# Patient Record
Sex: FEMALE | Race: BLACK OR AFRICAN AMERICAN | NOT HISPANIC OR LATINO | Employment: OTHER | URBAN - METROPOLITAN AREA
[De-identification: names, ages, dates, MRNs, and addresses within clinical notes are randomized per-mention and may not be internally consistent; named-entity substitution may affect disease eponyms.]

---

## 2017-06-03 PROBLEM — J96.21 ACUTE ON CHRONIC RESPIRATORY FAILURE WITH HYPOXIA AND HYPERCAPNIA: Status: ACTIVE | Noted: 2017-06-03

## 2017-06-03 PROBLEM — K43.6 VENTRAL HERNIA WITH BOWEL OBSTRUCTION: Status: ACTIVE | Noted: 2017-06-03

## 2017-06-03 PROBLEM — J96.22 ACUTE ON CHRONIC RESPIRATORY FAILURE WITH HYPOXIA AND HYPERCAPNIA: Status: ACTIVE | Noted: 2017-06-03

## 2017-06-08 PROBLEM — I26.99 PE (PULMONARY THROMBOEMBOLISM): Status: ACTIVE | Noted: 2017-06-08

## 2017-06-08 PROBLEM — I48.0 PAROXYSMAL A-FIB: Status: ACTIVE | Noted: 2017-06-08

## 2017-06-14 ENCOUNTER — PATIENT OUTREACH (OUTPATIENT)
Dept: ADMINISTRATIVE | Facility: CLINIC | Age: 67
End: 2017-06-14

## 2017-06-14 NOTE — PROGRESS NOTES
[facility] Script completed with patient instructed on medications stopped with D/C, verbalizes understanding.

## 2017-06-14 NOTE — PATIENT INSTRUCTIONS
Chronic Lung Disease: If Oxygen Is Prescribed   Supplemental oxygen is prescribed if tests show that the level of oxygen in your blood is too low. If the level stays too low for too long, serious problems can develop in many parts of the body. Supplemental oxygen helps to relieve your symptoms and prevent future problems by getting more oxygen to the blood. Depending on your test results, you may need oxygen all the time. Or it may only be needed during certain activities, such as exercise or sleep. When oxygen is prescribed, youll be referred to a medical equipment company. They will set up the oxygen unit and teach you how to use it.  Nasal cannula     A nasal cannula should be placed in the nose with the prongs arching toward you.     Oxygen is most often inhaled through a nasal cannula (lightweight tube with two hollow prongs that fit just inside the nose).  Types of supplemental oxygen    Compressed oxygen   Oxygen concentrator   Liquid oxygen   Prescribed oxygen comes in several forms. You may use more than one type, depending on when you need oxygen:  · Compressed oxygen is oxygen gas stored in a tank. Because the oxygen is stored under pressure, these tanks must be handled carefully. Gauges on the tank can be used to adjust the oxygen flow rate. Your healthcare provider will determine what this should be. Small tanks can be carried. Larger tanks are on wheels and can be pulled around the house.  · An oxygen concentrator is a machine about the size of a large suitcase. It plugs into an electrical outlet. (A back-up oxygen supply is recommended in case of a power outage.) The machine takes oxygen from the air and concentrates it. Its then delivered to you through plastic tubing. The tubing is long enough so that you can move around the house. When youre using the concentrator, it must be kept somewhere that has a good supply of fresh air. (Dont keep it in a confined space, like a closet.) You may be set up  on a concentrator if you need oxygen all the time or while youre sleeping.  · Liquid oxygen results when oxygen gas is cooled to a very low temperature. Its kept in special containers that maintain this low temperature. When you use liquid oxygen, its warmed and becomes gas before reaching the cannula. Most tanks come with a portable unit that you can carry or pull on a cart. Some of these weigh only a few pounds. Liquid oxygen units are easy to carry around. If you need oxygen all the time or during activity, this kind of unit can help you stay active.  Oxygen is prescribed just for you  Your healthcare provider will prescribe oxygen based on your needs. Here are a few things you should know:  · A therapist from the medical equipment company will explain when to use oxygen and what type to use. Youll be taught how to use and maintain your oxygen equipment.  · You must use the exact rate of oxygen prescribed for each activity. Dont increase or decrease the amount without asking your healthcare provider first.  · Supplemental oxygen is a medicine. Its not addictive and causes no side effects when used as directed.   Date Last Reviewed: 5/1/2016  © 8838-0761 [employer]. [address] [place] [address]. All rights reserved. This information is not intended as a substitute for professional medical care. Always follow your healthcare professional's instructions.

## 2021-04-12 PROBLEM — J96.12 CHRONIC RESPIRATORY FAILURE WITH HYPOXIA AND HYPERCAPNIA: Chronic | Status: ACTIVE | Noted: 2017-06-03

## 2021-04-12 PROBLEM — I26.99 PE (PULMONARY THROMBOEMBOLISM): Chronic | Status: ACTIVE | Noted: 2017-06-08

## 2021-04-12 PROBLEM — J96.11 CHRONIC RESPIRATORY FAILURE WITH HYPOXIA AND HYPERCAPNIA: Chronic | Status: ACTIVE | Noted: 2017-06-03

## 2021-04-12 PROBLEM — I48.0 PAROXYSMAL A-FIB: Chronic | Status: ACTIVE | Noted: 2017-06-08

## 2021-08-12 ENCOUNTER — HOSPITAL ENCOUNTER (EMERGENCY)
Facility: HOSPITAL | Age: 71
Discharge: HOME | End: 2021-08-12
Attending: EMERGENCY MEDICINE
Payer: MEDICARE

## 2021-08-12 VITALS
BODY MASS INDEX: 51.91 KG/M2 | SYSTOLIC BLOOD PRESSURE: 122 MMHG | TEMPERATURE: 98 F | HEIGHT: 63 IN | HEART RATE: 88 BPM | OXYGEN SATURATION: 96 % | WEIGHT: 293 LBS | RESPIRATION RATE: 20 BRPM | DIASTOLIC BLOOD PRESSURE: 84 MMHG

## 2021-08-12 DIAGNOSIS — J44.1 COPD EXACERBATION: Primary | ICD-10-CM

## 2021-08-12 DIAGNOSIS — Z20.822 COVID-19 VIRUS NOT DETECTED: ICD-10-CM

## 2021-08-12 LAB
CTP QC/QA: YES
SARS-COV-2 RDRP RESP QL NAA+PROBE: NEGATIVE

## 2021-08-12 PROCEDURE — 94761 N-INVAS EAR/PLS OXIMETRY MLT: CPT

## 2021-08-12 PROCEDURE — 94640 AIRWAY INHALATION TREATMENT: CPT

## 2021-08-12 PROCEDURE — 63600175 PHARM REV CODE 636 W HCPCS: Performed by: EMERGENCY MEDICINE

## 2021-08-12 PROCEDURE — 25000242 PHARM REV CODE 250 ALT 637 W/ HCPCS: Performed by: EMERGENCY MEDICINE

## 2021-08-12 PROCEDURE — 99284 EMERGENCY DEPT VISIT MOD MDM: CPT | Mod: 25

## 2021-08-12 PROCEDURE — 99900035 HC TECH TIME PER 15 MIN (STAT)

## 2021-08-12 PROCEDURE — 99900031 HC PATIENT EDUCATION (STAT)

## 2021-08-12 PROCEDURE — 27000221 HC OXYGEN, UP TO 24 HOURS

## 2021-08-12 PROCEDURE — U0002 COVID-19 LAB TEST NON-CDC: HCPCS | Performed by: EMERGENCY MEDICINE

## 2021-08-12 PROCEDURE — 96372 THER/PROPH/DIAG INJ SC/IM: CPT

## 2021-08-12 RX ORDER — DICYCLOMINE HYDROCHLORIDE 10 MG/ML
20 INJECTION INTRAMUSCULAR
Status: COMPLETED | OUTPATIENT
Start: 2021-08-12 | End: 2021-08-12

## 2021-08-12 RX ORDER — IPRATROPIUM BROMIDE AND ALBUTEROL SULFATE 2.5; .5 MG/3ML; MG/3ML
3 SOLUTION RESPIRATORY (INHALATION)
Status: COMPLETED | OUTPATIENT
Start: 2021-08-12 | End: 2021-08-12

## 2021-08-12 RX ORDER — METHYLPREDNISOLONE SOD SUCC 125 MG
125 VIAL (EA) INJECTION
Status: COMPLETED | OUTPATIENT
Start: 2021-08-12 | End: 2021-08-12

## 2021-08-12 RX ADMIN — IPRATROPIUM BROMIDE AND ALBUTEROL SULFATE 3 ML: 2.5; .5 SOLUTION RESPIRATORY (INHALATION) at 11:08

## 2021-08-12 RX ADMIN — DICYCLOMINE HYDROCHLORIDE 20 MG: 20 INJECTION, SOLUTION INTRAMUSCULAR at 11:08

## 2021-08-12 RX ADMIN — METHYLPREDNISOLONE SODIUM SUCCINATE 125 MG: 125 INJECTION, POWDER, FOR SOLUTION INTRAMUSCULAR; INTRAVENOUS at 11:08

## 2022-01-31 ENCOUNTER — HOSPITAL ENCOUNTER (INPATIENT)
Facility: HOSPITAL | Age: 72
LOS: 7 days | Discharge: HOME | DRG: 177 | End: 2022-02-07
Attending: EMERGENCY MEDICINE | Admitting: INTERNAL MEDICINE
Payer: MEDICARE

## 2022-01-31 DIAGNOSIS — R09.02 HYPOXIA: ICD-10-CM

## 2022-01-31 DIAGNOSIS — U07.1 COVID-19: Primary | ICD-10-CM

## 2022-01-31 DIAGNOSIS — R06.02 SHORTNESS OF BREATH: ICD-10-CM

## 2022-01-31 LAB
ALBUMIN SERPL BCP-MCNC: 2.9 G/DL (ref 3.5–5.2)
ALP SERPL-CCNC: 178 U/L (ref 55–135)
ALT SERPL W/O P-5'-P-CCNC: 31 U/L (ref 10–44)
ANION GAP SERPL CALC-SCNC: 4 MMOL/L (ref 8–16)
AST SERPL-CCNC: 44 U/L (ref 10–40)
BASOPHILS # BLD AUTO: 0.03 K/UL (ref 0–0.2)
BASOPHILS NFR BLD: 0.4 % (ref 0–1.9)
BILIRUB SERPL-MCNC: 0.5 MG/DL (ref 0.1–1)
BUN SERPL-MCNC: 15 MG/DL (ref 8–23)
CALCIUM SERPL-MCNC: 9.6 MG/DL (ref 8.7–10.5)
CHLORIDE SERPL-SCNC: 108 MMOL/L (ref 95–110)
CO2 SERPL-SCNC: 33 MMOL/L (ref 23–29)
CREAT SERPL-MCNC: 1.4 MG/DL (ref 0.5–1.4)
CTP QC/QA: YES
DIFFERENTIAL METHOD: ABNORMAL
EOSINOPHIL # BLD AUTO: 0 K/UL (ref 0–0.5)
EOSINOPHIL NFR BLD: 0 % (ref 0–8)
ERYTHROCYTE [DISTWIDTH] IN BLOOD BY AUTOMATED COUNT: 17.2 % (ref 11.5–14.5)
EST. GFR  (AFRICAN AMERICAN): 43.6 ML/MIN/1.73 M^2
EST. GFR  (NON AFRICAN AMERICAN): 37.8 ML/MIN/1.73 M^2
GLUCOSE SERPL-MCNC: 125 MG/DL (ref 70–110)
HCT VFR BLD AUTO: 42.2 % (ref 37–48.5)
HGB BLD-MCNC: 13.8 G/DL (ref 12–16)
IMM GRANULOCYTES # BLD AUTO: 0.02 K/UL (ref 0–0.04)
IMM GRANULOCYTES NFR BLD AUTO: 0.3 % (ref 0–0.5)
LYMPHOCYTES # BLD AUTO: 1.5 K/UL (ref 1–4.8)
LYMPHOCYTES NFR BLD: 20.3 % (ref 18–48)
MCH RBC QN AUTO: 29.9 PG (ref 27–31)
MCHC RBC AUTO-ENTMCNC: 32.7 G/DL (ref 32–36)
MCV RBC AUTO: 92 FL (ref 82–98)
MONOCYTES # BLD AUTO: 0.6 K/UL (ref 0.3–1)
MONOCYTES NFR BLD: 8.7 % (ref 4–15)
NEUTROPHILS # BLD AUTO: 5.1 K/UL (ref 1.8–7.7)
NEUTROPHILS NFR BLD: 70.3 % (ref 38–73)
NRBC BLD-RTO: 0 /100 WBC
NT-PROBNP SERPL-MCNC: 9796 PG/ML (ref 5–900)
PLATELET # BLD AUTO: 172 K/UL (ref 150–450)
PMV BLD AUTO: 9.5 FL (ref 9.2–12.9)
POTASSIUM SERPL-SCNC: 4.1 MMOL/L (ref 3.5–5.1)
PROT SERPL-MCNC: 6.7 G/DL (ref 6–8.4)
RBC # BLD AUTO: 4.61 M/UL (ref 4–5.4)
SARS-COV-2 RDRP RESP QL NAA+PROBE: POSITIVE
SODIUM SERPL-SCNC: 145 MMOL/L (ref 136–145)
TROPONIN I SERPL DL<=0.01 NG/ML-MCNC: 39.9 PG/ML (ref 0–60)
WBC # BLD AUTO: 7.25 K/UL (ref 3.9–12.7)

## 2022-01-31 PROCEDURE — U0002 COVID-19 LAB TEST NON-CDC: HCPCS | Performed by: EMERGENCY MEDICINE

## 2022-01-31 PROCEDURE — 36415 COLL VENOUS BLD VENIPUNCTURE: CPT | Performed by: EMERGENCY MEDICINE

## 2022-01-31 PROCEDURE — 93010 EKG 12-LEAD: ICD-10-PCS | Mod: ,,, | Performed by: INTERNAL MEDICINE

## 2022-01-31 PROCEDURE — 94660 CPAP INITIATION&MGMT: CPT

## 2022-01-31 PROCEDURE — 93010 ELECTROCARDIOGRAM REPORT: CPT | Mod: ,,, | Performed by: INTERNAL MEDICINE

## 2022-01-31 PROCEDURE — 63600175 PHARM REV CODE 636 W HCPCS: Performed by: EMERGENCY MEDICINE

## 2022-01-31 PROCEDURE — 93005 ELECTROCARDIOGRAM TRACING: CPT

## 2022-01-31 PROCEDURE — 27000221 HC OXYGEN, UP TO 24 HOURS

## 2022-01-31 PROCEDURE — 96374 THER/PROPH/DIAG INJ IV PUSH: CPT

## 2022-01-31 PROCEDURE — 25000003 PHARM REV CODE 250: Performed by: EMERGENCY MEDICINE

## 2022-01-31 PROCEDURE — 99291 CRITICAL CARE FIRST HOUR: CPT | Mod: 25

## 2022-01-31 PROCEDURE — 27000207 HC ISOLATION

## 2022-01-31 PROCEDURE — 94761 N-INVAS EAR/PLS OXIMETRY MLT: CPT

## 2022-01-31 PROCEDURE — 85025 COMPLETE CBC W/AUTO DIFF WBC: CPT | Performed by: EMERGENCY MEDICINE

## 2022-01-31 PROCEDURE — 27000190 HC CPAP FULL FACE MASK W/VALVE

## 2022-01-31 PROCEDURE — 80053 COMPREHEN METABOLIC PANEL: CPT | Performed by: EMERGENCY MEDICINE

## 2022-01-31 PROCEDURE — 99900031 HC PATIENT EDUCATION (STAT)

## 2022-01-31 PROCEDURE — 83880 ASSAY OF NATRIURETIC PEPTIDE: CPT | Performed by: EMERGENCY MEDICINE

## 2022-01-31 PROCEDURE — 84484 ASSAY OF TROPONIN QUANT: CPT | Performed by: EMERGENCY MEDICINE

## 2022-01-31 PROCEDURE — 11000001 HC ACUTE MED/SURG PRIVATE ROOM

## 2022-01-31 PROCEDURE — 99900035 HC TECH TIME PER 15 MIN (STAT)

## 2022-01-31 RX ORDER — TALC
6 POWDER (GRAM) TOPICAL NIGHTLY PRN
Status: DISCONTINUED | OUTPATIENT
Start: 2022-01-31 | End: 2022-02-07 | Stop reason: HOSPADM

## 2022-01-31 RX ORDER — SODIUM CHLORIDE 0.9 % (FLUSH) 0.9 %
10 SYRINGE (ML) INJECTION
Status: DISCONTINUED | OUTPATIENT
Start: 2022-01-31 | End: 2022-02-07 | Stop reason: HOSPADM

## 2022-01-31 RX ORDER — DEXAMETHASONE SODIUM PHOSPHATE 4 MG/ML
6 INJECTION, SOLUTION INTRA-ARTICULAR; INTRALESIONAL; INTRAMUSCULAR; INTRAVENOUS; SOFT TISSUE EVERY 24 HOURS
Status: DISCONTINUED | OUTPATIENT
Start: 2022-01-31 | End: 2022-02-01

## 2022-01-31 RX ORDER — ENOXAPARIN SODIUM 100 MG/ML
40 INJECTION SUBCUTANEOUS EVERY 12 HOURS
Status: DISCONTINUED | OUTPATIENT
Start: 2022-01-31 | End: 2022-02-07 | Stop reason: HOSPADM

## 2022-01-31 RX ORDER — ACETAMINOPHEN 325 MG/1
650 TABLET ORAL EVERY 8 HOURS PRN
Status: DISCONTINUED | OUTPATIENT
Start: 2022-01-31 | End: 2022-02-07 | Stop reason: HOSPADM

## 2022-01-31 RX ORDER — ONDANSETRON 2 MG/ML
4 INJECTION INTRAMUSCULAR; INTRAVENOUS EVERY 8 HOURS PRN
Status: DISCONTINUED | OUTPATIENT
Start: 2022-01-31 | End: 2022-02-07 | Stop reason: HOSPADM

## 2022-01-31 RX ORDER — FUROSEMIDE 10 MG/ML
40 INJECTION INTRAMUSCULAR; INTRAVENOUS
Status: COMPLETED | OUTPATIENT
Start: 2022-01-31 | End: 2022-01-31

## 2022-01-31 RX ADMIN — ENOXAPARIN SODIUM 40 MG: 40 INJECTION SUBCUTANEOUS at 10:01

## 2022-01-31 RX ADMIN — FUROSEMIDE 40 MG: 10 INJECTION, SOLUTION INTRAVENOUS at 06:01

## 2022-01-31 RX ADMIN — REMDESIVIR 200 MG: 100 INJECTION, POWDER, LYOPHILIZED, FOR SOLUTION INTRAVENOUS at 10:01

## 2022-01-31 RX ADMIN — MELATONIN TAB 3 MG 6 MG: 3 TAB at 11:01

## 2022-01-31 RX ADMIN — DEXAMETHASONE SODIUM PHOSPHATE 6 MG: 4 INJECTION, SOLUTION INTRA-ARTICULAR; INTRALESIONAL; INTRAMUSCULAR; INTRAVENOUS; SOFT TISSUE at 10:01

## 2022-02-01 PROBLEM — U07.1 COVID-19: Status: ACTIVE | Noted: 2022-02-01

## 2022-02-01 PROBLEM — J96.01 ACUTE HYPOXEMIC RESPIRATORY FAILURE: Status: ACTIVE | Noted: 2022-02-01

## 2022-02-01 LAB
ALBUMIN SERPL BCP-MCNC: 3.3 G/DL (ref 3.5–5.2)
ALP SERPL-CCNC: 185 U/L (ref 55–135)
ALT SERPL W/O P-5'-P-CCNC: 36 U/L (ref 10–44)
ANION GAP SERPL CALC-SCNC: 8 MMOL/L (ref 8–16)
AST SERPL-CCNC: 34 U/L (ref 10–40)
BASOPHILS # BLD AUTO: 0.01 K/UL (ref 0–0.2)
BASOPHILS NFR BLD: 0.2 % (ref 0–1.9)
BILIRUB SERPL-MCNC: 0.4 MG/DL (ref 0.1–1)
BUN SERPL-MCNC: 14 MG/DL (ref 8–23)
CALCIUM SERPL-MCNC: 9.9 MG/DL (ref 8.7–10.5)
CHLORIDE SERPL-SCNC: 107 MMOL/L (ref 95–110)
CO2 SERPL-SCNC: 31 MMOL/L (ref 23–29)
CREAT SERPL-MCNC: 1.3 MG/DL (ref 0.5–1.4)
DIFFERENTIAL METHOD: ABNORMAL
EOSINOPHIL # BLD AUTO: 0 K/UL (ref 0–0.5)
EOSINOPHIL NFR BLD: 0 % (ref 0–8)
ERYTHROCYTE [DISTWIDTH] IN BLOOD BY AUTOMATED COUNT: 18.5 % (ref 11.5–14.5)
EST. GFR  (AFRICAN AMERICAN): 47.7 ML/MIN/1.73 M^2
EST. GFR  (NON AFRICAN AMERICAN): 41.4 ML/MIN/1.73 M^2
GLUCOSE SERPL-MCNC: 138 MG/DL (ref 70–110)
HCT VFR BLD AUTO: 44.6 % (ref 37–48.5)
HGB BLD-MCNC: 14.6 G/DL (ref 12–16)
IMM GRANULOCYTES # BLD AUTO: 0.02 K/UL (ref 0–0.04)
IMM GRANULOCYTES NFR BLD AUTO: 0.3 % (ref 0–0.5)
LYMPHOCYTES # BLD AUTO: 0.7 K/UL (ref 1–4.8)
LYMPHOCYTES NFR BLD: 9.9 % (ref 18–48)
MCH RBC QN AUTO: 30.9 PG (ref 27–31)
MCHC RBC AUTO-ENTMCNC: 32.7 G/DL (ref 32–36)
MCV RBC AUTO: 95 FL (ref 82–98)
MONOCYTES # BLD AUTO: 0.2 K/UL (ref 0.3–1)
MONOCYTES NFR BLD: 2.6 % (ref 4–15)
NEUTROPHILS # BLD AUTO: 5.7 K/UL (ref 1.8–7.7)
NEUTROPHILS NFR BLD: 87 % (ref 38–73)
NRBC BLD-RTO: 0 /100 WBC
PLATELET # BLD AUTO: 191 K/UL (ref 150–450)
PMV BLD AUTO: 10.4 FL (ref 9.2–12.9)
POTASSIUM SERPL-SCNC: 4.5 MMOL/L (ref 3.5–5.1)
PROT SERPL-MCNC: 7.6 G/DL (ref 6–8.4)
RBC # BLD AUTO: 4.72 M/UL (ref 4–5.4)
SODIUM SERPL-SCNC: 146 MMOL/L (ref 136–145)
WBC # BLD AUTO: 6.58 K/UL (ref 3.9–12.7)

## 2022-02-01 PROCEDURE — 80053 COMPREHEN METABOLIC PANEL: CPT | Performed by: EMERGENCY MEDICINE

## 2022-02-01 PROCEDURE — 99900035 HC TECH TIME PER 15 MIN (STAT)

## 2022-02-01 PROCEDURE — 94660 CPAP INITIATION&MGMT: CPT

## 2022-02-01 PROCEDURE — 94761 N-INVAS EAR/PLS OXIMETRY MLT: CPT

## 2022-02-01 PROCEDURE — 27000207 HC ISOLATION

## 2022-02-01 PROCEDURE — 25000003 PHARM REV CODE 250: Performed by: EMERGENCY MEDICINE

## 2022-02-01 PROCEDURE — 99900031 HC PATIENT EDUCATION (STAT)

## 2022-02-01 PROCEDURE — 11000001 HC ACUTE MED/SURG PRIVATE ROOM

## 2022-02-01 PROCEDURE — 63600175 PHARM REV CODE 636 W HCPCS: Performed by: EMERGENCY MEDICINE

## 2022-02-01 PROCEDURE — 27000221 HC OXYGEN, UP TO 24 HOURS

## 2022-02-01 PROCEDURE — 63600175 PHARM REV CODE 636 W HCPCS: Performed by: INTERNAL MEDICINE

## 2022-02-01 PROCEDURE — 85025 COMPLETE CBC W/AUTO DIFF WBC: CPT | Performed by: EMERGENCY MEDICINE

## 2022-02-01 PROCEDURE — 36415 COLL VENOUS BLD VENIPUNCTURE: CPT | Performed by: EMERGENCY MEDICINE

## 2022-02-01 RX ORDER — DEXAMETHASONE SODIUM PHOSPHATE 4 MG/ML
6 INJECTION, SOLUTION INTRA-ARTICULAR; INTRALESIONAL; INTRAMUSCULAR; INTRAVENOUS; SOFT TISSUE EVERY 24 HOURS
Status: DISCONTINUED | OUTPATIENT
Start: 2022-02-01 | End: 2022-02-05

## 2022-02-01 RX ORDER — FUROSEMIDE 10 MG/ML
40 INJECTION INTRAMUSCULAR; INTRAVENOUS
Status: DISCONTINUED | OUTPATIENT
Start: 2022-02-01 | End: 2022-02-07 | Stop reason: HOSPADM

## 2022-02-01 RX ADMIN — DEXAMETHASONE SODIUM PHOSPHATE 6 MG: 4 INJECTION, SOLUTION INTRA-ARTICULAR; INTRALESIONAL; INTRAMUSCULAR; INTRAVENOUS; SOFT TISSUE at 01:02

## 2022-02-01 RX ADMIN — ENOXAPARIN SODIUM 40 MG: 40 INJECTION SUBCUTANEOUS at 08:02

## 2022-02-01 RX ADMIN — ENOXAPARIN SODIUM 40 MG: 40 INJECTION SUBCUTANEOUS at 09:02

## 2022-02-01 RX ADMIN — REMDESIVIR 100 MG: 100 INJECTION, POWDER, LYOPHILIZED, FOR SOLUTION INTRAVENOUS at 09:02

## 2022-02-01 RX ADMIN — FUROSEMIDE 40 MG: 10 INJECTION, SOLUTION INTRAVENOUS at 02:02

## 2022-02-01 NOTE — CARE UPDATE
Patient stated she did not feel like she was getting enough pressure on the bipap at 10/5 40%. Dr. Stephen stated ok to adjust settings to fit patients needs. Increased bipap settings to 16/8 40%. Pt stated she was more comfortable on these settings. No distress noted. Nurse Talon aware of bipap changes. Pt sats 94%

## 2022-02-01 NOTE — H&P
"[facility]  History & Physical    Patient Name: Carmen Tan  MRN: [id number]  Patient Class: IP- Inpatient  Admission Date: 1/31/2022  Attending Physician: Oscar Ge Jr., MD   Primary Care Provider: Lucian Barry MD         Patient information was obtained from ER records.     Subjective:     Principal Problem:<principal problem not specified>    Chief Complaint:   Chief Complaint   Patient presents with    Shortness of Breath     EMS reports, "Patient complaining of shortness of breath. Stopped taking her Lasix 3-4 months ago because she could never make it to the restroom. She would urinate all over. Started feeling really bad this morning. I put her on 4 liters and was up to 97%. 18 gauge right hand."        HPI:   Chief Complaint   Patient presents with    Shortness of Breath       EMS reports, "Patient complaining of shortness of breath. Stopped taking her Lasix 3-4 months ago because she could never make it to the restroom. She would urinate all over. Started feeling really bad this morning. I put her on 4 liters and was up to 97%. 18 gauge right hand."      73 yo female with HTN, CHF, COPD on 2L here with SOB x 2 days. No fever. Dry cough. No hemoptysis. Gradual onset. No known sick contacts. Vaccinated for COVID. Worse with exertion and alleviated with rest. Self d/c'd lasix a few months ago because she didn't like urinating so frequently.          Past Medical History:   Diagnosis Date    Abdominal hernia     CHF (congestive heart failure)     COPD (chronic obstructive pulmonary disease)     GERD (gastroesophageal reflux disease)     History of home oxygen therapy     Hypertension     Migraine headache     Pulmonary embolism 06/01/2017    Small bowel obstruction     Thyroid disease        Past Surgical History:   Procedure Laterality Date    COLONOSCOPY      HYSTERECTOMY      INNER EAR SURGERY      UPPER GASTROINTESTINAL ENDOSCOPY         Review of " patient's allergies indicates:   Allergen Reactions    Pcn [penicillins] Swelling       No current facility-administered medications on file prior to encounter.     Current Outpatient Medications on File Prior to Encounter   Medication Sig    fesoterodine (TOVIAZ) 8 mg Tb24 Take by mouth.    fish oil-omega-3 fatty acids 300-1,000 mg capsule Take 2 g by mouth once daily.    furosemide (LASIX) 40 MG tablet Take 1 tablet (40 mg total) by mouth 2 (two) times daily. If having increased shortness of breath, increase in leg swelling, and if you notice an >3lbs in 24 hours. May take up to 4 tablets in 1 day (every 6 hours)    gabapentin (NEURONTIN) 600 MG tablet Take 600 mg by mouth 2 (two) times daily.    LIPITOR 40 mg tablet Take 1 tablet (40 mg total) by mouth once daily.    lisinopril (PRINIVIL,ZESTRIL) 5 MG tablet Take 1 tablet (5 mg total) by mouth once daily.    metoprolol tartrate (LOPRESSOR) 25 MG tablet Take 1 tablet (25 mg total) by mouth 2 (two) times daily.    mometasone-formoterol (DULERA) 200-5 mcg/actuation inhaler Inhale 2 puffs into the lungs 2 (two) times daily. Controller    omeprazole (PRILOSEC) 20 MG capsule Take 20 mg by mouth once daily.    rivaroxaban (XARELTO) 10 mg Tab Take 2 tablets (20 mg total) by mouth daily with dinner or evening meal.    vitamin D 1000 units Tab Take 185 mg by mouth once daily.     Family History    None       Tobacco Use    Smoking status: Former Smoker    Smokeless tobacco: Never Used   Substance and Sexual Activity    Alcohol use: No    Drug use: No    Sexual activity: Not on file     Review of Systems   Constitutional: Positive for fatigue. Negative for chills and fever.   HENT: Negative for rhinorrhea, sneezing and sore throat.    Eyes: Negative for pain and visual disturbance.   Respiratory: Positive for cough and shortness of breath.    Cardiovascular: Positive for leg swelling. Negative for chest pain.   Gastrointestinal: Negative for abdominal pain,  constipation and diarrhea.   Endocrine: Negative for cold intolerance and heat intolerance.   Genitourinary: Negative for difficulty urinating.   Musculoskeletal: Negative for arthralgias and joint swelling.   Skin: Negative for rash.   Allergic/Immunologic: Negative for environmental allergies.   Neurological: Negative for dizziness, syncope and weakness.   Hematological: Does not bruise/bleed easily.   Psychiatric/Behavioral: Negative for dysphoric mood. The patient is not nervous/anxious.      Objective:     Vital Signs (Most Recent):  Temp: 97.6 °F (36.4 °C) (02/01/22 1119)  Pulse: 77 (02/01/22 1119)  Resp: (!) 32 (02/01/22 1119)  BP: (!) 156/70 (02/01/22 1119)  SpO2: 98 % (02/01/22 1119) Vital Signs (24h Range):  Temp:  [97.6 °F (36.4 °C)-98.9 °F (37.2 °C)] 97.6 °F (36.4 °C)  Pulse:  [60-92] 77  Resp:  [18-35] 32  SpO2:  [89 %-98 %] 98 %  BP: (151-184)/() 156/70     Weight: (!) 148.5 kg (327 lb 6.1 oz)  Body mass index is 57.99 kg/m².    Physical Exam  Vitals and nursing note reviewed.   Constitutional:       Appearance: Normal appearance. She is obese.   HENT:      Head: Normocephalic and atraumatic.      Nose: Nose normal.   Eyes:      Extraocular Movements: Extraocular movements intact.      Pupils: Pupils are equal, round, and reactive to light.   Cardiovascular:      Rate and Rhythm: Normal rate and regular rhythm.      Heart sounds: No murmur heard.  No friction rub. No gallop.    Pulmonary:      Effort: Pulmonary effort is normal.      Breath sounds: Rhonchi and rales present.   Abdominal:      General: Abdomen is flat. Bowel sounds are normal.      Palpations: Abdomen is soft.   Musculoskeletal:         General: No swelling or deformity.      Cervical back: Normal range of motion and neck supple.      Right lower leg: Edema present.      Left lower leg: Edema present.   Skin:     General: Skin is warm and dry.      Capillary Refill: Capillary refill takes less than 2 seconds.   Neurological:       General: No focal deficit present.      Mental Status: She is alert and oriented to person, place, and time.   Psychiatric:         Mood and Affect: Mood normal.         Behavior: Behavior normal.           CRANIAL NERVES     CN III, IV, VI   Pupils are equal, round, and reactive to light.       Significant Labs: All pertinent labs within the past 24 hours have been reviewed.    Significant Imaging: I have reviewed all pertinent imaging results/findings within the past 24 hours.    Assessment/Plan:     COVID-19  Continue treatment per protocol    remdesivir 1/5      Acute hypoxemic respiratory failure  Patient with Hypoxic Respiratory failure which is Acute on chronic.  she is on home oxygen at 2 LPM. Supplemental oxygen was provided and noted- Oxygen Concentration (%):  [40-50] 50.   Signs/symptoms of respiratory failure include- tachypnea, increased work of breathing, respiratory distress and wheezing. Contributing diagnoses includes - Pneumonia Labs and images were reviewed. Patient Has not had a recent ABG. Will treat underlying causes and adjust management of respiratory failure as follows- Treat for covid    PE (pulmonary thromboembolism)  By hx.  Continue lovenox      VTE Risk Mitigation (From admission, onward)         Ordered     IP VTE HIGH RISK PATIENT  Once         01/31/22 2159     Place sequential compression device  Until discontinued         01/31/22 2159     enoxaparin injection 40 mg  Every 12 hours         01/31/22 2159                   Oscar Ge Jr, MD  Department of Hospital Medicine   [facility] [address]

## 2022-02-01 NOTE — PLAN OF CARE
[facility]  Discharge Assessment    Primary Care Provider: Lucian Barry MD     Discharge Assessment (most recent)     BRIEF DISCHARGE ASSESSMENT - 02/01/22 1024        Discharge Planning    Assessment Type Discharge Planning Brief Assessment     Discharge Plan A Home with family;Home     Discharge Plan B Home with family;Home               Brief discharge assessment. SW attempted to completed the assessment with the patient's daughter, but she asked the SW to call her back later. SW attempted to contact the patient's brother, Don-no answer.     Will try to complete the full assessment later.

## 2022-02-01 NOTE — SUBJECTIVE & OBJECTIVE
Past Medical History:   Diagnosis Date    Abdominal hernia     CHF (congestive heart failure)     COPD (chronic obstructive pulmonary disease)     GERD (gastroesophageal reflux disease)     History of home oxygen therapy     Hypertension     Migraine headache     Pulmonary embolism 06/01/2017    Small bowel obstruction     Thyroid disease        Past Surgical History:   Procedure Laterality Date    COLONOSCOPY      HYSTERECTOMY      INNER EAR SURGERY      UPPER GASTROINTESTINAL ENDOSCOPY         Review of patient's allergies indicates:   Allergen Reactions    Pcn [penicillins] Swelling       No current facility-administered medications on file prior to encounter.     Current Outpatient Medications on File Prior to Encounter   Medication Sig    fesoterodine (TOVIAZ) 8 mg Tb24 Take by mouth.    fish oil-omega-3 fatty acids 300-1,000 mg capsule Take 2 g by mouth once daily.    furosemide (LASIX) 40 MG tablet Take 1 tablet (40 mg total) by mouth 2 (two) times daily. If having increased shortness of breath, increase in leg swelling, and if you notice an >3lbs in 24 hours. May take up to 4 tablets in 1 day (every 6 hours)    gabapentin (NEURONTIN) 600 MG tablet Take 600 mg by mouth 2 (two) times daily.    LIPITOR 40 mg tablet Take 1 tablet (40 mg total) by mouth once daily.    lisinopril (PRINIVIL,ZESTRIL) 5 MG tablet Take 1 tablet (5 mg total) by mouth once daily.    metoprolol tartrate (LOPRESSOR) 25 MG tablet Take 1 tablet (25 mg total) by mouth 2 (two) times daily.    mometasone-formoterol (DULERA) 200-5 mcg/actuation inhaler Inhale 2 puffs into the lungs 2 (two) times daily. Controller    omeprazole (PRILOSEC) 20 MG capsule Take 20 mg by mouth once daily.    rivaroxaban (XARELTO) 10 mg Tab Take 2 tablets (20 mg total) by mouth daily with dinner or evening meal.    vitamin D 1000 units Tab Take 185 mg by mouth once daily.     Family History    None       Tobacco Use    Smoking status: Former  Smoker    Smokeless tobacco: Never Used   Substance and Sexual Activity    Alcohol use: No    Drug use: No    Sexual activity: Not on file     Review of Systems   Constitutional: Positive for fatigue. Negative for chills and fever.   HENT: Negative for rhinorrhea, sneezing and sore throat.    Eyes: Negative for pain and visual disturbance.   Respiratory: Positive for cough and shortness of breath.    Cardiovascular: Positive for leg swelling. Negative for chest pain.   Gastrointestinal: Negative for abdominal pain, constipation and diarrhea.   Endocrine: Negative for cold intolerance and heat intolerance.   Genitourinary: Negative for difficulty urinating.   Musculoskeletal: Negative for arthralgias and joint swelling.   Skin: Negative for rash.   Allergic/Immunologic: Negative for environmental allergies.   Neurological: Negative for dizziness, syncope and weakness.   Hematological: Does not bruise/bleed easily.   Psychiatric/Behavioral: Negative for dysphoric mood. The patient is not nervous/anxious.      Objective:     Vital Signs (Most Recent):  Temp: 97.6 °F (36.4 °C) (02/01/22 1119)  Pulse: 77 (02/01/22 1119)  Resp: (!) 32 (02/01/22 1119)  BP: (!) 156/70 (02/01/22 1119)  SpO2: 98 % (02/01/22 1119) Vital Signs (24h Range):  Temp:  [97.6 °F (36.4 °C)-98.9 °F (37.2 °C)] 97.6 °F (36.4 °C)  Pulse:  [60-92] 77  Resp:  [18-35] 32  SpO2:  [89 %-98 %] 98 %  BP: (151-184)/() 156/70     Weight: (!) 148.5 kg (327 lb 6.1 oz)  Body mass index is 57.99 kg/m².    Physical Exam  Vitals and nursing note reviewed.   Constitutional:       Appearance: Normal appearance. She is obese.   HENT:      Head: Normocephalic and atraumatic.      Nose: Nose normal.   Eyes:      Extraocular Movements: Extraocular movements intact.      Pupils: Pupils are equal, round, and reactive to light.   Cardiovascular:      Rate and Rhythm: Normal rate and regular rhythm.      Heart sounds: No murmur heard.  No friction rub. No gallop.     Pulmonary:      Effort: Pulmonary effort is normal.      Breath sounds: Rhonchi and rales present.   Abdominal:      General: Abdomen is flat. Bowel sounds are normal.      Palpations: Abdomen is soft.   Musculoskeletal:         General: No swelling or deformity.      Cervical back: Normal range of motion and neck supple.      Right lower leg: Edema present.      Left lower leg: Edema present.   Skin:     General: Skin is warm and dry.      Capillary Refill: Capillary refill takes less than 2 seconds.   Neurological:      General: No focal deficit present.      Mental Status: She is alert and oriented to person, place, and time.   Psychiatric:         Mood and Affect: Mood normal.         Behavior: Behavior normal.           CRANIAL NERVES     CN III, IV, VI   Pupils are equal, round, and reactive to light.       Significant Labs: All pertinent labs within the past 24 hours have been reviewed.    Significant Imaging: I have reviewed all pertinent imaging results/findings within the past 24 hours.

## 2022-02-01 NOTE — NURSING
Pt admit to unit from ED with c/o sob. Pt. Aaox3. Able to make needs known. Pt came to ED after not taking lasix at home due to it causing her to urinate to much. Pt on 3L/nc with lung sounds diminished in all lung fields. Pt. States she uses a cipap at night at home. Pt instructed to call for assist. Call bell in reach. Will cont. To monitor.

## 2022-02-01 NOTE — ED NOTES
Report received and pt care assumed.  Purewik placed and connected to low suction. Pt requesting a C-Pap machine, will notify RANJAN RICHARDSON.

## 2022-02-01 NOTE — CARE UPDATE
Pt complaining of feeling SOB. Gave pt a bump of 100% O2 for 2 mins and then increased bipap settings to 50% fio2. Instructed pt how to deep breathe and slow rate. Pt began to feel slightly less short of breath and respiratory rate decreased and work of breathing decreased.  Sats 96% Talon nurse aware.

## 2022-02-01 NOTE — ASSESSMENT & PLAN NOTE
Patient with Hypoxic Respiratory failure which is Acute on chronic.  she is on home oxygen at 2 LPM. Supplemental oxygen was provided and noted- Oxygen Concentration (%):  [40-50] 50.   Signs/symptoms of respiratory failure include- tachypnea, increased work of breathing, respiratory distress and wheezing. Contributing diagnoses includes - Pneumonia Labs and images were reviewed. Patient Has not had a recent ABG. Will treat underlying causes and adjust management of respiratory failure as follows- Treat for covid

## 2022-02-01 NOTE — ED PROVIDER NOTES
"Encounter Date: 1/31/2022       History     Chief Complaint   Patient presents with    Shortness of Breath     EMS reports, "Patient complaining of shortness of breath. Stopped taking her Lasix 3-4 months ago because she could never make it to the restroom. She would urinate all over. Started feeling really bad this morning. I put her on 4 liters and was up to 97%. 18 gauge right hand."     73 yo female with HTN, CHF, COPD on 2L here with SOB x 2 days. No fever. Dry cough. No hemoptysis. Gradual onset. No known sick contacts. Vaccinated for COVID. Worse with exertion and alleviated with rest. Self d/c'd lasix a few months ago because she didn't like urinating so frequently.         Review of patient's allergies indicates:   Allergen Reactions    Pcn [penicillins] Swelling     Past Medical History:   Diagnosis Date    Abdominal hernia     CHF (congestive heart failure)     COPD (chronic obstructive pulmonary disease)     GERD (gastroesophageal reflux disease)     History of home oxygen therapy     Hypertension     Migraine headache     Pulmonary embolism 06/01/2017    Small bowel obstruction     Thyroid disease      Past Surgical History:   Procedure Laterality Date    COLONOSCOPY      HYSTERECTOMY      INNER EAR SURGERY      UPPER GASTROINTESTINAL ENDOSCOPY       History reviewed. No pertinent family history.  Social History     Tobacco Use    Smoking status: Former Smoker    Smokeless tobacco: Never Used   Substance Use Topics    Alcohol use: No    Drug use: No     Review of Systems   Constitutional: Positive for fatigue. Negative for fever.   HENT: Negative.    Respiratory: Positive for cough and shortness of breath.    Cardiovascular: Positive for leg swelling. Negative for chest pain.   Gastrointestinal: Negative.    All other systems reviewed and are negative.      Physical Exam     Initial Vitals   BP Pulse Resp Temp SpO2   01/31/22 1831 01/31/22 1831 01/31/22 1831 01/31/22 1835 01/31/22 " 1831   (!) 169/85 76 18 98.5 °F (36.9 °C) 96 %      MAP       --                Physical Exam    Nursing note and vitals reviewed.  Constitutional: She appears well-developed and well-nourished. She is not diaphoretic. No distress.   Morbidly obese   HENT:   Head: Normocephalic and atraumatic.   Eyes: Conjunctivae and EOM are normal. Pupils are equal, round, and reactive to light.   Neck: Neck supple.   Normal range of motion.  Cardiovascular: Normal rate, regular rhythm and intact distal pulses.   Pulmonary/Chest: No respiratory distress. She has no wheezes. She has rhonchi. She has rales.   Abdominal: Abdomen is soft. Bowel sounds are normal. She exhibits no distension. There is no abdominal tenderness. There is no rebound.   Musculoskeletal:         General: Edema present. No tenderness. Normal range of motion.      Cervical back: Normal range of motion and neck supple.     Neurological: She is alert and oriented to person, place, and time. She has normal strength and normal reflexes. GCS score is 15. GCS eye subscore is 4. GCS verbal subscore is 5. GCS motor subscore is 6.   Skin: Skin is warm and dry. Capillary refill takes less than 2 seconds. No rash noted. No erythema.         ED Course   Critical Care    Date/Time: 1/31/2022 8:24 PM  Performed by: Alfonzo Stephen MD  Authorized by: Alfonzo Stephen MD   Direct patient critical care time: 10 minutes  Additional history critical care time: 5 minutes  Ordering / reviewing critical care time: 5 minutes  Documentation critical care time: 5 minutes  Consulting other physicians critical care time: 5 minutes  Total critical care time (exclusive of procedural time) : 30 minutes  Critical care was necessary to treat or prevent imminent or life-threatening deterioration of the following conditions: respiratory failure.        Labs Reviewed   CBC W/ AUTO DIFFERENTIAL - Abnormal; Notable for the following components:       Result Value    RDW 17.2 (*)     All  other components within normal limits   COMPREHENSIVE METABOLIC PANEL - Abnormal; Notable for the following components:    CO2 33 (*)     Glucose 125 (*)     Albumin 2.9 (*)     Alkaline Phosphatase 178 (*)     AST 44 (*)     Anion Gap 4 (*)     eGFR if  43.6 (*)     eGFR if non  37.8 (*)     All other components within normal limits   NT-PRO NATRIURETIC PEPTIDE - Abnormal; Notable for the following components:    NT-proBNP 9796 (*)     All other components within normal limits   SARS-COV-2 RDRP GENE - Abnormal; Notable for the following components:    POC Rapid COVID Positive (*)     All other components within normal limits    Narrative:     This test utilizes isothermal nucleic acid amplification   technology to detect the SARS-CoV-2 RdRp nucleic acid segment.   The analytical sensitivity (limit of detection) is 125 genome   equivalents/mL.   A POSITIVE result implies infection with the SARS-CoV-2 virus;   the patient is presumed to be contagious.     A NEGATIVE result means that SARS-CoV-2 nucleic acids are not   present above the limit of detection. A NEGATIVE result should be   treated as presumptive. It does not rule out the possibility of   COVID-19 and should not be the sole basis for treatment decisions.   If COVID-19 is strongly suspected based on clinical and exposure   history, re-testing using an alternate molecular assay should be   considered.   This test is only for use under the Food and Drug   Administration s Emergency Use Authorization (EUA).   Commercial kits are provided by [employer].   Performance characteristics of the EUA have been independently   verified by [facility] Department of   Pathology and Laboratory Medicine.   _________________________________________________________________   The authorized Fact Sheet for Healthcare Providers and the authorized Fact   Sheet for Patients of the ID NOW COVID-19 are available on the FDA    website:     https://www.fda.gov/media/433944/download  https://www.fda.gov/media/214356/download       TROPONIN I HIGH SENSITIVITY     EKG Readings: (Independently Interpreted)   Initial Reading: No STEMI. Rhythm: Normal Sinus Rhythm. Heart Rate: 73. Ectopy: No Ectopy. Clinical Impression: Normal Sinus Rhythm       Imaging Results          X-Ray Chest AP Portable (Final result)  Result time 01/31/22 20:33:17    Final result by Abran Pelletier MD (01/31/22 20:33:17)                 Impression:      As above.      Electronically signed by: Abran Pelletier MD  Date:    01/31/2022  Time:    20:33             Narrative:    EXAMINATION:  XR CHEST AP PORTABLE    CLINICAL HISTORY:  CHF    COMPARISON:  06/05/2019    FINDINGS:  Cardiac silhouette is enlarged, similar to the previous exam.  Ground-glass opacities at the bilateral mid and lower lung zones could represent mild edema or an atypical infectious process.  No obvious pleural effusion.                              X-Rays:   Independently Interpreted Readings:   Chest X-Ray: Scattered infiltrates. Concerning for COVID-19     Medications   furosemide injection 40 mg (40 mg Intravenous Given 1/31/22 1859)     Medical Decision Making:   Clinical Tests:   Lab Tests: Reviewed and Ordered  Radiological Study: Reviewed and Ordered  Medical Tests: Reviewed and Ordered  ED Management:  Discussed with Dr Ge. Agrees to accept patient admission given increased O2 requirements, CXR findings and high risk for mortality related to COVID infection.                       Clinical Impression:   Final diagnoses:  [R06.02] Shortness of breath  [U07.1] COVID-19 (Primary)  [R09.02] Hypoxia          ED Disposition Condition    Admit               Alfonzo Stephen MD  01/31/22 2025       Alfonzo Stephen MD  01/31/22 2043

## 2022-02-01 NOTE — ED NOTES
Pt on the phone with her oldest son Yassine Tan #[phone].  Update given as son is out of state and will call later when pt settled in room.

## 2022-02-01 NOTE — PLAN OF CARE
[facility]  Initial Discharge Assessment       Primary Care Provider: Lucian Barry MD    Admission Diagnosis: Shortness of breath [R06.02]  Hypoxia [R09.02]  COVID-19 [U07.1]    Admission Date: 1/31/2022  Expected Discharge Date:          Payor: HU[employer] MANAGED MEDICARE / Plan: [employer] MEDICARE HMO / Product Type: Capitation /     Extended Emergency Contact Information  Primary Emergency Contact: Esther Tan   [facility]  Home Phone: [phone]  Relation: Daughter  Secondary Emergency Contact: Davion Tan   [place]  Mobile Phone: [phone]  Relation: Son    Discharge Plan A: Home with family,Home  Discharge Plan B: Home with family,Home Health      [id number] - [facility] [address] [place] [address] & [facility] [place]  [address] [address]  Phone: [phone] Fax: [phone]      Initial Assessment (most recent)     Adult Discharge Assessment - 02/01/22 1105        Discharge Assessment    Assessment Type Discharge Planning Assessment     Confirmed/corrected address, phone number and insurance Yes     Source of Information patient     When was your last doctors appointment? --   The patient stated that she saw her PCP last month.    Reason For Admission COVID-19     Lives With grandchild(edmar);spouse;child(edmar), adult     Do you expect to return to your current living situation? Yes     Do you have help at home or someone to help you manage your care at home? Yes     Who are your caregiver(s) and their phone number(s)? Luisa (granddaughter) [phone]     Prior to hospitilization cognitive status: Alert/Oriented     Current cognitive status: Alert/Oriented     Walking or Climbing Stairs Difficulty ambulation difficulty, requires equipment     Mobility Management rolling walker, cane, and wheelchair     Dressing/Bathing Difficulty bathing difficulty, assistance 1 person     Home Accessibility stairs to  enter home     Number of Stairs, Main Entrance three     Home Layout Able to live on 1st floor     Equipment Currently Used at Home oxygen;CPAP;walker, rolling;cane, straight;wheelchair     Readmission within 30 days? No     Patient currently being followed by outpatient case management? No     Do you currently have service(s) that help you manage your care at home? No     Do you take prescription medications? Yes     Do you have prescription coverage? Yes     Do you have any problems affording any of your prescribed medications? No     Is the patient taking medications as prescribed? no     If no, which medications is patient not taking? Lasix   The patient stated she was not taking her medication because she has to urinate a lot.    Who is going to help you get home at discharge? family     How do you get to doctors appointments? family or friend will provide     Discharge Plan A Home with family;Home     Discharge Plan B Home with family;Home Health     DME Needed Upon Discharge  other (see comments)   TBD    Discharge Plan discussed with: Patient             Initial discharge assessment is completed. SW was able to complete the assessment with the patient via phone. The patient stated that she lives with her , son, and granddaughter in a single level home. She stated the home has three steps leading towards the main entrance. She requires assistance with her ADLs. She stated that her granddaughter and cousin are able to help her as needed. She has oxygen, CPAP machine, rolling walker, cane, and wheelchair. The patient is open to home health if it is recommended.  Case management/SW will remain available until the patient is discharged. Please contact case management if the patient needs any assistance. [phone]

## 2022-02-01 NOTE — HPI
"Chief Complaint   Patient presents with    Shortness of Breath       EMS reports, "Patient complaining of shortness of breath. Stopped taking her Lasix 3-4 months ago because she could never make it to the restroom. She would urinate all over. Started feeling really bad this morning. I put her on 4 liters and was up to 97%. 18 gauge right hand."      73 yo female with HTN, CHF, COPD on 2L here with SOB x 2 days. No fever. Dry cough. No hemoptysis. Gradual onset. No known sick contacts. Vaccinated for COVID. Worse with exertion and alleviated with rest. Self d/c'd lasix a few months ago because she didn't like urinating so frequently.      "

## 2022-02-02 LAB
ALBUMIN SERPL BCP-MCNC: 2.8 G/DL (ref 3.5–5.2)
ALP SERPL-CCNC: 159 U/L (ref 55–135)
ALT SERPL W/O P-5'-P-CCNC: 27 U/L (ref 10–44)
ANION GAP SERPL CALC-SCNC: 5 MMOL/L (ref 8–16)
AST SERPL-CCNC: 17 U/L (ref 10–40)
BASOPHILS # BLD AUTO: 0.02 K/UL (ref 0–0.2)
BASOPHILS NFR BLD: 0.2 % (ref 0–1.9)
BILIRUB SERPL-MCNC: 0.3 MG/DL (ref 0.1–1)
BUN SERPL-MCNC: 16 MG/DL (ref 8–23)
CALCIUM SERPL-MCNC: 9.7 MG/DL (ref 8.7–10.5)
CHLORIDE SERPL-SCNC: 107 MMOL/L (ref 95–110)
CO2 SERPL-SCNC: 35 MMOL/L (ref 23–29)
CREAT SERPL-MCNC: 1.1 MG/DL (ref 0.5–1.4)
DIFFERENTIAL METHOD: ABNORMAL
EOSINOPHIL # BLD AUTO: 0 K/UL (ref 0–0.5)
EOSINOPHIL NFR BLD: 0 % (ref 0–8)
ERYTHROCYTE [DISTWIDTH] IN BLOOD BY AUTOMATED COUNT: 18.1 % (ref 11.5–14.5)
EST. GFR  (AFRICAN AMERICAN): 58.4 ML/MIN/1.73 M^2
EST. GFR  (NON AFRICAN AMERICAN): 50.6 ML/MIN/1.73 M^2
GLUCOSE SERPL-MCNC: 93 MG/DL (ref 70–110)
HCT VFR BLD AUTO: 44 % (ref 37–48.5)
HGB BLD-MCNC: 14 G/DL (ref 12–16)
IMM GRANULOCYTES # BLD AUTO: 0.02 K/UL (ref 0–0.04)
IMM GRANULOCYTES NFR BLD AUTO: 0.2 % (ref 0–0.5)
LYMPHOCYTES # BLD AUTO: 1.1 K/UL (ref 1–4.8)
LYMPHOCYTES NFR BLD: 13.4 % (ref 18–48)
MAGNESIUM SERPL-MCNC: 2.2 MG/DL (ref 1.6–2.6)
MCH RBC QN AUTO: 30.2 PG (ref 27–31)
MCHC RBC AUTO-ENTMCNC: 31.8 G/DL (ref 32–36)
MCV RBC AUTO: 95 FL (ref 82–98)
MONOCYTES # BLD AUTO: 0.8 K/UL (ref 0.3–1)
MONOCYTES NFR BLD: 10.2 % (ref 4–15)
NEUTROPHILS # BLD AUTO: 6.2 K/UL (ref 1.8–7.7)
NEUTROPHILS NFR BLD: 76 % (ref 38–73)
NRBC BLD-RTO: 0 /100 WBC
PLATELET # BLD AUTO: 180 K/UL (ref 150–450)
PMV BLD AUTO: 10 FL (ref 9.2–12.9)
POTASSIUM SERPL-SCNC: 4.3 MMOL/L (ref 3.5–5.1)
PROT SERPL-MCNC: 6.8 G/DL (ref 6–8.4)
RBC # BLD AUTO: 4.63 M/UL (ref 4–5.4)
SODIUM SERPL-SCNC: 147 MMOL/L (ref 136–145)
WBC # BLD AUTO: 8.14 K/UL (ref 3.9–12.7)

## 2022-02-02 PROCEDURE — 27000221 HC OXYGEN, UP TO 24 HOURS

## 2022-02-02 PROCEDURE — 99900035 HC TECH TIME PER 15 MIN (STAT)

## 2022-02-02 PROCEDURE — 86480 TB TEST CELL IMMUN MEASURE: CPT | Performed by: INTERNAL MEDICINE

## 2022-02-02 PROCEDURE — 87340 HEPATITIS B SURFACE AG IA: CPT | Performed by: INTERNAL MEDICINE

## 2022-02-02 PROCEDURE — 85025 COMPLETE CBC W/AUTO DIFF WBC: CPT | Performed by: INTERNAL MEDICINE

## 2022-02-02 PROCEDURE — 86704 HEP B CORE ANTIBODY TOTAL: CPT | Performed by: INTERNAL MEDICINE

## 2022-02-02 PROCEDURE — 27000207 HC ISOLATION

## 2022-02-02 PROCEDURE — 25000003 PHARM REV CODE 250: Performed by: INTERNAL MEDICINE

## 2022-02-02 PROCEDURE — 36415 COLL VENOUS BLD VENIPUNCTURE: CPT | Performed by: INTERNAL MEDICINE

## 2022-02-02 PROCEDURE — 94761 N-INVAS EAR/PLS OXIMETRY MLT: CPT

## 2022-02-02 PROCEDURE — 80053 COMPREHEN METABOLIC PANEL: CPT | Performed by: INTERNAL MEDICINE

## 2022-02-02 PROCEDURE — 63600175 PHARM REV CODE 636 W HCPCS: Performed by: EMERGENCY MEDICINE

## 2022-02-02 PROCEDURE — 63600175 PHARM REV CODE 636 W HCPCS: Performed by: INTERNAL MEDICINE

## 2022-02-02 PROCEDURE — 83735 ASSAY OF MAGNESIUM: CPT | Performed by: INTERNAL MEDICINE

## 2022-02-02 PROCEDURE — 94660 CPAP INITIATION&MGMT: CPT

## 2022-02-02 PROCEDURE — C9399 UNCLASSIFIED DRUGS OR BIOLOG: HCPCS | Performed by: INTERNAL MEDICINE

## 2022-02-02 PROCEDURE — 11000001 HC ACUTE MED/SURG PRIVATE ROOM

## 2022-02-02 PROCEDURE — 99900031 HC PATIENT EDUCATION (STAT)

## 2022-02-02 RX ADMIN — ENOXAPARIN SODIUM 40 MG: 40 INJECTION SUBCUTANEOUS at 09:02

## 2022-02-02 RX ADMIN — FUROSEMIDE 40 MG: 10 INJECTION, SOLUTION INTRAVENOUS at 03:02

## 2022-02-02 RX ADMIN — REMDESIVIR 100 MG: 100 INJECTION, POWDER, LYOPHILIZED, FOR SOLUTION INTRAVENOUS at 09:02

## 2022-02-02 RX ADMIN — FUROSEMIDE 40 MG: 10 INJECTION, SOLUTION INTRAVENOUS at 01:02

## 2022-02-02 RX ADMIN — BARICITINIB 2 MG: 2 TABLET, FILM COATED ORAL at 11:02

## 2022-02-02 RX ADMIN — DEXAMETHASONE SODIUM PHOSPHATE 6 MG: 4 INJECTION, SOLUTION INTRA-ARTICULAR; INTRALESIONAL; INTRAMUSCULAR; INTRAVENOUS; SOFT TISSUE at 08:02

## 2022-02-02 NOTE — HOSPITAL COURSE
2/2: No acute events overnight.  Patient tolerating BiPAP.  She endorses some significant fatigue and shortness of breath off of BiPAP at this time.  2/3:  weaning from bipap.  Will transition to nasal cannula.  2/4:  still requiring bipap.  Feeling better this am.     2/5/22 [facility] no acute events overnight, weaning oxygen via bipap now on 30% patient reports is on bipap much of the day at home. Will add Pt/OT     2/6/22 [facility] patient is doing well, no acute events overnight tolerating NC this am  2/7: Patient tolerating nasal cannula well.  She currently has BiPAP and oxygen at home.  Stable for discharge at this time.

## 2022-02-02 NOTE — CARE UPDATE
Attempted to remove and wean patient from bipap . 100% O2 sat. Patient placed on nasal O2 3lpm. Patient immediately became sob when bipap was removed. Attempted to reassure and calm patient.  She appeared anxious with arms flailing and continuously complaining she could not breathe. O2 sat 98%. Placed patient back on bipap . Nurse notified.

## 2022-02-02 NOTE — ASSESSMENT & PLAN NOTE
Patient with Hypoxic Respiratory failure which is Acute on chronic.  she is on home oxygen at 2 LPM. Supplemental oxygen was provided and noted- Oxygen Concentration (%):  [50] 50.   Signs/symptoms of respiratory failure include- tachypnea, increased work of breathing, respiratory distress and wheezing. Contributing diagnoses includes - Pneumonia Labs and images were reviewed. Patient Has not had a recent ABG. Will treat underlying causes and adjust management of respiratory failure as follows- Treat for covid

## 2022-02-02 NOTE — SUBJECTIVE & OBJECTIVE
Past Medical History:   Diagnosis Date    Abdominal hernia     CHF (congestive heart failure)     COPD (chronic obstructive pulmonary disease)     GERD (gastroesophageal reflux disease)     History of home oxygen therapy     Hypertension     Migraine headache     Pulmonary embolism 06/01/2017    Small bowel obstruction     Thyroid disease        Past Surgical History:   Procedure Laterality Date    COLONOSCOPY      HYSTERECTOMY      INNER EAR SURGERY      UPPER GASTROINTESTINAL ENDOSCOPY         Review of patient's allergies indicates:   Allergen Reactions    Pcn [penicillins] Swelling       No current facility-administered medications on file prior to encounter.     Current Outpatient Medications on File Prior to Encounter   Medication Sig    fesoterodine (TOVIAZ) 8 mg Tb24 Take by mouth.    fish oil-omega-3 fatty acids 300-1,000 mg capsule Take 2 g by mouth once daily.    furosemide (LASIX) 40 MG tablet Take 1 tablet (40 mg total) by mouth 2 (two) times daily. If having increased shortness of breath, increase in leg swelling, and if you notice an >3lbs in 24 hours. May take up to 4 tablets in 1 day (every 6 hours)    gabapentin (NEURONTIN) 600 MG tablet Take 600 mg by mouth 2 (two) times daily.    LIPITOR 40 mg tablet Take 1 tablet (40 mg total) by mouth once daily.    lisinopril (PRINIVIL,ZESTRIL) 5 MG tablet Take 1 tablet (5 mg total) by mouth once daily.    metoprolol tartrate (LOPRESSOR) 25 MG tablet Take 1 tablet (25 mg total) by mouth 2 (two) times daily.    mometasone-formoterol (DULERA) 200-5 mcg/actuation inhaler Inhale 2 puffs into the lungs 2 (two) times daily. Controller    omeprazole (PRILOSEC) 20 MG capsule Take 20 mg by mouth once daily.    rivaroxaban (XARELTO) 10 mg Tab Take 2 tablets (20 mg total) by mouth daily with dinner or evening meal.    vitamin D 1000 units Tab Take 185 mg by mouth once daily.     Family History    None       Tobacco Use    Smoking status: Former  Smoker    Smokeless tobacco: Never Used   Substance and Sexual Activity    Alcohol use: No    Drug use: No    Sexual activity: Not on file     Review of Systems   Constitutional: Positive for fatigue. Negative for chills and fever.   HENT: Negative for rhinorrhea, sneezing and sore throat.    Eyes: Negative for pain and visual disturbance.   Respiratory: Positive for cough and shortness of breath.    Cardiovascular: Positive for leg swelling. Negative for chest pain.   Gastrointestinal: Negative for abdominal pain, constipation and diarrhea.   Endocrine: Negative for cold intolerance and heat intolerance.   Genitourinary: Negative for difficulty urinating.   Musculoskeletal: Negative for arthralgias and joint swelling.   Skin: Negative for rash.   Allergic/Immunologic: Negative for environmental allergies.   Neurological: Negative for dizziness, syncope and weakness.   Hematological: Does not bruise/bleed easily.   Psychiatric/Behavioral: Negative for dysphoric mood. The patient is not nervous/anxious.      Objective:     Vital Signs (Most Recent):  Temp: 97.7 °F (36.5 °C) (02/02/22 0816)  Pulse: 82 (02/02/22 0816)  Resp: (!) 32 (02/02/22 0816)  BP: 133/79 (02/02/22 0816)  SpO2: 100 % (02/02/22 0816) Vital Signs (24h Range):  Temp:  [97.6 °F (36.4 °C)-98.1 °F (36.7 °C)] 97.7 °F (36.5 °C)  Pulse:  [66-88] 82  Resp:  [20-32] 32  SpO2:  [98 %-100 %] 100 %  BP: (133-177)/() 133/79     Weight: (!) 148.5 kg (327 lb 6.1 oz)  Body mass index is 57.99 kg/m².    Physical Exam  Vitals and nursing note reviewed.   Constitutional:       Appearance: Normal appearance. She is obese.   HENT:      Head: Normocephalic and atraumatic.      Nose: Nose normal.   Eyes:      Extraocular Movements: Extraocular movements intact.      Pupils: Pupils are equal, round, and reactive to light.   Cardiovascular:      Rate and Rhythm: Normal rate and regular rhythm.      Heart sounds: No murmur heard.  No friction rub. No gallop.     Pulmonary:      Effort: Pulmonary effort is normal.      Breath sounds: Rhonchi and rales present.   Abdominal:      General: Abdomen is flat. Bowel sounds are normal.      Palpations: Abdomen is soft.   Musculoskeletal:         General: No swelling or deformity.      Cervical back: Normal range of motion and neck supple.      Right lower leg: Edema present.      Left lower leg: Edema present.   Skin:     General: Skin is warm and dry.      Capillary Refill: Capillary refill takes less than 2 seconds.   Neurological:      General: No focal deficit present.      Mental Status: She is alert and oriented to person, place, and time.   Psychiatric:         Mood and Affect: Mood normal.         Behavior: Behavior normal.           CRANIAL NERVES     CN III, IV, VI   Pupils are equal, round, and reactive to light.       Significant Labs: All pertinent labs within the past 24 hours have been reviewed.    Significant Imaging: I have reviewed all pertinent imaging results/findings within the past 24 hours.

## 2022-02-02 NOTE — PROGRESS NOTES
"[facility]  Progress Note    Patient Name: Carmen Tan  MRN: [id number]  Patient Class: IP- Inpatient   Admission Date: 1/31/2022  Length of Stay: 2 days  Attending Physician: Oscar Ge Jr., MD  Primary Care Provider: Lucian Barry MD        Subjective:     Principal Problem:<principal problem not specified>        HPI:  Chief Complaint   Patient presents with    Shortness of Breath       EMS reports, "Patient complaining of shortness of breath. Stopped taking her Lasix 3-4 months ago because she could never make it to the restroom. She would urinate all over. Started feeling really bad this morning. I put her on 4 liters and was up to 97%. 18 gauge right hand."      71 yo female with HTN, CHF, COPD on 2L here with SOB x 2 days. No fever. Dry cough. No hemoptysis. Gradual onset. No known sick contacts. Vaccinated for COVID. Worse with exertion and alleviated with rest. Self d/c'd lasix a few months ago because she didn't like urinating so frequently.          Overview/Hospital Course:  2/2: No acute events overnight.  Patient tolerating BiPAP.  She endorses some significant fatigue and shortness of breath off of BiPAP at this time.      Past Medical History:   Diagnosis Date    Abdominal hernia     CHF (congestive heart failure)     COPD (chronic obstructive pulmonary disease)     GERD (gastroesophageal reflux disease)     History of home oxygen therapy     Hypertension     Migraine headache     Pulmonary embolism 06/01/2017    Small bowel obstruction     Thyroid disease        Past Surgical History:   Procedure Laterality Date    COLONOSCOPY      HYSTERECTOMY      INNER EAR SURGERY      UPPER GASTROINTESTINAL ENDOSCOPY         Review of patient's allergies indicates:   Allergen Reactions    Pcn [penicillins] Swelling       No current facility-administered medications on file prior to encounter.     Current Outpatient Medications on File Prior to Encounter "   Medication Sig    fesoterodine (TOVIAZ) 8 mg Tb24 Take by mouth.    fish oil-omega-3 fatty acids 300-1,000 mg capsule Take 2 g by mouth once daily.    furosemide (LASIX) 40 MG tablet Take 1 tablet (40 mg total) by mouth 2 (two) times daily. If having increased shortness of breath, increase in leg swelling, and if you notice an >3lbs in 24 hours. May take up to 4 tablets in 1 day (every 6 hours)    gabapentin (NEURONTIN) 600 MG tablet Take 600 mg by mouth 2 (two) times daily.    LIPITOR 40 mg tablet Take 1 tablet (40 mg total) by mouth once daily.    lisinopril (PRINIVIL,ZESTRIL) 5 MG tablet Take 1 tablet (5 mg total) by mouth once daily.    metoprolol tartrate (LOPRESSOR) 25 MG tablet Take 1 tablet (25 mg total) by mouth 2 (two) times daily.    mometasone-formoterol (DULERA) 200-5 mcg/actuation inhaler Inhale 2 puffs into the lungs 2 (two) times daily. Controller    omeprazole (PRILOSEC) 20 MG capsule Take 20 mg by mouth once daily.    rivaroxaban (XARELTO) 10 mg Tab Take 2 tablets (20 mg total) by mouth daily with dinner or evening meal.    vitamin D 1000 units Tab Take 185 mg by mouth once daily.     Family History    None       Tobacco Use    Smoking status: Former Smoker    Smokeless tobacco: Never Used   Substance and Sexual Activity    Alcohol use: No    Drug use: No    Sexual activity: Not on file     Review of Systems   Constitutional: Positive for fatigue. Negative for chills and fever.   HENT: Negative for rhinorrhea, sneezing and sore throat.    Eyes: Negative for pain and visual disturbance.   Respiratory: Positive for cough and shortness of breath.    Cardiovascular: Positive for leg swelling. Negative for chest pain.   Gastrointestinal: Negative for abdominal pain, constipation and diarrhea.   Endocrine: Negative for cold intolerance and heat intolerance.   Genitourinary: Negative for difficulty urinating.   Musculoskeletal: Negative for arthralgias and joint swelling.   Skin:  Negative for rash.   Allergic/Immunologic: Negative for environmental allergies.   Neurological: Negative for dizziness, syncope and weakness.   Hematological: Does not bruise/bleed easily.   Psychiatric/Behavioral: Negative for dysphoric mood. The patient is not nervous/anxious.      Objective:     Vital Signs (Most Recent):  Temp: 97.7 °F (36.5 °C) (02/02/22 0816)  Pulse: 82 (02/02/22 0816)  Resp: (!) 32 (02/02/22 0816)  BP: 133/79 (02/02/22 0816)  SpO2: 100 % (02/02/22 0816) Vital Signs (24h Range):  Temp:  [97.6 °F (36.4 °C)-98.1 °F (36.7 °C)] 97.7 °F (36.5 °C)  Pulse:  [66-88] 82  Resp:  [20-32] 32  SpO2:  [98 %-100 %] 100 %  BP: (133-177)/() 133/79     Weight: (!) 148.5 kg (327 lb 6.1 oz)  Body mass index is 57.99 kg/m².    Physical Exam  Vitals and nursing note reviewed.   Constitutional:       Appearance: Normal appearance. She is obese.   HENT:      Head: Normocephalic and atraumatic.      Nose: Nose normal.   Eyes:      Extraocular Movements: Extraocular movements intact.      Pupils: Pupils are equal, round, and reactive to light.   Cardiovascular:      Rate and Rhythm: Normal rate and regular rhythm.      Heart sounds: No murmur heard.  No friction rub. No gallop.    Pulmonary:      Effort: Pulmonary effort is normal.      Breath sounds: Rhonchi and rales present.   Abdominal:      General: Abdomen is flat. Bowel sounds are normal.      Palpations: Abdomen is soft.   Musculoskeletal:         General: No swelling or deformity.      Cervical back: Normal range of motion and neck supple.      Right lower leg: Edema present.      Left lower leg: Edema present.   Skin:     General: Skin is warm and dry.      Capillary Refill: Capillary refill takes less than 2 seconds.   Neurological:      General: No focal deficit present.      Mental Status: She is alert and oriented to person, place, and time.   Psychiatric:         Mood and Affect: Mood normal.         Behavior: Behavior normal.           CRANIAL  NERVES     CN III, IV, VI   Pupils are equal, round, and reactive to light.       Significant Labs: All pertinent labs within the past 24 hours have been reviewed.    Significant Imaging: I have reviewed all pertinent imaging results/findings within the past 24 hours.      Assessment/Plan:      COVID-19  Continue treatment per protocol    remdesivir begun      Acute hypoxemic respiratory failure  Patient with Hypoxic Respiratory failure which is Acute on chronic.  she is on home oxygen at 2 LPM. Supplemental oxygen was provided and noted- Oxygen Concentration (%):  [50] 50.   Signs/symptoms of respiratory failure include- tachypnea, increased work of breathing, respiratory distress and wheezing. Contributing diagnoses includes - Pneumonia Labs and images were reviewed. Patient Has not had a recent ABG. Will treat underlying causes and adjust management of respiratory failure as follows- Treat for covid    PE (pulmonary thromboembolism)  By hx.  Continue lovenox      VTE Risk Mitigation (From admission, onward)         Ordered     IP VTE HIGH RISK PATIENT  Once         01/31/22 2159     Place sequential compression device  Until discontinued         01/31/22 2159     enoxaparin injection 40 mg  Every 12 hours         01/31/22 2159                Discharge Planning   TIGRE:      Code Status: Full Code   Is the patient medically ready for discharge?:     Reason for patient still in hospital (select all that apply): Treatment  Discharge Plan A: Home with family,Home                  Oscar Ge Jr, MD  Department of Hospital Medicine   [facility]

## 2022-02-03 LAB
ALBUMIN SERPL BCP-MCNC: 2.7 G/DL (ref 3.5–5.2)
ALP SERPL-CCNC: 135 U/L (ref 55–135)
ALT SERPL W/O P-5'-P-CCNC: 22 U/L (ref 10–44)
ANION GAP SERPL CALC-SCNC: 1 MMOL/L (ref 8–16)
AST SERPL-CCNC: 18 U/L (ref 10–40)
BASOPHILS # BLD AUTO: 0.03 K/UL (ref 0–0.2)
BASOPHILS NFR BLD: 0.4 % (ref 0–1.9)
BILIRUB SERPL-MCNC: 0.4 MG/DL (ref 0.1–1)
BUN SERPL-MCNC: 17 MG/DL (ref 8–23)
CALCIUM SERPL-MCNC: 9.9 MG/DL (ref 8.7–10.5)
CHLORIDE SERPL-SCNC: 101 MMOL/L (ref 95–110)
CO2 SERPL-SCNC: 40 MMOL/L (ref 23–29)
CREAT SERPL-MCNC: 1 MG/DL (ref 0.5–1.4)
DIFFERENTIAL METHOD: ABNORMAL
EOSINOPHIL # BLD AUTO: 0 K/UL (ref 0–0.5)
EOSINOPHIL NFR BLD: 0 % (ref 0–8)
ERYTHROCYTE [DISTWIDTH] IN BLOOD BY AUTOMATED COUNT: 17.2 % (ref 11.5–14.5)
EST. GFR  (AFRICAN AMERICAN): >60 ML/MIN/1.73 M^2
EST. GFR  (NON AFRICAN AMERICAN): 56.8 ML/MIN/1.73 M^2
GAMMA INTERFERON BACKGROUND BLD IA-ACNC: 0.02 IU/ML
GLUCOSE SERPL-MCNC: 81 MG/DL (ref 70–110)
HBV CORE AB SERPL QL IA: NEGATIVE
HBV SURFACE AG SERPL QL IA: NEGATIVE
HCT VFR BLD AUTO: 44.4 % (ref 37–48.5)
HGB BLD-MCNC: 14.4 G/DL (ref 12–16)
IMM GRANULOCYTES # BLD AUTO: 0.01 K/UL (ref 0–0.04)
IMM GRANULOCYTES NFR BLD AUTO: 0.1 % (ref 0–0.5)
LYMPHOCYTES # BLD AUTO: 1.3 K/UL (ref 1–4.8)
LYMPHOCYTES NFR BLD: 17.8 % (ref 18–48)
M TB IFN-G CD4+ BCKGRND COR BLD-ACNC: 0 IU/ML
MAGNESIUM SERPL-MCNC: 2.1 MG/DL (ref 1.6–2.6)
MCH RBC QN AUTO: 30.5 PG (ref 27–31)
MCHC RBC AUTO-ENTMCNC: 32.4 G/DL (ref 32–36)
MCV RBC AUTO: 94 FL (ref 82–98)
MITOGEN IGNF BCKGRD COR BLD-ACNC: 0.69 IU/ML
MONOCYTES # BLD AUTO: 0.8 K/UL (ref 0.3–1)
MONOCYTES NFR BLD: 10.3 % (ref 4–15)
NEUTROPHILS # BLD AUTO: 5.3 K/UL (ref 1.8–7.7)
NEUTROPHILS NFR BLD: 71.4 % (ref 38–73)
NRBC BLD-RTO: 0 /100 WBC
PLATELET # BLD AUTO: 184 K/UL (ref 150–450)
PMV BLD AUTO: 10 FL (ref 9.2–12.9)
POTASSIUM SERPL-SCNC: 3.8 MMOL/L (ref 3.5–5.1)
PROT SERPL-MCNC: 6.9 G/DL (ref 6–8.4)
RBC # BLD AUTO: 4.72 M/UL (ref 4–5.4)
SODIUM SERPL-SCNC: 142 MMOL/L (ref 136–145)
TB GOLD PLUS: NEGATIVE
TB2 - NIL: -0.01 IU/ML
WBC # BLD AUTO: 7.36 K/UL (ref 3.9–12.7)

## 2022-02-03 PROCEDURE — 63600175 PHARM REV CODE 636 W HCPCS: Performed by: EMERGENCY MEDICINE

## 2022-02-03 PROCEDURE — 36415 COLL VENOUS BLD VENIPUNCTURE: CPT | Performed by: INTERNAL MEDICINE

## 2022-02-03 PROCEDURE — 99900035 HC TECH TIME PER 15 MIN (STAT)

## 2022-02-03 PROCEDURE — C9399 UNCLASSIFIED DRUGS OR BIOLOG: HCPCS | Performed by: INTERNAL MEDICINE

## 2022-02-03 PROCEDURE — 63600175 PHARM REV CODE 636 W HCPCS: Performed by: INTERNAL MEDICINE

## 2022-02-03 PROCEDURE — 94761 N-INVAS EAR/PLS OXIMETRY MLT: CPT

## 2022-02-03 PROCEDURE — 27000221 HC OXYGEN, UP TO 24 HOURS

## 2022-02-03 PROCEDURE — 11000001 HC ACUTE MED/SURG PRIVATE ROOM

## 2022-02-03 PROCEDURE — 80053 COMPREHEN METABOLIC PANEL: CPT | Performed by: INTERNAL MEDICINE

## 2022-02-03 PROCEDURE — 94660 CPAP INITIATION&MGMT: CPT

## 2022-02-03 PROCEDURE — 85025 COMPLETE CBC W/AUTO DIFF WBC: CPT | Performed by: INTERNAL MEDICINE

## 2022-02-03 PROCEDURE — 27000207 HC ISOLATION

## 2022-02-03 PROCEDURE — 83735 ASSAY OF MAGNESIUM: CPT | Performed by: INTERNAL MEDICINE

## 2022-02-03 PROCEDURE — 99900031 HC PATIENT EDUCATION (STAT)

## 2022-02-03 PROCEDURE — 25000003 PHARM REV CODE 250: Performed by: INTERNAL MEDICINE

## 2022-02-03 PROCEDURE — 25000003 PHARM REV CODE 250: Performed by: EMERGENCY MEDICINE

## 2022-02-03 RX ADMIN — FUROSEMIDE 40 MG: 10 INJECTION, SOLUTION INTRAVENOUS at 05:02

## 2022-02-03 RX ADMIN — FUROSEMIDE 40 MG: 10 INJECTION, SOLUTION INTRAVENOUS at 02:02

## 2022-02-03 RX ADMIN — ENOXAPARIN SODIUM 40 MG: 40 INJECTION SUBCUTANEOUS at 08:02

## 2022-02-03 RX ADMIN — DEXAMETHASONE SODIUM PHOSPHATE 6 MG: 4 INJECTION, SOLUTION INTRA-ARTICULAR; INTRALESIONAL; INTRAMUSCULAR; INTRAVENOUS; SOFT TISSUE at 08:02

## 2022-02-03 RX ADMIN — BARICITINIB 2 MG: 2 TABLET, FILM COATED ORAL at 08:02

## 2022-02-03 RX ADMIN — REMDESIVIR 100 MG: 100 INJECTION, POWDER, LYOPHILIZED, FOR SOLUTION INTRAVENOUS at 08:02

## 2022-02-03 RX ADMIN — MELATONIN TAB 3 MG 6 MG: 3 TAB at 12:02

## 2022-02-03 RX ADMIN — MELATONIN TAB 3 MG 6 MG: 3 TAB at 08:02

## 2022-02-03 NOTE — SUBJECTIVE & OBJECTIVE
Past Medical History:   Diagnosis Date    Abdominal hernia     CHF (congestive heart failure)     COPD (chronic obstructive pulmonary disease)     GERD (gastroesophageal reflux disease)     History of home oxygen therapy     Hypertension     Migraine headache     Pulmonary embolism 06/01/2017    Small bowel obstruction     Thyroid disease        Past Surgical History:   Procedure Laterality Date    COLONOSCOPY      HYSTERECTOMY      INNER EAR SURGERY      UPPER GASTROINTESTINAL ENDOSCOPY         Review of patient's allergies indicates:   Allergen Reactions    Pcn [penicillins] Swelling       No current facility-administered medications on file prior to encounter.     Current Outpatient Medications on File Prior to Encounter   Medication Sig    fesoterodine (TOVIAZ) 8 mg Tb24 Take by mouth.    fish oil-omega-3 fatty acids 300-1,000 mg capsule Take 2 g by mouth once daily.    furosemide (LASIX) 40 MG tablet Take 1 tablet (40 mg total) by mouth 2 (two) times daily. If having increased shortness of breath, increase in leg swelling, and if you notice an >3lbs in 24 hours. May take up to 4 tablets in 1 day (every 6 hours)    gabapentin (NEURONTIN) 600 MG tablet Take 600 mg by mouth 2 (two) times daily.    LIPITOR 40 mg tablet Take 1 tablet (40 mg total) by mouth once daily.    lisinopril (PRINIVIL,ZESTRIL) 5 MG tablet Take 1 tablet (5 mg total) by mouth once daily.    metoprolol tartrate (LOPRESSOR) 25 MG tablet Take 1 tablet (25 mg total) by mouth 2 (two) times daily.    mometasone-formoterol (DULERA) 200-5 mcg/actuation inhaler Inhale 2 puffs into the lungs 2 (two) times daily. Controller    omeprazole (PRILOSEC) 20 MG capsule Take 20 mg by mouth once daily.    rivaroxaban (XARELTO) 10 mg Tab Take 2 tablets (20 mg total) by mouth daily with dinner or evening meal.    vitamin D 1000 units Tab Take 185 mg by mouth once daily.     Family History    None       Tobacco Use    Smoking status: Former  Smoker    Smokeless tobacco: Never Used   Substance and Sexual Activity    Alcohol use: No    Drug use: No    Sexual activity: Not on file     Review of Systems   Constitutional: Positive for fatigue. Negative for chills and fever.   HENT: Negative for rhinorrhea, sneezing and sore throat.    Eyes: Negative for pain and visual disturbance.   Respiratory: Positive for cough and shortness of breath.    Cardiovascular: Positive for leg swelling. Negative for chest pain.   Gastrointestinal: Negative for abdominal pain, constipation and diarrhea.   Endocrine: Negative for cold intolerance and heat intolerance.   Genitourinary: Negative for difficulty urinating.   Musculoskeletal: Negative for arthralgias and joint swelling.   Skin: Negative for rash.   Allergic/Immunologic: Negative for environmental allergies.   Neurological: Negative for dizziness, syncope and weakness.   Hematological: Does not bruise/bleed easily.   Psychiatric/Behavioral: Negative for dysphoric mood. The patient is not nervous/anxious.      Objective:     Vital Signs (Most Recent):  Temp: 98.1 °F (36.7 °C) (02/03/22 0443)  Pulse: 73 (02/03/22 0443)  Resp: (!) 29 (02/03/22 0443)  BP: (!) 165/92 (02/03/22 0443)  SpO2: 99 % (02/03/22 0443) Vital Signs (24h Range):  Temp:  [98 °F (36.7 °C)-98.2 °F (36.8 °C)] 98.1 °F (36.7 °C)  Pulse:  [71-87] 73  Resp:  [26-36] 29  SpO2:  [95 %-100 %] 99 %  BP: (149-185)/() 165/92     Weight: (!) 148.5 kg (327 lb 6.1 oz)  Body mass index is 57.99 kg/m².    Physical Exam  Vitals and nursing note reviewed.   Constitutional:       Appearance: Normal appearance. She is obese.   HENT:      Head: Normocephalic and atraumatic.      Nose: Nose normal.   Eyes:      Extraocular Movements: Extraocular movements intact.      Pupils: Pupils are equal, round, and reactive to light.   Cardiovascular:      Rate and Rhythm: Normal rate and regular rhythm.      Heart sounds: No murmur heard.  No friction rub. No gallop.     Pulmonary:      Effort: Pulmonary effort is normal.      Breath sounds: Rhonchi and rales present.   Abdominal:      General: Abdomen is flat. Bowel sounds are normal.      Palpations: Abdomen is soft.   Musculoskeletal:         General: No swelling or deformity.      Cervical back: Normal range of motion and neck supple.      Right lower leg: Edema present.      Left lower leg: Edema present.   Skin:     General: Skin is warm and dry.      Capillary Refill: Capillary refill takes less than 2 seconds.   Neurological:      General: No focal deficit present.      Mental Status: She is alert and oriented to person, place, and time.   Psychiatric:         Mood and Affect: Mood normal.         Behavior: Behavior normal.           CRANIAL NERVES     CN III, IV, VI   Pupils are equal, round, and reactive to light.       Significant Labs: All pertinent labs within the past 24 hours have been reviewed.    Significant Imaging: I have reviewed all pertinent imaging results/findings within the past 24 hours.

## 2022-02-03 NOTE — RESPIRATORY THERAPY
Attempted to take pt of bipap and place on 4 liters via nasal cannula; however, pt only lasted a few minutes.  Pt felt sob.

## 2022-02-03 NOTE — ASSESSMENT & PLAN NOTE
Patient with Hypoxic Respiratory failure which is Acute on chronic.  she is on home oxygen at 2 LPM. Supplemental oxygen was provided and noted- Oxygen Concentration (%):  [40] 40.   Signs/symptoms of respiratory failure include- tachypnea, increased work of breathing, respiratory distress and wheezing. Contributing diagnoses includes - Pneumonia Labs and images were reviewed. Patient Has not had a recent ABG. Will treat underlying causes and adjust management of respiratory failure as follows- Treat for covid

## 2022-02-03 NOTE — PROGRESS NOTES
"[facility]  Progress Note    Patient Name: Carmen Tan  MRN: [id number]  Patient Class: IP- Inpatient   Admission Date: 1/31/2022  Length of Stay: 3 days  Attending Physician: Oscar Ge Jr., MD  Primary Care Provider: Lucian Barry MD        Subjective:     Principal Problem:<principal problem not specified>        HPI:  Chief Complaint   Patient presents with    Shortness of Breath       EMS reports, "Patient complaining of shortness of breath. Stopped taking her Lasix 3-4 months ago because she could never make it to the restroom. She would urinate all over. Started feeling really bad this morning. I put her on 4 liters and was up to 97%. 18 gauge right hand."      71 yo female with HTN, CHF, COPD on 2L here with SOB x 2 days. No fever. Dry cough. No hemoptysis. Gradual onset. No known sick contacts. Vaccinated for COVID. Worse with exertion and alleviated with rest. Self d/c'd lasix a few months ago because she didn't like urinating so frequently.          Overview/Hospital Course:  2/2: No acute events overnight.  Patient tolerating BiPAP.  She endorses some significant fatigue and shortness of breath off of BiPAP at this time.  2/3:  weaning from bipap.  Will transition to nasal cannula.      Past Medical History:   Diagnosis Date    Abdominal hernia     CHF (congestive heart failure)     COPD (chronic obstructive pulmonary disease)     GERD (gastroesophageal reflux disease)     History of home oxygen therapy     Hypertension     Migraine headache     Pulmonary embolism 06/01/2017    Small bowel obstruction     Thyroid disease        Past Surgical History:   Procedure Laterality Date    COLONOSCOPY      HYSTERECTOMY      INNER EAR SURGERY      UPPER GASTROINTESTINAL ENDOSCOPY         Review of patient's allergies indicates:   Allergen Reactions    Pcn [penicillins] Swelling       No current facility-administered medications on file prior to encounter. "     Current Outpatient Medications on File Prior to Encounter   Medication Sig    fesoterodine (TOVIAZ) 8 mg Tb24 Take by mouth.    fish oil-omega-3 fatty acids 300-1,000 mg capsule Take 2 g by mouth once daily.    furosemide (LASIX) 40 MG tablet Take 1 tablet (40 mg total) by mouth 2 (two) times daily. If having increased shortness of breath, increase in leg swelling, and if you notice an >3lbs in 24 hours. May take up to 4 tablets in 1 day (every 6 hours)    gabapentin (NEURONTIN) 600 MG tablet Take 600 mg by mouth 2 (two) times daily.    LIPITOR 40 mg tablet Take 1 tablet (40 mg total) by mouth once daily.    lisinopril (PRINIVIL,ZESTRIL) 5 MG tablet Take 1 tablet (5 mg total) by mouth once daily.    metoprolol tartrate (LOPRESSOR) 25 MG tablet Take 1 tablet (25 mg total) by mouth 2 (two) times daily.    mometasone-formoterol (DULERA) 200-5 mcg/actuation inhaler Inhale 2 puffs into the lungs 2 (two) times daily. Controller    omeprazole (PRILOSEC) 20 MG capsule Take 20 mg by mouth once daily.    rivaroxaban (XARELTO) 10 mg Tab Take 2 tablets (20 mg total) by mouth daily with dinner or evening meal.    vitamin D 1000 units Tab Take 185 mg by mouth once daily.     Family History    None       Tobacco Use    Smoking status: Former Smoker    Smokeless tobacco: Never Used   Substance and Sexual Activity    Alcohol use: No    Drug use: No    Sexual activity: Not on file     Review of Systems   Constitutional: Positive for fatigue. Negative for chills and fever.   HENT: Negative for rhinorrhea, sneezing and sore throat.    Eyes: Negative for pain and visual disturbance.   Respiratory: Positive for cough and shortness of breath.    Cardiovascular: Positive for leg swelling. Negative for chest pain.   Gastrointestinal: Negative for abdominal pain, constipation and diarrhea.   Endocrine: Negative for cold intolerance and heat intolerance.   Genitourinary: Negative for difficulty urinating.    Musculoskeletal: Negative for arthralgias and joint swelling.   Skin: Negative for rash.   Allergic/Immunologic: Negative for environmental allergies.   Neurological: Negative for dizziness, syncope and weakness.   Hematological: Does not bruise/bleed easily.   Psychiatric/Behavioral: Negative for dysphoric mood. The patient is not nervous/anxious.      Objective:     Vital Signs (Most Recent):  Temp: 98.1 °F (36.7 °C) (02/03/22 0443)  Pulse: 73 (02/03/22 0443)  Resp: (!) 29 (02/03/22 0443)  BP: (!) 165/92 (02/03/22 0443)  SpO2: 99 % (02/03/22 0443) Vital Signs (24h Range):  Temp:  [98 °F (36.7 °C)-98.2 °F (36.8 °C)] 98.1 °F (36.7 °C)  Pulse:  [71-87] 73  Resp:  [26-36] 29  SpO2:  [95 %-100 %] 99 %  BP: (149-185)/() 165/92     Weight: (!) 148.5 kg (327 lb 6.1 oz)  Body mass index is 57.99 kg/m².    Physical Exam  Vitals and nursing note reviewed.   Constitutional:       Appearance: Normal appearance. She is obese.   HENT:      Head: Normocephalic and atraumatic.      Nose: Nose normal.   Eyes:      Extraocular Movements: Extraocular movements intact.      Pupils: Pupils are equal, round, and reactive to light.   Cardiovascular:      Rate and Rhythm: Normal rate and regular rhythm.      Heart sounds: No murmur heard.  No friction rub. No gallop.    Pulmonary:      Effort: Pulmonary effort is normal.      Breath sounds: Rhonchi and rales present.   Abdominal:      General: Abdomen is flat. Bowel sounds are normal.      Palpations: Abdomen is soft.   Musculoskeletal:         General: No swelling or deformity.      Cervical back: Normal range of motion and neck supple.      Right lower leg: Edema present.      Left lower leg: Edema present.   Skin:     General: Skin is warm and dry.      Capillary Refill: Capillary refill takes less than 2 seconds.   Neurological:      General: No focal deficit present.      Mental Status: She is alert and oriented to person, place, and time.   Psychiatric:         Mood and  Affect: Mood normal.         Behavior: Behavior normal.           CRANIAL NERVES     CN III, IV, VI   Pupils are equal, round, and reactive to light.       Significant Labs: All pertinent labs within the past 24 hours have been reviewed.    Significant Imaging: I have reviewed all pertinent imaging results/findings within the past 24 hours.      Assessment/Plan:      COVID-19  Continue treatment per protocol    Remdesivir/immunomodulator       Acute hypoxemic respiratory failure  Patient with Hypoxic Respiratory failure which is Acute on chronic.  she is on home oxygen at 2 LPM. Supplemental oxygen was provided and noted- Oxygen Concentration (%):  [40] 40.   Signs/symptoms of respiratory failure include- tachypnea, increased work of breathing, respiratory distress and wheezing. Contributing diagnoses includes - Pneumonia Labs and images were reviewed. Patient Has not had a recent ABG. Will treat underlying causes and adjust management of respiratory failure as follows- Treat for covid    PE (pulmonary thromboembolism)  By hx.  Continue lovenox      VTE Risk Mitigation (From admission, onward)         Ordered     IP VTE HIGH RISK PATIENT  Once         01/31/22 2159     Place sequential compression device  Until discontinued         01/31/22 2159     enoxaparin injection 40 mg  Every 12 hours         01/31/22 2159                Discharge Planning   TIGRE:      Code Status: Full Code   Is the patient medically ready for discharge?:     Reason for patient still in hospital (select all that apply): Treatment  Discharge Plan A: Home with family,Home                  Oscar Ge Jr, MD  Department of Hospital Medicine   [facility]

## 2022-02-04 PROBLEM — E66.01 SEVERE OBESITY (BMI >= 40): Status: ACTIVE | Noted: 2022-02-04

## 2022-02-04 LAB
ALBUMIN SERPL BCP-MCNC: 2.6 G/DL (ref 3.5–5.2)
ALP SERPL-CCNC: 131 U/L (ref 55–135)
ALT SERPL W/O P-5'-P-CCNC: 21 U/L (ref 10–44)
ANION GAP SERPL CALC-SCNC: 1 MMOL/L (ref 8–16)
AST SERPL-CCNC: 20 U/L (ref 10–40)
BASOPHILS # BLD AUTO: 0.02 K/UL (ref 0–0.2)
BASOPHILS NFR BLD: 0.3 % (ref 0–1.9)
BILIRUB SERPL-MCNC: 0.5 MG/DL (ref 0.1–1)
BUN SERPL-MCNC: 16 MG/DL (ref 8–23)
CALCIUM SERPL-MCNC: 9.7 MG/DL (ref 8.7–10.5)
CHLORIDE SERPL-SCNC: 96 MMOL/L (ref 95–110)
CO2 SERPL-SCNC: 44 MMOL/L (ref 23–29)
CREAT SERPL-MCNC: 0.9 MG/DL (ref 0.5–1.4)
DIFFERENTIAL METHOD: ABNORMAL
EOSINOPHIL # BLD AUTO: 0 K/UL (ref 0–0.5)
EOSINOPHIL NFR BLD: 0 % (ref 0–8)
ERYTHROCYTE [DISTWIDTH] IN BLOOD BY AUTOMATED COUNT: 16.2 % (ref 11.5–14.5)
EST. GFR  (AFRICAN AMERICAN): >60 ML/MIN/1.73 M^2
EST. GFR  (NON AFRICAN AMERICAN): >60 ML/MIN/1.73 M^2
GLUCOSE SERPL-MCNC: 85 MG/DL (ref 70–110)
HCT VFR BLD AUTO: 46.6 % (ref 37–48.5)
HGB BLD-MCNC: 15.3 G/DL (ref 12–16)
IMM GRANULOCYTES # BLD AUTO: 0.02 K/UL (ref 0–0.04)
IMM GRANULOCYTES NFR BLD AUTO: 0.3 % (ref 0–0.5)
LYMPHOCYTES # BLD AUTO: 1.3 K/UL (ref 1–4.8)
LYMPHOCYTES NFR BLD: 17.3 % (ref 18–48)
MAGNESIUM SERPL-MCNC: 2.1 MG/DL (ref 1.6–2.6)
MCH RBC QN AUTO: 29.9 PG (ref 27–31)
MCHC RBC AUTO-ENTMCNC: 32.8 G/DL (ref 32–36)
MCV RBC AUTO: 91 FL (ref 82–98)
MONOCYTES # BLD AUTO: 0.8 K/UL (ref 0.3–1)
MONOCYTES NFR BLD: 10.7 % (ref 4–15)
NEUTROPHILS # BLD AUTO: 5.2 K/UL (ref 1.8–7.7)
NEUTROPHILS NFR BLD: 71.4 % (ref 38–73)
NRBC BLD-RTO: 0 /100 WBC
PLATELET # BLD AUTO: 176 K/UL (ref 150–450)
PMV BLD AUTO: 10.1 FL (ref 9.2–12.9)
POTASSIUM SERPL-SCNC: 3.7 MMOL/L (ref 3.5–5.1)
PROT SERPL-MCNC: 6.8 G/DL (ref 6–8.4)
RBC # BLD AUTO: 5.11 M/UL (ref 4–5.4)
SODIUM SERPL-SCNC: 141 MMOL/L (ref 136–145)
WBC # BLD AUTO: 7.3 K/UL (ref 3.9–12.7)

## 2022-02-04 PROCEDURE — 27000207 HC ISOLATION

## 2022-02-04 PROCEDURE — 11000001 HC ACUTE MED/SURG PRIVATE ROOM

## 2022-02-04 PROCEDURE — 94761 N-INVAS EAR/PLS OXIMETRY MLT: CPT

## 2022-02-04 PROCEDURE — 94660 CPAP INITIATION&MGMT: CPT

## 2022-02-04 PROCEDURE — 27000221 HC OXYGEN, UP TO 24 HOURS

## 2022-02-04 PROCEDURE — 99900035 HC TECH TIME PER 15 MIN (STAT)

## 2022-02-04 PROCEDURE — 36415 COLL VENOUS BLD VENIPUNCTURE: CPT | Performed by: INTERNAL MEDICINE

## 2022-02-04 PROCEDURE — 83735 ASSAY OF MAGNESIUM: CPT | Performed by: INTERNAL MEDICINE

## 2022-02-04 PROCEDURE — C9399 UNCLASSIFIED DRUGS OR BIOLOG: HCPCS | Performed by: INTERNAL MEDICINE

## 2022-02-04 PROCEDURE — 25000003 PHARM REV CODE 250: Performed by: INTERNAL MEDICINE

## 2022-02-04 PROCEDURE — 85025 COMPLETE CBC W/AUTO DIFF WBC: CPT | Performed by: INTERNAL MEDICINE

## 2022-02-04 PROCEDURE — 99900031 HC PATIENT EDUCATION (STAT)

## 2022-02-04 PROCEDURE — 80053 COMPREHEN METABOLIC PANEL: CPT | Performed by: INTERNAL MEDICINE

## 2022-02-04 PROCEDURE — 63600175 PHARM REV CODE 636 W HCPCS: Performed by: INTERNAL MEDICINE

## 2022-02-04 PROCEDURE — 63600175 PHARM REV CODE 636 W HCPCS: Performed by: EMERGENCY MEDICINE

## 2022-02-04 RX ADMIN — FUROSEMIDE 40 MG: 10 INJECTION, SOLUTION INTRAVENOUS at 02:02

## 2022-02-04 RX ADMIN — REMDESIVIR 100 MG: 100 INJECTION, POWDER, LYOPHILIZED, FOR SOLUTION INTRAVENOUS at 08:02

## 2022-02-04 RX ADMIN — ENOXAPARIN SODIUM 40 MG: 40 INJECTION SUBCUTANEOUS at 08:02

## 2022-02-04 RX ADMIN — DEXAMETHASONE SODIUM PHOSPHATE 6 MG: 4 INJECTION, SOLUTION INTRA-ARTICULAR; INTRALESIONAL; INTRAMUSCULAR; INTRAVENOUS; SOFT TISSUE at 08:02

## 2022-02-04 RX ADMIN — BARICITINIB 2 MG: 2 TABLET, FILM COATED ORAL at 08:02

## 2022-02-04 NOTE — SUBJECTIVE & OBJECTIVE
Past Medical History:   Diagnosis Date    Abdominal hernia     CHF (congestive heart failure)     COPD (chronic obstructive pulmonary disease)     GERD (gastroesophageal reflux disease)     History of home oxygen therapy     Hypertension     Migraine headache     Pulmonary embolism 06/01/2017    Small bowel obstruction     Thyroid disease        Past Surgical History:   Procedure Laterality Date    COLONOSCOPY      HYSTERECTOMY      INNER EAR SURGERY      UPPER GASTROINTESTINAL ENDOSCOPY         Review of patient's allergies indicates:   Allergen Reactions    Pcn [penicillins] Swelling       No current facility-administered medications on file prior to encounter.     Current Outpatient Medications on File Prior to Encounter   Medication Sig    fesoterodine (TOVIAZ) 8 mg Tb24 Take by mouth.    fish oil-omega-3 fatty acids 300-1,000 mg capsule Take 2 g by mouth once daily.    furosemide (LASIX) 40 MG tablet Take 1 tablet (40 mg total) by mouth 2 (two) times daily. If having increased shortness of breath, increase in leg swelling, and if you notice an >3lbs in 24 hours. May take up to 4 tablets in 1 day (every 6 hours)    gabapentin (NEURONTIN) 600 MG tablet Take 600 mg by mouth 2 (two) times daily.    LIPITOR 40 mg tablet Take 1 tablet (40 mg total) by mouth once daily.    lisinopril (PRINIVIL,ZESTRIL) 5 MG tablet Take 1 tablet (5 mg total) by mouth once daily.    metoprolol tartrate (LOPRESSOR) 25 MG tablet Take 1 tablet (25 mg total) by mouth 2 (two) times daily.    mometasone-formoterol (DULERA) 200-5 mcg/actuation inhaler Inhale 2 puffs into the lungs 2 (two) times daily. Controller    omeprazole (PRILOSEC) 20 MG capsule Take 20 mg by mouth once daily.    rivaroxaban (XARELTO) 10 mg Tab Take 2 tablets (20 mg total) by mouth daily with dinner or evening meal.    vitamin D 1000 units Tab Take 185 mg by mouth once daily.     Family History    None       Tobacco Use    Smoking status: Former  Smoker    Smokeless tobacco: Never Used   Substance and Sexual Activity    Alcohol use: No    Drug use: No    Sexual activity: Not on file     Review of Systems   Constitutional: Positive for fatigue. Negative for chills and fever.   HENT: Negative for rhinorrhea, sneezing and sore throat.    Eyes: Negative for pain and visual disturbance.   Respiratory: Positive for cough and shortness of breath.    Cardiovascular: Positive for leg swelling. Negative for chest pain.   Gastrointestinal: Negative for abdominal pain, constipation and diarrhea.   Endocrine: Negative for cold intolerance and heat intolerance.   Genitourinary: Negative for difficulty urinating.   Musculoskeletal: Negative for arthralgias and joint swelling.   Skin: Negative for rash.   Allergic/Immunologic: Negative for environmental allergies.   Neurological: Negative for dizziness, syncope and weakness.   Hematological: Does not bruise/bleed easily.   Psychiatric/Behavioral: Negative for dysphoric mood. The patient is not nervous/anxious.      Objective:     Vital Signs (Most Recent):  Temp: 97 °F (36.1 °C) (02/04/22 0752)  Pulse: 67 (02/04/22 0752)  Resp: 20 (02/04/22 0752)  BP: (!) 143/86 (02/04/22 0752)  SpO2: 98 % (02/04/22 0752) Vital Signs (24h Range):  Temp:  [97 °F (36.1 °C)-98.1 °F (36.7 °C)] 97 °F (36.1 °C)  Pulse:  [58-86] 67  Resp:  [19-37] 20  SpO2:  [94 %-100 %] 98 %  BP: (118-165)/(72-98) 143/86     Weight: (!) 148.5 kg (327 lb 6.1 oz)  Body mass index is 57.99 kg/m².    Physical Exam  Vitals and nursing note reviewed.   Constitutional:       Appearance: Normal appearance. She is obese.   HENT:      Head: Normocephalic and atraumatic.      Nose: Nose normal.   Eyes:      Extraocular Movements: Extraocular movements intact.      Pupils: Pupils are equal, round, and reactive to light.   Cardiovascular:      Rate and Rhythm: Normal rate and regular rhythm.      Heart sounds: No murmur heard.  No friction rub. No gallop.    Pulmonary:       Effort: Pulmonary effort is normal.      Breath sounds: Rhonchi and rales present.   Abdominal:      General: Abdomen is flat. Bowel sounds are normal.      Palpations: Abdomen is soft.   Musculoskeletal:         General: No swelling or deformity.      Cervical back: Normal range of motion and neck supple.      Right lower leg: Edema present.      Left lower leg: Edema present.   Skin:     General: Skin is warm and dry.      Capillary Refill: Capillary refill takes less than 2 seconds.   Neurological:      General: No focal deficit present.      Mental Status: She is alert and oriented to person, place, and time.   Psychiatric:         Mood and Affect: Mood normal.         Behavior: Behavior normal.           CRANIAL NERVES     CN III, IV, VI   Pupils are equal, round, and reactive to light.       Significant Labs: All pertinent labs within the past 24 hours have been reviewed.    Significant Imaging: I have reviewed all pertinent imaging results/findings within the past 24 hours.

## 2022-02-04 NOTE — ASSESSMENT & PLAN NOTE
Body mass index is 57.99 kg/m². Morbid obesity complicates all aspects of disease management from diagnostic modalities to treatment. Weight loss encouraged and health benefits explained to patient.

## 2022-02-04 NOTE — PROGRESS NOTES
"[facility] Medicine  Progress Note    Patient Name: Carmen Tan  MRN: [id number]  Patient Class: IP- Inpatient   Admission Date: 1/31/2022  Length of Stay: 4 days  Attending Physician: Oscar Ge Jr., MD  Primary Care Provider: Lucian Barry MD        Subjective:     Principal Problem:<principal problem not specified>        HPI:  Chief Complaint   Patient presents with    Shortness of Breath       EMS reports, "Patient complaining of shortness of breath. Stopped taking her Lasix 3-4 months ago because she could never make it to the restroom. She would urinate all over. Started feeling really bad this morning. I put her on 4 liters and was up to 97%. 18 gauge right hand."      71 yo female with HTN, CHF, COPD on 2L here with SOB x 2 days. No fever. Dry cough. No hemoptysis. Gradual onset. No known sick contacts. Vaccinated for COVID. Worse with exertion and alleviated with rest. Self d/c'd lasix a few months ago because she didn't like urinating so frequently.          Overview/Hospital Course:  2/2: No acute events overnight.  Patient tolerating BiPAP.  She endorses some significant fatigue and shortness of breath off of BiPAP at this time.  2/3:  weaning from bipap.  Will transition to nasal cannula.  2/4:  still requiring bipap.  Feeling better this am.       Past Medical History:   Diagnosis Date    Abdominal hernia     CHF (congestive heart failure)     COPD (chronic obstructive pulmonary disease)     GERD (gastroesophageal reflux disease)     History of home oxygen therapy     Hypertension     Migraine headache     Pulmonary embolism 06/01/2017    Small bowel obstruction     Thyroid disease        Past Surgical History:   Procedure Laterality Date    COLONOSCOPY      HYSTERECTOMY      INNER EAR SURGERY      UPPER GASTROINTESTINAL ENDOSCOPY         Review of patient's allergies indicates:   Allergen Reactions    Pcn [penicillins] Swelling       No current " facility-administered medications on file prior to encounter.     Current Outpatient Medications on File Prior to Encounter   Medication Sig    fesoterodine (TOVIAZ) 8 mg Tb24 Take by mouth.    fish oil-omega-3 fatty acids 300-1,000 mg capsule Take 2 g by mouth once daily.    furosemide (LASIX) 40 MG tablet Take 1 tablet (40 mg total) by mouth 2 (two) times daily. If having increased shortness of breath, increase in leg swelling, and if you notice an >3lbs in 24 hours. May take up to 4 tablets in 1 day (every 6 hours)    gabapentin (NEURONTIN) 600 MG tablet Take 600 mg by mouth 2 (two) times daily.    LIPITOR 40 mg tablet Take 1 tablet (40 mg total) by mouth once daily.    lisinopril (PRINIVIL,ZESTRIL) 5 MG tablet Take 1 tablet (5 mg total) by mouth once daily.    metoprolol tartrate (LOPRESSOR) 25 MG tablet Take 1 tablet (25 mg total) by mouth 2 (two) times daily.    mometasone-formoterol (DULERA) 200-5 mcg/actuation inhaler Inhale 2 puffs into the lungs 2 (two) times daily. Controller    omeprazole (PRILOSEC) 20 MG capsule Take 20 mg by mouth once daily.    rivaroxaban (XARELTO) 10 mg Tab Take 2 tablets (20 mg total) by mouth daily with dinner or evening meal.    vitamin D 1000 units Tab Take 185 mg by mouth once daily.     Family History    None       Tobacco Use    Smoking status: Former Smoker    Smokeless tobacco: Never Used   Substance and Sexual Activity    Alcohol use: No    Drug use: No    Sexual activity: Not on file     Review of Systems   Constitutional: Positive for fatigue. Negative for chills and fever.   HENT: Negative for rhinorrhea, sneezing and sore throat.    Eyes: Negative for pain and visual disturbance.   Respiratory: Positive for cough and shortness of breath.    Cardiovascular: Positive for leg swelling. Negative for chest pain.   Gastrointestinal: Negative for abdominal pain, constipation and diarrhea.   Endocrine: Negative for cold intolerance and heat intolerance.    Genitourinary: Negative for difficulty urinating.   Musculoskeletal: Negative for arthralgias and joint swelling.   Skin: Negative for rash.   Allergic/Immunologic: Negative for environmental allergies.   Neurological: Negative for dizziness, syncope and weakness.   Hematological: Does not bruise/bleed easily.   Psychiatric/Behavioral: Negative for dysphoric mood. The patient is not nervous/anxious.      Objective:     Vital Signs (Most Recent):  Temp: 97 °F (36.1 °C) (02/04/22 0752)  Pulse: 67 (02/04/22 0752)  Resp: 20 (02/04/22 0752)  BP: (!) 143/86 (02/04/22 0752)  SpO2: 98 % (02/04/22 0752) Vital Signs (24h Range):  Temp:  [97 °F (36.1 °C)-98.1 °F (36.7 °C)] 97 °F (36.1 °C)  Pulse:  [58-86] 67  Resp:  [19-37] 20  SpO2:  [94 %-100 %] 98 %  BP: (118-165)/(72-98) 143/86     Weight: (!) 148.5 kg (327 lb 6.1 oz)  Body mass index is 57.99 kg/m².    Physical Exam  Vitals and nursing note reviewed.   Constitutional:       Appearance: Normal appearance. She is obese.   HENT:      Head: Normocephalic and atraumatic.      Nose: Nose normal.   Eyes:      Extraocular Movements: Extraocular movements intact.      Pupils: Pupils are equal, round, and reactive to light.   Cardiovascular:      Rate and Rhythm: Normal rate and regular rhythm.      Heart sounds: No murmur heard.  No friction rub. No gallop.    Pulmonary:      Effort: Pulmonary effort is normal.      Breath sounds: Rhonchi and rales present.   Abdominal:      General: Abdomen is flat. Bowel sounds are normal.      Palpations: Abdomen is soft.   Musculoskeletal:         General: No swelling or deformity.      Cervical back: Normal range of motion and neck supple.      Right lower leg: Edema present.      Left lower leg: Edema present.   Skin:     General: Skin is warm and dry.      Capillary Refill: Capillary refill takes less than 2 seconds.   Neurological:      General: No focal deficit present.      Mental Status: She is alert and oriented to person, place, and  time.   Psychiatric:         Mood and Affect: Mood normal.         Behavior: Behavior normal.           CRANIAL NERVES     CN III, IV, VI   Pupils are equal, round, and reactive to light.       Significant Labs: All pertinent labs within the past 24 hours have been reviewed.    Significant Imaging: I have reviewed all pertinent imaging results/findings within the past 24 hours.      Assessment/Plan:      Severe obesity (BMI >= 40)  Body mass index is 57.99 kg/m². Morbid obesity complicates all aspects of disease management from diagnostic modalities to treatment. Weight loss encouraged and health benefits explained to patient.         COVID-19  Continue treatment per protocol    Remdesivir/immunomodulator       Acute hypoxemic respiratory failure  Patient with Hypoxic Respiratory failure which is Acute on chronic.  she is on home oxygen at 2 LPM. Supplemental oxygen was provided and noted- Oxygen Concentration (%):  [40] 40.   Signs/symptoms of respiratory failure include- tachypnea, increased work of breathing, respiratory distress and wheezing. Contributing diagnoses includes - Pneumonia Labs and images were reviewed. Patient Has not had a recent ABG. Will treat underlying causes and adjust management of respiratory failure as follows- Treat for covid    PE (pulmonary thromboembolism)  By hx.  Continue lovenox      VTE Risk Mitigation (From admission, onward)         Ordered     IP VTE HIGH RISK PATIENT  Once         01/31/22 2159     Place sequential compression device  Until discontinued         01/31/22 2159     enoxaparin injection 40 mg  Every 12 hours         01/31/22 2159                Discharge Planning   TIGRE:      Code Status: Full Code   Is the patient medically ready for discharge?:     Reason for patient still in hospital (select all that apply): Treatment  Discharge Plan A: Home with family,Home                  Oscar Ge Jr, MD  Department of Hospital Medicine   [facility]

## 2022-02-04 NOTE — PLAN OF CARE
02/04/22 1343   Medicare Message   Important Message from Medicare regarding Discharge Appeal Rights Explained to patient/caregiver;Signed/date by patient/caregiver;Other (comments);Given to patient/caregiver  (verbal explanation and consent per phone.)   Date IMM was signed 02/04/22   Time IMM was signed 1343   Covid isolation maintained.  Patient not answering phone now.  Reached out to patient's daughter, Esther Tan per phone  Explained medicare IM appeal rights to daughter.  She verbalizes understanding, Gives consent and agreement, copy delivered to patient at bedside per patient's nurse.

## 2022-02-05 PROBLEM — R53.1 WEAKNESS: Status: ACTIVE | Noted: 2022-02-05

## 2022-02-05 LAB
ALBUMIN SERPL BCP-MCNC: 2.5 G/DL (ref 3.5–5.2)
ALP SERPL-CCNC: 134 U/L (ref 55–135)
ALT SERPL W/O P-5'-P-CCNC: 21 U/L (ref 10–44)
ANION GAP SERPL CALC-SCNC: ABNORMAL MMOL/L (ref 8–16)
AST SERPL-CCNC: 18 U/L (ref 10–40)
BASOPHILS # BLD AUTO: 0.02 K/UL (ref 0–0.2)
BASOPHILS NFR BLD: 0.3 % (ref 0–1.9)
BILIRUB SERPL-MCNC: 0.6 MG/DL (ref 0.1–1)
BUN SERPL-MCNC: 18 MG/DL (ref 8–23)
CALCIUM SERPL-MCNC: 9.5 MG/DL (ref 8.7–10.5)
CHLORIDE SERPL-SCNC: 96 MMOL/L (ref 95–110)
CO2 SERPL-SCNC: >45 MMOL/L (ref 23–29)
CREAT SERPL-MCNC: 0.9 MG/DL (ref 0.5–1.4)
DIFFERENTIAL METHOD: ABNORMAL
EOSINOPHIL # BLD AUTO: 0 K/UL (ref 0–0.5)
EOSINOPHIL NFR BLD: 0 % (ref 0–8)
ERYTHROCYTE [DISTWIDTH] IN BLOOD BY AUTOMATED COUNT: 16.6 % (ref 11.5–14.5)
EST. GFR  (AFRICAN AMERICAN): >60 ML/MIN/1.73 M^2
EST. GFR  (NON AFRICAN AMERICAN): >60 ML/MIN/1.73 M^2
GLUCOSE SERPL-MCNC: 86 MG/DL (ref 70–110)
HCT VFR BLD AUTO: 45 % (ref 37–48.5)
HGB BLD-MCNC: 15.8 G/DL (ref 12–16)
IMM GRANULOCYTES # BLD AUTO: 0.01 K/UL (ref 0–0.04)
IMM GRANULOCYTES NFR BLD AUTO: 0.1 % (ref 0–0.5)
LYMPHOCYTES # BLD AUTO: 1.4 K/UL (ref 1–4.8)
LYMPHOCYTES NFR BLD: 19.7 % (ref 18–48)
MAGNESIUM SERPL-MCNC: 2.1 MG/DL (ref 1.6–2.6)
MCH RBC QN AUTO: 32.7 PG (ref 27–31)
MCHC RBC AUTO-ENTMCNC: 35.1 G/DL (ref 32–36)
MCV RBC AUTO: 93 FL (ref 82–98)
MONOCYTES # BLD AUTO: 0.7 K/UL (ref 0.3–1)
MONOCYTES NFR BLD: 9.5 % (ref 4–15)
NEUTROPHILS # BLD AUTO: 5.1 K/UL (ref 1.8–7.7)
NEUTROPHILS NFR BLD: 70.4 % (ref 38–73)
NRBC BLD-RTO: 0 /100 WBC
PLATELET # BLD AUTO: 186 K/UL (ref 150–450)
PMV BLD AUTO: 10 FL (ref 9.2–12.9)
POTASSIUM SERPL-SCNC: 4 MMOL/L (ref 3.5–5.1)
PROT SERPL-MCNC: 6.5 G/DL (ref 6–8.4)
RBC # BLD AUTO: 4.83 M/UL (ref 4–5.4)
SODIUM SERPL-SCNC: 142 MMOL/L (ref 136–145)
WBC # BLD AUTO: 7.19 K/UL (ref 3.9–12.7)

## 2022-02-05 PROCEDURE — 27000221 HC OXYGEN, UP TO 24 HOURS

## 2022-02-05 PROCEDURE — C9399 UNCLASSIFIED DRUGS OR BIOLOG: HCPCS | Performed by: INTERNAL MEDICINE

## 2022-02-05 PROCEDURE — 25000003 PHARM REV CODE 250: Performed by: EMERGENCY MEDICINE

## 2022-02-05 PROCEDURE — 80053 COMPREHEN METABOLIC PANEL: CPT | Performed by: INTERNAL MEDICINE

## 2022-02-05 PROCEDURE — 83735 ASSAY OF MAGNESIUM: CPT | Performed by: INTERNAL MEDICINE

## 2022-02-05 PROCEDURE — 85025 COMPLETE CBC W/AUTO DIFF WBC: CPT | Performed by: INTERNAL MEDICINE

## 2022-02-05 PROCEDURE — 94660 CPAP INITIATION&MGMT: CPT

## 2022-02-05 PROCEDURE — 99900035 HC TECH TIME PER 15 MIN (STAT)

## 2022-02-05 PROCEDURE — 27000207 HC ISOLATION

## 2022-02-05 PROCEDURE — 36415 COLL VENOUS BLD VENIPUNCTURE: CPT | Performed by: INTERNAL MEDICINE

## 2022-02-05 PROCEDURE — 97166 OT EVAL MOD COMPLEX 45 MIN: CPT

## 2022-02-05 PROCEDURE — 99900031 HC PATIENT EDUCATION (STAT)

## 2022-02-05 PROCEDURE — 11000001 HC ACUTE MED/SURG PRIVATE ROOM

## 2022-02-05 PROCEDURE — 63600175 PHARM REV CODE 636 W HCPCS: Performed by: EMERGENCY MEDICINE

## 2022-02-05 PROCEDURE — 63600175 PHARM REV CODE 636 W HCPCS: Performed by: INTERNAL MEDICINE

## 2022-02-05 PROCEDURE — 94761 N-INVAS EAR/PLS OXIMETRY MLT: CPT

## 2022-02-05 RX ORDER — DEXAMETHASONE SODIUM PHOSPHATE 4 MG/ML
4 INJECTION, SOLUTION INTRA-ARTICULAR; INTRALESIONAL; INTRAMUSCULAR; INTRAVENOUS; SOFT TISSUE EVERY 24 HOURS
Status: DISCONTINUED | OUTPATIENT
Start: 2022-02-06 | End: 2022-02-07 | Stop reason: HOSPADM

## 2022-02-05 RX ADMIN — DEXAMETHASONE SODIUM PHOSPHATE 6 MG: 4 INJECTION, SOLUTION INTRA-ARTICULAR; INTRALESIONAL; INTRAMUSCULAR; INTRAVENOUS; SOFT TISSUE at 08:02

## 2022-02-05 RX ADMIN — FUROSEMIDE 40 MG: 10 INJECTION, SOLUTION INTRAVENOUS at 01:02

## 2022-02-05 RX ADMIN — MELATONIN TAB 3 MG 6 MG: 3 TAB at 08:02

## 2022-02-05 RX ADMIN — MELATONIN TAB 3 MG 6 MG: 3 TAB at 12:02

## 2022-02-05 RX ADMIN — ENOXAPARIN SODIUM 40 MG: 40 INJECTION SUBCUTANEOUS at 08:02

## 2022-02-05 RX ADMIN — BARICITINIB 2 MG: 2 TABLET, FILM COATED ORAL at 08:02

## 2022-02-05 RX ADMIN — FUROSEMIDE 40 MG: 10 INJECTION, SOLUTION INTRAVENOUS at 03:02

## 2022-02-05 NOTE — RESPIRATORY THERAPY
02/05/22 0824   PRE-TX-O2   O2 Device (Oxygen Therapy) (S)  BiPAP   Oxygen Concentration (%) (S)  30   SpO2 100 %       Weaned FIO2 on Bipap to 30% at this time. Manuela VERDIN and Bridgett Fernandez NP notified

## 2022-02-05 NOTE — RESPIRATORY THERAPY
02/05/22 0822   Patient Assessment/Suction   Level of Consciousness (AVPU) alert   Respiratory Effort Unlabored   Expansion/Accessory Muscles/Retractions expansion symmetric;no retractions   Rhythm/Pattern, Respiratory tachypneic   PRE-TX-O2   O2 Device (Oxygen Therapy) BiPAP   $ Is the patient on Low Flow Oxygen? Yes   Oxygen Concentration (%) 40   SpO2 100 %   Pulse Oximetry Type Intermittent   $ Pulse Oximetry - Multiple Charge Pulse Oximetry - Multiple   Pulse 64   Resp (!) 32   Positioning HOB elevated 30 degrees   Skin Integrity   Area Observed Left;Right;Cheek;Bridge of nose;Chin;Forehead   Skin Appearance without discoloration   Barrier used? Liquid Filled Cushion  (Liquicell Cushion in place on bridge of patient's nose)   Preset CPAP/BiPAP Settings   Mode Of Delivery BiPAP S/T   $ CPAP/BiPAP Daily Charge BiPAP/CPAP Daily   $ Initial CPAP/BiPAP Setup? No   $ Is patient using? Yes   Size of Mask Medium/Large   Sized Appropriately? Yes   Equipment Type Trilogy    Airway Device Type medium full face mask   Humidifier not applicable   Ipap 16   EPAP (cm H2O) 8   Pressure Support (cm H2O) 8   ITime (sec) 1.3   Rise Time (sec) 2   Patient CPAP/BiPAP Settings   RR Total (Breaths/Min) 32   Tidal Volume (mL) 351   VE Minute Ventilation (L/min) 7.2 L/min   Peak Inspiratory Pressure (cm H2O) 15.9   Total Leak (L/Min) 32.8   Patient Trigger - ST Mode Only (%) 100   CPAP/BiPAP Backup Settings   Backup Rate 8 breaths per minute (bpm)   CPAP/BiPAP Alarms   Minute Ventilation (L/Min)   (low = 4.0 / high = 24.0)   High RR (breaths/min) 45   Low RR (breaths/min) 8   Apnea (Sec) 20   Respiratory Evaluation   $ Care Plan Tech Time 15 min       Patient requests to remain on Bipap. Manuela PERESN and Bridgett Fernandez NP notified

## 2022-02-05 NOTE — PT/OT/SLP EVAL
Occupational Therapy   Evaluation    Name: Carmen Tan  MRN: [id number]  Admitting Diagnosis:  Shortness of breath, Hypoxia, and COVID-19  Recent Surgery: * No surgery found *      Recommendations:     Discharge Recommendations: home with home health  Discharge Equipment Recommendations:  bedside commode,bath bench  Barriers to discharge:  Other (Comment) (Medical and functional status)    Assessment:     Carmen Tan is a 72 y.o. female with a medical diagnosis of Shortness of breath, Hypoxia, and COVID-19.  She presents with functional deficits impacting independence with ADL's including functional mobility. Performance deficits affecting function: weakness,impaired endurance,impaired self care skills,impaired functional mobilty,impaired balance,decreased safety awareness,impaired cardiopulmonary response to activity.      Rehab Prognosis: Good; patient would benefit from acute skilled OT services to address these deficits and reach maximum level of function.       Plan:     Patient to be seen 6 x/week to address the above listed problems via self-care/home management,therapeutic activities,therapeutic exercises  · Plan of Care Expires: 02/16/22  · Plan of Care Reviewed with: patient    Subjective     Chief Complaint: weakness and poor endurance  Patient/Family Comments/goals: Pt would like to increase strength and endurance in order to return home with her family.    Occupational Profile:  Living Environment: Pt lives with her spouse, son, and granddaughter in a H with 3 steps to enter / exit.  Previous level of function: Min to Mod Assistance required with ADL's  Roles and Routines: ADL's and some light IADL's  Equipment Used at Home:  cane, straight,CPAP,oxygen,walker, rolling,wheelchair  Assistance upon Discharge: Pt's granddaughter and cousin can assist as needed.    Pain/Comfort:  · Pain Rating 1: 0/10  · Pain Rating Post-Intervention 1: 0/10    Patients cultural, spiritual, Scientology conflicts  given the current situation: no    Objective:     Communicated with: nurse prior to session.  Patient found supine with telemetry,SCD,pulse ox (continuous),peripheral IV,BIPAP upon OT entry to room.    General Precautions: Standard, contact,airborne,droplet,fall   Orthopedic Precautions:N/A   Braces: N/A  Respiratory Status: BiPAP    Occupational Performance:    Bed Mobility:    · Patient completed Rolling/Turning to Left with  moderate assistance  · Patient completed Rolling/Turning to Right with moderate assistance  · Patient completed Scooting/Bridging with maximal assistance  · Patient completed Supine to Sit with maximal assistance  · Patient completed Sit to Supine with maximal assistance    Functional Mobility/Transfers:  · Patient completed Sit <> Stand Transfer with moderate assistance  with  no assistive device   · Patient completed Bed <> Chair Transfer using Stand Pivot technique with maximal assistance with no assistive device  · Patient completed Toilet Transfer Stand Pivot technique with maximal assistance with  bedside commode  · Functional Mobility: Pt ambulated 5' requiring much steadying assist and encouragement.    Activities of Daily Living:  · Feeding:  setup assistance    · Grooming: minimum assistance    · Bathing: maximal assistance    · Upper Body Dressing: moderate assistance    · Lower Body Dressing: maximal assistance    · Toileting: maximal assistance      Cognitive/Visual Perceptual:  Cognitive/Psychosocial Skills:  -       Oriented to: Person, Place and Situation   -       Follows Commands/attention:Follows two-step commands  -       Communication: clear/fluent  -       Memory: No Deficits noted  -       Safety awareness/insight to disability: impaired   -       Mood/Affect/Coping skills/emotional control: Appropriate to situation    Physical Exam:  Postural examination/scapula alignment: -       Rounded shoulders  Edema:  Moderate bilateral lower legs  Sensation: -       Intact   light/touch bilateral UE's and proprioception bilateral UE's  Dominant hand: -       Right  Upper Extremity Range of Motion:  -       Right Upper Extremity: WFL  -       Left Upper Extremity: WFL  Upper Extremity Strength: -       Right Upper Extremity: 4- to 4 / 5  -       Left Upper Extremity: 4- to 4 / 5   Strength: -       Right Upper Extremity: 4 / 5  -       Left Upper Extremity: 4 / 5  Fine Motor Coordination: -       Intact  Left hand, finger to nose, Right hand, finger to nose, Left hand, manipulation of objects and Right hand, manipulation of objects  Gross motor coordination: WFL    AMPAC 6 Click ADL:  AMPAC Total Score: 16    Treatment & Education:  Pt was provided education / instruction regarding role of OT and established OT POC.  Education:    Patient left HOB elevated with all lines intact and call button in reach    GOALS:   Goals to be met by: 02/16/22     Patient will increase functional independence with ADLs by performing:    Feeding with [place].  UE Dressing with Set-up Assistance.  LE Dressing with Moderate Assistance.  Grooming while seated at sink with Set-up Assistance.  Toileting from bedside commode with Moderate Assistance for hygiene and clothing management.   Bathing from  shower chair/bench with Moderate Assistance.  Step transfer with Minimal Assistance  Toilet transfer to bedside commode with Minimal Assistance.  Increased functional strength to 4+/5 for bilateral UE's.      History:     Past Medical History:   Diagnosis Date    Abdominal hernia     CHF (congestive heart failure)     COPD (chronic obstructive pulmonary disease)     GERD (gastroesophageal reflux disease)     History of home oxygen therapy     Hypertension     Migraine headache     Pulmonary embolism 06/01/2017    Small bowel obstruction     Thyroid disease        Past Surgical History:   Procedure Laterality Date    COLONOSCOPY      HYSTERECTOMY      INNER EAR SURGERY      UPPER  GASTROINTESTINAL ENDOSCOPY         Time Tracking:     OT Date of Treatment: 02/05/22  OT Start Time: 1045  OT Stop Time: 1125  OT Total Time (min): 40 min    Billable Minutes:Evaluation 40    2/5/2022

## 2022-02-05 NOTE — PROGRESS NOTES
"[facility]  Progress Note    Patient Name: Carmen Tan  MRN: [id number]  Patient Class: IP- Inpatient   Admission Date: 1/31/2022  Length of Stay: 5 days  Attending Physician: Oscar Ge Jr., MD  Primary Care Provider: Lucian Barry MD        Subjective:     Principal Problem:<principal problem not specified>        HPI:  Chief Complaint   Patient presents with    Shortness of Breath       EMS reports, "Patient complaining of shortness of breath. Stopped taking her Lasix 3-4 months ago because she could never make it to the restroom. She would urinate all over. Started feeling really bad this morning. I put her on 4 liters and was up to 97%. 18 gauge right hand."      71 yo female with HTN, CHF, COPD on 2L here with SOB x 2 days. No fever. Dry cough. No hemoptysis. Gradual onset. No known sick contacts. Vaccinated for COVID. Worse with exertion and alleviated with rest. Self d/c'd lasix a few months ago because she didn't like urinating so frequently.          Overview/Hospital Course:  2/2: No acute events overnight.  Patient tolerating BiPAP.  She endorses some significant fatigue and shortness of breath off of BiPAP at this time.  2/3:  weaning from bipap.  Will transition to nasal cannula.  2/4:  still requiring bipap.  Feeling better this am.     2/5/22 [facility] no acute events overnight, weaning oxygen via bipap now on 30% patient reports is on bipap much of the day at home. Will add Pt/OT       Past Medical History:   Diagnosis Date    Abdominal hernia     CHF (congestive heart failure)     COPD (chronic obstructive pulmonary disease)     GERD (gastroesophageal reflux disease)     History of home oxygen therapy     Hypertension     Migraine headache     Pulmonary embolism 06/01/2017    Small bowel obstruction     Thyroid disease        Past Surgical History:   Procedure Laterality Date    COLONOSCOPY      HYSTERECTOMY      INNER EAR SURGERY      UPPER " GASTROINTESTINAL ENDOSCOPY         Review of patient's allergies indicates:   Allergen Reactions    Pcn [penicillins] Swelling       No current facility-administered medications on file prior to encounter.     Current Outpatient Medications on File Prior to Encounter   Medication Sig    fesoterodine (TOVIAZ) 8 mg Tb24 Take by mouth.    fish oil-omega-3 fatty acids 300-1,000 mg capsule Take 2 g by mouth once daily.    furosemide (LASIX) 40 MG tablet Take 1 tablet (40 mg total) by mouth 2 (two) times daily. If having increased shortness of breath, increase in leg swelling, and if you notice an >3lbs in 24 hours. May take up to 4 tablets in 1 day (every 6 hours)    gabapentin (NEURONTIN) 600 MG tablet Take 600 mg by mouth 2 (two) times daily.    LIPITOR 40 mg tablet Take 1 tablet (40 mg total) by mouth once daily.    lisinopril (PRINIVIL,ZESTRIL) 5 MG tablet Take 1 tablet (5 mg total) by mouth once daily.    metoprolol tartrate (LOPRESSOR) 25 MG tablet Take 1 tablet (25 mg total) by mouth 2 (two) times daily.    mometasone-formoterol (DULERA) 200-5 mcg/actuation inhaler Inhale 2 puffs into the lungs 2 (two) times daily. Controller    omeprazole (PRILOSEC) 20 MG capsule Take 20 mg by mouth once daily.    rivaroxaban (XARELTO) 10 mg Tab Take 2 tablets (20 mg total) by mouth daily with dinner or evening meal.    vitamin D 1000 units Tab Take 185 mg by mouth once daily.     Family History    None       Tobacco Use    Smoking status: Former Smoker    Smokeless tobacco: Never Used   Substance and Sexual Activity    Alcohol use: No    Drug use: No    Sexual activity: Not on file     Review of Systems   Constitutional: Positive for fatigue. Negative for chills and fever.   HENT: Negative for rhinorrhea, sneezing and sore throat.    Eyes: Negative for pain and visual disturbance.   Respiratory: Positive for cough and shortness of breath.    Cardiovascular: Positive for leg swelling. Negative for chest pain.    Gastrointestinal: Negative for abdominal pain, constipation and diarrhea.   Endocrine: Negative for cold intolerance and heat intolerance.   Genitourinary: Negative for difficulty urinating.   Musculoskeletal: Negative for arthralgias and joint swelling.   Skin: Negative for rash.   Allergic/Immunologic: Negative for environmental allergies.   Neurological: Negative for dizziness, syncope and weakness.   Hematological: Does not bruise/bleed easily.   Psychiatric/Behavioral: Negative for dysphoric mood. The patient is not nervous/anxious.      Objective:     Vital Signs (Most Recent):  Temp: 97.6 °F (36.4 °C) (02/05/22 0834)  Pulse: 78 (02/05/22 0834)  Resp: 16 (02/05/22 0834)  BP: (!) 175/85 (02/05/22 0834)  SpO2: 96 % (02/05/22 0834) Vital Signs (24h Range):  Temp:  [97.6 °F (36.4 °C)-98.7 °F (37.1 °C)] 97.6 °F (36.4 °C)  Pulse:  [61-82] 78  Resp:  [16-39] 16  SpO2:  [95 %-100 %] 96 %  BP: (117-175)/(82-96) 175/85     Weight: (!) 148.5 kg (327 lb 6.1 oz)  Body mass index is 57.99 kg/m².    Physical Exam  Vitals and nursing note reviewed.   Constitutional:       Appearance: Normal appearance. She is obese.   HENT:      Head: Normocephalic and atraumatic.      Nose: Nose normal.   Eyes:      Extraocular Movements: Extraocular movements intact.      Pupils: Pupils are equal, round, and reactive to light.   Cardiovascular:      Rate and Rhythm: Normal rate and regular rhythm.      Heart sounds: No murmur heard.  No friction rub. No gallop.    Pulmonary:      Effort: Pulmonary effort is normal.      Breath sounds: Rhonchi and rales present.   Abdominal:      General: Abdomen is flat. Bowel sounds are normal.      Palpations: Abdomen is soft.   Musculoskeletal:         General: No swelling or deformity.      Cervical back: Normal range of motion and neck supple.      Right lower leg: Edema present.      Left lower leg: Edema present.   Skin:     General: Skin is warm and dry.      Capillary Refill: Capillary refill  takes less than 2 seconds.   Neurological:      General: No focal deficit present.      Mental Status: She is alert and oriented to person, place, and time.   Psychiatric:         Mood and Affect: Mood normal.         Behavior: Behavior normal.           CRANIAL NERVES     CN III, IV, VI   Pupils are equal, round, and reactive to light.       Significant Labs:   All pertinent labs within the past 24 hours have been reviewed.  CBC:   Recent Labs   Lab 02/04/22  0809 02/05/22  0453   WBC 7.30 7.19   HGB 15.3 15.8   HCT 46.6 45.0    186     CMP:   Recent Labs   Lab 02/04/22  0809 02/05/22  0453    142   K 3.7 4.0   CL 96 96   CO2 44* >45*   GLU 85 86   BUN 16 18   CREATININE 0.9 0.9   CALCIUM 9.7 9.5   PROT 6.8 6.5   ALBUMIN 2.6* 2.5*   BILITOT 0.5 0.6   ALKPHOS 131 134   AST 20 18   ALT 21 21   ANIONGAP 1* Unable to calculate   EGFRNONAA >60.0 >60.0     Magnesium:   Recent Labs   Lab 02/04/22  0809 02/05/22 0453   MG 2.1 2.1     POCT Glucose: No results for input(s): POCTGLUCOSE in the last 48 hours.    Significant Imaging: chest xray: Cardiac silhouette is unchanged.  Heterogeneous ground-glass infiltrates of the bilateral lungs are similar.  No obvious pleural effusion      Assessment/Plan:      Weakness  PT/OT eval and treat      Severe obesity (BMI >= 40)  Body mass index is 57.99 kg/m². Morbid obesity complicates all aspects of disease management from diagnostic modalities to treatment. Weight loss encouraged and health benefits explained to patient.         COVID-19  Continue treatment per protocol    Remdesivir/immunomodulator       Acute hypoxemic respiratory failure  Patient with Hypoxic Respiratory failure which is Acute on chronic.  she is on home oxygen at 2 LPM. Supplemental oxygen was provided and noted- Oxygen Concentration (%):  [40] 40.   Signs/symptoms of respiratory failure include- tachypnea, increased work of breathing, respiratory distress and wheezing. Contributing diagnoses  includes - Pneumonia Labs and images were reviewed. Patient Has not had a recent ABG. Will treat underlying causes and adjust management of respiratory failure as follows- Treat for covid    2/5/22 [facility] continue to wean oxygen as tolerates    PE (pulmonary thromboembolism)  By hx.  Continue lovenox      VTE Risk Mitigation (From admission, onward)         Ordered     IP VTE HIGH RISK PATIENT  Once         01/31/22 2159     Place sequential compression device  Until discontinued         01/31/22 2159     enoxaparin injection 40 mg  Every 12 hours         01/31/22 2159                Discharge Planning   TIGRE:      Code Status: Full Code   Is the patient medically ready for discharge?:     Reason for patient still in hospital (select all that apply): Patient trending condition, Laboratory test, Treatment and PT / OT recommendations  Discharge Plan A: Home with family,Home                  Bridgett Fernandez NP  Department of Hospital Medicine   [facility]

## 2022-02-05 NOTE — SUBJECTIVE & OBJECTIVE
Past Medical History:   Diagnosis Date    Abdominal hernia     CHF (congestive heart failure)     COPD (chronic obstructive pulmonary disease)     GERD (gastroesophageal reflux disease)     History of home oxygen therapy     Hypertension     Migraine headache     Pulmonary embolism 06/01/2017    Small bowel obstruction     Thyroid disease        Past Surgical History:   Procedure Laterality Date    COLONOSCOPY      HYSTERECTOMY      INNER EAR SURGERY      UPPER GASTROINTESTINAL ENDOSCOPY         Review of patient's allergies indicates:   Allergen Reactions    Pcn [penicillins] Swelling       No current facility-administered medications on file prior to encounter.     Current Outpatient Medications on File Prior to Encounter   Medication Sig    fesoterodine (TOVIAZ) 8 mg Tb24 Take by mouth.    fish oil-omega-3 fatty acids 300-1,000 mg capsule Take 2 g by mouth once daily.    furosemide (LASIX) 40 MG tablet Take 1 tablet (40 mg total) by mouth 2 (two) times daily. If having increased shortness of breath, increase in leg swelling, and if you notice an >3lbs in 24 hours. May take up to 4 tablets in 1 day (every 6 hours)    gabapentin (NEURONTIN) 600 MG tablet Take 600 mg by mouth 2 (two) times daily.    LIPITOR 40 mg tablet Take 1 tablet (40 mg total) by mouth once daily.    lisinopril (PRINIVIL,ZESTRIL) 5 MG tablet Take 1 tablet (5 mg total) by mouth once daily.    metoprolol tartrate (LOPRESSOR) 25 MG tablet Take 1 tablet (25 mg total) by mouth 2 (two) times daily.    mometasone-formoterol (DULERA) 200-5 mcg/actuation inhaler Inhale 2 puffs into the lungs 2 (two) times daily. Controller    omeprazole (PRILOSEC) 20 MG capsule Take 20 mg by mouth once daily.    rivaroxaban (XARELTO) 10 mg Tab Take 2 tablets (20 mg total) by mouth daily with dinner or evening meal.    vitamin D 1000 units Tab Take 185 mg by mouth once daily.     Family History    None       Tobacco Use    Smoking status: Former  Reviewed patient's chart with Nataly Adams PA-C  Per Chan Bhagat, if question of pyelonephritis, patient should have CT with contrast        Spoke with Princess Zuluaga at Dr Fred Rodriguez office  She discussed with Dr Regina Coppola, who will change ultrasound order to CT with contrast  Princess Zuluaga to call patient to arrange CT  Spoke with patient  Patient denies fever, but reports severe pain and nausea  Scheduled patient for appointment with Dr Marilee Carroll tomorrow, 11/9/18 at 3:00 in the [facility] office  Patient knows plan is to get CT done prior to this appointment  Patient to arrive early for new patient paperwork  Office location and address provided to patient  Smoker    Smokeless tobacco: Never Used   Substance and Sexual Activity    Alcohol use: No    Drug use: No    Sexual activity: Not on file     Review of Systems   Constitutional: Positive for fatigue. Negative for chills and fever.   HENT: Negative for rhinorrhea, sneezing and sore throat.    Eyes: Negative for pain and visual disturbance.   Respiratory: Positive for cough and shortness of breath.    Cardiovascular: Positive for leg swelling. Negative for chest pain.   Gastrointestinal: Negative for abdominal pain, constipation and diarrhea.   Endocrine: Negative for cold intolerance and heat intolerance.   Genitourinary: Negative for difficulty urinating.   Musculoskeletal: Negative for arthralgias and joint swelling.   Skin: Negative for rash.   Allergic/Immunologic: Negative for environmental allergies.   Neurological: Negative for dizziness, syncope and weakness.   Hematological: Does not bruise/bleed easily.   Psychiatric/Behavioral: Negative for dysphoric mood. The patient is not nervous/anxious.      Objective:     Vital Signs (Most Recent):  Temp: 97.6 °F (36.4 °C) (02/05/22 0834)  Pulse: 78 (02/05/22 0834)  Resp: 16 (02/05/22 0834)  BP: (!) 175/85 (02/05/22 0834)  SpO2: 96 % (02/05/22 0834) Vital Signs (24h Range):  Temp:  [97.6 °F (36.4 °C)-98.7 °F (37.1 °C)] 97.6 °F (36.4 °C)  Pulse:  [61-82] 78  Resp:  [16-39] 16  SpO2:  [95 %-100 %] 96 %  BP: (117-175)/(82-96) 175/85     Weight: (!) 148.5 kg (327 lb 6.1 oz)  Body mass index is 57.99 kg/m².    Physical Exam  Vitals and nursing note reviewed.   Constitutional:       Appearance: Normal appearance. She is obese.   HENT:      Head: Normocephalic and atraumatic.      Nose: Nose normal.   Eyes:      Extraocular Movements: Extraocular movements intact.      Pupils: Pupils are equal, round, and reactive to light.   Cardiovascular:      Rate and Rhythm: Normal rate and regular rhythm.      Heart sounds: No murmur heard.  No friction rub. No gallop.     Pulmonary:      Effort: Pulmonary effort is normal.      Breath sounds: Rhonchi and rales present.   Abdominal:      General: Abdomen is flat. Bowel sounds are normal.      Palpations: Abdomen is soft.   Musculoskeletal:         General: No swelling or deformity.      Cervical back: Normal range of motion and neck supple.      Right lower leg: Edema present.      Left lower leg: Edema present.   Skin:     General: Skin is warm and dry.      Capillary Refill: Capillary refill takes less than 2 seconds.   Neurological:      General: No focal deficit present.      Mental Status: She is alert and oriented to person, place, and time.   Psychiatric:         Mood and Affect: Mood normal.         Behavior: Behavior normal.           CRANIAL NERVES     CN III, IV, VI   Pupils are equal, round, and reactive to light.       Significant Labs:   All pertinent labs within the past 24 hours have been reviewed.  CBC:   Recent Labs   Lab 02/04/22  0809 02/05/22  0453   WBC 7.30 7.19   HGB 15.3 15.8   HCT 46.6 45.0    186     CMP:   Recent Labs   Lab 02/04/22  0809 02/05/22  0453    142   K 3.7 4.0   CL 96 96   CO2 44* >45*   GLU 85 86   BUN 16 18   CREATININE 0.9 0.9   CALCIUM 9.7 9.5   PROT 6.8 6.5   ALBUMIN 2.6* 2.5*   BILITOT 0.5 0.6   ALKPHOS 131 134   AST 20 18   ALT 21 21   ANIONGAP 1* Unable to calculate   EGFRNONAA >60.0 >60.0     Magnesium:   Recent Labs   Lab 02/04/22  0809 02/05/22  0453   MG 2.1 2.1     POCT Glucose: No results for input(s): POCTGLUCOSE in the last 48 hours.    Significant Imaging: chest xray: Cardiac silhouette is unchanged.  Heterogeneous ground-glass infiltrates of the bilateral lungs are similar.  No obvious pleural effusion

## 2022-02-05 NOTE — ASSESSMENT & PLAN NOTE
Patient with Hypoxic Respiratory failure which is Acute on chronic.  she is on home oxygen at 2 LPM. Supplemental oxygen was provided and noted- Oxygen Concentration (%):  [40] 40.   Signs/symptoms of respiratory failure include- tachypnea, increased work of breathing, respiratory distress and wheezing. Contributing diagnoses includes - Pneumonia Labs and images were reviewed. Patient Has not had a recent ABG. Will treat underlying causes and adjust management of respiratory failure as follows- Treat for covid    2/5/22 [facility] continue to wean oxygen as tolerates

## 2022-02-06 LAB
ALBUMIN SERPL BCP-MCNC: 2.6 G/DL (ref 3.5–5.2)
ALP SERPL-CCNC: 138 U/L (ref 55–135)
ALT SERPL W/O P-5'-P-CCNC: 24 U/L (ref 10–44)
ANION GAP SERPL CALC-SCNC: 1 MMOL/L (ref 8–16)
AST SERPL-CCNC: 20 U/L (ref 10–40)
BASOPHILS # BLD AUTO: 0.02 K/UL (ref 0–0.2)
BASOPHILS NFR BLD: 0.3 % (ref 0–1.9)
BILIRUB SERPL-MCNC: 0.8 MG/DL (ref 0.1–1)
BUN SERPL-MCNC: 20 MG/DL (ref 8–23)
CALCIUM SERPL-MCNC: 9.9 MG/DL (ref 8.7–10.5)
CHLORIDE SERPL-SCNC: 96 MMOL/L (ref 95–110)
CO2 SERPL-SCNC: 43 MMOL/L (ref 23–29)
CREAT SERPL-MCNC: 1 MG/DL (ref 0.5–1.4)
DIFFERENTIAL METHOD: ABNORMAL
EOSINOPHIL # BLD AUTO: 0 K/UL (ref 0–0.5)
EOSINOPHIL NFR BLD: 0 % (ref 0–8)
ERYTHROCYTE [DISTWIDTH] IN BLOOD BY AUTOMATED COUNT: 16.2 % (ref 11.5–14.5)
EST. GFR  (AFRICAN AMERICAN): >60 ML/MIN/1.73 M^2
EST. GFR  (NON AFRICAN AMERICAN): 56.8 ML/MIN/1.73 M^2
GLUCOSE SERPL-MCNC: 82 MG/DL (ref 70–110)
HCT VFR BLD AUTO: 47.9 % (ref 37–48.5)
HGB BLD-MCNC: 16.7 G/DL (ref 12–16)
IMM GRANULOCYTES # BLD AUTO: 0.02 K/UL (ref 0–0.04)
IMM GRANULOCYTES NFR BLD AUTO: 0.3 % (ref 0–0.5)
LYMPHOCYTES # BLD AUTO: 1.6 K/UL (ref 1–4.8)
LYMPHOCYTES NFR BLD: 21.1 % (ref 18–48)
MAGNESIUM SERPL-MCNC: 2.2 MG/DL (ref 1.6–2.6)
MCH RBC QN AUTO: 31.9 PG (ref 27–31)
MCHC RBC AUTO-ENTMCNC: 34.9 G/DL (ref 32–36)
MCV RBC AUTO: 91 FL (ref 82–98)
MONOCYTES # BLD AUTO: 0.7 K/UL (ref 0.3–1)
MONOCYTES NFR BLD: 8.6 % (ref 4–15)
NEUTROPHILS # BLD AUTO: 5.3 K/UL (ref 1.8–7.7)
NEUTROPHILS NFR BLD: 69.7 % (ref 38–73)
NRBC BLD-RTO: 0 /100 WBC
PLATELET # BLD AUTO: 187 K/UL (ref 150–450)
PMV BLD AUTO: 9.9 FL (ref 9.2–12.9)
POTASSIUM SERPL-SCNC: 3.8 MMOL/L (ref 3.5–5.1)
PROT SERPL-MCNC: 7.1 G/DL (ref 6–8.4)
RBC # BLD AUTO: 5.24 M/UL (ref 4–5.4)
SODIUM SERPL-SCNC: 140 MMOL/L (ref 136–145)
WBC # BLD AUTO: 7.55 K/UL (ref 3.9–12.7)

## 2022-02-06 PROCEDURE — 36415 COLL VENOUS BLD VENIPUNCTURE: CPT | Performed by: INTERNAL MEDICINE

## 2022-02-06 PROCEDURE — 27000221 HC OXYGEN, UP TO 24 HOURS

## 2022-02-06 PROCEDURE — 85025 COMPLETE CBC W/AUTO DIFF WBC: CPT | Performed by: INTERNAL MEDICINE

## 2022-02-06 PROCEDURE — 99900031 HC PATIENT EDUCATION (STAT)

## 2022-02-06 PROCEDURE — C9399 UNCLASSIFIED DRUGS OR BIOLOG: HCPCS | Performed by: INTERNAL MEDICINE

## 2022-02-06 PROCEDURE — 97162 PT EVAL MOD COMPLEX 30 MIN: CPT

## 2022-02-06 PROCEDURE — 25000003 PHARM REV CODE 250: Performed by: EMERGENCY MEDICINE

## 2022-02-06 PROCEDURE — 80053 COMPREHEN METABOLIC PANEL: CPT | Performed by: INTERNAL MEDICINE

## 2022-02-06 PROCEDURE — 83735 ASSAY OF MAGNESIUM: CPT | Performed by: INTERNAL MEDICINE

## 2022-02-06 PROCEDURE — 94761 N-INVAS EAR/PLS OXIMETRY MLT: CPT

## 2022-02-06 PROCEDURE — 63600175 PHARM REV CODE 636 W HCPCS: Performed by: EMERGENCY MEDICINE

## 2022-02-06 PROCEDURE — 94660 CPAP INITIATION&MGMT: CPT

## 2022-02-06 PROCEDURE — 11000001 HC ACUTE MED/SURG PRIVATE ROOM

## 2022-02-06 PROCEDURE — 63600175 PHARM REV CODE 636 W HCPCS: Performed by: INTERNAL MEDICINE

## 2022-02-06 PROCEDURE — 27000207 HC ISOLATION

## 2022-02-06 PROCEDURE — 63600175 PHARM REV CODE 636 W HCPCS: Performed by: NURSE PRACTITIONER

## 2022-02-06 PROCEDURE — 99900035 HC TECH TIME PER 15 MIN (STAT)

## 2022-02-06 RX ADMIN — ENOXAPARIN SODIUM 40 MG: 40 INJECTION SUBCUTANEOUS at 08:02

## 2022-02-06 RX ADMIN — FUROSEMIDE 40 MG: 10 INJECTION, SOLUTION INTRAVENOUS at 02:02

## 2022-02-06 RX ADMIN — MELATONIN TAB 3 MG 6 MG: 3 TAB at 08:02

## 2022-02-06 RX ADMIN — DEXAMETHASONE SODIUM PHOSPHATE 4 MG: 4 INJECTION, SOLUTION INTRA-ARTICULAR; INTRALESIONAL; INTRAMUSCULAR; INTRAVENOUS; SOFT TISSUE at 09:02

## 2022-02-06 RX ADMIN — BARICITINIB 2 MG: 2 TABLET, FILM COATED ORAL at 09:02

## 2022-02-06 NOTE — PROGRESS NOTES
"[facility] Medicine  Progress Note    Patient Name: Carmen Tan  MRN: [id number]  Patient Class: IP- Inpatient   Admission Date: 1/31/2022  Length of Stay: 6 days  Attending Physician: Oscar Ge Jr., MD  Primary Care Provider: Lucian Barry MD        Subjective:     Principal Problem:<principal problem not specified>        HPI:  Chief Complaint   Patient presents with    Shortness of Breath       EMS reports, "Patient complaining of shortness of breath. Stopped taking her Lasix 3-4 months ago because she could never make it to the restroom. She would urinate all over. Started feeling really bad this morning. I put her on 4 liters and was up to 97%. 18 gauge right hand."      73 yo female with HTN, CHF, COPD on 2L here with SOB x 2 days. No fever. Dry cough. No hemoptysis. Gradual onset. No known sick contacts. Vaccinated for COVID. Worse with exertion and alleviated with rest. Self d/c'd lasix a few months ago because she didn't like urinating so frequently.          Overview/Hospital Course:  2/2: No acute events overnight.  Patient tolerating BiPAP.  She endorses some significant fatigue and shortness of breath off of BiPAP at this time.  2/3:  weaning from bipap.  Will transition to nasal cannula.  2/4:  still requiring bipap.  Feeling better this am.     2/5/22 [facility] no acute events overnight, weaning oxygen via bipap now on 30% patient reports is on bipap much of the day at home. Will add Pt/OT     2/6/22 [facility] patient is doing well, no acute events overnight tolerating NC this am      Past Medical History:   Diagnosis Date    Abdominal hernia     CHF (congestive heart failure)     COPD (chronic obstructive pulmonary disease)     GERD (gastroesophageal reflux disease)     History of home oxygen therapy     Hypertension     Migraine headache     Pulmonary embolism 06/01/2017    Small bowel obstruction     Thyroid disease        Past Surgical History:   Procedure " Laterality Date    COLONOSCOPY      HYSTERECTOMY      INNER EAR SURGERY      UPPER GASTROINTESTINAL ENDOSCOPY         Review of patient's allergies indicates:   Allergen Reactions    Pcn [penicillins] Swelling       No current facility-administered medications on file prior to encounter.     Current Outpatient Medications on File Prior to Encounter   Medication Sig    fesoterodine (TOVIAZ) 8 mg Tb24 Take by mouth.    fish oil-omega-3 fatty acids 300-1,000 mg capsule Take 2 g by mouth once daily.    furosemide (LASIX) 40 MG tablet Take 1 tablet (40 mg total) by mouth 2 (two) times daily. If having increased shortness of breath, increase in leg swelling, and if you notice an >3lbs in 24 hours. May take up to 4 tablets in 1 day (every 6 hours)    gabapentin (NEURONTIN) 600 MG tablet Take 600 mg by mouth 2 (two) times daily.    LIPITOR 40 mg tablet Take 1 tablet (40 mg total) by mouth once daily.    lisinopril (PRINIVIL,ZESTRIL) 5 MG tablet Take 1 tablet (5 mg total) by mouth once daily.    metoprolol tartrate (LOPRESSOR) 25 MG tablet Take 1 tablet (25 mg total) by mouth 2 (two) times daily.    mometasone-formoterol (DULERA) 200-5 mcg/actuation inhaler Inhale 2 puffs into the lungs 2 (two) times daily. Controller    omeprazole (PRILOSEC) 20 MG capsule Take 20 mg by mouth once daily.    rivaroxaban (XARELTO) 10 mg Tab Take 2 tablets (20 mg total) by mouth daily with dinner or evening meal.    vitamin D 1000 units Tab Take 185 mg by mouth once daily.     Family History    None       Tobacco Use    Smoking status: Former Smoker    Smokeless tobacco: Never Used   Substance and Sexual Activity    Alcohol use: No    Drug use: No    Sexual activity: Not on file     Review of Systems   Constitutional: Positive for fatigue. Negative for chills and fever.   HENT: Negative for rhinorrhea, sneezing and sore throat.    Eyes: Negative for pain and visual disturbance.   Respiratory: Positive for cough and  shortness of breath.    Cardiovascular: Positive for leg swelling. Negative for chest pain.   Gastrointestinal: Negative for abdominal pain, constipation and diarrhea.   Endocrine: Negative for cold intolerance and heat intolerance.   Genitourinary: Negative for difficulty urinating.   Musculoskeletal: Negative for arthralgias and joint swelling.   Skin: Negative for rash.   Allergic/Immunologic: Negative for environmental allergies.   Neurological: Negative for dizziness, syncope and weakness.   Hematological: Does not bruise/bleed easily.   Psychiatric/Behavioral: Negative for dysphoric mood. The patient is not nervous/anxious.      Objective:     Vital Signs (Most Recent):  Temp: 97.8 °F (36.6 °C) (02/06/22 0818)  Pulse: 69 (02/06/22 0818)  Resp: 18 (02/06/22 0818)  BP: (!) 141/80 (02/06/22 0818)  SpO2: 98 % (02/06/22 0818) Vital Signs (24h Range):  Temp:  [97.5 °F (36.4 °C)-98.4 °F (36.9 °C)] 97.8 °F (36.6 °C)  Pulse:  [53-89] 69  Resp:  [12-36] 18  SpO2:  [95 %-98 %] 98 %  BP: (133-150)/(73-90) 141/80     Weight: (!) 148.5 kg (327 lb 6.1 oz)  Body mass index is 57.99 kg/m².    Physical Exam  Vitals and nursing note reviewed.   Constitutional:       Appearance: Normal appearance. She is obese.   HENT:      Head: Normocephalic and atraumatic.      Nose: Nose normal.   Eyes:      Extraocular Movements: Extraocular movements intact.      Pupils: Pupils are equal, round, and reactive to light.   Cardiovascular:      Rate and Rhythm: Normal rate and regular rhythm.      Heart sounds: No murmur heard.  No friction rub. No gallop.    Pulmonary:      Effort: Pulmonary effort is normal.      Breath sounds: Rhonchi and rales present.   Abdominal:      General: Abdomen is flat. Bowel sounds are normal.      Palpations: Abdomen is soft.   Musculoskeletal:         General: No swelling or deformity.      Cervical back: Normal range of motion and neck supple.      Right lower leg: Edema present.      Left lower leg: Edema  present.   Skin:     General: Skin is warm and dry.      Capillary Refill: Capillary refill takes less than 2 seconds.   Neurological:      General: No focal deficit present.      Mental Status: She is alert and oriented to person, place, and time.   Psychiatric:         Mood and Affect: Mood normal.         Behavior: Behavior normal.           CRANIAL NERVES     CN III, IV, VI   Pupils are equal, round, and reactive to light.       Significant Labs:   All pertinent labs within the past 24 hours have been reviewed.  CBC:   Recent Labs   Lab 02/05/22 0453 02/06/22  0530   WBC 7.19 7.55   HGB 15.8 16.7*   HCT 45.0 47.9    187     CMP:   Recent Labs   Lab 02/05/22 0453 02/06/22  0530    140   K 4.0 3.8   CL 96 96   CO2 >45* 43*   GLU 86 82   BUN 18 20   CREATININE 0.9 1.0   CALCIUM 9.5 9.9   PROT 6.5 7.1   ALBUMIN 2.5* 2.6*   BILITOT 0.6 0.8   ALKPHOS 134 138*   AST 18 20   ALT 21 24   ANIONGAP Unable to calculate 1*   EGFRNONAA >60.0 56.8*     Magnesium:   Recent Labs   Lab 02/05/22 0453 02/06/22  0530   MG 2.1 2.2     POCT Glucose: No results for input(s): POCTGLUCOSE in the last 48 hours.    Significant Imaging: chest xray: Cardiac silhouette is unchanged.  Heterogeneous ground-glass infiltrates of the bilateral lungs are similar.  No obvious pleural effusion      Assessment/Plan:      Weakness  PT/OT eval and treat      Severe obesity (BMI >= 40)  Body mass index is 57.99 kg/m². Morbid obesity complicates all aspects of disease management from diagnostic modalities to treatment. Weight loss encouraged and health benefits explained to patient.         COVID-19  Continue treatment per protocol    Remdesivir/immunomodulator       Acute hypoxemic respiratory failure  Patient with Hypoxic Respiratory failure which is Acute on chronic.  she is on home oxygen at 2 LPM. Supplemental oxygen was provided and noted- Oxygen Concentration (%):  [30-36] 36.   Signs/symptoms of respiratory failure include-  tachypnea, increased work of breathing, respiratory distress and wheezing. Contributing diagnoses includes - Pneumonia Labs and images were reviewed. Patient Has not had a recent ABG. Will treat underlying causes and adjust management of respiratory failure as follows- Treat for covid    2/5/22 [facility] continue to wean oxygen as tolerates    PE (pulmonary thromboembolism)  By hx.  Continue lovenox      VTE Risk Mitigation (From admission, onward)         Ordered     IP VTE HIGH RISK PATIENT  Once         01/31/22 2159     Place sequential compression device  Until discontinued         01/31/22 2159     enoxaparin injection 40 mg  Every 12 hours         01/31/22 2159                Discharge Planning   TIGRE:      Code Status: Full Code   Is the patient medically ready for discharge?:     Reason for patient still in hospital (select all that apply): Patient trending condition, Laboratory test, Treatment and PT / OT recommendations  Discharge Plan A: Home with family,Home                  Bridgett Fernandez NP  Department of Hospital Medicine   [facility]

## 2022-02-06 NOTE — ASSESSMENT & PLAN NOTE
Patient with Hypoxic Respiratory failure which is Acute on chronic.  she is on home oxygen at 2 LPM. Supplemental oxygen was provided and noted- Oxygen Concentration (%):  [30-36] 36.   Signs/symptoms of respiratory failure include- tachypnea, increased work of breathing, respiratory distress and wheezing. Contributing diagnoses includes - Pneumonia Labs and images were reviewed. Patient Has not had a recent ABG. Will treat underlying causes and adjust management of respiratory failure as follows- Treat for covid    2/5/22 [facility] continue to wean oxygen as tolerates

## 2022-02-06 NOTE — PLAN OF CARE
02/06/22 1134   Medicare Message   Important Message from Medicare regarding Discharge Appeal Rights Given to patient/caregiver;Explained to patient/caregiver   Date IMM was signed 02/06/22   Time IMM was signed 1134     Verbal consent via phone w/patient's daughter Leena LEVI.  Copy to be placed in patient's room.

## 2022-02-06 NOTE — PT/OT/SLP EVAL
Physical Therapy  Evaluation    Carmen Tan   MRN: [id number]   Admitting Diagnosis: <principal problem not specified>                          Billable Minutes:  Evaluation 30 minutes    Diagnosis: SOB: COvid    Past Medical History:   Diagnosis Date    Abdominal hernia     CHF (congestive heart failure)     COPD (chronic obstructive pulmonary disease)     GERD (gastroesophageal reflux disease)     History of home oxygen therapy     Hypertension     Migraine headache     Pulmonary embolism 06/01/2017    Small bowel obstruction     Thyroid disease       Past Surgical History:   Procedure Laterality Date    COLONOSCOPY      HYSTERECTOMY      INNER EAR SURGERY      UPPER GASTROINTESTINAL ENDOSCOPY         Referring physician: Luma  Date referred to PT: 2/5/22    General Precautions: Standard,  fall, contact, droplet  Resp: 4 L NC O2; pt's O25 remained 95/-98% with activity         Patient History:       Previous Level of Function:    Living Environment: Pt lives with her spouse, son, and granddaughter in a H with 3 steps to enter / exit.  Previous level of function: Min to Mod Assistance required with ADL's  Roles and Routines: ADL's and some light IADL's  Equipment Used at Home:  cane, straight,CPAP,oxygen,walker, rolling,wheelchair  Assistance upon Discharge: Pt's granddaughter and cousin can assist as needed.    Subjective:  Communicated with nurse prior to session.    Pain: feet: FACES scale 4/10 what is described as neuropathy by pt  Chief Complaint: foot pain  Patient goals: return home           Objective:         Cognitive Exam:  Oriented to: Person, Place, Time and Situation    Follows Commands/attention: Follows multistep  commands  Communication: clear/fluent  Safety awareness/insight to disability: intact    Physical Exam:  Postural examination/scapula alignment:    -       Rounded shoulders  -       Forward head      Lower Extremity Range of Motion:  Right Lower Extremity: WFL  Left  Lower Extremity: WFL    Lower Extremity Strength:  Right Lower Extremity: 3+/5  Left Lower Extremity: 3+/5     Fine motor coordination:     -       Intact    Gross motor coordination: WFL's    Functional Mobility:  Bed Mobility:   -Rolling mod assist  -supine to/from sit max assist for trunk elevation and B LE management  ]-scooting up in bed in supine max assist  -lateral scooting in sitting mod assist    Transfers:   -sit to/from stand mod assist and slow, tasking effort using SC and HHA    Gait:    -sidestpping along side of bed 3' mod assist using SC and HHA    Stairs:  NA    Balance:   Static Sit: GOOD-: Takes MODERATE challenges from all directions but inconsistently  Dynamic Sit: FAIR+: Maintains balance through MINIMAL excursions of active trunk motion  Static Stand: POOR+: Needs MINIMAL assist to maintain  Dynamic stand: POOR: Needs MOD (moderate) assist during gait      Patient left HOB elevated with all lines intact, call button in reach and chair alarm off.    Assessment:   Carmen Tan is a 72 y.o. female with a medical diagnosis of <principal problem not specified> and presents with decreased LE strength, decreased ability to perform bed mobility/transfers and gait without significant assistance. .    Rehab identified problem list/impairments:      Rehab potential is good.    Activity tolerance: Fair    Discharge recommendations:   continued PT    Barriers to discharge:      Equipment recommendations:       GOALS:   Multidisciplinary Problems     Physical Therapy Goals        Problem: Physical Therapy Goal    Goal Priority Disciplines Outcome Goal Variances Interventions   Physical Therapy Goal     PT, PT/OT      Description: Goals to be met by: 22    Patient will increase functional independence with mobility by performin. Supine to sit with MInimal Assistance  2. Sit to supine with MInimal Assistance  3. Sit to stand transfer with Minimal Assistance  4. Gait  x 25' x 2 feet with  Minimal Assistance using Rolling Walker vs cane.                      PLAN:    Patient to be seen 5-6 days/week   to address the above listed problems via  strengthening and progressive mobilization  Plan of Care expires:  2/21/22  Plan of Care reviewed with:  patient          02/06/2022

## 2022-02-06 NOTE — SUBJECTIVE & OBJECTIVE
Past Medical History:   Diagnosis Date    Abdominal hernia     CHF (congestive heart failure)     COPD (chronic obstructive pulmonary disease)     GERD (gastroesophageal reflux disease)     History of home oxygen therapy     Hypertension     Migraine headache     Pulmonary embolism 06/01/2017    Small bowel obstruction     Thyroid disease        Past Surgical History:   Procedure Laterality Date    COLONOSCOPY      HYSTERECTOMY      INNER EAR SURGERY      UPPER GASTROINTESTINAL ENDOSCOPY         Review of patient's allergies indicates:   Allergen Reactions    Pcn [penicillins] Swelling       No current facility-administered medications on file prior to encounter.     Current Outpatient Medications on File Prior to Encounter   Medication Sig    fesoterodine (TOVIAZ) 8 mg Tb24 Take by mouth.    fish oil-omega-3 fatty acids 300-1,000 mg capsule Take 2 g by mouth once daily.    furosemide (LASIX) 40 MG tablet Take 1 tablet (40 mg total) by mouth 2 (two) times daily. If having increased shortness of breath, increase in leg swelling, and if you notice an >3lbs in 24 hours. May take up to 4 tablets in 1 day (every 6 hours)    gabapentin (NEURONTIN) 600 MG tablet Take 600 mg by mouth 2 (two) times daily.    LIPITOR 40 mg tablet Take 1 tablet (40 mg total) by mouth once daily.    lisinopril (PRINIVIL,ZESTRIL) 5 MG tablet Take 1 tablet (5 mg total) by mouth once daily.    metoprolol tartrate (LOPRESSOR) 25 MG tablet Take 1 tablet (25 mg total) by mouth 2 (two) times daily.    mometasone-formoterol (DULERA) 200-5 mcg/actuation inhaler Inhale 2 puffs into the lungs 2 (two) times daily. Controller    omeprazole (PRILOSEC) 20 MG capsule Take 20 mg by mouth once daily.    rivaroxaban (XARELTO) 10 mg Tab Take 2 tablets (20 mg total) by mouth daily with dinner or evening meal.    vitamin D 1000 units Tab Take 185 mg by mouth once daily.     Family History    None       Tobacco Use    Smoking status: Former  Smoker    Smokeless tobacco: Never Used   Substance and Sexual Activity    Alcohol use: No    Drug use: No    Sexual activity: Not on file     Review of Systems   Constitutional: Positive for fatigue. Negative for chills and fever.   HENT: Negative for rhinorrhea, sneezing and sore throat.    Eyes: Negative for pain and visual disturbance.   Respiratory: Positive for cough and shortness of breath.    Cardiovascular: Positive for leg swelling. Negative for chest pain.   Gastrointestinal: Negative for abdominal pain, constipation and diarrhea.   Endocrine: Negative for cold intolerance and heat intolerance.   Genitourinary: Negative for difficulty urinating.   Musculoskeletal: Negative for arthralgias and joint swelling.   Skin: Negative for rash.   Allergic/Immunologic: Negative for environmental allergies.   Neurological: Negative for dizziness, syncope and weakness.   Hematological: Does not bruise/bleed easily.   Psychiatric/Behavioral: Negative for dysphoric mood. The patient is not nervous/anxious.      Objective:     Vital Signs (Most Recent):  Temp: 97.8 °F (36.6 °C) (02/06/22 0818)  Pulse: 69 (02/06/22 0818)  Resp: 18 (02/06/22 0818)  BP: (!) 141/80 (02/06/22 0818)  SpO2: 98 % (02/06/22 0818) Vital Signs (24h Range):  Temp:  [97.5 °F (36.4 °C)-98.4 °F (36.9 °C)] 97.8 °F (36.6 °C)  Pulse:  [53-89] 69  Resp:  [12-36] 18  SpO2:  [95 %-98 %] 98 %  BP: (133-150)/(73-90) 141/80     Weight: (!) 148.5 kg (327 lb 6.1 oz)  Body mass index is 57.99 kg/m².    Physical Exam  Vitals and nursing note reviewed.   Constitutional:       Appearance: Normal appearance. She is obese.   HENT:      Head: Normocephalic and atraumatic.      Nose: Nose normal.   Eyes:      Extraocular Movements: Extraocular movements intact.      Pupils: Pupils are equal, round, and reactive to light.   Cardiovascular:      Rate and Rhythm: Normal rate and regular rhythm.      Heart sounds: No murmur heard.  No friction rub. No gallop.     Pulmonary:      Effort: Pulmonary effort is normal.      Breath sounds: Rhonchi and rales present.   Abdominal:      General: Abdomen is flat. Bowel sounds are normal.      Palpations: Abdomen is soft.   Musculoskeletal:         General: No swelling or deformity.      Cervical back: Normal range of motion and neck supple.      Right lower leg: Edema present.      Left lower leg: Edema present.   Skin:     General: Skin is warm and dry.      Capillary Refill: Capillary refill takes less than 2 seconds.   Neurological:      General: No focal deficit present.      Mental Status: She is alert and oriented to person, place, and time.   Psychiatric:         Mood and Affect: Mood normal.         Behavior: Behavior normal.           CRANIAL NERVES     CN III, IV, VI   Pupils are equal, round, and reactive to light.       Significant Labs:   All pertinent labs within the past 24 hours have been reviewed.  CBC:   Recent Labs   Lab 02/05/22 0453 02/06/22  0530   WBC 7.19 7.55   HGB 15.8 16.7*   HCT 45.0 47.9    187     CMP:   Recent Labs   Lab 02/05/22 0453 02/06/22  0530    140   K 4.0 3.8   CL 96 96   CO2 >45* 43*   GLU 86 82   BUN 18 20   CREATININE 0.9 1.0   CALCIUM 9.5 9.9   PROT 6.5 7.1   ALBUMIN 2.5* 2.6*   BILITOT 0.6 0.8   ALKPHOS 134 138*   AST 18 20   ALT 21 24   ANIONGAP Unable to calculate 1*   EGFRNONAA >60.0 56.8*     Magnesium:   Recent Labs   Lab 02/05/22 0453 02/06/22  0530   MG 2.1 2.2     POCT Glucose: No results for input(s): POCTGLUCOSE in the last 48 hours.    Significant Imaging: chest xray: Cardiac silhouette is unchanged.  Heterogeneous ground-glass infiltrates of the bilateral lungs are similar.  No obvious pleural effusion

## 2022-02-07 VITALS
TEMPERATURE: 98 F | RESPIRATION RATE: 26 BRPM | BODY MASS INDEX: 51.91 KG/M2 | HEIGHT: 63 IN | OXYGEN SATURATION: 96 % | WEIGHT: 293 LBS | DIASTOLIC BLOOD PRESSURE: 97 MMHG | HEART RATE: 65 BPM | SYSTOLIC BLOOD PRESSURE: 143 MMHG

## 2022-02-07 DIAGNOSIS — U07.1 COVID-19 VIRUS DETECTED: ICD-10-CM

## 2022-02-07 LAB
ALBUMIN SERPL BCP-MCNC: 2.4 G/DL (ref 3.5–5.2)
ALP SERPL-CCNC: 126 U/L (ref 55–135)
ALT SERPL W/O P-5'-P-CCNC: 20 U/L (ref 10–44)
ANION GAP SERPL CALC-SCNC: 2 MMOL/L (ref 8–16)
AST SERPL-CCNC: 18 U/L (ref 10–40)
BASOPHILS # BLD AUTO: 0.01 K/UL (ref 0–0.2)
BASOPHILS NFR BLD: 0.1 % (ref 0–1.9)
BILIRUB SERPL-MCNC: 0.8 MG/DL (ref 0.1–1)
BUN SERPL-MCNC: 23 MG/DL (ref 8–23)
CALCIUM SERPL-MCNC: 9.6 MG/DL (ref 8.7–10.5)
CHLORIDE SERPL-SCNC: 97 MMOL/L (ref 95–110)
CO2 SERPL-SCNC: 41 MMOL/L (ref 23–29)
CREAT SERPL-MCNC: 1.2 MG/DL (ref 0.5–1.4)
DIFFERENTIAL METHOD: ABNORMAL
EOSINOPHIL # BLD AUTO: 0 K/UL (ref 0–0.5)
EOSINOPHIL NFR BLD: 0.2 % (ref 0–8)
ERYTHROCYTE [DISTWIDTH] IN BLOOD BY AUTOMATED COUNT: 15.5 % (ref 11.5–14.5)
EST. GFR  (AFRICAN AMERICAN): 52.5 ML/MIN/1.73 M^2
EST. GFR  (NON AFRICAN AMERICAN): 45.6 ML/MIN/1.73 M^2
GLUCOSE SERPL-MCNC: 79 MG/DL (ref 70–110)
HCT VFR BLD AUTO: 48.4 % (ref 37–48.5)
HGB BLD-MCNC: 16.5 G/DL (ref 12–16)
IMM GRANULOCYTES # BLD AUTO: 0.01 K/UL (ref 0–0.04)
IMM GRANULOCYTES NFR BLD AUTO: 0.1 % (ref 0–0.5)
LYMPHOCYTES # BLD AUTO: 2.3 K/UL (ref 1–4.8)
LYMPHOCYTES NFR BLD: 28.3 % (ref 18–48)
MAGNESIUM SERPL-MCNC: 2.1 MG/DL (ref 1.6–2.6)
MCH RBC QN AUTO: 30.9 PG (ref 27–31)
MCHC RBC AUTO-ENTMCNC: 34.1 G/DL (ref 32–36)
MCV RBC AUTO: 91 FL (ref 82–98)
MONOCYTES # BLD AUTO: 0.8 K/UL (ref 0.3–1)
MONOCYTES NFR BLD: 9.6 % (ref 4–15)
NEUTROPHILS # BLD AUTO: 5 K/UL (ref 1.8–7.7)
NEUTROPHILS NFR BLD: 61.7 % (ref 38–73)
NRBC BLD-RTO: 0 /100 WBC
PLATELET # BLD AUTO: 185 K/UL (ref 150–450)
PMV BLD AUTO: 10.4 FL (ref 9.2–12.9)
POTASSIUM SERPL-SCNC: 3.8 MMOL/L (ref 3.5–5.1)
PROT SERPL-MCNC: 6.5 G/DL (ref 6–8.4)
RBC # BLD AUTO: 5.34 M/UL (ref 4–5.4)
SODIUM SERPL-SCNC: 140 MMOL/L (ref 136–145)
WBC # BLD AUTO: 8.15 K/UL (ref 3.9–12.7)

## 2022-02-07 PROCEDURE — 63600175 PHARM REV CODE 636 W HCPCS: Performed by: NURSE PRACTITIONER

## 2022-02-07 PROCEDURE — 27000221 HC OXYGEN, UP TO 24 HOURS

## 2022-02-07 PROCEDURE — 80053 COMPREHEN METABOLIC PANEL: CPT | Performed by: INTERNAL MEDICINE

## 2022-02-07 PROCEDURE — 63600175 PHARM REV CODE 636 W HCPCS: Performed by: INTERNAL MEDICINE

## 2022-02-07 PROCEDURE — 94660 CPAP INITIATION&MGMT: CPT

## 2022-02-07 PROCEDURE — 36415 COLL VENOUS BLD VENIPUNCTURE: CPT | Performed by: INTERNAL MEDICINE

## 2022-02-07 PROCEDURE — 85025 COMPLETE CBC W/AUTO DIFF WBC: CPT | Performed by: INTERNAL MEDICINE

## 2022-02-07 PROCEDURE — 99900035 HC TECH TIME PER 15 MIN (STAT)

## 2022-02-07 PROCEDURE — C9399 UNCLASSIFIED DRUGS OR BIOLOG: HCPCS | Performed by: INTERNAL MEDICINE

## 2022-02-07 PROCEDURE — 63600175 PHARM REV CODE 636 W HCPCS: Performed by: EMERGENCY MEDICINE

## 2022-02-07 PROCEDURE — 83735 ASSAY OF MAGNESIUM: CPT | Performed by: INTERNAL MEDICINE

## 2022-02-07 PROCEDURE — 99900031 HC PATIENT EDUCATION (STAT)

## 2022-02-07 PROCEDURE — 94761 N-INVAS EAR/PLS OXIMETRY MLT: CPT

## 2022-02-07 RX ORDER — IPRATROPIUM BROMIDE AND ALBUTEROL SULFATE 2.5; .5 MG/3ML; MG/3ML
3 SOLUTION RESPIRATORY (INHALATION) EVERY 6 HOURS PRN
Qty: 1 EACH | Refills: 0 | Status: ON HOLD | OUTPATIENT
Start: 2022-02-07 | End: 2023-04-11 | Stop reason: HOSPADM

## 2022-02-07 RX ORDER — BUDESONIDE 0.5 MG/2ML
0.5 INHALANT ORAL 2 TIMES DAILY
Qty: 120 ML | Refills: 0 | Status: ON HOLD | OUTPATIENT
Start: 2022-02-07 | End: 2023-04-11 | Stop reason: HOSPADM

## 2022-02-07 RX ORDER — DEXAMETHASONE 6 MG/1
6 TABLET ORAL
Qty: 5 TABLET | Refills: 0 | Status: SHIPPED | OUTPATIENT
Start: 2022-02-07 | End: 2022-02-12

## 2022-02-07 RX ADMIN — DEXAMETHASONE SODIUM PHOSPHATE 4 MG: 4 INJECTION, SOLUTION INTRA-ARTICULAR; INTRALESIONAL; INTRAMUSCULAR; INTRAVENOUS; SOFT TISSUE at 08:02

## 2022-02-07 RX ADMIN — FUROSEMIDE 40 MG: 10 INJECTION, SOLUTION INTRAVENOUS at 02:02

## 2022-02-07 RX ADMIN — BARICITINIB 2 MG: 2 TABLET, FILM COATED ORAL at 08:02

## 2022-02-07 RX ADMIN — ENOXAPARIN SODIUM 40 MG: 40 INJECTION SUBCUTANEOUS at 08:02

## 2022-02-07 NOTE — PLAN OF CARE
[place] [facility]  Discharge Final Note    Primary Care Provider: Lucian Barry MD    Expected Discharge Date: 2/7/2022    Final Discharge Note (most recent)     Final Note - 02/07/22 0842        Final Note    Assessment Type Final Discharge Note     Anticipated Discharge Disposition Home or Self Care     Hospital Resources/Appts/Education Provided Appointments scheduled and added to AVS        Post-Acute Status    Discharge Delays None known at this time                 Important Message from Medicare  Important Message from Medicare regarding Discharge Appeal Rights: Given to patient/caregiver,Explained to patient/caregiver     Date IMM was signed: 02/06/22  Time IMM was signed: 1134    Contact Info     Lucian Barry MD   Specialty: Internal Medicine   Relationship: PCP - General    [address] [place] [id number]   Phone: [phone]       Next Steps: Follow up on 2/14/2022    Instructions: 8:30            Next Steps: Follow up on 2/14/2022      Final note is completed. The patient will be discharged home with self-care. SW spoke to the patient regarding transportation, and she stated that her granddaughter or daughter will be able to pick her up upon discharge. She does not require any case managment needs at this time. Patient will receive an [employer] brochure and 211 flyer for additional resource if needed.

## 2022-02-07 NOTE — NURSING
Saline lock removed intact with out edema or redness noted, discharge instructions given and patient instructed to have family bring her home oxygen

## 2022-02-07 NOTE — DISCHARGE SUMMARY
"[facility] Medicine  Discharge Summary      Patient Name: Carmen Tan  MRN: [id number]  Patient Class: IP- Inpatient  Admission Date: 1/31/2022  Hospital Length of Stay: 7 days  Discharge Date and Time:  02/07/2022 8:28 AM  Attending Physician: Oscar Ge Jr., MD   Discharging Provider: Oscar Ge Jr, MD  Primary Care Provider: Lucian Barry MD      HPI:   Chief Complaint   Patient presents with    Shortness of Breath       EMS reports, "Patient complaining of shortness of breath. Stopped taking her Lasix 3-4 months ago because she could never make it to the restroom. She would urinate all over. Started feeling really bad this morning. I put her on 4 liters and was up to 97%. 18 gauge right hand."      71 yo female with HTN, CHF, COPD on 2L here with SOB x 2 days. No fever. Dry cough. No hemoptysis. Gradual onset. No known sick contacts. Vaccinated for COVID. Worse with exertion and alleviated with rest. Self d/c'd lasix a few months ago because she didn't like urinating so frequently.          * No surgery found *      Hospital Course:   2/2: No acute events overnight.  Patient tolerating BiPAP.  She endorses some significant fatigue and shortness of breath off of BiPAP at this time.  2/3:  weaning from bipap.  Will transition to nasal cannula.  2/4:  still requiring bipap.  Feeling better this am.     2/5/22 [facility] no acute events overnight, weaning oxygen via bipap now on 30% patient reports is on bipap much of the day at home. Will add Pt/OT     2/6/22 [facility] patient is doing well, no acute events overnight tolerating NC this am  2/7: Patient tolerating nasal cannula well.  She currently has BiPAP and oxygen at home.  Stable for discharge at this time.       Goals of Care Treatment Preferences:  Code Status: Full Code      Consults:     COVID-19  Continue treatment per protocol    Remdesivir/immunomodulator       Acute hypoxemic respiratory failure  Patient with Hypoxic " Respiratory failure which is Acute on chronic.  she is on home oxygen at 2 LPM. Supplemental oxygen was provided and noted- Oxygen Concentration (%):  [30] 30.   Signs/symptoms of respiratory failure include- tachypnea, increased work of breathing, respiratory distress and wheezing. Contributing diagnoses includes - Pneumonia Labs and images were reviewed. Patient Has not had a recent ABG. Will treat underlying causes and adjust management of respiratory failure as follows- Treat for covid    2/5/22 [facility] continue to wean oxygen as tolerates    2/7:  At baseline will dc with close outpatient follow up.    PE (pulmonary thromboembolism)  By hx.  Continue lovenox      Final Active Diagnoses:    Diagnosis Date Noted POA    Weakness [R53.1] 02/05/2022 Yes    Severe obesity (BMI >= 40) [E66.01] 02/04/2022 Unknown    Acute hypoxemic respiratory failure [J96.01] 02/01/2022 Unknown    COVID-19 [U07.1] 02/01/2022 Unknown    PE (pulmonary thromboembolism) [I26.99] 06/08/2017 Yes     Chronic      Problems Resolved During this Admission:       Discharged Condition: good    Disposition:     Follow Up:    Patient Instructions:   No discharge procedures on file.    Significant Diagnostic Studies: Labs: All labs within the past 24 hours have been reviewed    Pending Diagnostic Studies:     Procedure Component Value Units Date/Time    CBC Auto Differential [[id number]] Collected: 02/07/22 0635    Order Status: Sent Lab Status: In process Updated: 02/07/22 0718    Specimen: Blood          Medications:  Reconciled Home Medications:      Medication List      ASK your doctor about these medications    DULERA 200-5 mcg/actuation inhaler  Generic drug: mometasone-formoterol  Inhale 2 puffs into the lungs 2 (two) times daily. Controller     fish oil-omega-3 fatty acids 300-1,000 mg capsule  Take 2 g by mouth once daily.     furosemide 40 MG tablet  Commonly known as: LASIX  Take 1 tablet (40 mg total) by mouth 2 (two) times daily. If  having increased shortness of breath, increase in leg swelling, and if you notice an >3lbs in 24 hours. May take up to 4 tablets in 1 day (every 6 hours)     gabapentin 600 MG tablet  Commonly known as: NEURONTIN  Take 600 mg by mouth 2 (two) times daily.     LIPITOR 40 MG tablet  Generic drug: atorvastatin  Take 1 tablet (40 mg total) by mouth once daily.     lisinopriL 5 MG tablet  Commonly known as: PRINIVIL,ZESTRIL  Take 1 tablet (5 mg total) by mouth once daily.     metoprolol tartrate 25 MG tablet  Commonly known as: LOPRESSOR  Take 1 tablet (25 mg total) by mouth 2 (two) times daily.     omeprazole 20 MG capsule  Commonly known as: PRILOSEC  Take 20 mg by mouth once daily.     rivaroxaban 10 mg Tab  Commonly known as: XARELTO  Take 2 tablets (20 mg total) by mouth daily with dinner or evening meal.     TOVIAZ 8 mg Tb24  Generic drug: fesoterodine  Take by mouth.     vitamin D 1000 units Tab  Commonly known as: VITAMIN D3  Take 185 mg by mouth once daily.            Indwelling Lines/Drains at time of discharge:   Lines/Drains/Airways     None                 Time spent on the discharge of patient: 60 minutes         Oscar Ge Jr, MD  Department of Hospital Medicine  [facility]

## 2022-02-07 NOTE — ASSESSMENT & PLAN NOTE
Patient with Hypoxic Respiratory failure which is Acute on chronic.  she is on home oxygen at 2 LPM. Supplemental oxygen was provided and noted- Oxygen Concentration (%):  [30] 30.   Signs/symptoms of respiratory failure include- tachypnea, increased work of breathing, respiratory distress and wheezing. Contributing diagnoses includes - Pneumonia Labs and images were reviewed. Patient Has not had a recent ABG. Will treat underlying causes and adjust management of respiratory failure as follows- Treat for covid    2/5/22 [facility] continue to wean oxygen as tolerates    2/7:  At baseline will dc with close outpatient follow up.

## 2022-02-08 ENCOUNTER — HOSPITAL ENCOUNTER (EMERGENCY)
Facility: HOSPITAL | Age: 72
Discharge: HOME | End: 2022-02-08
Attending: EMERGENCY MEDICINE
Payer: MEDICARE

## 2022-02-08 VITALS
TEMPERATURE: 98 F | HEART RATE: 88 BPM | RESPIRATION RATE: 24 BRPM | BODY MASS INDEX: 56.69 KG/M2 | WEIGHT: 293 LBS | SYSTOLIC BLOOD PRESSURE: 135 MMHG | OXYGEN SATURATION: 96 % | DIASTOLIC BLOOD PRESSURE: 80 MMHG

## 2022-02-08 DIAGNOSIS — R06.02 SHORTNESS OF BREATH: ICD-10-CM

## 2022-02-08 DIAGNOSIS — R55 NEAR SYNCOPE: Primary | ICD-10-CM

## 2022-02-08 LAB
ALBUMIN SERPL BCP-MCNC: 2.5 G/DL (ref 3.5–5.2)
ALP SERPL-CCNC: 149 U/L (ref 55–135)
ALT SERPL W/O P-5'-P-CCNC: 22 U/L (ref 10–44)
ANION GAP SERPL CALC-SCNC: 4 MMOL/L (ref 8–16)
AST SERPL-CCNC: 21 U/L (ref 10–40)
BASOPHILS # BLD AUTO: 0.01 K/UL (ref 0–0.2)
BASOPHILS NFR BLD: 0.1 % (ref 0–1.9)
BILIRUB SERPL-MCNC: 0.6 MG/DL (ref 0.1–1)
BUN SERPL-MCNC: 31 MG/DL (ref 8–23)
CALCIUM SERPL-MCNC: 9.7 MG/DL (ref 8.7–10.5)
CHLORIDE SERPL-SCNC: 96 MMOL/L (ref 95–110)
CO2 SERPL-SCNC: 41 MMOL/L (ref 23–29)
CREAT SERPL-MCNC: 1.4 MG/DL (ref 0.5–1.4)
DIFFERENTIAL METHOD: ABNORMAL
EOSINOPHIL # BLD AUTO: 0 K/UL (ref 0–0.5)
EOSINOPHIL NFR BLD: 0.1 % (ref 0–8)
ERYTHROCYTE [DISTWIDTH] IN BLOOD BY AUTOMATED COUNT: 15.7 % (ref 11.5–14.5)
EST. GFR  (AFRICAN AMERICAN): 43.6 ML/MIN/1.73 M^2
EST. GFR  (NON AFRICAN AMERICAN): 37.8 ML/MIN/1.73 M^2
GLUCOSE SERPL-MCNC: 103 MG/DL (ref 70–110)
HCT VFR BLD AUTO: 46.9 % (ref 37–48.5)
HGB BLD-MCNC: 15.5 G/DL (ref 12–16)
IMM GRANULOCYTES # BLD AUTO: 0.06 K/UL (ref 0–0.04)
IMM GRANULOCYTES NFR BLD AUTO: 0.6 % (ref 0–0.5)
LYMPHOCYTES # BLD AUTO: 1.6 K/UL (ref 1–4.8)
LYMPHOCYTES NFR BLD: 16.7 % (ref 18–48)
MCH RBC QN AUTO: 29.9 PG (ref 27–31)
MCHC RBC AUTO-ENTMCNC: 33 G/DL (ref 32–36)
MCV RBC AUTO: 90 FL (ref 82–98)
MONOCYTES # BLD AUTO: 1.1 K/UL (ref 0.3–1)
MONOCYTES NFR BLD: 11 % (ref 4–15)
NEUTROPHILS # BLD AUTO: 7 K/UL (ref 1.8–7.7)
NEUTROPHILS NFR BLD: 71.5 % (ref 38–73)
NRBC BLD-RTO: 0 /100 WBC
PLATELET # BLD AUTO: 190 K/UL (ref 150–450)
PMV BLD AUTO: 10 FL (ref 9.2–12.9)
POTASSIUM SERPL-SCNC: 3.4 MMOL/L (ref 3.5–5.1)
PROT SERPL-MCNC: 6.6 G/DL (ref 6–8.4)
RBC # BLD AUTO: 5.19 M/UL (ref 4–5.4)
SODIUM SERPL-SCNC: 141 MMOL/L (ref 136–145)
TROPONIN I SERPL DL<=0.01 NG/ML-MCNC: 64.9 PG/ML (ref 0–60)
WBC # BLD AUTO: 9.75 K/UL (ref 3.9–12.7)

## 2022-02-08 PROCEDURE — 99284 EMERGENCY DEPT VISIT MOD MDM: CPT | Mod: 25

## 2022-02-08 PROCEDURE — 93010 EKG 12-LEAD: ICD-10-PCS | Mod: ,,, | Performed by: INTERNAL MEDICINE

## 2022-02-08 PROCEDURE — 80053 COMPREHEN METABOLIC PANEL: CPT | Performed by: EMERGENCY MEDICINE

## 2022-02-08 PROCEDURE — 85025 COMPLETE CBC W/AUTO DIFF WBC: CPT | Performed by: EMERGENCY MEDICINE

## 2022-02-08 PROCEDURE — 36415 COLL VENOUS BLD VENIPUNCTURE: CPT | Performed by: EMERGENCY MEDICINE

## 2022-02-08 PROCEDURE — 93005 ELECTROCARDIOGRAM TRACING: CPT

## 2022-02-08 PROCEDURE — 84484 ASSAY OF TROPONIN QUANT: CPT | Performed by: EMERGENCY MEDICINE

## 2022-02-08 PROCEDURE — 93010 ELECTROCARDIOGRAM REPORT: CPT | Mod: ,,, | Performed by: INTERNAL MEDICINE

## 2022-02-08 NOTE — PHYSICIAN QUERY
PT Name: Carmen Tan  MR #: [id number]     DOCUMENTATION CLARIFICATION     CDS: Michael Hollis RN CCDS               Contact information: [email or web address]   This form is a permanent document in the medical record.     Query Date: February 8, 2022    By submitting this query, we are merely seeking further clarification of documentation.  Please utilize your independent clinical judgment when addressing the question(s) below.    The Medical Record contains the following   Indicators Supporting Clinical Findings Location in Medical Record     x Heart Failure documented CHF 2/1/2022 H&P     x BNP  1/31/2022 18:51   NT-proBNP 9796 (H)    Lab value     x EF/Echo CONCLUSIONS     1 - Not well seen left ventricular systolic function (EF 55-60%).     2 - Right ventricle is upper limit of normal in size with normal systolic function.     3 - Impaired LV relaxation, normal LAP (grade 1 diastolic dysfunction).     4 - Concentric hypertrophy.     5 - Mild left ventricular enlargement.     6 - Mild left atrial enlargement.     7 - Trivial mitral regurgitation.     8 - The estimated PA systolic pressure is greater than 19 mmHg.     9 - Mild to moderate pulmonic regurgitation.     10 - Trivial tricuspid regurgitation. 6/5/2017 Echo report     x Radiology findings Cardiac silhouette is enlarged, similar to the previous exam.  Ground-glass opacities at the bilateral mid and lower lung zones could represent mild edema or an atypical infectious process.  No obvious pleural effusion. 1/31/2022 Chest x-ray     x Subjective/Objective Respiratory Conditions Shortness of Breath. Worse with exertion and alleviated with rest. 1/31/2022 ED provider notes    Recent/Current MI      Heart Transplant, LVAD       x Edema, JVD Right lower leg: Edema present.   Left lower leg: Edema present 2/1/2022 H&P    Ascites       x Diuretics/Meds furosemide 40 mg IV x 1  furosemide 40 mg IV q12h 1/31/2022 Medication  2/1-2/7/2022 Medication    Other  Treatment       x Other Self d/c'd lasix a few months ago because she didn't like urinating so frequently.    Current Outpatient Medications on File Prior to Encounter  lisinopril (PRINIVIL,ZESTRIL) 5mg PO daily  metoprolol tartrate (LOPRESSOR) 25mg PO BID 1/31/2022 ED provider notes    2/1/2022 H&P     Heart failure is a clinical diagnosis which includes symptomatic fluid retention, elevated intracardiac pressures, and/or the inability of the heart to deliver adequate blood flow.    Heart Failure with reduced Ejection Fraction (HFrEF) or Systolic Heart Failure (loses ability to contract normally, EF is <40%)    Heart Failure with preserved Ejection Fraction (HFpEF) or Diastolic Heart Failure (stiff ventricles, does not relax properly, EF is >50%)     Heart Failure with Combined Systolic and Diastolic Failure (stiff ventricles, does not relax properly and EF is <50%)    Mid-range or mildly reduced ejection fraction (HFmrEF) is classified as systolic heart failure.   Common clues to acute exacerbation:  Rapidly progressive symptoms (w/in 2 weeks of presentation), using IV diuretics, using supplemental O2, pulmonary edema on Xray, new or worsening pleural effusion, +JVD or other signs of volume overload, MI w/in 4 weeks, and/or BNP >500  The clinical guidelines noted are only system guidelines, and do not replace the providers clinical judgment.    Provider, please clarify the Type and Acuity of CHF associated with the above clinical findings.    [  x ]  Acute on Chronic Diastolic Heart Failure (HFpEF) - worsening of CHF signs/symptoms in preexisting CHF   [   ]  Chronic Diastolic Heart Failure (HFpEF) - preexisting and stable   [   ]  Other (please specify): ___________________________________   [  ]  Clinically Undetermined         Please document in your progress notes daily for the duration of treatment until resolved and include in your discharge summary.    References:  American Heart Association editorial  staff. (2017, May). Ejection Fraction Heart Failure Measurement. American Heart Association. https://www.heart.org/en/health-topics/heart-failure/diagnosing-heart-failure/ejection-fraction-heart-failure-measurement#:~:text=Ejection%20fraction%20(EF)%20is%20a,pushed%20out%20with%20each%20heartbeat  ROSA M Baeza (2020, December 15). Heart failure with preserved ejection fraction: Clinical manifestations and diagnosis. [employer]. https://www.[employer]/contents/heart-failure-with-preserved-ejection-fraction-clinical-manifestations-and-diagnosis.  ICD-10-CM/PCS Coding Clinic Third Quarter ICD-10, Effective with discharges: September 8, 2020 [facility] § Heart failure with mid-range or mildly reduced ejection fraction (2020).  Form No. 84143

## 2022-02-08 NOTE — ED PROVIDER NOTES
Encounter Date: 2/8/2022       History     Chief Complaint   Patient presents with    Loss of Consciousness     Pt brought in by EMS after having a syncopal episode. Family stated they were helping her in bed and swapping her nasal cannula to her bipap and she had a short period of unresponsiveness. Pt room air SPO2 was in the mid 80's. Pt was 97% on 3 LPM NC. Pt was discharged from Med Surg today.      This is a 72-year-old female with recent admission here for COPD, COVID-19, pulmonary embolism states she was having a malfunction with her BiPAP mask and her oxygen dropped at home, questionable loss of consciousness per EMS, patient does not believe she lost consciousness, states wants her mask and oxygen working correctly that she was back to normal.  she has no complaints now.  States she is back to her baseline.  Denies any worsening shortness of breath, no chest pain, no nausea or vomiting, no diaphoresis.  Her oxygen saturations 100% on her nasal cannula.  She is not ill appearing, speaking in full sentences without issue, alert oriented x4, GCS is 15        Review of patient's allergies indicates:   Allergen Reactions    Pcn [penicillins] Swelling     Past Medical History:   Diagnosis Date    Abdominal hernia     CHF (congestive heart failure)     COPD (chronic obstructive pulmonary disease)     GERD (gastroesophageal reflux disease)     History of home oxygen therapy     Hypertension     Migraine headache     Pulmonary embolism 06/01/2017    Small bowel obstruction     Thyroid disease      Past Surgical History:   Procedure Laterality Date    COLONOSCOPY      HYSTERECTOMY      INNER EAR SURGERY      UPPER GASTROINTESTINAL ENDOSCOPY       No family history on file.  Social History     Tobacco Use    Smoking status: Former Smoker    Smokeless tobacco: Never Used   Substance Use Topics    Alcohol use: No    Drug use: No     Review of Systems   Constitutional: Negative for fever.   HENT:  Negative for sore throat.    Respiratory: Negative for shortness of breath.    Cardiovascular: Negative for chest pain.   Gastrointestinal: Negative for nausea.   Genitourinary: Negative for dysuria.   Musculoskeletal: Negative for back pain.   Skin: Negative for rash.   Neurological: Negative for weakness.   Hematological: Does not bruise/bleed easily.   All other systems reviewed and are negative.      Physical Exam     Initial Vitals   BP Pulse Resp Temp SpO2   02/08/22 0117 02/08/22 0117 02/08/22 0117 02/08/22 0119 02/08/22 0117   135/80 88 (!) 24 98.1 °F (36.7 °C) 96 %      MAP       --                Physical Exam    Nursing note and vitals reviewed.  Constitutional: She appears well-developed and well-nourished. She is not diaphoretic. No distress.   HENT:   Head: Normocephalic and atraumatic.   Eyes: Conjunctivae and EOM are normal. Pupils are equal, round, and reactive to light.   Neck: Neck supple.   Normal range of motion.  Cardiovascular: Normal rate, regular rhythm and intact distal pulses.   Murmur heard.  Pulmonary/Chest: Breath sounds normal. No respiratory distress. She has no wheezes. She has no rhonchi. She has no rales. She exhibits no tenderness.   Abdominal: Abdomen is soft. Bowel sounds are normal.   Musculoskeletal:         General: No tenderness or edema. Normal range of motion.      Cervical back: Normal range of motion and neck supple.     Neurological: She is alert and oriented to person, place, and time. She has normal strength. No cranial nerve deficit. GCS score is 15. GCS eye subscore is 4. GCS verbal subscore is 5. GCS motor subscore is 6.   Skin: Skin is warm and dry. Capillary refill takes less than 2 seconds.         ED Course   Procedures  Labs Reviewed   CBC W/ AUTO DIFFERENTIAL - Abnormal; Notable for the following components:       Result Value    RDW 15.7 (*)     Immature Granulocytes 0.6 (*)     Immature Grans (Abs) 0.06 (*)     Mono # 1.1 (*)     Lymph % 16.7 (*)     All  other components within normal limits   COMPREHENSIVE METABOLIC PANEL - Abnormal; Notable for the following components:    Potassium 3.4 (*)     CO2 41 (*)     BUN 31 (*)     Albumin 2.5 (*)     Alkaline Phosphatase 149 (*)     Anion Gap 4 (*)     eGFR if  43.6 (*)     eGFR if non  37.8 (*)     All other components within normal limits    Narrative:      CO2  critical result(s) called and verbal readback obtained from   Kaila/ RANJAN by LANCE 02/08/2022 02:20   TROPONIN I HIGH SENSITIVITY - Abnormal; Notable for the following components:    Troponin I High Sensitivity 64.9 (*)     All other components within normal limits     EKG Readings: (Independently Interpreted)   Initial Reading: No STEMI. Rhythm: Normal Sinus Rhythm. Heart Rate: 86. Ectopy: No Ectopy. Conduction: Normal. ST Segments: Normal ST Segments. T Waves: Normal. Clinical Impression: Normal Sinus Rhythm   LVH       Imaging Results    None          Medications - No data to display  Medical Decision Making:   Differential Diagnosis:   BiPAP malfunction at home, hypoxia, electrolyte abnormality                      Clinical Impression:   Final diagnoses:  [R06.02] Shortness of breath  [R55] Near syncope (Primary)          ED Disposition Condition    Discharge Stable        ED Prescriptions     None        Follow-up Information     Follow up With Specialties Details Why Contact Info Additional Information    Primary care physician  In 2 days       [facility] Emergency Medicine  If symptoms worsen, As needed [address] [place] [address] [phone] Floor 1           Deon Osorio MD  02/08/22 5373

## 2022-02-09 ENCOUNTER — PATIENT OUTREACH (OUTPATIENT)
Dept: ADMINISTRATIVE | Facility: CLINIC | Age: 72
End: 2022-02-09
Payer: MEDICARE

## 2022-02-09 NOTE — PROGRESS NOTES
C3 nurse attempted to contact Carmen Tan  for a TCC post hospital discharge follow up call. No answer. Left voicemail with callback information. The patient does not have a scheduled HOSFU appointment. Non [facility] PCP

## 2022-02-10 NOTE — PATIENT INSTRUCTIONS
Instructions for Patients with Confirmed or Suspected COVID-19    If you are awaiting your test result, you will either be called or it will be released to the patient portal.  If you have any questions about your test, please visit [email or web address] or call our COVID-19 information line at [phone].      Please isolate yourself at home.  You may leave home and/or return to work once the following conditions are met:    If you have symptoms and tested positive:   More than 5 days since symptoms first appeared AND   More than 24 hours fever free without medications AND       symptoms have improved   · For five days after ending isolation, masks are required.    If you had no symptoms but tested positive:   More than 5 days since the date of the first positive test. If you develop symptoms, then use the guidelines above  · For five days after ending isolation, masks are required.      Testing is not recommended if you are symptom free after completing isolation.      [facility] teaching reviewed with Carmen Tan .  Carmen Tan  Education was provided based on the patient's discharge diagnosis using the attached [facility] patient education as a reference.

## 2022-02-10 NOTE — PROGRESS NOTES
Trazodone at bedtime.        C3 nurse spoke with Carmen Tan  for a TCC post hospital discharge follow up call. The patient has a scheduled HOSFU appointment with Lucian Barry MD on 02/14/2022 @  1300

## 2022-02-15 ENCOUNTER — HOSPITAL ENCOUNTER (INPATIENT)
Facility: HOSPITAL | Age: 72
LOS: 3 days | Discharge: HOME | DRG: 291 | End: 2022-02-19
Attending: EMERGENCY MEDICINE | Admitting: EMERGENCY MEDICINE
Payer: MEDICARE

## 2022-02-15 DIAGNOSIS — R53.1 WEAKNESS: ICD-10-CM

## 2022-02-15 DIAGNOSIS — R06.02 SHORTNESS OF BREATH: ICD-10-CM

## 2022-02-15 DIAGNOSIS — J96.01 ACUTE HYPOXEMIC RESPIRATORY FAILURE: ICD-10-CM

## 2022-02-15 DIAGNOSIS — I50.9 ACUTE ON CHRONIC CONGESTIVE HEART FAILURE, UNSPECIFIED HEART FAILURE TYPE: Primary | ICD-10-CM

## 2022-02-15 DIAGNOSIS — J44.9 COPD (CHRONIC OBSTRUCTIVE PULMONARY DISEASE): ICD-10-CM

## 2022-02-15 LAB
ALBUMIN SERPL BCP-MCNC: 2.9 G/DL (ref 3.5–5.2)
ALP SERPL-CCNC: 122 U/L (ref 55–135)
ALT SERPL W/O P-5'-P-CCNC: 27 U/L (ref 10–44)
ANION GAP SERPL CALC-SCNC: 4 MMOL/L (ref 8–16)
AST SERPL-CCNC: 24 U/L (ref 10–40)
BASOPHILS # BLD AUTO: 0.05 K/UL (ref 0–0.2)
BASOPHILS NFR BLD: 0.5 % (ref 0–1.9)
BILIRUB SERPL-MCNC: 0.6 MG/DL (ref 0.1–1)
BUN SERPL-MCNC: 18 MG/DL (ref 8–23)
CALCIUM SERPL-MCNC: 9.8 MG/DL (ref 8.7–10.5)
CHLORIDE SERPL-SCNC: 102 MMOL/L (ref 95–110)
CO2 SERPL-SCNC: 32 MMOL/L (ref 23–29)
CREAT SERPL-MCNC: 1.5 MG/DL (ref 0.5–1.4)
CTP QC/QA: YES
DIFFERENTIAL METHOD: ABNORMAL
EOSINOPHIL # BLD AUTO: 0 K/UL (ref 0–0.5)
EOSINOPHIL NFR BLD: 0.3 % (ref 0–8)
ERYTHROCYTE [DISTWIDTH] IN BLOOD BY AUTOMATED COUNT: 16.4 % (ref 11.5–14.5)
EST. GFR  (AFRICAN AMERICAN): 40.1 ML/MIN/1.73 M^2
EST. GFR  (NON AFRICAN AMERICAN): 34.8 ML/MIN/1.73 M^2
GLUCOSE SERPL-MCNC: 250 MG/DL (ref 70–110)
HCT VFR BLD AUTO: 48 % (ref 37–48.5)
HGB BLD-MCNC: 15.4 G/DL (ref 12–16)
IMM GRANULOCYTES # BLD AUTO: 0.05 K/UL (ref 0–0.04)
IMM GRANULOCYTES NFR BLD AUTO: 0.5 % (ref 0–0.5)
LYMPHOCYTES # BLD AUTO: 3 K/UL (ref 1–4.8)
LYMPHOCYTES NFR BLD: 32.8 % (ref 18–48)
MCH RBC QN AUTO: 30.3 PG (ref 27–31)
MCHC RBC AUTO-ENTMCNC: 32.1 G/DL (ref 32–36)
MCV RBC AUTO: 94 FL (ref 82–98)
MONOCYTES # BLD AUTO: 1.7 K/UL (ref 0.3–1)
MONOCYTES NFR BLD: 18.1 % (ref 4–15)
NEUTROPHILS # BLD AUTO: 4.4 K/UL (ref 1.8–7.7)
NEUTROPHILS NFR BLD: 47.8 % (ref 38–73)
NRBC BLD-RTO: 0 /100 WBC
NT-PROBNP SERPL-MCNC: 3561 PG/ML (ref 5–900)
PLATELET # BLD AUTO: 214 K/UL (ref 150–450)
PMV BLD AUTO: 9.9 FL (ref 9.2–12.9)
POTASSIUM SERPL-SCNC: 4.3 MMOL/L (ref 3.5–5.1)
PROT SERPL-MCNC: 7.8 G/DL (ref 6–8.4)
RBC # BLD AUTO: 5.09 M/UL (ref 4–5.4)
SARS-COV-2 RDRP RESP QL NAA+PROBE: NEGATIVE
SODIUM SERPL-SCNC: 138 MMOL/L (ref 136–145)
TROPONIN I SERPL DL<=0.01 NG/ML-MCNC: 36 PG/ML (ref 0–60)
WBC # BLD AUTO: 9.27 K/UL (ref 3.9–12.7)

## 2022-02-15 PROCEDURE — 25000242 PHARM REV CODE 250 ALT 637 W/ HCPCS: Performed by: EMERGENCY MEDICINE

## 2022-02-15 PROCEDURE — 99285 EMERGENCY DEPT VISIT HI MDM: CPT | Mod: 25

## 2022-02-15 PROCEDURE — 99900035 HC TECH TIME PER 15 MIN (STAT)

## 2022-02-15 PROCEDURE — 99900031 HC PATIENT EDUCATION (STAT)

## 2022-02-15 PROCEDURE — 93005 ELECTROCARDIOGRAM TRACING: CPT

## 2022-02-15 PROCEDURE — 83880 ASSAY OF NATRIURETIC PEPTIDE: CPT | Performed by: EMERGENCY MEDICINE

## 2022-02-15 PROCEDURE — 27000221 HC OXYGEN, UP TO 24 HOURS

## 2022-02-15 PROCEDURE — 94660 CPAP INITIATION&MGMT: CPT

## 2022-02-15 PROCEDURE — 25000003 PHARM REV CODE 250: Performed by: EMERGENCY MEDICINE

## 2022-02-15 PROCEDURE — 36415 COLL VENOUS BLD VENIPUNCTURE: CPT | Performed by: EMERGENCY MEDICINE

## 2022-02-15 PROCEDURE — 96375 TX/PRO/DX INJ NEW DRUG ADDON: CPT

## 2022-02-15 PROCEDURE — 93010 ELECTROCARDIOGRAM REPORT: CPT | Mod: ,,, | Performed by: INTERNAL MEDICINE

## 2022-02-15 PROCEDURE — 96374 THER/PROPH/DIAG INJ IV PUSH: CPT

## 2022-02-15 PROCEDURE — 94640 AIRWAY INHALATION TREATMENT: CPT

## 2022-02-15 PROCEDURE — 93010 EKG 12-LEAD: ICD-10-PCS | Mod: ,,, | Performed by: INTERNAL MEDICINE

## 2022-02-15 PROCEDURE — 85025 COMPLETE CBC W/AUTO DIFF WBC: CPT | Performed by: EMERGENCY MEDICINE

## 2022-02-15 PROCEDURE — 27000190 HC CPAP FULL FACE MASK W/VALVE

## 2022-02-15 PROCEDURE — 80053 COMPREHEN METABOLIC PANEL: CPT | Performed by: EMERGENCY MEDICINE

## 2022-02-15 PROCEDURE — 63600175 PHARM REV CODE 636 W HCPCS: Performed by: EMERGENCY MEDICINE

## 2022-02-15 PROCEDURE — U0002 COVID-19 LAB TEST NON-CDC: HCPCS | Performed by: EMERGENCY MEDICINE

## 2022-02-15 PROCEDURE — 94761 N-INVAS EAR/PLS OXIMETRY MLT: CPT

## 2022-02-15 PROCEDURE — 84484 ASSAY OF TROPONIN QUANT: CPT | Performed by: EMERGENCY MEDICINE

## 2022-02-15 PROCEDURE — G0378 HOSPITAL OBSERVATION PER HR: HCPCS

## 2022-02-15 RX ORDER — INSULIN ASPART 100 [IU]/ML
0-5 INJECTION, SOLUTION INTRAVENOUS; SUBCUTANEOUS
Status: DISCONTINUED | OUTPATIENT
Start: 2022-02-15 | End: 2022-02-16

## 2022-02-15 RX ORDER — METOPROLOL TARTRATE 25 MG/1
25 TABLET, FILM COATED ORAL 2 TIMES DAILY
Status: DISCONTINUED | OUTPATIENT
Start: 2022-02-15 | End: 2022-02-19 | Stop reason: HOSPADM

## 2022-02-15 RX ORDER — TALC
6 POWDER (GRAM) TOPICAL NIGHTLY PRN
Status: DISCONTINUED | OUTPATIENT
Start: 2022-02-15 | End: 2022-02-19 | Stop reason: HOSPADM

## 2022-02-15 RX ORDER — GLUCAGON 1 MG
1 KIT INJECTION
Status: DISCONTINUED | OUTPATIENT
Start: 2022-02-15 | End: 2022-02-16

## 2022-02-15 RX ORDER — FAMOTIDINE 20 MG/1
20 TABLET, FILM COATED ORAL DAILY
Status: DISCONTINUED | OUTPATIENT
Start: 2022-02-16 | End: 2022-02-19 | Stop reason: HOSPADM

## 2022-02-15 RX ORDER — ONDANSETRON 2 MG/ML
4 INJECTION INTRAMUSCULAR; INTRAVENOUS EVERY 8 HOURS PRN
Status: DISCONTINUED | OUTPATIENT
Start: 2022-02-15 | End: 2022-02-19 | Stop reason: HOSPADM

## 2022-02-15 RX ORDER — IBUPROFEN 200 MG
16 TABLET ORAL
Status: DISCONTINUED | OUTPATIENT
Start: 2022-02-15 | End: 2022-02-16

## 2022-02-15 RX ORDER — ATORVASTATIN CALCIUM 40 MG/1
40 TABLET, FILM COATED ORAL DAILY
Status: DISCONTINUED | OUTPATIENT
Start: 2022-02-16 | End: 2022-02-19 | Stop reason: HOSPADM

## 2022-02-15 RX ORDER — IBUPROFEN 200 MG
24 TABLET ORAL
Status: DISCONTINUED | OUTPATIENT
Start: 2022-02-15 | End: 2022-02-16

## 2022-02-15 RX ORDER — SODIUM CHLORIDE 0.9 % (FLUSH) 0.9 %
10 SYRINGE (ML) INJECTION
Status: DISCONTINUED | OUTPATIENT
Start: 2022-02-15 | End: 2022-02-19 | Stop reason: HOSPADM

## 2022-02-15 RX ORDER — METHYLPREDNISOLONE SOD SUCC 125 MG
125 VIAL (EA) INJECTION
Status: COMPLETED | OUTPATIENT
Start: 2022-02-15 | End: 2022-02-15

## 2022-02-15 RX ORDER — FUROSEMIDE 10 MG/ML
80 INJECTION INTRAMUSCULAR; INTRAVENOUS
Status: DISCONTINUED | OUTPATIENT
Start: 2022-02-16 | End: 2022-02-18

## 2022-02-15 RX ORDER — IPRATROPIUM BROMIDE AND ALBUTEROL SULFATE 2.5; .5 MG/3ML; MG/3ML
3 SOLUTION RESPIRATORY (INHALATION) EVERY 4 HOURS PRN
Status: DISCONTINUED | OUTPATIENT
Start: 2022-02-15 | End: 2022-02-19 | Stop reason: HOSPADM

## 2022-02-15 RX ORDER — ACETAMINOPHEN 325 MG/1
650 TABLET ORAL EVERY 8 HOURS PRN
Status: DISCONTINUED | OUTPATIENT
Start: 2022-02-15 | End: 2022-02-19 | Stop reason: HOSPADM

## 2022-02-15 RX ORDER — FUROSEMIDE 10 MG/ML
100 INJECTION INTRAMUSCULAR; INTRAVENOUS
Status: COMPLETED | OUTPATIENT
Start: 2022-02-15 | End: 2022-02-15

## 2022-02-15 RX ORDER — IPRATROPIUM BROMIDE AND ALBUTEROL SULFATE 2.5; .5 MG/3ML; MG/3ML
3 SOLUTION RESPIRATORY (INHALATION)
Status: COMPLETED | OUTPATIENT
Start: 2022-02-15 | End: 2022-02-15

## 2022-02-15 RX ORDER — LISINOPRIL 5 MG/1
5 TABLET ORAL DAILY
Status: DISCONTINUED | OUTPATIENT
Start: 2022-02-16 | End: 2022-02-18

## 2022-02-15 RX ADMIN — Medication 6 MG: at 09:02

## 2022-02-15 RX ADMIN — METHYLPREDNISOLONE SODIUM SUCCINATE 125 MG: 125 INJECTION, POWDER, FOR SOLUTION INTRAMUSCULAR; INTRAVENOUS at 01:02

## 2022-02-15 RX ADMIN — IPRATROPIUM BROMIDE AND ALBUTEROL SULFATE 3 ML: 2.5; .5 SOLUTION RESPIRATORY (INHALATION) at 02:02

## 2022-02-15 RX ADMIN — METOPROLOL TARTRATE 25 MG: 25 TABLET, FILM COATED ORAL at 09:02

## 2022-02-15 RX ADMIN — IPRATROPIUM BROMIDE AND ALBUTEROL SULFATE 3 ML: 2.5; .5 SOLUTION RESPIRATORY (INHALATION) at 07:02

## 2022-02-15 RX ADMIN — FUROSEMIDE 100 MG: 10 INJECTION, SOLUTION INTRAMUSCULAR; INTRAVENOUS at 02:02

## 2022-02-15 NOTE — ED NOTES
Prior to bipap patient no longer answered questions dr. barrios notified. Patient appeared lethargic

## 2022-02-15 NOTE — RESPIRATORY THERAPY
02/15/22 1735   Patient Assessment/Suction   Level of Consciousness (AVPU) alert   Respiratory Effort Short of breath   Expansion/Accessory Muscles/Retractions no use of accessory muscles   All Lung Fields Breath Sounds crackles;wheezes, expiratory   Rhythm/Pattern, Respiratory tachypneic   Cough Frequency no cough   PRE-TX-O2   O2 Device (Oxygen Therapy) BiPAP   $ Is the patient on Low Flow Oxygen? Yes   SpO2 (!) 94 %   Pulse (!) 114   Resp (!) 33   Positioning HOB elevated 30 degrees   Preset CPAP/BiPAP Settings   Mode Of Delivery BiPAP S/T   $ CPAP/BiPAP Daily Charge BiPAP/CPAP Daily   $ Initial CPAP/BiPAP Setup? No   $ Is patient using? Yes   Size of Mask Small/Medium   Sized Appropriately? Yes   Equipment Type Trilogy    Airway Device Type medium full face mask   Humidifier not applicable   Ipap 14   EPAP (cm H2O) 7   Pressure Support (cm H2O) 7   ITime (sec) 1   Patient CPAP/BiPAP Settings   RR Total (Breaths/Min) 33   Tidal Volume (mL) 504   VE Minute Ventilation (L/min) 17.8 L/min   Peak Inspiratory Pressure (cm H2O) 14.1   Total Leak (L/Min) 30.9   Patient Trigger - ST Mode Only (%) 100   CPAP/BiPAP Alarms   High RR (breaths/min) 45   Low RR (breaths/min) 8   Apnea (Sec) 20   Transport Patient   $ Transport Tech Time Charge 15 min   Oxygen Method Nasal cannula  (4lpm)   Transport to 648   Toleration Fair       Patient moved from ER 5 o Rm 642.  After patient assisted in move from ER bed to 6th Floor bed she was SOB.  Patient placed on BIPAP.  Christina VERDIN at bedside.

## 2022-02-15 NOTE — ED NOTES
"Upon arrival patient complaining of air hunger. Patient states her son made her angry before she went to her doctors appointment and then became SOB edema noted to ellen lower legs and patient states she did not take lasix because she did not want to "have to pee for appointment" when patient got into bed patient in tripod position sats dropped to 60%. Dr. barrios notified. Patient placed on non rebreather and respiratory called.   "
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
## 2022-02-15 NOTE — ED NOTES
Patient taken off of bipap and reports feeling SOB dr. barrios notified of spo2 in upper 80's patient placed back on bipap

## 2022-02-16 PROBLEM — J44.1 COPD EXACERBATION: Status: ACTIVE | Noted: 2022-02-16

## 2022-02-16 PROBLEM — E11.9 DIABETES MELLITUS, TYPE 2: Status: ACTIVE | Noted: 2022-02-16

## 2022-02-16 PROBLEM — I50.9 ACUTE ON CHRONIC CONGESTIVE HEART FAILURE: Status: ACTIVE | Noted: 2022-02-16

## 2022-02-16 LAB
ESTIMATED AVG GLUCOSE: 117 MG/DL (ref 68–131)
HBA1C MFR BLD: 5.7 % (ref 4–5.6)
POCT GLUCOSE: 123 MG/DL (ref 70–110)
POCT GLUCOSE: 143 MG/DL (ref 70–110)

## 2022-02-16 PROCEDURE — 63600175 PHARM REV CODE 636 W HCPCS: Performed by: EMERGENCY MEDICINE

## 2022-02-16 PROCEDURE — 27000221 HC OXYGEN, UP TO 24 HOURS

## 2022-02-16 PROCEDURE — 36415 COLL VENOUS BLD VENIPUNCTURE: CPT | Performed by: INTERNAL MEDICINE

## 2022-02-16 PROCEDURE — 25000003 PHARM REV CODE 250: Performed by: INTERNAL MEDICINE

## 2022-02-16 PROCEDURE — 25000003 PHARM REV CODE 250: Performed by: EMERGENCY MEDICINE

## 2022-02-16 PROCEDURE — 25000242 PHARM REV CODE 250 ALT 637 W/ HCPCS: Performed by: INTERNAL MEDICINE

## 2022-02-16 PROCEDURE — 94660 CPAP INITIATION&MGMT: CPT

## 2022-02-16 PROCEDURE — 99900031 HC PATIENT EDUCATION (STAT)

## 2022-02-16 PROCEDURE — 96376 TX/PRO/DX INJ SAME DRUG ADON: CPT

## 2022-02-16 PROCEDURE — 11000001 HC ACUTE MED/SURG PRIVATE ROOM

## 2022-02-16 PROCEDURE — 83036 HEMOGLOBIN GLYCOSYLATED A1C: CPT | Performed by: INTERNAL MEDICINE

## 2022-02-16 PROCEDURE — 94640 AIRWAY INHALATION TREATMENT: CPT

## 2022-02-16 PROCEDURE — 63600175 PHARM REV CODE 636 W HCPCS: Performed by: INTERNAL MEDICINE

## 2022-02-16 PROCEDURE — 94761 N-INVAS EAR/PLS OXIMETRY MLT: CPT

## 2022-02-16 PROCEDURE — 99900035 HC TECH TIME PER 15 MIN (STAT)

## 2022-02-16 RX ORDER — IBUPROFEN 200 MG
24 TABLET ORAL
Status: DISCONTINUED | OUTPATIENT
Start: 2022-02-16 | End: 2022-02-19 | Stop reason: HOSPADM

## 2022-02-16 RX ORDER — INSULIN ASPART 100 [IU]/ML
0-5 INJECTION, SOLUTION INTRAVENOUS; SUBCUTANEOUS
Status: DISCONTINUED | OUTPATIENT
Start: 2022-02-16 | End: 2022-02-19 | Stop reason: HOSPADM

## 2022-02-16 RX ORDER — GLUCAGON 1 MG
1 KIT INJECTION
Status: DISCONTINUED | OUTPATIENT
Start: 2022-02-16 | End: 2022-02-19 | Stop reason: HOSPADM

## 2022-02-16 RX ORDER — METHYLPREDNISOLONE SOD SUCC 125 MG
80 VIAL (EA) INJECTION EVERY 8 HOURS
Status: DISCONTINUED | OUTPATIENT
Start: 2022-02-16 | End: 2022-02-18

## 2022-02-16 RX ORDER — IBUPROFEN 200 MG
16 TABLET ORAL
Status: DISCONTINUED | OUTPATIENT
Start: 2022-02-16 | End: 2022-02-19 | Stop reason: HOSPADM

## 2022-02-16 RX ADMIN — IPRATROPIUM BROMIDE AND ALBUTEROL SULFATE 3 ML: 2.5; .5 SOLUTION RESPIRATORY (INHALATION) at 01:02

## 2022-02-16 RX ADMIN — METHYLPREDNISOLONE SODIUM SUCCINATE 80 MG: 125 INJECTION, POWDER, FOR SOLUTION INTRAMUSCULAR; INTRAVENOUS at 02:02

## 2022-02-16 RX ADMIN — FAMOTIDINE 20 MG: 20 TABLET ORAL at 08:02

## 2022-02-16 RX ADMIN — RIVAROXABAN 20 MG: 10 TABLET, FILM COATED ORAL at 06:02

## 2022-02-16 RX ADMIN — METOPROLOL TARTRATE 25 MG: 25 TABLET, FILM COATED ORAL at 09:02

## 2022-02-16 RX ADMIN — FUROSEMIDE 80 MG: 10 INJECTION, SOLUTION INTRAMUSCULAR; INTRAVENOUS at 06:02

## 2022-02-16 RX ADMIN — IPRATROPIUM BROMIDE AND ALBUTEROL SULFATE 3 ML: 2.5; .5 SOLUTION RESPIRATORY (INHALATION) at 08:02

## 2022-02-16 RX ADMIN — FUROSEMIDE 80 MG: 10 INJECTION, SOLUTION INTRAMUSCULAR; INTRAVENOUS at 05:02

## 2022-02-16 RX ADMIN — LISINOPRIL 5 MG: 5 TABLET ORAL at 08:02

## 2022-02-16 RX ADMIN — METOPROLOL TARTRATE 25 MG: 25 TABLET, FILM COATED ORAL at 08:02

## 2022-02-16 RX ADMIN — ATORVASTATIN CALCIUM 40 MG: 40 TABLET, FILM COATED ORAL at 08:02

## 2022-02-16 RX ADMIN — ACETAMINOPHEN 650 MG: 325 TABLET ORAL at 08:02

## 2022-02-16 RX ADMIN — METHYLPREDNISOLONE SODIUM SUCCINATE 80 MG: 125 INJECTION, POWDER, FOR SOLUTION INTRAMUSCULAR; INTRAVENOUS at 10:02

## 2022-02-16 RX ADMIN — Medication 6 MG: at 09:02

## 2022-02-16 NOTE — ASSESSMENT & PLAN NOTE
Patient with Hypoxic Respiratory failure which is Acute on chronic.  she is not on home oxygen. Supplemental oxygen was provided and noted- Oxygen Concentration (%):  [40-50] 50.   Signs/symptoms of respiratory failure include- tachypnea, increased work of breathing, respiratory distress and wheezing. Contributing diagnoses includes - COPD Labs and images were reviewed. Patient Has not had a recent ABG. Will treat underlying causes and adjust management of respiratory failure as follows- treat underlying COPD exacerbation.

## 2022-02-16 NOTE — ASSESSMENT & PLAN NOTE
Body mass index is 44.78 kg/m². Morbid obesity complicates all aspects of disease management from diagnostic modalities to treatment. Weight loss encouraged and health benefits explained to patient.

## 2022-02-16 NOTE — SUBJECTIVE & OBJECTIVE
Past Medical History:   Diagnosis Date    Abdominal hernia     CHF (congestive heart failure)     COPD (chronic obstructive pulmonary disease)     GERD (gastroesophageal reflux disease)     History of home oxygen therapy     Hypertension     Migraine headache     Pulmonary embolism 06/01/2017    Small bowel obstruction     Thyroid disease        Past Surgical History:   Procedure Laterality Date    COLONOSCOPY      HYSTERECTOMY      INNER EAR SURGERY      UPPER GASTROINTESTINAL ENDOSCOPY         Review of patient's allergies indicates:   Allergen Reactions    Pcn [penicillins] Swelling       No current facility-administered medications on file prior to encounter.     Current Outpatient Medications on File Prior to Encounter   Medication Sig    albuterol-ipratropium (DUO-NEB) 2.5 mg-0.5 mg/3 mL nebulizer solution Take 3 mLs by nebulization every 6 (six) hours as needed for Wheezing. Rescue    budesonide (PULMICORT) 0.5 mg/2 mL nebulizer solution Take 2 mLs (0.5 mg total) by nebulization 2 (two) times a day. Controller    fesoterodine (TOVIAZ) 8 mg Tb24 Take by mouth.    fish oil-omega-3 fatty acids 300-1,000 mg capsule Take 2 g by mouth once daily.    furosemide (LASIX) 40 MG tablet Take 1 tablet (40 mg total) by mouth 2 (two) times daily. If having increased shortness of breath, increase in leg swelling, and if you notice an >3lbs in 24 hours. May take up to 4 tablets in 1 day (every 6 hours)    gabapentin (NEURONTIN) 600 MG tablet Take 600 mg by mouth 2 (two) times daily.    LIPITOR 40 mg tablet Take 1 tablet (40 mg total) by mouth once daily.    lisinopril (PRINIVIL,ZESTRIL) 5 MG tablet Take 1 tablet (5 mg total) by mouth once daily.    metoprolol tartrate (LOPRESSOR) 25 MG tablet Take 1 tablet (25 mg total) by mouth 2 (two) times daily.    mometasone-formoterol (DULERA) 200-5 mcg/actuation inhaler Inhale 2 puffs into the lungs 2 (two) times daily. Controller    omeprazole (PRILOSEC) 20  MG capsule Take 20 mg by mouth once daily.    rivaroxaban (XARELTO) 10 mg Tab Take 2 tablets (20 mg total) by mouth daily with dinner or evening meal.    vitamin D 1000 units Tab Take 185 mg by mouth once daily.     Family History    None       Tobacco Use    Smoking status: Former Smoker    Smokeless tobacco: Never Used   Substance and Sexual Activity    Alcohol use: No    Drug use: No    Sexual activity: Not on file     Review of Systems   Constitutional: Positive for fatigue. Negative for chills and fever.   HENT: Negative for rhinorrhea, sneezing and sore throat.    Eyes: Negative for pain and visual disturbance.   Respiratory: Positive for shortness of breath and wheezing. Negative for cough.    Cardiovascular: Negative for chest pain.   Gastrointestinal: Negative for abdominal pain, constipation and diarrhea.   Endocrine: Negative for cold intolerance and heat intolerance.   Genitourinary: Negative for difficulty urinating.   Musculoskeletal: Negative for arthralgias and joint swelling.   Skin: Negative for rash.   Allergic/Immunologic: Negative for environmental allergies.   Neurological: Negative for dizziness, syncope and weakness.   Hematological: Does not bruise/bleed easily.   Psychiatric/Behavioral: Negative for dysphoric mood. The patient is not nervous/anxious.      Objective:     Vital Signs (Most Recent):  Temp: 98.5 °F (36.9 °C) (02/16/22 0721)  Pulse: 62 (02/16/22 0721)  Resp: (!) 28 (02/16/22 0721)  BP: 132/82 (02/16/22 0721)  SpO2: 98 % (02/16/22 0721) Vital Signs (24h Range):  Temp:  [96.5 °F (35.8 °C)-98.5 °F (36.9 °C)] 98.5 °F (36.9 °C)  Pulse:  [] 62  Resp:  [20-33] 28  SpO2:  [86 %-100 %] 98 %  BP: (103-181)/(70-96) 132/82     Weight: 118.3 kg (260 lb 14.4 oz)  Body mass index is 44.78 kg/m².    Physical Exam  Vitals and nursing note reviewed.   Constitutional:       Appearance: She is obese. She is ill-appearing.   HENT:      Head: Normocephalic and atraumatic.      Nose: Nose  normal.   Eyes:      Extraocular Movements: Extraocular movements intact.      Pupils: Pupils are equal, round, and reactive to light.   Cardiovascular:      Rate and Rhythm: Normal rate and regular rhythm.      Heart sounds: No murmur heard.  No friction rub. No gallop.    Pulmonary:      Effort: Respiratory distress present.      Breath sounds: Wheezing present.      Comments: bipap  Abdominal:      General: Abdomen is flat. Bowel sounds are normal.      Palpations: Abdomen is soft.   Musculoskeletal:         General: No swelling or deformity.      Cervical back: Normal range of motion and neck supple.   Skin:     General: Skin is warm and dry.      Capillary Refill: Capillary refill takes less than 2 seconds.   Neurological:      General: No focal deficit present.      Mental Status: She is alert and oriented to person, place, and time.   Psychiatric:         Mood and Affect: Mood normal.         Behavior: Behavior normal.           CRANIAL NERVES     CN III, IV, VI   Pupils are equal, round, and reactive to light.       Significant Labs: All pertinent labs within the past 24 hours have been reviewed.    Significant Imaging: I have reviewed all pertinent imaging results/findings within the past 24 hours.

## 2022-02-16 NOTE — PLAN OF CARE
02/16/22 0758   Medicare Message   Important Message from Medicare regarding Discharge Appeal Rights Signed/date by patient/caregiver;Explained to patient/caregiver   Date IMM was signed 02/16/22   Time IMM was signed 1708     IMM obtained by registration.

## 2022-02-16 NOTE — PLAN OF CARE
[facility]  Initial [facility]       Primary Care Provider: Lucian Barry MD    Admission Diagnosis: Shortness of breath [R06.02]  COPD (chronic obstructive pulmonary disease) [J44.9]  Acute on chronic congestive heart failure, unspecified heart failure type [I50.9]    Admission Date: 2/15/2022  Expected Discharge Date:          Payor: HU[employer] MANAGED MEDICARE / Plan: HU[employer] MEDICARE HMO / Product Type: Capitation /     Extended Emergency Contact Information  Primary Emergency Contact: DominickEsther   [facility]  Home Phone: [phone]  Relation: Daughter  Secondary Emergency Contact: Davion Tan   [place]  Mobile Phone: [phone]  Relation: Son    Discharge Plan A: Home,Home with family  Discharge Plan B: Home with family,Home Health      [id number] - [facility] [address] AT [facility] [address] & [facility]  [address] [address]  Phone: [phone] Fax: [phone]      Initial Assessment (most recent)     Adult Discharge Assessment - 02/15/22 1800        Discharge Assessment    Assessment Type Discharge Planning Assessment     Confirmed/corrected address, phone number and insurance Yes     Confirmed Demographics Correct on Facesheet     When was your last doctors appointment? --   The patient saw her PCP yesterday.    Reason For Admission SOB     Lives With alone;grandchild(edmar);spouse;child(edmar), adult     Do you expect to return to your current living situation? Yes     Do you have help at home or someone to help you manage your care at home? Yes     Who are your caregiver(s) and their phone number(s)? Odilia [phone]     Prior to hospitilization cognitive status: Alert/Oriented     Current cognitive status: Alert/Oriented     Walking or Climbing Stairs Difficulty stair climbing difficulty, requires equipment;ambulation difficulty, requires equipment     Mobility Management cane  "rolling walker   The patient stated that her wheelchair broke    Dressing/Bathing Difficulty bathing difficulty, assistance 1 person     Home Accessibility wheelchair accessible;stairs to enter home     Number of Stairs, Main Entrance three     Stair Railings, Main Entrance railings on both sides of stairs     Home Layout Able to live on 1st floor     Equipment Currently Used at Home nebulizer;oxygen;CPAP;walker, rolling;cane, straight   inhaler    Readmission within 30 days? Yes     Patient currently being followed by outpatient case management? No     Do you currently have service(s) that help you manage your care at home? No     Do you take prescription medications? Yes     Do you have prescription coverage? Yes     Is the patient taking medications as prescribed? yes   The patient stated that she is taking her prescribed medications, but she did not take her lasix yesterday.    Who is going to help you get home at discharge? family     How do you get to doctors appointments? family or friend will provide     Are you on dialysis? No     Do you take coumadin? No     Discharge Plan A Home;Home with family     Discharge Plan B Home with family;Home Health     DME Needed Upon Discharge  other (see comments)   TBD    Discharge Plan discussed with: Patient                 Readmission Assessment (most recent)     Readmission Assessment - 02/16/22 0820        Readmission    Why were you hospitalized in the last 30 days? COVID-19     Why were you readmitted? Alarmed about signs/symptoms     When you left the hospital how did you feel? The patient stated that she felt "good."     When you left the hospital where did you go? Home with Family     Did patient/caregiver refused recommended DC plan? No     Tell me about what happened between when you left the hospital and the day you returned. The patient stated that she was feeling fine until yesterday. She stated that her son was supposed to bring her to her doctor's " appointment, and she was disappointed that he did not bring her. Instead, her granddaughter, was able to bring her to her appointment. The patient indicated that she was offended by her son not being able to bring her to her appointment.     When did you start not feeling well? Yesterday     Did you try to manage your symptoms your self? No     Did you call anyone? --   The patient stated that her doctor informed her to come to the hospital .    Did you try to see or did see a doctor or nurse before you came? Yes     Were you seen? Yes     Did you have  a follow-up appointment on discharge? Yes     Did you go? Yes     Was this a planned readmission? No              Initial discharge assessment is completed. SW went to see the patient in her room. She was alert and oriented to the questions being asked. The patient stated that she lives with her , son, and granddaughter, and great grand children in a single level home. She stated the home has three steps leading towards the main and back entrance.The home as has rails on both sides. She requires assistance with her ADLs. She stated that her granddaughter is  able to help her with her care. She has oxygen, CPAP machine, nebulizer, rolling walker, and  cane. The patient is open to home health if it is recommended.  Case management/SW will remain available until the patient is discharged. Please contact case management if the patient needs any assistance. [phone]
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
## 2022-02-16 NOTE — ASSESSMENT & PLAN NOTE
Last A1c reviewed-   Lab Results   Component Value Date    HGBA1C 6.6 (H) 06/13/2017     Most recent fingerstick glucose reviewed-   Recent Labs   Lab 02/15/22  2104 02/16/22  0443   POCTGLUCOSE 143* 123*     Current correctional scale:  Low  Anti-hyperglycemic dose as follows-   Antihyperglycemics (From admission, onward)            Start     Stop Route Frequency Ordered    02/15/22 1753  insulin aspart U-100 pen 0-5 Units         -- SubQ Before meals & nightly PRN 02/15/22 1754        Hold Oral hypoglycemics while patient is in the hospital.

## 2022-02-16 NOTE — PLAN OF CARE
02/16/22 1113   Post-Acute Status   Post-Acute Authorization E   E Status Referrals Sent       New order for wheelchair for home use. Sent referral to to [facility] via [facility]. Notified Nuria with [facility].
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
## 2022-02-17 LAB
ALBUMIN SERPL BCP-MCNC: 2.4 G/DL (ref 3.5–5.2)
ALP SERPL-CCNC: 103 U/L (ref 55–135)
ALT SERPL W/O P-5'-P-CCNC: 24 U/L (ref 10–44)
ANION GAP SERPL CALC-SCNC: 2 MMOL/L (ref 8–16)
AST SERPL-CCNC: 20 U/L (ref 10–40)
BASOPHILS # BLD AUTO: 0 K/UL (ref 0–0.2)
BASOPHILS NFR BLD: 0 % (ref 0–1.9)
BILIRUB SERPL-MCNC: 0.3 MG/DL (ref 0.1–1)
BUN SERPL-MCNC: 23 MG/DL (ref 8–23)
CALCIUM SERPL-MCNC: 9.9 MG/DL (ref 8.7–10.5)
CHLORIDE SERPL-SCNC: 100 MMOL/L (ref 95–110)
CO2 SERPL-SCNC: 37 MMOL/L (ref 23–29)
CREAT SERPL-MCNC: 1.1 MG/DL (ref 0.5–1.4)
DIFFERENTIAL METHOD: ABNORMAL
EOSINOPHIL # BLD AUTO: 0 K/UL (ref 0–0.5)
EOSINOPHIL NFR BLD: 0 % (ref 0–8)
ERYTHROCYTE [DISTWIDTH] IN BLOOD BY AUTOMATED COUNT: 16.5 % (ref 11.5–14.5)
EST. GFR  (AFRICAN AMERICAN): 58.4 ML/MIN/1.73 M^2
EST. GFR  (NON AFRICAN AMERICAN): 50.6 ML/MIN/1.73 M^2
GLUCOSE SERPL-MCNC: 139 MG/DL (ref 70–110)
HCT VFR BLD AUTO: 44.9 % (ref 37–48.5)
HGB BLD-MCNC: 14.7 G/DL (ref 12–16)
IMM GRANULOCYTES # BLD AUTO: 0.02 K/UL (ref 0–0.04)
IMM GRANULOCYTES NFR BLD AUTO: 0.3 % (ref 0–0.5)
LYMPHOCYTES # BLD AUTO: 0.8 K/UL (ref 1–4.8)
LYMPHOCYTES NFR BLD: 12.2 % (ref 18–48)
MAGNESIUM SERPL-MCNC: 2.5 MG/DL (ref 1.6–2.6)
MCH RBC QN AUTO: 30.1 PG (ref 27–31)
MCHC RBC AUTO-ENTMCNC: 32.7 G/DL (ref 32–36)
MCV RBC AUTO: 92 FL (ref 82–98)
MONOCYTES # BLD AUTO: 0.3 K/UL (ref 0.3–1)
MONOCYTES NFR BLD: 5.1 % (ref 4–15)
NEUTROPHILS # BLD AUTO: 5.1 K/UL (ref 1.8–7.7)
NEUTROPHILS NFR BLD: 82.4 % (ref 38–73)
NRBC BLD-RTO: 0 /100 WBC
PLATELET # BLD AUTO: 211 K/UL (ref 150–450)
PMV BLD AUTO: 10.1 FL (ref 9.2–12.9)
POCT GLUCOSE: 100 MG/DL (ref 70–110)
POCT GLUCOSE: 122 MG/DL (ref 70–110)
POCT GLUCOSE: 153 MG/DL (ref 70–110)
POCT GLUCOSE: 97 MG/DL (ref 70–110)
POTASSIUM SERPL-SCNC: 4.3 MMOL/L (ref 3.5–5.1)
PROT SERPL-MCNC: 6.7 G/DL (ref 6–8.4)
RBC # BLD AUTO: 4.88 M/UL (ref 4–5.4)
SODIUM SERPL-SCNC: 139 MMOL/L (ref 136–145)
WBC # BLD AUTO: 6.22 K/UL (ref 3.9–12.7)

## 2022-02-17 PROCEDURE — 99900035 HC TECH TIME PER 15 MIN (STAT)

## 2022-02-17 PROCEDURE — 63600175 PHARM REV CODE 636 W HCPCS: Performed by: INTERNAL MEDICINE

## 2022-02-17 PROCEDURE — 27000221 HC OXYGEN, UP TO 24 HOURS

## 2022-02-17 PROCEDURE — 83735 ASSAY OF MAGNESIUM: CPT | Performed by: INTERNAL MEDICINE

## 2022-02-17 PROCEDURE — 80053 COMPREHEN METABOLIC PANEL: CPT | Performed by: INTERNAL MEDICINE

## 2022-02-17 PROCEDURE — A4216 STERILE WATER/SALINE, 10 ML: HCPCS | Performed by: INTERNAL MEDICINE

## 2022-02-17 PROCEDURE — 25000242 PHARM REV CODE 250 ALT 637 W/ HCPCS: Performed by: INTERNAL MEDICINE

## 2022-02-17 PROCEDURE — 99900031 HC PATIENT EDUCATION (STAT)

## 2022-02-17 PROCEDURE — 36415 COLL VENOUS BLD VENIPUNCTURE: CPT | Performed by: INTERNAL MEDICINE

## 2022-02-17 PROCEDURE — 25000003 PHARM REV CODE 250: Performed by: INTERNAL MEDICINE

## 2022-02-17 PROCEDURE — 94761 N-INVAS EAR/PLS OXIMETRY MLT: CPT

## 2022-02-17 PROCEDURE — 94640 AIRWAY INHALATION TREATMENT: CPT

## 2022-02-17 PROCEDURE — 85025 COMPLETE CBC W/AUTO DIFF WBC: CPT | Performed by: INTERNAL MEDICINE

## 2022-02-17 PROCEDURE — 94660 CPAP INITIATION&MGMT: CPT

## 2022-02-17 PROCEDURE — 11000001 HC ACUTE MED/SURG PRIVATE ROOM

## 2022-02-17 RX ORDER — ISOSORBIDE MONONITRATE 30 MG/1
30 TABLET, EXTENDED RELEASE ORAL DAILY
Status: DISCONTINUED | OUTPATIENT
Start: 2022-02-17 | End: 2022-02-18

## 2022-02-17 RX ADMIN — SODIUM CHLORIDE, PRESERVATIVE FREE 10 ML: 5 INJECTION INTRAVENOUS at 07:02

## 2022-02-17 RX ADMIN — RIVAROXABAN 20 MG: 10 TABLET, FILM COATED ORAL at 04:02

## 2022-02-17 RX ADMIN — METHYLPREDNISOLONE SODIUM SUCCINATE 80 MG: 125 INJECTION, POWDER, FOR SOLUTION INTRAMUSCULAR; INTRAVENOUS at 02:02

## 2022-02-17 RX ADMIN — ACETAMINOPHEN 650 MG: 325 TABLET ORAL at 07:02

## 2022-02-17 RX ADMIN — IPRATROPIUM BROMIDE AND ALBUTEROL SULFATE 3 ML: 2.5; .5 SOLUTION RESPIRATORY (INHALATION) at 04:02

## 2022-02-17 RX ADMIN — FUROSEMIDE 80 MG: 10 INJECTION, SOLUTION INTRAMUSCULAR; INTRAVENOUS at 06:02

## 2022-02-17 RX ADMIN — METOPROLOL TARTRATE 25 MG: 25 TABLET, FILM COATED ORAL at 07:02

## 2022-02-17 RX ADMIN — Medication 6 MG: at 07:02

## 2022-02-17 RX ADMIN — FAMOTIDINE 20 MG: 20 TABLET ORAL at 08:02

## 2022-02-17 RX ADMIN — ATORVASTATIN CALCIUM 40 MG: 40 TABLET, FILM COATED ORAL at 08:02

## 2022-02-17 RX ADMIN — ISOSORBIDE MONONITRATE 30 MG: 30 TABLET, EXTENDED RELEASE ORAL at 11:02

## 2022-02-17 RX ADMIN — FUROSEMIDE 80 MG: 10 INJECTION, SOLUTION INTRAMUSCULAR; INTRAVENOUS at 05:02

## 2022-02-17 RX ADMIN — METHYLPREDNISOLONE SODIUM SUCCINATE 80 MG: 125 INJECTION, POWDER, FOR SOLUTION INTRAMUSCULAR; INTRAVENOUS at 06:02

## 2022-02-17 RX ADMIN — LISINOPRIL 5 MG: 5 TABLET ORAL at 08:02

## 2022-02-17 RX ADMIN — IPRATROPIUM BROMIDE AND ALBUTEROL SULFATE 3 ML: 2.5; .5 SOLUTION RESPIRATORY (INHALATION) at 11:02

## 2022-02-17 RX ADMIN — METHYLPREDNISOLONE SODIUM SUCCINATE 80 MG: 125 INJECTION, POWDER, FOR SOLUTION INTRAMUSCULAR; INTRAVENOUS at 07:02

## 2022-02-17 RX ADMIN — METOPROLOL TARTRATE 25 MG: 25 TABLET, FILM COATED ORAL at 08:02

## 2022-02-17 NOTE — PLAN OF CARE
02/17/22 1400   Medicare Message   Important Message from Medicare regarding Discharge Appeal Rights Given to patient/caregiver;Explained to patient/caregiver;Signed/date by patient/caregiver   Date IMM was signed 02/17/22   Time IMM was signed 7799      Pt /115 in triage. Reports hx of HTN. Pt states she took BP meds and cough syrup PTA. NAD noted. Denies chest pain, weakness, dizziness, and visual changes but reports headache 10/10. Provider made aware.

## 2022-02-17 NOTE — ASSESSMENT & PLAN NOTE
Last A1c reviewed-   Lab Results   Component Value Date    HGBA1C 5.7 (H) 02/16/2022     Most recent fingerstick glucose reviewed-   No results for input(s): POCTGLUCOSE in the last 24 hours.  Current correctional scale:  Low  Anti-hyperglycemic dose as follows-   Antihyperglycemics (From admission, onward)            Start     Stop Route Frequency Ordered    02/16/22 1017  insulin aspart U-100 pen 0-5 Units         -- SubQ Before meals & nightly PRN 02/16/22 0917        Hold Oral hypoglycemics while patient is in the hospital.

## 2022-02-17 NOTE — SUBJECTIVE & OBJECTIVE
Past Medical History:   Diagnosis Date    Abdominal hernia     CHF (congestive heart failure)     COPD (chronic obstructive pulmonary disease)     Diabetes mellitus, type 2 2/16/2022    GERD (gastroesophageal reflux disease)     History of home oxygen therapy     Hypertension     Migraine headache     Pulmonary embolism 06/01/2017    Small bowel obstruction     Thyroid disease        Past Surgical History:   Procedure Laterality Date    COLONOSCOPY      HYSTERECTOMY      INNER EAR SURGERY      UPPER GASTROINTESTINAL ENDOSCOPY         Review of patient's allergies indicates:   Allergen Reactions    Pcn [penicillins] Swelling       No current facility-administered medications on file prior to encounter.     Current Outpatient Medications on File Prior to Encounter   Medication Sig    albuterol-ipratropium (DUO-NEB) 2.5 mg-0.5 mg/3 mL nebulizer solution Take 3 mLs by nebulization every 6 (six) hours as needed for Wheezing. Rescue    budesonide (PULMICORT) 0.5 mg/2 mL nebulizer solution Take 2 mLs (0.5 mg total) by nebulization 2 (two) times a day. Controller    fesoterodine (TOVIAZ) 8 mg Tb24 Take by mouth.    fish oil-omega-3 fatty acids 300-1,000 mg capsule Take 2 g by mouth once daily.    furosemide (LASIX) 40 MG tablet Take 1 tablet (40 mg total) by mouth 2 (two) times daily. If having increased shortness of breath, increase in leg swelling, and if you notice an >3lbs in 24 hours. May take up to 4 tablets in 1 day (every 6 hours)    gabapentin (NEURONTIN) 600 MG tablet Take 600 mg by mouth 2 (two) times daily.    mometasone-formoterol (DULERA) 200-5 mcg/actuation inhaler Inhale 2 puffs into the lungs 2 (two) times daily. Controller    omeprazole (PRILOSEC) 20 MG capsule Take 20 mg by mouth once daily.    vitamin D 1000 units Tab Take 185 mg by mouth once daily.    LIPITOR 40 mg tablet Take 1 tablet (40 mg total) by mouth once daily.    lisinopril (PRINIVIL,ZESTRIL) 5 MG tablet Take 1  tablet (5 mg total) by mouth once daily.    metoprolol tartrate (LOPRESSOR) 25 MG tablet Take 1 tablet (25 mg total) by mouth 2 (two) times daily.    rivaroxaban (XARELTO) 10 mg Tab Take 2 tablets (20 mg total) by mouth daily with dinner or evening meal.     Family History    None       Tobacco Use    Smoking status: Former Smoker    Smokeless tobacco: Never Used   Substance and Sexual Activity    Alcohol use: No    Drug use: No    Sexual activity: Not on file     Review of Systems   Constitutional: Positive for fatigue. Negative for chills and fever.   HENT: Negative for rhinorrhea, sneezing and sore throat.    Eyes: Negative for pain and visual disturbance.   Respiratory: Positive for shortness of breath and wheezing. Negative for cough.    Cardiovascular: Negative for chest pain.   Gastrointestinal: Negative for abdominal pain, constipation and diarrhea.   Endocrine: Negative for cold intolerance and heat intolerance.   Genitourinary: Negative for difficulty urinating.   Musculoskeletal: Negative for arthralgias and joint swelling.   Skin: Negative for rash.   Allergic/Immunologic: Negative for environmental allergies.   Neurological: Negative for dizziness, syncope and weakness.   Hematological: Does not bruise/bleed easily.   Psychiatric/Behavioral: Negative for dysphoric mood. The patient is not nervous/anxious.      Objective:     Vital Signs (Most Recent):  Temp: 97.6 °F (36.4 °C) (02/17/22 0400)  Pulse: 70 (02/17/22 0840)  Resp: (!) 42 (02/17/22 0840)  BP: (!) 183/86 (02/17/22 0835)  SpO2: 96 % (02/17/22 0840) Vital Signs (24h Range):  Temp:  [97.6 °F (36.4 °C)-98.3 °F (36.8 °C)] 97.6 °F (36.4 °C)  Pulse:  [58-77] 70  Resp:  [16-42] 42  SpO2:  [95 %-100 %] 96 %  BP: (134-183)/(69-88) 183/86     Weight: 118.3 kg (260 lb 14.4 oz)  Body mass index is 44.78 kg/m².    Physical Exam  Vitals and nursing note reviewed.   Constitutional:       Appearance: She is obese. She is ill-appearing.   HENT:      Head:  Normocephalic and atraumatic.      Nose: Nose normal.   Eyes:      Extraocular Movements: Extraocular movements intact.      Pupils: Pupils are equal, round, and reactive to light.   Cardiovascular:      Rate and Rhythm: Normal rate and regular rhythm.      Heart sounds: No murmur heard.  No friction rub. No gallop.    Pulmonary:      Effort: Respiratory distress present.      Breath sounds: Wheezing present.      Comments: bipap  Abdominal:      General: Abdomen is flat. Bowel sounds are normal.      Palpations: Abdomen is soft.   Musculoskeletal:         General: No swelling or deformity.      Cervical back: Normal range of motion and neck supple.   Skin:     General: Skin is warm and dry.      Capillary Refill: Capillary refill takes less than 2 seconds.   Neurological:      General: No focal deficit present.      Mental Status: She is alert and oriented to person, place, and time.   Psychiatric:         Mood and Affect: Mood normal.         Behavior: Behavior normal.           CRANIAL NERVES     CN III, IV, VI   Pupils are equal, round, and reactive to light.       Significant Labs: All pertinent labs within the past 24 hours have been reviewed.    Significant Imaging: I have reviewed all pertinent imaging results/findings within the past 24 hours.

## 2022-02-17 NOTE — PLAN OF CARE
02/17/22 1402   Post-Acute Status   Post-Acute Authorization HME   HME Status Set-up Complete/Auth obtained     Spoke to the patient about the wheelchair. Told her that it could be a couple weeks before [facility] is able to get one and that they do not have a estimated date. Patient is okay with this. Notified Marizol with [facility].
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
## 2022-02-17 NOTE — HOSPITAL COURSE
2/17: No acute events overnight.  Patient still requiring BiPAP intermittently.  Will attempt trial of nasal cannula today.  Patient is on 2 L to 3 L nasal cannula at home continuously.  2/18: Patient still requires BiPAP while laying flat.  Will evaluate for nasal cannula tolerance when sitting up.  Tentative discharge home soon.  This appears to be how she treats herself while at home as well.  2/19: Patient appears to be back at her baseline.  She has diuresed significantly and is resting comfortable on nasal cannula.  Will discharge with close outpatient follow-up.

## 2022-02-17 NOTE — ASSESSMENT & PLAN NOTE
Patient with Hypoxic Respiratory failure which is Acute on chronic.  she is not on home oxygen. Supplemental oxygen was provided and noted- Oxygen Concentration (%):  [50] 50.   Signs/symptoms of respiratory failure include- tachypnea, increased work of breathing, respiratory distress and wheezing. Contributing diagnoses includes - COPD Labs and images were reviewed. Patient Has not had a recent ABG. Will treat underlying causes and adjust management of respiratory failure as follows- treat underlying COPD exacerbation.

## 2022-02-17 NOTE — PLAN OF CARE
02/17/22 0927   Post-Acute Status   Post-Acute Authorization FADI     Spoke to Meli with [employer] about the status of the wheelchair referral that was sent yesterday. She says that everything is approved but she is awaiting a call back from the family to see if they can wait until the wheelchair shipment arrives. Could be a week or two. Will await call back from Meli.    Depth Of Tumor Invasion (For Histology): dermis

## 2022-02-18 LAB
ALBUMIN SERPL BCP-MCNC: 2.5 G/DL (ref 3.5–5.2)
ALP SERPL-CCNC: 99 U/L (ref 55–135)
ALT SERPL W/O P-5'-P-CCNC: 26 U/L (ref 10–44)
ANION GAP SERPL CALC-SCNC: 2 MMOL/L (ref 8–16)
AST SERPL-CCNC: 18 U/L (ref 10–40)
BASOPHILS # BLD AUTO: 0.01 K/UL (ref 0–0.2)
BASOPHILS NFR BLD: 0.1 % (ref 0–1.9)
BILIRUB SERPL-MCNC: 0.3 MG/DL (ref 0.1–1)
BUN SERPL-MCNC: 32 MG/DL (ref 8–23)
CALCIUM SERPL-MCNC: 10 MG/DL (ref 8.7–10.5)
CHLORIDE SERPL-SCNC: 99 MMOL/L (ref 95–110)
CO2 SERPL-SCNC: 39 MMOL/L (ref 23–29)
CREAT SERPL-MCNC: 1.3 MG/DL (ref 0.5–1.4)
DIFFERENTIAL METHOD: ABNORMAL
EOSINOPHIL # BLD AUTO: 0 K/UL (ref 0–0.5)
EOSINOPHIL NFR BLD: 0 % (ref 0–8)
ERYTHROCYTE [DISTWIDTH] IN BLOOD BY AUTOMATED COUNT: 15.9 % (ref 11.5–14.5)
EST. GFR  (AFRICAN AMERICAN): 47.7 ML/MIN/1.73 M^2
EST. GFR  (NON AFRICAN AMERICAN): 41.4 ML/MIN/1.73 M^2
GLUCOSE SERPL-MCNC: 123 MG/DL (ref 70–110)
HCT VFR BLD AUTO: 45.2 % (ref 37–48.5)
HGB BLD-MCNC: 14.8 G/DL (ref 12–16)
IMM GRANULOCYTES # BLD AUTO: 0.05 K/UL (ref 0–0.04)
IMM GRANULOCYTES NFR BLD AUTO: 0.5 % (ref 0–0.5)
LYMPHOCYTES # BLD AUTO: 1.2 K/UL (ref 1–4.8)
LYMPHOCYTES NFR BLD: 11.2 % (ref 18–48)
MAGNESIUM SERPL-MCNC: 2.3 MG/DL (ref 1.6–2.6)
MCH RBC QN AUTO: 30.3 PG (ref 27–31)
MCHC RBC AUTO-ENTMCNC: 32.7 G/DL (ref 32–36)
MCV RBC AUTO: 93 FL (ref 82–98)
MONOCYTES # BLD AUTO: 0.6 K/UL (ref 0.3–1)
MONOCYTES NFR BLD: 5.4 % (ref 4–15)
NEUTROPHILS # BLD AUTO: 9 K/UL (ref 1.8–7.7)
NEUTROPHILS NFR BLD: 82.8 % (ref 38–73)
NRBC BLD-RTO: 0 /100 WBC
PLATELET # BLD AUTO: 222 K/UL (ref 150–450)
PMV BLD AUTO: 9.8 FL (ref 9.2–12.9)
POCT GLUCOSE: 130 MG/DL (ref 70–110)
POCT GLUCOSE: 177 MG/DL (ref 70–110)
POTASSIUM SERPL-SCNC: 4.2 MMOL/L (ref 3.5–5.1)
PROT SERPL-MCNC: 6.8 G/DL (ref 6–8.4)
RBC # BLD AUTO: 4.88 M/UL (ref 4–5.4)
SODIUM SERPL-SCNC: 140 MMOL/L (ref 136–145)
WBC # BLD AUTO: 10.84 K/UL (ref 3.9–12.7)

## 2022-02-18 PROCEDURE — 27000221 HC OXYGEN, UP TO 24 HOURS

## 2022-02-18 PROCEDURE — 36415 COLL VENOUS BLD VENIPUNCTURE: CPT | Performed by: INTERNAL MEDICINE

## 2022-02-18 PROCEDURE — 94660 CPAP INITIATION&MGMT: CPT

## 2022-02-18 PROCEDURE — 99900035 HC TECH TIME PER 15 MIN (STAT)

## 2022-02-18 PROCEDURE — 25000003 PHARM REV CODE 250: Performed by: INTERNAL MEDICINE

## 2022-02-18 PROCEDURE — 83735 ASSAY OF MAGNESIUM: CPT | Performed by: INTERNAL MEDICINE

## 2022-02-18 PROCEDURE — 85025 COMPLETE CBC W/AUTO DIFF WBC: CPT | Performed by: INTERNAL MEDICINE

## 2022-02-18 PROCEDURE — 80053 COMPREHEN METABOLIC PANEL: CPT | Performed by: INTERNAL MEDICINE

## 2022-02-18 PROCEDURE — 99900031 HC PATIENT EDUCATION (STAT)

## 2022-02-18 PROCEDURE — 94761 N-INVAS EAR/PLS OXIMETRY MLT: CPT

## 2022-02-18 PROCEDURE — 11000001 HC ACUTE MED/SURG PRIVATE ROOM

## 2022-02-18 PROCEDURE — 63600175 PHARM REV CODE 636 W HCPCS: Performed by: INTERNAL MEDICINE

## 2022-02-18 RX ORDER — PREDNISONE 20 MG/1
20 TABLET ORAL 2 TIMES DAILY
Status: DISCONTINUED | OUTPATIENT
Start: 2022-02-18 | End: 2022-02-19 | Stop reason: HOSPADM

## 2022-02-18 RX ORDER — ISOSORBIDE MONONITRATE 30 MG/1
60 TABLET, EXTENDED RELEASE ORAL DAILY
Status: DISCONTINUED | OUTPATIENT
Start: 2022-02-19 | End: 2022-02-19 | Stop reason: HOSPADM

## 2022-02-18 RX ORDER — LISINOPRIL 10 MG/1
10 TABLET ORAL DAILY
Status: DISCONTINUED | OUTPATIENT
Start: 2022-02-19 | End: 2022-02-19 | Stop reason: HOSPADM

## 2022-02-18 RX ADMIN — PREDNISONE 20 MG: 20 TABLET ORAL at 08:02

## 2022-02-18 RX ADMIN — RIVAROXABAN 20 MG: 10 TABLET, FILM COATED ORAL at 04:02

## 2022-02-18 RX ADMIN — ACETAMINOPHEN 650 MG: 325 TABLET ORAL at 02:02

## 2022-02-18 RX ADMIN — METOPROLOL TARTRATE 25 MG: 25 TABLET, FILM COATED ORAL at 08:02

## 2022-02-18 RX ADMIN — ISOSORBIDE MONONITRATE 30 MG: 30 TABLET, EXTENDED RELEASE ORAL at 08:02

## 2022-02-18 RX ADMIN — FUROSEMIDE 80 MG: 10 INJECTION, SOLUTION INTRAMUSCULAR; INTRAVENOUS at 05:02

## 2022-02-18 RX ADMIN — ATORVASTATIN CALCIUM 40 MG: 40 TABLET, FILM COATED ORAL at 08:02

## 2022-02-18 RX ADMIN — FAMOTIDINE 20 MG: 20 TABLET ORAL at 08:02

## 2022-02-18 RX ADMIN — Medication 6 MG: at 08:02

## 2022-02-18 RX ADMIN — METHYLPREDNISOLONE SODIUM SUCCINATE 80 MG: 125 INJECTION, POWDER, FOR SOLUTION INTRAMUSCULAR; INTRAVENOUS at 05:02

## 2022-02-18 RX ADMIN — LISINOPRIL 5 MG: 5 TABLET ORAL at 08:02

## 2022-02-18 RX ADMIN — PREDNISONE 20 MG: 20 TABLET ORAL at 11:02

## 2022-02-18 NOTE — PLAN OF CARE
Problem: Adult Inpatient Plan of Care  Goal: Plan of Care Review  Outcome: Ongoing, Progressing  Goal: Patient-Specific Goal (Individualized)  Outcome: Ongoing, Progressing  Goal: Absence of Hospital-Acquired Illness or Injury  Outcome: Ongoing, Progressing  Goal: Optimal Comfort and Wellbeing  Outcome: Ongoing, Progressing  Goal: Readiness for Transition of Care  Outcome: Ongoing, Progressing     Problem: Bariatric Environmental Safety  Goal: Safety Maintained with Care  Outcome: Ongoing, Progressing     Problem: Diabetes Comorbidity  Goal: Blood Glucose Level Within Targeted Range  Outcome: Ongoing, Progressing     Problem: Skin Injury Risk Increased  Goal: Skin Health and Integrity  Outcome: Ongoing, Progressing     Problem: Gas Exchange Impaired  Goal: Optimal Gas Exchange  Outcome: Ongoing, Progressing     Problem: Activity Intolerance  Goal: Enhanced Capacity and Energy  Outcome: Ongoing, Progressing

## 2022-02-18 NOTE — RESPIRATORY THERAPY
02/18/22 0800   Patient Assessment/Suction   Level of Consciousness (AVPU) alert   Respiratory Effort Unlabored   Expansion/Accessory Muscles/Retractions no use of accessory muscles;no retractions   All Lung Fields Breath Sounds diminished   Rhythm/Pattern, Respiratory unlabored   Cough Frequency infrequent   Cough Type nonproductive   PRE-TX-O2   O2 Device (Oxygen Therapy) nasal cannula   $ Is the patient on Low Flow Oxygen? Yes   Flow (L/min) 4   Oxygen Concentration (%) 36   SpO2 97 %   Pulse Oximetry Type Intermittent   $ Pulse Oximetry - Multiple Charge Pulse Oximetry - Multiple   Pulse (!) 57   Resp (!) 25   Positioning HOB elevated 45 degrees   Ready to Wean/Extubation Screen   FIO2<=50 (chart decimal) 0.36   Respiratory Evaluation   $ Care Plan Tech Time 15 min   Placed on 4lpm nasal cannual at this time.  Patient waiting to eat.  Dr. Ge at bedside.

## 2022-02-18 NOTE — ASSESSMENT & PLAN NOTE
Last A1c reviewed-   Lab Results   Component Value Date    HGBA1C 5.7 (H) 02/16/2022     Most recent fingerstick glucose reviewed-   Recent Labs   Lab 02/17/22  1634 02/17/22  1915   POCTGLUCOSE 130* 177*     Current correctional scale:  Low  Anti-hyperglycemic dose as follows-   Antihyperglycemics (From admission, onward)            Start     Stop Route Frequency Ordered    02/16/22 1017  insulin aspart U-100 pen 0-5 Units         -- SubQ Before meals & nightly PRN 02/16/22 0917        Hold Oral hypoglycemics while patient is in the hospital.
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
## 2022-02-18 NOTE — SUBJECTIVE & OBJECTIVE
Past Medical History:   Diagnosis Date    Abdominal hernia     CHF (congestive heart failure)     COPD (chronic obstructive pulmonary disease)     Diabetes mellitus, type 2 2/16/2022    GERD (gastroesophageal reflux disease)     History of home oxygen therapy     Hypertension     Migraine headache     Pulmonary embolism 06/01/2017    Small bowel obstruction     Thyroid disease        Past Surgical History:   Procedure Laterality Date    COLONOSCOPY      HYSTERECTOMY      INNER EAR SURGERY      UPPER GASTROINTESTINAL ENDOSCOPY         Review of patient's allergies indicates:   Allergen Reactions    Pcn [penicillins] Swelling       No current facility-administered medications on file prior to encounter.     Current Outpatient Medications on File Prior to Encounter   Medication Sig    albuterol-ipratropium (DUO-NEB) 2.5 mg-0.5 mg/3 mL nebulizer solution Take 3 mLs by nebulization every 6 (six) hours as needed for Wheezing. Rescue    budesonide (PULMICORT) 0.5 mg/2 mL nebulizer solution Take 2 mLs (0.5 mg total) by nebulization 2 (two) times a day. Controller    fesoterodine (TOVIAZ) 8 mg Tb24 Take by mouth.    fish oil-omega-3 fatty acids 300-1,000 mg capsule Take 2 g by mouth once daily.    furosemide (LASIX) 40 MG tablet Take 1 tablet (40 mg total) by mouth 2 (two) times daily. If having increased shortness of breath, increase in leg swelling, and if you notice an >3lbs in 24 hours. May take up to 4 tablets in 1 day (every 6 hours)    gabapentin (NEURONTIN) 600 MG tablet Take 600 mg by mouth 2 (two) times daily.    mometasone-formoterol (DULERA) 200-5 mcg/actuation inhaler Inhale 2 puffs into the lungs 2 (two) times daily. Controller    omeprazole (PRILOSEC) 20 MG capsule Take 20 mg by mouth once daily.    vitamin D 1000 units Tab Take 185 mg by mouth once daily.    LIPITOR 40 mg tablet Take 1 tablet (40 mg total) by mouth once daily.    lisinopril (PRINIVIL,ZESTRIL) 5 MG tablet Take 1  tablet (5 mg total) by mouth once daily.    metoprolol tartrate (LOPRESSOR) 25 MG tablet Take 1 tablet (25 mg total) by mouth 2 (two) times daily.    rivaroxaban (XARELTO) 10 mg Tab Take 2 tablets (20 mg total) by mouth daily with dinner or evening meal.     Family History    None       Tobacco Use    Smoking status: Former Smoker    Smokeless tobacco: Never Used   Substance and Sexual Activity    Alcohol use: No    Drug use: No    Sexual activity: Not on file     Review of Systems   Constitutional: Positive for fatigue. Negative for chills and fever.   HENT: Negative for rhinorrhea, sneezing and sore throat.    Eyes: Negative for pain and visual disturbance.   Respiratory: Positive for shortness of breath and wheezing. Negative for cough.    Cardiovascular: Negative for chest pain.   Gastrointestinal: Negative for abdominal pain, constipation and diarrhea.   Endocrine: Negative for cold intolerance and heat intolerance.   Genitourinary: Negative for difficulty urinating.   Musculoskeletal: Negative for arthralgias and joint swelling.   Skin: Negative for rash.   Allergic/Immunologic: Negative for environmental allergies.   Neurological: Negative for dizziness, syncope and weakness.   Hematological: Does not bruise/bleed easily.   Psychiatric/Behavioral: Negative for dysphoric mood. The patient is not nervous/anxious.      Objective:     Vital Signs (Most Recent):  Temp: 97.6 °F (36.4 °C) (02/18/22 0731)  Pulse: (!) 57 (02/18/22 0800)  Resp: (!) 25 (02/18/22 0800)  BP: (!) 189/88 (02/18/22 0821)  SpO2: 97 % (02/18/22 0800) Vital Signs (24h Range):  Temp:  [97 °F (36.1 °C)-98.1 °F (36.7 °C)] 97.6 °F (36.4 °C)  Pulse:  [49-70] 57  Resp:  [15-33] 25  SpO2:  [92 %-100 %] 97 %  BP: (137-189)/(63-88) 189/88     Weight: 118.3 kg (260 lb 14.4 oz)  Body mass index is 44.78 kg/m².    Physical Exam  Vitals and nursing note reviewed.   Constitutional:       Appearance: She is obese. She is ill-appearing.   HENT:       Head: Normocephalic and atraumatic.      Nose: Nose normal.   Eyes:      Extraocular Movements: Extraocular movements intact.      Pupils: Pupils are equal, round, and reactive to light.   Cardiovascular:      Rate and Rhythm: Normal rate and regular rhythm.      Heart sounds: No murmur heard.  No friction rub. No gallop.    Pulmonary:      Effort: Respiratory distress present.      Breath sounds: Wheezing present.      Comments: bipap  Abdominal:      General: Abdomen is flat. Bowel sounds are normal.      Palpations: Abdomen is soft.   Musculoskeletal:         General: No swelling or deformity.      Cervical back: Normal range of motion and neck supple.   Skin:     General: Skin is warm and dry.      Capillary Refill: Capillary refill takes less than 2 seconds.   Neurological:      General: No focal deficit present.      Mental Status: She is alert and oriented to person, place, and time.   Psychiatric:         Mood and Affect: Mood normal.         Behavior: Behavior normal.           CRANIAL NERVES     CN III, IV, VI   Pupils are equal, round, and reactive to light.       Significant Labs: All pertinent labs within the past 24 hours have been reviewed.    Significant Imaging: I have reviewed all pertinent imaging results/findings within the past 24 hours.

## 2022-02-18 NOTE — ASSESSMENT & PLAN NOTE
Patient with Hypoxic Respiratory failure which is Acute on chronic.  she is not on home oxygen. Supplemental oxygen was provided and noted- Oxygen Concentration (%):  [36-50] 36.   Signs/symptoms of respiratory failure include- tachypnea, increased work of breathing, respiratory distress and wheezing. Contributing diagnoses includes - COPD Labs and images were reviewed. Patient Has not had a recent ABG. Will treat underlying causes and adjust management of respiratory failure as follows- treat underlying COPD exacerbation.

## 2022-02-19 VITALS
RESPIRATION RATE: 24 BRPM | BODY MASS INDEX: 44.54 KG/M2 | TEMPERATURE: 98 F | HEART RATE: 57 BPM | WEIGHT: 260.88 LBS | OXYGEN SATURATION: 95 % | SYSTOLIC BLOOD PRESSURE: 127 MMHG | HEIGHT: 64 IN | DIASTOLIC BLOOD PRESSURE: 71 MMHG

## 2022-02-19 LAB
ALBUMIN SERPL BCP-MCNC: 2.4 G/DL (ref 3.5–5.2)
ALP SERPL-CCNC: 98 U/L (ref 55–135)
ALT SERPL W/O P-5'-P-CCNC: 27 U/L (ref 10–44)
ANION GAP SERPL CALC-SCNC: 1 MMOL/L (ref 8–16)
AST SERPL-CCNC: 18 U/L (ref 10–40)
BASOPHILS # BLD AUTO: 0.02 K/UL (ref 0–0.2)
BASOPHILS NFR BLD: 0.2 % (ref 0–1.9)
BILIRUB SERPL-MCNC: 0.3 MG/DL (ref 0.1–1)
BUN SERPL-MCNC: 40 MG/DL (ref 8–23)
CALCIUM SERPL-MCNC: 9.9 MG/DL (ref 8.7–10.5)
CHLORIDE SERPL-SCNC: 100 MMOL/L (ref 95–110)
CO2 SERPL-SCNC: 40 MMOL/L (ref 23–29)
CREAT SERPL-MCNC: 1.3 MG/DL (ref 0.5–1.4)
DIFFERENTIAL METHOD: ABNORMAL
EOSINOPHIL # BLD AUTO: 0 K/UL (ref 0–0.5)
EOSINOPHIL NFR BLD: 0 % (ref 0–8)
ERYTHROCYTE [DISTWIDTH] IN BLOOD BY AUTOMATED COUNT: 15.8 % (ref 11.5–14.5)
EST. GFR  (AFRICAN AMERICAN): 47.7 ML/MIN/1.73 M^2
EST. GFR  (NON AFRICAN AMERICAN): 41.4 ML/MIN/1.73 M^2
GLUCOSE SERPL-MCNC: 111 MG/DL (ref 70–110)
HCT VFR BLD AUTO: 46.6 % (ref 37–48.5)
HGB BLD-MCNC: 15.1 G/DL (ref 12–16)
IMM GRANULOCYTES # BLD AUTO: 0.05 K/UL (ref 0–0.04)
IMM GRANULOCYTES NFR BLD AUTO: 0.4 % (ref 0–0.5)
LYMPHOCYTES # BLD AUTO: 1.4 K/UL (ref 1–4.8)
LYMPHOCYTES NFR BLD: 12.4 % (ref 18–48)
MAGNESIUM SERPL-MCNC: 2.5 MG/DL (ref 1.6–2.6)
MCH RBC QN AUTO: 30.1 PG (ref 27–31)
MCHC RBC AUTO-ENTMCNC: 32.4 G/DL (ref 32–36)
MCV RBC AUTO: 93 FL (ref 82–98)
MONOCYTES # BLD AUTO: 0.7 K/UL (ref 0.3–1)
MONOCYTES NFR BLD: 5.8 % (ref 4–15)
NEUTROPHILS # BLD AUTO: 9.3 K/UL (ref 1.8–7.7)
NEUTROPHILS NFR BLD: 81.2 % (ref 38–73)
NRBC BLD-RTO: 0 /100 WBC
PLATELET # BLD AUTO: 229 K/UL (ref 150–450)
PMV BLD AUTO: 9.7 FL (ref 9.2–12.9)
POCT GLUCOSE: 112 MG/DL (ref 70–110)
POCT GLUCOSE: 115 MG/DL (ref 70–110)
POTASSIUM SERPL-SCNC: 5 MMOL/L (ref 3.5–5.1)
PROT SERPL-MCNC: 6.7 G/DL (ref 6–8.4)
RBC # BLD AUTO: 5.02 M/UL (ref 4–5.4)
SODIUM SERPL-SCNC: 141 MMOL/L (ref 136–145)
WBC # BLD AUTO: 11.49 K/UL (ref 3.9–12.7)

## 2022-02-19 PROCEDURE — 27000221 HC OXYGEN, UP TO 24 HOURS

## 2022-02-19 PROCEDURE — 63600175 PHARM REV CODE 636 W HCPCS: Performed by: INTERNAL MEDICINE

## 2022-02-19 PROCEDURE — 80053 COMPREHEN METABOLIC PANEL: CPT | Performed by: INTERNAL MEDICINE

## 2022-02-19 PROCEDURE — 25000003 PHARM REV CODE 250: Performed by: INTERNAL MEDICINE

## 2022-02-19 PROCEDURE — 94660 CPAP INITIATION&MGMT: CPT

## 2022-02-19 PROCEDURE — 83735 ASSAY OF MAGNESIUM: CPT | Performed by: INTERNAL MEDICINE

## 2022-02-19 PROCEDURE — 94640 AIRWAY INHALATION TREATMENT: CPT

## 2022-02-19 PROCEDURE — 36415 COLL VENOUS BLD VENIPUNCTURE: CPT | Performed by: INTERNAL MEDICINE

## 2022-02-19 PROCEDURE — 99900031 HC PATIENT EDUCATION (STAT)

## 2022-02-19 PROCEDURE — 94761 N-INVAS EAR/PLS OXIMETRY MLT: CPT

## 2022-02-19 PROCEDURE — 85025 COMPLETE CBC W/AUTO DIFF WBC: CPT | Performed by: INTERNAL MEDICINE

## 2022-02-19 PROCEDURE — 99900035 HC TECH TIME PER 15 MIN (STAT)

## 2022-02-19 PROCEDURE — 25000242 PHARM REV CODE 250 ALT 637 W/ HCPCS: Performed by: INTERNAL MEDICINE

## 2022-02-19 RX ORDER — ISOSORBIDE MONONITRATE 60 MG/1
60 TABLET, EXTENDED RELEASE ORAL DAILY
Qty: 30 TABLET | Refills: 1 | Status: ON HOLD | OUTPATIENT
Start: 2022-02-20 | End: 2023-04-11 | Stop reason: HOSPADM

## 2022-02-19 RX ORDER — PREDNISONE 20 MG/1
20 TABLET ORAL 2 TIMES DAILY
Qty: 14 TABLET | Refills: 0 | Status: ON HOLD | OUTPATIENT
Start: 2022-02-19 | End: 2023-04-11 | Stop reason: HOSPADM

## 2022-02-19 RX ADMIN — LISINOPRIL 10 MG: 10 TABLET ORAL at 08:02

## 2022-02-19 RX ADMIN — FAMOTIDINE 20 MG: 20 TABLET ORAL at 08:02

## 2022-02-19 RX ADMIN — IPRATROPIUM BROMIDE AND ALBUTEROL SULFATE 3 ML: 2.5; .5 SOLUTION RESPIRATORY (INHALATION) at 04:02

## 2022-02-19 RX ADMIN — ISOSORBIDE MONONITRATE 60 MG: 30 TABLET, EXTENDED RELEASE ORAL at 08:02

## 2022-02-19 RX ADMIN — ATORVASTATIN CALCIUM 40 MG: 40 TABLET, FILM COATED ORAL at 08:02

## 2022-02-19 RX ADMIN — IPRATROPIUM BROMIDE AND ALBUTEROL SULFATE 3 ML: 2.5; .5 SOLUTION RESPIRATORY (INHALATION) at 12:02

## 2022-02-19 RX ADMIN — PREDNISONE 20 MG: 20 TABLET ORAL at 08:02

## 2022-02-19 RX ADMIN — METOPROLOL TARTRATE 25 MG: 25 TABLET, FILM COATED ORAL at 08:02

## 2022-02-19 NOTE — NURSING
1430   PATIENT GIVEN DISCHARGE INSTRUCTIONS.  SALINE-LOCK D/C'D WITH CATHETER INTACT.  TELEMETRY D/C'D AS WELL.  VOICED COMPLETE UNDERSTANDING OF ALL INSTRUCTIONS.  PATIENT WAITING ON FAMILY MEMBER TO PICK HER UP.  PATIENT ALSO INSTRUCTED TO FOLLOW-UP WITH PRIMARY CARE DOCTOR IN 2 DAYS.  VOICED UNDERSTANDING.  JASON VARGAS LPN

## 2022-02-19 NOTE — NURSING
1520   PATIENT LEFT FLOOR IN STABLE CONDITION VIA WHEEL CHAIR WITH OXYGEN IN USE.  SAFETY MAINTAINED.  ACCOMPANIED BY STAFF MEMBER.   JASON VARGAS LPN

## 2022-02-19 NOTE — ASSESSMENT & PLAN NOTE
Patient with Hypoxic Respiratory failure which is Acute on chronic.  she is not on home oxygen. Supplemental oxygen was provided and noted- Oxygen Concentration (%):  [32-50] 32.   Signs/symptoms of respiratory failure include- tachypnea, increased work of breathing, respiratory distress and wheezing. Contributing diagnoses includes - COPD Labs and images were reviewed. Patient Has not had a recent ABG. Will treat underlying causes and adjust management of respiratory failure as follows- treat underlying COPD exacerbation.

## 2022-02-19 NOTE — DISCHARGE SUMMARY
[facility] Medicine  Discharge Summary      Patient Name: Carmen Tan  MRN: [id number]  Patient Class: IP- Inpatient  Admission Date: 2/15/2022  Hospital Length of Stay: 3 days  Discharge Date and Time:  02/19/2022 2:00 PM  Attending Physician: Oscar Ge Jr., MD   Discharging Provider: Oscar Ge Jr, MD  Primary Care Provider: Lucian Barry MD      HPI:   Chief Complaint   Patient presents with    Shortness of Breath       Pt states she asked her son to drive her to her follow up appointment at doctors office today. Pt states her son said she needed to find another ride which made patient upset and she then became short of breath. Pt also states she did not take her lasix this morning be cause she didn't want to pee while going to the doctor.      This is a 72-year-old female with a history of COPD, CHF, hypertension a presents to the emergency department with shortness of breath and wheezing that began after a family member upset her.  History of this multiple times in the past.  She is on home oxygen.  Worse with exertion better with rest.  Denies any fever.  No alleviating factors, denies any chest pain.  Patient states she had a doctor's appointment today and did not take her Lasix because she did not want to urinate often while at the doctor's office.  So she did not have her Lasix yet today         * No surgery found *      Hospital Course:   2/17: No acute events overnight.  Patient still requiring BiPAP intermittently.  Will attempt trial of nasal cannula today.  Patient is on 2 L to 3 L nasal cannula at home continuously.  2/18: Patient still requires BiPAP while laying flat.  Will evaluate for nasal cannula tolerance when sitting up.  Tentative discharge home soon.  This appears to be how she treats herself while at home as well.  2/19: Patient appears to be back at her baseline.  She has diuresed significantly and is resting comfortable on nasal cannula.  Will  discharge with close outpatient follow-up.       Goals of Care Treatment Preferences:  Code Status: Full Code      Consults:   Consults (From admission, onward)        Status Ordering Provider     Inpatient consult to Social Work/Case Management  Once        Provider:  (Not yet assigned)    Completed CHRISTINA BOJORQUEZ JR          * Acute hypoxemic respiratory failure  Patient with Hypoxic Respiratory failure which is Acute on chronic.  she is not on home oxygen. Supplemental oxygen was provided and noted- Oxygen Concentration (%):  [32-50] 32.   Signs/symptoms of respiratory failure include- tachypnea, increased work of breathing, respiratory distress and wheezing. Contributing diagnoses includes - COPD Labs and images were reviewed. Patient Has not had a recent ABG. Will treat underlying causes and adjust management of respiratory failure as follows- treat underlying COPD exacerbation.    Diabetes mellitus, type 2  Last A1c reviewed-   Lab Results   Component Value Date    HGBA1C 5.7 (H) 02/16/2022     Most recent fingerstick glucose reviewed-   Recent Labs   Lab 02/18/22  2242 02/19/22  1053   POCTGLUCOSE 112* 115*     Current correctional scale:  Low  Anti-hyperglycemic dose as follows-   Antihyperglycemics (From admission, onward)            Start     Stop Route Frequency Ordered    02/16/22 1017  insulin aspart U-100 pen 0-5 Units         -- SubQ Before meals & nightly PRN 02/16/22 0917        Hold Oral hypoglycemics while patient is in the hospital.        Acute on chronic congestive heart failure  Patient is responding nicely to diuretic therapy.    We will monitor.      COPD exacerbation  Continue antimicrobial therapy and steroids.    We will provide steroids at the time of discharge      Final Active Diagnoses:    Diagnosis Date Noted POA    PRINCIPAL PROBLEM:  Acute hypoxemic respiratory failure [J96.01] 02/01/2022 Yes    COPD exacerbation [J44.1] 02/16/2022 Unknown    Acute on chronic congestive heart  "failure [I50.9] 02/16/2022 Unknown    Diabetes mellitus, type 2 [E11.9] 02/16/2022 Unknown    Severe obesity (BMI >= 40) [E66.01] 02/04/2022 Yes    Paroxysmal A-fib [I48.0] 06/08/2017 Yes     Chronic      Problems Resolved During this Admission:       Discharged Condition: good    Disposition:     Follow Up:   Contact information for follow-up providers     Lucian Barry MD In 2 days.    Specialty: Internal Medicine  Contact information:  [address] [address]  [phone]             [facility] Emergency Department.    Specialty: Emergency Medicine  Why: If symptoms worsen  Contact information:  [address] [facility] [address] [phone]  Additional information:  Floor [address]                 Contact information for after-discharge care     Durable Medical Equipment     [facility] [place] .    Service: Durable Medical Equipment  Contact information:  [address]  [facility] [address]  [phone]                           Patient Instructions:      WHEELCHAIR FOR HOME USE     Order Specific Question Answer Comments   Hours in W/C per day: 24    Type of Wheelchair: Lightweight    Patient unable to propel in Standard wheelchair? Yes    Size(Width): 18"(STD adult)    Leg Support: Swing Away    Leg Support: STD footrests    Lap Belt: Velcro    Accessories: Front brakes    Cushion: Basic    Height: 5' 4" (1.626 m)    Weight: 118.3 kg (260 lb 14.4 oz)    Does patient have medical equipment at home? nebulizer inhaler / inhaler / inhaler / inhaler / inhaler   Does patient have medical equipment at home? oxygen    Does patient have medical equipment at home? CPAP    Does patient have medical equipment at home? walker, rolling    Does patient have medical equipment at home? cane, straight    Length of need (1-99 months): 99    Please check all that apply: Caregiver is capable and willing to operate wheelchair safely.    Please check all that apply: Patient's upper body strength " is sufficient for propulsion.        Significant Diagnostic Studies: Labs: All labs within the past 24 hours have been reviewed    Pending Diagnostic Studies:     None         Medications:  Reconciled Home Medications:      Medication List      ASK your doctor about these medications    albuterol-ipratropium 2.5 mg-0.5 mg/3 mL nebulizer solution  Commonly known as: DUO-NEB  Take 3 mLs by nebulization every 6 (six) hours as needed for Wheezing. Rescue     budesonide 0.5 mg/2 mL nebulizer solution  Commonly known as: PULMICORT  Take 2 mLs (0.5 mg total) by nebulization 2 (two) times a day. Controller     fish oil-omega-3 fatty acids 300-1,000 mg capsule  Take 2 g by mouth once daily.     furosemide 40 MG tablet  Commonly known as: LASIX  Take 1 tablet (40 mg total) by mouth 2 (two) times daily. If having increased shortness of breath, increase in leg swelling, and if you notice an >3lbs in 24 hours. May take up to 4 tablets in 1 day (every 6 hours)     gabapentin 600 MG tablet  Commonly known as: NEURONTIN  Take 600 mg by mouth 2 (two) times daily.     LIPITOR 40 MG tablet  Generic drug: atorvastatin  Take 1 tablet (40 mg total) by mouth once daily.     lisinopriL 5 MG tablet  Commonly known as: PRINIVIL,ZESTRIL  Take 1 tablet (5 mg total) by mouth once daily.     metoprolol tartrate 25 MG tablet  Commonly known as: LOPRESSOR  Take 1 tablet (25 mg total) by mouth 2 (two) times daily.     mometasone-formoterol 200-5 mcg/actuation inhaler  Commonly known as: DULERA  Inhale 2 puffs into the lungs 2 (two) times daily. Controller     omeprazole 20 MG capsule  Commonly known as: PRILOSEC  Take 20 mg by mouth once daily.     rivaroxaban 10 mg Tab  Commonly known as: XARELTO  Take 2 tablets (20 mg total) by mouth daily with dinner or evening meal.     TOVIAZ 8 mg Tb24  Generic drug: fesoterodine  Take by mouth.     vitamin D 1000 units Tab  Commonly known as: VITAMIN D3  Take 185 mg by mouth once daily.            Indwelling  Lines/Drains at time of discharge:   Lines/Drains/Airways     Drain  Duration           Female External Urinary Catheter 02/15/22 1700 3 days                Time spent on the discharge of patient: 60 minutes         Oscar Ge Jr, MD  Department of Hospital Medicine  [facility]

## 2022-02-19 NOTE — ASSESSMENT & PLAN NOTE
Last A1c reviewed-   Lab Results   Component Value Date    HGBA1C 5.7 (H) 02/16/2022     Most recent fingerstick glucose reviewed-   Recent Labs   Lab 02/18/22  2242 02/19/22  1053   POCTGLUCOSE 112* 115*     Current correctional scale:  Low  Anti-hyperglycemic dose as follows-   Antihyperglycemics (From admission, onward)            Start     Stop Route Frequency Ordered    02/16/22 1017  insulin aspart U-100 pen 0-5 Units         -- SubQ Before meals & nightly PRN 02/16/22 0917        Hold Oral hypoglycemics while patient is in the hospital.

## 2022-02-21 ENCOUNTER — PATIENT OUTREACH (OUTPATIENT)
Dept: ADMINISTRATIVE | Facility: CLINIC | Age: 72
End: 2022-02-21
Payer: MEDICARE

## 2022-02-21 NOTE — PROGRESS NOTES
C3 nurse attempted to contact Carmen Tan for a TCC post hospital discharge follow up call. No answer. The patient does not have a scheduled HOSFU appointment, and the pt does not have an [facility] PCP.

## 2022-02-23 NOTE — PROGRESS NOTES
"C3 nurse spoke with Carmen Tan for a TCC post hospital discharge follow up call. The patient reports does not have a scheduled HOSFU appointment. C3 nurse was unable to schedule HOSFU appointment for Non-[facility] PCP. Patient advised to contact their PCP to schedule a HOSPFU within 7 days -"Monday At the latest" patient was advised.  "

## 2022-10-08 ENCOUNTER — HOSPITAL ENCOUNTER (EMERGENCY)
Facility: HOSPITAL | Age: 72
Discharge: HOME | End: 2022-10-08
Attending: EMERGENCY MEDICINE
Payer: MEDICARE

## 2022-10-08 VITALS
TEMPERATURE: 99 F | OXYGEN SATURATION: 100 % | BODY MASS INDEX: 44.39 KG/M2 | RESPIRATION RATE: 18 BRPM | DIASTOLIC BLOOD PRESSURE: 74 MMHG | WEIGHT: 260 LBS | SYSTOLIC BLOOD PRESSURE: 135 MMHG | HEART RATE: 84 BPM | HEIGHT: 64 IN

## 2022-10-08 DIAGNOSIS — N39.0 URINARY TRACT INFECTION WITHOUT HEMATURIA, SITE UNSPECIFIED: Primary | ICD-10-CM

## 2022-10-08 LAB
ALBUMIN SERPL BCP-MCNC: 2.7 G/DL (ref 3.5–5.2)
ALP SERPL-CCNC: 84 U/L (ref 55–135)
ALT SERPL W/O P-5'-P-CCNC: 12 U/L (ref 10–44)
ANION GAP SERPL CALC-SCNC: 4 MMOL/L (ref 8–16)
AST SERPL-CCNC: 9 U/L (ref 10–40)
BACTERIA #/AREA URNS HPF: ABNORMAL /HPF
BASOPHILS # BLD AUTO: 0.01 K/UL (ref 0–0.2)
BASOPHILS NFR BLD: 0.1 % (ref 0–1.9)
BILIRUB SERPL-MCNC: 1.1 MG/DL (ref 0.1–1)
BILIRUB UR QL STRIP: NEGATIVE
BUN SERPL-MCNC: 15 MG/DL (ref 8–23)
CALCIUM SERPL-MCNC: 9.8 MG/DL (ref 8.7–10.5)
CHLORIDE SERPL-SCNC: 107 MMOL/L (ref 95–110)
CLARITY UR: ABNORMAL
CO2 SERPL-SCNC: 27 MMOL/L (ref 23–29)
COLOR UR: YELLOW
CREAT SERPL-MCNC: 1.3 MG/DL (ref 0.5–1.4)
CTP QC/QA: YES
CTP QC/QA: YES
DIFFERENTIAL METHOD: ABNORMAL
EOSINOPHIL # BLD AUTO: 0 K/UL (ref 0–0.5)
EOSINOPHIL NFR BLD: 0 % (ref 0–8)
ERYTHROCYTE [DISTWIDTH] IN BLOOD BY AUTOMATED COUNT: 15.4 % (ref 11.5–14.5)
EST. GFR  (NO RACE VARIABLE): 44 ML/MIN/1.73 M^2
GLUCOSE SERPL-MCNC: 103 MG/DL (ref 70–110)
GLUCOSE UR QL STRIP: NEGATIVE
HCT VFR BLD AUTO: 38.4 % (ref 37–48.5)
HGB BLD-MCNC: 12.6 G/DL (ref 12–16)
HGB UR QL STRIP: ABNORMAL
HYALINE CASTS #/AREA URNS LPF: 1.2 /LPF
IMM GRANULOCYTES # BLD AUTO: 0.05 K/UL (ref 0–0.04)
IMM GRANULOCYTES NFR BLD AUTO: 0.5 % (ref 0–0.5)
KETONES UR QL STRIP: NEGATIVE
LEUKOCYTE ESTERASE UR QL STRIP: ABNORMAL
LYMPHOCYTES # BLD AUTO: 1 K/UL (ref 1–4.8)
LYMPHOCYTES NFR BLD: 10.4 % (ref 18–48)
MCH RBC QN AUTO: 28.6 PG (ref 27–31)
MCHC RBC AUTO-ENTMCNC: 32.8 G/DL (ref 32–36)
MCV RBC AUTO: 87 FL (ref 82–98)
MICROSCOPIC COMMENT: ABNORMAL
MONOCYTES # BLD AUTO: 1.5 K/UL (ref 0.3–1)
MONOCYTES NFR BLD: 15.1 % (ref 4–15)
NEUTROPHILS # BLD AUTO: 7.3 K/UL (ref 1.8–7.7)
NEUTROPHILS NFR BLD: 73.9 % (ref 38–73)
NITRITE UR QL STRIP: POSITIVE
NRBC BLD-RTO: 0 /100 WBC
PH UR STRIP: 6 [PH] (ref 5–8)
PLATELET # BLD AUTO: 161 K/UL (ref 150–450)
PMV BLD AUTO: 8.7 FL (ref 9.2–12.9)
POC MOLECULAR INFLUENZA A AGN: NEGATIVE
POC MOLECULAR INFLUENZA B AGN: NEGATIVE
POTASSIUM SERPL-SCNC: 4.1 MMOL/L (ref 3.5–5.1)
PROT SERPL-MCNC: 6.4 G/DL (ref 6–8.4)
PROT UR QL STRIP: ABNORMAL
RBC # BLD AUTO: 4.4 M/UL (ref 4–5.4)
RBC #/AREA URNS HPF: 6 /HPF (ref 0–4)
SARS-COV-2 RDRP RESP QL NAA+PROBE: NEGATIVE
SODIUM SERPL-SCNC: 138 MMOL/L (ref 136–145)
SP GR UR STRIP: 1.01 (ref 1–1.03)
SQUAMOUS #/AREA URNS HPF: 2 /HPF
URN SPEC COLLECT METH UR: ABNORMAL
UROBILINOGEN UR STRIP-ACNC: 1 EU/DL
WBC # BLD AUTO: 9.84 K/UL (ref 3.9–12.7)
WBC #/AREA URNS HPF: >100 /HPF (ref 0–5)

## 2022-10-08 PROCEDURE — 96372 THER/PROPH/DIAG INJ SC/IM: CPT | Performed by: EMERGENCY MEDICINE

## 2022-10-08 PROCEDURE — 87635 SARS-COV-2 COVID-19 AMP PRB: CPT | Performed by: EMERGENCY MEDICINE

## 2022-10-08 PROCEDURE — 87086 URINE CULTURE/COLONY COUNT: CPT | Performed by: EMERGENCY MEDICINE

## 2022-10-08 PROCEDURE — 63600175 PHARM REV CODE 636 W HCPCS: Performed by: EMERGENCY MEDICINE

## 2022-10-08 PROCEDURE — 80053 COMPREHEN METABOLIC PANEL: CPT | Performed by: EMERGENCY MEDICINE

## 2022-10-08 PROCEDURE — 25000003 PHARM REV CODE 250: Performed by: EMERGENCY MEDICINE

## 2022-10-08 PROCEDURE — 99284 EMERGENCY DEPT VISIT MOD MDM: CPT | Mod: 25

## 2022-10-08 PROCEDURE — 85025 COMPLETE CBC W/AUTO DIFF WBC: CPT | Performed by: EMERGENCY MEDICINE

## 2022-10-08 PROCEDURE — 81000 URINALYSIS NONAUTO W/SCOPE: CPT | Performed by: EMERGENCY MEDICINE

## 2022-10-08 PROCEDURE — 36415 COLL VENOUS BLD VENIPUNCTURE: CPT | Performed by: EMERGENCY MEDICINE

## 2022-10-08 PROCEDURE — 87502 INFLUENZA DNA AMP PROBE: CPT

## 2022-10-08 RX ORDER — CEFTRIAXONE 1 G/1
1 INJECTION, POWDER, FOR SOLUTION INTRAMUSCULAR; INTRAVENOUS ONCE
Status: COMPLETED | OUTPATIENT
Start: 2022-10-08 | End: 2022-10-08

## 2022-10-08 RX ORDER — LIDOCAINE HYDROCHLORIDE 10 MG/ML
1 INJECTION INFILTRATION; PERINEURAL ONCE
Status: COMPLETED | OUTPATIENT
Start: 2022-10-08 | End: 2022-10-08

## 2022-10-08 RX ORDER — ACETAMINOPHEN 325 MG/1
650 TABLET ORAL
Status: COMPLETED | OUTPATIENT
Start: 2022-10-08 | End: 2022-10-08

## 2022-10-08 RX ORDER — LEVOFLOXACIN 500 MG/1
500 TABLET, FILM COATED ORAL DAILY
Qty: 7 TABLET | Refills: 0 | Status: SHIPPED | OUTPATIENT
Start: 2022-10-08 | End: 2022-10-15

## 2022-10-08 RX ADMIN — LIDOCAINE HYDROCHLORIDE 1 ML: 10 INJECTION, SOLUTION INFILTRATION; PERINEURAL at 03:10

## 2022-10-08 RX ADMIN — ACETAMINOPHEN 650 MG: 325 TABLET ORAL at 11:10

## 2022-10-08 RX ADMIN — CEFTRIAXONE 1 G: 1 INJECTION, POWDER, FOR SOLUTION INTRAMUSCULAR; INTRAVENOUS at 03:10

## 2022-10-08 NOTE — ED PROVIDER NOTES
Encounter Date: 10/8/2022       History     Chief Complaint   Patient presents with    Abdominal Pain     Pt stated that she has been experiencing abdominal pain for the past 3 days. Denied n/v/d. Reporting runny nose /headache as well. No known exposure.     Dysuria     Pt reporting burning urination - stated that doctor Rx antibiotic for 10 days but continues to experience burning.      73 yo female with extensive history as below is here via POV with multiple complaints. Reports generalized abd pain x 3 days with dysuria. No fever/chills. Has runny nose and gradual onset generalized headache. No known sick contacts.     Review of patient's allergies indicates:   Allergen Reactions    Pcn [penicillins] Swelling     Past Medical History:   Diagnosis Date    Abdominal hernia     CHF (congestive heart failure)     COPD (chronic obstructive pulmonary disease)     Diabetes mellitus, type 2 2/16/2022    GERD (gastroesophageal reflux disease)     History of home oxygen therapy     Hypertension     Migraine headache     Pulmonary embolism 06/01/2017    Small bowel obstruction     Thyroid disease      Past Surgical History:   Procedure Laterality Date    COLONOSCOPY      HYSTERECTOMY      INNER EAR SURGERY      UPPER GASTROINTESTINAL ENDOSCOPY       No family history on file.  Social History     Tobacco Use    Smoking status: Former    Smokeless tobacco: Never   Substance Use Topics    Alcohol use: No    Drug use: No     Review of Systems   Constitutional:  Positive for fatigue.   HENT:  Positive for congestion.    Respiratory:  Positive for cough.    Cardiovascular: Negative.    Gastrointestinal:  Positive for abdominal pain.   Genitourinary:  Positive for dysuria.   All other systems reviewed and are negative.    Physical Exam     Initial Vitals [10/08/22 1121]   BP Pulse Resp Temp SpO2   (!) 148/72 76 18 98.6 °F (37 °C) (!) 93 %      MAP       --         Physical Exam    Nursing note and vitals  reviewed.  Constitutional: She appears well-developed and well-nourished. She is not diaphoretic. No distress.   HENT:   Head: Normocephalic and atraumatic.   Eyes: EOM are normal. Pupils are equal, round, and reactive to light.   Neck: Neck supple.   Normal range of motion.  Cardiovascular:  Normal rate, regular rhythm and intact distal pulses.     Exam reveals no gallop and no friction rub.       No murmur heard.  Pulmonary/Chest: Breath sounds normal. No respiratory distress. She has no wheezes. She has no rales.   Abdominal: Abdomen is soft. Bowel sounds are normal. She exhibits no distension. There is no abdominal tenderness. There is no rebound.   Musculoskeletal:         General: No tenderness or edema. Normal range of motion.      Cervical back: Normal range of motion and neck supple.     Neurological: She is alert and oriented to person, place, and time. She has normal strength and normal reflexes.   Skin: Skin is warm and dry. Capillary refill takes less than 2 seconds. No rash noted.       ED Course   Procedures  Labs Reviewed   CBC W/ AUTO DIFFERENTIAL - Abnormal; Notable for the following components:       Result Value    RDW 15.4 (*)     MPV 8.7 (*)     Immature Grans (Abs) 0.05 (*)     Mono # 1.5 (*)     Gran % 73.9 (*)     Lymph % 10.4 (*)     Mono % 15.1 (*)     All other components within normal limits   COMPREHENSIVE METABOLIC PANEL - Abnormal; Notable for the following components:    Albumin 2.7 (*)     Total Bilirubin 1.1 (*)     AST 9 (*)     Anion Gap 4 (*)     eGFR 44.0 (*)     All other components within normal limits   URINALYSIS, REFLEX TO URINE CULTURE - Abnormal; Notable for the following components:    Appearance, UA Cloudy (*)     Protein, UA 2+ (*)     Occult Blood UA 2+ (*)     Nitrite, UA Positive (*)     Leukocytes, UA 3+ (*)     All other components within normal limits    Narrative:     Preferred Collection Type->Urine, Clean Catch  Specimen Source->Urine   URINALYSIS MICROSCOPIC  - Abnormal; Notable for the following components:    RBC, UA 6 (*)     WBC, UA >100 (*)     Bacteria Moderate (*)     Hyaline Casts, UA 1.2 (*)     All other components within normal limits    Narrative:     Preferred Collection Type->Urine, Clean Catch  Specimen Source->Urine   CULTURE, URINE   POCT INFLUENZA A/B MOLECULAR   SARS-COV-2 RDRP GENE    Narrative:     This test utilizes isothermal nucleic acid amplification   technology to detect the SARS-CoV-2 RdRp nucleic acid segment.   The analytical sensitivity (limit of detection) is 125 genome   equivalents/mL.   A POSITIVE result implies infection with the SARS-CoV-2 virus;   the patient is presumed to be contagious.     A NEGATIVE result means that SARS-CoV-2 nucleic acids are not   present above the limit of detection. A NEGATIVE result should be   treated as presumptive. It does not rule out the possibility of   COVID-19 and should not be the sole basis for treatment decisions.   If COVID-19 is strongly suspected based on clinical and exposure   history, re-testing using an alternate molecular assay should be   considered.   This test is only for use under the Food and Drug   Administration s Emergency Use Authorization (EUA).   Commercial kits are provided by [employer].   Performance characteristics of the EUA have been independently   verified by [facility] Department of   Pathology and Laboratory Medicine.   _________________________________________________________________   The authorized Fact Sheet for Healthcare Providers and the authorized Fact   Sheet for Patients of the ID NOW COVID-19 are available on the FDA   website:     https://www.fda.gov/media/884444/download  https://www.fda.gov/media/994965/download                 Imaging Results    None          Medications   acetaminophen tablet 650 mg (650 mg Oral Given 10/8/22 1152)   cefTRIAXone injection 1 g (1 g Intramuscular Given 10/8/22 1197)   LIDOcaine HCL 10 mg/ml (1%)  injection 1 mL (1 mL Intramuscular Given 10/8/22 8496)     Medical Decision Making:   Clinical Tests:   Lab Tests: Ordered and Reviewed                        Clinical Impression:   Final diagnoses:  [N39.0] Urinary tract infection without hematuria, site unspecified (Primary)        ED Disposition Condition    Discharge Stable          ED Prescriptions       Medication Sig Dispense Start Date End Date Auth. Provider    levoFLOXacin (LEVAQUIN) 500 MG tablet Take 1 tablet (500 mg total) by mouth once daily. for 7 days 7 tablet 10/8/2022 10/15/2022 Alfonzo Stephen MD          Follow-up Information       Follow up With Specialties Details Why Contact Info    Lucian Barry MD Internal Medicine Schedule an appointment as soon as possible for a visit   [address] [place] [address]  [phone]               Alfonzo Stephen MD  10/08/22 1508

## 2022-10-10 LAB
BACTERIA UR CULT: NORMAL
BACTERIA UR CULT: NORMAL

## 2023-03-29 ENCOUNTER — HOSPITAL ENCOUNTER (INPATIENT)
Facility: HOSPITAL | Age: 73
LOS: 14 days | Discharge: NURSING, REHAB OR LONG-TERM CARE | DRG: 189 | End: 2023-04-13
Attending: EMERGENCY MEDICINE | Admitting: STUDENT IN AN ORGANIZED HEALTH CARE EDUCATION/TRAINING PROGRAM
Payer: MEDICARE

## 2023-03-29 DIAGNOSIS — E11.65 TYPE 2 DIABETES MELLITUS WITH HYPERGLYCEMIA, WITHOUT LONG-TERM CURRENT USE OF INSULIN: ICD-10-CM

## 2023-03-29 DIAGNOSIS — R79.89 ELEVATED BRAIN NATRIURETIC PEPTIDE (BNP) LEVEL: ICD-10-CM

## 2023-03-29 DIAGNOSIS — J96.01 ACUTE RESPIRATORY FAILURE WITH HYPOXIA: ICD-10-CM

## 2023-03-29 DIAGNOSIS — I50.9 CHF (CONGESTIVE HEART FAILURE): ICD-10-CM

## 2023-03-29 DIAGNOSIS — I16.1 HYPERTENSIVE EMERGENCY: Primary | ICD-10-CM

## 2023-03-29 DIAGNOSIS — I50.1 PULMONARY EDEMA CARDIAC CAUSE: ICD-10-CM

## 2023-03-29 DIAGNOSIS — R06.02 SOB (SHORTNESS OF BREATH): ICD-10-CM

## 2023-03-29 DIAGNOSIS — I50.9 CONGESTIVE HEART FAILURE DUE TO CARDIOMYOPATHY: ICD-10-CM

## 2023-03-29 DIAGNOSIS — R78.81 BACTEREMIA: ICD-10-CM

## 2023-03-29 DIAGNOSIS — R06.02 SHORTNESS OF BREATH: ICD-10-CM

## 2023-03-29 DIAGNOSIS — I42.9 CONGESTIVE HEART FAILURE DUE TO CARDIOMYOPATHY: ICD-10-CM

## 2023-03-29 DIAGNOSIS — E66.01 SEVERE OBESITY (BMI >= 40): ICD-10-CM

## 2023-03-29 DIAGNOSIS — I50.43 ACUTE ON CHRONIC COMBINED SYSTOLIC AND DIASTOLIC CONGESTIVE HEART FAILURE: ICD-10-CM

## 2023-03-29 DIAGNOSIS — J44.1 COPD EXACERBATION: ICD-10-CM

## 2023-03-29 DIAGNOSIS — R53.1 WEAKNESS: ICD-10-CM

## 2023-03-29 LAB
ALBUMIN SERPL BCP-MCNC: 3.3 G/DL (ref 3.5–5.2)
ALP SERPL-CCNC: 198 U/L (ref 55–135)
ALT SERPL W/O P-5'-P-CCNC: 55 U/L (ref 10–44)
ANION GAP SERPL CALC-SCNC: 4 MMOL/L (ref 8–16)
AST SERPL-CCNC: 48 U/L (ref 10–40)
BASOPHILS # BLD AUTO: 0.03 K/UL (ref 0–0.2)
BASOPHILS NFR BLD: 0.5 % (ref 0–1.9)
BILIRUB SERPL-MCNC: 0.8 MG/DL (ref 0.1–1)
BUN SERPL-MCNC: 20 MG/DL (ref 8–23)
CALCIUM SERPL-MCNC: 9.6 MG/DL (ref 8.7–10.5)
CHLORIDE SERPL-SCNC: 109 MMOL/L (ref 95–110)
CO2 SERPL-SCNC: 28 MMOL/L (ref 23–29)
CREAT SERPL-MCNC: 1.6 MG/DL (ref 0.5–1.4)
DIFFERENTIAL METHOD: ABNORMAL
EOSINOPHIL # BLD AUTO: 0 K/UL (ref 0–0.5)
EOSINOPHIL NFR BLD: 0.2 % (ref 0–8)
ERYTHROCYTE [DISTWIDTH] IN BLOOD BY AUTOMATED COUNT: 17.2 % (ref 11.5–14.5)
EST. GFR  (NO RACE VARIABLE): 34.1 ML/MIN/1.73 M^2
GLUCOSE SERPL-MCNC: 132 MG/DL (ref 70–110)
HCT VFR BLD AUTO: 45.6 % (ref 37–48.5)
HGB BLD-MCNC: 14.6 G/DL (ref 12–16)
IMM GRANULOCYTES # BLD AUTO: 0.01 K/UL (ref 0–0.04)
IMM GRANULOCYTES NFR BLD AUTO: 0.2 % (ref 0–0.5)
LYMPHOCYTES # BLD AUTO: 0.7 K/UL (ref 1–4.8)
LYMPHOCYTES NFR BLD: 11.5 % (ref 18–48)
MCH RBC QN AUTO: 29 PG (ref 27–31)
MCHC RBC AUTO-ENTMCNC: 32 G/DL (ref 32–36)
MCV RBC AUTO: 91 FL (ref 82–98)
MONOCYTES # BLD AUTO: 0.7 K/UL (ref 0.3–1)
MONOCYTES NFR BLD: 11.9 % (ref 4–15)
NEUTROPHILS # BLD AUTO: 4.4 K/UL (ref 1.8–7.7)
NEUTROPHILS NFR BLD: 75.7 % (ref 38–73)
NRBC BLD-RTO: 0 /100 WBC
NT-PROBNP SERPL-MCNC: ABNORMAL PG/ML (ref 5–900)
PLATELET # BLD AUTO: 184 K/UL (ref 150–450)
PMV BLD AUTO: 9.2 FL (ref 9.2–12.9)
POTASSIUM SERPL-SCNC: 4.2 MMOL/L (ref 3.5–5.1)
PROT SERPL-MCNC: 7.1 G/DL (ref 6–8.4)
RBC # BLD AUTO: 5.04 M/UL (ref 4–5.4)
SODIUM SERPL-SCNC: 141 MMOL/L (ref 136–145)
TROPONIN I SERPL DL<=0.01 NG/ML-MCNC: 43.2 PG/ML (ref 0–60)
WBC # BLD AUTO: 5.82 K/UL (ref 3.9–12.7)

## 2023-03-29 PROCEDURE — 84443 ASSAY THYROID STIM HORMONE: CPT | Performed by: STUDENT IN AN ORGANIZED HEALTH CARE EDUCATION/TRAINING PROGRAM

## 2023-03-29 PROCEDURE — 99285 EMERGENCY DEPT VISIT HI MDM: CPT | Mod: 25

## 2023-03-29 PROCEDURE — 27000190 HC CPAP FULL FACE MASK W/VALVE

## 2023-03-29 PROCEDURE — 80053 COMPREHEN METABOLIC PANEL: CPT | Performed by: EMERGENCY MEDICINE

## 2023-03-29 PROCEDURE — 84484 ASSAY OF TROPONIN QUANT: CPT | Performed by: EMERGENCY MEDICINE

## 2023-03-29 PROCEDURE — 99900031 HC PATIENT EDUCATION (STAT)

## 2023-03-29 PROCEDURE — 83036 HEMOGLOBIN GLYCOSYLATED A1C: CPT | Performed by: STUDENT IN AN ORGANIZED HEALTH CARE EDUCATION/TRAINING PROGRAM

## 2023-03-29 PROCEDURE — 63600175 PHARM REV CODE 636 W HCPCS: Performed by: EMERGENCY MEDICINE

## 2023-03-29 PROCEDURE — 51702 INSERT TEMP BLADDER CATH: CPT

## 2023-03-29 PROCEDURE — 85025 COMPLETE CBC W/AUTO DIFF WBC: CPT | Performed by: EMERGENCY MEDICINE

## 2023-03-29 PROCEDURE — 96374 THER/PROPH/DIAG INJ IV PUSH: CPT

## 2023-03-29 PROCEDURE — 99900035 HC TECH TIME PER 15 MIN (STAT)

## 2023-03-29 PROCEDURE — 94761 N-INVAS EAR/PLS OXIMETRY MLT: CPT

## 2023-03-29 PROCEDURE — 84439 ASSAY OF FREE THYROXINE: CPT | Performed by: STUDENT IN AN ORGANIZED HEALTH CARE EDUCATION/TRAINING PROGRAM

## 2023-03-29 PROCEDURE — 36415 COLL VENOUS BLD VENIPUNCTURE: CPT | Performed by: EMERGENCY MEDICINE

## 2023-03-29 PROCEDURE — 94660 CPAP INITIATION&MGMT: CPT

## 2023-03-29 PROCEDURE — 83880 ASSAY OF NATRIURETIC PEPTIDE: CPT | Performed by: EMERGENCY MEDICINE

## 2023-03-29 RX ORDER — FUROSEMIDE 10 MG/ML
60 INJECTION INTRAMUSCULAR; INTRAVENOUS
Status: COMPLETED | OUTPATIENT
Start: 2023-03-29 | End: 2023-03-29

## 2023-03-29 RX ORDER — NITROGLYCERIN 20 MG/100ML
0-400 INJECTION INTRAVENOUS CONTINUOUS
Status: DISCONTINUED | OUTPATIENT
Start: 2023-03-30 | End: 2023-04-01

## 2023-03-29 RX ADMIN — NITROGLYCERIN 10 MCG/MIN: 20 INJECTION INTRAVENOUS at 11:03

## 2023-03-29 RX ADMIN — FUROSEMIDE 60 MG: 10 INJECTION, SOLUTION INTRAMUSCULAR; INTRAVENOUS at 11:03

## 2023-03-29 NOTE — Clinical Note
Diagnosis: Shortness of breath [786.05.ICD-9-CM]   Admitting Provider:: SHONNA DANIELS [[id number]]   Future Attending Provider: SHONNA DANIELS [[id number]]   Reason for IP Medical Treatment  (Clinical interventions that can only be accomplished in the IP setting? ) :: CHF   I certify that Inpatient services for greater than or equal to 2 midnights are medically necessary:: Yes   Plans for Post-Acute care--if anticipated (pick the single best option):: F. IP Rehabilitation Unit Placement

## 2023-03-29 NOTE — LETTER
April 11, 2023    {enter consultant's name}  {enter consultant's address}                [address] [place] [id number]  Phone: [phone]  Fax: [phone]   Patient: Carmen Tan   MR Number: [id number]   YOB: 1950   Date of Visit: 3/29/2023       Dear ***:    I am referring my patient, Carmen Tan, to you for evaluation of ***.    She  has a past medical history of Abdominal hernia, Bacteremia (4/5/2023), CHF (congestive heart failure), COPD (chronic obstructive pulmonary disease), Diabetes mellitus, type 2 (2/16/2022), GERD (gastroesophageal reflux disease), History of home oxygen therapy, Hypertension, Hypertensive emergency (3/30/2023), Migraine headache, Pulmonary embolism (06/01/2017), Small bowel obstruction, and Thyroid disease. Her  has a past surgical history that includes Hysterectomy; Inner ear surgery; Colonoscopy; and Upper gastrointestinal endoscopy. She  reports that she has quit smoking. She has never used smokeless tobacco. She reports that she does not drink alcohol and does not use drugs.    She has a current medication list which includes the following prescription(s): acetaminophen, albuterol-ipratropium, budesonide, buspirone, carvedilol, [START ON 4/12/2023] empagliflozin, [START ON 4/12/2023] furosemide, gabapentin, [START ON 4/12/2023] insulin detemir u-100 (levemir), lipitor, [START ON 4/12/2023] nifedipine, [START ON 4/12/2023] pantoprazole, potassium chloride sa, sacubitril-valsartan, [START ON 4/12/2023] sertraline, [START ON 4/12/2023] spironolactone, sucralfate, and vitamin d, and the following Facility-Administered Medications: acetaminophen, albuterol-ipratropium, aluminum-magnesium hydroxide-simethicone, budesonide, buspirone, carvedilol, dextrose 10%, dextrose 10%, dextrose, dextrose, empagliflozin, enoxaparin, furosemide, gabapentin, glucagon (human recombinant), insulin aspart u-100, insulin detemir u-100 (levemir), melatonin,  nifedipine, ondansetron, pantoprazole, potassium chloride sa, sacubitril-valsartan, sertraline, sodium chloride 0.9%, Flushing PICC Protocol **AND** sodium chloride 0.9% **AND** sodium chloride 0.9%, spironolactone, sucralfate. She is allergic to pcn [penicillins].    I appreciate your assistance in her care and look forward to your findings and recommendations.    Sincerely,                           No name on file

## 2023-03-30 ENCOUNTER — CLINICAL SUPPORT (OUTPATIENT)
Dept: CARDIOLOGY | Facility: HOSPITAL | Age: 73
DRG: 189 | End: 2023-03-30
Attending: EMERGENCY MEDICINE
Payer: MEDICARE

## 2023-03-30 PROBLEM — I16.1 HYPERTENSIVE EMERGENCY: Status: ACTIVE | Noted: 2023-03-30

## 2023-03-30 PROBLEM — I50.1 PULMONARY EDEMA CARDIAC CAUSE: Status: ACTIVE | Noted: 2023-03-30

## 2023-03-30 PROBLEM — R06.02 SHORTNESS OF BREATH: Status: ACTIVE | Noted: 2023-03-30

## 2023-03-30 LAB
ALBUMIN SERPL BCP-MCNC: 2.7 G/DL (ref 3.5–5.2)
ALP SERPL-CCNC: 162 U/L (ref 55–135)
ALT SERPL W/O P-5'-P-CCNC: 47 U/L (ref 10–44)
ANION GAP SERPL CALC-SCNC: 5 MMOL/L (ref 8–16)
AORTIC ROOT ANNULUS: 3.38 CM
AORTIC VALVE CUSP SEPERATION: 1.4 CM
AST SERPL-CCNC: 36 U/L (ref 10–40)
AV INDEX (PROSTH): 0.46
AV MEAN GRADIENT: 10 MMHG
AV PEAK GRADIENT: 17 MMHG
AV REGURGITATION PRESSURE HALF TIME: 462.18 MS
AV VALVE AREA: 1.44 CM2
AV VELOCITY RATIO: 0.37
BASOPHILS # BLD AUTO: 0.01 K/UL (ref 0–0.2)
BASOPHILS NFR BLD: 0.2 % (ref 0–1.9)
BILIRUB SERPL-MCNC: 0.6 MG/DL (ref 0.1–1)
BIPAP: 146
BSA FOR ECHO PROCEDURE: 2.57 M2
BUN SERPL-MCNC: 21 MG/DL (ref 8–23)
CALCIUM SERPL-MCNC: 9.2 MG/DL (ref 8.7–10.5)
CHLORIDE SERPL-SCNC: 109 MMOL/L (ref 95–110)
CO2 SERPL-SCNC: 27 MMOL/L (ref 23–29)
CREAT SERPL-MCNC: 1.4 MG/DL (ref 0.5–1.4)
CV ECHO LV RWT: 0.48 CM
DIFFERENTIAL METHOD: ABNORMAL
DOP CALC AO PEAK VEL: 2.09 M/S
DOP CALC AO VTI: 42.1 CM
DOP CALC LVOT AREA: 3.1 CM2
DOP CALC LVOT DIAMETER: 2 CM
DOP CALC LVOT PEAK VEL: 0.78 M/S
DOP CALC LVOT STROKE VOLUME: 60.6 CM3
DOP CALCLVOT PEAK VEL VTI: 19.3 CM
E WAVE DECELERATION TIME: 364.79 MSEC
E/A RATIO: 1.08
ECHO LV POSTERIOR WALL: 1.3 CM (ref 0.6–1.1)
EJECTION FRACTION: 45 %
EOSINOPHIL # BLD AUTO: 0 K/UL (ref 0–0.5)
EOSINOPHIL NFR BLD: 0 % (ref 0–8)
ERYTHROCYTE [DISTWIDTH] IN BLOOD BY AUTOMATED COUNT: 16.9 % (ref 11.5–14.5)
EST. GFR  (NO RACE VARIABLE): 40 ML/MIN/1.73 M^2
ESTIMATED AVG GLUCOSE: 111 MG/DL (ref 68–131)
FIO2: 50 %
FRACTIONAL SHORTENING: 24 % (ref 28–44)
GLUCOSE SERPL-MCNC: 149 MG/DL (ref 70–110)
HBA1C MFR BLD: 5.5 % (ref 4–5.6)
HCT VFR BLD AUTO: 38.5 % (ref 37–48.5)
HGB BLD-MCNC: 12.6 G/DL (ref 12–16)
IMM GRANULOCYTES # BLD AUTO: 0.01 K/UL (ref 0–0.04)
IMM GRANULOCYTES NFR BLD AUTO: 0.2 % (ref 0–0.5)
INTERVENTRICULAR SEPTUM: 1.62 CM (ref 0.6–1.1)
IVC DIAMETER: 2.39 CM
IVRT: 138.41 MSEC
LA MAJOR: 7.06 CM
LEFT ATRIUM SIZE: 5.2 CM
LEFT INTERNAL DIMENSION IN SYSTOLE: 4.1 CM (ref 2.1–4)
LEFT VENTRICLE DIASTOLIC VOLUME INDEX: 58.9 ML/M2
LEFT VENTRICLE DIASTOLIC VOLUME: 140.77 ML
LEFT VENTRICLE MASS INDEX: 146 G/M2
LEFT VENTRICLE SYSTOLIC VOLUME INDEX: 31 ML/M2
LEFT VENTRICLE SYSTOLIC VOLUME: 74.2 ML
LEFT VENTRICULAR INTERNAL DIMENSION IN DIASTOLE: 5.39 CM (ref 3.5–6)
LEFT VENTRICULAR MASS: 347.76 G
LVOT MG: 1.4 MMHG
LVOT MV: 0.56 CM/S
LYMPHOCYTES # BLD AUTO: 0.3 K/UL (ref 1–4.8)
LYMPHOCYTES NFR BLD: 4.7 % (ref 18–48)
MCH RBC QN AUTO: 29.2 PG (ref 27–31)
MCHC RBC AUTO-ENTMCNC: 32.7 G/DL (ref 32–36)
MCV RBC AUTO: 89 FL (ref 82–98)
MODIFIED ALLEN'S TEST: ABNORMAL
MONOCYTES # BLD AUTO: 0.2 K/UL (ref 0.3–1)
MONOCYTES NFR BLD: 3.5 % (ref 4–15)
MV PEAK A VEL: 0.86 M/S
MV PEAK E VEL: 0.93 M/S
MV STENOSIS PRESSURE HALF TIME: 105.79 MS
MV VALVE AREA P 1/2 METHOD: 2.08 CM2
NEUTROPHILS # BLD AUTO: 5.5 K/UL (ref 1.8–7.7)
NEUTROPHILS NFR BLD: 91.4 % (ref 38–73)
NRBC BLD-RTO: 0 /100 WBC
O2DEVICE: ABNORMAL
PCO2 BLDA: 51.8 MMHG (ref 35–45)
PH SMN: 7.33 [PH] (ref 7.34–7.45)
PISA AR MAX VEL: 5.06 M/S
PISA TR MAX VEL: 3.66 M/S
PLATELET # BLD AUTO: 155 K/UL (ref 150–450)
PMV BLD AUTO: 9.2 FL (ref 9.2–12.9)
PO2 BLDA: 130 MMHG (ref 80–100)
POC BASE DEFICIT: 1.2 MMOL/L (ref -2–2)
POC HCO3: 24.8 MMOL/L (ref 24–28)
POC PERFORMED BY: ABNORMAL
POC SATURATED O2: 98.8 % (ref 95–100)
POC TEMPERATURE: 37 C
POTASSIUM SERPL-SCNC: 3.9 MMOL/L (ref 3.5–5.1)
PROT SERPL-MCNC: 6 G/DL (ref 6–8.4)
PV MV: 0.47 M/S
PV PEAK VELOCITY: 0.65 CM/S
RA MAJOR: 5.5 CM
RA PRESSURE: 15 MMHG
RA WIDTH: 4.8 CM
RBC # BLD AUTO: 4.31 M/UL (ref 4–5.4)
RIGHT VENTRICULAR END-DIASTOLIC DIMENSION: 3.66 CM
SODIUM SERPL-SCNC: 141 MMOL/L (ref 136–145)
SPECIMEN SOURCE: ABNORMAL
T4 FREE SERPL-MCNC: 1.61 NG/DL (ref 0.71–1.51)
TR MAX PG: 54 MMHG
TSH SERPL DL<=0.005 MIU/L-ACNC: 2.15 UIU/ML (ref 0.4–4)
TV PEAK E VEL: 0.6 M/S
TV REST PULMONARY ARTERY PRESSURE: 69 MMHG
WBC # BLD AUTO: 5.98 K/UL (ref 3.9–12.7)

## 2023-03-30 PROCEDURE — 99223 PR INITIAL HOSPITAL CARE,LEVL III: ICD-10-PCS | Mod: AI,,, | Performed by: STUDENT IN AN ORGANIZED HEALTH CARE EDUCATION/TRAINING PROGRAM

## 2023-03-30 PROCEDURE — 94660 CPAP INITIATION&MGMT: CPT

## 2023-03-30 PROCEDURE — 20000000 HC ICU ROOM

## 2023-03-30 PROCEDURE — 25000003 PHARM REV CODE 250: Performed by: STUDENT IN AN ORGANIZED HEALTH CARE EDUCATION/TRAINING PROGRAM

## 2023-03-30 PROCEDURE — 80053 COMPREHEN METABOLIC PANEL: CPT | Performed by: EMERGENCY MEDICINE

## 2023-03-30 PROCEDURE — 93005 ELECTROCARDIOGRAM TRACING: CPT

## 2023-03-30 PROCEDURE — 25000003 PHARM REV CODE 250: Performed by: EMERGENCY MEDICINE

## 2023-03-30 PROCEDURE — 93010 ELECTROCARDIOGRAM REPORT: CPT | Mod: ,,, | Performed by: INTERNAL MEDICINE

## 2023-03-30 PROCEDURE — 99223 1ST HOSP IP/OBS HIGH 75: CPT | Mod: AI,,, | Performed by: STUDENT IN AN ORGANIZED HEALTH CARE EDUCATION/TRAINING PROGRAM

## 2023-03-30 PROCEDURE — 27000221 HC OXYGEN, UP TO 24 HOURS

## 2023-03-30 PROCEDURE — 25000242 PHARM REV CODE 250 ALT 637 W/ HCPCS: Performed by: EMERGENCY MEDICINE

## 2023-03-30 PROCEDURE — 85025 COMPLETE CBC W/AUTO DIFF WBC: CPT | Performed by: EMERGENCY MEDICINE

## 2023-03-30 PROCEDURE — 25000242 PHARM REV CODE 250 ALT 637 W/ HCPCS: Performed by: STUDENT IN AN ORGANIZED HEALTH CARE EDUCATION/TRAINING PROGRAM

## 2023-03-30 PROCEDURE — 36415 COLL VENOUS BLD VENIPUNCTURE: CPT | Performed by: STUDENT IN AN ORGANIZED HEALTH CARE EDUCATION/TRAINING PROGRAM

## 2023-03-30 PROCEDURE — 63600175 PHARM REV CODE 636 W HCPCS: Performed by: EMERGENCY MEDICINE

## 2023-03-30 PROCEDURE — 36600 WITHDRAWAL OF ARTERIAL BLOOD: CPT

## 2023-03-30 PROCEDURE — A4216 STERILE WATER/SALINE, 10 ML: HCPCS | Performed by: STUDENT IN AN ORGANIZED HEALTH CARE EDUCATION/TRAINING PROGRAM

## 2023-03-30 PROCEDURE — 36415 COLL VENOUS BLD VENIPUNCTURE: CPT | Performed by: EMERGENCY MEDICINE

## 2023-03-30 PROCEDURE — 25000003 PHARM REV CODE 250: Performed by: INTERNAL MEDICINE

## 2023-03-30 PROCEDURE — 93010 EKG 12-LEAD: ICD-10-PCS | Mod: ,,, | Performed by: INTERNAL MEDICINE

## 2023-03-30 PROCEDURE — 99900031 HC PATIENT EDUCATION (STAT)

## 2023-03-30 PROCEDURE — 94640 AIRWAY INHALATION TREATMENT: CPT

## 2023-03-30 PROCEDURE — 94761 N-INVAS EAR/PLS OXIMETRY MLT: CPT

## 2023-03-30 PROCEDURE — 93306 TTE W/DOPPLER COMPLETE: CPT

## 2023-03-30 PROCEDURE — 82803 BLOOD GASES ANY COMBINATION: CPT

## 2023-03-30 PROCEDURE — 63600175 PHARM REV CODE 636 W HCPCS: Performed by: STUDENT IN AN ORGANIZED HEALTH CARE EDUCATION/TRAINING PROGRAM

## 2023-03-30 PROCEDURE — 99900035 HC TECH TIME PER 15 MIN (STAT)

## 2023-03-30 RX ORDER — SPIRONOLACTONE 25 MG/1
25 TABLET ORAL DAILY
Status: DISCONTINUED | OUTPATIENT
Start: 2023-03-30 | End: 2023-04-13 | Stop reason: HOSPADM

## 2023-03-30 RX ORDER — ACETAMINOPHEN 325 MG/1
650 TABLET ORAL EVERY 8 HOURS PRN
Status: DISCONTINUED | OUTPATIENT
Start: 2023-03-30 | End: 2023-04-13 | Stop reason: HOSPADM

## 2023-03-30 RX ORDER — IPRATROPIUM BROMIDE AND ALBUTEROL SULFATE 2.5; .5 MG/3ML; MG/3ML
3 SOLUTION RESPIRATORY (INHALATION)
Status: COMPLETED | OUTPATIENT
Start: 2023-03-30 | End: 2023-04-01

## 2023-03-30 RX ORDER — CARVEDILOL 3.12 MG/1
6.25 TABLET ORAL 2 TIMES DAILY
Status: DISCONTINUED | OUTPATIENT
Start: 2023-03-30 | End: 2023-03-31

## 2023-03-30 RX ORDER — FUROSEMIDE 10 MG/ML
40 INJECTION INTRAMUSCULAR; INTRAVENOUS
Status: DISCONTINUED | OUTPATIENT
Start: 2023-03-30 | End: 2023-03-30

## 2023-03-30 RX ORDER — METOPROLOL TARTRATE 25 MG/1
25 TABLET, FILM COATED ORAL 2 TIMES DAILY
Status: CANCELLED | OUTPATIENT
Start: 2023-03-30

## 2023-03-30 RX ORDER — ISOSORBIDE MONONITRATE 30 MG/1
60 TABLET, EXTENDED RELEASE ORAL DAILY
Status: DISCONTINUED | OUTPATIENT
Start: 2023-03-30 | End: 2023-04-01

## 2023-03-30 RX ORDER — METOPROLOL TARTRATE 25 MG/1
25 TABLET, FILM COATED ORAL 2 TIMES DAILY
Status: DISCONTINUED | OUTPATIENT
Start: 2023-03-30 | End: 2023-03-30

## 2023-03-30 RX ORDER — TALC
6 POWDER (GRAM) TOPICAL NIGHTLY PRN
Status: DISCONTINUED | OUTPATIENT
Start: 2023-03-30 | End: 2023-04-13 | Stop reason: HOSPADM

## 2023-03-30 RX ORDER — ONDANSETRON 2 MG/ML
4 INJECTION INTRAMUSCULAR; INTRAVENOUS EVERY 8 HOURS PRN
Status: DISCONTINUED | OUTPATIENT
Start: 2023-03-30 | End: 2023-04-13 | Stop reason: HOSPADM

## 2023-03-30 RX ORDER — SODIUM CHLORIDE 0.9 % (FLUSH) 0.9 %
10 SYRINGE (ML) INJECTION
Status: DISCONTINUED | OUTPATIENT
Start: 2023-03-30 | End: 2023-04-13 | Stop reason: HOSPADM

## 2023-03-30 RX ORDER — HYDRALAZINE HYDROCHLORIDE 25 MG/1
25 TABLET, FILM COATED ORAL EVERY 8 HOURS
Status: DISCONTINUED | OUTPATIENT
Start: 2023-03-30 | End: 2023-04-04

## 2023-03-30 RX ORDER — FUROSEMIDE 10 MG/ML
40 INJECTION INTRAMUSCULAR; INTRAVENOUS EVERY 6 HOURS
Status: COMPLETED | OUTPATIENT
Start: 2023-03-30 | End: 2023-03-31

## 2023-03-30 RX ORDER — LISINOPRIL 5 MG/1
5 TABLET ORAL DAILY
Status: DISCONTINUED | OUTPATIENT
Start: 2023-03-30 | End: 2023-03-30

## 2023-03-30 RX ORDER — AMLODIPINE BESYLATE 5 MG/1
5 TABLET ORAL DAILY
Status: DISCONTINUED | OUTPATIENT
Start: 2023-03-30 | End: 2023-03-31

## 2023-03-30 RX ORDER — MUPIROCIN 20 MG/G
OINTMENT TOPICAL 2 TIMES DAILY
Status: ACTIVE | OUTPATIENT
Start: 2023-03-30 | End: 2023-04-04

## 2023-03-30 RX ADMIN — EMPAGLIFLOZIN 10 MG: 10 TABLET, FILM COATED ORAL at 04:03

## 2023-03-30 RX ADMIN — CARVEDILOL 6.25 MG: 3.12 TABLET, FILM COATED ORAL at 09:03

## 2023-03-30 RX ADMIN — AMLODIPINE BESYLATE 5 MG: 5 TABLET ORAL at 11:03

## 2023-03-30 RX ADMIN — RIVAROXABAN 20 MG: 10 TABLET, FILM COATED ORAL at 04:03

## 2023-03-30 RX ADMIN — METHYLPREDNISOLONE SODIUM SUCCINATE 60 MG: 40 INJECTION, POWDER, FOR SOLUTION INTRAMUSCULAR; INTRAVENOUS at 02:03

## 2023-03-30 RX ADMIN — Medication 10 ML: at 09:03

## 2023-03-30 RX ADMIN — IPRATROPIUM BROMIDE AND ALBUTEROL SULFATE 3 ML: 2.5; .5 SOLUTION RESPIRATORY (INHALATION) at 07:03

## 2023-03-30 RX ADMIN — SPIRONOLACTONE 25 MG: 25 TABLET ORAL at 11:03

## 2023-03-30 RX ADMIN — RIVAROXABAN 20 MG: 10 TABLET, FILM COATED ORAL at 02:03

## 2023-03-30 RX ADMIN — IPRATROPIUM BROMIDE AND ALBUTEROL SULFATE 3 ML: 2.5; .5 SOLUTION RESPIRATORY (INHALATION) at 11:03

## 2023-03-30 RX ADMIN — METOPROLOL TARTRATE 25 MG: 25 TABLET, FILM COATED ORAL at 08:03

## 2023-03-30 RX ADMIN — FUROSEMIDE 40 MG: 10 INJECTION, SOLUTION INTRAMUSCULAR; INTRAVENOUS at 08:03

## 2023-03-30 RX ADMIN — FUROSEMIDE 40 MG: 10 INJECTION, SOLUTION INTRAMUSCULAR; INTRAVENOUS at 05:03

## 2023-03-30 RX ADMIN — CARVEDILOL 6.25 MG: 3.12 TABLET, FILM COATED ORAL at 11:03

## 2023-03-30 RX ADMIN — METHYLPREDNISOLONE SODIUM SUCCINATE 60 MG: 40 INJECTION, POWDER, FOR SOLUTION INTRAMUSCULAR; INTRAVENOUS at 06:03

## 2023-03-30 RX ADMIN — HYDRALAZINE HYDROCHLORIDE 25 MG: 25 TABLET, FILM COATED ORAL at 09:03

## 2023-03-30 RX ADMIN — MUPIROCIN: 20 OINTMENT TOPICAL at 10:03

## 2023-03-30 RX ADMIN — ISOSORBIDE MONONITRATE 60 MG: 30 TABLET, EXTENDED RELEASE ORAL at 10:03

## 2023-03-30 RX ADMIN — LISINOPRIL 5 MG: 5 TABLET ORAL at 08:03

## 2023-03-30 RX ADMIN — Medication 6 MG: at 10:03

## 2023-03-30 RX ADMIN — METHYLPREDNISOLONE SODIUM SUCCINATE 60 MG: 40 INJECTION, POWDER, FOR SOLUTION INTRAMUSCULAR; INTRAVENOUS at 09:03

## 2023-03-30 RX ADMIN — IPRATROPIUM BROMIDE AND ALBUTEROL SULFATE 3 ML: 2.5; .5 SOLUTION RESPIRATORY (INHALATION) at 03:03

## 2023-03-30 RX ADMIN — HYDRALAZINE HYDROCHLORIDE 25 MG: 25 TABLET, FILM COATED ORAL at 02:03

## 2023-03-30 RX ADMIN — MUPIROCIN: 20 OINTMENT TOPICAL at 09:03

## 2023-03-30 NOTE — EICU
EICU BRIEF ADMIT NOTE:    Reason for ICU admission:  Acute hypoxic respiratory failure    Please refer to admission H&P for details.     Comfortably lying in the bed.  Currently on BiPAP.  Does not seem to be in distress.    Chart reviewed: yes  Recent MD notes reviewed: Yes  Labs results reviewed: yes  Radiology: Chest images reviewed. Reports for others reviewed.  Telemetry tracing: yes  Evaluation via interactive audio and video telecommunications: yes comfortable and not in distress.  Currently on BiPAP.  Communicated issues/orders/plan with: bedside ICU RN    Best Practices Review:  Stress ulcer prophylaxis:  not indicated  DVT prophylaxis: Systemic AC in effect.   Blood glucose monitoring: Ordered        Ventilator review:  Intubated : No      Assessment & Plan:     Impression:  Acute hypoxic respiratory failure  Acute pulmonary edema  Hypertensive emergency  Probable COPD exacerbation  Morbid obesity  History of PE on anticoagulation with Xarelto      Plan:  Continue BiPAP overnight.  Wean BiPAP in the a.m. if tolerated.  Wean FiO2 to the minimum to keep oxygen saturation between 89-92%.  I will obtain an ABG.  Continue diuresis with Lasix.  I was not able to open the chest x-ray in the system that is per ED physician reading chest x-ray look congested and showing pulmonary edema.  Strict intake output record and daily weight.  Continue to monitor renal parameters and electrolytes.  Continue steroid and bronchodilators.  Wean steroid based on clinical improvement.  Continue rest of supportive care.      Thank you for allowing the EICU to participate in your patient's care. Please feel free to call us as needed.     I have encourage bedside RN to call me with the results of labs/imaging if ordered.         Flakito Rivas MD  [facility]  [phone]

## 2023-03-30 NOTE — ASSESSMENT & PLAN NOTE
Called, spoke to the pt, he is doing better, was discharged from the hospital. Pt will follow up with me on Tuesday, November 26.   Patient's FSGs are controlled on current medication regimen.  Last A1c reviewed-   Lab Results   Component Value Date    HGBA1C 5.5 03/29/2023     Most recent fingerstick glucose reviewed- No results for input(s): POCTGLUCOSE in the last 24 hours.  Current correctional scale  Low  Maintain anti-hyperglycemic dose as follows-   Antihyperglycemics (From admission, onward)    None        Hold Oral hypoglycemics while patient is in the hospital.

## 2023-03-30 NOTE — ASSESSMENT & PLAN NOTE
Patient with Hypercapnic and Hypoxic Respiratory failure which is Acute on chronic.  she is on home oxygen at 2 LPM. Supplemental oxygen was provided and noted- Oxygen Concentration (%):  [32-50] 50    .   Signs/symptoms of respiratory failure include- tachypnea, increased work of breathing, use of accessory muscles and wheezing. Contributing diagnoses includes - CHF, COPD, Obesity Hypoventilation and Pleural effusion Labs and images were reviewed. Patient Has recent ABG, which has been reviewed. Will treat underlying causes and adjust management of respiratory failure as follows- IV lasix, supplemental oxygen, continuous BiPAP

## 2023-03-30 NOTE — ASSESSMENT & PLAN NOTE
Patient has a current diagnosis of Hypertensive emergency with end organ damage evidenced by hypertensive encephalopathy and acute pulmonary edema without heart failure which is controlled.  Latest blood pressure and vitals reviewed-   Temp:  [97.3 °F (36.3 °C)-98.5 °F (36.9 °C)]   Pulse:  []   Resp:  [7-44]   BP: (127-216)/()   SpO2:  [94 %-100 %] .   Patient currently on IV antihypertensives.   Home meds for hypertension were reviewed and noted below.   Hypertension Medications             furosemide (LASIX) 40 MG tablet Take 1 tablet (40 mg total) by mouth 2 (two) times daily. If having increased shortness of breath, increase in leg swelling, and if you notice an >3lbs in 24 hours. May take up to 4 tablets in 1 day (every 6 hours)    isosorbide mononitrate (IMDUR) 60 MG 24 hr tablet Take 1 tablet (60 mg total) by mouth once daily.    lisinopril (PRINIVIL,ZESTRIL) 5 MG tablet Take 1 tablet (5 mg total) by mouth once daily.    metoprolol tartrate (LOPRESSOR) 25 MG tablet Take 1 tablet (25 mg total) by mouth 2 (two) times daily.      Will aim for controlled BP reduction by medications noted above. Monitor and mitigate end organ damage as indicated.  Follow up cardiology consult appreciated.

## 2023-03-30 NOTE — ASSESSMENT & PLAN NOTE
Secondary to above CHF exacerbation, fluid overload, SOB, body habitus. When patient's cardiorespiratory status stabilized, follow up PT and OT evaluation.

## 2023-03-30 NOTE — ASSESSMENT & PLAN NOTE
Patient with Long standing persistent (>12 months) atrial fibrillation which is controlled currently with Beta Blocker. Patient is currently in atrial fibrillation.[id number] Score: 3. Anticoagulation indicated. Anticoagulation done with Xarelto.

## 2023-03-30 NOTE — EICU
Intervention Initiated From:  [facility] / [facility]    Elliott intervened regarding:  Rounding (Video assessment)    Comments:   Video assessment complete. Pt lying in bed on bipap, awake and alert. Pt shakes head yes when asked if she is feeling ok. VSS and NAD noted. Reminded pt to press call light which is in her hands if she has any needs.

## 2023-03-30 NOTE — EICU
Intervention Initiated From:  [facility] / EICU    Elliott intervened regarding:  Rounding (Video assessment)    Nurse Notified:  No    Doctor Notified:  Yes    Comments: new admit rounding. Pt resting in bed with eyes closed. NAD observed. VSS. Pt maintained on Bipap and tolerating well. Nitro drip in progress. Informed Dr. Rivas of admit.

## 2023-03-30 NOTE — SUBJECTIVE & OBJECTIVE
Past Medical History:   Diagnosis Date    Abdominal hernia     CHF (congestive heart failure)     COPD (chronic obstructive pulmonary disease)     Diabetes mellitus, type 2 2/16/2022    GERD (gastroesophageal reflux disease)     History of home oxygen therapy     Hypertension     Migraine headache     Pulmonary embolism 06/01/2017    Small bowel obstruction     Thyroid disease        Past Surgical History:   Procedure Laterality Date    COLONOSCOPY      HYSTERECTOMY      INNER EAR SURGERY      UPPER GASTROINTESTINAL ENDOSCOPY         Review of patient's allergies indicates:   Allergen Reactions    Pcn [penicillins] Swelling       No current facility-administered medications on file prior to encounter.     Current Outpatient Medications on File Prior to Encounter   Medication Sig    albuterol-ipratropium (DUO-NEB) 2.5 mg-0.5 mg/3 mL nebulizer solution Take 3 mLs by nebulization every 6 (six) hours as needed for Wheezing. Rescue    budesonide (PULMICORT) 0.5 mg/2 mL nebulizer solution Take 2 mLs (0.5 mg total) by nebulization 2 (two) times a day. Controller    fesoterodine (TOVIAZ) 8 mg Tb24 Take by mouth.    fish oil-omega-3 fatty acids 300-1,000 mg capsule Take 2 g by mouth once daily.    furosemide (LASIX) 40 MG tablet Take 1 tablet (40 mg total) by mouth 2 (two) times daily. If having increased shortness of breath, increase in leg swelling, and if you notice an >3lbs in 24 hours. May take up to 4 tablets in 1 day (every 6 hours)    gabapentin (NEURONTIN) 600 MG tablet Take 600 mg by mouth 2 (two) times daily.    isosorbide mononitrate (IMDUR) 60 MG 24 hr tablet Take 1 tablet (60 mg total) by mouth once daily.    LIPITOR 40 mg tablet Take 1 tablet (40 mg total) by mouth once daily.    lisinopril (PRINIVIL,ZESTRIL) 5 MG tablet Take 1 tablet (5 mg total) by mouth once daily.    metoprolol tartrate (LOPRESSOR) 25 MG tablet Take 1 tablet (25 mg total) by mouth 2 (two) times daily.    mometasone-formoterol (DULERA)  200-5 mcg/actuation inhaler Inhale 2 puffs into the lungs 2 (two) times daily. Controller    omeprazole (PRILOSEC) 20 MG capsule Take 20 mg by mouth once daily.    predniSONE (DELTASONE) 20 MG tablet Take 1 tablet (20 mg total) by mouth 2 (two) times daily.    rivaroxaban (XARELTO) 10 mg Tab Take 2 tablets (20 mg total) by mouth daily with dinner or evening meal.    vitamin D 1000 units Tab Take 185 mg by mouth once daily.     Family History    None       Tobacco Use    Smoking status: Former    Smokeless tobacco: Never   Substance and Sexual Activity    Alcohol use: No    Drug use: No    Sexual activity: Not on file     Review of Systems   Constitutional:  Positive for activity change. Negative for appetite change, chills, fatigue and fever.   HENT:  Negative for mouth sores, sneezing and sore throat.    Eyes:  Negative for visual disturbance.   Respiratory:  Positive for cough (intermittent dry) and shortness of breath. Negative for chest tightness, wheezing and stridor.    Cardiovascular:  Positive for leg swelling. Negative for chest pain and palpitations.   Gastrointestinal:  Negative for abdominal pain, blood in stool, constipation, diarrhea, nausea and vomiting.   Musculoskeletal:  Positive for arthralgias, gait problem and joint swelling. Negative for neck pain and neck stiffness.   Skin:  Negative for color change and pallor.   Neurological:  Positive for weakness. Negative for dizziness, seizures, syncope, facial asymmetry, speech difficulty, light-headedness, numbness and headaches.   Psychiatric/Behavioral:  Negative for agitation, confusion, decreased concentration, dysphoric mood and sleep disturbance. The patient is not nervous/anxious.    Objective:     Vital Signs (Most Recent):  Temp: 97.6 °F (36.4 °C) (03/30/23 1129)  Pulse: 64 (03/30/23 1145)  Resp: (!) 28 (03/30/23 1145)  BP: (!) 163/77 (03/30/23 1145)  SpO2: 97 % (03/30/23 1145)   Vital Signs (24h Range):  Temp:  [97.3 °F (36.3 °C)-98.5 °F  (36.9 °C)] 97.6 °F (36.4 °C)  Pulse:  [] 64  Resp:  [7-44] 28  SpO2:  [94 %-100 %] 97 %  BP: (127-216)/() 163/77     Weight: (!) 146.1 kg (322 lb)  Body mass index is 55.27 kg/m².    Physical Exam  Vitals and nursing note reviewed.   Constitutional:       General: She is not in acute distress.     Appearance: She is obese. She is ill-appearing. She is not toxic-appearing or diaphoretic.   HENT:      Right Ear: External ear normal.      Left Ear: External ear normal.      Nose: No congestion or rhinorrhea.      Mouth/Throat:      Mouth: Mucous membranes are dry.      Pharynx: Oropharynx is clear. No oropharyngeal exudate or posterior oropharyngeal erythema.   Eyes:      Extraocular Movements: Extraocular movements intact.   Neck:      Comments: Thick skin folds  Cardiovascular:      Rate and Rhythm: Bradycardia present. Rhythm irregular.      Heart sounds: No murmur heard.  Pulmonary:      Effort: No respiratory distress.      Breath sounds: Wheezing and rhonchi present. No rales.   Chest:      Chest wall: No tenderness.   Abdominal:      Palpations: Abdomen is soft.      Tenderness: There is no abdominal tenderness.      Comments: Limited secondary to body habitus   Musculoskeletal:         General: Swelling and tenderness present.      Cervical back: Neck supple. No rigidity.      Right lower leg: Edema (tense skin, loss of bony features in toes and dorsum of foot bilaterally) present.      Left lower leg: Edema present.   Skin:     General: Skin is warm and dry.      Capillary Refill: Capillary refill takes less than 2 seconds.      Coloration: Skin is not jaundiced.      Findings: No bruising, erythema, lesion or rash.   Neurological:      Mental Status: She is alert and oriented to person, place, and time. Mental status is at baseline.      Comments: Baseline requires walker for ambulation   Psychiatric:         Mood and Affect: Mood normal.         Behavior: Behavior normal.           Significant  Labs: All pertinent labs within the past 24 hours have been reviewed.  A1C:   Recent Labs   Lab 03/29/23 2319   HGBA1C 5.5     ABGs:   Recent Labs   Lab 03/30/23  0558   PH 7.328*   PCO2 51.8*   HCO3 24.8   POCSATURATED 98.8   PO2 130*     Bilirubin:   Recent Labs   Lab 03/29/23 2319 03/30/23  0340   BILITOT 0.8 0.6     BMP:   Recent Labs   Lab 03/30/23  0340   *      K 3.9      CO2 27   BUN 21   CREATININE 1.4   CALCIUM 9.2     CBC:   Recent Labs   Lab 03/29/23 2319 03/30/23  0340   WBC 5.82 5.98   HGB 14.6 12.6   HCT 45.6 38.5    155     CMP:   Recent Labs   Lab 03/29/23 2319 03/30/23  0340    141   K 4.2 3.9    109   CO2 28 27   * 149*   BUN 20 21   CREATININE 1.6* 1.4   CALCIUM 9.6 9.2   PROT 7.1 6.0   ALBUMIN 3.3* 2.7*   BILITOT 0.8 0.6   ALKPHOS 198* 162*   AST 48* 36   ALT 55* 47*   ANIONGAP 4* 5*     Troponin:   Recent Labs   Lab 03/29/23 2319   TROPONINIHS 43.2     TSH:   Recent Labs   Lab 03/29/23 2319   TSH 2.150     Significant Imaging: I have reviewed all pertinent imaging results/findings within the past 24 hours.

## 2023-03-30 NOTE — CONSULTS
Cardiology Consultation  ([facility])    Today's Date:  3/30/2023  Patient Name: Carmen Tan    MRN: [id number]    Code Status: Full Code     Attending Physician: Gunnar Mott DO   Consulting Physician: ELIOT Espinosa MD  ______________________________________________________________________    Reason for Consult:  Congestive heart failure  Respiratory failure  Cardiomyopathy    Temp: 97.6 °F (36.4 °C) (03/30/23 1129)  Pulse: (!) 58 (03/30/23 1115)  Resp: (!) 27 (03/30/23 1115)  BP: (!) 160/72 (03/30/23 1115)  SpO2: 100 % (03/30/23 1115)  Subjective:    The patient is a 73-year-old lady with multiple chronic comorbidities:  Congestive heart failure: patient's  chest x-ray shows enlarged cardiac silhouette which  would be suggestive of dilated cardiomyopathy.  No recent echocardiogram.  The patient has been treated for both systolic and diastolic dysfunction.   Hypertension: poorly controlled.   Diabetes mellitus  COPD  Morbid obesity (BMI 55.27)  History of small bowel obstruction  History of pulmonary embolus: on Xarelto therapy  GERD    Patient with multiple admissions for respiratory failure.  The patient was admitted on 3/29/2023, with respiratory failure.  In the ED her blood pressure was significantly elevated at 200/100 mmHg's.  She was started on:  nitrates, CPAP, and diuretic therapy to which  she has responded well.    BNP 17,411    EKG: none performed.    Chest x-ray:  CLINICAL HISTORY:  Shortness of breath     COMPARISON:  Chest x-ray 02/15/2022.     FINDINGS:  Prominent AP cardiac silhouette.  Lungs clear.  No pleural fluid.     Impression:     No acute findings.        Electronically signed by: William Hardy MD  Date:                                            03/30/2023  Time:                                           08:13     The patient was admitted to the intensive care unit for more aggressive management.  She is on IV Lasix, as well as a Tridil drip.  She has been maintained  on beta-blocker therapy.  She is diuresing well, but her blood pressure remains elevated albeit improved.    Past Medical History:  Past Medical History:   Diagnosis Date    Abdominal hernia     CHF (congestive heart failure)     COPD (chronic obstructive pulmonary disease)     Diabetes mellitus, type 2 2/16/2022    GERD (gastroesophageal reflux disease)     History of home oxygen therapy     Hypertension     Migraine headache     Pulmonary embolism 06/01/2017    Small bowel obstruction     Thyroid disease        Past Surgical History:   Procedure Laterality Date    COLONOSCOPY      HYSTERECTOMY      INNER EAR SURGERY      UPPER GASTROINTESTINAL ENDOSCOPY         No family history on file.    Social History     Socioeconomic History    Marital status:    Tobacco Use    Smoking status: Former    Smokeless tobacco: Never   Substance and Sexual Activity    Alcohol use: No    Drug use: No       Medications:  Current Facility-Administered Medications   Medication Dose Route Frequency Provider Last Rate Last Admin    acetaminophen tablet 650 mg  650 mg Oral Q8H PRN Gunnar Mott DO        albuterol-ipratropium 2.5 mg-0.5 mg/3 mL nebulizer solution 3 mL  3 mL Nebulization Q4H WAKE Gunnar Mott, DO   3 mL at 03/30/23 1107    amLODIPine tablet 5 mg  5 mg Oral Daily David Espinosa MD        carvediloL tablet 6.25 mg  6.25 mg Oral BID David Espinosa MD        furosemide injection 40 mg  40 mg Intravenous Q6H Gunnar Mott DO        hydrALAZINE tablet 25 mg  25 mg Oral Q8H David Espinosa MD        isosorbide mononitrate 24 hr tablet 60 mg  60 mg Oral Daily Gunnar Mott, DO   60 mg at 03/30/23 1043    melatonin tablet 6 mg  6 mg Oral Nightly PRN Gunnar Mott DO        methylPREDNISolone sodium succinate injection 60 mg  60 mg Intravenous Q8H Gunnar Mott, DO   60 mg at 03/30/23 0625    mupirocin 2 % ointment   Nasal BID Gunnar Mott,    Given at 03/30/23  1043    nitroGLYCERIN in 5 % dextrose 50 mg/250 mL (200 mcg/mL) infusion  0-400 mcg/min Intravenous Continuous Gunnar Mott, DO 3 mL/hr at 03/30/23 1057 10 mcg/min at 03/30/23 1057    ondansetron injection 4 mg  4 mg Intravenous Q8H PRN Gunnar Mott, DO        rivaroxaban tablet 20 mg  20 mg Oral Daily with dinner Gunnar Mott, DO        sodium chloride 0.9% flush 10 mL  10 mL Intravenous PRN Gunnar Mott, DO        spironolactone tablet 25 mg  25 mg Oral Daily David Espinosa MD           Allergies:  Review of patient's allergies indicates:   Allergen Reactions    Pcn [penicillins] Swelling        Labs: CMP   Recent Labs   Lab 03/29/23 2319 03/30/23  0340    141   K 4.2 3.9    109   CO2 28 27   * 149*   BUN 20 21   CREATININE 1.6* 1.4   CALCIUM 9.6 9.2   PROT 7.1 6.0   ALBUMIN 3.3* 2.7*   BILITOT 0.8 0.6   ALKPHOS 198* 162*   AST 48* 36   ALT 55* 47*   ANIONGAP 4* 5*   , CBC   Recent Labs   Lab 03/29/23 2319 03/30/23  0340   WBC 5.82 5.98   HGB 14.6 12.6   HCT 45.6 38.5    155   , and Troponin No results for input(s): TROPONINI in the last 48 hours.      Vital Signs (Most Recent):  Temp: 97.6 °F (36.4 °C) (03/30/23 1129)  Pulse: (!) 58 (03/30/23 1115)  Resp: (!) 27 (03/30/23 1115)  BP: (!) 160/72 (03/30/23 1115)  SpO2: 100 % (03/30/23 1115)   Vital Signs (24h Range):  Temp:  [97.3 °F (36.3 °C)-98.5 °F (36.9 °C)] 97.6 °F (36.4 °C)  Pulse:  [] 58  Resp:  [7-44] 27  SpO2:  [94 %-100 %] 100 %  BP: (127-216)/() 160/72     Weight: (!) 146.1 kg (322 lb)  Body mass index is 55.27 kg/m².    SpO2: 100 %         Intake/Output Summary (Last 24 hours) at 3/30/2023 1135  Last data filed at 3/30/2023 0600  Gross per 24 hour   Intake 149 ml   Output 1800 ml   Net -1651 ml       Assessment and Plan:   1.  Congestive heart failure: Clinically the patient is in CHF supported by an elevated BNP of 17,.411.    It is unclear whether this is systolic dysfunction,  "diastolic dysfunction or combination of both.    Acute goals will be to control blood pressure, and diurese patient.  We will make the following recommendations:  Continue with Lasix IV.  We will continue with Lasix until the patient begins to show evidence of prerenal azotemia.  Obtain EKG  Obtain echocardiogram  Continue with nitrate therapy for both blood pressure and systolic dysfunction.  The patient's chest x-ray shows considerable cardiomegaly, and therefore nitrates are very much appropriate.  Discontinue metoprolol, and institute carvedilol at 6.25 mg twice daily with up titration.  Discontinue lisinopril.  We will institute Entresto, but will require a "washout".  BIDIL.  The patient is on nitrate therapy therefore, we will institute hydralazine 25 mg every 8.  [place] Jardiance 10 mg daily (will bring Jardiance from office as this medication is currently nonformulary).  [place] spironolactone 25 mg daily.    2.  Hypertension: poorly controlled.  Titrate Tridil accordingly.  Carvedilol 6.25 mg twice daily with up titration.  [place] Entresto after washout.  Amlodipine 5 mg daily.  Hydralazine 25 every 8    3.  Hyperlipidemia: restart statin therapy.    4.  Diabetes mellitus: as per primary care physician.  [place] Jardiance 10 mg daily.    5.  COPD: Componrnry og "obesity hypoventilation syndrome"  as well an emphysema from chronic tobacco usage.  (CO2 levels are elevated.)  Weight loss  CPAP therapy  Outpatient sleep study    6.  History of pulmonary embolus: on Xarelto therapy.  "

## 2023-03-30 NOTE — ED PROVIDER NOTES
EMERGENCY DEPARTMENT HISTORY AND PHYSICAL EXAM     This note is dictated on M*Modal word recognition program.  There are word recognition mistakes and grammatical errors that are occasionally missed on review.     Date: 3/29/2023   Patient Name: Carmen Tan       History of Presenting Illness      Chief Complaint   Patient presents with    Shortness of Breath     Per ems pt called 911 due to sob, when they arrived gave her a duoneb and moved her over and her blood pressure went up to 200/100's from an original reading of 140/80, solumedrol 125mg IM given, 4 sprays of nitro spray given, 1inch of nitropaste on pts chest, pt currently on cpap with an O2 sat of 100%        2323   Carmen Tan is a 73 y.o. female with PMHX of COPD, CHF, PE on Xarelto, morbid obesity who presents to the emergency department C/O shortness of breath.    Patient arrives by EMS.  They report they were called because patient was acutely short of breath.  Patient was given a DuoNeb and being prepared to be taken to hospital.  On repeat blood pressure check patient was markedly hypertensive.  Patient was given for nitro space, nitro patch, placed on CPAP.  She received 125mg solumendrol. On arrival to the emergency department patient is on CPAP and moderate respiratory distress.  Appears to be mentating normally and able to respond to questions appropriately however history limited due to respiratory distress    PCP: Lucian Barry MD        Current Facility-Administered Medications   Medication Dose Route Frequency Provider Last Rate Last Admin    albuterol-ipratropium 2.5 mg-0.5 mg/3 mL nebulizer solution 3 mL  3 mL Nebulization Q4H WAKE John Paul Rojas MD        furosemide injection 40 mg  40 mg Intravenous Q12H John Paul Rojas MD        lisinopriL tablet 5 mg  5 mg Oral Daily John Paul Rojas MD        metoprolol tartrate (LOPRESSOR) tablet 25 mg  25 mg Oral BID John Paul Rojas MD        nitroGLYCERIN in  5 % dextrose 50 mg/250 mL (200 mcg/mL) infusion  0-400 mcg/min Intravenous Continuous John Paul Rojas MD 12 mL/hr at 03/30/23 0030 40 mcg/min at 03/30/23 0030    rivaroxaban tablet 20 mg  20 mg Oral Daily with dinner John Paul Rojas MD        rivaroxaban tablet 20 mg  20 mg Oral Once John Paul Rojas MD         Current Outpatient Medications   Medication Sig Dispense Refill    albuterol-ipratropium (DUO-NEB) 2.5 mg-0.5 mg/3 mL nebulizer solution Take 3 mLs by nebulization every 6 (six) hours as needed for Wheezing. Rescue 1 each 0    budesonide (PULMICORT) 0.5 mg/2 mL nebulizer solution Take 2 mLs (0.5 mg total) by nebulization 2 (two) times a day. Controller 120 mL 0    fesoterodine (TOVIAZ) 8 mg Tb24 Take by mouth.      fish oil-omega-3 fatty acids 300-1,000 mg capsule Take 2 g by mouth once daily.      furosemide (LASIX) 40 MG tablet Take 1 tablet (40 mg total) by mouth 2 (two) times daily. If having increased shortness of breath, increase in leg swelling, and if you notice an >3lbs in 24 hours. May take up to 4 tablets in 1 day (every 6 hours) 90 tablet 11    gabapentin (NEURONTIN) 600 MG tablet Take 600 mg by mouth 2 (two) times daily.      isosorbide mononitrate (IMDUR) 60 MG 24 hr tablet Take 1 tablet (60 mg total) by mouth once daily. 30 tablet 1    LIPITOR 40 mg tablet Take 1 tablet (40 mg total) by mouth once daily. 90 tablet 6    lisinopril (PRINIVIL,ZESTRIL) 5 MG tablet Take 1 tablet (5 mg total) by mouth once daily. 30 tablet 11    metoprolol tartrate (LOPRESSOR) 25 MG tablet Take 1 tablet (25 mg total) by mouth 2 (two) times daily. 60 tablet 11    mometasone-formoterol (DULERA) 200-5 mcg/actuation inhaler Inhale 2 puffs into the lungs 2 (two) times daily. Controller      omeprazole (PRILOSEC) 20 MG capsule Take 20 mg by mouth once daily.      predniSONE (DELTASONE) 20 MG tablet Take 1 tablet (20 mg total) by mouth 2 (two) times daily. 14 tablet 0    rivaroxaban (XARELTO)  10 mg Tab Take 2 tablets (20 mg total) by mouth daily with dinner or evening meal. 30 tablet 11    vitamin D 1000 units Tab Take 185 mg by mouth once daily.             Past History     Past Medical History:   Past Medical History:   Diagnosis Date    Abdominal hernia     CHF (congestive heart failure)     COPD (chronic obstructive pulmonary disease)     Diabetes mellitus, type 2 2/16/2022    GERD (gastroesophageal reflux disease)     History of home oxygen therapy     Hypertension     Migraine headache     Pulmonary embolism 06/01/2017    Small bowel obstruction     Thyroid disease         Past Surgical History:   Past Surgical History:   Procedure Laterality Date    COLONOSCOPY      HYSTERECTOMY      INNER EAR SURGERY      UPPER GASTROINTESTINAL ENDOSCOPY          Family History:   No family history on file.     Social History:   Social History     Tobacco Use    Smoking status: Former    Smokeless tobacco: Never   Substance Use Topics    Alcohol use: No    Drug use: No        Allergies:   Review of patient's allergies indicates:   Allergen Reactions    Pcn [penicillins] Swelling          Review of Systems   Review of Systems   See HPI for pertinent positives and negatives       Physical Exam     Vitals:    03/29/23 2344 03/30/23 0015 03/30/23 0026 03/30/23 0042   BP:  (!) 168/80 (!) 170/79 (!) 162/74   Pulse:  80 80 77   Resp:  (!) 24 (!) 25 (!) 22   Temp: 97.3 °F (36.3 °C)      TempSrc: Axillary      SpO2:  100% 100% 100%   Weight:          Physical Exam  Vitals and nursing note reviewed.   Constitutional:       General: She is not in acute distress.     Appearance: Normal appearance. She is obese. She is ill-appearing and diaphoretic.   HENT:      Head: Normocephalic and atraumatic.      Right Ear: External ear normal.      Left Ear: External ear normal.      Nose: Nose normal. No congestion or rhinorrhea.      Mouth/Throat:      Mouth: Mucous membranes are moist.   Eyes:       Conjunctiva/sclera: Conjunctivae normal.      Pupils: Pupils are equal, round, and reactive to light.   Cardiovascular:      Rate and Rhythm: Regular rhythm. Tachycardia present.   Pulmonary:      Effort: Tachypnea present. No respiratory distress.      Breath sounds: Wheezing present.   Chest:      Chest wall: No tenderness.   Abdominal:      Palpations: Abdomen is soft.      Tenderness: There is no guarding or rebound.   Musculoskeletal:         General: No deformity. Normal range of motion.      Cervical back: Normal range of motion. No rigidity.      Right lower leg: No tenderness. Edema present.      Left lower leg: No tenderness. Edema present.   Neurological:      General: No focal deficit present.      Mental Status: She is alert. Mental status is at baseline.            Diagnostic Study Results      Labs -   Recent Results (from the past 12 hour(s))   CBC auto differential    Collection Time: 03/29/23 11:19 PM   Result Value Ref Range    WBC 5.82 3.90 - 12.70 K/uL    RBC 5.04 4.00 - 5.40 M/uL    Hemoglobin 14.6 12.0 - 16.0 g/dL    Hematocrit 45.6 37.0 - 48.5 %    MCV 91 82 - 98 fL    MCH 29.0 27.0 - 31.0 pg    MCHC 32.0 32.0 - 36.0 g/dL    RDW 17.2 (H) 11.5 - 14.5 %    Platelets 184 150 - 450 K/uL    MPV 9.2 9.2 - 12.9 fL    Immature Granulocytes 0.2 0.0 - 0.5 %    Gran # (ANC) 4.4 1.8 - 7.7 K/uL    Immature Grans (Abs) 0.01 0.00 - 0.04 K/uL    Lymph # 0.7 (L) 1.0 - 4.8 K/uL    Mono # 0.7 0.3 - 1.0 K/uL    Eos # 0.0 0.0 - 0.5 K/uL    Baso # 0.03 0.00 - 0.20 K/uL    nRBC 0 0 /100 WBC    Gran % 75.7 (H) 38.0 - 73.0 %    Lymph % 11.5 (L) 18.0 - 48.0 %    Mono % 11.9 4.0 - 15.0 %    Eosinophil % 0.2 0.0 - 8.0 %    Basophil % 0.5 0.0 - 1.9 %    Differential Method Automated    Comprehensive metabolic panel    Collection Time: 03/29/23 11:19 PM   Result Value Ref Range    Sodium 141 136 - 145 mmol/L    Potassium 4.2 3.5 - 5.1 mmol/L    Chloride 109 95 - 110 mmol/L    CO2 28 23 - 29 mmol/L    Glucose 132 (H) 70 -  110 mg/dL    BUN 20 8 - 23 mg/dL    Creatinine 1.6 (H) 0.5 - 1.4 mg/dL    Calcium 9.6 8.7 - 10.5 mg/dL    Total Protein 7.1 6.0 - 8.4 g/dL    Albumin 3.3 (L) 3.5 - 5.2 g/dL    Total Bilirubin 0.8 0.1 - 1.0 mg/dL    Alkaline Phosphatase 198 (H) 55 - 135 U/L    AST 48 (H) 10 - 40 U/L    ALT 55 (H) 10 - 44 U/L    Anion Gap 4 (L) 8 - 16 mmol/L    eGFR 34.1 (A) >60 mL/min/1.73 m^2   TROPONIN I HIGH SENSITIVITY    Collection Time: 03/29/23 11:19 PM   Result Value Ref Range    Troponin I High Sensitivity 43.2 0.0 - 60.0 pg/mL   NT-Pro Natriuretic Peptide    Collection Time: 03/29/23 11:19 PM   Result Value Ref Range    NT-proBNP 20875 (H) 5 - 900 pg/mL        Radiologic Studies -    X-Ray Chest 1 View    (Results Pending)        Medications given in the ED-   Medications   nitroGLYCERIN in 5 % dextrose 50 mg/250 mL (200 mcg/mL) infusion (40 mcg/min Intravenous Rate/Dose Change 3/30/23 0030)   furosemide injection 40 mg (has no administration in time range)   albuterol-ipratropium 2.5 mg-0.5 mg/3 mL nebulizer solution 3 mL (has no administration in time range)   metoprolol tartrate (LOPRESSOR) tablet 25 mg (has no administration in time range)   lisinopriL tablet 5 mg (has no administration in time range)   rivaroxaban tablet 20 mg (has no administration in time range)   rivaroxaban tablet 20 mg (has no administration in time range)   furosemide injection 60 mg (60 mg Intravenous Given 3/29/23 4309)           Medical Decision Making    I am the first provider for this patient.     I reviewed the vital signs, available nursing notes, past medical history, past surgical history, family history and social history.     Vital Signs:  Reviewed the patient's vital signs.     Pulse Oximetry Analysis and Interpretation:    100% on BiPAP, hypoxia requiring supplemental oxygen        EKG Interpretation: (Per my independent interpretation, pending formal read)   Interpreted by John Paul Rojas MD at normal sinus rhythm rate of 94,  left axis deviation, LVH, no ST elevation, when compared to prior studys grossly similar       CXR  Interpretation: (Per my independent interpretation, pending formal read)   CXR read by Dr. John Paul Rojas at 2353    Cardiomegaly, pulmonary edema, CHF     External Test Results (Pertinent to encounter):    Records Reviewed: Nursing Notes, Current Prescription Medications, Old Medical Records, External Medical Records , Previous EKG, and Previous Radiology Studies    History Obtained By: Patient and EMS    Provider Notes: Carmen Tan is a 73 y.o. female with SOB    Co-morbidities Considered: CHF, COPD    Differential Diagnosis:  Pulmonary edema, CHF, COPD exacerbation, pneumonia      ED Course:    Patient presents with acute shortness of breath.  Markedly hypertensive on arrival.  History of CHF.  Reviewed past documentation no recent echo since 2017 which showed LVH and diastolic dysfunction.  Treating as a CHF flash pulmonary edema picture in emergency department.  Patient tolerating BiPAP.  Will give diuretic.  Patient transition to nitro drip for blood pressure control and preload reduction    ED Course as of 03/30/23 0138   Wed Mar 29, 2023   2351 WBC: 5.82 [MO]   2351 Hemoglobin: 14.6 [MO]   2351 Creatinine(!): 1.6 [MO]   2351 NT-proBNP(!): 11676 [MO]   2351 Troponin I High Sensitivity: 43.2 [MO]      ED Course User Index  [MO] John Paul Rojas MD       CONSULT NOTE:    12:41 AM      Dr. Rojas discussed care with?Dr Mott, Hospitalist   It was a standard discussion,?including history of patients chief complaint, available diagnostic results, and treatment course.?Discussed case. Agrees with admission    Patient presents emergency department with acute shortness of breath found to be in flash pulmonary edema secondary congestive heart failure.  Placed on BiPAP which she is tolerated well and started on nitroglycerin drip.  Patient reports that her son was getting into an argument with her  grandchildren and this might have triggered her symptoms.  She denies tobacco use.  She denies chest pain.  Labs reviewed and noted above.  ProBNP markedly elevated from prior value.  Creatinine at baseline.  High sensitive troponin negative.  Chest x-ray consistent with a pulmonary edema type picture.  Patient diuresed.  Hardwick catheter placed for to measure urine output.  Patient will be admitted to ICU at this time due to nitro drip requirements and BiPAP requirement.    Updated patient of plan.  Patient resting comfortably.  Denies complaints.  States breathing feels much better on BiPAP.           Problems Addressed:  CHF, Pulmonary Edema    Procedures:   Procedures       Diagnosis and Disposition     Critical Care:      1:37 AM     I have spent 62 minutes of critical care time involved in lab review, consultations with specialist, family decision-making, and documentation.  During this entire length of time I was immediately available to the patient.     Critical Care:  The reason for providing this level of medical care for this critically ill patient was due a critical illness that impaired one or more vital organ systems such that there was a high probability of imminent or life threatening deterioration in the patients condition. This care involved high complexity decision making to assess, manipulate, and support vital system functions, to treat this degreee vital organ system failure and to prevent further life threatening deterioration of the patients condition.               CLINICAL IMPRESSION:         1. Hypertensive emergency    2. Shortness of breath    3. SOB (shortness of breath)    4. Pulmonary edema cardiac cause    5. Acute respiratory failure with hypoxia              PLAN:   1. Admit ICU  2.      Medication List        ASK your doctor about these medications      albuterol-ipratropium 2.5 mg-0.5 mg/3 mL nebulizer solution  Commonly known as: DUO-NEB  Take 3 mLs by nebulization every 6 (six)  hours as needed for Wheezing. Rescue     budesonide 0.5 mg/2 mL nebulizer solution  Commonly known as: PULMICORT  Take 2 mLs (0.5 mg total) by nebulization 2 (two) times a day. Controller     fish oil-omega-3 fatty acids 300-1,000 mg capsule     furosemide 40 MG tablet  Commonly known as: LASIX  Take 1 tablet (40 mg total) by mouth 2 (two) times daily. If having increased shortness of breath, increase in leg swelling, and if you notice an >3lbs in 24 hours. May take up to 4 tablets in 1 day (every 6 hours)     gabapentin 600 MG tablet  Commonly known as: NEURONTIN     isosorbide mononitrate 60 MG 24 hr tablet  Commonly known as: IMDUR  Take 1 tablet (60 mg total) by mouth once daily.     LIPITOR 40 MG tablet  Generic drug: atorvastatin  Take 1 tablet (40 mg total) by mouth once daily.     lisinopriL 5 MG tablet  Commonly known as: PRINIVIL,ZESTRIL  Take 1 tablet (5 mg total) by mouth once daily.     metoprolol tartrate 25 MG tablet  Commonly known as: LOPRESSOR  Take 1 tablet (25 mg total) by mouth 2 (two) times daily.     mometasone-formoterol 200-5 mcg/actuation inhaler  Commonly known as: DULERA     omeprazole 20 MG capsule  Commonly known as: PRILOSEC     predniSONE 20 MG tablet  Commonly known as: DELTASONE  Take 1 tablet (20 mg total) by mouth 2 (two) times daily.     rivaroxaban 10 mg Tab  Commonly known as: XARELTO  Take 2 tablets (20 mg total) by mouth daily with dinner or evening meal.     TOVIAZ 8 mg Tb24  Generic drug: fesoterodine     vitamin D 1000 units Tab  Commonly known as: VITAMIN D3             3. No follow-up provider specified.     _______________________________     Please note that this dictation was completed with [employer], the computer voice recognition software.  Quite often unanticipated grammatical, syntax, homophones, and other interpretive errors are inadvertently transcribed by the computer software.  Please disregard these errors.  Please excuse any errors that have escaped final  proofreading.             John Paul Rojas MD  03/30/23 0138

## 2023-03-30 NOTE — PLAN OF CARE
Received patient to room 209 from ER via stretcher in stable condition. AAOX3, calm and cooperative. Placed on bipap per RT, patient reports feeling better since arriving. Resp even and unlabored, no distress noted. Tridil gtt decreased to 20mcg from 40mcg since arriving in unit. VSS, afebrile. UOP adequate, no BM

## 2023-03-30 NOTE — PLAN OF CARE
Remains on bipap - off for meals but remains very sob - 02 sat 100 percent - awake and alert - moves self in bed and all extremities - bipap settings 10.6 back up rate of 8 - fio2 50 percent - sb pulses present - multiple changes to blood pressure medications by dr haddad today after consult - hannon catheter draining clear yellow urine via gravity secured in place - wili 2800 cc of urine today - lasix q 12 hours - no bm today - iv x 2 in place - weaning tridil infusion today - no complaints of pain or distress - max temp 98.3 - will continue to monitor

## 2023-03-30 NOTE — H&P
[facility] Medicine  History & Physical    Patient Name: Carmen Tan  MRN: [id number]  Patient Class: IP- Inpatient  Admission Date: 3/29/2023  Attending Physician: Gunnar Mott DO   Primary Care Provider: Lucian Barry MD         Patient information was obtained from patient and ER records.     Subjective:     Principal Problem:Acute respiratory failure with hypoxia    Chief Complaint:   Chief Complaint   Patient presents with    Shortness of Breath     Per ems pt called 911 due to sob, when they arrived gave her a duoneb and moved her over and her blood pressure went up to 200/100's from an original reading of 140/80, solumedrol 125mg IM given, 4 sprays of nitro spray given, 1inch of nitropaste on pts chest, pt currently on cpap with an O2 sat of 100%        HPI:   Chief Complaint   Patient presents with    Shortness of Breath       Per ems pt called 911 due to sob, when they arrived gave her a duoneb and moved her over and her blood pressure went up to 200/100's from an original reading of 140/80, solumedrol 125mg IM given, 4 sprays of nitro spray given, 1inch of nitropaste on pts chest, pt currently on cpap with an O2 sat of 100%      2325   Carmen Tan is a 73 y.o. female with PMHX of COPD, CHF, PE on Xarelto, morbid obesity who presents to the emergency department C/O shortness of breath.     Patient arrives by EMS. They report they were called because patient was acutely short of breath.  Patient was given a DuoNeb and being prepared to be taken to hospital. On repeat blood pressure check patient was markedly hypertensive.  Patient was given for nitro space, nitro patch, placed on CPAP.  She received 125mg solumedrol. On arrival to the emergency department patient is on CPAP and moderate respiratory distress.  Appears to be mentating normally and able to respond to questions appropriately however history limited due to respiratory distress.     PCP: Lucian GRAY  MD Jhonny   3/30   Patient awake and alert. Diuresed well overnight with urine output of 1.8L. Patient endorses breathing better compared to time of arrival to ED. She has had a similar hospitalization over a year ago and have since not followed up with any of her specialists. 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   Shortness of breath has been worsening over the past week and her leg swellings have started to worsen two weeks ago. She normally ambulates with a walker at home. She has a son who also lives with her to assist. Patient remains on nitroglycerin gtt with SBP still >160s mmHg. Patient to be admitted for hypertensive emergency with acute respiratory failure with hypoxia and hypercarbia, CHF exacerbation for continue IV diuresis, oxygen supplementation, and consult with cardiology.              Past Medical History:   Diagnosis Date    Abdominal hernia     CHF (congestive heart failure)     COPD (chronic obstructive pulmonary disease)     Diabetes mellitus, type 2 2/16/2022    GERD (gastroesophageal reflux disease)     History of home oxygen therapy     Hypertension     Migraine headache     Pulmonary embolism 06/01/2017    Small bowel obstruction     Thyroid disease        Past Surgical History:   Procedure Laterality Date    COLONOSCOPY      HYSTERECTOMY      INNER EAR SURGERY      UPPER GASTROINTESTINAL ENDOSCOPY         Review of patient's allergies indicates:   Allergen Reactions    Pcn [penicillins] Swelling       No current facility-administered medications on file prior to encounter.     Current Outpatient Medications on File Prior to Encounter   Medication Sig    albuterol-ipratropium (DUO-NEB) 2.5 mg-0.5 mg/3 mL nebulizer solution Take 3 mLs by nebulization every 6 (six) hours as needed for Wheezing. Rescue    budesonide (PULMICORT) 0.5 mg/2 mL nebulizer solution Take 2 mLs (0.5 mg total) by nebulization 2 (two) times a day. Controller     fesoterodine (TOVIAZ) 8 mg Tb24 Take by mouth.    fish oil-omega-3 fatty acids 300-1,000 mg capsule Take 2 g by mouth once daily.    furosemide (LASIX) 40 MG tablet Take 1 tablet (40 mg total) by mouth 2 (two) times daily. If having increased shortness of breath, increase in leg swelling, and if you notice an >3lbs in 24 hours. May take up to 4 tablets in 1 day (every 6 hours)    gabapentin (NEURONTIN) 600 MG tablet Take 600 mg by mouth 2 (two) times daily.    isosorbide mononitrate (IMDUR) 60 MG 24 hr tablet Take 1 tablet (60 mg total) by mouth once daily.    LIPITOR 40 mg tablet Take 1 tablet (40 mg total) by mouth once daily.    lisinopril (PRINIVIL,ZESTRIL) 5 MG tablet Take 1 tablet (5 mg total) by mouth once daily.    metoprolol tartrate (LOPRESSOR) 25 MG tablet Take 1 tablet (25 mg total) by mouth 2 (two) times daily.    mometasone-formoterol (DULERA) 200-5 mcg/actuation inhaler Inhale 2 puffs into the lungs 2 (two) times daily. Controller    omeprazole (PRILOSEC) 20 MG capsule Take 20 mg by mouth once daily.    predniSONE (DELTASONE) 20 MG tablet Take 1 tablet (20 mg total) by mouth 2 (two) times daily.    rivaroxaban (XARELTO) 10 mg Tab Take 2 tablets (20 mg total) by mouth daily with dinner or evening meal.    vitamin D 1000 units Tab Take 185 mg by mouth once daily.     Family History    None       Tobacco Use    Smoking status: Former    Smokeless tobacco: Never   Substance and Sexual Activity    Alcohol use: No    Drug use: No    Sexual activity: Not on file     Review of Systems   Constitutional:  Positive for activity change. Negative for appetite change, chills, fatigue and fever.   HENT:  Negative for mouth sores, sneezing and sore throat.    Eyes:  Negative for visual disturbance.   Respiratory:  Positive for cough (intermittent dry) and shortness of breath. Negative for chest tightness, wheezing and stridor.    Cardiovascular:  Positive for leg swelling. Negative for chest pain  and palpitations.   Gastrointestinal:  Negative for abdominal pain, blood in stool, constipation, diarrhea, nausea and vomiting.   Musculoskeletal:  Positive for arthralgias, gait problem and joint swelling. Negative for neck pain and neck stiffness.   Skin:  Negative for color change and pallor.   Neurological:  Positive for weakness. Negative for dizziness, seizures, syncope, facial asymmetry, speech difficulty, light-headedness, numbness and headaches.   Psychiatric/Behavioral:  Negative for agitation, confusion, decreased concentration, dysphoric mood and sleep disturbance. The patient is not nervous/anxious.    Objective:     Vital Signs (Most Recent):  Temp: 97.6 °F (36.4 °C) (03/30/23 1129)  Pulse: 64 (03/30/23 1145)  Resp: (!) 28 (03/30/23 1145)  BP: (!) 163/77 (03/30/23 1145)  SpO2: 97 % (03/30/23 1145)   Vital Signs (24h Range):  Temp:  [97.3 °F (36.3 °C)-98.5 °F (36.9 °C)] 97.6 °F (36.4 °C)  Pulse:  [] 64  Resp:  [7-44] 28  SpO2:  [94 %-100 %] 97 %  BP: (127-216)/() 163/77     Weight: (!) 146.1 kg (322 lb)  Body mass index is 55.27 kg/m².    Physical Exam  Vitals and nursing note reviewed.   Constitutional:       General: She is not in acute distress.     Appearance: She is obese. She is ill-appearing. She is not toxic-appearing or diaphoretic.   HENT:      Right Ear: External ear normal.      Left Ear: External ear normal.      Nose: No congestion or rhinorrhea.      Mouth/Throat:      Mouth: Mucous membranes are dry.      Pharynx: Oropharynx is clear. No oropharyngeal exudate or posterior oropharyngeal erythema.   Eyes:      Extraocular Movements: Extraocular movements intact.   Neck:      Comments: Thick skin folds  Cardiovascular:      Rate and Rhythm: Bradycardia present. Rhythm irregular.      Heart sounds: No murmur heard.  Pulmonary:      Effort: No respiratory distress.      Breath sounds: Wheezing and rhonchi present. No rales.   Chest:      Chest wall: No tenderness.   Abdominal:       Palpations: Abdomen is soft.      Tenderness: There is no abdominal tenderness.      Comments: Limited secondary to body habitus   Musculoskeletal:         General: Swelling and tenderness present.      Cervical back: Neck supple. No rigidity.      Right lower leg: Edema (tense skin, loss of bony features in toes and dorsum of foot bilaterally) present.      Left lower leg: Edema present.   Skin:     General: Skin is warm and dry.      Capillary Refill: Capillary refill takes less than 2 seconds.      Coloration: Skin is not jaundiced.      Findings: No bruising, erythema, lesion or rash.   Neurological:      Mental Status: She is alert and oriented to person, place, and time. Mental status is at baseline.      Comments: Baseline requires walker for ambulation   Psychiatric:         Mood and Affect: Mood normal.         Behavior: Behavior normal.           Significant Labs: All pertinent labs within the past 24 hours have been reviewed.  A1C:   Recent Labs   Lab 03/29/23 2319   HGBA1C 5.5     ABGs:   Recent Labs   Lab 03/30/23  0558   PH 7.328*   PCO2 51.8*   HCO3 24.8   POCSATURATED 98.8   PO2 130*     Bilirubin:   Recent Labs   Lab 03/29/23 2319 03/30/23  0340   BILITOT 0.8 0.6     BMP:   Recent Labs   Lab 03/30/23 0340   *      K 3.9      CO2 27   BUN 21   CREATININE 1.4   CALCIUM 9.2     CBC:   Recent Labs   Lab 03/29/23 2319 03/30/23  0340   WBC 5.82 5.98   HGB 14.6 12.6   HCT 45.6 38.5    155     CMP:   Recent Labs   Lab 03/29/23 2319 03/30/23  0340    141   K 4.2 3.9    109   CO2 28 27   * 149*   BUN 20 21   CREATININE 1.6* 1.4   CALCIUM 9.6 9.2   PROT 7.1 6.0   ALBUMIN 3.3* 2.7*   BILITOT 0.8 0.6   ALKPHOS 198* 162*   AST 48* 36   ALT 55* 47*   ANIONGAP 4* 5*     Troponin:   Recent Labs   Lab 03/29/23 2319   TROPONINIHS 43.2     TSH:   Recent Labs   Lab 03/29/23 2319   TSH 2.150     Significant Imaging: I have reviewed all pertinent imaging  results/findings within the past 24 hours.    Assessment/Plan:     * Acute respiratory failure with hypoxia  Patient with Hypercapnic and Hypoxic Respiratory failure which is Acute on chronic.  she is on home oxygen at 2 LPM. Supplemental oxygen was provided and noted- Oxygen Concentration (%):  [32-50] 50    .   Signs/symptoms of respiratory failure include- tachypnea, increased work of breathing, use of accessory muscles and wheezing. Contributing diagnoses includes - CHF, COPD, Obesity Hypoventilation and Pleural effusion Labs and images were reviewed. Patient Has recent ABG, which has been reviewed. Will treat underlying causes and adjust management of respiratory failure as follows- IV lasix, supplemental oxygen, continuous BiPAP    Hypertensive emergency  Patient has a current diagnosis of Hypertensive emergency with end organ damage evidenced by hypertensive encephalopathy and acute pulmonary edema without heart failure which is controlled.  Latest blood pressure and vitals reviewed-   Temp:  [97.3 °F (36.3 °C)-98.5 °F (36.9 °C)]   Pulse:  []   Resp:  [7-44]   BP: (127-216)/()   SpO2:  [94 %-100 %] .   Patient currently on IV antihypertensives.   Home meds for hypertension were reviewed and noted below.   Hypertension Medications             furosemide (LASIX) 40 MG tablet Take 1 tablet (40 mg total) by mouth 2 (two) times daily. If having increased shortness of breath, increase in leg swelling, and if you notice an >3lbs in 24 hours. May take up to 4 tablets in 1 day (every 6 hours)    isosorbide mononitrate (IMDUR) 60 MG 24 hr tablet Take 1 tablet (60 mg total) by mouth once daily.    lisinopril (PRINIVIL,ZESTRIL) 5 MG tablet Take 1 tablet (5 mg total) by mouth once daily.    metoprolol tartrate (LOPRESSOR) 25 MG tablet Take 1 tablet (25 mg total) by mouth 2 (two) times daily.      Will aim for controlled BP reduction by medications noted above. Monitor and mitigate end organ damage as  indicated.  Follow up cardiology consult appreciated.     Pulmonary edema cardiac cause  Patient is identified as having Combined Systolic and Diastolic heart failure that is Acute on chronic. CHF is currently uncontrolled due to Continued edema of extremities and Hepatic congestion/ascites, Dyspnea not returned to baseline after 2 doses of IV diuretic and Rales/crackles on pulmonary exam. Latest ECHO performed and demonstrates- No results found for this or any previous visit.  . Continue Beta Blocker, Furosemide and Nitrate/Vasodilator and monitor clinical status closely. Monitor on telemetry. Monitor strict Is&Os and daily weights.  Place on fluid restriction of 1.5 L. Continue to stress to patient importance of self efficacy and  on diet for CHF. Last BNP reviewed- and noted below No results for input(s): BNP, BNPTRIAGEBLO in the last 168 hours.   Lab Results   Component Value Date    NTPROBNP 25726 (H) 03/29/2023         Shortness of breath  See above management.      Diabetes mellitus, type 2  Patient's FSGs are controlled on current medication regimen.  Last A1c reviewed-   Lab Results   Component Value Date    HGBA1C 5.5 03/29/2023     Most recent fingerstick glucose reviewed- No results for input(s): POCTGLUCOSE in the last 24 hours.  Current correctional scale  Low  Maintain anti-hyperglycemic dose as follows-   Antihyperglycemics (From admission, onward)    None        Hold Oral hypoglycemics while patient is in the hospital.    Weakness  Secondary to above CHF exacerbation, fluid overload, SOB, body habitus. When patient's cardiorespiratory status stabilized, follow up PT and OT evaluation.       Paroxysmal A-fib  Patient with Long standing persistent (>12 months) atrial fibrillation which is controlled currently with Beta Blocker. Patient is currently in atrial fibrillation.[id number] Score: 3. Anticoagulation indicated. Anticoagulation done with Xarelto.          VTE Risk Mitigation (From  admission, onward)         Ordered     rivaroxaban tablet 20 mg  With dinner         03/30/23 0055     IP VTE HIGH RISK PATIENT  Once         03/30/23 0314     Place sequential compression device  Until discontinued         03/30/23 0314                           Gunnar Mott DO  Department of Hospital Medicine  [place] - Intensive Care

## 2023-03-30 NOTE — HPI
Chief Complaint   Patient presents with    Shortness of Breath       Per ems pt called 911 due to sob, when they arrived gave her a duoneb and moved her over and her blood pressure went up to 200/100's from an original reading of 140/80, solumedrol 125mg IM given, 4 sprays of nitro spray given, 1inch of nitropaste on pts chest, pt currently on cpap with an O2 sat of 100%      2436   Carmen Tan is a 73 y.o. female with PMHX of COPD, CHF, PE on Xarelto, morbid obesity who presents to the emergency department C/O shortness of breath.     Patient arrives by EMS. They report they were called because patient was acutely short of breath.  Patient was given a DuoNeb and being prepared to be taken to hospital. On repeat blood pressure check patient was markedly hypertensive.  Patient was given for nitro space, nitro patch, placed on CPAP.  She received 125mg solumedrol. On arrival to the emergency department patient is on CPAP and moderate respiratory distress.  Appears to be mentating normally and able to respond to questions appropriately however history limited due to respiratory distress.     PCP: Lucian Barry MD   3/30   Patient awake and alert. Diuresed well overnight with urine output of 1.8L. Patient endorses breathing better compared to time of arrival to ED. She has had a similar hospitalization over a year ago and have since not followed up with any of her specialists. 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   Shortness of breath has been worsening over the past week and her leg swellings have started to worsen two weeks ago. She normally ambulates with a walker at home. She has a son who also lives with her to assist. Patient remains on nitroglycerin gtt with SBP still >160s mmHg. Patient to be admitted for hypertensive emergency with acute respiratory failure with hypoxia and hypercarbia, CHF exacerbation for continue IV diuresis, oxygen supplementation, and  consult with cardiology.

## 2023-03-30 NOTE — ASSESSMENT & PLAN NOTE
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 CHF is currently uncontrolled due to Continued edema of extremities and Hepatic congestion/ascites, Dyspnea not returned to baseline after 2 doses of IV diuretic and Rales/crackles on pulmonary exam. Latest ECHO performed and demonstrates- No results found for this or any previous visit.  . Continue Beta Blocker, Furosemide and Nitrate/Vasodilator and monitor clinical status closely. Monitor on telemetry. Monitor strict Is&Os and daily weights.  Place on fluid restriction of 1.5 L. Continue to stress to patient importance of self efficacy and  on diet for CHF. Last BNP reviewed- and noted below No results for input(s): BNP, BNPTRIAGEBLO in the last 168 hours.   Lab Results   Component Value Date    NTPROBNP 67718 (H) 03/29/2023

## 2023-03-31 PROBLEM — F13.10: Status: ACTIVE | Noted: 2023-03-31

## 2023-03-31 LAB
ANION GAP SERPL CALC-SCNC: 3 MMOL/L (ref 8–16)
BUN SERPL-MCNC: 24 MG/DL (ref 8–23)
CALCIUM SERPL-MCNC: 9.3 MG/DL (ref 8.7–10.5)
CHLORIDE SERPL-SCNC: 108 MMOL/L (ref 95–110)
CO2 SERPL-SCNC: 28 MMOL/L (ref 23–29)
CREAT SERPL-MCNC: 1.3 MG/DL (ref 0.5–1.4)
EST. GFR  (NO RACE VARIABLE): 43.7 ML/MIN/1.73 M^2
GLUCOSE SERPL-MCNC: 120 MG/DL (ref 70–110)
MAGNESIUM SERPL-MCNC: 2 MG/DL (ref 1.6–2.6)
POCT GLUCOSE: 110 MG/DL (ref 70–110)
POCT GLUCOSE: 117 MG/DL (ref 70–110)
POCT GLUCOSE: 126 MG/DL (ref 70–110)
POCT GLUCOSE: 128 MG/DL (ref 70–110)
POTASSIUM SERPL-SCNC: 4.6 MMOL/L (ref 3.5–5.1)
SODIUM SERPL-SCNC: 139 MMOL/L (ref 136–145)

## 2023-03-31 PROCEDURE — 99900035 HC TECH TIME PER 15 MIN (STAT)

## 2023-03-31 PROCEDURE — 27000221 HC OXYGEN, UP TO 24 HOURS

## 2023-03-31 PROCEDURE — 25000003 PHARM REV CODE 250: Performed by: INTERNAL MEDICINE

## 2023-03-31 PROCEDURE — 63600175 PHARM REV CODE 636 W HCPCS: Performed by: INTERNAL MEDICINE

## 2023-03-31 PROCEDURE — A4216 STERILE WATER/SALINE, 10 ML: HCPCS | Performed by: STUDENT IN AN ORGANIZED HEALTH CARE EDUCATION/TRAINING PROGRAM

## 2023-03-31 PROCEDURE — 25000242 PHARM REV CODE 250 ALT 637 W/ HCPCS: Performed by: STUDENT IN AN ORGANIZED HEALTH CARE EDUCATION/TRAINING PROGRAM

## 2023-03-31 PROCEDURE — 99900031 HC PATIENT EDUCATION (STAT)

## 2023-03-31 PROCEDURE — 20000000 HC ICU ROOM

## 2023-03-31 PROCEDURE — 94640 AIRWAY INHALATION TREATMENT: CPT

## 2023-03-31 PROCEDURE — 63600175 PHARM REV CODE 636 W HCPCS: Performed by: STUDENT IN AN ORGANIZED HEALTH CARE EDUCATION/TRAINING PROGRAM

## 2023-03-31 PROCEDURE — 36415 COLL VENOUS BLD VENIPUNCTURE: CPT | Performed by: STUDENT IN AN ORGANIZED HEALTH CARE EDUCATION/TRAINING PROGRAM

## 2023-03-31 PROCEDURE — 94660 CPAP INITIATION&MGMT: CPT

## 2023-03-31 PROCEDURE — 83735 ASSAY OF MAGNESIUM: CPT | Performed by: STUDENT IN AN ORGANIZED HEALTH CARE EDUCATION/TRAINING PROGRAM

## 2023-03-31 PROCEDURE — 80048 BASIC METABOLIC PNL TOTAL CA: CPT | Performed by: STUDENT IN AN ORGANIZED HEALTH CARE EDUCATION/TRAINING PROGRAM

## 2023-03-31 PROCEDURE — 99233 SBSQ HOSP IP/OBS HIGH 50: CPT | Mod: ,,, | Performed by: STUDENT IN AN ORGANIZED HEALTH CARE EDUCATION/TRAINING PROGRAM

## 2023-03-31 PROCEDURE — 99233 PR SUBSEQUENT HOSPITAL CARE,LEVL III: ICD-10-PCS | Mod: ,,, | Performed by: STUDENT IN AN ORGANIZED HEALTH CARE EDUCATION/TRAINING PROGRAM

## 2023-03-31 PROCEDURE — 94761 N-INVAS EAR/PLS OXIMETRY MLT: CPT

## 2023-03-31 PROCEDURE — 25000003 PHARM REV CODE 250: Performed by: STUDENT IN AN ORGANIZED HEALTH CARE EDUCATION/TRAINING PROGRAM

## 2023-03-31 RX ORDER — DEXTROSE 40 %
30 GEL (GRAM) ORAL
Status: DISCONTINUED | OUTPATIENT
Start: 2023-03-31 | End: 2023-04-13 | Stop reason: HOSPADM

## 2023-03-31 RX ORDER — TRAZODONE HYDROCHLORIDE 50 MG/1
50 TABLET ORAL NIGHTLY PRN
Status: DISCONTINUED | OUTPATIENT
Start: 2023-03-31 | End: 2023-04-04

## 2023-03-31 RX ORDER — GLUCAGON 1 MG
1 KIT INJECTION
Status: DISCONTINUED | OUTPATIENT
Start: 2023-03-31 | End: 2023-04-13 | Stop reason: HOSPADM

## 2023-03-31 RX ORDER — CARVEDILOL 12.5 MG/1
12.5 TABLET ORAL 2 TIMES DAILY
Status: DISCONTINUED | OUTPATIENT
Start: 2023-03-31 | End: 2023-04-10

## 2023-03-31 RX ORDER — INSULIN ASPART 100 [IU]/ML
0-5 INJECTION, SOLUTION INTRAVENOUS; SUBCUTANEOUS
Status: DISCONTINUED | OUTPATIENT
Start: 2023-03-31 | End: 2023-04-13 | Stop reason: HOSPADM

## 2023-03-31 RX ORDER — GABAPENTIN 300 MG/1
300 CAPSULE ORAL 2 TIMES DAILY
Status: DISCONTINUED | OUTPATIENT
Start: 2023-03-31 | End: 2023-04-13 | Stop reason: HOSPADM

## 2023-03-31 RX ORDER — FUROSEMIDE 10 MG/ML
40 INJECTION INTRAMUSCULAR; INTRAVENOUS
Status: DISCONTINUED | OUTPATIENT
Start: 2023-03-31 | End: 2023-04-01

## 2023-03-31 RX ORDER — DEXTROSE 40 %
15 GEL (GRAM) ORAL
Status: DISCONTINUED | OUTPATIENT
Start: 2023-03-31 | End: 2023-04-13 | Stop reason: HOSPADM

## 2023-03-31 RX ORDER — NIFEDIPINE 30 MG/1
60 TABLET, EXTENDED RELEASE ORAL DAILY
Status: DISCONTINUED | OUTPATIENT
Start: 2023-03-31 | End: 2023-04-13 | Stop reason: HOSPADM

## 2023-03-31 RX ORDER — FUROSEMIDE 10 MG/ML
40 INJECTION INTRAMUSCULAR; INTRAVENOUS EVERY 6 HOURS
Status: DISCONTINUED | OUTPATIENT
Start: 2023-03-31 | End: 2023-03-31

## 2023-03-31 RX ADMIN — CARVEDILOL 12.5 MG: 12.5 TABLET, FILM COATED ORAL at 08:03

## 2023-03-31 RX ADMIN — NIFEDIPINE 60 MG: 30 TABLET, FILM COATED, EXTENDED RELEASE ORAL at 09:03

## 2023-03-31 RX ADMIN — METHYLPREDNISOLONE SODIUM SUCCINATE 60 MG: 40 INJECTION, POWDER, FOR SOLUTION INTRAMUSCULAR; INTRAVENOUS at 05:03

## 2023-03-31 RX ADMIN — FUROSEMIDE 40 MG: 10 INJECTION, SOLUTION INTRAMUSCULAR; INTRAVENOUS at 11:03

## 2023-03-31 RX ADMIN — GABAPENTIN 300 MG: 300 CAPSULE ORAL at 11:03

## 2023-03-31 RX ADMIN — HYDRALAZINE HYDROCHLORIDE 25 MG: 25 TABLET, FILM COATED ORAL at 05:03

## 2023-03-31 RX ADMIN — EMPAGLIFLOZIN 10 MG: 10 TABLET, FILM COATED ORAL at 09:03

## 2023-03-31 RX ADMIN — IPRATROPIUM BROMIDE AND ALBUTEROL SULFATE 3 ML: 2.5; .5 SOLUTION RESPIRATORY (INHALATION) at 12:03

## 2023-03-31 RX ADMIN — IPRATROPIUM BROMIDE AND ALBUTEROL SULFATE 3 ML: 2.5; .5 SOLUTION RESPIRATORY (INHALATION) at 08:03

## 2023-03-31 RX ADMIN — TRAZODONE HYDROCHLORIDE 50 MG: 50 TABLET ORAL at 12:03

## 2023-03-31 RX ADMIN — ISOSORBIDE MONONITRATE 60 MG: 30 TABLET, EXTENDED RELEASE ORAL at 09:03

## 2023-03-31 RX ADMIN — MUPIROCIN: 20 OINTMENT TOPICAL at 09:03

## 2023-03-31 RX ADMIN — TRAZODONE HYDROCHLORIDE 50 MG: 50 TABLET ORAL at 08:03

## 2023-03-31 RX ADMIN — GABAPENTIN 300 MG: 300 CAPSULE ORAL at 08:03

## 2023-03-31 RX ADMIN — MUPIROCIN: 20 OINTMENT TOPICAL at 08:03

## 2023-03-31 RX ADMIN — HYDRALAZINE HYDROCHLORIDE 25 MG: 25 TABLET, FILM COATED ORAL at 09:03

## 2023-03-31 RX ADMIN — SPIRONOLACTONE 25 MG: 25 TABLET ORAL at 09:03

## 2023-03-31 RX ADMIN — IPRATROPIUM BROMIDE AND ALBUTEROL SULFATE 3 ML: 2.5; .5 SOLUTION RESPIRATORY (INHALATION) at 04:03

## 2023-03-31 RX ADMIN — Medication 10 ML: at 11:03

## 2023-03-31 RX ADMIN — HYDRALAZINE HYDROCHLORIDE 25 MG: 25 TABLET, FILM COATED ORAL at 02:03

## 2023-03-31 RX ADMIN — FUROSEMIDE 40 MG: 10 INJECTION, SOLUTION INTRAMUSCULAR; INTRAVENOUS at 12:03

## 2023-03-31 RX ADMIN — RIVAROXABAN 20 MG: 10 TABLET, FILM COATED ORAL at 05:03

## 2023-03-31 RX ADMIN — METHYLPREDNISOLONE SODIUM SUCCINATE 60 MG: 40 INJECTION, POWDER, FOR SOLUTION INTRAMUSCULAR; INTRAVENOUS at 11:03

## 2023-03-31 NOTE — NURSING
Patient is not sleeping, agitated, B/P noted turned patient to the right side as patient request, tridil restarted at 10 mcg/min as ordered.

## 2023-03-31 NOTE — PLAN OF CARE
Patient had uneventful day. Placed on 4L NC for meals only, tolerated supper much better that breakfast and lunch but still requested bipap due to SOB after brief period of time. Tridil gtt off at 1445 and BP has remained stable. Afebrile, UOP 1200ml, no BM

## 2023-03-31 NOTE — PROGRESS NOTES
Cardiology Progress Note  ([facility])    Today's Date:  3/31/2023  Patient Name: Carmen Tan    MRN: [id number]    Code Status: Full Code     Attending Physician: Gunnar Mott DO   Consulting Physician: ELIOT Espinosa MD    Hospital Course:  The patient is resting comfortably this morning on BiPAP therapy.  According to nursing staff, she was placed on nasal cannula, but began to experience symptoms of dyspnea.  However, her oxygen saturation was maintained at 100%.    Last night the patient's blood pressure began to trend upward for which he is now back on Tridil therapy.    Clinical:  Fluctuating blood pressure.  Experiencing dyspnea without oxygen therapy.  She is diuresed well:  Negative 6590 cc.    Hemodynamic:  Blood pressure remains elevated, heart rate and vital signs otherwise are stable.    Telemetry:  Normal sinus rhythm.    Relevant testing:      ROS    Vital Signs (Most Recent):  Temp: 97.6 °F (36.4 °C) (03/31/23 0800)  Pulse: 69 (03/31/23 0825)  Resp: (!) 30 (03/31/23 0825)  BP: (!) 163/86 (03/31/23 0800)  SpO2: 100 % (03/31/23 0825)   Vital Signs (24h Range):  Temp:  [96.4 °F (35.8 °C)-98.3 °F (36.8 °C)] 97.6 °F (36.4 °C)  Pulse:  [49-74] 69  Resp:  [7-34] 30  SpO2:  [96 %-100 %] 100 %  BP: (134-188)/(62-91) 163/86     Weight: (!) 146.1 kg (322 lb)  Body mass index is 55.27 kg/m².    SpO2: 100 %         Intake/Output Summary (Last 24 hours) at 3/31/2023 0904  Last data filed at 3/31/2023 0606  Gross per 24 hour   Intake 949.25 ml   Output 7800 ml   Net -6850.75 ml     Labs:  CMP   Recent Labs   Lab 03/29/23  2319 03/30/23  0340 03/31/23  0334    141 139   K 4.2 3.9 4.6    109 108   CO2 28 27 28   * 149* 120*   BUN 20 21 24*   CREATININE 1.6* 1.4 1.3   CALCIUM 9.6 9.2 9.3   PROT 7.1 6.0  --    ALBUMIN 3.3* 2.7*  --    BILITOT 0.8 0.6  --    ALKPHOS 198* 162*  --    AST 48* 36  --    ALT 55* 47*  --    ANIONGAP 4* 5* 3*    and CBC   Recent Labs   Lab 03/29/23  4135  03/30/23  0340   WBC 5.82 5.98   HGB 14.6 12.6   HCT 45.6 38.5    155       Imaging:  Echocardiogram (03/30/2023):  The left ventricle is normal in size with moderate concentric hypertrophy and mildly decreased systolic function.  The estimated ejection fraction is 45%.  Grade I left ventricular diastolic dysfunction.  Moderate right ventricular enlargement.  Moderate right atrial enlargement.  Mild-to-moderate aortic regurgitation.  There is mild aortic valve stenosis.  Aortic valve area is 1.44 cm2; peak velocity is 2.09 m/s; mean gradient is 10 mmHg.  Mild-to-moderate mitral regurgitation.  Moderate tricuspid regurgitation.  Moderate pulmonic regurgitation.  The estimated PA systolic pressure is 69 mmHg.  There is moderate pulmonary hypertension.  Moderate left atrial enlargement.      Physical Examination:  General:  Comfortable with BiPAP therapy.  Heent:  No JVD.  Lungs:  Diminished breath sounds, but no wheezing or rhonchi.  Heart:  Regular rate rhythm; grade 1 holosystolic murmur.  Extremities:  1+ bilateral edema; bilateral skin tenting.    Assessment and Plan:   1. Congestive heart failure:  Secondary to both systolic and diastolic heart failure, as well as significant structural heart disease.  The patient is diuresing well.  Reduce Lasix to b.i.d. dosing  Continue with O2 therapy  DC amlodipine  Initiate nifedipine ER 60 mg daily  Increase carvedilol to 12.5 mg b.i.d.  Continue hydralazine    Continue nitrates    2. Hypertension:  Patient with resistant hypertension.  Steroid contributing to blood pressure.  Nifedipine  Hydralazine  Carvedilol  Nitrates  Spironolactone  Will introduce Entresto tomorrow (post washout)    3. Valvular disease:  No further workup or intervention.  The patient is not a candidate for surgery.    4. Pulmonary hypertension:  Secondary to obesity hypoventilation syndrome.    5. Respiratory failure:  Multifactorial:  Morbid obesity, hypoventilation syndrome, hypertension,  valvular insufficiency, related structural heart disease.

## 2023-03-31 NOTE — HOSPITAL COURSE
3/31 Overnight due to increased blood pressure, tridil gtt back on. Patient awake and tolerating BiPAP I/E 14/6 FiO2 50%. Urine output of 5 L. Patient endorses tenseness in lower extremities improving with breathing better compared yesterday. BP adjusted per cardiology with added daily Jardiance. Continue with IV 40 mg BID lasix and monitor UOP and renal function.   4/1 ND patient is feeling a little better this morning, does report less heaviness to her legs which do show improvement in edema.  Patient had good diuresis overnight.  Off of all IV blood pressure meds, and blood pressure appears more stable  4/2 ND patient still endorses some shortness of breath especially when transitioned from BiPAP to nasal cannula O2 despite having good saturation.  Nurses reports she stays very anxious.  Patient has been diuresing well with improvement in blood pressures  4/3 Overnight increasing agitation and confusion, blood tinged urine collecting in [place]wick catheter. Given change in mentation; will hold xarelto, f/u urine culture, f/u blood cultures, f/u ultrasound. Over the weekend, patient diuresed over 8 liters, IV lasix now PO. Remains tachypneic, ABG obtained show hypercarbia, BiPAP settings adjusted back to I/E 14/6 FiO2 60%. On nasal cannula patient maintains SpO2 >98%.   4/4 BiPAP tubing replacement resulted in increased tidal volume, back on nasal cannula this morning. BP normotensive. Patient more alert and agrees to participating with PT and OT. Adjust anxiety meds, add sertraline and zyprexa at bedtime. Continue bowel regimen with gentle IVF.  Follow pulmonary hygiene care per respiratory.   4/5 Patient more alert endorsing feeling back to baseline mentation. Passing stool. Positive blood cx for gram positive clusters, start IV vancomycin adjust per pharmacy. BP normotensive. Continue with daily pulmonary hygiene care.   4/6 F/u positive blood cx, PCR resulted with no evidence of staph aureus, MRSA. CXR unremarakle,  urine culture negative, patient afebrile, no clinical signs suggestive of systemic infection, will discontinue vancomycin. Spoke at length with three of patient's children, advised SNF or LTAC to continue with pulmonary care to allow patient regain her baseline strength. Family insists patient to return home with home health services, PT and OT for strengthening.   4/7 Cardiopulmonary status stable. Continue with current daily pulm care, BiPAP QHS and continuous O2 2-3L via nasal cannula. CM working on inpatient rehab care to help patient regain strength and mobility. Patient prior to hospitalization reported to ambulate with walker. With PT has been able to sit up in bed, but unable to take steps with assistance. Continue with PT and OT efforts.   4/8/23 CG: No acute overnight events. A little more lethargic today, will continue PT and OT.   4/9/23 CG: No acute overnight events. More awake this AM, BP meds optimized yesterday.   4/10 Improving appetite. No events overnight. Medically optimized at this time. CM working on placement.    4/11 Endorses feeling a little better. No changes to daily medications. Continue daily PT and OT. F/u placement.    4/12 No reported events overnight. [facility] declined acceptance of patient due to issues with breathing equipment. CM working on placement. Continue with daily PT and OT.  4/13 Encouraged patient to stay up longer during the day time and stay on 2L oxygen supplementation with use of BiPAP limited to bedtime use. Patient to be discharged to [facility] for continue PT, OT and aggressive daily pulmonary hygiene care.

## 2023-03-31 NOTE — EICU
Intervention Initiated From:  [facility] / EICU    Elliott intervened regarding:  Rounding (Video assessment)    Pt awake in bed on 50% BIPAP. No distress observed. VSS. No continuous medications infusing.

## 2023-03-31 NOTE — NURSING
Placed on 4L NC for breakfast, only bites eaten before patient requested to be put back on bipap, SOB reported, grunting noted despite sats being 100%.

## 2023-03-31 NOTE — PLAN OF CARE
Patient is on the BIPAP tolerating it well, on 50% FIO2. She get SOB when on the N/C while eating . She is receiving medication to lower her B/P, and for the edema, U/O > 5 liters in a 24 hour period. She states she is breathing better .   Patient did not sleep well last night, B/P went up and the tridil was restarted.

## 2023-03-31 NOTE — EICU
Intervention Initiated From:  [facility] / EICU    Elliott intervened regarding:  Rounding (Video assessment)    Comments: Debby rounding complete; patient on bipap; no infusions   n/a

## 2023-04-01 LAB
ALBUMIN SERPL BCP-MCNC: 2.4 G/DL (ref 3.5–5.2)
ALP SERPL-CCNC: 120 U/L (ref 55–135)
ALT SERPL W/O P-5'-P-CCNC: 34 U/L (ref 10–44)
ANION GAP SERPL CALC-SCNC: 2 MMOL/L (ref 8–16)
AST SERPL-CCNC: 19 U/L (ref 10–40)
BILIRUB SERPL-MCNC: 0.4 MG/DL (ref 0.1–1)
BUN SERPL-MCNC: 28 MG/DL (ref 8–23)
CALCIUM SERPL-MCNC: 8.9 MG/DL (ref 8.7–10.5)
CHLORIDE SERPL-SCNC: 106 MMOL/L (ref 95–110)
CO2 SERPL-SCNC: 33 MMOL/L (ref 23–29)
CREAT SERPL-MCNC: 1.2 MG/DL (ref 0.5–1.4)
EST. GFR  (NO RACE VARIABLE): 48.1 ML/MIN/1.73 M^2
GLUCOSE SERPL-MCNC: 108 MG/DL (ref 70–110)
MAGNESIUM SERPL-MCNC: 2.1 MG/DL (ref 1.6–2.6)
POCT GLUCOSE: 101 MG/DL (ref 70–110)
POCT GLUCOSE: 110 MG/DL (ref 70–110)
POCT GLUCOSE: 93 MG/DL (ref 70–110)
POTASSIUM SERPL-SCNC: 4.1 MMOL/L (ref 3.5–5.1)
PROT SERPL-MCNC: 5.8 G/DL (ref 6–8.4)
SODIUM SERPL-SCNC: 141 MMOL/L (ref 136–145)

## 2023-04-01 PROCEDURE — 94640 AIRWAY INHALATION TREATMENT: CPT

## 2023-04-01 PROCEDURE — 25000242 PHARM REV CODE 250 ALT 637 W/ HCPCS: Performed by: STUDENT IN AN ORGANIZED HEALTH CARE EDUCATION/TRAINING PROGRAM

## 2023-04-01 PROCEDURE — 83735 ASSAY OF MAGNESIUM: CPT | Performed by: STUDENT IN AN ORGANIZED HEALTH CARE EDUCATION/TRAINING PROGRAM

## 2023-04-01 PROCEDURE — 94761 N-INVAS EAR/PLS OXIMETRY MLT: CPT

## 2023-04-01 PROCEDURE — 25000003 PHARM REV CODE 250: Performed by: INTERNAL MEDICINE

## 2023-04-01 PROCEDURE — 63600175 PHARM REV CODE 636 W HCPCS: Performed by: INTERNAL MEDICINE

## 2023-04-01 PROCEDURE — 99900031 HC PATIENT EDUCATION (STAT)

## 2023-04-01 PROCEDURE — 25000003 PHARM REV CODE 250: Performed by: STUDENT IN AN ORGANIZED HEALTH CARE EDUCATION/TRAINING PROGRAM

## 2023-04-01 PROCEDURE — 20000000 HC ICU ROOM

## 2023-04-01 PROCEDURE — 94660 CPAP INITIATION&MGMT: CPT

## 2023-04-01 PROCEDURE — 27000221 HC OXYGEN, UP TO 24 HOURS

## 2023-04-01 PROCEDURE — 80053 COMPREHEN METABOLIC PANEL: CPT | Performed by: STUDENT IN AN ORGANIZED HEALTH CARE EDUCATION/TRAINING PROGRAM

## 2023-04-01 PROCEDURE — 36415 COLL VENOUS BLD VENIPUNCTURE: CPT | Performed by: STUDENT IN AN ORGANIZED HEALTH CARE EDUCATION/TRAINING PROGRAM

## 2023-04-01 PROCEDURE — 99900035 HC TECH TIME PER 15 MIN (STAT)

## 2023-04-01 RX ORDER — FUROSEMIDE 40 MG/1
40 TABLET ORAL 2 TIMES DAILY
Status: DISCONTINUED | OUTPATIENT
Start: 2023-04-01 | End: 2023-04-08

## 2023-04-01 RX ORDER — ISOSORBIDE MONONITRATE 30 MG/1
30 TABLET, EXTENDED RELEASE ORAL DAILY
Status: DISCONTINUED | OUTPATIENT
Start: 2023-04-02 | End: 2023-04-08

## 2023-04-01 RX ADMIN — FUROSEMIDE 40 MG: 40 TABLET ORAL at 05:04

## 2023-04-01 RX ADMIN — MUPIROCIN: 20 OINTMENT TOPICAL at 08:04

## 2023-04-01 RX ADMIN — GABAPENTIN 300 MG: 300 CAPSULE ORAL at 08:04

## 2023-04-01 RX ADMIN — TRAZODONE HYDROCHLORIDE 50 MG: 50 TABLET ORAL at 10:04

## 2023-04-01 RX ADMIN — FUROSEMIDE 40 MG: 10 INJECTION, SOLUTION INTRAMUSCULAR; INTRAVENOUS at 11:04

## 2023-04-01 RX ADMIN — SACUBITRIL AND VALSARTAN 1 TABLET: 24; 26 TABLET, FILM COATED ORAL at 08:04

## 2023-04-01 RX ADMIN — IPRATROPIUM BROMIDE AND ALBUTEROL SULFATE 3 ML: 2.5; .5 SOLUTION RESPIRATORY (INHALATION) at 03:04

## 2023-04-01 RX ADMIN — IPRATROPIUM BROMIDE AND ALBUTEROL SULFATE 3 ML: 2.5; .5 SOLUTION RESPIRATORY (INHALATION) at 07:04

## 2023-04-01 RX ADMIN — NIFEDIPINE 60 MG: 30 TABLET, FILM COATED, EXTENDED RELEASE ORAL at 08:04

## 2023-04-01 RX ADMIN — RIVAROXABAN 20 MG: 10 TABLET, FILM COATED ORAL at 04:04

## 2023-04-01 RX ADMIN — CARVEDILOL 12.5 MG: 12.5 TABLET, FILM COATED ORAL at 08:04

## 2023-04-01 RX ADMIN — ISOSORBIDE MONONITRATE 60 MG: 30 TABLET, EXTENDED RELEASE ORAL at 08:04

## 2023-04-01 RX ADMIN — IPRATROPIUM BROMIDE AND ALBUTEROL SULFATE 3 ML: 2.5; .5 SOLUTION RESPIRATORY (INHALATION) at 11:04

## 2023-04-01 RX ADMIN — HYDRALAZINE HYDROCHLORIDE 25 MG: 25 TABLET, FILM COATED ORAL at 02:04

## 2023-04-01 RX ADMIN — EMPAGLIFLOZIN 10 MG: 10 TABLET, FILM COATED ORAL at 08:04

## 2023-04-01 RX ADMIN — SPIRONOLACTONE 25 MG: 25 TABLET ORAL at 08:04

## 2023-04-01 RX ADMIN — HYDRALAZINE HYDROCHLORIDE 25 MG: 25 TABLET, FILM COATED ORAL at 09:04

## 2023-04-01 NOTE — ASSESSMENT & PLAN NOTE
Improving with maintaining SpO2 >95% when transitioned to nasal cannula, but patient exhibiting increased agitation and hyperventilation when off BiPAP mask and machine.   Patient medication list not updated, but local [facility] verifies intermittent use of Temazepam 15 mg QHS for insomnia.  - will start ativan PRN and monitor symptoms of withdrawal

## 2023-04-01 NOTE — ASSESSMENT & PLAN NOTE
Patient's FSGs are controlled on current medication regimen.  Last A1c reviewed-   Lab Results   Component Value Date    HGBA1C 5.5 03/29/2023     Most recent fingerstick glucose reviewed-   Recent Labs   Lab 03/31/23  1106 03/31/23  1641 03/31/23 2057   POCTGLUCOSE 110 126* 128*     Current correctional scale  Low  Maintain anti-hyperglycemic dose as follows-   Antihyperglycemics (From admission, onward)    Start     Stop Route Frequency Ordered    03/31/23 0106  insulin aspart U-100 pen 0-5 Units         -- SubQ Before meals & nightly PRN 03/31/23 0008    03/30/23 1700  empagliflozin 10 mg tablet 10 mg         -- Oral Daily 03/30/23 1345        Per cardiology;   3/31 Jardiance 10 mg daily  4/1 continue current med

## 2023-04-01 NOTE — EICU
Intervention Initiated From:  [facility] / EICU    Elliott intervened regarding:  Rounding (Video assessment)    Pt lying in bed-awake on BIPAP. O2 sat 93%, RR 30s. Encouraged slow, deep breathing. Pt acknowledged with hand gesture and head nod. Other VSS-No continuous medications infusing.

## 2023-04-01 NOTE — ASSESSMENT & PLAN NOTE
Patient with Hypercapnic and Hypoxic Respiratory failure which is Acute on chronic.  she is on home oxygen at 2 LPM. Supplemental oxygen was provided and noted- Oxygen Concentration (%):  [50] 50    Signs/symptoms of respiratory failure include- tachypnea, increased work of breathing, use of accessory muscles and wheezing. Contributing diagnoses includes - CHF, COPD, Obesity Hypoventilation and Pleural effusion Labs and images were reviewed. Patient Has recent ABG, which has been reviewed. Will treat underlying causes and adjust management of respiratory failure as follows- IV lasix, supplemental oxygen, continuous BiPAP  3/31: IV lasix 40 mg Q6H x 2 doses

## 2023-04-01 NOTE — PROGRESS NOTES
[facility] Medicine  Progress Note    Patient Name: Carmen Tan  MRN: [id number]  Patient Class: IP- Inpatient   Admission Date: 3/29/2023  Length of Stay: 1 days  Attending Physician: Gunnar Mott DO  Primary Care Provider: Lucian Barry MD    Subjective:     Principal Problem:Acute respiratory failure with hypoxia    HPI:  Chief Complaint   Patient presents with    Shortness of Breath       Per ems pt called 911 due to sob, when they arrived gave her a duoneb and moved her over and her blood pressure went up to 200/100's from an original reading of 140/80, solumedrol 125mg IM given, 4 sprays of nitro spray given, 1inch of nitropaste on pts chest, pt currently on cpap with an O2 sat of 100%      [id number]   Carmen Tan is a 73 y.o. female with PMHX of COPD, CHF, PE on Xarelto, morbid obesity who presents to the emergency department C/O shortness of breath.     Patient arrives by EMS. They report they were called because patient was acutely short of breath.  Patient was given a DuoNeb and being prepared to be taken to hospital. On repeat blood pressure check patient was markedly hypertensive.  Patient was given for nitro space, nitro patch, placed on CPAP.  She received 125mg solumedrol. On arrival to the emergency department patient is on CPAP and moderate respiratory distress.  Appears to be mentating normally and able to respond to questions appropriately however history limited due to respiratory distress.     PCP: Lucian Barry MD   3/30   Patient awake and alert. Diuresed well overnight with urine output of 1.8L. Patient endorses breathing better compared to time of arrival to ED. She has had a similar hospitalization over a year ago and have since not followed up with any of her specialists. 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   Shortness of breath has been worsening over the past week and her leg swellings have started to  worsen two weeks ago. She normally ambulates with a walker at home. She has a son who also lives with her to assist. Patient remains on nitroglycerin gtt with SBP still >160s mmHg. Patient to be admitted for hypertensive emergency with acute respiratory failure with hypoxia and hypercarbia, CHF exacerbation for continue IV diuresis, oxygen supplementation, and consult with cardiology.              Overview/Hospital Course:  3/31 Overnight due to increased blood pressure, tridil gtt back on. Patient awake and tolerating BiPAP I/E 14/6 FiO2 50%. Urine output of 5 L. Patient endorses tenseness in lower extremities improving with breathing better compared yesterday. BP adjusted per cardiology with added daily Jardiance. Continue with IV 40 mg BID lasix and monitor UOP and renal function.       Interval History: Patient seen and examined.     Review of Systems   Constitutional:  Positive for activity change. Negative for appetite change, chills, fatigue and fever.   HENT:  Negative for mouth sores, sneezing and sore throat.    Eyes:  Negative for visual disturbance.   Respiratory:  Positive for cough (intermittent dry) and shortness of breath (improving). Negative for chest tightness, wheezing and stridor.    Cardiovascular:  Positive for leg swelling. Negative for chest pain and palpitations.   Gastrointestinal:  Negative for abdominal pain, blood in stool, constipation, diarrhea, nausea and vomiting.   Musculoskeletal:  Positive for arthralgias, gait problem and joint swelling. Negative for neck pain and neck stiffness.   Skin:  Negative for color change and pallor.   Neurological:  Positive for weakness. Negative for dizziness, seizures, syncope, facial asymmetry, speech difficulty, light-headedness, numbness and headaches.   Psychiatric/Behavioral:  Negative for agitation, confusion, decreased concentration, dysphoric mood and sleep disturbance. The patient is not nervous/anxious.    Objective:     Vital Signs (Most  Recent):  Temp: 97.9 °F (36.6 °C) (03/31/23 1600)  Pulse: 73 (03/31/23 1800)  Resp: (!) 22 (03/31/23 1800)  BP: 132/63 (03/31/23 1800)  SpO2: 99 % (03/31/23 1800)   Vital Signs (24h Range):  Temp:  [96.4 °F (35.8 °C)-98.3 °F (36.8 °C)] 97.9 °F (36.6 °C)  Pulse:  [49-77] 73  Resp:  [18-34] 22  SpO2:  [97 %-100 %] 99 %  BP: (109-185)/(52-91) 132/63     Weight: (!) 146.1 kg (322 lb)  Body mass index is 55.27 kg/m².    Intake/Output Summary (Last 24 hours) at 3/31/2023 1912  Last data filed at 3/31/2023 1800  Gross per 24 hour   Intake 882.9 ml   Output 6200 ml   Net -5317.1 ml      Physical Exam  Vitals and nursing note reviewed.   Constitutional:       General: She is not in acute distress.     Appearance: She is obese. She is ill-appearing. She is not toxic-appearing or diaphoretic.   HENT:      Right Ear: External ear normal.      Left Ear: External ear normal.      Nose: No congestion or rhinorrhea.      Mouth/Throat:      Mouth: Mucous membranes are dry.      Pharynx: Oropharynx is clear. No oropharyngeal exudate or posterior oropharyngeal erythema.   Eyes:      Extraocular Movements: Extraocular movements intact.   Neck:      Comments: Thick skin folds  Cardiovascular:      Rate and Rhythm: Bradycardia present. Rhythm irregular.      Heart sounds: No murmur heard.  Pulmonary:      Effort: No respiratory distress.      Breath sounds: Wheezing present. No rhonchi or rales.   Chest:      Chest wall: No tenderness.   Abdominal:      Palpations: Abdomen is soft.      Tenderness: There is no abdominal tenderness.      Comments: Limited secondary to body habitus   Musculoskeletal:         General: Swelling and tenderness present.      Cervical back: Neck supple. No rigidity.      Right lower leg: Edema (+3 pitting, loss of bony features in toes and dorsum of foot bilaterally) present.      Left lower leg: Edema present.   Skin:     General: Skin is warm and dry.      Capillary Refill: Capillary refill takes less than 2  seconds.      Coloration: Skin is not jaundiced.      Findings: No bruising, erythema, lesion or rash.   Neurological:      Mental Status: She is alert and oriented to person, place, and time. Mental status is at baseline.      Comments: Baseline requires walker for ambulation   Psychiatric:         Mood and Affect: Mood normal.         Behavior: Behavior normal.     Significant Labs: All pertinent labs within the past 24 hours have been reviewed.  Blood Culture: No results for input(s): LABBLOO in the last 48 hours.  BMP:   Recent Labs   Lab 03/31/23  0334   *      K 4.6      CO2 28   BUN 24*   CREATININE 1.3   CALCIUM 9.3   MG 2.0     CBC:   Recent Labs   Lab 03/29/23 2319 03/30/23  0340   WBC 5.82 5.98   HGB 14.6 12.6   HCT 45.6 38.5    155     CMP:   Recent Labs   Lab 03/29/23 2319 03/30/23  0340 03/31/23  0334    141 139   K 4.2 3.9 4.6    109 108   CO2 28 27 28   * 149* 120*   BUN 20 21 24*   CREATININE 1.6* 1.4 1.3   CALCIUM 9.6 9.2 9.3   PROT 7.1 6.0  --    ALBUMIN 3.3* 2.7*  --    BILITOT 0.8 0.6  --    ALKPHOS 198* 162*  --    AST 48* 36  --    ALT 55* 47*  --    ANIONGAP 4* 5* 3*     Magnesium:   Recent Labs   Lab 03/31/23  0334   MG 2.0     Significant Imaging: I have reviewed all pertinent imaging results/findings within the past 24 hours.      Assessment/Plan:      * Acute respiratory failure with hypoxia  Patient with Hypercapnic and Hypoxic Respiratory failure which is Acute on chronic.  she is on home oxygen at 2 LPM. Supplemental oxygen was provided and noted- Oxygen Concentration (%):  [50] 50    Signs/symptoms of respiratory failure include- tachypnea, increased work of breathing, use of accessory muscles and wheezing. Contributing diagnoses includes - CHF, COPD, Obesity Hypoventilation and Pleural effusion Labs and images were reviewed. Patient Has recent ABG, which has been reviewed. Will treat underlying causes and adjust management of respiratory  failure as follows- IV lasix, supplemental oxygen, continuous BiPAP  3/31: IV lasix 40 mg Q6H x 2 doses        Mild temazepam use disorder  Temazepam 15 mg QHS use, unclear consistency since 1/13/23 with last fill of 30 tablets on 2/28/23.   - ativan 1g QHS  - monitor symptoms of withdrawal      Hypertensive emergency  Patient has a current diagnosis of Hypertensive emergency with end organ damage evidenced by hypertensive encephalopathy and acute pulmonary edema without heart failure which is uncontrolled.  Latest blood pressure and vitals reviewed-   Temp:  [96.4 °F (35.8 °C)-98.3 °F (36.8 °C)]   Pulse:  [52-77]   Resp:  [18-34]   BP: (109-185)/(52-91)   SpO2:  [97 %-100 %] .   Patient currently on IV antihypertensives.   Home meds for hypertension were reviewed and noted below.   Hypertension Medications             furosemide (LASIX) 40 MG tablet Take 1 tablet (40 mg total) by mouth 2 (two) times daily. If having increased shortness of breath, increase in leg swelling, and if you notice an >3lbs in 24 hours. May take up to 4 tablets in 1 day (every 6 hours)    isosorbide mononitrate (IMDUR) 60 MG 24 hr tablet Take 1 tablet (60 mg total) by mouth once daily.    lisinopril (PRINIVIL,ZESTRIL) 5 MG tablet Take 1 tablet (5 mg total) by mouth once daily.    metoprolol tartrate (LOPRESSOR) 25 MG tablet Take 1 tablet (25 mg total) by mouth 2 (two) times daily.      Will aim for controlled BP reduction by medications noted above. Monitor and mitigate end organ damage as indicated.  Follow up cardiology consult appreciated.   3/31 Lisinopril discontinued for start of Entresto  - continue nitrate, hydralazine, carvedilol  - tridil gtt PRN      Pulmonary edema cardiac cause  Patient is identified as having Combined Systolic and Diastolic heart failure that is Acute on chronic. CHF is currently uncontrolled due to Continued edema of extremities and Hepatic congestion/ascites, Dyspnea not returned to baseline after 2 doses of  IV diuretic and Rales/crackles on pulmonary exam. Latest ECHO performed and demonstrates- No results found for this or any previous visit.  . Continue Beta Blocker, Furosemide and Nitrate/Vasodilator and monitor clinical status closely. Monitor on telemetry. Monitor strict Is&Os and daily weights.  Place on fluid restriction of 1.5 L. Continue to stress to patient importance of self efficacy and  on diet for CHF. Last BNP reviewed- and noted below No results for input(s): BNP, BNPTRIAGEBLO in the last 168 hours.   Lab Results   Component Value Date    NTPROBNP 23775 (H) 03/29/2023   - per cardiology, echo cardiogram with systolic and diastolic dysfunction with pulmonary hypertension  - continue with IV lasix, BiPAP QHS      Shortness of breath  Improving with maintaining SpO2 >95% when transitioned to nasal cannula, but patient exhibiting increased agitation and hyperventilation when off BiPAP mask and machine.   Patient medication list not updated, but local [facility] verifies intermittent use of Temazepam 15 mg QHS for insomnia.  - will start ativan PRN and monitor symptoms of withdrawal        Diabetes mellitus, type 2  Patient's FSGs are controlled on current medication regimen.  Last A1c reviewed-   Lab Results   Component Value Date    HGBA1C 5.5 03/29/2023     Most recent fingerstick glucose reviewed-   Recent Labs   Lab 03/31/23  0553 03/31/23  1106   POCTGLUCOSE 117* 110     Current correctional scale  Low  Maintain anti-hyperglycemic dose as follows-   Antihyperglycemics (From admission, onward)    Start     Stop Route Frequency Ordered    03/31/23 0106  insulin aspart U-100 pen 0-5 Units         -- SubQ Before meals & nightly PRN 03/31/23 0008    03/30/23 1700  empagliflozin 10 mg tablet 10 mg         -- Oral Daily 03/30/23 1345        Per cardiology;   3/31 Jardiance 10 mg daily      Weakness  Secondary to above CHF exacerbation, fluid overload, SOB, body habitus. When patient's cardiorespiratory  status stabilized, follow up PT and OT evaluation.       Paroxysmal A-fib  Patient with Long standing persistent (>12 months) atrial fibrillation which is controlled currently with Beta Blocker. Patient is currently in atrial fibrillation.[id number] Score: 3. Anticoagulation indicated. Anticoagulation done with Xarelto.          VTE Risk Mitigation (From admission, onward)         Ordered     rivaroxaban tablet 20 mg  With dinner         03/30/23 0055     IP VTE HIGH RISK PATIENT  Once         03/30/23 0314     Place sequential compression device  Until discontinued         03/30/23 0314                Discharge Planning   TIGRE:      Code Status: Full Code   Is the patient medically ready for discharge?:     Reason for patient still in hospital (select all that apply): Patient trending condition, Treatment, Consult recommendations and PT / OT recommendations  Discharge Plan A: Home with family, Home Health                  Gunnar Mott DO  Department of Hospital Medicine   [place] - [facility]

## 2023-04-01 NOTE — PROGRESS NOTES
Cardiology Progress Note  [facility]    Today's Date:  4/1/2023  Patient Name: Carmen Tan    MRN: [id number]    Code Status: Full Code     Attending Physician: Gunnar Mott DO   Consulting Physician: ELIOT Espinosa MD    Hospital Course:  Excellent clinical improvement over the last 24 hours.    Clinical:  Doing much better today.  She is breathing with nasal cannula and maintaining her oxygen saturation at 93%.  She is able to hold a conversation, and is clearly making good progress.  We discussed the importance of tobacco cessation, eating correctly, and maintaining a healthy lifestyle.    Hemodynamic:  Blood pressure is controlled at 136/82 off of Tridil.    Telemetry:  Normal sinus rhythm with occasional PVCs.    Relevant testing:      ROS    Vital Signs (Most Recent):  Temp: 97.7 °F (36.5 °C) (04/01/23 1210)  Pulse: 60 (04/01/23 1100)  Resp: (!) 30 (04/01/23 1100)  BP: 132/61 (04/01/23 1100)  SpO2: (!) 93 % (04/01/23 1100)   Vital Signs (24h Range):  Temp:  [97.7 °F (36.5 °C)-98.2 °F (36.8 °C)] 97.7 °F (36.5 °C)  Pulse:  [56-77] 60  Resp:  [19-35] 30  SpO2:  [93 %-100 %] 93 %  BP: (120-156)/(57-72) 132/61     Weight: (!) 146.1 kg (322 lb)  Body mass index is 55.27 kg/m².    SpO2: (!) 93 %         Intake/Output Summary (Last 24 hours) at 4/1/2023 1213  Last data filed at 4/1/2023 0540  Gross per 24 hour   Intake 872.5 ml   Output 4600 ml   Net -3727.5 ml       Labs:  CMP   Recent Labs   Lab 03/31/23  0334 04/01/23  0412    141   K 4.6 4.1    106   CO2 28 33*   * 108   BUN 24* 28*   CREATININE 1.3 1.2   CALCIUM 9.3 8.9   PROT  --  5.8*   ALBUMIN  --  2.4*   BILITOT  --  0.4   ALKPHOS  --  120   AST  --  19   ALT  --  34   ANIONGAP 3* 2*         Imaging:  Echocardiogram (03/30/2023):  The left ventricle is normal in size with moderate concentric hypertrophy and mildly decreased systolic function.  The estimated ejection fraction is 45%.  Grade I left ventricular diastolic  dysfunction.  Moderate right ventricular enlargement.  Moderate right atrial enlargement.  Mild-to-moderate aortic regurgitation.  There is mild aortic valve stenosis.  Aortic valve area is 1.44 cm2; peak velocity is 2.09 m/s; mean gradient is 10 mmHg.  Mild-to-moderate mitral regurgitation.  Moderate tricuspid regurgitation.  Moderate pulmonic regurgitation.  The estimated PA systolic pressure is 69 mmHg.  There is moderate pulmonary hypertension.  Moderate left atrial enlargement.      Physical Examination:  General:  Comfortable with BiPAP therapy.  Heent:  No JVD.  Lungs:  Diminished breath sounds, but no wheezing or rhonchi.  Heart:  Regular rate rhythm; grade 1 holosystolic murmur.  Extremities:  1+ bilateral edema; bilateral skin tenting.    Assessment and Plan:   1. Congestive heart failure:  Secondary to both systolic and diastolic heart failure, as well as significant structural heart disease.  The patient is diuresing well.  Change Lasix to 40 mg p.o. b.i.d.  Continue with O2 therapy  Continue nifedipine ER 60 mg daily  Contiue carvedilol to 12.5 mg b.i.d.  Continue hydralazine    Reduce isosorbide mononitrate from 60 to 30 mg daily  Initiate entresto bid.  Continue Jardiance 10 mg qd (brought from office)    2. Hypertension:  Patient with resistant hypertension.  Much better controlled.  See #1.    ** Will need to watch for evidence of hyperkalemia as patient will be placed on entresto, reduced lasix, and spironolactone.    3. Valvular disease:  No further workup or intervention.  The patient is not a candidate for surgery.    4. Pulmonary hypertension:  Secondary to obesity hypoventilation syndrome.  Continue anticoagulation therapy.  Weight loss  Tobacco cessation.    5. Respiratory failure:  Multifactorial:  Morbid obesity, hypoventilation syndrome, hypertension, valvular insufficiency, related structural heart disease.  Patient has made appropriate clinical progress.

## 2023-04-01 NOTE — ASSESSMENT & PLAN NOTE
Patient has a current diagnosis of Hypertensive emergency with end organ damage evidenced by hypertensive encephalopathy and acute pulmonary edema without heart failure which is uncontrolled.  Latest blood pressure and vitals reviewed-   Temp:  [97.6 °F (36.4 °C)-98.3 °F (36.8 °C)]   Pulse:  [56-77]   Resp:  [19-34]   BP: (109-163)/(52-86)   SpO2:  [97 %-100 %] .   Patient currently on IV antihypertensives.   Home meds for hypertension were reviewed and noted below.   Hypertension Medications             furosemide (LASIX) 40 MG tablet Take 1 tablet (40 mg total) by mouth 2 (two) times daily. If having increased shortness of breath, increase in leg swelling, and if you notice an >3lbs in 24 hours. May take up to 4 tablets in 1 day (every 6 hours)    isosorbide mononitrate (IMDUR) 60 MG 24 hr tablet Take 1 tablet (60 mg total) by mouth once daily.    lisinopril (PRINIVIL,ZESTRIL) 5 MG tablet Take 1 tablet (5 mg total) by mouth once daily.    metoprolol tartrate (LOPRESSOR) 25 MG tablet Take 1 tablet (25 mg total) by mouth 2 (two) times daily.      Will aim for controlled BP reduction by medications noted above. Monitor and mitigate end organ damage as indicated.  Follow up cardiology consult appreciated.   3/31 Lisinopril discontinued for start of Entresto  - continue nitrate, hydralazine, carvedilol  - tridil gtt PRN  4/1 blood pressure has improved, appreciate cardiology's help

## 2023-04-01 NOTE — ASSESSMENT & PLAN NOTE
Patient's FSGs are controlled on current medication regimen.  Last A1c reviewed-   Lab Results   Component Value Date    HGBA1C 5.5 03/29/2023     Most recent fingerstick glucose reviewed-   Recent Labs   Lab 03/31/23  0553 03/31/23  1106   POCTGLUCOSE 117* 110     Current correctional scale  Low  Maintain anti-hyperglycemic dose as follows-   Antihyperglycemics (From admission, onward)    Start     Stop Route Frequency Ordered    03/31/23 0106  insulin aspart U-100 pen 0-5 Units         -- SubQ Before meals & nightly PRN 03/31/23 0008    03/30/23 1700  empagliflozin 10 mg tablet 10 mg         -- Oral Daily 03/30/23 1345        Per cardiology;   3/31 Jardiance 10 mg daily

## 2023-04-01 NOTE — ASSESSMENT & PLAN NOTE
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 CHF is currently uncontrolled due to Continued edema of extremities and Hepatic congestion/ascites, Dyspnea not returned to baseline after 2 doses of IV diuretic and Rales/crackles on pulmonary exam. Latest ECHO performed and demonstrates- No results found for this or any previous visit.  . Continue Beta Blocker, Furosemide and Nitrate/Vasodilator and monitor clinical status closely. Monitor on telemetry. Monitor strict Is&Os and daily weights.  Place on fluid restriction of 1.5 L. Continue to stress to patient importance of self efficacy and  on diet for CHF. Last BNP reviewed- and noted below No results for input(s): BNP, BNPTRIAGEBLO in the last 168 hours.   Lab Results   Component Value Date    NTPROBNP 68998 (H) 03/29/2023   - per cardiology, echo cardiogram with systolic and diastolic dysfunction with pulmonary hypertension  - continue with IV lasix, BiPAP QHS  4/1 diuresing well continue Lasix, continue blood pressure control

## 2023-04-01 NOTE — ASSESSMENT & PLAN NOTE
Patient with Long standing persistent (>12 months) atrial fibrillation which is controlled currently with Beta Blocker. Patient is currently in atrial fibrillation.[id number] Score: 3. Anticoagulation indicated. Anticoagulation done with Xarelto.

## 2023-04-01 NOTE — ASSESSMENT & PLAN NOTE
Patient has a current diagnosis of Hypertensive emergency with end organ damage evidenced by hypertensive encephalopathy and acute pulmonary edema without heart failure which is uncontrolled.  Latest blood pressure and vitals reviewed-   Temp:  [96.4 °F (35.8 °C)-98.3 °F (36.8 °C)]   Pulse:  [52-77]   Resp:  [18-34]   BP: (109-185)/(52-91)   SpO2:  [97 %-100 %] .   Patient currently on IV antihypertensives.   Home meds for hypertension were reviewed and noted below.   Hypertension Medications             furosemide (LASIX) 40 MG tablet Take 1 tablet (40 mg total) by mouth 2 (two) times daily. If having increased shortness of breath, increase in leg swelling, and if you notice an >3lbs in 24 hours. May take up to 4 tablets in 1 day (every 6 hours)    isosorbide mononitrate (IMDUR) 60 MG 24 hr tablet Take 1 tablet (60 mg total) by mouth once daily.    lisinopril (PRINIVIL,ZESTRIL) 5 MG tablet Take 1 tablet (5 mg total) by mouth once daily.    metoprolol tartrate (LOPRESSOR) 25 MG tablet Take 1 tablet (25 mg total) by mouth 2 (two) times daily.      Will aim for controlled BP reduction by medications noted above. Monitor and mitigate end organ damage as indicated.  Follow up cardiology consult appreciated.   3/31 Lisinopril discontinued for start of Entresto  - continue nitrate, hydralazine, carvedilol  - tridil gtt PRN

## 2023-04-01 NOTE — ASSESSMENT & PLAN NOTE
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 CHF is currently uncontrolled due to Continued edema of extremities and Hepatic congestion/ascites, Dyspnea not returned to baseline after 2 doses of IV diuretic and Rales/crackles on pulmonary exam. Latest ECHO performed and demonstrates- No results found for this or any previous visit.  . Continue Beta Blocker, Furosemide and Nitrate/Vasodilator and monitor clinical status closely. Monitor on telemetry. Monitor strict Is&Os and daily weights.  Place on fluid restriction of 1.5 L. Continue to stress to patient importance of self efficacy and  on diet for CHF. Last BNP reviewed- and noted below No results for input(s): BNP, BNPTRIAGEBLO in the last 168 hours.   Lab Results   Component Value Date    NTPROBNP 39263 (H) 03/29/2023   - per cardiology, echo cardiogram with systolic and diastolic dysfunction with pulmonary hypertension  - continue with IV lasix, BiPAP QHS

## 2023-04-01 NOTE — PLAN OF CARE
Patient rested very well last. Urine output is good, some blood streaked in the tubing, orange/cornelia in the bag. VSS, afebrile. Patient's B/P better controlled.  Patient asked during the night if she was going to die, reassured patient, she seemed content with my response.

## 2023-04-01 NOTE — ASSESSMENT & PLAN NOTE
Temazepam 15 mg QHS use, unclear consistency since 1/13/23 with last fill of 30 tablets on 2/28/23.   - ativan 1g QHS  - monitor symptoms of withdrawal

## 2023-04-01 NOTE — RESPIRATORY THERAPY
04/01/23 0755   PRE-TX-O2   Device (Oxygen Therapy) (S)  nasal cannula   $ Is the patient on Low Flow Oxygen? Yes   Flow (L/min) (S)  5   Oxygen Concentration (%) 40   Pulse Oximetry Type Continuous   $ Pulse Oximetry - Multiple Charge Pulse Oximetry - Multiple   Resp (!) 25   Positioning HOB elevated 30 degrees   Preset CPAP/BiPAP Settings   Mode Of Delivery Standby  (Placed BIPAP on standby at this time; RUI Marion at bedside.)     Placed patient on 5LNC at this time for patient to eat breakfast. Patient tolerating well and denies shortness of breath. BIPAP on standby at bedside. RUI Marion notified and will call if BIPAP use is indicated.

## 2023-04-01 NOTE — ASSESSMENT & PLAN NOTE
Patient with Long standing persistent (>12 months) atrial fibrillation which is controlled currently with Beta Blocker. Patient is currently in atrial fibrillation.[id number] Score: 3. Anticoagulation indicated. Anticoagulation done with Xarelto.

## 2023-04-01 NOTE — SUBJECTIVE & OBJECTIVE
Interval History: Patient seen and examined.     Review of Systems   Constitutional:  Positive for activity change. Negative for appetite change, chills, fatigue and fever.   HENT:  Negative for mouth sores, sneezing and sore throat.    Eyes:  Negative for visual disturbance.   Respiratory:  Positive for cough (intermittent dry) and shortness of breath (improving). Negative for chest tightness, wheezing and stridor.    Cardiovascular:  Positive for leg swelling. Negative for chest pain and palpitations.   Gastrointestinal:  Negative for abdominal pain, blood in stool, constipation, diarrhea, nausea and vomiting.   Musculoskeletal:  Positive for arthralgias, gait problem and joint swelling. Negative for neck pain and neck stiffness.   Skin:  Negative for color change and pallor.   Neurological:  Positive for weakness. Negative for dizziness, seizures, syncope, facial asymmetry, speech difficulty, light-headedness, numbness and headaches.   Psychiatric/Behavioral:  Negative for agitation, confusion, decreased concentration, dysphoric mood and sleep disturbance. The patient is not nervous/anxious.    Objective:     Vital Signs (Most Recent):  Temp: 97.9 °F (36.6 °C) (03/31/23 1600)  Pulse: 73 (03/31/23 1800)  Resp: (!) 22 (03/31/23 1800)  BP: 132/63 (03/31/23 1800)  SpO2: 99 % (03/31/23 1800)   Vital Signs (24h Range):  Temp:  [96.4 °F (35.8 °C)-98.3 °F (36.8 °C)] 97.9 °F (36.6 °C)  Pulse:  [49-77] 73  Resp:  [18-34] 22  SpO2:  [97 %-100 %] 99 %  BP: (109-185)/(52-91) 132/63     Weight: (!) 146.1 kg (322 lb)  Body mass index is 55.27 kg/m².    Intake/Output Summary (Last 24 hours) at 3/31/2023 1912  Last data filed at 3/31/2023 1800  Gross per 24 hour   Intake 882.9 ml   Output 6200 ml   Net -5317.1 ml      Physical Exam  Vitals and nursing note reviewed.   Constitutional:       General: She is not in acute distress.     Appearance: She is obese. She is ill-appearing. She is not toxic-appearing or diaphoretic.   HENT:       Right Ear: External ear normal.      Left Ear: External ear normal.      Nose: No congestion or rhinorrhea.      Mouth/Throat:      Mouth: Mucous membranes are dry.      Pharynx: Oropharynx is clear. No oropharyngeal exudate or posterior oropharyngeal erythema.   Eyes:      Extraocular Movements: Extraocular movements intact.   Neck:      Comments: Thick skin folds  Cardiovascular:      Rate and Rhythm: Bradycardia present. Rhythm irregular.      Heart sounds: No murmur heard.  Pulmonary:      Effort: No respiratory distress.      Breath sounds: Wheezing present. No rhonchi or rales.   Chest:      Chest wall: No tenderness.   Abdominal:      Palpations: Abdomen is soft.      Tenderness: There is no abdominal tenderness.      Comments: Limited secondary to body habitus   Musculoskeletal:         General: Swelling and tenderness present.      Cervical back: Neck supple. No rigidity.      Right lower leg: Edema (+3 pitting, loss of bony features in toes and dorsum of foot bilaterally) present.      Left lower leg: Edema present.   Skin:     General: Skin is warm and dry.      Capillary Refill: Capillary refill takes less than 2 seconds.      Coloration: Skin is not jaundiced.      Findings: No bruising, erythema, lesion or rash.   Neurological:      Mental Status: She is alert and oriented to person, place, and time. Mental status is at baseline.      Comments: Baseline requires walker for ambulation   Psychiatric:         Mood and Affect: Mood normal.         Behavior: Behavior normal.     Significant Labs: All pertinent labs within the past 24 hours have been reviewed.  Blood Culture: No results for input(s): LABBLOO in the last 48 hours.  BMP:   Recent Labs   Lab 03/31/23  0334   *      K 4.6      CO2 28   BUN 24*   CREATININE 1.3   CALCIUM 9.3   MG 2.0     CBC:   Recent Labs   Lab 03/29/23  2319 03/30/23  0340   WBC 5.82 5.98   HGB 14.6 12.6   HCT 45.6 38.5    155     CMP:   Recent  Labs   Lab 03/29/23  2319 03/30/23  0340 03/31/23  0334    141 139   K 4.2 3.9 4.6    109 108   CO2 28 27 28   * 149* 120*   BUN 20 21 24*   CREATININE 1.6* 1.4 1.3   CALCIUM 9.6 9.2 9.3   PROT 7.1 6.0  --    ALBUMIN 3.3* 2.7*  --    BILITOT 0.8 0.6  --    ALKPHOS 198* 162*  --    AST 48* 36  --    ALT 55* 47*  --    ANIONGAP 4* 5* 3*     Magnesium:   Recent Labs   Lab 03/31/23  0334   MG 2.0     Significant Imaging: I have reviewed all pertinent imaging results/findings within the past 24 hours.

## 2023-04-01 NOTE — CARE UPDATE
03/31/23 2020   PRE-TX-O2   Oxygen Concentration (%) (S)  40  (decreased fio2 do to pt spo2 maintaining %)

## 2023-04-01 NOTE — ASSESSMENT & PLAN NOTE
Patient with Hypercapnic and Hypoxic Respiratory failure which is Acute on chronic.  she is on home oxygen at 2 LPM. Supplemental oxygen was provided and noted- Oxygen Concentration (%):  [40-50] 40    Signs/symptoms of respiratory failure include- tachypnea, increased work of breathing, use of accessory muscles and wheezing. Contributing diagnoses includes - CHF, COPD, Obesity Hypoventilation and Pleural effusion Labs and images were reviewed. Patient Has recent ABG, which has been reviewed. Will treat underlying causes and adjust management of respiratory failure as follows- IV lasix, supplemental oxygen, continuous BiPAP  3/31: IV lasix 40 mg Q6H x 2 doses  4/1 improving daily, continue diuresis with Lasix, continue BiPAP therapy and will transition to O2 therapy as tolerates

## 2023-04-01 NOTE — RESPIRATORY THERAPY
04/01/23 1100   Patient Assessment/Suction   Level of Consciousness (AVPU) alert   Respiratory Effort Unlabored   Expansion/Accessory Muscles/Retractions no use of accessory muscles;no retractions   All Lung Fields Breath Sounds equal bilaterally;diminished   Rhythm/Pattern, Respiratory unlabored   Cough Frequency infrequent   Cough Type nonproductive   Skin Integrity   Area Observed Left;Right;Bridge of nose   Skin Appearance without discoloration   Barrier used? Liquid Filled Cushion   PRE-TX-O2   Device (Oxygen Therapy) (S)  BIPAP  (Found patient on BIPAP at this time per RUI Marion.)   $ Is the patient on Low Flow Oxygen? Yes   Oxygen Concentration (%) 40   SpO2 (!) 93 %   Pulse Oximetry Type Continuous   $ Pulse Oximetry - Multiple Charge Pulse Oximetry - Multiple   Pulse 60   Resp (!) 30   /61   Positioning HOB elevated 30 degrees   Aerosol Therapy   $ Aerosol Therapy Charges Aerosol Treatment   Daily Review of Necessity (SVN) completed   Respiratory Treatment Status (SVN) given   Treatment Route (SVN) in-line   Patient Position (SVN) HOB elevated   Post Treatment Assessment (SVN) breath sounds unchanged   Signs of Intolerance (SVN) none   Breath Sounds Post-Respiratory Treatment   Throughout All Fields Post-Treatment All Fields   Throughout All Fields Post-Treatment diminished   Post-treatment Heart Rate (beats/min) 60   Post-treatment Resp Rate (breaths/min) 31   Preset CPAP/BiPAP Settings   Mode Of Delivery BiPAP S/T   $ CPAP/BiPAP Daily Charge BiPAP/CPAP Daily   $ Initial CPAP/BiPAP Setup? No   $ Is patient using? Yes   Size of Mask Medium/Large   Sized Appropriately? Yes   Equipment Type Trilog    Airway Device Type medium full face mask   Humidifier not applicable   Ipap 14   EPAP (cm H2O) 6   Pressure Support (cm H2O) 8   ITime (sec) 1.3   Rise Time (sec) 2   Patient CPAP/BiPAP Settings   CPAP/BIPAP ID [id number]   RR Total (Breaths/Min) 31   Tidal Volume (mL) 327   VE Minute Ventilation (L/min)  10.2 L/min   Peak Inspiratory Pressure (cm H2O) 14.2   Total Leak (L/Min) 25   Patient Trigger - ST Mode Only (%) 100   CPAP/BiPAP Backup Settings   Backup Rate 8 breaths per minute (bpm)   CPAP/BiPAP Alarms   Minute Ventilation (L/Min)   (3.0-24.0)   High RR (breaths/min) 45   Low RR (breaths/min) 8   Apnea (Sec) 20   Respiratory Evaluation   $ Care Plan Tech Time 15 min     Found patient on BIPAP at this time per RUI Marion; oxygen via nasal cannula running at bedside. No distress noted at this time. RT encouraged deep breathing when patient feels short of breath.  Patient understands.

## 2023-04-01 NOTE — ASSESSMENT & PLAN NOTE
Improving with maintaining SpO2 >95% when transitioned to nasal cannula, but patient exhibiting increased agitation and hyperventilation when off BiPAP mask and machine.   Patient medication list not updated, but local [facility] verifies intermittent use of Temazepam 15 mg QHS for insomnia.  - will start ativan PRN and monitor symptoms of withdrawal  4/1 continues to improve daily

## 2023-04-01 NOTE — PROGRESS NOTES
[facility] Medicine  Progress Note    Patient Name: Carmen Tan  MRN: [id number]  Patient Class: IP- Inpatient   Admission Date: 3/29/2023  Length of Stay: 2 days  Attending Physician: Gunnar Mott DO  Primary Care Provider: Lucian Barry MD        Subjective:     Principal Problem:Acute respiratory failure with hypoxia        HPI:  Chief Complaint   Patient presents with    Shortness of Breath       Per ems pt called 911 due to sob, when they arrived gave her a duoneb and moved her over and her blood pressure went up to 200/100's from an original reading of 140/80, solumedrol 125mg IM given, 4 sprays of nitro spray given, 1inch of nitropaste on pts chest, pt currently on cpap with an O2 sat of 100%      [id number]   Carmen Tan is a 73 y.o. female with PMHX of COPD, CHF, PE on Xarelto, morbid obesity who presents to the emergency department C/O shortness of breath.     Patient arrives by EMS. They report they were called because patient was acutely short of breath.  Patient was given a DuoNeb and being prepared to be taken to hospital. On repeat blood pressure check patient was markedly hypertensive.  Patient was given for nitro space, nitro patch, placed on CPAP.  She received 125mg solumedrol. On arrival to the emergency department patient is on CPAP and moderate respiratory distress.  Appears to be mentating normally and able to respond to questions appropriately however history limited due to respiratory distress.     PCP: Lucian Barry MD   3/30   Patient awake and alert. Diuresed well overnight with urine output of 1.8L. Patient endorses breathing better compared to time of arrival to ED. She has had a similar hospitalization over a year ago and have since not followed up with any of her specialists. 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   Shortness of breath has been worsening over the past week and her leg swellings have  started to worsen two weeks ago. She normally ambulates with a walker at home. She has a son who also lives with her to assist. Patient remains on nitroglycerin gtt with SBP still >160s mmHg. Patient to be admitted for hypertensive emergency with acute respiratory failure with hypoxia and hypercarbia, CHF exacerbation for continue IV diuresis, oxygen supplementation, and consult with cardiology.              Overview/Hospital Course:  3/31 Overnight due to increased blood pressure, tridil gtt back on. Patient awake and tolerating BiPAP I/E 14/6 FiO2 50%. Urine output of 5 L. Patient endorses tenseness in lower extremities improving with breathing better compared yesterday. BP adjusted per cardiology with added daily Jardiance. Continue with IV 40 mg BID lasix and monitor UOP and renal function.   4/1 ND patient is feeling a little better this morning, does report less heaviness to her legs which do show improvement in edema.  Patient had good diuresis overnight.  Off of all IV blood pressure meds, and blood pressure appears more stable      Interval History: Patient seen and examined.     Review of Systems   Constitutional:  Positive for activity change. Negative for appetite change, chills, fatigue and fever.   HENT:  Negative for mouth sores, sneezing and sore throat.    Eyes:  Negative for visual disturbance.   Respiratory:  Positive for cough (intermittent dry) and shortness of breath (improving). Negative for chest tightness, wheezing and stridor.    Cardiovascular:  Positive for leg swelling. Negative for chest pain and palpitations.   Gastrointestinal:  Negative for abdominal pain, blood in stool, constipation, diarrhea, nausea and vomiting.   Musculoskeletal:  Positive for arthralgias, gait problem and joint swelling. Negative for neck pain and neck stiffness.   Skin:  Negative for color change and pallor.   Neurological:  Positive for weakness. Negative for dizziness, seizures, syncope, facial asymmetry,  speech difficulty, light-headedness, numbness and headaches.   Psychiatric/Behavioral:  Negative for agitation, confusion, decreased concentration, dysphoric mood and sleep disturbance. The patient is not nervous/anxious.    Objective:     Vital Signs (Most Recent):  Temp: 98 °F (36.7 °C) (04/01/23 0714)  Pulse: 60 (04/01/23 0600)  Resp: (!) 25 (04/01/23 0600)  BP: (!) 146/65 (04/01/23 0600)  SpO2: 100 % (04/01/23 0600)   Vital Signs (24h Range):  Temp:  [97.6 °F (36.4 °C)-98.3 °F (36.8 °C)] 98 °F (36.7 °C)  Pulse:  [56-77] 60  Resp:  [19-34] 25  SpO2:  [97 %-100 %] 100 %  BP: (109-163)/(52-86) 146/65     Weight: (!) 146.1 kg (322 lb)  Body mass index is 55.27 kg/m².    Intake/Output Summary (Last 24 hours) at 4/1/2023 0741  Last data filed at 4/1/2023 0540  Gross per 24 hour   Intake 872.5 ml   Output 4600 ml   Net -3727.5 ml        Physical Exam  Vitals and nursing note reviewed.   Constitutional:       General: She is not in acute distress.     Appearance: She is obese. She is ill-appearing. She is not toxic-appearing or diaphoretic.   HENT:      Right Ear: External ear normal.      Left Ear: External ear normal.      Nose: No congestion or rhinorrhea.      Mouth/Throat:      Mouth: Mucous membranes are dry.      Pharynx: Oropharynx is clear. No oropharyngeal exudate or posterior oropharyngeal erythema.   Eyes:      Extraocular Movements: Extraocular movements intact.   Neck:      Comments: Thick skin folds  Cardiovascular:      Rate and Rhythm: Normal rate. Rhythm irregular.      Heart sounds: No murmur heard.  Pulmonary:      Effort: No respiratory distress.      Breath sounds: No rhonchi or rales.      Comments: On bipap - coarse bs ellen  Chest:      Chest wall: No tenderness.   Abdominal:      Palpations: Abdomen is soft.      Tenderness: There is no abdominal tenderness.      Comments: Limited secondary to body habitus   Musculoskeletal:         General: Swelling and tenderness present.      Cervical back:  Neck supple. No rigidity.      Right lower leg: Edema (+2 pitting, loss of bony features in toes and dorsum of foot bilaterally) present.      Left lower leg: Edema present.   Skin:     General: Skin is warm and dry.      Capillary Refill: Capillary refill takes less than 2 seconds.      Coloration: Skin is not jaundiced.      Findings: No bruising, erythema, lesion or rash.   Neurological:      Mental Status: She is alert and oriented to person, place, and time. Mental status is at baseline.      Comments: Baseline requires walker for ambulation   Psychiatric:         Mood and Affect: Mood normal.         Behavior: Behavior normal.     Significant Labs: All pertinent labs within the past 24 hours have been reviewed.  Blood Culture: No results for input(s): LABBLOO in the last 48 hours.  BMP:   Recent Labs   Lab 04/01/23 0412         K 4.1      CO2 33*   BUN 28*   CREATININE 1.2   CALCIUM 8.9   MG 2.1       CBC:   No results for input(s): WBC, HGB, HCT, PLT in the last 48 hours.    CMP:   Recent Labs   Lab 03/31/23  0334 04/01/23 0412    141   K 4.6 4.1    106   CO2 28 33*   * 108   BUN 24* 28*   CREATININE 1.3 1.2   CALCIUM 9.3 8.9   PROT  --  5.8*   ALBUMIN  --  2.4*   BILITOT  --  0.4   ALKPHOS  --  120   AST  --  19   ALT  --  34   ANIONGAP 3* 2*       Magnesium:   Recent Labs   Lab 03/31/23  0334 04/01/23 0412   MG 2.0 2.1       Significant Imaging: I have reviewed all pertinent imaging results/findings within the past 24 hours.      Assessment/Plan:      * Acute respiratory failure with hypoxia  Patient with Hypercapnic and Hypoxic Respiratory failure which is Acute on chronic.  she is on home oxygen at 2 LPM. Supplemental oxygen was provided and noted- Oxygen Concentration (%):  [40-50] 40    Signs/symptoms of respiratory failure include- tachypnea, increased work of breathing, use of accessory muscles and wheezing. Contributing diagnoses includes - CHF, COPD, Obesity  Hypoventilation and Pleural effusion Labs and images were reviewed. Patient Has recent ABG, which has been reviewed. Will treat underlying causes and adjust management of respiratory failure as follows- IV lasix, supplemental oxygen, continuous BiPAP  3/31: IV lasix 40 mg Q6H x 2 doses  4/1 improving daily, continue diuresis with Lasix, continue BiPAP therapy and will transition to O2 therapy as tolerates      Mild temazepam use disorder  Temazepam 15 mg QHS use, unclear consistency since 1/13/23 with last fill of 30 tablets on 2/28/23.   - ativan 1g QHS  - monitor symptoms of withdrawal      Hypertensive emergency  Patient has a current diagnosis of Hypertensive emergency with end organ damage evidenced by hypertensive encephalopathy and acute pulmonary edema without heart failure which is uncontrolled.  Latest blood pressure and vitals reviewed-   Temp:  [97.6 °F (36.4 °C)-98.3 °F (36.8 °C)]   Pulse:  [56-77]   Resp:  [19-34]   BP: (109-163)/(52-86)   SpO2:  [97 %-100 %] .   Patient currently on IV antihypertensives.   Home meds for hypertension were reviewed and noted below.   Hypertension Medications             furosemide (LASIX) 40 MG tablet Take 1 tablet (40 mg total) by mouth 2 (two) times daily. If having increased shortness of breath, increase in leg swelling, and if you notice an >3lbs in 24 hours. May take up to 4 tablets in 1 day (every 6 hours)    isosorbide mononitrate (IMDUR) 60 MG 24 hr tablet Take 1 tablet (60 mg total) by mouth once daily.    lisinopril (PRINIVIL,ZESTRIL) 5 MG tablet Take 1 tablet (5 mg total) by mouth once daily.    metoprolol tartrate (LOPRESSOR) 25 MG tablet Take 1 tablet (25 mg total) by mouth 2 (two) times daily.      Will aim for controlled BP reduction by medications noted above. Monitor and mitigate end organ damage as indicated.  Follow up cardiology consult appreciated.   3/31 Lisinopril discontinued for start of Entresto  - continue nitrate, hydralazine, carvedilol  -  tridil gtt PRN  4/1 blood pressure has improved, appreciate cardiology's help      Pulmonary edema cardiac cause  Patient is identified as having Combined Systolic and Diastolic heart failure that is Acute on chronic. CHF is currently uncontrolled due to Continued edema of extremities and Hepatic congestion/ascites, Dyspnea not returned to baseline after 2 doses of IV diuretic and Rales/crackles on pulmonary exam. Latest ECHO performed and demonstrates- No results found for this or any previous visit.  . Continue Beta Blocker, Furosemide and Nitrate/Vasodilator and monitor clinical status closely. Monitor on telemetry. Monitor strict Is&Os and daily weights.  Place on fluid restriction of 1.5 L. Continue to stress to patient importance of self efficacy and  on diet for CHF. Last BNP reviewed- and noted below No results for input(s): BNP, BNPTRIAGEBLO in the last 168 hours.   Lab Results   Component Value Date    NTPROBNP 04126 (H) 03/29/2023   - per cardiology, echo cardiogram with systolic and diastolic dysfunction with pulmonary hypertension  - continue with IV lasix, BiPAP QHS  4/1 diuresing well continue Lasix, continue blood pressure control    Shortness of breath  Improving with maintaining SpO2 >95% when transitioned to nasal cannula, but patient exhibiting increased agitation and hyperventilation when off BiPAP mask and machine.   Patient medication list not updated, but local [facility] verifies intermittent use of Temazepam 15 mg QHS for insomnia.  - will start ativan PRN and monitor symptoms of withdrawal  4/1 continues to improve daily      Diabetes mellitus, type 2  Patient's FSGs are controlled on current medication regimen.  Last A1c reviewed-   Lab Results   Component Value Date    HGBA1C 5.5 03/29/2023     Most recent fingerstick glucose reviewed-   Recent Labs   Lab 03/31/23  1106 03/31/23  1641 03/31/23 2057   POCTGLUCOSE 110 126* 128*     Current correctional scale  Low  Maintain  anti-hyperglycemic dose as follows-   Antihyperglycemics (From admission, onward)    Start     Stop Route Frequency Ordered    03/31/23 0106  insulin aspart U-100 pen 0-5 Units         -- SubQ Before meals & nightly PRN 03/31/23 0008    03/30/23 1700  empagliflozin 10 mg tablet 10 mg         -- Oral Daily 03/30/23 1345        Per cardiology;   3/31 Jardiance 10 mg daily  4/1 continue current med      Weakness  Secondary to above CHF exacerbation, fluid overload, SOB, body habitus. When patient's cardiorespiratory status stabilized, follow up PT and OT evaluation.       Paroxysmal A-fib  Patient with Long standing persistent (>12 months) atrial fibrillation which is controlled currently with Beta Blocker. Patient is currently in atrial fibrillation.[id number] Score: 3. Anticoagulation indicated. Anticoagulation done with Xarelto.          VTE Risk Mitigation (From admission, onward)         Ordered     rivaroxaban tablet 20 mg  With dinner         03/30/23 0055     IP VTE HIGH RISK PATIENT  Once         03/30/23 0314     Place sequential compression device  Until discontinued         03/30/23 0314                Discharge Planning   TIGRE:      Code Status: Full Code   Is the patient medically ready for discharge?:     Reason for patient still in hospital (select all that apply): Treatment  Discharge Plan A: Home with family, Home Health                  Veda Gleason MD  Department of Hospital Medicine   [place] [facility]

## 2023-04-01 NOTE — PLAN OF CARE
Patient with improvement today. Placed on 5L NC for meals and patient lasted longer of bipap each time before requesting it to be put back on.  Fed self and visited with sister. VSS, BP controlled, afebrile. UOP 1500ml, no BM noted.

## 2023-04-02 LAB
ALBUMIN SERPL BCP-MCNC: 2.5 G/DL (ref 3.5–5.2)
ALP SERPL-CCNC: 124 U/L (ref 55–135)
ALT SERPL W/O P-5'-P-CCNC: 29 U/L (ref 10–44)
ANION GAP SERPL CALC-SCNC: 3 MMOL/L (ref 8–16)
AST SERPL-CCNC: 16 U/L (ref 10–40)
BASOPHILS # BLD AUTO: 0.02 K/UL (ref 0–0.2)
BASOPHILS NFR BLD: 0.2 % (ref 0–1.9)
BILIRUB SERPL-MCNC: 0.5 MG/DL (ref 0.1–1)
BUN SERPL-MCNC: 29 MG/DL (ref 8–23)
CALCIUM SERPL-MCNC: 9.1 MG/DL (ref 8.7–10.5)
CHLORIDE SERPL-SCNC: 106 MMOL/L (ref 95–110)
CO2 SERPL-SCNC: 34 MMOL/L (ref 23–29)
CREAT SERPL-MCNC: 1.2 MG/DL (ref 0.5–1.4)
DIFFERENTIAL METHOD: ABNORMAL
EOSINOPHIL # BLD AUTO: 0 K/UL (ref 0–0.5)
EOSINOPHIL NFR BLD: 0.1 % (ref 0–8)
ERYTHROCYTE [DISTWIDTH] IN BLOOD BY AUTOMATED COUNT: 17 % (ref 11.5–14.5)
EST. GFR  (NO RACE VARIABLE): 48.1 ML/MIN/1.73 M^2
GLUCOSE SERPL-MCNC: 111 MG/DL (ref 70–110)
HCT VFR BLD AUTO: 46.1 % (ref 37–48.5)
HGB BLD-MCNC: 14.7 G/DL (ref 12–16)
IMM GRANULOCYTES # BLD AUTO: 0.05 K/UL (ref 0–0.04)
IMM GRANULOCYTES NFR BLD AUTO: 0.6 % (ref 0–0.5)
LYMPHOCYTES # BLD AUTO: 1.7 K/UL (ref 1–4.8)
LYMPHOCYTES NFR BLD: 20.7 % (ref 18–48)
MAGNESIUM SERPL-MCNC: 2.1 MG/DL (ref 1.6–2.6)
MCH RBC QN AUTO: 29.2 PG (ref 27–31)
MCHC RBC AUTO-ENTMCNC: 31.9 G/DL (ref 32–36)
MCV RBC AUTO: 92 FL (ref 82–98)
MONOCYTES # BLD AUTO: 0.8 K/UL (ref 0.3–1)
MONOCYTES NFR BLD: 9.6 % (ref 4–15)
NEUTROPHILS # BLD AUTO: 5.7 K/UL (ref 1.8–7.7)
NEUTROPHILS NFR BLD: 68.8 % (ref 38–73)
NRBC BLD-RTO: 0 /100 WBC
PLATELET # BLD AUTO: 212 K/UL (ref 150–450)
PMV BLD AUTO: 8.7 FL (ref 9.2–12.9)
POCT GLUCOSE: 100 MG/DL (ref 70–110)
POCT GLUCOSE: 103 MG/DL (ref 70–110)
POCT GLUCOSE: 116 MG/DL (ref 70–110)
POTASSIUM SERPL-SCNC: 3.5 MMOL/L (ref 3.5–5.1)
PROT SERPL-MCNC: 5.8 G/DL (ref 6–8.4)
RBC # BLD AUTO: 5.04 M/UL (ref 4–5.4)
SODIUM SERPL-SCNC: 143 MMOL/L (ref 136–145)
WBC # BLD AUTO: 8.23 K/UL (ref 3.9–12.7)

## 2023-04-02 PROCEDURE — 94660 CPAP INITIATION&MGMT: CPT

## 2023-04-02 PROCEDURE — 25000242 PHARM REV CODE 250 ALT 637 W/ HCPCS: Performed by: STUDENT IN AN ORGANIZED HEALTH CARE EDUCATION/TRAINING PROGRAM

## 2023-04-02 PROCEDURE — 25000242 PHARM REV CODE 250 ALT 637 W/ HCPCS: Performed by: INTERNAL MEDICINE

## 2023-04-02 PROCEDURE — 83735 ASSAY OF MAGNESIUM: CPT | Performed by: STUDENT IN AN ORGANIZED HEALTH CARE EDUCATION/TRAINING PROGRAM

## 2023-04-02 PROCEDURE — 99900035 HC TECH TIME PER 15 MIN (STAT)

## 2023-04-02 PROCEDURE — 25000003 PHARM REV CODE 250: Performed by: INTERNAL MEDICINE

## 2023-04-02 PROCEDURE — 25000003 PHARM REV CODE 250: Performed by: STUDENT IN AN ORGANIZED HEALTH CARE EDUCATION/TRAINING PROGRAM

## 2023-04-02 PROCEDURE — 94640 AIRWAY INHALATION TREATMENT: CPT

## 2023-04-02 PROCEDURE — 99900031 HC PATIENT EDUCATION (STAT)

## 2023-04-02 PROCEDURE — 80053 COMPREHEN METABOLIC PANEL: CPT | Performed by: STUDENT IN AN ORGANIZED HEALTH CARE EDUCATION/TRAINING PROGRAM

## 2023-04-02 PROCEDURE — 36415 COLL VENOUS BLD VENIPUNCTURE: CPT | Performed by: STUDENT IN AN ORGANIZED HEALTH CARE EDUCATION/TRAINING PROGRAM

## 2023-04-02 PROCEDURE — 63600175 PHARM REV CODE 636 W HCPCS: Performed by: STUDENT IN AN ORGANIZED HEALTH CARE EDUCATION/TRAINING PROGRAM

## 2023-04-02 PROCEDURE — 94761 N-INVAS EAR/PLS OXIMETRY MLT: CPT

## 2023-04-02 PROCEDURE — 20000000 HC ICU ROOM

## 2023-04-02 PROCEDURE — 85025 COMPLETE CBC W/AUTO DIFF WBC: CPT | Performed by: INTERNAL MEDICINE

## 2023-04-02 PROCEDURE — 27000221 HC OXYGEN, UP TO 24 HOURS

## 2023-04-02 RX ORDER — BUSPIRONE HYDROCHLORIDE 5 MG/1
5 TABLET ORAL 3 TIMES DAILY
Status: DISCONTINUED | OUTPATIENT
Start: 2023-04-02 | End: 2023-04-13 | Stop reason: HOSPADM

## 2023-04-02 RX ORDER — IPRATROPIUM BROMIDE AND ALBUTEROL SULFATE 2.5; .5 MG/3ML; MG/3ML
3 SOLUTION RESPIRATORY (INHALATION)
Status: DISCONTINUED | OUTPATIENT
Start: 2023-04-02 | End: 2023-04-13 | Stop reason: HOSPADM

## 2023-04-02 RX ADMIN — MUPIROCIN: 20 OINTMENT TOPICAL at 09:04

## 2023-04-02 RX ADMIN — NIFEDIPINE 60 MG: 30 TABLET, FILM COATED, EXTENDED RELEASE ORAL at 08:04

## 2023-04-02 RX ADMIN — TRAZODONE HYDROCHLORIDE 50 MG: 50 TABLET ORAL at 11:04

## 2023-04-02 RX ADMIN — FUROSEMIDE 40 MG: 40 TABLET ORAL at 05:04

## 2023-04-02 RX ADMIN — GABAPENTIN 300 MG: 300 CAPSULE ORAL at 09:04

## 2023-04-02 RX ADMIN — HYDRALAZINE HYDROCHLORIDE 25 MG: 25 TABLET, FILM COATED ORAL at 05:04

## 2023-04-02 RX ADMIN — MUPIROCIN: 20 OINTMENT TOPICAL at 08:04

## 2023-04-02 RX ADMIN — ONDANSETRON HYDROCHLORIDE 4 MG: 2 SOLUTION INTRAMUSCULAR; INTRAVENOUS at 11:04

## 2023-04-02 RX ADMIN — HYDRALAZINE HYDROCHLORIDE 25 MG: 25 TABLET, FILM COATED ORAL at 09:04

## 2023-04-02 RX ADMIN — BUSPIRONE HYDROCHLORIDE 5 MG: 5 TABLET ORAL at 08:04

## 2023-04-02 RX ADMIN — GABAPENTIN 300 MG: 300 CAPSULE ORAL at 08:04

## 2023-04-02 RX ADMIN — HYDRALAZINE HYDROCHLORIDE 25 MG: 25 TABLET, FILM COATED ORAL at 01:04

## 2023-04-02 RX ADMIN — CARVEDILOL 12.5 MG: 12.5 TABLET, FILM COATED ORAL at 08:04

## 2023-04-02 RX ADMIN — SACUBITRIL AND VALSARTAN 1 TABLET: 24; 26 TABLET, FILM COATED ORAL at 08:04

## 2023-04-02 RX ADMIN — IPRATROPIUM BROMIDE AND ALBUTEROL SULFATE 3 ML: 2.5; .5 SOLUTION RESPIRATORY (INHALATION) at 01:04

## 2023-04-02 RX ADMIN — LORAZEPAM 1 MG: 2 INJECTION INTRAMUSCULAR; INTRAVENOUS at 12:04

## 2023-04-02 RX ADMIN — IPRATROPIUM BROMIDE AND ALBUTEROL SULFATE 3 ML: 2.5; .5 SOLUTION RESPIRATORY (INHALATION) at 08:04

## 2023-04-02 RX ADMIN — RIVAROXABAN 20 MG: 10 TABLET, FILM COATED ORAL at 04:04

## 2023-04-02 RX ADMIN — ISOSORBIDE MONONITRATE 30 MG: 30 TABLET, EXTENDED RELEASE ORAL at 08:04

## 2023-04-02 RX ADMIN — BUSPIRONE HYDROCHLORIDE 5 MG: 5 TABLET ORAL at 09:04

## 2023-04-02 RX ADMIN — IPRATROPIUM BROMIDE AND ALBUTEROL SULFATE 3 ML: 2.5; .5 SOLUTION RESPIRATORY (INHALATION) at 07:04

## 2023-04-02 RX ADMIN — SACUBITRIL AND VALSARTAN 1 TABLET: 24; 26 TABLET, FILM COATED ORAL at 09:04

## 2023-04-02 RX ADMIN — CARVEDILOL 12.5 MG: 12.5 TABLET, FILM COATED ORAL at 09:04

## 2023-04-02 RX ADMIN — SPIRONOLACTONE 25 MG: 25 TABLET ORAL at 08:04

## 2023-04-02 RX ADMIN — Medication 6 MG: at 11:04

## 2023-04-02 RX ADMIN — ACETAMINOPHEN 650 MG: 325 TABLET ORAL at 08:04

## 2023-04-02 RX ADMIN — FUROSEMIDE 40 MG: 40 TABLET ORAL at 08:04

## 2023-04-02 RX ADMIN — EMPAGLIFLOZIN 10 MG: 10 TABLET, FILM COATED ORAL at 08:04

## 2023-04-02 RX ADMIN — BUSPIRONE HYDROCHLORIDE 5 MG: 5 TABLET ORAL at 02:04

## 2023-04-02 NOTE — SUBJECTIVE & OBJECTIVE
Interval History: Patient seen and examined.     Review of Systems   Constitutional:  Positive for activity change. Negative for appetite change, chills, fatigue and fever.   HENT:  Negative for mouth sores, sneezing and sore throat.    Eyes:  Negative for visual disturbance.   Respiratory:  Positive for cough (intermittent dry) and shortness of breath (improving). Negative for chest tightness, wheezing and stridor.    Cardiovascular:  Positive for leg swelling. Negative for chest pain and palpitations.   Gastrointestinal:  Negative for abdominal pain, blood in stool, constipation, diarrhea, nausea and vomiting.   Musculoskeletal:  Positive for arthralgias, gait problem and joint swelling. Negative for neck pain and neck stiffness.   Skin:  Negative for color change and pallor.   Neurological:  Positive for weakness. Negative for dizziness, seizures, syncope, facial asymmetry, speech difficulty, light-headedness, numbness and headaches.   Psychiatric/Behavioral:  Negative for agitation, confusion, decreased concentration, dysphoric mood and sleep disturbance. The patient is not nervous/anxious.    Objective:     Vital Signs (Most Recent):  Temp: 97.6 °F (36.4 °C) (04/02/23 0816)  Pulse: 73 (04/02/23 0817)  Resp: (!) 31 (04/02/23 0817)  BP: (!) 153/72 (04/02/23 0817)  SpO2: 96 % (04/02/23 0817)   Vital Signs (24h Range):  Temp:  [97.6 °F (36.4 °C)-97.9 °F (36.6 °C)] 97.6 °F (36.4 °C)  Pulse:  [56-77] 73  Resp:  [24-35] 31  SpO2:  [91 %-97 %] 96 %  BP: (118-154)/() 153/72     Weight: 132.9 kg (293 lb)  Body mass index is 50.29 kg/m².    Intake/Output Summary (Last 24 hours) at 4/2/2023 0820  Last data filed at 4/2/2023 0537  Gross per 24 hour   Intake 1380 ml   Output 4300 ml   Net -2920 ml        Physical Exam  Vitals and nursing note reviewed.   Constitutional:       General: She is not in acute distress.     Appearance: She is obese. She is not toxic-appearing or diaphoretic.   HENT:      Right Ear: External  ear normal.      Left Ear: External ear normal.      Nose: No congestion or rhinorrhea.      Mouth/Throat:      Mouth: Mucous membranes are dry.      Pharynx: Oropharynx is clear. No oropharyngeal exudate or posterior oropharyngeal erythema.   Eyes:      Extraocular Movements: Extraocular movements intact.   Neck:      Comments: Thick skin folds  Cardiovascular:      Rate and Rhythm: Normal rate. Rhythm irregular.      Heart sounds: No murmur heard.  Pulmonary:      Effort: No respiratory distress.      Breath sounds: No rhonchi or rales.      Comments: On bipap - coarse bs ellen - with improvement in airflow  Chest:      Chest wall: No tenderness.   Abdominal:      Palpations: Abdomen is soft.      Tenderness: There is no abdominal tenderness.      Comments: Limited secondary to body habitus   Genitourinary:     Comments: [place] with pinkish urine  Musculoskeletal:         General: Swelling present.      Cervical back: Neck supple. No rigidity.      Right lower leg: Edema (+2 pitting, loss of bony features in toes and dorsum of foot bilaterally) present.      Left lower leg: Edema present.      Comments: Skin much more relaxed   Skin:     General: Skin is warm and dry.      Capillary Refill: Capillary refill takes less than 2 seconds.      Coloration: Skin is not jaundiced.      Findings: No bruising, erythema, lesion or rash.   Neurological:      Mental Status: She is alert and oriented to person, place, and time. Mental status is at baseline.      Comments: Baseline requires walker for ambulation   Psychiatric:         Behavior: Behavior is uncooperative and agitated.     Significant Labs: All pertinent labs within the past 24 hours have been reviewed.  Blood Culture: No results for input(s): LABBLOO in the last 48 hours.  BMP:   Recent Labs   Lab 04/02/23  0339   *      K 3.5      CO2 34*   BUN 29*   CREATININE 1.2   CALCIUM 9.1   MG 2.1       CBC:   Recent Labs   Lab 04/02/23  0339   WBC 8.23    HGB 14.7   HCT 46.1          CMP:   Recent Labs   Lab 04/01/23  0412 04/02/23  0339    143   K 4.1 3.5    106   CO2 33* 34*    111*   BUN 28* 29*   CREATININE 1.2 1.2   CALCIUM 8.9 9.1   PROT 5.8* 5.8*   ALBUMIN 2.4* 2.5*   BILITOT 0.4 0.5   ALKPHOS 120 124   AST 19 16   ALT 34 29   ANIONGAP 2* 3*       Magnesium:   Recent Labs   Lab 04/01/23  0412 04/02/23  0339   MG 2.1 2.1       Significant Imaging: I have reviewed all pertinent imaging results/findings within the past 24 hours.

## 2023-04-02 NOTE — ASSESSMENT & PLAN NOTE
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 CHF is currently uncontrolled due to Continued edema of extremities and Hepatic congestion/ascites, Dyspnea not returned to baseline after 2 doses of IV diuretic and Rales/crackles on pulmonary exam. Latest ECHO performed and demonstrates- No results found for this or any previous visit.  . Continue Beta Blocker, Furosemide and Nitrate/Vasodilator and monitor clinical status closely. Monitor on telemetry. Monitor strict Is&Os and daily weights.  Place on fluid restriction of 1.5 L. Continue to stress to patient importance of self efficacy and  on diet for CHF. Last BNP reviewed- and noted below No results for input(s): BNP, BNPTRIAGEBLO in the last 168 hours.   Lab Results   Component Value Date    NTPROBNP 75513 (H) 03/29/2023   - per cardiology, echo cardiogram with systolic and diastolic dysfunction with pulmonary hypertension  - continue with IV lasix, BiPAP QHS  4/1 diuresing well continue Lasix, continue blood pressure control  4/2 a should diuresing well continue Lasix

## 2023-04-02 NOTE — ASSESSMENT & PLAN NOTE
Patient has a current diagnosis of Hypertensive emergency with end organ damage evidenced by hypertensive encephalopathy and acute pulmonary edema without heart failure which is uncontrolled.  Latest blood pressure and vitals reviewed-   Temp:  [97.6 °F (36.4 °C)-97.9 °F (36.6 °C)]   Pulse:  [56-77]   Resp:  [24-35]   BP: (118-154)/()   SpO2:  [91 %-97 %] .   Patient currently on IV antihypertensives.   Home meds for hypertension were reviewed and noted below.   Hypertension Medications             furosemide (LASIX) 40 MG tablet Take 1 tablet (40 mg total) by mouth 2 (two) times daily. If having increased shortness of breath, increase in leg swelling, and if you notice an >3lbs in 24 hours. May take up to 4 tablets in 1 day (every 6 hours)    isosorbide mononitrate (IMDUR) 60 MG 24 hr tablet Take 1 tablet (60 mg total) by mouth once daily.    lisinopril (PRINIVIL,ZESTRIL) 5 MG tablet Take 1 tablet (5 mg total) by mouth once daily.    metoprolol tartrate (LOPRESSOR) 25 MG tablet Take 1 tablet (25 mg total) by mouth 2 (two) times daily.      Will aim for controlled BP reduction by medications noted above. Monitor and mitigate end organ damage as indicated.  Follow up cardiology consult appreciated.   3/31 Lisinopril discontinued for start of Entresto  - continue nitrate, hydralazine, carvedilol  - tridil gtt PRN  4/1 blood pressure has improved, appreciate cardiology's help  4/2 blood pressures have improved continue current medications  4/3 on Entresto, spironolactone, carvedilol, nifedipime, hydralazine, lasix

## 2023-04-02 NOTE — ASSESSMENT & PLAN NOTE
Temazepam 15 mg QHS use, unclear consistency since 1/13/23 with last fill of 30 tablets on 2/28/23.   - ativan 1g QHS  - monitor symptoms of withdrawal  4/2 due to continued anxiety will add BuSpar 3 times a day

## 2023-04-02 NOTE — PLAN OF CARE
Pt awake and oriented - moving better in bed - sat up on side of bed to eat lunch - able to stay off bipap for wili 2 hours - tolerated - sr no ectopy - pulses present -  abdomen soft bs x 4 - hannon catheter draining clear yellow/pink urine - dr du aware of color - iv x 2 saline locked - max temp 97.5 - no complaints of pain or distress - will continue to monitor

## 2023-04-02 NOTE — ASSESSMENT & PLAN NOTE
Patient's FSGs are controlled on current medication regimen.  Last A1c reviewed-   Lab Results   Component Value Date    HGBA1C 5.5 03/29/2023     Most recent fingerstick glucose reviewed-   Recent Labs   Lab 04/01/23  1107 04/01/23  1633 04/01/23 2037   POCTGLUCOSE 93 101 110     Current correctional scale  Low  Maintain anti-hyperglycemic dose as follows-   Antihyperglycemics (From admission, onward)    Start     Stop Route Frequency Ordered    03/31/23 0106  insulin aspart U-100 pen 0-5 Units         -- SubQ Before meals & nightly PRN 03/31/23 0008    03/30/23 1700  empagliflozin 10 mg tablet 10 mg         -- Oral Daily 03/30/23 1345        Per cardiology;   3/31 Jardiance 10 mg daily  4/1 continue current med

## 2023-04-02 NOTE — PLAN OF CARE
"Patient"s O2 sat where 93-94% all night. She did not rest  well last night, she even received ativan. Her urine has some blood , sediment noted, its pinkish orange. Urinary out put is good. Patient seems to be breathing better auscultated exp. Wheeze. Her VSS, B/P has been steady, not as labile.     "

## 2023-04-02 NOTE — ASSESSMENT & PLAN NOTE
Patient with Long standing persistent (>12 months) atrial fibrillation which is controlled currently with Beta Blocker. Patient is currently in atrial fibrillation.[id number] Score: 3. Anticoagulation indicated. Anticoagulation done with Xarelto.

## 2023-04-02 NOTE — EICU
Intervention Initiated From:  [facility] / EICU    Elliott intervened regarding:  Documentation    Nurse Notified:  No    Doctor Notified:  No    Comments: video rounding complete, appears asleep, bipap on/in place, no critical care drips at this time, NAD, VSS

## 2023-04-02 NOTE — EICU
Intervention Initiated From:  [facility] / GLENNU    Elliott intervened regarding:  Rounding (Video assessment)    Nurse Notified:  No    Doctor Notified:  No    Comments: Video rounding completed. BIPAP 40% O2 in place, sats 97%. Gives thumbs up when asked if everything is well. VSS. NAD.

## 2023-04-02 NOTE — ASSESSMENT & PLAN NOTE
Patient with Hypercapnic and Hypoxic Respiratory failure which is Acute on chronic.  she is on home oxygen at 2 LPM. Supplemental oxygen was provided and noted- Oxygen Concentration (%):  [40] 40    Signs/symptoms of respiratory failure include- tachypnea, increased work of breathing, use of accessory muscles and wheezing. Contributing diagnoses includes - CHF, COPD, Obesity Hypoventilation and Pleural effusion Labs and images were reviewed. Patient Has recent ABG, which has been reviewed. Will treat underlying causes and adjust management of respiratory failure as follows- IV lasix, supplemental oxygen, continuous BiPAP  3/31: IV lasix 40 mg Q6H x 2 doses  4/1 improving daily, continue diuresis with Lasix, continue BiPAP therapy and will transition to O2 therapy as tolerates  4/2 continue current treatment, continue diuresis with Lasix; continue to offer nasal cannula as tolerates for oxygen options  4/3 transitioned to PO lasix 40 mg BID, continue BiPAP I/E 14/6 FiO2 60%

## 2023-04-02 NOTE — ASSESSMENT & PLAN NOTE
Improving with maintaining SpO2 >95% when transitioned to nasal cannula, but patient exhibiting increased agitation and hyperventilation when off BiPAP mask and machine.   Patient medication list not updated, but local [facility] verifies intermittent use of Temazepam 15 mg QHS for insomnia.  - will start ativan PRN and monitor symptoms of withdrawal  4/1 continues to improve daily  4/3 complaint of increased agitation and confusion, ABG shows retention, BiPAP settings adjusted

## 2023-04-03 PROBLEM — R31.0 GROSS HEMATURIA: Status: ACTIVE | Noted: 2023-04-03

## 2023-04-03 LAB
ALBUMIN SERPL BCP-MCNC: 2.5 G/DL (ref 3.5–5.2)
ALP SERPL-CCNC: 120 U/L (ref 55–135)
ALT SERPL W/O P-5'-P-CCNC: 25 U/L (ref 10–44)
ANION GAP SERPL CALC-SCNC: -1 MMOL/L (ref 8–16)
AST SERPL-CCNC: 15 U/L (ref 10–40)
BACTERIA #/AREA URNS HPF: NEGATIVE /HPF
BASOPHILS # BLD AUTO: 0.02 K/UL (ref 0–0.2)
BASOPHILS NFR BLD: 0.3 % (ref 0–1.9)
BILIRUB SERPL-MCNC: 0.5 MG/DL (ref 0.1–1)
BILIRUB UR QL STRIP: ABNORMAL
BIPAP: ABNORMAL
BUN SERPL-MCNC: 29 MG/DL (ref 8–23)
CALCIUM SERPL-MCNC: 9.4 MG/DL (ref 8.7–10.5)
CHLORIDE SERPL-SCNC: 106 MMOL/L (ref 95–110)
CLARITY UR: ABNORMAL
CO2 SERPL-SCNC: 38 MMOL/L (ref 23–29)
COLOR UR: ABNORMAL
COMMENTS: ABNORMAL
CREAT SERPL-MCNC: 1.3 MG/DL (ref 0.5–1.4)
DIFFERENTIAL METHOD: ABNORMAL
EOSINOPHIL # BLD AUTO: 0 K/UL (ref 0–0.5)
EOSINOPHIL NFR BLD: 0 % (ref 0–8)
ERYTHROCYTE [DISTWIDTH] IN BLOOD BY AUTOMATED COUNT: 17.8 % (ref 11.5–14.5)
EST. GFR  (NO RACE VARIABLE): 43.7 ML/MIN/1.73 M^2
FIO2: 60 %
FIO2: 60 %
GLUCOSE SERPL-MCNC: 118 MG/DL (ref 70–110)
GLUCOSE UR QL STRIP: ABNORMAL
HCT VFR BLD AUTO: 52.5 % (ref 37–48.5)
HGB BLD-MCNC: 16.4 G/DL (ref 12–16)
HGB UR QL STRIP: ABNORMAL
HYALINE CASTS #/AREA URNS LPF: 0 /LPF
IMM GRANULOCYTES # BLD AUTO: 0.02 K/UL (ref 0–0.04)
IMM GRANULOCYTES NFR BLD AUTO: 0.3 % (ref 0–0.5)
KETONES UR QL STRIP: NEGATIVE
LEUKOCYTE ESTERASE UR QL STRIP: ABNORMAL
LYMPHOCYTES # BLD AUTO: 1.4 K/UL (ref 1–4.8)
LYMPHOCYTES NFR BLD: 20.6 % (ref 18–48)
Lab: ABNORMAL
Lab: ABNORMAL
MAGNESIUM SERPL-MCNC: 2.5 MG/DL (ref 1.6–2.6)
MCH RBC QN AUTO: 29.1 PG (ref 27–31)
MCHC RBC AUTO-ENTMCNC: 31.2 G/DL (ref 32–36)
MCV RBC AUTO: 93 FL (ref 82–98)
MICROSCOPIC COMMENT: ABNORMAL
MODIFIED ALLEN'S TEST: ABNORMAL
MODIFIED ALLEN'S TEST: POSITIVE
MONOCYTES # BLD AUTO: 0.7 K/UL (ref 0.3–1)
MONOCYTES NFR BLD: 11.3 % (ref 4–15)
NEUTROPHILS # BLD AUTO: 4.4 K/UL (ref 1.8–7.7)
NEUTROPHILS NFR BLD: 67.5 % (ref 38–73)
NITRITE UR QL STRIP: NEGATIVE
NOTIFIED BY: ABNORMAL
NOTIFIED BY: ABNORMAL
NRBC BLD-RTO: 0 /100 WBC
O2DEVICE: ABNORMAL
O2DEVICE: ABNORMAL
PCO2 BLDA: 70 MMHG (ref 35–45)
PCO2 BLDA: 83 MMHG (ref 35–45)
PEEP: 8
PH SMN: 7.29 [PH] (ref 7.34–7.45)
PH SMN: 7.35 [PH] (ref 7.34–7.45)
PH UR STRIP: 5 [PH] (ref 5–8)
PLATELET # BLD AUTO: 247 K/UL (ref 150–450)
PMV BLD AUTO: 9.2 FL (ref 9.2–12.9)
PO2 BLDA: 54.8 MMHG (ref 80–100)
PO2 BLDA: 56.5 MMHG (ref 80–100)
POC BASE DEFICIT: 12.7 MMOL/L (ref -2–2)
POC BASE DEFICIT: 13.6 MMOL/L (ref -2–2)
POC HCO3: 32.4 MMOL/L (ref 24–28)
POC HCO3: 32.8 MMOL/L (ref 24–28)
POC NOTIFIED NOTE: ABNORMAL
POC PERFORMED BY: ABNORMAL
POC PERFORMED BY: ABNORMAL
POC SATURATED O2: 85.8 % (ref 95–100)
POC SATURATED O2: 87.5 % (ref 95–100)
POC TEMPERATURE: 37 C
POC TEMPERATURE: 37 C
POCT GLUCOSE: 103 MG/DL (ref 70–110)
POCT GLUCOSE: 95 MG/DL (ref 70–110)
POCT GLUCOSE: 98 MG/DL (ref 70–110)
POTASSIUM SERPL-SCNC: 3.9 MMOL/L (ref 3.5–5.1)
PRESSURE SUPPORT: 10
PROT SERPL-MCNC: 5.9 G/DL (ref 6–8.4)
PROT UR QL STRIP: ABNORMAL
PROVIDER NOTIFIED: ABNORMAL
PROVIDER NOTIFIED: ABNORMAL
RBC # BLD AUTO: 5.63 M/UL (ref 4–5.4)
RBC #/AREA URNS HPF: >100 /HPF (ref 0–4)
SODIUM SERPL-SCNC: 143 MMOL/L (ref 136–145)
SP GR UR STRIP: 1.02 (ref 1–1.03)
SPECIMEN SOURCE: ABNORMAL
SPECIMEN SOURCE: ABNORMAL
SQUAMOUS #/AREA URNS HPF: 3 /HPF
URATE SERPL-MCNC: 5.7 MG/DL (ref 2.4–5.7)
URN SPEC COLLECT METH UR: ABNORMAL
UROBILINOGEN UR STRIP-ACNC: 1 EU/DL
VT: 325
WBC # BLD AUTO: 6.56 K/UL (ref 3.9–12.7)
WBC #/AREA URNS HPF: 9 /HPF (ref 0–5)
YEAST URNS QL MICRO: ABNORMAL

## 2023-04-03 PROCEDURE — S0166 INJ OLANZAPINE 2.5MG: HCPCS | Performed by: STUDENT IN AN ORGANIZED HEALTH CARE EDUCATION/TRAINING PROGRAM

## 2023-04-03 PROCEDURE — 27000492 HC SLEEVE, SCD T/L

## 2023-04-03 PROCEDURE — 25000003 PHARM REV CODE 250: Performed by: INTERNAL MEDICINE

## 2023-04-03 PROCEDURE — 84550 ASSAY OF BLOOD/URIC ACID: CPT | Performed by: STUDENT IN AN ORGANIZED HEALTH CARE EDUCATION/TRAINING PROGRAM

## 2023-04-03 PROCEDURE — 81000 URINALYSIS NONAUTO W/SCOPE: CPT | Performed by: STUDENT IN AN ORGANIZED HEALTH CARE EDUCATION/TRAINING PROGRAM

## 2023-04-03 PROCEDURE — 94660 CPAP INITIATION&MGMT: CPT

## 2023-04-03 PROCEDURE — 27000190 HC CPAP FULL FACE MASK W/VALVE

## 2023-04-03 PROCEDURE — 80053 COMPREHEN METABOLIC PANEL: CPT | Performed by: STUDENT IN AN ORGANIZED HEALTH CARE EDUCATION/TRAINING PROGRAM

## 2023-04-03 PROCEDURE — 99900035 HC TECH TIME PER 15 MIN (STAT)

## 2023-04-03 PROCEDURE — 87150 DNA/RNA AMPLIFIED PROBE: CPT | Mod: 59 | Performed by: STUDENT IN AN ORGANIZED HEALTH CARE EDUCATION/TRAINING PROGRAM

## 2023-04-03 PROCEDURE — 99233 SBSQ HOSP IP/OBS HIGH 50: CPT | Mod: ,,, | Performed by: STUDENT IN AN ORGANIZED HEALTH CARE EDUCATION/TRAINING PROGRAM

## 2023-04-03 PROCEDURE — 83735 ASSAY OF MAGNESIUM: CPT | Performed by: STUDENT IN AN ORGANIZED HEALTH CARE EDUCATION/TRAINING PROGRAM

## 2023-04-03 PROCEDURE — 99233 PR SUBSEQUENT HOSPITAL CARE,LEVL III: ICD-10-PCS | Mod: ,,, | Performed by: STUDENT IN AN ORGANIZED HEALTH CARE EDUCATION/TRAINING PROGRAM

## 2023-04-03 PROCEDURE — 25000242 PHARM REV CODE 250 ALT 637 W/ HCPCS: Performed by: STUDENT IN AN ORGANIZED HEALTH CARE EDUCATION/TRAINING PROGRAM

## 2023-04-03 PROCEDURE — 85025 COMPLETE CBC W/AUTO DIFF WBC: CPT | Performed by: INTERNAL MEDICINE

## 2023-04-03 PROCEDURE — 20000000 HC ICU ROOM

## 2023-04-03 PROCEDURE — 87040 BLOOD CULTURE FOR BACTERIA: CPT | Performed by: STUDENT IN AN ORGANIZED HEALTH CARE EDUCATION/TRAINING PROGRAM

## 2023-04-03 PROCEDURE — 36410 VNPNXR 3YR/> PHY/QHP DX/THER: CPT

## 2023-04-03 PROCEDURE — 36600 WITHDRAWAL OF ARTERIAL BLOOD: CPT

## 2023-04-03 PROCEDURE — 63600175 PHARM REV CODE 636 W HCPCS: Performed by: STUDENT IN AN ORGANIZED HEALTH CARE EDUCATION/TRAINING PROGRAM

## 2023-04-03 PROCEDURE — C1751 CATH, INF, PER/CENT/MIDLINE: HCPCS

## 2023-04-03 PROCEDURE — 99900031 HC PATIENT EDUCATION (STAT)

## 2023-04-03 PROCEDURE — 25000242 PHARM REV CODE 250 ALT 637 W/ HCPCS: Performed by: INTERNAL MEDICINE

## 2023-04-03 PROCEDURE — 94761 N-INVAS EAR/PLS OXIMETRY MLT: CPT

## 2023-04-03 PROCEDURE — 82803 BLOOD GASES ANY COMBINATION: CPT

## 2023-04-03 PROCEDURE — 94640 AIRWAY INHALATION TREATMENT: CPT

## 2023-04-03 PROCEDURE — 27000221 HC OXYGEN, UP TO 24 HOURS

## 2023-04-03 PROCEDURE — 87086 URINE CULTURE/COLONY COUNT: CPT | Performed by: STUDENT IN AN ORGANIZED HEALTH CARE EDUCATION/TRAINING PROGRAM

## 2023-04-03 PROCEDURE — 36415 COLL VENOUS BLD VENIPUNCTURE: CPT | Performed by: STUDENT IN AN ORGANIZED HEALTH CARE EDUCATION/TRAINING PROGRAM

## 2023-04-03 PROCEDURE — 25000003 PHARM REV CODE 250: Performed by: STUDENT IN AN ORGANIZED HEALTH CARE EDUCATION/TRAINING PROGRAM

## 2023-04-03 RX ORDER — BISACODYL 10 MG
10 SUPPOSITORY, RECTAL RECTAL DAILY
Status: DISCONTINUED | OUTPATIENT
Start: 2023-04-03 | End: 2023-04-07

## 2023-04-03 RX ORDER — OLANZAPINE 10 MG/2ML
2.5 INJECTION, POWDER, FOR SOLUTION INTRAMUSCULAR ONCE AS NEEDED
Status: COMPLETED | OUTPATIENT
Start: 2023-04-03 | End: 2023-04-03

## 2023-04-03 RX ORDER — SODIUM CHLORIDE 9 MG/ML
INJECTION, SOLUTION INTRAVENOUS CONTINUOUS
Status: DISCONTINUED | OUTPATIENT
Start: 2023-04-03 | End: 2023-04-05

## 2023-04-03 RX ORDER — MAG HYDROX/ALUMINUM HYD/SIMETH 200-200-20
30 SUSPENSION, ORAL (FINAL DOSE FORM) ORAL EVERY 6 HOURS PRN
Status: DISCONTINUED | OUTPATIENT
Start: 2023-04-03 | End: 2023-04-13 | Stop reason: HOSPADM

## 2023-04-03 RX ORDER — AMOXICILLIN 250 MG
2 CAPSULE ORAL ONCE
Status: COMPLETED | OUTPATIENT
Start: 2023-04-03 | End: 2023-04-03

## 2023-04-03 RX ORDER — VANCOMYCIN 2 GRAM/500 ML IN 0.9 % SODIUM CHLORIDE INTRAVENOUS
2000 ONCE
Status: DISCONTINUED | OUTPATIENT
Start: 2023-04-03 | End: 2023-04-03

## 2023-04-03 RX ORDER — PANTOPRAZOLE SODIUM 40 MG/1
40 TABLET, DELAYED RELEASE ORAL DAILY
Status: DISCONTINUED | OUTPATIENT
Start: 2023-04-03 | End: 2023-04-04

## 2023-04-03 RX ORDER — POLYETHYLENE GLYCOL 3350 17 G/17G
17 POWDER, FOR SOLUTION ORAL ONCE
Status: COMPLETED | OUTPATIENT
Start: 2023-04-03 | End: 2023-04-03

## 2023-04-03 RX ADMIN — SODIUM CHLORIDE: 9 INJECTION, SOLUTION INTRAVENOUS at 06:04

## 2023-04-03 RX ADMIN — CARVEDILOL 12.5 MG: 12.5 TABLET, FILM COATED ORAL at 09:04

## 2023-04-03 RX ADMIN — SPIRONOLACTONE 25 MG: 25 TABLET ORAL at 09:04

## 2023-04-03 RX ADMIN — MUPIROCIN: 20 OINTMENT TOPICAL at 09:04

## 2023-04-03 RX ADMIN — ALUMINUM HYDROXIDE, MAGNESIUM HYDROXIDE, AND SIMETHICONE 30 ML: 200; 200; 20 SUSPENSION ORAL at 12:04

## 2023-04-03 RX ADMIN — SODIUM CHLORIDE 250 ML: 9 INJECTION, SOLUTION INTRAVENOUS at 11:04

## 2023-04-03 RX ADMIN — ISOSORBIDE MONONITRATE 30 MG: 30 TABLET, EXTENDED RELEASE ORAL at 09:04

## 2023-04-03 RX ADMIN — POLYETHYLENE GLYCOL (3350) 17 G: 17 POWDER, FOR SOLUTION ORAL at 12:04

## 2023-04-03 RX ADMIN — BUSPIRONE HYDROCHLORIDE 5 MG: 5 TABLET ORAL at 03:04

## 2023-04-03 RX ADMIN — SACUBITRIL AND VALSARTAN 1 TABLET: 24; 26 TABLET, FILM COATED ORAL at 09:04

## 2023-04-03 RX ADMIN — FUROSEMIDE 40 MG: 40 TABLET ORAL at 09:04

## 2023-04-03 RX ADMIN — IPRATROPIUM BROMIDE AND ALBUTEROL SULFATE 3 ML: 2.5; .5 SOLUTION RESPIRATORY (INHALATION) at 07:04

## 2023-04-03 RX ADMIN — FUROSEMIDE 40 MG: 40 TABLET ORAL at 05:04

## 2023-04-03 RX ADMIN — HYDRALAZINE HYDROCHLORIDE 25 MG: 25 TABLET, FILM COATED ORAL at 05:04

## 2023-04-03 RX ADMIN — ACETAMINOPHEN 650 MG: 325 TABLET ORAL at 10:04

## 2023-04-03 RX ADMIN — OLANZAPINE 2.5 MG: 10 INJECTION, POWDER, FOR SOLUTION INTRAMUSCULAR at 09:04

## 2023-04-03 RX ADMIN — LORAZEPAM 1 MG: 2 INJECTION INTRAMUSCULAR; INTRAVENOUS at 12:04

## 2023-04-03 RX ADMIN — GABAPENTIN 300 MG: 300 CAPSULE ORAL at 09:04

## 2023-04-03 RX ADMIN — PANTOPRAZOLE SODIUM 40 MG: 40 TABLET, DELAYED RELEASE ORAL at 09:04

## 2023-04-03 RX ADMIN — EMPAGLIFLOZIN 10 MG: 10 TABLET, FILM COATED ORAL at 09:04

## 2023-04-03 RX ADMIN — BUSPIRONE HYDROCHLORIDE 5 MG: 5 TABLET ORAL at 09:04

## 2023-04-03 RX ADMIN — IPRATROPIUM BROMIDE AND ALBUTEROL SULFATE 3 ML: 2.5; .5 SOLUTION RESPIRATORY (INHALATION) at 01:04

## 2023-04-03 RX ADMIN — BISACODYL 10 MG: 10 SUPPOSITORY RECTAL at 12:04

## 2023-04-03 RX ADMIN — SENNOSIDES AND DOCUSATE SODIUM 2 TABLET: 50; 8.6 TABLET ORAL at 12:04

## 2023-04-03 RX ADMIN — NIFEDIPINE 60 MG: 30 TABLET, FILM COATED, EXTENDED RELEASE ORAL at 09:04

## 2023-04-03 NOTE — CARE UPDATE
04/03/23 0321   PRE-TX-O2   Oxygen Concentration (%) (S)  60  (increased fio2 do to spo2 decreasing below 90%)

## 2023-04-03 NOTE — EICU
Intervention Initiated From:  [facility] / EICU    Elliott intervened regarding:  Rounding (Video assessment)    Nurse Notified:  No    Doctor Notified:  No    Comments: Video rounding completed. No infusions at present. BIPAP w/ 40% O2 in place, sats 93%. VSS. Patient waves when asked if everything is going well. NAD.

## 2023-04-03 NOTE — PLAN OF CARE
"[facility] - Intensive Care  Initial Discharge Assessment       Primary Care Provider: Lucian Barry MD    Admission Diagnosis: Shortness of breath [R06.02]  SOB (shortness of breath) [R06.02]  Pulmonary edema cardiac cause [I50.1]  Acute respiratory failure with hypoxia [J96.01]  Hypertensive emergency [I16.1]    Admission Date: 3/29/2023  Expected Discharge Date:     Discharge Barriers Identified: Social (The patient assist in the care of her elderly .)    Payor: [employer] MEDICARE / Plan: [employer] MEDICARE HMO / Product Type: Capitation /     Extended Emergency Contact Information  Primary Emergency Contact: Esther Tan   [facility]  Home Phone: [phone]  Relation: Daughter    Discharge Plan A: Home with family, Home Health  Discharge Plan B: Other (TBD)      [id number] - [facility] [address] & [facility]  [address] [address]  Phone: [phone] Fax: [phone]      Initial Assessment (most recent)       Adult Discharge Assessment - 03/31/23 0736          Discharge Assessment    Assessment Type Discharge Planning Assessment     Confirmed/corrected address, phone number and insurance Yes     Confirmed Demographics Correct on Facesheet     Source of Information patient     When was your last doctors appointment? --   The patient was unsure of her last doctor's appontment. She stated between "November or December."    Reason For Admission Acute respiratory failure with hypoxia     People in Home child(edmar), adult;spouse     Do you expect to return to your current living situation? Yes     Do you have help at home or someone to help you manage your care at home? Yes   The patient does not have a caregiver, but she does have children who are able to assist her as needed.    Prior to hospitilization cognitive status: Alert/Oriented     Current cognitive status: Alert/Oriented     Walking or " Climbing Stairs ambulation difficulty, requires equipment;stair climbing difficulty, assistance 1 person     Mobility Management rollator     Dressing/Bathing bathing difficulty, assistance 1 person;dressing difficulty, assistance 1 person   The patient stated that her daughter assist her with her bathing.    Do you have any problems with: Needs other help;Errands/Grocery   The patient's family assist her with her groceries.    Home Accessibility other (see comments)   The patient stated that she has a ramp attached to her mobile home.    Home Layout Able to live on 1st floor     Equipment Currently Used at Home oxygen;CPAP;rollator     Readmission within 30 days? No     Patient currently being followed by outpatient case management? No     Do you currently have service(s) that help you manage your care at home? No     Do you take prescription medications? Yes     Do you have prescription coverage? Yes     Coverage [employer] MANAGED MEDICARE - [employer] MEDICARE HMO     Is the patient taking medications as prescribed? yes     Who is going to help you get home at discharge? family     How do you get to doctors appointments? family or friend will provide     Discharge Plan A Home with family;Home Health     Discharge Plan B Other   TBD    DME Needed Upon Discharge  other (see comments)   TBD    Discharge Plan discussed with: Patient     Discharge Barriers Identified Social   The patient assist in the care of her elderly .                Initial discharge assessment completed. At this time, the patient does not require any assistance from case management. CM/SW will remain available. Contact information was left in the patient's room.

## 2023-04-03 NOTE — NURSING
Called respiratory patient's O2 sat has been 90 88-90%, patient is asleep, HR in 70's RR in the 30's, no distress noted, FIO2 increased per RT.

## 2023-04-03 NOTE — ASSESSMENT & PLAN NOTE
4/3 Collecting blood tinged urine in Hardwick catheter. Hg/HCT stable. Holding xarelto. Start empiric antibiotics for UTI, f/u urine culture and blood cultures.

## 2023-04-03 NOTE — ASSESSMENT & PLAN NOTE
Body mass index is 50.29 kg/m². Morbid obesity complicates all aspects of disease management from diagnostic modalities to treatment. Weight loss encouraged and health benefits explained to patient.

## 2023-04-03 NOTE — PLAN OF CARE
Late entry: Spoke to the patient's daughter, Esther, on Friday regarding the patient's discharge plan of care. Esther expressed some concerns between her sibling, Alfonzo and the patient. She informed me that she does not agree with the way her brother talks to the patient. I had a conversation with the patient regarding her daughter's concerns, and the patient denies any form of abuse. I explained to the patient to please contact me if she has any questions or concerns. CM/SW will remain available.

## 2023-04-03 NOTE — PLAN OF CARE
Pt confused today.  Bipap in place.  Awaiting Midline nurse for midline placement.  Pt pulled IV's out today and unable to insert another one after multiple times.  Blood cultures X 2 collected and uric acid collected.  Pt bathed today.  Assessment per flow sheet.  Will monitor.

## 2023-04-03 NOTE — PROGRESS NOTES
Pharmacokinetic Initial Assessment: IV Vancomycin    Assessment/Plan:    Initiate intravenous vancomycin with loading dose of 2000 mg once followed by a maintenance dose of vancomycin 1000 mg IV every 24 hours  Desired empiric serum trough concentration is 15 to 20 mcg/mL  Draw vancomycin trough level 60 min prior to third dose on 4/5/23 at approximately 1100  Pharmacy will continue to follow and monitor vancomycin.      Please contact pharmacy at extension [phone] with any questions regarding this assessment.     Thank you for the consult,   DEANNE MOONEY       Patient brief summary:  Carmen Tan is a 73 y.o. female initiated on antimicrobial therapy with IV Vancomycin for treatment of suspected urinary tract infection    Drug Allergies:   Review of patient's allergies indicates:   Allergen Reactions    Pcn [penicillins] Swelling       Actual Body Weight:   132.9 kg ( Ideal wt: 54.7 kg)    Renal Function:   Estimated Creatinine Clearance: 53.1 mL/min (based on SCr of 1.3 mg/dL).,     Dialysis Method (if applicable):  N/A    CBC (last 72 hours):  Recent Labs   Lab Result Units 04/02/23  0339 04/03/23  0344   WBC K/uL 8.23 6.56   Hemoglobin g/dL 14.7 16.4*   Hematocrit % 46.1 52.5*   Platelets K/uL 212 247   Gran % % 68.8 67.5   Lymph % % 20.7 20.6   Mono % % 9.6 11.3   Eosinophil % % 0.1 0.0   Basophil % % 0.2 0.3   Differential Method  Automated Automated       Metabolic Panel (last 72 hours):  Recent Labs   Lab Result Units 04/01/23  0412 04/02/23  0339 04/03/23  0344 04/03/23  0632   Sodium mmol/L 141 143 143  --    Potassium mmol/L 4.1 3.5 3.9  --    Chloride mmol/L 106 106 106  --    CO2 mmol/L 33* 34* 38*  --    Glucose mg/dL 108 111* 118*  --    Glucose, UA   --   --   --  4+*   BUN mg/dL 28* 29* 29*  --    Creatinine mg/dL 1.2 1.2 1.3  --    Albumin g/dL 2.4* 2.5* 2.5*  --    Total Bilirubin mg/dL 0.4 0.5 0.5  --    Alkaline Phosphatase U/L 120 124 120  --    AST U/L 19 16 15  --    ALT U/L 34 29 25  --     Magnesium mg/dL 2.1 2.1 2.5  --        Drug levels (last 3 results):  No results for input(s): VANCOMYCINRA, VANCORANDOM, VANCOMYCINPE, VANCOPEAK, VANCOMYCINTR, VANCOTROUGH in the last 72 hours.    Microbiologic Results:  Microbiology Results (last 7 days)       Procedure Component Value Units Date/Time    Blood culture [[id number]]     Order Status: Sent Specimen: Blood     Blood culture [[id number]]     Order Status: Sent Specimen: Blood     Urine Culture High Risk [[id number]] Collected: 04/03/23 0631    Order Status: Sent Specimen: Urine, Catheterized Updated: 04/03/23 0653

## 2023-04-03 NOTE — PLAN OF CARE
Patient's pulmonary status has improved, she has been in ICU for a while and seems to be getting confused. She asked to take the BIPAP off and she is tolerating the N/C well at 5 LPM. Her U/O is still good, the hannon is draining blood tinged urine. VSS, afebrile.

## 2023-04-03 NOTE — ASSESSMENT & PLAN NOTE
4/3 continue with cardiology recommendations  Patient is identified as having Combined Systolic and Diastolic heart failure that is Acute on chronic. CHF is currently uncontrolled due to Continued edema of extremities, Dyspnea not returned to baseline after 2 doses of IV diuretic and Rales/crackles on pulmonary exam. Latest ECHO performed and demonstrates- Results for orders placed during the hospital encounter of 03/29/23  Echo  Interpretation Summary  · The left ventricle is normal in size with moderate concentric hypertrophy and mildly decreased systolic function.  · The estimated ejection fraction is 45%.  · Grade I left ventricular diastolic dysfunction.  · Moderate right ventricular enlargement.  · Moderate right atrial enlargement.  · Mild-to-moderate aortic regurgitation.  · There is mild aortic valve stenosis.  · Aortic valve area is 1.44 cm2; peak velocity is 2.09 m/s; mean gradient is 10 mmHg.  · Mild-to-moderate mitral regurgitation.  · Moderate tricuspid regurgitation.  · Moderate pulmonic regurgitation.  · The estimated PA systolic pressure is 69 mmHg.  · There is moderate pulmonary hypertension.  · Moderate left atrial enlargement.  . Continue Beta Blocker, ACE/ARB, Furosemide, Aldactone, Nitrate/Vasodilator and ARNI and monitor clinical status closely. Monitor on telemetry. Monitor strict Is&Os and daily weights.  Place on fluid restriction of 1.5 L. Continue to stress to patient importance of self efficacy and  on diet for CHF. Last BNP reviewed-  Lab Results   Component Value Date    NTPROBNP 58638 (H) 03/29/2023

## 2023-04-03 NOTE — PROGRESS NOTES
[facility]  Progress Note    Patient Name: Carmen Tan  MRN: [id number]  Patient Class: IP- Inpatient   Admission Date: 3/29/2023  Length of Stay: 4 days  Attending Physician: Gunnar Mott DO  Primary Care Provider: Lucian Barry MD    Subjective:     Principal Problem:Acute respiratory failure with hypoxia    HPI:  Chief Complaint   Patient presents with    Shortness of Breath       Per ems pt called 911 due to sob, when they arrived gave her a duoneb and moved her over and her blood pressure went up to 200/100's from an original reading of 140/80, solumedrol 125mg IM given, 4 sprays of nitro spray given, 1inch of nitropaste on pts chest, pt currently on cpap with an O2 sat of 100%      1101   Carmen Tan is a 73 y.o. female with PMHX of COPD, CHF, PE on Xarelto, morbid obesity who presents to the emergency department C/O shortness of breath.     Patient arrives by EMS. They report they were called because patient was acutely short of breath.  Patient was given a DuoNeb and being prepared to be taken to hospital. On repeat blood pressure check patient was markedly hypertensive.  Patient was given for nitro space, nitro patch, placed on CPAP.  She received 125mg solumedrol. On arrival to the emergency department patient is on CPAP and moderate respiratory distress.  Appears to be mentating normally and able to respond to questions appropriately however history limited due to respiratory distress.     PCP: Lucian Barry MD   3/30   Patient awake and alert. Diuresed well overnight with urine output of 1.8L. Patient endorses breathing better compared to time of arrival to ED. She has had a similar hospitalization over a year ago and have since not followed up with any of her specialists. 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   Shortness of breath has been worsening over the past week and her leg swellings have started to  worsen two weeks ago. She normally ambulates with a walker at home. She has a son who also lives with her to assist. Patient remains on nitroglycerin gtt with SBP still >160s mmHg. Patient to be admitted for hypertensive emergency with acute respiratory failure with hypoxia and hypercarbia, CHF exacerbation for continue IV diuresis, oxygen supplementation, and consult with cardiology.              Overview/Hospital Course:  3/31 Overnight due to increased blood pressure, tridil gtt back on. Patient awake and tolerating BiPAP I/E 14/6 FiO2 50%. Urine output of 5 L. Patient endorses tenseness in lower extremities improving with breathing better compared yesterday. BP adjusted per cardiology with added daily Jardiance. Continue with IV 40 mg BID lasix and monitor UOP and renal function.   4/1 ND patient is feeling a little better this morning, does report less heaviness to her legs which do show improvement in edema.  Patient had good diuresis overnight.  Off of all IV blood pressure meds, and blood pressure appears more stable  4/2 ND patient still endorses some shortness of breath especially when transitioned from BiPAP to nasal cannula O2 despite having good saturation.  Nurses reports she stays very anxious.  Patient has been diuresing well with improvement in blood pressures  4/3 Overnight increasing agitation and confusion, blood tinged urine collecting in Hardwick catheter. Given change in mentation; will hold xarelto, start empiric broad antibiotics to cover for UTI. F/u ultrasound. Over the weekend, patient diuresed over 8 liters. Remains tachypneic, ABG obtained show hypercarbia, BiPAP settings adjusted back to I/E 14/6 FiO2 60%, on nasal cannula patient maintaining SpO2 >98%. F/u blood cx x2, urine cx.       Interval History: Patient seen and examined.     Review of Systems   Constitutional:  Positive for activity change. Negative for appetite change, chills, fatigue and fever.   HENT:  Negative for mouth  sores, sneezing and sore throat.    Eyes:  Negative for visual disturbance.   Respiratory:  Positive for cough (intermittent dry) and shortness of breath (improving). Negative for chest tightness, wheezing and stridor.    Cardiovascular:  Positive for leg swelling. Negative for chest pain and palpitations.   Gastrointestinal:  Negative for abdominal pain, blood in stool, constipation, diarrhea, nausea and vomiting.   Musculoskeletal:  Positive for arthralgias, gait problem and joint swelling. Negative for neck pain and neck stiffness.   Skin:  Negative for color change and pallor.   Neurological:  Positive for weakness. Negative for dizziness, seizures, syncope, facial asymmetry, speech difficulty, light-headedness, numbness and headaches.   Psychiatric/Behavioral:  Negative for agitation, confusion, decreased concentration, dysphoric mood and sleep disturbance. The patient is not nervous/anxious.    Objective:     Vital Signs (Most Recent):  Temp: 97.6 °F (36.4 °C) (04/02/23 0816)  Pulse: 73 (04/02/23 0817)  Resp: (!) 31 (04/02/23 0817)  BP: (!) 153/72 (04/02/23 0817)  SpO2: 96 % (04/02/23 0817)   Vital Signs (24h Range):  Temp:  [97.6 °F (36.4 °C)-97.9 °F (36.6 °C)] 97.6 °F (36.4 °C)  Pulse:  [56-77] 73  Resp:  [24-35] 31  SpO2:  [91 %-97 %] 96 %  BP: (118-154)/() 153/72     Weight: 132.9 kg (293 lb)  Body mass index is 50.29 kg/m².    Intake/Output Summary (Last 24 hours) at 4/2/2023 0820  Last data filed at 4/2/2023 0537  Gross per 24 hour   Intake 1380 ml   Output 4300 ml   Net -2920 ml        Physical Exam  Vitals and nursing note reviewed.   Constitutional:       General: She is not in acute distress.     Appearance: She is obese. She is not toxic-appearing or diaphoretic.   HENT:      Right Ear: External ear normal.      Left Ear: External ear normal.      Nose: No congestion or rhinorrhea.      Mouth/Throat:      Mouth: Mucous membranes are dry.      Pharynx: Oropharynx is clear. No oropharyngeal  exudate or posterior oropharyngeal erythema.   Eyes:      Extraocular Movements: Extraocular movements intact.   Neck:      Comments: Thick skin folds  Cardiovascular:      Rate and Rhythm: Normal rate. Rhythm irregular.      Heart sounds: No murmur heard.  Pulmonary:      Effort: No respiratory distress.      Breath sounds: No rhonchi or rales.      Comments: On bipap - coarse bs ellen - with improvement in airflow  Chest:      Chest wall: No tenderness.   Abdominal:      Palpations: Abdomen is soft.      Tenderness: There is no abdominal tenderness.      Comments: Limited secondary to body habitus   Genitourinary:     Comments: Hardwick with pinkish urine  Musculoskeletal:         General: Swelling present.      Cervical back: Neck supple. No rigidity.      Right lower leg: Edema (+2 pitting, loss of bony features in toes and dorsum of foot bilaterally) present.      Left lower leg: Edema present.      Comments: Skin much more relaxed   Skin:     General: Skin is warm and dry.      Capillary Refill: Capillary refill takes less than 2 seconds.      Coloration: Skin is not jaundiced.      Findings: No bruising, erythema, lesion or rash.   Neurological:      Mental Status: She is alert and oriented to person, place, and time. Mental status is at baseline.      Comments: Baseline requires walker for ambulation   Psychiatric:         Behavior: Behavior is uncooperative and agitated.     Significant Labs: All pertinent labs within the past 24 hours have been reviewed.  Blood Culture: No results for input(s): LABBLOO in the last 48 hours.  BMP:   Recent Labs   Lab 04/02/23 0339   *      K 3.5      CO2 34*   BUN 29*   CREATININE 1.2   CALCIUM 9.1   MG 2.1       CBC:   Recent Labs   Lab 04/02/23 0339   WBC 8.23   HGB 14.7   HCT 46.1          CMP:   Recent Labs   Lab 04/01/23  0412 04/02/23 0339    143   K 4.1 3.5    106   CO2 33* 34*    111*   BUN 28* 29*   CREATININE 1.2 1.2    CALCIUM 8.9 9.1   PROT 5.8* 5.8*   ALBUMIN 2.4* 2.5*   BILITOT 0.4 0.5   ALKPHOS 120 124   AST 19 16   ALT 34 29   ANIONGAP 2* 3*       Magnesium:   Recent Labs   Lab 04/01/23  0412 04/02/23  0339   MG 2.1 2.1       Significant Imaging: I have reviewed all pertinent imaging results/findings within the past 24 hours.      Assessment/Plan:      * Acute respiratory failure with hypoxia  Patient with Hypercapnic and Hypoxic Respiratory failure which is Acute on chronic.  she is on home oxygen at 2 LPM. Supplemental oxygen was provided and noted- Oxygen Concentration (%):  [40] 40    Signs/symptoms of respiratory failure include- tachypnea, increased work of breathing, use of accessory muscles and wheezing. Contributing diagnoses includes - CHF, COPD, Obesity Hypoventilation and Pleural effusion Labs and images were reviewed. Patient Has recent ABG, which has been reviewed. Will treat underlying causes and adjust management of respiratory failure as follows- IV lasix, supplemental oxygen, continuous BiPAP  3/31: IV lasix 40 mg Q6H x 2 doses  4/1 improving daily, continue diuresis with Lasix, continue BiPAP therapy and will transition to O2 therapy as tolerates  4/2 continue current treatment, continue diuresis with Lasix; continue to offer nasal cannula as tolerates for oxygen options  4/3 transitioned to PO lasix 40 mg BID, repeat CXR with no worsening interval changes.   While on BiPAP I/E 14/6 FiO2 60%, worsening ABG readings of pH 7.29, PCO2 83, PO2 56 HCO3 32. Respiratory status complicated by pulmonary hypertension due to morbid obesity and associated hypoventilation syndrome. Patient will require non-invasive home ventilator on discharge. F/u ABG and adjust per respiratory.      Gross hematuria  4/3 Collecting blood tinged urine in Hardwick catheter. Hg/HCT stable. Holding xarelto. Start empiric antibiotics for UTI, f/u urine culture and blood cultures.        Mild temazepam use disorder  Temazepam 15 mg QHS use,  unclear consistency since 1/13/23 with last fill of 30 tablets on 2/28/23.   - ativan 1g QHS  - monitor symptoms of withdrawal  4/2 due to continued anxiety will add BuSpar 3 times a day    Hypertensive emergency  Patient has a current diagnosis of Hypertensive emergency with end organ damage evidenced by hypertensive encephalopathy and acute pulmonary edema without heart failure which is uncontrolled.  Latest blood pressure and vitals reviewed-   Temp:  [97.6 °F (36.4 °C)-97.9 °F (36.6 °C)]   Pulse:  [56-77]   Resp:  [24-35]   BP: (118-154)/()   SpO2:  [91 %-97 %] .   Patient currently on IV antihypertensives.   Home meds for hypertension were reviewed and noted below.   Hypertension Medications               furosemide (LASIX) 40 MG tablet Take 1 tablet (40 mg total) by mouth 2 (two) times daily. If having increased shortness of breath, increase in leg swelling, and if you notice an >3lbs in 24 hours. May take up to 4 tablets in 1 day (every 6 hours)    isosorbide mononitrate (IMDUR) 60 MG 24 hr tablet Take 1 tablet (60 mg total) by mouth once daily.    lisinopril (PRINIVIL,ZESTRIL) 5 MG tablet Take 1 tablet (5 mg total) by mouth once daily.    metoprolol tartrate (LOPRESSOR) 25 MG tablet Take 1 tablet (25 mg total) by mouth 2 (two) times daily.        Will aim for controlled BP reduction by medications noted above. Monitor and mitigate end organ damage as indicated.  Follow up cardiology consult appreciated.   3/31 Lisinopril discontinued for start of Entresto  - continue nitrate, hydralazine, carvedilol  - tridil gtt PRN  4/1 blood pressure has improved, appreciate cardiology's help  4/2 blood pressures have improved continue current medications  4/3 on Entresto, spironolactone, carvedilol, nifedipime, hydralazine, lasix    Pulmonary edema cardiac cause  Patient is identified as having Combined Systolic and Diastolic heart failure that is Acute on chronic. CHF is currently uncontrolled due to Continued  edema of extremities and Hepatic congestion/ascites, Dyspnea not returned to baseline after 2 doses of IV diuretic and Rales/crackles on pulmonary exam. Latest ECHO performed and demonstrates- No results found for this or any previous visit.  . Continue Beta Blocker, Furosemide and Nitrate/Vasodilator and monitor clinical status closely. Monitor on telemetry. Monitor strict Is&Os and daily weights.  Place on fluid restriction of 1.5 L. Continue to stress to patient importance of self efficacy and  on diet for CHF. Last BNP reviewed- and noted below No results for input(s): BNP, BNPTRIAGEBLO in the last 168 hours.   Lab Results   Component Value Date    NTPROBNP 85812 (H) 03/29/2023   - per cardiology, echo cardiogram with systolic and diastolic dysfunction with pulmonary hypertension  - continue with IV lasix, BiPAP QHS  4/1 diuresing well continue Lasix, continue blood pressure control  4/2 a should diuresing well continue Lasix    Shortness of breath  Improving with maintaining SpO2 >95% when transitioned to nasal cannula, but patient exhibiting increased agitation and hyperventilation when off BiPAP mask and machine.   Patient medication list not updated, but local [facility] verifies intermittent use of Temazepam 15 mg QHS for insomnia.  - will start ativan PRN and monitor symptoms of withdrawal  4/1 continues to improve daily  4/3 complaint of increased agitation and confusion, ABG shows retention, BiPAP settings adjusted        Diabetes mellitus, type 2  Patient's FSGs are controlled on current medication regimen.  Last A1c reviewed-   Lab Results   Component Value Date    HGBA1C 5.5 03/29/2023     Most recent fingerstick glucose reviewed-   Recent Labs   Lab 04/01/23  1107 04/01/23  1633 04/01/23 2037   POCTGLUCOSE 93 101 110     Current correctional scale  Low  Maintain anti-hyperglycemic dose as follows-   Antihyperglycemics (From admission, onward)      Start     Stop Route Frequency Ordered     03/31/23 0106  insulin aspart U-100 pen 0-5 Units         -- SubQ Before meals & nightly PRN 03/31/23 0008    03/30/23 1700  empagliflozin 10 mg tablet 10 mg         -- Oral Daily 03/30/23 1345          Per cardiology;   3/31 Jardiance 10 mg daily  4/1 continue current med      Acute on chronic congestive heart failure  4/3 continue with cardiology recommendations  Patient is identified as having Combined Systolic and Diastolic heart failure that is Acute on chronic. CHF is currently uncontrolled due to Continued edema of extremities, Dyspnea not returned to baseline after 2 doses of IV diuretic and Rales/crackles on pulmonary exam. Latest ECHO performed and demonstrates- Results for orders placed during the hospital encounter of 03/29/23  Echo  Interpretation Summary  · The left ventricle is normal in size with moderate concentric hypertrophy and mildly decreased systolic function.  · The estimated ejection fraction is 45%.  · Grade I left ventricular diastolic dysfunction.  · Moderate right ventricular enlargement.  · Moderate right atrial enlargement.  · Mild-to-moderate aortic regurgitation.  · There is mild aortic valve stenosis.  · Aortic valve area is 1.44 cm2; peak velocity is 2.09 m/s; mean gradient is 10 mmHg.  · Mild-to-moderate mitral regurgitation.  · Moderate tricuspid regurgitation.  · Moderate pulmonic regurgitation.  · The estimated PA systolic pressure is 69 mmHg.  · There is moderate pulmonary hypertension.  · Moderate left atrial enlargement.  . Continue Beta Blocker, ACE/ARB, Furosemide, Aldactone, Nitrate/Vasodilator and ARNI and monitor clinical status closely. Monitor on telemetry. Monitor strict Is&Os and daily weights.  Place on fluid restriction of 1.5 L. Continue to stress to patient importance of self efficacy and  on diet for CHF. Last BNP reviewed-  Lab Results   Component Value Date    NTPROBNP 23138 (H) 03/29/2023       Weakness  Secondary to above CHF exacerbation, fluid  overload, SOB, body habitus. When patient's cardiorespiratory status stabilized, follow up PT and OT evaluation.       Severe obesity (BMI >= 40)  Body mass index is 50.29 kg/m². Morbid obesity complicates all aspects of disease management from diagnostic modalities to treatment. Weight loss encouraged and health benefits explained to patient.         Paroxysmal A-fib  Patient with Long standing persistent (>12 months) atrial fibrillation which is controlled currently with Beta Blocker. Patient is currently in atrial fibrillation.[id number] Score: 3. Anticoagulation indicated. Anticoagulation done with Xarelto.   4/3: HOLD xarelto secondary to hematuria, monitor Hg/HCT          VTE Risk Mitigation (From admission, onward)           Ordered     IP VTE HIGH RISK PATIENT  Once         03/30/23 0314     Place sequential compression device  Until discontinued         03/30/23 0314                    Discharge Planning   TIGRE:      Code Status: Full Code   Is the patient medically ready for discharge?:     Reason for patient still in hospital (select all that apply): Patient new problem, Patient trending condition, Treatment, Consult recommendations and PT / OT recommendations  Discharge Plan A: Home with family, Home Health                  Gunnar Mott DO  Department of Hospital Medicine   [employer] - [facility]

## 2023-04-04 LAB
ALBUMIN SERPL BCP-MCNC: 2.4 G/DL (ref 3.5–5.2)
ALP SERPL-CCNC: 101 U/L (ref 55–135)
ALT SERPL W/O P-5'-P-CCNC: 21 U/L (ref 10–44)
ANION GAP SERPL CALC-SCNC: 0 MMOL/L (ref 8–16)
AST SERPL-CCNC: 14 U/L (ref 10–40)
BASOPHILS # BLD AUTO: 0.01 K/UL (ref 0–0.2)
BASOPHILS NFR BLD: 0.1 % (ref 0–1.9)
BILIRUB SERPL-MCNC: 0.5 MG/DL (ref 0.1–1)
BUN SERPL-MCNC: 29 MG/DL (ref 8–23)
CALCIUM SERPL-MCNC: 9.3 MG/DL (ref 8.7–10.5)
CHLORIDE SERPL-SCNC: 107 MMOL/L (ref 95–110)
CO2 SERPL-SCNC: 38 MMOL/L (ref 23–29)
COMMENTS: ABNORMAL
CREAT SERPL-MCNC: 1.3 MG/DL (ref 0.5–1.4)
DIFFERENTIAL METHOD: ABNORMAL
EOSINOPHIL # BLD AUTO: 0 K/UL (ref 0–0.5)
EOSINOPHIL NFR BLD: 0.4 % (ref 0–8)
ERYTHROCYTE [DISTWIDTH] IN BLOOD BY AUTOMATED COUNT: 17.2 % (ref 11.5–14.5)
EST. GFR  (NO RACE VARIABLE): 43.7 ML/MIN/1.73 M^2
FIO2: 50 %
GLUCOSE SERPL-MCNC: 99 MG/DL (ref 70–110)
HCT VFR BLD AUTO: 49.5 % (ref 37–48.5)
HGB BLD-MCNC: 15.1 G/DL (ref 12–16)
IMM GRANULOCYTES # BLD AUTO: 0.02 K/UL (ref 0–0.04)
IMM GRANULOCYTES NFR BLD AUTO: 0.3 % (ref 0–0.5)
LYMPHOCYTES # BLD AUTO: 1.5 K/UL (ref 1–4.8)
LYMPHOCYTES NFR BLD: 20.6 % (ref 18–48)
Lab: ABNORMAL
MAGNESIUM SERPL-MCNC: 2.6 MG/DL (ref 1.6–2.6)
MCH RBC QN AUTO: 28.9 PG (ref 27–31)
MCHC RBC AUTO-ENTMCNC: 30.5 G/DL (ref 32–36)
MCV RBC AUTO: 95 FL (ref 82–98)
MODIFIED ALLEN'S TEST: ABNORMAL
MONOCYTES # BLD AUTO: 0.7 K/UL (ref 0.3–1)
MONOCYTES NFR BLD: 9.7 % (ref 4–15)
NEUTROPHILS # BLD AUTO: 4.9 K/UL (ref 1.8–7.7)
NEUTROPHILS NFR BLD: 68.9 % (ref 38–73)
NOTIFIED BY: ABNORMAL
NRBC BLD-RTO: 0 /100 WBC
O2DEVICE: ABNORMAL
PCO2 BLDA: 68.4 MMHG (ref 35–45)
PH SMN: 7.34 [PH] (ref 7.34–7.45)
PLATELET # BLD AUTO: 222 K/UL (ref 150–450)
PMV BLD AUTO: 8.5 FL (ref 9.2–12.9)
PO2 BLDA: 86.6 MMHG (ref 80–100)
POC BASE DEFICIT: 11.6 MMOL/L (ref -2–2)
POC HCO3: 32.3 MMOL/L (ref 24–28)
POC NOTIFIED NOTE: ABNORMAL
POC PERFORMED BY: ABNORMAL
POC SATURATED O2: 96.9 % (ref 95–100)
POC TEMPERATURE: 37 C
POCT GLUCOSE: 67 MG/DL (ref 70–110)
POCT GLUCOSE: 77 MG/DL (ref 70–110)
POCT GLUCOSE: 79 MG/DL (ref 70–110)
POCT GLUCOSE: 86 MG/DL (ref 70–110)
POTASSIUM SERPL-SCNC: 3.7 MMOL/L (ref 3.5–5.1)
PROT SERPL-MCNC: 5.8 G/DL (ref 6–8.4)
PROVIDER NOTIFIED: ABNORMAL
RBC # BLD AUTO: 5.22 M/UL (ref 4–5.4)
SODIUM SERPL-SCNC: 145 MMOL/L (ref 136–145)
SPECIMEN SOURCE: ABNORMAL
VT: 325
WBC # BLD AUTO: 7.04 K/UL (ref 3.9–12.7)

## 2023-04-04 PROCEDURE — 25000242 PHARM REV CODE 250 ALT 637 W/ HCPCS: Performed by: STUDENT IN AN ORGANIZED HEALTH CARE EDUCATION/TRAINING PROGRAM

## 2023-04-04 PROCEDURE — 94660 CPAP INITIATION&MGMT: CPT

## 2023-04-04 PROCEDURE — 85025 COMPLETE CBC W/AUTO DIFF WBC: CPT | Performed by: INTERNAL MEDICINE

## 2023-04-04 PROCEDURE — 99233 PR SUBSEQUENT HOSPITAL CARE,LEVL III: ICD-10-PCS | Mod: ,,, | Performed by: STUDENT IN AN ORGANIZED HEALTH CARE EDUCATION/TRAINING PROGRAM

## 2023-04-04 PROCEDURE — 25000242 PHARM REV CODE 250 ALT 637 W/ HCPCS: Performed by: INTERNAL MEDICINE

## 2023-04-04 PROCEDURE — 27000221 HC OXYGEN, UP TO 24 HOURS

## 2023-04-04 PROCEDURE — 99900031 HC PATIENT EDUCATION (STAT)

## 2023-04-04 PROCEDURE — 36415 COLL VENOUS BLD VENIPUNCTURE: CPT | Performed by: STUDENT IN AN ORGANIZED HEALTH CARE EDUCATION/TRAINING PROGRAM

## 2023-04-04 PROCEDURE — 25000003 PHARM REV CODE 250: Performed by: INTERNAL MEDICINE

## 2023-04-04 PROCEDURE — 25000003 PHARM REV CODE 250: Performed by: STUDENT IN AN ORGANIZED HEALTH CARE EDUCATION/TRAINING PROGRAM

## 2023-04-04 PROCEDURE — 99233 SBSQ HOSP IP/OBS HIGH 50: CPT | Mod: ,,, | Performed by: STUDENT IN AN ORGANIZED HEALTH CARE EDUCATION/TRAINING PROGRAM

## 2023-04-04 PROCEDURE — C9113 INJ PANTOPRAZOLE SODIUM, VIA: HCPCS | Performed by: STUDENT IN AN ORGANIZED HEALTH CARE EDUCATION/TRAINING PROGRAM

## 2023-04-04 PROCEDURE — 94640 AIRWAY INHALATION TREATMENT: CPT

## 2023-04-04 PROCEDURE — S0166 INJ OLANZAPINE 2.5MG: HCPCS | Performed by: STUDENT IN AN ORGANIZED HEALTH CARE EDUCATION/TRAINING PROGRAM

## 2023-04-04 PROCEDURE — 27000190 HC CPAP FULL FACE MASK W/VALVE

## 2023-04-04 PROCEDURE — 80053 COMPREHEN METABOLIC PANEL: CPT | Performed by: STUDENT IN AN ORGANIZED HEALTH CARE EDUCATION/TRAINING PROGRAM

## 2023-04-04 PROCEDURE — 83735 ASSAY OF MAGNESIUM: CPT | Performed by: STUDENT IN AN ORGANIZED HEALTH CARE EDUCATION/TRAINING PROGRAM

## 2023-04-04 PROCEDURE — 99900035 HC TECH TIME PER 15 MIN (STAT)

## 2023-04-04 PROCEDURE — 82803 BLOOD GASES ANY COMBINATION: CPT

## 2023-04-04 PROCEDURE — 36600 WITHDRAWAL OF ARTERIAL BLOOD: CPT

## 2023-04-04 PROCEDURE — 20000000 HC ICU ROOM

## 2023-04-04 PROCEDURE — C1751 CATH, INF, PER/CENT/MIDLINE: HCPCS

## 2023-04-04 PROCEDURE — 94761 N-INVAS EAR/PLS OXIMETRY MLT: CPT

## 2023-04-04 PROCEDURE — 63600175 PHARM REV CODE 636 W HCPCS: Performed by: STUDENT IN AN ORGANIZED HEALTH CARE EDUCATION/TRAINING PROGRAM

## 2023-04-04 PROCEDURE — 36410 VNPNXR 3YR/> PHY/QHP DX/THER: CPT

## 2023-04-04 PROCEDURE — A4216 STERILE WATER/SALINE, 10 ML: HCPCS | Performed by: STUDENT IN AN ORGANIZED HEALTH CARE EDUCATION/TRAINING PROGRAM

## 2023-04-04 RX ORDER — BUDESONIDE 0.25 MG/2ML
0.25 INHALANT ORAL EVERY 12 HOURS
Status: DISCONTINUED | OUTPATIENT
Start: 2023-04-04 | End: 2023-04-13 | Stop reason: HOSPADM

## 2023-04-04 RX ORDER — OLANZAPINE 10 MG/2ML
10 INJECTION, POWDER, FOR SOLUTION INTRAMUSCULAR ONCE
Status: COMPLETED | OUTPATIENT
Start: 2023-04-04 | End: 2023-04-04

## 2023-04-04 RX ORDER — HYDRALAZINE HYDROCHLORIDE 20 MG/ML
10 INJECTION INTRAMUSCULAR; INTRAVENOUS EVERY 8 HOURS
Status: DISCONTINUED | OUTPATIENT
Start: 2023-04-04 | End: 2023-04-08

## 2023-04-04 RX ORDER — SODIUM CHLORIDE 0.9 % (FLUSH) 0.9 %
10 SYRINGE (ML) INJECTION EVERY 6 HOURS
Status: DISCONTINUED | OUTPATIENT
Start: 2023-04-04 | End: 2023-04-13 | Stop reason: HOSPADM

## 2023-04-04 RX ORDER — SERTRALINE HYDROCHLORIDE 25 MG/1
25 TABLET, FILM COATED ORAL DAILY
Status: DISCONTINUED | OUTPATIENT
Start: 2023-04-04 | End: 2023-04-12

## 2023-04-04 RX ORDER — POLYETHYLENE GLYCOL 3350 17 G/17G
17 POWDER, FOR SOLUTION ORAL 2 TIMES DAILY
Status: DISPENSED | OUTPATIENT
Start: 2023-04-04 | End: 2023-04-07

## 2023-04-04 RX ORDER — OLANZAPINE 5 MG/1
5 TABLET ORAL NIGHTLY
Status: DISCONTINUED | OUTPATIENT
Start: 2023-04-04 | End: 2023-04-04

## 2023-04-04 RX ORDER — SODIUM CHLORIDE 0.9 % (FLUSH) 0.9 %
10 SYRINGE (ML) INJECTION
Status: DISCONTINUED | OUTPATIENT
Start: 2023-04-04 | End: 2023-04-13 | Stop reason: HOSPADM

## 2023-04-04 RX ORDER — OLANZAPINE 10 MG/2ML
10 INJECTION, POWDER, FOR SOLUTION INTRAMUSCULAR ONCE AS NEEDED
Status: DISCONTINUED | OUTPATIENT
Start: 2023-04-04 | End: 2023-04-04

## 2023-04-04 RX ORDER — OLANZAPINE 10 MG/2ML
2.5 INJECTION, POWDER, FOR SOLUTION INTRAMUSCULAR ONCE AS NEEDED
Status: DISCONTINUED | OUTPATIENT
Start: 2023-04-04 | End: 2023-04-04

## 2023-04-04 RX ORDER — OLANZAPINE 10 MG/2ML
5 INJECTION, POWDER, FOR SOLUTION INTRAMUSCULAR ONCE AS NEEDED
Status: COMPLETED | OUTPATIENT
Start: 2023-04-04 | End: 2023-04-04

## 2023-04-04 RX ORDER — PANTOPRAZOLE SODIUM 40 MG/10ML
40 INJECTION, POWDER, LYOPHILIZED, FOR SOLUTION INTRAVENOUS DAILY
Status: DISCONTINUED | OUTPATIENT
Start: 2023-04-04 | End: 2023-04-10

## 2023-04-04 RX ADMIN — EMPAGLIFLOZIN 10 MG: 10 TABLET, FILM COATED ORAL at 09:04

## 2023-04-04 RX ADMIN — HYDRALAZINE HYDROCHLORIDE 10 MG: 20 INJECTION, SOLUTION INTRAMUSCULAR; INTRAVENOUS at 09:04

## 2023-04-04 RX ADMIN — BUSPIRONE HYDROCHLORIDE 5 MG: 5 TABLET ORAL at 09:04

## 2023-04-04 RX ADMIN — PANTOPRAZOLE SODIUM 40 MG: 40 TABLET, DELAYED RELEASE ORAL at 09:04

## 2023-04-04 RX ADMIN — Medication 10 ML: at 11:04

## 2023-04-04 RX ADMIN — BUDESONIDE 0.25 MG: 0.25 INHALANT ORAL at 08:04

## 2023-04-04 RX ADMIN — PANTOPRAZOLE SODIUM 40 MG: 40 INJECTION, POWDER, FOR SOLUTION INTRAVENOUS at 11:04

## 2023-04-04 RX ADMIN — FUROSEMIDE 40 MG: 40 TABLET ORAL at 09:04

## 2023-04-04 RX ADMIN — CARVEDILOL 12.5 MG: 12.5 TABLET, FILM COATED ORAL at 09:04

## 2023-04-04 RX ADMIN — BUSPIRONE HYDROCHLORIDE 5 MG: 5 TABLET ORAL at 03:04

## 2023-04-04 RX ADMIN — SACUBITRIL AND VALSARTAN 1 TABLET: 24; 26 TABLET, FILM COATED ORAL at 09:04

## 2023-04-04 RX ADMIN — OLANZAPINE 5 MG: 10 INJECTION, POWDER, FOR SOLUTION INTRAMUSCULAR at 09:04

## 2023-04-04 RX ADMIN — GABAPENTIN 300 MG: 300 CAPSULE ORAL at 09:04

## 2023-04-04 RX ADMIN — SERTRALINE HYDROCHLORIDE 25 MG: 25 TABLET ORAL at 09:04

## 2023-04-04 RX ADMIN — BUDESONIDE 0.25 MG: 0.25 INHALANT ORAL at 07:04

## 2023-04-04 RX ADMIN — IPRATROPIUM BROMIDE AND ALBUTEROL SULFATE 3 ML: 2.5; .5 SOLUTION RESPIRATORY (INHALATION) at 01:04

## 2023-04-04 RX ADMIN — NIFEDIPINE 60 MG: 30 TABLET, FILM COATED, EXTENDED RELEASE ORAL at 09:04

## 2023-04-04 RX ADMIN — HYDRALAZINE HYDROCHLORIDE 10 MG: 20 INJECTION, SOLUTION INTRAMUSCULAR; INTRAVENOUS at 11:04

## 2023-04-04 RX ADMIN — IPRATROPIUM BROMIDE AND ALBUTEROL SULFATE 3 ML: 2.5; .5 SOLUTION RESPIRATORY (INHALATION) at 07:04

## 2023-04-04 RX ADMIN — ISOSORBIDE MONONITRATE 30 MG: 30 TABLET, EXTENDED RELEASE ORAL at 09:04

## 2023-04-04 RX ADMIN — SPIRONOLACTONE 25 MG: 25 TABLET ORAL at 09:04

## 2023-04-04 RX ADMIN — FUROSEMIDE 40 MG: 40 TABLET ORAL at 05:04

## 2023-04-04 RX ADMIN — BISACODYL 10 MG: 10 SUPPOSITORY RECTAL at 09:04

## 2023-04-04 RX ADMIN — POTASSIUM BICARBONATE 50 MEQ: 978 TABLET, EFFERVESCENT ORAL at 11:04

## 2023-04-04 RX ADMIN — Medication 10 ML: at 05:04

## 2023-04-04 RX ADMIN — OLANZAPINE 10 MG: 10 INJECTION, POWDER, FOR SOLUTION INTRAMUSCULAR at 09:04

## 2023-04-04 RX ADMIN — SODIUM CHLORIDE: 9 INJECTION, SOLUTION INTRAVENOUS at 01:04

## 2023-04-04 RX ADMIN — POLYETHYLENE GLYCOL (3350) 17 G: 17 POWDER, FOR SOLUTION ORAL at 09:04

## 2023-04-04 NOTE — ASSESSMENT & PLAN NOTE
Temazepam 15 mg QHS use, unclear consistency since 1/13/23 with last fill of 30 tablets on 2/28/23.   - ativan 1g QHS  - monitor symptoms of withdrawal  4/2 due to continued anxiety will add BuSpar 3 times a day  4/4 add sertraline to Buspar TID, zyprexa QHS

## 2023-04-04 NOTE — CARE UPDATE
Patient placed on bipap and noted exhaled tidal volumes ranging from 40-70mls despite set tidal volume of 325ml. Upon physical assessment patient in no distress with symmetrical expansion and normal depth, no shallow breaths noted. Oxygen saturation 88-90% on 70% FIO2. Attempted to changed bipap circuit without increase in exhaled tidal volumes. Bipap exchanged for a different machine and exhaled tidal volumes are now reading 300-400ml. Patient oxygen saturation increased and was able to lower to 50% FIO2. RN at bedside and aware. Patient condition stable throughout. Care ongiong.

## 2023-04-04 NOTE — ASSESSMENT & PLAN NOTE
4/3 Collecting blood tinged urine in Hardwick catheter. Hg/HCT stable. Holding xarelto. Start empiric antibiotics for UTI, f/u urine culture and blood cultures.    4/4 Blood cx x2 NGTD x1 day

## 2023-04-04 NOTE — RESPIRATORY THERAPY
Patient refusing Bipap at this time. Patient very uncooperative and agitated when asked to wear bipap. Patient in no distress. Rn aware.

## 2023-04-04 NOTE — ASSESSMENT & PLAN NOTE
Patient with Long standing persistent (>12 months) atrial fibrillation which is controlled currently with Beta Blocker. Patient is currently in atrial fibrillation.[id number] Score: 3. Anticoagulation indicated. Anticoagulation done with Xarelto.

## 2023-04-04 NOTE — PROGRESS NOTES
[facility] Medicine  Progress Note    Patient Name: Cramen Tan  MRN: [id number]  Patient Class: IP- Inpatient   Admission Date: 3/29/2023  Length of Stay: 5 days  Attending Physician: Gunnar Mott DO  Primary Care Provider: Lucian Barry MD        Subjective:     Principal Problem:Acute respiratory failure with hypoxia        HPI:  Chief Complaint   Patient presents with    Shortness of Breath       Per ems pt called 911 due to sob, when they arrived gave her a duoneb and moved her over and her blood pressure went up to 200/100's from an original reading of 140/80, solumedrol 125mg IM given, 4 sprays of nitro spray given, 1inch of nitropaste on pts chest, pt currently on cpap with an O2 sat of 100%      7030   Carmen Tan is a 73 y.o. female with PMHX of COPD, CHF, PE on Xarelto, morbid obesity who presents to the emergency department C/O shortness of breath.     Patient arrives by EMS. They report they were called because patient was acutely short of breath.  Patient was given a DuoNeb and being prepared to be taken to hospital. On repeat blood pressure check patient was markedly hypertensive.  Patient was given for nitro space, nitro patch, placed on CPAP.  She received 125mg solumedrol. On arrival to the emergency department patient is on CPAP and moderate respiratory distress.  Appears to be mentating normally and able to respond to questions appropriately however history limited due to respiratory distress.     PCP: Lucian Barry MD   3/30   Patient awake and alert. Diuresed well overnight with urine output of 1.8L. Patient endorses breathing better compared to time of arrival to ED. She has had a similar hospitalization over a year ago and have since not followed up with any of her specialists. 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   Shortness of breath has been worsening over the past week and her leg swellings have  started to worsen two weeks ago. She normally ambulates with a walker at home. She has a son who also lives with her to assist. Patient remains on nitroglycerin gtt with SBP still >160s mmHg. Patient to be admitted for hypertensive emergency with acute respiratory failure with hypoxia and hypercarbia, CHF exacerbation for continue IV diuresis, oxygen supplementation, and consult with cardiology.              Overview/Hospital Course:  3/31 Overnight due to increased blood pressure, tridil gtt back on. Patient awake and tolerating BiPAP I/E 14/6 FiO2 50%. Urine output of 5 L. Patient endorses tenseness in lower extremities improving with breathing better compared yesterday. BP adjusted per cardiology with added daily Jardiance. Continue with IV 40 mg BID lasix and monitor UOP and renal function.   4/1 ND patient is feeling a little better this morning, does report less heaviness to her legs which do show improvement in edema.  Patient had good diuresis overnight.  Off of all IV blood pressure meds, and blood pressure appears more stable  4/2 ND patient still endorses some shortness of breath especially when transitioned from BiPAP to nasal cannula O2 despite having good saturation.  Nurses reports she stays very anxious.  Patient has been diuresing well with improvement in blood pressures  4/3 Overnight increasing agitation and confusion, blood tinged urine collecting in Hardwick catheter. Given change in mentation; will hold xarelto, f/u urine culture, f/u blood cultures, f/u ultrasound. Over the weekend, patient diuresed over 8 liters, IV lasix now PO. Remains tachypneic, ABG obtained show hypercarbia, BiPAP settings adjusted back to I/E 14/6 FiO2 60%. On nasal cannula patient maintains SpO2 >98%.   4/4 BiPAP tubing replacement resulted in increased tidal volume, back on nasal cannula this morning. BP normotensive. Patient more alert and agrees to participating with PT and OT. Adjust anxiety meds, add sertraline and  zyprexa at bedtime. Continue bowel regimen with gentle IVF.  Follow pulmonary hygiene care per respiratory.          Interval History: patient seen and examined.     Review of Systems   Constitutional:  Positive for activity change. Negative for appetite change, chills, fatigue and fever.   HENT:  Negative for mouth sores, sneezing and sore throat.    Eyes:  Negative for visual disturbance.   Respiratory:  Negative for cough, chest tightness, shortness of breath (improving), wheezing and stridor.    Cardiovascular:  Positive for leg swelling. Negative for chest pain and palpitations.   Gastrointestinal:  Negative for abdominal pain, blood in stool, constipation, diarrhea, nausea and vomiting.   Musculoskeletal:  Positive for arthralgias, gait problem and joint swelling. Negative for neck pain and neck stiffness.   Skin:  Negative for color change and pallor.   Neurological:  Positive for weakness. Negative for dizziness, seizures, syncope, facial asymmetry, speech difficulty, light-headedness, numbness and headaches.   Psychiatric/Behavioral:  Negative for agitation, confusion, decreased concentration, dysphoric mood and sleep disturbance. The patient is not nervous/anxious.    Objective:     Vital Signs (Most Recent):  Temp: 97.7 °F (36.5 °C) (04/04/23 0701)  Pulse: 76 (04/04/23 0830)  Resp: (!) 28 (04/04/23 0830)  BP: (!) 152/67 (04/04/23 0800)  SpO2: (!) 88 % (04/04/23 0830)   Vital Signs (24h Range):  Temp:  [96.2 °F (35.7 °C)-98.1 °F (36.7 °C)] 97.7 °F (36.5 °C)  Pulse:  [62-94] 76  Resp:  [0-39] 28  SpO2:  [70 %-99 %] 88 %  BP: ()/(51-73) 152/67     Weight: 132.9 kg (293 lb)  Body mass index is 50.29 kg/m².    Intake/Output Summary (Last 24 hours) at 4/4/2023 0839  Last data filed at 4/4/2023 0600  Gross per 24 hour   Intake 986 ml   Output 1725 ml   Net -739 ml      Physical Exam  Vitals and nursing note reviewed.   Constitutional:       General: She is not in acute distress.     Appearance: She is  obese. She is not toxic-appearing or diaphoretic.   HENT:      Right Ear: External ear normal.      Left Ear: External ear normal.      Nose: No congestion or rhinorrhea.      Mouth/Throat:      Mouth: Mucous membranes are dry.      Pharynx: Oropharynx is clear. No oropharyngeal exudate or posterior oropharyngeal erythema.   Eyes:      Extraocular Movements: Extraocular movements intact.   Neck:      Comments: Thick skin folds  Cardiovascular:      Rate and Rhythm: Normal rate. Rhythm irregular.      Heart sounds: No murmur heard.  Pulmonary:      Effort: No respiratory distress.      Breath sounds: No rhonchi or rales.      Comments: On bipap - coarse bs ellen - with improvement in airflow  Chest:      Chest wall: No tenderness.   Abdominal:      Palpations: Abdomen is soft.      Tenderness: There is no abdominal tenderness.      Comments: Limited secondary to body habitus, distant bowel sounds in all quadrants   Genitourinary:     Comments: Hardwick with pinkish urine  Musculoskeletal:         General: Swelling present.      Cervical back: Neck supple. No rigidity.      Right lower leg: Edema (+2 pitting, loss of bony features in toes and dorsum of foot bilaterally) present.      Left lower leg: Edema present.      Comments: Skin much more relaxed   Skin:     General: Skin is warm and dry.      Capillary Refill: Capillary refill takes less than 2 seconds.      Coloration: Skin is not jaundiced.      Findings: No bruising, erythema, lesion or rash.   Neurological:      Mental Status: She is alert and oriented to person, place, and time. Mental status is at baseline.      Comments: Baseline requires walker for ambulation   Psychiatric:         Behavior: Behavior is not agitated or aggressive. Behavior is cooperative.     Significant Labs: All pertinent labs within the past 24 hours have been reviewed.  A1C:   Recent Labs   Lab 03/29/23  2319   HGBA1C 5.5     ABGs:   Recent Labs   Lab 04/03/23  0635 04/03/23  1716   PH  7.293* 7.347   PCO2 83.0* 70.0*   HCO3 32.4* 32.8*   POCSATURATED 85.8* 87.5*   PO2 56.5* 54.8*     Bilirubin:   Recent Labs   Lab 03/30/23  0340 04/01/23  0412 04/02/23  0339 04/03/23  0344 04/04/23  0327   BILITOT 0.6 0.4 0.5 0.5 0.5     Blood Culture:   Recent Labs   Lab 04/03/23  1150 04/03/23  1200   LABBLOO No Growth to date No Growth to date     BMP:   Recent Labs   Lab 04/04/23  0327   GLU 99      K 3.7      CO2 38*   BUN 29*   CREATININE 1.3   CALCIUM 9.3   MG 2.6     CBC:   Recent Labs   Lab 04/03/23 0344 04/04/23  0327   WBC 6.56 7.04   HGB 16.4* 15.1   HCT 52.5* 49.5*    222     CMP:   Recent Labs   Lab 04/03/23  0344 04/04/23  0327    145   K 3.9 3.7    107   CO2 38* 38*   * 99   BUN 29* 29*   CREATININE 1.3 1.3   CALCIUM 9.4 9.3   PROT 5.9* 5.8*   ALBUMIN 2.5* 2.4*   BILITOT 0.5 0.5   ALKPHOS 120 101   AST 15 14   ALT 25 21   ANIONGAP -1* 0*     Cardiac Markers: No results for input(s): CKMB, MYOGLOBIN, BNP, TROPISTAT in the last 48 hours.  Coagulation: No results for input(s): PT, INR, APTT in the last 48 hours.  Lactic Acid: No results for input(s): LACTATE in the last 48 hours.  Lipase: No results for input(s): LIPASE in the last 48 hours.  Lipid Panel: No results for input(s): CHOL, HDL, LDLCALC, TRIG, CHOLHDL in the last 48 hours.  Magnesium:   Recent Labs   Lab 04/03/23 0344 04/04/23  0327   MG 2.5 2.6     Troponin: No results for input(s): TROPONINI, TROPONINIHS in the last 48 hours.  TSH:   Recent Labs   Lab 03/29/23  2319   TSH 2.150     Urine Culture: No results for input(s): LABURIN in the last 48 hours.  Urine Studies:   Recent Labs   Lab 04/03/23  0632   COLORU Red*   APPEARANCEUA Cloudy*   PHUR 5.0   SPECGRAV 1.020   PROTEINUA 2+*   GLUCUA 4+*   KETONESU Negative   BILIRUBINUA 1+*   OCCULTUA 3+*   NITRITE Negative   UROBILINOGEN 1.0   LEUKOCYTESUR 1+*   RBCUA >100*   WBCUA 9*   BACTERIA Negative   SQUAMEPITHEL 3   HYALINECASTS 0     US  Retroperitoneal Complete  Narrative: EXAMINATION:  US RETROPERITONEAL COMPLETE    CLINICAL HISTORY:  Hematuria    COMPARISON:  None    FINDINGS:  Kidneys are normal in size measuring 10.5 cm on the left and 10.2 cm on the right.  No hydronephrosis or mass identified.  Urinary bladder is collapsed by Hardwick catheter.  Impression: No sonographic abnormality of the kidneys.    Electronically signed by: Abran Pelletier MD  Date:    04/03/2023  Time:    14:53    X-Ray Chest 1 View  Narrative: EXAMINATION:  XR CHEST 1 VIEW    CLINICAL HISTORY:  Low o2 sat, confusion increase in RR;    TECHNIQUE:  Frontal view obtained of the chest    COMPARISON:  03/29/2023    FINDINGS:  Cardiac silhouette is enlarged with unchanged cardiomediastinal contours.  Coarsened pulmonary markings in the periphery of the right midlung zone unchanged from the prior exam.  No interval developed consolidation or acute osseous change.  Impression: Stable chronic findings with no interval acute radiographic change detected.    Electronically signed by: Faustino Pelletier MD  Date:    04/03/2023  Time:    13:01     Significant Imaging: I have reviewed all pertinent imaging results/findings within the past 24 hours.      Assessment/Plan:      * Acute respiratory failure with hypoxia  Patient with Hypercapnic and Hypoxic Respiratory failure which is Acute on chronic.  she is on home oxygen at 2 LPM. Supplemental oxygen was provided and noted- Oxygen Concentration (%):  [36-60] 36    Signs/symptoms of respiratory failure include- tachypnea, increased work of breathing, use of accessory muscles and wheezing. Contributing diagnoses includes - CHF, COPD, Obesity Hypoventilation and Pleural effusion Labs and images were reviewed. Patient Has recent ABG, which has been reviewed. Will treat underlying causes and adjust management of respiratory failure as follows- IV lasix, supplemental oxygen, continuous BiPAP  3/31: IV lasix 40 mg Q6H x 2 doses  4/1 improving daily,  continue diuresis with Lasix, continue BiPAP therapy and will transition to O2 therapy as tolerates  4/2 continue current treatment, continue diuresis with Lasix; continue to offer nasal cannula as tolerates for oxygen options  4/3 transitioned to PO lasix 40 mg BID, continue BiPAP I/E 14/6 FiO2 60%  4/4 There is evidence, patient now requires noninvasive home ventilator due to chronic respiratory failure and COPD.  If obtained, this will help the patient decrease the work of breathing, hospital readmits, intubations, and improve overall quality of life. If this home respiratory device is not obtained it may lead to patient's harm or death. Will arrange for NI ventilator for home use ready on discharge.    Gross hematuria  4/3 Collecting blood tinged urine in Hardwick catheter. Hg/HCT stable. Holding xarelto. Start empiric antibiotics for UTI, f/u urine culture and blood cultures.    4/4 Blood cx x2 NGTD x1 day      Mild temazepam use disorder  Temazepam 15 mg QHS use, unclear consistency since 1/13/23 with last fill of 30 tablets on 2/28/23.   - ativan 1g QHS  - monitor symptoms of withdrawal  4/2 due to continued anxiety will add BuSpar 3 times a day  4/4 add sertraline to Buspar TID, zyprexa QHS    Hypertensive emergency  Patient has a current diagnosis of Hypertensive emergency with end organ damage evidenced by hypertensive encephalopathy and acute pulmonary edema without heart failure which is uncontrolled.  Latest blood pressure and vitals reviewed-   Temp:  [96.2 °F (35.7 °C)-98.1 °F (36.7 °C)]   Pulse:  [62-94]   Resp:  [0-39]   BP: ()/(51-73)   SpO2:  [70 %-99 %] .   Patient currently on IV antihypertensives.   Home meds for hypertension were reviewed and noted below.   Hypertension Medications             furosemide (LASIX) 40 MG tablet Take 1 tablet (40 mg total) by mouth 2 (two) times daily. If having increased shortness of breath, increase in leg swelling, and if you notice an >3lbs in 24 hours. May  take up to 4 tablets in 1 day (every 6 hours)    isosorbide mononitrate (IMDUR) 60 MG 24 hr tablet Take 1 tablet (60 mg total) by mouth once daily.    lisinopril (PRINIVIL,ZESTRIL) 5 MG tablet Take 1 tablet (5 mg total) by mouth once daily.    metoprolol tartrate (LOPRESSOR) 25 MG tablet Take 1 tablet (25 mg total) by mouth 2 (two) times daily.      Will aim for controlled BP reduction by medications noted above. Monitor and mitigate end organ damage as indicated.  Follow up cardiology consult appreciated.   3/31 Lisinopril discontinued for start of Entresto  - continue nitrate, hydralazine, carvedilol  - tridil gtt PRN  4/1 blood pressure has improved, appreciate cardiology's help  4/2 blood pressures have improved continue current medications  4/3 on Entresto, spironolactone, carvedilol, nifedipime, hydralazine, lasix    Pulmonary edema cardiac cause  Patient is identified as having Combined Systolic and Diastolic heart failure that is Acute on chronic. CHF is currently uncontrolled due to Continued edema of extremities and Hepatic congestion/ascites, Dyspnea not returned to baseline after 2 doses of IV diuretic and Rales/crackles on pulmonary exam. Latest ECHO performed and demonstrates- No results found for this or any previous visit.  . Continue Beta Blocker, Furosemide and Nitrate/Vasodilator and monitor clinical status closely. Monitor on telemetry. Monitor strict Is&Os and daily weights.  Place on fluid restriction of 1.5 L. Continue to stress to patient importance of self efficacy and  on diet for CHF. Last BNP reviewed- and noted below No results for input(s): BNP, BNPTRIAGEBLO in the last 168 hours.   Lab Results   Component Value Date    NTPROBNP 53188 (H) 03/29/2023   - per cardiology, echo cardiogram with systolic and diastolic dysfunction with pulmonary hypertension  - continue with IV lasix, BiPAP QHS  4/1 diuresing well continue Lasix, continue blood pressure control  4/2 a should diuresing  well continue Lasix  4/4 CXR unchanged, on PO lasix, adequate UOP, continue to monitor    Shortness of breath  Improving with maintaining SpO2 >95% when transitioned to nasal cannula, but patient exhibiting increased agitation and hyperventilation when off BiPAP mask and machine.   Patient medication list not updated, but local [facility] verifies intermittent use of Temazepam 15 mg QHS for insomnia.  - will start ativan PRN and monitor symptoms of withdrawal  4/1 continues to improve daily  4/3 complaint of increased agitation and confusion, ABG shows retention, BiPAP settings adjusted  4/4 after BiPAP tubing replaced, patient tidal volume, oxygen requirements have improved, continue with daily pulm hygiene         Diabetes mellitus, type 2  Patient's FSGs are controlled on current medication regimen.  Last A1c reviewed-   Lab Results   Component Value Date    HGBA1C 5.5 03/29/2023     Most recent fingerstick glucose reviewed-   Recent Labs   Lab 04/03/23  1114 04/03/23  1710 04/03/23  2121   POCTGLUCOSE 98 103 95     Current correctional scale  Low  Maintain anti-hyperglycemic dose as follows-   Antihyperglycemics (From admission, onward)    Start     Stop Route Frequency Ordered    03/31/23 0106  insulin aspart U-100 pen 0-5 Units         -- SubQ Before meals & nightly PRN 03/31/23 0008    03/30/23 1700  empagliflozin 10 mg tablet 10 mg         -- Oral Daily 03/30/23 1345        Per cardiology;   3/31 Jardiance 10 mg daily  4/1 continue current med      Acute on chronic congestive heart failure  4/3 continue with cardiology recommendations  Patient is identified as having Combined Systolic and Diastolic heart failure that is Acute on chronic. CHF is currently uncontrolled due to Continued edema of extremities, Dyspnea not returned to baseline after 2 doses of IV diuretic and Rales/crackles on pulmonary exam. Latest ECHO performed and demonstrates- Results for orders placed during the hospital encounter of  03/29/23  Echo  Interpretation Summary  · The left ventricle is normal in size with moderate concentric hypertrophy and mildly decreased systolic function.  · The estimated ejection fraction is 45%.  · Grade I left ventricular diastolic dysfunction.  · Moderate right ventricular enlargement.  · Moderate right atrial enlargement.  · Mild-to-moderate aortic regurgitation.  · There is mild aortic valve stenosis.  · Aortic valve area is 1.44 cm2; peak velocity is 2.09 m/s; mean gradient is 10 mmHg.  · Mild-to-moderate mitral regurgitation.  · Moderate tricuspid regurgitation.  · Moderate pulmonic regurgitation.  · The estimated PA systolic pressure is 69 mmHg.  · There is moderate pulmonary hypertension.  · Moderate left atrial enlargement.  . Continue Beta Blocker, ACE/ARB, Furosemide, Aldactone, Nitrate/Vasodilator and ARNI and monitor clinical status closely. Monitor on telemetry. Monitor strict Is&Os and daily weights.  Place on fluid restriction of 1.5 L. Continue to stress to patient importance of self efficacy and  on diet for CHF. Last BNP reviewed-  Lab Results   Component Value Date    NTPROBNP 28848 (H) 03/29/2023 4/4 improved BP control, now on PO lasix with adequate urine output. Continue recommendations per cardiology.    Weakness  Secondary to above CHF exacerbation, fluid overload, SOB, body habitus. When patient's cardiorespiratory status stabilized, follow up PT and OT evaluation.   4/4 work on sitting up and out of bed, patient agrees to working with PT and OT      Severe obesity (BMI >= 40)  Body mass index is 50.29 kg/m². Morbid obesity complicates all aspects of disease management from diagnostic modalities to treatment. Weight loss encouraged and health benefits explained to patient.         Paroxysmal A-fib  Patient with Long standing persistent (>12 months) atrial fibrillation which is controlled currently with Beta Blocker. Patient is currently in atrial fibrillation.[id number] Score:  3. Anticoagulation indicated. Anticoagulation done with Xarelto.          VTE Risk Mitigation (From admission, onward)         Ordered     Place sequential compression device  Until discontinued         04/03/23 1137     IP VTE HIGH RISK PATIENT  Once         03/30/23 0314     Place sequential compression device  Until discontinued         03/30/23 0314                Discharge Planning   TIGRE:      Code Status: Full Code   Is the patient medically ready for discharge?:     Reason for patient still in hospital (select all that apply): Patient trending condition, Treatment, Consult recommendations, PT / OT recommendations and Pending disposition  Discharge Plan A: Home with family, Home Health                  Gunnar Mott DO  Department of Hospital Medicine   [place] [facility]

## 2023-04-04 NOTE — NURSING
Patient placed back on 4L NC after sleeping approx 2 hrs then pulling off mask. Remains confused and disoriented, argumentative and uncooperative but is somewhat better since receiving Zyprexa. VSS, afebrile, UOP 1200ml, attempted bedpan X2 with no BM noted

## 2023-04-04 NOTE — SUBJECTIVE & OBJECTIVE
Interval History: patient seen and examined.     Review of Systems   Constitutional:  Positive for activity change. Negative for appetite change, chills, fatigue and fever.   HENT:  Negative for mouth sores, sneezing and sore throat.    Eyes:  Negative for visual disturbance.   Respiratory:  Negative for cough, chest tightness, shortness of breath (improving), wheezing and stridor.    Cardiovascular:  Positive for leg swelling. Negative for chest pain and palpitations.   Gastrointestinal:  Negative for abdominal pain, blood in stool, constipation, diarrhea, nausea and vomiting.   Musculoskeletal:  Positive for arthralgias, gait problem and joint swelling. Negative for neck pain and neck stiffness.   Skin:  Negative for color change and pallor.   Neurological:  Positive for weakness. Negative for dizziness, seizures, syncope, facial asymmetry, speech difficulty, light-headedness, numbness and headaches.   Psychiatric/Behavioral:  Negative for agitation, confusion, decreased concentration, dysphoric mood and sleep disturbance. The patient is not nervous/anxious.    Objective:     Vital Signs (Most Recent):  Temp: 97.7 °F (36.5 °C) (04/04/23 0701)  Pulse: 76 (04/04/23 0830)  Resp: (!) 28 (04/04/23 0830)  BP: (!) 152/67 (04/04/23 0800)  SpO2: (!) 88 % (04/04/23 0830)   Vital Signs (24h Range):  Temp:  [96.2 °F (35.7 °C)-98.1 °F (36.7 °C)] 97.7 °F (36.5 °C)  Pulse:  [62-94] 76  Resp:  [0-39] 28  SpO2:  [70 %-99 %] 88 %  BP: ()/(51-73) 152/67     Weight: 132.9 kg (293 lb)  Body mass index is 50.29 kg/m².    Intake/Output Summary (Last 24 hours) at 4/4/2023 0839  Last data filed at 4/4/2023 0600  Gross per 24 hour   Intake 986 ml   Output 1725 ml   Net -739 ml      Physical Exam  Vitals and nursing note reviewed.   Constitutional:       General: She is not in acute distress.     Appearance: She is obese. She is not toxic-appearing or diaphoretic.   HENT:      Right Ear: External ear normal.      Left Ear: External  ear normal.      Nose: No congestion or rhinorrhea.      Mouth/Throat:      Mouth: Mucous membranes are dry.      Pharynx: Oropharynx is clear. No oropharyngeal exudate or posterior oropharyngeal erythema.   Eyes:      Extraocular Movements: Extraocular movements intact.   Neck:      Comments: Thick skin folds  Cardiovascular:      Rate and Rhythm: Normal rate. Rhythm irregular.      Heart sounds: No murmur heard.  Pulmonary:      Effort: No respiratory distress.      Breath sounds: No rhonchi or rales.      Comments: On bipap - coarse bs ellen - with improvement in airflow  Chest:      Chest wall: No tenderness.   Abdominal:      Palpations: Abdomen is soft.      Tenderness: There is no abdominal tenderness.      Comments: Limited secondary to body habitus, distant bowel sounds in all quadrants   Genitourinary:     Comments: Hardwick with pinkish urine  Musculoskeletal:         General: Swelling present.      Cervical back: Neck supple. No rigidity.      Right lower leg: Edema (+2 pitting, loss of bony features in toes and dorsum of foot bilaterally) present.      Left lower leg: Edema present.      Comments: Skin much more relaxed   Skin:     General: Skin is warm and dry.      Capillary Refill: Capillary refill takes less than 2 seconds.      Coloration: Skin is not jaundiced.      Findings: No bruising, erythema, lesion or rash.   Neurological:      Mental Status: She is alert and oriented to person, place, and time. Mental status is at baseline.      Comments: Baseline requires walker for ambulation   Psychiatric:         Behavior: Behavior is not agitated or aggressive. Behavior is cooperative.     Significant Labs: All pertinent labs within the past 24 hours have been reviewed.  A1C:   Recent Labs   Lab 03/29/23  2319   HGBA1C 5.5     ABGs:   Recent Labs   Lab 04/03/23  0635 04/03/23  1716   PH 7.293* 7.347   PCO2 83.0* 70.0*   HCO3 32.4* 32.8*   POCSATURATED 85.8* 87.5*   PO2 56.5* 54.8*     Bilirubin:   Recent  Labs   Lab 03/30/23  0340 04/01/23  0412 04/02/23  0339 04/03/23  0344 04/04/23  0327   BILITOT 0.6 0.4 0.5 0.5 0.5     Blood Culture:   Recent Labs   Lab 04/03/23  1150 04/03/23  1200   LABBLOO No Growth to date No Growth to date     BMP:   Recent Labs   Lab 04/04/23  0327   GLU 99      K 3.7      CO2 38*   BUN 29*   CREATININE 1.3   CALCIUM 9.3   MG 2.6     CBC:   Recent Labs   Lab 04/03/23  0344 04/04/23 0327   WBC 6.56 7.04   HGB 16.4* 15.1   HCT 52.5* 49.5*    222     CMP:   Recent Labs   Lab 04/03/23  0344 04/04/23  0327    145   K 3.9 3.7    107   CO2 38* 38*   * 99   BUN 29* 29*   CREATININE 1.3 1.3   CALCIUM 9.4 9.3   PROT 5.9* 5.8*   ALBUMIN 2.5* 2.4*   BILITOT 0.5 0.5   ALKPHOS 120 101   AST 15 14   ALT 25 21   ANIONGAP -1* 0*     Cardiac Markers: No results for input(s): CKMB, MYOGLOBIN, BNP, TROPISTAT in the last 48 hours.  Coagulation: No results for input(s): PT, INR, APTT in the last 48 hours.  Lactic Acid: No results for input(s): LACTATE in the last 48 hours.  Lipase: No results for input(s): LIPASE in the last 48 hours.  Lipid Panel: No results for input(s): CHOL, HDL, LDLCALC, TRIG, CHOLHDL in the last 48 hours.  Magnesium:   Recent Labs   Lab 04/03/23  0344 04/04/23 0327   MG 2.5 2.6     Troponin: No results for input(s): TROPONINI, TROPONINIHS in the last 48 hours.  TSH:   Recent Labs   Lab 03/29/23  2319   TSH 2.150     Urine Culture: No results for input(s): LABURIN in the last 48 hours.  Urine Studies:   Recent Labs   Lab 04/03/23  0632   COLORU Red*   APPEARANCEUA Cloudy*   PHUR 5.0   SPECGRAV 1.020   PROTEINUA 2+*   GLUCUA 4+*   KETONESU Negative   BILIRUBINUA 1+*   OCCULTUA 3+*   NITRITE Negative   UROBILINOGEN 1.0   LEUKOCYTESUR 1+*   RBCUA >100*   WBCUA 9*   BACTERIA Negative   SQUAMEPITHEL 3   HYALINECASTS 0     US Retroperitoneal Complete  Narrative: EXAMINATION:  US RETROPERITONEAL COMPLETE    CLINICAL  HISTORY:  Hematuria    COMPARISON:  None    FINDINGS:  Kidneys are normal in size measuring 10.5 cm on the left and 10.2 cm on the right.  No hydronephrosis or mass identified.  Urinary bladder is collapsed by Hardwick catheter.  Impression: No sonographic abnormality of the kidneys.    Electronically signed by: Abran Pelletier MD  Date:    04/03/2023  Time:    14:53    X-Ray Chest 1 View  Narrative: EXAMINATION:  XR CHEST 1 VIEW    CLINICAL HISTORY:  Low o2 sat, confusion increase in RR;    TECHNIQUE:  Frontal view obtained of the chest    COMPARISON:  03/29/2023    FINDINGS:  Cardiac silhouette is enlarged with unchanged cardiomediastinal contours.  Coarsened pulmonary markings in the periphery of the right midlung zone unchanged from the prior exam.  No interval developed consolidation or acute osseous change.  Impression: Stable chronic findings with no interval acute radiographic change detected.    Electronically signed by: Faustino Pelletier MD  Date:    04/03/2023  Time:    13:01     Significant Imaging: I have reviewed all pertinent imaging results/findings within the past 24 hours.

## 2023-04-04 NOTE — NURSING
Patient not getting tidal volumes on bipap and sats only 89-90% on 70% FIO2. Bipap and tubing completely changed out per RT and patient immediately started getting tidal volumes and sats increased to 95% on 50% FIO2

## 2023-04-04 NOTE — ASSESSMENT & PLAN NOTE
4/3 continue with cardiology recommendations  Patient is identified as having Combined Systolic and Diastolic heart failure that is Acute on chronic. CHF is currently uncontrolled due to Continued edema of extremities, Dyspnea not returned to baseline after 2 doses of IV diuretic and Rales/crackles on pulmonary exam. Latest ECHO performed and demonstrates- Results for orders placed during the hospital encounter of 03/29/23  Echo  Interpretation Summary  · The left ventricle is normal in size with moderate concentric hypertrophy and mildly decreased systolic function.  · The estimated ejection fraction is 45%.  · Grade I left ventricular diastolic dysfunction.  · Moderate right ventricular enlargement.  · Moderate right atrial enlargement.  · Mild-to-moderate aortic regurgitation.  · There is mild aortic valve stenosis.  · Aortic valve area is 1.44 cm2; peak velocity is 2.09 m/s; mean gradient is 10 mmHg.  · Mild-to-moderate mitral regurgitation.  · Moderate tricuspid regurgitation.  · Moderate pulmonic regurgitation.  · The estimated PA systolic pressure is 69 mmHg.  · There is moderate pulmonary hypertension.  · Moderate left atrial enlargement.  . Continue Beta Blocker, ACE/ARB, Furosemide, Aldactone, Nitrate/Vasodilator and ARNI and monitor clinical status closely. Monitor on telemetry. Monitor strict Is&Os and daily weights.  Place on fluid restriction of 1.5 L. Continue to stress to patient importance of self efficacy and  on diet for CHF. Last BNP reviewed-  Lab Results   Component Value Date    NTPROBNP 60871 (H) 03/29/2023 4/4 improved BP control, now on PO lasix with adequate urine output. Continue recommendations per cardiology.

## 2023-04-04 NOTE — EICU
Intervention Initiated From:  [facility] / KLAUS Gallagher intervened regarding:  Rounding (Video assessment)    Nurse Notified:  Yes    Doctor Notified:  Yes    Comments: pt resting quietly in bed. Bedside nurse reports primary md would like KLAUS RICHARDSON to assess pt and order Precedex drip, if needed. Pt has been confused and pulling out lines. Pt remains  on 4L/NC and  is sating WNL.  Also  ivf of NS infusing.   19:05-Informed Dr. Fierro of above.   20:15- I did not see a note by KLAUS richardson so I reminded  of above. He reports he as already checked pt and feels pt is not in need of Precedex drip. Informed bedside nurse of above via secure chat. She responded that she agrees.

## 2023-04-04 NOTE — NURSING
Dr. Mott requested [facility] to evaluate patient for possible precedex gtt. [facility] not feel [facility] needs precedex at this time but to call if condition changes.

## 2023-04-04 NOTE — ASSESSMENT & PLAN NOTE
Patient's FSGs are controlled on current medication regimen.  Last A1c reviewed-   Lab Results   Component Value Date    HGBA1C 5.5 03/29/2023     Most recent fingerstick glucose reviewed-   Recent Labs   Lab 04/03/23  1114 04/03/23  1710 04/03/23  2121   POCTGLUCOSE 98 103 95     Current correctional scale  Low  Maintain anti-hyperglycemic dose as follows-   Antihyperglycemics (From admission, onward)    Start     Stop Route Frequency Ordered    03/31/23 0106  insulin aspart U-100 pen 0-5 Units         -- SubQ Before meals & nightly PRN 03/31/23 0008    03/30/23 1700  empagliflozin 10 mg tablet 10 mg         -- Oral Daily 03/30/23 1345        Per cardiology;   3/31 Jardiance 10 mg daily  4/1 continue current med

## 2023-04-04 NOTE — NURSING
Patients sats running 88% on 4L NC, patient allowed bipap placement. Sats remained 88-89%, FIO2 increased to 70%

## 2023-04-04 NOTE — PLAN OF CARE
Pt confused today requiring Zyprexa IM and bilateral soft wrist restraints.  Pt trying to climb out of bed and taking O2 off.  Psych consult called to [facility] today and NP will see pt tomorrow.  Will monitor.

## 2023-04-04 NOTE — ASSESSMENT & PLAN NOTE
Improving with maintaining SpO2 >95% when transitioned to nasal cannula, but patient exhibiting increased agitation and hyperventilation when off BiPAP mask and machine.   Patient medication list not updated, but local [facility] verifies intermittent use of Temazepam 15 mg QHS for insomnia.  - will start ativan PRN and monitor symptoms of withdrawal  4/1 continues to improve daily  4/3 complaint of increased agitation and confusion, ABG shows retention, BiPAP settings adjusted  4/4 after BiPAP tubing replaced, patient tidal volume, oxygen requirements have improved, continue with daily pulm hygiene

## 2023-04-04 NOTE — ASSESSMENT & PLAN NOTE
Patient has a current diagnosis of Hypertensive emergency with end organ damage evidenced by hypertensive encephalopathy and acute pulmonary edema without heart failure which is uncontrolled.  Latest blood pressure and vitals reviewed-   Temp:  [96.2 °F (35.7 °C)-98.1 °F (36.7 °C)]   Pulse:  [62-94]   Resp:  [0-39]   BP: ()/(51-73)   SpO2:  [70 %-99 %] .   Patient currently on IV antihypertensives.   Home meds for hypertension were reviewed and noted below.   Hypertension Medications             furosemide (LASIX) 40 MG tablet Take 1 tablet (40 mg total) by mouth 2 (two) times daily. If having increased shortness of breath, increase in leg swelling, and if you notice an >3lbs in 24 hours. May take up to 4 tablets in 1 day (every 6 hours)    isosorbide mononitrate (IMDUR) 60 MG 24 hr tablet Take 1 tablet (60 mg total) by mouth once daily.    lisinopril (PRINIVIL,ZESTRIL) 5 MG tablet Take 1 tablet (5 mg total) by mouth once daily.    metoprolol tartrate (LOPRESSOR) 25 MG tablet Take 1 tablet (25 mg total) by mouth 2 (two) times daily.      Will aim for controlled BP reduction by medications noted above. Monitor and mitigate end organ damage as indicated.  Follow up cardiology consult appreciated.   3/31 Lisinopril discontinued for start of Entresto  - continue nitrate, hydralazine, carvedilol  - tridil gtt PRN  4/1 blood pressure has improved, appreciate cardiology's help  4/2 blood pressures have improved continue current medications  4/3 on Entresto, spironolactone, carvedilol, nifedipime, hydralazine, lasix

## 2023-04-04 NOTE — ASSESSMENT & PLAN NOTE
Secondary to above CHF exacerbation, fluid overload, SOB, body habitus. When patient's cardiorespiratory status stabilized, follow up PT and OT evaluation.   4/4 work on sitting up and out of bed, patient agrees to working with PT and OT

## 2023-04-04 NOTE — ASSESSMENT & PLAN NOTE
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 CHF is currently uncontrolled due to Continued edema of extremities and Hepatic congestion/ascites, Dyspnea not returned to baseline after 2 doses of IV diuretic and Rales/crackles on pulmonary exam. Latest ECHO performed and demonstrates- No results found for this or any previous visit.  . Continue Beta Blocker, Furosemide and Nitrate/Vasodilator and monitor clinical status closely. Monitor on telemetry. Monitor strict Is&Os and daily weights.  Place on fluid restriction of 1.5 L. Continue to stress to patient importance of self efficacy and  on diet for CHF. Last BNP reviewed- and noted below No results for input(s): BNP, BNPTRIAGEBLO in the last 168 hours.   Lab Results   Component Value Date    NTPROBNP 15622 (H) 03/29/2023   - per cardiology, echo cardiogram with systolic and diastolic dysfunction with pulmonary hypertension  - continue with IV lasix, BiPAP QHS  4/1 diuresing well continue Lasix, continue blood pressure control  4/2 a should diuresing well continue Lasix  4/4 CXR unchanged, on PO lasix, adequate UOP, continue to monitor

## 2023-04-05 PROBLEM — R78.81 BACTEREMIA: Status: ACTIVE | Noted: 2023-04-05

## 2023-04-05 LAB
ANION GAP SERPL CALC-SCNC: 1 MMOL/L (ref 8–16)
BACTERIA #/AREA URNS HPF: NEGATIVE /HPF
BACTERIA UR CULT: NO GROWTH
BASOPHILS # BLD AUTO: 0.01 K/UL (ref 0–0.2)
BASOPHILS NFR BLD: 0.2 % (ref 0–1.9)
BILIRUB UR QL STRIP: NEGATIVE
BUN SERPL-MCNC: 23 MG/DL (ref 8–23)
CALCIUM SERPL-MCNC: 9.2 MG/DL (ref 8.7–10.5)
CHLORIDE SERPL-SCNC: 109 MMOL/L (ref 95–110)
CLARITY UR: CLEAR
CO2 SERPL-SCNC: 36 MMOL/L (ref 23–29)
COLOR UR: YELLOW
CREAT SERPL-MCNC: 1.1 MG/DL (ref 0.5–1.4)
DIFFERENTIAL METHOD: ABNORMAL
EOSINOPHIL # BLD AUTO: 0.1 K/UL (ref 0–0.5)
EOSINOPHIL NFR BLD: 1.2 % (ref 0–8)
ERYTHROCYTE [DISTWIDTH] IN BLOOD BY AUTOMATED COUNT: 16.7 % (ref 11.5–14.5)
EST. GFR  (NO RACE VARIABLE): 53.4 ML/MIN/1.73 M^2
GLUCOSE SERPL-MCNC: 80 MG/DL (ref 70–110)
GLUCOSE UR QL STRIP: ABNORMAL
HCT VFR BLD AUTO: 47.4 % (ref 37–48.5)
HGB BLD-MCNC: 14.6 G/DL (ref 12–16)
HGB UR QL STRIP: ABNORMAL
HYALINE CASTS #/AREA URNS LPF: 2.7 /LPF
IMM GRANULOCYTES # BLD AUTO: 0.01 K/UL (ref 0–0.04)
IMM GRANULOCYTES NFR BLD AUTO: 0.2 % (ref 0–0.5)
KETONES UR QL STRIP: ABNORMAL
LEUKOCYTE ESTERASE UR QL STRIP: ABNORMAL
LYMPHOCYTES # BLD AUTO: 1.4 K/UL (ref 1–4.8)
LYMPHOCYTES NFR BLD: 22.1 % (ref 18–48)
MAGNESIUM SERPL-MCNC: 2.3 MG/DL (ref 1.6–2.6)
MCH RBC QN AUTO: 29 PG (ref 27–31)
MCHC RBC AUTO-ENTMCNC: 30.8 G/DL (ref 32–36)
MCV RBC AUTO: 94 FL (ref 82–98)
MICROSCOPIC COMMENT: ABNORMAL
MONOCYTES # BLD AUTO: 0.8 K/UL (ref 0.3–1)
MONOCYTES NFR BLD: 11.9 % (ref 4–15)
MRSA ID BY PCR: NEGATIVE
NEUTROPHILS # BLD AUTO: 4.2 K/UL (ref 1.8–7.7)
NEUTROPHILS NFR BLD: 64.4 % (ref 38–73)
NITRITE UR QL STRIP: NEGATIVE
NRBC BLD-RTO: 0 /100 WBC
PH UR STRIP: 7 [PH] (ref 5–8)
PLATELET # BLD AUTO: 195 K/UL (ref 150–450)
PMV BLD AUTO: 8.7 FL (ref 9.2–12.9)
POCT GLUCOSE: 110 MG/DL (ref 70–110)
POCT GLUCOSE: 119 MG/DL (ref 70–110)
POCT GLUCOSE: 77 MG/DL (ref 70–110)
POTASSIUM SERPL-SCNC: 3.6 MMOL/L (ref 3.5–5.1)
PROT UR QL STRIP: ABNORMAL
RBC # BLD AUTO: 5.04 M/UL (ref 4–5.4)
RBC #/AREA URNS HPF: >100 /HPF (ref 0–4)
SODIUM SERPL-SCNC: 146 MMOL/L (ref 136–145)
SP GR UR STRIP: 1.01 (ref 1–1.03)
SQUAMOUS #/AREA URNS HPF: 4 /HPF
STAPH AUREUS ID BY PCR: NEGATIVE
URN SPEC COLLECT METH UR: ABNORMAL
UROBILINOGEN UR STRIP-ACNC: 1 EU/DL
WBC # BLD AUTO: 6.48 K/UL (ref 3.9–12.7)
WBC #/AREA URNS HPF: 29 /HPF (ref 0–5)

## 2023-04-05 PROCEDURE — 25000003 PHARM REV CODE 250: Performed by: INTERNAL MEDICINE

## 2023-04-05 PROCEDURE — 99233 PR SUBSEQUENT HOSPITAL CARE,LEVL III: ICD-10-PCS | Mod: ,,, | Performed by: PSYCHIATRY & NEUROLOGY

## 2023-04-05 PROCEDURE — 90833 PSYTX W PT W E/M 30 MIN: CPT | Mod: ,,, | Performed by: PSYCHIATRY & NEUROLOGY

## 2023-04-05 PROCEDURE — 25000242 PHARM REV CODE 250 ALT 637 W/ HCPCS: Performed by: INTERNAL MEDICINE

## 2023-04-05 PROCEDURE — 87086 URINE CULTURE/COLONY COUNT: CPT | Performed by: STUDENT IN AN ORGANIZED HEALTH CARE EDUCATION/TRAINING PROGRAM

## 2023-04-05 PROCEDURE — 99233 SBSQ HOSP IP/OBS HIGH 50: CPT | Mod: ,,, | Performed by: STUDENT IN AN ORGANIZED HEALTH CARE EDUCATION/TRAINING PROGRAM

## 2023-04-05 PROCEDURE — 90833 PR PSYCHOTHERAPY W/PATIENT W/E&M, 30 MIN (ADD ON): ICD-10-PCS | Mod: ,,, | Performed by: PSYCHIATRY & NEUROLOGY

## 2023-04-05 PROCEDURE — A4216 STERILE WATER/SALINE, 10 ML: HCPCS | Performed by: STUDENT IN AN ORGANIZED HEALTH CARE EDUCATION/TRAINING PROGRAM

## 2023-04-05 PROCEDURE — 85025 COMPLETE CBC W/AUTO DIFF WBC: CPT | Performed by: INTERNAL MEDICINE

## 2023-04-05 PROCEDURE — 25000242 PHARM REV CODE 250 ALT 637 W/ HCPCS: Performed by: STUDENT IN AN ORGANIZED HEALTH CARE EDUCATION/TRAINING PROGRAM

## 2023-04-05 PROCEDURE — 99233 PR SUBSEQUENT HOSPITAL CARE,LEVL III: ICD-10-PCS | Mod: ,,, | Performed by: STUDENT IN AN ORGANIZED HEALTH CARE EDUCATION/TRAINING PROGRAM

## 2023-04-05 PROCEDURE — 80048 BASIC METABOLIC PNL TOTAL CA: CPT | Performed by: STUDENT IN AN ORGANIZED HEALTH CARE EDUCATION/TRAINING PROGRAM

## 2023-04-05 PROCEDURE — 51702 INSERT TEMP BLADDER CATH: CPT

## 2023-04-05 PROCEDURE — 84145 PROCALCITONIN (PCT): CPT | Performed by: STUDENT IN AN ORGANIZED HEALTH CARE EDUCATION/TRAINING PROGRAM

## 2023-04-05 PROCEDURE — 83735 ASSAY OF MAGNESIUM: CPT | Performed by: STUDENT IN AN ORGANIZED HEALTH CARE EDUCATION/TRAINING PROGRAM

## 2023-04-05 PROCEDURE — 94761 N-INVAS EAR/PLS OXIMETRY MLT: CPT

## 2023-04-05 PROCEDURE — 36415 COLL VENOUS BLD VENIPUNCTURE: CPT | Performed by: STUDENT IN AN ORGANIZED HEALTH CARE EDUCATION/TRAINING PROGRAM

## 2023-04-05 PROCEDURE — 99900031 HC PATIENT EDUCATION (STAT)

## 2023-04-05 PROCEDURE — 20000000 HC ICU ROOM

## 2023-04-05 PROCEDURE — C9113 INJ PANTOPRAZOLE SODIUM, VIA: HCPCS | Performed by: STUDENT IN AN ORGANIZED HEALTH CARE EDUCATION/TRAINING PROGRAM

## 2023-04-05 PROCEDURE — 99900035 HC TECH TIME PER 15 MIN (STAT)

## 2023-04-05 PROCEDURE — 63600175 PHARM REV CODE 636 W HCPCS: Performed by: STUDENT IN AN ORGANIZED HEALTH CARE EDUCATION/TRAINING PROGRAM

## 2023-04-05 PROCEDURE — 94640 AIRWAY INHALATION TREATMENT: CPT

## 2023-04-05 PROCEDURE — 94660 CPAP INITIATION&MGMT: CPT

## 2023-04-05 PROCEDURE — 27000221 HC OXYGEN, UP TO 24 HOURS

## 2023-04-05 PROCEDURE — 25000003 PHARM REV CODE 250: Performed by: STUDENT IN AN ORGANIZED HEALTH CARE EDUCATION/TRAINING PROGRAM

## 2023-04-05 PROCEDURE — 99233 SBSQ HOSP IP/OBS HIGH 50: CPT | Mod: ,,, | Performed by: PSYCHIATRY & NEUROLOGY

## 2023-04-05 PROCEDURE — 81000 URINALYSIS NONAUTO W/SCOPE: CPT | Performed by: STUDENT IN AN ORGANIZED HEALTH CARE EDUCATION/TRAINING PROGRAM

## 2023-04-05 RX ORDER — DEXTROSE MONOHYDRATE, SODIUM CHLORIDE, AND POTASSIUM CHLORIDE 50; 2.98; 4.5 G/1000ML; G/1000ML; G/1000ML
INJECTION, SOLUTION INTRAVENOUS CONTINUOUS
Status: DISPENSED | OUTPATIENT
Start: 2023-04-05 | End: 2023-04-05

## 2023-04-05 RX ORDER — VANCOMYCIN 2 GRAM/500 ML IN 0.9 % SODIUM CHLORIDE INTRAVENOUS
2000 ONCE
Status: COMPLETED | OUTPATIENT
Start: 2023-04-05 | End: 2023-04-05

## 2023-04-05 RX ADMIN — ONDANSETRON HYDROCHLORIDE 4 MG: 2 SOLUTION INTRAMUSCULAR; INTRAVENOUS at 10:04

## 2023-04-05 RX ADMIN — NIFEDIPINE 60 MG: 30 TABLET, FILM COATED, EXTENDED RELEASE ORAL at 01:04

## 2023-04-05 RX ADMIN — IPRATROPIUM BROMIDE AND ALBUTEROL SULFATE 3 ML: 2.5; .5 SOLUTION RESPIRATORY (INHALATION) at 08:04

## 2023-04-05 RX ADMIN — BUDESONIDE 0.25 MG: 0.25 INHALANT ORAL at 08:04

## 2023-04-05 RX ADMIN — CARVEDILOL 12.5 MG: 12.5 TABLET, FILM COATED ORAL at 08:04

## 2023-04-05 RX ADMIN — HYDRALAZINE HYDROCHLORIDE 10 MG: 20 INJECTION, SOLUTION INTRAMUSCULAR; INTRAVENOUS at 02:04

## 2023-04-05 RX ADMIN — SACUBITRIL AND VALSARTAN 1 TABLET: 24; 26 TABLET, FILM COATED ORAL at 08:04

## 2023-04-05 RX ADMIN — HYDRALAZINE HYDROCHLORIDE 10 MG: 20 INJECTION, SOLUTION INTRAMUSCULAR; INTRAVENOUS at 05:04

## 2023-04-05 RX ADMIN — HYDRALAZINE HYDROCHLORIDE 10 MG: 20 INJECTION, SOLUTION INTRAMUSCULAR; INTRAVENOUS at 09:04

## 2023-04-05 RX ADMIN — PANTOPRAZOLE SODIUM 40 MG: 40 INJECTION, POWDER, FOR SOLUTION INTRAVENOUS at 10:04

## 2023-04-05 RX ADMIN — SERTRALINE HYDROCHLORIDE 25 MG: 25 TABLET ORAL at 01:04

## 2023-04-05 RX ADMIN — VANCOMYCIN 2 GRAM/500 ML IN 0.9 % SODIUM CHLORIDE INTRAVENOUS 2000 MG: at 09:04

## 2023-04-05 RX ADMIN — FUROSEMIDE 40 MG: 40 TABLET ORAL at 01:04

## 2023-04-05 RX ADMIN — Medication 10 ML: at 05:04

## 2023-04-05 RX ADMIN — BUSPIRONE HYDROCHLORIDE 5 MG: 5 TABLET ORAL at 08:04

## 2023-04-05 RX ADMIN — VANCOMYCIN HYDROCHLORIDE 1250 MG: 1.25 INJECTION, POWDER, LYOPHILIZED, FOR SOLUTION INTRAVENOUS at 08:04

## 2023-04-05 RX ADMIN — SODIUM CHLORIDE: 9 INJECTION, SOLUTION INTRAVENOUS at 02:04

## 2023-04-05 RX ADMIN — BUSPIRONE HYDROCHLORIDE 5 MG: 5 TABLET ORAL at 01:04

## 2023-04-05 RX ADMIN — SACUBITRIL AND VALSARTAN 1 TABLET: 24; 26 TABLET, FILM COATED ORAL at 01:04

## 2023-04-05 RX ADMIN — SPIRONOLACTONE 25 MG: 25 TABLET ORAL at 01:04

## 2023-04-05 RX ADMIN — DEXTROSE MONOHYDRATE, SODIUM CHLORIDE, AND POTASSIUM CHLORIDE: 50; 4.5; 2.98 INJECTION, SOLUTION INTRAVENOUS at 08:04

## 2023-04-05 RX ADMIN — Medication 10 ML: at 12:04

## 2023-04-05 RX ADMIN — CARVEDILOL 12.5 MG: 12.5 TABLET, FILM COATED ORAL at 01:04

## 2023-04-05 RX ADMIN — IPRATROPIUM BROMIDE AND ALBUTEROL SULFATE 3 ML: 2.5; .5 SOLUTION RESPIRATORY (INHALATION) at 02:04

## 2023-04-05 RX ADMIN — ISOSORBIDE MONONITRATE 30 MG: 30 TABLET, EXTENDED RELEASE ORAL at 01:04

## 2023-04-05 NOTE — ASSESSMENT & PLAN NOTE
Patient with Long standing persistent (>12 months) atrial fibrillation which is controlled currently with Beta Blocker. Patient is currently in atrial fibrillation.[id number] Score: 3. Anticoagulation indicated. Anticoagulation done with Xarelto.

## 2023-04-05 NOTE — PROGRESS NOTES
Pharmacokinetic Initial Assessment: IV Vancomycin    Assessment/Plan:    Initiate intravenous vancomycin with loading dose of 2000 mg once followed by a maintenance dose of vancomycin 1250mg IV every 12 hours  Desired empiric serum trough concentration is 15 to 20 mcg/mL  Draw vancomycin trough level 60 min prior to fourth dose on 4/6/26 at approximately 1930  Pharmacy will continue to follow and monitor vancomycin.      Please contact pharmacy at extension [phone] with any questions regarding this assessment.     Thank you for the consult,   Kaelyn Lopez       Patient brief summary:  Carmen Tan is a 73 y.o. female initiated on antimicrobial therapy with IV Vancomycin for treatment of suspected bacteremia    Drug Allergies:   Review of patient's allergies indicates:   Allergen Reactions    Pcn [penicillins] Swelling       Actual Body Weight:   132.9 kg    Renal Function:   Estimated Creatinine Clearance: 62.8 mL/min (based on SCr of 1.1 mg/dL).,     Dialysis Method (if applicable):  N/A    CBC (last 72 hours):  Recent Labs   Lab Result Units 04/03/23 0344 04/04/23 0327 04/05/23  0334   WBC K/uL 6.56 7.04 6.48   Hemoglobin g/dL 16.4* 15.1 14.6   Hematocrit % 52.5* 49.5* 47.4   Platelets K/uL 247 222 195   Gran % % 67.5 68.9 64.4   Lymph % % 20.6 20.6 22.1   Mono % % 11.3 9.7 11.9   Eosinophil % % 0.0 0.4 1.2   Basophil % % 0.3 0.1 0.2   Differential Method  Automated Automated Automated       Metabolic Panel (last 72 hours):  Recent Labs   Lab Result Units 04/03/23  0344 04/03/23  0632 04/04/23  0327 04/05/23  0334 04/05/23  0400   Sodium mmol/L 143  --  145 146*  --    Potassium mmol/L 3.9  --  3.7 3.6  --    Chloride mmol/L 106  --  107 109  --    CO2 mmol/L 38*  --  38* 36*  --    Glucose mg/dL 118*  --  99 80  --    Glucose, UA   --  4+*  --   --  2+*   BUN mg/dL 29*  --  29* 23  --    Creatinine mg/dL 1.3  --  1.3 1.1  --    Albumin g/dL 2.5*  --  2.4*  --   --    Total Bilirubin mg/dL 0.5  --   0.5  --   --    Alkaline Phosphatase U/L 120  --  101  --   --    AST U/L 15  --  14  --   --    ALT U/L 25  --  21  --   --    Magnesium mg/dL 2.5  --  2.6 2.3  --        Drug levels (last 3 results):  No results for input(s): VANCOMYCINRA, VANCORANDOM, VANCOMYCINPE, VANCOPEAK, VANCOMYCINTR, VANCOTROUGH in the last 72 hours.    Microbiologic Results:  Microbiology Results (last 7 days)       Procedure Component Value Units Date/Time    Blood culture [[id number]] Collected: 04/03/23 1150    Order Status: Completed Specimen: Blood Updated: 04/05/23 0645     Blood Culture, Routine Gram stain aer bottle: Gram positive cocci in clusters resembling Staph      Results called to and read back by: Pao Lindquist RN 04/05/2023      06:44    Urine Culture High Risk [[id number]] Collected: 04/05/23 0400    Order Status: Sent Specimen: Urine, Catheterized Updated: 04/05/23 0430    Urine Culture High Risk [[id number]] Collected: 04/03/23 0631    Order Status: Completed Specimen: Urine, Catheterized Updated: 04/05/23 0348     Urine Culture, Routine No growth    Narrative:      Indicated criteria for high risk culture:->Other  Other (specify):->Altered mental status    MRSA/SA Rapid ID by PCR from Blood culture [[id number]] Collected: 04/03/23 1150    Order Status: No result Updated: 04/05/23 0321    Blood culture [[id number]] Collected: 04/03/23 1200    Order Status: Completed Specimen: Blood Updated: 04/04/23 2212     Blood Culture, Routine No Growth to date      No Growth to date

## 2023-04-05 NOTE — NURSING
Patient was on 4 LPM N/C, RR in the 30's, she states she is a little SOB, noted, changed the N/C to the BIPAP.

## 2023-04-05 NOTE — ASSESSMENT & PLAN NOTE
Patient with Hypercapnic and Hypoxic Respiratory failure which is Acute on chronic.  she is on home oxygen at 2 LPM. Supplemental oxygen was provided and noted- Oxygen Concentration (%):  [40-50] 50    Signs/symptoms of respiratory failure include- tachypnea, increased work of breathing, use of accessory muscles and wheezing. Contributing diagnoses includes - CHF, COPD, Obesity Hypoventilation and Pleural effusion Labs and images were reviewed. Patient Has recent ABG, which has been reviewed. Will treat underlying causes and adjust management of respiratory failure as follows- IV lasix, supplemental oxygen, continuous BiPAP  3/31: IV lasix 40 mg Q6H x 2 doses  4/1 improving daily, continue diuresis with Lasix, continue BiPAP therapy and will transition to O2 therapy as tolerates  4/2 continue current treatment, continue diuresis with Lasix; continue to offer nasal cannula as tolerates for oxygen options  4/3 transitioned to PO lasix 40 mg BID, continue BiPAP I/E 14/6 FiO2 60%  4/4 There is evidence, patient now requires noninvasive home ventilator due to chronic respiratory failure and COPD.  If obtained, this will help the patient decrease the work of breathing, hospital readmits, intubations, and improve overall quality of life. If this home respiratory device is not obtained it may lead to patient's harm or death. Will arrange for NI ventilator for home use ready on discharge.

## 2023-04-05 NOTE — ASSESSMENT & PLAN NOTE
Patient's FSGs are controlled on current medication regimen.  Last A1c reviewed-   Lab Results   Component Value Date    HGBA1C 5.5 03/29/2023     Most recent fingerstick glucose reviewed-   Recent Labs   Lab 04/04/23  1051 04/04/23  1624 04/04/23  2106 04/04/23 2115   POCTGLUCOSE 86 77 79 67*     Current correctional scale  Low  Maintain anti-hyperglycemic dose as follows-   Antihyperglycemics (From admission, onward)    Start     Stop Route Frequency Ordered    03/31/23 0106  insulin aspart U-100 pen 0-5 Units         -- SubQ Before meals & nightly PRN 03/31/23 0008    03/30/23 1700  empagliflozin 10 mg tablet 10 mg         -- Oral Daily 03/30/23 1345        Per cardiology;   3/31 Jardiance 10 mg daily  4/1 continue current med

## 2023-04-05 NOTE — ASSESSMENT & PLAN NOTE
Patient has a current diagnosis of Hypertensive emergency with end organ damage evidenced by hypertensive encephalopathy and acute pulmonary edema without heart failure which is uncontrolled.  Latest blood pressure and vitals reviewed-   Temp:  [97.1 °F (36.2 °C)-97.9 °F (36.6 °C)]   Pulse:  []   Resp:  [12-39]   BP: (131-163)/()   SpO2:  [87 %-100 %] .   Patient currently on IV antihypertensives.   Home meds for hypertension were reviewed and noted below.   Hypertension Medications             furosemide (LASIX) 40 MG tablet Take 1 tablet (40 mg total) by mouth 2 (two) times daily. If having increased shortness of breath, increase in leg swelling, and if you notice an >3lbs in 24 hours. May take up to 4 tablets in 1 day (every 6 hours)    isosorbide mononitrate (IMDUR) 60 MG 24 hr tablet Take 1 tablet (60 mg total) by mouth once daily.    lisinopril (PRINIVIL,ZESTRIL) 5 MG tablet Take 1 tablet (5 mg total) by mouth once daily.    metoprolol tartrate (LOPRESSOR) 25 MG tablet Take 1 tablet (25 mg total) by mouth 2 (two) times daily.      Will aim for controlled BP reduction by medications noted above. Monitor and mitigate end organ damage as indicated.  Follow up cardiology consult appreciated.   3/31 Lisinopril discontinued for start of Entresto  - continue nitrate, hydralazine, carvedilol  - tridil gtt PRN  4/1 blood pressure has improved, appreciate cardiology's help  4/2 blood pressures have improved continue current medications  4/3 on Entresto, spironolactone, carvedilol, nifedipime, hydralazine, lasix

## 2023-04-05 NOTE — ASSESSMENT & PLAN NOTE
4/3 continue with cardiology recommendations  Patient is identified as having Combined Systolic and Diastolic heart failure that is Acute on chronic. CHF is currently uncontrolled due to Continued edema of extremities, Dyspnea not returned to baseline after 2 doses of IV diuretic and Rales/crackles on pulmonary exam. Latest ECHO performed and demonstrates- Results for orders placed during the hospital encounter of 03/29/23  Echo  Interpretation Summary  · The left ventricle is normal in size with moderate concentric hypertrophy and mildly decreased systolic function.  · The estimated ejection fraction is 45%.  · Grade I left ventricular diastolic dysfunction.  · Moderate right ventricular enlargement.  · Moderate right atrial enlargement.  · Mild-to-moderate aortic regurgitation.  · There is mild aortic valve stenosis.  · Aortic valve area is 1.44 cm2; peak velocity is 2.09 m/s; mean gradient is 10 mmHg.  · Mild-to-moderate mitral regurgitation.  · Moderate tricuspid regurgitation.  · Moderate pulmonic regurgitation.  · The estimated PA systolic pressure is 69 mmHg.  · There is moderate pulmonary hypertension.  · Moderate left atrial enlargement.  . Continue Beta Blocker, ACE/ARB, Furosemide, Aldactone, Nitrate/Vasodilator and ARNI and monitor clinical status closely. Monitor on telemetry. Monitor strict Is&Os and daily weights.  Place on fluid restriction of 1.5 L. Continue to stress to patient importance of self efficacy and  on diet for CHF. Last BNP reviewed-  Lab Results   Component Value Date    NTPROBNP 44610 (H) 03/29/2023 4/4 improved BP control, now on PO lasix with adequate urine output. Continue recommendations per cardiology.

## 2023-04-05 NOTE — EICU
Intervention Initiated From:  [facility] / KLAUS Gallagher intervened regarding:  Rounding (Video assessment)    Comments: Video assessment complete. Pt resting in bed, on Bipap. No acute distress noted. VS per flowsheet.

## 2023-04-05 NOTE — CONSULTS
PSYCHIATRY CONSULT      4/5/2023 7:45 AM   Carmen Tan   1950   [id number]         DATE OF ADMISSION: 3/29/2023 11:09 PM    SITE: [facility]    CURRENT LEGAL STATUS: PEC and/or CEC      HISTORY    CHIEF COMPLAINT   Carmen Tan is a 73 y.o. female with a past psychiatric history of n/a currently admitted to the inpatient unit with the following chief complaint: altered mental status    HPI   The patient was seen and examined. The chart was reviewed.    The patient presented to the ER on 3/29/2023 .    Per ED/Hospital medicine:     Per ems pt called 911 due to sob, when they arrived gave her a duoneb and moved her over and her blood pressure went up to 200/100's from an original reading of 140/80, solumedrol 125mg IM given, 4 sprays of nitro spray given, 1inch of nitropaste on pts chest, pt currently on cpap with an O2 sat of 100%      [id number]   Carmen Tan is a 73 y.o. female with PMHX of COPD, CHF, PE on Xarelto, morbid obesity who presents to the emergency department C/O shortness of breath.     Patient arrives by EMS. They report they were called because patient was acutely short of breath.  Patient was given a DuoNeb and being prepared to be taken to hospital. On repeat blood pressure check patient was markedly hypertensive.  Patient was given for nitro space, nitro patch, placed on CPAP.  She received 125mg solumedrol. On arrival to the emergency department patient is on CPAP and moderate respiratory distress.  Appears to be mentating normally and able to respond to questions appropriately however history limited due to respiratory distress.     PCP: Lucian Barry MD   3/30   Patient awake and alert. Diuresed well overnight with urine output of 1.8L. Patient endorses breathing better compared to time of arrival to ED. She has had a similar hospitalization over a year ago and have since not followed up with any of her specialists. 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   Shortness of breath has been worsening over the past week and her leg swellings have started to worsen two weeks ago. She normally ambulates with a walker at home. She has a son who also lives with her to assist. Patient remains on nitroglycerin gtt with SBP still >160s mmHg. Patient to be admitted for hypertensive emergency with acute respiratory failure with hypoxia and hypercarbia, CHF exacerbation for continue IV diuresis, oxygen supplementation, and consult with cardiology.                   Overview/Hospital Course:  3/31 Overnight due to increased blood pressure, tridil gtt back on. Patient awake and tolerating BiPAP I/E 14/6 FiO2 50%. Urine output of 5 L. Patient endorses tenseness in lower extremities improving with breathing better compared yesterday. BP adjusted per cardiology with added daily Jardiance. Continue with IV 40 mg BID lasix and monitor UOP and renal function.   4/1 ND patient is feeling a little better this morning, does report less heaviness to her legs which do show improvement in edema.  Patient had good diuresis overnight.  Off of all IV blood pressure meds, and blood pressure appears more stable  4/2 ND patient still endorses some shortness of breath especially when transitioned from BiPAP to nasal cannula O2 despite having good saturation.  Nurses reports she stays very anxious.  Patient has been diuresing well with improvement in blood pressures  4/3 Overnight increasing agitation and confusion, blood tinged urine collecting in Hardwick catheter. Given change in mentation; will hold xarelto, f/u urine culture, f/u blood cultures, f/u ultrasound. Over the weekend, patient diuresed over 8 liters, IV lasix now PO. Remains tachypneic, ABG obtained show hypercarbia, BiPAP settings adjusted back to I/E 14/6 FiO2 60%. On nasal cannula patient maintains SpO2 >98%.   4/4 BiPAP tubing replacement resulted in increased tidal volume, back on nasal cannula this morning. BP  "normotensive. Patient more alert and agrees to participating with PT and OT. Adjust anxiety meds, add sertraline and zyprexa at bedtime. Continue bowel regimen with gentle IVF.  Follow pulmonary hygiene care per respiratory.             Per initial psychiatric assessment:    Patient seen today for psychiatric consultation secondary to agitation/altered mental status. Patient has significant medical history including heart failure, COPD, diabetes. Pt was brought to hospital d/t shortness of breath. Nursing notes indicate several episodes of confusion/agitation since admission to ICU.     On assessment, patient is in bed, awake, alert, oriented to person, time, situation. When asked to state her location, pt stated "[place]", but otherwise orientation is intact. She is unable to verbalize circumstances leading to hospitalization, states "I don't remember." Pt denies any significant psychiatric history other than insomnia for which she is prescribed Temazepam outpatient. Of note, patient states that she does not take this medication on a nightly basis, urine screen negative for benzos.     Pt denies history of auditory or visual hallucinations. When asked about recent episodes of agitation (attempting to leave bed, pulling IV lines, etc) patient states she has no recollection of this.     She denies any current or previous suicidal or homicidal ideation.     Patient does not appear to have a comprehensive understanding of her medical conditions or medication regimen. When asked to list her health problems, she states "diabetes I think."    Of note, patient utilizing bipap throughout assessment and toward the end of assessment requested to be left alone so that she can rest.     Additional assessment data and recommendations below:    Symptoms of Depression: diminished mood - No, loss of interest/anhedonia - No;  recurrent - No, >14 days - No, diminished energy - No, change in sleep - Yes, change in appetite - Yes, " "diminished concentration or cognition or indecisiveness - No, PMA/R -  No, excessive guilt or hopelessness or worthlessness - No, suicidal ideations - No    Changes in Sleep: trouble with initiation-Occasionally, maintenance, - Yes early morning awakening with inability to return to sleep - No, hypersomnolence - No    Suicidal- active/passive ideations - No, organized plans, future intentions - No    Homicidal ideations: active/passive ideations - No, organized plans, future intentions - No    Symptoms of psychosis: hallucinations - No, delusions - No, disorganized speech - No, disorganized behavior or abnormal motor behavior - No, or negative symptoms (diminshed emotional expression, avolition, anhedonia, alogia, asociality) - No, active phase symptoms >1 month - No, continuous signs of illness > 6 months - No, since onset of illness decreased level of functioning present - No    Symptoms of jamila or hypomania: elevated, expansive, or irritable mood with increased energy or activity - No; > 4 days - No,  >7 days - No; with inflated self-esteem or grandiosity - No, decreased need for sleep - No, increased rate of speech - No, FOI or racing thoughts - No, distractibility - No, increased goal directed activity or PMA - No, risky/disinhibited behavior - No    Symptoms of DELMI: excessive anxiety/worry/fear, more days than not, about numerous issues - No, ongoing for >6 months - No, difficult to control - No, with restlessness - No, fatigue - No, poor concentration - No, irritability - No, muscle tension - No, sleep disturbance - No; causes functionally impairing distress - No    Symptoms of Panic Disorder: recurrent panic attacks (palpitations/heart racing, sweating, shakiness, dyspnea, choking, chest pain/discomfort, Gi symptoms, dizzy/lightheadedness, hot/col flashes, paresthesias, derealization, fear of losing control or fear of dying or fear of "going crazy") - No, precipitated - No, un-precipitated - No, source of " worry and/or behavioral changes secondary for 1 month or longer- No, agoraphobia - No    Symptoms of PTSD: h/o trauma exposure - No; re-experiencing/intrusive symptoms - No, avoidant behavior - No, 2 or more negative alterations in cognition or mood - No, 2 or more hyperarousal symptoms - No; with dissociative symptoms - No, ongoing for 1 or more  months - No    Symptoms of OCD: obsessions (recurrent thoughts/urges/images; intrusive and/or unwanted; uses other thoughts/actions to suppress) - No; compulsions (repetitive behaviors used to lower distress/anxiety/obsessions) - No, time-consuming (over 1 hour per day) or cause significant distress/impairment - - No    Symptoms of Anorexia: restriction of caloric intake leading to significantly low body weight - No, intense fear of gaining weight or persistent behavior that interferes with weight gain even thought at a significantly low weight - No, disturbance in the way in which one's body weight or shape is experienced, undue influence of body weight or shape on self evaluation, or persistent lack of recognition of the seriousness of the current low body weight - No    Symptoms of Bulimia: recurrent episodes of binge eating (definitely larger amount  than what others would eat and lack of a sense of control over eating during episode) - No, recurrent inappropriate compensatory behaviors in order to prevent weight gain (fasting, medications, exercise, vomiting) - No, binges and compensatory behaviors both occur on average at least once a week for 3 months - No, self evaluations is unduly influenced by body shape/weight- - No    Symptoms of Binge eating: recurrent episodes of binge eating (definitely larger amount than what others would eat and lack of a sense of control over eating during episode) - No, 3 or more of following (eating much more rapidly, eating until uncomfortably full, large amounts when not hungry, eating alone because of embarrassed by how much,   feeling disgusted with oneself, depressed or very guilty afterward) - No, distress regarding binges - No, binges occur on average at least once a week for 3 months - No      Substance/s:  Taken in larger amounts or over longer periods than intended: No,  Persistent desire or unsuccessful attempts to cut down or stop: No,  Great deal of time spent seeking, using or recovering from: No,  Craving or strong desire to use: No,  Recurrent use despite failure to meet major role obligation: No,  Continued use despite persistent or recurrent social/interparsonal issues due to use: No,  Important social/work/recreational activities given up due to use: No,  Recurrent use in physically hazardous situations: No,  Continued use despite knowledge of persistent physical or psychological problem: No,  Tolerance (either increased need or diminished effect): No,      Psychotherapy:  Target symptoms: confusion  Why chosen therapy is appropriate versus another modality: relevant to diagnosis, evidence based practice  Outcome monitoring methods: self-report, observation  Therapeutic intervention type: insight oriented psychotherapy, supportive psychotherapy  Topics discussed/themes: building skills sets for symptom management, symptom recognition  The patient's response to the intervention is accepting. The patient's progress toward treatment goals is fair.   Duration of intervention: 16 minutes.      PAST PSYCHIATRIC HISTORY  Previous Psychiatric Hospitalizations: No  Previous SI/HI: No,  Previous Suicide Attempts: No,   Previous Medication Trials: No,  Psychiatric Care (current & past): No,  History of Psychotherapy: No,  History of Violence: No,  History of sexual/physical abuse: No,    PAST MEDICAL & SURGICAL HISTORY   Past Medical History:   Diagnosis Date    Abdominal hernia     CHF (congestive heart failure)     COPD (chronic obstructive pulmonary disease)     Diabetes mellitus, type 2 2/16/2022    GERD (gastroesophageal reflux  disease)     History of home oxygen therapy     Hypertension     Migraine headache     Pulmonary embolism 06/01/2017    Small bowel obstruction     Thyroid disease      Past Surgical History:   Procedure Laterality Date    COLONOSCOPY      HYSTERECTOMY      INNER EAR SURGERY      UPPER GASTROINTESTINAL ENDOSCOPY           CURRENT PSYCH MEDICATION REGIMEN   Temazepam 15 mg qhs PRN  Current Medication side effects:  denies  Current Medication compliance:  yes    Previous psych meds trials      Home Meds:   Prior to Admission medications    Medication Sig Start Date End Date Taking? Authorizing Provider   albuterol-ipratropium (DUO-NEB) 2.5 mg-0.5 mg/3 mL nebulizer solution Take 3 mLs by nebulization every 6 (six) hours as needed for Wheezing. Rescue 2/7/22 2/7/23  Oscar Ge Jr., MD   budesonide (PULMICORT) 0.5 mg/2 mL nebulizer solution Take 2 mLs (0.5 mg total) by nebulization 2 (two) times a day. Controller 2/7/22 3/9/22  Oscar Ge Jr., MD   fesoterodine (TOVIAZ) 8 mg Tb24 Take by mouth.    Historical Provider   fish oil-omega-3 fatty acids 300-1,000 mg capsule Take 2 g by mouth once daily.    Historical Provider   furosemide (LASIX) 40 MG tablet Take 1 tablet (40 mg total) by mouth 2 (two) times daily. If having increased shortness of breath, increase in leg swelling, and if you notice an >3lbs in 24 hours. May take up to 4 tablets in 1 day (every 6 hours) 6/13/17   Sasha Olson MD   gabapentin (NEURONTIN) 600 MG tablet Take 600 mg by mouth 2 (two) times daily.    Historical Provider   isosorbide mononitrate (IMDUR) 60 MG 24 hr tablet Take 1 tablet (60 mg total) by mouth once daily. 2/20/22 2/20/23  Oscar Ge Jr., MD   LIPITOR 40 mg tablet Take 1 tablet (40 mg total) by mouth once daily. 6/13/17 6/13/18  Sasha Olson MD   lisinopril (PRINIVIL,ZESTRIL) 5 MG tablet Take 1 tablet (5 mg total) by mouth once daily. 6/13/17 6/13/18  Sasha Olson MD   metoprolol tartrate (LOPRESSOR) 25 MG tablet Take  1 tablet (25 mg total) by mouth 2 (two) times daily. 6/13/17 6/13/18  Sasha Olson MD   mometasone-formoterol (DULERA) 200-5 mcg/actuation inhaler Inhale 2 puffs into the lungs 2 (two) times daily. Controller    Historical Provider   omeprazole (PRILOSEC) 20 MG capsule Take 20 mg by mouth once daily.    Historical Provider   predniSONE (DELTASONE) 20 MG tablet Take 1 tablet (20 mg total) by mouth 2 (two) times daily. 2/19/22   Oscar Ge Jr., MD   rivaroxaban (XARELTO) 10 mg Tab Take 2 tablets (20 mg total) by mouth daily with dinner or evening meal. 6/13/17 6/13/18  Sasha Olson MD   vitamin D 1000 units Tab Take 185 mg by mouth once daily.    Historical Provider         OTC Meds:     Scheduled Meds:    albuterol-ipratropium  3 mL Nebulization Q6H WAKE    bisacodyL  10 mg Rectal Daily    budesonide  0.25 mg Nebulization Q12H    busPIRone  5 mg Oral TID    carvediloL  12.5 mg Oral BID    empagliflozin  10 mg Oral Daily    furosemide  40 mg Oral BID    gabapentin  300 mg Oral BID    hydrALAZINE  10 mg Intravenous Q8H    isosorbide mononitrate  30 mg Oral Daily    NIFEdipine  60 mg Oral Daily    pantoprazole  40 mg Intravenous Daily    polyethylene glycol  17 g Oral BID    sacubitriL-valsartan  1 tablet Oral BID    sertraline  25 mg Oral Daily    sodium chloride 0.9%  10 mL Intravenous Q6H    spironolactone  25 mg Oral Daily    vancomycin (VANCOCIN) IVPB  1,250 mg Intravenous Q12H    vancomycin (VANCOCIN) IVPB  2,000 mg Intravenous Once      PRN Meds: acetaminophen, aluminum-magnesium hydroxide-simethicone, dextrose 10%, dextrose 10%, dextrose, dextrose, glucagon (human recombinant), insulin aspart U-100, melatonin, ondansetron, sodium chloride 0.9%, Flushing PICC Protocol **AND** sodium chloride 0.9% **AND** sodium chloride 0.9%, Pharmacy to dose Vancomycin consult **AND** vancomycin - pharmacy to dose   [facility] (From admission, onward)      Start     Stop Route Frequency Ordered    04/04/23 7491   "sertraline tablet 25 mg         -- Oral Daily 04/04/23 0826    04/02/23 0930  busPIRone tablet 5 mg         -- Oral 3 times daily 04/02/23 0823            ALLERGIES   Review of patient's allergies indicates:   Allergen Reactions    Pcn [penicillins] Swelling       NEUROLOGIC HISTORY  Seizures: No  Head trauma: Yes, reports head injury with LOC as child    SOCIAL HISTORY:  Developmental/Childhood:Achieved all developmental milestones timely  Education:"8th I think"  Employment Status/Finances:Retired   Relationship Status/Sexual Orientation:   Children: 2  Housing Status: Home     history:  NO   Access to Firearms: NO ;  Locked up? n/a  Buddhist: n/a  Recreational activities: "I don't know"    SUBSTANCE ABUSE HISTORY   Recreational Drugs: denies   Use of Alcohol: denies  Rehab History:denies   Tobacco Use:denies    LEGAL HISTORY:   Past charges/incarcerations: NO  Pending charges:NO    FAMILY PSYCHIATRIC HISTORY   No family history on file.             ROS  Review of Systems   HENT: Negative.     Eyes: Negative.    Respiratory:          Bipap present   Cardiovascular: Negative.    Genitourinary: Negative.    Musculoskeletal: Negative.    Skin: Negative.    Neurological: Negative.    Endo/Heme/Allergies: Negative.    Psychiatric/Behavioral:          As noted       EXAMINATION    PHYSICAL EXAM  Reviewed note/exam by Dr. Mott from     VITALS   Vitals:    04/05/23 0721   BP:    Pulse:    Resp:    Temp: 97.9 °F (36.6 °C)        Body mass index is 50.29 kg/m².        PAIN  0/10  Subjective report of pain matches objective signs and symptoms: Yes    LABORATORY DATA   Recent Results (from the past 72 hour(s))   POCT glucose    Collection Time: 04/02/23 10:18 AM   Result Value Ref Range    POCT Glucose 100 70 - 110 mg/dL   POCT glucose    Collection Time: 04/02/23  4:21 PM   Result Value Ref Range    POCT Glucose 116 (H) 70 - 110 mg/dL   POCT glucose    Collection Time: 04/02/23  9:04 PM   Result Value " Ref Range    POCT Glucose 103 70 - 110 mg/dL   Magnesium    Collection Time: 04/03/23  3:44 AM   Result Value Ref Range    Magnesium 2.5 1.6 - 2.6 mg/dL   Comprehensive Metabolic Panel    Collection Time: 04/03/23  3:44 AM   Result Value Ref Range    Sodium 143 136 - 145 mmol/L    Potassium 3.9 3.5 - 5.1 mmol/L    Chloride 106 95 - 110 mmol/L    CO2 38 (H) 23 - 29 mmol/L    Glucose 118 (H) 70 - 110 mg/dL    BUN 29 (H) 8 - 23 mg/dL    Creatinine 1.3 0.5 - 1.4 mg/dL    Calcium 9.4 8.7 - 10.5 mg/dL    Total Protein 5.9 (L) 6.0 - 8.4 g/dL    Albumin 2.5 (L) 3.5 - 5.2 g/dL    Total Bilirubin 0.5 0.1 - 1.0 mg/dL    Alkaline Phosphatase 120 55 - 135 U/L    AST 15 10 - 40 U/L    ALT 25 10 - 44 U/L    Anion Gap -1 (L) 8 - 16 mmol/L    eGFR 43.7 (A) >60 mL/min/1.73 m^2   CBC Auto Differential    Collection Time: 04/03/23  3:44 AM   Result Value Ref Range    WBC 6.56 3.90 - 12.70 K/uL    RBC 5.63 (H) 4.00 - 5.40 M/uL    Hemoglobin 16.4 (H) 12.0 - 16.0 g/dL    Hematocrit 52.5 (H) 37.0 - 48.5 %    MCV 93 82 - 98 fL    MCH 29.1 27.0 - 31.0 pg    MCHC 31.2 (L) 32.0 - 36.0 g/dL    RDW 17.8 (H) 11.5 - 14.5 %    Platelets 247 150 - 450 K/uL    MPV 9.2 9.2 - 12.9 fL    Immature Granulocytes 0.3 0.0 - 0.5 %    Gran # (ANC) 4.4 1.8 - 7.7 K/uL    Immature Grans (Abs) 0.02 0.00 - 0.04 K/uL    Lymph # 1.4 1.0 - 4.8 K/uL    Mono # 0.7 0.3 - 1.0 K/uL    Eos # 0.0 0.0 - 0.5 K/uL    Baso # 0.02 0.00 - 0.20 K/uL    nRBC 0 0 /100 WBC    Gran % 67.5 38.0 - 73.0 %    Lymph % 20.6 18.0 - 48.0 %    Mono % 11.3 4.0 - 15.0 %    Eosinophil % 0.0 0.0 - 8.0 %    Basophil % 0.3 0.0 - 1.9 %    Differential Method Automated    Urine Culture High Risk    Collection Time: 04/03/23  6:31 AM    Specimen: Urine, Catheterized   Result Value Ref Range    Urine Culture, Routine No growth    Urinalysis    Collection Time: 04/03/23  6:32 AM   Result Value Ref Range    Specimen UA Urine, Catheterized     Color, UA Red (A) Yellow, Straw, Olga    Appearance, UA Cloudy  (A) Clear    pH, UA 5.0 5.0 - 8.0    Specific Gravity, UA 1.020 1.005 - 1.030    Protein, UA 2+ (A) Negative    Glucose, UA 4+ (A) Negative    Ketones, UA Negative Negative    Bilirubin (UA) 1+ (A) Negative    Occult Blood UA 3+ (A) Negative    Nitrite, UA Negative Negative    Urobilinogen, UA 1.0 <2.0 EU/dL    Leukocytes, UA 1+ (A) Negative   Urinalysis Microscopic    Collection Time: 04/03/23  6:32 AM   Result Value Ref Range    RBC, UA >100 (H) 0 - 4 /hpf    WBC, UA 9 (H) 0 - 5 /hpf    Bacteria Negative None-Occ /hpf    Yeast, UA None None    Squam Epithel, UA 3 /hpf    Hyaline Casts, UA 0 0-1/lpf /lpf    Microscopic Comment SEE COMMENT    POCT ARTERIAL BLOOD GAS    Collection Time: 04/03/23  6:35 AM   Result Value Ref Range    POC PH 7.293 (L) 7.345 - 7.450    POC PCO2 83.0 (HH) 35.0 - 45.0 mmHg    POC PO2 56.5 (LL) 80.0 - 100 mmHg    POC SATURATED O2 85.8 (LL) 95.0 - 100.0 %    POC HCO3 32.4 (H) 24.0 - 28.0 mmol/l    Base Deficit 13.6 (H) -2.0 - 2.0 mmol/l    POC Temp 37.0 C    Specimen source Arterial     Performed By: Arnav     MODIFIED BLANCHE'S TEST +     FiO2 60.0 %    O2DEVICE BIPAP     BIPAP 14/6     Provider Notified: RUI CALI     Notified Time 04/03/2023 06:37     Notified By Arnav    POCT glucose    Collection Time: 04/03/23 11:14 AM   Result Value Ref Range    POCT Glucose 98 70 - 110 mg/dL   Blood culture    Collection Time: 04/03/23 11:50 AM    Specimen: Blood   Result Value Ref Range    Blood Culture, Routine       Gram stain aer bottle: Gram positive cocci in clusters resembling Staph    Blood Culture, Routine       Results called to and read back by: Pao Lindquist RN 04/05/2023    Blood Culture, Routine 06:44    Uric Acid    Collection Time: 04/03/23 12:00 PM   Result Value Ref Range    Uric Acid 5.7 2.4 - 5.7 mg/dL   Blood culture    Collection Time: 04/03/23 12:00 PM    Specimen: Blood   Result Value Ref Range    Blood Culture, Routine No Growth to date     Blood Culture, Routine No Growth  to date    POCT glucose    Collection Time: 04/03/23  5:10 PM   Result Value Ref Range    POCT Glucose 103 70 - 110 mg/dL   POCT ARTERIAL BLOOD GAS    Collection Time: 04/03/23  5:16 PM   Result Value Ref Range    POC PH 7.347 7.345 - 7.450    POC PCO2 70.0 (HH) 35.0 - 45.0 mmHg    POC PO2 54.8 (LL) 80.0 - 100 mmHg    POC SATURATED O2 87.5 (LL) 95.0 - 100.0 %    POC HCO3 32.8 (H) 24.0 - 28.0 mmol/l    Base Deficit 12.7 (H) -2.0 - 2.0 mmol/l    POC Temp 37.0 C    Specimen source Arterial     Performed By: Devon     MODIFIED BLANCHE'S TEST POSITIVE     FiO2 60.0 %    O2DEVICE Other     Vt 325     PEEP 8.0     Pressure Support 10.0     Comments       AVAPS 325VT, 30 PRESSURE MAX, 8-10 EPAP, 10-15 PRESSURE SUPPORT    Provider Notified: RUI HAMMER     Notified Time 04/03/2023 17:20     Notified By Devon     Notified Note Called and read back by RUI HAMMER    POCT glucose    Collection Time: 04/03/23  9:21 PM   Result Value Ref Range    POCT Glucose 95 70 - 110 mg/dL   Magnesium    Collection Time: 04/04/23  3:27 AM   Result Value Ref Range    Magnesium 2.6 1.6 - 2.6 mg/dL   CBC Auto Differential    Collection Time: 04/04/23  3:27 AM   Result Value Ref Range    WBC 7.04 3.90 - 12.70 K/uL    RBC 5.22 4.00 - 5.40 M/uL    Hemoglobin 15.1 12.0 - 16.0 g/dL    Hematocrit 49.5 (H) 37.0 - 48.5 %    MCV 95 82 - 98 fL    MCH 28.9 27.0 - 31.0 pg    MCHC 30.5 (L) 32.0 - 36.0 g/dL    RDW 17.2 (H) 11.5 - 14.5 %    Platelets 222 150 - 450 K/uL    MPV 8.5 (L) 9.2 - 12.9 fL    Immature Granulocytes 0.3 0.0 - 0.5 %    Gran # (ANC) 4.9 1.8 - 7.7 K/uL    Immature Grans (Abs) 0.02 0.00 - 0.04 K/uL    Lymph # 1.5 1.0 - 4.8 K/uL    Mono # 0.7 0.3 - 1.0 K/uL    Eos # 0.0 0.0 - 0.5 K/uL    Baso # 0.01 0.00 - 0.20 K/uL    nRBC 0 0 /100 WBC    Gran % 68.9 38.0 - 73.0 %    Lymph % 20.6 18.0 - 48.0 %    Mono % 9.7 4.0 - 15.0 %    Eosinophil % 0.4 0.0 - 8.0 %    Basophil % 0.1 0.0 - 1.9 %    Differential Method Automated    Comprehensive Metabolic  Panel    Collection Time: 04/04/23  3:27 AM   Result Value Ref Range    Sodium 145 136 - 145 mmol/L    Potassium 3.7 3.5 - 5.1 mmol/L    Chloride 107 95 - 110 mmol/L    CO2 38 (H) 23 - 29 mmol/L    Glucose 99 70 - 110 mg/dL    BUN 29 (H) 8 - 23 mg/dL    Creatinine 1.3 0.5 - 1.4 mg/dL    Calcium 9.3 8.7 - 10.5 mg/dL    Total Protein 5.8 (L) 6.0 - 8.4 g/dL    Albumin 2.4 (L) 3.5 - 5.2 g/dL    Total Bilirubin 0.5 0.1 - 1.0 mg/dL    Alkaline Phosphatase 101 55 - 135 U/L    AST 14 10 - 40 U/L    ALT 21 10 - 44 U/L    Anion Gap 0 (L) 8 - 16 mmol/L    eGFR 43.7 (A) >60 mL/min/1.73 m^2   POCT glucose    Collection Time: 04/04/23 10:51 AM   Result Value Ref Range    POCT Glucose 86 70 - 110 mg/dL   POCT ARTERIAL BLOOD GAS    Collection Time: 04/04/23 11:30 AM   Result Value Ref Range    POC PH 7.344 (L) 7.345 - 7.450    POC PCO2 68.4 (HH) 35.0 - 45.0 mmHg    POC PO2 86.6 80.0 - 100 mmHg    POC SATURATED O2 96.9 95.0 - 100.0 %    POC HCO3 32.3 (H) 24.0 - 28.0 mmol/l    Base Deficit 11.6 (H) -2.0 - 2.0 mmol/l    POC Temp 37.0 C    Specimen source Arterial     Performed By: Devon     MODIFIED BLANCHE'S TEST NA     FiO2 50.0 %    O2DEVICE Other     Vt 325     Comments       AVAPS , MAX PRESSURE 30, 8-10 EPAP, 10-15 PRESSURE SUPPORT    Provider Notified: RUI HAMMER     Notified Time 04/04/2023 11:33     Notified By Devon     Notified Note Called and read back by RUI HAMMER    POCT glucose    Collection Time: 04/04/23  4:24 PM   Result Value Ref Range    POCT Glucose 77 70 - 110 mg/dL   POCT glucose    Collection Time: 04/04/23  9:06 PM   Result Value Ref Range    POCT Glucose 79 70 - 110 mg/dL   POCT glucose    Collection Time: 04/04/23  9:15 PM   Result Value Ref Range    POCT Glucose 67 (L) 70 - 110 mg/dL   CBC Auto Differential    Collection Time: 04/05/23  3:34 AM   Result Value Ref Range    WBC 6.48 3.90 - 12.70 K/uL    RBC 5.04 4.00 - 5.40 M/uL    Hemoglobin 14.6 12.0 - 16.0 g/dL    Hematocrit 47.4 37.0 - 48.5 %    MCV  94 82 - 98 fL    MCH 29.0 27.0 - 31.0 pg    MCHC 30.8 (L) 32.0 - 36.0 g/dL    RDW 16.7 (H) 11.5 - 14.5 %    Platelets 195 150 - 450 K/uL    MPV 8.7 (L) 9.2 - 12.9 fL    Immature Granulocytes 0.2 0.0 - 0.5 %    Gran # (ANC) 4.2 1.8 - 7.7 K/uL    Immature Grans (Abs) 0.01 0.00 - 0.04 K/uL    Lymph # 1.4 1.0 - 4.8 K/uL    Mono # 0.8 0.3 - 1.0 K/uL    Eos # 0.1 0.0 - 0.5 K/uL    Baso # 0.01 0.00 - 0.20 K/uL    nRBC 0 0 /100 WBC    Gran % 64.4 38.0 - 73.0 %    Lymph % 22.1 18.0 - 48.0 %    Mono % 11.9 4.0 - 15.0 %    Eosinophil % 1.2 0.0 - 8.0 %    Basophil % 0.2 0.0 - 1.9 %    Differential Method Automated    Basic Metabolic Panel    Collection Time: 04/05/23  3:34 AM   Result Value Ref Range    Sodium 146 (H) 136 - 145 mmol/L    Potassium 3.6 3.5 - 5.1 mmol/L    Chloride 109 95 - 110 mmol/L    CO2 36 (H) 23 - 29 mmol/L    Glucose 80 70 - 110 mg/dL    BUN 23 8 - 23 mg/dL    Creatinine 1.1 0.5 - 1.4 mg/dL    Calcium 9.2 8.7 - 10.5 mg/dL    Anion Gap 1 (L) 8 - 16 mmol/L    eGFR 53.4 (A) >60 mL/min/1.73 m^2   Magnesium    Collection Time: 04/05/23  3:34 AM   Result Value Ref Range    Magnesium 2.3 1.6 - 2.6 mg/dL   Urinalysis    Collection Time: 04/05/23  4:00 AM   Result Value Ref Range    Specimen UA Urine, Catheterized     Color, UA Yellow Yellow, Straw, Olga    Appearance, UA Clear Clear    pH, UA 7.0 5.0 - 8.0    Specific Gravity, UA 1.015 1.005 - 1.030    Protein, UA 2+ (A) Negative    Glucose, UA 2+ (A) Negative    Ketones, UA Trace (A) Negative    Bilirubin (UA) Negative Negative    Occult Blood UA 3+ (A) Negative    Nitrite, UA Negative Negative    Urobilinogen, UA 1.0 <2.0 EU/dL    Leukocytes, UA 1+ (A) Negative   Urinalysis Microscopic    Collection Time: 04/05/23  4:00 AM   Result Value Ref Range    RBC, UA >100 (H) 0 - 4 /hpf    WBC, UA 29 (H) 0 - 5 /hpf    Bacteria Negative None-Occ /hpf    Squam Epithel, UA 4 /hpf    Hyaline Casts, UA 2.7 (A) 0-1/lpf /lpf    Microscopic Comment SEE COMMENT       No results  "found for: PHENYTOIN, PHENOBARB, VALPROATE, CBMZ        CONSTITUTIONAL  General Appearance: lying in bed, overweight    MUSCULOSKELETAL  Muscle Strength and Tone:no tremor, no tic  Abnormal Involuntary Movements: No  Gait and Station: in bed    PSYCHIATRIC   Level of Consciousness: awake, alert , and oriented  Orientation: person, place, time, and situation  Grooming: Hospital garb  Psychomotor Behavior: normal, cooperative  Speech: normal tone, normal rate, normal pitch, normal volume  Language: grossly intact  Mood: "Ok"  Affect: Consistent with mood and Congruent with thought  Thought Process: linear, logical  Associations: intact   Thought Content: denies SI and denies HI  Perceptions: denies AH and denies  VH  Memory: Registers and recalls 3/3 objects at 0 minutes and 2/3 at 5 minutes  Attention:Attends to interview without distraction  Fund of Knowledge: Aware of current events and Vocabulary appropriate   Estimate if Intelligence:  Average based on work/education history, vocabulary and mental status exam  Insight: limited awareness of illness  Judgment: behavior is adequate to circumstances      PSYCHOSOCIAL    PSYCHOSOCIAL STRESSORS   health    FUNCTIONING RELATIONSHIPS   Reports good relationship with children    STRENGTHS AND LIABILITIES   Strength: Patient accepts guidance/feedback, Liability: Patient has poor health.    Is the patient aware of the biomedical complications associated with substance abuse and mental illness? yes    Does the patient have an Advance Directive for Mental Health treatment? no  (If yes, inform patient to bring copy.)        MEDICAL DECISION MAKING        ASSESSMENT       Altered mental status  Chronic benzodiazepine use  Antidepressant/anxiolytic use      PROBLEM LIST AND MANAGEMENT PLANS    Altered mental status:  -Waxing and waning indicates delirium  -Continue to treat underlying medical conditions  DELIRIUM BEHAVIOR MANAGEMENT  PLEASE utilize CHEMICAL restraints with PRN " meds first for agitation. Minimize use of PHYSICAL restraints  Keep window shades open and room lit during day and room dim at night in order to promote normal sleep-wake cycles  Encourage family at bedside. [place] patient often to situation, location, date  Continue to Limit or Discontinue use of Narcotics, Benzos and Anti-cholinergic medications as they may worsen delirium  Continue medical workup for causative etiology of Delirium  -Consider PRN antipsychotic as needed for acute agitation.   -For short term use, recommend olanzapine PO first (ODT may be considered d/t faster onset), IM if needed. 5-10 mg TID PRN.   -Recommend continued close monitoring of cardiac and respiratory status.     Chronic benzo use:  -Exact pattern of use is unclear. Pt appears to be prescribed temazepam 15 mg qhs PRN for insomnia, which she states she does not take every night.   -Monitor for s/s of benzo withdrawal  including sleep disturbance, acute anxiety, tremor, nausea/vomiting, tachycardia/hypertension, visual hallucinations  -Of note, patient's VS are WNL at time of assessment and she has no subjective complaints.     Anxiolytic/antidepressant use:  -Buspar, sertraline-unclear if home medications of hospital-initiated.   -Ok to continue if home medication or if needed for anxiety/depression      PRESCRIPTION DRUG MANAGEMENT    Discussed diagnosis, risks and benefits of proposed treatment vs alternative treatments vs no treatment, potential side effects of these treatments and the inherent unpredictability of treatment. The patient expresses understanding of the above and displays the capacity to agree with this treatment given said understanding. Patient also agrees that, currently, the benefits outweigh the risks and would like to pursue/continue treatment at this time.    Any medications being used off-label were discussed with the patient inclusive of the evidence base for the use of the medications and consent was obtained  for the off-label use of the medication.         DIAGNOSTIC TESTING  Labs reviewed with patient; follow up pending labs    Disposition:   Inpatient psychiatric hospitalization not indicated at this time.         Vadim Dela Cruz, HERIBERTO-BC

## 2023-04-05 NOTE — ASSESSMENT & PLAN NOTE
No leukocytosis, no elevated temperature readings.   4/5 Blood cx x2 positive growth of gram positive clusters, start IV vancomycin dosed per pharmacy.

## 2023-04-05 NOTE — SUBJECTIVE & OBJECTIVE
Interval History: patient seen and examined.     Review of Systems   Constitutional:  Positive for activity change. Negative for appetite change, chills, fatigue and fever.   HENT:  Negative for mouth sores, sneezing and sore throat.    Eyes:  Negative for visual disturbance.   Respiratory:  Negative for cough, chest tightness, shortness of breath (improving), wheezing and stridor.    Cardiovascular:  Positive for leg swelling. Negative for chest pain and palpitations.   Gastrointestinal:  Negative for abdominal pain, blood in stool, constipation, diarrhea, nausea and vomiting.   Musculoskeletal:  Positive for gait problem and joint swelling. Negative for arthralgias, neck pain and neck stiffness.   Skin:  Negative for color change and pallor.   Neurological:  Positive for weakness. Negative for dizziness, seizures, syncope, facial asymmetry, speech difficulty, light-headedness, numbness and headaches.   Psychiatric/Behavioral:  Negative for agitation, confusion, decreased concentration, dysphoric mood and sleep disturbance. The patient is not nervous/anxious.    Objective:     Vital Signs (Most Recent):  Temp: 97.9 °F (36.6 °C) (04/05/23 0721)  Pulse: 75 (04/05/23 0835)  Resp: (!) 23 (04/05/23 0835)  BP: 138/68 (04/05/23 0600)  SpO2: 99 % (04/05/23 0835)   Vital Signs (24h Range):  Temp:  [97.1 °F (36.2 °C)-97.9 °F (36.6 °C)] 97.9 °F (36.6 °C)  Pulse:  [] 75  Resp:  [12-39] 23  SpO2:  [87 %-100 %] 99 %  BP: (131-163)/() 138/68     Weight: 132.9 kg (293 lb)  Body mass index is 50.29 kg/m².    Intake/Output Summary (Last 24 hours) at 4/5/2023 9162  Last data filed at 4/5/2023 0515  Gross per 24 hour   Intake 2269.25 ml   Output 2400 ml   Net -130.75 ml      Physical Exam  Vitals and nursing note reviewed.   Constitutional:       General: She is not in acute distress.     Appearance: She is obese. She is not toxic-appearing or diaphoretic.   HENT:      Right Ear: External ear normal.      Left Ear:  External ear normal.      Nose: No congestion or rhinorrhea.      Mouth/Throat:      Mouth: Mucous membranes are dry.      Pharynx: Oropharynx is clear. No oropharyngeal exudate or posterior oropharyngeal erythema.   Eyes:      Extraocular Movements: Extraocular movements intact.   Neck:      Comments: Thick skin folds  Cardiovascular:      Rate and Rhythm: Normal rate. Rhythm irregular.      Heart sounds: No murmur heard.  Pulmonary:      Effort: No respiratory distress.      Breath sounds: No rhonchi or rales.      Comments: On bipap - coarse bs ellen - with improvement in airflow  Chest:      Chest wall: No tenderness.   Abdominal:      General: There is no distension.      Palpations: Abdomen is soft.      Tenderness: There is no abdominal tenderness.      Comments: Limited secondary to body habitus   Genitourinary:     Comments: [place] with pinkish urine  Musculoskeletal:         General: Swelling present.      Cervical back: Neck supple. No rigidity.      Right lower leg: Edema (+1 pitting, loss of bony features in toes and dorsum of foot bilaterally) present.      Left lower leg: Edema present.      Comments: Skin much more relaxed   Skin:     General: Skin is warm and dry.      Capillary Refill: Capillary refill takes less than 2 seconds.      Coloration: Skin is not jaundiced.      Findings: No bruising, erythema, lesion or rash.   Neurological:      Mental Status: She is alert and oriented to person, place, and time. Mental status is at baseline.      Comments: Baseline requires walker for ambulation   Psychiatric:         Behavior: Behavior is not agitated or aggressive. Behavior is cooperative.     Significant Labs: All pertinent labs within the past 24 hours have been reviewed.  A1C:   Recent Labs   Lab 03/29/23  2319   HGBA1C 5.5     ABGs:   Recent Labs   Lab 04/03/23  1716 04/04/23  1130   PH 7.347 7.344*   PCO2 70.0* 68.4*   HCO3 32.8* 32.3*   POCSATURATED 87.5* 96.9   PO2 54.8* 86.6     Bilirubin:    Recent Labs   Lab 03/30/23  0340 04/01/23  0412 04/02/23  0339 04/03/23  0344 04/04/23  0327   BILITOT 0.6 0.4 0.5 0.5 0.5     Blood Culture:   Recent Labs   Lab 04/03/23  1150 04/03/23  1200   LABBLOO Gram stain aer bottle: Gram positive cocci in clusters resembling Staph  Results called to and read back by: Pao Lindquist RN 04/05/2023  06:44 No Growth to date  No Growth to date     BMP:   Recent Labs   Lab 04/05/23  0334   GLU 80   *   K 3.6      CO2 36*   BUN 23   CREATININE 1.1   CALCIUM 9.2   MG 2.3     CBC:   Recent Labs   Lab 04/04/23 0327 04/05/23 0334   WBC 7.04 6.48   HGB 15.1 14.6   HCT 49.5* 47.4    195     CMP:   Recent Labs   Lab 04/04/23 0327 04/05/23  0334    146*   K 3.7 3.6    109   CO2 38* 36*   GLU 99 80   BUN 29* 23   CREATININE 1.3 1.1   CALCIUM 9.3 9.2   PROT 5.8*  --    ALBUMIN 2.4*  --    BILITOT 0.5  --    ALKPHOS 101  --    AST 14  --    ALT 21  --    ANIONGAP 0* 1*     Cardiac Markers: No results for input(s): CKMB, MYOGLOBIN, BNP, TROPISTAT in the last 48 hours.  Coagulation: No results for input(s): PT, INR, APTT in the last 48 hours.  Lactic Acid: No results for input(s): LACTATE in the last 48 hours.  Lipase: No results for input(s): LIPASE in the last 48 hours.  Lipid Panel: No results for input(s): CHOL, HDL, LDLCALC, TRIG, CHOLHDL in the last 48 hours.  Magnesium:   Recent Labs   Lab 04/04/23 0327 04/05/23  0334   MG 2.6 2.3     POCT Glucose:   Recent Labs   Lab 04/04/23  1624 04/04/23  2106 04/04/23  2115   POCTGLUCOSE 77 79 67*     Prealbumin: No results for input(s): PREALBUMIN in the last 48 hours.  Respiratory Culture: No results for input(s): GSRESP, RESPIRATORYC in the last 48 hours.  Troponin: No results for input(s): TROPONINI, TROPONINIHS in the last 48 hours.  TSH:   Recent Labs   Lab 03/29/23  2319   TSH 2.150     Urine Culture: No results for input(s): LABURIN in the last 48 hours.  Urine Studies:   Recent Labs   Lab  04/05/23  0400   COLORU Yellow   APPEARANCEUA Clear   PHUR 7.0   SPECGRAV 1.015   PROTEINUA 2+*   GLUCUA 2+*   KETONESU Trace*   BILIRUBINUA Negative   OCCULTUA 3+*   NITRITE Negative   UROBILINOGEN 1.0   LEUKOCYTESUR 1+*   RBCUA >100*   WBCUA 29*   BACTERIA Negative   SQUAMEPITHEL 4   HYALINECASTS 2.7*     Significant Imaging: I have reviewed all pertinent imaging results/findings within the past 24 hours.

## 2023-04-05 NOTE — PROGRESS NOTES
[facility] Medicine  Progress Note    Patient Name: Carmen Tan  MRN: [id number]  Patient Class: IP- Inpatient   Admission Date: 3/29/2023  Length of Stay: 6 days  Attending Physician: Gunnar Mott DO  Primary Care Provider: Lucian Barry MD        Subjective:     Principal Problem:Acute respiratory failure with hypoxia        HPI:  Chief Complaint   Patient presents with    Shortness of Breath       Per ems pt called 911 due to sob, when they arrived gave her a duoneb and moved her over and her blood pressure went up to 200/100's from an original reading of 140/80, solumedrol 125mg IM given, 4 sprays of nitro spray given, 1inch of nitropaste on pts chest, pt currently on cpap with an O2 sat of 100%      [id number]   Carmen Tan is a 73 y.o. female with PMHX of COPD, CHF, PE on Xarelto, morbid obesity who presents to the emergency department C/O shortness of breath.     Patient arrives by EMS. They report they were called because patient was acutely short of breath.  Patient was given a DuoNeb and being prepared to be taken to hospital. On repeat blood pressure check patient was markedly hypertensive.  Patient was given for nitro space, nitro patch, placed on CPAP.  She received 125mg solumedrol. On arrival to the emergency department patient is on CPAP and moderate respiratory distress.  Appears to be mentating normally and able to respond to questions appropriately however history limited due to respiratory distress.     PCP: Lucian Barry MD   3/30   Patient awake and alert. Diuresed well overnight with urine output of 1.8L. Patient endorses breathing better compared to time of arrival to ED. She has had a similar hospitalization over a year ago and have since not followed up with any of her specialists. 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   Shortness of breath has been worsening over the past week and her leg swellings have  started to worsen two weeks ago. She normally ambulates with a walker at home. She has a son who also lives with her to assist. Patient remains on nitroglycerin gtt with SBP still >160s mmHg. Patient to be admitted for hypertensive emergency with acute respiratory failure with hypoxia and hypercarbia, CHF exacerbation for continue IV diuresis, oxygen supplementation, and consult with cardiology.              Overview/Hospital Course:  3/31 Overnight due to increased blood pressure, tridil gtt back on. Patient awake and tolerating BiPAP I/E 14/6 FiO2 50%. Urine output of 5 L. Patient endorses tenseness in lower extremities improving with breathing better compared yesterday. BP adjusted per cardiology with added daily Jardiance. Continue with IV 40 mg BID lasix and monitor UOP and renal function.   4/1 ND patient is feeling a little better this morning, does report less heaviness to her legs which do show improvement in edema.  Patient had good diuresis overnight.  Off of all IV blood pressure meds, and blood pressure appears more stable  4/2 ND patient still endorses some shortness of breath especially when transitioned from BiPAP to nasal cannula O2 despite having good saturation.  Nurses reports she stays very anxious.  Patient has been diuresing well with improvement in blood pressures  4/3 Overnight increasing agitation and confusion, blood tinged urine collecting in Hardwick catheter. Given change in mentation; will hold xarelto, f/u urine culture, f/u blood cultures, f/u ultrasound. Over the weekend, patient diuresed over 8 liters, IV lasix now PO. Remains tachypneic, ABG obtained show hypercarbia, BiPAP settings adjusted back to I/E 14/6 FiO2 60%. On nasal cannula patient maintains SpO2 >98%.   4/4 BiPAP tubing replacement resulted in increased tidal volume, back on nasal cannula this morning. BP normotensive. Patient more alert and agrees to participating with PT and OT. Adjust anxiety meds, add sertraline and  zyprexa at bedtime. Continue bowel regimen with gentle IVF.  Follow pulmonary hygiene care per respiratory.   4/5 Patient more alert endorsing feeling back to baseline mentation. Passing stool. Positive blood cx for gram positive clusters, start IV vancomycin adjust per pharmacy. BP normotensive. Continue with daily pulmonary hygiene care.       Interval History: patient seen and examined.     Review of Systems   Constitutional:  Positive for activity change. Negative for appetite change, chills, fatigue and fever.   HENT:  Negative for mouth sores, sneezing and sore throat.    Eyes:  Negative for visual disturbance.   Respiratory:  Negative for cough, chest tightness, shortness of breath (improving), wheezing and stridor.    Cardiovascular:  Positive for leg swelling. Negative for chest pain and palpitations.   Gastrointestinal:  Negative for abdominal pain, blood in stool, constipation, diarrhea, nausea and vomiting.   Musculoskeletal:  Positive for gait problem and joint swelling. Negative for arthralgias, neck pain and neck stiffness.   Skin:  Negative for color change and pallor.   Neurological:  Positive for weakness. Negative for dizziness, seizures, syncope, facial asymmetry, speech difficulty, light-headedness, numbness and headaches.   Psychiatric/Behavioral:  Negative for agitation, confusion, decreased concentration, dysphoric mood and sleep disturbance. The patient is not nervous/anxious.    Objective:     Vital Signs (Most Recent):  Temp: 97.9 °F (36.6 °C) (04/05/23 0721)  Pulse: 75 (04/05/23 0835)  Resp: (!) 23 (04/05/23 0835)  BP: 138/68 (04/05/23 0600)  SpO2: 99 % (04/05/23 0835)   Vital Signs (24h Range):  Temp:  [97.1 °F (36.2 °C)-97.9 °F (36.6 °C)] 97.9 °F (36.6 °C)  Pulse:  [] 75  Resp:  [12-39] 23  SpO2:  [87 %-100 %] 99 %  BP: (131-163)/() 138/68     Weight: 132.9 kg (293 lb)  Body mass index is 50.29 kg/m².    Intake/Output Summary (Last 24 hours) at 4/5/2023 3156  Last data filed  at 4/5/2023 0515  Gross per 24 hour   Intake 2269.25 ml   Output 2400 ml   Net -130.75 ml      Physical Exam  Vitals and nursing note reviewed.   Constitutional:       General: She is not in acute distress.     Appearance: She is obese. She is not toxic-appearing or diaphoretic.   HENT:      Right Ear: External ear normal.      Left Ear: External ear normal.      Nose: No congestion or rhinorrhea.      Mouth/Throat:      Mouth: Mucous membranes are dry.      Pharynx: Oropharynx is clear. No oropharyngeal exudate or posterior oropharyngeal erythema.   Eyes:      Extraocular Movements: Extraocular movements intact.   Neck:      Comments: Thick skin folds  Cardiovascular:      Rate and Rhythm: Normal rate. Rhythm irregular.      Heart sounds: No murmur heard.  Pulmonary:      Effort: No respiratory distress.      Breath sounds: No rhonchi or rales.      Comments: On bipap - coarse bs ellen - with improvement in airflow  Chest:      Chest wall: No tenderness.   Abdominal:      General: There is no distension.      Palpations: Abdomen is soft.      Tenderness: There is no abdominal tenderness.      Comments: Limited secondary to body habitus   Genitourinary:     Comments: Hardwick with pinkish urine  Musculoskeletal:         General: Swelling present.      Cervical back: Neck supple. No rigidity.      Right lower leg: Edema (+1 pitting, loss of bony features in toes and dorsum of foot bilaterally) present.      Left lower leg: Edema present.      Comments: Skin much more relaxed   Skin:     General: Skin is warm and dry.      Capillary Refill: Capillary refill takes less than 2 seconds.      Coloration: Skin is not jaundiced.      Findings: No bruising, erythema, lesion or rash.   Neurological:      Mental Status: She is alert and oriented to person, place, and time. Mental status is at baseline.      Comments: Baseline requires walker for ambulation   Psychiatric:         Behavior: Behavior is not agitated or aggressive.  Behavior is cooperative.     Significant Labs: All pertinent labs within the past 24 hours have been reviewed.  A1C:   Recent Labs   Lab 03/29/23  2319   HGBA1C 5.5     ABGs:   Recent Labs   Lab 04/03/23  1716 04/04/23  1130   PH 7.347 7.344*   PCO2 70.0* 68.4*   HCO3 32.8* 32.3*   POCSATURATED 87.5* 96.9   PO2 54.8* 86.6     Bilirubin:   Recent Labs   Lab 03/30/23  0340 04/01/23  0412 04/02/23  0339 04/03/23  0344 04/04/23  0327   BILITOT 0.6 0.4 0.5 0.5 0.5     Blood Culture:   Recent Labs   Lab 04/03/23  1150 04/03/23  1200   LABBLOO Gram stain aer bottle: Gram positive cocci in clusters resembling Staph  Results called to and read back by: Pao Lindquist RN 04/05/2023  06:44 No Growth to date  No Growth to date     BMP:   Recent Labs   Lab 04/05/23  0334   GLU 80   *   K 3.6      CO2 36*   BUN 23   CREATININE 1.1   CALCIUM 9.2   MG 2.3     CBC:   Recent Labs   Lab 04/04/23  0327 04/05/23  0334   WBC 7.04 6.48   HGB 15.1 14.6   HCT 49.5* 47.4    195     CMP:   Recent Labs   Lab 04/04/23  0327 04/05/23  0334    146*   K 3.7 3.6    109   CO2 38* 36*   GLU 99 80   BUN 29* 23   CREATININE 1.3 1.1   CALCIUM 9.3 9.2   PROT 5.8*  --    ALBUMIN 2.4*  --    BILITOT 0.5  --    ALKPHOS 101  --    AST 14  --    ALT 21  --    ANIONGAP 0* 1*     Cardiac Markers: No results for input(s): CKMB, MYOGLOBIN, BNP, TROPISTAT in the last 48 hours.  Coagulation: No results for input(s): PT, INR, APTT in the last 48 hours.  Lactic Acid: No results for input(s): LACTATE in the last 48 hours.  Lipase: No results for input(s): LIPASE in the last 48 hours.  Lipid Panel: No results for input(s): CHOL, HDL, LDLCALC, TRIG, CHOLHDL in the last 48 hours.  Magnesium:   Recent Labs   Lab 04/04/23  0327 04/05/23  0334   MG 2.6 2.3     POCT Glucose:   Recent Labs   Lab 04/04/23  1624 04/04/23  2106 04/04/23  2115   POCTGLUCOSE 77 79 67*     Prealbumin: No results for input(s): PREALBUMIN in the last 48  hours.  Respiratory Culture: No results for input(s): GSRESP, RESPIRATORYC in the last 48 hours.  Troponin: No results for input(s): TROPONINI, TROPONINIHS in the last 48 hours.  TSH:   Recent Labs   Lab 03/29/23  2319   TSH 2.150     Urine Culture: No results for input(s): LABURIN in the last 48 hours.  Urine Studies:   Recent Labs   Lab 04/05/23  0400   COLORU Yellow   APPEARANCEUA Clear   PHUR 7.0   SPECGRAV 1.015   PROTEINUA 2+*   GLUCUA 2+*   KETONESU Trace*   BILIRUBINUA Negative   OCCULTUA 3+*   NITRITE Negative   UROBILINOGEN 1.0   LEUKOCYTESUR 1+*   RBCUA >100*   WBCUA 29*   BACTERIA Negative   SQUAMEPITHEL 4   HYALINECASTS 2.7*     Significant Imaging: I have reviewed all pertinent imaging results/findings within the past 24 hours.      Assessment/Plan:      * Acute respiratory failure with hypoxia  Patient with Hypercapnic and Hypoxic Respiratory failure which is Acute on chronic.  she is on home oxygen at 2 LPM. Supplemental oxygen was provided and noted- Oxygen Concentration (%):  [40-50] 50    Signs/symptoms of respiratory failure include- tachypnea, increased work of breathing, use of accessory muscles and wheezing. Contributing diagnoses includes - CHF, COPD, Obesity Hypoventilation and Pleural effusion Labs and images were reviewed. Patient Has recent ABG, which has been reviewed. Will treat underlying causes and adjust management of respiratory failure as follows- IV lasix, supplemental oxygen, continuous BiPAP  3/31: IV lasix 40 mg Q6H x 2 doses  4/1 improving daily, continue diuresis with Lasix, continue BiPAP therapy and will transition to O2 therapy as tolerates  4/2 continue current treatment, continue diuresis with Lasix; continue to offer nasal cannula as tolerates for oxygen options  4/3 transitioned to PO lasix 40 mg BID, continue BiPAP I/E 14/6 FiO2 60%  4/4 There is evidence, patient now requires noninvasive home ventilator due to chronic respiratory failure and COPD.  If obtained, this  will help the patient decrease the work of breathing, hospital readmits, intubations, and improve overall quality of life. If this home respiratory device is not obtained it may lead to patient's harm or death. Will arrange for NI ventilator for home use ready on discharge.    Bacteremia  No leukocytosis, no elevated temperature readings.   4/5 Blood cx x2 positive growth of gram positive clusters, start IV vancomycin dosed per pharmacy.       Gross hematuria  4/3 Collecting blood tinged urine in Hardwick catheter. Hg/HCT stable. Holding xarelto. Start empiric antibiotics for UTI, f/u urine culture and blood cultures.    4/4 Blood cx x2, positive for gram positive clusters      Mild temazepam use disorder  Temazepam 15 mg QHS use, unclear consistency since 1/13/23 with last fill of 30 tablets on 2/28/23.   - ativan 1g QHS  - monitor symptoms of withdrawal  4/2 due to continued anxiety will add BuSpar 3 times a day  4/4 add sertraline to Buspar TID, zyprexa QHS    Hypertensive emergency  Patient has a current diagnosis of Hypertensive emergency with end organ damage evidenced by hypertensive encephalopathy and acute pulmonary edema without heart failure which is uncontrolled.  Latest blood pressure and vitals reviewed-   Temp:  [97.1 °F (36.2 °C)-97.9 °F (36.6 °C)]   Pulse:  []   Resp:  [12-39]   BP: (131-163)/()   SpO2:  [87 %-100 %] .   Patient currently on IV antihypertensives.   Home meds for hypertension were reviewed and noted below.   Hypertension Medications             furosemide (LASIX) 40 MG tablet Take 1 tablet (40 mg total) by mouth 2 (two) times daily. If having increased shortness of breath, increase in leg swelling, and if you notice an >3lbs in 24 hours. May take up to 4 tablets in 1 day (every 6 hours)    isosorbide mononitrate (IMDUR) 60 MG 24 hr tablet Take 1 tablet (60 mg total) by mouth once daily.    lisinopril (PRINIVIL,ZESTRIL) 5 MG tablet Take 1 tablet (5 mg total) by mouth once  daily.    metoprolol tartrate (LOPRESSOR) 25 MG tablet Take 1 tablet (25 mg total) by mouth 2 (two) times daily.      Will aim for controlled BP reduction by medications noted above. Monitor and mitigate end organ damage as indicated.  Follow up cardiology consult appreciated.   3/31 Lisinopril discontinued for start of Entresto  - continue nitrate, hydralazine, carvedilol  - tridil gtt PRN  4/1 blood pressure has improved, appreciate cardiology's help  4/2 blood pressures have improved continue current medications  4/3 on Entresto, spironolactone, carvedilol, nifedipime, hydralazine, lasix    Pulmonary edema cardiac cause  Patient is identified as having Combined Systolic and Diastolic heart failure that is Acute on chronic. CHF is currently uncontrolled due to Continued edema of extremities and Hepatic congestion/ascites, Dyspnea not returned to baseline after 2 doses of IV diuretic and Rales/crackles on pulmonary exam. Latest ECHO performed and demonstrates- No results found for this or any previous visit.  . Continue Beta Blocker, Furosemide and Nitrate/Vasodilator and monitor clinical status closely. Monitor on telemetry. Monitor strict Is&Os and daily weights.  Place on fluid restriction of 1.5 L. Continue to stress to patient importance of self efficacy and  on diet for CHF. Last BNP reviewed- and noted below No results for input(s): BNP, BNPTRIAGEBLO in the last 168 hours.   Lab Results   Component Value Date    NTPROBNP 26992 (H) 03/29/2023   - per cardiology, echo cardiogram with systolic and diastolic dysfunction with pulmonary hypertension  - continue with IV lasix, BiPAP QHS  4/1 diuresing well continue Lasix, continue blood pressure control  4/2 a should diuresing well continue Lasix  4/4 CXR unchanged, on PO lasix, adequate UOP, continue to monitor    Shortness of breath  Improving with maintaining SpO2 >95% when transitioned to nasal cannula, but patient exhibiting increased agitation and  hyperventilation when off BiPAP mask and machine.   Patient medication list not updated, but local [place] verifies intermittent use of Temazepam 15 mg QHS for insomnia.  - will start ativan PRN and monitor symptoms of withdrawal  4/1 continues to improve daily  4/3 complaint of increased agitation and confusion, ABG shows retention, BiPAP settings adjusted  4/4 after BiPAP tubing replaced, patient tidal volume, oxygen requirements have improved, continue with daily pulm hygiene         Diabetes mellitus, type 2  Patient's FSGs are controlled on current medication regimen.  Last A1c reviewed-   Lab Results   Component Value Date    HGBA1C 5.5 03/29/2023     Most recent fingerstick glucose reviewed-   Recent Labs   Lab 04/04/23  1051 04/04/23  1624 04/04/23  2106 04/04/23  2115   POCTGLUCOSE 86 77 79 67*     Current correctional scale  Low  Maintain anti-hyperglycemic dose as follows-   Antihyperglycemics (From admission, onward)    Start     Stop Route Frequency Ordered    03/31/23 0106  insulin aspart U-100 pen 0-5 Units         -- SubQ Before meals & nightly PRN 03/31/23 0008    03/30/23 1700  empagliflozin 10 mg tablet 10 mg         -- Oral Daily 03/30/23 1345        Per cardiology;   3/31 Jardiance 10 mg daily  4/1 continue current med      Acute on chronic congestive heart failure  4/3 continue with cardiology recommendations  Patient is identified as having Combined Systolic and Diastolic heart failure that is Acute on chronic. CHF is currently uncontrolled due to Continued edema of extremities, Dyspnea not returned to baseline after 2 doses of IV diuretic and Rales/crackles on pulmonary exam. Latest ECHO performed and demonstrates- Results for orders placed during the hospital encounter of 03/29/23  Echo  Interpretation Summary  · The left ventricle is normal in size with moderate concentric hypertrophy and mildly decreased systolic function.  · The estimated ejection fraction is 45%.  · Grade I left  ventricular diastolic dysfunction.  · Moderate right ventricular enlargement.  · Moderate right atrial enlargement.  · Mild-to-moderate aortic regurgitation.  · There is mild aortic valve stenosis.  · Aortic valve area is 1.44 cm2; peak velocity is 2.09 m/s; mean gradient is 10 mmHg.  · Mild-to-moderate mitral regurgitation.  · Moderate tricuspid regurgitation.  · Moderate pulmonic regurgitation.  · The estimated PA systolic pressure is 69 mmHg.  · There is moderate pulmonary hypertension.  · Moderate left atrial enlargement.  . Continue Beta Blocker, ACE/ARB, Furosemide, Aldactone, Nitrate/Vasodilator and ARNI and monitor clinical status closely. Monitor on telemetry. Monitor strict Is&Os and daily weights.  Place on fluid restriction of 1.5 L. Continue to stress to patient importance of self efficacy and  on diet for CHF. Last BNP reviewed-  Lab Results   Component Value Date    NTPROBNP 33716 (H) 03/29/2023 4/4 improved BP control, now on PO lasix with adequate urine output. Continue recommendations per cardiology.    Weakness  Secondary to above CHF exacerbation, fluid overload, SOB, body habitus. When patient's cardiorespiratory status stabilized, follow up PT and OT evaluation.   4/4 work on sitting up and out of bed, patient agrees to working with PT and OT      Severe obesity (BMI >= 40)  Body mass index is 50.29 kg/m². Morbid obesity complicates all aspects of disease management from diagnostic modalities to treatment. Weight loss encouraged and health benefits explained to patient.         Paroxysmal A-fib  Patient with Long standing persistent (>12 months) atrial fibrillation which is controlled currently with Beta Blocker. Patient is currently in atrial fibrillation.[id number] Score: 3. Anticoagulation indicated. Anticoagulation done with Xarelto.          VTE Risk Mitigation (From admission, onward)         Ordered     Place sequential compression device  Until discontinued         04/03/23  1137     IP VTE HIGH RISK PATIENT  Once         03/30/23 0314     Place sequential compression device  Until discontinued         03/30/23 0314                Discharge Planning   TIGRE:      Code Status: Full Code   Is the patient medically ready for discharge?:     Reason for patient still in hospital (select all that apply): Patient new problem, Patient trending condition, Laboratory test, Treatment, Consult recommendations and PT / OT recommendations  Discharge Plan A: Home with family, Home Health                  Gunnar Mott DO  Department of Hospital Medicine   [place] [facility]

## 2023-04-05 NOTE — ASSESSMENT & PLAN NOTE
4/3 Collecting blood tinged urine in Hardwick catheter. Hg/HCT stable. Holding xarelto. Start empiric antibiotics for UTI, f/u urine culture and blood cultures.    4/4 Blood cx x2, positive for gram positive clusters

## 2023-04-05 NOTE — EICU
Intervention Initiated From:  [facility] / EICU    Elliott intervened regarding:  Rounding (Video assessment)    Nurse Notified:  No    Doctor Notified:  No    Comments: In bed, BiPap on, bilateral wrist restraints in use. IVF infusing at 75cc/hr.  B/P 156/72, HR 76, RR 32, Sats 100%

## 2023-04-05 NOTE — ASSESSMENT & PLAN NOTE
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 CHF is currently uncontrolled due to Continued edema of extremities and Hepatic congestion/ascites, Dyspnea not returned to baseline after 2 doses of IV diuretic and Rales/crackles on pulmonary exam. Latest ECHO performed and demonstrates- No results found for this or any previous visit.  . Continue Beta Blocker, Furosemide and Nitrate/Vasodilator and monitor clinical status closely. Monitor on telemetry. Monitor strict Is&Os and daily weights.  Place on fluid restriction of 1.5 L. Continue to stress to patient importance of self efficacy and  on diet for CHF. Last BNP reviewed- and noted below No results for input(s): BNP, BNPTRIAGEBLO in the last 168 hours.   Lab Results   Component Value Date    NTPROBNP 14031 (H) 03/29/2023   - per cardiology, echo cardiogram with systolic and diastolic dysfunction with pulmonary hypertension  - continue with IV lasix, BiPAP QHS  4/1 diuresing well continue Lasix, continue blood pressure control  4/2 a should diuresing well continue Lasix  4/4 CXR unchanged, on PO lasix, adequate UOP, continue to monitor

## 2023-04-05 NOTE — NURSING
After reviewing patient's medication , held the gabapentin and buspirone, which are contraindicated to give together, and gave the Olanzapine IM as ordered.

## 2023-04-05 NOTE — PLAN OF CARE
[place] - Intensive Care  Discharge Reassessment    Primary Care Provider: Lucian Barry MD    Expected Discharge Date:     Reassessment (most recent)       Discharge Reassessment - 04/05/23 2178          Discharge Reassessment    Assessment Type Discharge Planning Assessment     Did the patient's condition or plan change since previous assessment? Yes     Discharge Plan discussed with: Patient;Adult children     Discharge Plan A Home with family;Home Health     Discharge Plan B Home with family;Home Health     DME Needed Upon Discharge  other (see comments)   TBD    Why the patient remains in the hospital Requires continued medical care        Post-Acute Status    Coverage [employer] [facility] MEDICARE - [employer]     Discharge Delays None known at this time                 Discharge reassessment is completed. Spoke to the patient in her room. The patient's daughter was present at her bedside, and her son was on the phone. I had a discussion with the patient about her discharge plan of care, and she is adamantly refusing placement at this time. Dr. Mott was notified. The patient did agree to home health care.

## 2023-04-05 NOTE — PLAN OF CARE
Patient was placed in restraints yesterday, she is confused, pulled out IV's, Midline etc. Held Buspirone and gabapentin, patient calmer last night, still disorientated to place,time, situation. Patient in and out of atrial fib. B/P elevated at times also, it was reported that patient didn't take her medications. Patient is not eating well, Blood sugars are running low. Collected U/A/culture after d/cing her catheter then reinserting another as per policy. She is afebrile.

## 2023-04-06 LAB
ANION GAP SERPL CALC-SCNC: -1 MMOL/L (ref 8–16)
BASOPHILS # BLD AUTO: 0.02 K/UL (ref 0–0.2)
BASOPHILS NFR BLD: 0.3 % (ref 0–1.9)
BUN SERPL-MCNC: 20 MG/DL (ref 8–23)
CALCIUM SERPL-MCNC: 9.1 MG/DL (ref 8.7–10.5)
CHLORIDE SERPL-SCNC: 110 MMOL/L (ref 95–110)
CO2 SERPL-SCNC: 37 MMOL/L (ref 23–29)
CREAT SERPL-MCNC: 1.1 MG/DL (ref 0.5–1.4)
DIFFERENTIAL METHOD: ABNORMAL
EOSINOPHIL # BLD AUTO: 0.1 K/UL (ref 0–0.5)
EOSINOPHIL NFR BLD: 1.6 % (ref 0–8)
ERYTHROCYTE [DISTWIDTH] IN BLOOD BY AUTOMATED COUNT: 16.8 % (ref 11.5–14.5)
EST. GFR  (NO RACE VARIABLE): 53.4 ML/MIN/1.73 M^2
GLUCOSE SERPL-MCNC: 94 MG/DL (ref 70–110)
HCT VFR BLD AUTO: 43.9 % (ref 37–48.5)
HGB BLD-MCNC: 13.6 G/DL (ref 12–16)
IMM GRANULOCYTES # BLD AUTO: 0.02 K/UL (ref 0–0.04)
IMM GRANULOCYTES NFR BLD AUTO: 0.3 % (ref 0–0.5)
LYMPHOCYTES # BLD AUTO: 1.4 K/UL (ref 1–4.8)
LYMPHOCYTES NFR BLD: 20.5 % (ref 18–48)
MAGNESIUM SERPL-MCNC: 2.4 MG/DL (ref 1.6–2.6)
MCH RBC QN AUTO: 29.5 PG (ref 27–31)
MCHC RBC AUTO-ENTMCNC: 31 G/DL (ref 32–36)
MCV RBC AUTO: 95 FL (ref 82–98)
MONOCYTES # BLD AUTO: 0.9 K/UL (ref 0.3–1)
MONOCYTES NFR BLD: 13.8 % (ref 4–15)
NEUTROPHILS # BLD AUTO: 4.3 K/UL (ref 1.8–7.7)
NEUTROPHILS NFR BLD: 63.5 % (ref 38–73)
NRBC BLD-RTO: 0 /100 WBC
PLATELET # BLD AUTO: 178 K/UL (ref 150–450)
PMV BLD AUTO: 9.3 FL (ref 9.2–12.9)
POCT GLUCOSE: 113 MG/DL (ref 70–110)
POCT GLUCOSE: 126 MG/DL (ref 70–110)
POCT GLUCOSE: 126 MG/DL (ref 70–110)
POTASSIUM SERPL-SCNC: 4 MMOL/L (ref 3.5–5.1)
PROCALCITONIN SERPL IA-MCNC: 0.05 NG/ML
RBC # BLD AUTO: 4.61 M/UL (ref 4–5.4)
SODIUM SERPL-SCNC: 146 MMOL/L (ref 136–145)
WBC # BLD AUTO: 6.74 K/UL (ref 3.9–12.7)

## 2023-04-06 PROCEDURE — 94640 AIRWAY INHALATION TREATMENT: CPT

## 2023-04-06 PROCEDURE — 99233 PR SUBSEQUENT HOSPITAL CARE,LEVL III: ICD-10-PCS | Mod: ,,, | Performed by: STUDENT IN AN ORGANIZED HEALTH CARE EDUCATION/TRAINING PROGRAM

## 2023-04-06 PROCEDURE — A4216 STERILE WATER/SALINE, 10 ML: HCPCS | Performed by: STUDENT IN AN ORGANIZED HEALTH CARE EDUCATION/TRAINING PROGRAM

## 2023-04-06 PROCEDURE — 97162 PT EVAL MOD COMPLEX 30 MIN: CPT

## 2023-04-06 PROCEDURE — 25000242 PHARM REV CODE 250 ALT 637 W/ HCPCS: Performed by: INTERNAL MEDICINE

## 2023-04-06 PROCEDURE — 85025 COMPLETE CBC W/AUTO DIFF WBC: CPT | Performed by: INTERNAL MEDICINE

## 2023-04-06 PROCEDURE — 20000000 HC ICU ROOM

## 2023-04-06 PROCEDURE — 25000242 PHARM REV CODE 250 ALT 637 W/ HCPCS: Performed by: STUDENT IN AN ORGANIZED HEALTH CARE EDUCATION/TRAINING PROGRAM

## 2023-04-06 PROCEDURE — 90833 PR PSYCHOTHERAPY W/PATIENT W/E&M, 30 MIN (ADD ON): ICD-10-PCS | Mod: ,,, | Performed by: PSYCHIATRY & NEUROLOGY

## 2023-04-06 PROCEDURE — 83735 ASSAY OF MAGNESIUM: CPT | Performed by: STUDENT IN AN ORGANIZED HEALTH CARE EDUCATION/TRAINING PROGRAM

## 2023-04-06 PROCEDURE — 99232 PR SUBSEQUENT HOSPITAL CARE,LEVL II: ICD-10-PCS | Mod: ,,, | Performed by: PSYCHIATRY & NEUROLOGY

## 2023-04-06 PROCEDURE — 25000003 PHARM REV CODE 250: Performed by: INTERNAL MEDICINE

## 2023-04-06 PROCEDURE — 99900031 HC PATIENT EDUCATION (STAT)

## 2023-04-06 PROCEDURE — 80048 BASIC METABOLIC PNL TOTAL CA: CPT | Performed by: STUDENT IN AN ORGANIZED HEALTH CARE EDUCATION/TRAINING PROGRAM

## 2023-04-06 PROCEDURE — 99233 SBSQ HOSP IP/OBS HIGH 50: CPT | Mod: ,,, | Performed by: STUDENT IN AN ORGANIZED HEALTH CARE EDUCATION/TRAINING PROGRAM

## 2023-04-06 PROCEDURE — 27000221 HC OXYGEN, UP TO 24 HOURS

## 2023-04-06 PROCEDURE — S5010 5% DEXTROSE AND 0.45% SALINE: HCPCS | Performed by: STUDENT IN AN ORGANIZED HEALTH CARE EDUCATION/TRAINING PROGRAM

## 2023-04-06 PROCEDURE — 94761 N-INVAS EAR/PLS OXIMETRY MLT: CPT

## 2023-04-06 PROCEDURE — 94660 CPAP INITIATION&MGMT: CPT

## 2023-04-06 PROCEDURE — 25000003 PHARM REV CODE 250: Performed by: STUDENT IN AN ORGANIZED HEALTH CARE EDUCATION/TRAINING PROGRAM

## 2023-04-06 PROCEDURE — 63600175 PHARM REV CODE 636 W HCPCS: Performed by: STUDENT IN AN ORGANIZED HEALTH CARE EDUCATION/TRAINING PROGRAM

## 2023-04-06 PROCEDURE — 99232 SBSQ HOSP IP/OBS MODERATE 35: CPT | Mod: ,,, | Performed by: PSYCHIATRY & NEUROLOGY

## 2023-04-06 PROCEDURE — C9113 INJ PANTOPRAZOLE SODIUM, VIA: HCPCS | Performed by: STUDENT IN AN ORGANIZED HEALTH CARE EDUCATION/TRAINING PROGRAM

## 2023-04-06 PROCEDURE — 90833 PSYTX W PT W E/M 30 MIN: CPT | Mod: ,,, | Performed by: PSYCHIATRY & NEUROLOGY

## 2023-04-06 PROCEDURE — 36415 COLL VENOUS BLD VENIPUNCTURE: CPT | Performed by: STUDENT IN AN ORGANIZED HEALTH CARE EDUCATION/TRAINING PROGRAM

## 2023-04-06 PROCEDURE — 97166 OT EVAL MOD COMPLEX 45 MIN: CPT

## 2023-04-06 PROCEDURE — 99900035 HC TECH TIME PER 15 MIN (STAT)

## 2023-04-06 RX ORDER — DEXTROSE MONOHYDRATE AND SODIUM CHLORIDE 5; .45 G/100ML; G/100ML
INJECTION, SOLUTION INTRAVENOUS CONTINUOUS
Status: ACTIVE | OUTPATIENT
Start: 2023-04-06 | End: 2023-04-06

## 2023-04-06 RX ORDER — ENOXAPARIN SODIUM 100 MG/ML
40 INJECTION SUBCUTANEOUS EVERY 12 HOURS
Status: DISCONTINUED | OUTPATIENT
Start: 2023-04-06 | End: 2023-04-13 | Stop reason: HOSPADM

## 2023-04-06 RX ADMIN — Medication 10 ML: at 11:04

## 2023-04-06 RX ADMIN — BUDESONIDE 0.25 MG: 0.25 INHALANT ORAL at 07:04

## 2023-04-06 RX ADMIN — BUSPIRONE HYDROCHLORIDE 5 MG: 5 TABLET ORAL at 09:04

## 2023-04-06 RX ADMIN — GABAPENTIN 300 MG: 300 CAPSULE ORAL at 09:04

## 2023-04-06 RX ADMIN — SPIRONOLACTONE 25 MG: 25 TABLET ORAL at 10:04

## 2023-04-06 RX ADMIN — DEXTROSE AND SODIUM CHLORIDE: 5; .45 INJECTION, SOLUTION INTRAVENOUS at 12:04

## 2023-04-06 RX ADMIN — BUSPIRONE HYDROCHLORIDE 5 MG: 5 TABLET ORAL at 10:04

## 2023-04-06 RX ADMIN — HYDRALAZINE HYDROCHLORIDE 10 MG: 20 INJECTION, SOLUTION INTRAMUSCULAR; INTRAVENOUS at 05:04

## 2023-04-06 RX ADMIN — NIFEDIPINE 60 MG: 30 TABLET, FILM COATED, EXTENDED RELEASE ORAL at 10:04

## 2023-04-06 RX ADMIN — FUROSEMIDE 40 MG: 40 TABLET ORAL at 09:04

## 2023-04-06 RX ADMIN — SODIUM CHLORIDE 250 ML: 9 INJECTION, SOLUTION INTRAVENOUS at 11:04

## 2023-04-06 RX ADMIN — FUROSEMIDE 40 MG: 40 TABLET ORAL at 06:04

## 2023-04-06 RX ADMIN — POLYETHYLENE GLYCOL (3350) 17 G: 17 POWDER, FOR SOLUTION ORAL at 10:04

## 2023-04-06 RX ADMIN — ENOXAPARIN SODIUM 40 MG: 40 INJECTION SUBCUTANEOUS at 09:04

## 2023-04-06 RX ADMIN — EMPAGLIFLOZIN 10 MG: 10 TABLET, FILM COATED ORAL at 10:04

## 2023-04-06 RX ADMIN — POLYETHYLENE GLYCOL (3350) 17 G: 17 POWDER, FOR SOLUTION ORAL at 09:04

## 2023-04-06 RX ADMIN — Medication 10 ML: at 06:04

## 2023-04-06 RX ADMIN — IPRATROPIUM BROMIDE AND ALBUTEROL SULFATE 3 ML: 2.5; .5 SOLUTION RESPIRATORY (INHALATION) at 07:04

## 2023-04-06 RX ADMIN — SACUBITRIL AND VALSARTAN 1 TABLET: 24; 26 TABLET, FILM COATED ORAL at 09:04

## 2023-04-06 RX ADMIN — IPRATROPIUM BROMIDE AND ALBUTEROL SULFATE 3 ML: 2.5; .5 SOLUTION RESPIRATORY (INHALATION) at 01:04

## 2023-04-06 RX ADMIN — Medication 10 ML: at 12:04

## 2023-04-06 RX ADMIN — DEXTROSE AND SODIUM CHLORIDE: 5; .45 INJECTION, SOLUTION INTRAVENOUS at 10:04

## 2023-04-06 RX ADMIN — ISOSORBIDE MONONITRATE 30 MG: 30 TABLET, EXTENDED RELEASE ORAL at 09:04

## 2023-04-06 RX ADMIN — HYDRALAZINE HYDROCHLORIDE 10 MG: 20 INJECTION, SOLUTION INTRAMUSCULAR; INTRAVENOUS at 09:04

## 2023-04-06 RX ADMIN — VANCOMYCIN HYDROCHLORIDE 1250 MG: 1.25 INJECTION, POWDER, LYOPHILIZED, FOR SOLUTION INTRAVENOUS at 09:04

## 2023-04-06 RX ADMIN — SERTRALINE HYDROCHLORIDE 25 MG: 25 TABLET ORAL at 10:04

## 2023-04-06 RX ADMIN — CARVEDILOL 12.5 MG: 12.5 TABLET, FILM COATED ORAL at 09:04

## 2023-04-06 RX ADMIN — PANTOPRAZOLE SODIUM 40 MG: 40 INJECTION, POWDER, FOR SOLUTION INTRAVENOUS at 10:04

## 2023-04-06 RX ADMIN — BUSPIRONE HYDROCHLORIDE 5 MG: 5 TABLET ORAL at 03:04

## 2023-04-06 RX ADMIN — GABAPENTIN 300 MG: 300 CAPSULE ORAL at 10:04

## 2023-04-06 RX ADMIN — ENOXAPARIN SODIUM 40 MG: 40 INJECTION SUBCUTANEOUS at 01:04

## 2023-04-06 RX ADMIN — Medication 10 ML: at 05:04

## 2023-04-06 NOTE — NURSING
Daughter at side. More alert. Verbalize Name and  correctly. Took am meds without adverse reaction. Refusing Miralax and Dulcolax. Denies need for them at this time.  Dr. Chung notified. No new orders

## 2023-04-06 NOTE — ASSESSMENT & PLAN NOTE
Patient has a current diagnosis of Hypertensive emergency with end organ damage evidenced by hypertensive encephalopathy and acute pulmonary edema without heart failure which is uncontrolled.  Latest blood pressure and vitals reviewed-   Temp:  [96.8 °F (36 °C)-97.9 °F (36.6 °C)]   Pulse:  [59-75]   Resp:  [13-29]   BP: (101-159)/(57-87)   SpO2:  [89 %-99 %] .   Patient currently on IV antihypertensives.   Home meds for hypertension were reviewed and noted below.   Hypertension Medications             furosemide (LASIX) 40 MG tablet Take 1 tablet (40 mg total) by mouth 2 (two) times daily. If having increased shortness of breath, increase in leg swelling, and if you notice an >3lbs in 24 hours. May take up to 4 tablets in 1 day (every 6 hours)    isosorbide mononitrate (IMDUR) 60 MG 24 hr tablet Take 1 tablet (60 mg total) by mouth once daily.    lisinopril (PRINIVIL,ZESTRIL) 5 MG tablet Take 1 tablet (5 mg total) by mouth once daily.    metoprolol tartrate (LOPRESSOR) 25 MG tablet Take 1 tablet (25 mg total) by mouth 2 (two) times daily.      Will aim for controlled BP reduction by medications noted above. Monitor and mitigate end organ damage as indicated.  Follow up cardiology consult appreciated.   3/31 Lisinopril discontinued for start of Entresto  - continue nitrate, hydralazine, carvedilol  - tridil gtt PRN  4/1 blood pressure has improved, appreciate cardiology's help  4/2 blood pressures have improved continue current medications  4/3 on Entresto, spironolactone, carvedilol, nifedipime, hydralazine, lasix

## 2023-04-06 NOTE — PLAN OF CARE
Patient is staying on the N/C 4 LPM for longer periods of time., She is not c/o that she is tired or SOB. When her O2 sat begins to decrease and her RR increases she is put back on the BIPAP which is approx 2 hours now. Patient is less confused, not always taking her medication, noted. She has a poor appetite. She has not been sleeping well, last night she slept more soundly all night. O2 sat where above 90 RR in the teens. Afebrile, and B/P stable.

## 2023-04-06 NOTE — PLAN OF CARE
Problem: Physical Therapy  Goal: Physical Therapy Goal  Description: Patient will increase functional independence with mobility by performin. Supine to sit with Standby Assistance  2. Sit to supine with Standby Assistance  3. Bed to chair transfer with Standby Assistancewith or without rolling walker using Step Transfer TECHNIQUE  4. Gait  x 50  feet with Standby Assistance with or without rolling walker  5. Lower extremity exercise program x10 reps per handout, with assistance as needed      Outcome: Ongoing, Progressing; Plan of Care initiated today

## 2023-04-06 NOTE — PLAN OF CARE
Cont to encourage PO intake. Tolerating NC. IVF changed. Vanc given. Awaiting sputum culture. Psych consult completed.

## 2023-04-06 NOTE — NURSING
Spoke with Rajesh pharmacist to see if ok to give Lasix. Last dose @ 1313. Attempted to reach Dr. Chung in regards to PO Lasix. Awaiting call back. Reported off to JAKE Momin RN.

## 2023-04-06 NOTE — PROGRESS NOTES
"PSYCHIATRY DAILY INPATIENT PROGRESS NOTE  SUBSEQUENT HOSPITAL VISIT    ENCOUNTER DATE: 4/6/2023  SITE: [facility]    DATE OF ADMISSION: 3/29/2023 11:09 PM  LENGTH OF STAY: 7 days    CHIEF COMPLAINT   Carmen Tan is a 73 y.o. female, seen during daily oseguera rounds on the ICU  Carmen Tan presented with the chief complaint of altered mental status    The patient was seen and examined. The chart was reviewed.     Reviewed notes from Rns and MD and labs from the last 24 hours.    The patient's case was discussed with the treatment team/care providers today including Rns and MD    Staff reports less behavioral or management issues.     The patient has been compliant with treatment.    Subjective 04/06/2023    Patient seen today for follow up NP visit (psychiatry). She is seen via telehealth.     Patient observed lying in bed, asleep, awakens to voice and light touch. She is quite lethargic , limiting assessment somewhat.     Pt denies suicidal or homicidal ideation. Denies auditory/visual hallucination at this time. States that mood is "better."     Nursing staff reports patient's appetite seems to have decreased markedly for the last two days. Recent notes indicate poor sleep (improved last night).     The patient denies any side effects to medications.    Psychiatric ROS (observed, reported, or endorsed/denied):  Depressed mood - No  Interest/pleasure/anhedonia: No  Guilt/hopelessness/worthlessness - No  Changes in Sleep - Yes  Changes in Appetite - Yes  Changes in Concentration - No  Changes in Energy - No  PMA/R- No  Suicidal- active/passive ideations - No  Homicidal ideations: active/passive ideations - No    Hallucinations - No  Delusions - No  Disorganized behavior - No  Disorganized speech - No  Negative symptoms - No    Elevated mood - No  Decreased need for sleep - No  Grandiosity - No  Racing thoughts - No  Impulsivity - No  Irritability- No  Increased energy - No  Distractibility - " No  Increase in goal-directed activity or PMA- No    Symptoms of DELMI - fluctuating  Symptoms of Panic Disorder- No  Symptoms of PTSD - No    Overall progress: Patient is showing mild improvement     Psychotherapy:  Target symptoms: confusion  Why chosen therapy is appropriate versus another modality: relevant to diagnosis, evidence based practice  Outcome monitoring methods: self-report, observation  Therapeutic intervention type: insight oriented psychotherapy  Topics discussed/themes: building skills sets for symptom management, symptom recognition  The patient's response to the intervention is  minimal . The patient's progress toward treatment goals is limited.   Duration of intervention: 16 minutes.    Medical ROS  Review of Systems   Constitutional: Negative.    HENT: Negative.     Eyes: Negative.    Respiratory: Negative.     Cardiovascular: Negative.    Gastrointestinal: Negative.    Genitourinary: Negative.    Musculoskeletal: Negative.    Skin: Negative.    Neurological: Negative.    Endo/Heme/Allergies: Negative.    Psychiatric/Behavioral:          As noted     PAST MEDICAL HISTORY   Past Medical History:   Diagnosis Date    Abdominal hernia     CHF (congestive heart failure)     COPD (chronic obstructive pulmonary disease)     Diabetes mellitus, type 2 2/16/2022    GERD (gastroesophageal reflux disease)     History of home oxygen therapy     Hypertension     Migraine headache     Pulmonary embolism 06/01/2017    Small bowel obstruction     Thyroid disease        PSYCHOTROPIC MEDICATIONS   Scheduled Meds:   albuterol-ipratropium  3 mL Nebulization Q6H WAKE    bisacodyL  10 mg Rectal Daily    budesonide  0.25 mg Nebulization Q12H    busPIRone  5 mg Oral TID    carvediloL  12.5 mg Oral BID    empagliflozin  10 mg Oral Daily    furosemide  40 mg Oral BID    gabapentin  300 mg Oral BID    hydrALAZINE  10 mg Intravenous Q8H    isosorbide mononitrate  30 mg Oral Daily    NIFEdipine  60 mg Oral Daily     pantoprazole  40 mg Intravenous Daily    polyethylene glycol  17 g Oral BID    sacubitriL-valsartan  1 tablet Oral BID    sertraline  25 mg Oral Daily    sodium chloride 0.9%  10 mL Intravenous Q6H    spironolactone  25 mg Oral Daily     Continuous Infusions:  PRN Meds:.acetaminophen, aluminum-magnesium hydroxide-simethicone, dextrose 10%, dextrose 10%, dextrose, dextrose, glucagon (human recombinant), insulin aspart U-100, melatonin, ondansetron, sodium chloride 0.9%, Flushing PICC Protocol **AND** sodium chloride 0.9% **AND** sodium chloride 0.9%    EXAMINATION    VITALS   Vitals:    04/06/23 0958 04/06/23 0959 04/06/23 1001 04/06/23 1002   BP: 130/64 130/64 (!) 159/78 (!) 159/78   BP Location:       Patient Position:       Pulse:  65     Resp:       Temp:       TempSrc:       SpO2:       Weight:       Height:           Body mass index is 50.29 kg/m².    CONSTITUTIONAL  General Appearance: neatly groomed, obese    MUSCULOSKELETAL  Muscle Strength and Tone:no tremor, no tic  Abnormal Involuntary Movements: No  Gait and Station: In bed/not observed    PSYCHIATRIC   Level of Consciousness: awake, lethargic, and responds to stimulation  Orientation: person, place, and situation  Grooming: Hospital garb and Well groomed  Psychomotor Behavior: normal, cooperative  Speech: slowed, increased latency of response  Language: grossly intact  Mood: fine  Affect: Consistent with mood and Congruent with thought  Thought Process: linear, logical  Associations: intact   Thought Content: DENIES suicidal ideation, DENIES homicidal ideation, and no delusions  Perceptions: denies hallucinations  Memory: Unable to assess  Attention:Impaired to some degree  Fund of Knowledge: Aware of current events and Vocabulary appropriate   Estimate if Intelligence:  Average based on work/education history, vocabulary and mental status exam  Insight: limited awareness of illness  Judgment: behavior is adequate to circumstances    DIAGNOSTIC TESTING    Laboratory Results  Recent Results (from the past 24 hour(s))   Procalcitonin    Collection Time: 04/05/23 12:25 PM   Result Value Ref Range    Procalcitonin 0.05 <0.25 ng/mL   POCT glucose    Collection Time: 04/05/23  1:04 PM   Result Value Ref Range    POCT Glucose 77 70 - 110 mg/dL   POCT glucose    Collection Time: 04/05/23  5:27 PM   Result Value Ref Range    POCT Glucose 110 70 - 110 mg/dL   POCT glucose    Collection Time: 04/05/23  8:55 PM   Result Value Ref Range    POCT Glucose 119 (H) 70 - 110 mg/dL   CBC Auto Differential    Collection Time: 04/06/23  4:25 AM   Result Value Ref Range    WBC 6.74 3.90 - 12.70 K/uL    RBC 4.61 4.00 - 5.40 M/uL    Hemoglobin 13.6 12.0 - 16.0 g/dL    Hematocrit 43.9 37.0 - 48.5 %    MCV 95 82 - 98 fL    MCH 29.5 27.0 - 31.0 pg    MCHC 31.0 (L) 32.0 - 36.0 g/dL    RDW 16.8 (H) 11.5 - 14.5 %    Platelets 178 150 - 450 K/uL    MPV 9.3 9.2 - 12.9 fL    Immature Granulocytes 0.3 0.0 - 0.5 %    Gran # (ANC) 4.3 1.8 - 7.7 K/uL    Immature Grans (Abs) 0.02 0.00 - 0.04 K/uL    Lymph # 1.4 1.0 - 4.8 K/uL    Mono # 0.9 0.3 - 1.0 K/uL    Eos # 0.1 0.0 - 0.5 K/uL    Baso # 0.02 0.00 - 0.20 K/uL    nRBC 0 0 /100 WBC    Gran % 63.5 38.0 - 73.0 %    Lymph % 20.5 18.0 - 48.0 %    Mono % 13.8 4.0 - 15.0 %    Eosinophil % 1.6 0.0 - 8.0 %    Basophil % 0.3 0.0 - 1.9 %    Differential Method Automated    Basic Metabolic Panel    Collection Time: 04/06/23  4:25 AM   Result Value Ref Range    Sodium 146 (H) 136 - 145 mmol/L    Potassium 4.0 3.5 - 5.1 mmol/L    Chloride 110 95 - 110 mmol/L    CO2 37 (H) 23 - 29 mmol/L    Glucose 94 70 - 110 mg/dL    BUN 20 8 - 23 mg/dL    Creatinine 1.1 0.5 - 1.4 mg/dL    Calcium 9.1 8.7 - 10.5 mg/dL    Anion Gap -1 (L) 8 - 16 mmol/L    eGFR 53.4 (A) >60 mL/min/1.73 m^2   Magnesium    Collection Time: 04/06/23  4:25 AM   Result Value Ref Range    Magnesium 2.4 1.6 - 2.6 mg/dL       MEDICAL DECISION MAKING       ASSESSMENT         Altered mental status  Chronic  benzodiazepine use  Antidepressant/anxiolytic use  Decreased appetite        PROBLEM LIST AND MANAGEMENT PLANS     Altered mental status:  -Waxing and waning indicates delirium  -Continue to treat underlying medical conditions  DELIRIUM BEHAVIOR MANAGEMENT  PLEASE utilize CHEMICAL restraints with PRN meds first for agitation. Minimize use of PHYSICAL restraints  Keep window shades open and room lit during day and room dim at night in order to promote normal sleep-wake cycles  Encourage family at bedside. Moro patient often to situation, location, date  Continue to Limit or Discontinue use of Narcotics, Benzos and Anti-cholinergic medications as they may worsen delirium  Continue medical workup for causative etiology of Delirium  -Consider PRN antipsychotic as needed for acute agitation.   -For short term use, recommend olanzapine PO first (ODT may be considered d/t faster onset), IM if needed. 5-10 mg TID PRN.   -Recommend continued close monitoring of cardiac and respiratory status.      Chronic benzo use:  -Exact pattern of use is unclear. Pt appears to be prescribed temazepam 15 mg qhs PRN for insomnia, which she states she does not take every night.   -Monitor for s/s of benzo withdrawal  including sleep disturbance, acute anxiety, tremor, nausea/vomiting, tachycardia/hypertension, visual hallucinations  -Of note, patient's VS are WNL at time of assessment and she has no subjective complaints.      Anxiolytic/antidepressant use:  -Buspar, sertraline-unclear if home medications of hospital-initiated.   -Ok to continue if home medication or if needed for anxiety/depression    Decreased appetite:  -Monitor for now  -May consider remeron 7.5 mg qhs as adjunct to sertraline and off-label for appetite stimulation/improved sleep quality. Long-term use should entail close monitoring of patient's weight/glucose      STAFF:   Vadim Dela Cruz, ISHAN  Psychiatry

## 2023-04-06 NOTE — PROGRESS NOTES
[facility] Medicine  Progress Note    Patient Name: Carmen Tan  MRN: [id number]  Patient Class: IP- Inpatient   Admission Date: 3/29/2023  Length of Stay: 7 days  Attending Physician: Gunnar Mott DO  Primary Care Provider: Lucian Barry MD  Subjective:     Principal Problem:Acute respiratory failure with hypoxia    HPI:  Chief Complaint   Patient presents with    Shortness of Breath       Per ems pt called 911 due to sob, when they arrived gave her a duoneb and moved her over and her blood pressure went up to 200/100's from an original reading of 140/80, solumedrol 125mg IM given, 4 sprays of nitro spray given, 1inch of nitropaste on pts chest, pt currently on cpap with an O2 sat of 100%      7529   Carmen Tan is a 73 y.o. female with PMHX of COPD, CHF, PE on Xarelto, morbid obesity who presents to the emergency department C/O shortness of breath.     Patient arrives by EMS. They report they were called because patient was acutely short of breath.  Patient was given a DuoNeb and being prepared to be taken to hospital. On repeat blood pressure check patient was markedly hypertensive.  Patient was given for nitro space, nitro patch, placed on CPAP.  She received 125mg solumedrol. On arrival to the emergency department patient is on CPAP and moderate respiratory distress.  Appears to be mentating normally and able to respond to questions appropriately however history limited due to respiratory distress.     PCP: Lucian Barry MD   3/30   Patient awake and alert. Diuresed well overnight with urine output of 1.8L. Patient endorses breathing better compared to time of arrival to ED. She has had a similar hospitalization over a year ago and have since not followed up with any of her specialists. 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   Shortness of breath has been worsening over the past week and her leg swellings have started to  worsen two weeks ago. She normally ambulates with a walker at home. She has a son who also lives with her to assist. Patient remains on nitroglycerin gtt with SBP still >160s mmHg. Patient to be admitted for hypertensive emergency with acute respiratory failure with hypoxia and hypercarbia, CHF exacerbation for continue IV diuresis, oxygen supplementation, and consult with cardiology.              Overview/Hospital Course:  3/31 Overnight due to increased blood pressure, tridil gtt back on. Patient awake and tolerating BiPAP I/E 14/6 FiO2 50%. Urine output of 5 L. Patient endorses tenseness in lower extremities improving with breathing better compared yesterday. BP adjusted per cardiology with added daily Jardiance. Continue with IV 40 mg BID lasix and monitor UOP and renal function.   4/1 ND patient is feeling a little better this morning, does report less heaviness to her legs which do show improvement in edema.  Patient had good diuresis overnight.  Off of all IV blood pressure meds, and blood pressure appears more stable  4/2 ND patient still endorses some shortness of breath especially when transitioned from BiPAP to nasal cannula O2 despite having good saturation.  Nurses reports she stays very anxious.  Patient has been diuresing well with improvement in blood pressures  4/3 Overnight increasing agitation and confusion, blood tinged urine collecting in Hardwick catheter. Given change in mentation; will hold xarelto, f/u urine culture, f/u blood cultures, f/u ultrasound. Over the weekend, patient diuresed over 8 liters, IV lasix now PO. Remains tachypneic, ABG obtained show hypercarbia, BiPAP settings adjusted back to I/E 14/6 FiO2 60%. On nasal cannula patient maintains SpO2 >98%.   4/4 BiPAP tubing replacement resulted in increased tidal volume, back on nasal cannula this morning. BP normotensive. Patient more alert and agrees to participating with PT and OT. Adjust anxiety meds, add sertraline and zyprexa at  bedtime. Continue bowel regimen with gentle IVF.  Follow pulmonary hygiene care per respiratory.   4/5 Patient more alert endorsing feeling back to baseline mentation. Passing stool. Positive blood cx for gram positive clusters, start IV vancomycin adjust per pharmacy. BP normotensive. Continue with daily pulmonary hygiene care.   4/6 F/u positive blood cx, PCR resulted with no evidence of staph aureus, MRSA. CXR unremarakle, urine culture negative, patient afebrile, no clinical signs suggestive of systemic infection, will discontinue vancomycin. Spoke at length with three of patient's children, advised SNF or LTAC to continue with pulmonary care to allow patient regain her baseline strength. Family insists patient to return home with home health services, PT and OT for strengthening.       Interval History: Patient seen and examined. Insists on feeling fine but making minimal effort in sitting up in bed.      Review of Systems   Constitutional:  Positive for activity change. Negative for appetite change, chills, fatigue and fever.   HENT:  Negative for mouth sores, sneezing and sore throat.    Eyes:  Negative for visual disturbance.   Respiratory:  Negative for cough, chest tightness, shortness of breath (improving), wheezing and stridor.    Cardiovascular:  Positive for leg swelling. Negative for chest pain and palpitations.   Gastrointestinal:  Negative for abdominal pain, blood in stool, constipation, diarrhea, nausea and vomiting.   Musculoskeletal:  Positive for gait problem and joint swelling. Negative for arthralgias, neck pain and neck stiffness.   Skin:  Negative for color change and pallor.   Neurological:  Positive for weakness. Negative for dizziness, seizures, syncope, facial asymmetry, speech difficulty, light-headedness, numbness and headaches.   Psychiatric/Behavioral:  Negative for agitation, confusion, decreased concentration, dysphoric mood and sleep disturbance. The patient is not  nervous/anxious.    Objective:     Vital Signs (Most Recent):  Temp: 96.8 °F (36 °C) (04/06/23 0400)  Pulse: 65 (04/06/23 0959)  Resp: (!) 22 (04/06/23 0802)  BP: (!) 159/78 (04/06/23 1002)  SpO2: (!) 93 % (04/06/23 0802)   Vital Signs (24h Range):  Temp:  [96.8 °F (36 °C)-97.9 °F (36.6 °C)] 96.8 °F (36 °C)  Pulse:  [59-75] 65  Resp:  [13-29] 22  SpO2:  [89 %-99 %] 93 %  BP: (101-159)/(57-87) 159/78     Weight: 132.9 kg (293 lb)  Body mass index is 50.29 kg/m².    Intake/Output Summary (Last 24 hours) at 4/6/2023 1138  Last data filed at 4/6/2023 0555  Gross per 24 hour   Intake 2049.75 ml   Output 1650 ml   Net 399.75 ml      Physical Exam  Vitals and nursing note reviewed.   Constitutional:       General: She is not in acute distress.     Appearance: She is obese. She is not toxic-appearing or diaphoretic.   HENT:      Right Ear: External ear normal.      Left Ear: External ear normal.      Nose: No congestion or rhinorrhea.      Mouth/Throat:      Mouth: Mucous membranes are dry.      Pharynx: Oropharynx is clear. No oropharyngeal exudate or posterior oropharyngeal erythema.   Eyes:      Extraocular Movements: Extraocular movements intact.   Neck:      Comments: Thick skin folds  Cardiovascular:      Rate and Rhythm: Normal rate. Rhythm irregular.      Heart sounds: No murmur heard.  Pulmonary:      Effort: No respiratory distress.      Breath sounds: No rhonchi or rales.      Comments: On bipap - coarse bs ellen - with improvement in airflow  Chest:      Chest wall: No tenderness.   Abdominal:      General: There is no distension.      Palpations: Abdomen is soft.      Tenderness: There is no abdominal tenderness.      Comments: Limited secondary to body habitus   Genitourinary:     Comments: Hardwick with pinkish urine  Musculoskeletal:         General: Swelling present.      Cervical back: Neck supple. No rigidity.      Right lower leg: Edema (+1 pitting, loss of bony features in toes and dorsum of foot  bilaterally) present.      Left lower leg: Edema present.      Comments: Skin much more relaxed   Skin:     General: Skin is warm and dry.      Capillary Refill: Capillary refill takes less than 2 seconds.      Coloration: Skin is not jaundiced.      Findings: No bruising, erythema, lesion or rash.   Neurological:      Mental Status: She is alert and oriented to person, place, and time. Mental status is at baseline.      Comments: Baseline requires walker for ambulation   Psychiatric:         Behavior: Behavior is not agitated or aggressive. Behavior is cooperative.     Significant Labs: All pertinent labs within the past 24 hours have been reviewed.  A1C:   Recent Labs   Lab 03/29/23 2319   HGBA1C 5.5     ABGs: No results for input(s): PH, PCO2, HCO3, POCSATURATED, BE, TOTALHB, COHB, METHB, O2HB, POCFIO2, PO2 in the last 48 hours.  Bilirubin:   Recent Labs   Lab 03/30/23  0340 04/01/23  0412 04/02/23  0339 04/03/23  0344 04/04/23  0327   BILITOT 0.6 0.4 0.5 0.5 0.5     Blood Culture: No results for input(s): LABBLOO in the last 48 hours.  BMP:   Recent Labs   Lab 04/06/23  0425   GLU 94   *   K 4.0      CO2 37*   BUN 20   CREATININE 1.1   CALCIUM 9.1   MG 2.4     CBC:   Recent Labs   Lab 04/05/23  0334 04/06/23  0425   WBC 6.48 6.74   HGB 14.6 13.6   HCT 47.4 43.9    178     CMP:   Recent Labs   Lab 04/05/23  0334 04/06/23  0425   * 146*   K 3.6 4.0    110   CO2 36* 37*   GLU 80 94   BUN 23 20   CREATININE 1.1 1.1   CALCIUM 9.2 9.1   ANIONGAP 1* -1*     Magnesium:   Recent Labs   Lab 04/05/23  0334 04/06/23  0425   MG 2.3 2.4     POCT Glucose:   Recent Labs   Lab 04/05/23  1727 04/05/23  2055 04/06/23  1129   POCTGLUCOSE 110 119* 113*     TSH:   Recent Labs   Lab 03/29/23  2319   TSH 2.150     Urine Studies:   Recent Labs   Lab 04/05/23  0400   COLORU Yellow   APPEARANCEUA Clear   PHUR 7.0   SPECGRAV 1.015   PROTEINUA 2+*   GLUCUA 2+*   KETONESU Trace*   BILIRUBINUA Negative    OCCULTUA 3+*   NITRITE Negative   UROBILINOGEN 1.0   LEUKOCYTESUR 1+*   RBCUA >100*   WBCUA 29*   BACTERIA Negative   SQUAMEPITHEL 4   HYALINECASTS 2.7*     Significant Imaging: I have reviewed all pertinent imaging results/findings within the past 24 hours.      Assessment/Plan:      * Acute respiratory failure with hypoxia  Patient with Hypercapnic and Hypoxic Respiratory failure which is Acute on chronic.  she is on home oxygen at 2 LPM. Supplemental oxygen was provided and noted- Oxygen Concentration (%):  [32-50] 32    Signs/symptoms of respiratory failure include- tachypnea, increased work of breathing, use of accessory muscles and wheezing. Contributing diagnoses includes - CHF, COPD, Obesity Hypoventilation and Pleural effusion Labs and images were reviewed. Patient Has recent ABG, which has been reviewed. Will treat underlying causes and adjust management of respiratory failure as follows- IV lasix, supplemental oxygen, continuous BiPAP  3/31: IV lasix 40 mg Q6H x 2 doses  4/1 improving daily, continue diuresis with Lasix, continue BiPAP therapy and will transition to O2 therapy as tolerates  4/2 continue current treatment, continue diuresis with Lasix; continue to offer nasal cannula as tolerates for oxygen options  4/3 transitioned to PO lasix 40 mg BID, continue BiPAP I/E 14/6 FiO2 60%  4/4 There is evidence, patient now requires noninvasive home ventilator due to chronic respiratory failure and COPD.  If obtained, this will help the patient decrease the work of breathing, hospital readmits, intubations, and improve overall quality of life. If this home respiratory device is not obtained it may lead to patient's harm or death. Will arrange for NI ventilator for home use ready on discharge.  4/6 SpO2 >94% on 2L NC, arranging at home NIV     Bacteremia  No leukocytosis, no elevated temperature readings.   4/5 Blood cx x2 positive growth of gram positive clusters, start IV vancomycin dosed per pharmacy.    4/6 Negative PCR for MRSA or staph aureus. Will discontinue vancomycin and treatment for bacteremia. ID Pharm consulted.       Gross hematuria  4/3 Collecting blood tinged urine in Hannon catheter. Hg/HCT stable. Holding xarelto. Start empiric antibiotics for UTI, f/u urine culture and blood cultures.    4/4 Blood cx x2, positive for gram positive clusters  4/6 Hg/HCT stable. Urine in hannon collecting clear colored    Mild temazepam use disorder  Temazepam 15 mg QHS use, unclear consistency since 1/13/23 with last fill of 30 tablets on 2/28/23.   - ativan 1g QHS  - monitor symptoms of withdrawal  4/2 due to continued anxiety will add BuSpar 3 times a day  4/4 add sertraline to Buspar TID, zyprexa QHS    Hypertensive emergency  Patient has a current diagnosis of Hypertensive emergency with end organ damage evidenced by hypertensive encephalopathy and acute pulmonary edema without heart failure which is uncontrolled.  Latest blood pressure and vitals reviewed-   Temp:  [96.8 °F (36 °C)-97.9 °F (36.6 °C)]   Pulse:  [59-75]   Resp:  [13-29]   BP: (101-159)/(57-87)   SpO2:  [89 %-99 %] .   Patient currently on IV antihypertensives.   Home meds for hypertension were reviewed and noted below.   Hypertension Medications             furosemide (LASIX) 40 MG tablet Take 1 tablet (40 mg total) by mouth 2 (two) times daily. If having increased shortness of breath, increase in leg swelling, and if you notice an >3lbs in 24 hours. May take up to 4 tablets in 1 day (every 6 hours)    isosorbide mononitrate (IMDUR) 60 MG 24 hr tablet Take 1 tablet (60 mg total) by mouth once daily.    lisinopril (PRINIVIL,ZESTRIL) 5 MG tablet Take 1 tablet (5 mg total) by mouth once daily.    metoprolol tartrate (LOPRESSOR) 25 MG tablet Take 1 tablet (25 mg total) by mouth 2 (two) times daily.      Will aim for controlled BP reduction by medications noted above. Monitor and mitigate end organ damage as indicated.  Follow up cardiology consult  appreciated.   3/31 Lisinopril discontinued for start of Entresto  - continue nitrate, hydralazine, carvedilol  - tridil gtt PRN  4/1 blood pressure has improved, appreciate cardiology's help  4/2 blood pressures have improved continue current medications  4/3 on Entresto, spironolactone, carvedilol, nifedipime, hydralazine, lasix      Pulmonary edema cardiac cause  Patient is identified as having Combined Systolic and Diastolic heart failure that is Acute on chronic. CHF is currently uncontrolled due to Continued edema of extremities and Hepatic congestion/ascites, Dyspnea not returned to baseline after 2 doses of IV diuretic and Rales/crackles on pulmonary exam. Latest ECHO performed and demonstrates- No results found for this or any previous visit.  . Continue Beta Blocker, Furosemide and Nitrate/Vasodilator and monitor clinical status closely. Monitor on telemetry. Monitor strict Is&Os and daily weights.  Place on fluid restriction of 1.5 L. Continue to stress to patient importance of self efficacy and  on diet for CHF. Last BNP reviewed- and noted below No results for input(s): BNP, BNPTRIAGEBLO in the last 168 hours.   Lab Results   Component Value Date    NTPROBNP 47285 (H) 03/29/2023   - per cardiology, echo cardiogram with systolic and diastolic dysfunction with pulmonary hypertension  - continue with IV lasix, BiPAP QHS  4/1 diuresing well continue Lasix, continue blood pressure control  4/2 a should diuresing well continue Lasix  4/4 CXR unchanged, on PO lasix, adequate UOP, continue to monitor    Shortness of breath  Improving with maintaining SpO2 >95% when transitioned to nasal cannula, but patient exhibiting increased agitation and hyperventilation when off BiPAP mask and machine.   Patient medication list not updated, but local [facility] verifies intermittent use of Temazepam 15 mg QHS for insomnia.  - will start ativan PRN and monitor symptoms of withdrawal  4/1 continues to improve daily  4/3  complaint of increased agitation and confusion, ABG shows retention, BiPAP settings adjusted  4/4 after BiPAP tubing replaced, patient tidal volume, oxygen requirements have improved, continue with daily pulm hygiene         Diabetes mellitus, type 2  Patient's FSGs are controlled on current medication regimen.  Last A1c reviewed-   Lab Results   Component Value Date    HGBA1C 5.5 03/29/2023     Most recent fingerstick glucose reviewed-   Recent Labs   Lab 04/05/23  1304 04/05/23  1727 04/05/23  2055 04/06/23  1129   POCTGLUCOSE 77 110 119* 113*     Current correctional scale  Low  Maintain anti-hyperglycemic dose as follows-   Antihyperglycemics (From admission, onward)    Start     Stop Route Frequency Ordered    03/31/23 0106  insulin aspart U-100 pen 0-5 Units         -- SubQ Before meals & nightly PRN 03/31/23 0008    03/30/23 1700  empagliflozin 10 mg tablet 10 mg         -- Oral Daily 03/30/23 1345        Per cardiology;   3/31 Jardiance 10 mg daily  4/1 continue current med  4/6 PO intake reduced, continue with regimen above      Acute on chronic congestive heart failure  4/3 continue with cardiology recommendations  Patient is identified as having Combined Systolic and Diastolic heart failure that is Acute on chronic. CHF is currently uncontrolled due to Continued edema of extremities, Dyspnea not returned to baseline after 2 doses of IV diuretic and Rales/crackles on pulmonary exam. Latest ECHO performed and demonstrates- Results for orders placed during the hospital encounter of 03/29/23  Echo  Interpretation Summary  · The left ventricle is normal in size with moderate concentric hypertrophy and mildly decreased systolic function.  · The estimated ejection fraction is 45%.  · Grade I left ventricular diastolic dysfunction.  · Moderate right ventricular enlargement.  · Moderate right atrial enlargement.  · Mild-to-moderate aortic regurgitation.  · There is mild aortic valve stenosis.  · Aortic valve area  is 1.44 cm2; peak velocity is 2.09 m/s; mean gradient is 10 mmHg.  · Mild-to-moderate mitral regurgitation.  · Moderate tricuspid regurgitation.  · Moderate pulmonic regurgitation.  · The estimated PA systolic pressure is 69 mmHg.  · There is moderate pulmonary hypertension.  · Moderate left atrial enlargement.  . Continue Beta Blocker, ACE/ARB, Furosemide, Aldactone, Nitrate/Vasodilator and ARNI and monitor clinical status closely. Monitor on telemetry. Monitor strict Is&Os and daily weights.  Place on fluid restriction of 1.5 L. Continue to stress to patient importance of self efficacy and  on diet for CHF. Last BNP reviewed-  Lab Results   Component Value Date    NTPROBNP 70741 (H) 03/29/2023 4/4 improved BP control, now on PO lasix with adequate urine output. Continue recommendations per cardiology.  4/6 borderline hypotension, will provide gentle IVF followed by bolus 250mL    Weakness  Secondary to above CHF exacerbation, fluid overload, SOB, body habitus. When patient's cardiorespiratory status stabilized, follow up PT and OT evaluation.   4/6 work on sitting up and out of bed, patient agrees to working with PT and OT      Severe obesity (BMI >= 40)  Body mass index is 50.29 kg/m². Morbid obesity complicates all aspects of disease management from diagnostic modalities to treatment. Weight loss encouraged and health benefits explained to patient.         Paroxysmal A-fib  Patient with Long standing persistent (>12 months) atrial fibrillation which is controlled currently with Beta Blocker. Patient is currently in atrial fibrillation.[id number] Score: 3. Anticoagulation indicated. Anticoagulation done with Xarelto.          VTE Risk Mitigation (From admission, onward)         Ordered     enoxaparin injection 40 mg  Every 12 hours         04/06/23 1125     Place sequential compression device  Until discontinued         04/03/23 1137     IP VTE HIGH RISK PATIENT  Once         03/30/23 0314     Place  sequential compression device  Until discontinued         03/30/23 0314                Discharge Planning   TIGRE:      Code Status: Full Code   Is the patient medically ready for discharge?:     Reason for patient still in hospital (select all that apply): Patient trending condition, Consult recommendations, PT / OT recommendations and Pending disposition  Discharge Plan A: Home with family, Home Health   Discharge Delays: None known at this time              Gunnar Mott DO  Department of Hospital Medicine   [place] [facility]

## 2023-04-06 NOTE — ASSESSMENT & PLAN NOTE
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 CHF is currently uncontrolled due to Continued edema of extremities and Hepatic congestion/ascites, Dyspnea not returned to baseline after 2 doses of IV diuretic and Rales/crackles on pulmonary exam. Latest ECHO performed and demonstrates- No results found for this or any previous visit.  . Continue Beta Blocker, Furosemide and Nitrate/Vasodilator and monitor clinical status closely. Monitor on telemetry. Monitor strict Is&Os and daily weights.  Place on fluid restriction of 1.5 L. Continue to stress to patient importance of self efficacy and  on diet for CHF. Last BNP reviewed- and noted below No results for input(s): BNP, BNPTRIAGEBLO in the last 168 hours.   Lab Results   Component Value Date    NTPROBNP 90102 (H) 03/29/2023   - per cardiology, echo cardiogram with systolic and diastolic dysfunction with pulmonary hypertension  - continue with IV lasix, BiPAP QHS  4/1 diuresing well continue Lasix, continue blood pressure control  4/2 a should diuresing well continue Lasix  4/4 CXR unchanged, on PO lasix, adequate UOP, continue to monitor

## 2023-04-06 NOTE — PT/OT/SLP EVAL
Physical Therapy Evaluation     Patient Name: Carmen Tan   MRN: [id number]  Recent Surgery: * No surgery found *      Recommendations:     Discharge Recommendations: home with home health, rehabilitation facility, nursing facility, skilled   Discharge Equipment Recommendations: other (see comments) (TBD)   Barriers to discharge: Increased level of assist, Decreased caregiver support, and Ongoing medical treatment    Assessment:     Carmen Tan is a 73 y.o. female admitted with a medical diagnosis of Acute respiratory failure with hypoxia. She presents with the following impairments/functional limitations: weakness, impaired endurance, impaired self care skills, impaired functional mobility, gait instability, impaired balance, decreased lower extremity function, decreased safety awareness, pain, edema, impaired cardiopulmonary response to activity. Pt very limited in mobility activity at time of eval due to somnolence and complaints of pain during mobility activity.  Pt's SpO2 decreasing from 92% to 87% upon sitting.      Rehab Prognosis: Fair; patient would benefit from acute PT services to address these deficits and reach maximum level of function.    Plan:     During this hospitalization, patient to be seen 5 x/week to address the above listed problems via gait training, therapeutic activities, therapeutic exercises    Plan of Care Expires: 04/14/23      Subjective     Chief Complaint: Pt complaints of increase back pain when sitting at edge of bed.  Patient Comments/Goals: to be in less pain  Pain/Comfort:  Pain Rating 1: 10/10  Location - Orientation 1: generalized  Location 1: back  Pain Addressed 1: Cessation of Activity  Pain Rating Post-Intervention 1: 0/10    Social History:  Living Environment: Patient lives with their spouse in a single story home with ramp to enter home  Prior Level of Function: Prior to admission, patient  reportedly able to ambulate with Rollator for short distances with pt  stating having instances of losing balance due to leg not clearing the floor  Equipment Used at Home: oxygen, CPAP, rollator   DME owned (not currently used): rollator  Assistance Upon Discharge: family    Objective:     Communicated with nursing and patient prior to session. Patient found supine with oxygen, hannon catheter, telemetry, SCD, pulse ox (continuous), blood pressure cuff, peripheral IV upon PT entry to room.    General Precautions: Standard, fall   Orthopedic Precautions: N/A   Braces: N/A    Respiratory Status: Nasal cannula, flow 3 L/min    Exams:  Cognition: Patient is oriented to Person  RLE ROM: Deficits: decreased active range by grossly 50%  RLE Strength:  grossly graded as 3/5  LLE ROM: Deficits: decreased active range by grossly 50%  LLE Strength:  grossly graded as 3/5  Gross Motor Coordination: WFL    Functional Mobility:  Gait belt applied - No  Bed Mobility  Rolling Left: moderate assistance and maximal assistance  Rolling Right: moderate assistance and maximal assistance  Supine to Sit: moderate assistance and maximal assistance for LE management and trunk management  Sit to Supine: moderate assistance and maximal assistance for LE management and trunk management  Transfers  Sit to Stand: nable with rolling walker  Gait  Unable to ambulate at this time  Balance  Sitting: minimum assistance  Standing:  unable      Therapeutic Activities and Exercises:   Patient educated on role of acute care PT and PT POC and call light usage  Patient educated about importance of OOB mobility and remaining up in chair most of day      AM-PAC 6 CLICK MOBILITY  Total Score:12    Patient left HOB elevated with all lines intact and call button in reach.    GOALS:   Multidisciplinary Problems       Physical Therapy Goals          Problem: Physical Therapy    Goal Priority Disciplines Outcome Goal Variances Interventions   Physical Therapy Goal     PT, PT/OT Ongoing, Progressing     Description: Patient will  increase functional independence with mobility by performin. Supine to sit with Standby Assistance  2. Sit to supine with Standby Assistance  3. Bed to chair transfer with Standby Assistancewith or without rolling walker using Step Transfer TECHNIQUE  4. Gait  x 50  feet with Standby Assistance with or without rolling walker  5. Lower extremity exercise program x10 reps per handout, with assistance as needed                           History:     Past Medical History:   Diagnosis Date    Abdominal hernia     CHF (congestive heart failure)     COPD (chronic obstructive pulmonary disease)     Diabetes mellitus, type 2 2022    GERD (gastroesophageal reflux disease)     History of home oxygen therapy     Hypertension     Migraine headache     Pulmonary embolism 2017    Small bowel obstruction     Thyroid disease        Past Surgical History:   Procedure Laterality Date    COLONOSCOPY      HYSTERECTOMY      INNER EAR SURGERY      UPPER GASTROINTESTINAL ENDOSCOPY         Time Tracking:     PT Received On: 23  PT Start Time: 1342  PT Stop Time: 1403  PT Total Time (min): 21 min     Billable Minutes: Evaluation 20 minimal  - Mod    2023

## 2023-04-06 NOTE — PLAN OF CARE
Problem: Occupational Therapy  Goal: Occupational Therapy Goal  Description: Goals to be met by: 04/14/23     Patient will increase functional independence with ADLs by performing:    Feeding with Set-up Assistance.  UE Dressing with Minimal Assistance.  LE Dressing with Moderate Assistance.  Grooming while seated with Set-up Assistance.  Toileting from bedside commode with Moderate Assistance for hygiene and clothing management.   Bathing from  edge of bed with Moderate Assistance.  Step transfer with Moderate Assistance  Toilet transfer to bedside commode with Moderate Assistance.  Increased functional strength to 3+ to 4-/5 for bilateral UE's.    Outcome: Ongoing, Progressing

## 2023-04-06 NOTE — EICU
Intervention Initiated From:  [facility] / EICU    Elliott intervened regarding:  Rounding (Video assessment)    RN Notified: Yes  Comments: Family at bedside, report poor appetite and sleeping a lot.  B/P 148/69, HR 74, RR 21, sats 90%

## 2023-04-06 NOTE — ASSESSMENT & PLAN NOTE
Patient with Long standing persistent (>12 months) atrial fibrillation which is controlled currently with Beta Blocker. Patient is currently in atrial fibrillation.[id number] Score: 3. Anticoagulation indicated. Anticoagulation done with Xarelto.

## 2023-04-06 NOTE — ASSESSMENT & PLAN NOTE
Patient with Hypercapnic and Hypoxic Respiratory failure which is Acute on chronic.  she is on home oxygen at 2 LPM. Supplemental oxygen was provided and noted- Oxygen Concentration (%):  [32-50] 32    Signs/symptoms of respiratory failure include- tachypnea, increased work of breathing, use of accessory muscles and wheezing. Contributing diagnoses includes - CHF, COPD, Obesity Hypoventilation and Pleural effusion Labs and images were reviewed. Patient Has recent ABG, which has been reviewed. Will treat underlying causes and adjust management of respiratory failure as follows- IV lasix, supplemental oxygen, continuous BiPAP  3/31: IV lasix 40 mg Q6H x 2 doses  4/1 improving daily, continue diuresis with Lasix, continue BiPAP therapy and will transition to O2 therapy as tolerates  4/2 continue current treatment, continue diuresis with Lasix; continue to offer nasal cannula as tolerates for oxygen options  4/3 transitioned to PO lasix 40 mg BID, continue BiPAP I/E 14/6 FiO2 60%  4/4 There is evidence, patient now requires noninvasive home ventilator due to chronic respiratory failure and COPD.  If obtained, this will help the patient decrease the work of breathing, hospital readmits, intubations, and improve overall quality of life. If this home respiratory device is not obtained it may lead to patient's harm or death. Will arrange for NI ventilator for home use ready on discharge.  4/6 SpO2 >94% on 2L NC, arranging at home NIV

## 2023-04-06 NOTE — ASSESSMENT & PLAN NOTE
4/3 continue with cardiology recommendations  Patient is identified as having Combined Systolic and Diastolic heart failure that is Acute on chronic. CHF is currently uncontrolled due to Continued edema of extremities, Dyspnea not returned to baseline after 2 doses of IV diuretic and Rales/crackles on pulmonary exam. Latest ECHO performed and demonstrates- Results for orders placed during the hospital encounter of 03/29/23  Echo  Interpretation Summary  · The left ventricle is normal in size with moderate concentric hypertrophy and mildly decreased systolic function.  · The estimated ejection fraction is 45%.  · Grade I left ventricular diastolic dysfunction.  · Moderate right ventricular enlargement.  · Moderate right atrial enlargement.  · Mild-to-moderate aortic regurgitation.  · There is mild aortic valve stenosis.  · Aortic valve area is 1.44 cm2; peak velocity is 2.09 m/s; mean gradient is 10 mmHg.  · Mild-to-moderate mitral regurgitation.  · Moderate tricuspid regurgitation.  · Moderate pulmonic regurgitation.  · The estimated PA systolic pressure is 69 mmHg.  · There is moderate pulmonary hypertension.  · Moderate left atrial enlargement.  . Continue Beta Blocker, ACE/ARB, Furosemide, Aldactone, Nitrate/Vasodilator and ARNI and monitor clinical status closely. Monitor on telemetry. Monitor strict Is&Os and daily weights.  Place on fluid restriction of 1.5 L. Continue to stress to patient importance of self efficacy and  on diet for CHF. Last BNP reviewed-  Lab Results   Component Value Date    NTPROBNP 61990 (H) 03/29/2023 4/4 improved BP control, now on PO lasix with adequate urine output. Continue recommendations per cardiology.  4/6 borderline hypotension, will provide gentle IVF followed by bolus 250mL

## 2023-04-06 NOTE — ASSESSMENT & PLAN NOTE
4/3 Collecting blood tinged urine in Hannon catheter. Hg/HCT stable. Holding xarelto. Start empiric antibiotics for UTI, f/u urine culture and blood cultures.    4/4 Blood cx x2, positive for gram positive clusters  4/6 Hg/HCT stable. Urine in hannon collecting clear colored

## 2023-04-06 NOTE — EICU
Intervention Initiated From:  [facility] / [facility]    Elliott intervened regarding:  Rounding (Video assessment)    Comments: Video assessment complete. Pt resting in bed, no acute distress noted. VS per flowsheet.

## 2023-04-06 NOTE — ASSESSMENT & PLAN NOTE
Patient's FSGs are controlled on current medication regimen.  Last A1c reviewed-   Lab Results   Component Value Date    HGBA1C 5.5 03/29/2023     Most recent fingerstick glucose reviewed-   Recent Labs   Lab 04/05/23  1304 04/05/23  1727 04/05/23 2055 04/06/23  1129   POCTGLUCOSE 77 110 119* 113*     Current correctional scale  Low  Maintain anti-hyperglycemic dose as follows-   Antihyperglycemics (From admission, onward)    Start     Stop Route Frequency Ordered    03/31/23 0106  insulin aspart U-100 pen 0-5 Units         -- SubQ Before meals & nightly PRN 03/31/23 0008    03/30/23 1700  empagliflozin 10 mg tablet 10 mg         -- Oral Daily 03/30/23 1345        Per cardiology;   3/31 Jardiance 10 mg daily  4/1 continue current med  4/6 PO intake reduced, continue with regimen above

## 2023-04-06 NOTE — PT/OT/SLP EVAL
Occupational Therapy   Evaluation    Name: Carmen Tan  MRN: [id number]  Admitting Diagnosis: Acute respiratory failure with hypoxia  Recent Surgery: * No surgery found *      Recommendations:     Discharge Recommendations: other (see comments) (Post Acute Rehabilitation to be further determined prior to discharge.)  Discharge Equipment Recommendations:  bedside commode  Barriers to discharge:   (Medical and functional status)    Assessment:     Carmen Tan is a 73 y.o. female with a medical diagnosis of Acute respiratory failure with hypoxia.  She presents with severe weakness in which patient fatigues with minimal activity including feeding self. Performance deficits affecting function: weakness, impaired endurance, impaired self care skills, impaired functional mobility, impaired balance, decreased upper extremity function, decreased lower extremity function, decreased safety awareness, pain, decreased ROM, edema, impaired cardiopulmonary response to activity.      Rehab Prognosis: Fair; patient would benefit from acute skilled OT services to address these deficits and reach maximum level of function.       Plan:     Patient to be seen 5 x/week to address the above listed problems via self-care/home management, therapeutic activities, therapeutic exercises  Plan of Care Expires: 04/14/23  Plan of Care Reviewed with: patient    Subjective     Chief Complaint: weakness  Patient/Family Comments/goals: Pt would like to increase overall strength in order to do more for herself to eventually return home with family.     Occupational Profile:  Living Environment: Pt lives with spouse and son in a mobile home with ramp to enter / exit.   Previous level of function: Minimal assistance at times, but modified independent most of the time.   Roles and Routines: ADL's and light IADL's  Equipment Used at Home: oxygen, CPAP, rollator, wheelchair  Assistance upon Discharge: Family can assist as needed.      Pain/Comfort:  Pain Rating 1: 8/10  Location - Orientation 1: generalized  Location 1: back  Pain Addressed 1: Reposition, Distraction, Cessation of Activity, Nurse notified  Pain Rating Post-Intervention 1: 0/10    Patients cultural, spiritual, Jewish conflicts given the current situation: no    Objective:     Communicated with: nurse prior to session.  Patient found HOB elevated with oxygen, hannon catheter, telemetry, SCD, pulse ox (continuous), peripheral IV, blood pressure cuff upon OT entry to room.    General Precautions: Standard, fall  Orthopedic Precautions: N/A  Braces: N/A  Respiratory Status: Nasal cannula, flow 3 L/min    Occupational Performance:    Bed Mobility:    Patient completed Rolling/Turning to Left with  maximal assistance  Patient completed Rolling/Turning to Right with maximal assistance  Patient completed Scooting/Bridging with maximal assistance  Patient completed Supine to Sit with maximal assistance  Patient completed Sit to Supine with maximal assistance    Functional Mobility/Transfers:  Patient completed Sit <> Stand Transfer with maximal assistance  with  rolling walker   Patient completed Bed <> Chair Transfer using Stand Pivot technique with maximal assistance with no assistive device  Functional Mobility: Pt unable to ambulate on this date.    Activities of Daily Living:  Feeding:  moderate assistance    Grooming: moderate assistance    Bathing: maximal assistance    Upper Body Dressing: maximal assistance    Lower Body Dressing: total assistance    Toileting: total assistance      Cognitive/Visual Perceptual:  Cognitive/Psychosocial Skills:  -       Oriented to: Person, Place, and Situation   -       Follows Commands/attention:Follows two-step commands  -       Communication: clear/fluent  -       Memory: No Deficits noted  -       Safety awareness/insight to disability: impaired   -       Mood/Affect/Coping skills/emotional control: Lethargic    Physical Exam:  Postural  examination/scapula alignment: -       Rounded shoulders  -       Forward head  Edema:  bilateral lower legs  Sensation: -       Intact  light/touch   and proprioception bilateral UE's  Dominant hand: -       Right  Upper Extremity Range of Motion:  -       Right Upper Extremity: WFL except 95 degrees shoulder flexion in plane of scaption  -       Left Upper Extremity: WFL except 90 degrees shoulder flexion in plane of scaption  Upper Extremity Strength: -       Right Upper Extremity: Deficits: 3- / 5  -       Left Upper Extremity: Deficits: 3- / 5  Fine Motor Coordination: -       Impaired  Left hand, manipulation of objects mild and Right hand, manipulation of objects mild  Gross motor coordination: WFL    AMPAC 6 Click ADL:  AMPAC Total Score: 15    Treatment & Education:  Pt was provided education / instruction regarding role of OT and established OT POC.    Patient left HOB elevated with all lines intact, call button in reach, and nurse notified    GOALS:   Goals to be met by: 04/14/23     Patient will increase functional independence with ADLs by performing:    Feeding with Set-up Assistance.  UE Dressing with Minimal Assistance.  LE Dressing with Moderate Assistance.  Grooming while seated with Set-up Assistance.  Toileting from bedside commode with Moderate Assistance for hygiene and clothing management.   Bathing from  edge of bed with Moderate Assistance.  Step transfer with Moderate Assistance  Toilet transfer to bedside commode with Moderate Assistance.  Increased functional strength to 3+ to 4-/5 for bilateral UE's.      History:     Past Medical History:   Diagnosis Date    Abdominal hernia     CHF (congestive heart failure)     COPD (chronic obstructive pulmonary disease)     Diabetes mellitus, type 2 2/16/2022    GERD (gastroesophageal reflux disease)     History of home oxygen therapy     Hypertension     Migraine headache     Pulmonary embolism 06/01/2017    Small bowel obstruction     Thyroid  disease          Past Surgical History:   Procedure Laterality Date    COLONOSCOPY      HYSTERECTOMY      INNER EAR SURGERY      UPPER GASTROINTESTINAL ENDOSCOPY         Time Tracking:     OT Date of Treatment: 04/06/23  OT Start Time: 1535  OT Stop Time: 1610  OT Total Time (min): 35 min    Billable Minutes:Evaluation 35    4/6/2023

## 2023-04-06 NOTE — SUBJECTIVE & OBJECTIVE
Interval History: Patient seen and examined. Insists on feeling fine but making minimal effort in sitting up in bed.      Review of Systems   Constitutional:  Positive for activity change. Negative for appetite change, chills, fatigue and fever.   HENT:  Negative for mouth sores, sneezing and sore throat.    Eyes:  Negative for visual disturbance.   Respiratory:  Negative for cough, chest tightness, shortness of breath (improving), wheezing and stridor.    Cardiovascular:  Positive for leg swelling. Negative for chest pain and palpitations.   Gastrointestinal:  Negative for abdominal pain, blood in stool, constipation, diarrhea, nausea and vomiting.   Musculoskeletal:  Positive for gait problem and joint swelling. Negative for arthralgias, neck pain and neck stiffness.   Skin:  Negative for color change and pallor.   Neurological:  Positive for weakness. Negative for dizziness, seizures, syncope, facial asymmetry, speech difficulty, light-headedness, numbness and headaches.   Psychiatric/Behavioral:  Negative for agitation, confusion, decreased concentration, dysphoric mood and sleep disturbance. The patient is not nervous/anxious.    Objective:     Vital Signs (Most Recent):  Temp: 96.8 °F (36 °C) (04/06/23 0400)  Pulse: 65 (04/06/23 0959)  Resp: (!) 22 (04/06/23 0802)  BP: (!) 159/78 (04/06/23 1002)  SpO2: (!) 93 % (04/06/23 0802)   Vital Signs (24h Range):  Temp:  [96.8 °F (36 °C)-97.9 °F (36.6 °C)] 96.8 °F (36 °C)  Pulse:  [59-75] 65  Resp:  [13-29] 22  SpO2:  [89 %-99 %] 93 %  BP: (101-159)/(57-87) 159/78     Weight: 132.9 kg (293 lb)  Body mass index is 50.29 kg/m².    Intake/Output Summary (Last 24 hours) at 4/6/2023 1138  Last data filed at 4/6/2023 0555  Gross per 24 hour   Intake 2049.75 ml   Output 1650 ml   Net 399.75 ml      Physical Exam  Vitals and nursing note reviewed.   Constitutional:       General: She is not in acute distress.     Appearance: She is obese. She is not toxic-appearing or  diaphoretic.   HENT:      Right Ear: External ear normal.      Left Ear: External ear normal.      Nose: No congestion or rhinorrhea.      Mouth/Throat:      Mouth: Mucous membranes are dry.      Pharynx: Oropharynx is clear. No oropharyngeal exudate or posterior oropharyngeal erythema.   Eyes:      Extraocular Movements: Extraocular movements intact.   Neck:      Comments: Thick skin folds  Cardiovascular:      Rate and Rhythm: Normal rate. Rhythm irregular.      Heart sounds: No murmur heard.  Pulmonary:      Effort: No respiratory distress.      Breath sounds: No rhonchi or rales.      Comments: On bipap - coarse bs ellen - with improvement in airflow  Chest:      Chest wall: No tenderness.   Abdominal:      General: There is no distension.      Palpations: Abdomen is soft.      Tenderness: There is no abdominal tenderness.      Comments: Limited secondary to body habitus   Genitourinary:     Comments: Hardwick with pinkish urine  Musculoskeletal:         General: Swelling present.      Cervical back: Neck supple. No rigidity.      Right lower leg: Edema (+1 pitting, loss of bony features in toes and dorsum of foot bilaterally) present.      Left lower leg: Edema present.      Comments: Skin much more relaxed   Skin:     General: Skin is warm and dry.      Capillary Refill: Capillary refill takes less than 2 seconds.      Coloration: Skin is not jaundiced.      Findings: No bruising, erythema, lesion or rash.   Neurological:      Mental Status: She is alert and oriented to person, place, and time. Mental status is at baseline.      Comments: Baseline requires walker for ambulation   Psychiatric:         Behavior: Behavior is not agitated or aggressive. Behavior is cooperative.     Significant Labs: All pertinent labs within the past 24 hours have been reviewed.  A1C:   Recent Labs   Lab 03/29/23  2319   HGBA1C 5.5     ABGs: No results for input(s): PH, PCO2, HCO3, POCSATURATED, BE, TOTALHB, COHB, METHB, O2HB, POCFIO2,  PO2 in the last 48 hours.  Bilirubin:   Recent Labs   Lab 03/30/23  0340 04/01/23  0412 04/02/23  0339 04/03/23  0344 04/04/23  0327   BILITOT 0.6 0.4 0.5 0.5 0.5     Blood Culture: No results for input(s): LABBLOO in the last 48 hours.  BMP:   Recent Labs   Lab 04/06/23  0425   GLU 94   *   K 4.0      CO2 37*   BUN 20   CREATININE 1.1   CALCIUM 9.1   MG 2.4     CBC:   Recent Labs   Lab 04/05/23  0334 04/06/23  0425   WBC 6.48 6.74   HGB 14.6 13.6   HCT 47.4 43.9    178     CMP:   Recent Labs   Lab 04/05/23  0334 04/06/23  0425   * 146*   K 3.6 4.0    110   CO2 36* 37*   GLU 80 94   BUN 23 20   CREATININE 1.1 1.1   CALCIUM 9.2 9.1   ANIONGAP 1* -1*     Magnesium:   Recent Labs   Lab 04/05/23  0334 04/06/23  0425   MG 2.3 2.4     POCT Glucose:   Recent Labs   Lab 04/05/23  1727 04/05/23  2055 04/06/23  1129   POCTGLUCOSE 110 119* 113*     TSH:   Recent Labs   Lab 03/29/23  2319   TSH 2.150     Urine Studies:   Recent Labs   Lab 04/05/23  0400   COLORU Yellow   APPEARANCEUA Clear   PHUR 7.0   SPECGRAV 1.015   PROTEINUA 2+*   GLUCUA 2+*   KETONESU Trace*   BILIRUBINUA Negative   OCCULTUA 3+*   NITRITE Negative   UROBILINOGEN 1.0   LEUKOCYTESUR 1+*   RBCUA >100*   WBCUA 29*   BACTERIA Negative   SQUAMEPITHEL 4   HYALINECASTS 2.7*     Significant Imaging: I have reviewed all pertinent imaging results/findings within the past 24 hours.

## 2023-04-06 NOTE — RESPIRATORY THERAPY
04/06/23 0802   Patient Assessment/Suction   Respiratory Effort Unlabored   PRE-TX-O2   Device (Oxygen Therapy) (S)  nasal cannula   $ Is the patient on Low Flow Oxygen? Yes   Flow (L/min) (S)  3   Oxygen Concentration (%) 32   SpO2 (!) 93 %   Pulse 62   Resp (!) 22   BP (!) 142/67     Placed patient on 3LNC at this time; Placed BIPAP on standby at bedside. Patient appears to be resting comfortably. Dr. Mott & Josie, RN aware.

## 2023-04-06 NOTE — ASSESSMENT & PLAN NOTE
Secondary to above CHF exacerbation, fluid overload, SOB, body habitus. When patient's cardiorespiratory status stabilized, follow up PT and OT evaluation.   4/6 work on sitting up and out of bed, patient agrees to working with PT and OT

## 2023-04-06 NOTE — PLAN OF CARE
BAKARI Chapman and I spoke to the patient in her room about her discharge plan of care. The patient was in agreement to placement. She was open to several facilities.

## 2023-04-07 PROBLEM — I16.1 HYPERTENSIVE EMERGENCY: Status: RESOLVED | Noted: 2023-03-30 | Resolved: 2023-04-07

## 2023-04-07 PROBLEM — R78.81 BACTEREMIA: Status: RESOLVED | Noted: 2023-04-05 | Resolved: 2023-04-07

## 2023-04-07 LAB
ANION GAP SERPL CALC-SCNC: -1 MMOL/L (ref 8–16)
BACTERIA BLD CULT: ABNORMAL
BACTERIA UR CULT: NO GROWTH
BASOPHILS # BLD AUTO: 0.01 K/UL (ref 0–0.2)
BASOPHILS NFR BLD: 0.2 % (ref 0–1.9)
BUN SERPL-MCNC: 14 MG/DL (ref 8–23)
CALCIUM SERPL-MCNC: 8.9 MG/DL (ref 8.7–10.5)
CHLORIDE SERPL-SCNC: 108 MMOL/L (ref 95–110)
CO2 SERPL-SCNC: 38 MMOL/L (ref 23–29)
CREAT SERPL-MCNC: 1.2 MG/DL (ref 0.5–1.4)
DIFFERENTIAL METHOD: ABNORMAL
EOSINOPHIL # BLD AUTO: 0.1 K/UL (ref 0–0.5)
EOSINOPHIL NFR BLD: 0.9 % (ref 0–8)
ERYTHROCYTE [DISTWIDTH] IN BLOOD BY AUTOMATED COUNT: 16.7 % (ref 11.5–14.5)
EST. GFR  (NO RACE VARIABLE): 48.1 ML/MIN/1.73 M^2
GLUCOSE SERPL-MCNC: 118 MG/DL (ref 70–110)
HCT VFR BLD AUTO: 42.6 % (ref 37–48.5)
HGB BLD-MCNC: 13.1 G/DL (ref 12–16)
IMM GRANULOCYTES # BLD AUTO: 0.02 K/UL (ref 0–0.04)
IMM GRANULOCYTES NFR BLD AUTO: 0.3 % (ref 0–0.5)
LYMPHOCYTES # BLD AUTO: 1.2 K/UL (ref 1–4.8)
LYMPHOCYTES NFR BLD: 21.6 % (ref 18–48)
MAGNESIUM SERPL-MCNC: 2.2 MG/DL (ref 1.6–2.6)
MCH RBC QN AUTO: 29.5 PG (ref 27–31)
MCHC RBC AUTO-ENTMCNC: 30.8 G/DL (ref 32–36)
MCV RBC AUTO: 96 FL (ref 82–98)
MONOCYTES # BLD AUTO: 0.7 K/UL (ref 0.3–1)
MONOCYTES NFR BLD: 12 % (ref 4–15)
NEUTROPHILS # BLD AUTO: 3.7 K/UL (ref 1.8–7.7)
NEUTROPHILS NFR BLD: 65 % (ref 38–73)
NRBC BLD-RTO: 0 /100 WBC
OB PNL STL: POSITIVE
PLATELET # BLD AUTO: 188 K/UL (ref 150–450)
PMV BLD AUTO: 8.9 FL (ref 9.2–12.9)
POCT GLUCOSE: 110 MG/DL (ref 70–110)
POCT GLUCOSE: 118 MG/DL (ref 70–110)
POCT GLUCOSE: 97 MG/DL (ref 70–110)
POTASSIUM SERPL-SCNC: 3.5 MMOL/L (ref 3.5–5.1)
RBC # BLD AUTO: 4.44 M/UL (ref 4–5.4)
SODIUM SERPL-SCNC: 145 MMOL/L (ref 136–145)
WBC # BLD AUTO: 5.73 K/UL (ref 3.9–12.7)

## 2023-04-07 PROCEDURE — 25000242 PHARM REV CODE 250 ALT 637 W/ HCPCS: Performed by: INTERNAL MEDICINE

## 2023-04-07 PROCEDURE — 94761 N-INVAS EAR/PLS OXIMETRY MLT: CPT

## 2023-04-07 PROCEDURE — 27000221 HC OXYGEN, UP TO 24 HOURS

## 2023-04-07 PROCEDURE — 80048 BASIC METABOLIC PNL TOTAL CA: CPT | Performed by: STUDENT IN AN ORGANIZED HEALTH CARE EDUCATION/TRAINING PROGRAM

## 2023-04-07 PROCEDURE — 87205 SMEAR GRAM STAIN: CPT | Performed by: STUDENT IN AN ORGANIZED HEALTH CARE EDUCATION/TRAINING PROGRAM

## 2023-04-07 PROCEDURE — 87147 CULTURE TYPE IMMUNOLOGIC: CPT | Performed by: STUDENT IN AN ORGANIZED HEALTH CARE EDUCATION/TRAINING PROGRAM

## 2023-04-07 PROCEDURE — 20000000 HC ICU ROOM

## 2023-04-07 PROCEDURE — 94640 AIRWAY INHALATION TREATMENT: CPT

## 2023-04-07 PROCEDURE — 25000003 PHARM REV CODE 250: Performed by: INTERNAL MEDICINE

## 2023-04-07 PROCEDURE — A4216 STERILE WATER/SALINE, 10 ML: HCPCS | Performed by: STUDENT IN AN ORGANIZED HEALTH CARE EDUCATION/TRAINING PROGRAM

## 2023-04-07 PROCEDURE — 36415 COLL VENOUS BLD VENIPUNCTURE: CPT | Performed by: STUDENT IN AN ORGANIZED HEALTH CARE EDUCATION/TRAINING PROGRAM

## 2023-04-07 PROCEDURE — C9113 INJ PANTOPRAZOLE SODIUM, VIA: HCPCS | Performed by: STUDENT IN AN ORGANIZED HEALTH CARE EDUCATION/TRAINING PROGRAM

## 2023-04-07 PROCEDURE — 99233 SBSQ HOSP IP/OBS HIGH 50: CPT | Mod: ,,, | Performed by: STUDENT IN AN ORGANIZED HEALTH CARE EDUCATION/TRAINING PROGRAM

## 2023-04-07 PROCEDURE — 99233 PR SUBSEQUENT HOSPITAL CARE,LEVL III: ICD-10-PCS | Mod: ,,, | Performed by: STUDENT IN AN ORGANIZED HEALTH CARE EDUCATION/TRAINING PROGRAM

## 2023-04-07 PROCEDURE — 63600175 PHARM REV CODE 636 W HCPCS: Performed by: STUDENT IN AN ORGANIZED HEALTH CARE EDUCATION/TRAINING PROGRAM

## 2023-04-07 PROCEDURE — 99900031 HC PATIENT EDUCATION (STAT)

## 2023-04-07 PROCEDURE — 25000242 PHARM REV CODE 250 ALT 637 W/ HCPCS: Performed by: STUDENT IN AN ORGANIZED HEALTH CARE EDUCATION/TRAINING PROGRAM

## 2023-04-07 PROCEDURE — 99900035 HC TECH TIME PER 15 MIN (STAT)

## 2023-04-07 PROCEDURE — 83735 ASSAY OF MAGNESIUM: CPT | Performed by: STUDENT IN AN ORGANIZED HEALTH CARE EDUCATION/TRAINING PROGRAM

## 2023-04-07 PROCEDURE — S5010 5% DEXTROSE AND 0.45% SALINE: HCPCS | Performed by: INTERNAL MEDICINE

## 2023-04-07 PROCEDURE — 25000003 PHARM REV CODE 250: Performed by: STUDENT IN AN ORGANIZED HEALTH CARE EDUCATION/TRAINING PROGRAM

## 2023-04-07 PROCEDURE — 87070 CULTURE OTHR SPECIMN AEROBIC: CPT | Performed by: STUDENT IN AN ORGANIZED HEALTH CARE EDUCATION/TRAINING PROGRAM

## 2023-04-07 PROCEDURE — 85025 COMPLETE CBC W/AUTO DIFF WBC: CPT | Performed by: INTERNAL MEDICINE

## 2023-04-07 PROCEDURE — 82272 OCCULT BLD FECES 1-3 TESTS: CPT | Performed by: STUDENT IN AN ORGANIZED HEALTH CARE EDUCATION/TRAINING PROGRAM

## 2023-04-07 PROCEDURE — 94660 CPAP INITIATION&MGMT: CPT

## 2023-04-07 RX ORDER — DEXTROSE MONOHYDRATE, SODIUM CHLORIDE, AND POTASSIUM CHLORIDE 50; 2.98; 4.5 G/1000ML; G/1000ML; G/1000ML
INJECTION, SOLUTION INTRAVENOUS CONTINUOUS
Status: DISCONTINUED | OUTPATIENT
Start: 2023-04-07 | End: 2023-04-07

## 2023-04-07 RX ORDER — DEXTROSE MONOHYDRATE AND SODIUM CHLORIDE 5; .45 G/100ML; G/100ML
INJECTION, SOLUTION INTRAVENOUS CONTINUOUS
Status: DISCONTINUED | OUTPATIENT
Start: 2023-04-07 | End: 2023-04-10

## 2023-04-07 RX ADMIN — HYDRALAZINE HYDROCHLORIDE 10 MG: 20 INJECTION, SOLUTION INTRAMUSCULAR; INTRAVENOUS at 05:04

## 2023-04-07 RX ADMIN — DEXTROSE AND SODIUM CHLORIDE: 5; .45 INJECTION, SOLUTION INTRAVENOUS at 05:04

## 2023-04-07 RX ADMIN — GABAPENTIN 300 MG: 300 CAPSULE ORAL at 09:04

## 2023-04-07 RX ADMIN — Medication 10 ML: at 11:04

## 2023-04-07 RX ADMIN — FUROSEMIDE 40 MG: 40 TABLET ORAL at 05:04

## 2023-04-07 RX ADMIN — SACUBITRIL AND VALSARTAN 1 TABLET: 24; 26 TABLET, FILM COATED ORAL at 08:04

## 2023-04-07 RX ADMIN — BUDESONIDE 0.25 MG: 0.25 INHALANT ORAL at 07:04

## 2023-04-07 RX ADMIN — CARVEDILOL 12.5 MG: 12.5 TABLET, FILM COATED ORAL at 08:04

## 2023-04-07 RX ADMIN — DEXTROSE MONOHYDRATE, SODIUM CHLORIDE, AND POTASSIUM CHLORIDE: 50; 4.5; 2.98 INJECTION, SOLUTION INTRAVENOUS at 08:04

## 2023-04-07 RX ADMIN — BUSPIRONE HYDROCHLORIDE 5 MG: 5 TABLET ORAL at 08:04

## 2023-04-07 RX ADMIN — BUDESONIDE 0.25 MG: 0.25 INHALANT ORAL at 08:04

## 2023-04-07 RX ADMIN — FUROSEMIDE 40 MG: 40 TABLET ORAL at 09:04

## 2023-04-07 RX ADMIN — POTASSIUM BICARBONATE 50 MEQ: 978 TABLET, EFFERVESCENT ORAL at 12:04

## 2023-04-07 RX ADMIN — NIFEDIPINE 60 MG: 30 TABLET, FILM COATED, EXTENDED RELEASE ORAL at 09:04

## 2023-04-07 RX ADMIN — IPRATROPIUM BROMIDE AND ALBUTEROL SULFATE 3 ML: 2.5; .5 SOLUTION RESPIRATORY (INHALATION) at 08:04

## 2023-04-07 RX ADMIN — EMPAGLIFLOZIN 10 MG: 10 TABLET, FILM COATED ORAL at 08:04

## 2023-04-07 RX ADMIN — SACUBITRIL AND VALSARTAN 1 TABLET: 24; 26 TABLET, FILM COATED ORAL at 09:04

## 2023-04-07 RX ADMIN — SPIRONOLACTONE 25 MG: 25 TABLET ORAL at 08:04

## 2023-04-07 RX ADMIN — IPRATROPIUM BROMIDE AND ALBUTEROL SULFATE 3 ML: 2.5; .5 SOLUTION RESPIRATORY (INHALATION) at 02:04

## 2023-04-07 RX ADMIN — Medication 10 ML: at 05:04

## 2023-04-07 RX ADMIN — CARVEDILOL 12.5 MG: 12.5 TABLET, FILM COATED ORAL at 09:04

## 2023-04-07 RX ADMIN — GABAPENTIN 300 MG: 300 CAPSULE ORAL at 08:04

## 2023-04-07 RX ADMIN — IPRATROPIUM BROMIDE AND ALBUTEROL SULFATE 3 ML: 2.5; .5 SOLUTION RESPIRATORY (INHALATION) at 07:04

## 2023-04-07 RX ADMIN — SERTRALINE HYDROCHLORIDE 25 MG: 25 TABLET ORAL at 08:04

## 2023-04-07 RX ADMIN — PANTOPRAZOLE SODIUM 40 MG: 40 INJECTION, POWDER, FOR SOLUTION INTRAVENOUS at 09:04

## 2023-04-07 RX ADMIN — HYDRALAZINE HYDROCHLORIDE 10 MG: 20 INJECTION, SOLUTION INTRAMUSCULAR; INTRAVENOUS at 03:04

## 2023-04-07 RX ADMIN — ENOXAPARIN SODIUM 40 MG: 40 INJECTION SUBCUTANEOUS at 09:04

## 2023-04-07 RX ADMIN — ISOSORBIDE MONONITRATE 30 MG: 30 TABLET, EXTENDED RELEASE ORAL at 09:04

## 2023-04-07 RX ADMIN — ENOXAPARIN SODIUM 40 MG: 40 INJECTION SUBCUTANEOUS at 08:04

## 2023-04-07 RX ADMIN — BUSPIRONE HYDROCHLORIDE 5 MG: 5 TABLET ORAL at 03:04

## 2023-04-07 NOTE — ASSESSMENT & PLAN NOTE
4/3 continue with cardiology recommendations  Patient is identified as having Combined Systolic and Diastolic heart failure that is Acute on chronic. CHF is currently uncontrolled due to Continued edema of extremities, Dyspnea not returned to baseline after 2 doses of IV diuretic and Rales/crackles on pulmonary exam. Latest ECHO performed and demonstrates- Results for orders placed during the hospital encounter of 03/29/23  Echo  Interpretation Summary  · The left ventricle is normal in size with moderate concentric hypertrophy and mildly decreased systolic function.  · The estimated ejection fraction is 45%.  · Grade I left ventricular diastolic dysfunction.  · Moderate right ventricular enlargement.  · Moderate right atrial enlargement.  · Mild-to-moderate aortic regurgitation.  · There is mild aortic valve stenosis.  · Aortic valve area is 1.44 cm2; peak velocity is 2.09 m/s; mean gradient is 10 mmHg.  · Mild-to-moderate mitral regurgitation.  · Moderate tricuspid regurgitation.  · Moderate pulmonic regurgitation.  · The estimated PA systolic pressure is 69 mmHg.  · There is moderate pulmonary hypertension.  · Moderate left atrial enlargement.  . Continue Beta Blocker, ACE/ARB, Furosemide, Aldactone, Nitrate/Vasodilator and ARNI and monitor clinical status closely. Monitor on telemetry. Monitor strict Is&Os and daily weights.  Place on fluid restriction of 1.5 L. Continue to stress to patient importance of self efficacy and  on diet for CHF. Last BNP reviewed-  Lab Results   Component Value Date    NTPROBNP 43512 (H) 03/29/2023 4/4 improved BP control, now on PO lasix with adequate urine output. Continue recommendations per cardiology.  4/6 borderline hypotension, will provide gentle IVF followed by bolus 250mL

## 2023-04-07 NOTE — ASSESSMENT & PLAN NOTE
4/3 Collecting blood tinged urine in Hannon catheter. Hg/HCT stable. Holding xarelto. Start empiric antibiotics for UTI, f/u urine culture and blood cultures.    4/4 Blood cx x2, positive for gram positive clusters  4/6 Blood cx PCR negative for MRSA and staph aureus, urine cx x2 (4/6 and 4/7) NGTD. No evidence of an infectious process, afebrile. Discontinued vancomycin after one dose administered  4/7 Hg/HCT stable. Urine in hannon collecting clear colored

## 2023-04-07 NOTE — EICU
Intervention Initiated From:  [facility] / EICU    Elliott intervened regarding:  Documentation    Nurse Notified:  No    Doctor Notified:  No    Comments: video rounding complete, appears asleep, no critical care drips at this time, NC on/in place, NAD, VSS

## 2023-04-07 NOTE — ASSESSMENT & PLAN NOTE
Patient with Long standing persistent (>12 months) atrial fibrillation which is controlled currently with Beta Blocker. Patient is currently in atrial fibrillation.[id number] Score: 3. Anticoagulation indicated. Anticoagulation done with Xarelto.  Xarelto held 4/3 after evidence of hematuria. Hg/HCT stable.

## 2023-04-07 NOTE — NURSING
Dr. Chung updated on patient condition. Drowsy. Arousable but quickly closes eyes. Able to follow command and weak speech noted.B/P- 95/53 HR- 53-55.

## 2023-04-07 NOTE — ASSESSMENT & PLAN NOTE
Patient with Hypercapnic and Hypoxic Respiratory failure which is Acute on chronic.  she is on home oxygen at 2 LPM. Supplemental oxygen was provided and noted- Oxygen Concentration (%):  [32-50] 32    Signs/symptoms of respiratory failure include- tachypnea, increased work of breathing, use of accessory muscles and wheezing. Contributing diagnoses includes - CHF, COPD, Obesity Hypoventilation and Pleural effusion Labs and images were reviewed. Patient Has recent ABG, which has been reviewed. Will treat underlying causes and adjust management of respiratory failure as follows- IV lasix, supplemental oxygen, continuous BiPAP  3/31: IV lasix 40 mg Q6H x 2 doses  4/1 improving daily, continue diuresis with Lasix, continue BiPAP therapy and will transition to O2 therapy as tolerates  4/2 continue current treatment, continue diuresis with Lasix; continue to offer nasal cannula as tolerates for oxygen options  4/3 transitioned to PO lasix 40 mg BID, continue BiPAP I/E 14/6 FiO2 60%  4/4 There is evidence, patient now requires noninvasive home ventilator due to chronic respiratory failure and COPD.  If obtained, this will help the patient decrease the work of breathing, hospital readmits, intubations, and improve overall quality of life. If this home respiratory device is not obtained it may lead to patient's harm or death. Will arrange for NI ventilator for home use ready on discharge.  4/6 SpO2 >94% on 2L NC, arranging at home NIV   4/7 Home NIV arranged on discharge. Currently requiring continuous O2 3L via NC during daytime. Continue daily pulmonary hygiene care

## 2023-04-07 NOTE — ASSESSMENT & PLAN NOTE
Patient's FSGs are controlled on current medication regimen.  Last A1c reviewed-   Lab Results   Component Value Date    HGBA1C 5.5 03/29/2023     Most recent fingerstick glucose reviewed-   Recent Labs   Lab 04/06/23  1627 04/06/23  2122 04/07/23  1039   POCTGLUCOSE 126* 126* 118*     Current correctional scale  Low  Maintain anti-hyperglycemic dose as follows-   Antihyperglycemics (From admission, onward)    Start     Stop Route Frequency Ordered    03/31/23 0106  insulin aspart U-100 pen 0-5 Units         -- SubQ Before meals & nightly PRN 03/31/23 0008    03/30/23 1700  empagliflozin 10 mg tablet 10 mg         -- Oral Daily 03/30/23 1345        Per cardiology;   3/31 Jardiance 10 mg daily  4/1 continue current med  4/6 PO intake reduced, continue with regimen above  4/7 Tolerating PO intake, continue daily Jardiance

## 2023-04-07 NOTE — PLAN OF CARE
No issues overnight, patient rested well on bipap. Pleasant and cooperative, took PO meds whole without difficulty. VSS, afebrile, UOP 1000ml, small, loose, brown BM noted. Bed bath and linen change done. Tolerated well.

## 2023-04-07 NOTE — RESPIRATORY THERAPY
04/07/23 0725   PRE-TX-O2   Device (Oxygen Therapy) (S)  nasal cannula  (placed patient on nasal cannula at this time; RUI Marion notified.)   $ Is the patient on Low Flow Oxygen? Yes   Flow (L/min) (S)  3   Oxygen Concentration (%) 32   SpO2 100 %   Pulse Oximetry Type Continuous   $ Pulse Oximetry - Multiple Charge Pulse Oximetry - Multiple   Pulse (!) 55   Resp (!) 21   Preset CPAP/BiPAP Settings   Mode Of Delivery Standby  (BIPAP placed on standby at bedside at this time.)

## 2023-04-07 NOTE — ASSESSMENT & PLAN NOTE
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 CHF is currently uncontrolled due to Continued edema of extremities and Hepatic congestion/ascites, Dyspnea not returned to baseline after 2 doses of IV diuretic and Rales/crackles on pulmonary exam. Latest ECHO performed and demonstrates- No results found for this or any previous visit.  . Continue Beta Blocker, Furosemide and Nitrate/Vasodilator and monitor clinical status closely. Monitor on telemetry. Monitor strict Is&Os and daily weights.  Place on fluid restriction of 1.5 L. Continue to stress to patient importance of self efficacy and  on diet for CHF. Last BNP reviewed- and noted below No results for input(s): BNP, BNPTRIAGEBLO in the last 168 hours.   Lab Results   Component Value Date    NTPROBNP 21583 (H) 03/29/2023   - per cardiology, echo cardiogram with systolic and diastolic dysfunction with pulmonary hypertension  - continue with IV lasix, BiPAP QHS  4/1 diuresing well continue Lasix, continue blood pressure control  4/2 a should diuresing well continue Lasix  4/4 CXR unchanged, on PO lasix, adequate UOP, continue to monitor  4/7 stable cardiopulmonary status, continue with current med regimen with continuous O2 supplementation

## 2023-04-07 NOTE — PLAN OF CARE
Spoke to Marija with [facility]. Patient not a candidate for their SWING program at this time.     Addendum: 0905  Spoke to Micheline with [facility]. Updated therapy notes sent to [facility] and [facility] in [facility]. Micheline with [facility] will look at patient and have case submitted to [facility] for authorization. Will await authorization. Auth is unlikely to happen today.

## 2023-04-07 NOTE — EICU
Intervention Initiated From:  COR / [facility]    Elliott intervened regarding:  Rounding (Video assessment)    Nurse Notified:  No    Doctor Notified:  No    Comments: Video rounds complete. Patient appears to be sleeping with BiPAP in place. No alarms, or distress noted at this time.

## 2023-04-07 NOTE — PLAN OF CARE
Pt remains very sleepy today - does wake up and takes medications - drinks well but eats very little - refusing - skin warm and dry - max temp today 98.0 - no complaints of pain or distress - iv fluids changed per dr loyola this am to d 5 1/2 normal saline at 100 ml per hour x 1 liters - - bipap removed this am by resp and is on 3 liters n.c. and tolerating well - hannon catheter draining clear yellow urine via gravity - secured in place - stool sample to lab - + for occult blood - sputum sample also sent to labs for analysis - will continue to monitor

## 2023-04-07 NOTE — ASSESSMENT & PLAN NOTE
No leukocytosis, no elevated temperature readings.   4/5 Blood cx x2 positive growth of gram positive clusters, start IV vancomycin dosed per pharmacy.   4/6 Negative PCR for MRSA or staph aureus. Will discontinue vancomycin and treatment for bacteremia. ID Pharm consulted.

## 2023-04-07 NOTE — SUBJECTIVE & OBJECTIVE
Interval History: Patient seen and examined.     Review of Systems   Constitutional:  Positive for activity change. Negative for appetite change, chills, fatigue and fever.   HENT:  Negative for mouth sores, sneezing and sore throat.    Eyes:  Negative for visual disturbance.   Respiratory:  Negative for cough, chest tightness, shortness of breath (improving), wheezing and stridor.    Cardiovascular:  Positive for leg swelling. Negative for chest pain and palpitations.   Gastrointestinal:  Negative for abdominal pain, blood in stool, constipation, diarrhea, nausea and vomiting.   Musculoskeletal:  Positive for gait problem and joint swelling. Negative for arthralgias, neck pain and neck stiffness.   Skin:  Negative for color change and pallor.   Neurological:  Positive for weakness. Negative for dizziness, seizures, syncope, facial asymmetry, speech difficulty, light-headedness, numbness and headaches.   Psychiatric/Behavioral:  Negative for agitation, confusion, decreased concentration, dysphoric mood and sleep disturbance. The patient is not nervous/anxious.    Objective:     Vital Signs (Most Recent):  Temp: 97.2 °F (36.2 °C) (04/07/23 1110)  Pulse: (!) 57 (04/07/23 1115)  Resp: (!) 23 (04/07/23 1115)  BP: (!) 112/51 (04/07/23 1110)  SpO2: (!) 92 % (04/07/23 1115)   Vital Signs (24h Range):  Temp:  [96.8 °F (36 °C)-97.5 °F (36.4 °C)] 97.2 °F (36.2 °C)  Pulse:  [51-67] 57  Resp:  [14-29] 23  SpO2:  [91 %-100 %] 92 %  BP: (107-140)/(51-86) 112/51     Weight: 132.9 kg (293 lb)  Body mass index is 50.29 kg/m².    Intake/Output Summary (Last 24 hours) at 4/7/2023 1159  Last data filed at 4/7/2023 0800  Gross per 24 hour   Intake 2902.33 ml   Output 2000 ml   Net 902.33 ml      Physical Exam  Vitals and nursing note reviewed.   Constitutional:       General: She is not in acute distress.     Appearance: She is obese. She is not toxic-appearing or diaphoretic.   HENT:      Right Ear: External ear normal.      Left Ear:  External ear normal.      Nose: No congestion or rhinorrhea.      Mouth/Throat:      Mouth: Mucous membranes are dry.      Pharynx: Oropharynx is clear. No oropharyngeal exudate or posterior oropharyngeal erythema.   Eyes:      Extraocular Movements: Extraocular movements intact.   Neck:      Comments: Thick skin folds  Cardiovascular:      Rate and Rhythm: Normal rate. Rhythm irregular.      Heart sounds: No murmur heard.  Pulmonary:      Effort: No respiratory distress.      Breath sounds: No rhonchi or rales.      Comments: On bipap - coarse bs ellen - with improvement in airflow  Chest:      Chest wall: No tenderness.   Abdominal:      General: There is no distension.      Palpations: Abdomen is soft.      Tenderness: There is no abdominal tenderness.      Comments: Limited secondary to body habitus   Genitourinary:     Comments: Hardwick draining yellow urine  Musculoskeletal:         General: Swelling present.      Cervical back: Neck supple. No rigidity.      Right lower leg: No edema (+1 pitting, loss of bony features in toes and dorsum of foot bilaterally).      Left lower leg: No edema.      Comments: Skin much more relaxed   Skin:     General: Skin is warm and dry.      Capillary Refill: Capillary refill takes less than 2 seconds.      Coloration: Skin is not jaundiced.      Findings: No bruising, erythema, lesion or rash.   Neurological:      Mental Status: She is alert and oriented to person, place, and time. Mental status is at baseline.      Comments: Baseline requires walker for ambulation   Psychiatric:         Mood and Affect: Mood normal.         Behavior: Behavior normal. Behavior is not agitated or aggressive. Behavior is cooperative.     Significant Labs: All pertinent labs within the past 24 hours have been reviewed.  Bilirubin:   Recent Labs   Lab 03/30/23  0340 04/01/23  0412 04/02/23  0339 04/03/23  0344 04/04/23  0327   BILITOT 0.6 0.4 0.5 0.5 0.5     BMP:   Recent Labs   Lab 04/07/23  0403    *      K 3.5      CO2 38*   BUN 14   CREATININE 1.2   CALCIUM 8.9   MG 2.2     CBC:   Recent Labs   Lab 04/06/23  0425 04/07/23  0403   WBC 6.74 5.73   HGB 13.6 13.1   HCT 43.9 42.6    188     CMP:   Recent Labs   Lab 04/06/23  0425 04/07/23  0403   * 145   K 4.0 3.5    108   CO2 37* 38*   GLU 94 118*   BUN 20 14   CREATININE 1.1 1.2   CALCIUM 9.1 8.9   ANIONGAP -1* -1*     Magnesium:   Recent Labs   Lab 04/06/23  0425 04/07/23  0403   MG 2.4 2.2     POCT Glucose:   Recent Labs   Lab 04/06/23  1627 04/06/23 2122 04/07/23  1039   POCTGLUCOSE 126* 126* 118*     Significant Imaging: I have reviewed all pertinent imaging results/findings within the past 24 hours.

## 2023-04-07 NOTE — PROGRESS NOTES
[facility] Medicine  Progress Note    Patient Name: Carmen Tan  MRN: [id number]  Patient Class: IP- Inpatient   Admission Date: 3/29/2023  Length of Stay: 8 days  Attending Physician: Gunnar Mott DO  Primary Care Provider: Lucian Barry MD    Subjective:     Principal Problem:Acute respiratory failure with hypoxia    HPI:  Chief Complaint   Patient presents with    Shortness of Breath       Per ems pt called 911 due to sob, when they arrived gave her a duoneb and moved her over and her blood pressure went up to 200/100's from an original reading of 140/80, solumedrol 125mg IM given, 4 sprays of nitro spray given, 1inch of nitropaste on pts chest, pt currently on cpap with an O2 sat of 100%      0176   Carmen Tan is a 73 y.o. female with PMHX of COPD, CHF, PE on Xarelto, morbid obesity who presents to the emergency department C/O shortness of breath.     Patient arrives by EMS. They report they were called because patient was acutely short of breath.  Patient was given a DuoNeb and being prepared to be taken to hospital. On repeat blood pressure check patient was markedly hypertensive.  Patient was given for nitro space, nitro patch, placed on CPAP.  She received 125mg solumedrol. On arrival to the emergency department patient is on CPAP and moderate respiratory distress.  Appears to be mentating normally and able to respond to questions appropriately however history limited due to respiratory distress.     PCP: Lucian Barry MD   3/30   Patient awake and alert. Diuresed well overnight with urine output of 1.8L. Patient endorses breathing better compared to time of arrival to ED. She has had a similar hospitalization over a year ago and have since not followed up with any of her specialists. 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   Shortness of breath has been worsening over the past week and her leg swellings have started to  worsen two weeks ago. She normally ambulates with a walker at home. She has a son who also lives with her to assist. Patient remains on nitroglycerin gtt with SBP still >160s mmHg. Patient to be admitted for hypertensive emergency with acute respiratory failure with hypoxia and hypercarbia, CHF exacerbation for continue IV diuresis, oxygen supplementation, and consult with cardiology.              Overview/Hospital Course:  3/31 Overnight due to increased blood pressure, tridil gtt back on. Patient awake and tolerating BiPAP I/E 14/6 FiO2 50%. Urine output of 5 L. Patient endorses tenseness in lower extremities improving with breathing better compared yesterday. BP adjusted per cardiology with added daily Jardiance. Continue with IV 40 mg BID lasix and monitor UOP and renal function.   4/1 ND patient is feeling a little better this morning, does report less heaviness to her legs which do show improvement in edema.  Patient had good diuresis overnight.  Off of all IV blood pressure meds, and blood pressure appears more stable  4/2 ND patient still endorses some shortness of breath especially when transitioned from BiPAP to nasal cannula O2 despite having good saturation.  Nurses reports she stays very anxious.  Patient has been diuresing well with improvement in blood pressures  4/3 Overnight increasing agitation and confusion, blood tinged urine collecting in Hardwick catheter. Given change in mentation; will hold xarelto, f/u urine culture, f/u blood cultures, f/u ultrasound. Over the weekend, patient diuresed over 8 liters, IV lasix now PO. Remains tachypneic, ABG obtained show hypercarbia, BiPAP settings adjusted back to I/E 14/6 FiO2 60%. On nasal cannula patient maintains SpO2 >98%.   4/4 BiPAP tubing replacement resulted in increased tidal volume, back on nasal cannula this morning. BP normotensive. Patient more alert and agrees to participating with PT and OT. Adjust anxiety meds, add sertraline and zyprexa at  bedtime. Continue bowel regimen with gentle IVF.  Follow pulmonary hygiene care per respiratory.   4/5 Patient more alert endorsing feeling back to baseline mentation. Passing stool. Positive blood cx for gram positive clusters, start IV vancomycin adjust per pharmacy. BP normotensive. Continue with daily pulmonary hygiene care.   4/6 F/u positive blood cx, PCR resulted with no evidence of staph aureus, MRSA. CXR unremarakle, urine culture negative, patient afebrile, no clinical signs suggestive of systemic infection, will discontinue vancomycin. Spoke at length with three of patient's children, advised SNF or LTAC to continue with pulmonary care to allow patient regain her baseline strength. Family insists patient to return home with home health services, PT and OT for strengthening.   4/7 Cardiopulmonary status stable. Continue with current daily pulm care, BiPAP QHS and continuous O2 2-3L via nasal cannula. CM working on inpatient rehab care to help patient regain strength and mobility. Patient prior to hospitalization reported to ambulate with walker. With PT has been able to sit up in bed, but unable to take steps with assistance. Continue with PT and OT efforts.       Interval History: Patient seen and examined.     Review of Systems   Constitutional:  Positive for activity change. Negative for appetite change, chills, fatigue and fever.   HENT:  Negative for mouth sores, sneezing and sore throat.    Eyes:  Negative for visual disturbance.   Respiratory:  Negative for cough, chest tightness, shortness of breath (improving), wheezing and stridor.    Cardiovascular:  Positive for leg swelling. Negative for chest pain and palpitations.   Gastrointestinal:  Negative for abdominal pain, blood in stool, constipation, diarrhea, nausea and vomiting.   Musculoskeletal:  Positive for gait problem and joint swelling. Negative for arthralgias, neck pain and neck stiffness.   Skin:  Negative for color change and pallor.    Neurological:  Positive for weakness. Negative for dizziness, seizures, syncope, facial asymmetry, speech difficulty, light-headedness, numbness and headaches.   Psychiatric/Behavioral:  Negative for agitation, confusion, decreased concentration, dysphoric mood and sleep disturbance. The patient is not nervous/anxious.    Objective:     Vital Signs (Most Recent):  Temp: 97.2 °F (36.2 °C) (04/07/23 1110)  Pulse: (!) 57 (04/07/23 1115)  Resp: (!) 23 (04/07/23 1115)  BP: (!) 112/51 (04/07/23 1110)  SpO2: (!) 92 % (04/07/23 1115)   Vital Signs (24h Range):  Temp:  [96.8 °F (36 °C)-97.5 °F (36.4 °C)] 97.2 °F (36.2 °C)  Pulse:  [51-67] 57  Resp:  [14-29] 23  SpO2:  [91 %-100 %] 92 %  BP: (107-140)/(51-86) 112/51     Weight: 132.9 kg (293 lb)  Body mass index is 50.29 kg/m².    Intake/Output Summary (Last 24 hours) at 4/7/2023 1159  Last data filed at 4/7/2023 0800  Gross per 24 hour   Intake 2902.33 ml   Output 2000 ml   Net 902.33 ml      Physical Exam  Vitals and nursing note reviewed.   Constitutional:       General: She is not in acute distress.     Appearance: She is obese. She is not toxic-appearing or diaphoretic.   HENT:      Right Ear: External ear normal.      Left Ear: External ear normal.      Nose: No congestion or rhinorrhea.      Mouth/Throat:      Mouth: Mucous membranes are dry.      Pharynx: Oropharynx is clear. No oropharyngeal exudate or posterior oropharyngeal erythema.   Eyes:      Extraocular Movements: Extraocular movements intact.   Neck:      Comments: Thick skin folds  Cardiovascular:      Rate and Rhythm: Normal rate. Rhythm irregular.      Heart sounds: No murmur heard.  Pulmonary:      Effort: No respiratory distress.      Breath sounds: No rhonchi or rales.      Comments: On bipap - coarse bs ellen - with improvement in airflow  Chest:      Chest wall: No tenderness.   Abdominal:      General: There is no distension.      Palpations: Abdomen is soft.      Tenderness: There is no abdominal  tenderness.      Comments: Limited secondary to body habitus   Genitourinary:     Comments: Hardwick draining yellow urine  Musculoskeletal:         General: Swelling present.      Cervical back: Neck supple. No rigidity.      Right lower leg: No edema (+1 pitting, loss of bony features in toes and dorsum of foot bilaterally).      Left lower leg: No edema.      Comments: Skin much more relaxed   Skin:     General: Skin is warm and dry.      Capillary Refill: Capillary refill takes less than 2 seconds.      Coloration: Skin is not jaundiced.      Findings: No bruising, erythema, lesion or rash.   Neurological:      Mental Status: She is alert and oriented to person, place, and time. Mental status is at baseline.      Comments: Baseline requires walker for ambulation   Psychiatric:         Mood and Affect: Mood normal.         Behavior: Behavior normal. Behavior is not agitated or aggressive. Behavior is cooperative.     Significant Labs: All pertinent labs within the past 24 hours have been reviewed.  Bilirubin:   Recent Labs   Lab 03/30/23  0340 04/01/23  0412 04/02/23  0339 04/03/23  0344 04/04/23  0327   BILITOT 0.6 0.4 0.5 0.5 0.5     BMP:   Recent Labs   Lab 04/07/23  0403   *      K 3.5      CO2 38*   BUN 14   CREATININE 1.2   CALCIUM 8.9   MG 2.2     CBC:   Recent Labs   Lab 04/06/23  0425 04/07/23  0403   WBC 6.74 5.73   HGB 13.6 13.1   HCT 43.9 42.6    188     CMP:   Recent Labs   Lab 04/06/23  0425 04/07/23  0403   * 145   K 4.0 3.5    108   CO2 37* 38*   GLU 94 118*   BUN 20 14   CREATININE 1.1 1.2   CALCIUM 9.1 8.9   ANIONGAP -1* -1*     Magnesium:   Recent Labs   Lab 04/06/23  0425 04/07/23  0403   MG 2.4 2.2     POCT Glucose:   Recent Labs   Lab 04/06/23  1627 04/06/23  2122 04/07/23  1039   POCTGLUCOSE 126* 126* 118*     Significant Imaging: I have reviewed all pertinent imaging results/findings within the past 24 hours.      Assessment/Plan:      * Acute respiratory  failure with hypoxia  Patient with Hypercapnic and Hypoxic Respiratory failure which is Acute on chronic.  she is on home oxygen at 2 LPM. Supplemental oxygen was provided and noted- Oxygen Concentration (%):  [32-50] 32    Signs/symptoms of respiratory failure include- tachypnea, increased work of breathing, use of accessory muscles and wheezing. Contributing diagnoses includes - CHF, COPD, Obesity Hypoventilation and Pleural effusion Labs and images were reviewed. Patient Has recent ABG, which has been reviewed. Will treat underlying causes and adjust management of respiratory failure as follows- IV lasix, supplemental oxygen, continuous BiPAP  3/31: IV lasix 40 mg Q6H x 2 doses  4/1 improving daily, continue diuresis with Lasix, continue BiPAP therapy and will transition to O2 therapy as tolerates  4/2 continue current treatment, continue diuresis with Lasix; continue to offer nasal cannula as tolerates for oxygen options  4/3 transitioned to PO lasix 40 mg BID, continue BiPAP I/E 14/6 FiO2 60%  4/4 There is evidence, patient now requires noninvasive home ventilator due to chronic respiratory failure and COPD.  If obtained, this will help the patient decrease the work of breathing, hospital readmits, intubations, and improve overall quality of life. If this home respiratory device is not obtained it may lead to patient's harm or death. Will arrange for NI ventilator for home use ready on discharge.  4/6 SpO2 >94% on 2L NC, arranging at home NIV   4/7 Home NIV arranged on discharge. Currently requiring continuous O2 3L via NC during daytime. Continue daily pulmonary hygiene care    Gross hematuria  4/3 Collecting blood tinged urine in Hardwick catheter. Hg/HCT stable. Holding xarelto. Start empiric antibiotics for UTI, f/u urine culture and blood cultures.    4/4 Blood cx x2, positive for gram positive clusters  4/6 Blood cx PCR negative for MRSA and staph aureus, urine cx x2 (4/6 and 4/7) NGTD. No evidence of an  infectious process, afebrile. Discontinued vancomycin after one dose administered  4/7 Hg/HCT stable. Urine in hannon collecting clear colored    Mild temazepam use disorder  Temazepam 15 mg QHS use, unclear consistency since 1/13/23 with last fill of 30 tablets on 2/28/23.   - ativan 1g QHS  - monitor symptoms of withdrawal  4/2 due to continued anxiety will add BuSpar 3 times a day  4/4 add sertraline to Buspar TID, zyprexa QHS  4/7 mentation at baseline, continue with current sertraline and buspar, per psychiatry if appetite low, then consider addition of mirtazipine     Pulmonary edema cardiac cause  Patient is identified as having Combined Systolic and Diastolic heart failure that is Acute on chronic. CHF is currently uncontrolled due to Continued edema of extremities and Hepatic congestion/ascites, Dyspnea not returned to baseline after 2 doses of IV diuretic and Rales/crackles on pulmonary exam. Latest ECHO performed and demonstrates- No results found for this or any previous visit.  . Continue Beta Blocker, Furosemide and Nitrate/Vasodilator and monitor clinical status closely. Monitor on telemetry. Monitor strict Is&Os and daily weights.  Place on fluid restriction of 1.5 L. Continue to stress to patient importance of self efficacy and  on diet for CHF. Last BNP reviewed- and noted below No results for input(s): BNP, BNPTRIAGEBLO in the last 168 hours.   Lab Results   Component Value Date    NTPROBNP 53942 (H) 03/29/2023   - per cardiology, echo cardiogram with systolic and diastolic dysfunction with pulmonary hypertension  - continue with IV lasix, BiPAP QHS  4/1 diuresing well continue Lasix, continue blood pressure control  4/2 a should diuresing well continue Lasix  4/4 CXR unchanged, on PO lasix, adequate UOP, continue to monitor  4/7 stable cardiopulmonary status, continue with current med regimen with continuous O2 supplementation    Shortness of breath  Improving with maintaining SpO2 >95%  when transitioned to nasal cannula, but patient exhibiting increased agitation and hyperventilation when off BiPAP mask and machine.   Patient medication list not updated, but local [facility] verifies intermittent use of Temazepam 15 mg QHS for insomnia.  - will start ativan PRN and monitor symptoms of withdrawal  4/1 continues to improve daily  4/3 complaint of increased agitation and confusion, ABG shows retention, BiPAP settings adjusted  4/4 after BiPAP tubing replaced, patient tidal volume, oxygen requirements have improved, continue with daily pulm hygiene   4/7 Tolerating continuous O2 supplementation via nasal cannula 3L SpO2 >94% during daytime with BiPAP QHS, continue to monitor        Diabetes mellitus, type 2  Patient's FSGs are controlled on current medication regimen.  Last A1c reviewed-   Lab Results   Component Value Date    HGBA1C 5.5 03/29/2023     Most recent fingerstick glucose reviewed-   Recent Labs   Lab 04/06/23  1627 04/06/23  2122 04/07/23  1039   POCTGLUCOSE 126* 126* 118*     Current correctional scale  Low  Maintain anti-hyperglycemic dose as follows-   Antihyperglycemics (From admission, onward)    Start     Stop Route Frequency Ordered    03/31/23 0106  insulin aspart U-100 pen 0-5 Units         -- SubQ Before meals & nightly PRN 03/31/23 0008    03/30/23 1700  empagliflozin 10 mg tablet 10 mg         -- Oral Daily 03/30/23 1345        Per cardiology;   3/31 Jardiance 10 mg daily  4/1 continue current med  4/6 PO intake reduced, continue with regimen above  4/7 Tolerating PO intake, continue daily Jardiance      Acute on chronic congestive heart failure  4/3 continue with cardiology recommendations  Patient is identified as having Combined Systolic and Diastolic heart failure that is Acute on chronic. CHF is currently uncontrolled due to Continued edema of extremities, Dyspnea not returned to baseline after 2 doses of IV diuretic and Rales/crackles on pulmonary exam. Latest ECHO  performed and demonstrates- Results for orders placed during the hospital encounter of 03/29/23  Echo  Interpretation Summary  · The left ventricle is normal in size with moderate concentric hypertrophy and mildly decreased systolic function.  · The estimated ejection fraction is 45%.  · Grade I left ventricular diastolic dysfunction.  · Moderate right ventricular enlargement.  · Moderate right atrial enlargement.  · Mild-to-moderate aortic regurgitation.  · There is mild aortic valve stenosis.  · Aortic valve area is 1.44 cm2; peak velocity is 2.09 m/s; mean gradient is 10 mmHg.  · Mild-to-moderate mitral regurgitation.  · Moderate tricuspid regurgitation.  · Moderate pulmonic regurgitation.  · The estimated PA systolic pressure is 69 mmHg.  · There is moderate pulmonary hypertension.  · Moderate left atrial enlargement.  . Continue Beta Blocker, ACE/ARB, Furosemide, Aldactone, Nitrate/Vasodilator and ARNI and monitor clinical status closely. Monitor on telemetry. Monitor strict Is&Os and daily weights.  Place on fluid restriction of 1.5 L. Continue to stress to patient importance of self efficacy and  on diet for CHF. Last BNP reviewed-  Lab Results   Component Value Date    NTPROBNP 53583 (H) 03/29/2023   4/4 improved BP control, now on PO lasix with adequate urine output. Continue recommendations per cardiology.  4/6 borderline hypotension, will provide gentle IVF followed by bolus 250mL    Weakness  Secondary to above CHF exacerbation, fluid overload, SOB, body habitus. When patient's cardiorespiratory status stabilized, follow up PT and OT evaluation.   4/6 work on sitting up and out of bed, patient agrees to working with PT and OT  4/7 continue with aggressive PT and OT, CM working on inpatient rehab services       Severe obesity (BMI >= 40)  Body mass index is 50.29 kg/m². Morbid obesity complicates all aspects of disease management from diagnostic modalities to treatment. Weight loss encouraged and  health benefits explained to patient.         Paroxysmal A-fib  Patient with Long standing persistent (>12 months) atrial fibrillation which is controlled currently with Beta Blocker. Patient is currently in atrial fibrillation.[id number] Score: 3. Anticoagulation indicated. Anticoagulation done with Xarelto.  Xarelto held 4/3 after evidence of hematuria. Hg/HCT stable.             VTE Risk Mitigation (From admission, onward)         Ordered     enoxaparin injection 40 mg  Every 12 hours         04/06/23 1125     Place sequential compression device  Until discontinued         04/03/23 1137     IP VTE HIGH RISK PATIENT  Once         03/30/23 0314     Place sequential compression device  Until discontinued         03/30/23 0314                Discharge Planning   TIGRE:      Code Status: Full Code   Is the patient medically ready for discharge?:     Reason for patient still in hospital (select all that apply): PT / OT recommendations and Pending disposition  Discharge Plan A: Home with family, Home Health   Discharge Delays: None known at this time      Gunnar Mott DO  Department of Hospital Medicine   [facility]

## 2023-04-07 NOTE — ASSESSMENT & PLAN NOTE
Patient has a current diagnosis of Hypertensive emergency with end organ damage evidenced by hypertensive encephalopathy and acute pulmonary edema without heart failure which is uncontrolled.  Latest blood pressure and vitals reviewed-   Temp:  [96.8 °F (36 °C)-97.5 °F (36.4 °C)]   Pulse:  [51-67]   Resp:  [14-29]   BP: ()/(51-86)   SpO2:  [91 %-100 %] .   Patient currently on IV antihypertensives.   Home meds for hypertension were reviewed and noted below.   Hypertension Medications             furosemide (LASIX) 40 MG tablet Take 1 tablet (40 mg total) by mouth 2 (two) times daily. If having increased shortness of breath, increase in leg swelling, and if you notice an >3lbs in 24 hours. May take up to 4 tablets in 1 day (every 6 hours)    isosorbide mononitrate (IMDUR) 60 MG 24 hr tablet Take 1 tablet (60 mg total) by mouth once daily.    lisinopril (PRINIVIL,ZESTRIL) 5 MG tablet Take 1 tablet (5 mg total) by mouth once daily.    metoprolol tartrate (LOPRESSOR) 25 MG tablet Take 1 tablet (25 mg total) by mouth 2 (two) times daily.      Will aim for controlled BP reduction by medications noted above. Monitor and mitigate end organ damage as indicated.  Follow up cardiology consult appreciated.   3/31 Lisinopril discontinued for start of Entresto  - continue nitrate, hydralazine, carvedilol  - tridil gtt PRN  4/1 blood pressure has improved, appreciate cardiology's help  4/2 blood pressures have improved continue current medications  4/3 on Entresto, spironolactone, carvedilol, nifedipime, hydralazine, lasix  4/7 Continue with above regimen

## 2023-04-07 NOTE — ASSESSMENT & PLAN NOTE
Improving with maintaining SpO2 >95% when transitioned to nasal cannula, but patient exhibiting increased agitation and hyperventilation when off BiPAP mask and machine.   Patient medication list not updated, but local [facility] verifies intermittent use of Temazepam 15 mg QHS for insomnia.  - will start ativan PRN and monitor symptoms of withdrawal  4/1 continues to improve daily  4/3 complaint of increased agitation and confusion, ABG shows retention, BiPAP settings adjusted  4/4 after BiPAP tubing replaced, patient tidal volume, oxygen requirements have improved, continue with daily pulm hygiene   4/7 Tolerating continuous O2 supplementation via nasal cannula 3L SpO2 >94% during daytime with BiPAP QHS, continue to monitor

## 2023-04-07 NOTE — ASSESSMENT & PLAN NOTE
Temazepam 15 mg QHS use, unclear consistency since 1/13/23 with last fill of 30 tablets on 2/28/23.   - ativan 1g QHS  - monitor symptoms of withdrawal  4/2 due to continued anxiety will add BuSpar 3 times a day  4/4 add sertraline to Buspar TID, zyprexa QHS  4/7 mentation at baseline, continue with current sertraline and buspar, per psychiatry if appetite low, then consider addition of mirtazipine

## 2023-04-07 NOTE — PLAN OF CARE
Awaiting stool for OCB and Sputum collection. PT/OT eval completed. IVF started. Better spirits today. Abx. Dc'd. Accuchecks- no coverage needed. Tolerated 3L NC today.

## 2023-04-07 NOTE — ASSESSMENT & PLAN NOTE
Secondary to above CHF exacerbation, fluid overload, SOB, body habitus. When patient's cardiorespiratory status stabilized, follow up PT and OT evaluation.   4/6 work on sitting up and out of bed, patient agrees to working with PT and OT  4/7 continue with aggressive PT and OT, CM working on inpatient rehab services

## 2023-04-08 LAB
ANION GAP SERPL CALC-SCNC: -2 MMOL/L (ref 8–16)
BACTERIA BLD CULT: NORMAL
BASOPHILS # BLD AUTO: 0.01 K/UL (ref 0–0.2)
BASOPHILS NFR BLD: 0.2 % (ref 0–1.9)
BUN SERPL-MCNC: 12 MG/DL (ref 8–23)
CALCIUM SERPL-MCNC: 9.1 MG/DL (ref 8.7–10.5)
CHLORIDE SERPL-SCNC: 105 MMOL/L (ref 95–110)
CO2 SERPL-SCNC: 41 MMOL/L (ref 23–29)
CREAT SERPL-MCNC: 1.2 MG/DL (ref 0.5–1.4)
DIFFERENTIAL METHOD: ABNORMAL
EOSINOPHIL # BLD AUTO: 0.1 K/UL (ref 0–0.5)
EOSINOPHIL NFR BLD: 1.5 % (ref 0–8)
ERYTHROCYTE [DISTWIDTH] IN BLOOD BY AUTOMATED COUNT: 16.3 % (ref 11.5–14.5)
EST. GFR  (NO RACE VARIABLE): 48.1 ML/MIN/1.73 M^2
GLUCOSE SERPL-MCNC: 101 MG/DL (ref 70–110)
HCT VFR BLD AUTO: 42.2 % (ref 37–48.5)
HGB BLD-MCNC: 13 G/DL (ref 12–16)
IMM GRANULOCYTES # BLD AUTO: 0.01 K/UL (ref 0–0.04)
IMM GRANULOCYTES NFR BLD AUTO: 0.2 % (ref 0–0.5)
LYMPHOCYTES # BLD AUTO: 1.4 K/UL (ref 1–4.8)
LYMPHOCYTES NFR BLD: 25.7 % (ref 18–48)
MAGNESIUM SERPL-MCNC: 2 MG/DL (ref 1.6–2.6)
MCH RBC QN AUTO: 29.3 PG (ref 27–31)
MCHC RBC AUTO-ENTMCNC: 30.8 G/DL (ref 32–36)
MCV RBC AUTO: 95 FL (ref 82–98)
MONOCYTES # BLD AUTO: 0.7 K/UL (ref 0.3–1)
MONOCYTES NFR BLD: 12.6 % (ref 4–15)
NEUTROPHILS # BLD AUTO: 3.2 K/UL (ref 1.8–7.7)
NEUTROPHILS NFR BLD: 59.8 % (ref 38–73)
NRBC BLD-RTO: 0 /100 WBC
PLATELET # BLD AUTO: 193 K/UL (ref 150–450)
PMV BLD AUTO: 9.1 FL (ref 9.2–12.9)
POCT GLUCOSE: 104 MG/DL (ref 70–110)
POCT GLUCOSE: 110 MG/DL (ref 70–110)
POCT GLUCOSE: 131 MG/DL (ref 70–110)
POTASSIUM SERPL-SCNC: 3.8 MMOL/L (ref 3.5–5.1)
RBC # BLD AUTO: 4.43 M/UL (ref 4–5.4)
SODIUM SERPL-SCNC: 144 MMOL/L (ref 136–145)
WBC # BLD AUTO: 5.4 K/UL (ref 3.9–12.7)

## 2023-04-08 PROCEDURE — 80048 BASIC METABOLIC PNL TOTAL CA: CPT | Performed by: STUDENT IN AN ORGANIZED HEALTH CARE EDUCATION/TRAINING PROGRAM

## 2023-04-08 PROCEDURE — 25000003 PHARM REV CODE 250: Performed by: STUDENT IN AN ORGANIZED HEALTH CARE EDUCATION/TRAINING PROGRAM

## 2023-04-08 PROCEDURE — 25000242 PHARM REV CODE 250 ALT 637 W/ HCPCS: Performed by: INTERNAL MEDICINE

## 2023-04-08 PROCEDURE — C9113 INJ PANTOPRAZOLE SODIUM, VIA: HCPCS | Performed by: STUDENT IN AN ORGANIZED HEALTH CARE EDUCATION/TRAINING PROGRAM

## 2023-04-08 PROCEDURE — 25000003 PHARM REV CODE 250: Performed by: INTERNAL MEDICINE

## 2023-04-08 PROCEDURE — 97530 THERAPEUTIC ACTIVITIES: CPT

## 2023-04-08 PROCEDURE — 99900031 HC PATIENT EDUCATION (STAT)

## 2023-04-08 PROCEDURE — 25000242 PHARM REV CODE 250 ALT 637 W/ HCPCS: Performed by: STUDENT IN AN ORGANIZED HEALTH CARE EDUCATION/TRAINING PROGRAM

## 2023-04-08 PROCEDURE — 27000221 HC OXYGEN, UP TO 24 HOURS

## 2023-04-08 PROCEDURE — S5010 5% DEXTROSE AND 0.45% SALINE: HCPCS | Performed by: INTERNAL MEDICINE

## 2023-04-08 PROCEDURE — 99900035 HC TECH TIME PER 15 MIN (STAT)

## 2023-04-08 PROCEDURE — 97110 THERAPEUTIC EXERCISES: CPT

## 2023-04-08 PROCEDURE — 85025 COMPLETE CBC W/AUTO DIFF WBC: CPT | Performed by: INTERNAL MEDICINE

## 2023-04-08 PROCEDURE — 63600175 PHARM REV CODE 636 W HCPCS: Performed by: STUDENT IN AN ORGANIZED HEALTH CARE EDUCATION/TRAINING PROGRAM

## 2023-04-08 PROCEDURE — 94761 N-INVAS EAR/PLS OXIMETRY MLT: CPT

## 2023-04-08 PROCEDURE — 94640 AIRWAY INHALATION TREATMENT: CPT

## 2023-04-08 PROCEDURE — 36415 COLL VENOUS BLD VENIPUNCTURE: CPT | Performed by: STUDENT IN AN ORGANIZED HEALTH CARE EDUCATION/TRAINING PROGRAM

## 2023-04-08 PROCEDURE — 20000000 HC ICU ROOM

## 2023-04-08 PROCEDURE — A4216 STERILE WATER/SALINE, 10 ML: HCPCS | Performed by: STUDENT IN AN ORGANIZED HEALTH CARE EDUCATION/TRAINING PROGRAM

## 2023-04-08 PROCEDURE — 94660 CPAP INITIATION&MGMT: CPT

## 2023-04-08 PROCEDURE — 83735 ASSAY OF MAGNESIUM: CPT | Performed by: STUDENT IN AN ORGANIZED HEALTH CARE EDUCATION/TRAINING PROGRAM

## 2023-04-08 RX ORDER — HYDRALAZINE HYDROCHLORIDE 25 MG/1
25 TABLET, FILM COATED ORAL EVERY 12 HOURS
Status: DISCONTINUED | OUTPATIENT
Start: 2023-04-08 | End: 2023-04-08

## 2023-04-08 RX ORDER — FUROSEMIDE 40 MG/1
40 TABLET ORAL DAILY
Status: DISCONTINUED | OUTPATIENT
Start: 2023-04-09 | End: 2023-04-13 | Stop reason: HOSPADM

## 2023-04-08 RX ADMIN — Medication 10 ML: at 06:04

## 2023-04-08 RX ADMIN — BUSPIRONE HYDROCHLORIDE 5 MG: 5 TABLET ORAL at 02:04

## 2023-04-08 RX ADMIN — SERTRALINE HYDROCHLORIDE 25 MG: 25 TABLET ORAL at 08:04

## 2023-04-08 RX ADMIN — SACUBITRIL AND VALSARTAN 1 TABLET: 24; 26 TABLET, FILM COATED ORAL at 08:04

## 2023-04-08 RX ADMIN — IPRATROPIUM BROMIDE AND ALBUTEROL SULFATE 3 ML: 2.5; .5 SOLUTION RESPIRATORY (INHALATION) at 01:04

## 2023-04-08 RX ADMIN — CARVEDILOL 12.5 MG: 12.5 TABLET, FILM COATED ORAL at 08:04

## 2023-04-08 RX ADMIN — SPIRONOLACTONE 25 MG: 25 TABLET ORAL at 08:04

## 2023-04-08 RX ADMIN — IPRATROPIUM BROMIDE AND ALBUTEROL SULFATE 3 ML: 2.5; .5 SOLUTION RESPIRATORY (INHALATION) at 07:04

## 2023-04-08 RX ADMIN — Medication 10 ML: at 11:04

## 2023-04-08 RX ADMIN — Medication 10 ML: at 05:04

## 2023-04-08 RX ADMIN — GABAPENTIN 300 MG: 300 CAPSULE ORAL at 08:04

## 2023-04-08 RX ADMIN — ENOXAPARIN SODIUM 40 MG: 40 INJECTION SUBCUTANEOUS at 08:04

## 2023-04-08 RX ADMIN — Medication 10 ML: at 12:04

## 2023-04-08 RX ADMIN — PANTOPRAZOLE SODIUM 40 MG: 40 INJECTION, POWDER, FOR SOLUTION INTRAVENOUS at 08:04

## 2023-04-08 RX ADMIN — BUSPIRONE HYDROCHLORIDE 5 MG: 5 TABLET ORAL at 08:04

## 2023-04-08 RX ADMIN — NIFEDIPINE 60 MG: 30 TABLET, FILM COATED, EXTENDED RELEASE ORAL at 08:04

## 2023-04-08 RX ADMIN — EMPAGLIFLOZIN 10 MG: 10 TABLET, FILM COATED ORAL at 08:04

## 2023-04-08 RX ADMIN — FUROSEMIDE 40 MG: 40 TABLET ORAL at 08:04

## 2023-04-08 RX ADMIN — BUDESONIDE 0.25 MG: 0.25 INHALANT ORAL at 07:04

## 2023-04-08 RX ADMIN — DEXTROSE AND SODIUM CHLORIDE: 5; .45 INJECTION, SOLUTION INTRAVENOUS at 01:04

## 2023-04-08 RX ADMIN — ISOSORBIDE MONONITRATE 30 MG: 30 TABLET, EXTENDED RELEASE ORAL at 08:04

## 2023-04-08 RX ADMIN — ACETAMINOPHEN 650 MG: 325 TABLET ORAL at 12:04

## 2023-04-08 NOTE — PLAN OF CARE
Pt more awake today - stayed on 3 liters n.c. until this afternoon post bath and pt therapy session - feeds her self this am - slightly better appetite - bath given - large bm noted and cleansed - diaper in place - pt does assist some with turning and pulling up in bed - sr occasional pvc today - pulses present - generalized edema - abdomen soft non tender bs x 4 - hannon catheter draining clear cornelia urine via graviy - secured in placed - iv with d 5 1/2 ns at 50 ml per hour - no complaints of pain or distress - max temp today 97.8 - multiple adjustments to blood pressure medications per dr loyola and dr haddad - monitoring blood sugar levels - will continue to monitor

## 2023-04-08 NOTE — PT/OT/SLP PROGRESS
"Physical Therapy Treatment    Patient Name:  Carmen Tan   MRN:  [id number]    Recommendations:     Discharge Recommendations: nursing facility, skilled (TBD)  Discharge Equipment Recommendations:  (TBD)  Barriers to discharge: Decreased caregiver support    Assessment:     Carmen Tan is a 73 y.o. female admitted with a medical diagnosis of Acute respiratory failure with hypoxia.  She presents with the following impairments/functional limitations: weakness, impaired endurance, impaired functional mobility, impaired cardiopulmonary response to activity. Patient requiring mod-maxA of 1-2 persons with bed mobility. Patient performing supine therapeutic exercises with SpO2 remaining at 95-97%. Patient performing supine>sit with mod A and BiPAP mask donned. Patient SpO2 desaturating to 84% after about 1 minute sitting EOB. Patient return to supine.    Rehab Prognosis: Good; patient would benefit from acute skilled PT services to address these deficits and reach maximum level of function.    Recent Surgery: * No surgery found *      Plan:     During this hospitalization, patient to be seen 5 x/week to address the identified rehab impairments via gait training, therapeutic activities, therapeutic exercises and progress toward the following goals:    Plan of Care Expires:  04/22/23    Subjective     Chief Complaint: SOB  Patient/Family Comments/goals: "I need some air"  Pain/Comfort:  Pain Rating 1: 0/10      Objective:     Communicated with RN prior to session.  Patient found supine with peripheral IV, hannon catheter, BIPAP, pulse ox (continuous), blood pressure cuff, telemetry, SCD upon PT entry to room.     General Precautions: Standard, fall  Orthopedic Precautions: N/A  Braces: N/A  Respiratory Status:  BiPAP on 50% oxygen concentration     Functional Mobility:  Bed Mobility:     Rolling Left:  maximal assistance  Rolling Right: maximal assistance  Scooting: maximal assistance and of 2 persons  Supine to " Sit: maximal assistance  Sit to Supine: maximal assistance  Transfers:     Sit to Stand:  unable to assess with 2/2 oxygen desaturation with sitting EOB  Gait: unable to assess      AM-PAC 6 CLICK MOBILITY  Turning over in bed (including adjusting bedclothes, sheets and blankets)?: 2  Sitting down on and standing up from a chair with arms (e.g., wheelchair, bedside commode, etc.): 1  Moving from lying on back to sitting on the side of the bed?: 2  Moving to and from a bed to a chair (including a wheelchair)?: 1  Need to walk in hospital room?: 1  Climbing 3-5 steps with a railing?: 1  Basic Mobility Total Score: 8       Treatment & Education:  Supine Therapeutic Exercises performed bilaterally: AP, heels slides, hip abduction x30 reps each    Patient left HOB elevated with all lines intact, call button in reach, and RN present..    GOALS:   Multidisciplinary Problems       Physical Therapy Goals          Problem: Physical Therapy    Goal Priority Disciplines Outcome Goal Variances Interventions   Physical Therapy Goal     PT, PT/OT Ongoing, Progressing     Description: Patient will increase functional independence with mobility by performin. Supine to sit with Standby Assistance  2. Sit to supine with Standby Assistance  3. Bed to chair transfer with Standby Assistancewith or without rolling walker using Step Transfer TECHNIQUE  4. Gait  x 50  feet with Standby Assistance with or without rolling walker  5. Lower extremity exercise program x10 reps per handout, with assistance as needed                           Time Tracking:     PT Received On: 23  PT Start Time: 1328     PT Stop Time: 1400  PT Total Time (min): 32 min     Billable Minutes: Therapeutic Activity 20 minutes and Therapeutic Exercise 12 minutes    Treatment Type: Treatment  PT/PTA: PT     Number of PTA visits since last PT visit: 0     2023

## 2023-04-08 NOTE — EICU
Intervention Initiated From:  [facility] / EICU    Elliott intervened regarding:  Rounding (Video assessment)    Nurse Notified:  No    Doctor Notified:  No    Comments: Video rounds complete. Patient resting in bed with family at bedside. Patient appears to be in good spirits, laughing and visiting with family. Denies needs, and no alarms or distress noted at this time.

## 2023-04-08 NOTE — SUBJECTIVE & OBJECTIVE
Interval History: Patient seen and examined.     Review of Systems   Constitutional:  Positive for activity change. Negative for appetite change, chills, fatigue and fever.   HENT:  Negative for mouth sores, sneezing and sore throat.    Eyes:  Negative for visual disturbance.   Respiratory:  Negative for cough, chest tightness, shortness of breath (improving), wheezing and stridor.    Cardiovascular:  Positive for leg swelling. Negative for chest pain and palpitations.   Gastrointestinal:  Negative for abdominal pain, blood in stool, constipation, diarrhea, nausea and vomiting.   Musculoskeletal:  Positive for gait problem and joint swelling. Negative for arthralgias, neck pain and neck stiffness.   Skin:  Negative for color change and pallor.   Neurological:  Positive for weakness. Negative for dizziness, seizures, syncope, facial asymmetry, speech difficulty, light-headedness, numbness and headaches.   Psychiatric/Behavioral:  Negative for agitation, confusion, decreased concentration, dysphoric mood and sleep disturbance. The patient is not nervous/anxious.    Objective:     Vital Signs (Most Recent):  Temp: 97.6 °F (36.4 °C) (04/08/23 1245)  Pulse: 76 (04/08/23 1400)  Resp: (!) 29 (04/08/23 1400)  BP: 123/61 (04/08/23 1400)  SpO2: 98 % (04/08/23 1400)   Vital Signs (24h Range):  Temp:  [97.3 °F (36.3 °C)-98 °F (36.7 °C)] 97.6 °F (36.4 °C)  Pulse:  [48-80] 76  Resp:  [12-35] 29  SpO2:  [88 %-100 %] 98 %  BP: ()/(51-66) 123/61     Weight: 132.9 kg (293 lb)  Body mass index is 50.29 kg/m².    Intake/Output Summary (Last 24 hours) at 4/8/2023 1448  Last data filed at 4/8/2023 1255  Gross per 24 hour   Intake 3959.84 ml   Output 2550 ml   Net 1409.84 ml        Physical Exam  Vitals and nursing note reviewed.   Constitutional:       General: She is not in acute distress.     Appearance: She is obese. She is not toxic-appearing or diaphoretic.   HENT:      Right Ear: External ear normal.      Left Ear: External  ear normal.      Nose: No congestion or rhinorrhea.      Mouth/Throat:      Mouth: Mucous membranes are dry.      Pharynx: Oropharynx is clear. No oropharyngeal exudate or posterior oropharyngeal erythema.   Eyes:      Extraocular Movements: Extraocular movements intact.   Neck:      Comments: Thick skin folds  Cardiovascular:      Rate and Rhythm: Normal rate. Rhythm irregular.      Heart sounds: No murmur heard.  Pulmonary:      Effort: No respiratory distress.      Breath sounds: No rhonchi or rales.      Comments: On bipap - coarse bs ellen - with improvement in airflow  Chest:      Chest wall: No tenderness.   Abdominal:      General: There is no distension.      Palpations: Abdomen is soft.      Tenderness: There is no abdominal tenderness.      Comments: Limited secondary to body habitus   Genitourinary:     Comments: Hardwick draining yellow urine  Musculoskeletal:         General: Swelling present.      Cervical back: Neck supple. No rigidity.      Right lower leg: No edema (+1 pitting, loss of bony features in toes and dorsum of foot bilaterally).      Left lower leg: No edema.      Comments: Skin much more relaxed   Skin:     General: Skin is warm and dry.      Capillary Refill: Capillary refill takes less than 2 seconds.      Coloration: Skin is not jaundiced.      Findings: No bruising, erythema, lesion or rash.   Neurological:      Mental Status: She is alert and oriented to person, place, and time. Mental status is at baseline.      Comments: Baseline requires walker for ambulation   Psychiatric:         Mood and Affect: Mood normal.         Behavior: Behavior normal. Behavior is not agitated or aggressive. Behavior is cooperative.     Significant Labs: All pertinent labs within the past 24 hours have been reviewed.  Bilirubin:   Recent Labs   Lab 03/30/23  0340 04/01/23  0412 04/02/23  0339 04/03/23  0344 04/04/23  0327   BILITOT 0.6 0.4 0.5 0.5 0.5       BMP:   Recent Labs   Lab 04/08/23  0404          K 3.8      CO2 41*   BUN 12   CREATININE 1.2   CALCIUM 9.1   MG 2.0       CBC:   Recent Labs   Lab 04/07/23  0403 04/08/23  0404   WBC 5.73 5.40   HGB 13.1 13.0   HCT 42.6 42.2    193       CMP:   Recent Labs   Lab 04/07/23  0403 04/08/23  0404    144   K 3.5 3.8    105   CO2 38* 41*   * 101   BUN 14 12   CREATININE 1.2 1.2   CALCIUM 8.9 9.1   ANIONGAP -1* -2*       Magnesium:   Recent Labs   Lab 04/07/23  0403 04/08/23  0404   MG 2.2 2.0       POCT Glucose:   Recent Labs   Lab 04/07/23  1558 04/07/23  2041 04/08/23  1042   POCTGLUCOSE 110 97 131*       Significant Imaging: I have reviewed all pertinent imaging results/findings within the past 24 hours.

## 2023-04-08 NOTE — CARE UPDATE
Large bm noted and cleansed - bath given with linen change - pt in room working with pt - pt sat up side of bed but was sob - bipap in place - pt tolerating without distress    96986 - Moderate Complexity

## 2023-04-08 NOTE — PLAN OF CARE
No issues overnight, patient rested well on bipap. No distress noted. Remains oriented and pleasant. VSS, afebrile, UOP 2550ml, no BM this shift

## 2023-04-08 NOTE — EICU
Intervention Initiated From:  [facility] / [facility]    Elliott intervened regarding:  Rounding (Video assessment)    Nurse Notified:  No    Doctor Notified:  No    Comments: Pt seen on video rounds resting in bed with nc in place.  No distress noted.

## 2023-04-08 NOTE — EICU
Intervention Initiated From:  [facility] / EICU    Elliott intervened regarding:  Rounding (Video assessment)    Comments: [place] rounding complete; patient lying in bed, awake alert and oriented with family at bedside; no distress noted, states she feels much better

## 2023-04-08 NOTE — PROGRESS NOTES
[facility] Medicine  Progress Note    Patient Name: Carmen Tan  MRN: [id number]  Patient Class: IP- Inpatient   Admission Date: 3/29/2023  Length of Stay: 9 days  Attending Physician: Gunnar Mott DO  Primary Care Provider: Lucian Barry MD        Subjective:     Principal Problem:Acute respiratory failure with hypoxia        HPI:  Chief Complaint   Patient presents with    Shortness of Breath       Per ems pt called 911 due to sob, when they arrived gave her a duoneb and moved her over and her blood pressure went up to 200/100's from an original reading of 140/80, solumedrol 125mg IM given, 4 sprays of nitro spray given, 1inch of nitropaste on pts chest, pt currently on cpap with an O2 sat of 100%      8933   Carmen Tan is a 73 y.o. female with PMHX of COPD, CHF, PE on Xarelto, morbid obesity who presents to the emergency department C/O shortness of breath.     Patient arrives by EMS. They report they were called because patient was acutely short of breath.  Patient was given a DuoNeb and being prepared to be taken to hospital. On repeat blood pressure check patient was markedly hypertensive.  Patient was given for nitro space, nitro patch, placed on CPAP.  She received 125mg solumedrol. On arrival to the emergency department patient is on CPAP and moderate respiratory distress.  Appears to be mentating normally and able to respond to questions appropriately however history limited due to respiratory distress.     PCP: Lucian Barry MD   3/30   Patient awake and alert. Diuresed well overnight with urine output of 1.8L. Patient endorses breathing better compared to time of arrival to ED. She has had a similar hospitalization over a year ago and have since not followed up with any of her specialists. 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   Shortness of breath has been worsening over the past week and her leg swellings have  started to worsen two weeks ago. She normally ambulates with a walker at home. She has a son who also lives with her to assist. Patient remains on nitroglycerin gtt with SBP still >160s mmHg. Patient to be admitted for hypertensive emergency with acute respiratory failure with hypoxia and hypercarbia, CHF exacerbation for continue IV diuresis, oxygen supplementation, and consult with cardiology.              Overview/Hospital Course:  3/31 Overnight due to increased blood pressure, tridil gtt back on. Patient awake and tolerating BiPAP I/E 14/6 FiO2 50%. Urine output of 5 L. Patient endorses tenseness in lower extremities improving with breathing better compared yesterday. BP adjusted per cardiology with added daily Jardiance. Continue with IV 40 mg BID lasix and monitor UOP and renal function.   4/1 ND patient is feeling a little better this morning, does report less heaviness to her legs which do show improvement in edema.  Patient had good diuresis overnight.  Off of all IV blood pressure meds, and blood pressure appears more stable  4/2 ND patient still endorses some shortness of breath especially when transitioned from BiPAP to nasal cannula O2 despite having good saturation.  Nurses reports she stays very anxious.  Patient has been diuresing well with improvement in blood pressures  4/3 Overnight increasing agitation and confusion, blood tinged urine collecting in Hardwick catheter. Given change in mentation; will hold xarelto, f/u urine culture, f/u blood cultures, f/u ultrasound. Over the weekend, patient diuresed over 8 liters, IV lasix now PO. Remains tachypneic, ABG obtained show hypercarbia, BiPAP settings adjusted back to I/E 14/6 FiO2 60%. On nasal cannula patient maintains SpO2 >98%.   4/4 BiPAP tubing replacement resulted in increased tidal volume, back on nasal cannula this morning. BP normotensive. Patient more alert and agrees to participating with PT and OT. Adjust anxiety meds, add sertraline and  zyprexa at bedtime. Continue bowel regimen with gentle IVF.  Follow pulmonary hygiene care per respiratory.   4/5 Patient more alert endorsing feeling back to baseline mentation. Passing stool. Positive blood cx for gram positive clusters, start IV vancomycin adjust per pharmacy. BP normotensive. Continue with daily pulmonary hygiene care.   4/6 F/u positive blood cx, PCR resulted with no evidence of staph aureus, MRSA. CXR unremarakle, urine culture negative, patient afebrile, no clinical signs suggestive of systemic infection, will discontinue vancomycin. Spoke at length with three of patient's children, advised SNF or LTAC to continue with pulmonary care to allow patient regain her baseline strength. Family insists patient to return home with home health services, PT and OT for strengthening.   4/7 Cardiopulmonary status stable. Continue with current daily pulm care, BiPAP QHS and continuous O2 2-3L via nasal cannula. CM working on inpatient rehab care to help patient regain strength and mobility. Patient prior to hospitalization reported to ambulate with walker. With PT has been able to sit up in bed, but unable to take steps with assistance. Continue with PT and OT efforts.   4/8/23 CG: No acute overnight events. A little more lethargic today, will continue PT and OT.       Interval History: Patient seen and examined.     Review of Systems   Constitutional:  Positive for activity change. Negative for appetite change, chills, fatigue and fever.   HENT:  Negative for mouth sores, sneezing and sore throat.    Eyes:  Negative for visual disturbance.   Respiratory:  Negative for cough, chest tightness, shortness of breath (improving), wheezing and stridor.    Cardiovascular:  Positive for leg swelling. Negative for chest pain and palpitations.   Gastrointestinal:  Negative for abdominal pain, blood in stool, constipation, diarrhea, nausea and vomiting.   Musculoskeletal:  Positive for gait problem and joint  swelling. Negative for arthralgias, neck pain and neck stiffness.   Skin:  Negative for color change and pallor.   Neurological:  Positive for weakness. Negative for dizziness, seizures, syncope, facial asymmetry, speech difficulty, light-headedness, numbness and headaches.   Psychiatric/Behavioral:  Negative for agitation, confusion, decreased concentration, dysphoric mood and sleep disturbance. The patient is not nervous/anxious.    Objective:     Vital Signs (Most Recent):  Temp: 97.6 °F (36.4 °C) (04/08/23 1245)  Pulse: 76 (04/08/23 1400)  Resp: (!) 29 (04/08/23 1400)  BP: 123/61 (04/08/23 1400)  SpO2: 98 % (04/08/23 1400)   Vital Signs (24h Range):  Temp:  [97.3 °F (36.3 °C)-98 °F (36.7 °C)] 97.6 °F (36.4 °C)  Pulse:  [48-80] 76  Resp:  [12-35] 29  SpO2:  [88 %-100 %] 98 %  BP: ()/(51-66) 123/61     Weight: 132.9 kg (293 lb)  Body mass index is 50.29 kg/m².    Intake/Output Summary (Last 24 hours) at 4/8/2023 1448  Last data filed at 4/8/2023 1255  Gross per 24 hour   Intake 3959.84 ml   Output 2550 ml   Net 1409.84 ml        Physical Exam  Vitals and nursing note reviewed.   Constitutional:       General: She is not in acute distress.     Appearance: She is obese. She is not toxic-appearing or diaphoretic.   HENT:      Right Ear: External ear normal.      Left Ear: External ear normal.      Nose: No congestion or rhinorrhea.      Mouth/Throat:      Mouth: Mucous membranes are dry.      Pharynx: Oropharynx is clear. No oropharyngeal exudate or posterior oropharyngeal erythema.   Eyes:      Extraocular Movements: Extraocular movements intact.   Neck:      Comments: Thick skin folds  Cardiovascular:      Rate and Rhythm: Normal rate. Rhythm irregular.      Heart sounds: No murmur heard.  Pulmonary:      Effort: No respiratory distress.      Breath sounds: No rhonchi or rales.      Comments: On bipap - coarse bs ellen - with improvement in airflow  Chest:      Chest wall: No tenderness.   Abdominal:       General: There is no distension.      Palpations: Abdomen is soft.      Tenderness: There is no abdominal tenderness.      Comments: Limited secondary to body habitus   Genitourinary:     Comments: Hardwick draining yellow urine  Musculoskeletal:         General: Swelling present.      Cervical back: Neck supple. No rigidity.      Right lower leg: No edema (+1 pitting, loss of bony features in toes and dorsum of foot bilaterally).      Left lower leg: No edema.      Comments: Skin much more relaxed   Skin:     General: Skin is warm and dry.      Capillary Refill: Capillary refill takes less than 2 seconds.      Coloration: Skin is not jaundiced.      Findings: No bruising, erythema, lesion or rash.   Neurological:      Mental Status: She is alert and oriented to person, place, and time. Mental status is at baseline.      Comments: Baseline requires walker for ambulation   Psychiatric:         Mood and Affect: Mood normal.         Behavior: Behavior normal. Behavior is not agitated or aggressive. Behavior is cooperative.     Significant Labs: All pertinent labs within the past 24 hours have been reviewed.  Bilirubin:   Recent Labs   Lab 03/30/23  0340 04/01/23  0412 04/02/23  0339 04/03/23  0344 04/04/23  0327   BILITOT 0.6 0.4 0.5 0.5 0.5       BMP:   Recent Labs   Lab 04/08/23  0404         K 3.8      CO2 41*   BUN 12   CREATININE 1.2   CALCIUM 9.1   MG 2.0       CBC:   Recent Labs   Lab 04/07/23  0403 04/08/23  0404   WBC 5.73 5.40   HGB 13.1 13.0   HCT 42.6 42.2    193       CMP:   Recent Labs   Lab 04/07/23  0403 04/08/23  0404    144   K 3.5 3.8    105   CO2 38* 41*   * 101   BUN 14 12   CREATININE 1.2 1.2   CALCIUM 8.9 9.1   ANIONGAP -1* -2*       Magnesium:   Recent Labs   Lab 04/07/23  0403 04/08/23  0404   MG 2.2 2.0       POCT Glucose:   Recent Labs   Lab 04/07/23  1558 04/07/23  2041 04/08/23  1042   POCTGLUCOSE 110 97 131*       Significant Imaging: I have  reviewed all pertinent imaging results/findings within the past 24 hours.      Assessment/Plan:      * Acute respiratory failure with hypoxia  Patient with Hypercapnic and Hypoxic Respiratory failure which is Acute on chronic.  she is on home oxygen at 2 LPM. Supplemental oxygen was provided and noted- Oxygen Concentration (%):  [32-50] 32    Signs/symptoms of respiratory failure include- tachypnea, increased work of breathing, use of accessory muscles and wheezing. Contributing diagnoses includes - CHF, COPD, Obesity Hypoventilation and Pleural effusion Labs and images were reviewed. Patient Has recent ABG, which has been reviewed. Will treat underlying causes and adjust management of respiratory failure as follows- IV lasix, supplemental oxygen, continuous BiPAP  3/31: IV lasix 40 mg Q6H x 2 doses  4/1 improving daily, continue diuresis with Lasix, continue BiPAP therapy and will transition to O2 therapy as tolerates  4/2 continue current treatment, continue diuresis with Lasix; continue to offer nasal cannula as tolerates for oxygen options  4/3 transitioned to PO lasix 40 mg BID, continue BiPAP I/E 14/6 FiO2 60%  4/4 There is evidence, patient now requires noninvasive home ventilator due to chronic respiratory failure and COPD.  If obtained, this will help the patient decrease the work of breathing, hospital readmits, intubations, and improve overall quality of life. If this home respiratory device is not obtained it may lead to patient's harm or death. Will arrange for NI ventilator for home use ready on discharge.  4/6 SpO2 >94% on 2L NC, arranging at home NIV   4/7 Home NIV arranged on discharge. Currently requiring continuous O2 3L via NC during daytime. Continue daily pulmonary hygiene care    Gross hematuria  4/3 Collecting blood tinged urine in Hardwick catheter. Hg/HCT stable. Holding xarelto. Start empiric antibiotics for UTI, f/u urine culture and blood cultures.    4/4 Blood cx x2, positive for gram  positive clusters  4/6 Blood cx PCR negative for MRSA and staph aureus, urine cx x2 (4/6 and 4/7) NGTD. No evidence of an infectious process, afebrile. Discontinued vancomycin after one dose administered  4/7 Hg/HCT stable. Urine in hannon collecting clear colored    Mild temazepam use disorder  Temazepam 15 mg QHS use, unclear consistency since 1/13/23 with last fill of 30 tablets on 2/28/23.   - ativan 1g QHS  - monitor symptoms of withdrawal  4/2 due to continued anxiety will add BuSpar 3 times a day  4/4 add sertraline to Buspar TID, zyprexa QHS  4/7 mentation at baseline, continue with current sertraline and buspar, per psychiatry if appetite low, then consider addition of mirtazipine     Pulmonary edema cardiac cause  Patient is identified as having Combined Systolic and Diastolic heart failure that is Acute on chronic. CHF is currently uncontrolled due to Continued edema of extremities and Hepatic congestion/ascites, Dyspnea not returned to baseline after 2 doses of IV diuretic and Rales/crackles on pulmonary exam. Latest ECHO performed and demonstrates- No results found for this or any previous visit.  . Continue Beta Blocker, Furosemide and Nitrate/Vasodilator and monitor clinical status closely. Monitor on telemetry. Monitor strict Is&Os and daily weights.  Place on fluid restriction of 1.5 L. Continue to stress to patient importance of self efficacy and  on diet for CHF. Last BNP reviewed- and noted below No results for input(s): BNP, BNPTRIAGEBLO in the last 168 hours.   Lab Results   Component Value Date    NTPROBNP 06469 (H) 03/29/2023   - per cardiology, echo cardiogram with systolic and diastolic dysfunction with pulmonary hypertension  - continue with IV lasix, BiPAP QHS  4/1 diuresing well continue Lasix, continue blood pressure control  4/2 a should diuresing well continue Lasix  4/4 CXR unchanged, on PO lasix, adequate UOP, continue to monitor  4/7 stable cardiopulmonary status, continue  with current med regimen with continuous O2 supplementation    Shortness of breath  Improving with maintaining SpO2 >95% when transitioned to nasal cannula, but patient exhibiting increased agitation and hyperventilation when off BiPAP mask and machine.   Patient medication list not updated, but local [facility] verifies intermittent use of Temazepam 15 mg QHS for insomnia.  - will start ativan PRN and monitor symptoms of withdrawal  4/1 continues to improve daily  4/3 complaint of increased agitation and confusion, ABG shows retention, BiPAP settings adjusted  4/4 after BiPAP tubing replaced, patient tidal volume, oxygen requirements have improved, continue with daily pulm hygiene   4/7 Tolerating continuous O2 supplementation via nasal cannula 3L SpO2 >94% during daytime with BiPAP QHS, continue to monitor        Diabetes mellitus, type 2  Patient's FSGs are controlled on current medication regimen.  Last A1c reviewed-   Lab Results   Component Value Date    HGBA1C 5.5 03/29/2023     Most recent fingerstick glucose reviewed-   Recent Labs   Lab 04/06/23  1627 04/06/23  2122 04/07/23  1039   POCTGLUCOSE 126* 126* 118*     Current correctional scale  Low  Maintain anti-hyperglycemic dose as follows-   Antihyperglycemics (From admission, onward)    Start     Stop Route Frequency Ordered    03/31/23 0106  insulin aspart U-100 pen 0-5 Units         -- SubQ Before meals & nightly PRN 03/31/23 0008    03/30/23 1700  empagliflozin 10 mg tablet 10 mg         -- Oral Daily 03/30/23 1345        Per cardiology;   3/31 Jardiance 10 mg daily  4/1 continue current med  4/6 PO intake reduced, continue with regimen above  4/7 Tolerating PO intake, continue daily Jardiance      Acute on chronic congestive heart failure  4/3 continue with cardiology recommendations  Patient is identified as having Combined Systolic and Diastolic heart failure that is Acute on chronic. CHF is currently uncontrolled due to Continued edema of  extremities, Dyspnea not returned to baseline after 2 doses of IV diuretic and Rales/crackles on pulmonary exam. Latest ECHO performed and demonstrates- Results for orders placed during the hospital encounter of 03/29/23  Echo  Interpretation Summary  · The left ventricle is normal in size with moderate concentric hypertrophy and mildly decreased systolic function.  · The estimated ejection fraction is 45%.  · Grade I left ventricular diastolic dysfunction.  · Moderate right ventricular enlargement.  · Moderate right atrial enlargement.  · Mild-to-moderate aortic regurgitation.  · There is mild aortic valve stenosis.  · Aortic valve area is 1.44 cm2; peak velocity is 2.09 m/s; mean gradient is 10 mmHg.  · Mild-to-moderate mitral regurgitation.  · Moderate tricuspid regurgitation.  · Moderate pulmonic regurgitation.  · The estimated PA systolic pressure is 69 mmHg.  · There is moderate pulmonary hypertension.  · Moderate left atrial enlargement.  . Continue Beta Blocker, ACE/ARB, Furosemide, Aldactone, Nitrate/Vasodilator and ARNI and monitor clinical status closely. Monitor on telemetry. Monitor strict Is&Os and daily weights.  Place on fluid restriction of 1.5 L. Continue to stress to patient importance of self efficacy and  on diet for CHF. Last BNP reviewed-  Lab Results   Component Value Date    NTPROBNP 00218 (H) 03/29/2023   4/4 improved BP control, now on PO lasix with adequate urine output. Continue recommendations per cardiology.  4/6 borderline hypotension, will provide gentle IVF followed by bolus 250mL    Weakness  Secondary to above CHF exacerbation, fluid overload, SOB, body habitus. When patient's cardiorespiratory status stabilized, follow up PT and OT evaluation.   4/6 work on sitting up and out of bed, patient agrees to working with PT and OT  4/7 continue with aggressive PT and OT, CM working on inpatient rehab services       Severe obesity (BMI >= 40)  Body mass index is 50.29 kg/m².  Morbid obesity complicates all aspects of disease management from diagnostic modalities to treatment. Weight loss encouraged and health benefits explained to patient.         Paroxysmal A-fib  Patient with Long standing persistent (>12 months) atrial fibrillation which is controlled currently with Beta Blocker. Patient is currently in atrial fibrillation.[id number] Score: 3. Anticoagulation indicated. Anticoagulation done with Xarelto.  Xarelto held 4/3 after evidence of hematuria. Hg/HCT stable.             VTE Risk Mitigation (From admission, onward)         Ordered     enoxaparin injection 40 mg  Every 12 hours         04/06/23 1125     Place sequential compression device  Until discontinued         04/03/23 1137     IP VTE HIGH RISK PATIENT  Once         03/30/23 0314     Place sequential compression device  Until discontinued         03/30/23 0314                Discharge Planning   TIGRE:      Code Status: Full Code   Is the patient medically ready for discharge?:     Reason for patient still in hospital (select all that apply): Treatment  Discharge Plan A: Home with family, Home Health   Discharge Delays: None known at this time              Leonidas Mills DO  Department of Hospital Medicine   [place] - [facility]

## 2023-04-09 LAB
ANION GAP SERPL CALC-SCNC: -4 MMOL/L (ref 8–16)
BASOPHILS # BLD AUTO: 0.02 K/UL (ref 0–0.2)
BASOPHILS NFR BLD: 0.4 % (ref 0–1.9)
BUN SERPL-MCNC: 11 MG/DL (ref 8–23)
CALCIUM SERPL-MCNC: 9 MG/DL (ref 8.7–10.5)
CHLORIDE SERPL-SCNC: 104 MMOL/L (ref 95–110)
CO2 SERPL-SCNC: 42 MMOL/L (ref 23–29)
CREAT SERPL-MCNC: 1.1 MG/DL (ref 0.5–1.4)
DIFFERENTIAL METHOD: ABNORMAL
EOSINOPHIL # BLD AUTO: 0.1 K/UL (ref 0–0.5)
EOSINOPHIL NFR BLD: 1.5 % (ref 0–8)
ERYTHROCYTE [DISTWIDTH] IN BLOOD BY AUTOMATED COUNT: 16 % (ref 11.5–14.5)
EST. GFR  (NO RACE VARIABLE): 53.4 ML/MIN/1.73 M^2
GLUCOSE SERPL-MCNC: 92 MG/DL (ref 70–110)
HCT VFR BLD AUTO: 41.8 % (ref 37–48.5)
HGB BLD-MCNC: 12.9 G/DL (ref 12–16)
IMM GRANULOCYTES # BLD AUTO: 0.03 K/UL (ref 0–0.04)
IMM GRANULOCYTES NFR BLD AUTO: 0.6 % (ref 0–0.5)
LYMPHOCYTES # BLD AUTO: 1.3 K/UL (ref 1–4.8)
LYMPHOCYTES NFR BLD: 25 % (ref 18–48)
MAGNESIUM SERPL-MCNC: 2 MG/DL (ref 1.6–2.6)
MCH RBC QN AUTO: 29.1 PG (ref 27–31)
MCHC RBC AUTO-ENTMCNC: 30.9 G/DL (ref 32–36)
MCV RBC AUTO: 94 FL (ref 82–98)
MONOCYTES # BLD AUTO: 0.7 K/UL (ref 0.3–1)
MONOCYTES NFR BLD: 13.1 % (ref 4–15)
NEUTROPHILS # BLD AUTO: 3.2 K/UL (ref 1.8–7.7)
NEUTROPHILS NFR BLD: 59.4 % (ref 38–73)
NRBC BLD-RTO: 0 /100 WBC
PLATELET # BLD AUTO: 199 K/UL (ref 150–450)
PMV BLD AUTO: 9.2 FL (ref 9.2–12.9)
POCT GLUCOSE: 103 MG/DL (ref 70–110)
POCT GLUCOSE: 115 MG/DL (ref 70–110)
POCT GLUCOSE: 209 MG/DL (ref 70–110)
POTASSIUM SERPL-SCNC: 3.8 MMOL/L (ref 3.5–5.1)
RBC # BLD AUTO: 4.44 M/UL (ref 4–5.4)
SODIUM SERPL-SCNC: 142 MMOL/L (ref 136–145)
WBC # BLD AUTO: 5.33 K/UL (ref 3.9–12.7)

## 2023-04-09 PROCEDURE — 25000003 PHARM REV CODE 250: Performed by: INTERNAL MEDICINE

## 2023-04-09 PROCEDURE — 20000000 HC ICU ROOM

## 2023-04-09 PROCEDURE — 36415 COLL VENOUS BLD VENIPUNCTURE: CPT | Performed by: STUDENT IN AN ORGANIZED HEALTH CARE EDUCATION/TRAINING PROGRAM

## 2023-04-09 PROCEDURE — 25000003 PHARM REV CODE 250: Performed by: STUDENT IN AN ORGANIZED HEALTH CARE EDUCATION/TRAINING PROGRAM

## 2023-04-09 PROCEDURE — 25000242 PHARM REV CODE 250 ALT 637 W/ HCPCS: Performed by: INTERNAL MEDICINE

## 2023-04-09 PROCEDURE — 85025 COMPLETE CBC W/AUTO DIFF WBC: CPT | Performed by: INTERNAL MEDICINE

## 2023-04-09 PROCEDURE — A4216 STERILE WATER/SALINE, 10 ML: HCPCS | Performed by: STUDENT IN AN ORGANIZED HEALTH CARE EDUCATION/TRAINING PROGRAM

## 2023-04-09 PROCEDURE — 83735 ASSAY OF MAGNESIUM: CPT | Performed by: STUDENT IN AN ORGANIZED HEALTH CARE EDUCATION/TRAINING PROGRAM

## 2023-04-09 PROCEDURE — 94640 AIRWAY INHALATION TREATMENT: CPT

## 2023-04-09 PROCEDURE — 94660 CPAP INITIATION&MGMT: CPT

## 2023-04-09 PROCEDURE — 25000242 PHARM REV CODE 250 ALT 637 W/ HCPCS: Performed by: STUDENT IN AN ORGANIZED HEALTH CARE EDUCATION/TRAINING PROGRAM

## 2023-04-09 PROCEDURE — S5010 5% DEXTROSE AND 0.45% SALINE: HCPCS | Performed by: INTERNAL MEDICINE

## 2023-04-09 PROCEDURE — C9113 INJ PANTOPRAZOLE SODIUM, VIA: HCPCS | Performed by: STUDENT IN AN ORGANIZED HEALTH CARE EDUCATION/TRAINING PROGRAM

## 2023-04-09 PROCEDURE — 99900035 HC TECH TIME PER 15 MIN (STAT)

## 2023-04-09 PROCEDURE — 27000221 HC OXYGEN, UP TO 24 HOURS

## 2023-04-09 PROCEDURE — 99900031 HC PATIENT EDUCATION (STAT)

## 2023-04-09 PROCEDURE — 94799 UNLISTED PULMONARY SVC/PX: CPT

## 2023-04-09 PROCEDURE — 63600175 PHARM REV CODE 636 W HCPCS: Performed by: STUDENT IN AN ORGANIZED HEALTH CARE EDUCATION/TRAINING PROGRAM

## 2023-04-09 PROCEDURE — 94761 N-INVAS EAR/PLS OXIMETRY MLT: CPT

## 2023-04-09 PROCEDURE — 80048 BASIC METABOLIC PNL TOTAL CA: CPT | Performed by: STUDENT IN AN ORGANIZED HEALTH CARE EDUCATION/TRAINING PROGRAM

## 2023-04-09 RX ADMIN — GABAPENTIN 300 MG: 300 CAPSULE ORAL at 08:04

## 2023-04-09 RX ADMIN — IPRATROPIUM BROMIDE AND ALBUTEROL SULFATE 3 ML: 2.5; .5 SOLUTION RESPIRATORY (INHALATION) at 07:04

## 2023-04-09 RX ADMIN — EMPAGLIFLOZIN 10 MG: 10 TABLET, FILM COATED ORAL at 08:04

## 2023-04-09 RX ADMIN — DEXTROSE AND SODIUM CHLORIDE: 5; .45 INJECTION, SOLUTION INTRAVENOUS at 09:04

## 2023-04-09 RX ADMIN — BUDESONIDE 0.25 MG: 0.25 INHALANT ORAL at 07:04

## 2023-04-09 RX ADMIN — SACUBITRIL AND VALSARTAN 1 TABLET: 24; 26 TABLET, FILM COATED ORAL at 08:04

## 2023-04-09 RX ADMIN — Medication 10 ML: at 05:04

## 2023-04-09 RX ADMIN — SERTRALINE HYDROCHLORIDE 25 MG: 25 TABLET ORAL at 08:04

## 2023-04-09 RX ADMIN — INSULIN ASPART 1 UNITS: 100 INJECTION, SOLUTION INTRAVENOUS; SUBCUTANEOUS at 09:04

## 2023-04-09 RX ADMIN — BUSPIRONE HYDROCHLORIDE 5 MG: 5 TABLET ORAL at 08:04

## 2023-04-09 RX ADMIN — NIFEDIPINE 60 MG: 30 TABLET, FILM COATED, EXTENDED RELEASE ORAL at 08:04

## 2023-04-09 RX ADMIN — ENOXAPARIN SODIUM 40 MG: 40 INJECTION SUBCUTANEOUS at 08:04

## 2023-04-09 RX ADMIN — ACETAMINOPHEN 650 MG: 325 TABLET ORAL at 07:04

## 2023-04-09 RX ADMIN — Medication 10 ML: at 11:04

## 2023-04-09 RX ADMIN — BUSPIRONE HYDROCHLORIDE 5 MG: 5 TABLET ORAL at 02:04

## 2023-04-09 RX ADMIN — CARVEDILOL 12.5 MG: 12.5 TABLET, FILM COATED ORAL at 08:04

## 2023-04-09 RX ADMIN — FUROSEMIDE 40 MG: 40 TABLET ORAL at 08:04

## 2023-04-09 RX ADMIN — PANTOPRAZOLE SODIUM 40 MG: 40 INJECTION, POWDER, FOR SOLUTION INTRAVENOUS at 08:04

## 2023-04-09 RX ADMIN — SPIRONOLACTONE 25 MG: 25 TABLET ORAL at 08:04

## 2023-04-09 RX ADMIN — Medication 6 MG: at 08:04

## 2023-04-09 RX ADMIN — IPRATROPIUM BROMIDE AND ALBUTEROL SULFATE 3 ML: 2.5; .5 SOLUTION RESPIRATORY (INHALATION) at 01:04

## 2023-04-09 NOTE — PLAN OF CARE
No change in condition and no issues overnight. Patient requested bipap shortly after shift change and rested well all night. VSS, afebrile. UOP 1500ml, no BM this shift.

## 2023-04-09 NOTE — EICU
Intervention Initiated From:  [facility] / EICU    Elliott intervened regarding:  Documentation    Nurse Notified:  No    Doctor Notified:  No    Comments: Video rounding completed on patient. VS on cardiac monitor P 78, RR 29, Sa02 89% on N/C on . Encouraged deep breaths. Patient denies discomfort. PIV Midline inserted on 04/04 not due to be change. Hardwick inserted on 04/06 for hourly I&O. NAD noted. WCTM as needed.

## 2023-04-09 NOTE — SUBJECTIVE & OBJECTIVE
Interval History: Patient seen and examined.     Review of Systems   Constitutional:  Positive for activity change. Negative for appetite change, chills, fatigue and fever.   HENT:  Negative for mouth sores, sneezing and sore throat.    Eyes:  Negative for visual disturbance.   Respiratory:  Negative for cough, chest tightness, shortness of breath (improving), wheezing and stridor.    Cardiovascular:  Positive for leg swelling. Negative for chest pain and palpitations.   Gastrointestinal:  Negative for abdominal pain, blood in stool, constipation, diarrhea, nausea and vomiting.   Musculoskeletal:  Positive for gait problem and joint swelling. Negative for arthralgias, neck pain and neck stiffness.   Skin:  Negative for color change and pallor.   Neurological:  Positive for weakness. Negative for dizziness, seizures, syncope, facial asymmetry, speech difficulty, light-headedness, numbness and headaches.   Psychiatric/Behavioral:  Negative for agitation, confusion, decreased concentration, dysphoric mood and sleep disturbance. The patient is not nervous/anxious.    Objective:     Vital Signs (Most Recent):  Temp: 98.4 °F (36.9 °C) (04/09/23 1109)  Pulse: 72 (04/09/23 1117)  Resp: (!) 27 (04/09/23 1117)  BP: (!) 109/57 (04/09/23 1109)  SpO2: 97 % (04/09/23 1117)   Vital Signs (24h Range):  Temp:  [97.1 °F (36.2 °C)-98.4 °F (36.9 °C)] 98.4 °F (36.9 °C)  Pulse:  [53-80] 72  Resp:  [14-35] 27  SpO2:  [88 %-100 %] 97 %  BP: ()/(51-82) 109/57     Weight: 132.9 kg (293 lb)  Body mass index is 50.29 kg/m².    Intake/Output Summary (Last 24 hours) at 4/9/2023 1144  Last data filed at 4/9/2023 0800  Gross per 24 hour   Intake 1868.16 ml   Output 3200 ml   Net -1331.84 ml        Physical Exam  Vitals and nursing note reviewed.   Constitutional:       General: She is not in acute distress.     Appearance: She is obese. She is not toxic-appearing or diaphoretic.   HENT:      Right Ear: External ear normal.      Left Ear:  External ear normal.      Nose: No congestion or rhinorrhea.      Mouth/Throat:      Mouth: Mucous membranes are dry.      Pharynx: Oropharynx is clear. No oropharyngeal exudate or posterior oropharyngeal erythema.   Eyes:      Extraocular Movements: Extraocular movements intact.   Neck:      Comments: Thick skin folds  Cardiovascular:      Rate and Rhythm: Normal rate. Rhythm irregular.      Heart sounds: No murmur heard.  Pulmonary:      Effort: No respiratory distress.      Breath sounds: No rhonchi or rales.      Comments: On bipap - coarse bs ellen - with improvement in airflow  Chest:      Chest wall: No tenderness.   Abdominal:      General: There is no distension.      Palpations: Abdomen is soft.      Tenderness: There is no abdominal tenderness.      Comments: Limited secondary to body habitus   Genitourinary:     Comments: Hardwick draining yellow urine  Musculoskeletal:         General: Swelling present.      Cervical back: Neck supple. No rigidity.      Right lower leg: No edema (+1 pitting, loss of bony features in toes and dorsum of foot bilaterally).      Left lower leg: No edema.      Comments: Skin much more relaxed   Skin:     General: Skin is warm and dry.      Capillary Refill: Capillary refill takes less than 2 seconds.      Coloration: Skin is not jaundiced.      Findings: No bruising, erythema, lesion or rash.   Neurological:      Mental Status: She is alert and oriented to person, place, and time. Mental status is at baseline.      Comments: Baseline requires walker for ambulation   Psychiatric:         Mood and Affect: Mood normal.         Behavior: Behavior normal. Behavior is not agitated or aggressive. Behavior is cooperative.     Significant Labs: All pertinent labs within the past 24 hours have been reviewed.  Bilirubin:   Recent Labs   Lab 03/30/23  0340 04/01/23  0412 04/02/23  0339 04/03/23  0344 04/04/23  0327   BILITOT 0.6 0.4 0.5 0.5 0.5       BMP:   Recent Labs   Lab 04/09/23  0400    GLU 92      K 3.8      CO2 42*   BUN 11   CREATININE 1.1   CALCIUM 9.0   MG 2.0       CBC:   Recent Labs   Lab 04/08/23  0404 04/09/23  0400   WBC 5.40 5.33   HGB 13.0 12.9   HCT 42.2 41.8    199       CMP:   Recent Labs   Lab 04/08/23  0404 04/09/23  0400    142   K 3.8 3.8    104   CO2 41* 42*    92   BUN 12 11   CREATININE 1.2 1.1   CALCIUM 9.1 9.0   ANIONGAP -2* -4*       Magnesium:   Recent Labs   Lab 04/08/23  0404 04/09/23  0400   MG 2.0 2.0       POCT Glucose:   Recent Labs   Lab 04/08/23  1705 04/08/23  2040 04/09/23  1045   POCTGLUCOSE 104 110 115*       Significant Imaging: I have reviewed all pertinent imaging results/findings within the past 24 hours.

## 2023-04-09 NOTE — PLAN OF CARE
Pt more awake and alert - ate well for breakfast and lunch - speaking with sister at present on 3 liters n.c. - moves all extremities and to assist with rolling slightly but does not attempted any physical activity - no pt or ot today - encourage pt to much arms and legs and turn in bed but she sleeps most of the day on bipap - encouraged to remain off bipap as much as possible - voiced understanding - max temp today 98.4 - no complaints of pain or distress - will continue to monitor

## 2023-04-09 NOTE — PROGRESS NOTES
[facility] Medicine  Progress Note    Patient Name: Carmen Tan  MRN: [id number]  Patient Class: IP- Inpatient   Admission Date: 3/29/2023  Length of Stay: 10 days  Attending Physician: Gunnar Mott DO  Primary Care Provider: Lucian Barry MD        Subjective:     Principal Problem:Acute respiratory failure with hypoxia        HPI:  Chief Complaint   Patient presents with    Shortness of Breath       Per ems pt called 911 due to sob, when they arrived gave her a duoneb and moved her over and her blood pressure went up to 200/100's from an original reading of 140/80, solumedrol 125mg IM given, 4 sprays of nitro spray given, 1inch of nitropaste on pts chest, pt currently on cpap with an O2 sat of 100%      2875   Carmen Tan is a 73 y.o. female with PMHX of COPD, CHF, PE on Xarelto, morbid obesity who presents to the emergency department C/O shortness of breath.     Patient arrives by EMS. They report they were called because patient was acutely short of breath.  Patient was given a DuoNeb and being prepared to be taken to hospital. On repeat blood pressure check patient was markedly hypertensive.  Patient was given for nitro space, nitro patch, placed on CPAP.  She received 125mg solumedrol. On arrival to the emergency department patient is on CPAP and moderate respiratory distress.  Appears to be mentating normally and able to respond to questions appropriately however history limited due to respiratory distress.     PCP: Lucain Barry MD   3/30   Patient awake and alert. Diuresed well overnight with urine output of 1.8L. Patient endorses breathing better compared to time of arrival to ED. She has had a similar hospitalization over a year ago and have since not followed up with any of her specialists. 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   Shortness of breath has been worsening over the past week and her leg swellings have  started to worsen two weeks ago. She normally ambulates with a walker at home. She has a son who also lives with her to assist. Patient remains on nitroglycerin gtt with SBP still >160s mmHg. Patient to be admitted for hypertensive emergency with acute respiratory failure with hypoxia and hypercarbia, CHF exacerbation for continue IV diuresis, oxygen supplementation, and consult with cardiology.              Overview/Hospital Course:  3/31 Overnight due to increased blood pressure, tridil gtt back on. Patient awake and tolerating BiPAP I/E 14/6 FiO2 50%. Urine output of 5 L. Patient endorses tenseness in lower extremities improving with breathing better compared yesterday. BP adjusted per cardiology with added daily Jardiance. Continue with IV 40 mg BID lasix and monitor UOP and renal function.   4/1 ND patient is feeling a little better this morning, does report less heaviness to her legs which do show improvement in edema.  Patient had good diuresis overnight.  Off of all IV blood pressure meds, and blood pressure appears more stable  4/2 ND patient still endorses some shortness of breath especially when transitioned from BiPAP to nasal cannula O2 despite having good saturation.  Nurses reports she stays very anxious.  Patient has been diuresing well with improvement in blood pressures  4/3 Overnight increasing agitation and confusion, blood tinged urine collecting in Hardwick catheter. Given change in mentation; will hold xarelto, f/u urine culture, f/u blood cultures, f/u ultrasound. Over the weekend, patient diuresed over 8 liters, IV lasix now PO. Remains tachypneic, ABG obtained show hypercarbia, BiPAP settings adjusted back to I/E 14/6 FiO2 60%. On nasal cannula patient maintains SpO2 >98%.   4/4 BiPAP tubing replacement resulted in increased tidal volume, back on nasal cannula this morning. BP normotensive. Patient more alert and agrees to participating with PT and OT. Adjust anxiety meds, add sertraline and  zyprexa at bedtime. Continue bowel regimen with gentle IVF.  Follow pulmonary hygiene care per respiratory.   4/5 Patient more alert endorsing feeling back to baseline mentation. Passing stool. Positive blood cx for gram positive clusters, start IV vancomycin adjust per pharmacy. BP normotensive. Continue with daily pulmonary hygiene care.   4/6 F/u positive blood cx, PCR resulted with no evidence of staph aureus, MRSA. CXR unremarakle, urine culture negative, patient afebrile, no clinical signs suggestive of systemic infection, will discontinue vancomycin. Spoke at length with three of patient's children, advised SNF or LTAC to continue with pulmonary care to allow patient regain her baseline strength. Family insists patient to return home with home health services, PT and OT for strengthening.   4/7 Cardiopulmonary status stable. Continue with current daily pulm care, BiPAP QHS and continuous O2 2-3L via nasal cannula. CM working on inpatient rehab care to help patient regain strength and mobility. Patient prior to hospitalization reported to ambulate with walker. With PT has been able to sit up in bed, but unable to take steps with assistance. Continue with PT and OT efforts.   4/8/23 CG: No acute overnight events. A little more lethargic today, will continue PT and OT.   4/9/23 CG: No acute overnight events. More awake this AM, BP meds optimized yesterday.       Interval History: Patient seen and examined.     Review of Systems   Constitutional:  Positive for activity change. Negative for appetite change, chills, fatigue and fever.   HENT:  Negative for mouth sores, sneezing and sore throat.    Eyes:  Negative for visual disturbance.   Respiratory:  Negative for cough, chest tightness, shortness of breath (improving), wheezing and stridor.    Cardiovascular:  Positive for leg swelling. Negative for chest pain and palpitations.   Gastrointestinal:  Negative for abdominal pain, blood in stool, constipation,  diarrhea, nausea and vomiting.   Musculoskeletal:  Positive for gait problem and joint swelling. Negative for arthralgias, neck pain and neck stiffness.   Skin:  Negative for color change and pallor.   Neurological:  Positive for weakness. Negative for dizziness, seizures, syncope, facial asymmetry, speech difficulty, light-headedness, numbness and headaches.   Psychiatric/Behavioral:  Negative for agitation, confusion, decreased concentration, dysphoric mood and sleep disturbance. The patient is not nervous/anxious.    Objective:     Vital Signs (Most Recent):  Temp: 98.4 °F (36.9 °C) (04/09/23 1109)  Pulse: 72 (04/09/23 1117)  Resp: (!) 27 (04/09/23 1117)  BP: (!) 109/57 (04/09/23 1109)  SpO2: 97 % (04/09/23 1117)   Vital Signs (24h Range):  Temp:  [97.1 °F (36.2 °C)-98.4 °F (36.9 °C)] 98.4 °F (36.9 °C)  Pulse:  [53-80] 72  Resp:  [14-35] 27  SpO2:  [88 %-100 %] 97 %  BP: ()/(51-82) 109/57     Weight: 132.9 kg (293 lb)  Body mass index is 50.29 kg/m².    Intake/Output Summary (Last 24 hours) at 4/9/2023 1144  Last data filed at 4/9/2023 0800  Gross per 24 hour   Intake 1868.16 ml   Output 3200 ml   Net -1331.84 ml        Physical Exam  Vitals and nursing note reviewed.   Constitutional:       General: She is not in acute distress.     Appearance: She is obese. She is not toxic-appearing or diaphoretic.   HENT:      Right Ear: External ear normal.      Left Ear: External ear normal.      Nose: No congestion or rhinorrhea.      Mouth/Throat:      Mouth: Mucous membranes are dry.      Pharynx: Oropharynx is clear. No oropharyngeal exudate or posterior oropharyngeal erythema.   Eyes:      Extraocular Movements: Extraocular movements intact.   Neck:      Comments: Thick skin folds  Cardiovascular:      Rate and Rhythm: Normal rate. Rhythm irregular.      Heart sounds: No murmur heard.  Pulmonary:      Effort: No respiratory distress.      Breath sounds: No rhonchi or rales.      Comments: On bipap - coarse bs ellen  - with improvement in airflow  Chest:      Chest wall: No tenderness.   Abdominal:      General: There is no distension.      Palpations: Abdomen is soft.      Tenderness: There is no abdominal tenderness.      Comments: Limited secondary to body habitus   Genitourinary:     Comments: Hardwick draining yellow urine  Musculoskeletal:         General: Swelling present.      Cervical back: Neck supple. No rigidity.      Right lower leg: No edema (+1 pitting, loss of bony features in toes and dorsum of foot bilaterally).      Left lower leg: No edema.      Comments: Skin much more relaxed   Skin:     General: Skin is warm and dry.      Capillary Refill: Capillary refill takes less than 2 seconds.      Coloration: Skin is not jaundiced.      Findings: No bruising, erythema, lesion or rash.   Neurological:      Mental Status: She is alert and oriented to person, place, and time. Mental status is at baseline.      Comments: Baseline requires walker for ambulation   Psychiatric:         Mood and Affect: Mood normal.         Behavior: Behavior normal. Behavior is not agitated or aggressive. Behavior is cooperative.     Significant Labs: All pertinent labs within the past 24 hours have been reviewed.  Bilirubin:   Recent Labs   Lab 03/30/23  0340 04/01/23  0412 04/02/23  0339 04/03/23  0344 04/04/23  0327   BILITOT 0.6 0.4 0.5 0.5 0.5       BMP:   Recent Labs   Lab 04/09/23  0400   GLU 92      K 3.8      CO2 42*   BUN 11   CREATININE 1.1   CALCIUM 9.0   MG 2.0       CBC:   Recent Labs   Lab 04/08/23  0404 04/09/23  0400   WBC 5.40 5.33   HGB 13.0 12.9   HCT 42.2 41.8    199       CMP:   Recent Labs   Lab 04/08/23  0404 04/09/23  0400    142   K 3.8 3.8    104   CO2 41* 42*    92   BUN 12 11   CREATININE 1.2 1.1   CALCIUM 9.1 9.0   ANIONGAP -2* -4*       Magnesium:   Recent Labs   Lab 04/08/23  0404 04/09/23  0400   MG 2.0 2.0       POCT Glucose:   Recent Labs   Lab 04/08/23  1705  04/08/23 2040 04/09/23  1045   POCTGLUCOSE 104 110 115*       Significant Imaging: I have reviewed all pertinent imaging results/findings within the past 24 hours.      Assessment/Plan:      * Acute respiratory failure with hypoxia  Patient with Hypercapnic and Hypoxic Respiratory failure which is Acute on chronic.  she is on home oxygen at 2 LPM. Supplemental oxygen was provided and noted- Oxygen Concentration (%):  [32-50] 32    Signs/symptoms of respiratory failure include- tachypnea, increased work of breathing, use of accessory muscles and wheezing. Contributing diagnoses includes - CHF, COPD, Obesity Hypoventilation and Pleural effusion Labs and images were reviewed. Patient Has recent ABG, which has been reviewed. Will treat underlying causes and adjust management of respiratory failure as follows- IV lasix, supplemental oxygen, continuous BiPAP  3/31: IV lasix 40 mg Q6H x 2 doses  4/1 improving daily, continue diuresis with Lasix, continue BiPAP therapy and will transition to O2 therapy as tolerates  4/2 continue current treatment, continue diuresis with Lasix; continue to offer nasal cannula as tolerates for oxygen options  4/3 transitioned to PO lasix 40 mg BID, continue BiPAP I/E 14/6 FiO2 60%  4/4 There is evidence, patient now requires noninvasive home ventilator due to chronic respiratory failure and COPD.  If obtained, this will help the patient decrease the work of breathing, hospital readmits, intubations, and improve overall quality of life. If this home respiratory device is not obtained it may lead to patient's harm or death. Will arrange for NI ventilator for home use ready on discharge.  4/6 SpO2 >94% on 2L NC, arranging at home NIV   4/7 Home NIV arranged on discharge. Currently requiring continuous O2 3L via NC during daytime. Continue daily pulmonary hygiene care    Gross hematuria  4/3 Collecting blood tinged urine in Hardwick catheter. Hg/HCT stable. Holding xarelto. Start empiric  antibiotics for UTI, f/u urine culture and blood cultures.    4/4 Blood cx x2, positive for gram positive clusters  4/6 Blood cx PCR negative for MRSA and staph aureus, urine cx x2 (4/6 and 4/7) NGTD. No evidence of an infectious process, afebrile. Discontinued vancomycin after one dose administered  4/7 Hg/HCT stable. Urine in hannon collecting clear colored    Mild temazepam use disorder  Temazepam 15 mg QHS use, unclear consistency since 1/13/23 with last fill of 30 tablets on 2/28/23.   - ativan 1g QHS  - monitor symptoms of withdrawal  4/2 due to continued anxiety will add BuSpar 3 times a day  4/4 add sertraline to Buspar TID, zyprexa QHS  4/7 mentation at baseline, continue with current sertraline and buspar, per psychiatry if appetite low, then consider addition of mirtazipine     Pulmonary edema cardiac cause  Patient is identified as having Combined Systolic and Diastolic heart failure that is Acute on chronic. CHF is currently uncontrolled due to Continued edema of extremities and Hepatic congestion/ascites, Dyspnea not returned to baseline after 2 doses of IV diuretic and Rales/crackles on pulmonary exam. Latest ECHO performed and demonstrates- No results found for this or any previous visit.  . Continue Beta Blocker, Furosemide and Nitrate/Vasodilator and monitor clinical status closely. Monitor on telemetry. Monitor strict Is&Os and daily weights.  Place on fluid restriction of 1.5 L. Continue to stress to patient importance of self efficacy and  on diet for CHF. Last BNP reviewed- and noted below No results for input(s): BNP, BNPTRIAGEBLO in the last 168 hours.   Lab Results   Component Value Date    NTPROBNP 86034 (H) 03/29/2023   - per cardiology, echo cardiogram with systolic and diastolic dysfunction with pulmonary hypertension  - continue with IV lasix, BiPAP QHS  4/1 diuresing well continue Lasix, continue blood pressure control  4/2 a should diuresing well continue Lasix  4/4 CXR  unchanged, on PO lasix, adequate UOP, continue to monitor  4/7 stable cardiopulmonary status, continue with current med regimen with continuous O2 supplementation    Shortness of breath  Improving with maintaining SpO2 >95% when transitioned to nasal cannula, but patient exhibiting increased agitation and hyperventilation when off BiPAP mask and machine.   Patient medication list not updated, but local [facility] verifies intermittent use of Temazepam 15 mg QHS for insomnia.  - will start ativan PRN and monitor symptoms of withdrawal  4/1 continues to improve daily  4/3 complaint of increased agitation and confusion, ABG shows retention, BiPAP settings adjusted  4/4 after BiPAP tubing replaced, patient tidal volume, oxygen requirements have improved, continue with daily pulm hygiene   4/7 Tolerating continuous O2 supplementation via nasal cannula 3L SpO2 >94% during daytime with BiPAP QHS, continue to monitor        Diabetes mellitus, type 2  Patient's FSGs are controlled on current medication regimen.  Last A1c reviewed-   Lab Results   Component Value Date    HGBA1C 5.5 03/29/2023     Most recent fingerstick glucose reviewed-   Recent Labs   Lab 04/06/23  1627 04/06/23  2122 04/07/23  1039   POCTGLUCOSE 126* 126* 118*     Current correctional scale  Low  Maintain anti-hyperglycemic dose as follows-   Antihyperglycemics (From admission, onward)    Start     Stop Route Frequency Ordered    03/31/23 0106  insulin aspart U-100 pen 0-5 Units         -- SubQ Before meals & nightly PRN 03/31/23 0008    03/30/23 1700  empagliflozin 10 mg tablet 10 mg         -- Oral Daily 03/30/23 1345        Per cardiology;   3/31 Jardiance 10 mg daily  4/1 continue current med  4/6 PO intake reduced, continue with regimen above  4/7 Tolerating PO intake, continue daily Jardiance      Acute on chronic congestive heart failure  4/3 continue with cardiology recommendations  Patient is identified as having Combined Systolic and Diastolic  heart failure that is Acute on chronic. CHF is currently uncontrolled due to Continued edema of extremities, Dyspnea not returned to baseline after 2 doses of IV diuretic and Rales/crackles on pulmonary exam. Latest ECHO performed and demonstrates- Results for orders placed during the hospital encounter of 03/29/23  Echo  Interpretation Summary  · The left ventricle is normal in size with moderate concentric hypertrophy and mildly decreased systolic function.  · The estimated ejection fraction is 45%.  · Grade I left ventricular diastolic dysfunction.  · Moderate right ventricular enlargement.  · Moderate right atrial enlargement.  · Mild-to-moderate aortic regurgitation.  · There is mild aortic valve stenosis.  · Aortic valve area is 1.44 cm2; peak velocity is 2.09 m/s; mean gradient is 10 mmHg.  · Mild-to-moderate mitral regurgitation.  · Moderate tricuspid regurgitation.  · Moderate pulmonic regurgitation.  · The estimated PA systolic pressure is 69 mmHg.  · There is moderate pulmonary hypertension.  · Moderate left atrial enlargement.  . Continue Beta Blocker, ACE/ARB, Furosemide, Aldactone, Nitrate/Vasodilator and ARNI and monitor clinical status closely. Monitor on telemetry. Monitor strict Is&Os and daily weights.  Place on fluid restriction of 1.5 L. Continue to stress to patient importance of self efficacy and  on diet for CHF. Last BNP reviewed-  Lab Results   Component Value Date    NTPROBNP 14565 (H) 03/29/2023   4/4 improved BP control, now on PO lasix with adequate urine output. Continue recommendations per cardiology.  4/6 borderline hypotension, will provide gentle IVF followed by bolus 250mL    Weakness  Secondary to above CHF exacerbation, fluid overload, SOB, body habitus. When patient's cardiorespiratory status stabilized, follow up PT and OT evaluation.   4/6 work on sitting up and out of bed, patient agrees to working with PT and OT  4/7 continue with aggressive PT and OT, CM working on  inpatient rehab services       Severe obesity (BMI >= 40)  Body mass index is 50.29 kg/m². Morbid obesity complicates all aspects of disease management from diagnostic modalities to treatment. Weight loss encouraged and health benefits explained to patient.         Paroxysmal A-fib  Patient with Long standing persistent (>12 months) atrial fibrillation which is controlled currently with Beta Blocker. Patient is currently in atrial fibrillation.[id number] Score: 3. Anticoagulation indicated. Anticoagulation done with Xarelto.  Xarelto held 4/3 after evidence of hematuria. Hg/HCT stable.             VTE Risk Mitigation (From admission, onward)         Ordered     enoxaparin injection 40 mg  Every 12 hours         04/06/23 1125     Place sequential compression device  Until discontinued         04/03/23 1137     IP VTE HIGH RISK PATIENT  Once         03/30/23 0314     Place sequential compression device  Until discontinued         03/30/23 0314                Discharge Planning   TIGRE:      Code Status: Full Code   Is the patient medically ready for discharge?:     Reason for patient still in hospital (select all that apply): Treatment  Discharge Plan A: Home with family, Home Health   Discharge Delays: None known at this time              Leonidas Mills DO  Department of Hospital Medicine   [place] [facility]

## 2023-04-10 LAB
ANION GAP SERPL CALC-SCNC: -3 MMOL/L (ref 8–16)
BACTERIA SPEC AEROBE CULT: NORMAL
BACTERIA SPEC AEROBE CULT: NORMAL
BASOPHILS # BLD AUTO: 0.02 K/UL (ref 0–0.2)
BASOPHILS NFR BLD: 0.3 % (ref 0–1.9)
BUN SERPL-MCNC: 13 MG/DL (ref 8–23)
CALCIUM SERPL-MCNC: 8.9 MG/DL (ref 8.7–10.5)
CHLORIDE SERPL-SCNC: 104 MMOL/L (ref 95–110)
CO2 SERPL-SCNC: 41 MMOL/L (ref 23–29)
CREAT SERPL-MCNC: 1.3 MG/DL (ref 0.5–1.4)
DIFFERENTIAL METHOD: ABNORMAL
EOSINOPHIL # BLD AUTO: 0.1 K/UL (ref 0–0.5)
EOSINOPHIL NFR BLD: 0.9 % (ref 0–8)
ERYTHROCYTE [DISTWIDTH] IN BLOOD BY AUTOMATED COUNT: 15.9 % (ref 11.5–14.5)
EST. GFR  (NO RACE VARIABLE): 43.7 ML/MIN/1.73 M^2
GLUCOSE SERPL-MCNC: 102 MG/DL (ref 70–110)
GRAM STN SPEC: NORMAL
HCT VFR BLD AUTO: 39.2 % (ref 37–48.5)
HGB BLD-MCNC: 12.2 G/DL (ref 12–16)
IMM GRANULOCYTES # BLD AUTO: 0.03 K/UL (ref 0–0.04)
IMM GRANULOCYTES NFR BLD AUTO: 0.5 % (ref 0–0.5)
LYMPHOCYTES # BLD AUTO: 1.4 K/UL (ref 1–4.8)
LYMPHOCYTES NFR BLD: 22.3 % (ref 18–48)
MCH RBC QN AUTO: 29.3 PG (ref 27–31)
MCHC RBC AUTO-ENTMCNC: 31.1 G/DL (ref 32–36)
MCV RBC AUTO: 94 FL (ref 82–98)
MONOCYTES # BLD AUTO: 0.9 K/UL (ref 0.3–1)
MONOCYTES NFR BLD: 13.8 % (ref 4–15)
NEUTROPHILS # BLD AUTO: 4 K/UL (ref 1.8–7.7)
NEUTROPHILS NFR BLD: 62.2 % (ref 38–73)
NRBC BLD-RTO: 0 /100 WBC
PLATELET # BLD AUTO: 185 K/UL (ref 150–450)
PMV BLD AUTO: 9.6 FL (ref 9.2–12.9)
POCT GLUCOSE: 91 MG/DL (ref 70–110)
POCT GLUCOSE: 93 MG/DL (ref 70–110)
POCT GLUCOSE: 94 MG/DL (ref 70–110)
POTASSIUM SERPL-SCNC: 3.4 MMOL/L (ref 3.5–5.1)
RBC # BLD AUTO: 4.17 M/UL (ref 4–5.4)
SODIUM SERPL-SCNC: 142 MMOL/L (ref 136–145)
WBC # BLD AUTO: 6.45 K/UL (ref 3.9–12.7)

## 2023-04-10 PROCEDURE — 11000001 HC ACUTE MED/SURG PRIVATE ROOM

## 2023-04-10 PROCEDURE — 25000242 PHARM REV CODE 250 ALT 637 W/ HCPCS: Performed by: STUDENT IN AN ORGANIZED HEALTH CARE EDUCATION/TRAINING PROGRAM

## 2023-04-10 PROCEDURE — 94640 AIRWAY INHALATION TREATMENT: CPT

## 2023-04-10 PROCEDURE — 97110 THERAPEUTIC EXERCISES: CPT | Mod: CO

## 2023-04-10 PROCEDURE — A4216 STERILE WATER/SALINE, 10 ML: HCPCS | Performed by: STUDENT IN AN ORGANIZED HEALTH CARE EDUCATION/TRAINING PROGRAM

## 2023-04-10 PROCEDURE — 85025 COMPLETE CBC W/AUTO DIFF WBC: CPT | Performed by: INTERNAL MEDICINE

## 2023-04-10 PROCEDURE — 99900031 HC PATIENT EDUCATION (STAT)

## 2023-04-10 PROCEDURE — 36415 COLL VENOUS BLD VENIPUNCTURE: CPT | Performed by: STUDENT IN AN ORGANIZED HEALTH CARE EDUCATION/TRAINING PROGRAM

## 2023-04-10 PROCEDURE — 25000242 PHARM REV CODE 250 ALT 637 W/ HCPCS: Performed by: INTERNAL MEDICINE

## 2023-04-10 PROCEDURE — 97530 THERAPEUTIC ACTIVITIES: CPT | Mod: CQ

## 2023-04-10 PROCEDURE — 27000221 HC OXYGEN, UP TO 24 HOURS

## 2023-04-10 PROCEDURE — 99233 PR SUBSEQUENT HOSPITAL CARE,LEVL III: ICD-10-PCS | Mod: ,,, | Performed by: STUDENT IN AN ORGANIZED HEALTH CARE EDUCATION/TRAINING PROGRAM

## 2023-04-10 PROCEDURE — 94761 N-INVAS EAR/PLS OXIMETRY MLT: CPT

## 2023-04-10 PROCEDURE — 25000003 PHARM REV CODE 250: Performed by: INTERNAL MEDICINE

## 2023-04-10 PROCEDURE — 99233 SBSQ HOSP IP/OBS HIGH 50: CPT | Mod: ,,, | Performed by: STUDENT IN AN ORGANIZED HEALTH CARE EDUCATION/TRAINING PROGRAM

## 2023-04-10 PROCEDURE — 63600175 PHARM REV CODE 636 W HCPCS: Performed by: STUDENT IN AN ORGANIZED HEALTH CARE EDUCATION/TRAINING PROGRAM

## 2023-04-10 PROCEDURE — C9113 INJ PANTOPRAZOLE SODIUM, VIA: HCPCS | Performed by: STUDENT IN AN ORGANIZED HEALTH CARE EDUCATION/TRAINING PROGRAM

## 2023-04-10 PROCEDURE — 80048 BASIC METABOLIC PNL TOTAL CA: CPT | Performed by: STUDENT IN AN ORGANIZED HEALTH CARE EDUCATION/TRAINING PROGRAM

## 2023-04-10 PROCEDURE — 99900035 HC TECH TIME PER 15 MIN (STAT)

## 2023-04-10 PROCEDURE — 94660 CPAP INITIATION&MGMT: CPT

## 2023-04-10 PROCEDURE — 94799 UNLISTED PULMONARY SVC/PX: CPT

## 2023-04-10 PROCEDURE — S5010 5% DEXTROSE AND 0.45% SALINE: HCPCS | Performed by: INTERNAL MEDICINE

## 2023-04-10 PROCEDURE — 25000003 PHARM REV CODE 250: Performed by: STUDENT IN AN ORGANIZED HEALTH CARE EDUCATION/TRAINING PROGRAM

## 2023-04-10 RX ORDER — PANTOPRAZOLE SODIUM 40 MG/1
40 TABLET, DELAYED RELEASE ORAL DAILY
Status: DISCONTINUED | OUTPATIENT
Start: 2023-04-10 | End: 2023-04-13 | Stop reason: HOSPADM

## 2023-04-10 RX ORDER — CARVEDILOL 3.12 MG/1
6.25 TABLET ORAL 2 TIMES DAILY
Status: DISCONTINUED | OUTPATIENT
Start: 2023-04-10 | End: 2023-04-13 | Stop reason: HOSPADM

## 2023-04-10 RX ORDER — SUCRALFATE 1 G/10ML
1 SUSPENSION ORAL
Status: DISCONTINUED | OUTPATIENT
Start: 2023-04-10 | End: 2023-04-13 | Stop reason: HOSPADM

## 2023-04-10 RX ADMIN — ACETAMINOPHEN 650 MG: 325 TABLET ORAL at 05:04

## 2023-04-10 RX ADMIN — CARVEDILOL 6.25 MG: 3.12 TABLET, FILM COATED ORAL at 08:04

## 2023-04-10 RX ADMIN — FUROSEMIDE 40 MG: 40 TABLET ORAL at 09:04

## 2023-04-10 RX ADMIN — SPIRONOLACTONE 25 MG: 25 TABLET ORAL at 09:04

## 2023-04-10 RX ADMIN — IPRATROPIUM BROMIDE AND ALBUTEROL SULFATE 3 ML: 2.5; .5 SOLUTION RESPIRATORY (INHALATION) at 08:04

## 2023-04-10 RX ADMIN — SACUBITRIL AND VALSARTAN 1 TABLET: 24; 26 TABLET, FILM COATED ORAL at 08:04

## 2023-04-10 RX ADMIN — BUDESONIDE 0.25 MG: 0.25 INHALANT ORAL at 08:04

## 2023-04-10 RX ADMIN — ENOXAPARIN SODIUM 40 MG: 40 INJECTION SUBCUTANEOUS at 09:04

## 2023-04-10 RX ADMIN — Medication 10 ML: at 12:04

## 2023-04-10 RX ADMIN — SERTRALINE HYDROCHLORIDE 25 MG: 25 TABLET ORAL at 09:04

## 2023-04-10 RX ADMIN — INSULIN DETEMIR 15 UNITS: 100 INJECTION, SOLUTION SUBCUTANEOUS at 10:04

## 2023-04-10 RX ADMIN — PANTOPRAZOLE SODIUM 40 MG: 40 INJECTION, POWDER, FOR SOLUTION INTRAVENOUS at 09:04

## 2023-04-10 RX ADMIN — DEXTROSE AND SODIUM CHLORIDE: 5; .45 INJECTION, SOLUTION INTRAVENOUS at 03:04

## 2023-04-10 RX ADMIN — IPRATROPIUM BROMIDE AND ALBUTEROL SULFATE 3 ML: 2.5; .5 SOLUTION RESPIRATORY (INHALATION) at 01:04

## 2023-04-10 RX ADMIN — GABAPENTIN 300 MG: 300 CAPSULE ORAL at 09:04

## 2023-04-10 RX ADMIN — SACUBITRIL AND VALSARTAN 1 TABLET: 24; 26 TABLET, FILM COATED ORAL at 09:04

## 2023-04-10 RX ADMIN — Medication 10 ML: at 05:04

## 2023-04-10 RX ADMIN — SUCRALFATE 1 G: 1 SUSPENSION ORAL at 11:04

## 2023-04-10 RX ADMIN — BUSPIRONE HYDROCHLORIDE 5 MG: 5 TABLET ORAL at 09:04

## 2023-04-10 RX ADMIN — ENOXAPARIN SODIUM 40 MG: 40 INJECTION SUBCUTANEOUS at 08:04

## 2023-04-10 RX ADMIN — NIFEDIPINE 60 MG: 30 TABLET, FILM COATED, EXTENDED RELEASE ORAL at 09:04

## 2023-04-10 RX ADMIN — SUCRALFATE 1 G: 1 SUSPENSION ORAL at 04:04

## 2023-04-10 RX ADMIN — CARVEDILOL 6.25 MG: 3.12 TABLET, FILM COATED ORAL at 09:04

## 2023-04-10 RX ADMIN — BUSPIRONE HYDROCHLORIDE 5 MG: 5 TABLET ORAL at 04:04

## 2023-04-10 RX ADMIN — GABAPENTIN 300 MG: 300 CAPSULE ORAL at 08:04

## 2023-04-10 RX ADMIN — BUSPIRONE HYDROCHLORIDE 5 MG: 5 TABLET ORAL at 08:04

## 2023-04-10 RX ADMIN — SUCRALFATE 1 G: 1 SUSPENSION ORAL at 08:04

## 2023-04-10 RX ADMIN — EMPAGLIFLOZIN 10 MG: 10 TABLET, FILM COATED ORAL at 09:04

## 2023-04-10 NOTE — PROGRESS NOTES
[facility] Medicine  Progress Note    Patient Name: Carmen Tan  MRN: [id number]  Patient Class: IP- Inpatient   Admission Date: 3/29/2023  Length of Stay: 11 days  Attending Physician: Gunnar Mott DO  Primary Care Provider: Lucian Barry MD        Subjective:     Principal Problem:Acute respiratory failure with hypoxia        HPI:  Chief Complaint   Patient presents with    Shortness of Breath       Per ems pt called 911 due to sob, when they arrived gave her a duoneb and moved her over and her blood pressure went up to 200/100's from an original reading of 140/80, solumedrol 125mg IM given, 4 sprays of nitro spray given, 1inch of nitropaste on pts chest, pt currently on cpap with an O2 sat of 100%      6882   Carmen Tan is a 73 y.o. female with PMHX of COPD, CHF, PE on Xarelto, morbid obesity who presents to the emergency department C/O shortness of breath.     Patient arrives by EMS. They report they were called because patient was acutely short of breath.  Patient was given a DuoNeb and being prepared to be taken to hospital. On repeat blood pressure check patient was markedly hypertensive.  Patient was given for nitro space, nitro patch, placed on CPAP.  She received 125mg solumedrol. On arrival to the emergency department patient is on CPAP and moderate respiratory distress.  Appears to be mentating normally and able to respond to questions appropriately however history limited due to respiratory distress.     PCP: Lucian Barry MD   3/30   Patient awake and alert. Diuresed well overnight with urine output of 1.8L. Patient endorses breathing better compared to time of arrival to ED. She has had a similar hospitalization over a year ago and have since not followed up with any of her specialists. 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   Shortness of breath has been worsening over the past week and her leg swellings have  started to worsen two weeks ago. She normally ambulates with a walker at home. She has a son who also lives with her to assist. Patient remains on nitroglycerin gtt with SBP still >160s mmHg. Patient to be admitted for hypertensive emergency with acute respiratory failure with hypoxia and hypercarbia, CHF exacerbation for continue IV diuresis, oxygen supplementation, and consult with cardiology.              Overview/Hospital Course:  3/31 Overnight due to increased blood pressure, tridil gtt back on. Patient awake and tolerating BiPAP I/E 14/6 FiO2 50%. Urine output of 5 L. Patient endorses tenseness in lower extremities improving with breathing better compared yesterday. BP adjusted per cardiology with added daily Jardiance. Continue with IV 40 mg BID lasix and monitor UOP and renal function.   4/1 ND patient is feeling a little better this morning, does report less heaviness to her legs which do show improvement in edema.  Patient had good diuresis overnight.  Off of all IV blood pressure meds, and blood pressure appears more stable  4/2 ND patient still endorses some shortness of breath especially when transitioned from BiPAP to nasal cannula O2 despite having good saturation.  Nurses reports she stays very anxious.  Patient has been diuresing well with improvement in blood pressures  4/3 Overnight increasing agitation and confusion, blood tinged urine collecting in Hardwick catheter. Given change in mentation; will hold xarelto, f/u urine culture, f/u blood cultures, f/u ultrasound. Over the weekend, patient diuresed over 8 liters, IV lasix now PO. Remains tachypneic, ABG obtained show hypercarbia, BiPAP settings adjusted back to I/E 14/6 FiO2 60%. On nasal cannula patient maintains SpO2 >98%.   4/4 BiPAP tubing replacement resulted in increased tidal volume, back on nasal cannula this morning. BP normotensive. Patient more alert and agrees to participating with PT and OT. Adjust anxiety meds, add sertraline and  zyprexa at bedtime. Continue bowel regimen with gentle IVF.  Follow pulmonary hygiene care per respiratory.   4/5 Patient more alert endorsing feeling back to baseline mentation. Passing stool. Positive blood cx for gram positive clusters, start IV vancomycin adjust per pharmacy. BP normotensive. Continue with daily pulmonary hygiene care.   4/6 F/u positive blood cx, PCR resulted with no evidence of staph aureus, MRSA. CXR unremarakle, urine culture negative, patient afebrile, no clinical signs suggestive of systemic infection, will discontinue vancomycin. Spoke at length with three of patient's children, advised SNF or LTAC to continue with pulmonary care to allow patient regain her baseline strength. Family insists patient to return home with home health services, PT and OT for strengthening.   4/7 Cardiopulmonary status stable. Continue with current daily pulm care, BiPAP QHS and continuous O2 2-3L via nasal cannula. CM working on inpatient rehab care to help patient regain strength and mobility. Patient prior to hospitalization reported to ambulate with walker. With PT has been able to sit up in bed, but unable to take steps with assistance. Continue with PT and OT efforts.   4/8/23 CG: No acute overnight events. A little more lethargic today, will continue PT and OT.   4/9/23 CG: No acute overnight events. More awake this AM, BP meds optimized yesterday.   4/10 Improving appetite. No events overnight. Medically optimized at this time. CM working on placement.        Interval History: Patient seen and examined.     Review of Systems   Constitutional:  Positive for activity change. Negative for appetite change, chills, fatigue and fever.   HENT:  Negative for mouth sores, sneezing and sore throat.    Eyes:  Negative for visual disturbance.   Respiratory:  Negative for cough, chest tightness, shortness of breath (improving), wheezing and stridor.    Cardiovascular:  Positive for leg swelling. Negative for chest  pain and palpitations.   Gastrointestinal:  Negative for abdominal pain, blood in stool, constipation, diarrhea, nausea and vomiting.   Musculoskeletal:  Positive for gait problem and joint swelling. Negative for arthralgias, neck pain and neck stiffness.   Skin:  Negative for color change and pallor.   Neurological:  Positive for weakness. Negative for dizziness, seizures, syncope, facial asymmetry, speech difficulty, light-headedness, numbness and headaches.   Psychiatric/Behavioral:  Negative for agitation, confusion, decreased concentration, dysphoric mood and sleep disturbance. The patient is not nervous/anxious.    Objective:     Vital Signs (Most Recent):  Temp: 97.6 °F (36.4 °C) (04/10/23 0729)  Pulse: (!) 48 (04/10/23 0814)  Resp: (!) 26 (04/10/23 0814)  BP: (!) 101/57 (04/10/23 0600)  SpO2: 97 % (04/10/23 0814)   Vital Signs (24h Range):  Temp:  [97.4 °F (36.3 °C)-98.4 °F (36.9 °C)] 97.6 °F (36.4 °C)  Pulse:  [45-79] 48  Resp:  [14-39] 26  SpO2:  [88 %-99 %] 97 %  BP: ()/(44-99) 101/57     Weight: 132.9 kg (293 lb)  Body mass index is 50.29 kg/m².    Intake/Output Summary (Last 24 hours) at 4/10/2023 0848  Last data filed at 4/10/2023 0600  Gross per 24 hour   Intake 2122 ml   Output 3125 ml   Net -1003 ml      Physical Exam  Vitals and nursing note reviewed.   Constitutional:       General: She is not in acute distress.     Appearance: She is obese. She is not toxic-appearing or diaphoretic.   HENT:      Right Ear: External ear normal.      Left Ear: External ear normal.      Nose: No congestion or rhinorrhea.      Mouth/Throat:      Mouth: Mucous membranes are dry.      Pharynx: Oropharynx is clear. No oropharyngeal exudate or posterior oropharyngeal erythema.   Eyes:      Extraocular Movements: Extraocular movements intact.   Neck:      Comments: Thick skin folds  Cardiovascular:      Rate and Rhythm: Normal rate. Rhythm irregular.      Heart sounds: No murmur heard.  Pulmonary:      Effort: No  respiratory distress.      Breath sounds: No rhonchi or rales.      Comments: On bipap - coarse bs ellen - with improvement in airflow  Chest:      Chest wall: No tenderness.   Abdominal:      General: There is no distension.      Palpations: Abdomen is soft.      Tenderness: There is no abdominal tenderness.      Comments: Limited secondary to body habitus   Genitourinary:     Comments: Hardwick draining yellow urine  Musculoskeletal:         General: Swelling present.      Cervical back: Neck supple. No rigidity.      Right lower leg: No edema (+1 pitting, loss of bony features in toes and dorsum of foot bilaterally).      Left lower leg: No edema.      Comments: Skin much more relaxed, moves upper and lower extremities while in supine position, unable to roll self without assistance   Skin:     General: Skin is warm and dry.      Capillary Refill: Capillary refill takes less than 2 seconds.      Coloration: Skin is not jaundiced.      Findings: No bruising, erythema, lesion or rash.   Neurological:      Mental Status: She is alert and oriented to person, place, and time. Mental status is at baseline.      Gait: Gait abnormal.      Comments: Baseline requires walker for ambulation   Psychiatric:         Mood and Affect: Mood normal.         Behavior: Behavior normal. Behavior is not agitated or aggressive. Behavior is cooperative.     Significant Labs: All pertinent labs within the past 24 hours have been reviewed.  BMP:   Recent Labs   Lab 04/09/23  0400 04/10/23  0407   GLU 92 102    142   K 3.8 3.4*    104   CO2 42* 41*   BUN 11 13   CREATININE 1.1 1.3   CALCIUM 9.0 8.9   MG 2.0  --      CBC:   Recent Labs   Lab 04/09/23  0400 04/10/23  0407   WBC 5.33 6.45   HGB 12.9 12.2   HCT 41.8 39.2    185     CMP:   Recent Labs   Lab 04/09/23 0400 04/10/23  0407    142   K 3.8 3.4*    104   CO2 42* 41*   GLU 92 102   BUN 11 13   CREATININE 1.1 1.3   CALCIUM 9.0 8.9   ANIONGAP -4* -3*      Magnesium:   Recent Labs   Lab 04/09/23  0400   MG 2.0     Significant Imaging: I have reviewed all pertinent imaging results/findings within the past 24 hours.      Assessment/Plan:      * Acute respiratory failure with hypoxia  Patient with Hypercapnic and Hypoxic Respiratory failure which is Acute on chronic.  she is on home oxygen at 2 LPM. Supplemental oxygen was provided and noted- Oxygen Concentration (%):  [32-50] 50    Signs/symptoms of respiratory failure include- tachypnea, increased work of breathing, use of accessory muscles and wheezing. Contributing diagnoses includes - CHF, COPD, Obesity Hypoventilation and Pleural effusion Labs and images were reviewed. Patient Has recent ABG, which has been reviewed. Will treat underlying causes and adjust management of respiratory failure as follows- IV lasix, supplemental oxygen, continuous BiPAP  3/31: IV lasix 40 mg Q6H x 2 doses  4/1 improving daily, continue diuresis with Lasix, continue BiPAP therapy and will transition to O2 therapy as tolerates  4/2 continue current treatment, continue diuresis with Lasix; continue to offer nasal cannula as tolerates for oxygen options  4/3 transitioned to PO lasix 40 mg BID, continue BiPAP I/E 14/6 FiO2 60%  4/4 There is evidence, patient now requires noninvasive home ventilator due to chronic respiratory failure and COPD.  If obtained, this will help the patient decrease the work of breathing, hospital readmits, intubations, and improve overall quality of life. If this home respiratory device is not obtained it may lead to patient's harm or death. Will arrange for NI ventilator for home use ready on discharge.  4/6 SpO2 >94% on 2L NC, arranging at home NIV   4/7 Home NIV arranged on discharge. Currently requiring continuous O2 3L via NC during daytime. Continue daily pulmonary hygiene care    Gross hematuria  4/3 Collecting blood tinged urine in Hardwick catheter. Hg/HCT stable. Holding xarelto. Start empiric  antibiotics for UTI, f/u urine culture and blood cultures.    4/4 Blood cx x2, positive for gram positive clusters  4/6 Blood cx PCR negative for MRSA and staph aureus, urine cx x2 (4/6 and 4/7) NGTD. No evidence of an infectious process, afebrile. Discontinued vancomycin after one dose administered  4/7 Hg/HCT stable. Urine in hannon collecting clear colored  4/10 Hg/HCT stable, continue to monitor    Mild temazepam use disorder  Temazepam 15 mg QHS use, unclear consistency since 1/13/23 with last fill of 30 tablets on 2/28/23.   - ativan 1g QHS  - monitor symptoms of withdrawal  4/2 due to continued anxiety will add BuSpar 3 times a day  4/4 add sertraline to Buspar TID, zyprexa QHS  4/7 mentation at baseline, continue with current sertraline and buspar, per psychiatry if appetite low, then consider addition of mirtazipine     Pulmonary edema cardiac cause  Patient is identified as having Combined Systolic and Diastolic heart failure that is Acute on chronic. CHF is currently uncontrolled due to Continued edema of extremities and Hepatic congestion/ascites, Dyspnea not returned to baseline after 2 doses of IV diuretic and Rales/crackles on pulmonary exam. Latest ECHO performed and demonstrates- No results found for this or any previous visit.  . Continue Beta Blocker, Furosemide and Nitrate/Vasodilator and monitor clinical status closely. Monitor on telemetry. Monitor strict Is&Os and daily weights.  Place on fluid restriction of 1.5 L. Continue to stress to patient importance of self efficacy and  on diet for CHF. Last BNP reviewed- and noted below No results for input(s): BNP, BNPTRIAGEBLO in the last 168 hours.   Lab Results   Component Value Date    NTPROBNP 52374 (H) 03/29/2023   - per cardiology, echo cardiogram with systolic and diastolic dysfunction with pulmonary hypertension  - continue with IV lasix, BiPAP QHS  4/1 diuresing well continue Lasix, continue blood pressure control  4/2 a should  diuresing well continue Lasix  4/4 CXR unchanged, on PO lasix, adequate UOP, continue to monitor  4/7 stable cardiopulmonary status, continue with current med regimen with continuous O2 supplementation    Shortness of breath  Improving with maintaining SpO2 >95% when transitioned to nasal cannula, but patient exhibiting increased agitation and hyperventilation when off BiPAP mask and machine.   Patient medication list not updated, but local [facility] verifies intermittent use of Temazepam 15 mg QHS for insomnia.  - will start ativan PRN and monitor symptoms of withdrawal  4/1 continues to improve daily  4/3 complaint of increased agitation and confusion, ABG shows retention, BiPAP settings adjusted  4/4 after BiPAP tubing replaced, patient tidal volume, oxygen requirements have improved, continue with daily pulm hygiene   4/7 Tolerating continuous O2 supplementation via nasal cannula 3L SpO2 >94% during daytime with BiPAP QHS, continue to monitor  4/10 stable respiratory status, continue with baseline O2 and BiPAP QHS        Diabetes mellitus, type 2  Patient's FSGs are controlled on current medication regimen.  Last A1c reviewed-   Lab Results   Component Value Date    HGBA1C 5.5 03/29/2023     Most recent fingerstick glucose reviewed-   Recent Labs   Lab 04/09/23  1045 04/09/23  1637 04/09/23  2101   POCTGLUCOSE 115* 103 209*     Current correctional scale  Low  Maintain anti-hyperglycemic dose as follows-   Antihyperglycemics (From admission, onward)    Start     Stop Route Frequency Ordered    03/31/23 0106  insulin aspart U-100 pen 0-5 Units         -- SubQ Before meals & nightly PRN 03/31/23 0008    03/30/23 1700  empagliflozin 10 mg tablet 10 mg         -- Oral Daily 03/30/23 1345        Per cardiology;   3/31 Jardiance 10 mg daily  4/1 continue current med  4/6 PO intake reduced, continue with regimen above  4/7 Tolerating PO intake, continue daily Jardiance  4/10 add on basal insulin 15U daily      Acute on  chronic congestive heart failure  4/3 continue with cardiology recommendations  Patient is identified as having Combined Systolic and Diastolic heart failure that is Acute on chronic. CHF is currently uncontrolled due to Continued edema of extremities, Dyspnea not returned to baseline after 2 doses of IV diuretic and Rales/crackles on pulmonary exam. Latest ECHO performed and demonstrates- Results for orders placed during the hospital encounter of 03/29/23  Echo  Interpretation Summary  · The left ventricle is normal in size with moderate concentric hypertrophy and mildly decreased systolic function.  · The estimated ejection fraction is 45%.  · Grade I left ventricular diastolic dysfunction.  · Moderate right ventricular enlargement.  · Moderate right atrial enlargement.  · Mild-to-moderate aortic regurgitation.  · There is mild aortic valve stenosis.  · Aortic valve area is 1.44 cm2; peak velocity is 2.09 m/s; mean gradient is 10 mmHg.  · Mild-to-moderate mitral regurgitation.  · Moderate tricuspid regurgitation.  · Moderate pulmonic regurgitation.  · The estimated PA systolic pressure is 69 mmHg.  · There is moderate pulmonary hypertension.  · Moderate left atrial enlargement.  . Continue Beta Blocker, ACE/ARB, Furosemide, Aldactone, Nitrate/Vasodilator and ARNI and monitor clinical status closely. Monitor on telemetry. Monitor strict Is&Os and daily weights.  Place on fluid restriction of 1.5 L. Continue to stress to patient importance of self efficacy and  on diet for CHF. Last BNP reviewed-  Lab Results   Component Value Date    NTPROBNP 43512 (H) 03/29/2023   4/4 improved BP control, now on PO lasix with adequate urine output. Continue recommendations per cardiology.  4/6 borderline hypotension, will provide gentle IVF followed by bolus 250mL  4/8 BP medications adjusted    Weakness  Secondary to above CHF exacerbation, fluid overload, SOB, body habitus. When patient's cardiorespiratory status  stabilized, follow up PT and OT evaluation.   4/6 work on sitting up and out of bed, patient agrees to working with PT and OT  4/7 continue with aggressive PT and OT, CM working on inpatient rehab services   4/10 medially optimized, requires continued encouragement to increase mobility, PT and OT efforts      Severe obesity (BMI >= 40)  Body mass index is 50.29 kg/m². Morbid obesity complicates all aspects of disease management from diagnostic modalities to treatment. Weight loss encouraged and health benefits explained to patient.         Paroxysmal A-fib  Patient with Long standing persistent (>12 months) atrial fibrillation which is controlled currently with Beta Blocker. Patient is currently in atrial fibrillation.[id number] Score: 3. Anticoagulation indicated. Anticoagulation done with Xarelto.  Xarelto held 4/3 after evidence of hematuria. Hg/HCT stable.   Prophylaxis with lovenox.          VTE Risk Mitigation (From admission, onward)         Ordered     enoxaparin injection 40 mg  Every 12 hours         04/06/23 1125     Place sequential compression device  Until discontinued         04/03/23 1137     IP VTE HIGH RISK PATIENT  Once         03/30/23 0314     Place sequential compression device  Until discontinued         03/30/23 0314                Discharge Planning   TIGRE:      Code Status: Full Code   Is the patient medically ready for discharge?:     Reason for patient still in hospital (select all that apply): PT / OT recommendations and Pending disposition  Discharge Plan A: Home with family, Home Health   Discharge Delays: None known at this time              Gunnar Mott DO  Department of Hospital Medicine   [place] [facility]

## 2023-04-10 NOTE — ASSESSMENT & PLAN NOTE
Patient with Long standing persistent (>12 months) atrial fibrillation which is controlled currently with Beta Blocker. Patient is currently in atrial fibrillation.[id number] Score: 3. Anticoagulation indicated. Anticoagulation done with Xarelto.  Xarelto held 4/3 after evidence of hematuria. Hg/HCT stable.   Prophylaxis with lovenox.

## 2023-04-10 NOTE — ASSESSMENT & PLAN NOTE
Improving with maintaining SpO2 >95% when transitioned to nasal cannula, but patient exhibiting increased agitation and hyperventilation when off BiPAP mask and machine.   Patient medication list not updated, but local [facility] verifies intermittent use of Temazepam 15 mg QHS for insomnia.  - will start ativan PRN and monitor symptoms of withdrawal  4/1 continues to improve daily  4/3 complaint of increased agitation and confusion, ABG shows retention, BiPAP settings adjusted  4/4 after BiPAP tubing replaced, patient tidal volume, oxygen requirements have improved, continue with daily pulm hygiene   4/7 Tolerating continuous O2 supplementation via nasal cannula 3L SpO2 >94% during daytime with BiPAP QHS, continue to monitor  4/10 stable respiratory status, continue with baseline O2 and BiPAP QHS

## 2023-04-10 NOTE — RESPIRATORY THERAPY
04/10/23 0831   PRE-TX-O2   Device (Oxygen Therapy) (S)  nasal cannula   $ Is the patient on Low Flow Oxygen? Yes   Flow (L/min) (S)  3   Oxygen Concentration (%) 32   SpO2 99 %   Pulse (!) 48   Resp 20   Positioning HOB elevated 30 degrees   Respiratory Evaluation   $ Care Plan Tech Time 15 min     Placed patient on 3LNC at this time. BIPAP placed on standby. Patient resting comfortably. RUI Galindo aware.

## 2023-04-10 NOTE — PT/OT/SLP PROGRESS
Occupational Therapy   Treatment    Name: Carmen Tan  MRN: [id number]  Admitting Diagnosis:  Acute respiratory failure with hypoxia       Recommendations:     Discharge Recommendations: nursing facility, skilled  Discharge Equipment Recommendations:  other (see comments) (TBD)  Barriers to discharge:  Other (Comment) (current medical and functional status)    Assessment:     Carmen Tan is a 73 y.o. female with a medical diagnosis of Acute respiratory failure with hypoxia.  She presents with good participation and motivation. Performance deficits affecting function are weakness, impaired endurance, impaired self care skills, impaired functional mobility, decreased coordination, decreased upper extremity function, decreased lower extremity function, decreased safety awareness, decreased ROM, impaired fine motor, impaired cardiopulmonary response to activity, impaired joint extensibility, impaired muscle length.     Rehab Prognosis:  Good; patient would benefit from acute skilled OT services to address these deficits and reach maximum level of function.       Plan:     Patient to be seen 5 x/week to address the above listed problems via self-care/home management, therapeutic activities, therapeutic exercises  Plan of Care Expires: 04/14/23  Plan of Care Reviewed with: patient    Subjective     Chief Complaint: none at this time  Patient/Family Comments/goals: I want to go home  Pain/Comfort:  Pain Rating 1: 0/10  Pain Rating Post-Intervention 1: 0/10    Objective:     Communicated with: occupational therapist and RN prior to session.  Patient found HOB elevated with peripheral IV, hannon catheter, BIPAP, pulse ox (continuous), blood pressure cuff, telemetry, SCD upon OT entry to room.    General Precautions: Standard, fall    Orthopedic Precautions:N/A  Braces: N/A  Respiratory Status:  BIPAP     Occupational Performance:     Bed Mobility:    Patient completed Rolling/Turning to Left with  moderate  assistance and maximal assistance  Patient completed Rolling/Turning to Right with moderate assistance and maximal assistance     [facility] 6 Click ADL: 15    Treatment & Education:  Patient engaged in active range of motion therapeutic exercise for 2 x 10 supine in bed with HOB elevated in the following planes: shoulder flexion/extension, shoulder abduction/adduction, elbow flexion/extension, scapular retractions/protraction, scapular elevation/depression, shoulder rotations (forward and reverse), wrist flexion/extension, wrist radial/ulnar deviation, forearm supination/pronation, and composite fist. Patient needed rest breaks between each set dur to impaired muscle endurance and activity tolerance.  Patient then engage din bed mobility task rolling left and right in bed for 3 trials each side with min verbal cues to use bed rails for assistance and needing mod/max assistance for DELACRUZ to complete task efficiently. Patient educated to continue to complete exercise throughout the day to increase strength and endurance to facilitate an increase in ADL/IADLs. Patient verbally understood.      Patient left HOB elevated with all lines intact, call button in reach, and registered nurse in visual sight    GOALS:   Multidisciplinary Problems       Occupational Therapy Goals          Problem: Occupational Therapy    Goal Priority Disciplines Outcome Interventions   Occupational Therapy Goal     OT, PT/OT Ongoing, Progressing    Description: Goals to be met by: 04/14/23     Patient will increase functional independence with ADLs by performing:    Feeding with Set-up Assistance.  UE Dressing with Minimal Assistance.  LE Dressing with Moderate Assistance.  Grooming while seated with Set-up Assistance.  Toileting from bedside commode with Moderate Assistance for hygiene and clothing management.   Bathing from  edge of bed with Moderate Assistance.  Step transfer with Moderate Assistance  Toilet transfer to bedside commode with  Moderate Assistance.  Increased functional strength to 3+ to 4-/5 for bilateral UE's.                         Time Tracking:     OT Date of Treatment: 04/10/23  OT Start Time: 1409  OT Stop Time: 1423  OT Total Time (min): 14 min    Billable Minutes:Therapeutic Exercise 14 min    OT/MARLA: MARLA     Number of MARLA visits since last OT visit: 1    4/10/2023  Juliet DELACRUZ

## 2023-04-10 NOTE — ASSESSMENT & PLAN NOTE
Patient with Hypercapnic and Hypoxic Respiratory failure which is Acute on chronic.  she is on home oxygen at 2 LPM. Supplemental oxygen was provided and noted- Oxygen Concentration (%):  [32-50] 50    Signs/symptoms of respiratory failure include- tachypnea, increased work of breathing, use of accessory muscles and wheezing. Contributing diagnoses includes - CHF, COPD, Obesity Hypoventilation and Pleural effusion Labs and images were reviewed. Patient Has recent ABG, which has been reviewed. Will treat underlying causes and adjust management of respiratory failure as follows- IV lasix, supplemental oxygen, continuous BiPAP  3/31: IV lasix 40 mg Q6H x 2 doses  4/1 improving daily, continue diuresis with Lasix, continue BiPAP therapy and will transition to O2 therapy as tolerates  4/2 continue current treatment, continue diuresis with Lasix; continue to offer nasal cannula as tolerates for oxygen options  4/3 transitioned to PO lasix 40 mg BID, continue BiPAP I/E 14/6 FiO2 60%  4/4 There is evidence, patient now requires noninvasive home ventilator due to chronic respiratory failure and COPD.  If obtained, this will help the patient decrease the work of breathing, hospital readmits, intubations, and improve overall quality of life. If this home respiratory device is not obtained it may lead to patient's harm or death. Will arrange for NI ventilator for home use ready on discharge.  4/6 SpO2 >94% on 2L NC, arranging at home NIV   4/7 Home NIV arranged on discharge. Currently requiring continuous O2 3L via NC during daytime. Continue daily pulmonary hygiene care

## 2023-04-10 NOTE — PLAN OF CARE
04/10/23 1444   Medicare Message   Important Message from Medicare regarding Discharge Appeal Rights Given to patient/caregiver;Explained to patient/caregiver;Signed/date by patient/caregiver   Date IMM was signed 04/10/23   Time IMM was signed [id number]

## 2023-04-10 NOTE — PLAN OF CARE
No issues overnight. Patient more awake and alert this shift. Appetite improving, patient calm and cooperative. VSS, BP and HR trend down throughout the night after PM meds but patient remains asymptomatic. Afebrile, UOP adequate, no BM this shift.

## 2023-04-10 NOTE — SUBJECTIVE & OBJECTIVE
Interval History: Patient seen and examined.     Review of Systems   Constitutional:  Positive for activity change. Negative for appetite change, chills, fatigue and fever.   HENT:  Negative for mouth sores, sneezing and sore throat.    Eyes:  Negative for visual disturbance.   Respiratory:  Negative for cough, chest tightness, shortness of breath (improving), wheezing and stridor.    Cardiovascular:  Positive for leg swelling. Negative for chest pain and palpitations.   Gastrointestinal:  Negative for abdominal pain, blood in stool, constipation, diarrhea, nausea and vomiting.   Musculoskeletal:  Positive for gait problem and joint swelling. Negative for arthralgias, neck pain and neck stiffness.   Skin:  Negative for color change and pallor.   Neurological:  Positive for weakness. Negative for dizziness, seizures, syncope, facial asymmetry, speech difficulty, light-headedness, numbness and headaches.   Psychiatric/Behavioral:  Negative for agitation, confusion, decreased concentration, dysphoric mood and sleep disturbance. The patient is not nervous/anxious.    Objective:     Vital Signs (Most Recent):  Temp: 97.6 °F (36.4 °C) (04/10/23 0729)  Pulse: (!) 48 (04/10/23 0814)  Resp: (!) 26 (04/10/23 0814)  BP: (!) 101/57 (04/10/23 0600)  SpO2: 97 % (04/10/23 0814)   Vital Signs (24h Range):  Temp:  [97.4 °F (36.3 °C)-98.4 °F (36.9 °C)] 97.6 °F (36.4 °C)  Pulse:  [45-79] 48  Resp:  [14-39] 26  SpO2:  [88 %-99 %] 97 %  BP: ()/(44-99) 101/57     Weight: 132.9 kg (293 lb)  Body mass index is 50.29 kg/m².    Intake/Output Summary (Last 24 hours) at 4/10/2023 0848  Last data filed at 4/10/2023 0600  Gross per 24 hour   Intake 2122 ml   Output 3125 ml   Net -1003 ml      Physical Exam  Vitals and nursing note reviewed.   Constitutional:       General: She is not in acute distress.     Appearance: She is obese. She is not toxic-appearing or diaphoretic.   HENT:      Right Ear: External ear normal.      Left Ear:  External ear normal.      Nose: No congestion or rhinorrhea.      Mouth/Throat:      Mouth: Mucous membranes are dry.      Pharynx: Oropharynx is clear. No oropharyngeal exudate or posterior oropharyngeal erythema.   Eyes:      Extraocular Movements: Extraocular movements intact.   Neck:      Comments: Thick skin folds  Cardiovascular:      Rate and Rhythm: Normal rate. Rhythm irregular.      Heart sounds: No murmur heard.  Pulmonary:      Effort: No respiratory distress.      Breath sounds: No rhonchi or rales.      Comments: On bipap - coarse bs ellen - with improvement in airflow  Chest:      Chest wall: No tenderness.   Abdominal:      General: There is no distension.      Palpations: Abdomen is soft.      Tenderness: There is no abdominal tenderness.      Comments: Limited secondary to body habitus   Genitourinary:     Comments: Hardwick draining yellow urine  Musculoskeletal:         General: Swelling present.      Cervical back: Neck supple. No rigidity.      Right lower leg: No edema (+1 pitting, loss of bony features in toes and dorsum of foot bilaterally).      Left lower leg: No edema.      Comments: Skin much more relaxed, moves upper and lower extremities while in supine position, unable to roll self without assistance   Skin:     General: Skin is warm and dry.      Capillary Refill: Capillary refill takes less than 2 seconds.      Coloration: Skin is not jaundiced.      Findings: No bruising, erythema, lesion or rash.   Neurological:      Mental Status: She is alert and oriented to person, place, and time. Mental status is at baseline.      Gait: Gait abnormal.      Comments: Baseline requires walker for ambulation   Psychiatric:         Mood and Affect: Mood normal.         Behavior: Behavior normal. Behavior is not agitated or aggressive. Behavior is cooperative.     Significant Labs: All pertinent labs within the past 24 hours have been reviewed.  BMP:   Recent Labs   Lab 04/09/23  0400 04/10/23  1909    GLU 92 102    142   K 3.8 3.4*    104   CO2 42* 41*   BUN 11 13   CREATININE 1.1 1.3   CALCIUM 9.0 8.9   MG 2.0  --      CBC:   Recent Labs   Lab 04/09/23 0400 04/10/23  0407   WBC 5.33 6.45   HGB 12.9 12.2   HCT 41.8 39.2    185     CMP:   Recent Labs   Lab 04/09/23 0400 04/10/23  0407    142   K 3.8 3.4*    104   CO2 42* 41*   GLU 92 102   BUN 11 13   CREATININE 1.1 1.3   CALCIUM 9.0 8.9   ANIONGAP -4* -3*     Magnesium:   Recent Labs   Lab 04/09/23 0400   MG 2.0     Significant Imaging: I have reviewed all pertinent imaging results/findings within the past 24 hours.

## 2023-04-10 NOTE — ASSESSMENT & PLAN NOTE
4/3 continue with cardiology recommendations  Patient is identified as having Combined Systolic and Diastolic heart failure that is Acute on chronic. CHF is currently uncontrolled due to Continued edema of extremities, Dyspnea not returned to baseline after 2 doses of IV diuretic and Rales/crackles on pulmonary exam. Latest ECHO performed and demonstrates- Results for orders placed during the hospital encounter of 03/29/23  Echo  Interpretation Summary  · The left ventricle is normal in size with moderate concentric hypertrophy and mildly decreased systolic function.  · The estimated ejection fraction is 45%.  · Grade I left ventricular diastolic dysfunction.  · Moderate right ventricular enlargement.  · Moderate right atrial enlargement.  · Mild-to-moderate aortic regurgitation.  · There is mild aortic valve stenosis.  · Aortic valve area is 1.44 cm2; peak velocity is 2.09 m/s; mean gradient is 10 mmHg.  · Mild-to-moderate mitral regurgitation.  · Moderate tricuspid regurgitation.  · Moderate pulmonic regurgitation.  · The estimated PA systolic pressure is 69 mmHg.  · There is moderate pulmonary hypertension.  · Moderate left atrial enlargement.  . Continue Beta Blocker, ACE/ARB, Furosemide, Aldactone, Nitrate/Vasodilator and ARNI and monitor clinical status closely. Monitor on telemetry. Monitor strict Is&Os and daily weights.  Place on fluid restriction of 1.5 L. Continue to stress to patient importance of self efficacy and  on diet for CHF. Last BNP reviewed-  Lab Results   Component Value Date    NTPROBNP 39757 (H) 03/29/2023 4/4 improved BP control, now on PO lasix with adequate urine output. Continue recommendations per cardiology.  4/6 borderline hypotension, will provide gentle IVF followed by bolus 250mL  4/8 BP medications adjusted

## 2023-04-10 NOTE — EICU
Intervention Initiated From:  COR / EICU    Elliott intervened regarding:  Rounding (Video assessment)    Nurse Notified:  RN is at Bedside    Doctor Notified:  No    Comments:  Awake, alert and conversant. IVF infusing at 50cc/hr.  B/P 119/63, HR 71, RR 28m Sats 91%

## 2023-04-10 NOTE — PLAN OF CARE
Problem: Occupational Therapy  Goal: Occupational Therapy Goal  Description: Goals to be met by: 04/14/23     Patient will increase functional independence with ADLs by performing:    Feeding with Set-up Assistance.  UE Dressing with Minimal Assistance.  LE Dressing with Moderate Assistance.  Grooming while seated with Set-up Assistance.  Toileting from bedside commode with Moderate Assistance for hygiene and clothing management.   Bathing from  edge of bed with Moderate Assistance.  Step transfer with Moderate Assistance  Toilet transfer to bedside commode with Moderate Assistance.  Increased functional strength to 3+ to 4-/5 for bilateral UE's.    Outcome: Ongoing, Progressing   Continue with POC

## 2023-04-10 NOTE — PLAN OF CARE
The patient verbalized that she was okay with, Micheline from [facility], coming to see her.     Addendum: Marija with [facility] also denied the patient at this time-unable to meet the patient's needs.

## 2023-04-10 NOTE — PLAN OF CARE
Awaiting med-surg bed. IVF dc'd. Hardwick cath dc'd. Check void status. External cath in place. Cont to encourage PO intake. Sat on side of bed for Breakfast and supper. NC/ Bipap. Carafate ordered. B/p meds adjusted. Accuchecks AC/HS- no coverage needed.

## 2023-04-10 NOTE — ASSESSMENT & PLAN NOTE
Secondary to above CHF exacerbation, fluid overload, SOB, body habitus. When patient's cardiorespiratory status stabilized, follow up PT and OT evaluation.   4/6 work on sitting up and out of bed, patient agrees to working with PT and OT  4/7 continue with aggressive PT and OT, CM working on inpatient rehab services   4/10 medially optimized, requires continued encouragement to increase mobility, PT and OT efforts

## 2023-04-10 NOTE — EICU
Intervention Initiated From:  COR / GLENNU    Elliott intervened regarding:  Rounding (Video assessment)    Comments:   Video assessment complete. Pt sitting up in bed, awake and alert. Pt off bipap at this time and on NC w/ O2 sats 88-91% during the time I was in the room. Respirations appear even and unlabored. Pt states she overall is feeling better w/ NAD noted.

## 2023-04-10 NOTE — ASSESSMENT & PLAN NOTE
4/3 Collecting blood tinged urine in Hannon catheter. Hg/HCT stable. Holding xarelto. Start empiric antibiotics for UTI, f/u urine culture and blood cultures.    4/4 Blood cx x2, positive for gram positive clusters  4/6 Blood cx PCR negative for MRSA and staph aureus, urine cx x2 (4/6 and 4/7) NGTD. No evidence of an infectious process, afebrile. Discontinued vancomycin after one dose administered  4/7 Hg/HCT stable. Urine in hannon collecting clear colored  4/10 Hg/HCT stable, continue to monitor

## 2023-04-10 NOTE — ASSESSMENT & PLAN NOTE
Patient's FSGs are controlled on current medication regimen.  Last A1c reviewed-   Lab Results   Component Value Date    HGBA1C 5.5 03/29/2023     Most recent fingerstick glucose reviewed-   Recent Labs   Lab 04/09/23  1045 04/09/23  1637 04/09/23  2101   POCTGLUCOSE 115* 103 209*     Current correctional scale  Low  Maintain anti-hyperglycemic dose as follows-   Antihyperglycemics (From admission, onward)    Start     Stop Route Frequency Ordered    03/31/23 0106  insulin aspart U-100 pen 0-5 Units         -- SubQ Before meals & nightly PRN 03/31/23 0008    03/30/23 1700  empagliflozin 10 mg tablet 10 mg         -- Oral Daily 03/30/23 1345        Per cardiology;   3/31 Jardiance 10 mg daily  4/1 continue current med  4/6 PO intake reduced, continue with regimen above  4/7 Tolerating PO intake, continue daily Jardiance  4/10 add on basal insulin 15U daily

## 2023-04-10 NOTE — PLAN OF CARE
Problem: Physical Therapy  Goal: Physical Therapy Goal  Description: Patient will increase functional independence with mobility by performin. Supine to sit with Standby Assistance  2. Sit to supine with Standby Assistance  3. Bed to chair transfer with Standby Assistancewith or without rolling walker using Step Transfer TECHNIQUE  4. Gait  x 50  feet with Standby Assistance with or without rolling walker  5. Lower extremity exercise program x10 reps per handout, with assistance as needed      Outcome: Ongoing, Progressing

## 2023-04-10 NOTE — ASSESSMENT & PLAN NOTE
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 CHF is currently uncontrolled due to Continued edema of extremities and Hepatic congestion/ascites, Dyspnea not returned to baseline after 2 doses of IV diuretic and Rales/crackles on pulmonary exam. Latest ECHO performed and demonstrates- No results found for this or any previous visit.  . Continue Beta Blocker, Furosemide and Nitrate/Vasodilator and monitor clinical status closely. Monitor on telemetry. Monitor strict Is&Os and daily weights.  Place on fluid restriction of 1.5 L. Continue to stress to patient importance of self efficacy and  on diet for CHF. Last BNP reviewed- and noted below No results for input(s): BNP, BNPTRIAGEBLO in the last 168 hours.   Lab Results   Component Value Date    NTPROBNP 71687 (H) 03/29/2023   - per cardiology, echo cardiogram with systolic and diastolic dysfunction with pulmonary hypertension  - continue with IV lasix, BiPAP QHS  4/1 diuresing well continue Lasix, continue blood pressure control  4/2 a should diuresing well continue Lasix  4/4 CXR unchanged, on PO lasix, adequate UOP, continue to monitor  4/7 stable cardiopulmonary status, continue with current med regimen with continuous O2 supplementation

## 2023-04-11 PROBLEM — Z78.9: Status: ACTIVE | Noted: 2023-04-11

## 2023-04-11 LAB
ANION GAP SERPL CALC-SCNC: -1 MMOL/L (ref 8–16)
BUN SERPL-MCNC: 11 MG/DL (ref 8–23)
CALCIUM SERPL-MCNC: 9.7 MG/DL (ref 8.7–10.5)
CHLORIDE SERPL-SCNC: 104 MMOL/L (ref 95–110)
CO2 SERPL-SCNC: 39 MMOL/L (ref 23–29)
CREAT SERPL-MCNC: 1 MG/DL (ref 0.5–1.4)
EST. GFR  (NO RACE VARIABLE): 59.9 ML/MIN/1.73 M^2
GLUCOSE SERPL-MCNC: 81 MG/DL (ref 70–110)
MAGNESIUM SERPL-MCNC: 1.9 MG/DL (ref 1.6–2.6)
POCT GLUCOSE: 108 MG/DL (ref 70–110)
POCT GLUCOSE: 112 MG/DL (ref 70–110)
POTASSIUM SERPL-SCNC: 3.6 MMOL/L (ref 3.5–5.1)
SODIUM SERPL-SCNC: 142 MMOL/L (ref 136–145)

## 2023-04-11 PROCEDURE — 25000242 PHARM REV CODE 250 ALT 637 W/ HCPCS: Performed by: INTERNAL MEDICINE

## 2023-04-11 PROCEDURE — 25000003 PHARM REV CODE 250: Performed by: STUDENT IN AN ORGANIZED HEALTH CARE EDUCATION/TRAINING PROGRAM

## 2023-04-11 PROCEDURE — 99900031 HC PATIENT EDUCATION (STAT)

## 2023-04-11 PROCEDURE — 63600175 PHARM REV CODE 636 W HCPCS: Performed by: STUDENT IN AN ORGANIZED HEALTH CARE EDUCATION/TRAINING PROGRAM

## 2023-04-11 PROCEDURE — 25000003 PHARM REV CODE 250: Performed by: INTERNAL MEDICINE

## 2023-04-11 PROCEDURE — 80048 BASIC METABOLIC PNL TOTAL CA: CPT | Performed by: STUDENT IN AN ORGANIZED HEALTH CARE EDUCATION/TRAINING PROGRAM

## 2023-04-11 PROCEDURE — 99900035 HC TECH TIME PER 15 MIN (STAT)

## 2023-04-11 PROCEDURE — 94660 CPAP INITIATION&MGMT: CPT

## 2023-04-11 PROCEDURE — 94761 N-INVAS EAR/PLS OXIMETRY MLT: CPT

## 2023-04-11 PROCEDURE — 99232 PR SUBSEQUENT HOSPITAL CARE,LEVL II: ICD-10-PCS | Mod: ,,, | Performed by: STUDENT IN AN ORGANIZED HEALTH CARE EDUCATION/TRAINING PROGRAM

## 2023-04-11 PROCEDURE — 83735 ASSAY OF MAGNESIUM: CPT | Performed by: STUDENT IN AN ORGANIZED HEALTH CARE EDUCATION/TRAINING PROGRAM

## 2023-04-11 PROCEDURE — A4216 STERILE WATER/SALINE, 10 ML: HCPCS | Performed by: STUDENT IN AN ORGANIZED HEALTH CARE EDUCATION/TRAINING PROGRAM

## 2023-04-11 PROCEDURE — 99232 SBSQ HOSP IP/OBS MODERATE 35: CPT | Mod: ,,, | Performed by: STUDENT IN AN ORGANIZED HEALTH CARE EDUCATION/TRAINING PROGRAM

## 2023-04-11 PROCEDURE — 25000242 PHARM REV CODE 250 ALT 637 W/ HCPCS: Performed by: STUDENT IN AN ORGANIZED HEALTH CARE EDUCATION/TRAINING PROGRAM

## 2023-04-11 PROCEDURE — 27000221 HC OXYGEN, UP TO 24 HOURS

## 2023-04-11 PROCEDURE — 97110 THERAPEUTIC EXERCISES: CPT | Mod: CO

## 2023-04-11 PROCEDURE — 36415 COLL VENOUS BLD VENIPUNCTURE: CPT | Performed by: STUDENT IN AN ORGANIZED HEALTH CARE EDUCATION/TRAINING PROGRAM

## 2023-04-11 PROCEDURE — 97530 THERAPEUTIC ACTIVITIES: CPT

## 2023-04-11 PROCEDURE — 94640 AIRWAY INHALATION TREATMENT: CPT

## 2023-04-11 PROCEDURE — 11000001 HC ACUTE MED/SURG PRIVATE ROOM

## 2023-04-11 RX ORDER — POTASSIUM CHLORIDE 20 MEQ/1
20 TABLET, EXTENDED RELEASE ORAL
Status: DISPENSED | OUTPATIENT
Start: 2023-04-11 | End: 2023-04-11

## 2023-04-11 RX ORDER — POTASSIUM CHLORIDE 20 MEQ/1
20 TABLET, EXTENDED RELEASE ORAL 2 TIMES DAILY
Qty: 60 TABLET | Refills: 0 | Status: SHIPPED | OUTPATIENT
Start: 2023-04-11 | End: 2023-05-11

## 2023-04-11 RX ORDER — BUSPIRONE HYDROCHLORIDE 5 MG/1
5 TABLET ORAL 3 TIMES DAILY
Qty: 90 TABLET | Refills: 11 | Status: ON HOLD | OUTPATIENT
Start: 2023-04-11 | End: 2023-09-09 | Stop reason: CLARIF

## 2023-04-11 RX ORDER — CARVEDILOL 6.25 MG/1
6.25 TABLET ORAL 2 TIMES DAILY
Qty: 60 TABLET | Refills: 11 | Status: SHIPPED | OUTPATIENT
Start: 2023-04-11 | End: 2024-04-10

## 2023-04-11 RX ORDER — SUCRALFATE 1 G/10ML
1 SUSPENSION ORAL
Qty: 1200 ML | Refills: 2 | Status: SHIPPED | OUTPATIENT
Start: 2023-04-11 | End: 2023-07-10

## 2023-04-11 RX ORDER — SPIRONOLACTONE 25 MG/1
25 TABLET ORAL DAILY
Qty: 30 TABLET | Refills: 11 | Status: ON HOLD | OUTPATIENT
Start: 2023-04-12 | End: 2023-09-09 | Stop reason: CLARIF

## 2023-04-11 RX ORDER — IPRATROPIUM BROMIDE AND ALBUTEROL SULFATE 2.5; .5 MG/3ML; MG/3ML
3 SOLUTION RESPIRATORY (INHALATION)
Qty: 75 ML | Refills: 0 | Status: ON HOLD | OUTPATIENT
Start: 2023-04-11 | End: 2023-10-04 | Stop reason: HOSPADM

## 2023-04-11 RX ORDER — GABAPENTIN 300 MG/1
300 CAPSULE ORAL 2 TIMES DAILY
Qty: 60 CAPSULE | Refills: 0 | Status: ON HOLD | OUTPATIENT
Start: 2023-04-11 | End: 2023-04-25 | Stop reason: HOSPADM

## 2023-04-11 RX ORDER — NIFEDIPINE 60 MG/1
60 TABLET, EXTENDED RELEASE ORAL DAILY
Qty: 30 TABLET | Refills: 11 | Status: ON HOLD | OUTPATIENT
Start: 2023-04-12 | End: 2023-09-09

## 2023-04-11 RX ORDER — SERTRALINE HYDROCHLORIDE 50 MG/1
50 TABLET, FILM COATED ORAL DAILY
Qty: 30 TABLET | Refills: 11 | Status: SHIPPED | OUTPATIENT
Start: 2023-04-12 | End: 2023-04-22

## 2023-04-11 RX ORDER — FUROSEMIDE 40 MG/1
40 TABLET ORAL DAILY
Qty: 30 TABLET | Refills: 11 | Status: ON HOLD | OUTPATIENT
Start: 2023-04-12 | End: 2023-10-04 | Stop reason: HOSPADM

## 2023-04-11 RX ORDER — BUDESONIDE 0.25 MG/2ML
0.25 INHALANT ORAL EVERY 12 HOURS
Qty: 120 ML | Refills: 0 | Status: ON HOLD | OUTPATIENT
Start: 2023-04-11 | End: 2023-10-04 | Stop reason: HOSPADM

## 2023-04-11 RX ORDER — ACETAMINOPHEN 325 MG/1
650 TABLET ORAL EVERY 8 HOURS PRN
Qty: 90 TABLET | Refills: 0 | Status: SHIPPED | OUTPATIENT
Start: 2023-04-11 | End: 2023-05-11

## 2023-04-11 RX ORDER — PANTOPRAZOLE SODIUM 40 MG/1
40 TABLET, DELAYED RELEASE ORAL DAILY
Qty: 30 TABLET | Refills: 0 | Status: ON HOLD | OUTPATIENT
Start: 2023-04-12 | End: 2023-04-25 | Stop reason: HOSPADM

## 2023-04-11 RX ADMIN — SERTRALINE HYDROCHLORIDE 25 MG: 25 TABLET ORAL at 10:04

## 2023-04-11 RX ADMIN — Medication 10 ML: at 12:04

## 2023-04-11 RX ADMIN — IPRATROPIUM BROMIDE AND ALBUTEROL SULFATE 3 ML: 2.5; .5 SOLUTION RESPIRATORY (INHALATION) at 07:04

## 2023-04-11 RX ADMIN — NIFEDIPINE 60 MG: 30 TABLET, FILM COATED, EXTENDED RELEASE ORAL at 10:04

## 2023-04-11 RX ADMIN — SUCRALFATE 1 G: 1 SUSPENSION ORAL at 09:04

## 2023-04-11 RX ADMIN — SUCRALFATE 1 G: 1 SUSPENSION ORAL at 04:04

## 2023-04-11 RX ADMIN — ENOXAPARIN SODIUM 40 MG: 40 INJECTION SUBCUTANEOUS at 09:04

## 2023-04-11 RX ADMIN — EMPAGLIFLOZIN 10 MG: 10 TABLET, FILM COATED ORAL at 09:04

## 2023-04-11 RX ADMIN — BUDESONIDE 0.25 MG: 0.25 INHALANT ORAL at 07:04

## 2023-04-11 RX ADMIN — GABAPENTIN 300 MG: 300 CAPSULE ORAL at 10:04

## 2023-04-11 RX ADMIN — POTASSIUM CHLORIDE 20 MEQ: 1500 TABLET, EXTENDED RELEASE ORAL at 02:04

## 2023-04-11 RX ADMIN — BUSPIRONE HYDROCHLORIDE 5 MG: 5 TABLET ORAL at 09:04

## 2023-04-11 RX ADMIN — BUSPIRONE HYDROCHLORIDE 5 MG: 5 TABLET ORAL at 10:04

## 2023-04-11 RX ADMIN — INSULIN DETEMIR 15 UNITS: 100 INJECTION, SOLUTION SUBCUTANEOUS at 09:04

## 2023-04-11 RX ADMIN — IPRATROPIUM BROMIDE AND ALBUTEROL SULFATE 3 ML: 2.5; .5 SOLUTION RESPIRATORY (INHALATION) at 01:04

## 2023-04-11 RX ADMIN — GABAPENTIN 300 MG: 300 CAPSULE ORAL at 09:04

## 2023-04-11 RX ADMIN — PANTOPRAZOLE SODIUM 40 MG: 40 TABLET, DELAYED RELEASE ORAL at 10:04

## 2023-04-11 RX ADMIN — SACUBITRIL AND VALSARTAN 1 TABLET: 24; 26 TABLET, FILM COATED ORAL at 09:04

## 2023-04-11 RX ADMIN — CARVEDILOL 6.25 MG: 3.12 TABLET, FILM COATED ORAL at 10:04

## 2023-04-11 RX ADMIN — BUSPIRONE HYDROCHLORIDE 5 MG: 5 TABLET ORAL at 03:04

## 2023-04-11 RX ADMIN — SACUBITRIL AND VALSARTAN 1 TABLET: 24; 26 TABLET, FILM COATED ORAL at 10:04

## 2023-04-11 RX ADMIN — SPIRONOLACTONE 25 MG: 25 TABLET ORAL at 10:04

## 2023-04-11 RX ADMIN — Medication 10 ML: at 06:04

## 2023-04-11 RX ADMIN — ENOXAPARIN SODIUM 40 MG: 40 INJECTION SUBCUTANEOUS at 10:04

## 2023-04-11 RX ADMIN — FUROSEMIDE 40 MG: 40 TABLET ORAL at 10:04

## 2023-04-11 RX ADMIN — CARVEDILOL 6.25 MG: 3.12 TABLET, FILM COATED ORAL at 09:04

## 2023-04-11 NOTE — ASSESSMENT & PLAN NOTE
4/3 Collecting blood tinged urine in Hannon catheter. Hg/HCT stable. Holding xarelto. Start empiric antibiotics for UTI, f/u urine culture and blood cultures.    4/4 Blood cx x2, positive for gram positive clusters  4/6 Blood cx PCR negative for MRSA and staph aureus, urine cx x2 (4/6 and 4/7) NGTD. No evidence of an infectious process, afebrile. Discontinued vancomycin after one dose administered  4/7 Hg/HCT stable. Urine in hannon collecting clear colored  4/10 Hg/HCT stable, continue to monitor  4/11 Xarelto not initiated, Hg/HCT stable

## 2023-04-11 NOTE — ASSESSMENT & PLAN NOTE
4/3 continue with cardiology recommendations  Patient is identified as having Combined Systolic and Diastolic heart failure that is Acute on chronic. CHF is currently uncontrolled due to Continued edema of extremities, Dyspnea not returned to baseline after 2 doses of IV diuretic and Rales/crackles on pulmonary exam. Latest ECHO performed and demonstrates- Results for orders placed during the hospital encounter of 03/29/23  Echo  Interpretation Summary  · The left ventricle is normal in size with moderate concentric hypertrophy and mildly decreased systolic function.  · The estimated ejection fraction is 45%.  · Grade I left ventricular diastolic dysfunction.  · Moderate right ventricular enlargement.  · Moderate right atrial enlargement.  · Mild-to-moderate aortic regurgitation.  · There is mild aortic valve stenosis.  · Aortic valve area is 1.44 cm2; peak velocity is 2.09 m/s; mean gradient is 10 mmHg.  · Mild-to-moderate mitral regurgitation.  · Moderate tricuspid regurgitation.  · Moderate pulmonic regurgitation.  · The estimated PA systolic pressure is 69 mmHg.  · There is moderate pulmonary hypertension.  · Moderate left atrial enlargement.  . Continue Beta Blocker, ACE/ARB, Furosemide, Aldactone, Nitrate/Vasodilator and ARNI and monitor clinical status closely. Monitor on telemetry. Monitor strict Is&Os and daily weights.  Place on fluid restriction of 1.5 L. Continue to stress to patient importance of self efficacy and  on diet for CHF. Last BNP reviewed-  Lab Results   Component Value Date    NTPROBNP 46397 (H) 03/29/2023 4/4 improved BP control, now on PO lasix with adequate urine output. Continue recommendations per cardiology.  4/6 borderline hypotension, will provide gentle IVF followed by bolus 250mL  4/8 BP medications adjusted  4/10 carvedilol reduced to 6.25 mg BID

## 2023-04-11 NOTE — NURSING
2135 Called report to Bianka FABIAN on 6 th floor. Patient a room is now available, Patient is med-surg status. Patient's blood sugar was 93. She had her PM medication, and ate a 4 gram crackers and drank 2 juices . She then brushed her teeth , back on the BIPAP.  2145 Called patient's family called her daughter's number she will be transferred to [phone].

## 2023-04-11 NOTE — ASSESSMENT & PLAN NOTE
Patient has a current diagnosis of Hypertensive emergency with end organ damage evidenced by hypertensive encephalopathy and acute pulmonary edema without heart failure which is uncontrolled.  Latest blood pressure and vitals reviewed-   Temp:  [96.6 °F (35.9 °C)-98.3 °F (36.8 °C)]   Pulse:  [50-76]   Resp:  [18-31]   BP: ()/(51-74)   SpO2:  [85 %-98 %] .   Patient currently on IV antihypertensives.   Home meds for hypertension were reviewed and noted below.   Hypertension Medications             furosemide (LASIX) 40 MG tablet Take 1 tablet (40 mg total) by mouth 2 (two) times daily. If having increased shortness of breath, increase in leg swelling, and if you notice an >3lbs in 24 hours. May take up to 4 tablets in 1 day (every 6 hours)    isosorbide mononitrate (IMDUR) 60 MG 24 hr tablet Take 1 tablet (60 mg total) by mouth once daily.    lisinopril (PRINIVIL,ZESTRIL) 5 MG tablet Take 1 tablet (5 mg total) by mouth once daily.    metoprolol tartrate (LOPRESSOR) 25 MG tablet Take 1 tablet (25 mg total) by mouth 2 (two) times daily.      Will aim for controlled BP reduction by medications noted above. Monitor and mitigate end organ damage as indicated.  Follow up cardiology consult appreciated.   3/31 Lisinopril discontinued for start of Entresto  - continue nitrate, hydralazine, carvedilol  - tridil gtt PRN  4/1 blood pressure has improved, appreciate cardiology's help  4/2 blood pressures have improved continue current medications  4/3 on Entresto, spironolactone, carvedilol, nifedipime, hydralazine, lasix  4/7 Continue with above regimen

## 2023-04-11 NOTE — ASSESSMENT & PLAN NOTE
Patient's FSGs are controlled on current medication regimen.  Last A1c reviewed-   Lab Results   Component Value Date    HGBA1C 5.5 03/29/2023     Most recent fingerstick glucose reviewed-   Recent Labs   Lab 04/10/23  1639 04/10/23  2026 04/11/23  1113   POCTGLUCOSE 94 93 112*     Current correctional scale  Low  Maintain anti-hyperglycemic dose as follows-   Antihyperglycemics (From admission, onward)    Start     Stop Route Frequency Ordered    04/10/23 1000  insulin detemir U-100 (Levemir) pen 15 Units         -- SubQ Daily 04/10/23 0858    03/31/23 0106  insulin aspart U-100 pen 0-5 Units         -- SubQ Before meals & nightly PRN 03/31/23 0008    03/30/23 1700  empagliflozin 10 mg tablet 10 mg         -- Oral Daily 03/30/23 1345        Per cardiology;   3/31 Jardiance 10 mg daily  4/1 continue current med  4/6 PO intake reduced, continue with regimen above  4/7 Tolerating PO intake, continue daily Jardiance  4/10 add on basal insulin 15U daily

## 2023-04-11 NOTE — PLAN OF CARE
Dr. Mott requesting pulmonology referral be sent out. Referral faxed to Dr. Brodie Marin's office in [place]. His office will call [facility] in [place] with appointment. Micheline with [facility] made aware.

## 2023-04-11 NOTE — ASSESSMENT & PLAN NOTE
Patient with Long standing persistent (>12 months) atrial fibrillation which is controlled currently with Beta Blocker. Patient is currently in atrial fibrillation.[id number] Score: 3. Anticoagulation indicated. Anticoagulation done with Xarelto.  Xarelto held 4/3 after evidence of hematuria. Hg/HCT stable.   - continue prophylaxis with lovenox

## 2023-04-11 NOTE — ASSESSMENT & PLAN NOTE
Patient with Long standing persistent (>12 months) atrial fibrillation which is controlled currently with Beta Blocker. Patient is currently in atrial fibrillation.[id number] Score: 3. Anticoagulation indicated. Anticoagulation done with Xarelto.  History of pulmonary embolism 2017.   Xarelto held 4/3 after evidence of hematuria. Hg/HCT stable.   - continue prophylaxis with lovenox  - on discharge discontinue anticoagulation  - f/u cardiology and pulmonology on reassessing benefits of resuming anti-coagulation for increased risks for DVT and PE

## 2023-04-11 NOTE — NURSING
Patient up to floor via bed from ICU, oriented to room and nurse, assessment performed, denies any pain or discomfort at this time. No distress noted at this time. Bed lowered, call bell placed in reach, side rails up x2, instructed to call with any needs or for assistance. Bi-pap in use. See flowchart.

## 2023-04-11 NOTE — SUBJECTIVE & OBJECTIVE
Interval History: Patient seen and examined.     Review of Systems   Constitutional:  Positive for activity change. Negative for appetite change, chills, fatigue and fever.   HENT:  Negative for mouth sores, sneezing and sore throat.    Eyes:  Negative for visual disturbance.   Respiratory:  Positive for shortness of breath. Negative for cough, chest tightness, wheezing and stridor.    Cardiovascular:  Positive for leg swelling. Negative for chest pain and palpitations.   Gastrointestinal:  Negative for abdominal pain, blood in stool, constipation, diarrhea, nausea and vomiting.   Musculoskeletal:  Positive for gait problem and joint swelling. Negative for arthralgias, neck pain and neck stiffness.   Skin:  Negative for color change and pallor.   Neurological:  Positive for weakness. Negative for dizziness, seizures, syncope, facial asymmetry, speech difficulty, light-headedness, numbness and headaches.   Psychiatric/Behavioral:  Negative for agitation, confusion, decreased concentration, dysphoric mood and sleep disturbance. The patient is not nervous/anxious.    Objective:     Vital Signs (Most Recent):  Temp: 97.9 °F (36.6 °C) (04/11/23 1114)  Pulse: 61 (04/11/23 1114)  Resp: 20 (04/11/23 1114)  BP: (!) 140/67 (04/11/23 1114)  SpO2: 96 % (04/11/23 1114)   Vital Signs (24h Range):  Temp:  [96.6 °F (35.9 °C)-98.3 °F (36.8 °C)] 97.9 °F (36.6 °C)  Pulse:  [50-76] 61  Resp:  [18-31] 20  SpO2:  [85 %-98 %] 96 %  BP: ()/(51-74) 140/67     Weight: 132.9 kg (293 lb)  Body mass index is 50.29 kg/m².    Intake/Output Summary (Last 24 hours) at 4/11/2023 1134  Last data filed at 4/11/2023 0558  Gross per 24 hour   Intake 1214.17 ml   Output 1650 ml   Net -435.83 ml      Physical Exam  Vitals and nursing note reviewed.   Constitutional:       General: She is not in acute distress.     Appearance: She is obese. She is not toxic-appearing or diaphoretic.   HENT:      Right Ear: External ear normal.      Left Ear: External  ear normal.      Nose: No congestion or rhinorrhea.      Mouth/Throat:      Mouth: Mucous membranes are dry.      Pharynx: Oropharynx is clear. No oropharyngeal exudate or posterior oropharyngeal erythema.   Eyes:      Extraocular Movements: Extraocular movements intact.   Neck:      Comments: Thick skin folds  Cardiovascular:      Rate and Rhythm: Normal rate. Rhythm irregular.      Heart sounds: No murmur heard.  Pulmonary:      Effort: No respiratory distress.      Breath sounds: No rhonchi or rales.      Comments: On bipap - coarse bs bilaterally, breath sounds reaching lung bases  Chest:      Chest wall: No tenderness.   Abdominal:      General: There is no distension.      Palpations: Abdomen is soft.      Tenderness: There is no abdominal tenderness.      Comments: Limited secondary to body habitus   Genitourinary:     Comments: Hardwick draining yellow urine  Musculoskeletal:         General: Swelling (non tender) present.      Cervical back: Neck supple. No rigidity.      Right lower leg: No edema.      Left lower leg: No edema.   Skin:     General: Skin is warm and dry.      Capillary Refill: Capillary refill takes less than 2 seconds.      Coloration: Skin is not jaundiced.      Findings: No bruising, erythema, lesion or rash.   Neurological:      Mental Status: She is alert and oriented to person, place, and time. Mental status is at baseline.      Gait: Gait abnormal.   Psychiatric:         Mood and Affect: Mood normal.         Behavior: Behavior normal. Behavior is not agitated or aggressive. Behavior is cooperative.     Significant Labs: All pertinent labs within the past 24 hours have been reviewed.  A1C:   Recent Labs   Lab 03/29/23  2319   HGBA1C 5.5     Bilirubin:   Recent Labs   Lab 03/30/23  0340 04/01/23  0412 04/02/23  0339 04/03/23  0344 04/04/23  0327   BILITOT 0.6 0.4 0.5 0.5 0.5     BMP:   Recent Labs   Lab 04/11/23  0516   GLU 81      K 3.6      CO2 39*   BUN 11   CREATININE 1.0    CALCIUM 9.7   MG 1.9     CBC:   Recent Labs   Lab 04/10/23  0407   WBC 6.45   HGB 12.2   HCT 39.2        CMP:   Recent Labs   Lab 04/10/23  0407 04/11/23  0516    142   K 3.4* 3.6    104   CO2 41* 39*    81   BUN 13 11   CREATININE 1.3 1.0   CALCIUM 8.9 9.7   ANIONGAP -3* -1*     Magnesium:   Recent Labs   Lab 04/11/23  0516   MG 1.9     Significant Imaging: I have reviewed all pertinent imaging results/findings within the past 24 hours.

## 2023-04-11 NOTE — ASSESSMENT & PLAN NOTE
Patient with Hypercapnic and Hypoxic Respiratory failure which is Acute on chronic.  she is on home oxygen at 2 LPM. Supplemental oxygen was provided and noted- Oxygen Concentration (%):  [32-50] 32    Signs/symptoms of respiratory failure include- tachypnea, increased work of breathing, use of accessory muscles and wheezing. Contributing diagnoses includes - CHF, COPD, Obesity Hypoventilation and Pleural effusion Labs and images were reviewed. Patient Has recent ABG, which has been reviewed. Will treat underlying causes and adjust management of respiratory failure as follows- IV lasix, supplemental oxygen, continuous BiPAP  3/31: IV lasix 40 mg Q6H x 2 doses  4/1 improving daily, continue diuresis with Lasix, continue BiPAP therapy and will transition to O2 therapy as tolerates  4/2 continue current treatment, continue diuresis with Lasix; continue to offer nasal cannula as tolerates for oxygen options  4/3 transitioned to PO lasix 40 mg BID, continue BiPAP I/E 14/6 FiO2 60%  4/4 There is evidence, patient now requires noninvasive home ventilator due to chronic respiratory failure and COPD.  If obtained, this will help the patient decrease the work of breathing, hospital readmits, intubations, and improve overall quality of life. If this home respiratory device is not obtained it may lead to patient's harm or death. Will arrange for NI ventilator for home use ready on discharge.  4/6 SpO2 >94% on 2L NC, arranging at home NIV   4/7 Home NIV arranged on discharge. Currently requiring continuous O2 3L via NC during daytime. Continue daily pulmonary hygiene care  4/11 2LNC during daytime, BiPAP at bedtime and daytime when asleep, continue daily pulmonary hygiene care.

## 2023-04-11 NOTE — ASSESSMENT & PLAN NOTE
Xarelto held after gross hematuria, likely secondary to trauma from hannon catheter insertion.   Hg/HCT stable throughout hospitalization. History of pulmonary embolism and paroxysmal atrial fibrillation raise risk for DVT and PE with associated co morbidities. Will continue to hold Xarelto until further evaluation outpatient with cardiology and pulmonology on weighing benefits of resuming daily anticoagulation therapy.   - encourage physical activity as tolerated  - daily pulmonary hygiene care including use of incentive spirometer

## 2023-04-11 NOTE — PROGRESS NOTES
[facility] Medicine  Progress Note    Patient Name: Carmen Tan  MRN: [id number]  Patient Class: IP- Inpatient   Admission Date: 3/29/2023  Length of Stay: 12 days  Attending Physician: Gunnar Mott DO  Primary Care Provider: Lucian Barry MD      Subjective:     Principal Problem:Acute respiratory failure with hypoxia    HPI:  Chief Complaint   Patient presents with    Shortness of Breath       Per ems pt called 911 due to sob, when they arrived gave her a duoneb and moved her over and her blood pressure went up to 200/100's from an original reading of 140/80, solumedrol 125mg IM given, 4 sprays of nitro spray given, 1inch of nitropaste on pts chest, pt currently on cpap with an O2 sat of 100%      9009   Carmen Tan is a 73 y.o. female with PMHX of COPD, CHF, PE on Xarelto, morbid obesity who presents to the emergency department C/O shortness of breath.     Patient arrives by EMS. They report they were called because patient was acutely short of breath.  Patient was given a DuoNeb and being prepared to be taken to hospital. On repeat blood pressure check patient was markedly hypertensive.  Patient was given for nitro space, nitro patch, placed on CPAP.  She received 125mg solumedrol. On arrival to the emergency department patient is on CPAP and moderate respiratory distress.  Appears to be mentating normally and able to respond to questions appropriately however history limited due to respiratory distress.     PCP: Lucian Barry MD   3/30   Patient awake and alert. Diuresed well overnight with urine output of 1.8L. Patient endorses breathing better compared to time of arrival to ED. She has had a similar hospitalization over a year ago and have since not followed up with any of her specialists. 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   Shortness of breath has been worsening over the past week and her leg swellings have started to  worsen two weeks ago. She normally ambulates with a walker at home. She has a son who also lives with her to assist. Patient remains on nitroglycerin gtt with SBP still >160s mmHg. Patient to be admitted for hypertensive emergency with acute respiratory failure with hypoxia and hypercarbia, CHF exacerbation for continue IV diuresis, oxygen supplementation, and consult with cardiology.              Overview/Hospital Course:  3/31 Overnight due to increased blood pressure, tridil gtt back on. Patient awake and tolerating BiPAP I/E 14/6 FiO2 50%. Urine output of 5 L. Patient endorses tenseness in lower extremities improving with breathing better compared yesterday. BP adjusted per cardiology with added daily Jardiance. Continue with IV 40 mg BID lasix and monitor UOP and renal function.   4/1 ND patient is feeling a little better this morning, does report less heaviness to her legs which do show improvement in edema.  Patient had good diuresis overnight.  Off of all IV blood pressure meds, and blood pressure appears more stable  4/2 ND patient still endorses some shortness of breath especially when transitioned from BiPAP to nasal cannula O2 despite having good saturation.  Nurses reports she stays very anxious.  Patient has been diuresing well with improvement in blood pressures  4/3 Overnight increasing agitation and confusion, blood tinged urine collecting in Hardwick catheter. Given change in mentation; will hold xarelto, f/u urine culture, f/u blood cultures, f/u ultrasound. Over the weekend, patient diuresed over 8 liters, IV lasix now PO. Remains tachypneic, ABG obtained show hypercarbia, BiPAP settings adjusted back to I/E 14/6 FiO2 60%. On nasal cannula patient maintains SpO2 >98%.   4/4 BiPAP tubing replacement resulted in increased tidal volume, back on nasal cannula this morning. BP normotensive. Patient more alert and agrees to participating with PT and OT. Adjust anxiety meds, add sertraline and zyprexa at  bedtime. Continue bowel regimen with gentle IVF.  Follow pulmonary hygiene care per respiratory.   4/5 Patient more alert endorsing feeling back to baseline mentation. Passing stool. Positive blood cx for gram positive clusters, start IV vancomycin adjust per pharmacy. BP normotensive. Continue with daily pulmonary hygiene care.   4/6 F/u positive blood cx, PCR resulted with no evidence of staph aureus, MRSA. CXR unremarakle, urine culture negative, patient afebrile, no clinical signs suggestive of systemic infection, will discontinue vancomycin. Spoke at length with three of patient's children, advised SNF or LTAC to continue with pulmonary care to allow patient regain her baseline strength. Family insists patient to return home with home health services, PT and OT for strengthening.   4/7 Cardiopulmonary status stable. Continue with current daily pulm care, BiPAP QHS and continuous O2 2-3L via nasal cannula. CM working on inpatient rehab care to help patient regain strength and mobility. Patient prior to hospitalization reported to ambulate with walker. With PT has been able to sit up in bed, but unable to take steps with assistance. Continue with PT and OT efforts.   4/8/23 CG: No acute overnight events. A little more lethargic today, will continue PT and OT.   4/9/23 CG: No acute overnight events. More awake this AM, BP meds optimized yesterday.   4/10 Improving appetite. No events overnight. Medically optimized at this time. CM working on placement.    4/11 Endorses feeling a little better. No changes to daily medications. Continue daily PT and OT. F/u placement.        Interval History: Patient seen and examined.     Review of Systems   Constitutional:  Positive for activity change. Negative for appetite change, chills, fatigue and fever.   HENT:  Negative for mouth sores, sneezing and sore throat.    Eyes:  Negative for visual disturbance.   Respiratory:  Positive for shortness of breath. Negative for cough,  chest tightness, wheezing and stridor.    Cardiovascular:  Positive for leg swelling. Negative for chest pain and palpitations.   Gastrointestinal:  Negative for abdominal pain, blood in stool, constipation, diarrhea, nausea and vomiting.   Musculoskeletal:  Positive for gait problem and joint swelling. Negative for arthralgias, neck pain and neck stiffness.   Skin:  Negative for color change and pallor.   Neurological:  Positive for weakness. Negative for dizziness, seizures, syncope, facial asymmetry, speech difficulty, light-headedness, numbness and headaches.   Psychiatric/Behavioral:  Negative for agitation, confusion, decreased concentration, dysphoric mood and sleep disturbance. The patient is not nervous/anxious.    Objective:     Vital Signs (Most Recent):  Temp: 97.9 °F (36.6 °C) (04/11/23 1114)  Pulse: 61 (04/11/23 1114)  Resp: 20 (04/11/23 1114)  BP: (!) 140/67 (04/11/23 1114)  SpO2: 96 % (04/11/23 1114)   Vital Signs (24h Range):  Temp:  [96.6 °F (35.9 °C)-98.3 °F (36.8 °C)] 97.9 °F (36.6 °C)  Pulse:  [50-76] 61  Resp:  [18-31] 20  SpO2:  [85 %-98 %] 96 %  BP: ()/(51-74) 140/67     Weight: 132.9 kg (293 lb)  Body mass index is 50.29 kg/m².    Intake/Output Summary (Last 24 hours) at 4/11/2023 1134  Last data filed at 4/11/2023 0558  Gross per 24 hour   Intake 1214.17 ml   Output 1650 ml   Net -435.83 ml      Physical Exam  Vitals and nursing note reviewed.   Constitutional:       General: She is not in acute distress.     Appearance: She is obese. She is not toxic-appearing or diaphoretic.   HENT:      Right Ear: External ear normal.      Left Ear: External ear normal.      Nose: No congestion or rhinorrhea.      Mouth/Throat:      Mouth: Mucous membranes are dry.      Pharynx: Oropharynx is clear. No oropharyngeal exudate or posterior oropharyngeal erythema.   Eyes:      Extraocular Movements: Extraocular movements intact.   Neck:      Comments: Thick skin folds  Cardiovascular:      Rate and  Rhythm: Normal rate. Rhythm irregular.      Heart sounds: No murmur heard.  Pulmonary:      Effort: No respiratory distress.      Breath sounds: No rhonchi or rales.      Comments: On bipap - coarse bs bilaterally, breath sounds reaching lung bases  Chest:      Chest wall: No tenderness.   Abdominal:      General: There is no distension.      Palpations: Abdomen is soft.      Tenderness: There is no abdominal tenderness.      Comments: Limited secondary to body habitus   Genitourinary:     Comments: Hardwick draining yellow urine  Musculoskeletal:         General: Swelling (non tender) present.      Cervical back: Neck supple. No rigidity.      Right lower leg: No edema.      Left lower leg: No edema.   Skin:     General: Skin is warm and dry.      Capillary Refill: Capillary refill takes less than 2 seconds.      Coloration: Skin is not jaundiced.      Findings: No bruising, erythema, lesion or rash.   Neurological:      Mental Status: She is alert and oriented to person, place, and time. Mental status is at baseline.      Gait: Gait abnormal.   Psychiatric:         Mood and Affect: Mood normal.         Behavior: Behavior normal. Behavior is not agitated or aggressive. Behavior is cooperative.     Significant Labs: All pertinent labs within the past 24 hours have been reviewed.  A1C:   Recent Labs   Lab 03/29/23  2319   HGBA1C 5.5     Bilirubin:   Recent Labs   Lab 03/30/23  0340 04/01/23  0412 04/02/23  0339 04/03/23  0344 04/04/23  0327   BILITOT 0.6 0.4 0.5 0.5 0.5     BMP:   Recent Labs   Lab 04/11/23  0516   GLU 81      K 3.6      CO2 39*   BUN 11   CREATININE 1.0   CALCIUM 9.7   MG 1.9     CBC:   Recent Labs   Lab 04/10/23  0407   WBC 6.45   HGB 12.2   HCT 39.2        CMP:   Recent Labs   Lab 04/10/23  0407 04/11/23  0516    142   K 3.4* 3.6    104   CO2 41* 39*    81   BUN 13 11   CREATININE 1.3 1.0   CALCIUM 8.9 9.7   ANIONGAP -3* -1*     Magnesium:   Recent Labs   Lab  04/11/23  0516   MG 1.9     Significant Imaging: I have reviewed all pertinent imaging results/findings within the past 24 hours.      Assessment/Plan:      * Acute respiratory failure with hypoxia  Patient with Hypercapnic and Hypoxic Respiratory failure which is Acute on chronic.  she is on home oxygen at 2 LPM. Supplemental oxygen was provided and noted- Oxygen Concentration (%):  [32-50] 32    Signs/symptoms of respiratory failure include- tachypnea, increased work of breathing, use of accessory muscles and wheezing. Contributing diagnoses includes - CHF, COPD, Obesity Hypoventilation and Pleural effusion Labs and images were reviewed. Patient Has recent ABG, which has been reviewed. Will treat underlying causes and adjust management of respiratory failure as follows- IV lasix, supplemental oxygen, continuous BiPAP  3/31: IV lasix 40 mg Q6H x 2 doses  4/1 improving daily, continue diuresis with Lasix, continue BiPAP therapy and will transition to O2 therapy as tolerates  4/2 continue current treatment, continue diuresis with Lasix; continue to offer nasal cannula as tolerates for oxygen options  4/3 transitioned to PO lasix 40 mg BID, continue BiPAP I/E 14/6 FiO2 60%  4/4 There is evidence, patient now requires noninvasive home ventilator due to chronic respiratory failure and COPD.  If obtained, this will help the patient decrease the work of breathing, hospital readmits, intubations, and improve overall quality of life. If this home respiratory device is not obtained it may lead to patient's harm or death. Will arrange for NI ventilator for home use ready on discharge.  4/6 SpO2 >94% on 2L NC, arranging at home NIV   4/7 Home NIV arranged on discharge. Currently requiring continuous O2 3L via NC during daytime. Continue daily pulmonary hygiene care  4/11 2LNC during daytime, BiPAP at bedtime and daytime when asleep, continue daily pulmonary hygiene care.    Gross hematuria  4/3 Collecting blood tinged urine in  Hannon catheter. Hg/HCT stable. Holding xarelto. Start empiric antibiotics for UTI, f/u urine culture and blood cultures.    4/4 Blood cx x2, positive for gram positive clusters  4/6 Blood cx PCR negative for MRSA and staph aureus, urine cx x2 (4/6 and 4/7) NGTD. No evidence of an infectious process, afebrile. Discontinued vancomycin after one dose administered  4/7 Hg/HCT stable. Urine in hannon collecting clear colored  4/10 Hg/HCT stable, continue to monitor    Mild temazepam use disorder  Temazepam 15 mg QHS use, unclear consistency since 1/13/23 with last fill of 30 tablets on 2/28/23.   - ativan 1g QHS  - monitor symptoms of withdrawal  4/2 due to continued anxiety will add BuSpar 3 times a day  4/4 add sertraline to Buspar TID, zyprexa QHS  4/7 mentation at baseline, continue with current sertraline and buspar, per psychiatry if appetite low, then consider addition of mirtazipine     Pulmonary edema cardiac cause  Patient is identified as having Combined Systolic and Diastolic heart failure that is Acute on chronic. CHF is currently uncontrolled due to Continued edema of extremities and Hepatic congestion/ascites, Dyspnea not returned to baseline after 2 doses of IV diuretic and Rales/crackles on pulmonary exam. Latest ECHO performed and demonstrates- No results found for this or any previous visit.  . Continue Beta Blocker, Furosemide and Nitrate/Vasodilator and monitor clinical status closely. Monitor on telemetry. Monitor strict Is&Os and daily weights.  Place on fluid restriction of 1.5 L. Continue to stress to patient importance of self efficacy and  on diet for CHF. Last BNP reviewed- and noted below No results for input(s): BNP, BNPTRIAGEBLO in the last 168 hours.   Lab Results   Component Value Date    NTPROBNP 96982 (H) 03/29/2023   - per cardiology, echo cardiogram with systolic and diastolic dysfunction with pulmonary hypertension  - continue with IV lasix, BiPAP QHS  4/1 diuresing well  continue Lasix, continue blood pressure control  4/2 a should diuresing well continue Lasix  4/4 CXR unchanged, on PO lasix, adequate UOP, continue to monitor  4/7 stable cardiopulmonary status, continue with current med regimen with continuous O2 supplementation    Shortness of breath  Improving with maintaining SpO2 >95% when transitioned to nasal cannula, but patient exhibiting increased agitation and hyperventilation when off BiPAP mask and machine.   Patient medication list not updated, but local [facility] verifies intermittent use of Temazepam 15 mg QHS for insomnia.  - will start ativan PRN and monitor symptoms of withdrawal  4/1 continues to improve daily  4/3 complaint of increased agitation and confusion, ABG shows retention, BiPAP settings adjusted  4/4 after BiPAP tubing replaced, patient tidal volume, oxygen requirements have improved, continue with daily pulm hygiene   4/7 Tolerating continuous O2 supplementation via nasal cannula 3L SpO2 >94% during daytime with BiPAP QHS, continue to monitor  4/10 stable respiratory status, continue with baseline O2 and BiPAP QHS        Diabetes mellitus, type 2  Patient's FSGs are controlled on current medication regimen.  Last A1c reviewed-   Lab Results   Component Value Date    HGBA1C 5.5 03/29/2023     Most recent fingerstick glucose reviewed-   Recent Labs   Lab 04/10/23  1639 04/10/23  2026 04/11/23  1113   POCTGLUCOSE 94 93 112*     Current correctional scale  Low  Maintain anti-hyperglycemic dose as follows-   Antihyperglycemics (From admission, onward)    Start     Stop Route Frequency Ordered    04/10/23 1000  insulin detemir U-100 (Levemir) pen 15 Units         -- SubQ Daily 04/10/23 0858    03/31/23 0106  insulin aspart U-100 pen 0-5 Units         -- SubQ Before meals & nightly PRN 03/31/23 0008    03/30/23 1700  empagliflozin 10 mg tablet 10 mg         -- Oral Daily 03/30/23 1345        Per cardiology;   3/31 Jardiance 10 mg daily  4/1 continue current  med  4/6 PO intake reduced, continue with regimen above  4/7 Tolerating PO intake, continue daily Jardiance  4/10 add on basal insulin 15U daily       Acute on chronic congestive heart failure  4/3 continue with cardiology recommendations  Patient is identified as having Combined Systolic and Diastolic heart failure that is Acute on chronic. CHF is currently uncontrolled due to Continued edema of extremities, Dyspnea not returned to baseline after 2 doses of IV diuretic and Rales/crackles on pulmonary exam. Latest ECHO performed and demonstrates- Results for orders placed during the hospital encounter of 03/29/23  Echo  Interpretation Summary  · The left ventricle is normal in size with moderate concentric hypertrophy and mildly decreased systolic function.  · The estimated ejection fraction is 45%.  · Grade I left ventricular diastolic dysfunction.  · Moderate right ventricular enlargement.  · Moderate right atrial enlargement.  · Mild-to-moderate aortic regurgitation.  · There is mild aortic valve stenosis.  · Aortic valve area is 1.44 cm2; peak velocity is 2.09 m/s; mean gradient is 10 mmHg.  · Mild-to-moderate mitral regurgitation.  · Moderate tricuspid regurgitation.  · Moderate pulmonic regurgitation.  · The estimated PA systolic pressure is 69 mmHg.  · There is moderate pulmonary hypertension.  · Moderate left atrial enlargement.  . Continue Beta Blocker, ACE/ARB, Furosemide, Aldactone, Nitrate/Vasodilator and ARNI and monitor clinical status closely. Monitor on telemetry. Monitor strict Is&Os and daily weights.  Place on fluid restriction of 1.5 L. Continue to stress to patient importance of self efficacy and  on diet for CHF. Last BNP reviewed-  Lab Results   Component Value Date    NTPROBNP 80725 (H) 03/29/2023   4/4 improved BP control, now on PO lasix with adequate urine output. Continue recommendations per cardiology.  4/6 borderline hypotension, will provide gentle IVF followed by bolus  250mL  4/8 BP medications adjusted  4/10 carvedilol reduced to 6.25 mg BID    Weakness  Secondary to above CHF exacerbation, fluid overload, SOB, body habitus. When patient's cardiorespiratory status stabilized, follow up PT and OT evaluation.   4/6 work on sitting up and out of bed, patient agrees to working with PT and OT  4/7 continue with aggressive PT and OT, CM working on inpatient rehab services   4/10 medically optimized, requires continued encouragement to increase mobility, PT and OT efforts  4/11 awaiting placement for PT and OT      Severe obesity (BMI >= 40)  Body mass index is 50.29 kg/m². Morbid obesity complicates all aspects of disease management from diagnostic modalities to treatment. Weight loss encouraged and health benefits explained to patient.         Paroxysmal A-fib  Patient with Long standing persistent (>12 months) atrial fibrillation which is controlled currently with Beta Blocker. Patient is currently in atrial fibrillation.[id number] Score: 3. Anticoagulation indicated. Anticoagulation done with Xarelto.  Xarelto held 4/3 after evidence of hematuria. Hg/HCT stable.   - continue prophylaxis with lovenox          VTE Risk Mitigation (From admission, onward)         Ordered     enoxaparin injection 40 mg  Every 12 hours         04/06/23 1125     Place sequential compression device  Until discontinued         04/03/23 1137     IP VTE HIGH RISK PATIENT  Once         03/30/23 0314     Place sequential compression device  Until discontinued         03/30/23 0314                Discharge Planning   TIGRE:      Code Status: Full Code   Is the patient medically ready for discharge?:     Reason for patient still in hospital (select all that apply): PT / OT recommendations and Pending disposition  Discharge Plan A: Home with family, Home Health   Discharge Delays: None known at this time              Gunnar Mott DO  Department of Hospital Medicine   [facility]

## 2023-04-11 NOTE — DISCHARGE SUMMARY
[facility] Medicine  Discharge Summary      Patient Name: Carmen Tan  MRN: [id number]  GOLDIE: [id number]  Patient Class: IP- Inpatient  Admission Date: 3/29/2023  Hospital Length of Stay: 12 days  Discharge Date and Time:  04/11/2023 12:16 PM  Attending Physician: Gunnar Mott DO   Discharging Provider: Gunnar Mott DO  Primary Care Provider: Lucian Barry MD    Primary Care Team: Networked reference to record PCT     HPI:   Chief Complaint   Patient presents with    Shortness of Breath       Per ems pt called 911 due to sob, when they arrived gave her a duoneb and moved her over and her blood pressure went up to 200/100's from an original reading of 140/80, solumedrol 125mg IM given, 4 sprays of nitro spray given, 1inch of nitropaste on pts chest, pt currently on cpap with an O2 sat of 100%      2312   Carmen Tan is a 73 y.o. female with PMHX of COPD, CHF, PE on Xarelto, morbid obesity who presents to the emergency department C/O shortness of breath.     Patient arrives by EMS. They report they were called because patient was acutely short of breath.  Patient was given a DuoNeb and being prepared to be taken to hospital. On repeat blood pressure check patient was markedly hypertensive.  Patient was given for nitro space, nitro patch, placed on CPAP.  She received 125mg solumedrol. On arrival to the emergency department patient is on CPAP and moderate respiratory distress.  Appears to be mentating normally and able to respond to questions appropriately however history limited due to respiratory distress.     PCP: Lucian Barry MD   3/30   Patient awake and alert. Diuresed well overnight with urine output of 1.8L. Patient endorses breathing better compared to time of arrival to ED. She has had a similar hospitalization over a year ago and have since not followed up with any of her specialists. 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   Shortness of breath has been worsening over the past week and her leg swellings have started to worsen two weeks ago. She normally ambulates with a walker at home. She has a son who also lives with her to assist. Patient remains on nitroglycerin gtt with SBP still >160s mmHg. Patient to be admitted for hypertensive emergency with acute respiratory failure with hypoxia and hypercarbia, CHF exacerbation for continue IV diuresis, oxygen supplementation, and consult with cardiology.              * No surgery found *      Hospital Course:   3/31 Overnight due to increased blood pressure, tridil gtt back on. Patient awake and tolerating BiPAP I/E 14/6 FiO2 50%. Urine output of 5 L. Patient endorses tenseness in lower extremities improving with breathing better compared yesterday. BP adjusted per cardiology with added daily Jardiance. Continue with IV 40 mg BID lasix and monitor UOP and renal function.   4/1 ND patient is feeling a little better this morning, does report less heaviness to her legs which do show improvement in edema.  Patient had good diuresis overnight.  Off of all IV blood pressure meds, and blood pressure appears more stable  4/2 ND patient still endorses some shortness of breath especially when transitioned from BiPAP to nasal cannula O2 despite having good saturation.  Nurses reports she stays very anxious.  Patient has been diuresing well with improvement in blood pressures  4/3 Overnight increasing agitation and confusion, blood tinged urine collecting in Hardwick catheter. Given change in mentation; will hold xarelto, f/u urine culture, f/u blood cultures, f/u ultrasound. Over the weekend, patient diuresed over 8 liters, IV lasix now PO. Remains tachypneic, ABG obtained show hypercarbia, BiPAP settings adjusted back to I/E 14/6 FiO2 60%. On nasal cannula patient maintains SpO2 >98%.   4/4 BiPAP tubing replacement resulted in increased tidal volume, back on nasal cannula this morning. BP  normotensive. Patient more alert and agrees to participating with PT and OT. Adjust anxiety meds, add sertraline and zyprexa at bedtime. Continue bowel regimen with gentle IVF.  Follow pulmonary hygiene care per respiratory.   4/5 Patient more alert endorsing feeling back to baseline mentation. Passing stool. Positive blood cx for gram positive clusters, start IV vancomycin adjust per pharmacy. BP normotensive. Continue with daily pulmonary hygiene care.   4/6 F/u positive blood cx, PCR resulted with no evidence of staph aureus, MRSA. CXR unremarakle, urine culture negative, patient afebrile, no clinical signs suggestive of systemic infection, will discontinue vancomycin. Spoke at length with three of patient's children, advised SNF or LTAC to continue with pulmonary care to allow patient regain her baseline strength. Family insists patient to return home with home health services, PT and OT for strengthening.   4/7 Cardiopulmonary status stable. Continue with current daily pulm care, BiPAP QHS and continuous O2 2-3L via nasal cannula. CM working on inpatient rehab care to help patient regain strength and mobility. Patient prior to hospitalization reported to ambulate with walker. With PT has been able to sit up in bed, but unable to take steps with assistance. Continue with PT and OT efforts.   4/8/23 CG: No acute overnight events. A little more lethargic today, will continue PT and OT.   4/9/23 CG: No acute overnight events. More awake this AM, BP meds optimized yesterday.   4/10 Improving appetite. No events overnight. Medically optimized at this time. CM working on placement.    4/11 Endorses feeling a little better. No changes to daily medications. Continue daily PT and OT. F/u placement.         Goals of Care Treatment Preferences:  Code Status: Full Code      Consults:   Consults (From admission, onward)        Status Ordering Provider     Inpatient consult to Social Work/Case Management  Once         Provider:  (Not yet assigned)    Acknowledged SHONNA DANIELS     Inpatient consult to Registered Dietitian/Nutritionist  Once        Provider:  (Not yet assigned)    Acknowledged SHONNA DANIELS.     Inpatient consult to Psychiatry  Once        Provider:  Vadim Dela Cruz NP    Completed SHONNA DANIELS     Inpatient consult to Cardiology  Once        Provider:  David Espinosa MD    Acknowledged SHONNA DANIELS          Cardiac/Vascular  Paroxysmal A-fib  Patient with Long standing persistent (>12 months) atrial fibrillation which is controlled currently with Beta Blocker. Patient is currently in atrial fibrillation.[id number] Score: 3. Anticoagulation indicated. Anticoagulation done with Xarelto.  History of pulmonary embolism 2017.   Xarelto held 4/3 after evidence of hematuria. Hg/HCT stable.   - continue prophylaxis with lovenox  - on discharge discontinue anticoagulation  - f/u cardiology and pulmonology on reassessing benefits of resuming anti-coagulation for increased risks for DVT and PE        Renal/  Gross hematuria  4/3 Collecting blood tinged urine in Hannon catheter. Hg/HCT stable. Holding xarelto. Start empiric antibiotics for UTI, f/u urine culture and blood cultures.    4/4 Blood cx x2, positive for gram positive clusters  4/6 Blood cx PCR negative for MRSA and staph aureus, urine cx x2 (4/6 and 4/7) NGTD. No evidence of an infectious process, afebrile. Discontinued vancomycin after one dose administered  4/7 Hg/HCT stable. Urine in hannon collecting clear colored  4/10 Hg/HCT stable, continue to monitor  4/11 Xarelto not initiated, Hg/HCT stable    Hematology  Deep vein thrombosis (DVT) prophylaxis not tolerated by patient  Xarelto held after gross hematuria, likely secondary to trauma from hannon catheter insertion.   Hg/HCT stable throughout hospitalization. History of pulmonary embolism and paroxysmal atrial fibrillation raise risk for DVT and PE with associated co  morbidities. Will continue to hold Xarelto until further evaluation outpatient with cardiology and pulmonology on weighing benefits of resuming daily anticoagulation therapy.   - encourage physical activity as tolerated  - daily pulmonary hygiene care including use of incentive spirometer             Final Active Diagnoses:    Diagnosis Date Noted POA    PRINCIPAL PROBLEM:  Acute respiratory failure with hypoxia [J96.01] 02/01/2022 Yes    Deep vein thrombosis (DVT) prophylaxis not tolerated by patient [Z78.9] 04/11/2023 Unknown    Gross hematuria [R31.0] 04/03/2023 No    Mild temazepam use disorder [F13.10] 03/31/2023 No    Shortness of breath [R06.02] 03/30/2023 Yes    Pulmonary edema cardiac cause [I50.1] 03/30/2023 Yes    Diabetes mellitus, type 2 [E11.9] 02/16/2022 Yes    Acute on chronic congestive heart failure [I50.9] 02/16/2022 Yes    Weakness [R53.1] 02/05/2022 Yes    Severe obesity (BMI >= 40) [E66.01] 02/04/2022 Yes    Paroxysmal A-fib [I48.0] 06/08/2017 Yes     Chronic      Problems Resolved During this Admission:    Diagnosis Date Noted Date Resolved POA    Bacteremia [R78.81] 04/05/2023 04/07/2023 Clinically Undetermined    Hypertensive emergency [I16.1] 03/30/2023 04/07/2023 Yes       Discharged Condition: stable    Disposition: SNF placement in [place] with PT and OT, pulmonary hygiene care    Follow Up:   Follow-up Information     Lucian Barry MD Follow up.    Specialty: Internal Medicine  Contact information:  [address]  [facility] [address]  [phone]             David Espinosa MD Follow up in 2 day(s).    Specialty: Cardiology  Contact information:  [address]  [address]  [place] [address]  [phone]                       Patient Instructions:      Ambulatory referral/consult to Pulmonology   Standing Status: Future   Referral Priority: Routine Referral Type: Consultation   Referral Reason: Specialty Services Required    Requested Specialty: Pulmonary Disease   Number of Visits Requested: 1     Ambulatory referral/consult to Home Health   Standing Status: Future   Referral Priority: Routine Referral Type: Home Health   Referral Reason: Specialty Services Required   Requested Specialty: Home Health Services   Number of Visits Requested: 1     Ambulatory referral/consult to Skilled Nursing   Standing Status: Future   Referral Priority: Routine Referral Type: Consultation   Referral Reason: Specialty Services Required   Requested Specialty: Skilled Nursing   Number of Visits Requested: 1       Significant Diagnostic Studies: Labs:   BMP:   Recent Labs   Lab 04/10/23  0407 04/11/23  0516    81    142   K 3.4* 3.6    104   CO2 41* 39*   BUN 13 11   CREATININE 1.3 1.0   CALCIUM 8.9 9.7   MG  --  1.9   , CMP   Recent Labs   Lab 04/10/23  0407 04/11/23  0516    142   K 3.4* 3.6    104   CO2 41* 39*    81   BUN 13 11   CREATININE 1.3 1.0   CALCIUM 8.9 9.7   ANIONGAP -3* -1*   , CBC   Recent Labs   Lab 04/10/23  0407   WBC 6.45   HGB 12.2   HCT 39.2      , INR   Lab Results   Component Value Date    INR 1.0 07/29/2017   , Lipid Panel   Lab Results   Component Value Date    CHOL 151 06/13/2017    HDL 42 06/13/2017    LDLCALC 85.0 06/13/2017    TRIG 120 06/13/2017    CHOLHDL 27.8 06/13/2017   , Troponin No results for input(s): TROPONINI in the last 168 hours., A1C:   Recent Labs   Lab 03/29/23  2319   HGBA1C 5.5    and All labs within the past 24 hours have been reviewed  Microbiology:   Blood Culture   Lab Results   Component Value Date    LABBLOO No growth after 5 days. 04/03/2023   , Sputum Culture   Lab Results   Component Value Date    GSRESP <10 epithelial cells per low power field. 04/07/2023    GSRESP Few Gram positive cocci 04/07/2023    GSRESP Few Gram negative rods 04/07/2023    RESPIRATORYC Normal respiratory beth 04/07/2023    RESPIRATORYC No S aureus or Pseudomonas isolated.  04/07/2023    and Urine Culture    Lab Results   Component Value Date    LABURIN No growth 04/05/2023     US Retroperitoneal Complete  Narrative: EXAMINATION:  US RETROPERITONEAL COMPLETE    CLINICAL HISTORY:  Hematuria    COMPARISON:  None    FINDINGS:  Kidneys are normal in size measuring 10.5 cm on the left and 10.2 cm on the right.  No hydronephrosis or mass identified.  Urinary bladder is collapsed by Hardwick catheter.  Impression: No sonographic abnormality of the kidneys.    Electronically signed by: Abran Pelletier MD  Date:    04/03/2023  Time:    14:53  X-Ray Chest 1 View  Narrative: EXAMINATION:  XR CHEST 1 VIEW    CLINICAL HISTORY:  Low o2 sat, confusion increase in RR;    TECHNIQUE:  Frontal view obtained of the chest    COMPARISON:  03/29/2023    FINDINGS:  Cardiac silhouette is enlarged with unchanged cardiomediastinal contours.  Coarsened pulmonary markings in the periphery of the right midlung zone unchanged from the prior exam.  No interval developed consolidation or acute osseous change.  Impression: Stable chronic findings with no interval acute radiographic change detected.    Electronically signed by: Faustino Pelletier MD  Date:    04/03/2023  Time:    13:01      Pending Diagnostic Studies:     Procedure Component Value Units Date/Time    Echo [[id number]]     Order Status: Sent Lab Status: No result     Echo [[id number]]     Order Status: Sent Lab Status: No result          Medications:  Reconciled Home Medications:      Medication List      START taking these medications    acetaminophen 325 MG tablet  Commonly known as: TYLENOL  Take 2 tablets (650 mg total) by mouth every 8 (eight) hours as needed for Pain or Temperature greater than.     budesonide 0.25 mg/2 mL nebulizer solution  Commonly known as: PULMICORT  Take 2 mLs (0.25 mg total) by nebulization every 12 (twelve) hours. Controller  Replaces: budesonide 0.5 mg/2 mL nebulizer solution     busPIRone 5 MG Tab  Commonly known as: BUSPAR  Take 1 tablet  (5 mg total) by mouth 3 (three) times daily.     carvediloL 6.25 MG tablet  Commonly known as: COREG  Take 1 tablet (6.25 mg total) by mouth 2 (two) times daily.     empagliflozin 10 mg tablet  Commonly known as: JARDIANCE  Take 1 tablet (10 mg total) by mouth once daily.  Start taking on: April 12, 2023     gabapentin 300 MG capsule  Commonly known as: NEURONTIN  Take 1 capsule (300 mg total) by mouth 2 (two) times daily.  Replaces: gabapentin 600 MG tablet     insulin detemir U-100 (Levemir) 100 unit/mL (3 mL) Inpn pen  Inject 15 Units into the skin once daily.  Start taking on: April 12, 2023     NIFEdipine 60 MG (OSM) 24 hr tablet  Commonly known as: PROCARDIA-XL  Take 1 tablet (60 mg total) by mouth once daily.  Start taking on: April 12, 2023     pantoprazole 40 MG tablet  Commonly known as: PROTONIX  Take 1 tablet (40 mg total) by mouth once daily.  Start taking on: April 12, 2023     potassium chloride SA 20 MEQ tablet  Commonly known as: K-DUR,KLOR-CON  Take 1 tablet (20 mEq total) by mouth 2 (two) times daily.     sacubitriL-valsartan 24-26 mg per tablet  Commonly known as: ENTRESTO  Take 1 tablet by mouth 2 (two) times daily.     sertraline 50 MG tablet  Commonly known as: ZOLOFT  Take 1 tablet (50 mg total) by mouth once daily.  Start taking on: April 12, 2023     spironolactone 25 MG tablet  Commonly known as: ALDACTONE  Take 1 tablet (25 mg total) by mouth once daily.  Start taking on: April 12, 2023     sucralfate 100 mg/mL suspension  Commonly known as: CARAFATE  Take 10 mLs (1 g total) by mouth 4 (four) times daily before meals and nightly.        CHANGE how you take these medications    albuterol-ipratropium 2.5 mg-0.5 mg/3 mL nebulizer solution  Commonly known as: DUO-NEB  Take 3 mLs by nebulization every 6 (six) hours while awake. Rescue  What changed:   · when to take this  · reasons to take this     furosemide 40 MG tablet  Commonly known as: LASIX  Take 1 tablet (40 mg total) by mouth once  daily.  Start taking on: April 12, 2023  What changed:   · when to take this  · additional instructions        CONTINUE taking these medications    LIPITOR 40 MG tablet  Generic drug: atorvastatin  Take 1 tablet (40 mg total) by mouth once daily.     vitamin D 1000 units Tab  Commonly known as: VITAMIN D3  Take 185 mg by mouth once daily.        STOP taking these medications    budesonide 0.5 mg/2 mL nebulizer solution  Commonly known as: PULMICORT  Replaced by: budesonide 0.25 mg/2 mL nebulizer solution     fish oil-omega-3 fatty acids 300-1,000 mg capsule     gabapentin 600 MG tablet  Commonly known as: NEURONTIN  Replaced by: gabapentin 300 MG capsule     isosorbide mononitrate 60 MG 24 hr tablet  Commonly known as: IMDUR     lisinopriL 5 MG tablet  Commonly known as: PRINIVIL,ZESTRIL     metoprolol tartrate 25 MG tablet  Commonly known as: LOPRESSOR     mometasone-formoterol 200-5 mcg/actuation inhaler  Commonly known as: DULERA     omeprazole 20 MG capsule  Commonly known as: PRILOSEC     predniSONE 20 MG tablet  Commonly known as: DELTASONE     rivaroxaban 10 mg Tab  Commonly known as: XARELTO     TOVIAZ 8 mg Tb24  Generic drug: fesoterodine            Indwelling Lines/Drains at time of discharge:   Lines/Drains/Airways     None               Time spent on the discharge of patient: 30 minutes    Gunnar Mott DO  Department of Hospital Medicine  [facility]

## 2023-04-11 NOTE — ASSESSMENT & PLAN NOTE
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 CHF is currently uncontrolled due to Continued edema of extremities and Hepatic congestion/ascites, Dyspnea not returned to baseline after 2 doses of IV diuretic and Rales/crackles on pulmonary exam. Latest ECHO performed and demonstrates- No results found for this or any previous visit.  . Continue Beta Blocker, Furosemide and Nitrate/Vasodilator and monitor clinical status closely. Monitor on telemetry. Monitor strict Is&Os and daily weights.  Place on fluid restriction of 1.5 L. Continue to stress to patient importance of self efficacy and  on diet for CHF. Last BNP reviewed- and noted below No results for input(s): BNP, BNPTRIAGEBLO in the last 168 hours.   Lab Results   Component Value Date    NTPROBNP 45101 (H) 03/29/2023   - per cardiology, echo cardiogram with systolic and diastolic dysfunction with pulmonary hypertension  - continue with IV lasix, BiPAP QHS  4/1 diuresing well continue Lasix, continue blood pressure control  4/2 a should diuresing well continue Lasix  4/4 CXR unchanged, on PO lasix, adequate UOP, continue to monitor  4/7 stable cardiopulmonary status, continue with current med regimen with continuous O2 supplementation

## 2023-04-11 NOTE — ASSESSMENT & PLAN NOTE
Secondary to above CHF exacerbation, fluid overload, SOB, body habitus. When patient's cardiorespiratory status stabilized, follow up PT and OT evaluation.   4/6 work on sitting up and out of bed, patient agrees to working with PT and OT  4/7 continue with aggressive PT and OT, CM working on inpatient rehab services   4/10 medically optimized, requires continued encouragement to increase mobility, PT and OT efforts  4/11 awaiting placement for PT and OT

## 2023-04-11 NOTE — PT/OT/SLP PROGRESS
"Occupational Therapy   Treatment    Name: Carmen Tan  MRN: [id number]  Admitting Diagnosis:  Acute respiratory failure with hypoxia       Recommendations:     Discharge Recommendations: nursing facility, skilled  Discharge Equipment Recommendations:  other (see comments) (TBD)  Barriers to discharge:  Other (Comment) (current medical and functional status)    Assessment:     Carmen Tan is a 73 y.o. female with a medical diagnosis of Acute respiratory failure with hypoxia.  She presents with good participation and fatigue. Performance deficits affecting function are weakness, impaired endurance, impaired self care skills, impaired functional mobility, gait instability, impaired balance, decreased coordination, decreased upper extremity function, decreased lower extremity function, decreased safety awareness, impaired fine motor.     Rehab Prognosis:  Fair; patient would benefit from acute skilled OT services to address these deficits and reach maximum level of function.       Plan:     Patient to be seen 5 x/week to address the above listed problems via self-care/home management, therapeutic activities, therapeutic exercises  Plan of Care Expires: 04/14/23  Plan of Care Reviewed with: patient    Subjective     Chief Complaint: "I am tired"  Pain/Comfort:  Pain Rating 1: 0/10  Pain Rating Post-Intervention 1: 0/10    Objective:     Communicated with: occupational therapist and RN prior to session.  Patient found HOB elevated with BIPAP, PureWick, peripheral IV, pulse ox (continuous), telemetry upon OT entry to room.    General Precautions: Standard, fall    Orthopedic Precautions:N/A  Braces: N/A  Respiratory Status:  BIPAP     Occupational Performance:     Bed Mobility:    Patient refused stating she was too tired    [facility] 6 Click ADL: 15    Treatment & Education:  Patient engaged in active range of motion therapeutic exercise for 2 x 10 supine in bed with HOB elevated in the following planes: shoulder " flexion/extension, shoulder abduction/adduction, elbow flexion/extension, scapular retractions/protraction, scapular elevation/depression, shoulder rotations (forward and reverse), wrist flexion/extension, wrist radial/ulnar deviation, forearm supination/pronation, and composite fist. Patient needed rest breaks between each set dur to impaired muscle endurance and activity tolerance. DELACRUZ attempted to assist patient with bed mobility task post therapeutic exercise and patient refused reporting she was too tired. While patient was completing therapeutic exercise, patient requested to take BIPAP off and do therapy with NC at 3 left O2. DELACRUZ talked with nurse and reported it was okay. After session, DELACRUZ reapplied BIPAP and notified registered nurse of re-application. Patient reported comfort upon exit.     Patient left HOB elevated with all lines intact, call button in reach, and RN notified    GOALS:   Multidisciplinary Problems       Occupational Therapy Goals          Problem: Occupational Therapy    Goal Priority Disciplines Outcome Interventions   Occupational Therapy Goal     OT, PT/OT Ongoing, Progressing    Description: Goals to be met by: 04/14/23     Patient will increase functional independence with ADLs by performing:    Feeding with Set-up Assistance.  UE Dressing with Minimal Assistance.  LE Dressing with Moderate Assistance.  Grooming while seated with Set-up Assistance.  Toileting from bedside commode with Moderate Assistance for hygiene and clothing management.   Bathing from  edge of bed with Moderate Assistance.  Step transfer with Moderate Assistance  Toilet transfer to bedside commode with Moderate Assistance.  Increased functional strength to 3+ to 4-/5 for bilateral UE's.                         Time Tracking:     OT Date of Treatment: 04/11/23  OT Start Time: 1035  OT Stop Time: 1054  OT Total Time (min): 19 min    Billable Minutes:Therapeutic Exercise 19 min    OT/MARLA: MARLA     Number of MARLA  visits since last OT visit: 2    4/11/2023  Juliet DELACRUZ

## 2023-04-12 PROBLEM — F32.A ANXIETY AND DEPRESSION: Status: ACTIVE | Noted: 2023-04-12

## 2023-04-12 PROBLEM — F41.9 ANXIETY AND DEPRESSION: Status: ACTIVE | Noted: 2023-04-12

## 2023-04-12 LAB
POCT GLUCOSE: 115 MG/DL (ref 70–110)
POCT GLUCOSE: 117 MG/DL (ref 70–110)
POCT GLUCOSE: 123 MG/DL (ref 70–110)

## 2023-04-12 PROCEDURE — 25000003 PHARM REV CODE 250: Performed by: INTERNAL MEDICINE

## 2023-04-12 PROCEDURE — 94761 N-INVAS EAR/PLS OXIMETRY MLT: CPT

## 2023-04-12 PROCEDURE — 99232 PR SUBSEQUENT HOSPITAL CARE,LEVL II: ICD-10-PCS | Mod: ,,, | Performed by: STUDENT IN AN ORGANIZED HEALTH CARE EDUCATION/TRAINING PROGRAM

## 2023-04-12 PROCEDURE — 97116 GAIT TRAINING THERAPY: CPT

## 2023-04-12 PROCEDURE — 94660 CPAP INITIATION&MGMT: CPT

## 2023-04-12 PROCEDURE — 25000242 PHARM REV CODE 250 ALT 637 W/ HCPCS: Performed by: STUDENT IN AN ORGANIZED HEALTH CARE EDUCATION/TRAINING PROGRAM

## 2023-04-12 PROCEDURE — A4216 STERILE WATER/SALINE, 10 ML: HCPCS | Performed by: STUDENT IN AN ORGANIZED HEALTH CARE EDUCATION/TRAINING PROGRAM

## 2023-04-12 PROCEDURE — 94640 AIRWAY INHALATION TREATMENT: CPT

## 2023-04-12 PROCEDURE — 99900031 HC PATIENT EDUCATION (STAT)

## 2023-04-12 PROCEDURE — 94799 UNLISTED PULMONARY SVC/PX: CPT

## 2023-04-12 PROCEDURE — 97530 THERAPEUTIC ACTIVITIES: CPT

## 2023-04-12 PROCEDURE — 11000001 HC ACUTE MED/SURG PRIVATE ROOM

## 2023-04-12 PROCEDURE — 25000242 PHARM REV CODE 250 ALT 637 W/ HCPCS: Performed by: INTERNAL MEDICINE

## 2023-04-12 PROCEDURE — 63600175 PHARM REV CODE 636 W HCPCS: Performed by: STUDENT IN AN ORGANIZED HEALTH CARE EDUCATION/TRAINING PROGRAM

## 2023-04-12 PROCEDURE — 99232 SBSQ HOSP IP/OBS MODERATE 35: CPT | Mod: ,,, | Performed by: STUDENT IN AN ORGANIZED HEALTH CARE EDUCATION/TRAINING PROGRAM

## 2023-04-12 PROCEDURE — 25000003 PHARM REV CODE 250: Performed by: STUDENT IN AN ORGANIZED HEALTH CARE EDUCATION/TRAINING PROGRAM

## 2023-04-12 PROCEDURE — 97530 THERAPEUTIC ACTIVITIES: CPT | Mod: CO

## 2023-04-12 PROCEDURE — 99900035 HC TECH TIME PER 15 MIN (STAT)

## 2023-04-12 PROCEDURE — 97535 SELF CARE MNGMENT TRAINING: CPT | Mod: CO

## 2023-04-12 PROCEDURE — 27000221 HC OXYGEN, UP TO 24 HOURS

## 2023-04-12 RX ORDER — SERTRALINE HYDROCHLORIDE 50 MG/1
50 TABLET, FILM COATED ORAL DAILY
Status: DISCONTINUED | OUTPATIENT
Start: 2023-04-13 | End: 2023-04-13 | Stop reason: HOSPADM

## 2023-04-12 RX ADMIN — CARVEDILOL 6.25 MG: 3.12 TABLET, FILM COATED ORAL at 09:04

## 2023-04-12 RX ADMIN — Medication 10 ML: at 06:04

## 2023-04-12 RX ADMIN — SUCRALFATE 1 G: 1 SUSPENSION ORAL at 08:04

## 2023-04-12 RX ADMIN — FUROSEMIDE 40 MG: 40 TABLET ORAL at 09:04

## 2023-04-12 RX ADMIN — IPRATROPIUM BROMIDE AND ALBUTEROL SULFATE 3 ML: 2.5; .5 SOLUTION RESPIRATORY (INHALATION) at 08:04

## 2023-04-12 RX ADMIN — BUSPIRONE HYDROCHLORIDE 5 MG: 5 TABLET ORAL at 03:04

## 2023-04-12 RX ADMIN — BUDESONIDE 0.25 MG: 0.25 INHALANT ORAL at 07:04

## 2023-04-12 RX ADMIN — IPRATROPIUM BROMIDE AND ALBUTEROL SULFATE 3 ML: 2.5; .5 SOLUTION RESPIRATORY (INHALATION) at 07:04

## 2023-04-12 RX ADMIN — BUDESONIDE 0.25 MG: 0.25 INHALANT ORAL at 08:04

## 2023-04-12 RX ADMIN — EMPAGLIFLOZIN 10 MG: 10 TABLET, FILM COATED ORAL at 09:04

## 2023-04-12 RX ADMIN — Medication 10 ML: at 05:04

## 2023-04-12 RX ADMIN — NIFEDIPINE 60 MG: 30 TABLET, FILM COATED, EXTENDED RELEASE ORAL at 09:04

## 2023-04-12 RX ADMIN — BUSPIRONE HYDROCHLORIDE 5 MG: 5 TABLET ORAL at 08:04

## 2023-04-12 RX ADMIN — GABAPENTIN 300 MG: 300 CAPSULE ORAL at 09:04

## 2023-04-12 RX ADMIN — SUCRALFATE 1 G: 1 SUSPENSION ORAL at 11:04

## 2023-04-12 RX ADMIN — Medication 10 ML: at 11:04

## 2023-04-12 RX ADMIN — SACUBITRIL AND VALSARTAN 1 TABLET: 24; 26 TABLET, FILM COATED ORAL at 09:04

## 2023-04-12 RX ADMIN — IPRATROPIUM BROMIDE AND ALBUTEROL SULFATE 3 ML: 2.5; .5 SOLUTION RESPIRATORY (INHALATION) at 02:04

## 2023-04-12 RX ADMIN — SPIRONOLACTONE 25 MG: 25 TABLET ORAL at 09:04

## 2023-04-12 RX ADMIN — SUCRALFATE 1 G: 1 SUSPENSION ORAL at 04:04

## 2023-04-12 RX ADMIN — SACUBITRIL AND VALSARTAN 1 TABLET: 24; 26 TABLET, FILM COATED ORAL at 08:04

## 2023-04-12 RX ADMIN — Medication 10 ML: at 12:04

## 2023-04-12 RX ADMIN — INSULIN DETEMIR 15 UNITS: 100 INJECTION, SOLUTION SUBCUTANEOUS at 09:04

## 2023-04-12 RX ADMIN — ENOXAPARIN SODIUM 40 MG: 40 INJECTION SUBCUTANEOUS at 08:04

## 2023-04-12 RX ADMIN — GABAPENTIN 300 MG: 300 CAPSULE ORAL at 08:04

## 2023-04-12 RX ADMIN — BUSPIRONE HYDROCHLORIDE 5 MG: 5 TABLET ORAL at 09:04

## 2023-04-12 RX ADMIN — SERTRALINE HYDROCHLORIDE 25 MG: 25 TABLET ORAL at 09:04

## 2023-04-12 RX ADMIN — PANTOPRAZOLE SODIUM 40 MG: 40 TABLET, DELAYED RELEASE ORAL at 09:04

## 2023-04-12 RX ADMIN — ENOXAPARIN SODIUM 40 MG: 40 INJECTION SUBCUTANEOUS at 09:04

## 2023-04-12 NOTE — SUBJECTIVE & OBJECTIVE
Interval History: Patient seen and examined.     Review of Systems   Constitutional:  Positive for activity change. Negative for appetite change, chills, fatigue and fever.   HENT:  Negative for mouth sores, sneezing and sore throat.    Eyes:  Negative for visual disturbance.   Respiratory:  Positive for shortness of breath. Negative for cough, chest tightness, wheezing and stridor.    Cardiovascular:  Positive for leg swelling. Negative for chest pain and palpitations.   Gastrointestinal:  Negative for abdominal pain, blood in stool, constipation, diarrhea, nausea and vomiting.   Musculoskeletal:  Positive for gait problem and joint swelling. Negative for arthralgias, neck pain and neck stiffness.   Skin:  Negative for color change and pallor.   Neurological:  Positive for weakness. Negative for dizziness, seizures, syncope, facial asymmetry, speech difficulty, light-headedness, numbness and headaches.   Psychiatric/Behavioral:  Negative for agitation, confusion, decreased concentration, dysphoric mood and sleep disturbance. The patient is not nervous/anxious.    Objective:     Vital Signs (Most Recent):  Temp: 97.6 °F (36.4 °C) (04/12/23 1109)  Pulse: (!) 52 (04/12/23 1109)  Resp: (!) 22 (04/12/23 1109)  BP: 100/65 (04/12/23 1109)  SpO2: 99 % (04/12/23 1109)   Vital Signs (24h Range):  Temp:  [96 °F (35.6 °C)-98.6 °F (37 °C)] 97.6 °F (36.4 °C)  Pulse:  [52-66] 52  Resp:  [17-26] 22  SpO2:  [90 %-100 %] 99 %  BP: (100-136)/(59-81) 100/65     Weight: 132.9 kg (293 lb)  Body mass index is 50.29 kg/m².    Intake/Output Summary (Last 24 hours) at 4/12/2023 1229  Last data filed at 4/12/2023 0631  Gross per 24 hour   Intake 960 ml   Output 2100 ml   Net -1140 ml      Physical Exam  Vitals and nursing note reviewed.   Constitutional:       General: She is not in acute distress.     Appearance: She is obese. She is not toxic-appearing or diaphoretic.   HENT:      Right Ear: External ear normal.      Left Ear: External ear  normal.      Nose: No congestion or rhinorrhea.      Mouth/Throat:      Mouth: Mucous membranes are dry.      Pharynx: Oropharynx is clear. No oropharyngeal exudate or posterior oropharyngeal erythema.   Eyes:      Extraocular Movements: Extraocular movements intact.   Neck:      Comments: Thick skin folds  Cardiovascular:      Rate and Rhythm: Normal rate. Rhythm irregular.      Heart sounds: No murmur heard.  Pulmonary:      Effort: No respiratory distress.      Breath sounds: No rhonchi or rales.      Comments: On bipap - coarse bs bilaterally, breath sounds reaching lung bases  Chest:      Chest wall: No tenderness.   Abdominal:      General: There is no distension.      Palpations: Abdomen is soft.      Tenderness: There is no abdominal tenderness.      Comments: Limited secondary to body habitus   Genitourinary:     Comments: Hardwick draining yellow urine  Musculoskeletal:         General: Swelling (non tender) present.      Cervical back: Neck supple. No rigidity.      Right lower leg: No edema.      Left lower leg: No edema.   Skin:     General: Skin is warm and dry.      Capillary Refill: Capillary refill takes less than 2 seconds.      Coloration: Skin is not jaundiced.      Findings: No bruising, erythema, lesion or rash.   Neurological:      Mental Status: She is alert and oriented to person, place, and time. Mental status is at baseline.      Gait: Gait abnormal.   Psychiatric:         Mood and Affect: Mood normal.         Behavior: Behavior normal. Behavior is not agitated or aggressive. Behavior is cooperative.     Significant Labs: All pertinent labs within the past 24 hours have been reviewed.  BMP:   Recent Labs   Lab 04/11/23  0516   GLU 81      K 3.6      CO2 39*   BUN 11   CREATININE 1.0   CALCIUM 9.7   MG 1.9     CMP:   Recent Labs   Lab 04/11/23  0516      K 3.6      CO2 39*   GLU 81   BUN 11   CREATININE 1.0   CALCIUM 9.7   ANIONGAP -1*     Significant Imaging: I have  reviewed all pertinent imaging results/findings within the past 24 hours.

## 2023-04-12 NOTE — PLAN OF CARE
Discharge has been delayed due to [facility] calling and stating that they need Tho to do an in-service with their staff on the [facility] in the morning.

## 2023-04-12 NOTE — PT/OT/SLP PROGRESS
"Physical Therapy Treatment    Patient Name:  Carmen Tan   MRN:  [id number]    Recommendations:     Discharge Recommendations: other (see comments) (post acute care rehabilitation)  Discharge Equipment Recommendations: none  Barriers to discharge: None    Assessment:     Carmen Tan is a 73 y.o. female admitted with a medical diagnosis of Acute respiratory failure with hypoxia.  She presents with the following impairments/functional limitations: weakness, impaired joint extensibility, impaired endurance, impaired muscle length, impaired coordination, impaired self care skills, decreased upper extremity function, impaired functional mobility, decreased lower extremity function, gait instability, decreased safety awareness, impaired balance, impaired cardiopulmonary response to activity Patient was able to demonstrate decrease assistance with supine <> sit and sit <> stand, increase distance ambulated noted.  Progressing well in therapy. .    Rehab Prognosis: Fair; patient would benefit from acute skilled PT services to address these deficits and reach maximum level of function.    Recent Surgery: * No surgery found *      Plan:     During this hospitalization, patient to be seen 5 x/week to address the identified rehab impairments via gait training, therapeutic activities, therapeutic exercises, neuromuscular re-education and progress toward the following goals:    Plan of Care Expires:  04/22/23    Subjective     Chief Complaint: "I did better today."   Patient/Family Comments/goals: Get better.  Pain/Comfort:  Pain Rating 1: 0/10  Pain Rating Post-Intervention 1: 0/10      Objective:     Communicated with nurse and patient  prior to session.  Patient found supine with peripheral IV, PureWick, pulse ox (continuous), telemetry, BIPAP upon PT entry to room.     General Precautions: Standard, fall, respiratory  Orthopedic Precautions: N/A  Braces: N/A  Respiratory Status:  BI-PAP      Functional " Mobility:  Bed Mobility:     Rolling Left:  stand by assistance and contact guard assistance  Rolling Right: stand by assistance and contact guard assistance  Scooting: stand by assistance and contact guard assistance  Bridging: contact guard assistance  Supine to Sit: contact guard assistance  Sit to Supine: contact guard assistance  Transfers:     Sit to Stand:  contact guard assistance and minimum assistance with rolling walker  Gait: 12 feet x 2 with RW with BI-PAP as O2 supplement, CGA/Min assist, decrease david   Balance: SBA with static sitting, CGA with static standing using a RW       AM-PAC 6 CLICK MOBILITY  Turning over in bed (including adjusting bedclothes, sheets and blankets)?: 3  Sitting down on and standing up from a chair with arms (e.g., wheelchair, bedside commode, etc.): 3  Moving from lying on back to sitting on the side of the bed?: 3  Moving to and from a bed to a chair (including a wheelchair)?: 3  Need to walk in hospital room?: 3  Climbing 3-5 steps with a railing?: 2 (based on clinical judgement)  Basic Mobility Total Score: 17       Treatment & Education:  roles and goals of PT, transfer training, bed mobility, gait training, balance training, safety awareness, body mechanics, assistive device, bed positioning, strengthening exercises, and fall prevention    Patient left HOB elevated with all lines intact, call button in reach, bed alarm on, and nurse notified..    GOALS:   Multidisciplinary Problems       Physical Therapy Goals          Problem: Physical Therapy    Goal Priority Disciplines Outcome Goal Variances Interventions   Physical Therapy Goal     PT, PT/OT Ongoing, Progressing     Description: Patient will increase functional independence with mobility by performin. Supine to sit with Standby Assistance  2. Sit to supine with Standby Assistance  3. Bed to chair transfer with Standby Assistancewith or without rolling walker using Step Transfer TECHNIQUE  4. Gait  x 50   feet with Standby Assistance with or without rolling walker  5. Lower extremity exercise program x10 reps per handout, with assistance as needed                           Time Tracking:     PT Received On: 04/12/23  PT Start Time: 1400     PT Stop Time: 1423  PT Total Time (min): 23 min     Billable Minutes: Gait Training 10 and Therapeutic Activity 13    Treatment Type: Treatment  PT/PTA: PT     Number of PTA visits since last PT visit: 0     04/12/2023

## 2023-04-12 NOTE — PROGRESS NOTES
[facility] Medicine  Progress Note    Patient Name: Carmen Tan  MRN: [id number]  Patient Class: IP- Inpatient   Admission Date: 3/29/2023  Length of Stay: 13 days  Attending Physician: Gunnar Mott DO  Primary Care Provider: Lucian Barry MD      Subjective:     Principal Problem:Acute respiratory failure with hypoxia    HPI:  Chief Complaint   Patient presents with    Shortness of Breath       Per ems pt called 911 due to sob, when they arrived gave her a duoneb and moved her over and her blood pressure went up to 200/100's from an original reading of 140/80, solumedrol 125mg IM given, 4 sprays of nitro spray given, 1inch of nitropaste on pts chest, pt currently on cpap with an O2 sat of 100%      0977   Carmen Tan is a 73 y.o. female with PMHX of COPD, CHF, PE on Xarelto, morbid obesity who presents to the emergency department C/O shortness of breath.     Patient arrives by EMS. They report they were called because patient was acutely short of breath.  Patient was given a DuoNeb and being prepared to be taken to hospital. On repeat blood pressure check patient was markedly hypertensive.  Patient was given for nitro space, nitro patch, placed on CPAP.  She received 125mg solumedrol. On arrival to the emergency department patient is on CPAP and moderate respiratory distress.  Appears to be mentating normally and able to respond to questions appropriately however history limited due to respiratory distress.     PCP: Lucian Barry MD   3/30   Patient awake and alert. Diuresed well overnight with urine output of 1.8L. Patient endorses breathing better compared to time of arrival to ED. She has had a similar hospitalization over a year ago and have since not followed up with any of her specialists. 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   Shortness of breath has been worsening over the past week and her leg swellings have started to  worsen two weeks ago. She normally ambulates with a walker at home. She has a son who also lives with her to assist. Patient remains on nitroglycerin gtt with SBP still >160s mmHg. Patient to be admitted for hypertensive emergency with acute respiratory failure with hypoxia and hypercarbia, CHF exacerbation for continue IV diuresis, oxygen supplementation, and consult with cardiology.              Overview/Hospital Course:  3/31 Overnight due to increased blood pressure, tridil gtt back on. Patient awake and tolerating BiPAP I/E 14/6 FiO2 50%. Urine output of 5 L. Patient endorses tenseness in lower extremities improving with breathing better compared yesterday. BP adjusted per cardiology with added daily Jardiance. Continue with IV 40 mg BID lasix and monitor UOP and renal function.   4/1 ND patient is feeling a little better this morning, does report less heaviness to her legs which do show improvement in edema.  Patient had good diuresis overnight.  Off of all IV blood pressure meds, and blood pressure appears more stable  4/2 ND patient still endorses some shortness of breath especially when transitioned from BiPAP to nasal cannula O2 despite having good saturation.  Nurses reports she stays very anxious.  Patient has been diuresing well with improvement in blood pressures  4/3 Overnight increasing agitation and confusion, blood tinged urine collecting in Hardwick catheter. Given change in mentation; will hold xarelto, f/u urine culture, f/u blood cultures, f/u ultrasound. Over the weekend, patient diuresed over 8 liters, IV lasix now PO. Remains tachypneic, ABG obtained show hypercarbia, BiPAP settings adjusted back to I/E 14/6 FiO2 60%. On nasal cannula patient maintains SpO2 >98%.   4/4 BiPAP tubing replacement resulted in increased tidal volume, back on nasal cannula this morning. BP normotensive. Patient more alert and agrees to participating with PT and OT. Adjust anxiety meds, add sertraline and zyprexa at  bedtime. Continue bowel regimen with gentle IVF.  Follow pulmonary hygiene care per respiratory.   4/5 Patient more alert endorsing feeling back to baseline mentation. Passing stool. Positive blood cx for gram positive clusters, start IV vancomycin adjust per pharmacy. BP normotensive. Continue with daily pulmonary hygiene care.   4/6 F/u positive blood cx, PCR resulted with no evidence of staph aureus, MRSA. CXR unremarakle, urine culture negative, patient afebrile, no clinical signs suggestive of systemic infection, will discontinue vancomycin. Spoke at length with three of patient's children, advised SNF or LTAC to continue with pulmonary care to allow patient regain her baseline strength. Family insists patient to return home with home health services, PT and OT for strengthening.   4/7 Cardiopulmonary status stable. Continue with current daily pulm care, BiPAP QHS and continuous O2 2-3L via nasal cannula. CM working on inpatient rehab care to help patient regain strength and mobility. Patient prior to hospitalization reported to ambulate with walker. With PT has been able to sit up in bed, but unable to take steps with assistance. Continue with PT and OT efforts.   4/8/23 CG: No acute overnight events. A little more lethargic today, will continue PT and OT.   4/9/23 CG: No acute overnight events. More awake this AM, BP meds optimized yesterday.   4/10 Improving appetite. No events overnight. Medically optimized at this time. CM working on placement.    4/11 Endorses feeling a little better. No changes to daily medications. Continue daily PT and OT. F/u placement.    4/12 No reported events overnight. [facility] declined acceptance of patient due to issues with breathing equipment. CM working on placement. Continue with daily PT and OT.      Interval History: Patient seen and examined.     Review of Systems   Constitutional:  Positive for activity change. Negative for appetite change, chills, fatigue and  fever.   HENT:  Negative for mouth sores, sneezing and sore throat.    Eyes:  Negative for visual disturbance.   Respiratory:  Positive for shortness of breath. Negative for cough, chest tightness, wheezing and stridor.    Cardiovascular:  Positive for leg swelling. Negative for chest pain and palpitations.   Gastrointestinal:  Negative for abdominal pain, blood in stool, constipation, diarrhea, nausea and vomiting.   Musculoskeletal:  Positive for gait problem and joint swelling. Negative for arthralgias, neck pain and neck stiffness.   Skin:  Negative for color change and pallor.   Neurological:  Positive for weakness. Negative for dizziness, seizures, syncope, facial asymmetry, speech difficulty, light-headedness, numbness and headaches.   Psychiatric/Behavioral:  Negative for agitation, confusion, decreased concentration, dysphoric mood and sleep disturbance. The patient is not nervous/anxious.    Objective:     Vital Signs (Most Recent):  Temp: 97.6 °F (36.4 °C) (04/12/23 1109)  Pulse: (!) 52 (04/12/23 1109)  Resp: (!) 22 (04/12/23 1109)  BP: 100/65 (04/12/23 1109)  SpO2: 99 % (04/12/23 1109)   Vital Signs (24h Range):  Temp:  [96 °F (35.6 °C)-98.6 °F (37 °C)] 97.6 °F (36.4 °C)  Pulse:  [52-66] 52  Resp:  [17-26] 22  SpO2:  [90 %-100 %] 99 %  BP: (100-136)/(59-81) 100/65     Weight: 132.9 kg (293 lb)  Body mass index is 50.29 kg/m².    Intake/Output Summary (Last 24 hours) at 4/12/2023 1229  Last data filed at 4/12/2023 0631  Gross per 24 hour   Intake 960 ml   Output 2100 ml   Net -1140 ml      Physical Exam  Vitals and nursing note reviewed.   Constitutional:       General: She is not in acute distress.     Appearance: She is obese. She is not toxic-appearing or diaphoretic.   HENT:      Right Ear: External ear normal.      Left Ear: External ear normal.      Nose: No congestion or rhinorrhea.      Mouth/Throat:      Mouth: Mucous membranes are dry.      Pharynx: Oropharynx is clear. No oropharyngeal exudate  or posterior oropharyngeal erythema.   Eyes:      Extraocular Movements: Extraocular movements intact.   Neck:      Comments: Thick skin folds  Cardiovascular:      Rate and Rhythm: Normal rate. Rhythm irregular.      Heart sounds: No murmur heard.  Pulmonary:      Effort: No respiratory distress.      Breath sounds: No rhonchi or rales.      Comments: On bipap - coarse bs bilaterally, breath sounds reaching lung bases  Chest:      Chest wall: No tenderness.   Abdominal:      General: There is no distension.      Palpations: Abdomen is soft.      Tenderness: There is no abdominal tenderness.      Comments: Limited secondary to body habitus   Genitourinary:     Comments: Hardwick draining yellow urine  Musculoskeletal:         General: Swelling (non tender) present.      Cervical back: Neck supple. No rigidity.      Right lower leg: No edema.      Left lower leg: No edema.   Skin:     General: Skin is warm and dry.      Capillary Refill: Capillary refill takes less than 2 seconds.      Coloration: Skin is not jaundiced.      Findings: No bruising, erythema, lesion or rash.   Neurological:      Mental Status: She is alert and oriented to person, place, and time. Mental status is at baseline.      Gait: Gait abnormal.   Psychiatric:         Mood and Affect: Mood normal.         Behavior: Behavior normal. Behavior is not agitated or aggressive. Behavior is cooperative.     Significant Labs: All pertinent labs within the past 24 hours have been reviewed.  BMP:   Recent Labs   Lab 04/11/23  0516   GLU 81      K 3.6      CO2 39*   BUN 11   CREATININE 1.0   CALCIUM 9.7   MG 1.9     CMP:   Recent Labs   Lab 04/11/23  0516      K 3.6      CO2 39*   GLU 81   BUN 11   CREATININE 1.0   CALCIUM 9.7   ANIONGAP -1*     Significant Imaging: I have reviewed all pertinent imaging results/findings within the past 24 hours.      Assessment/Plan:      * Acute respiratory failure with hypoxia  Patient with Hypercapnic  and Hypoxic Respiratory failure which is Acute on chronic.  she is on home oxygen at 2 LPM. Supplemental oxygen was provided and noted- Oxygen Concentration (%):  [32-50] 32    Signs/symptoms of respiratory failure include- tachypnea, increased work of breathing, use of accessory muscles and wheezing. Contributing diagnoses includes - CHF, COPD, Obesity Hypoventilation and Pleural effusion Labs and images were reviewed. Patient Has recent ABG, which has been reviewed. Will treat underlying causes and adjust management of respiratory failure as follows- IV lasix, supplemental oxygen, continuous BiPAP  3/31: IV lasix 40 mg Q6H x 2 doses  4/1 improving daily, continue diuresis with Lasix, continue BiPAP therapy and will transition to O2 therapy as tolerates  4/2 continue current treatment, continue diuresis with Lasix; continue to offer nasal cannula as tolerates for oxygen options  4/3 transitioned to PO lasix 40 mg BID, continue BiPAP I/E 14/6 FiO2 60%  4/4 There is evidence, patient now requires noninvasive home ventilator due to chronic respiratory failure and COPD.  If obtained, this will help the patient decrease the work of breathing, hospital readmits, intubations, and improve overall quality of life. If this home respiratory device is not obtained it may lead to patient's harm or death. Will arrange for NI ventilator for home use ready on discharge.  4/6 SpO2 >94% on 2L NC, arranging at home NIV   4/7 Home NIV arranged on discharge. Currently requiring continuous O2 3L via NC during daytime. Continue daily pulmonary hygiene care  4/11 2LNC during daytime, BiPAP at bedtime and daytime when asleep, continue daily pulmonary hygiene care.    Anxiety and depression  During hospitalization, patient newly started on buspar and sertraline.   Tolerating medications well. No psychosis, no increased agitation. Cardiopulmonary status stable.   Continue to monitor closely and adjust plan of care as needed.  - continue  buspar 5mg TID  - continue sertraline 50 mg daily      Deep vein thrombosis (DVT) prophylaxis not tolerated by patient  Xarelto held after gross hematuria, likely secondary to trauma from hannon catheter insertion.   Hg/HCT stable throughout hospitalization. History of pulmonary embolism and paroxysmal atrial fibrillation raise risk for DVT and PE with associated co morbidities. Will continue to hold Xarelto until further evaluation outpatient with cardiology and pulmonology on weighing benefits of resuming daily anticoagulation therapy.   - encourage physical activity as tolerated  - daily pulmonary hygiene care including use of incentive spirometer           Gross hematuria  4/3 Collecting blood tinged urine in Hannon catheter. Hg/HCT stable. Holding xarelto. Start empiric antibiotics for UTI, f/u urine culture and blood cultures.    4/4 Blood cx x2, positive for gram positive clusters  4/6 Blood cx PCR negative for MRSA and staph aureus, urine cx x2 (4/6 and 4/7) NGTD. No evidence of an infectious process, afebrile. Discontinued vancomycin after one dose administered  4/7 Hg/HCT stable. Urine in hannon collecting clear colored  4/10 Hg/HCT stable, continue to monitor  4/11 Xarelto not initiated, Hg/HCT stable, monitor signs for bleeds  Need for anticoagulation therapy to be evaluated and reviewed outpatient with vascular specialist.      Mild temazepam use disorder  Temazepam 15 mg QHS use, unclear consistency since 1/13/23 with last fill of 30 tablets on 2/28/23.   - ativan 1g QHS  - monitor symptoms of withdrawal  4/2 due to continued anxiety will add BuSpar 3 times a day  4/4 add sertraline to Buspar TID, zyprexa QHS  4/7 mentation at baseline, continue with current sertraline and buspar, per psychiatry if appetite low, then consider addition of mirtazipine       Pulmonary edema cardiac cause  Patient is identified as having Combined Systolic and Diastolic heart failure that is Acute on chronic. CHF is currently  uncontrolled due to Continued edema of extremities and Hepatic congestion/ascites, Dyspnea not returned to baseline after 2 doses of IV diuretic and Rales/crackles on pulmonary exam. Latest ECHO performed and demonstrates- No results found for this or any previous visit.  . Continue Beta Blocker, Furosemide and Nitrate/Vasodilator and monitor clinical status closely. Monitor on telemetry. Monitor strict Is&Os and daily weights.  Place on fluid restriction of 1.5 L. Continue to stress to patient importance of self efficacy and  on diet for CHF. Last BNP reviewed- and noted below No results for input(s): BNP, BNPTRIAGEBLO in the last 168 hours.   Lab Results   Component Value Date    NTPROBNP 13658 (H) 03/29/2023   - per cardiology, echo cardiogram with systolic and diastolic dysfunction with pulmonary hypertension  - continue with IV lasix, BiPAP QHS  4/1 diuresing well continue Lasix, continue blood pressure control  4/2 a should diuresing well continue Lasix  4/4 CXR unchanged, on PO lasix, adequate UOP, continue to monitor  4/7 stable cardiopulmonary status, continue with current med regimen with continuous O2 supplementation    Shortness of breath  Improving with maintaining SpO2 >95% when transitioned to nasal cannula, but patient exhibiting increased agitation and hyperventilation when off BiPAP mask and machine.   Patient medication list not updated, but local [facility] verifies intermittent use of Temazepam 15 mg QHS for insomnia.  - will start ativan PRN and monitor symptoms of withdrawal  4/1 continues to improve daily  4/3 complaint of increased agitation and confusion, ABG shows retention, BiPAP settings adjusted  4/4 after BiPAP tubing replaced, patient tidal volume, oxygen requirements have improved, continue with daily pulm hygiene   4/7 Tolerating continuous O2 supplementation via nasal cannula 3L SpO2 >94% during daytime with BiPAP QHS, continue to monitor  4/10 stable respiratory status,  continue with baseline O2 and BiPAP QHS  4/12 continue daily pulmonary hygiene care, keep patient on nasal cannula during daytime        Diabetes mellitus, type 2  Patient's FSGs are controlled on current medication regimen.  Last A1c reviewed-   Lab Results   Component Value Date    HGBA1C 5.5 03/29/2023     Most recent fingerstick glucose reviewed-   Recent Labs   Lab 04/11/23  1823 04/12/23  1108   POCTGLUCOSE 108 117*     Current correctional scale  Low  Maintain anti-hyperglycemic dose as follows-   Antihyperglycemics (From admission, onward)    Start     Stop Route Frequency Ordered    04/10/23 1000  insulin detemir U-100 (Levemir) pen 15 Units         -- SubQ Daily 04/10/23 0858    03/31/23 0106  insulin aspart U-100 pen 0-5 Units         -- SubQ Before meals & nightly PRN 03/31/23 0008    03/30/23 1700  empagliflozin 10 mg tablet 10 mg         -- Oral Daily 03/30/23 1345        Per cardiology;   3/31 Jardiance 10 mg daily  4/1 continue current med  4/6 PO intake reduced, continue with regimen above  4/7 Tolerating PO intake, continue daily Jardiance  4/10 add on basal insulin 15U daily       Acute on chronic congestive heart failure  4/3 continue with cardiology recommendations  Patient is identified as having Combined Systolic and Diastolic heart failure that is Acute on chronic. CHF is currently uncontrolled due to Continued edema of extremities, Dyspnea not returned to baseline after 2 doses of IV diuretic and Rales/crackles on pulmonary exam. Latest ECHO performed and demonstrates- Results for orders placed during the hospital encounter of 03/29/23  Echo  Interpretation Summary  · The left ventricle is normal in size with moderate concentric hypertrophy and mildly decreased systolic function.  · The estimated ejection fraction is 45%.  · Grade I left ventricular diastolic dysfunction.  · Moderate right ventricular enlargement.  · Moderate right atrial enlargement.  · Mild-to-moderate aortic  regurgitation.  · There is mild aortic valve stenosis.  · Aortic valve area is 1.44 cm2; peak velocity is 2.09 m/s; mean gradient is 10 mmHg.  · Mild-to-moderate mitral regurgitation.  · Moderate tricuspid regurgitation.  · Moderate pulmonic regurgitation.  · The estimated PA systolic pressure is 69 mmHg.  · There is moderate pulmonary hypertension.  · Moderate left atrial enlargement.  . Continue Beta Blocker, ACE/ARB, Furosemide, Aldactone, Nitrate/Vasodilator and ARNI and monitor clinical status closely. Monitor on telemetry. Monitor strict Is&Os and daily weights.  Place on fluid restriction of 1.5 L. Continue to stress to patient importance of self efficacy and  on diet for CHF. Last BNP reviewed-  Lab Results   Component Value Date    NTPROBNP 71511 (H) 03/29/2023 4/4 improved BP control, now on PO lasix with adequate urine output. Continue recommendations per cardiology.  4/6 borderline hypotension, will provide gentle IVF followed by bolus 250mL  4/8 BP medications adjusted  4/10 carvedilol reduced to 6.25 mg BID    Weakness  Secondary to above CHF exacerbation, fluid overload, SOB, body habitus. When patient's cardiorespiratory status stabilized, follow up PT and OT evaluation.   4/6 work on sitting up and out of bed, patient agrees to working with PT and OT  4/7 continue with aggressive PT and OT, CM working on inpatient rehab services   4/10 medically optimized, requires continued encouragement to increase mobility, PT and OT efforts  4/11 awaiting placement for PT and OT  4/12 encourage continued physical activity      Severe obesity (BMI >= 40)  Body mass index is 50.29 kg/m². Morbid obesity complicates all aspects of disease management from diagnostic modalities to treatment. Weight loss encouraged and health benefits explained to patient.         Paroxysmal A-fib  Patient with Long standing persistent (>12 months) atrial fibrillation which is controlled currently with Beta Blocker. Patient is  currently in atrial fibrillation.[id number] Score: 3. Anticoagulation indicated. Anticoagulation done with Xarelto.  History of pulmonary embolism 2017.   Xarelto held 4/3 after evidence of hematuria. Hg/HCT stable.   - continue prophylaxis with lovenox  - on discharge discontinue anticoagulation  - f/u cardiology and pulmonology on reassessing benefits of resuming anti-coagulation for increased risks for DVT and PE          VTE Risk Mitigation (From admission, onward)         Ordered     enoxaparin injection 40 mg  Every 12 hours         04/06/23 1125     Place sequential compression device  Until discontinued         04/03/23 1137     IP VTE HIGH RISK PATIENT  Once         03/30/23 0314     Place sequential compression device  Until discontinued         03/30/23 0314                Discharge Planning   TIGRE: 4/12/2023     Code Status: Full Code   Is the patient medically ready for discharge?:     Reason for patient still in hospital (select all that apply): Placement  Discharge Plan A: Home with family, Home Health   Discharge Delays: None known at this time              Gunnar Mott DO  Department of Hospital Medicine   [facility]

## 2023-04-12 NOTE — ASSESSMENT & PLAN NOTE
During hospitalization, patient newly started on buspar and sertraline.   Tolerating medications well. No psychosis, no increased agitation. Cardiopulmonary status stable.   Continue to monitor closely and adjust plan of care as needed.  - continue buspar 5mg TID  - continue sertraline 50 mg daily

## 2023-04-12 NOTE — ASSESSMENT & PLAN NOTE
Improving with maintaining SpO2 >95% when transitioned to nasal cannula, but patient exhibiting increased agitation and hyperventilation when off BiPAP mask and machine.   Patient medication list not updated, but local [facility] verifies intermittent use of Temazepam 15 mg QHS for insomnia.  - will start ativan PRN and monitor symptoms of withdrawal  4/1 continues to improve daily  4/3 complaint of increased agitation and confusion, ABG shows retention, BiPAP settings adjusted  4/4 after BiPAP tubing replaced, patient tidal volume, oxygen requirements have improved, continue with daily pulm hygiene   4/7 Tolerating continuous O2 supplementation via nasal cannula 3L SpO2 >94% during daytime with BiPAP QHS, continue to monitor  4/10 stable respiratory status, continue with baseline O2 and BiPAP QHS  4/12 continue daily pulmonary hygiene care, keep patient on nasal cannula during daytime

## 2023-04-12 NOTE — PLAN OF CARE
04/12/23 1312   Medicare Message   Important Message from Medicare regarding Discharge Appeal Rights Given to patient/caregiver;Explained to patient/caregiver;Signed/date by patient/caregiver   Date IMM was signed 04/12/23   Time IMM was signed [id number]

## 2023-04-12 NOTE — ASSESSMENT & PLAN NOTE
4/3 Collecting blood tinged urine in Hannon catheter. Hg/HCT stable. Holding xarelto. Start empiric antibiotics for UTI, f/u urine culture and blood cultures.    4/4 Blood cx x2, positive for gram positive clusters  4/6 Blood cx PCR negative for MRSA and staph aureus, urine cx x2 (4/6 and 4/7) NGTD. No evidence of an infectious process, afebrile. Discontinued vancomycin after one dose administered  4/7 Hg/HCT stable. Urine in hannon collecting clear colored  4/10 Hg/HCT stable, continue to monitor  4/11 Xarelto not initiated, Hg/HCT stable, monitor signs for bleeds  Need for anticoagulation therapy to be evaluated and reviewed outpatient with vascular specialist.

## 2023-04-12 NOTE — PLAN OF CARE
Spoke to BAKARI Meek, she informed me that per Micheline, with [facility], they will not be able to meet the patient's needs at this time. The patient and her family was informed, and she would like to try placement at [facility].

## 2023-04-12 NOTE — ASSESSMENT & PLAN NOTE
Patient's FSGs are controlled on current medication regimen.  Last A1c reviewed-   Lab Results   Component Value Date    HGBA1C 5.5 03/29/2023     Most recent fingerstick glucose reviewed-   Recent Labs   Lab 04/11/23  1823 04/12/23  1108   POCTGLUCOSE 108 117*     Current correctional scale  Low  Maintain anti-hyperglycemic dose as follows-   Antihyperglycemics (From admission, onward)    Start     Stop Route Frequency Ordered    04/10/23 1000  insulin detemir U-100 (Levemir) pen 15 Units         -- SubQ Daily 04/10/23 0858    03/31/23 0106  insulin aspart U-100 pen 0-5 Units         -- SubQ Before meals & nightly PRN 03/31/23 0008    03/30/23 1700  empagliflozin 10 mg tablet 10 mg         -- Oral Daily 03/30/23 1345        Per cardiology;   3/31 Jardiance 10 mg daily  4/1 continue current med  4/6 PO intake reduced, continue with regimen above  4/7 Tolerating PO intake, continue daily Jardiance  4/10 add on basal insulin 15U daily

## 2023-04-12 NOTE — ASSESSMENT & PLAN NOTE
Secondary to above CHF exacerbation, fluid overload, SOB, body habitus. When patient's cardiorespiratory status stabilized, follow up PT and OT evaluation.   4/6 work on sitting up and out of bed, patient agrees to working with PT and OT  4/7 continue with aggressive PT and OT, CM working on inpatient rehab services   4/10 medically optimized, requires continued encouragement to increase mobility, PT and OT efforts  4/11 awaiting placement for PT and OT  4/12 encourage continued physical activity

## 2023-04-12 NOTE — ASSESSMENT & PLAN NOTE
4/3 continue with cardiology recommendations  Patient is identified as having Combined Systolic and Diastolic heart failure that is Acute on chronic. CHF is currently uncontrolled due to Continued edema of extremities, Dyspnea not returned to baseline after 2 doses of IV diuretic and Rales/crackles on pulmonary exam. Latest ECHO performed and demonstrates- Results for orders placed during the hospital encounter of 03/29/23  Echo  Interpretation Summary  · The left ventricle is normal in size with moderate concentric hypertrophy and mildly decreased systolic function.  · The estimated ejection fraction is 45%.  · Grade I left ventricular diastolic dysfunction.  · Moderate right ventricular enlargement.  · Moderate right atrial enlargement.  · Mild-to-moderate aortic regurgitation.  · There is mild aortic valve stenosis.  · Aortic valve area is 1.44 cm2; peak velocity is 2.09 m/s; mean gradient is 10 mmHg.  · Mild-to-moderate mitral regurgitation.  · Moderate tricuspid regurgitation.  · Moderate pulmonic regurgitation.  · The estimated PA systolic pressure is 69 mmHg.  · There is moderate pulmonary hypertension.  · Moderate left atrial enlargement.  . Continue Beta Blocker, ACE/ARB, Furosemide, Aldactone, Nitrate/Vasodilator and ARNI and monitor clinical status closely. Monitor on telemetry. Monitor strict Is&Os and daily weights.  Place on fluid restriction of 1.5 L. Continue to stress to patient importance of self efficacy and  on diet for CHF. Last BNP reviewed-  Lab Results   Component Value Date    NTPROBNP 48826 (H) 03/29/2023 4/4 improved BP control, now on PO lasix with adequate urine output. Continue recommendations per cardiology.  4/6 borderline hypotension, will provide gentle IVF followed by bolus 250mL  4/8 BP medications adjusted  4/10 carvedilol reduced to 6.25 mg BID

## 2023-04-12 NOTE — PLAN OF CARE
[facility] approved this patient for skilled therapy, per Jenn, with admissions. She submitted for the prior auth with the insurance provider. I called Octavio with [employer] to expedite the referral

## 2023-04-12 NOTE — PT/OT/SLP PROGRESS
Occupational Therapy   Treatment    Name: Carmen Tan  MRN: [id number]  Admitting Diagnosis:  Acute respiratory failure with hypoxia       Recommendations:     Discharge Recommendations: other (see comments) (post acute care rehab)  Discharge Equipment Recommendations:  none  Barriers to discharge:  Other (Comment) (current medical and functional status)    Assessment:     Carmen Tan is a 73 y.o. female with a medical diagnosis of Acute respiratory failure with hypoxia.  She presents with good motivation and participation. Performance deficits affecting function are weakness, impaired endurance, impaired self care skills, impaired functional mobility, gait instability, impaired balance, decreased coordination, decreased upper extremity function, decreased lower extremity function, decreased safety awareness, decreased ROM, impaired fine motor, impaired cardiopulmonary response to activity.     Rehab Prognosis:  Good; patient would benefit from acute skilled OT services to address these deficits and reach maximum level of function.       Plan:     Patient to be seen 5 x/week to address the above listed problems via self-care/home management, therapeutic activities, therapeutic exercises  Plan of Care Expires: 04/14/23  Plan of Care Reviewed with: patient    Subjective     Chief Complaint: none at this time  Pain/Comfort:  Pain Rating 1: 0/10  Pain Addressed 1: Reposition, Cessation of Activity  Pain Rating Post-Intervention 1: 0/10    Objective:     Communicated with: occupational therapist and RN prior to session.  Patient found HOB elevated with peripheral IV, pulse ox (continuous), PureWick, telemetry, BIPAP upon OT entry to room.    General Precautions: Standard, fall, respiratory    Orthopedic Precautions:N/A  Braces: N/A  Respiratory Status:  BIPAP     Occupational Performance:     Bed Mobility:    Patient completed Rolling/Turning to Left with  contact guard assistance  Patient completed  Rolling/Turning to Right with contact guard assistance  Patient completed Scooting/Bridging with minimum assistance     Activities of Daily Living:  Grooming: stand by assistance post set up supine in be with HOB elevated      AMPA 6 Click ADL: 14    Treatment & Education:  Patient engaged in active range of motion therapeutic exercise for 2 x 15 supine in bed with HOB elevated in the following planes: shoulder flexion/extension, shoulder abduction/adduction, elbow flexion/extension, scapular retractions/protraction, scapular elevation/depression, shoulder rotations (forward and reverse), wrist flexion/extension, wrist radial/ulnar deviation, forearm supination/pronation, and composite fist. Patient needed rest breaks between each set dur to impaired muscle endurance and activity tolerance.  Patient then completed bed mobility task rolling left and right in bed with use of bed rails with CGA for safety. Patient then engaged in ADL task of washing face and brushing teeth with SBA with supine HOB elevated. While patient completed ADL task, BIPAP was removed and O2 applied via NC on 3L.     Patient left HOB elevated with all lines intact, call button in reach, and RN notified    GOALS:   Multidisciplinary Problems       Occupational Therapy Goals          Problem: Occupational Therapy    Goal Priority Disciplines Outcome Interventions   Occupational Therapy Goal     OT, PT/OT Ongoing, Progressing    Description: Goals to be met by: 04/14/23     Patient will increase functional independence with ADLs by performing:    Feeding with Set-up Assistance.  UE Dressing with Minimal Assistance.  LE Dressing with Moderate Assistance.  Grooming while seated with Set-up Assistance.  Toileting from bedside commode with Moderate Assistance for hygiene and clothing management.   Bathing from  edge of bed with Moderate Assistance.  Step transfer with Moderate Assistance  Toilet transfer to bedside commode with Moderate  Assistance.  Increased functional strength to 3+ to 4-/5 for bilateral UE's.                         Time Tracking:     OT Date of Treatment: 04/12/23  OT Start Time: 1306  OT Stop Time: 1329  OT Total Time (min): 23 min    Billable Minutes:Self Care/Home Management 10 min  Therapeutic Activity 13 min    OT/MARLA: MARLA     Number of MARLA visits since last OT visit: 3    4/12/2023  Juliet DELACRUZ

## 2023-04-13 VITALS
HEART RATE: 54 BPM | TEMPERATURE: 98 F | OXYGEN SATURATION: 98 % | RESPIRATION RATE: 27 BRPM | DIASTOLIC BLOOD PRESSURE: 87 MMHG | WEIGHT: 293 LBS | BODY MASS INDEX: 50.02 KG/M2 | HEIGHT: 64 IN | SYSTOLIC BLOOD PRESSURE: 120 MMHG

## 2023-04-13 LAB
POCT GLUCOSE: 154 MG/DL (ref 70–110)
POCT GLUCOSE: 71 MG/DL (ref 70–110)

## 2023-04-13 PROCEDURE — 25000003 PHARM REV CODE 250: Performed by: INTERNAL MEDICINE

## 2023-04-13 PROCEDURE — 96372 THER/PROPH/DIAG INJ SC/IM: CPT

## 2023-04-13 PROCEDURE — 1111F PR DISCHARGE MEDS RECONCILED W/ CURRENT OUTPATIENT MED LIST: ICD-10-PCS | Mod: CPTII,,, | Performed by: STUDENT IN AN ORGANIZED HEALTH CARE EDUCATION/TRAINING PROGRAM

## 2023-04-13 PROCEDURE — 25000242 PHARM REV CODE 250 ALT 637 W/ HCPCS: Performed by: STUDENT IN AN ORGANIZED HEALTH CARE EDUCATION/TRAINING PROGRAM

## 2023-04-13 PROCEDURE — 1111F DSCHRG MED/CURRENT MED MERGE: CPT | Mod: CPTII,,, | Performed by: STUDENT IN AN ORGANIZED HEALTH CARE EDUCATION/TRAINING PROGRAM

## 2023-04-13 PROCEDURE — 94660 CPAP INITIATION&MGMT: CPT

## 2023-04-13 PROCEDURE — 94640 AIRWAY INHALATION TREATMENT: CPT

## 2023-04-13 PROCEDURE — 99900035 HC TECH TIME PER 15 MIN (STAT)

## 2023-04-13 PROCEDURE — 25000003 PHARM REV CODE 250: Performed by: STUDENT IN AN ORGANIZED HEALTH CARE EDUCATION/TRAINING PROGRAM

## 2023-04-13 PROCEDURE — 27000221 HC OXYGEN, UP TO 24 HOURS

## 2023-04-13 PROCEDURE — 94761 N-INVAS EAR/PLS OXIMETRY MLT: CPT

## 2023-04-13 PROCEDURE — 99900031 HC PATIENT EDUCATION (STAT)

## 2023-04-13 PROCEDURE — A4216 STERILE WATER/SALINE, 10 ML: HCPCS | Performed by: STUDENT IN AN ORGANIZED HEALTH CARE EDUCATION/TRAINING PROGRAM

## 2023-04-13 PROCEDURE — 25000242 PHARM REV CODE 250 ALT 637 W/ HCPCS: Performed by: INTERNAL MEDICINE

## 2023-04-13 PROCEDURE — 99238 PR HOSPITAL DISCHARGE DAY,<30 MIN: ICD-10-PCS | Mod: ,,, | Performed by: STUDENT IN AN ORGANIZED HEALTH CARE EDUCATION/TRAINING PROGRAM

## 2023-04-13 PROCEDURE — 99238 HOSP IP/OBS DSCHRG MGMT 30/<: CPT | Mod: ,,, | Performed by: STUDENT IN AN ORGANIZED HEALTH CARE EDUCATION/TRAINING PROGRAM

## 2023-04-13 PROCEDURE — 63600175 PHARM REV CODE 636 W HCPCS: Performed by: STUDENT IN AN ORGANIZED HEALTH CARE EDUCATION/TRAINING PROGRAM

## 2023-04-13 RX ORDER — SERTRALINE HYDROCHLORIDE 50 MG/1
50 TABLET, FILM COATED ORAL DAILY
Qty: 30 TABLET | Refills: 11 | Status: ON HOLD | OUTPATIENT
Start: 2023-04-14 | End: 2023-09-09 | Stop reason: CLARIF

## 2023-04-13 RX ADMIN — Medication 10 ML: at 11:04

## 2023-04-13 RX ADMIN — SERTRALINE HYDROCHLORIDE 50 MG: 50 TABLET ORAL at 10:04

## 2023-04-13 RX ADMIN — CARVEDILOL 6.25 MG: 3.12 TABLET, FILM COATED ORAL at 10:04

## 2023-04-13 RX ADMIN — SUCRALFATE 1 G: 1 SUSPENSION ORAL at 10:04

## 2023-04-13 RX ADMIN — INSULIN DETEMIR 15 UNITS: 100 INJECTION, SOLUTION SUBCUTANEOUS at 10:04

## 2023-04-13 RX ADMIN — BUDESONIDE 0.25 MG: 0.25 INHALANT ORAL at 07:04

## 2023-04-13 RX ADMIN — SUCRALFATE 1 G: 1 SUSPENSION ORAL at 06:04

## 2023-04-13 RX ADMIN — NIFEDIPINE 60 MG: 30 TABLET, FILM COATED, EXTENDED RELEASE ORAL at 10:04

## 2023-04-13 RX ADMIN — ENOXAPARIN SODIUM 40 MG: 40 INJECTION SUBCUTANEOUS at 10:04

## 2023-04-13 RX ADMIN — PANTOPRAZOLE SODIUM 40 MG: 40 TABLET, DELAYED RELEASE ORAL at 10:04

## 2023-04-13 RX ADMIN — IPRATROPIUM BROMIDE AND ALBUTEROL SULFATE 3 ML: 2.5; .5 SOLUTION RESPIRATORY (INHALATION) at 01:04

## 2023-04-13 RX ADMIN — EMPAGLIFLOZIN 10 MG: 10 TABLET, FILM COATED ORAL at 10:04

## 2023-04-13 RX ADMIN — SACUBITRIL AND VALSARTAN 1 TABLET: 24; 26 TABLET, FILM COATED ORAL at 10:04

## 2023-04-13 RX ADMIN — SPIRONOLACTONE 25 MG: 25 TABLET ORAL at 10:04

## 2023-04-13 RX ADMIN — Medication 10 ML: at 06:04

## 2023-04-13 RX ADMIN — IPRATROPIUM BROMIDE AND ALBUTEROL SULFATE 3 ML: 2.5; .5 SOLUTION RESPIRATORY (INHALATION) at 07:04

## 2023-04-13 RX ADMIN — FUROSEMIDE 40 MG: 40 TABLET ORAL at 10:04

## 2023-04-13 RX ADMIN — GABAPENTIN 300 MG: 300 CAPSULE ORAL at 10:04

## 2023-04-13 RX ADMIN — BUSPIRONE HYDROCHLORIDE 5 MG: 5 TABLET ORAL at 10:04

## 2023-04-13 NOTE — PLAN OF CARE
Pt being Dc to [facility]. Called report to [facility]ON[facility]. Per Nellie they will provide transportation .

## 2023-04-13 NOTE — ASSESSMENT & PLAN NOTE
Patient's FSGs are controlled on current medication regimen.  Last A1c reviewed-   Lab Results   Component Value Date    HGBA1C 5.5 03/29/2023     Most recent fingerstick glucose reviewed-   Recent Labs   Lab 04/12/23  1635 04/12/23 2014 04/13/23  0640 04/13/23  1048   POCTGLUCOSE 123* 115* 71 154*     Current correctional scale  Low  Maintain anti-hyperglycemic dose as follows-   Antihyperglycemics (From admission, onward)    Start     Stop Route Frequency Ordered    04/10/23 1000  insulin detemir U-100 (Levemir) pen 15 Units         -- SubQ Daily 04/10/23 0858    03/31/23 0106  insulin aspart U-100 pen 0-5 Units         -- SubQ Before meals & nightly PRN 03/31/23 0008    03/30/23 1700  empagliflozin 10 mg tablet 10 mg         -- Oral Daily 03/30/23 1345        Per cardiology;   3/31 Jardiance 10 mg daily  4/1 continue current med  4/6 PO intake reduced, continue with regimen above  4/7 Tolerating PO intake, continue daily Jardiance  4/10 add on basal insulin 15U daily

## 2023-04-13 NOTE — ASSESSMENT & PLAN NOTE
Patient with Hypercapnic and Hypoxic Respiratory failure which is Acute on chronic.  she is on home oxygen at 2 LPM. Supplemental oxygen was provided and noted- Oxygen Concentration (%):  [50] 50    Signs/symptoms of respiratory failure include- tachypnea, increased work of breathing, use of accessory muscles and wheezing. Contributing diagnoses includes - CHF, COPD, Obesity Hypoventilation and Pleural effusion Labs and images were reviewed. Patient Has recent ABG, which has been reviewed. Will treat underlying causes and adjust management of respiratory failure as follows- IV lasix, supplemental oxygen, continuous BiPAP  3/31: IV lasix 40 mg Q6H x 2 doses  4/1 improving daily, continue diuresis with Lasix, continue BiPAP therapy and will transition to O2 therapy as tolerates  4/2 continue current treatment, continue diuresis with Lasix; continue to offer nasal cannula as tolerates for oxygen options  4/3 transitioned to PO lasix 40 mg BID, continue BiPAP I/E 14/6 FiO2 60%  4/4 There is evidence, patient now requires noninvasive home ventilator due to chronic respiratory failure and COPD.  If obtained, this will help the patient decrease the work of breathing, hospital readmits, intubations, and improve overall quality of life. If this home respiratory device is not obtained it may lead to patient's harm or death. Will arrange for NI ventilator for home use ready on discharge.  4/6 SpO2 >94% on 2L NC, arranging at home NIV   4/7 Home NIV arranged on discharge. Currently requiring continuous O2 3L via NC during daytime. Continue daily pulmonary hygiene care  4/11 2LNC during daytime, BiPAP at bedtime and daytime when asleep, continue daily pulmonary hygiene care.  4/13 continue with above daily pulmonary hygiene care

## 2023-04-13 NOTE — ASSESSMENT & PLAN NOTE
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 CHF is currently uncontrolled due to Continued edema of extremities and Hepatic congestion/ascites, Dyspnea not returned to baseline after 2 doses of IV diuretic and Rales/crackles on pulmonary exam. Latest ECHO performed and demonstrates- No results found for this or any previous visit.  . Continue Beta Blocker, Furosemide and Nitrate/Vasodilator and monitor clinical status closely. Monitor on telemetry. Monitor strict Is&Os and daily weights.  Place on fluid restriction of 1.5 L. Continue to stress to patient importance of self efficacy and  on diet for CHF. Last BNP reviewed- and noted below No results for input(s): BNP, BNPTRIAGEBLO in the last 168 hours.   Lab Results   Component Value Date    NTPROBNP 80316 (H) 03/29/2023   - per cardiology, echo cardiogram with systolic and diastolic dysfunction with pulmonary hypertension  - continue with IV lasix, BiPAP QHS  4/1 diuresing well continue Lasix, continue blood pressure control  4/2 a should diuresing well continue Lasix  4/4 CXR unchanged, on PO lasix, adequate UOP, continue to monitor  4/7 stable cardiopulmonary status, continue with current med regimen with continuous O2 supplementation  4/13 Continue daily PO lasix 40 mg daily

## 2023-04-13 NOTE — ASSESSMENT & PLAN NOTE
Body mass index is 50.29 kg/m². Morbid obesity complicates all aspects of disease management from diagnostic modalities to treatment. Weight loss encouraged and health benefits explained to patient.        I have supervised the LPN's care of the patient relating to the following procedure(s): Lupron Injection

## 2023-04-13 NOTE — PLAN OF CARE
[facility]  Discharge Final Note    Primary Care Provider: Lucian Barry MD    Expected Discharge Date: 4/13/2023    Final Discharge Note (most recent)       Final Note - 04/13/23 1356          Final Note    Assessment Type Final Discharge Note     Anticipated Discharge Disposition Skilled Nursing Facility        Post-Acute Status    Coverage [employer] MANAGED MEDICARE - [employer] MED[employer]ARE HMO     Discharge Delays Ambulance Transport/Facility Transport                     Important Message from Medicare  Important Message from Medicare regarding Discharge Appeal Rights: Given to patient/caregiver, Explained to patient/caregiver, Signed/date by patient/caregiver     Date IMM was signed: 04/12/23  Time IMM was signed: 1309     Follow-up providers       Lucian Barry MD   Specialty: Internal Medicine   Relationship: PCP - General    [address] [place], [place] [address]  [facility] [address]   Phone: [phone]       Next Steps: Follow up    David Espinosa MD   Specialty: Cardiology    [address] [facility]  [address]  [facility] [address]   Phone: [phone]       Next Steps: Follow up in 2 day(s)              After-discharge care                Destination       [facility]   Service: Skilled Nursing    [address] [facility] [address]   Phone: [phone]                           Final note is completed. The patient will be discharging to [facility] for skilled services. The patient and her family were informed.

## 2023-04-13 NOTE — PT/OT/SLP DISCHARGE
Physical Therapy Discharge Summary    Name: Carmen Tan  MRN: [id number]   Principal Problem: Acute respiratory failure with hypoxia     Patient Discharged from acute Physical Therapy on 2023 .  Please refer to prior PT noted date on 2023  for functional status.     Assessment:     Goals partially met. Patient appropriate for care in another setting.    Objective:     GOALS:   Multidisciplinary Problems       Physical Therapy Goals          Problem: Physical Therapy    Goal Priority Disciplines Outcome Goal Variances Interventions   Physical Therapy Goal     PT, PT/OT Adequate for Care Transition     Description: Patient will increase functional independence with mobility by performin. Supine to sit with Standby Assistance  2. Sit to supine with Standby Assistance  3. Bed to chair transfer with Standby Assistancewith or without rolling walker using Step Transfer TECHNIQUE  4. Gait  x 50  feet with Standby Assistance with or without rolling walker  5. Lower extremity exercise program x10 reps per handout, with assistance as needed                           Reasons for Discontinuation of Therapy Services  Transfer to alternate level of care.      Plan:     Patient Discharged to: Skilled Nursing Facility.      2023

## 2023-04-13 NOTE — PLAN OF CARE
Problem: Physical Therapy  Goal: Physical Therapy Goal  Description: Patient will increase functional independence with mobility by performin. Supine to sit with Standby Assistance  2. Sit to supine with Standby Assistance  3. Bed to chair transfer with Standby Assistancewith or without rolling walker using Step Transfer TECHNIQUE  4. Gait  x 50  feet with Standby Assistance with or without rolling walker  5. Lower extremity exercise program x10 reps per handout, with assistance as needed      Outcome: Adequate for Care Transition

## 2023-04-13 NOTE — DISCHARGE SUMMARY
[facility]  Discharge Summary      Patient Name: Carmen Tan  MRN: [id number]  GOLDIE: [id number]  Patient Class: IP- Inpatient  Admission Date: 3/29/2023  Hospital Length of Stay: 14 days  Discharge Date and Time:  04/13/2023 12:42 PM  Attending Physician: Gunnar Mott DO   Discharging Provider: Gunnar Mott DO  Primary Care Provider: Lucian Barry MD    Primary Care Team: Networked reference to record PCT     HPI:   Chief Complaint   Patient presents with    Shortness of Breath       Per ems pt called 911 due to sob, when they arrived gave her a duoneb and moved her over and her blood pressure went up to 200/100's from an original reading of 140/80, solumedrol 125mg IM given, 4 sprays of nitro spray given, 1inch of nitropaste on pts chest, pt currently on cpap with an O2 sat of 100%      3035   Carmen Tan is a 73 y.o. female with PMHX of COPD, CHF, PE on Xarelto, morbid obesity who presents to the emergency department C/O shortness of breath.     Patient arrives by EMS. They report they were called because patient was acutely short of breath.  Patient was given a DuoNeb and being prepared to be taken to hospital. On repeat blood pressure check patient was markedly hypertensive.  Patient was given for nitro space, nitro patch, placed on CPAP.  She received 125mg solumedrol. On arrival to the emergency department patient is on CPAP and moderate respiratory distress.  Appears to be mentating normally and able to respond to questions appropriately however history limited due to respiratory distress.     PCP: Lucian Barry MD   3/30   Patient awake and alert. Diuresed well overnight with urine output of 1.8L. Patient endorses breathing better compared to time of arrival to ED. She has had a similar hospitalization over a year ago and have since not followed up with any of her specialists. 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   Shortness of breath has been worsening over the past week and her leg swellings have started to worsen two weeks ago. She normally ambulates with a walker at home. She has a son who also lives with her to assist. Patient remains on nitroglycerin gtt with SBP still >160s mmHg. Patient to be admitted for hypertensive emergency with acute respiratory failure with hypoxia and hypercarbia, CHF exacerbation for continue IV diuresis, oxygen supplementation, and consult with cardiology.              * No surgery found *      Hospital Course:   3/31 Overnight due to increased blood pressure, tridil gtt back on. Patient awake and tolerating BiPAP I/E 14/6 FiO2 50%. Urine output of 5 L. Patient endorses tenseness in lower extremities improving with breathing better compared yesterday. BP adjusted per cardiology with added daily Jardiance. Continue with IV 40 mg BID lasix and monitor UOP and renal function.   4/1 ND patient is feeling a little better this morning, does report less heaviness to her legs which do show improvement in edema.  Patient had good diuresis overnight.  Off of all IV blood pressure meds, and blood pressure appears more stable  4/2 ND patient still endorses some shortness of breath especially when transitioned from BiPAP to nasal cannula O2 despite having good saturation.  Nurses reports she stays very anxious.  Patient has been diuresing well with improvement in blood pressures  4/3 Overnight increasing agitation and confusion, blood tinged urine collecting in Hardwick catheter. Given change in mentation; will hold xarelto, f/u urine culture, f/u blood cultures, f/u ultrasound. Over the weekend, patient diuresed over 8 liters, IV lasix now PO. Remains tachypneic, ABG obtained show hypercarbia, BiPAP settings adjusted back to I/E 14/6 FiO2 60%. On nasal cannula patient maintains SpO2 >98%.   4/4 BiPAP tubing replacement resulted in increased tidal volume, back on nasal cannula this morning. BP  normotensive. Patient more alert and agrees to participating with PT and OT. Adjust anxiety meds, add sertraline and zyprexa at bedtime. Continue bowel regimen with gentle IVF.  Follow pulmonary hygiene care per respiratory.   4/5 Patient more alert endorsing feeling back to baseline mentation. Passing stool. Positive blood cx for gram positive clusters, start IV vancomycin adjust per pharmacy. BP normotensive. Continue with daily pulmonary hygiene care.   4/6 F/u positive blood cx, PCR resulted with no evidence of staph aureus, MRSA. CXR unremarakle, urine culture negative, patient afebrile, no clinical signs suggestive of systemic infection, will discontinue vancomycin. Spoke at length with three of patient's children, advised SNF or LTAC to continue with pulmonary care to allow patient regain her baseline strength. Family insists patient to return home with home health services, PT and OT for strengthening.   4/7 Cardiopulmonary status stable. Continue with current daily pulm care, BiPAP QHS and continuous O2 2-3L via nasal cannula. CM working on inpatient rehab care to help patient regain strength and mobility. Patient prior to hospitalization reported to ambulate with walker. With PT has been able to sit up in bed, but unable to take steps with assistance. Continue with PT and OT efforts.   4/8/23 CG: No acute overnight events. A little more lethargic today, will continue PT and OT.   4/9/23 CG: No acute overnight events. More awake this AM, BP meds optimized yesterday.   4/10 Improving appetite. No events overnight. Medically optimized at this time. CM working on placement.    4/11 Endorses feeling a little better. No changes to daily medications. Continue daily PT and OT. F/u placement.    4/12 No reported events overnight. [facility] declined acceptance of patient due to issues with breathing equipment. CM working on placement. Continue with daily PT and OT.  4/13 Encouraged patient to stay up longer  during the day time and stay on 2L oxygen supplementation with use of BiPAP limited to bedtime use. Patient to be discharged to [facility] for continue PT, OT and aggressive daily pulmonary hygiene care.        Goals of Care Treatment Preferences:  Code Status: Full Code      Consults:   Consults (From admission, onward)        Status Ordering Provider     Inpatient consult to Social Work/Case Management  Once        Provider:  (Not yet assigned)    Acknowledged SHONNA DANIELS     Inpatient consult to Social Work/Case Management  Once        Provider:  (Not yet assigned)    Acknowledged SHONNA DANIELS     Inpatient consult to Registered Dietitian/Nutritionist  Once        Provider:  (Not yet assigned)    Acknowledged SHONNA DANIELS     Inpatient consult to Psychiatry  Once        Provider:  Vadim Dela Cruz NP    Completed SHONNA DANIELS     Inpatient consult to Cardiology  Once        Provider:  David Espinosa MD    Acknowledged SHONNA DANIELS          Psychiatric  Anxiety and depression  During hospitalization, patient newly started on buspar and sertraline.   Tolerating medications well. No psychosis, no increased agitation. Cardiopulmonary status stable.   Continue to monitor closely and adjust plan of care as needed.  - continue buspar 5mg TID  - continue sertraline 50 mg daily      Mild temazepam use disorder  Temazepam 15 mg QHS use, unclear consistency since 1/13/23 with last fill of 30 tablets on 2/28/23.   - ativan 1g QHS  - monitor symptoms of withdrawal  4/2 due to continued anxiety will add BuSpar 3 times a day  4/4 add sertraline to Buspar TID, zyprexa QHS  4/7 mentation at baseline, continue with current sertraline and buspar, per psychiatry if appetite low, then consider addition of mirtazipine       Pulmonary  * Acute respiratory failure with hypoxia  Patient with Hypercapnic and Hypoxic Respiratory failure which is Acute on chronic.  she is on home  oxygen at 2 LPM. Supplemental oxygen was provided and noted- Oxygen Concentration (%):  [50] 50    Signs/symptoms of respiratory failure include- tachypnea, increased work of breathing, use of accessory muscles and wheezing. Contributing diagnoses includes - CHF, COPD, Obesity Hypoventilation and Pleural effusion Labs and images were reviewed. Patient Has recent ABG, which has been reviewed. Will treat underlying causes and adjust management of respiratory failure as follows- IV lasix, supplemental oxygen, continuous BiPAP  3/31: IV lasix 40 mg Q6H x 2 doses  4/1 improving daily, continue diuresis with Lasix, continue BiPAP therapy and will transition to O2 therapy as tolerates  4/2 continue current treatment, continue diuresis with Lasix; continue to offer nasal cannula as tolerates for oxygen options  4/3 transitioned to PO lasix 40 mg BID, continue BiPAP I/E 14/6 FiO2 60%  4/4 There is evidence, patient now requires noninvasive home ventilator due to chronic respiratory failure and COPD.  If obtained, this will help the patient decrease the work of breathing, hospital readmits, intubations, and improve overall quality of life. If this home respiratory device is not obtained it may lead to patient's harm or death. Will arrange for NI ventilator for home use ready on discharge.  4/6 SpO2 >94% on 2L NC, arranging at home NIV   4/7 Home NIV arranged on discharge. Currently requiring continuous O2 3L via NC during daytime. Continue daily pulmonary hygiene care  4/11 2LNC during daytime, BiPAP at bedtime and daytime when asleep, continue daily pulmonary hygiene care.  4/13 continue with above daily pulmonary hygiene care    Shortness of breath  Improving with maintaining SpO2 >95% when transitioned to nasal cannula, but patient exhibiting increased agitation and hyperventilation when off BiPAP mask and machine.   Patient medication list not updated, but local [facility] verifies intermittent use of Temazepam 15 mg QHS  for insomnia.  - will start ativan PRN and monitor symptoms of withdrawal  4/1 continues to improve daily  4/3 complaint of increased agitation and confusion, ABG shows retention, BiPAP settings adjusted  4/4 after BiPAP tubing replaced, patient tidal volume, oxygen requirements have improved, continue with daily pulm hygiene   4/7 Tolerating continuous O2 supplementation via nasal cannula 3L SpO2 >94% during daytime with BiPAP QHS, continue to monitor  4/10 stable respiratory status, continue with baseline O2 and BiPAP QHS  4/12 continue daily pulmonary hygiene care, keep patient on nasal cannula during daytime        Cardiac/Vascular  Pulmonary edema cardiac cause  Patient is identified as having Combined Systolic and Diastolic heart failure that is Acute on chronic. CHF is currently uncontrolled due to Continued edema of extremities and Hepatic congestion/ascites, Dyspnea not returned to baseline after 2 doses of IV diuretic and Rales/crackles on pulmonary exam. Latest ECHO performed and demonstrates- No results found for this or any previous visit.  . Continue Beta Blocker, Furosemide and Nitrate/Vasodilator and monitor clinical status closely. Monitor on telemetry. Monitor strict Is&Os and daily weights.  Place on fluid restriction of 1.5 L. Continue to stress to patient importance of self efficacy and  on diet for CHF. Last BNP reviewed- and noted below No results for input(s): BNP, BNPTRIAGEBLO in the last 168 hours.   Lab Results   Component Value Date    NTPROBNP 81117 (H) 03/29/2023   - per cardiology, echo cardiogram with systolic and diastolic dysfunction with pulmonary hypertension  - continue with IV lasix, BiPAP QHS  4/1 diuresing well continue Lasix, continue blood pressure control  4/2 a should diuresing well continue Lasix  4/4 CXR unchanged, on PO lasix, adequate UOP, continue to monitor  4/7 stable cardiopulmonary status, continue with current med regimen with continuous O2  supplementation  4/13 Continue daily PO lasix 40 mg daily    Acute on chronic congestive heart failure  4/3 continue with cardiology recommendations  Patient is identified as having Combined Systolic and Diastolic heart failure that is Acute on chronic. CHF is currently uncontrolled due to Continued edema of extremities, Dyspnea not returned to baseline after 2 doses of IV diuretic and Rales/crackles on pulmonary exam. Latest ECHO performed and demonstrates- Results for orders placed during the hospital encounter of 03/29/23  Echo  Interpretation Summary  · The left ventricle is normal in size with moderate concentric hypertrophy and mildly decreased systolic function.  · The estimated ejection fraction is 45%.  · Grade I left ventricular diastolic dysfunction.  · Moderate right ventricular enlargement.  · Moderate right atrial enlargement.  · Mild-to-moderate aortic regurgitation.  · There is mild aortic valve stenosis.  · Aortic valve area is 1.44 cm2; peak velocity is 2.09 m/s; mean gradient is 10 mmHg.  · Mild-to-moderate mitral regurgitation.  · Moderate tricuspid regurgitation.  · Moderate pulmonic regurgitation.  · The estimated PA systolic pressure is 69 mmHg.  · There is moderate pulmonary hypertension.  · Moderate left atrial enlargement.  . Continue Beta Blocker, ACE/ARB, Furosemide, Aldactone, Nitrate/Vasodilator and ARNI and monitor clinical status closely. Monitor on telemetry. Monitor strict Is&Os and daily weights.  Place on fluid restriction of 1.5 L. Continue to stress to patient importance of self efficacy and  on diet for CHF. Last BNP reviewed-  Lab Results   Component Value Date    NTPROBNP 17204 (H) 03/29/2023 4/4 improved BP control, now on PO lasix with adequate urine output. Continue recommendations per cardiology.  4/6 borderline hypotension, will provide gentle IVF followed by bolus 250mL  4/8 BP medications adjusted  4/10 Carvedilol reduced to 6.25 mg BID  4/13 Continue with   -  carvedilol 6.25 mg BID  - nifedipine 24hr 60 mg daily  - entresto 24-26 mg daily  - spironolactone 25 mg daily  - lasix 40 mg daily  - jardiance 10 mg daily    Paroxysmal A-fib  Patient with Long standing persistent (>12 months) atrial fibrillation which is controlled currently with Beta Blocker. Patient is currently in atrial fibrillation.[id number] Score: 3. Anticoagulation indicated. Anticoagulation done with Xarelto.  History of pulmonary embolism 2017.   Xarelto held 4/3 after evidence of hematuria. Hg/HCT stable.   - continue prophylaxis with lovenox  - on discharge discontinue anticoagulation  - f/u cardiology and pulmonology on reassessing benefits of resuming anti-coagulation for increased risks for DVT and PE        Renal/  Gross hematuria  4/3 Collecting blood tinged urine in Hannon catheter. Hg/HCT stable. Holding xarelto. Start empiric antibiotics for UTI, f/u urine culture and blood cultures.    4/4 Blood cx x2, positive for gram positive clusters  4/6 Blood cx PCR negative for MRSA and staph aureus, urine cx x2 (4/6 and 4/7) NGTD. No evidence of an infectious process, afebrile. Discontinued vancomycin after one dose administered  4/7 Hg/HCT stable. Urine in hannon collecting clear colored  4/10 Hg/HCT stable, continue to monitor  4/11 Xarelto not initiated, Hg/HCT stable, monitor signs for bleeds  Need for anticoagulation therapy to be evaluated and reviewed outpatient with vascular specialist.      Hematology  Deep vein thrombosis (DVT) prophylaxis not tolerated by patient  Xarelto held after gross hematuria, likely secondary to trauma from hannon catheter insertion.   Hg/HCT stable throughout hospitalization. History of pulmonary embolism and paroxysmal atrial fibrillation raise risk for DVT and PE with associated co morbidities. Will continue to hold Xarelto until further evaluation outpatient with cardiology and pulmonology on weighing benefits of resuming daily anticoagulation therapy.   -  encourage physical activity as tolerated  - daily pulmonary hygiene care including use of incentive spirometer           Endocrine  Diabetes mellitus, type 2  Patient's FSGs are controlled on current medication regimen.  Last A1c reviewed-   Lab Results   Component Value Date    HGBA1C 5.5 03/29/2023     Most recent fingerstick glucose reviewed-   Recent Labs   Lab 04/12/23  1635 04/12/23 2014 04/13/23  0640 04/13/23  1048   POCTGLUCOSE 123* 115* 71 154*     Current correctional scale  Low  Maintain anti-hyperglycemic dose as follows-   Antihyperglycemics (From admission, onward)    Start     Stop Route Frequency Ordered    04/10/23 1000  insulin detemir U-100 (Levemir) pen 15 Units         -- SubQ Daily 04/10/23 0858    03/31/23 0106  insulin aspart U-100 pen 0-5 Units         -- SubQ Before meals & nightly PRN 03/31/23 0008    03/30/23 1700  empagliflozin 10 mg tablet 10 mg         -- Oral Daily 03/30/23 1345        Per cardiology;   3/31 Jardiance 10 mg daily  4/1 continue current med  4/6 PO intake reduced, continue with regimen above  4/7 Tolerating PO intake, continue daily Jardiance  4/10 add on basal insulin 15U daily       Severe obesity (BMI >= 40)  Body mass index is 50.29 kg/m². Morbid obesity complicates all aspects of disease management from diagnostic modalities to treatment. Weight loss encouraged and health benefits explained to patient.         Other  Weakness  Secondary to above CHF exacerbation, fluid overload, SOB, body habitus. When patient's cardiorespiratory status stabilized, follow up PT and OT evaluation.   4/6 work on sitting up and out of bed, patient agrees to working with PT and OT  4/7 continue with aggressive PT and OT, CM working on inpatient rehab services   4/10 medically optimized, requires continued encouragement to increase mobility, PT and OT efforts  4/11 awaiting placement for PT and OT  4/12 encourage continued physical activity  4/13 continue aggressive PT, OT and  pulmonary hygiene care, follow up cardiology and respiratory specialists for ongoing monitoring of cardiopulmonary function        Final Active Diagnoses:    Diagnosis Date Noted POA    PRINCIPAL PROBLEM:  Acute respiratory failure with hypoxia [J96.01] 02/01/2022 Yes    Anxiety and depression [F41.9, F32.A] 04/12/2023 No    Deep vein thrombosis (DVT) prophylaxis not tolerated by patient [Z78.9] 04/11/2023 No    Gross hematuria [R31.0] 04/03/2023 No    Mild temazepam use disorder [F13.10] 03/31/2023 No    Shortness of breath [R06.02] 03/30/2023 Yes    Pulmonary edema cardiac cause [I50.1] 03/30/2023 Yes    Diabetes mellitus, type 2 [E11.9] 02/16/2022 Yes    Acute on chronic congestive heart failure [I50.9] 02/16/2022 Yes    Weakness [R53.1] 02/05/2022 Yes    Severe obesity (BMI >= 40) [E66.01] 02/04/2022 Yes    Paroxysmal A-fib [I48.0] 06/08/2017 Yes     Chronic      Problems Resolved During this Admission:    Diagnosis Date Noted Date Resolved POA    Bacteremia [R78.81] 04/05/2023 04/07/2023 Clinically Undetermined    Hypertensive emergency [I16.1] 03/30/2023 04/07/2023 Yes       Discharged Condition: stable    Disposition: Skilled Nursing Facility    Follow Up:   Contact information for follow-up providers     Lucian Barry MD Follow up.    Specialty: Internal Medicine  Contact information:  [address]  [facility] [facility] [address]  [phone]             David Espinosa MD Follow up in 2 day(s).    Specialty: Cardiology  Contact information:  [address]  [address]  [place] [address]  [phone]                   Contact information for after-discharge care     Destination     [facility] .    Service: Skilled Nursing  Contact information:  [address] [address]  [phone]                           Patient Instructions:      Ambulatory referral/consult to Pulmonology   Standing Status: Future   Referral Priority:  Routine Referral Type: Consultation   Referral Reason: Specialty Services Required   Requested Specialty: Pulmonary Disease   Number of Visits Requested: 1     Ambulatory referral/consult to Home Health   Standing Status: Future   Referral Priority: Routine Referral Type: Home Health   Referral Reason: Specialty Services Required   Requested Specialty: Home Health Services   Number of Visits Requested: 1     Ambulatory referral/consult to Skilled Nursing   Standing Status: Future   Referral Priority: Routine Referral Type: Consultation   Referral Reason: Specialty Services Required   Requested Specialty: Skilled Nursing   Number of Visits Requested: 1     Significant Diagnostic Studies: N/A    Pending Diagnostic Studies:     Procedure Component Value Units Date/Time    Echo [[id number]]     Order Status: Sent Lab Status: No result     Echo [[id number]]     Order Status: Sent Lab Status: No result          Medications:  Reconciled Home Medications:      Medication List      START taking these medications    acetaminophen 325 MG tablet  Commonly known as: TYLENOL  Take 2 tablets (650 mg total) by mouth every 8 (eight) hours as needed for Pain or Temperature greater than.     budesonide 0.25 mg/2 mL nebulizer solution  Commonly known as: PULMICORT  Take 2 mLs (0.25 mg total) by nebulization every 12 (twelve) hours. Controller  Replaces: budesonide 0.5 mg/2 mL nebulizer solution     busPIRone 5 MG Tab  Commonly known as: BUSPAR  Take 1 tablet (5 mg total) by mouth 3 (three) times daily.     carvediloL 6.25 MG tablet  Commonly known as: COREG  Take 1 tablet (6.25 mg total) by mouth 2 (two) times daily.     empagliflozin 10 mg tablet  Commonly known as: JARDIANCE  Take 1 tablet (10 mg total) by mouth once daily.     gabapentin 300 MG capsule  Commonly known as: NEURONTIN  Take 1 capsule (300 mg total) by mouth 2 (two) times daily.  Replaces: gabapentin 600 MG tablet     insulin detemir U-100 (Levemir) 100 unit/mL (3 mL)  Inpn pen  Inject 15 Units into the skin once daily.     NIFEdipine 60 MG (OSM) 24 hr tablet  Commonly known as: PROCARDIA-XL  Take 1 tablet (60 mg total) by mouth once daily.     pantoprazole 40 MG tablet  Commonly known as: PROTONIX  Take 1 tablet (40 mg total) by mouth once daily.     potassium chloride SA 20 MEQ tablet  Commonly known as: K-DUR,KLOR-CON  Take 1 tablet (20 mEq total) by mouth 2 (two) times daily.     sacubitriL-valsartan 24-26 mg per tablet  Commonly known as: ENTRESTO  Take 1 tablet by mouth 2 (two) times daily.     * sertraline 50 MG tablet  Commonly known as: ZOLOFT  Take 1 tablet (50 mg total) by mouth once daily.     * sertraline 50 MG tablet  Commonly known as: ZOLOFT  Take 1 tablet (50 mg total) by mouth once daily.  Start taking on: April 14, 2023     spironolactone 25 MG tablet  Commonly known as: ALDACTONE  Take 1 tablet (25 mg total) by mouth once daily.     sucralfate 100 mg/mL suspension  Commonly known as: CARAFATE  Take 10 mLs (1 g total) by mouth 4 (four) times daily before meals and nightly.         * This list has 2 medication(s) that are the same as other medications prescribed for you. Read the directions carefully, and ask your doctor or other care provider to review them with you.            CHANGE how you take these medications    albuterol-ipratropium 2.5 mg-0.5 mg/3 mL nebulizer solution  Commonly known as: DUO-NEB  Take 3 mLs by nebulization every 6 (six) hours while awake. Rescue  What changed:   · when to take this  · reasons to take this     furosemide 40 MG tablet  Commonly known as: LASIX  Take 1 tablet (40 mg total) by mouth once daily.  What changed:   · when to take this  · additional instructions        CONTINUE taking these medications    LIPITOR 40 MG tablet  Generic drug: atorvastatin  Take 1 tablet (40 mg total) by mouth once daily.     vitamin D 1000 units Tab  Commonly known as: VITAMIN D3  Take 185 mg by mouth once daily.        STOP taking these  medications    budesonide 0.5 mg/2 mL nebulizer solution  Commonly known as: PULMICORT  Replaced by: budesonide 0.25 mg/2 mL nebulizer solution     fish oil-omega-3 fatty acids 300-1,000 mg capsule     gabapentin 600 MG tablet  Commonly known as: NEURONTIN  Replaced by: gabapentin 300 MG capsule     isosorbide mononitrate 60 MG 24 hr tablet  Commonly known as: IMDUR     lisinopriL 5 MG tablet  Commonly known as: PRINIVIL,ZESTRIL     metoprolol tartrate 25 MG tablet  Commonly known as: LOPRESSOR     mometasone-formoterol 200-5 mcg/actuation inhaler  Commonly known as: DULERA     omeprazole 20 MG capsule  Commonly known as: PRILOSEC     predniSONE 20 MG tablet  Commonly known as: DELTASONE     rivaroxaban 10 mg Tab  Commonly known as: XARELTO     TOVIAZ 8 mg Tb24  Generic drug: fesoterodine            Indwelling Lines/Drains at time of discharge:   Lines/Drains/Airways     None               Time spent on the discharge of patient: 30 minutes      Gunnar Mott DO  Department of Hospital Medicine  [facility]

## 2023-04-13 NOTE — PLAN OF CARE
Problem: Adult Inpatient Plan of Care  Goal: Optimal Comfort and Wellbeing  Outcome: Ongoing, Not Progressing  Goal: Readiness for Transition of Care  Outcome: Ongoing, Not Progressing         Problem: Adult Inpatient Plan of Care  Goal: Plan of Care Review  Outcome: Ongoing, Progressing  Goal: Patient-Specific Goal (Individualized)  Outcome: Ongoing, Progressing  Goal: Absence of Hospital-Acquired Illness or Injury  Outcome: Ongoing, Progressing     Problem: Pain Acute  Goal: Acceptable Pain Control and Functional Ability  Outcome: Ongoing, Progressing

## 2023-04-13 NOTE — ASSESSMENT & PLAN NOTE
Patient with Long standing persistent (>12 months) atrial fibrillation which is controlled currently with Beta Blocker. Patient is currently in atrial fibrillation.[id number] Score: 3. Anticoagulation indicated. Anticoagulation done with Xarelto.  History of pulmonary embolism 2017.   Xarelto held 4/3 after evidence of hematuria. Hg/HCT stable.   - continue prophylaxis with lovenox  - on discharge discontinue anticoagulation  - f/u cardiology and pulmonology on reassessing benefits of resuming anti-coagulation for increased risks for DVT and PE

## 2023-04-13 NOTE — ASSESSMENT & PLAN NOTE
Secondary to above CHF exacerbation, fluid overload, SOB, body habitus. When patient's cardiorespiratory status stabilized, follow up PT and OT evaluation.   4/6 work on sitting up and out of bed, patient agrees to working with PT and OT  4/7 continue with aggressive PT and OT, CM working on inpatient rehab services   4/10 medically optimized, requires continued encouragement to increase mobility, PT and OT efforts  4/11 awaiting placement for PT and OT  4/12 encourage continued physical activity  4/13 continue aggressive PT, OT and pulmonary hygiene care, follow up cardiology and respiratory specialists for ongoing monitoring of cardiopulmonary function

## 2023-04-13 NOTE — SUBJECTIVE & OBJECTIVE
Interval History: Patient seen and examined. No new complaints.     Review of Systems   Constitutional:  Positive for activity change. Negative for appetite change, chills, fatigue and fever.   HENT:  Negative for mouth sores, sneezing and sore throat.    Eyes:  Negative for visual disturbance.   Respiratory:  Positive for shortness of breath. Negative for cough, chest tightness, wheezing and stridor.    Cardiovascular:  Negative for chest pain, palpitations and leg swelling.   Gastrointestinal:  Negative for abdominal pain, blood in stool, constipation, diarrhea, nausea and vomiting.   Musculoskeletal:  Positive for gait problem and joint swelling. Negative for arthralgias, neck pain and neck stiffness.   Skin:  Negative for color change and pallor.   Neurological:  Positive for weakness. Negative for dizziness, seizures, syncope, facial asymmetry, speech difficulty, light-headedness, numbness and headaches.   Psychiatric/Behavioral:  Negative for agitation, confusion, decreased concentration, dysphoric mood and sleep disturbance. The patient is not nervous/anxious.    Objective:     Vital Signs (Most Recent):  Temp: 98.4 °F (36.9 °C) (04/13/23 1120)  Pulse: 63 (04/13/23 1120)  Resp: (!) 24 (04/13/23 1120)  BP: 120/87 (04/13/23 1120)  SpO2: 98 % (04/13/23 1120)   Vital Signs (24h Range):  Temp:  [97.6 °F (36.4 °C)-98.4 °F (36.9 °C)] 98.4 °F (36.9 °C)  Pulse:  [49-63] 63  Resp:  [20-24] 24  SpO2:  [98 %-99 %] 98 %  BP: (111-130)/(59-87) 120/87     Weight: 132.9 kg (293 lb)  Body mass index is 50.29 kg/m².    Intake/Output Summary (Last 24 hours) at 4/13/2023 1229  Last data filed at 4/13/2023 0600  Gross per 24 hour   Intake 1080 ml   Output 2200 ml   Net -1120 ml      Physical Exam  Vitals and nursing note reviewed.   Constitutional:       General: She is not in acute distress.     Appearance: She is obese. She is not toxic-appearing or diaphoretic.   HENT:      Right Ear: External ear normal.      Left Ear:  External ear normal.      Nose: No congestion or rhinorrhea.      Mouth/Throat:      Mouth: Mucous membranes are dry.      Pharynx: Oropharynx is clear. No oropharyngeal exudate or posterior oropharyngeal erythema.   Eyes:      Extraocular Movements: Extraocular movements intact.   Neck:      Comments: Thick skin folds  Cardiovascular:      Rate and Rhythm: Normal rate. Rhythm irregular.      Heart sounds: No murmur heard.  Pulmonary:      Effort: No respiratory distress.      Breath sounds: No rhonchi or rales.      Comments: On bipap - coarse bs bilaterally, breath sounds reaching lung bases  Chest:      Chest wall: No tenderness.   Abdominal:      General: There is no distension.      Palpations: Abdomen is soft.      Tenderness: There is no abdominal tenderness.      Comments: Limited secondary to body habitus   Genitourinary:     Comments: Hardwick draining yellow urine  Musculoskeletal:         General: No swelling.      Cervical back: Neck supple. No rigidity.      Right lower leg: No edema.      Left lower leg: No edema.   Skin:     General: Skin is warm and dry.      Capillary Refill: Capillary refill takes less than 2 seconds.      Coloration: Skin is not jaundiced.      Findings: No bruising, erythema, lesion or rash.   Neurological:      Mental Status: She is alert and oriented to person, place, and time. Mental status is at baseline.      Motor: Weakness present.      Gait: Gait abnormal.   Psychiatric:         Mood and Affect: Mood normal.         Behavior: Behavior is not agitated or aggressive. Behavior is cooperative.      Comments: Mood depressed     Significant Labs: All pertinent labs within the past 24 hours have been reviewed.    Significant Imaging: I have reviewed all pertinent imaging results/findings within the past 24 hours.

## 2023-04-13 NOTE — ASSESSMENT & PLAN NOTE
4/3 continue with cardiology recommendations  Patient is identified as having Combined Systolic and Diastolic heart failure that is Acute on chronic. CHF is currently uncontrolled due to Continued edema of extremities, Dyspnea not returned to baseline after 2 doses of IV diuretic and Rales/crackles on pulmonary exam. Latest ECHO performed and demonstrates- Results for orders placed during the hospital encounter of 03/29/23  Echo  Interpretation Summary  · The left ventricle is normal in size with moderate concentric hypertrophy and mildly decreased systolic function.  · The estimated ejection fraction is 45%.  · Grade I left ventricular diastolic dysfunction.  · Moderate right ventricular enlargement.  · Moderate right atrial enlargement.  · Mild-to-moderate aortic regurgitation.  · There is mild aortic valve stenosis.  · Aortic valve area is 1.44 cm2; peak velocity is 2.09 m/s; mean gradient is 10 mmHg.  · Mild-to-moderate mitral regurgitation.  · Moderate tricuspid regurgitation.  · Moderate pulmonic regurgitation.  · The estimated PA systolic pressure is 69 mmHg.  · There is moderate pulmonary hypertension.  · Moderate left atrial enlargement.  . Continue Beta Blocker, ACE/ARB, Furosemide, Aldactone, Nitrate/Vasodilator and ARNI and monitor clinical status closely. Monitor on telemetry. Monitor strict Is&Os and daily weights.  Place on fluid restriction of 1.5 L. Continue to stress to patient importance of self efficacy and  on diet for CHF. Last BNP reviewed-  Lab Results   Component Value Date    NTPROBNP 92949 (H) 03/29/2023 4/4 improved BP control, now on PO lasix with adequate urine output. Continue recommendations per cardiology.  4/6 borderline hypotension, will provide gentle IVF followed by bolus 250mL  4/8 BP medications adjusted  4/10 Carvedilol reduced to 6.25 mg BID  4/13 Continue with   - carvedilol 6.25 mg BID  - nifedipine 24hr 60 mg daily  - entresto 24-26 mg daily  - spironolactone 25  mg daily  - lasix 40 mg daily  - jardiance 10 mg daily

## 2023-04-22 ENCOUNTER — HOSPITAL ENCOUNTER (INPATIENT)
Facility: HOSPITAL | Age: 73
LOS: 3 days | Discharge: HOME | DRG: 291 | End: 2023-04-25
Attending: EMERGENCY MEDICINE | Admitting: EMERGENCY MEDICINE
Payer: MEDICARE

## 2023-04-22 DIAGNOSIS — I50.1 PULMONARY EDEMA CARDIAC CAUSE: ICD-10-CM

## 2023-04-22 DIAGNOSIS — I50.9 ACUTE ON CHRONIC CONGESTIVE HEART FAILURE, UNSPECIFIED HEART FAILURE TYPE: ICD-10-CM

## 2023-04-22 DIAGNOSIS — J96.01 ACUTE RESPIRATORY FAILURE WITH HYPOXIA: ICD-10-CM

## 2023-04-22 DIAGNOSIS — J44.1 COPD EXACERBATION: Primary | ICD-10-CM

## 2023-04-22 DIAGNOSIS — Z95.828 S/P PICC CENTRAL LINE PLACEMENT: ICD-10-CM

## 2023-04-22 DIAGNOSIS — R06.02 SHORTNESS OF BREATH: ICD-10-CM

## 2023-04-22 LAB
ALBUMIN SERPL BCP-MCNC: 2.2 G/DL (ref 3.5–5.2)
ALBUMIN SERPL BCP-MCNC: 2.2 G/DL (ref 3.5–5.2)
ALP SERPL-CCNC: 120 U/L (ref 55–135)
ALP SERPL-CCNC: 129 U/L (ref 55–135)
ALT SERPL W/O P-5'-P-CCNC: 21 U/L (ref 10–44)
ALT SERPL W/O P-5'-P-CCNC: 21 U/L (ref 10–44)
ANION GAP SERPL CALC-SCNC: -1 MMOL/L (ref 8–16)
ANION GAP SERPL CALC-SCNC: 0 MMOL/L (ref 8–16)
AST SERPL-CCNC: 12 U/L (ref 10–40)
AST SERPL-CCNC: 16 U/L (ref 10–40)
BASOPHILS # BLD AUTO: 0.03 K/UL (ref 0–0.2)
BASOPHILS # BLD AUTO: 0.03 K/UL (ref 0–0.2)
BASOPHILS NFR BLD: 0.3 % (ref 0–1.9)
BASOPHILS NFR BLD: 0.3 % (ref 0–1.9)
BILIRUB SERPL-MCNC: 0.3 MG/DL (ref 0.1–1)
BILIRUB SERPL-MCNC: 0.4 MG/DL (ref 0.1–1)
BIPAP: ABNORMAL
BUN SERPL-MCNC: 22 MG/DL (ref 8–23)
BUN SERPL-MCNC: 24 MG/DL (ref 8–23)
CALCIUM SERPL-MCNC: 10.1 MG/DL (ref 8.7–10.5)
CALCIUM SERPL-MCNC: 10.5 MG/DL (ref 8.7–10.5)
CHLORIDE SERPL-SCNC: 105 MMOL/L (ref 95–110)
CHLORIDE SERPL-SCNC: 106 MMOL/L (ref 95–110)
CO2 SERPL-SCNC: 33 MMOL/L (ref 23–29)
CO2 SERPL-SCNC: 34 MMOL/L (ref 23–29)
CREAT SERPL-MCNC: 1.4 MG/DL (ref 0.5–1.4)
CREAT SERPL-MCNC: 1.4 MG/DL (ref 0.5–1.4)
DIFFERENTIAL METHOD: ABNORMAL
DIFFERENTIAL METHOD: ABNORMAL
EOSINOPHIL # BLD AUTO: 0 K/UL (ref 0–0.5)
EOSINOPHIL # BLD AUTO: 0.1 K/UL (ref 0–0.5)
EOSINOPHIL NFR BLD: 0.1 % (ref 0–8)
EOSINOPHIL NFR BLD: 0.5 % (ref 0–8)
ERYTHROCYTE [DISTWIDTH] IN BLOOD BY AUTOMATED COUNT: 16.1 % (ref 11.5–14.5)
ERYTHROCYTE [DISTWIDTH] IN BLOOD BY AUTOMATED COUNT: 16.2 % (ref 11.5–14.5)
EST. GFR  (NO RACE VARIABLE): 40 ML/MIN/1.73 M^2
EST. GFR  (NO RACE VARIABLE): 40 ML/MIN/1.73 M^2
FIO2: 40 %
GLUCOSE SERPL-MCNC: 110 MG/DL (ref 70–110)
GLUCOSE SERPL-MCNC: 94 MG/DL (ref 70–110)
HCT VFR BLD AUTO: 40.3 % (ref 37–48.5)
HCT VFR BLD AUTO: 41.2 % (ref 37–48.5)
HGB BLD-MCNC: 12.5 G/DL (ref 12–16)
HGB BLD-MCNC: 13.1 G/DL (ref 12–16)
IMM GRANULOCYTES # BLD AUTO: 0.06 K/UL (ref 0–0.04)
IMM GRANULOCYTES # BLD AUTO: 0.06 K/UL (ref 0–0.04)
IMM GRANULOCYTES NFR BLD AUTO: 0.5 % (ref 0–0.5)
IMM GRANULOCYTES NFR BLD AUTO: 0.6 % (ref 0–0.5)
LYMPHOCYTES # BLD AUTO: 1.3 K/UL (ref 1–4.8)
LYMPHOCYTES # BLD AUTO: 1.5 K/UL (ref 1–4.8)
LYMPHOCYTES NFR BLD: 12.6 % (ref 18–48)
LYMPHOCYTES NFR BLD: 13.3 % (ref 18–48)
Lab: ABNORMAL
MCH RBC QN AUTO: 28.8 PG (ref 27–31)
MCH RBC QN AUTO: 29 PG (ref 27–31)
MCHC RBC AUTO-ENTMCNC: 31 G/DL (ref 32–36)
MCHC RBC AUTO-ENTMCNC: 31.8 G/DL (ref 32–36)
MCV RBC AUTO: 91 FL (ref 82–98)
MCV RBC AUTO: 93 FL (ref 82–98)
MODIFIED ALLEN'S TEST: POSITIVE
MONOCYTES # BLD AUTO: 1.3 K/UL (ref 0.3–1)
MONOCYTES # BLD AUTO: 1.8 K/UL (ref 0.3–1)
MONOCYTES NFR BLD: 12.8 % (ref 4–15)
MONOCYTES NFR BLD: 15.4 % (ref 4–15)
NEUTROPHILS # BLD AUTO: 7.2 K/UL (ref 1.8–7.7)
NEUTROPHILS # BLD AUTO: 8.4 K/UL (ref 1.8–7.7)
NEUTROPHILS NFR BLD: 71.1 % (ref 38–73)
NEUTROPHILS NFR BLD: 72.5 % (ref 38–73)
NOTIFIED BY: ABNORMAL
NRBC BLD-RTO: 0 /100 WBC
NRBC BLD-RTO: 0 /100 WBC
NT-PROBNP SERPL-MCNC: 1458 PG/ML (ref 5–900)
O2DEVICE: ABNORMAL
PCO2 BLDA: 61.7 MMHG (ref 35–45)
PH SMN: 7.33 [PH] (ref 7.34–7.45)
PLATELET # BLD AUTO: 243 K/UL (ref 150–450)
PLATELET # BLD AUTO: 255 K/UL (ref 150–450)
PLATELET BLD QL SMEAR: ABNORMAL
PMV BLD AUTO: 8.7 FL (ref 9.2–12.9)
PMV BLD AUTO: 9.2 FL (ref 9.2–12.9)
PO2 BLDA: 69.7 MMHG (ref 80–100)
POC BASE DEFICIT: 6.6 MMOL/L (ref -2–2)
POC HCO3: 28.5 MMOL/L (ref 24–28)
POC NOTIFIED NOTE: ABNORMAL
POC PERFORMED BY: ABNORMAL
POC SATURATED O2: 93.9 % (ref 95–100)
POC TEMPERATURE: 37 C
POCT GLUCOSE: 97 MG/DL (ref 70–110)
POTASSIUM SERPL-SCNC: 4.2 MMOL/L (ref 3.5–5.1)
POTASSIUM SERPL-SCNC: 4.7 MMOL/L (ref 3.5–5.1)
PROT SERPL-MCNC: 7 G/DL (ref 6–8.4)
PROT SERPL-MCNC: 7.1 G/DL (ref 6–8.4)
PROVIDER NOTIFIED: ABNORMAL
RBC # BLD AUTO: 4.34 M/UL (ref 4–5.4)
RBC # BLD AUTO: 4.51 M/UL (ref 4–5.4)
SODIUM SERPL-SCNC: 137 MMOL/L (ref 136–145)
SODIUM SERPL-SCNC: 140 MMOL/L (ref 136–145)
SPECIMEN SOURCE: ABNORMAL
TROPONIN I SERPL DL<=0.01 NG/ML-MCNC: 17.3 PG/ML (ref 0–60)
WBC # BLD AUTO: 11.86 K/UL (ref 3.9–12.7)
WBC # BLD AUTO: 9.86 K/UL (ref 3.9–12.7)

## 2023-04-22 PROCEDURE — 83880 ASSAY OF NATRIURETIC PEPTIDE: CPT | Performed by: EMERGENCY MEDICINE

## 2023-04-22 PROCEDURE — 63600175 PHARM REV CODE 636 W HCPCS: Performed by: STUDENT IN AN ORGANIZED HEALTH CARE EDUCATION/TRAINING PROGRAM

## 2023-04-22 PROCEDURE — 82803 BLOOD GASES ANY COMBINATION: CPT

## 2023-04-22 PROCEDURE — 25000003 PHARM REV CODE 250: Performed by: STUDENT IN AN ORGANIZED HEALTH CARE EDUCATION/TRAINING PROGRAM

## 2023-04-22 PROCEDURE — 25000242 PHARM REV CODE 250 ALT 637 W/ HCPCS: Performed by: STUDENT IN AN ORGANIZED HEALTH CARE EDUCATION/TRAINING PROGRAM

## 2023-04-22 PROCEDURE — 11000001 HC ACUTE MED/SURG PRIVATE ROOM

## 2023-04-22 PROCEDURE — 99900035 HC TECH TIME PER 15 MIN (STAT)

## 2023-04-22 PROCEDURE — 93010 ELECTROCARDIOGRAM REPORT: CPT | Mod: ,,, | Performed by: INTERNAL MEDICINE

## 2023-04-22 PROCEDURE — 99285 EMERGENCY DEPT VISIT HI MDM: CPT | Mod: 25

## 2023-04-22 PROCEDURE — 85025 COMPLETE CBC W/AUTO DIFF WBC: CPT | Mod: 91 | Performed by: EMERGENCY MEDICINE

## 2023-04-22 PROCEDURE — 99900031 HC PATIENT EDUCATION (STAT)

## 2023-04-22 PROCEDURE — 80053 COMPREHEN METABOLIC PANEL: CPT | Performed by: EMERGENCY MEDICINE

## 2023-04-22 PROCEDURE — 36415 COLL VENOUS BLD VENIPUNCTURE: CPT | Performed by: EMERGENCY MEDICINE

## 2023-04-22 PROCEDURE — 63600175 PHARM REV CODE 636 W HCPCS: Performed by: EMERGENCY MEDICINE

## 2023-04-22 PROCEDURE — 27000190 HC CPAP FULL FACE MASK W/VALVE

## 2023-04-22 PROCEDURE — 36569 INSJ PICC 5 YR+ W/O IMAGING: CPT

## 2023-04-22 PROCEDURE — 36600 WITHDRAWAL OF ARTERIAL BLOOD: CPT

## 2023-04-22 PROCEDURE — 25000003 PHARM REV CODE 250: Performed by: EMERGENCY MEDICINE

## 2023-04-22 PROCEDURE — 25000242 PHARM REV CODE 250 ALT 637 W/ HCPCS: Performed by: EMERGENCY MEDICINE

## 2023-04-22 PROCEDURE — 93005 ELECTROCARDIOGRAM TRACING: CPT

## 2023-04-22 PROCEDURE — 94660 CPAP INITIATION&MGMT: CPT

## 2023-04-22 PROCEDURE — 94640 AIRWAY INHALATION TREATMENT: CPT

## 2023-04-22 PROCEDURE — 27000221 HC OXYGEN, UP TO 24 HOURS

## 2023-04-22 PROCEDURE — 99223 1ST HOSP IP/OBS HIGH 75: CPT | Mod: ,,, | Performed by: STUDENT IN AN ORGANIZED HEALTH CARE EDUCATION/TRAINING PROGRAM

## 2023-04-22 PROCEDURE — 99223 PR INITIAL HOSPITAL CARE,LEVL III: ICD-10-PCS | Mod: ,,, | Performed by: STUDENT IN AN ORGANIZED HEALTH CARE EDUCATION/TRAINING PROGRAM

## 2023-04-22 PROCEDURE — 93010 EKG 12-LEAD: ICD-10-PCS | Mod: ,,, | Performed by: INTERNAL MEDICINE

## 2023-04-22 PROCEDURE — 84484 ASSAY OF TROPONIN QUANT: CPT | Performed by: EMERGENCY MEDICINE

## 2023-04-22 PROCEDURE — C1751 CATH, INF, PER/CENT/MIDLINE: HCPCS

## 2023-04-22 PROCEDURE — 94761 N-INVAS EAR/PLS OXIMETRY MLT: CPT

## 2023-04-22 RX ORDER — SUCRALFATE 1 G/10ML
1 SUSPENSION ORAL
Status: DISCONTINUED | OUTPATIENT
Start: 2023-04-22 | End: 2023-04-25 | Stop reason: HOSPADM

## 2023-04-22 RX ORDER — BUDESONIDE 0.5 MG/2ML
0.5 INHALANT ORAL EVERY 12 HOURS
Status: DISCONTINUED | OUTPATIENT
Start: 2023-04-22 | End: 2023-04-25 | Stop reason: HOSPADM

## 2023-04-22 RX ORDER — IPRATROPIUM BROMIDE AND ALBUTEROL SULFATE 2.5; .5 MG/3ML; MG/3ML
3 SOLUTION RESPIRATORY (INHALATION)
Status: DISCONTINUED | OUTPATIENT
Start: 2023-04-22 | End: 2023-04-24

## 2023-04-22 RX ORDER — FAMOTIDINE 20 MG/1
20 TABLET, FILM COATED ORAL DAILY
Status: DISCONTINUED | OUTPATIENT
Start: 2023-04-22 | End: 2023-04-25

## 2023-04-22 RX ORDER — CARVEDILOL 3.12 MG/1
6.25 TABLET ORAL 2 TIMES DAILY
Status: DISCONTINUED | OUTPATIENT
Start: 2023-04-22 | End: 2023-04-25 | Stop reason: HOSPADM

## 2023-04-22 RX ORDER — TALC
6 POWDER (GRAM) TOPICAL NIGHTLY PRN
Status: DISCONTINUED | OUTPATIENT
Start: 2023-04-22 | End: 2023-04-25 | Stop reason: HOSPADM

## 2023-04-22 RX ORDER — ONDANSETRON 2 MG/ML
4 INJECTION INTRAMUSCULAR; INTRAVENOUS EVERY 8 HOURS PRN
Status: DISCONTINUED | OUTPATIENT
Start: 2023-04-22 | End: 2023-04-25 | Stop reason: HOSPADM

## 2023-04-22 RX ORDER — SODIUM CHLORIDE 0.9 % (FLUSH) 0.9 %
10 SYRINGE (ML) INJECTION
Status: DISCONTINUED | OUTPATIENT
Start: 2023-04-22 | End: 2023-04-25 | Stop reason: HOSPADM

## 2023-04-22 RX ORDER — BUSPIRONE HYDROCHLORIDE 5 MG/1
5 TABLET ORAL 3 TIMES DAILY
Status: DISCONTINUED | OUTPATIENT
Start: 2023-04-22 | End: 2023-04-25 | Stop reason: HOSPADM

## 2023-04-22 RX ORDER — FUROSEMIDE 10 MG/ML
40 INJECTION INTRAMUSCULAR; INTRAVENOUS
Status: DISCONTINUED | OUTPATIENT
Start: 2023-04-22 | End: 2023-04-24

## 2023-04-22 RX ORDER — ATORVASTATIN CALCIUM 40 MG/1
40 TABLET, FILM COATED ORAL DAILY
Status: DISCONTINUED | OUTPATIENT
Start: 2023-04-22 | End: 2023-04-25 | Stop reason: HOSPADM

## 2023-04-22 RX ORDER — LIDOCAINE HYDROCHLORIDE 20 MG/ML
5 SOLUTION OROPHARYNGEAL
Status: COMPLETED | OUTPATIENT
Start: 2023-04-22 | End: 2023-04-22

## 2023-04-22 RX ORDER — ACETAMINOPHEN 325 MG/1
650 TABLET ORAL EVERY 8 HOURS PRN
Status: DISCONTINUED | OUTPATIENT
Start: 2023-04-22 | End: 2023-04-25 | Stop reason: HOSPADM

## 2023-04-22 RX ADMIN — BUDESONIDE 0.5 MG: 0.5 INHALANT RESPIRATORY (INHALATION) at 08:04

## 2023-04-22 RX ADMIN — IPRATROPIUM BROMIDE AND ALBUTEROL SULFATE 3 ML: .5; 3 SOLUTION RESPIRATORY (INHALATION) at 12:04

## 2023-04-22 RX ADMIN — BUSPIRONE HYDROCHLORIDE 5 MG: 5 TABLET ORAL at 08:04

## 2023-04-22 RX ADMIN — ATORVASTATIN CALCIUM 40 MG: 40 TABLET, FILM COATED ORAL at 08:04

## 2023-04-22 RX ADMIN — IPRATROPIUM BROMIDE AND ALBUTEROL SULFATE 3 ML: .5; 3 SOLUTION RESPIRATORY (INHALATION) at 04:04

## 2023-04-22 RX ADMIN — FAMOTIDINE 20 MG: 20 TABLET ORAL at 08:04

## 2023-04-22 RX ADMIN — CARVEDILOL 6.25 MG: 3.12 TABLET, FILM COATED ORAL at 08:04

## 2023-04-22 RX ADMIN — LIDOCAINE HYDROCHLORIDE 5 ML: 20 SOLUTION ORAL at 08:04

## 2023-04-22 RX ADMIN — SUCRALFATE 1 G: 1 SUSPENSION ORAL at 08:04

## 2023-04-22 RX ADMIN — SACUBITRIL AND VALSARTAN 1 TABLET: 24; 26 TABLET, FILM COATED ORAL at 08:04

## 2023-04-22 RX ADMIN — FUROSEMIDE 40 MG: 10 INJECTION, SOLUTION INTRAMUSCULAR; INTRAVENOUS at 04:04

## 2023-04-22 RX ADMIN — BUSPIRONE HYDROCHLORIDE 5 MG: 5 TABLET ORAL at 02:04

## 2023-04-22 RX ADMIN — IPRATROPIUM BROMIDE AND ALBUTEROL SULFATE 3 ML: .5; 3 SOLUTION RESPIRATORY (INHALATION) at 08:04

## 2023-04-22 RX ADMIN — SUCRALFATE 1 G: 1 SUSPENSION ORAL at 04:04

## 2023-04-22 RX ADMIN — FUROSEMIDE 40 MG: 10 INJECTION, SOLUTION INTRAMUSCULAR; INTRAVENOUS at 05:04

## 2023-04-22 NOTE — SUBJECTIVE & OBJECTIVE
Past Medical History:   Diagnosis Date    Abdominal hernia     Bacteremia 4/5/2023    CHF (congestive heart failure)     COPD (chronic obstructive pulmonary disease)     Diabetes mellitus, type 2 2/16/2022    GERD (gastroesophageal reflux disease)     History of home oxygen therapy     Hypertension     Hypertensive emergency 3/30/2023    Migraine headache     Pulmonary embolism 06/01/2017    Small bowel obstruction     Thyroid disease        Past Surgical History:   Procedure Laterality Date    COLONOSCOPY      HYSTERECTOMY      INNER EAR SURGERY      UPPER GASTROINTESTINAL ENDOSCOPY         Review of patient's allergies indicates:   Allergen Reactions    Pcn [penicillins] Swelling       No current facility-administered medications on file prior to encounter.     Current Outpatient Medications on File Prior to Encounter   Medication Sig    acetaminophen (TYLENOL) 325 MG tablet Take 2 tablets (650 mg total) by mouth every 8 (eight) hours as needed for Pain or Temperature greater than. (Patient taking differently: Take 650 mg by mouth every 8 (eight) hours as needed for Pain or Temperature greater than (temp greater than 100F).)    albuterol-ipratropium (DUO-NEB) 2.5 mg-0.5 mg/3 mL nebulizer solution Take 3 mLs by nebulization every 6 (six) hours while awake. Rescue    budesonide (PULMICORT) 0.25 mg/2 mL nebulizer solution Take 2 mLs (0.25 mg total) by nebulization every 12 (twelve) hours. Controller    busPIRone (BUSPAR) 5 MG Tab Take 1 tablet (5 mg total) by mouth 3 (three) times daily.    carvediloL (COREG) 6.25 MG tablet Take 1 tablet (6.25 mg total) by mouth 2 (two) times daily.    empagliflozin (JARDIANCE) 10 mg tablet Take 1 tablet (10 mg total) by mouth once daily.    furosemide (LASIX) 40 MG tablet Take 1 tablet (40 mg total) by mouth once daily.    gabapentin (NEURONTIN) 300 MG capsule Take 1 capsule (300 mg total) by mouth 2 (two) times daily.    insulin detemir U-100, Levemir, 100 unit/mL (3 mL) SubQ InPn  pen Inject 15 Units into the skin once daily.    LIPITOR 40 mg tablet Take 1 tablet (40 mg total) by mouth once daily.    NIFEdipine (PROCARDIA-XL) 60 MG (OSM) 24 hr tablet Take 1 tablet (60 mg total) by mouth once daily.    pantoprazole (PROTONIX) 40 MG tablet Take 1 tablet (40 mg total) by mouth once daily.    potassium chloride SA (K-DUR,KLOR-CON) 20 MEQ tablet Take 1 tablet (20 mEq total) by mouth 2 (two) times daily.    sacubitriL-valsartan (ENTRESTO) 24-26 mg per tablet Take 1 tablet by mouth 2 (two) times daily.    spironolactone (ALDACTONE) 25 MG tablet Take 1 tablet (25 mg total) by mouth once daily.    sucralfate (CARAFATE) 100 mg/mL suspension Take 10 mLs (1 g total) by mouth 4 (four) times daily before meals and nightly.    [DISCONTINUED] sertraline (ZOLOFT) 50 MG tablet Take 1 tablet (50 mg total) by mouth once daily.    sertraline (ZOLOFT) 50 MG tablet Take 1 tablet (50 mg total) by mouth once daily.    vitamin D 1000 units Tab Take 185 mg by mouth once daily.     Family History    None       Tobacco Use    Smoking status: Former    Smokeless tobacco: Never   Substance and Sexual Activity    Alcohol use: No    Drug use: No    Sexual activity: Not on file     Review of Systems   Constitutional:  Positive for activity change. Negative for appetite change, chills, fatigue and fever.   HENT:  Negative for sinus pressure, sinus pain, sneezing and sore throat.    Respiratory:  Positive for chest tightness, shortness of breath and wheezing.    Cardiovascular:  Negative for chest pain, palpitations and leg swelling.   Gastrointestinal:  Negative for abdominal distention, abdominal pain, constipation, diarrhea, nausea and vomiting.   Genitourinary:  Negative for dysuria and frequency.   Musculoskeletal:  Positive for back pain and gait problem.   Neurological:  Negative for dizziness, seizures, speech difficulty, light-headedness, numbness and headaches.   Psychiatric/Behavioral:  Negative for agitation,  behavioral problems, self-injury and sleep disturbance. The patient is not nervous/anxious.    Objective:     Vital Signs (Most Recent):  Temp: 98.6 °F (37 °C) (04/22/23 1200)  Pulse: 64 (04/22/23 1611)  Resp: 20 (04/22/23 1611)  BP: 109/70 (04/22/23 1611)  SpO2: (!) 92 % (04/22/23 1611)   Vital Signs (24h Range):  Temp:  [98.3 °F (36.8 °C)-98.6 °F (37 °C)] 98.6 °F (37 °C)  Pulse:  [49-66] 64  Resp:  [17-31] 20  SpO2:  [91 %-98 %] 92 %  BP: ()/(52-70) 109/70     Weight: 119.1 kg (262 lb 8 oz)  Body mass index is 45.06 kg/m².    Physical Exam  Constitutional:       General: She is not in acute distress.     Appearance: She is ill-appearing. She is not toxic-appearing.   HENT:      Head: Normocephalic.      Nose: No congestion or rhinorrhea.      Mouth/Throat:      Mouth: Mucous membranes are dry.      Pharynx: Oropharynx is clear.   Eyes:      Extraocular Movements: Extraocular movements intact.      Conjunctiva/sclera: Conjunctivae normal.   Cardiovascular:      Rate and Rhythm: Bradycardia present.      Heart sounds: No murmur heard.  Pulmonary:      Effort: No respiratory distress.      Breath sounds: Rhonchi present. No wheezing or rales.      Comments: Breath sounds reaching lung bases bilaterally  Abdominal:      General: Abdomen is flat. There is no distension.      Palpations: Abdomen is soft.      Tenderness: There is no abdominal tenderness. There is no guarding.   Musculoskeletal:         General: No swelling or deformity.      Cervical back: Normal range of motion and neck supple. No rigidity or tenderness.      Right lower leg: No edema.      Left lower leg: No edema.   Skin:     General: Skin is warm and dry.      Comments: Skin over abdomen and legs relaxed with no evidence of edema   Neurological:      General: No focal deficit present.      Mental Status: She is alert and oriented to person, place, and time. Mental status is at baseline.   Psychiatric:         Mood and Affect: Mood normal.          Behavior: Behavior normal.           Significant Labs: All pertinent labs within the past 24 hours have been reviewed.  A1C:   Recent Labs   Lab 03/29/23  2319   HGBA1C 5.5     ABGs:   Recent Labs   Lab 04/22/23  0059   PH 7.330*   PCO2 61.7*   HCO3 28.5*   POCSATURATED 93.9*   PO2 69.7*     Bilirubin:   Recent Labs   Lab 04/02/23  0339 04/03/23  0344 04/04/23  0327 04/22/23  0200 04/22/23  1526   BILITOT 0.5 0.5 0.5 0.3 0.4     Blood Culture: No results for input(s): LABBLOO in the last 48 hours.  BMP:   Recent Labs   Lab 04/22/23  1526   GLU 94      K 4.2      CO2 34*   BUN 22   CREATININE 1.4   CALCIUM 10.5     CBC:   Recent Labs   Lab 04/22/23  0200 04/22/23  1526   WBC 11.86 9.86   HGB 12.5 13.1   HCT 40.3 41.2    255     CMP:   Recent Labs   Lab 04/22/23  0200 04/22/23  1526    140   K 4.7 4.2    106   CO2 33* 34*    94   BUN 24* 22   CREATININE 1.4 1.4   CALCIUM 10.1 10.5   PROT 7.1 7.0   ALBUMIN 2.2* 2.2*   BILITOT 0.3 0.4   ALKPHOS 129 120   AST 16 12   ALT 21 21   ANIONGAP -1* 0*     Troponin:   Recent Labs   Lab 04/22/23  0200   TROPONINIHS 17.3     TSH:   Recent Labs   Lab 03/29/23  2319   TSH 2.150     Significant Imaging: I have reviewed all pertinent imaging results/findings within the past 24 hours.

## 2023-04-22 NOTE — ASSESSMENT & PLAN NOTE
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 CHF is currently uncontrolled due to Rales/crackles on pulmonary exam and Pulmonary edema/pleural effusion on CXR. Latest ECHO performed and demonstrates- Results for orders placed during the hospital encounter of 03/29/23    Echo  Interpretation Summary  · The left ventricle is normal in size with moderate concentric hypertrophy and mildly decreased systolic function.  · The estimated ejection fraction is 45%.  · Grade I left ventricular diastolic dysfunction.  · Moderate right ventricular enlargement.  · Moderate right atrial enlargement.  · Mild-to-moderate aortic regurgitation.  · There is mild aortic valve stenosis.  · Aortic valve area is 1.44 cm2; peak velocity is 2.09 m/s; mean gradient is 10 mmHg.  · Mild-to-moderate mitral regurgitation.  · Moderate tricuspid regurgitation.  · Moderate pulmonic regurgitation.  · The estimated PA systolic pressure is 69 mmHg.  · There is moderate pulmonary hypertension.  · Moderate left atrial enlargement.  . Continue Beta Blocker, ACE/ARB, Furosemide and Aldactone and monitor clinical status closely. Monitor on telemetry. Patient is on CHF pathway.  Monitor strict Is&Os and daily weights.  Place on fluid restriction of 1.5 L. Continue to stress to patient importance of self efficacy and  on diet for CHF. Last BNP reviewed- and noted below No results for input(s): BNP, BNPTRIAGEBLO in the last 168 hours..  Lab Results   Component Value Date    NTPROBNP 1458 (H) 04/22/2023    NTPROBNP 95808 (H) 03/29/2023    NTPROBNP 3561 (H) 02/15/2022   - continue BiPAP management per respiratory  - continue supplemental oxygen while off BiPAP at baseline 2L  - continue daily breathing treatments   - contact DME office with evaluation of home trilogy device

## 2023-04-22 NOTE — ASSESSMENT & PLAN NOTE
No leukocytosis, vital signs stable. Likely associated from combination of fluid overload and ineffective non-invasive equipment or settings at home for chronic respiratory failure and CHF.   Continue with above management and monitor. No antibiotics at this time.

## 2023-04-22 NOTE — ED PROVIDER NOTES
Encounter Date: 4/22/2023       History     Chief Complaint   Patient presents with    Shortness of Breath     Pt presents to the ER via EMS w/ complaints of shortness of breath x 1 day. [facility] reports hx of copd/chf, states pt was satting in the 80's on bipap after duonebs. Upon arrival pt is 100% w/ cpap after 1 duoneb.      73-year-old female with a history CHF with diastolic dysfunction, COPD, diabetes, hypertension, pulmonary embolus on Xarelto presents the emergency department with shortness of breath beginning last night gradually.  History of pulmonary edema in the past.  Much improved with CPAP per EMS.  She does wear BiPAP at the nursing home.  Given a DuoNeb as well by EMS.  Oxygen saturation is 97% on the CPAP here in the emergency department.  She is alert oriented x4, GCS is 15.  Denies chest pain    Review of patient's allergies indicates:   Allergen Reactions    Pcn [penicillins] Swelling     Past Medical History:   Diagnosis Date    Abdominal hernia     Bacteremia 4/5/2023    CHF (congestive heart failure)     COPD (chronic obstructive pulmonary disease)     Diabetes mellitus, type 2 2/16/2022    GERD (gastroesophageal reflux disease)     History of home oxygen therapy     Hypertension     Hypertensive emergency 3/30/2023    Migraine headache     Pulmonary embolism 06/01/2017    Small bowel obstruction     Thyroid disease      Past Surgical History:   Procedure Laterality Date    COLONOSCOPY      HYSTERECTOMY      INNER EAR SURGERY      UPPER GASTROINTESTINAL ENDOSCOPY       No family history on file.  Social History     Tobacco Use    Smoking status: Former    Smokeless tobacco: Never   Substance Use Topics    Alcohol use: No    Drug use: No     Review of Systems   Constitutional:  Negative for fever.   HENT:  Negative for sore throat.    Respiratory:  Positive for shortness of breath.    Cardiovascular:  Negative for chest pain.   Gastrointestinal:  Negative for nausea.   Genitourinary:  Negative for  dysuria.   Musculoskeletal:  Negative for back pain.   Skin:  Negative for rash.   Neurological:  Negative for weakness.   Hematological:  Does not bruise/bleed easily.   All other systems reviewed and are negative.    Physical Exam     Initial Vitals   BP Pulse Resp Temp SpO2   04/22/23 0050 04/22/23 0042 04/22/23 0042 04/22/23 0044 04/22/23 0042   (!) 91/53 (!) 50 (!) 22 98.3 °F (36.8 °C) 98 %      MAP       --                Physical Exam    Nursing note and vitals reviewed.  Constitutional: She appears well-developed and well-nourished. She is not diaphoretic. No distress.   HENT:   Head: Normocephalic and atraumatic.   Mouth/Throat: Oropharynx is clear and moist.   Eyes: Conjunctivae and EOM are normal. Pupils are equal, round, and reactive to light.   Neck: Neck supple. No tracheal deviation present. No JVD present.   Normal range of motion.  Cardiovascular:  Regular rhythm, normal heart sounds and intact distal pulses.           No murmur heard.  Bradycardic at 54 beats per minute   Pulmonary/Chest: No stridor. No respiratory distress. She has no wheezes. She has rhonchi. She has rales. She exhibits no tenderness.   Abdominal: Abdomen is soft. Bowel sounds are normal. She exhibits no distension. There is no abdominal tenderness. There is no rebound and no guarding.   Musculoskeletal:         General: No tenderness or edema. Normal range of motion.      Cervical back: Normal range of motion and neck supple.      Comments: Minimal lower leg edema     Neurological: She is alert and oriented to person, place, and time. She has normal strength. No cranial nerve deficit. GCS score is 15. GCS eye subscore is 4. GCS verbal subscore is 5. GCS motor subscore is 6.   Skin: Skin is warm and dry. Capillary refill takes less than 2 seconds.       ED Course   Critical Care    Date/Time: 4/22/2023 2:44 AM  Performed by: Deon Osorio MD  Authorized by: Gunnar Mott DO   Direct patient critical care time: 10  minutes  Additional history critical care time: 10 minutes  Ordering / reviewing critical care time: 10 minutes  Documentation critical care time: 10 minutes  Consulting other physicians critical care time: 10 minutes  Consult with family critical care time: 10 minutes  Total critical care time (exclusive of procedural time) : 60 minutes  Critical care was necessary to treat or prevent imminent or life-threatening deterioration of the following conditions: Acute on chronic congestive heart failure with hypoxia, COPD exacerbation requiring BiPAP management.  Critical care was time spent personally by me on the following activities: review of old charts, re-evaluation of patient's condition, pulse oximetry, ordering and review of radiographic studies, ordering and review of laboratory studies, ordering and performing treatments and interventions, obtaining history from patient or surrogate, examination of patient, evaluation of patient's response to treatment, discussions with primary provider and development of treatment plan with patient or surrogate.      Labs Reviewed   CBC W/ AUTO DIFFERENTIAL - Abnormal; Notable for the following components:       Result Value    MCHC 31.0 (*)     RDW 16.2 (*)     MPV 8.7 (*)     Gran # (ANC) 8.4 (*)     Immature Grans (Abs) 0.06 (*)     Mono # 1.8 (*)     Lymph % 12.6 (*)     Mono % 15.4 (*)     All other components within normal limits   COMPREHENSIVE METABOLIC PANEL - Abnormal; Notable for the following components:    CO2 33 (*)     BUN 24 (*)     Albumin 2.2 (*)     Anion Gap -1 (*)     eGFR 40.0 (*)     All other components within normal limits   NT-PRO NATRIURETIC PEPTIDE - Abnormal; Notable for the following components:    NT-proBNP 1458 (*)     All other components within normal limits   TROPONIN I HIGH SENSITIVITY     EKG Readings: (Independently Interpreted)   Initial Reading: No STEMI. Rhythm: Sinus Bradycardia. Heart Rate: 49. Ectopy: No Ectopy. Conduction: Normal.  ST Segments: Normal ST Segments. T Waves: Normal. Axis: Left Axis Deviation. Clinical Impression: Sinus Bradycardia     Imaging Results              X-Ray Chest 1 View (In process)                      Medications - No data to display  Medical Decision Making:   Differential Diagnosis:   CHF exacerbation, acute on chronic respiratory failure, COPD exacerbation, shortness of breath, pulmonary edema           ED Course as of 04/22/23 0244   Sat Apr 22, 2023   0111 Chest x-ray with stable chronic findings [SD]   0238 Discussed case with  - will admit to telemetry for continued BiPAP therapy.  PICC line nurses coming to place PICC line in this patient this morning [SD]      ED Course User Index  [SD] Deon Osorio MD                 Clinical Impression:   Final diagnoses:  [R06.02] Shortness of breath  [J44.1] COPD exacerbation (Primary)  [I50.9] Acute on chronic congestive heart failure, unspecified heart failure type        ED Disposition Condition    Admit Stable                Deon Osorio MD  04/22/23 0244

## 2023-04-22 NOTE — ASSESSMENT & PLAN NOTE
Patient with Hypercapnic and Hypoxic Respiratory failure which is Acute on chronic.  she is on home oxygen at 2 LPM. Supplemental oxygen was provided and noted- Oxygen Concentration (%):  [40] 40    Signs/symptoms of respiratory failure include- tachypnea, increased work of breathing and respiratory distress. Contributing diagnoses includes - CHF, COPD, Obesity Hypoventilation and Pleural effusion Labs and images were reviewed. Patient Has recent ABG, which has been reviewed. Will treat underlying causes and adjust management of respiratory failure as follows- IV lasix, pulmonary hygiene care, BiPAP management per respiratory.

## 2023-04-22 NOTE — HPI
Chief Complaint   Patient presents with    Shortness of Breath       Pt presents to the ER via EMS w/ complaints of shortness of breath x 1 day. [facility] reports hx of copd/chf, states pt was satting in the 80's on bipap after duonebs. Upon arrival pt is 100% w/ cpap after 1 duoneb.       73-year-old female with a history CHF with diastolic dysfunction, COPD, diabetes, hypertension, pulmonary embolus on Xarelto presents the emergency department with shortness of breath beginning last night gradually.  History of pulmonary edema in the past.  Much improved with CPAP per EMS.  She does wear BiPAP at the nursing home.  Given a DuoNeb as well by EMS.  Oxygen saturation is 97% on the CPAP here in the emergency department.  She is alert oriented x4, GCS is 15. Denies chest pain, palpitations, increased work of breathing after placement on BiPAP.     ED course: Patient responded to IV lasix, breathing treatments and being placed on BiPAP I/P 14/7 FiO2 40%. Patient endorses at the nursing home she has slowly been regaining her strength, spending more time off her breathing machine, however feels like the BiPAP is not functioning well.   Will admit patient to continue treatment with IV lasix and BiPAP for acute on chronic congestive heart failure with hypoxia, COPD exacerbation and assess her non-invasive vent support equipment arranged during last hospitalization.

## 2023-04-22 NOTE — H&P
[facility] Emergency Department  [facility] Medicine  History & Physical    Patient Name: Carmen Tan  MRN: [id number]  Patient Class: IP- Inpatient  Admission Date: 4/22/2023  Attending Physician: Gunnar Mott DO   Primary Care Provider: Lucian Barry MD    Patient information was obtained from patient, relative(s) and ER records.     Subjective:     Principal Problem:Acute respiratory failure with hypoxia    Chief Complaint:   Chief Complaint   Patient presents with    Shortness of Breath     Pt presents to the ER via EMS w/ complaints of shortness of breath x 1 day. [facility] reports hx of copd/chf, states pt was satting in the 80's on bipap after duonebs. Upon arrival pt is 100% w/ cpap after 1 duoneb.         HPI:   Chief Complaint   Patient presents with    Shortness of Breath       Pt presents to the ER via EMS w/ complaints of shortness of breath x 1 day. [facility] reports hx of copd/chf, states pt was satting in the 80's on bipap after duonebs. Upon arrival pt is 100% w/ cpap after 1 duoneb.       73-year-old female with a history CHF with diastolic dysfunction, COPD, diabetes, hypertension, pulmonary embolus on Xarelto presents the emergency department with shortness of breath beginning last night gradually.  History of pulmonary edema in the past.  Much improved with CPAP per EMS.  She does wear BiPAP at the nursing home.  Given a DuoNeb as well by EMS.  Oxygen saturation is 97% on the CPAP here in the emergency department.  She is alert oriented x4, GCS is 15. Denies chest pain, palpitations, increased work of breathing after placement on BiPAP.     ED course: Patient responded to IV lasix, breathing treatments and being placed on BiPAP I/P 14/7 FiO2 40%. Patient endorses at the nursing home she has slowly been regaining her strength, spending more time off her breathing machine, however feels like the BiPAP is not functioning well.   Will admit patient to continue treatment with IV lasix and BiPAP  for acute on chronic congestive heart failure with hypoxia, COPD exacerbation and assess her non-invasive vent support equipment arranged during last hospitalization.        Past Medical History:   Diagnosis Date    Abdominal hernia     Bacteremia 4/5/2023    CHF (congestive heart failure)     COPD (chronic obstructive pulmonary disease)     Diabetes mellitus, type 2 2/16/2022    GERD (gastroesophageal reflux disease)     History of home oxygen therapy     Hypertension     Hypertensive emergency 3/30/2023    Migraine headache     Pulmonary embolism 06/01/2017    Small bowel obstruction     Thyroid disease        Past Surgical History:   Procedure Laterality Date    COLONOSCOPY      HYSTERECTOMY      INNER EAR SURGERY      UPPER GASTROINTESTINAL ENDOSCOPY         Review of patient's allergies indicates:   Allergen Reactions    Pcn [penicillins] Swelling       No current facility-administered medications on file prior to encounter.     Current Outpatient Medications on File Prior to Encounter   Medication Sig    acetaminophen (TYLENOL) 325 MG tablet Take 2 tablets (650 mg total) by mouth every 8 (eight) hours as needed for Pain or Temperature greater than. (Patient taking differently: Take 650 mg by mouth every 8 (eight) hours as needed for Pain or Temperature greater than (temp greater than 100F).)    albuterol-ipratropium (DUO-NEB) 2.5 mg-0.5 mg/3 mL nebulizer solution Take 3 mLs by nebulization every 6 (six) hours while awake. Rescue    budesonide (PULMICORT) 0.25 mg/2 mL nebulizer solution Take 2 mLs (0.25 mg total) by nebulization every 12 (twelve) hours. Controller    busPIRone (BUSPAR) 5 MG Tab Take 1 tablet (5 mg total) by mouth 3 (three) times daily.    carvediloL (COREG) 6.25 MG tablet Take 1 tablet (6.25 mg total) by mouth 2 (two) times daily.    empagliflozin (JARDIANCE) 10 mg tablet Take 1 tablet (10 mg total) by mouth once daily.    furosemide (LASIX) 40 MG tablet Take 1 tablet  (40 mg total) by mouth once daily.    gabapentin (NEURONTIN) 300 MG capsule Take 1 capsule (300 mg total) by mouth 2 (two) times daily.    insulin detemir U-100, Levemir, 100 unit/mL (3 mL) SubQ InPn pen Inject 15 Units into the skin once daily.    LIPITOR 40 mg tablet Take 1 tablet (40 mg total) by mouth once daily.    NIFEdipine (PROCARDIA-XL) 60 MG (OSM) 24 hr tablet Take 1 tablet (60 mg total) by mouth once daily.    pantoprazole (PROTONIX) 40 MG tablet Take 1 tablet (40 mg total) by mouth once daily.    potassium chloride SA (K-DUR,KLOR-CON) 20 MEQ tablet Take 1 tablet (20 mEq total) by mouth 2 (two) times daily.    sacubitriL-valsartan (ENTRESTO) 24-26 mg per tablet Take 1 tablet by mouth 2 (two) times daily.    spironolactone (ALDACTONE) 25 MG tablet Take 1 tablet (25 mg total) by mouth once daily.    sucralfate (CARAFATE) 100 mg/mL suspension Take 10 mLs (1 g total) by mouth 4 (four) times daily before meals and nightly.    [DISCONTINUED] sertraline (ZOLOFT) 50 MG tablet Take 1 tablet (50 mg total) by mouth once daily.    sertraline (ZOLOFT) 50 MG tablet Take 1 tablet (50 mg total) by mouth once daily.    vitamin D 1000 units Tab Take 185 mg by mouth once daily.     Family History    None       Tobacco Use    Smoking status: Former    Smokeless tobacco: Never   Substance and Sexual Activity    Alcohol use: No    Drug use: No    Sexual activity: Not on file     Review of Systems   Constitutional:  Positive for activity change. Negative for appetite change, chills, fatigue and fever.   HENT:  Negative for sinus pressure, sinus pain, sneezing and sore throat.    Respiratory:  Positive for chest tightness, shortness of breath and wheezing.    Cardiovascular:  Negative for chest pain, palpitations and leg swelling.   Gastrointestinal:  Negative for abdominal distention, abdominal pain, constipation, diarrhea, nausea and vomiting.   Genitourinary:  Negative for dysuria and frequency.    Musculoskeletal:  Positive for back pain and gait problem.   Neurological:  Negative for dizziness, seizures, speech difficulty, light-headedness, numbness and headaches.   Psychiatric/Behavioral:  Negative for agitation, behavioral problems, self-injury and sleep disturbance. The patient is not nervous/anxious.    Objective:     Vital Signs (Most Recent):  Temp: 98.6 °F (37 °C) (04/22/23 1200)  Pulse: 64 (04/22/23 1611)  Resp: 20 (04/22/23 1611)  BP: 109/70 (04/22/23 1611)  SpO2: (!) 92 % (04/22/23 1611)   Vital Signs (24h Range):  Temp:  [98.3 °F (36.8 °C)-98.6 °F (37 °C)] 98.6 °F (37 °C)  Pulse:  [49-66] 64  Resp:  [17-31] 20  SpO2:  [91 %-98 %] 92 %  BP: ()/(52-70) 109/70     Weight: 119.1 kg (262 lb 8 oz)  Body mass index is 45.06 kg/m².    Physical Exam  Constitutional:       General: She is not in acute distress.     Appearance: She is ill-appearing. She is not toxic-appearing.   HENT:      Head: Normocephalic.      Nose: No congestion or rhinorrhea.      Mouth/Throat:      Mouth: Mucous membranes are dry.      Pharynx: Oropharynx is clear.   Eyes:      Extraocular Movements: Extraocular movements intact.      Conjunctiva/sclera: Conjunctivae normal.   Cardiovascular:      Rate and Rhythm: Bradycardia present.      Heart sounds: No murmur heard.  Pulmonary:      Effort: No respiratory distress.      Breath sounds: Rhonchi present. No wheezing or rales.      Comments: Breath sounds reaching lung bases bilaterally  Abdominal:      General: Abdomen is flat. There is no distension.      Palpations: Abdomen is soft.      Tenderness: There is no abdominal tenderness. There is no guarding.   Musculoskeletal:         General: No swelling or deformity.      Cervical back: Normal range of motion and neck supple. No rigidity or tenderness.      Right lower leg: No edema.      Left lower leg: No edema.   Skin:     General: Skin is warm and dry.      Comments: Skin over abdomen and legs relaxed with no evidence of  edema   Neurological:      General: No focal deficit present.      Mental Status: She is alert and oriented to person, place, and time. Mental status is at baseline.   Psychiatric:         Mood and Affect: Mood normal.         Behavior: Behavior normal.           Significant Labs: All pertinent labs within the past 24 hours have been reviewed.  A1C:   Recent Labs   Lab 03/29/23  2319   HGBA1C 5.5     ABGs:   Recent Labs   Lab 04/22/23  0059   PH 7.330*   PCO2 61.7*   HCO3 28.5*   POCSATURATED 93.9*   PO2 69.7*     Bilirubin:   Recent Labs   Lab 04/02/23  0339 04/03/23  0344 04/04/23  0327 04/22/23  0200 04/22/23  1526   BILITOT 0.5 0.5 0.5 0.3 0.4     Blood Culture: No results for input(s): LABBLOO in the last 48 hours.  BMP:   Recent Labs   Lab 04/22/23  1526   GLU 94      K 4.2      CO2 34*   BUN 22   CREATININE 1.4   CALCIUM 10.5     CBC:   Recent Labs   Lab 04/22/23  0200 04/22/23  1526   WBC 11.86 9.86   HGB 12.5 13.1   HCT 40.3 41.2    255     CMP:   Recent Labs   Lab 04/22/23  0200 04/22/23  1526    140   K 4.7 4.2    106   CO2 33* 34*    94   BUN 24* 22   CREATININE 1.4 1.4   CALCIUM 10.1 10.5   PROT 7.1 7.0   ALBUMIN 2.2* 2.2*   BILITOT 0.3 0.4   ALKPHOS 129 120   AST 16 12   ALT 21 21   ANIONGAP -1* 0*     Troponin:   Recent Labs   Lab 04/22/23  0200   TROPONINIHS 17.3     TSH:   Recent Labs   Lab 03/29/23  2319   TSH 2.150     Significant Imaging: I have reviewed all pertinent imaging results/findings within the past 24 hours.    Assessment/Plan:     * Acute respiratory failure with hypoxia  Patient with Hypercapnic and Hypoxic Respiratory failure which is Acute on chronic.  she is on home oxygen at 2 LPM. Supplemental oxygen was provided and noted- Oxygen Concentration (%):  [40] 40    Signs/symptoms of respiratory failure include- tachypnea, increased work of breathing and respiratory distress. Contributing diagnoses includes - CHF, COPD, Obesity Hypoventilation and  Pleural effusion Labs and images were reviewed. Patient Has recent ABG, which has been reviewed. Will treat underlying causes and adjust management of respiratory failure as follows- IV lasix, pulmonary hygiene care, BiPAP management per respiratory.      Anxiety and depression  Patient has persistent depression which is mild and is currently controlled. Will Continue anti-depressant medications. We will not consult psychiatry at this time. Patient does not display psychosis at this time. Continue to monitor closely and adjust plan of care as needed.        Acute on chronic congestive heart failure  Patient is identified as having Combined Systolic and Diastolic heart failure that is Acute on chronic. CHF is currently uncontrolled due to Rales/crackles on pulmonary exam and Pulmonary edema/pleural effusion on CXR. Latest ECHO performed and demonstrates- Results for orders placed during the hospital encounter of 03/29/23    Echo  Interpretation Summary  · The left ventricle is normal in size with moderate concentric hypertrophy and mildly decreased systolic function.  · The estimated ejection fraction is 45%.  · Grade I left ventricular diastolic dysfunction.  · Moderate right ventricular enlargement.  · Moderate right atrial enlargement.  · Mild-to-moderate aortic regurgitation.  · There is mild aortic valve stenosis.  · Aortic valve area is 1.44 cm2; peak velocity is 2.09 m/s; mean gradient is 10 mmHg.  · Mild-to-moderate mitral regurgitation.  · Moderate tricuspid regurgitation.  · Moderate pulmonic regurgitation.  · The estimated PA systolic pressure is 69 mmHg.  · There is moderate pulmonary hypertension.  · Moderate left atrial enlargement.  . Continue Beta Blocker, ACE/ARB, Furosemide and Aldactone and monitor clinical status closely. Monitor on telemetry. Patient is on CHF pathway.  Monitor strict Is&Os and daily weights.  Place on fluid restriction of 1.5 L. Continue to stress to patient importance of self  efficacy and  on diet for CHF. Last BNP reviewed- and noted below No results for input(s): BNP, BNPTRIAGEBLO in the last 168 hours..  Lab Results   Component Value Date    NTPROBNP 1458 (H) 04/22/2023    NTPROBNP 79188 (H) 03/29/2023    NTPROBNP 3561 (H) 02/15/2022   - continue BiPAP management per respiratory  - continue supplemental oxygen while off BiPAP at baseline 2L  - continue daily breathing treatments   - contact DME office with evaluation of home trilogy device      COPD exacerbation  No leukocytosis, vital signs stable. Likely associated from combination of fluid overload and ineffective non-invasive equipment or settings at home for chronic respiratory failure and CHF.   Continue with above management and monitor. No antibiotics at this time.       Weakness  Debility from last hospitalization, patient is under PT, OT pulmonary care at [facility].  - f/u PT eval and treatment    Severe obesity (BMI >= 40)  Body mass index is 45.06 kg/m². Morbid obesity complicates all aspects of disease management from diagnostic modalities to treatment. Weight loss encouraged and health benefits explained to patient.           VTE Risk Mitigation (From admission, onward)         Ordered     IP VTE HIGH RISK PATIENT  Once         04/22/23 0534     Place sequential compression device  Until discontinued         04/22/23 0534     Place ANAMARIA hose  Until discontinued         04/22/23 0534                           Gunnar Mott DO  Department of Hospital Medicine  [place] - Emergency Department

## 2023-04-22 NOTE — ASSESSMENT & PLAN NOTE
Debility from last hospitalization, patient is under PT, OT pulmonary care at SNF.  - f/u PT eval and treatment

## 2023-04-22 NOTE — PLAN OF CARE
[facility] - Emergency Department  Initial Discharge Assessment       Primary Care Provider: Lucian Barry MD    Admission Diagnosis: COPD exacerbation [J44.1]    Admission Date: 4/22/2023  Expected Discharge Date:     Discharge Barriers Identified: Other (see comments) (Patient unable to speak for herself at this time)    Payor: [employer] MANAGED MEDICARE / Plan: [employer] MEDICARE HMO / Product Type: Capitation /     Extended Emergency Contact Information  Primary Emergency Contact: DominickEsther   [facility]  Home Phone: [phone]  Relation: Daughter  Secondary Emergency Contact: Alfonzo Tan  Home Phone: [phone]  Relation: Son  Preferred language: English    Discharge Plan A: Skilled Nursing Facility  Discharge Plan B: Other (Patient's sister stating patient's oldest son is on his way here from out of town to discuss Discharge plans.  Salvatore Tan,)      [id number] - [address] & [address] [address]  Phone: [phone] Fax: [phone]      Initial Assessment (most recent)       Adult Discharge Assessment - 04/22/23 1413          Discharge Assessment    Assessment Type Discharge Planning Assessment     Confirmed/corrected address, phone number and insurance Yes     Confirmed Demographics Correct on Facesheet     Source of Information family   Paradise Rae, sister is at bedside    Communicated TIGRE with patient/caregiver Date not available/Unable to determine     Reason For Admission copd     Facility Arrived From: [facility]     Do you expect to return to your current living situation? Other (see comments)   Ptient unable to speak for self at this time.    Current cognitive status: Coma/Sedated/Intubated   sleeping at this time    Walking or Climbing Stairs ambulation difficulty, dependent     Dressing/Bathing bathing difficulty, dependent     Home Layout Able to live  on 1st floor     Equipment Currently Used at Home wheelchair     Readmission within 30 days? Yes     Patient currently being followed by outpatient case management? No     Do you take prescription medications? Yes     Do you have any problems affording any of your prescribed medications? No     Is the patient taking medications as prescribed? yes     Who is going to help you get home at discharge? [facility]     How do you get to doctors appointments? agency     Are you on dialysis? No     Do you take coumadin? No     Discharge Plan A Skilled Nursing Facility     Discharge Plan B Other   Patient's sister stating patient's oldest son is on his way here from out of town to discuss Discharge plans.  Salvatore Tan,    Discharge Barriers Identified Other (see comments)   Patient unable to speak for herself at this time                Patient unable to speak at this time, Is somnolent, Bipapp in place.  Her sister /prema Rae is at bedside, and confrims demographics and names of patient's children.  She is stating the oldest son is on his way from out of town to discuss discharge plans. Salvatore Tan, his phone number not listed on demographics.  Patient has other children Esther Nairnett and Alfonzo Tan who are listed   Paradise states patient has been getting out of bed to wheelchair with assistance of staff at nursing home, but has been very short of breath.  Expressing concerns that bipapp not working at nursing  home.  Aleisha plan return to nursing home, follow up with family and nursing home regarding functioning of bipapp at nursing home.

## 2023-04-22 NOTE — ED NOTES
Multiple attempts made to obtain iv access without success, will have house supervisor call out picc line team

## 2023-04-22 NOTE — ASSESSMENT & PLAN NOTE
Body mass index is 45.06 kg/m². Morbid obesity complicates all aspects of disease management from diagnostic modalities to treatment. Weight loss encouraged and health benefits explained to patient.

## 2023-04-22 NOTE — PROCEDURES
"Carmen Tan is a 73 y.o. female patient.    Temp: 98.3 °F (36.8 °C) (04/22/23 0044)  Pulse: (!) 53 (04/22/23 0626)  Resp: 17 (04/22/23 0427)  BP: (!) 110/58 (04/22/23 0626)  SpO2: (!) 94 % (04/22/23 0427)  Weight: 119.1 kg (262 lb 8 oz) (04/22/23 0042)  Height: 5' 4" (162.6 cm) (04/22/23 0241)    PICC  Date/Time: 4/22/2023 8:11 AM  Performed by: Malcolm Tomas RN  Consent Done: Yes  Time out: Immediately prior to procedure a time out was called to verify the correct patient, procedure, equipment, support staff and site/side marked as required  Indications: med administration  Anesthesia: local infiltration  Local anesthetic: lidocaine 1% without epinephrine  Anesthetic Total (mL): 5  Preparation: skin prepped with ChloraPrep  Skin prep agent dried: skin prep agent completely dried prior to procedure  Sterile barriers: all five maximum sterile barriers used - cap, mask, sterile gown, sterile gloves, and large sterile sheet  Hand hygiene: hand hygiene performed prior to central venous catheter insertion  Location details: right brachial  Catheter type: triple lumen  Catheter size: 5 Fr  Catheter Length: 38cm    Ultrasound guidance: yes  Vessel Caliber: medium and large and patent, compressibility normal  Needle advanced into vessel with real time Ultrasound guidance.  Guidewire confirmed in vessel.  Sterile sheath used.  Number of attempts: 1  Post-procedure: blood return through all ports    Assessment: placement verified by x-ray        Malcolm Tomas Rn  4/22/2023    "

## 2023-04-22 NOTE — ED NOTES
NEUROLOGICAL:   Patient is awake  and alert . Pupils are PERRL. Gait is not visualized.   Moves all extremities without difficulty.   Patient reports no neuro complaints..  GCS 15    HEENT:   Head appears normocephalic  and symmetric .   Eyes appear WNL to both eye(s). Patient reports no complaints  to both eye(s) .   Ears appear WNL. Patient reports no complaints  to both ear(s).   Nares appear patent . Patient reports no nose complaints .  Mouth appears moist, pink, and teeth intact. Patient reports no mouth complaints.   Throat appears pink and moist . Patient reports no throat complaints.    CARDIOVASCULAR:   S1 and S2 present, no murmurs, gallops, or rubs, rate regular , and pulses palpable (2+)    Patient vitals are WNL.    RESPIRATORY:   Airway Clear, Open, and Patent.  Respirations are even and unlabored.   Breath sounds diminished   Patient reports shortness of breath on exertion.     GASTROINTESTINAL:   Abdomen is soft  and non-tender x 4 quadrants. Bowel sounds are normoactive to all quadrants .   Patient reports no GI complaints .     GENITOURINARY:   Patient reports no  complaints.     SKIN:   Skin appears warm , dry , good turgor, color normal for race, and intact. Patient reports no skin complaints.

## 2023-04-23 LAB
ANION GAP SERPL CALC-SCNC: -1 MMOL/L (ref 8–16)
BUN SERPL-MCNC: 24 MG/DL (ref 8–23)
CALCIUM SERPL-MCNC: 10.5 MG/DL (ref 8.7–10.5)
CHLORIDE SERPL-SCNC: 103 MMOL/L (ref 95–110)
CO2 SERPL-SCNC: 36 MMOL/L (ref 23–29)
CREAT SERPL-MCNC: 1.4 MG/DL (ref 0.5–1.4)
EST. GFR  (NO RACE VARIABLE): 40 ML/MIN/1.73 M^2
GLUCOSE SERPL-MCNC: 143 MG/DL (ref 70–110)
MAGNESIUM SERPL-MCNC: 2.3 MG/DL (ref 1.6–2.6)
POTASSIUM SERPL-SCNC: 4 MMOL/L (ref 3.5–5.1)
SODIUM SERPL-SCNC: 138 MMOL/L (ref 136–145)

## 2023-04-23 PROCEDURE — 94660 CPAP INITIATION&MGMT: CPT

## 2023-04-23 PROCEDURE — 63700000 PHARM REV CODE 250 ALT 637 W/O HCPCS: Performed by: STUDENT IN AN ORGANIZED HEALTH CARE EDUCATION/TRAINING PROGRAM

## 2023-04-23 PROCEDURE — 25000242 PHARM REV CODE 250 ALT 637 W/ HCPCS: Performed by: STUDENT IN AN ORGANIZED HEALTH CARE EDUCATION/TRAINING PROGRAM

## 2023-04-23 PROCEDURE — 99900031 HC PATIENT EDUCATION (STAT)

## 2023-04-23 PROCEDURE — 27000221 HC OXYGEN, UP TO 24 HOURS

## 2023-04-23 PROCEDURE — 99900035 HC TECH TIME PER 15 MIN (STAT)

## 2023-04-23 PROCEDURE — 11000001 HC ACUTE MED/SURG PRIVATE ROOM

## 2023-04-23 PROCEDURE — 94640 AIRWAY INHALATION TREATMENT: CPT

## 2023-04-23 PROCEDURE — 36415 COLL VENOUS BLD VENIPUNCTURE: CPT | Performed by: STUDENT IN AN ORGANIZED HEALTH CARE EDUCATION/TRAINING PROGRAM

## 2023-04-23 PROCEDURE — 94761 N-INVAS EAR/PLS OXIMETRY MLT: CPT

## 2023-04-23 PROCEDURE — 83735 ASSAY OF MAGNESIUM: CPT | Performed by: STUDENT IN AN ORGANIZED HEALTH CARE EDUCATION/TRAINING PROGRAM

## 2023-04-23 PROCEDURE — 99232 SBSQ HOSP IP/OBS MODERATE 35: CPT | Mod: ,,, | Performed by: STUDENT IN AN ORGANIZED HEALTH CARE EDUCATION/TRAINING PROGRAM

## 2023-04-23 PROCEDURE — 63600175 PHARM REV CODE 636 W HCPCS: Performed by: STUDENT IN AN ORGANIZED HEALTH CARE EDUCATION/TRAINING PROGRAM

## 2023-04-23 PROCEDURE — 99232 PR SUBSEQUENT HOSPITAL CARE,LEVL II: ICD-10-PCS | Mod: ,,, | Performed by: STUDENT IN AN ORGANIZED HEALTH CARE EDUCATION/TRAINING PROGRAM

## 2023-04-23 PROCEDURE — 25000003 PHARM REV CODE 250: Performed by: STUDENT IN AN ORGANIZED HEALTH CARE EDUCATION/TRAINING PROGRAM

## 2023-04-23 PROCEDURE — 80048 BASIC METABOLIC PNL TOTAL CA: CPT | Performed by: STUDENT IN AN ORGANIZED HEALTH CARE EDUCATION/TRAINING PROGRAM

## 2023-04-23 RX ORDER — MUPIROCIN 20 MG/G
OINTMENT TOPICAL 2 TIMES DAILY
Status: DISCONTINUED | OUTPATIENT
Start: 2023-04-23 | End: 2023-04-25 | Stop reason: HOSPADM

## 2023-04-23 RX ORDER — KETOTIFEN FUMARATE 0.35 MG/ML
1 SOLUTION/ DROPS OPHTHALMIC 2 TIMES DAILY
Status: DISCONTINUED | OUTPATIENT
Start: 2023-04-23 | End: 2023-04-25 | Stop reason: HOSPADM

## 2023-04-23 RX ADMIN — KETOTIFEN FUMARATE 1 DROP: 0.35 SOLUTION/ DROPS OPHTHALMIC at 08:04

## 2023-04-23 RX ADMIN — FAMOTIDINE 20 MG: 20 TABLET ORAL at 09:04

## 2023-04-23 RX ADMIN — BUDESONIDE 0.5 MG: 0.5 INHALANT RESPIRATORY (INHALATION) at 07:04

## 2023-04-23 RX ADMIN — IPRATROPIUM BROMIDE AND ALBUTEROL SULFATE 3 ML: .5; 3 SOLUTION RESPIRATORY (INHALATION) at 07:04

## 2023-04-23 RX ADMIN — SUCRALFATE 1 G: 1 SUSPENSION ORAL at 08:04

## 2023-04-23 RX ADMIN — CARVEDILOL 6.25 MG: 3.12 TABLET, FILM COATED ORAL at 09:04

## 2023-04-23 RX ADMIN — IPRATROPIUM BROMIDE AND ALBUTEROL SULFATE 3 ML: .5; 3 SOLUTION RESPIRATORY (INHALATION) at 11:04

## 2023-04-23 RX ADMIN — FUROSEMIDE 40 MG: 10 INJECTION, SOLUTION INTRAMUSCULAR; INTRAVENOUS at 06:04

## 2023-04-23 RX ADMIN — BUSPIRONE HYDROCHLORIDE 5 MG: 5 TABLET ORAL at 04:04

## 2023-04-23 RX ADMIN — KETOTIFEN FUMARATE 1 DROP: 0.35 SOLUTION/ DROPS OPHTHALMIC at 12:04

## 2023-04-23 RX ADMIN — BUSPIRONE HYDROCHLORIDE 5 MG: 5 TABLET ORAL at 08:04

## 2023-04-23 RX ADMIN — SUCRALFATE 1 G: 1 SUSPENSION ORAL at 10:04

## 2023-04-23 RX ADMIN — MUPIROCIN: 20 OINTMENT TOPICAL at 08:04

## 2023-04-23 RX ADMIN — SUCRALFATE 1 G: 1 SUSPENSION ORAL at 06:04

## 2023-04-23 RX ADMIN — CARVEDILOL 6.25 MG: 3.12 TABLET, FILM COATED ORAL at 08:04

## 2023-04-23 RX ADMIN — SUCRALFATE 1 G: 1 SUSPENSION ORAL at 04:04

## 2023-04-23 RX ADMIN — IPRATROPIUM BROMIDE AND ALBUTEROL SULFATE 3 ML: .5; 3 SOLUTION RESPIRATORY (INHALATION) at 04:04

## 2023-04-23 RX ADMIN — SACUBITRIL AND VALSARTAN 1 TABLET: 24; 26 TABLET, FILM COATED ORAL at 09:04

## 2023-04-23 RX ADMIN — SACUBITRIL AND VALSARTAN 1 TABLET: 24; 26 TABLET, FILM COATED ORAL at 08:04

## 2023-04-23 RX ADMIN — FUROSEMIDE 40 MG: 10 INJECTION, SOLUTION INTRAMUSCULAR; INTRAVENOUS at 05:04

## 2023-04-23 RX ADMIN — ATORVASTATIN CALCIUM 40 MG: 40 TABLET, FILM COATED ORAL at 09:04

## 2023-04-23 RX ADMIN — BUSPIRONE HYDROCHLORIDE 5 MG: 5 TABLET ORAL at 09:04

## 2023-04-23 NOTE — PLAN OF CARE
Problem: Adult Inpatient Plan of Care  Goal: Plan of Care Review  4/22/2023 2356 by Roderick Jacinto, RN  Outcome: Ongoing, Progressing  4/22/2023 2356 by Roderick Jacinto RN  Outcome: Ongoing, Progressing     Problem: Bariatric Environmental Safety  Goal: Safety Maintained with Care  Outcome: Ongoing, Progressing     Problem: Diabetes Comorbidity  Goal: Blood Glucose Level Within Targeted Range  Outcome: Ongoing, Progressing     Problem: Skin Injury Risk Increased  Goal: Skin Health and Integrity  Outcome: Ongoing, Progressing

## 2023-04-23 NOTE — HOSPITAL COURSE
4/23 Tolerating BiPAP use, sitting upright at edge of bed preparing for breathing treatment. Continue with daily pulmonary hygiene care, PT and OT. Patient will have a family member bring in home non-invasive vent to be evaluated.   4/24 Need to verify whether at the returning nursing facility patient can have a full face mask with the Trelegy machine as the over the nose mask arranged for use at the nursing facility leaks and patient cannot get effective ventilation. On I/P 14/7 FiO2 40% patient maintains SpO2 >93%. Will transition IV lasix to PO and monitor urine output. Counseled patient on use of BiPAP for night time use and to switch to nasal cannula during the day time. Continue with PT and OT efforts.   4/25 Patient sitting upright on continuous O2 2L breathing comfortably. Again reviewed pulmonary hygiene care with patient. Also discussed changing code status to avoid or even reduce unnecessary pain and suffering in the event of massive cardiac event and respiratory failure given her overall decline in functional status. Patient agrees to consider and will continue efforts with PT and OT and chest physiotherapy. Patient is ready to return to [facility] with SNF. Trilogy device compatible full face mask to be transported with patient back to nursing home.

## 2023-04-23 NOTE — SUBJECTIVE & OBJECTIVE
Interval History: Patient seen and examined.     Review of Systems   Constitutional:  Positive for activity change. Negative for appetite change, chills, fatigue and fever.   HENT:  Negative for sinus pressure, sinus pain, sneezing and sore throat.    Respiratory:  Positive for shortness of breath. Negative for chest tightness.    Cardiovascular:  Negative for chest pain, palpitations and leg swelling.   Gastrointestinal:  Negative for abdominal distention, abdominal pain, constipation, diarrhea, nausea and vomiting.   Genitourinary:  Negative for dysuria and frequency.   Musculoskeletal:  Positive for back pain and gait problem.   Neurological:  Negative for dizziness, seizures, speech difficulty, light-headedness, numbness and headaches.   Psychiatric/Behavioral:  Negative for agitation, behavioral problems, confusion, self-injury and sleep disturbance. The patient is not nervous/anxious.    Objective:     Vital Signs (Most Recent):  Temp: 97.6 °F (36.4 °C) (04/23/23 0558)  Pulse: 71 (04/23/23 0719)  Resp: 20 (04/23/23 0719)  BP: 106/73 (04/23/23 0558)  SpO2: (!) 91 % (04/23/23 0719)   Vital Signs (24h Range):  Temp:  [97.6 °F (36.4 °C)-98.6 °F (37 °C)] 97.6 °F (36.4 °C)  Pulse:  [51-81] 71  Resp:  [19-35] 20  SpO2:  [90 %-95 %] 91 %  BP: (106-141)/(52-73) 106/73     Weight: 119.1 kg (262 lb 8 oz)  Body mass index is 45.06 kg/m².    Intake/Output Summary (Last 24 hours) at 4/23/2023 0754  Last data filed at 4/23/2023 0631  Gross per 24 hour   Intake 120 ml   Output 2800 ml   Net -2680 ml      Physical Exam  Vitals and nursing note reviewed.   Constitutional:       General: She is not in acute distress.     Appearance: She is ill-appearing. She is not toxic-appearing.   HENT:      Head: Normocephalic.      Nose: No congestion or rhinorrhea.      Mouth/Throat:      Mouth: Mucous membranes are dry.      Pharynx: Oropharynx is clear.   Eyes:      Extraocular Movements: Extraocular movements intact.       Conjunctiva/sclera: Conjunctivae normal.   Cardiovascular:      Rate and Rhythm: Normal rate.      Heart sounds: No murmur heard.  Pulmonary:      Effort: No respiratory distress.      Breath sounds: Rhonchi present. No wheezing or rales.      Comments: Breath sounds reaching lung bases bilaterally  Abdominal:      General: Abdomen is flat. There is no distension.      Palpations: Abdomen is soft.      Tenderness: There is no abdominal tenderness. There is no guarding.   Musculoskeletal:         General: No swelling or deformity.      Cervical back: Normal range of motion and neck supple. No rigidity or tenderness.      Right lower leg: No edema.      Left lower leg: No edema.   Skin:     General: Skin is warm and dry.      Comments: Skin over abdomen and legs relaxed with no evidence of edema   Neurological:      General: No focal deficit present.      Mental Status: She is alert and oriented to person, place, and time. Mental status is at baseline.   Psychiatric:         Mood and Affect: Mood normal.         Behavior: Behavior normal.     Significant Labs: All pertinent labs within the past 24 hours have been reviewed.  A1C:   Recent Labs   Lab 03/29/23  2319   HGBA1C 5.5     ABGs:   Recent Labs   Lab 04/22/23  0059   PH 7.330*   PCO2 61.7*   HCO3 28.5*   POCSATURATED 93.9*   PO2 69.7*     Bilirubin:   Recent Labs   Lab 04/02/23  0339 04/03/23  0344 04/04/23  0327 04/22/23  0200 04/22/23  1526   BILITOT 0.5 0.5 0.5 0.3 0.4     Blood Culture: No results for input(s): LABBLOO in the last 48 hours.  BMP:   Recent Labs   Lab 04/22/23  1526   GLU 94      K 4.2      CO2 34*   BUN 22   CREATININE 1.4   CALCIUM 10.5     CBC:   Recent Labs   Lab 04/22/23  0200 04/22/23  1526   WBC 11.86 9.86   HGB 12.5 13.1   HCT 40.3 41.2    255     CMP:   Recent Labs   Lab 04/22/23  0200 04/22/23  1526    140   K 4.7 4.2    106   CO2 33* 34*    94   BUN 24* 22   CREATININE 1.4 1.4   CALCIUM 10.1 10.5    PROT 7.1 7.0   ALBUMIN 2.2* 2.2*   BILITOT 0.3 0.4   ALKPHOS 129 120   AST 16 12   ALT 21 21   ANIONGAP -1* 0*     POCT Glucose:   Recent Labs   Lab 04/22/23  2101   POCTGLUCOSE 97     Troponin:   Recent Labs   Lab 04/22/23  0200   TROPONINIHS 17.3     TSH:   Recent Labs   Lab 03/29/23  2319   TSH 2.150   Significant Imaging: I have reviewed all pertinent imaging results/findings within the past 24 hours.

## 2023-04-24 PROCEDURE — 94761 N-INVAS EAR/PLS OXIMETRY MLT: CPT

## 2023-04-24 PROCEDURE — 99900035 HC TECH TIME PER 15 MIN (STAT)

## 2023-04-24 PROCEDURE — 25000003 PHARM REV CODE 250: Performed by: STUDENT IN AN ORGANIZED HEALTH CARE EDUCATION/TRAINING PROGRAM

## 2023-04-24 PROCEDURE — 11000001 HC ACUTE MED/SURG PRIVATE ROOM

## 2023-04-24 PROCEDURE — 99900031 HC PATIENT EDUCATION (STAT)

## 2023-04-24 PROCEDURE — 27000221 HC OXYGEN, UP TO 24 HOURS

## 2023-04-24 PROCEDURE — 94660 CPAP INITIATION&MGMT: CPT

## 2023-04-24 PROCEDURE — 25000242 PHARM REV CODE 250 ALT 637 W/ HCPCS: Performed by: STUDENT IN AN ORGANIZED HEALTH CARE EDUCATION/TRAINING PROGRAM

## 2023-04-24 PROCEDURE — 99233 SBSQ HOSP IP/OBS HIGH 50: CPT | Mod: ,,, | Performed by: STUDENT IN AN ORGANIZED HEALTH CARE EDUCATION/TRAINING PROGRAM

## 2023-04-24 PROCEDURE — 63600175 PHARM REV CODE 636 W HCPCS: Performed by: STUDENT IN AN ORGANIZED HEALTH CARE EDUCATION/TRAINING PROGRAM

## 2023-04-24 PROCEDURE — 99233 PR SUBSEQUENT HOSPITAL CARE,LEVL III: ICD-10-PCS | Mod: ,,, | Performed by: STUDENT IN AN ORGANIZED HEALTH CARE EDUCATION/TRAINING PROGRAM

## 2023-04-24 PROCEDURE — 97162 PT EVAL MOD COMPLEX 30 MIN: CPT

## 2023-04-24 PROCEDURE — 94640 AIRWAY INHALATION TREATMENT: CPT

## 2023-04-24 RX ORDER — SODIUM CHLORIDE 9 MG/ML
INJECTION, SOLUTION INTRAVENOUS ONCE
Status: CANCELLED | OUTPATIENT
Start: 2023-04-24

## 2023-04-24 RX ORDER — POTASSIUM CHLORIDE 750 MG/1
30 TABLET, EXTENDED RELEASE ORAL ONCE
Status: COMPLETED | OUTPATIENT
Start: 2023-04-24 | End: 2023-04-24

## 2023-04-24 RX ORDER — IPRATROPIUM BROMIDE AND ALBUTEROL SULFATE 2.5; .5 MG/3ML; MG/3ML
3 SOLUTION RESPIRATORY (INHALATION)
Status: DISCONTINUED | OUTPATIENT
Start: 2023-04-24 | End: 2023-04-25 | Stop reason: HOSPADM

## 2023-04-24 RX ORDER — FUROSEMIDE 40 MG/1
40 TABLET ORAL 2 TIMES DAILY
Status: DISCONTINUED | OUTPATIENT
Start: 2023-04-25 | End: 2023-04-25 | Stop reason: HOSPADM

## 2023-04-24 RX ADMIN — ATORVASTATIN CALCIUM 40 MG: 40 TABLET, FILM COATED ORAL at 08:04

## 2023-04-24 RX ADMIN — CARVEDILOL 6.25 MG: 3.12 TABLET, FILM COATED ORAL at 08:04

## 2023-04-24 RX ADMIN — KETOTIFEN FUMARATE 1 DROP: 0.35 SOLUTION/ DROPS OPHTHALMIC at 09:04

## 2023-04-24 RX ADMIN — MUPIROCIN: 20 OINTMENT TOPICAL at 09:04

## 2023-04-24 RX ADMIN — KETOTIFEN FUMARATE 1 DROP: 0.35 SOLUTION/ DROPS OPHTHALMIC at 08:04

## 2023-04-24 RX ADMIN — BUSPIRONE HYDROCHLORIDE 5 MG: 5 TABLET ORAL at 09:04

## 2023-04-24 RX ADMIN — SUCRALFATE 1 G: 1 SUSPENSION ORAL at 03:04

## 2023-04-24 RX ADMIN — BUDESONIDE 0.5 MG: 0.5 INHALANT RESPIRATORY (INHALATION) at 07:04

## 2023-04-24 RX ADMIN — IPRATROPIUM BROMIDE AND ALBUTEROL SULFATE 3 ML: .5; 3 SOLUTION RESPIRATORY (INHALATION) at 07:04

## 2023-04-24 RX ADMIN — SACUBITRIL AND VALSARTAN 1 TABLET: 24; 26 TABLET, FILM COATED ORAL at 08:04

## 2023-04-24 RX ADMIN — BUDESONIDE 0.5 MG: 0.5 INHALANT RESPIRATORY (INHALATION) at 08:04

## 2023-04-24 RX ADMIN — SUCRALFATE 1 G: 1 SUSPENSION ORAL at 09:04

## 2023-04-24 RX ADMIN — SUCRALFATE 1 G: 1 SUSPENSION ORAL at 11:04

## 2023-04-24 RX ADMIN — POTASSIUM CHLORIDE 30 MEQ: 750 TABLET, FILM COATED, EXTENDED RELEASE ORAL at 09:04

## 2023-04-24 RX ADMIN — BUSPIRONE HYDROCHLORIDE 5 MG: 5 TABLET ORAL at 02:04

## 2023-04-24 RX ADMIN — FAMOTIDINE 20 MG: 20 TABLET ORAL at 08:04

## 2023-04-24 RX ADMIN — IPRATROPIUM BROMIDE AND ALBUTEROL SULFATE 3 ML: .5; 3 SOLUTION RESPIRATORY (INHALATION) at 11:04

## 2023-04-24 RX ADMIN — FUROSEMIDE 40 MG: 10 INJECTION, SOLUTION INTRAMUSCULAR; INTRAVENOUS at 05:04

## 2023-04-24 RX ADMIN — MUPIROCIN: 20 OINTMENT TOPICAL at 08:04

## 2023-04-24 RX ADMIN — IPRATROPIUM BROMIDE AND ALBUTEROL SULFATE 3 ML: .5; 3 SOLUTION RESPIRATORY (INHALATION) at 08:04

## 2023-04-24 RX ADMIN — SUCRALFATE 1 G: 1 SUSPENSION ORAL at 05:04

## 2023-04-24 RX ADMIN — BUSPIRONE HYDROCHLORIDE 5 MG: 5 TABLET ORAL at 08:04

## 2023-04-24 NOTE — PLAN OF CARE
Sent order for face mask for trilogy to [facility] per [facility].  Have spoken with Cinthia per phone and explained to make sure to notify [facility] for additional masks.

## 2023-04-24 NOTE — PLAN OF CARE
No acute changes this shift. Pt denies pain or discomfort. Bipap utilized most of the night. 02 Sats remained mid - upper 90s. Bp readings are trending low, Lasix held this morning.     0600: Post ambulating to bedside commode 02 Sat dropped to 88% with Bipap in use. Pt now remaining in low 90s    Problem: Adult Inpatient Plan of Care  Goal: Plan of Care Review  Outcome: Ongoing, Progressing  Flowsheets (Taken 4/24/2023 0514)  Plan of Care Reviewed With: patient  Goal: Patient-Specific Goal (Individualized)  Outcome: Ongoing, Progressing  Goal: Absence of Hospital-Acquired Illness or Injury  Outcome: Ongoing, Progressing  Intervention: Identify and Manage Fall Risk  Flowsheets (Taken 4/24/2023 0514)  Safety Promotion/Fall Prevention:   assistive device/personal item within reach   room near unit station   supervised activity  Goal: Optimal Comfort and Wellbeing  Outcome: Ongoing, Progressing  Intervention: Monitor Pain and Promote Comfort  Flowsheets (Taken 4/24/2023 0514)  Pain Management Interventions: pain management plan reviewed with patient/caregiver  Intervention: Provide Person-Centered Care  Flowsheets (Taken 4/24/2023 0514)  Trust Relationship/Rapport:   care explained   thoughts/feelings acknowledged

## 2023-04-24 NOTE — PT/OT/SLP EVAL
"Physical Therapy Evaluation     Patient Name: Carmen Tan   MRN: [id number]  Recent Surgery: * No surgery found *      Recommendations:     Discharge Recommendations: other (see comments) (post acute care rehabilitation)   Discharge Equipment Recommendations: none   Barriers to discharge: Ongoing medical treatment    Assessment:     Carmen Tan is a 73 y.o. female admitted with a medical diagnosis of Acute respiratory failure with hypoxia. She presents with the following impairments/functional limitations: weakness, impaired functional mobility, impaired cardiopulmonary response to activity, impaired endurance, gait instability, pain, impaired joint extensibility, decreased upper extremity function, impaired muscle length, impaired self care skills, decreased lower extremity function, decreased ROM. Patient was recently discharged from this hospital to the nursing home, now re-admitted .  At the time of evaluation, patient is c/o pain on both feet, she required increase time with functional mobility, ambulated ~10 feet and 12 feet with rolling walker with 4 liters of O2 via nasal cannula.  She could benefit from continued skilled P.T. services to increase functional mobility and endurance.     Rehab Prognosis: Fair; patient would benefit from acute PT services to address these deficits and reach maximum level of function.    Plan:     During this hospitalization, patient to be seen 5 x/week to address the above listed problems via gait training, therapeutic activities, therapeutic exercises, neuromuscular re-education, wheelchair management/training    Plan of Care Expires: 05/02/23    Subjective     Chief Complaint: "I just got discharge from here. "   Patient Comments/Goals: Get better.  Pain/Comfort:  Pain Rating 1:  (did nto quantify)  Location - Side 1: Bilateral  Location 1: foot  Pain Addressed 1: Reposition, Distraction  Pain Rating Post-Intervention 1:  (did not quantify)    Social " History:  Living Environment: Patient lives in a nursing home   Prior Level of Function: Prior to admission, patient was independent prior to going to the nursing home  Equipment Used at Home: wheelchair  DME owned (not currently used): none  Assistance Upon Discharge: facility staff    Objective:     Communicated with nurse and patient  prior to session. Patient found sitting edge of bed with oxygen, peripheral IV (midline) upon PT entry to room.    General Precautions: Standard, fall, respiratory   Orthopedic Precautions: N/A   Braces: N/A    Respiratory Status: Nasal cannula, flow 4 L/min    Exams:  Cognition: Patient is oriented to Person, Place, Time, Situation  RLE ROM: WFL  RLE Strength: WFL  LLE ROM: WFL  LLE Strength: WFL  Gross Motor Coordination: WFL  Skin Integrity/Edema:     -       Skin integrity: Visible skin intact    Functional Mobility:  Gait belt applied - Yes  Bed Mobility  Scooting: minimum assistance  Supine to Sit: minimum assistance for LE management and trunk management  Sit to Supine: moderate assistance for LE management and trunk management  Transfers  Sit to Stand: minimum assistance with rolling walker and with cues for hand placement  Bed to Chair: minimum assistance with rolling walker and with cues for hand placement using Step Transfer  Gait  Patient ambulated 10 feet and 12 feet  with rolling walker and contact guard assistance and minimum assistance. Patient demonstrates decreased step length, decreased foot clearance, ambulates outside NORMA of RW, flexed posture, and decreased david. .. All lines remained intact throughout ambulation trail, chair follow for patient safety, and portable Supplemental O2 4L utilized.  Balance  Sitting: modified independence  Standing: contact guard assistance      Therapeutic Activities and Exercises:   Patient educated on role of acute care PT and PT POC, safety while in hospital including calling nurse for mobility, and call light usage  Patient  educated about importance of OOB mobility and remaining up in chair most of the day.  roles and goals of PT, transfer training, bed mobility, gait training, balance training, safety awareness, body mechanics, assistive device, bed positioning, strengthening exercises, and fall prevention    AM-PAC 6 CLICK MOBILITY  Total Score:16    Patient left HOB elevated with all lines intact, call button in reach, RN notified, and bed alarm on.    GOALS:   Multidisciplinary Problems       Physical Therapy Goals          Problem: Physical Therapy    Goal Priority Disciplines Outcome Goal Variances Interventions   Physical Therapy Goal     PT, PT/OT Ongoing, Progressing     Description: Goals to be met by: 2023     Patient will increase functional independence with mobility by performin. Supine to sit with Standby Assistance.  2. Sit to supine with Standby Assistance.  3. Bed to chair transfer with Contact Guard Assistance with rolling walker using Step Transfer technique.  4. Sit to Stand with Contact Guard Assistance with rolling walker.  5. Gait  x 50  feet with Contact Guard Assistance with RW and O2 supplement .  6. Lower extremity exercise program x10 reps, with assistance as needed.                          History:     Past Medical History:   Diagnosis Date    Abdominal hernia     Bacteremia 2023    CHF (congestive heart failure)     COPD (chronic obstructive pulmonary disease)     Diabetes mellitus, type 2 2022    GERD (gastroesophageal reflux disease)     History of home oxygen therapy     Hypertension     Hypertensive emergency 3/30/2023    Migraine headache     Pulmonary embolism 2017    Small bowel obstruction     Thyroid disease        Past Surgical History:   Procedure Laterality Date    COLONOSCOPY      HYSTERECTOMY      INNER EAR SURGERY      UPPER GASTROINTESTINAL ENDOSCOPY         Time Tracking:     PT Received On: 23  PT Start Time: 1047  PT Stop Time: 1111  PT Total Time (min):  24 min     Billable Minutes: Evaluation moderate complexity     4/24/2023

## 2023-04-24 NOTE — PROGRESS NOTES
[facility] Medicine  Progress Note    Patient Name: Carmen Tan  MRN: [id number]  Patient Class: IP- Inpatient   Admission Date: 4/22/2023  Length of Stay: 2 days  Attending Physician: Gunnar Mott DO  Primary Care Provider: Lucian Barry MD  Subjective:     Principal Problem:Acute respiratory failure with hypoxia    HPI:  Chief Complaint   Patient presents with    Shortness of Breath       Pt presents to the ER via EMS w/ complaints of shortness of breath x 1 day. PHC reports hx of copd/chf, states pt was satting in the 80's on bipap after duonebs. Upon arrival pt is 100% w/ cpap after 1 duoneb.       73-year-old female with a history CHF with diastolic dysfunction, COPD, diabetes, hypertension, pulmonary embolus on Xarelto presents the emergency department with shortness of breath beginning last night gradually.  History of pulmonary edema in the past.  Much improved with CPAP per EMS.  She does wear BiPAP at the nursing home.  Given a DuoNeb as well by EMS.  Oxygen saturation is 97% on the CPAP here in the emergency department.  She is alert oriented x4, GCS is 15. Denies chest pain, palpitations, increased work of breathing after placement on BiPAP.     ED course: Patient responded to IV lasix, breathing treatments and being placed on BiPAP I/P 14/7 FiO2 40%. Patient endorses at the nursing home she has slowly been regaining her strength, spending more time off her breathing machine, however feels like the BiPAP is not functioning well.   Will admit patient to continue treatment with IV lasix and BiPAP for acute on chronic congestive heart failure with hypoxia, COPD exacerbation and assess her non-invasive vent support equipment arranged during last hospitalization.        Overview/Hospital Course:  4/23 Tolerating BiPAP use, sitting upright at edge of bed preparing for breathing treatment. Continue with daily pulmonary hygiene care, PT and OT. Patient will have a family member  bring in home non-invasive vent to be evaluated.       Interval History: Patient seen and examined.     Review of Systems   Constitutional:  Positive for activity change. Negative for appetite change, chills, fatigue and fever.   HENT:  Negative for sinus pressure, sinus pain, sneezing and sore throat.    Respiratory:  Positive for shortness of breath. Negative for chest tightness.    Cardiovascular:  Negative for chest pain, palpitations and leg swelling.   Gastrointestinal:  Negative for abdominal distention, abdominal pain, constipation, diarrhea, nausea and vomiting.   Genitourinary:  Negative for dysuria and frequency.   Musculoskeletal:  Positive for back pain and gait problem.   Neurological:  Negative for dizziness, seizures, speech difficulty, light-headedness, numbness and headaches.   Psychiatric/Behavioral:  Negative for agitation, behavioral problems, confusion, self-injury and sleep disturbance. The patient is not nervous/anxious.    Objective:     Vital Signs (Most Recent):  Temp: 97.6 °F (36.4 °C) (04/23/23 0558)  Pulse: 71 (04/23/23 0719)  Resp: 20 (04/23/23 0719)  BP: 106/73 (04/23/23 0558)  SpO2: (!) 91 % (04/23/23 0719)   Vital Signs (24h Range):  Temp:  [97.6 °F (36.4 °C)-98.6 °F (37 °C)] 97.6 °F (36.4 °C)  Pulse:  [51-81] 71  Resp:  [19-35] 20  SpO2:  [90 %-95 %] 91 %  BP: (106-141)/(52-73) 106/73     Weight: 119.1 kg (262 lb 8 oz)  Body mass index is 45.06 kg/m².    Intake/Output Summary (Last 24 hours) at 4/23/2023 0710  Last data filed at 4/23/2023 0631  Gross per 24 hour   Intake 120 ml   Output 2800 ml   Net -2680 ml      Physical Exam  Vitals and nursing note reviewed.   Constitutional:       General: She is not in acute distress.     Appearance: She is ill-appearing. She is not toxic-appearing.   HENT:      Head: Normocephalic.      Nose: No congestion or rhinorrhea.      Mouth/Throat:      Mouth: Mucous membranes are dry.      Pharynx: Oropharynx is clear.   Eyes:      Extraocular  Movements: Extraocular movements intact.      Conjunctiva/sclera: Conjunctivae normal.   Cardiovascular:      Rate and Rhythm: Normal rate.      Heart sounds: No murmur heard.  Pulmonary:      Effort: No respiratory distress.      Breath sounds: Rhonchi present. No wheezing or rales.      Comments: Breath sounds reaching lung bases bilaterally  Abdominal:      General: Abdomen is flat. There is no distension.      Palpations: Abdomen is soft.      Tenderness: There is no abdominal tenderness. There is no guarding.   Musculoskeletal:         General: No swelling or deformity.      Cervical back: Normal range of motion and neck supple. No rigidity or tenderness.      Right lower leg: No edema.      Left lower leg: No edema.   Skin:     General: Skin is warm and dry.      Comments: Skin over abdomen and legs relaxed with no evidence of edema   Neurological:      General: No focal deficit present.      Mental Status: She is alert and oriented to person, place, and time. Mental status is at baseline.   Psychiatric:         Mood and Affect: Mood normal.         Behavior: Behavior normal.     Significant Labs: All pertinent labs within the past 24 hours have been reviewed.  A1C:   Recent Labs   Lab 03/29/23  2319   HGBA1C 5.5     ABGs:   Recent Labs   Lab 04/22/23  0059   PH 7.330*   PCO2 61.7*   HCO3 28.5*   POCSATURATED 93.9*   PO2 69.7*     Bilirubin:   Recent Labs   Lab 04/02/23  0339 04/03/23  0344 04/04/23  0327 04/22/23  0200 04/22/23  1526   BILITOT 0.5 0.5 0.5 0.3 0.4     Blood Culture: No results for input(s): LABBLOO in the last 48 hours.  BMP:   Recent Labs   Lab 04/22/23  1526   GLU 94      K 4.2      CO2 34*   BUN 22   CREATININE 1.4   CALCIUM 10.5     CBC:   Recent Labs   Lab 04/22/23  0200 04/22/23  1526   WBC 11.86 9.86   HGB 12.5 13.1   HCT 40.3 41.2    255     CMP:   Recent Labs   Lab 04/22/23  0200 04/22/23  1526    140   K 4.7 4.2    106   CO2 33* 34*    94   BUN  24* 22   CREATININE 1.4 1.4   CALCIUM 10.1 10.5   PROT 7.1 7.0   ALBUMIN 2.2* 2.2*   BILITOT 0.3 0.4   ALKPHOS 129 120   AST 16 12   ALT 21 21   ANIONGAP -1* 0*     POCT Glucose:   Recent Labs   Lab 04/22/23  2101   POCTGLUCOSE 97     Troponin:   Recent Labs   Lab 04/22/23  0200   TROPONINIHS 17.3     TSH:   Recent Labs   Lab 03/29/23  2319   TSH 2.150   Significant Imaging: I have reviewed all pertinent imaging results/findings within the past 24 hours.      Assessment/Plan:      * Acute respiratory failure with hypoxia  Patient with Hypercapnic and Hypoxic Respiratory failure which is Acute on chronic.  she is on home oxygen at 2 LPM. Supplemental oxygen was provided and noted- Oxygen Concentration (%):  [40] 40    Signs/symptoms of respiratory failure include- tachypnea, increased work of breathing and respiratory distress. Contributing diagnoses includes - CHF, COPD, Obesity Hypoventilation and Pleural effusion Labs and images were reviewed. Patient Has recent ABG, which has been reviewed. Will treat underlying causes and adjust management of respiratory failure as follows- IV lasix, pulmonary hygiene care, BiPAP management per respiratory.      Anxiety and depression  Patient has persistent depression which is mild and is currently controlled. Will Continue anti-depressant medications. We will not consult psychiatry at this time. Patient does not display psychosis at this time. Continue to monitor closely and adjust plan of care as needed.        Deep vein thrombosis (DVT) prophylaxis not tolerated by patient        Acute on chronic congestive heart failure  Patient is identified as having Combined Systolic and Diastolic heart failure that is Acute on chronic. CHF is currently uncontrolled due to Rales/crackles on pulmonary exam and Pulmonary edema/pleural effusion on CXR. Latest ECHO performed and demonstrates- Results for orders placed during the hospital encounter of 03/29/23    Echo  Interpretation  Summary  · The left ventricle is normal in size with moderate concentric hypertrophy and mildly decreased systolic function.  · The estimated ejection fraction is 45%.  · Grade I left ventricular diastolic dysfunction.  · Moderate right ventricular enlargement.  · Moderate right atrial enlargement.  · Mild-to-moderate aortic regurgitation.  · There is mild aortic valve stenosis.  · Aortic valve area is 1.44 cm2; peak velocity is 2.09 m/s; mean gradient is 10 mmHg.  · Mild-to-moderate mitral regurgitation.  · Moderate tricuspid regurgitation.  · Moderate pulmonic regurgitation.  · The estimated PA systolic pressure is 69 mmHg.  · There is moderate pulmonary hypertension.  · Moderate left atrial enlargement.  . Continue Beta Blocker, ACE/ARB, Furosemide and Aldactone and monitor clinical status closely. Monitor on telemetry. Patient is on CHF pathway.  Monitor strict Is&Os and daily weights.  Place on fluid restriction of 1.5 L. Continue to stress to patient importance of self efficacy and  on diet for CHF. Last BNP reviewed- and noted below No results for input(s): BNP, BNPTRIAGEBLO in the last 168 hours..  Lab Results   Component Value Date    NTPROBNP 1458 (H) 04/22/2023    NTPROBNP 98361 (H) 03/29/2023    NTPROBNP 3561 (H) 02/15/2022   - continue BiPAP management per respiratory  - continue supplemental oxygen while off BiPAP at baseline 2L  - continue daily breathing treatments   - contact DME office with evaluation of home trilogy device      COPD exacerbation  No leukocytosis, vital signs stable. Likely associated from combination of fluid overload and ineffective non-invasive equipment or settings at home for chronic respiratory failure and CHF.   Continue with above management and monitor. No antibiotics at this time.       Weakness  Debility from last hospitalization, patient is under PT, OT pulmonary care at [facility].  - f/u PT eval and treatment    Severe obesity (BMI >= 40)  Body mass index is 45.06  kg/m². Morbid obesity complicates all aspects of disease management from diagnostic modalities to treatment. Weight loss encouraged and health benefits explained to patient.           VTE Risk Mitigation (From admission, onward)         Ordered     IP VTE HIGH RISK PATIENT  Once         04/22/23 0534     Place sequential compression device  Until discontinued         04/22/23 0534     Place ANAMARIA hose  Until discontinued         04/22/23 0534                Discharge Planning   TIGRE:      Code Status: Full Code   Is the patient medically ready for discharge?:     Reason for patient still in hospital (select all that apply): Patient trending condition and Pending disposition  Discharge Plan A: Skilled Nursing Facility                  Gunnar Mott DO  Department of Hospital Medicine   [facility] Surg

## 2023-04-24 NOTE — PLAN OF CARE
Problem: Physical Therapy  Goal: Physical Therapy Goal  Description: Goals to be met by: 2023     Patient will increase functional independence with mobility by performin. Supine to sit with Standby Assistance.  2. Sit to supine with Standby Assistance.  3. Bed to chair transfer with Contact Guard Assistance with rolling walker using Step Transfer technique.  4. Sit to Stand with Contact Guard Assistance with rolling walker.  5. Gait  x 50  feet with Contact Guard Assistance with RW and O2 supplement .  6. Lower extremity exercise program x10 reps, with assistance as needed.     Outcome: Plan of care established

## 2023-04-24 NOTE — ASSESSMENT & PLAN NOTE
[id number] [address] EMERGENCY DEPT  [address] [facility] [address] [phone]    Work/School Note    Date: 11/1/2021    To Whom It May concern:    Rodrigo Isaac was seen and treated today in the emergency room by the following provider(s):  Attending Provider: Mo Isaac DO  Physician Assistant: VIVEK Reyes.      Rodrigo Isaac is excused from work/school on 11/1/2021 through 11/4/2021. He is medically clear to return to work/school on 11/5/2021.         Sincerely,          VIVEK Mariscal Patient is identified as having Combined Systolic and Diastolic heart failure that is Acute on chronic. CHF is currently uncontrolled due to Rales/crackles on pulmonary exam and Pulmonary edema/pleural effusion on CXR. Latest ECHO performed and demonstrates- Results for orders placed during the hospital encounter of 03/29/23    Echo  Interpretation Summary  · The left ventricle is normal in size with moderate concentric hypertrophy and mildly decreased systolic function.  · The estimated ejection fraction is 45%.  · Grade I left ventricular diastolic dysfunction.  · Moderate right ventricular enlargement.  · Moderate right atrial enlargement.  · Mild-to-moderate aortic regurgitation.  · There is mild aortic valve stenosis.  · Aortic valve area is 1.44 cm2; peak velocity is 2.09 m/s; mean gradient is 10 mmHg.  · Mild-to-moderate mitral regurgitation.  · Moderate tricuspid regurgitation.  · Moderate pulmonic regurgitation.  · The estimated PA systolic pressure is 69 mmHg.  · There is moderate pulmonary hypertension.  · Moderate left atrial enlargement.  . Continue Beta Blocker, ACE/ARB, Furosemide and Aldactone and monitor clinical status closely. Monitor on telemetry. Patient is on CHF pathway.  Monitor strict Is&Os and daily weights.  Place on fluid restriction of 1.5 L. Continue to stress to patient importance of self efficacy and  on diet for CHF. Last BNP reviewed- and noted below No results for input(s): BNP, BNPTRIAGEBLO in the last 168 hours..  Lab Results   Component Value Date    NTPROBNP 1458 (H) 04/22/2023    NTPROBNP 04003 (H) 03/29/2023    NTPROBNP 3561 (H) 02/15/2022   - continue BiPAP management per respiratory  - continue supplemental oxygen while off BiPAP at baseline 2L  - continue daily breathing treatments   - contact DME office with evaluation of home trilogy device

## 2023-04-25 VITALS
OXYGEN SATURATION: 96 % | RESPIRATION RATE: 20 BRPM | TEMPERATURE: 98 F | HEART RATE: 65 BPM | SYSTOLIC BLOOD PRESSURE: 103 MMHG | BODY MASS INDEX: 42.38 KG/M2 | DIASTOLIC BLOOD PRESSURE: 63 MMHG | WEIGHT: 248.25 LBS | HEIGHT: 64 IN

## 2023-04-25 LAB
ANION GAP SERPL CALC-SCNC: 5 MMOL/L (ref 8–16)
BUN SERPL-MCNC: 20 MG/DL (ref 8–23)
CALCIUM SERPL-MCNC: 10 MG/DL (ref 8.7–10.5)
CHLORIDE SERPL-SCNC: 102 MMOL/L (ref 95–110)
CO2 SERPL-SCNC: 32 MMOL/L (ref 23–29)
CREAT SERPL-MCNC: 1.1 MG/DL (ref 0.5–1.4)
EST. GFR  (NO RACE VARIABLE): 53.4 ML/MIN/1.73 M^2
GLUCOSE SERPL-MCNC: 101 MG/DL (ref 70–110)
MAGNESIUM SERPL-MCNC: 2.1 MG/DL (ref 1.6–2.6)
POTASSIUM SERPL-SCNC: 4.4 MMOL/L (ref 3.5–5.1)
SODIUM SERPL-SCNC: 139 MMOL/L (ref 136–145)

## 2023-04-25 PROCEDURE — 25000003 PHARM REV CODE 250: Performed by: STUDENT IN AN ORGANIZED HEALTH CARE EDUCATION/TRAINING PROGRAM

## 2023-04-25 PROCEDURE — 99900031 HC PATIENT EDUCATION (STAT)

## 2023-04-25 PROCEDURE — 99900035 HC TECH TIME PER 15 MIN (STAT)

## 2023-04-25 PROCEDURE — 99238 PR HOSPITAL DISCHARGE DAY,<30 MIN: ICD-10-PCS | Mod: ,,, | Performed by: STUDENT IN AN ORGANIZED HEALTH CARE EDUCATION/TRAINING PROGRAM

## 2023-04-25 PROCEDURE — 36415 COLL VENOUS BLD VENIPUNCTURE: CPT | Performed by: STUDENT IN AN ORGANIZED HEALTH CARE EDUCATION/TRAINING PROGRAM

## 2023-04-25 PROCEDURE — 25000242 PHARM REV CODE 250 ALT 637 W/ HCPCS: Performed by: STUDENT IN AN ORGANIZED HEALTH CARE EDUCATION/TRAINING PROGRAM

## 2023-04-25 PROCEDURE — 97116 GAIT TRAINING THERAPY: CPT

## 2023-04-25 PROCEDURE — 94761 N-INVAS EAR/PLS OXIMETRY MLT: CPT

## 2023-04-25 PROCEDURE — 94660 CPAP INITIATION&MGMT: CPT

## 2023-04-25 PROCEDURE — 83735 ASSAY OF MAGNESIUM: CPT | Performed by: STUDENT IN AN ORGANIZED HEALTH CARE EDUCATION/TRAINING PROGRAM

## 2023-04-25 PROCEDURE — 94640 AIRWAY INHALATION TREATMENT: CPT

## 2023-04-25 PROCEDURE — 27000221 HC OXYGEN, UP TO 24 HOURS

## 2023-04-25 PROCEDURE — 97530 THERAPEUTIC ACTIVITIES: CPT

## 2023-04-25 PROCEDURE — 80048 BASIC METABOLIC PNL TOTAL CA: CPT | Performed by: STUDENT IN AN ORGANIZED HEALTH CARE EDUCATION/TRAINING PROGRAM

## 2023-04-25 PROCEDURE — 99238 HOSP IP/OBS DSCHRG MGMT 30/<: CPT | Mod: ,,, | Performed by: STUDENT IN AN ORGANIZED HEALTH CARE EDUCATION/TRAINING PROGRAM

## 2023-04-25 RX ORDER — MUPIROCIN 20 MG/G
OINTMENT TOPICAL 2 TIMES DAILY
Qty: 2 G | Refills: 0 | Status: SHIPPED | OUTPATIENT
Start: 2023-04-25 | End: 2023-04-28

## 2023-04-25 RX ORDER — KETOTIFEN FUMARATE 0.35 MG/ML
1 SOLUTION/ DROPS OPHTHALMIC 2 TIMES DAILY
Qty: 10 ML | Refills: 0 | Status: ON HOLD | OUTPATIENT
Start: 2023-04-25 | End: 2023-10-04 | Stop reason: HOSPADM

## 2023-04-25 RX ORDER — TALC
6 POWDER (GRAM) TOPICAL NIGHTLY
Qty: 60 TABLET | Refills: 2 | Status: SHIPPED | OUTPATIENT
Start: 2023-04-25 | End: 2023-07-24

## 2023-04-25 RX ORDER — SERTRALINE HYDROCHLORIDE 50 MG/1
50 TABLET, FILM COATED ORAL DAILY
Status: DISCONTINUED | OUTPATIENT
Start: 2023-04-25 | End: 2023-04-25 | Stop reason: HOSPADM

## 2023-04-25 RX ORDER — ZOLPIDEM TARTRATE 5 MG/1
5 TABLET ORAL NIGHTLY PRN
Status: DISCONTINUED | OUTPATIENT
Start: 2023-04-25 | End: 2023-04-25

## 2023-04-25 RX ADMIN — SUCRALFATE 1 G: 1 SUSPENSION ORAL at 05:04

## 2023-04-25 RX ADMIN — IPRATROPIUM BROMIDE AND ALBUTEROL SULFATE 3 ML: .5; 3 SOLUTION RESPIRATORY (INHALATION) at 07:04

## 2023-04-25 RX ADMIN — BUDESONIDE 0.5 MG: 0.5 INHALANT RESPIRATORY (INHALATION) at 07:04

## 2023-04-25 RX ADMIN — BUSPIRONE HYDROCHLORIDE 5 MG: 5 TABLET ORAL at 08:04

## 2023-04-25 RX ADMIN — SUCRALFATE 1 G: 1 SUSPENSION ORAL at 11:04

## 2023-04-25 RX ADMIN — CARVEDILOL 6.25 MG: 3.12 TABLET, FILM COATED ORAL at 08:04

## 2023-04-25 RX ADMIN — MUPIROCIN: 20 OINTMENT TOPICAL at 08:04

## 2023-04-25 RX ADMIN — SACUBITRIL AND VALSARTAN 1 TABLET: 24; 26 TABLET, FILM COATED ORAL at 08:04

## 2023-04-25 RX ADMIN — IPRATROPIUM BROMIDE AND ALBUTEROL SULFATE 3 ML: .5; 3 SOLUTION RESPIRATORY (INHALATION) at 02:04

## 2023-04-25 RX ADMIN — ATORVASTATIN CALCIUM 40 MG: 40 TABLET, FILM COATED ORAL at 08:04

## 2023-04-25 RX ADMIN — KETOTIFEN FUMARATE 1 DROP: 0.35 SOLUTION/ DROPS OPHTHALMIC at 08:04

## 2023-04-25 RX ADMIN — SERTRALINE HYDROCHLORIDE 50 MG: 50 TABLET ORAL at 08:04

## 2023-04-25 RX ADMIN — FUROSEMIDE 40 MG: 40 TABLET ORAL at 08:04

## 2023-04-25 NOTE — PLAN OF CARE
Problem: Physical Therapy  Goal: Physical Therapy Goal  Description: Goals to be met by: 2023     Patient will increase functional independence with mobility by performin. Supine to sit with Standby Assistance.  2. Sit to supine with Standby Assistance.  3. Bed to chair transfer with Contact Guard Assistance with rolling walker using Step Transfer technique.  4. Sit to Stand with Contact Guard Assistance with rolling walker.  5. Gait  x 50  feet with Contact Guard Assistance with RW and O2 supplement .  6. Lower extremity exercise program x10 reps, with assistance as needed.     Outcome: Adequate for Care Transition, discharge back to the nursing home

## 2023-04-25 NOTE — NURSING
CALLED REPORT TO VELVET ECHOLS AT [facility].  WILL SEND TRANSPORTATION VAN TO PICK PATIENT UP.  PATIENT MADE AWARE OF SAME.   JASON VARGAS LPN

## 2023-04-25 NOTE — PT/OT/SLP PROGRESS
"Physical Therapy Treatment    Patient Name:  Carmen Tan   MRN:  [id number]    Recommendations:     Discharge Recommendations: nursing facility, skilled  Discharge Equipment Recommendations: none  Barriers to discharge: None    Assessment:     Carmen Tan is a 73 y.o. female admitted with a medical diagnosis of Acute respiratory failure with hypoxia.  She presents with the following impairments/functional limitations: weakness, impaired functional mobility, decreased safety awareness, impaired cardiopulmonary response to activity, impaired endurance, gait instability, impaired joint extensibility, impaired balance, impaired muscle length, impaired self care skills, decreased lower extremity function, decreased ROM, edema Patient is scheduled to be discharged back to the nursing home today.  She has a strong urine smell, nurse Christina made aware.  Patient tolerated ambulation for ~23 feet x 2 with 4 liters of O2 via nasal cannula.  Increasing tolerance to activity. .    Rehab Prognosis: Fair; patient would benefit from acute skilled PT services to address these deficits and reach maximum level of function.    Recent Surgery: * No surgery found *      Plan:     During this hospitalization, patient to be seen 5 x/week to address the identified rehab impairments via gait training, therapeutic activities, therapeutic exercises, neuromuscular re-education and progress toward the following goals:    Plan of Care Expires:  05/02/23    Subjective     Chief Complaint: "I am wet."   Patient/Family Comments/goals: Unstated   Pain/Comfort:  Pain Rating 1: 0/10  Pain Rating Post-Intervention 1: 0/10      Objective:     Communicated with nurse and patient  prior to session.  Patient found supine with BIPAP, peripheral IV, PureWick upon PT entry to room.     General Precautions: Standard, fall, respiratory  Orthopedic Precautions: N/A  Braces: N/A  Respiratory Status:  BI-PAP      Functional Mobility:  Bed Mobility:   "   Scooting: stand by assistance  Supine to Sit: stand by assistance  Transfers:     Sit to Stand:  contact guard assistance and minimum assistance with rolling walker  Bed to Chair: contact guard assistance with  rolling walker  using  Step Transfer  Toilet Transfer: contact guard assistance with  rolling walker  using  Step Transfer  Gait: 23 feet x 2 with 4 liters of O2 via nasal cannula CGA, another person following with a chair for safety, sitting rest breaks in between walk  Balance: Mod I with static sitting, SBA with static standing       AM-PAC 6 CLICK MOBILITY  Turning over in bed (including adjusting bedclothes, sheets and blankets)?: 3  Sitting down on and standing up from a chair with arms (e.g., wheelchair, bedside commode, etc.): 3  Moving from lying on back to sitting on the side of the bed?: 3  Moving to and from a bed to a chair (including a wheelchair)?: 3  Need to walk in hospital room?: 3  Climbing 3-5 steps with a railing?: 1 (Based on clinical judgement)  Basic Mobility Total Score: 16       Treatment & Education:  roles and goals of PT, transfer training, bed mobility, gait training, balance training, safety awareness, body mechanics, assistive device, strengthening exercises, and fall prevention    Patient left sitting edge of bed with all lines intact, call button in reach, nurse  notified    GOALS:   Multidisciplinary Problems       Physical Therapy Goals          Problem: Physical Therapy    Goal Priority Disciplines Outcome Goal Variances Interventions   Physical Therapy Goal     PT, PT/OT Adequate for Care Transition     Description: Goals to be met by: 2023     Patient will increase functional independence with mobility by performin. Supine to sit with Standby Assistance.  2. Sit to supine with Standby Assistance.  3. Bed to chair transfer with Contact Guard Assistance with rolling walker using Step Transfer technique.  4. Sit to Stand with Contact Guard Assistance with  rolling walker.  5. Gait  x 50  feet with Contact Guard Assistance with RW and O2 supplement .  6. Lower extremity exercise program x10 reps, with assistance as needed.                          Time Tracking:     PT Received On: 04/25/23  PT Start Time: 0900     PT Stop Time: 0925  PT Total Time (min): 25 min     Billable Minutes: Gait Training 15 and Therapeutic Activity 10    Treatment Type: Treatment  PT/PTA: PT           04/25/2023

## 2023-04-25 NOTE — ASSESSMENT & PLAN NOTE
Patient with Hypercapnic and Hypoxic Respiratory failure which is Acute on chronic.  she is on home oxygen at 2 LPM. Supplemental oxygen was provided and noted- Oxygen Concentration (%):  [40-55] 55    Signs/symptoms of respiratory failure include- tachypnea, increased work of breathing and respiratory distress. Contributing diagnoses includes - CHF, COPD, Obesity Hypoventilation and Pleural effusion Labs and images were reviewed. Patient Has recent ABG, which has been reviewed. Will treat underlying causes and adjust management of respiratory failure as follows- IV lasix, pulmonary hygiene care, BiPAP management per respiratory.

## 2023-04-25 NOTE — SUBJECTIVE & OBJECTIVE
Interval History: Patient seen and examined.     Review of Systems   Constitutional:  Positive for activity change. Negative for appetite change, chills, fatigue and fever.   HENT:  Negative for sinus pressure, sinus pain, sneezing and sore throat.    Respiratory:  Negative for chest tightness, shortness of breath and wheezing.    Cardiovascular:  Negative for chest pain, palpitations and leg swelling.   Gastrointestinal:  Negative for abdominal distention, abdominal pain, constipation, diarrhea, nausea and vomiting.   Genitourinary:  Negative for dysuria and frequency.   Musculoskeletal:  Positive for back pain and gait problem.   Neurological:  Positive for weakness. Negative for dizziness, seizures, speech difficulty, light-headedness, numbness and headaches.   Psychiatric/Behavioral:  Negative for agitation, behavioral problems, confusion, self-injury and sleep disturbance. The patient is not nervous/anxious.    Objective:     Vital Signs (Most Recent):  Temp: 97.6 °F (36.4 °C) (04/25/23 0711)  Pulse: 66 (04/25/23 0728)  Resp: (!) 36 (04/25/23 0728)  BP: (!) 135/93 (04/25/23 0711)  SpO2: 97 % (04/25/23 0728)   Vital Signs (24h Range):  Temp:  [96.7 °F (35.9 °C)-97.6 °F (36.4 °C)] 97.6 °F (36.4 °C)  Pulse:  [65-81] 66  Resp:  [18-36] 36  SpO2:  [91 %-99 %] 97 %  BP: ()/(57-93) 135/93     Weight: 112.6 kg (248 lb 3.8 oz)  Body mass index is 42.61 kg/m².    Intake/Output Summary (Last 24 hours) at 4/25/2023 0849  Last data filed at 4/25/2023 0515  Gross per 24 hour   Intake 2130 ml   Output 2400 ml   Net -270 ml      Physical Exam  Vitals and nursing note reviewed.   Constitutional:       General: She is not in acute distress.     Appearance: She is not ill-appearing or toxic-appearing.   HENT:      Head: Normocephalic.      Nose: No congestion or rhinorrhea.      Mouth/Throat:      Mouth: Mucous membranes are dry.      Pharynx: Oropharynx is clear.   Eyes:      Extraocular Movements: Extraocular movements  intact.      Conjunctiva/sclera: Conjunctivae normal.   Cardiovascular:      Rate and Rhythm: Normal rate.      Heart sounds: No murmur heard.  Pulmonary:      Effort: No respiratory distress.      Breath sounds: No wheezing, rhonchi or rales.      Comments: Breath sounds reaching lung bases bilaterally  Abdominal:      General: Abdomen is flat. There is no distension.      Palpations: Abdomen is soft.      Tenderness: There is no abdominal tenderness. There is no guarding.   Musculoskeletal:         General: No swelling or deformity.      Cervical back: Normal range of motion and neck supple. No rigidity or tenderness.      Right lower leg: No edema.      Left lower leg: No edema.   Skin:     General: Skin is warm and dry.      Findings: No bruising, erythema, lesion or rash.      Comments: Skin over abdomen and legs relaxed with no evidence of pitting edema, trace edema over dorsum of foot bilaterally   Neurological:      General: No focal deficit present.      Mental Status: She is alert and oriented to person, place, and time. Mental status is at baseline.      Motor: Weakness present.   Psychiatric:         Mood and Affect: Mood normal.         Behavior: Behavior normal.     Significant Labs: All pertinent labs within the past 24 hours have been reviewed.  Bilirubin:   Recent Labs   Lab 04/02/23  0339 04/03/23  0344 04/04/23  0327 04/22/23  0200 04/22/23  1526   BILITOT 0.5 0.5 0.5 0.3 0.4     BMP:   Recent Labs   Lab 04/25/23  0735         K 4.4      CO2 32*   BUN 20   CREATININE 1.1   CALCIUM 10.0   MG 2.1     Lab Results   Component Value Date    WBC 9.86 04/22/2023    HGB 13.1 04/22/2023    HCT 41.2 04/22/2023    MCV 91 04/22/2023     04/22/2023     CMP:   Recent Labs   Lab 04/23/23  1221 04/25/23  0735    139   K 4.0 4.4    102   CO2 36* 32*   * 101   BUN 24* 20   CREATININE 1.4 1.1   CALCIUM 10.5 10.0   ANIONGAP -1* 5*     Magnesium:   Recent Labs   Lab  04/23/23  1221 04/25/23  0735   MG 2.3 2.1     Significant Imaging: I have reviewed all pertinent imaging results/findings within the past 24 hours.

## 2023-04-25 NOTE — PLAN OF CARE
POC reviewed with pt, educated pt on the importance of taking BiPAP off during the day as to not become depended on BiPAP for breathing. Nurse & RT counseled pt on slow deep breaths when she becomes anxious. Pt v/u will continue to monitor.       Problem: Adult Inpatient Plan of Care  Goal: Plan of Care Review  Outcome: Ongoing, Progressing  Goal: Patient-Specific Goal (Individualized)  Outcome: Ongoing, Progressing  Goal: Absence of Hospital-Acquired Illness or Injury  Outcome: Ongoing, Progressing  Goal: Optimal Comfort and Wellbeing  Outcome: Ongoing, Progressing  Goal: Readiness for Transition of Care  Outcome: Ongoing, Progressing     Problem: Bariatric Environmental Safety  Goal: Safety Maintained with Care  Outcome: Ongoing, Progressing     Problem: Infection  Goal: Absence of Infection Signs and Symptoms  Outcome: Ongoing, Progressing     Problem: Diabetes Comorbidity  Goal: Blood Glucose Level Within Targeted Range  Outcome: Ongoing, Progressing     Problem: Skin Injury Risk Increased  Goal: Skin Health and Integrity  Outcome: Ongoing, Progressing

## 2023-04-25 NOTE — DISCHARGE INSTRUCTIONS
Please have patient's Trilogy breathing machine with full face mask available for patient use at bed time and PRN use for shortness of breath during the waking hours.   Please call patient's primary care physician if the following signs are observed.   - Loss of appetite  - New or worsening dizziness, confusion, depression  - Sudden weight gain of more than 2-3 pounds over 24 hours period or 5 pounds in one week  - Please check patient's weight every 5 days, if weight is over 3.5 pounds then notify physician  - Increased shortness of breath at rest  - Frequent dry coughs  - Increased swelling in lower extremities

## 2023-04-25 NOTE — ASSESSMENT & PLAN NOTE
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 CHF is currently uncontrolled due to Rales/crackles on pulmonary exam and Pulmonary edema/pleural effusion on CXR. Latest ECHO performed and demonstrates- Results for orders placed during the hospital encounter of 03/29/23    Echo  Interpretation Summary  · The left ventricle is normal in size with moderate concentric hypertrophy and mildly decreased systolic function.  · The estimated ejection fraction is 45%.  · Grade I left ventricular diastolic dysfunction.  · Moderate right ventricular enlargement.  · Moderate right atrial enlargement.  · Mild-to-moderate aortic regurgitation.  · There is mild aortic valve stenosis.  · Aortic valve area is 1.44 cm2; peak velocity is 2.09 m/s; mean gradient is 10 mmHg.  · Mild-to-moderate mitral regurgitation.  · Moderate tricuspid regurgitation.  · Moderate pulmonic regurgitation.  · The estimated PA systolic pressure is 69 mmHg.  · There is moderate pulmonary hypertension.  · Moderate left atrial enlargement.  . Continue Beta Blocker, ACE/ARB, Furosemide and Aldactone and monitor clinical status closely. Monitor on telemetry. Patient is on CHF pathway.  Monitor strict Is&Os and daily weights.  Place on fluid restriction of 1.5 L. Continue to stress to patient importance of self efficacy and  on diet for CHF. Last BNP reviewed- and noted below No results for input(s): BNP, BNPTRIAGEBLO in the last 168 hours..  Lab Results   Component Value Date    NTPROBNP 1458 (H) 04/22/2023    NTPROBNP 46676 (H) 03/29/2023    NTPROBNP 3561 (H) 02/15/2022   - continue supplemental oxygen while off BiPAP at baseline 2L  - continue daily breathing treatments   Over the past three days patient diuresed well with almost 14 pounds of weight loss.   Will continue with PO lasix BID.   Follow up outpatient closely with electrolyte imbalances and renal function along with respiratory function.    Trilogy device compatible full face mask to be transported with patient.

## 2023-04-25 NOTE — ASSESSMENT & PLAN NOTE
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 CHF is currently uncontrolled due to Rales/crackles on pulmonary exam and Pulmonary edema/pleural effusion on CXR. Latest ECHO performed and demonstrates- Results for orders placed during the hospital encounter of 03/29/23    Echo  Interpretation Summary  · The left ventricle is normal in size with moderate concentric hypertrophy and mildly decreased systolic function.  · The estimated ejection fraction is 45%.  · Grade I left ventricular diastolic dysfunction.  · Moderate right ventricular enlargement.  · Moderate right atrial enlargement.  · Mild-to-moderate aortic regurgitation.  · There is mild aortic valve stenosis.  · Aortic valve area is 1.44 cm2; peak velocity is 2.09 m/s; mean gradient is 10 mmHg.  · Mild-to-moderate mitral regurgitation.  · Moderate tricuspid regurgitation.  · Moderate pulmonic regurgitation.  · The estimated PA systolic pressure is 69 mmHg.  · There is moderate pulmonary hypertension.  · Moderate left atrial enlargement.  . Continue Beta Blocker, ACE/ARB, Furosemide and Aldactone and monitor clinical status closely. Monitor on telemetry. Patient is on CHF pathway.  Monitor strict Is&Os and daily weights.  Place on fluid restriction of 1.5 L. Continue to stress to patient importance of self efficacy and  on diet for CHF. Last BNP reviewed- and noted below No results for input(s): BNP, BNPTRIAGEBLO in the last 168 hours..  Lab Results   Component Value Date    NTPROBNP 1458 (H) 04/22/2023    NTPROBNP 88214 (H) 03/29/2023    NTPROBNP 3561 (H) 02/15/2022   - continue BiPAP management per respiratory  - continue supplemental oxygen while off BiPAP at baseline 2L  - continue daily breathing treatments   - contact DME office with evaluation of home trilogy device

## 2023-04-25 NOTE — SUBJECTIVE & OBJECTIVE
Interval History: Patient seen and examined.     Review of Systems   Constitutional:  Positive for activity change. Negative for appetite change, chills, fatigue and fever.   HENT:  Negative for sinus pressure, sinus pain, sneezing and sore throat.    Respiratory:  Positive for wheezing. Negative for chest tightness and shortness of breath.    Cardiovascular:  Negative for chest pain, palpitations and leg swelling.   Gastrointestinal:  Negative for abdominal distention, abdominal pain, constipation, diarrhea, nausea and vomiting.   Genitourinary:  Negative for dysuria and frequency.   Musculoskeletal:  Positive for back pain and gait problem.   Neurological:  Positive for weakness. Negative for dizziness, seizures, speech difficulty, light-headedness, numbness and headaches.   Psychiatric/Behavioral:  Negative for agitation, behavioral problems, confusion, self-injury and sleep disturbance. The patient is not nervous/anxious.    Objective:     Vital Signs (Most Recent):  Temp: 97.5 °F (36.4 °C) (04/24/23 1937)  Pulse: 66 (04/24/23 1937)  Resp: (!) 27 (04/24/23 1937)  BP: (!) 86/57 (04/24/23 1937)  SpO2: 99 % (04/24/23 1937)   Vital Signs (24h Range):  Temp:  [97.5 °F (36.4 °C)-98.4 °F (36.9 °C)] 97.5 °F (36.4 °C)  Pulse:  [65-81] 66  Resp:  [18-35] 27  SpO2:  [92 %-99 %] 99 %  BP: ()/(57-61) 86/57     Weight: 119.1 kg (262 lb 8 oz)  Body mass index is 45.06 kg/m².    Intake/Output Summary (Last 24 hours) at 4/24/2023 2001  Last data filed at 4/24/2023 1751  Gross per 24 hour   Intake 2060 ml   Output 1300 ml   Net 760 ml      Physical Exam  Vitals and nursing note reviewed.   Constitutional:       General: She is not in acute distress.     Appearance: She is not ill-appearing or toxic-appearing.   HENT:      Head: Normocephalic.      Nose: No congestion or rhinorrhea.      Mouth/Throat:      Mouth: Mucous membranes are dry.      Pharynx: Oropharynx is clear.   Eyes:      Extraocular Movements: Extraocular  movements intact.      Conjunctiva/sclera: Conjunctivae normal.   Cardiovascular:      Rate and Rhythm: Normal rate.      Heart sounds: No murmur heard.  Pulmonary:      Effort: No respiratory distress.      Breath sounds: No wheezing, rhonchi or rales.      Comments: Breath sounds reaching lung bases bilaterally  Abdominal:      General: Abdomen is flat. There is no distension.      Palpations: Abdomen is soft.      Tenderness: There is no abdominal tenderness. There is no guarding.   Musculoskeletal:         General: No swelling or deformity.      Cervical back: Normal range of motion and neck supple. No rigidity or tenderness.      Right lower leg: No edema.      Left lower leg: No edema.   Skin:     General: Skin is warm and dry.      Findings: No bruising, erythema, lesion or rash.      Comments: Skin over abdomen and legs relaxed with no evidence of pitting edema, trace edema over dorsum of foot bilaterally   Neurological:      General: No focal deficit present.      Mental Status: She is alert and oriented to person, place, and time. Mental status is at baseline.      Motor: Weakness present.   Psychiatric:         Mood and Affect: Mood normal.         Behavior: Behavior normal.     Significant Labs: All pertinent labs within the past 24 hours have been reviewed.  BMP:   Recent Labs   Lab 04/23/23  1221   *      K 4.0      CO2 36*   BUN 24*   CREATININE 1.4   CALCIUM 10.5   MG 2.3     CMP:   Recent Labs   Lab 04/23/23  1221      K 4.0      CO2 36*   *   BUN 24*   CREATININE 1.4   CALCIUM 10.5   ANIONGAP -1*     Magnesium:   Recent Labs   Lab 04/23/23  1221   MG 2.3     Significant Imaging: I have reviewed all pertinent imaging results/findings within the past 24 hours.

## 2023-04-25 NOTE — CARE UPDATE
04/25/23 0425   Patient Assessment/Suction   Level of Consciousness (AVPU) alert   Respiratory Effort Moderate;Labored   Expansion/Accessory Muscles/Retractions no use of accessory muscles;no retractions   All Lung Fields Breath Sounds diminished   Rhythm/Pattern, Respiratory labored;shortness of breath;tachypneic   Cough Frequency no cough   PRE-TX-O2   Device (Oxygen Therapy) BIPAP   $ Is the patient on Low Flow Oxygen? Yes   Oxygen Concentration (%) (S)  55   SpO2 96 %   Pulse Oximetry Type Intermittent   $ Pulse Oximetry - Multiple Charge Pulse Oximetry - Multiple   Probe Placed On (Pulse Ox) Left:;finger   Pulse 73   Resp (!) 26   Preset CPAP/BiPAP Settings   Mode Of Delivery BiPAP S/T   $ Initial CPAP/BiPAP Setup? No   $ Is patient using? Yes   Size of Mask Medium/Large   Sized Appropriately? Yes   Equipment Type TrilogMediaBoost    Airway Device Type medium full face mask   Humidifier not applicable   Ipap (S)  16   EPAP (cm H2O) (S)  8   Pressure Support (cm H2O) 8   ITime (sec) 1.3   Rise Time (sec) 2   Patient CPAP/BiPAP Settings   CPAP/BIPAP ID 12   RR Total (Breaths/Min) 26   Tidal Volume (mL) 272   VE Minute Ventilation (L/min) 7.4 L/min   Peak Inspiratory Pressure (cm H2O) 16   Total Leak (L/Min) 28.7   Patient Trigger - ST Mode Only (%) 100   CPAP/BiPAP Backup Settings   Backup Rate 8 breaths per minute (bpm)   CPAP/BiPAP Alarms   Minute Ventilation (L/Min)   (4-24)   High RR (breaths/min) 45   Low RR (breaths/min) 8   Apnea (Sec) 20   Respiratory Evaluation   $ Care Plan Tech Time 15 min     Ana María RN called stated patient breathing 38-42 bpm with a SAT of 91% on 40% FiO2. Upon entering room RT noticed patient breathing 28 bpm. Patient stated she is anxious and does not wear the Bipap all the time at nursing home. RT adjusted Bipap settings to from 14/7 at 40% to 16/8 at 55%. The settings seemed to have calmed patient just enough to slow breathing down a little bit and bring SATs up to 96%. Per RN,  patient has not slept in 24 hrs. Breathing rate might be connected to anxiety levels. RN requesting Ativan or Xanax to help patient calm down enough o get some rest.

## 2023-04-25 NOTE — ASSESSMENT & PLAN NOTE
Patient has persistent depression which is mild and is currently controlled. Will Continue anti-depressant medications. We will not consult psychiatry at this time. Patient does not display psychosis at this time. Continue to monitor closely and adjust plan of care as needed.  - continue with burspirone and sertraline as prescribed

## 2023-04-25 NOTE — ASSESSMENT & PLAN NOTE
Body mass index is 42.61 kg/m². Morbid obesity complicates all aspects of disease management from diagnostic modalities to treatment. Weight loss encouraged and health benefits explained to patient.

## 2023-04-25 NOTE — PROGRESS NOTES
[facility] Medicine  Progress Note    Patient Name: Carmen Tan  MRN: [id number]  Patient Class: IP- Inpatient   Admission Date: 4/22/2023  Length of Stay: 2 days  Attending Physician: Gunnar Mott DO  Primary Care Provider: Lucian Barry MD        Subjective:     Principal Problem:Acute respiratory failure with hypoxia        HPI:  Chief Complaint   Patient presents with    Shortness of Breath       Pt presents to the ER via EMS w/ complaints of shortness of breath x 1 day. PHC reports hx of copd/chf, states pt was satting in the 80's on bipap after duonebs. Upon arrival pt is 100% w/ cpap after 1 duoneb.       73-year-old female with a history CHF with diastolic dysfunction, COPD, diabetes, hypertension, pulmonary embolus on Xarelto presents the emergency department with shortness of breath beginning last night gradually.  History of pulmonary edema in the past.  Much improved with CPAP per EMS.  She does wear BiPAP at the nursing home.  Given a DuoNeb as well by EMS.  Oxygen saturation is 97% on the CPAP here in the emergency department.  She is alert oriented x4, GCS is 15. Denies chest pain, palpitations, increased work of breathing after placement on BiPAP.     ED course: Patient responded to IV lasix, breathing treatments and being placed on BiPAP I/P 14/7 FiO2 40%. Patient endorses at the nursing home she has slowly been regaining her strength, spending more time off her breathing machine, however feels like the BiPAP is not functioning well.   Will admit patient to continue treatment with IV lasix and BiPAP for acute on chronic congestive heart failure with hypoxia, COPD exacerbation and assess her non-invasive vent support equipment arranged during last hospitalization.        Overview/Hospital Course:  4/23 Tolerating BiPAP use, sitting upright at edge of bed preparing for breathing treatment. Continue with daily pulmonary hygiene care, PT and OT. Patient will have a  family member bring in home non-invasive vent to be evaluated.   4/24 Need to verify whether at the returning nursing facility patient can have a full face mask with the Trelegy machine as the over the nose mask arranged for use at the nursing facility leaks and patient cannot get effective ventilation. On I/P 14/7 FiO2 40% patient maintains SpO2 >93%. Will transition IV lasix to PO and monitor urine output. Counseled patient on use of BiPAP for night time use and to switch to nasal cannula during the day time. Continue with PT and OT efforts.       Interval History: Patient seen and examined.     Review of Systems   Constitutional:  Positive for activity change. Negative for appetite change, chills, fatigue and fever.   HENT:  Negative for sinus pressure, sinus pain, sneezing and sore throat.    Respiratory:  Positive for wheezing. Negative for chest tightness and shortness of breath.    Cardiovascular:  Negative for chest pain, palpitations and leg swelling.   Gastrointestinal:  Negative for abdominal distention, abdominal pain, constipation, diarrhea, nausea and vomiting.   Genitourinary:  Negative for dysuria and frequency.   Musculoskeletal:  Positive for back pain and gait problem.   Neurological:  Positive for weakness. Negative for dizziness, seizures, speech difficulty, light-headedness, numbness and headaches.   Psychiatric/Behavioral:  Negative for agitation, behavioral problems, confusion, self-injury and sleep disturbance. The patient is not nervous/anxious.    Objective:     Vital Signs (Most Recent):  Temp: 97.5 °F (36.4 °C) (04/24/23 1937)  Pulse: 66 (04/24/23 1937)  Resp: (!) 27 (04/24/23 1937)  BP: (!) 86/57 (04/24/23 1937)  SpO2: 99 % (04/24/23 1937)   Vital Signs (24h Range):  Temp:  [97.5 °F (36.4 °C)-98.4 °F (36.9 °C)] 97.5 °F (36.4 °C)  Pulse:  [65-81] 66  Resp:  [18-35] 27  SpO2:  [92 %-99 %] 99 %  BP: ()/(57-61) 86/57     Weight: 119.1 kg (262 lb 8 oz)  Body mass index is 45.06  kg/m².    Intake/Output Summary (Last 24 hours) at 4/24/2023 2001  Last data filed at 4/24/2023 1751  Gross per 24 hour   Intake 2060 ml   Output 1300 ml   Net 760 ml      Physical Exam  Vitals and nursing note reviewed.   Constitutional:       General: She is not in acute distress.     Appearance: She is not ill-appearing or toxic-appearing.   HENT:      Head: Normocephalic.      Nose: No congestion or rhinorrhea.      Mouth/Throat:      Mouth: Mucous membranes are dry.      Pharynx: Oropharynx is clear.   Eyes:      Extraocular Movements: Extraocular movements intact.      Conjunctiva/sclera: Conjunctivae normal.   Cardiovascular:      Rate and Rhythm: Normal rate.      Heart sounds: No murmur heard.  Pulmonary:      Effort: No respiratory distress.      Breath sounds: No wheezing, rhonchi or rales.      Comments: Breath sounds reaching lung bases bilaterally  Abdominal:      General: Abdomen is flat. There is no distension.      Palpations: Abdomen is soft.      Tenderness: There is no abdominal tenderness. There is no guarding.   Musculoskeletal:         General: No swelling or deformity.      Cervical back: Normal range of motion and neck supple. No rigidity or tenderness.      Right lower leg: No edema.      Left lower leg: No edema.   Skin:     General: Skin is warm and dry.      Findings: No bruising, erythema, lesion or rash.      Comments: Skin over abdomen and legs relaxed with no evidence of pitting edema, trace edema over dorsum of foot bilaterally   Neurological:      General: No focal deficit present.      Mental Status: She is alert and oriented to person, place, and time. Mental status is at baseline.      Motor: Weakness present.   Psychiatric:         Mood and Affect: Mood normal.         Behavior: Behavior normal.     Significant Labs: All pertinent labs within the past 24 hours have been reviewed.  BMP:   Recent Labs   Lab 04/23/23  1221   *      K 4.0      CO2 36*   BUN 24*    CREATININE 1.4   CALCIUM 10.5   MG 2.3     CMP:   Recent Labs   Lab 04/23/23  1221      K 4.0      CO2 36*   *   BUN 24*   CREATININE 1.4   CALCIUM 10.5   ANIONGAP -1*     Magnesium:   Recent Labs   Lab 04/23/23  1221   MG 2.3     Significant Imaging: I have reviewed all pertinent imaging results/findings within the past 24 hours.      Assessment/Plan:      * Acute respiratory failure with hypoxia  Patient with Hypercapnic and Hypoxic Respiratory failure which is Acute on chronic.  she is on home oxygen at 2 LPM. Supplemental oxygen was provided and noted- Oxygen Concentration (%):  [40] 40    Signs/symptoms of respiratory failure include- tachypnea, increased work of breathing and respiratory distress. Contributing diagnoses includes - CHF, COPD, Obesity Hypoventilation and Pleural effusion Labs and images were reviewed. Patient Has recent ABG, which has been reviewed. Will treat underlying causes and adjust management of respiratory failure as follows- IV lasix, pulmonary hygiene care, BiPAP management per respiratory.      Anxiety and depression  Patient has persistent depression which is mild and is currently controlled. Will Continue anti-depressant medications. We will not consult psychiatry at this time. Patient does not display psychosis at this time. Continue to monitor closely and adjust plan of care as needed.        Deep vein thrombosis (DVT) prophylaxis not tolerated by patient        Acute on chronic congestive heart failure  Patient is identified as having Combined Systolic and Diastolic heart failure that is Acute on chronic. CHF is currently uncontrolled due to Rales/crackles on pulmonary exam and Pulmonary edema/pleural effusion on CXR. Latest ECHO performed and demonstrates- Results for orders placed during the hospital encounter of 03/29/23    Echo  Interpretation Summary  · The left ventricle is normal in size with moderate concentric hypertrophy and mildly decreased  systolic function.  · The estimated ejection fraction is 45%.  · Grade I left ventricular diastolic dysfunction.  · Moderate right ventricular enlargement.  · Moderate right atrial enlargement.  · Mild-to-moderate aortic regurgitation.  · There is mild aortic valve stenosis.  · Aortic valve area is 1.44 cm2; peak velocity is 2.09 m/s; mean gradient is 10 mmHg.  · Mild-to-moderate mitral regurgitation.  · Moderate tricuspid regurgitation.  · Moderate pulmonic regurgitation.  · The estimated PA systolic pressure is 69 mmHg.  · There is moderate pulmonary hypertension.  · Moderate left atrial enlargement.  . Continue Beta Blocker, ACE/ARB, Furosemide and Aldactone and monitor clinical status closely. Monitor on telemetry. Patient is on CHF pathway.  Monitor strict Is&Os and daily weights.  Place on fluid restriction of 1.5 L. Continue to stress to patient importance of self efficacy and  on diet for CHF. Last BNP reviewed- and noted below No results for input(s): BNP, BNPTRIAGEBLO in the last 168 hours..  Lab Results   Component Value Date    NTPROBNP 1458 (H) 04/22/2023    NTPROBNP 50163 (H) 03/29/2023    NTPROBNP 3561 (H) 02/15/2022   - continue BiPAP management per respiratory  - continue supplemental oxygen while off BiPAP at baseline 2L  - continue daily breathing treatments   - contact DME office with evaluation of home trilogy device      COPD exacerbation  No leukocytosis, vital signs stable. Likely associated from combination of fluid overload and ineffective non-invasive equipment or settings at home for chronic respiratory failure and CHF.   Continue with above management and monitor. No antibiotics at this time.       Weakness  Debility from last hospitalization, patient is under PT, OT pulmonary care at [facility].  - f/u PT eval and treatment    Severe obesity (BMI >= 40)  Body mass index is 45.06 kg/m². Morbid obesity complicates all aspects of disease management from diagnostic modalities to treatment.  Weight loss encouraged and health benefits explained to patient.           VTE Risk Mitigation (From admission, onward)         Ordered     IP VTE HIGH RISK PATIENT  Once         04/22/23 0534     Place sequential compression device  Until discontinued         04/22/23 0534     Place ANAMARIA hose  Until discontinued         04/22/23 0534                Discharge Planning   TIGRE:      Code Status: Full Code   Is the patient medically ready for discharge?:     Reason for patient still in hospital (select all that apply): Patient trending condition, Treatment, PT / OT recommendations and Pending disposition  Discharge Plan A: Skilled Nursing Facility                  Gunnar Mott DO  Department of Hospital Medicine   [facility]

## 2023-04-25 NOTE — DISCHARGE SUMMARY
[facility] Medicine  Discharge Summary      Patient Name: Carmen Tan  MRN: [id number]  GOLDIE: [id number]  Patient Class: IP- Inpatient  Admission Date: 4/22/2023  Hospital Length of Stay: 3 days  Discharge Date and Time:  04/25/2023 10:23 AM  Attending Physician: Gunnar Mott DO   Discharging Provider: Gunnar Mott DO  Primary Care Provider: Lucian Barry MD    Primary Care Team: Networked reference to record PCT     HPI:   Chief Complaint   Patient presents with    Shortness of Breath       Pt presents to the ER via EMS w/ complaints of shortness of breath x 1 day. PHC reports hx of copd/chf, states pt was satting in the 80's on bipap after duonebs. Upon arrival pt is 100% w/ cpap after 1 duoneb.       73-year-old female with a history CHF with diastolic dysfunction, COPD, diabetes, hypertension, pulmonary embolus on Xarelto presents the emergency department with shortness of breath beginning last night gradually.  History of pulmonary edema in the past.  Much improved with CPAP per EMS.  She does wear BiPAP at the nursing home.  Given a DuoNeb as well by EMS.  Oxygen saturation is 97% on the CPAP here in the emergency department.  She is alert oriented x4, GCS is 15. Denies chest pain, palpitations, increased work of breathing after placement on BiPAP.     ED course: Patient responded to IV lasix, breathing treatments and being placed on BiPAP I/P 14/7 FiO2 40%. Patient endorses at the nursing home she has slowly been regaining her strength, spending more time off her breathing machine, however feels like the BiPAP is not functioning well.   Will admit patient to continue treatment with IV lasix and BiPAP for acute on chronic congestive heart failure with hypoxia, COPD exacerbation and assess her non-invasive vent support equipment arranged during last hospitalization.        * No surgery found *      Hospital Course:   4/23 Tolerating BiPAP use, sitting upright at edge of  bed preparing for breathing treatment. Continue with daily pulmonary hygiene care, PT and OT. Patient will have a family member bring in home non-invasive vent to be evaluated.   4/24 Need to verify whether at the returning nursing facility patient can have a full face mask with the Trelegy machine as the over the nose mask arranged for use at the nursing facility leaks and patient cannot get effective ventilation. On I/P 14/7 FiO2 40% patient maintains SpO2 >93%. Will transition IV lasix to PO and monitor urine output. Counseled patient on use of BiPAP for night time use and to switch to nasal cannula during the day time. Continue with PT and OT efforts.   4/25 Patient sitting upright on continuous O2 2L breathing comfortably. Again reviewed pulmonary hygiene care with patient. Also discussed changing code status to avoid or even reduce unnecessary pain and suffering in the event of massive cardiac event and respiratory failure given her overall decline in functional status. Patient agrees to consider and will continue efforts with PT and OT and chest physiotherapy. Patient is ready to return to [facility] with SNF. Trilogy device compatible full face mask to be transported with patient back to nursing home.          Goals of Care Treatment Preferences:  Code Status: Full Code      Consults:   Consults (From admission, onward)        Status Ordering Provider     Inpatient consult to Social Work/Case Management  Once        Provider:  (Not yet assigned)    Acknowledged SHONNA DANIELS     Inpatient consult to Social Work/Case Management  Once        Provider:  (Not yet assigned)    Acknowledged SHONNA DANIELS          Psychiatric  Anxiety and depression  Patient has persistent depression which is mild and is currently controlled. Will Continue anti-depressant medications. We will not consult psychiatry at this time. Patient does not display psychosis at this time. Continue to monitor closely and adjust  plan of care as needed.  - continue with burspirone and sertraline as prescribed      Pulmonary  * Acute respiratory failure with hypoxia  Patient with Hypercapnic and Hypoxic Respiratory failure which is Acute on chronic.  she is on home oxygen at 2 LPM. Supplemental oxygen was provided and noted- Oxygen Concentration (%):  [40-55] 55    Signs/symptoms of respiratory failure include- tachypnea, increased work of breathing and respiratory distress. Contributing diagnoses includes - CHF, COPD, Obesity Hypoventilation and Pleural effusion Labs and images were reviewed. Patient Has recent ABG, which has been reviewed. Will treat underlying causes and adjust management of respiratory failure as follows- IV lasix, pulmonary hygiene care, BiPAP management per respiratory.      COPD exacerbation  No leukocytosis, vital signs stable. Likely associated from combination of fluid overload and ineffective non-invasive equipment or settings at home for chronic respiratory failure and CHF.   Continue with above management and monitor. No antibiotics at this time.       Cardiac/Vascular  Acute on chronic congestive heart failure  Patient is identified as having Combined Systolic and Diastolic heart failure that is Acute on chronic. CHF is currently uncontrolled due to Rales/crackles on pulmonary exam and Pulmonary edema/pleural effusion on CXR. Latest ECHO performed and demonstrates- Results for orders placed during the hospital encounter of 03/29/23    Echo  Interpretation Summary  · The left ventricle is normal in size with moderate concentric hypertrophy and mildly decreased systolic function.  · The estimated ejection fraction is 45%.  · Grade I left ventricular diastolic dysfunction.  · Moderate right ventricular enlargement.  · Moderate right atrial enlargement.  · Mild-to-moderate aortic regurgitation.  · There is mild aortic valve stenosis.  · Aortic valve area is 1.44 cm2; peak velocity is 2.09 m/s; mean gradient is 10  mmHg.  · Mild-to-moderate mitral regurgitation.  · Moderate tricuspid regurgitation.  · Moderate pulmonic regurgitation.  · The estimated PA systolic pressure is 69 mmHg.  · There is moderate pulmonary hypertension.  · Moderate left atrial enlargement.  . Continue Beta Blocker, ACE/ARB, Furosemide and Aldactone and monitor clinical status closely. Monitor on telemetry. Patient is on CHF pathway.  Monitor strict Is&Os and daily weights.  Place on fluid restriction of 1.5 L. Continue to stress to patient importance of self efficacy and  on diet for CHF. Last BNP reviewed- and noted below No results for input(s): BNP, BNPTRIAGEBLO in the last 168 hours..  Lab Results   Component Value Date    NTPROBNP 1458 (H) 04/22/2023    NTPROBNP 20400 (H) 03/29/2023    NTPROBNP 3561 (H) 02/15/2022   - continue supplemental oxygen while off BiPAP at baseline 2L  - continue daily breathing treatments   Over the past three days patient diuresed well with almost 14 pounds of weight loss.   Will continue with PO lasix BID.   Follow up outpatient closely with electrolyte imbalances and renal function along with respiratory function.   Trilogy device compatible full face mask to be transported with patient.       Hematology  Deep vein thrombosis (DVT) prophylaxis not tolerated by patient        Endocrine  Severe obesity (BMI >= 40)  Body mass index is 42.61 kg/m². Morbid obesity complicates all aspects of disease management from diagnostic modalities to treatment. Weight loss encouraged and health benefits explained to patient.         Other  Weakness  Debility from last hospitalization, patient is under PT, OT pulmonary care at SNF.  Patient slowly making small improvements, sitting up right at on continuous oxygen via nasal cannula. Continue with daily care.   Stable and ready for discharge to nursing home with continued therapy.       Final Active Diagnoses:    Diagnosis Date Noted POA    PRINCIPAL PROBLEM:  Acute respiratory  failure with hypoxia [J96.01] 02/01/2022 Yes    Anxiety and depression [F41.9, F32.A] 04/12/2023 Yes    Deep vein thrombosis (DVT) prophylaxis not tolerated by patient [Z78.9] 04/11/2023 Yes    COPD exacerbation [J44.1] 02/16/2022 Yes    Acute on chronic congestive heart failure [I50.9] 02/16/2022 Yes    Weakness [R53.1] 02/05/2022 Yes    Severe obesity (BMI >= 40) [E66.01] 02/04/2022 Yes      Problems Resolved During this Admission:       Discharged Condition: stable    Disposition:     Follow Up:   Follow-up Information     Lucian Barry MD Follow up.    Specialty: Internal Medicine  Contact information:  [facility] [address] [place] [address] [id number] [phone]                       Patient Instructions:   No discharge procedures on file.    Significant Diagnostic Studies: Labs:   BMP:   Recent Labs   Lab 04/23/23  1221 04/25/23  0735   * 101    139   K 4.0 4.4    102   CO2 36* 32*   BUN 24* 20   CREATININE 1.4 1.1   CALCIUM 10.5 10.0   MG 2.3 2.1   , CMP   Recent Labs   Lab 04/23/23  1221 04/25/23  0735    139   K 4.0 4.4    102   CO2 36* 32*   * 101   BUN 24* 20   CREATININE 1.4 1.1   CALCIUM 10.5 10.0   ANIONGAP -1* 5*    and CBC No results for input(s): WBC, HGB, HCT, PLT in the last 48 hours.    Pending Diagnostic Studies:     None         Medications:  Reconciled Home Medications:      Medication List      START taking these medications    ketotifen 0.025 % (0.035 %) ophthalmic solution  Commonly known as: ZADITOR  Place 1 drop into both eyes 2 (two) times daily. for 14 days     melatonin 3 mg tablet  Commonly known as: MELATIN  Take 2 tablets (6 mg total) by mouth nightly.     mupirocin 2 % ointment  Commonly known as: BACTROBAN  by Nasal route 2 (two) times daily. for 3 days        CONTINUE taking these medications    albuterol-ipratropium 2.5 mg-0.5 mg/3 mL nebulizer solution  Commonly known as: DUO-NEB  Take 3 mLs by nebulization every 6 (six) hours while  awake. Rescue     budesonide 0.25 mg/2 mL nebulizer solution  Commonly known as: PULMICORT  Take 2 mLs (0.25 mg total) by nebulization every 12 (twelve) hours. Controller     busPIRone 5 MG Tab  Commonly known as: BUSPAR  Take 1 tablet (5 mg total) by mouth 3 (three) times daily.     carvediloL 6.25 MG tablet  Commonly known as: COREG  Take 1 tablet (6.25 mg total) by mouth 2 (two) times daily.     empagliflozin 10 mg tablet  Commonly known as: JARDIANCE  Take 1 tablet (10 mg total) by mouth once daily.     furosemide 40 MG tablet  Commonly known as: LASIX  Take 1 tablet (40 mg total) by mouth once daily.     insulin detemir U-100 (Levemir) 100 unit/mL (3 mL) Inpn pen  Inject 15 Units into the skin once daily.     LIPITOR 40 MG tablet  Generic drug: atorvastatin  Take 1 tablet (40 mg total) by mouth once daily.     NIFEdipine 60 MG (OSM) 24 hr tablet  Commonly known as: PROCARDIA-XL  Take 1 tablet (60 mg total) by mouth once daily.     potassium chloride SA 20 MEQ tablet  Commonly known as: K-DUR,KLOR-CON  Take 1 tablet (20 mEq total) by mouth 2 (two) times daily.     sacubitriL-valsartan 24-26 mg per tablet  Commonly known as: ENTRESTO  Take 1 tablet by mouth 2 (two) times daily.     sertraline 50 MG tablet  Commonly known as: ZOLOFT  Take 1 tablet (50 mg total) by mouth once daily.     spironolactone 25 MG tablet  Commonly known as: ALDACTONE  Take 1 tablet (25 mg total) by mouth once daily.     sucralfate 100 mg/mL suspension  Commonly known as: CARAFATE  Take 10 mLs (1 g total) by mouth 4 (four) times daily before meals and nightly.     vitamin D 1000 units Tab  Commonly known as: VITAMIN D3  Take 185 mg by mouth once daily.        STOP taking these medications    gabapentin 300 MG capsule  Commonly known as: NEURONTIN     pantoprazole 40 MG tablet  Commonly known as: PROTONIX        ASK your doctor about these medications    acetaminophen 325 MG tablet  Commonly known as: TYLENOL  Take 2 tablets (650 mg total)  by mouth every 8 (eight) hours as needed for Pain or Temperature greater than.            Indwelling Lines/Drains at time of discharge:   Lines/Drains/Airways     Peripherally Inserted Central Catheter Line  Duration           PICC Triple Lumen 04/22/23 0813 right brachial 3 days                Time spent on the discharge of patient: 30 minutes       Gunnar Mott DO  Department of Hospital Medicine  [facility] Surg

## 2023-04-25 NOTE — PT/OT/SLP DISCHARGE
Physical Therapy Discharge Summary    Name: Carmen Tan  MRN: [id number]   Principal Problem: Acute respiratory failure with hypoxia     Patient Discharged from acute Physical Therapy on 2023 .  Please refer to prior PT noted date on 2023  for functional status.     Assessment:     Patient appropriate for care in another setting.    Objective:     GOALS:   Multidisciplinary Problems       Physical Therapy Goals          Problem: Physical Therapy    Goal Priority Disciplines Outcome Goal Variances Interventions   Physical Therapy Goal     PT, PT/OT Adequate for Care Transition     Description: Goals to be met by: 2023     Patient will increase functional independence with mobility by performin. Supine to sit with Standby Assistance.  2. Sit to supine with Standby Assistance.  3. Bed to chair transfer with Contact Guard Assistance with rolling walker using Step Transfer technique.  4. Sit to Stand with Contact Guard Assistance with rolling walker.  5. Gait  x 50  feet with Contact Guard Assistance with RW and O2 supplement .  6. Lower extremity exercise program x10 reps, with assistance as needed.                          Reasons for Discontinuation of Therapy Services  Transfer to alternate level of care.      Plan:     Patient Discharged to: Skilled Nursing Facility.      2023

## 2023-04-25 NOTE — NURSING
1420   PATIENT LEFT FLOOR IN STABLE CONDITION PER WHEEL CHAIR.   SAFETY MAINTAINED.  PATIENT TRANSPORTED BACK TO [facility] VIA TRANSPORTATION VAN.   JASON VARGAS LPN

## 2023-04-25 NOTE — ASSESSMENT & PLAN NOTE
Debility from last hospitalization, patient is under PT, OT pulmonary care at SNF.  Patient slowly making small improvements, sitting up right at on continuous oxygen via nasal cannula. Continue with daily care.   Stable and ready for discharge to nursing home with continued therapy.

## 2023-04-27 ENCOUNTER — PATIENT OUTREACH (OUTPATIENT)
Dept: ADMINISTRATIVE | Facility: CLINIC | Age: 73
End: 2023-04-27
Payer: MEDICARE

## 2023-04-28 NOTE — PROGRESS NOTES
"  No additional outreaches will be made for this encounter as the patient's daughter, Esther, confirms to C3 nurse that the patient is residing at "[facility]."   "

## 2023-05-03 NOTE — SUBJECTIVE & OBJECTIVE
Interval History: Patient seen and examined.     Review of Systems   Constitutional:  Positive for activity change. Negative for appetite change, chills, fatigue and fever.   HENT:  Negative for mouth sores, sneezing and sore throat.    Eyes:  Negative for visual disturbance.   Respiratory:  Positive for cough (intermittent dry) and shortness of breath (improving). Negative for chest tightness, wheezing and stridor.    Cardiovascular:  Positive for leg swelling. Negative for chest pain and palpitations.   Gastrointestinal:  Negative for abdominal pain, blood in stool, constipation, diarrhea, nausea and vomiting.   Musculoskeletal:  Positive for arthralgias, gait problem and joint swelling. Negative for neck pain and neck stiffness.   Skin:  Negative for color change and pallor.   Neurological:  Positive for weakness. Negative for dizziness, seizures, syncope, facial asymmetry, speech difficulty, light-headedness, numbness and headaches.   Psychiatric/Behavioral:  Negative for agitation, confusion, decreased concentration, dysphoric mood and sleep disturbance. The patient is not nervous/anxious.    Objective:     Vital Signs (Most Recent):  Temp: 98 °F (36.7 °C) (04/01/23 0714)  Pulse: 60 (04/01/23 0600)  Resp: (!) 25 (04/01/23 0600)  BP: (!) 146/65 (04/01/23 0600)  SpO2: 100 % (04/01/23 0600)   Vital Signs (24h Range):  Temp:  [97.6 °F (36.4 °C)-98.3 °F (36.8 °C)] 98 °F (36.7 °C)  Pulse:  [56-77] 60  Resp:  [19-34] 25  SpO2:  [97 %-100 %] 100 %  BP: (109-163)/(52-86) 146/65     Weight: (!) 146.1 kg (322 lb)  Body mass index is 55.27 kg/m².    Intake/Output Summary (Last 24 hours) at 4/1/2023 0741  Last data filed at 4/1/2023 0540  Gross per 24 hour   Intake 872.5 ml   Output 4600 ml   Net -3727.5 ml        Physical Exam  Vitals and nursing note reviewed.   Constitutional:       General: She is not in acute distress.     Appearance: She is obese. She is ill-appearing. She is not toxic-appearing or diaphoretic.    HENT:      Right Ear: External ear normal.      Left Ear: External ear normal.      Nose: No congestion or rhinorrhea.      Mouth/Throat:      Mouth: Mucous membranes are dry.      Pharynx: Oropharynx is clear. No oropharyngeal exudate or posterior oropharyngeal erythema.   Eyes:      Extraocular Movements: Extraocular movements intact.   Neck:      Comments: Thick skin folds  Cardiovascular:      Rate and Rhythm: Normal rate. Rhythm irregular.      Heart sounds: No murmur heard.  Pulmonary:      Effort: No respiratory distress.      Breath sounds: No rhonchi or rales.      Comments: On bipap - coarse bs ellen  Chest:      Chest wall: No tenderness.   Abdominal:      Palpations: Abdomen is soft.      Tenderness: There is no abdominal tenderness.      Comments: Limited secondary to body habitus   Musculoskeletal:         General: Swelling and tenderness present.      Cervical back: Neck supple. No rigidity.      Right lower leg: Edema (+2 pitting, loss of bony features in toes and dorsum of foot bilaterally) present.      Left lower leg: Edema present.   Skin:     General: Skin is warm and dry.      Capillary Refill: Capillary refill takes less than 2 seconds.      Coloration: Skin is not jaundiced.      Findings: No bruising, erythema, lesion or rash.   Neurological:      Mental Status: She is alert and oriented to person, place, and time. Mental status is at baseline.      Comments: Baseline requires walker for ambulation   Psychiatric:         Mood and Affect: Mood normal.         Behavior: Behavior normal.     Significant Labs: All pertinent labs within the past 24 hours have been reviewed.  Blood Culture: No results for input(s): LABBLOO in the last 48 hours.  BMP:   Recent Labs   Lab 04/01/23  0412         K 4.1      CO2 33*   BUN 28*   CREATININE 1.2   CALCIUM 8.9   MG 2.1       CBC:   No results for input(s): WBC, HGB, HCT, PLT in the last 48 hours.    CMP:   Recent Labs   Lab  03/31/23  0334 04/01/23 0412    141   K 4.6 4.1    106   CO2 28 33*   * 108   BUN 24* 28*   CREATININE 1.3 1.2   CALCIUM 9.3 8.9   PROT  --  5.8*   ALBUMIN  --  2.4*   BILITOT  --  0.4   ALKPHOS  --  120   AST  --  19   ALT  --  34   ANIONGAP 3* 2*       Magnesium:   Recent Labs   Lab 03/31/23  0334 04/01/23 0412   MG 2.0 2.1       Significant Imaging: I have reviewed all pertinent imaging results/findings within the past 24 hours.   [facility]

## 2023-07-24 PROBLEM — J96.01 ACUTE RESPIRATORY FAILURE WITH HYPOXIA: Status: RESOLVED | Noted: 2022-02-01 | Resolved: 2023-07-24

## 2023-09-08 ENCOUNTER — HOSPITAL ENCOUNTER (EMERGENCY)
Facility: HOSPITAL | Age: 73
Discharge: ANOTHER HOSPITAL | End: 2023-09-09
Attending: STUDENT IN AN ORGANIZED HEALTH CARE EDUCATION/TRAINING PROGRAM
Payer: MEDICARE

## 2023-09-08 DIAGNOSIS — R79.89 ELEVATED TROPONIN: ICD-10-CM

## 2023-09-08 DIAGNOSIS — N30.90 CYSTITIS: ICD-10-CM

## 2023-09-08 DIAGNOSIS — R79.89 ELEVATED BRAIN NATRIURETIC PEPTIDE (BNP) LEVEL: ICD-10-CM

## 2023-09-08 DIAGNOSIS — R06.02 SHORTNESS OF BREATH: ICD-10-CM

## 2023-09-08 DIAGNOSIS — I21.4 NSTEMI (NON-ST ELEVATED MYOCARDIAL INFARCTION): Primary | ICD-10-CM

## 2023-09-08 LAB
ALBUMIN SERPL BCP-MCNC: 3.3 G/DL (ref 3.5–5.2)
ALP SERPL-CCNC: 146 U/L (ref 55–135)
ALT SERPL W/O P-5'-P-CCNC: 30 U/L (ref 10–44)
ANION GAP SERPL CALC-SCNC: -1 MMOL/L (ref 8–16)
AST SERPL-CCNC: 33 U/L (ref 10–40)
BACTERIA #/AREA URNS HPF: NEGATIVE /HPF
BASOPHILS # BLD AUTO: 0.03 K/UL (ref 0–0.2)
BASOPHILS NFR BLD: 0.5 % (ref 0–1.9)
BILIRUB SERPL-MCNC: 0.4 MG/DL (ref 0.1–1)
BILIRUB UR QL STRIP: NEGATIVE
BUN SERPL-MCNC: 14 MG/DL (ref 8–23)
CALCIUM SERPL-MCNC: 10.3 MG/DL (ref 8.7–10.5)
CHLORIDE SERPL-SCNC: 106 MMOL/L (ref 95–110)
CLARITY UR: ABNORMAL
CO2 SERPL-SCNC: 35 MMOL/L (ref 23–29)
COLOR UR: YELLOW
CREAT SERPL-MCNC: 1.2 MG/DL (ref 0.5–1.4)
CTP QC/QA: YES
DIFFERENTIAL METHOD: ABNORMAL
EOSINOPHIL # BLD AUTO: 0.1 K/UL (ref 0–0.5)
EOSINOPHIL NFR BLD: 2.2 % (ref 0–8)
ERYTHROCYTE [DISTWIDTH] IN BLOOD BY AUTOMATED COUNT: 14.4 % (ref 11.5–14.5)
EST. GFR  (NO RACE VARIABLE): 48.1 ML/MIN/1.73 M^2
GLUCOSE SERPL-MCNC: 102 MG/DL (ref 70–110)
GLUCOSE UR QL STRIP: NEGATIVE
HCT VFR BLD AUTO: 52.9 % (ref 37–48.5)
HGB BLD-MCNC: 16.1 G/DL (ref 12–16)
HGB UR QL STRIP: ABNORMAL
HYALINE CASTS #/AREA URNS LPF: 1.2 /LPF
IMM GRANULOCYTES # BLD AUTO: 0.02 K/UL (ref 0–0.04)
IMM GRANULOCYTES NFR BLD AUTO: 0.3 % (ref 0–0.5)
KETONES UR QL STRIP: NEGATIVE
LEUKOCYTE ESTERASE UR QL STRIP: ABNORMAL
LYMPHOCYTES # BLD AUTO: 1.5 K/UL (ref 1–4.8)
LYMPHOCYTES NFR BLD: 23.7 % (ref 18–48)
MCH RBC QN AUTO: 29.3 PG (ref 27–31)
MCHC RBC AUTO-ENTMCNC: 30.4 G/DL (ref 32–36)
MCV RBC AUTO: 96 FL (ref 82–98)
MICROSCOPIC COMMENT: ABNORMAL
MONOCYTES # BLD AUTO: 0.7 K/UL (ref 0.3–1)
MONOCYTES NFR BLD: 11.5 % (ref 4–15)
NEUTROPHILS # BLD AUTO: 4 K/UL (ref 1.8–7.7)
NEUTROPHILS NFR BLD: 61.8 % (ref 38–73)
NITRITE UR QL STRIP: NEGATIVE
NRBC BLD-RTO: 0 /100 WBC
NT-PROBNP SERPL-MCNC: 5085 PG/ML (ref 5–900)
PH UR STRIP: 6 [PH] (ref 5–8)
PLATELET # BLD AUTO: 169 K/UL (ref 150–450)
PMV BLD AUTO: 8.9 FL (ref 9.2–12.9)
POTASSIUM SERPL-SCNC: 4.4 MMOL/L (ref 3.5–5.1)
PROT SERPL-MCNC: 8.5 G/DL (ref 6–8.4)
PROT UR QL STRIP: ABNORMAL
RBC # BLD AUTO: 5.5 M/UL (ref 4–5.4)
RBC #/AREA URNS HPF: 10 /HPF (ref 0–4)
SARS-COV-2 RDRP RESP QL NAA+PROBE: NEGATIVE
SODIUM SERPL-SCNC: 140 MMOL/L (ref 136–145)
SP GR UR STRIP: 1.01 (ref 1–1.03)
SQUAMOUS #/AREA URNS HPF: 3 /HPF
TROPONIN I SERPL DL<=0.01 NG/ML-MCNC: 200.8 PG/ML (ref 0–60)
URN SPEC COLLECT METH UR: ABNORMAL
UROBILINOGEN UR STRIP-ACNC: 1 EU/DL
WBC # BLD AUTO: 6.46 K/UL (ref 3.9–12.7)
WBC #/AREA URNS HPF: >100 /HPF (ref 0–5)

## 2023-09-08 PROCEDURE — 99285 EMERGENCY DEPT VISIT HI MDM: CPT | Mod: 25

## 2023-09-08 PROCEDURE — 96372 THER/PROPH/DIAG INJ SC/IM: CPT | Performed by: STUDENT IN AN ORGANIZED HEALTH CARE EDUCATION/TRAINING PROGRAM

## 2023-09-08 PROCEDURE — 84484 ASSAY OF TROPONIN QUANT: CPT | Performed by: NURSE PRACTITIONER

## 2023-09-08 PROCEDURE — 25000242 PHARM REV CODE 250 ALT 637 W/ HCPCS: Performed by: NURSE PRACTITIONER

## 2023-09-08 PROCEDURE — 93010 EKG 12-LEAD: ICD-10-PCS | Mod: ,,, | Performed by: INTERNAL MEDICINE

## 2023-09-08 PROCEDURE — 87635 SARS-COV-2 COVID-19 AMP PRB: CPT | Performed by: NURSE PRACTITIONER

## 2023-09-08 PROCEDURE — 25000003 PHARM REV CODE 250: Performed by: STUDENT IN AN ORGANIZED HEALTH CARE EDUCATION/TRAINING PROGRAM

## 2023-09-08 PROCEDURE — 81000 URINALYSIS NONAUTO W/SCOPE: CPT | Performed by: NURSE PRACTITIONER

## 2023-09-08 PROCEDURE — 99900035 HC TECH TIME PER 15 MIN (STAT)

## 2023-09-08 PROCEDURE — 87088 URINE BACTERIA CULTURE: CPT | Performed by: NURSE PRACTITIONER

## 2023-09-08 PROCEDURE — 36415 COLL VENOUS BLD VENIPUNCTURE: CPT | Performed by: NURSE PRACTITIONER

## 2023-09-08 PROCEDURE — 87186 SC STD MICRODIL/AGAR DIL: CPT | Performed by: NURSE PRACTITIONER

## 2023-09-08 PROCEDURE — 85025 COMPLETE CBC W/AUTO DIFF WBC: CPT | Performed by: NURSE PRACTITIONER

## 2023-09-08 PROCEDURE — 94640 AIRWAY INHALATION TREATMENT: CPT

## 2023-09-08 PROCEDURE — 93010 ELECTROCARDIOGRAM REPORT: CPT | Mod: ,,, | Performed by: INTERNAL MEDICINE

## 2023-09-08 PROCEDURE — 99900031 HC PATIENT EDUCATION (STAT)

## 2023-09-08 PROCEDURE — 63600175 PHARM REV CODE 636 W HCPCS: Performed by: STUDENT IN AN ORGANIZED HEALTH CARE EDUCATION/TRAINING PROGRAM

## 2023-09-08 PROCEDURE — 83880 ASSAY OF NATRIURETIC PEPTIDE: CPT | Performed by: NURSE PRACTITIONER

## 2023-09-08 PROCEDURE — 87086 URINE CULTURE/COLONY COUNT: CPT | Performed by: NURSE PRACTITIONER

## 2023-09-08 PROCEDURE — 93005 ELECTROCARDIOGRAM TRACING: CPT

## 2023-09-08 PROCEDURE — 96374 THER/PROPH/DIAG INJ IV PUSH: CPT

## 2023-09-08 PROCEDURE — 87077 CULTURE AEROBIC IDENTIFY: CPT | Performed by: NURSE PRACTITIONER

## 2023-09-08 PROCEDURE — 80053 COMPREHEN METABOLIC PANEL: CPT | Performed by: NURSE PRACTITIONER

## 2023-09-08 RX ORDER — ENOXAPARIN SODIUM 150 MG/ML
1 INJECTION SUBCUTANEOUS
Status: COMPLETED | OUTPATIENT
Start: 2023-09-08 | End: 2023-09-08

## 2023-09-08 RX ORDER — ASPIRIN 325 MG
325 TABLET ORAL
Status: COMPLETED | OUTPATIENT
Start: 2023-09-08 | End: 2023-09-08

## 2023-09-08 RX ORDER — FUROSEMIDE 10 MG/ML
40 INJECTION INTRAMUSCULAR; INTRAVENOUS
Status: COMPLETED | OUTPATIENT
Start: 2023-09-08 | End: 2023-09-08

## 2023-09-08 RX ORDER — SULFAMETHOXAZOLE AND TRIMETHOPRIM 800; 160 MG/1; MG/1
1 TABLET ORAL
Status: COMPLETED | OUTPATIENT
Start: 2023-09-08 | End: 2023-09-09

## 2023-09-08 RX ORDER — IPRATROPIUM BROMIDE AND ALBUTEROL SULFATE 2.5; .5 MG/3ML; MG/3ML
3 SOLUTION RESPIRATORY (INHALATION)
Status: COMPLETED | OUTPATIENT
Start: 2023-09-08 | End: 2023-09-08

## 2023-09-08 RX ADMIN — ENOXAPARIN SODIUM 120 MG: 150 INJECTION SUBCUTANEOUS at 08:09

## 2023-09-08 RX ADMIN — IPRATROPIUM BROMIDE AND ALBUTEROL SULFATE 3 ML: .5; 3 SOLUTION RESPIRATORY (INHALATION) at 07:09

## 2023-09-08 RX ADMIN — FUROSEMIDE 40 MG: 10 INJECTION, SOLUTION INTRAVENOUS at 10:09

## 2023-09-08 RX ADMIN — ASPIRIN 325 MG ORAL TABLET 325 MG: 325 PILL ORAL at 08:09

## 2023-09-08 NOTE — ED PROVIDER NOTES
Encounter Date: 9/8/2023       History     Chief Complaint   Patient presents with    Shortness of Breath     Pt to ED via EMS - stated that she began experiencing worsening dyspnea this morning. Found by EMS to be anxious with dyspnea. Hx CHF/COPD on continuous oxygen at home. Complaints of headache - denied chest pain.      This is a 73-year-old black female with multiple medical problems including COPD on home O2, CHF, and hypertension who presents to the emergency department via EMS with complaints of worsening chronic shortness of breath.  Patient reports gradual worsening of chronic shortness of breath this morning and throughout the day associated with runny nose and worsening of chronic cough.  She reports taking 1 breathing treatment at home without improvement of symptoms.  She also reports that yesterday she developed intermittent episodes of burning upon urination.  She denies known fever, nausea/vomiting, chest pain, abdominal pain, diarrhea, or worsening chronic lower extremity edema.      Review of patient's allergies indicates:   Allergen Reactions    Pcn [penicillins] Swelling     Past Medical History:   Diagnosis Date    Abdominal hernia     Bacteremia 4/5/2023    CHF (congestive heart failure)     COPD (chronic obstructive pulmonary disease)     Diabetes mellitus, type 2 2/16/2022    GERD (gastroesophageal reflux disease)     History of home oxygen therapy     Hypertension     Hypertensive emergency 3/30/2023    Migraine headache     Pulmonary embolism 06/01/2017    Small bowel obstruction     Thyroid disease      Past Surgical History:   Procedure Laterality Date    COLONOSCOPY      HYSTERECTOMY      INNER EAR SURGERY      UPPER GASTROINTESTINAL ENDOSCOPY       No family history on file.  Social History     Tobacco Use    Smoking status: Former    Smokeless tobacco: Never   Substance Use Topics    Alcohol use: No    Drug use: No     Review of Systems   Constitutional:  Negative for appetite  change and fever.   HENT:  Positive for congestion and rhinorrhea.    Respiratory:  Positive for cough and shortness of breath.    Cardiovascular:  Positive for leg swelling. Negative for chest pain and palpitations.   Gastrointestinal:  Negative for abdominal pain, diarrhea, nausea and vomiting.   Genitourinary:  Positive for dysuria.   Musculoskeletal:  Positive for back pain.       Physical Exam     Initial Vitals   BP Pulse Resp Temp SpO2   09/08/23 1846 09/08/23 1844 09/08/23 1844 09/08/23 1945 09/08/23 1844   (!) 194/84 96 (!) 24 99.5 °F (37.5 °C) 97 %      MAP       --                Physical Exam    Nursing note and vitals reviewed.  Constitutional: She appears well-developed and well-nourished. She is active. No distress.   HENT:   Head: Normocephalic and atraumatic.   Eyes: EOM are normal. Pupils are equal, round, and reactive to light.   Neck: Neck supple.   Normal range of motion.  Cardiovascular:  Normal rate, regular rhythm and normal heart sounds.           Pulmonary/Chest: She is in respiratory distress. She has wheezes.   Musculoskeletal:         General: Edema present.      Cervical back: Normal range of motion and neck supple.      Comments: Generalized weakness     Neurological: She is alert and oriented to person, place, and time. GCS score is 15. GCS eye subscore is 4. GCS verbal subscore is 5. GCS motor subscore is 6.   Skin: Skin is warm and dry. Capillary refill takes less than 2 seconds.   Psychiatric: She has a normal mood and affect. Her behavior is normal. Thought content normal.         ED Course   Procedures  Labs Reviewed   CBC W/ AUTO DIFFERENTIAL - Abnormal; Notable for the following components:       Result Value    RBC 5.50 (*)     Hemoglobin 16.1 (*)     Hematocrit 52.9 (*)     MCHC 30.4 (*)     MPV 8.9 (*)     All other components within normal limits   COMPREHENSIVE METABOLIC PANEL - Abnormal; Notable for the following components:    CO2 35 (*)     Total Protein 8.5 (*)      Albumin 3.3 (*)     Alkaline Phosphatase 146 (*)     eGFR 48.1 (*)     Anion Gap -1 (*)     All other components within normal limits   TROPONIN I HIGH SENSITIVITY - Abnormal; Notable for the following components:    Troponin I High Sensitivity 200.8 (*)     All other components within normal limits   URINALYSIS, REFLEX TO URINE CULTURE - Abnormal; Notable for the following components:    Appearance, UA Cloudy (*)     Protein, UA 1+ (*)     Occult Blood UA 1+ (*)     Leukocytes, UA 3+ (*)     All other components within normal limits    Narrative:     Preferred Collection Type->Urine, Clean Catch  Specimen Source->Urine   NT-PRO NATRIURETIC PEPTIDE - Abnormal; Notable for the following components:    NT-proBNP 5085 (*)     All other components within normal limits   URINALYSIS MICROSCOPIC - Abnormal; Notable for the following components:    RBC, UA 10 (*)     WBC, UA >100 (*)     Hyaline Casts, UA 1.2 (*)     All other components within normal limits    Narrative:     Preferred Collection Type->Urine, Clean Catch  Specimen Source->Urine   CULTURE, URINE   TROPONIN I HIGH SENSITIVITY   SARS-COV-2 RDRP GENE    Narrative:     This test utilizes isothermal nucleic acid amplification technology to detect the SARS-CoV-2 RdRp nucleic acid segment. The analytical sensitivity (limit of detection) is 500 copies/swab.     A POSITIVE result is indicative of the presence of SARS-CoV-2 RNA; clinical correlation with patient history and other diagnostic information is necessary to determine patient infection status.    A NEGATIVE result means that SARS-CoV-2 nucleic acids are not present above the limit of detection. A NEGATIVE result should be treated as presumptive. It does not rule out the possibility of COVID-19 and should not be the sole basis for treatment decisions. If COVID-19 is strongly suspected based on clinical and exposure history, re-testing using an alternate molecular assay should be considered.     This test is  only for use under the Food and Drug Administration s Emergency Use Authorization (EUA).     Commercial kits are provided by [employer]. Performance characteristics of the EUA have been independently verified by [facility] Department of Pathology and Laboratory Medicine.   _________________________________________________________________   The authorized Fact Sheet for Healthcare Providers and the authorized Fact Sheet for Patients of the ID NOW COVID-19 are available on the FDA website:    https://www.fda.gov/media/599253/download      https://www.fda.gov/media/277163/download        EKG Readings: (Independently Interpreted)   Initial Reading: No STEMI. Rhythm: Normal Sinus Rhythm. Heart Rate: 96.       Imaging Results              X-Ray Chest AP Portable (Final result)  Result time 09/08/23 22:04:54      Final result by Abran Pelletier MD (09/08/23 22:04:54)                   Impression:      As above.      Electronically signed by: Abran Pelletier MD  Date:    09/08/2023  Time:    22:04               Narrative:    EXAMINATION:  XR CHEST AP PORTABLE    CLINICAL HISTORY:  Shortness of breath    COMPARISON:  04/22/2023    FINDINGS:  Cardiac silhouette appears enlarged, unchanged.  Subtle hazy opacities at the perihilar aspects of the lungs may represent mild edema.  No obvious pleural effusion.  No acute osseous finding.                                    X-Rays:   Independently Interpreted Readings:   Chest X-Ray: Cardiomegaly present.  Increased vascular markings consistent with CHF are present.     Medications   albuterol-ipratropium 2.5 mg-0.5 mg/3 mL nebulizer solution 3 mL (3 mLs Nebulization Given 9/8/23 1959)   aspirin tablet 325 mg (325 mg Oral Given 9/8/23 2057)   enoxaparin injection 120 mg (120 mg Subcutaneous Given 9/8/23 2057)   furosemide injection 40 mg (40 mg Intravenous Given 9/8/23 2233)   sulfamethoxazole-trimethoprim 800-160mg per tablet 1 tablet (1 tablet Oral Given 9/9/23 0008)      Medical Decision Making  Amount and/or Complexity of Data Reviewed  Labs: ordered. Decision-making details documented in ED Course.  Radiology: ordered.    Risk  OTC drugs.  Prescription drug management.              Attending Attestation:         Attending Critical Care:   Critical Care Times:   Direct Patient Care (initial evaluation, reassessments, and time considering the case)................................................................17 minutes.   Additional History from reviewing old medical records or taking additional history from the family, EMS, PCP, etc.......................5 minutes.   Ordering, Reviewing, and Interpreting Diagnostic Studies...............................................................................................................7 minutes.   Documentation..................................................................................................................................................................................8 minutes.   ==============================================================  Total Critical Care Time - exclusive of procedural time: 37 minutes.  ==============================================================  Critical care was necessary to treat or prevent imminent or life-threatening deterioration of the following conditions: myocardial infarction and congestive heart failure.   Critical care was time spent personally by me on the following activities: obtaining history from patient or relative, examination of patient, review of old charts, review of x-rays / CT sent with the patient, ordering lab, x-rays, and/or EKG, development of treatment plan with patient or relative, ordering and performing treatments and interventions, evaluation of patient's response to treatment, discussions with primary provider, re-evaluation of patient's conition, interpretation of cardiac measurements and discussion with consultants.       Attending ED Notes:    Attending Attestation of Advanced Practitioner/Medical Decision Making:     I personally provided a substantive portion of the patient care.  I had a face-to-face evaluation of the patient independently of the advanced practitioner and personally supervised the care of this patient.  I was directly involved with collection of the patient's history, physical exam and the medical decision making.  I personally reviewed the orders and interpreted the results.  I developed and agree with the care plan and management decisions for this patient in conjunction with the advanced practitioner.  I agree with the documentation in this record by the advanced practitioner.  My pertinent history, PE and MDM documented below.     Hx/PE/MDM: This is a 73-year-old black female with multiple medical problems including COPD on home O2, CHF, and hypertension who presents to the emergency department via EMS with complaints of worsening chronic shortness of breath.  Patient reports gradual worsening of chronic shortness of breath this morning and throughout the day associated with runny nose and worsening of chronic cough.  Physical exam notable for lower extremity edema with wheezing.  EKG is sinus rhythm at a rate of 96.  Troponin and BNP both elevated.  Concern for NSTEMI with concomitant CHF.  Decision made to transfer for cardiology evaluation             ED Course as of 09/09/23 0146   Fri Sep 08, 2023   2059 Spoke with  cardiology at [facility], Dr. Sampson, pt accepted for transfer [NA]   2156 Spoke with Dr. Chaparro at [facility] [NA]   2340 Leukocytes, UA(!): 3+ [NA]   2340 WBC, UA(!): >100 [NA]      ED Course User Index  [NA] Brittany Cole MD                    Clinical Impression:   Final diagnoses:  [R06.02] Shortness of breath  [I21.4] NSTEMI (non-ST elevated myocardial infarction) (Primary)  [R77.8] Elevated troponin  [R79.89] Elevated brain natriuretic peptide (BNP) level  [N30.90] Cystitis        ED Disposition  Condition    Transfer to Another Facility Stable                Brittany Cole MD  09/09/23 0146

## 2023-09-09 ENCOUNTER — HOSPITAL ENCOUNTER (INPATIENT)
Facility: HOSPITAL | Age: 73
LOS: 10 days | Discharge: HOME HEALTH | DRG: 291 | End: 2023-09-19
Attending: INTERNAL MEDICINE | Admitting: INTERNAL MEDICINE
Payer: MEDICARE

## 2023-09-09 VITALS
OXYGEN SATURATION: 97 % | TEMPERATURE: 100 F | DIASTOLIC BLOOD PRESSURE: 98 MMHG | HEIGHT: 64 IN | HEART RATE: 86 BPM | WEIGHT: 250 LBS | SYSTOLIC BLOOD PRESSURE: 170 MMHG | BODY MASS INDEX: 42.68 KG/M2 | RESPIRATION RATE: 22 BRPM

## 2023-09-09 DIAGNOSIS — I50.9 HEART FAILURE, UNSPECIFIED HF CHRONICITY, UNSPECIFIED HEART FAILURE TYPE: ICD-10-CM

## 2023-09-09 DIAGNOSIS — I50.9 HEART FAILURE: ICD-10-CM

## 2023-09-09 DIAGNOSIS — J96.21 ACUTE ON CHRONIC RESPIRATORY FAILURE WITH HYPOXIA: Primary | ICD-10-CM

## 2023-09-09 DIAGNOSIS — I50.9 CHF (CONGESTIVE HEART FAILURE): ICD-10-CM

## 2023-09-09 DIAGNOSIS — J96.21 ACUTE ON CHRONIC RESPIRATORY FAILURE WITH HYPOXIA AND HYPERCAPNIA: ICD-10-CM

## 2023-09-09 DIAGNOSIS — I50.9 CONGESTIVE HEART FAILURE, UNSPECIFIED HF CHRONICITY, UNSPECIFIED HEART FAILURE TYPE: ICD-10-CM

## 2023-09-09 DIAGNOSIS — R79.89 ELEVATED TROPONIN: ICD-10-CM

## 2023-09-09 DIAGNOSIS — J96.22 ACUTE ON CHRONIC RESPIRATORY FAILURE WITH HYPOXIA AND HYPERCAPNIA: ICD-10-CM

## 2023-09-09 PROBLEM — I10 HYPERTENSION: Status: ACTIVE | Noted: 2023-09-09

## 2023-09-09 PROBLEM — N30.00 ACUTE CYSTITIS WITHOUT HEMATURIA: Status: ACTIVE | Noted: 2023-09-09

## 2023-09-09 LAB
ALBUMIN SERPL BCP-MCNC: 3 G/DL (ref 3.5–5.2)
ALP SERPL-CCNC: 112 U/L (ref 55–135)
ALT SERPL W/O P-5'-P-CCNC: 14 U/L (ref 10–44)
ANION GAP SERPL CALC-SCNC: 11 MMOL/L (ref 8–16)
AORTIC ROOT ANNULUS: 3.18 CM
APTT PPP: 34.1 SEC (ref 21–32)
APTT PPP: 34.1 SEC (ref 21–32)
APTT PPP: 39.9 SEC (ref 21–32)
ASCENDING AORTA: 3.46 CM
AST SERPL-CCNC: 20 U/L (ref 10–40)
AV INDEX (PROSTH): 0.7
AV MEAN GRADIENT: 10 MMHG
AV PEAK GRADIENT: 17 MMHG
AV REGURGITATION PRESSURE HALF TIME: 850.54 MS
AV VALVE AREA BY VELOCITY RATIO: 1.93 CM²
AV VALVE AREA: 2.1 CM²
AV VELOCITY RATIO: 0.65
BASOPHILS # BLD AUTO: 0.02 K/UL (ref 0–0.2)
BASOPHILS # BLD AUTO: 0.02 K/UL (ref 0–0.2)
BASOPHILS NFR BLD: 0.3 % (ref 0–1.9)
BASOPHILS NFR BLD: 0.3 % (ref 0–1.9)
BILIRUB SERPL-MCNC: 0.4 MG/DL (ref 0.1–1)
BNP SERPL-MCNC: 1532 PG/ML (ref 0–99)
BSA FOR ECHO PROCEDURE: 2.36 M2
BUN SERPL-MCNC: 12 MG/DL (ref 8–23)
CALCIUM SERPL-MCNC: 10.4 MG/DL (ref 8.7–10.5)
CHLORIDE SERPL-SCNC: 103 MMOL/L (ref 95–110)
CHOLEST SERPL-MCNC: 160 MG/DL (ref 120–199)
CHOLEST/HDLC SERPL: 3.1 {RATIO} (ref 2–5)
CO2 SERPL-SCNC: 32 MMOL/L (ref 23–29)
CREAT SERPL-MCNC: 1 MG/DL (ref 0.5–1.4)
CV ECHO LV RWT: 0.54 CM
DIFFERENTIAL METHOD: ABNORMAL
DIFFERENTIAL METHOD: ABNORMAL
DOP CALC AO PEAK VEL: 2.09 M/S
DOP CALC AO VTI: 39.3 CM
DOP CALC LVOT AREA: 3 CM2
DOP CALC LVOT DIAMETER: 1.95 CM
DOP CALC LVOT PEAK VEL: 1.35 M/S
DOP CALC LVOT STROKE VOLUME: 82.38 CM3
DOP CALC RVOT PEAK VEL: 0.82 M/S
DOP CALC RVOT VTI: 18.1 CM
DOP CALCLVOT PEAK VEL VTI: 27.6 CM
E WAVE DECELERATION TIME: 232.88 MSEC
E/A RATIO: 1.03
E/E' RATIO: 8.86 M/S
ECHO LV POSTERIOR WALL: 1.35 CM (ref 0.6–1.1)
EOSINOPHIL # BLD AUTO: 0.1 K/UL (ref 0–0.5)
EOSINOPHIL # BLD AUTO: 0.1 K/UL (ref 0–0.5)
EOSINOPHIL NFR BLD: 1.6 % (ref 0–8)
EOSINOPHIL NFR BLD: 1.6 % (ref 0–8)
ERYTHROCYTE [DISTWIDTH] IN BLOOD BY AUTOMATED COUNT: 14.1 % (ref 11.5–14.5)
ERYTHROCYTE [DISTWIDTH] IN BLOOD BY AUTOMATED COUNT: 14.1 % (ref 11.5–14.5)
EST. GFR  (NO RACE VARIABLE): 60 ML/MIN/1.73 M^2
ESTIMATED AVG GLUCOSE: 105 MG/DL (ref 68–131)
ESTIMATED AVG GLUCOSE: 105 MG/DL (ref 68–131)
FRACTIONAL SHORTENING: 21 % (ref 28–44)
GLUCOSE SERPL-MCNC: 121 MG/DL (ref 70–110)
HBA1C MFR BLD: 5.3 % (ref 4–5.6)
HBA1C MFR BLD: 5.3 % (ref 4–5.6)
HCT VFR BLD AUTO: 47.6 % (ref 37–48.5)
HCT VFR BLD AUTO: 47.6 % (ref 37–48.5)
HDLC SERPL-MCNC: 52 MG/DL (ref 40–75)
HDLC SERPL: 32.5 % (ref 20–50)
HGB BLD-MCNC: 14.3 G/DL (ref 12–16)
HGB BLD-MCNC: 14.3 G/DL (ref 12–16)
IMM GRANULOCYTES # BLD AUTO: 0.01 K/UL (ref 0–0.04)
IMM GRANULOCYTES # BLD AUTO: 0.01 K/UL (ref 0–0.04)
IMM GRANULOCYTES NFR BLD AUTO: 0.2 % (ref 0–0.5)
IMM GRANULOCYTES NFR BLD AUTO: 0.2 % (ref 0–0.5)
INR PPP: 1 (ref 0.8–1.2)
INTERVENTRICULAR SEPTUM: 1.11 CM (ref 0.6–1.1)
IRON SERPL-MCNC: 40 UG/DL (ref 30–160)
IVC DIAMETER: 2.31 CM
IVRT: 137.01 MSEC
LA MAJOR: 5.78 CM
LA MINOR: 6.21 CM
LA WIDTH: 4 CM
LDLC SERPL CALC-MCNC: 96.2 MG/DL (ref 63–159)
LEFT ATRIUM SIZE: 3.64 CM
LEFT ATRIUM VOLUME INDEX: 33.2 ML/M2
LEFT ATRIUM VOLUME: 74.1 CM3
LEFT INTERNAL DIMENSION IN SYSTOLE: 3.96 CM (ref 2.1–4)
LEFT VENTRICLE DIASTOLIC VOLUME INDEX: 53.87 ML/M2
LEFT VENTRICLE DIASTOLIC VOLUME: 120.14 ML
LEFT VENTRICLE MASS INDEX: 110 G/M2
LEFT VENTRICLE SYSTOLIC VOLUME INDEX: 30.7 ML/M2
LEFT VENTRICLE SYSTOLIC VOLUME: 68.43 ML
LEFT VENTRICULAR INTERNAL DIMENSION IN DIASTOLE: 5.03 CM (ref 3.5–6)
LEFT VENTRICULAR MASS: 244.31 G
LV LATERAL E/E' RATIO: 7.15 M/S
LV SEPTAL E/E' RATIO: 11.63 M/S
LVOT MG: 3.55 MMHG
LVOT MV: 0.87 CM/S
LYMPHOCYTES # BLD AUTO: 1.3 K/UL (ref 1–4.8)
LYMPHOCYTES # BLD AUTO: 1.3 K/UL (ref 1–4.8)
LYMPHOCYTES NFR BLD: 19.9 % (ref 18–48)
LYMPHOCYTES NFR BLD: 19.9 % (ref 18–48)
MAGNESIUM SERPL-MCNC: 1.9 MG/DL (ref 1.6–2.6)
MCH RBC QN AUTO: 28.6 PG (ref 27–31)
MCH RBC QN AUTO: 28.6 PG (ref 27–31)
MCHC RBC AUTO-ENTMCNC: 30 G/DL (ref 32–36)
MCHC RBC AUTO-ENTMCNC: 30 G/DL (ref 32–36)
MCV RBC AUTO: 95 FL (ref 82–98)
MCV RBC AUTO: 95 FL (ref 82–98)
MONOCYTES # BLD AUTO: 0.7 K/UL (ref 0.3–1)
MONOCYTES # BLD AUTO: 0.7 K/UL (ref 0.3–1)
MONOCYTES NFR BLD: 11.5 % (ref 4–15)
MONOCYTES NFR BLD: 11.5 % (ref 4–15)
MV PEAK A VEL: 0.9 M/S
MV PEAK E VEL: 0.93 M/S
NEUTROPHILS # BLD AUTO: 4.3 K/UL (ref 1.8–7.7)
NEUTROPHILS # BLD AUTO: 4.3 K/UL (ref 1.8–7.7)
NEUTROPHILS NFR BLD: 66.5 % (ref 38–73)
NEUTROPHILS NFR BLD: 66.5 % (ref 38–73)
NONHDLC SERPL-MCNC: 108 MG/DL
NRBC BLD-RTO: 0 /100 WBC
NRBC BLD-RTO: 0 /100 WBC
PHOSPHATE SERPL-MCNC: 2.7 MG/DL (ref 2.7–4.5)
PISA AR MAX VEL: 4.57 M/S
PISA MRMAX VEL: 5.59 M/S
PISA TR MAX VEL: 3.3 M/S
PLATELET # BLD AUTO: 173 K/UL (ref 150–450)
PLATELET # BLD AUTO: 173 K/UL (ref 150–450)
PMV BLD AUTO: 9 FL (ref 9.2–12.9)
PMV BLD AUTO: 9 FL (ref 9.2–12.9)
POCT GLUCOSE: 114 MG/DL (ref 70–110)
POCT GLUCOSE: 119 MG/DL (ref 70–110)
POCT GLUCOSE: 119 MG/DL (ref 70–110)
POCT GLUCOSE: 155 MG/DL (ref 70–110)
POTASSIUM SERPL-SCNC: 4.4 MMOL/L (ref 3.5–5.1)
PROT SERPL-MCNC: 6.9 G/DL (ref 6–8.4)
PROTHROMBIN TIME: 11 SEC (ref 9–12.5)
PV MEAN GRADIENT: 2 MMHG
RA MAJOR: 4.94 CM
RA PRESSURE ESTIMATED: 3 MMHG
RA WIDTH: 3.2 CM
RBC # BLD AUTO: 5 M/UL (ref 4–5.4)
RBC # BLD AUTO: 5 M/UL (ref 4–5.4)
RV MID DIAMA: 5.15 CM
RV TB RVSP: 6 MMHG
SODIUM SERPL-SCNC: 146 MMOL/L (ref 136–145)
STJ: 3.59 CM
T4 FREE SERPL-MCNC: 1.09 NG/DL (ref 0.71–1.51)
TDI LATERAL: 0.13 M/S
TDI SEPTAL: 0.08 M/S
TDI: 0.11 M/S
TR MAX PG: 44 MMHG
TRICUSPID ANNULAR PLANE SYSTOLIC EXCURSION: 2.4 CM
TRIGL SERPL-MCNC: 59 MG/DL (ref 30–150)
TROPONIN I SERPL DL<=0.01 NG/ML-MCNC: 0.1 NG/ML (ref 0–0.03)
TROPONIN I SERPL DL<=0.01 NG/ML-MCNC: 0.14 NG/ML (ref 0–0.03)
TROPONIN I SERPL DL<=0.01 NG/ML-MCNC: 0.16 NG/ML (ref 0–0.03)
TSH SERPL DL<=0.005 MIU/L-ACNC: 1.22 UIU/ML (ref 0.4–4)
TV REST PULMONARY ARTERY PRESSURE: 47 MMHG
WBC # BLD AUTO: 6.42 K/UL (ref 3.9–12.7)
WBC # BLD AUTO: 6.42 K/UL (ref 3.9–12.7)
Z-SCORE OF LEFT VENTRICULAR DIMENSION IN END DIASTOLE: -4.49
Z-SCORE OF LEFT VENTRICULAR DIMENSION IN END SYSTOLE: -1.49

## 2023-09-09 PROCEDURE — 99900035 HC TECH TIME PER 15 MIN (STAT)

## 2023-09-09 PROCEDURE — 83540 ASSAY OF IRON: CPT | Performed by: NURSE PRACTITIONER

## 2023-09-09 PROCEDURE — 83880 ASSAY OF NATRIURETIC PEPTIDE: CPT | Performed by: NURSE PRACTITIONER

## 2023-09-09 PROCEDURE — 93010 ELECTROCARDIOGRAM REPORT: CPT | Mod: ,,, | Performed by: INTERNAL MEDICINE

## 2023-09-09 PROCEDURE — 25000242 PHARM REV CODE 250 ALT 637 W/ HCPCS: Performed by: INTERNAL MEDICINE

## 2023-09-09 PROCEDURE — 93010 EKG 12-LEAD: ICD-10-PCS | Mod: ,,, | Performed by: INTERNAL MEDICINE

## 2023-09-09 PROCEDURE — 94761 N-INVAS EAR/PLS OXIMETRY MLT: CPT

## 2023-09-09 PROCEDURE — 27000190 HC CPAP FULL FACE MASK W/VALVE

## 2023-09-09 PROCEDURE — 84443 ASSAY THYROID STIM HORMONE: CPT | Performed by: NURSE PRACTITIONER

## 2023-09-09 PROCEDURE — 80061 LIPID PANEL: CPT | Performed by: NURSE PRACTITIONER

## 2023-09-09 PROCEDURE — 84100 ASSAY OF PHOSPHORUS: CPT | Performed by: NURSE PRACTITIONER

## 2023-09-09 PROCEDURE — 85025 COMPLETE CBC W/AUTO DIFF WBC: CPT | Performed by: NURSE PRACTITIONER

## 2023-09-09 PROCEDURE — 83036 HEMOGLOBIN GLYCOSYLATED A1C: CPT | Performed by: NURSE PRACTITIONER

## 2023-09-09 PROCEDURE — 93005 ELECTROCARDIOGRAM TRACING: CPT

## 2023-09-09 PROCEDURE — 85610 PROTHROMBIN TIME: CPT | Performed by: NURSE PRACTITIONER

## 2023-09-09 PROCEDURE — 85730 THROMBOPLASTIN TIME PARTIAL: CPT | Performed by: NURSE PRACTITIONER

## 2023-09-09 PROCEDURE — 25000003 PHARM REV CODE 250: Performed by: STUDENT IN AN ORGANIZED HEALTH CARE EDUCATION/TRAINING PROGRAM

## 2023-09-09 PROCEDURE — 85730 THROMBOPLASTIN TIME PARTIAL: CPT | Mod: 91 | Performed by: HOSPITALIST

## 2023-09-09 PROCEDURE — 36415 COLL VENOUS BLD VENIPUNCTURE: CPT | Performed by: NURSE PRACTITIONER

## 2023-09-09 PROCEDURE — 84484 ASSAY OF TROPONIN QUANT: CPT | Performed by: NURSE PRACTITIONER

## 2023-09-09 PROCEDURE — 21400001 HC TELEMETRY ROOM

## 2023-09-09 PROCEDURE — 87040 BLOOD CULTURE FOR BACTERIA: CPT | Performed by: NURSE PRACTITIONER

## 2023-09-09 PROCEDURE — 94640 AIRWAY INHALATION TREATMENT: CPT

## 2023-09-09 PROCEDURE — 25000003 PHARM REV CODE 250: Performed by: NURSE PRACTITIONER

## 2023-09-09 PROCEDURE — 63600175 PHARM REV CODE 636 W HCPCS: Performed by: NURSE PRACTITIONER

## 2023-09-09 PROCEDURE — 84439 ASSAY OF FREE THYROXINE: CPT | Performed by: NURSE PRACTITIONER

## 2023-09-09 PROCEDURE — 94660 CPAP INITIATION&MGMT: CPT

## 2023-09-09 PROCEDURE — 99223 1ST HOSP IP/OBS HIGH 75: CPT | Mod: ,,, | Performed by: INTERNAL MEDICINE

## 2023-09-09 PROCEDURE — 25000242 PHARM REV CODE 250 ALT 637 W/ HCPCS: Performed by: NURSE PRACTITIONER

## 2023-09-09 PROCEDURE — 27100171 HC OXYGEN HIGH FLOW UP TO 24 HOURS

## 2023-09-09 PROCEDURE — 83735 ASSAY OF MAGNESIUM: CPT | Performed by: NURSE PRACTITIONER

## 2023-09-09 PROCEDURE — 76937 US GUIDE VASCULAR ACCESS: CPT

## 2023-09-09 PROCEDURE — 99223 PR INITIAL HOSPITAL CARE,LEVL III: ICD-10-PCS | Mod: ,,, | Performed by: INTERNAL MEDICINE

## 2023-09-09 PROCEDURE — 80053 COMPREHEN METABOLIC PANEL: CPT | Performed by: NURSE PRACTITIONER

## 2023-09-09 RX ORDER — ENOXAPARIN SODIUM 100 MG/ML
40 INJECTION SUBCUTANEOUS EVERY 24 HOURS
Status: DISCONTINUED | OUTPATIENT
Start: 2023-09-09 | End: 2023-09-09

## 2023-09-09 RX ORDER — ATORVASTATIN CALCIUM 40 MG/1
40 TABLET, FILM COATED ORAL DAILY
Status: DISCONTINUED | OUTPATIENT
Start: 2023-09-09 | End: 2023-09-19 | Stop reason: HOSPADM

## 2023-09-09 RX ORDER — CARVEDILOL 6.25 MG/1
6.25 TABLET ORAL 2 TIMES DAILY
Status: DISCONTINUED | OUTPATIENT
Start: 2023-09-09 | End: 2023-09-19 | Stop reason: HOSPADM

## 2023-09-09 RX ORDER — NIFEDIPINE 60 MG/1
60 TABLET, EXTENDED RELEASE ORAL DAILY
Status: DISCONTINUED | OUTPATIENT
Start: 2023-09-09 | End: 2023-09-09

## 2023-09-09 RX ORDER — LEVALBUTEROL INHALATION SOLUTION 0.63 MG/3ML
1.25 SOLUTION RESPIRATORY (INHALATION) EVERY 8 HOURS
Status: DISCONTINUED | OUTPATIENT
Start: 2023-09-09 | End: 2023-09-16

## 2023-09-09 RX ORDER — HEPARIN SODIUM,PORCINE/D5W 25000/250
0-40 INTRAVENOUS SOLUTION INTRAVENOUS CONTINUOUS
Status: DISCONTINUED | OUTPATIENT
Start: 2023-09-09 | End: 2023-09-12

## 2023-09-09 RX ORDER — IBUPROFEN 200 MG
24 TABLET ORAL
Status: DISCONTINUED | OUTPATIENT
Start: 2023-09-09 | End: 2023-09-19 | Stop reason: HOSPADM

## 2023-09-09 RX ORDER — FUROSEMIDE 10 MG/ML
40 INJECTION INTRAMUSCULAR; INTRAVENOUS
Status: DISCONTINUED | OUTPATIENT
Start: 2023-09-09 | End: 2023-09-13

## 2023-09-09 RX ORDER — HYDRALAZINE HYDROCHLORIDE 20 MG/ML
10 INJECTION INTRAMUSCULAR; INTRAVENOUS EVERY 4 HOURS PRN
Status: DISCONTINUED | OUTPATIENT
Start: 2023-09-09 | End: 2023-09-19 | Stop reason: HOSPADM

## 2023-09-09 RX ORDER — TALC
6 POWDER (GRAM) TOPICAL NIGHTLY PRN
Status: DISCONTINUED | OUTPATIENT
Start: 2023-09-09 | End: 2023-09-19 | Stop reason: HOSPADM

## 2023-09-09 RX ORDER — LEVOTHYROXINE SODIUM 50 UG/1
50 TABLET ORAL
Status: DISCONTINUED | OUTPATIENT
Start: 2023-09-09 | End: 2023-09-19 | Stop reason: HOSPADM

## 2023-09-09 RX ORDER — FUROSEMIDE 80 MG/1
80 TABLET ORAL DAILY PRN
Status: ON HOLD | COMMUNITY
End: 2023-10-04 | Stop reason: HOSPADM

## 2023-09-09 RX ORDER — BUDESONIDE 0.5 MG/2ML
0.5 INHALANT ORAL EVERY 12 HOURS
Status: DISCONTINUED | OUTPATIENT
Start: 2023-09-09 | End: 2023-09-19 | Stop reason: HOSPADM

## 2023-09-09 RX ORDER — PANTOPRAZOLE SODIUM 20 MG/1
40 TABLET, DELAYED RELEASE ORAL DAILY
COMMUNITY

## 2023-09-09 RX ORDER — GLUCAGON 1 MG
1 KIT INJECTION
Status: DISCONTINUED | OUTPATIENT
Start: 2023-09-09 | End: 2023-09-19 | Stop reason: HOSPADM

## 2023-09-09 RX ORDER — PANTOPRAZOLE SODIUM 40 MG/1
40 TABLET, DELAYED RELEASE ORAL DAILY
Status: DISCONTINUED | OUTPATIENT
Start: 2023-09-09 | End: 2023-09-19 | Stop reason: HOSPADM

## 2023-09-09 RX ORDER — POLYETHYLENE GLYCOL 3350 17 G/17G
17 POWDER, FOR SOLUTION ORAL DAILY
Status: DISCONTINUED | OUTPATIENT
Start: 2023-09-09 | End: 2023-09-19 | Stop reason: HOSPADM

## 2023-09-09 RX ORDER — ONDANSETRON 2 MG/ML
4 INJECTION INTRAMUSCULAR; INTRAVENOUS EVERY 8 HOURS PRN
Status: DISCONTINUED | OUTPATIENT
Start: 2023-09-09 | End: 2023-09-19 | Stop reason: HOSPADM

## 2023-09-09 RX ORDER — LEVALBUTEROL 1.25 MG/.5ML
1.25 SOLUTION, CONCENTRATE RESPIRATORY (INHALATION) EVERY 8 HOURS
Status: DISCONTINUED | OUTPATIENT
Start: 2023-09-09 | End: 2023-09-09

## 2023-09-09 RX ORDER — SERTRALINE HYDROCHLORIDE 50 MG/1
50 TABLET, FILM COATED ORAL DAILY
Status: DISCONTINUED | OUTPATIENT
Start: 2023-09-09 | End: 2023-09-19 | Stop reason: HOSPADM

## 2023-09-09 RX ORDER — SODIUM CHLORIDE 0.9 % (FLUSH) 0.9 %
10 SYRINGE (ML) INJECTION
Status: DISCONTINUED | OUTPATIENT
Start: 2023-09-09 | End: 2023-09-19 | Stop reason: HOSPADM

## 2023-09-09 RX ORDER — ARFORMOTEROL TARTRATE 15 UG/2ML
15 SOLUTION RESPIRATORY (INHALATION) 2 TIMES DAILY
Status: DISCONTINUED | OUTPATIENT
Start: 2023-09-09 | End: 2023-09-19 | Stop reason: HOSPADM

## 2023-09-09 RX ORDER — LEVOTHYROXINE SODIUM 50 UG/1
CAPSULE ORAL DAILY
Status: ON HOLD | COMMUNITY
End: 2023-10-04 | Stop reason: HOSPADM

## 2023-09-09 RX ORDER — GABAPENTIN 600 MG/1
600 TABLET ORAL 2 TIMES DAILY
Status: ON HOLD | COMMUNITY
End: 2023-10-04 | Stop reason: HOSPADM

## 2023-09-09 RX ORDER — AMOXICILLIN 250 MG
1 CAPSULE ORAL 2 TIMES DAILY
Status: DISCONTINUED | OUTPATIENT
Start: 2023-09-09 | End: 2023-09-19 | Stop reason: HOSPADM

## 2023-09-09 RX ORDER — INSULIN ASPART 100 [IU]/ML
0-5 INJECTION, SOLUTION INTRAVENOUS; SUBCUTANEOUS
Status: DISCONTINUED | OUTPATIENT
Start: 2023-09-09 | End: 2023-09-19 | Stop reason: HOSPADM

## 2023-09-09 RX ORDER — BUSPIRONE HYDROCHLORIDE 5 MG/1
5 TABLET ORAL 3 TIMES DAILY
Status: DISCONTINUED | OUTPATIENT
Start: 2023-09-09 | End: 2023-09-09

## 2023-09-09 RX ORDER — SPIRONOLACTONE 25 MG/1
25 TABLET ORAL DAILY
Status: DISCONTINUED | OUTPATIENT
Start: 2023-09-09 | End: 2023-09-09

## 2023-09-09 RX ORDER — IBUPROFEN 200 MG
16 TABLET ORAL
Status: DISCONTINUED | OUTPATIENT
Start: 2023-09-09 | End: 2023-09-19 | Stop reason: HOSPADM

## 2023-09-09 RX ADMIN — FUROSEMIDE 40 MG: 10 INJECTION, SOLUTION INTRAMUSCULAR; INTRAVENOUS at 06:09

## 2023-09-09 RX ADMIN — CEFTRIAXONE 1 G: 1 INJECTION, POWDER, FOR SOLUTION INTRAMUSCULAR; INTRAVENOUS at 04:09

## 2023-09-09 RX ADMIN — SACUBITRIL AND VALSARTAN 1 TABLET: 24; 26 TABLET, FILM COATED ORAL at 06:09

## 2023-09-09 RX ADMIN — ARFORMOTEROL TARTRATE 15 MCG: 15 SOLUTION RESPIRATORY (INHALATION) at 07:09

## 2023-09-09 RX ADMIN — ATORVASTATIN CALCIUM 40 MG: 40 TABLET, FILM COATED ORAL at 09:09

## 2023-09-09 RX ADMIN — CARVEDILOL 6.25 MG: 6.25 TABLET, FILM COATED ORAL at 06:09

## 2023-09-09 RX ADMIN — LEVALBUTEROL HYDROCHLORIDE 1.25 MG: 0.63 SOLUTION RESPIRATORY (INHALATION) at 11:09

## 2023-09-09 RX ADMIN — LEVALBUTEROL HYDROCHLORIDE 1.25 MG: 0.63 SOLUTION RESPIRATORY (INHALATION) at 03:09

## 2023-09-09 RX ADMIN — HEPARIN SODIUM 12 UNITS/KG/HR: 10000 INJECTION, SOLUTION INTRAVENOUS at 12:09

## 2023-09-09 RX ADMIN — POLYETHYLENE GLYCOL 3350 17 G: 17 POWDER, FOR SOLUTION ORAL at 09:09

## 2023-09-09 RX ADMIN — BUDESONIDE 0.5 MG: 0.5 INHALANT ORAL at 07:09

## 2023-09-09 RX ADMIN — SENNOSIDES AND DOCUSATE SODIUM 1 TABLET: 50; 8.6 TABLET ORAL at 09:09

## 2023-09-09 RX ADMIN — NIFEDIPINE 60 MG: 60 TABLET, FILM COATED, EXTENDED RELEASE ORAL at 06:09

## 2023-09-09 RX ADMIN — LEVOTHYROXINE SODIUM 50 MCG: 50 TABLET ORAL at 09:09

## 2023-09-09 RX ADMIN — SERTRALINE HYDROCHLORIDE 50 MG: 50 TABLET ORAL at 09:09

## 2023-09-09 RX ADMIN — CARVEDILOL 6.25 MG: 6.25 TABLET, FILM COATED ORAL at 09:09

## 2023-09-09 RX ADMIN — PANTOPRAZOLE SODIUM 40 MG: 40 TABLET, DELAYED RELEASE ORAL at 09:09

## 2023-09-09 RX ADMIN — FUROSEMIDE 40 MG: 10 INJECTION, SOLUTION INTRAMUSCULAR; INTRAVENOUS at 04:09

## 2023-09-09 RX ADMIN — SULFAMETHOXAZOLE AND TRIMETHOPRIM 1 TABLET: 800; 160 TABLET ORAL at 12:09

## 2023-09-09 RX ADMIN — LEVALBUTEROL HYDROCHLORIDE 1.25 MG: 0.63 SOLUTION RESPIRATORY (INHALATION) at 07:09

## 2023-09-09 RX ADMIN — METHYLPREDNISOLONE SODIUM SUCCINATE 40 MG: 40 INJECTION, POWDER, FOR SOLUTION INTRAMUSCULAR; INTRAVENOUS at 02:09

## 2023-09-09 NOTE — HPI
The patient is a 73 year old female with DM, COPD on home oxygen 4 liters, Diastolic CHF, HTN, hx PE who presented to [facility] ED with Generalized weakness, Fatigue, SOB, and Dysuria - onset 2 days ago that progressively worsened. Pt also reports BLE edema that worsened over the past week.      In the ED, CXR showed Subtle hazy opacities at the perihilar aspects of the lungs may represent mild edema. Elevated troponin of 200, elevated BNP 5085.  Labs otherwise unremarkable. COVID negative. Patient denied any chest pain, EKG non-ischemia. Patient given Aspirin 325 mg, Lovenox 1mg/kg, and Lasix 40 IV x1 as well as a breathing treatment. Po Bactrim for UTI     The patient was transferred to [facility] for admission under hospital medicine and for cardiology consultation

## 2023-09-09 NOTE — H&P
[facility] - Telemetry [facility]  History & Physical    Patient Name: Carmen Tan  MRN: [id number]  Patient Class: IP- Inpatient  Admission Date: 9/9/2023  Attending Physician:Dr. Laughlin  Primary Care Provider: Lucian Barry MD         Patient information was obtained from patient.     Subjective:     Principal Problem:Acute on chronic respiratory failure with hypoxia    Chief Complaint: CHF, Elevated troponin, UTI    HPI: The patient is a 73 year old female with DM, COPD on home oxygen 4 liters, Diastolic CHF, HTN, hx PE who presented to [facility] ED with Generalized weakness, Fatigue, SOB, and Dysuria - onset 2 days ago that progressively worsened. Pt also reports BLE edema that worsened over the past week.     In the ED, CXR showed Subtle hazy opacities at the perihilar aspects of the lungs may represent mild edema. Elevated troponin of 200, elevated BNP 5085.  Labs otherwise unremarkable. COVID negative. Patient denied any chest pain, EKG non-ischemia. Patient given Aspirin 325 mg, Lovenox 1mg/kg, and Lasix 40 IV x1 as well as a breathing treatment. Po Bactrim for UTI    The patient was transferred to [facility] for admission under hospital medicine and for cardiology consultation       Past Medical History:   Diagnosis Date    Abdominal hernia     Bacteremia 4/5/2023    CHF (congestive heart failure)     COPD (chronic obstructive pulmonary disease)     Diabetes mellitus, type 2 2/16/2022    GERD (gastroesophageal reflux disease)     History of home oxygen therapy     Hypertension     Hypertensive emergency 3/30/2023    Migraine headache     Pulmonary embolism 06/01/2017    Small bowel obstruction     Thyroid disease        Past Surgical History:   Procedure Laterality Date    COLONOSCOPY      HYSTERECTOMY      INNER EAR SURGERY      UPPER GASTROINTESTINAL ENDOSCOPY         Review of patient's allergies indicates:   Allergen Reactions    Pcn [penicillins] Swelling       Current  Facility-Administered Medications on File Prior to Encounter   Medication    [COMPLETED] albuterol-ipratropium 2.5 mg-0.5 mg/3 mL nebulizer solution 3 mL    [COMPLETED] aspirin tablet 325 mg    [COMPLETED] enoxaparin injection 120 mg    [COMPLETED] furosemide injection 40 mg    [COMPLETED] sulfamethoxazole-trimethoprim 800-160mg per tablet 1 tablet     Current Outpatient Medications on File Prior to Encounter   Medication Sig    albuterol-ipratropium (DUO-NEB) 2.5 mg-0.5 mg/3 mL nebulizer solution Take 3 mLs by nebulization every 6 (six) hours while awake. Rescue    budesonide (PULMICORT) 0.25 mg/2 mL nebulizer solution Take 2 mLs (0.25 mg total) by nebulization every 12 (twelve) hours. Controller    busPIRone (BUSPAR) 5 MG Tab Take 1 tablet (5 mg total) by mouth 3 (three) times daily.    carvediloL (COREG) 6.25 MG tablet Take 1 tablet (6.25 mg total) by mouth 2 (two) times daily.    furosemide (LASIX) 40 MG tablet Take 1 tablet (40 mg total) by mouth once daily.    insulin detemir U-100, Levemir, 100 unit/mL (3 mL) SubQ InPn pen Inject 15 Units into the skin once daily.    ketotifen (ZADITOR) 0.025 % (0.035 %) ophthalmic solution Place 1 drop into both eyes 2 (two) times daily. for 14 days    LIPITOR 40 mg tablet Take 1 tablet (40 mg total) by mouth once daily.    NIFEdipine (PROCARDIA-XL) 60 MG (OSM) 24 hr tablet Take 1 tablet (60 mg total) by mouth once daily.    sacubitriL-valsartan (ENTRESTO) 24-26 mg per tablet Take 1 tablet by mouth 2 (two) times daily.    sertraline (ZOLOFT) 50 MG tablet Take 1 tablet (50 mg total) by mouth once daily.    spironolactone (ALDACTONE) 25 MG tablet Take 1 tablet (25 mg total) by mouth once daily.    vitamin D 1000 units Tab Take 185 mg by mouth once daily.     Family History    None       Tobacco Use    Smoking status: Former    Smokeless tobacco: Never   Substance and Sexual Activity    Alcohol use: No    Drug use: No    Sexual activity: Not on file     Review of Systems    Constitutional:  Positive for activity change, fatigue and unexpected weight change. Negative for appetite change, chills, diaphoresis and fever.   HENT:  Negative for congestion, nosebleeds, sinus pressure and sore throat.    Eyes:  Negative for pain, discharge and visual disturbance.   Respiratory:  Positive for shortness of breath and wheezing. Negative for cough, chest tightness and stridor.    Cardiovascular:  Positive for leg swelling. Negative for chest pain and palpitations.   Gastrointestinal:  Negative for abdominal distention, abdominal pain, blood in stool, constipation, diarrhea, nausea and vomiting.   Endocrine: Negative for cold intolerance and heat intolerance.   Genitourinary:  Positive for dysuria and flank pain (left). Negative for difficulty urinating, frequency and urgency.   Musculoskeletal:  Negative for arthralgias, back pain, joint swelling, myalgias, neck pain and neck stiffness.   Skin:  Negative for rash and wound.   Allergic/Immunologic: Negative for food allergies and immunocompromised state.   Neurological:  Positive for weakness (generalized). Negative for dizziness, seizures, syncope, facial asymmetry, speech difficulty, light-headedness, numbness and headaches.   Hematological:  Negative for adenopathy.   Psychiatric/Behavioral:  Negative for agitation, confusion and hallucinations.      Objective:     Vital Signs (Most Recent):  Temp: 98.7 °F (37.1 °C) (09/09/23 0246)  Pulse: 86 (09/09/23 0246)  Resp: (!) 21 (09/09/23 0246)  BP: (!) 186/74 (09/09/23 0250)  SpO2: (!) 93 % (09/09/23 0246) Vital Signs (24h Range):  Temp:  [98.7 °F (37.1 °C)-99.5 °F (37.5 °C)] 98.7 °F (37.1 °C)  Pulse:  [] 86  Resp:  [20-24] 21  SpO2:  [93 %-98 %] 93 %  BP: (170-201)/() 186/74        There is no height or weight on file to calculate BMI.     Physical Exam  Vitals and nursing note reviewed.   Constitutional:       General: She is not in acute distress.     Appearance: She is  well-developed. She is not diaphoretic.   HENT:      Head: Normocephalic and atraumatic.      Nose: Nose normal.   Eyes:      General: No scleral icterus.     Conjunctiva/sclera: Conjunctivae normal.   Neck:      Vascular: JVD present.      Trachea: No tracheal deviation.   Cardiovascular:      Rate and Rhythm: Normal rate and regular rhythm.      Heart sounds: Normal heart sounds. No murmur heard.     No friction rub. No gallop.   Pulmonary:      Effort: Accessory muscle usage present. No respiratory distress.      Breath sounds: No stridor. Wheezing and rales present.      Comments: +conversational dyspnea   Chest:      Chest wall: No tenderness.   Abdominal:      General: Bowel sounds are normal. There is no distension.      Palpations: Abdomen is soft. There is no mass.      Tenderness: There is no abdominal tenderness. There is no guarding or rebound.   Musculoskeletal:         General: No tenderness or deformity. Normal range of motion.      Cervical back: Normal range of motion and neck supple.      Right lower leg: Edema (+3-4) present.      Left lower leg: Edema (+3-4) present.   Skin:     General: Skin is warm and dry.      Coloration: Skin is not pale.      Findings: No erythema or rash.   Neurological:      Mental Status: She is alert and oriented to person, place, and time.      Cranial Nerves: No cranial nerve deficit.      Motor: No abnormal muscle tone.      Coordination: Coordination normal.   Psychiatric:         Behavior: Behavior normal.         Thought Content: Thought content normal.                Significant Labs: All pertinent labs within the past 24 hours have been reviewed.    Significant Imaging: I have reviewed all pertinent imaging results/findings within the past 24 hours.    Assessment/Plan:     * Acute on chronic respiratory failure with hypoxia  Patient with Hypoxic Respiratory failure which is Acute on chronic.  she is on home oxygen at 4 LPM. Supplemental oxygen was provided and  noted-      .   Signs/symptoms of respiratory failure include- increased work of breathing. Contributing diagnoses includes - CHF and COPD Labs and images were reviewed. Patient Has not had a recent ABG. Will treat underlying causes and adjust management of respiratory failure     Acute on chronic congestive heart failure  Patient is identified as having Diastolic (HFpEF) heart failure that is Acute on chronic. CHF is currently uncontrolled due to Rales/crackles on pulmonary exam and Pulmonary edema/pleural effusion on CXR. Latest ECHO performed and demonstrates- Results for orders placed during the hospital encounter of 03/29/23    Echo    Interpretation Summary  · The left ventricle is normal in size with moderate concentric hypertrophy and mildly decreased systolic function.  · The estimated ejection fraction is 45%.  · Grade I left ventricular diastolic dysfunction.  · Moderate right ventricular enlargement.  · Moderate right atrial enlargement.  · Mild-to-moderate aortic regurgitation.  · There is mild aortic valve stenosis.  · Aortic valve area is 1.44 cm2; peak velocity is 2.09 m/s; mean gradient is 10 mmHg.  · Mild-to-moderate mitral regurgitation.  · Moderate tricuspid regurgitation.  · Moderate pulmonic regurgitation.  · The estimated PA systolic pressure is 69 mmHg.  · There is moderate pulmonary hypertension.  · Moderate left atrial enlargement.  . Continue Beta Blocker, Furosemide, Aldactone and ARNI and monitor clinical status closely. Monitor on telemetry. Patient is on CHF pathway.  Monitor strict Is&Os and daily weights.  Place on fluid restriction of 1.5 L. Cardiology has been consulted. Continue to stress to patient importance of self efficacy and  on diet for CHF.  IV Lasix  Cont Spironolactone and Entresto  Echo     COPD exacerbation  IV Steroids  LABA. CITLALY. ICS neb txs      Acute cystitis without hematuria  IV Rocephin   Blood and urine culture        Elevated troponin  Serial troponin  "  Cardiology recommended Lovenox 1mg/kg to ED provider   Will start IV Heparin  cont ASA, BB, Statin, Entresto   Echo   Consult cardiology       Hypertension  BP elevated  Cont home meds Coreg, Entresto, Nifedipine   IV Lasix and Aldactone  Monitor     Diabetes mellitus, type 2  Patient's FSGs are controlled on current medication regimen.  Last A1c reviewed-   Lab Results   Component Value Date    HGBA1C 5.5 03/29/2023     Most recent fingerstick glucose reviewed- No results for input(s): "POCTGLUCOSE" in the last 24 hours.  Current correctional scale  Low  Maintain anti-hyperglycemic dose as follows-   Antihyperglycemics (From admission, onward)      Start     Stop Route Frequency Ordered    09/09/23 0545  insulin aspart U-100 pen 0-5 Units         -- SubQ Before meals & nightly PRN 09/09/23 0447          Hold Oral hypoglycemics while patient is in the hospital.    Severe obesity (BMI >= 40)  There is no height or weight on file to calculate BMI. Morbid obesity complicates all aspects of disease management from diagnostic modalities to treatment. Weight loss encouraged and health benefits explained to patient.           VTE Risk Mitigation (From admission, onward)           Ordered     heparin 25,000 units in dextrose 5% (100 units/ml) IV bolus from bag - ADDITIONAL PRN BOLUS - 60 units/kg  As needed (PRN)        Question:  Heparin Infusion Adjustment (DO NOT MODIFY ANSWER)  Answer:  [email or web address]\epic\Images\Pharmacy\HeparinInfusions\heparin LOW INTENSITY nomogram for [facility] [id number]    09/09/23 0547     heparin 25,000 units in dextrose 5% (100 units/ml) IV bolus from bag - ADDITIONAL PRN BOLUS - 30 units/kg  As needed (PRN)        Question:  Heparin Infusion Adjustment (DO NOT MODIFY ANSWER)  Answer:  [email or web address]\epic\Images\Pharmacy\HeparinInfusions\heparin LOW INTENSITY nomogram for [facility] [id number]    09/09/23 0547     heparin 25,000 units in dextrose 5% (100 units/ml) IV bolus from bag INITIAL BOLUS  Once      "   Question:  Heparin Infusion Adjustment (DO NOT MODIFY ANSWER)  Answer:  [email or web address]\epic\Images\Pharmacy\HeparinInfusions\heparin LOW INTENSITY nomogram for [facility] [id number].pdf    09/09/23 0547     heparin 25,000 units in dextrose 5% 250 mL (100 units/mL) infusion LOW INTENSITY nomogram - [facility]  Continuous        Question Answer Comment   Heparin Infusion Adjustment (DO NOT MODIFY ANSWER) [email or web address]\epic\Images\Pharmacy\HeparinInfusions\heparin LOW INTENSITY nomogram for [facility] [id number]    Begin at (in units/kg/hr) 12        09/09/23 0547     IP VTE HIGH RISK PATIENT  Once         09/09/23 0447     Place sequential compression device  Until discontinued         09/09/23 0447                               Gracie Garrison NP  Department of Hospital Medicine  [facility] - Telemetry ([facility])

## 2023-09-09 NOTE — ED NOTES
Unable to collect repeat troponin sensitivity after multiple attempts MD notified, MD approved to hold off on redraw due to limited access.

## 2023-09-09 NOTE — SUBJECTIVE & OBJECTIVE
Past Medical History:   Diagnosis Date    Abdominal hernia     Bacteremia 4/5/2023    CHF (congestive heart failure)     COPD (chronic obstructive pulmonary disease)     Diabetes mellitus, type 2 2/16/2022    GERD (gastroesophageal reflux disease)     History of home oxygen therapy     Hypertension     Hypertensive emergency 3/30/2023    Migraine headache     Pulmonary embolism 06/01/2017    Small bowel obstruction     Thyroid disease        Past Surgical History:   Procedure Laterality Date    COLONOSCOPY      HYSTERECTOMY      INNER EAR SURGERY      UPPER GASTROINTESTINAL ENDOSCOPY         Review of patient's allergies indicates:   Allergen Reactions    Pcn [penicillins] Swelling       Current Facility-Administered Medications on File Prior to Encounter   Medication    [COMPLETED] albuterol-ipratropium 2.5 mg-0.5 mg/3 mL nebulizer solution 3 mL    [COMPLETED] aspirin tablet 325 mg    [COMPLETED] enoxaparin injection 120 mg    [COMPLETED] furosemide injection 40 mg    [COMPLETED] sulfamethoxazole-trimethoprim 800-160mg per tablet 1 tablet     Current Outpatient Medications on File Prior to Encounter   Medication Sig    albuterol-ipratropium (DUO-NEB) 2.5 mg-0.5 mg/3 mL nebulizer solution Take 3 mLs by nebulization every 6 (six) hours while awake. Rescue    budesonide (PULMICORT) 0.25 mg/2 mL nebulizer solution Take 2 mLs (0.25 mg total) by nebulization every 12 (twelve) hours. Controller    busPIRone (BUSPAR) 5 MG Tab Take 1 tablet (5 mg total) by mouth 3 (three) times daily.    carvediloL (COREG) 6.25 MG tablet Take 1 tablet (6.25 mg total) by mouth 2 (two) times daily.    furosemide (LASIX) 40 MG tablet Take 1 tablet (40 mg total) by mouth once daily.    insulin detemir U-100, Levemir, 100 unit/mL (3 mL) SubQ InPn pen Inject 15 Units into the skin once daily.    ketotifen (ZADITOR) 0.025 % (0.035 %) ophthalmic solution Place 1 drop into both eyes 2 (two) times daily. for 14 days    LIPITOR 40 mg tablet Take 1  tablet (40 mg total) by mouth once daily.    NIFEdipine (PROCARDIA-XL) 60 MG (OSM) 24 hr tablet Take 1 tablet (60 mg total) by mouth once daily.    sacubitriL-valsartan (ENTRESTO) 24-26 mg per tablet Take 1 tablet by mouth 2 (two) times daily.    sertraline (ZOLOFT) 50 MG tablet Take 1 tablet (50 mg total) by mouth once daily.    spironolactone (ALDACTONE) 25 MG tablet Take 1 tablet (25 mg total) by mouth once daily.    vitamin D 1000 units Tab Take 185 mg by mouth once daily.     Family History    None       Tobacco Use    Smoking status: Former    Smokeless tobacco: Never   Substance and Sexual Activity    Alcohol use: No    Drug use: No    Sexual activity: Not on file     Review of Systems   Constitutional:  Positive for activity change, fatigue and unexpected weight change. Negative for appetite change, chills, diaphoresis and fever.   HENT:  Negative for congestion, nosebleeds, sinus pressure and sore throat.    Eyes:  Negative for pain, discharge and visual disturbance.   Respiratory:  Positive for shortness of breath and wheezing. Negative for cough, chest tightness and stridor.    Cardiovascular:  Positive for leg swelling. Negative for chest pain and palpitations.   Gastrointestinal:  Negative for abdominal distention, abdominal pain, blood in stool, constipation, diarrhea, nausea and vomiting.   Endocrine: Negative for cold intolerance and heat intolerance.   Genitourinary:  Positive for dysuria and flank pain (left). Negative for difficulty urinating, frequency and urgency.   Musculoskeletal:  Negative for arthralgias, back pain, joint swelling, myalgias, neck pain and neck stiffness.   Skin:  Negative for rash and wound.   Allergic/Immunologic: Negative for food allergies and immunocompromised state.   Neurological:  Positive for weakness (generalized). Negative for dizziness, seizures, syncope, facial asymmetry, speech difficulty, light-headedness, numbness and headaches.   Hematological:  Negative for  adenopathy.   Psychiatric/Behavioral:  Negative for agitation, confusion and hallucinations.      Objective:     Vital Signs (Most Recent):  Temp: 98.7 °F (37.1 °C) (09/09/23 0246)  Pulse: 86 (09/09/23 0246)  Resp: (!) 21 (09/09/23 0246)  BP: (!) 186/74 (09/09/23 0250)  SpO2: (!) 93 % (09/09/23 0246) Vital Signs (24h Range):  Temp:  [98.7 °F (37.1 °C)-99.5 °F (37.5 °C)] 98.7 °F (37.1 °C)  Pulse:  [] 86  Resp:  [20-24] 21  SpO2:  [93 %-98 %] 93 %  BP: (170-201)/() 186/74        There is no height or weight on file to calculate BMI.     Physical Exam  Vitals and nursing note reviewed.   Constitutional:       General: She is not in acute distress.     Appearance: She is well-developed. She is not diaphoretic.   HENT:      Head: Normocephalic and atraumatic.      Nose: Nose normal.   Eyes:      General: No scleral icterus.     Conjunctiva/sclera: Conjunctivae normal.   Neck:      Vascular: JVD present.      Trachea: No tracheal deviation.   Cardiovascular:      Rate and Rhythm: Normal rate and regular rhythm.      Heart sounds: Normal heart sounds. No murmur heard.     No friction rub. No gallop.   Pulmonary:      Effort: Accessory muscle usage present. No respiratory distress.      Breath sounds: No stridor. Wheezing and rales present.      Comments: +conversational dyspnea   Chest:      Chest wall: No tenderness.   Abdominal:      General: Bowel sounds are normal. There is no distension.      Palpations: Abdomen is soft. There is no mass.      Tenderness: There is no abdominal tenderness. There is no guarding or rebound.   Musculoskeletal:         General: No tenderness or deformity. Normal range of motion.      Cervical back: Normal range of motion and neck supple.      Right lower leg: Edema (+3-4) present.      Left lower leg: Edema (+3-4) present.   Skin:     General: Skin is warm and dry.      Coloration: Skin is not pale.      Findings: No erythema or rash.   Neurological:      Mental Status: She is  alert and oriented to person, place, and time.      Cranial Nerves: No cranial nerve deficit.      Motor: No abnormal muscle tone.      Coordination: Coordination normal.   Psychiatric:         Behavior: Behavior normal.         Thought Content: Thought content normal.                Significant Labs: All pertinent labs within the past 24 hours have been reviewed.    Significant Imaging: I have reviewed all pertinent imaging results/findings within the past 24 hours.

## 2023-09-09 NOTE — SUBJECTIVE & OBJECTIVE
Past Medical History:   Diagnosis Date    Abdominal hernia     Bacteremia 4/5/2023    CHF (congestive heart failure)     COPD (chronic obstructive pulmonary disease)     Diabetes mellitus, type 2 2/16/2022    GERD (gastroesophageal reflux disease)     History of home oxygen therapy     Hypertension     Hypertensive emergency 3/30/2023    Migraine headache     Pulmonary embolism 06/01/2017    Small bowel obstruction     Thyroid disease        Past Surgical History:   Procedure Laterality Date    COLONOSCOPY      HYSTERECTOMY      INNER EAR SURGERY      UPPER GASTROINTESTINAL ENDOSCOPY         Review of patient's allergies indicates:   Allergen Reactions    Pcn [penicillins] Swelling       Current Facility-Administered Medications on File Prior to Encounter   Medication    [COMPLETED] albuterol-ipratropium 2.5 mg-0.5 mg/3 mL nebulizer solution 3 mL    [COMPLETED] aspirin tablet 325 mg    [COMPLETED] enoxaparin injection 120 mg    [COMPLETED] furosemide injection 40 mg    [COMPLETED] sulfamethoxazole-trimethoprim 800-160mg per tablet 1 tablet     Current Outpatient Medications on File Prior to Encounter   Medication Sig    carvediloL (COREG) 6.25 MG tablet Take 1 tablet (6.25 mg total) by mouth 2 (two) times daily. (Patient taking differently: Take 12.5 mg by mouth 2 (two) times daily.)    furosemide (LASIX) 80 MG tablet Take 80 mg by mouth daily as needed.    gabapentin (NEURONTIN) 600 MG tablet Take 600 mg by mouth 2 (two) times daily.    levothyroxine (TIROSINT) 50 mcg Cap Take by mouth once daily.    linaCLOtide (LINZESS) 145 mcg Cap capsule Take 145 mcg by mouth before breakfast.    LIPITOR 40 mg tablet Take 1 tablet (40 mg total) by mouth once daily.    pantoprazole (PROTONIX) 20 MG tablet Take 40 mg by mouth once daily.    albuterol-ipratropium (DUO-NEB) 2.5 mg-0.5 mg/3 mL nebulizer solution Take 3 mLs by nebulization every 6 (six) hours while awake. Rescue    budesonide (PULMICORT) 0.25 mg/2 mL nebulizer  solution Take 2 mLs (0.25 mg total) by nebulization every 12 (twelve) hours. Controller    furosemide (LASIX) 40 MG tablet Take 1 tablet (40 mg total) by mouth once daily.    insulin detemir U-100, Levemir, 100 unit/mL (3 mL) SubQ InPn pen Inject 15 Units into the skin once daily.    ketotifen (ZADITOR) 0.025 % (0.035 %) ophthalmic solution Place 1 drop into both eyes 2 (two) times daily. for 14 days    vitamin D 1000 units Tab Take 185 mg by mouth once daily.    [DISCONTINUED] busPIRone (BUSPAR) 5 MG Tab Take 1 tablet (5 mg total) by mouth 3 (three) times daily.    [DISCONTINUED] NIFEdipine (PROCARDIA-XL) 60 MG (OSM) 24 hr tablet Take 1 tablet (60 mg total) by mouth once daily.    [DISCONTINUED] sacubitriL-valsartan (ENTRESTO) 24-26 mg per tablet Take 1 tablet by mouth 2 (two) times daily.    [DISCONTINUED] sertraline (ZOLOFT) 50 MG tablet Take 1 tablet (50 mg total) by mouth once daily.    [DISCONTINUED] spironolactone (ALDACTONE) 25 MG tablet Take 1 tablet (25 mg total) by mouth once daily.     Family History    None       Tobacco Use    Smoking status: Former    Smokeless tobacco: Never   Substance and Sexual Activity    Alcohol use: No    Drug use: No    Sexual activity: Not on file     Review of Systems   Constitutional: Positive for malaise/fatigue. Negative for chills, diaphoresis, night sweats, weight gain and weight loss.   HENT:  Negative for congestion, hoarse voice, sore throat and stridor.    Eyes:  Negative for double vision and pain.   Cardiovascular:  Positive for dyspnea on exertion and leg swelling. Negative for chest pain, claudication, cyanosis, irregular heartbeat, near-syncope, orthopnea, palpitations, paroxysmal nocturnal dyspnea and syncope.   Respiratory:  Negative for cough, hemoptysis, shortness of breath, sleep disturbances due to breathing, snoring, sputum production and wheezing.    Endocrine: Negative for cold intolerance, heat intolerance and polydipsia.   Hematologic/Lymphatic:  Negative for bleeding problem. Does not bruise/bleed easily.   Skin:  Negative for color change, dry skin and rash.   Musculoskeletal:  Negative for joint swelling and muscle cramps.   Gastrointestinal:  Negative for bloating, abdominal pain, constipation, diarrhea, dysphagia, melena, nausea and vomiting.   Genitourinary:  Negative for flank pain and urgency.   Neurological:  Negative for dizziness, focal weakness, headaches, light-headedness, loss of balance, seizures and weakness.   Psychiatric/Behavioral:  Negative for altered mental status and memory loss. The patient is not nervous/anxious.      Objective:     Vital Signs (Most Recent):  Temp: 97.5 °F (36.4 °C) (09/09/23 0733)  Pulse: 65 (09/09/23 1146)  Resp: (!) 30 (09/09/23 1146)  BP: (!) 127/92 (09/09/23 0733)  SpO2: 95 % (09/09/23 1146) Vital Signs (24h Range):  Temp:  [97.5 °F (36.4 °C)-99.5 °F (37.5 °C)] 97.5 °F (36.4 °C)  Pulse:  [] 65  Resp:  [20-33] 30  SpO2:  [93 %-98 %] 95 %  BP: (127-201)/() 127/92     Weight: 123.8 kg (273 lb)  Body mass index is 46.86 kg/m².    SpO2: 95 %         Intake/Output Summary (Last 24 hours) at 9/9/2023 1203  Last data filed at 9/9/2023 0600  Gross per 24 hour   Intake --   Output 900 ml   Net -900 ml       Lines/Drains/Airways       Peripheral Intravenous Line  Duration                  Peripheral IV - Single Lumen 09/09/23 1200 20 G;2 in Anterior;Left Forearm <1 day                     Physical Exam  Eyes:      Pupils: Pupils are equal, round, and reactive to light.   Neck:      Trachea: No tracheal deviation.   Cardiovascular:      Rate and Rhythm: Normal rate and regular rhythm.      Pulses: Intact distal pulses.           Carotid pulses are 2+ on the right side and 2+ on the left side.       Radial pulses are 2+ on the right side and 2+ on the left side.        Femoral pulses are 2+ on the right side and 2+ on the left side.       Popliteal pulses are 2+ on the right side and 2+ on the left side.         Dorsalis pedis pulses are 2+ on the right side and 2+ on the left side.        Posterior tibial pulses are 2+ on the right side and 2+ on the left side.      Heart sounds: Normal heart sounds. No murmur heard.     No friction rub. No gallop.   Pulmonary:      Effort: Pulmonary effort is normal. No respiratory distress.      Breath sounds: Normal breath sounds. No stridor. No wheezing or rales.   Chest:      Chest wall: No tenderness.   Abdominal:      General: There is no distension.      Tenderness: There is no abdominal tenderness. There is no rebound.   Musculoskeletal:         General: No tenderness.      Cervical back: Normal range of motion.      Right lower leg: Edema present.      Left lower leg: Edema present.   Skin:     General: Skin is warm and dry.   Neurological:      Mental Status: She is alert and oriented to person, place, and time.          Significant Labs: CMP   Recent Labs   Lab 09/08/23 1941 09/09/23 0618    146*   K 4.4 4.4    103   CO2 35* 32*    121*   BUN 14 12   CREATININE 1.2 1.0   CALCIUM 10.3 10.4   PROT 8.5* 6.9   ALBUMIN 3.3* 3.0*   BILITOT 0.4 0.4   ALKPHOS 146* 112   AST 33 20   ALT 30 14   ANIONGAP -1* 11   , CBC   Recent Labs   Lab 09/08/23 1941 09/09/23 0618   WBC 6.46 6.42  6.42   HGB 16.1* 14.3  14.3   HCT 52.9* 47.6  47.6    173  173   , and Troponin   Recent Labs   Lab 09/09/23 0618 09/09/23  1052   TROPONINI 0.162* 0.145*       Significant Imaging: Echocardiogram: Transthoracic echo (TTE) complete (Cupid Only):   Results for orders placed or performed during the hospital encounter of 09/09/23   Echo   Result Value Ref Range    BSA 2.36 m2    LVOT stroke volume 82.38 cm3    LVIDd 5.03 3.5 - 6.0 cm    LV Systolic Volume 68.43 mL    LV Systolic Volume Index 30.7 mL/m2    LVIDs 3.96 2.1 - 4.0 cm    LV Diastolic Volume 120.14 mL    LV Diastolic Volume Index 53.87 mL/m2    IVS 1.11 (A) 0.6 - 1.1 cm    LVOT diameter 1.95 cm    LVOT area 3.0 cm2     FS 21 (A) 28 - 44 %    Left Ventricle Relative Wall Thickness 0.54 cm    Posterior Wall 1.35 (A) 0.6 - 1.1 cm    LV mass 244.31 g    LV Mass Index 110 g/m2    MV Peak E Berhane 0.93 m/s    TDI LATERAL 0.13 m/s    TDI SEPTAL 0.08 m/s    E/E' ratio 8.86 m/s    MV Peak A Berhane 0.90 m/s    TR Max Berhane 3.30 m/s    E/A ratio 1.03     IVRT 137.01 msec    E wave deceleration time 232.88 msec    LV SEPTAL E/E' RATIO 11.63 m/s    LV LATERAL E/E' RATIO 7.15 m/s    LVOT peak berhane 1.35 m/s    Left Ventricular Outflow Tract Mean Velocity 0.87 cm/s    Left Ventricular Outflow Tract Mean Gradient 3.55 mmHg    LA size 3.64 cm    Left Atrium Major Axis 5.78 cm    Left Atrium Minor Axis 6.21 cm    RV mid diameter 5.15 cm    RVOT peak VTI 18.1 cm    RA Major Axis 4.94 cm    AV regurgitation pressure 1/2 time [id number] ms    AR Max Berhane 4.57 m/s    AV mean gradient 10 mmHg    AV peak gradient 17 mmHg    Ao peak berhane 2.09 m/s    Ao VTI 39.30 cm    LVOT peak VTI 27.60 cm    AV valve area 2.10 cm²    AV Velocity Ratio 0.65     AV index (prosthetic) 0.70     KARYNA by Velocity Ratio 1.93 cm²    Mr max berhane 5.59 m/s    Triscuspid Valve Regurgitation Peak Gradient 44 mmHg    PV mean gradient 2 mmHg    PV peak gradient 3     RVOT peak berhane 0.82 m/s    Ao root annulus 3.18 cm    STJ 3.59 cm    Ascending aorta 3.46 cm    IVC diameter 2.31 cm    Mean e' 0.11 m/s    ZLVIDS -1.49     ZLVIDD -4.49     LA Volume Index 33.2 mL/m2    LA volume 74.10 cm3    LA WIDTH 4.0 cm    TAPSE 2.40 cm    RA Width 3.2 cm

## 2023-09-09 NOTE — ASSESSMENT & PLAN NOTE
Patient with Hypercapnic and Hypoxic Respiratory failure which is Acute on chronic.  she is not on home oxygen. Supplemental oxygen was provided and noted- Oxygen Concentration (%):  [30-40] 30    .   Signs/symptoms of respiratory failure include- tachypnea. Contributing diagnoses includes - COPD Labs and images were reviewed. Patient Has recent ABG, which has been reviewed. Will treat underlying causes and adjust management of respiratory failure as follows- hypercapnic

## 2023-09-09 NOTE — CONSULTS
Food & Nutrition                                                           Education     Diet Education: Low sodium, cardiac, diabetic diet and fluid restriction  Time Spent: 15 minutes  Learners: Patient        Nutrition Education provided with handouts: yes, pt discharge packet        Comments: Patient admitted with CHF exacerbation and UTI. PMH DM2 ( A1C 5.5), COPD, CHF, HTN, PE. PTA pt was following a low sodium, low sugar diet. Usually she will read labels for sodium content and pick brands that have the lowest amount. She claims she was told by a health care provider to use season all recently which contains salt.     I educated pt on a heart healthy, low sodium diet and tips for choosing salt free seasonings. I discussed label reading for sodium and numbers to aim for. I discouraged eating out/fast food very often and discussed what to avoid. We also discussed her fluid restriction and sticking to a sugar free, consistent carb diet. Teacback method successful. Pt would benefit from reinforcement.      Assessment: NFPE to be done by on-site RD at f/u if warranted. Good appetite PTA.  All questions and concerns answered. Dietitian's contact information provided.         Follow-Up: yes     Please Re-consult as needed           Thanks!

## 2023-09-09 NOTE — CONSULTS
[facility] Tele[facility] (Hospital)  Cardiology  Consult Note    Patient Name: Carmen Tan  MRN: [id number]  Admission Date: 9/9/2023  Hospital Length of Stay: 0 days  Code Status: Full Code   Attending Provider: James Smith MD   Consulting Provider: Lorenzo Sampson MD  Primary Care Physician: Lucian Barry MD  Principal Problem:Acute on chronic respiratory failure with hypoxia    Patient information was obtained from patient and ER records.     Inpatient consult to Cardiology  Consult performed by: Lorenzo Sampson MD  Consult ordered by: Gracie Garrison NP        Subjective:     Chief Complaint:  Shortness of breath     HPI:    The patient is a 73 year old female with DM, COPD on home oxygen 4 liters, Diastolic CHF, HTN, hx PE who presented to [facility] ED with Generalized weakness, Fatigue, SOB, and Dysuria - onset 2 days ago that progressively worsened. Pt also reports BLE edema that worsened over the past week.      In the ED, CXR showed Subtle hazy opacities at the perihilar aspects of the lungs may represent mild edema. Elevated troponin of 200, elevated BNP 5085.  Labs otherwise unremarkable. COVID negative. Patient denied any chest pain, EKG non-ischemia. Patient given Aspirin 325 mg, Lovenox 1mg/kg, and Lasix 40 IV x1 as well as a breathing treatment. Po Bactrim for UTI     The patient was transferred to [facility] for admission under hospital medicine and for cardiology consultation       Past Medical History:   Diagnosis Date    Abdominal hernia     Bacteremia 4/5/2023    CHF (congestive heart failure)     COPD (chronic obstructive pulmonary disease)     Diabetes mellitus, type 2 2/16/2022    GERD (gastroesophageal reflux disease)     History of home oxygen therapy     Hypertension     Hypertensive emergency 3/30/2023    Migraine headache     Pulmonary embolism 06/01/2017    Small bowel obstruction     Thyroid disease        Past Surgical History:   Procedure Laterality  Date    COLONOSCOPY      HYSTERECTOMY      INNER EAR SURGERY      UPPER GASTROINTESTINAL ENDOSCOPY         Review of patient's allergies indicates:   Allergen Reactions    Pcn [penicillins] Swelling       Current Facility-Administered Medications on File Prior to Encounter   Medication    [COMPLETED] albuterol-ipratropium 2.5 mg-0.5 mg/3 mL nebulizer solution 3 mL    [COMPLETED] aspirin tablet 325 mg    [COMPLETED] enoxaparin injection 120 mg    [COMPLETED] furosemide injection 40 mg    [COMPLETED] sulfamethoxazole-trimethoprim 800-160mg per tablet 1 tablet     Current Outpatient Medications on File Prior to Encounter   Medication Sig    carvediloL (COREG) 6.25 MG tablet Take 1 tablet (6.25 mg total) by mouth 2 (two) times daily. (Patient taking differently: Take 12.5 mg by mouth 2 (two) times daily.)    furosemide (LASIX) 80 MG tablet Take 80 mg by mouth daily as needed.    gabapentin (NEURONTIN) 600 MG tablet Take 600 mg by mouth 2 (two) times daily.    levothyroxine (TIROSINT) 50 mcg Cap Take by mouth once daily.    linaCLOtide (LINZESS) 145 mcg Cap capsule Take 145 mcg by mouth before breakfast.    LIPITOR 40 mg tablet Take 1 tablet (40 mg total) by mouth once daily.    pantoprazole (PROTONIX) 20 MG tablet Take 40 mg by mouth once daily.    albuterol-ipratropium (DUO-NEB) 2.5 mg-0.5 mg/3 mL nebulizer solution Take 3 mLs by nebulization every 6 (six) hours while awake. Rescue    budesonide (PULMICORT) 0.25 mg/2 mL nebulizer solution Take 2 mLs (0.25 mg total) by nebulization every 12 (twelve) hours. Controller    furosemide (LASIX) 40 MG tablet Take 1 tablet (40 mg total) by mouth once daily.    insulin detemir U-100, Levemir, 100 unit/mL (3 mL) SubQ InPn pen Inject 15 Units into the skin once daily.    ketotifen (ZADITOR) 0.025 % (0.035 %) ophthalmic solution Place 1 drop into both eyes 2 (two) times daily. for 14 days    vitamin D 1000 units Tab Take 185 mg by mouth once daily.     [DISCONTINUED] busPIRone (BUSPAR) 5 MG Tab Take 1 tablet (5 mg total) by mouth 3 (three) times daily.    [DISCONTINUED] NIFEdipine (PROCARDIA-XL) 60 MG (OSM) 24 hr tablet Take 1 tablet (60 mg total) by mouth once daily.    [DISCONTINUED] sacubitriL-valsartan (ENTRESTO) 24-26 mg per tablet Take 1 tablet by mouth 2 (two) times daily.    [DISCONTINUED] sertraline (ZOLOFT) 50 MG tablet Take 1 tablet (50 mg total) by mouth once daily.    [DISCONTINUED] spironolactone (ALDACTONE) 25 MG tablet Take 1 tablet (25 mg total) by mouth once daily.     Family History    None       Tobacco Use    Smoking status: Former    Smokeless tobacco: Never   Substance and Sexual Activity    Alcohol use: No    Drug use: No    Sexual activity: Not on file     Review of Systems   Constitutional: Positive for malaise/fatigue. Negative for chills, diaphoresis, night sweats, weight gain and weight loss.   HENT:  Negative for congestion, hoarse voice, sore throat and stridor.    Eyes:  Negative for double vision and pain.   Cardiovascular:  Positive for dyspnea on exertion and leg swelling. Negative for chest pain, claudication, cyanosis, irregular heartbeat, near-syncope, orthopnea, palpitations, paroxysmal nocturnal dyspnea and syncope.   Respiratory:  Negative for cough, hemoptysis, shortness of breath, sleep disturbances due to breathing, snoring, sputum production and wheezing.    Endocrine: Negative for cold intolerance, heat intolerance and polydipsia.   Hematologic/Lymphatic: Negative for bleeding problem. Does not bruise/bleed easily.   Skin:  Negative for color change, dry skin and rash.   Musculoskeletal:  Negative for joint swelling and muscle cramps.   Gastrointestinal:  Negative for bloating, abdominal pain, constipation, diarrhea, dysphagia, melena, nausea and vomiting.   Genitourinary:  Negative for flank pain and urgency.   Neurological:  Negative for dizziness, focal weakness, headaches, light-headedness, loss of  balance, seizures and weakness.   Psychiatric/Behavioral:  Negative for altered mental status and memory loss. The patient is not nervous/anxious.      Objective:     Vital Signs (Most Recent):  Temp: 97.5 °F (36.4 °C) (09/09/23 0733)  Pulse: 65 (09/09/23 1146)  Resp: (!) 30 (09/09/23 1146)  BP: (!) 127/92 (09/09/23 0733)  SpO2: 95 % (09/09/23 1146) Vital Signs (24h Range):  Temp:  [97.5 °F (36.4 °C)-99.5 °F (37.5 °C)] 97.5 °F (36.4 °C)  Pulse:  [] 65  Resp:  [20-33] 30  SpO2:  [93 %-98 %] 95 %  BP: (127-201)/() 127/92     Weight: 123.8 kg (273 lb)  Body mass index is 46.86 kg/m².    SpO2: 95 %         Intake/Output Summary (Last 24 hours) at 9/9/2023 1203  Last data filed at 9/9/2023 0600  Gross per 24 hour   Intake --   Output 900 ml   Net -900 ml       Lines/Drains/Airways       Peripheral Intravenous Line  Duration                  Peripheral IV - Single Lumen 09/09/23 1200 20 G;2 in Anterior;Left Forearm <1 day                     Physical Exam  Eyes:      Pupils: Pupils are equal, round, and reactive to light.   Neck:      Trachea: No tracheal deviation.   Cardiovascular:      Rate and Rhythm: Normal rate and regular rhythm.      Pulses: Intact distal pulses.           Carotid pulses are 2+ on the right side and 2+ on the left side.       Radial pulses are 2+ on the right side and 2+ on the left side.        Femoral pulses are 2+ on the right side and 2+ on the left side.       Popliteal pulses are 2+ on the right side and 2+ on the left side.        Dorsalis pedis pulses are 2+ on the right side and 2+ on the left side.        Posterior tibial pulses are 2+ on the right side and 2+ on the left side.      Heart sounds: Normal heart sounds. No murmur heard.     No friction rub. No gallop.   Pulmonary:      Effort: Pulmonary effort is normal. No respiratory distress.      Breath sounds: Normal breath sounds. No stridor. No wheezing or rales.   Chest:      Chest wall: No tenderness.   Abdominal:       General: There is no distension.      Tenderness: There is no abdominal tenderness. There is no rebound.   Musculoskeletal:         General: No tenderness.      Cervical back: Normal range of motion.      Right lower leg: Edema present.      Left lower leg: Edema present.   Skin:     General: Skin is warm and dry.   Neurological:      Mental Status: She is alert and oriented to person, place, and time.          Significant Labs: CMP   Recent Labs   Lab 09/08/23 1941 09/09/23 0618    146*   K 4.4 4.4    103   CO2 35* 32*    121*   BUN 14 12   CREATININE 1.2 1.0   CALCIUM 10.3 10.4   PROT 8.5* 6.9   ALBUMIN 3.3* 3.0*   BILITOT 0.4 0.4   ALKPHOS 146* 112   AST 33 20   ALT 30 14   ANIONGAP -1* 11   , CBC   Recent Labs   Lab 09/08/23 1941 09/09/23 0618   WBC 6.46 6.42  6.42   HGB 16.1* 14.3  14.3   HCT 52.9* 47.6  47.6    173  173   , and Troponin   Recent Labs   Lab 09/09/23 0618 09/09/23  1052   TROPONINI 0.162* 0.145*       Significant Imaging: Echocardiogram: Transthoracic echo (TTE) complete (Cupid Only):   Results for orders placed or performed during the hospital encounter of 09/09/23   Echo   Result Value Ref Range    BSA 2.36 m2    LVOT stroke volume 82.38 cm3    LVIDd 5.03 3.5 - 6.0 cm    LV Systolic Volume 68.43 mL    LV Systolic Volume Index 30.7 mL/m2    LVIDs 3.96 2.1 - 4.0 cm    LV Diastolic Volume 120.14 mL    LV Diastolic Volume Index 53.87 mL/m2    IVS 1.11 (A) 0.6 - 1.1 cm    LVOT diameter 1.95 cm    LVOT area 3.0 cm2    FS 21 (A) 28 - 44 %    Left Ventricle Relative Wall Thickness 0.54 cm    Posterior Wall 1.35 (A) 0.6 - 1.1 cm    LV mass 244.31 g    LV Mass Index 110 g/m2    MV Peak E Berhane 0.93 m/s    TDI LATERAL 0.13 m/s    TDI SEPTAL 0.08 m/s    E/E' ratio 8.86 m/s    MV Peak A Berhane 0.90 m/s    TR Max Berhane 3.30 m/s    E/A ratio 1.03     IVRT 137.01 msec    E wave deceleration time 232.88 msec    LV SEPTAL E/E' RATIO 11.63 m/s    LV LATERAL E/E' RATIO 7.15 m/s    LVOT  peak berhane 1.35 m/s    Left Ventricular Outflow Tract Mean Velocity 0.87 cm/s    Left Ventricular Outflow Tract Mean Gradient 3.55 mmHg    LA size 3.64 cm    Left Atrium Major Axis 5.78 cm    Left Atrium Minor Axis 6.21 cm    RV mid diameter 5.15 cm    RVOT peak VTI 18.1 cm    RA Major Axis 4.94 cm    AV regurgitation pressure 1/2 time 850.870042095682688 ms    AR Max Berhane 4.57 m/s    AV mean gradient 10 mmHg    AV peak gradient 17 mmHg    Ao peak berhane 2.09 m/s    Ao VTI 39.30 cm    LVOT peak VTI 27.60 cm    AV valve area 2.10 cm²    AV Velocity Ratio 0.65     AV index (prosthetic) 0.70     KARYNA by Velocity Ratio 1.93 cm²    Mr max berhane 5.59 m/s    Triscuspid Valve Regurgitation Peak Gradient 44 mmHg    PV mean gradient 2 mmHg    PV peak gradient 3     RVOT peak berhane 0.82 m/s    Ao root annulus 3.18 cm    STJ 3.59 cm    Ascending aorta 3.46 cm    IVC diameter 2.31 cm    Mean e' 0.11 m/s    ZLVIDS -1.49     ZLVIDD -4.49     LA Volume Index 33.2 mL/m2    LA volume 74.10 cm3    LA WIDTH 4.0 cm    TAPSE 2.40 cm    RA Width 3.2 cm     Assessment and Plan:     * Acute on chronic respiratory failure with hypoxia  Patient with Hypercapnic and Hypoxic Respiratory failure which is Acute on chronic.  she is not on home oxygen. Supplemental oxygen was provided and noted- Oxygen Concentration (%):  [30-40] 30    .   Signs/symptoms of respiratory failure include- tachypnea. Contributing diagnoses includes - COPD Labs and images were reviewed. Patient Has recent ABG, which has been reviewed. Will treat underlying causes and adjust management of respiratory failure as follows- hypercapnic    Elevated troponin  Trend  Cardiac echo results pending    Hypertension  Continue coreg    Acute cystitis without hematuria  treated    Acute on chronic congestive heart failure  Patient is identified as having Diastolic (HFpEF) heart failure that is Acute on chronic. CHF is currently uncontrolled due to Continued edema of extremities. Latest ECHO  performed and demonstrates- Results for orders placed during the hospital encounter of 03/29/23    Echo    Interpretation Summary  · The left ventricle is normal in size with moderate concentric hypertrophy and mildly decreased systolic function.  · The estimated ejection fraction is 45%.  · Grade I left ventricular diastolic dysfunction.  · Moderate right ventricular enlargement.  · Moderate right atrial enlargement.  · Mild-to-moderate aortic regurgitation.  · There is mild aortic valve stenosis.  · Aortic valve area is 1.44 cm2; peak velocity is 2.09 m/s; mean gradient is 10 mmHg.  · Mild-to-moderate mitral regurgitation.  · Moderate tricuspid regurgitation.  · Moderate pulmonic regurgitation.  · The estimated PA systolic pressure is 69 mmHg.  · There is moderate pulmonary hypertension.  · Moderate left atrial enlargement.  . Continue Beta Blocker and Furosemide and monitor clinical status closely. Monitor on telemetry. Patient is on CHF pathway.  Monitor strict Is&Os and daily weights.  Place on fluid restriction of 2 L. Cardiology has been consulted. Continue to stress to patient importance of self efficacy and  on diet for CHF. Last BNP reviewed- and noted below   Recent Labs   Lab 09/09/23  0618   BNP 1,532*   .        VTE Risk Mitigation (From admission, onward)         Ordered     heparin 25,000 units in dextrose 5% (100 units/ml) IV bolus from bag - ADDITIONAL PRN BOLUS - 60 units/kg  As needed (PRN)        Question:  Heparin Infusion Adjustment (DO NOT MODIFY ANSWER)  Answer:  \\[email or web address]\Images\Pharmacy\HeparinInfusions\heparin LOW INTENSITY nomogram for [facility] [id number].pdf    09/09/23 0547     heparin 25,000 units in dextrose 5% (100 units/ml) IV bolus from bag - ADDITIONAL PRN BOLUS - 30 units/kg  As needed (PRN)        Question:  Heparin Infusion Adjustment (DO NOT MODIFY ANSWER)  Answer:  \\[email or web address]\Images\Pharmacy\HeparinInfusions\heparin LOW INTENSITY nomogram for [facility] [id number].pdf     09/09/23 0547     heparin 25,000 units in dextrose 5% (100 units/ml) IV bolus from bag INITIAL BOLUS  Once        Question:  Heparin Infusion Adjustment (DO NOT MODIFY ANSWER)  Answer:  \\[email or web address]\[employer]\Images\Pharmacy\HeparinInfusions\heparin LOW INTENSITY nomogram for [facility] [id number].pdf    09/09/23 0547     heparin 25,000 units in dextrose 5% 250 mL (100 units/mL) infusion LOW INTENSITY nomogram - [facility]  Continuous        Question Answer Comment   Heparin Infusion Adjustment (DO NOT MODIFY ANSWER) \\[email or web address]\Images\Pharmacy\HeparinInfusions\heparin LOW INTENSITY nomogram for [facility] [id number]    Begin at (in units/kg/hr) 12        09/09/23 0547     IP VTE HIGH RISK PATIENT  Once         09/09/23 0447     Place sequential compression device  Until discontinued         09/09/23 0447                Thank you for your consult. I will follow-up with patient. Please contact us if you have any additional questions.    Lorenzo Sampson MD  Cardiology   [facility] - Telemetry ([facility])

## 2023-09-09 NOTE — DISCHARGE INSTRUCTIONS
"Nutrition:  Use salt free seasoning blends and herbs. No not use Season-All. Mrs. Dash and NoSalt are two examples of low sodium seasonings. Other brands such as Lucio Meganradha's ( purple label) have salt free blends and with say "salt free" on the front.  Nohemi Ware RDN, LDN, CDCES  "

## 2023-09-09 NOTE — HPI
The patient is a 73 year old female with DM, COPD on home oxygen 4 liters, Diastolic CHF, HTN, hx PE who presented to [facility] ED with Generalized weakness, Fatigue, SOB, and Dysuria - onset 2 days ago that progressively worsened. Pt also reports BLE edema that worsened over the past week.     n the ED, CXR shows chronic scarring no focal consolidation. Elevated troponin of 200, elevated BNP. Labs otherwise unremarkable. COVID negative. Patient denied any chest pain, EKG non-ischemia. Patient given Aspirin 325 mg, Lovenox 1mg/kg, and Lasix 40 IV x1 as well as a breathing treatment. Po Bactrim for UTI    The patient was transferred to [facility] for admission under hospital medicine and for cardiology consultation

## 2023-09-09 NOTE — ASSESSMENT & PLAN NOTE
"Patient is identified as having Diastolic (HFpEF) heart failure that is Acute on chronic. CHF is currently uncontrolled due to Rales/crackles on pulmonary exam and Pulmonary edema/pleural effusion on CXR. Latest ECHO performed and demonstrates- Results for orders placed during the hospital encounter of 03/29/23    Echo    Interpretation Summary  · The left ventricle is normal in size with moderate concentric hypertrophy and mildly decreased systolic function.  · The estimated ejection fraction is 45%.  · Grade I left ventricular diastolic dysfunction.  · Moderate right ventricular enlargement.  · Moderate right atrial enlargement.  · Mild-to-moderate aortic regurgitation.  · There is mild aortic valve stenosis.  · Aortic valve area is 1.44 cm2; peak velocity is 2.09 m/s; mean gradient is 10 mmHg.  · Mild-to-moderate mitral regurgitation.  · Moderate tricuspid regurgitation.  · Moderate pulmonic regurgitation.  · The estimated PA systolic pressure is 69 mmHg.  · There is moderate pulmonary hypertension.  · Moderate left atrial enlargement.  . Continue Beta Blocker, Furosemide, Aldactone and ARNI and monitor clinical status closely. Monitor on telemetry. Patient is on CHF pathway.  Monitor strict Is&Os and daily weights.  Place on fluid restriction of 1.5 L. Cardiology has been consulted. Continue to stress to patient importance of self efficacy and  on diet for CHF. Last BNP reviewed- and noted below No results for input(s): "BNP", "BNPTRIAGEBLO" in the last 168 hours..  "

## 2023-09-09 NOTE — ASSESSMENT & PLAN NOTE
Serial troponin   Cardiology recommended Lovenox 1mg/kg to ED provider   Will start IV Heparin  cont ASA, BB, Statin, Entresto   Echo   Consult cardiology

## 2023-09-09 NOTE — ASSESSMENT & PLAN NOTE
Patient with Hypoxic Respiratory failure which is Acute on chronic.  she is on home oxygen at 4 LPM. Supplemental oxygen was provided and noted-      .   Signs/symptoms of respiratory failure include- increased work of breathing. Contributing diagnoses includes - CHF and COPD Labs and images were reviewed. Patient Has not had a recent ABG. Will treat underlying causes and adjust management of respiratory failure

## 2023-09-09 NOTE — PROVIDER TRANSFER
"   Outside Transfer Acceptance Note / Regional Referral Center    Referring facility: [facility]   Referring provider: NIKKO BUSH  Accepting facility: [facility]  Accepting provider: Dr. Jayant Jovel  Admitting provider: Dr. Laughlin  Reason for transfer:  cardiology, higher level of care   Transfer diagnosis: Elevated troponin   Transfer specialty requested: Cardiology  Transfer specialty notified: Yes  Transfer level: NUMBER 1-5: 2  Bed type requested: med-tele  Isolation status: No active isolations   Admission class or status: IP- Inpatient      Narrative     73 year old female with PMHx of COPD on home oxygen, CHF, HTN with previous COPD exacerbations who presented to the ED  with SOB along with lower extremity edema. CXR shows chronic scarring no focal consolidation. Elevated troponin of 200, elevated BNP. Labs otherwise unremarkable. COVID negative. Patient denied any chest pain, EKG non-ischemic, case discussed with cardiology who agreed to consult on the patient. Patient given aspirin 325 mg, lovenox 120g mg, and lasix 40 IV x1 as well as a breathing treatment.     Objective     Vitals: Temp: 99.5 °F (37.5 °C) (09/08/23 1945)  Pulse: 100 (09/08/23 1959)  Resp: 20 (09/08/23 1959)  BP: (!) 194/84 (09/08/23 1846)  SpO2: 98 % (09/08/23 1959)  Recent Labs: All pertinent labs within the past 24 hours have been reviewed.  CBC:   Recent Labs   Lab 09/08/23 1941   WBC 6.46   HGB 16.1*   HCT 52.9*        CMP:   Recent Labs   Lab 09/08/23 1941      K 4.4      CO2 35*      BUN 14   CREATININE 1.2   CALCIUM 10.3   PROT 8.5*   ALBUMIN 3.3*   BILITOT 0.4   ALKPHOS 146*   AST 33   ALT 30   ANIONGAP -1*     Cardiac Markers: No results for input(s): "CKMB", "MYOGLOBIN", "BNP", "TROPISTAT" in the last 48 hours.  Troponin:   Recent Labs   Lab 09/08/23 1941   TROPONINIHS 200.8*     Recent imaging:  CXR  Airway:     Vent settings:         IV access:    Infusions: " none  Allergies:   Review of patient's allergies indicates:   Allergen Reactions    Pcn [penicillins] Swelling      NPO: No    Anticoagulation:   Anticoagulants       None             Instructions      [facility]  Admit to Hospital Medicine  Upon patient arrival to floor, please contact Hospital Medicine on call.     Consult cardiology

## 2023-09-09 NOTE — ASSESSMENT & PLAN NOTE
"Patient's FSGs are controlled on current medication regimen.  Last A1c reviewed-   Lab Results   Component Value Date    HGBA1C 5.5 03/29/2023     Most recent fingerstick glucose reviewed- No results for input(s): "POCTGLUCOSE" in the last 24 hours.  Current correctional scale  Low  Maintain anti-hyperglycemic dose as follows-   Antihyperglycemics (From admission, onward)    Start     Stop Route Frequency Ordered    09/09/23 0545  insulin aspart U-100 pen 0-5 Units         -- SubQ Before meals & nightly PRN 09/09/23 0447        Hold Oral hypoglycemics while patient is in the hospital.  "

## 2023-09-09 NOTE — CARE UPDATE
Patient seen and examined today. Patient reports feeling much better. Diuresing well, 1.3L out. Troponin 0.162, 0.145. Heparin infusion in progress. Cardiology consulted. UTI being treated with IV rocephin. I agree with current medical management plan.

## 2023-09-09 NOTE — ASSESSMENT & PLAN NOTE
Patient is identified as having Diastolic (HFpEF) heart failure that is Acute on chronic. CHF is currently uncontrolled due to Continued edema of extremities. Latest ECHO performed and demonstrates- Results for orders placed during the hospital encounter of 03/29/23    Echo    Interpretation Summary  · The left ventricle is normal in size with moderate concentric hypertrophy and mildly decreased systolic function.  · The estimated ejection fraction is 45%.  · Grade I left ventricular diastolic dysfunction.  · Moderate right ventricular enlargement.  · Moderate right atrial enlargement.  · Mild-to-moderate aortic regurgitation.  · There is mild aortic valve stenosis.  · Aortic valve area is 1.44 cm2; peak velocity is 2.09 m/s; mean gradient is 10 mmHg.  · Mild-to-moderate mitral regurgitation.  · Moderate tricuspid regurgitation.  · Moderate pulmonic regurgitation.  · The estimated PA systolic pressure is 69 mmHg.  · There is moderate pulmonary hypertension.  · Moderate left atrial enlargement.  . Continue Beta Blocker and Furosemide and monitor clinical status closely. Monitor on telemetry. Patient is on CHF pathway.  Monitor strict Is&Os and daily weights.  Place on fluid restriction of 2 L. Cardiology has been consulted. Continue to stress to patient importance of self efficacy and  on diet for CHF. Last BNP reviewed- and noted below   Recent Labs   Lab 09/09/23  0618   BNP 1,532*   .

## 2023-09-10 LAB
ALLENS TEST: ABNORMAL
ANION GAP SERPL CALC-SCNC: 10 MMOL/L (ref 8–16)
APTT PPP: 30 SEC (ref 21–32)
APTT PPP: 35.3 SEC (ref 21–32)
APTT PPP: 42.1 SEC (ref 21–32)
APTT PPP: 53.6 SEC (ref 21–32)
BASOPHILS # BLD AUTO: 0.01 K/UL (ref 0–0.2)
BASOPHILS NFR BLD: 0.1 % (ref 0–1.9)
BUN SERPL-MCNC: 15 MG/DL (ref 8–23)
CALCIUM SERPL-MCNC: 9.9 MG/DL (ref 8.7–10.5)
CHLORIDE SERPL-SCNC: 99 MMOL/L (ref 95–110)
CO2 SERPL-SCNC: 32 MMOL/L (ref 23–29)
CREAT SERPL-MCNC: 1 MG/DL (ref 0.5–1.4)
DELSYS: ABNORMAL
DIFFERENTIAL METHOD: ABNORMAL
EOSINOPHIL # BLD AUTO: 0 K/UL (ref 0–0.5)
EOSINOPHIL NFR BLD: 0 % (ref 0–8)
ERYTHROCYTE [DISTWIDTH] IN BLOOD BY AUTOMATED COUNT: 13.4 % (ref 11.5–14.5)
EST. GFR  (NO RACE VARIABLE): 60 ML/MIN/1.73 M^2
FIO2: 30
GLUCOSE SERPL-MCNC: 109 MG/DL (ref 70–110)
HCO3 UR-SCNC: 39.9 MMOL/L (ref 24–28)
HCT VFR BLD AUTO: 44.4 % (ref 37–48.5)
HGB BLD-MCNC: 13.9 G/DL (ref 12–16)
IMM GRANULOCYTES # BLD AUTO: 0.02 K/UL (ref 0–0.04)
IMM GRANULOCYTES NFR BLD AUTO: 0.2 % (ref 0–0.5)
LYMPHOCYTES # BLD AUTO: 0.9 K/UL (ref 1–4.8)
LYMPHOCYTES NFR BLD: 11.3 % (ref 18–48)
MCH RBC QN AUTO: 28.7 PG (ref 27–31)
MCHC RBC AUTO-ENTMCNC: 31.3 G/DL (ref 32–36)
MCV RBC AUTO: 92 FL (ref 82–98)
MODE: ABNORMAL
MONOCYTES # BLD AUTO: 0.5 K/UL (ref 0.3–1)
MONOCYTES NFR BLD: 6.1 % (ref 4–15)
NEUTROPHILS # BLD AUTO: 6.9 K/UL (ref 1.8–7.7)
NEUTROPHILS NFR BLD: 82.3 % (ref 38–73)
NRBC BLD-RTO: 0 /100 WBC
PCO2 BLDA: 61.3 MMHG (ref 35–45)
PEEP: 16
PH SMN: 7.42 [PH] (ref 7.35–7.45)
PLATELET # BLD AUTO: 202 K/UL (ref 150–450)
PMV BLD AUTO: 9.4 FL (ref 9.2–12.9)
PO2 BLDA: 67 MMHG (ref 80–100)
POC BE: 16 MMOL/L
POC SATURATED O2: 92 % (ref 95–100)
POCT GLUCOSE: 116 MG/DL (ref 70–110)
POCT GLUCOSE: 148 MG/DL (ref 70–110)
POCT GLUCOSE: 155 MG/DL (ref 70–110)
POCT GLUCOSE: 183 MG/DL (ref 70–110)
POTASSIUM SERPL-SCNC: 4.5 MMOL/L (ref 3.5–5.1)
RBC # BLD AUTO: 4.85 M/UL (ref 4–5.4)
SAMPLE: ABNORMAL
SITE: ABNORMAL
SODIUM SERPL-SCNC: 141 MMOL/L (ref 136–145)
WBC # BLD AUTO: 8.34 K/UL (ref 3.9–12.7)

## 2023-09-10 PROCEDURE — 80048 BASIC METABOLIC PNL TOTAL CA: CPT | Performed by: NURSE PRACTITIONER

## 2023-09-10 PROCEDURE — 63600175 PHARM REV CODE 636 W HCPCS: Performed by: NURSE PRACTITIONER

## 2023-09-10 PROCEDURE — 25000003 PHARM REV CODE 250: Performed by: NURSE PRACTITIONER

## 2023-09-10 PROCEDURE — 94761 N-INVAS EAR/PLS OXIMETRY MLT: CPT

## 2023-09-10 PROCEDURE — 36415 COLL VENOUS BLD VENIPUNCTURE: CPT | Performed by: HOSPITALIST

## 2023-09-10 PROCEDURE — 94660 CPAP INITIATION&MGMT: CPT

## 2023-09-10 PROCEDURE — 99900035 HC TECH TIME PER 15 MIN (STAT)

## 2023-09-10 PROCEDURE — 85730 THROMBOPLASTIN TIME PARTIAL: CPT | Performed by: HOSPITALIST

## 2023-09-10 PROCEDURE — 27100171 HC OXYGEN HIGH FLOW UP TO 24 HOURS

## 2023-09-10 PROCEDURE — 36600 WITHDRAWAL OF ARTERIAL BLOOD: CPT

## 2023-09-10 PROCEDURE — 36415 COLL VENOUS BLD VENIPUNCTURE: CPT | Performed by: FAMILY MEDICINE

## 2023-09-10 PROCEDURE — 36415 COLL VENOUS BLD VENIPUNCTURE: CPT | Performed by: NURSE PRACTITIONER

## 2023-09-10 PROCEDURE — 82803 BLOOD GASES ANY COMBINATION: CPT

## 2023-09-10 PROCEDURE — 99233 PR SUBSEQUENT HOSPITAL CARE,LEVL III: ICD-10-PCS | Mod: ,,, | Performed by: INTERNAL MEDICINE

## 2023-09-10 PROCEDURE — 85025 COMPLETE CBC W/AUTO DIFF WBC: CPT | Performed by: NURSE PRACTITIONER

## 2023-09-10 PROCEDURE — 99233 SBSQ HOSP IP/OBS HIGH 50: CPT | Mod: ,,, | Performed by: INTERNAL MEDICINE

## 2023-09-10 PROCEDURE — 21400001 HC TELEMETRY ROOM

## 2023-09-10 PROCEDURE — 25000242 PHARM REV CODE 250 ALT 637 W/ HCPCS: Performed by: INTERNAL MEDICINE

## 2023-09-10 PROCEDURE — 25000242 PHARM REV CODE 250 ALT 637 W/ HCPCS: Performed by: NURSE PRACTITIONER

## 2023-09-10 PROCEDURE — 85730 THROMBOPLASTIN TIME PARTIAL: CPT | Mod: 91 | Performed by: FAMILY MEDICINE

## 2023-09-10 PROCEDURE — 94640 AIRWAY INHALATION TREATMENT: CPT

## 2023-09-10 PROCEDURE — 85730 THROMBOPLASTIN TIME PARTIAL: CPT | Mod: 91 | Performed by: NURSE PRACTITIONER

## 2023-09-10 RX ORDER — METHYLPREDNISOLONE SOD SUCC 125 MG
40 VIAL (EA) INJECTION EVERY 6 HOURS
Status: DISCONTINUED | OUTPATIENT
Start: 2023-09-10 | End: 2023-09-16

## 2023-09-10 RX ORDER — ASPIRIN 81 MG/1
81 TABLET ORAL DAILY
Status: DISCONTINUED | OUTPATIENT
Start: 2023-09-10 | End: 2023-09-19 | Stop reason: HOSPADM

## 2023-09-10 RX ADMIN — SENNOSIDES AND DOCUSATE SODIUM 1 TABLET: 50; 8.6 TABLET ORAL at 08:09

## 2023-09-10 RX ADMIN — HEPARIN SODIUM 14 UNITS/KG/HR: 10000 INJECTION, SOLUTION INTRAVENOUS at 04:09

## 2023-09-10 RX ADMIN — SACUBITRIL AND VALSARTAN 1 TABLET: 24; 26 TABLET, FILM COATED ORAL at 08:09

## 2023-09-10 RX ADMIN — CARVEDILOL 6.25 MG: 6.25 TABLET, FILM COATED ORAL at 08:09

## 2023-09-10 RX ADMIN — ATORVASTATIN CALCIUM 40 MG: 40 TABLET, FILM COATED ORAL at 10:09

## 2023-09-10 RX ADMIN — BUDESONIDE 0.5 MG: 0.5 INHALANT ORAL at 07:09

## 2023-09-10 RX ADMIN — LEVOTHYROXINE SODIUM 50 MCG: 50 TABLET ORAL at 06:09

## 2023-09-10 RX ADMIN — ARFORMOTEROL TARTRATE 15 MCG: 15 SOLUTION RESPIRATORY (INHALATION) at 07:09

## 2023-09-10 RX ADMIN — PANTOPRAZOLE SODIUM 40 MG: 40 TABLET, DELAYED RELEASE ORAL at 10:09

## 2023-09-10 RX ADMIN — CEFTRIAXONE 1 G: 1 INJECTION, POWDER, FOR SOLUTION INTRAMUSCULAR; INTRAVENOUS at 06:09

## 2023-09-10 RX ADMIN — FUROSEMIDE 40 MG: 10 INJECTION, SOLUTION INTRAMUSCULAR; INTRAVENOUS at 06:09

## 2023-09-10 RX ADMIN — METHYLPREDNISOLONE SODIUM SUCCINATE 40 MG: 125 INJECTION, POWDER, FOR SOLUTION INTRAMUSCULAR; INTRAVENOUS at 01:09

## 2023-09-10 RX ADMIN — ASPIRIN 81 MG: 81 TABLET, COATED ORAL at 01:09

## 2023-09-10 RX ADMIN — MELATONIN TAB 3 MG 6 MG: 3 TAB at 12:09

## 2023-09-10 RX ADMIN — SERTRALINE HYDROCHLORIDE 50 MG: 50 TABLET ORAL at 10:09

## 2023-09-10 RX ADMIN — METHYLPREDNISOLONE SODIUM SUCCINATE 40 MG: 125 INJECTION, POWDER, FOR SOLUTION INTRAMUSCULAR; INTRAVENOUS at 06:09

## 2023-09-10 RX ADMIN — LEVALBUTEROL HYDROCHLORIDE 1.25 MG: 0.63 SOLUTION RESPIRATORY (INHALATION) at 07:09

## 2023-09-10 RX ADMIN — FUROSEMIDE 40 MG: 10 INJECTION, SOLUTION INTRAMUSCULAR; INTRAVENOUS at 05:09

## 2023-09-10 RX ADMIN — METHYLPREDNISOLONE SODIUM SUCCINATE 40 MG: 125 INJECTION, POWDER, FOR SOLUTION INTRAMUSCULAR; INTRAVENOUS at 05:09

## 2023-09-10 RX ADMIN — CARVEDILOL 6.25 MG: 6.25 TABLET, FILM COATED ORAL at 10:09

## 2023-09-10 RX ADMIN — LEVALBUTEROL HYDROCHLORIDE 1.25 MG: 0.63 SOLUTION RESPIRATORY (INHALATION) at 03:09

## 2023-09-10 NOTE — ASSESSMENT & PLAN NOTE
Patient with Hypercapnic and Hypoxic Respiratory failure which is Acute on chronic.  she is not on home oxygen. Supplemental oxygen was provided and noted- Oxygen Concentration (%):  [30-36] 36    .   Signs/symptoms of respiratory failure include- tachypnea. Contributing diagnoses includes - COPD Labs and images were reviewed. Patient Has recent ABG, which has been reviewed. Will treat underlying causes and adjust management of respiratory failure as follows- hypercapnic

## 2023-09-10 NOTE — SUBJECTIVE & OBJECTIVE
Interval History: interval improvement    Review of Systems   Constitutional: Positive for malaise/fatigue. Negative for chills, diaphoresis, night sweats, weight gain and weight loss.   HENT:  Negative for congestion, hoarse voice, sore throat and stridor.    Eyes:  Negative for double vision and pain.   Cardiovascular:  Positive for dyspnea on exertion and leg swelling. Negative for chest pain, claudication, cyanosis, irregular heartbeat, near-syncope, orthopnea, palpitations, paroxysmal nocturnal dyspnea and syncope.   Respiratory:  Negative for cough, hemoptysis, shortness of breath, sleep disturbances due to breathing, snoring, sputum production and wheezing.    Endocrine: Negative for cold intolerance, heat intolerance and polydipsia.   Hematologic/Lymphatic: Negative for bleeding problem. Does not bruise/bleed easily.   Skin:  Negative for color change, dry skin and rash.   Musculoskeletal:  Negative for joint swelling and muscle cramps.   Gastrointestinal:  Negative for bloating, abdominal pain, constipation, diarrhea, dysphagia, melena, nausea and vomiting.   Genitourinary:  Negative for flank pain and urgency.   Neurological:  Negative for dizziness, focal weakness, headaches, light-headedness, loss of balance, seizures and weakness.   Psychiatric/Behavioral:  Negative for altered mental status and memory loss. The patient is not nervous/anxious.      Objective:     Vital Signs (Most Recent):  Temp: 98 °F (36.7 °C) (09/10/23 0546)  Pulse: (!) 52 (09/10/23 0847)  Resp: (!) 25 (09/10/23 0847)  BP: 133/61 (09/10/23 0546)  SpO2: 95 % (09/10/23 0847) Vital Signs (24h Range):  Temp:  [97.6 °F (36.4 °C)-98.7 °F (37.1 °C)] 98 °F (36.7 °C)  Pulse:  [52-64] 52  Resp:  [16-28] 25  SpO2:  [91 %-100 %] 95 %  BP: (117-162)/(61-85) 133/61     Weight: 122.6 kg (270 lb 4.5 oz)  Body mass index is 46.39 kg/m².     SpO2: 95 %         Intake/Output Summary (Last 24 hours) at 9/10/2023 1238  Last data filed at 9/10/2023  0950  Gross per 24 hour   Intake 240 ml   Output 1200 ml   Net -960 ml       Lines/Drains/Airways       Peripheral Intravenous Line  Duration                  Peripheral IV - Single Lumen 09/09/23 1200 20 G;2 in Anterior;Left Forearm 1 day         Peripheral IV - Single Lumen 09/09/23 1507 20 G;2 in Anterior;Right Forearm <1 day                       Physical Exam  Eyes:      Pupils: Pupils are equal, round, and reactive to light.   Neck:      Trachea: No tracheal deviation.   Cardiovascular:      Rate and Rhythm: Normal rate and regular rhythm.      Pulses: Intact distal pulses.           Carotid pulses are 2+ on the right side and 2+ on the left side.       Radial pulses are 2+ on the right side and 2+ on the left side.        Femoral pulses are 2+ on the right side and 2+ on the left side.       Popliteal pulses are 2+ on the right side and 2+ on the left side.        Dorsalis pedis pulses are 2+ on the right side and 2+ on the left side.        Posterior tibial pulses are 2+ on the right side and 2+ on the left side.      Heart sounds: Normal heart sounds. No murmur heard.     No friction rub. No gallop.   Pulmonary:      Effort: Pulmonary effort is normal. No respiratory distress.      Breath sounds: Normal breath sounds. No stridor. No wheezing or rales.   Chest:      Chest wall: No tenderness.   Abdominal:      General: There is no distension.      Tenderness: There is no abdominal tenderness. There is no rebound.   Musculoskeletal:         General: No tenderness.      Cervical back: Normal range of motion.      Right lower leg: Edema present.      Left lower leg: Edema present.   Skin:     General: Skin is warm and dry.   Neurological:      Mental Status: She is alert and oriented to person, place, and time.            Significant Labs: CMP   Recent Labs   Lab 09/08/23  1941 09/09/23  0618 09/10/23  0521    146* 141   K 4.4 4.4 4.5    103 99   CO2 35* 32* 32*    121* 109   BUN 14 12 15    CREATININE 1.2 1.0 1.0   CALCIUM 10.3 10.4 9.9   PROT 8.5* 6.9  --    ALBUMIN 3.3* 3.0*  --    BILITOT 0.4 0.4  --    ALKPHOS 146* 112  --    AST 33 20  --    ALT 30 14  --    ANIONGAP -1* 11 10       Significant Imaging: Echocardiogram: Transthoracic echo (TTE) complete ([facility] Only):   Results for orders placed or performed during the hospital encounter of 09/09/23   Echo   Result Value Ref Range    BSA 2.36 m2    LVOT stroke volume 82.38 cm3    LVIDd 5.03 3.5 - 6.0 cm    LV Systolic Volume 68.43 mL    LV Systolic Volume Index 30.7 mL/m2    LVIDs 3.96 2.1 - 4.0 cm    LV Diastolic Volume 120.14 mL    LV Diastolic Volume Index 53.87 mL/m2    IVS 1.11 (A) 0.6 - 1.1 cm    LVOT diameter 1.95 cm    LVOT area 3.0 cm2    FS 21 (A) 28 - 44 %    Left Ventricle Relative Wall Thickness 0.54 cm    Posterior Wall 1.35 (A) 0.6 - 1.1 cm    LV mass 244.31 g    LV Mass Index 110 g/m2    MV Peak E Berhane 0.93 m/s    TDI LATERAL 0.13 m/s    TDI SEPTAL 0.08 m/s    E/E' ratio 8.86 m/s    MV Peak A Berhane 0.90 m/s    TR Max Berhane 3.30 m/s    E/A ratio 1.03     IVRT 137.01 msec    E wave deceleration time 232.88 msec    LV SEPTAL E/E' RATIO 11.63 m/s    LV LATERAL E/E' RATIO 7.15 m/s    LVOT peak berhane 1.35 m/s    Left Ventricular Outflow Tract Mean Velocity 0.87 cm/s    Left Ventricular Outflow Tract Mean Gradient 3.55 mmHg    LA size 3.64 cm    Left Atrium Major Axis 5.78 cm    Left Atrium Minor Axis 6.21 cm    RV mid diameter 5.15 cm    RVOT peak VTI 18.1 cm    RA Major Axis 4.94 cm    AV regurgitation pressure 1/2 time [id number] ms    AR Max Berhane 4.57 m/s    AV mean gradient 10 mmHg    AV peak gradient 17 mmHg    Ao peak berhane 2.09 m/s    Ao VTI 39.30 cm    LVOT peak VTI 27.60 cm    AV valve area 2.10 cm²    AV Velocity Ratio 0.65     AV index (prosthetic) 0.70     KARYNA by Velocity Ratio 1.93 cm²    Mr max berhane 5.59 m/s    Triscuspid Valve Regurgitation Peak Gradient 44 mmHg    PV mean gradient 2 mmHg    RVOT peak berhane 0.82 m/s    Ao root  annulus 3.18 cm    STJ 3.59 cm    Ascending aorta 3.46 cm    IVC diameter 2.31 cm    Mean e' 0.11 m/s    ZLVIDS -1.49     ZLVIDD -4.49     LA Volume Index 33.2 mL/m2    LA volume 74.10 cm3    LA WIDTH 4.0 cm    TAPSE 2.40 cm    RA Width 3.2 cm    TV resting pulmonary artery pressure 47 mmHg    RV TB RVSP 6 mmHg    Est. RA pres 3 mmHg    Narrative      Left Ventricle: The left ventricle is normal in size. Mildly increased   wall thickness. Normal wall motion. There is mildly reduced systolic   function with a visually estimated ejection fraction of 40 - 45%. There is   normal diastolic function.    Right Ventricle: Normal right ventricular cavity size. Wall thickness   is normal. Right ventricle wall motion  is normal. Systolic function is   normal.    Aortic Valve: There is mild to moderate aortic regurgitation with a   centrally directed jet.    Mitral Valve: There is moderate regurgitation.    Tricuspid Valve: There is moderate regurgitation with a centrally   directed jet.    Pulmonic Valve: There is mild to moderate regurgitation.    Pulmonary Artery: The estimated pulmonary artery systolic pressure is   47 mmHg.    IVC/SVC: Normal venous pressure at 3 mmHg.

## 2023-09-10 NOTE — PLAN OF CARE
[facility] - Telemetry (Hospital)  Initial Discharge Assessment       Primary Care Provider: Lucian Barry MD    Admission Diagnosis: Acute on chronic respiratory failure with hypoxia and hypercapnia [J96.21, J96.22]  Elevated troponin [R77.8]    Admission Date: 9/9/2023  Expected Discharge Date:     Transition of Care Barriers: None    Payor: HU[employer] MANAGED MEDICARE / Plan: HU[employer]NA MEDICARE HMO / Product Type: Capitation /     Extended Emergency Contact Information  Primary Emergency Contact: Esther Tan   [facility]  Home Phone: [phone]  Relation: Daughter  Secondary Emergency Contact: Alfonzo Tan  Home Phone: [phone]  Relation: Son  Preferred language: English    Discharge Plan A: Home         [id number] - [place], [place] - [address] [facility] [address] [facility]  [address]  [facility] [address]  Phone: [phone] Fax: [phone]    [id number] - [place], [place] - [address] AT [facility] [address]  [address]  [facility] [address]  Phone: [phone] Fax: [phone]      Initial Assessment (most recent)       Adult Discharge Assessment - 09/10/23 1222          Discharge Assessment    Assessment Type Discharge Planning Assessment     Confirmed/corrected address, phone number and insurance Yes   Physical address: [address] [place], [place] [address] [place] [address]    Confirmed Demographics Correct on Facesheet     Source of Information patient     When was your last doctors appointment? 07/12/23     Communicated TIGRE with patient/caregiver Date not available/Unable to determine     Reason For Admission SOB-Acute on chronic respiratory failure with hypoxia (P)      People in Home child(edmar), adult;spouse     Do you expect to return to your current living situation? Yes     Do you have help at home or someone to help you manage your care at home? Yes     Who are your caregiver(s) and their phone  number(s)? Geraldine Rosenbaum (Cousin) (P)      Prior to hospitilization cognitive status: Alert/Oriented     Current cognitive status: Alert/Oriented     Home Accessibility wheelchair accessible;stairs to enter home     Number of Stairs, Main Entrance none;other (see comments)   Ramp    Surface of Stairs, Main Entrance hardwood     Stair Railings, Main Entrance railings on both sides of stairs     Landing, Stairs, Main Entrance railings present     Home Layout Able to live on 1st floor     Equipment Currently Used at Home rollator;cane, straight;wheelchair;bedside commode;nebulizer;oxygen;shower chair     Readmission within 30 days? No     Patient currently being followed by outpatient case management? Yes     If yes, name of outpatient case management following: other (comments) (P)    Patient reports that she is on HH and Hospice but can't recall the name.    Do you currently have service(s) that help you manage your care at home? Yes     Name and Contact number of agency Patient reports that she is on HH and Hospice but can't recall the name. (P)      Is the pt/caregiver preference to resume services with current agency Yes (P)      Do you take prescription medications? Yes     Do you have prescription coverage? Yes     Coverage HU[employer] MANAGED MEDICARE - HU[employer] MEDICARE HMO (P)      Do you have any problems affording any of your prescribed medications? No (P)      Is the patient taking medications as prescribed? yes (P)      Who is going to help you get home at discharge? Patient is not sure who will get her     How do you get to doctors appointments? family or friend will provide     Are you on dialysis? No     Do you take coumadin? No     DME Needed Upon Discharge  none     Discharge Plan discussed with: Patient     Transition of Care Barriers None     Discharge Plan A Home        Physical Activity    On average, how many days per week do you engage in moderate to strenuous exercise (like a brisk walk)? 0 days      "On average, how many minutes do you engage in exercise at this level? 0 min        Financial Resource Strain    How hard is it for you to pay for the very basics like food, housing, medical care, and heating? Not hard at all        Housing Stability    In the last 12 months, was there a time when you were not able to pay the mortgage or rent on time? No     In the last 12 months, was there a time when you did not have a steady place to sleep or slept in a shelter (including now)? No        Transportation Needs    In the past 12 months, has lack of transportation kept you from medical appointments or from getting medications? No     In the past 12 months, has lack of transportation kept you from meetings, work, or from getting things needed for daily living? No        Food Insecurity    Within the past 12 months, you worried that your food would run out before you got the money to buy more. Never true     Within the past 12 months, the food you bought just didn't last and you didn't have money to get more. Never true        Social Connections    In a typical week, how many times do you talk on the phone with family, friends, or neighbors? More than three times a week     How often do you get together with friends or relatives? More than three times a week     How often do you attend meetings of the clubs or organizations you belong to? Never     Are you , , , , never , or living with a partner?         OTHER    Name(s) of People in Home Alfonzo Tan (son) and her spouse                      Patient reports that she is on HH and Hospice but can't recall the name.  She is also noted to be a Full Code.  FRANCOIS discussed with patient that typically patients on Hospice or DNR.  Patient stated that "she is not sure."  She appeared oriented, however SW is not sure what her base line is.       Marija Nation LMSW 9/10/2023 12:35 PM      "

## 2023-09-10 NOTE — ASSESSMENT & PLAN NOTE
Body mass index is 46.39 kg/m². Morbid obesity complicates all aspects of disease management from diagnostic modalities to treatment. Weight loss encouraged and health benefits explained to patient.

## 2023-09-10 NOTE — PROGRESS NOTES
[facility] - Tele[facility] ([facility])  Cardiology  Progress Note    Patient Name: Carmen Tan  MRN: [id number]  Admission Date: 9/9/2023  Hospital Length of Stay: 1 days  Code Status: Full Code   Attending Physician: Elmer Nichols MD   Primary Care Physician: Lucian Barry MD  Expected Discharge Date:   Principal Problem:Acute on chronic respiratory failure with hypoxia    Subjective:     Hospital Course:   9/10/23-Patient seen and examined. Stable and continue diuresis      Interval History: interval improvement    Review of Systems   Constitutional: Positive for malaise/fatigue. Negative for chills, diaphoresis, night sweats, weight gain and weight loss.   HENT:  Negative for congestion, hoarse voice, sore throat and stridor.    Eyes:  Negative for double vision and pain.   Cardiovascular:  Positive for dyspnea on exertion and leg swelling. Negative for chest pain, claudication, cyanosis, irregular heartbeat, near-syncope, orthopnea, palpitations, paroxysmal nocturnal dyspnea and syncope.   Respiratory:  Negative for cough, hemoptysis, shortness of breath, sleep disturbances due to breathing, snoring, sputum production and wheezing.    Endocrine: Negative for cold intolerance, heat intolerance and polydipsia.   Hematologic/Lymphatic: Negative for bleeding problem. Does not bruise/bleed easily.   Skin:  Negative for color change, dry skin and rash.   Musculoskeletal:  Negative for joint swelling and muscle cramps.   Gastrointestinal:  Negative for bloating, abdominal pain, constipation, diarrhea, dysphagia, melena, nausea and vomiting.   Genitourinary:  Negative for flank pain and urgency.   Neurological:  Negative for dizziness, focal weakness, headaches, light-headedness, loss of balance, seizures and weakness.   Psychiatric/Behavioral:  Negative for altered mental status and memory loss. The patient is not nervous/anxious.      Objective:     Vital Signs (Most Recent):  Temp: 98 °F (36.7 °C) (09/10/23 0546)  Pulse:  (!) 52 (09/10/23 0847)  Resp: (!) 25 (09/10/23 0847)  BP: 133/61 (09/10/23 0546)  SpO2: 95 % (09/10/23 0847) Vital Signs (24h Range):  Temp:  [97.6 °F (36.4 °C)-98.7 °F (37.1 °C)] 98 °F (36.7 °C)  Pulse:  [52-64] 52  Resp:  [16-28] 25  SpO2:  [91 %-100 %] 95 %  BP: (117-162)/(61-85) 133/61     Weight: 122.6 kg (270 lb 4.5 oz)  Body mass index is 46.39 kg/m².     SpO2: 95 %         Intake/Output Summary (Last 24 hours) at 9/10/2023 1238  Last data filed at 9/10/2023 0950  Gross per 24 hour   Intake 240 ml   Output 1200 ml   Net -960 ml       Lines/Drains/Airways       Peripheral Intravenous Line  Duration                  Peripheral IV - Single Lumen 09/09/23 1200 20 G;2 in Anterior;Left Forearm 1 day         Peripheral IV - Single Lumen 09/09/23 1507 20 G;2 in Anterior;Right Forearm <1 day                       Physical Exam  Eyes:      Pupils: Pupils are equal, round, and reactive to light.   Neck:      Trachea: No tracheal deviation.   Cardiovascular:      Rate and Rhythm: Normal rate and regular rhythm.      Pulses: Intact distal pulses.           Carotid pulses are 2+ on the right side and 2+ on the left side.       Radial pulses are 2+ on the right side and 2+ on the left side.        Femoral pulses are 2+ on the right side and 2+ on the left side.       Popliteal pulses are 2+ on the right side and 2+ on the left side.        Dorsalis pedis pulses are 2+ on the right side and 2+ on the left side.        Posterior tibial pulses are 2+ on the right side and 2+ on the left side.      Heart sounds: Normal heart sounds. No murmur heard.     No friction rub. No gallop.   Pulmonary:      Effort: Pulmonary effort is normal. No respiratory distress.      Breath sounds: Normal breath sounds. No stridor. No wheezing or rales.   Chest:      Chest wall: No tenderness.   Abdominal:      General: There is no distension.      Tenderness: There is no abdominal tenderness. There is no rebound.   Musculoskeletal:          General: No tenderness.      Cervical back: Normal range of motion.      Right lower leg: Edema present.      Left lower leg: Edema present.   Skin:     General: Skin is warm and dry.   Neurological:      Mental Status: She is alert and oriented to person, place, and time.            Significant Labs: CMP   Recent Labs   Lab 09/08/23  1941 09/09/23  0618 09/10/23  0521    146* 141   K 4.4 4.4 4.5    103 99   CO2 35* 32* 32*    121* 109   BUN 14 12 15   CREATININE 1.2 1.0 1.0   CALCIUM 10.3 10.4 9.9   PROT 8.5* 6.9  --    ALBUMIN 3.3* 3.0*  --    BILITOT 0.4 0.4  --    ALKPHOS 146* 112  --    AST 33 20  --    ALT 30 14  --    ANIONGAP -1* 11 10       Significant Imaging: Echocardiogram: Transthoracic echo (TTE) complete ([facility] Only):   Results for orders placed or performed during the hospital encounter of 09/09/23   Echo   Result Value Ref Range    BSA 2.36 m2    LVOT stroke volume 82.38 cm3    LVIDd 5.03 3.5 - 6.0 cm    LV Systolic Volume 68.43 mL    LV Systolic Volume Index 30.7 mL/m2    LVIDs 3.96 2.1 - 4.0 cm    LV Diastolic Volume 120.14 mL    LV Diastolic Volume Index 53.87 mL/m2    IVS 1.11 (A) 0.6 - 1.1 cm    LVOT diameter 1.95 cm    LVOT area 3.0 cm2    FS 21 (A) 28 - 44 %    Left Ventricle Relative Wall Thickness 0.54 cm    Posterior Wall 1.35 (A) 0.6 - 1.1 cm    LV mass 244.31 g    LV Mass Index 110 g/m2    MV Peak E Berhane 0.93 m/s    TDI LATERAL 0.13 m/s    TDI SEPTAL 0.08 m/s    E/E' ratio 8.86 m/s    MV Peak A Berhane 0.90 m/s    TR Max Berhane 3.30 m/s    E/A ratio 1.03     IVRT 137.01 msec    E wave deceleration time 232.88 msec    LV SEPTAL E/E' RATIO 11.63 m/s    LV LATERAL E/E' RATIO 7.15 m/s    LVOT peak berhane 1.35 m/s    Left Ventricular Outflow Tract Mean Velocity 0.87 cm/s    Left Ventricular Outflow Tract Mean Gradient 3.55 mmHg    LA size 3.64 cm    Left Atrium Major Axis 5.78 cm    Left Atrium Minor Axis 6.21 cm    RV mid diameter 5.15 cm    RVOT peak VTI 18.1 cm    RA Major Axis  4.94 cm    AV regurgitation pressure 1/2 time [id number] ms    AR Max Berhane 4.57 m/s    AV mean gradient 10 mmHg    AV peak gradient 17 mmHg    Ao peak berhane 2.09 m/s    Ao VTI 39.30 cm    LVOT peak VTI 27.60 cm    AV valve area 2.10 cm²    AV Velocity Ratio 0.65     AV index (prosthetic) 0.70     KARYNA by Velocity Ratio 1.93 cm²    Mr max berhane 5.59 m/s    Triscuspid Valve Regurgitation Peak Gradient 44 mmHg    PV mean gradient 2 mmHg    RVOT peak berhane 0.82 m/s    Ao root annulus 3.18 cm    STJ 3.59 cm    Ascending aorta 3.46 cm    IVC diameter 2.31 cm    Mean e' 0.11 m/s    ZLVIDS -1.49     ZLVIDD -4.49     LA Volume Index 33.2 mL/m2    LA volume 74.10 cm3    LA WIDTH 4.0 cm    TAPSE 2.40 cm    RA Width 3.2 cm    TV resting pulmonary artery pressure 47 mmHg    RV TB RVSP 6 mmHg    Est. RA pres 3 mmHg    Narrative      Left Ventricle: The left ventricle is normal in size. Mildly increased   wall thickness. Normal wall motion. There is mildly reduced systolic   function with a visually estimated ejection fraction of 40 - 45%. There is   normal diastolic function.    Right Ventricle: Normal right ventricular cavity size. Wall thickness   is normal. Right ventricle wall motion  is normal. Systolic function is   normal.    Aortic Valve: There is mild to moderate aortic regurgitation with a   centrally directed jet.    Mitral Valve: There is moderate regurgitation.    Tricuspid Valve: There is moderate regurgitation with a centrally   directed jet.    Pulmonic Valve: There is mild to moderate regurgitation.    Pulmonary Artery: The estimated pulmonary artery systolic pressure is   47 mmHg.    IVC/SVC: Normal venous pressure at 3 mmHg.       Assessment and Plan:     Brief HPI: stable and continue diuresis    * Acute on chronic respiratory failure with hypoxia  Patient with Hypercapnic and Hypoxic Respiratory failure which is Acute on chronic.  she is not on home oxygen. Supplemental oxygen was provided and noted-  Oxygen Concentration (%):  [30-36] 36    .   Signs/symptoms of respiratory failure include- tachypnea. Contributing diagnoses includes - COPD Labs and images were reviewed. Patient Has recent ABG, which has been reviewed. Will treat underlying causes and adjust management of respiratory failure as follows- hypercapnic    Elevated troponin  Trend  Cardiac echo results pending    Hypertension  Continue coreg    Acute cystitis without hematuria  treated    Acute on chronic congestive heart failure  Patient is identified as having Diastolic (HFpEF) heart failure that is Acute on chronic. CHF is currently uncontrolled due to Continued edema of extremities. Latest ECHO performed and demonstrates- Results for orders placed during the hospital encounter of 03/29/23    Echo    Interpretation Summary  · The left ventricle is normal in size with moderate concentric hypertrophy and mildly decreased systolic function.  · The estimated ejection fraction is 45%.  · Grade I left ventricular diastolic dysfunction.  · Moderate right ventricular enlargement.  · Moderate right atrial enlargement.  · Mild-to-moderate aortic regurgitation.  · There is mild aortic valve stenosis.  · Aortic valve area is 1.44 cm2; peak velocity is 2.09 m/s; mean gradient is 10 mmHg.  · Mild-to-moderate mitral regurgitation.  · Moderate tricuspid regurgitation.  · Moderate pulmonic regurgitation.  · The estimated PA systolic pressure is 69 mmHg.  · There is moderate pulmonary hypertension.  · Moderate left atrial enlargement.  . Continue Beta Blocker and Furosemide and monitor clinical status closely. Monitor on telemetry. Patient is on CHF pathway.  Monitor strict Is&Os and daily weights.  Place on fluid restriction of 2 L. Cardiology has been consulted. Continue to stress to patient importance of self efficacy and  on diet for CHF. Last BNP reviewed- and noted below   Recent Labs   Lab 09/09/23  0618   BNP 1,532*   .        VTE Risk Mitigation  (From admission, onward)         Ordered     heparin 25,000 units in dextrose 5% (100 units/ml) IV bolus from bag - ADDITIONAL PRN BOLUS - 60 units/kg  As needed (PRN)        Question:  Heparin Infusion Adjustment (DO NOT MODIFY ANSWER)  Answer:  [email or web address]\epic\Images\Pharmacy\HeparinInfusions\heparin LOW INTENSITY nomogram for [facility] [id number]    09/09/23 0547     heparin 25,000 units in dextrose 5% (100 units/ml) IV bolus from bag - ADDITIONAL PRN BOLUS - 30 units/kg  As needed (PRN)        Question:  Heparin Infusion Adjustment (DO NOT MODIFY ANSWER)  Answer:  [email or web address]\epic\Images\Pharmacy\HeparinInfusions\heparin LOW INTENSITY nomogram for [facility] [id number].pdf    09/09/23 0547     heparin 25,000 units in dextrose 5% 250 mL (100 units/mL) infusion LOW INTENSITY nomogram - [facility]  Continuous        Question Answer Comment   Heparin Infusion Adjustment (DO NOT MODIFY ANSWER) [email or web address]\epic\Images\Pharmacy\HeparinInfusions\heparin LOW INTENSITY nomogram for [facility] [id number].pdf    Begin at (in units/kg/hr) 12        09/09/23 0547     IP VTE HIGH RISK PATIENT  Once         09/09/23 0447     Place sequential compression device  Until discontinued         09/09/23 0447                Lorenzo Sampson MD  Cardiology  [facility] - Telemetry ([facility])

## 2023-09-10 NOTE — ASSESSMENT & PLAN NOTE
Patient with Hypoxic Respiratory failure which is Acute on chronic.  she is on home oxygen at 4 LPM. Supplemental oxygen was provided and noted- Oxygen Concentration (%):  [30-36] 36    .   Signs/symptoms of respiratory failure include- increased work of breathing. Contributing diagnoses includes - CHF and COPD Labs and images were reviewed. Patient Has not had a recent ABG. Will treat underlying causes and adjust management of respiratory failure

## 2023-09-10 NOTE — HOSPITAL COURSE
9/10/23-Patient seen and examined. Stable and continue diuresis    9/11/23-Patient seen and examined today, lying in bed with CPAP on. Feels ok. SOB improving. No CP. Labs reviewed, creatinine 1.2.     9/12/23-Patient seen and examined today, resting with CPAP on. Continues to improve. Diuresing well. Labs reviewed/stable. Possible MPI stress test tmw.    9/13/23-Patient seen and examined today, sitting up in bed. SOB much improved, on supplemental O2. No CP. BNP trended down significantly. Labs reviewed, mild bump in BUN/creatinine, IV Lasix d/c'd. Stress test ordered (rest portion today).    9/14/23-Patient seen and examined today, on CPAP. Wheezing, unable to complete stress test today. Give additional dose of IV Lasix today. Labs reviewed.     9/15/23-Patient seen and examined today, wearing CPAP. Breathing improved. Sill wheezing, unable to complete stress test. Labs reviewed. CBC pending.     9/16/23- BP remains elevated this morning. Remains on CPAP. O2 sats are low 90s this morning. CT negative for PE. Lower extremity US negative for DVT. Continues to diurese well. -9L so far.      9/17/23 - feels better this AM sitting up, no wheezing on exam. Discussed Nuc stress tomorrow AM if continues to have stable resp status. Vitals and labs stable    9/18/23 Pt seen and examined today, sitting up in bedside chair, Bipap on. Labs reviewed, K 5.2. No nuclear stress today, wheezing/Bipap    9/19/23 Pt seen and examined today, resting in bed with Bipap on. Pt reports she does not wear her Bipap all day. Still with ellen wheezing on exam. Labs reviewed, chart reviewed

## 2023-09-10 NOTE — SUBJECTIVE & OBJECTIVE
Interval History: Diuresed well overnight.     Review of Systems   Constitutional:  Positive for activity change, fatigue and unexpected weight change. Negative for appetite change, chills, diaphoresis and fever.   HENT:  Negative for congestion, nosebleeds, sinus pressure and sore throat.    Eyes:  Negative for pain, discharge and visual disturbance.   Respiratory:  Positive for shortness of breath and wheezing. Negative for cough, chest tightness and stridor.    Cardiovascular:  Positive for leg swelling. Negative for chest pain and palpitations.   Gastrointestinal:  Negative for abdominal distention, abdominal pain, blood in stool, constipation, diarrhea, nausea and vomiting.   Endocrine: Negative for cold intolerance and heat intolerance.   Genitourinary:  Negative for difficulty urinating, dysuria, flank pain, frequency and urgency.   Musculoskeletal:  Negative for arthralgias, back pain, joint swelling, myalgias, neck pain and neck stiffness.   Skin:  Negative for rash and wound.   Allergic/Immunologic: Negative for food allergies and immunocompromised state.   Neurological:  Positive for weakness (generalized). Negative for dizziness, seizures, syncope, facial asymmetry, speech difficulty, light-headedness, numbness and headaches.   Hematological:  Negative for adenopathy.   Psychiatric/Behavioral:  Negative for agitation, confusion and hallucinations.      Objective:     Vital Signs (Most Recent):  Temp: 98 °F (36.7 °C) (09/10/23 0546)  Pulse: (!) 52 (09/10/23 0847)  Resp: (!) 25 (09/10/23 0847)  BP: 133/61 (09/10/23 0546)  SpO2: 95 % (09/10/23 0847) Vital Signs (24h Range):  Temp:  [97.6 °F (36.4 °C)-98.7 °F (37.1 °C)] 98 °F (36.7 °C)  Pulse:  [52-65] 52  Resp:  [16-30] 25  SpO2:  [91 %-100 %] 95 %  BP: (117-162)/(61-85) 133/61     Weight: 122.6 kg (270 lb 4.5 oz)  Body mass index is 46.39 kg/m².    Intake/Output Summary (Last 24 hours) at 9/10/2023 1143  Last data filed at 9/10/2023 0950  Gross per 24 hour    Intake 240 ml   Output 1200 ml   Net -960 ml         Physical Exam  Vitals and nursing note reviewed.   Constitutional:       General: She is not in acute distress.     Appearance: She is well-developed. She is not diaphoretic.      Interventions: Nasal cannula in place.   HENT:      Head: Normocephalic and atraumatic.      Nose: Nose normal.   Eyes:      General: No scleral icterus.     Conjunctiva/sclera: Conjunctivae normal.   Neck:      Vascular: No JVD.      Trachea: No tracheal deviation.   Cardiovascular:      Rate and Rhythm: Normal rate and regular rhythm.      Heart sounds: Normal heart sounds. No murmur heard.     No friction rub. No gallop.   Pulmonary:      Effort: Pulmonary effort is normal. No accessory muscle usage or respiratory distress.      Breath sounds: No stridor. Rales present. No wheezing.   Chest:      Chest wall: No tenderness.   Abdominal:      General: Bowel sounds are normal. There is no distension.      Palpations: Abdomen is soft. There is no mass.      Tenderness: There is no abdominal tenderness. There is no guarding or rebound.   Musculoskeletal:         General: No tenderness or deformity. Normal range of motion.      Cervical back: Normal range of motion and neck supple.      Right lower le+ Edema present.      Left lower le+ Edema present.   Skin:     General: Skin is warm and dry.      Coloration: Skin is not pale.      Findings: No erythema or rash.   Neurological:      Mental Status: She is alert and oriented to person, place, and time.      Cranial Nerves: No cranial nerve deficit.      Motor: No abnormal muscle tone.      Coordination: Coordination normal.   Psychiatric:         Behavior: Behavior normal.         Thought Content: Thought content normal.             Significant Labs: All pertinent labs within the past 24 hours have been reviewed.  BMP:   Recent Labs   Lab 23  0618 09/10/23  0521   * 109   * 141   K 4.4 4.5    99   CO2 32*  32*   BUN 12 15   CREATININE 1.0 1.0   CALCIUM 10.4 9.9   MG 1.9  --      CBC:   Recent Labs   Lab 09/08/23  1941 09/09/23  0618 09/10/23  0521   WBC 6.46 6.42  6.42 8.34   HGB 16.1* 14.3  14.3 13.9   HCT 52.9* 47.6  47.6 44.4    173  173 202     CMP:   Recent Labs   Lab 09/08/23  1941 09/09/23  0618 09/10/23  0521    146* 141   K 4.4 4.4 4.5    103 99   CO2 35* 32* 32*    121* 109   BUN 14 12 15   CREATININE 1.2 1.0 1.0   CALCIUM 10.3 10.4 9.9   PROT 8.5* 6.9  --    ALBUMIN 3.3* 3.0*  --    BILITOT 0.4 0.4  --    ALKPHOS 146* 112  --    AST 33 20  --    ALT 30 14  --    ANIONGAP -1* 11 10     Cardiac Markers:   Recent Labs   Lab 09/09/23 0618   BNP 1,532*       Significant Imaging: I have reviewed all pertinent imaging results/findings within the past 24 hours.

## 2023-09-10 NOTE — ASSESSMENT & PLAN NOTE
Patient's FSGs are controlled on current medication regimen.  Last A1c reviewed-   Lab Results   Component Value Date    HGBA1C 5.3 09/09/2023    HGBA1C 5.3 09/09/2023     Most recent fingerstick glucose reviewed-   Recent Labs   Lab 09/09/23  1233 09/09/23  1556 09/09/23  2203 09/10/23  0751   POCTGLUCOSE 119* 114* 155* 116*     Current correctional scale  Low  Maintain anti-hyperglycemic dose as follows-   Antihyperglycemics (From admission, onward)    Start     Stop Route Frequency Ordered    09/09/23 0545  insulin aspart U-100 pen 0-5 Units         -- SubQ Before meals & nightly PRN 09/09/23 0447        Hold Oral hypoglycemics while patient is in the hospital.   no concerns

## 2023-09-10 NOTE — HOSPITAL COURSE
Patient sitting on side of the bed eating. No distress noted. Reports feeling much better. On continuous oxygen. Diuresed well overnight. Will continue to diurese.  On 9/11/23, diuresis continued. Cardiology following with inpatient vs outpatient ischemic work up to be evaluated. Troponin remains elevated with downward trend noted. Urine culture grew E. Coli (50,000-99,999) with sensitivities reviewed and antibiotics continued. Echo on 9/9/23 showed EF 40-45%  mildly reduced systolic function- mild to moderate aortic/pulmonic regurgitation with a centrally directed jet. Mitral/Tricuspid moderate regurgitation. BIPAP placed as needed for comfort and respiratory support. On 9/12/23, diuresis continued with 4.8 L removed. BIPAP continued nightly and as needed. Stress test planned for tomorrow per Cardiology. On 9/13/23, pt verbalized symptom improvement with IV diuresis completed. Respiratory support maintained on home oxygen dose of 4L NC. Resting portion of stress test performed today with second portion to be completed on tomorrow. On 9/14/23, pt was unable to complete second portion of stress test due to wheezing. IV Lasix continued. Pt verbalized symptom improvement with CPAP in place. Stress test to be attempted in am. On 9/15/23, stress test not completed due to continued wheezing. Diuresis continued and D-dimer of 3.35 noted. Lovenox initiated at therapeutic dose per Cardiology. CTA of chest and US of BLE pending. Oxygen requirements supported with nasal cannula at home dose and CPAP. May complete stress test outpatient post acute event pending imaging results. On 9/16/23, CTA of chest showed no pulmonary embolism, no dissection, and no pneumonia.  Cardiomegaly.  Mild basilar atelectasis. Incentive spirometry encouraged. US of BLE showed no obvious signs of DVT in bilateral lower extremity. Lovenox at therapeutic dose held. IV steroids transitioned to oral dosing. On 9/17/23, pt improving slowly with BIPAP as  needed for respiratory support. Pulmonology consulted. ABG results reviewed. Stress test to be completed on 9/18/23 pending wheezing and respiratory status per Cardiology. Pt counseled on smoking cessation and dangers of smoking while wearing supplemental oxygen with understanding verbalized. 9/18/23: evaluated by cardiology. Recommended to give an additional day prior to stress test. BNP normal. Hold diuretics. Monitor respiratory status.       9/19/23 patient continues to have wheezing related to chronic respiratory disease. Stress test deferred until outpatient. Ambulatory referral order placed. Prior to admission patient was with [facility]. Contacted the company who is not willing to accept patient back because she still wants to see doctors if needed. She will keep pulmonologist for outpatient PFT and possible medication adjustment. She continues to smoke and was counseled on cessation. She has remained stable and is ready for discharge.

## 2023-09-10 NOTE — PROGRESS NOTES
[facility] - Telemetry ([facility])  [facility] Medicine  Progress Note    Patient Name: Carmen Tan  MRN: [id number]  Patient Class: IP- Inpatient   Admission Date: 9/9/2023  Length of Stay: 1 days  Attending Physician: Elmer Nichols MD  Primary Care Provider: Lucian Barry MD        Subjective:     Principal Problem:Acute on chronic respiratory failure with hypoxia        HPI:  The patient is a 73 year old female with DM, COPD on home oxygen 4 liters, Diastolic CHF, HTN, hx PE who presented to [facility] ED with Generalized weakness, Fatigue, SOB, and Dysuria - onset 2 days ago that progressively worsened. Pt also reports BLE edema that worsened over the past week.     n the ED, CXR shows chronic scarring no focal consolidation. Elevated troponin of 200, elevated BNP. Labs otherwise unremarkable. COVID negative. Patient denied any chest pain, EKG non-ischemia. Patient given Aspirin 325 mg, Lovenox 1mg/kg, and Lasix 40 IV x1 as well as a breathing treatment. Po Bactrim for UTI    The patient was transferred to [facility] for admission under hospital medicine and for cardiology consultation       Overview/Hospital Course:  Patient sitting on side of the bed eating. No distress noted. Reports feeling much better. On continuous oxygen. Diuresed well overnight. Will continue to diurese.        Interval History: Diuresed well overnight.     Review of Systems   Constitutional:  Positive for activity change, fatigue and unexpected weight change. Negative for appetite change, chills, diaphoresis and fever.   HENT:  Negative for congestion, nosebleeds, sinus pressure and sore throat.    Eyes:  Negative for pain, discharge and visual disturbance.   Respiratory:  Positive for shortness of breath and wheezing. Negative for cough, chest tightness and stridor.    Cardiovascular:  Positive for leg swelling. Negative for chest pain and palpitations.   Gastrointestinal:  Negative for abdominal distention, abdominal pain, blood in  stool, constipation, diarrhea, nausea and vomiting.   Endocrine: Negative for cold intolerance and heat intolerance.   Genitourinary:  Negative for difficulty urinating, dysuria, flank pain, frequency and urgency.   Musculoskeletal:  Negative for arthralgias, back pain, joint swelling, myalgias, neck pain and neck stiffness.   Skin:  Negative for rash and wound.   Allergic/Immunologic: Negative for food allergies and immunocompromised state.   Neurological:  Positive for weakness (generalized). Negative for dizziness, seizures, syncope, facial asymmetry, speech difficulty, light-headedness, numbness and headaches.   Hematological:  Negative for adenopathy.   Psychiatric/Behavioral:  Negative for agitation, confusion and hallucinations.      Objective:     Vital Signs (Most Recent):  Temp: 98 °F (36.7 °C) (09/10/23 0546)  Pulse: (!) 52 (09/10/23 0847)  Resp: (!) 25 (09/10/23 0847)  BP: 133/61 (09/10/23 0546)  SpO2: 95 % (09/10/23 0847) Vital Signs (24h Range):  Temp:  [97.6 °F (36.4 °C)-98.7 °F (37.1 °C)] 98 °F (36.7 °C)  Pulse:  [52-65] 52  Resp:  [16-30] 25  SpO2:  [91 %-100 %] 95 %  BP: (117-162)/(61-85) 133/61     Weight: 122.6 kg (270 lb 4.5 oz)  Body mass index is 46.39 kg/m².    Intake/Output Summary (Last 24 hours) at 9/10/2023 1143  Last data filed at 9/10/2023 0950  Gross per 24 hour   Intake 240 ml   Output 1200 ml   Net -960 ml         Physical Exam  Vitals and nursing note reviewed.   Constitutional:       General: She is not in acute distress.     Appearance: She is well-developed. She is not diaphoretic.      Interventions: Nasal cannula in place.   HENT:      Head: Normocephalic and atraumatic.      Nose: Nose normal.   Eyes:      General: No scleral icterus.     Conjunctiva/sclera: Conjunctivae normal.   Neck:      Vascular: No JVD.      Trachea: No tracheal deviation.   Cardiovascular:      Rate and Rhythm: Normal rate and regular rhythm.      Heart sounds: Normal heart sounds. No murmur heard.      No friction rub. No gallop.   Pulmonary:      Effort: Pulmonary effort is normal. No accessory muscle usage or respiratory distress.      Breath sounds: No stridor. Rales present. No wheezing.   Chest:      Chest wall: No tenderness.   Abdominal:      General: Bowel sounds are normal. There is no distension.      Palpations: Abdomen is soft. There is no mass.      Tenderness: There is no abdominal tenderness. There is no guarding or rebound.   Musculoskeletal:         General: No tenderness or deformity. Normal range of motion.      Cervical back: Normal range of motion and neck supple.      Right lower le+ Edema present.      Left lower le+ Edema present.   Skin:     General: Skin is warm and dry.      Coloration: Skin is not pale.      Findings: No erythema or rash.   Neurological:      Mental Status: She is alert and oriented to person, place, and time.      Cranial Nerves: No cranial nerve deficit.      Motor: No abnormal muscle tone.      Coordination: Coordination normal.   Psychiatric:         Behavior: Behavior normal.         Thought Content: Thought content normal.             Significant Labs: All pertinent labs within the past 24 hours have been reviewed.  BMP:   Recent Labs   Lab 09/09/23  0618 09/10/23  0521   * 109   * 141   K 4.4 4.5    99   CO2 32* 32*   BUN 12 15   CREATININE 1.0 1.0   CALCIUM 10.4 9.9   MG 1.9  --      CBC:   Recent Labs   Lab 09/08/23  1941 09/09/23  0618 09/10/23  0521   WBC 6.46 6.42  6.42 8.34   HGB 16.1* 14.3  14.3 13.9   HCT 52.9* 47.6  47.6 44.4    173  173 202     CMP:   Recent Labs   Lab 09/08/23  1941 09/09/23  0618 09/10/23  0521    146* 141   K 4.4 4.4 4.5    103 99   CO2 35* 32* 32*    121* 109   BUN 14 12 15   CREATININE 1.2 1.0 1.0   CALCIUM 10.3 10.4 9.9   PROT 8.5* 6.9  --    ALBUMIN 3.3* 3.0*  --    BILITOT 0.4 0.4  --    ALKPHOS 146* 112  --    AST 33 20  --    ALT 30 14  --    ANIONGAP -1* 11 10      Cardiac Markers:   Recent Labs   Lab 09/09/23  0618   BNP 1,532*       Significant Imaging: I have reviewed all pertinent imaging results/findings within the past 24 hours.      Assessment/Plan:      * Acute on chronic respiratory failure with hypoxia  Patient with Hypoxic Respiratory failure which is Acute on chronic.  she is on home oxygen at 4 LPM. Supplemental oxygen was provided and noted- Oxygen Concentration (%):  [30-36] 36    .   Signs/symptoms of respiratory failure include- increased work of breathing. Contributing diagnoses includes - CHF and COPD Labs and images were reviewed. Patient Has not had a recent ABG. Will treat underlying causes and adjust management of respiratory failure     Elevated troponin  Serial troponin   Cardiology recommended Lovenox 1mg/kg to ED provider   Will start IV Heparin  cont ASA, BB, Statin, Entresto   Echo   Consult cardiology     9/10/23  Denies chest pain.   Troponin trending down  Continue current meds     Hypertension  BP stable  Cont home meds Coreg and Entresto  Monitor     Acute cystitis without hematuria  IV Rocephin   Blood cx remain negative  Urine culture growing GRAM NEGATIVE CARLA         Diabetes mellitus, type 2  Patient's FSGs are controlled on current medication regimen.  Last A1c reviewed-   Lab Results   Component Value Date    HGBA1C 5.3 09/09/2023    HGBA1C 5.3 09/09/2023     Most recent fingerstick glucose reviewed-   Recent Labs   Lab 09/09/23  1233 09/09/23  1556 09/09/23  2203 09/10/23  0751   POCTGLUCOSE 119* 114* 155* 116*     Current correctional scale  Low  Maintain anti-hyperglycemic dose as follows-   Antihyperglycemics (From admission, onward)    Start     Stop Route Frequency Ordered    09/09/23 0545  insulin aspart U-100 pen 0-5 Units         -- SubQ Before meals & nightly PRN 09/09/23 0447        Hold Oral hypoglycemics while patient is in the hospital.    Acute on chronic congestive heart failure  Patient is identified as having  Diastolic (HFpEF) heart failure that is Acute on chronic. CHF is currently uncontrolled due to Rales/crackles on pulmonary exam and Pulmonary edema/pleural effusion on CXR. Latest ECHO performed and demonstrates- Results for orders placed during the hospital encounter of 03/29/23    Echo    Interpretation Summary  · The left ventricle is normal in size with moderate concentric hypertrophy and mildly decreased systolic function.  · The estimated ejection fraction is 45%.  · Grade I left ventricular diastolic dysfunction.  · Moderate right ventricular enlargement.  · Moderate right atrial enlargement.  · Mild-to-moderate aortic regurgitation.  · There is mild aortic valve stenosis.  · Aortic valve area is 1.44 cm2; peak velocity is 2.09 m/s; mean gradient is 10 mmHg.  · Mild-to-moderate mitral regurgitation.  · Moderate tricuspid regurgitation.  · Moderate pulmonic regurgitation.  · The estimated PA systolic pressure is 69 mmHg.  · There is moderate pulmonary hypertension.  · Moderate left atrial enlargement.  . Continue Beta Blocker, Furosemide, Aldactone and ARNI and monitor clinical status closely. Monitor on telemetry. Patient is on CHF pathway.  Monitor strict Is&Os and daily weights.  Place on fluid restriction of 1.5 L. Cardiology has been consulted. Continue to stress to patient importance of self efficacy and  on diet for CHF. Last BNP reviewed- and noted below   Recent Labs   Lab 09/09/23  0618   BNP 1,532*     9/10/23  -Diuresing well  -Continue IV lasix      COPD exacerbation  IV Steroids  LABA. CITLALY. ICS neb txs      Severe obesity (BMI >= 40)  Body mass index is 46.39 kg/m². Morbid obesity complicates all aspects of disease management from diagnostic modalities to treatment. Weight loss encouraged and health benefits explained to patient.           VTE Risk Mitigation (From admission, onward)         Ordered     heparin 25,000 units in dextrose 5% (100 units/ml) IV bolus from bag - ADDITIONAL PRN  BOLUS - 60 units/kg  As needed (PRN)        Question:  Heparin Infusion Adjustment (DO NOT MODIFY ANSWER)  Answer:  \\[email or web address]\epic\Images\Pharmacy\HeparinInfusions\heparin LOW INTENSITY nomogram for [facility] [id number].pdf    09/09/23 0547     heparin 25,000 units in dextrose 5% (100 units/ml) IV bolus from bag - ADDITIONAL PRN BOLUS - 30 units/kg  As needed (PRN)        Question:  Heparin Infusion Adjustment (DO NOT MODIFY ANSWER)  Answer:  \\[email or web address]\epic\Images\Pharmacy\HeparinInfusions\heparin LOW INTENSITY nomogram for [facility] [id number].pdf    09/09/23 0547     heparin 25,000 units in dextrose 5% 250 mL (100 units/mL) infusion LOW INTENSITY nomogram - [facility]  Continuous        Question Answer Comment   Heparin Infusion Adjustment (DO NOT MODIFY ANSWER) \\[email or web address]\epic\Images\Pharmacy\HeparinInfusions\heparin LOW INTENSITY nomogram for [facility] [id number].pdf    Begin at (in units/kg/hr) 12        09/09/23 0547     IP VTE HIGH RISK PATIENT  Once         09/09/23 0447     Place sequential compression device  Until discontinued         09/09/23 0447                Discharge Planning   TIGRE:      Code Status: Full Code   Is the patient medically ready for discharge?:     Reason for patient still in hospital (select all that apply): Patient trending condition and Treatment                     Adriane Engel NP  Department of Hospital Medicine   [facility] - Telemetry ([facility])

## 2023-09-10 NOTE — ASSESSMENT & PLAN NOTE
Patient is identified as having Diastolic (HFpEF) heart failure that is Acute on chronic. CHF is currently uncontrolled due to Rales/crackles on pulmonary exam and Pulmonary edema/pleural effusion on CXR. Latest ECHO performed and demonstrates- Results for orders placed during the hospital encounter of 03/29/23    Echo    Interpretation Summary  · The left ventricle is normal in size with moderate concentric hypertrophy and mildly decreased systolic function.  · The estimated ejection fraction is 45%.  · Grade I left ventricular diastolic dysfunction.  · Moderate right ventricular enlargement.  · Moderate right atrial enlargement.  · Mild-to-moderate aortic regurgitation.  · There is mild aortic valve stenosis.  · Aortic valve area is 1.44 cm2; peak velocity is 2.09 m/s; mean gradient is 10 mmHg.  · Mild-to-moderate mitral regurgitation.  · Moderate tricuspid regurgitation.  · Moderate pulmonic regurgitation.  · The estimated PA systolic pressure is 69 mmHg.  · There is moderate pulmonary hypertension.  · Moderate left atrial enlargement.  . Continue Beta Blocker, Furosemide, Aldactone and ARNI and monitor clinical status closely. Monitor on telemetry. Patient is on CHF pathway.  Monitor strict Is&Os and daily weights.  Place on fluid restriction of 1.5 L. Cardiology has been consulted. Continue to stress to patient importance of self efficacy and  on diet for CHF. Last BNP reviewed- and noted below   Recent Labs   Lab 09/09/23  0618   BNP 1,532*     9/10/23  -Diuresing well  -Continue IV lasix

## 2023-09-10 NOTE — ASSESSMENT & PLAN NOTE
Serial troponin   Cardiology recommended Lovenox 1mg/kg to ED provider   Will start IV Heparin  cont ASA, BB, Statin, Entresto   Echo   Consult cardiology     9/10/23  Denies chest pain.   Troponin trending down  Continue current meds

## 2023-09-11 ENCOUNTER — DOCUMENTATION ONLY (OUTPATIENT)
Dept: CARDIOLOGY | Facility: CLINIC | Age: 73
End: 2023-09-11
Payer: MEDICARE

## 2023-09-11 PROBLEM — I50.43 ACUTE ON CHRONIC COMBINED SYSTOLIC AND DIASTOLIC CONGESTIVE HEART FAILURE: Status: ACTIVE | Noted: 2022-02-16

## 2023-09-11 LAB
ANION GAP SERPL CALC-SCNC: 8 MMOL/L (ref 8–16)
APTT PPP: 52.8 SEC (ref 21–32)
BACTERIA UR CULT: ABNORMAL
BASOPHILS # BLD AUTO: 0.01 K/UL (ref 0–0.2)
BASOPHILS NFR BLD: 0.1 % (ref 0–1.9)
BUN SERPL-MCNC: 20 MG/DL (ref 8–23)
CALCIUM SERPL-MCNC: 10.4 MG/DL (ref 8.7–10.5)
CHLORIDE SERPL-SCNC: 98 MMOL/L (ref 95–110)
CO2 SERPL-SCNC: 33 MMOL/L (ref 23–29)
CREAT SERPL-MCNC: 1.2 MG/DL (ref 0.5–1.4)
DIFFERENTIAL METHOD: ABNORMAL
EOSINOPHIL # BLD AUTO: 0 K/UL (ref 0–0.5)
EOSINOPHIL NFR BLD: 0 % (ref 0–8)
ERYTHROCYTE [DISTWIDTH] IN BLOOD BY AUTOMATED COUNT: 13.3 % (ref 11.5–14.5)
EST. GFR  (NO RACE VARIABLE): 48 ML/MIN/1.73 M^2
GLUCOSE SERPL-MCNC: 138 MG/DL (ref 70–110)
HCT VFR BLD AUTO: 45.7 % (ref 37–48.5)
HGB BLD-MCNC: 14.8 G/DL (ref 12–16)
IMM GRANULOCYTES # BLD AUTO: 0.03 K/UL (ref 0–0.04)
IMM GRANULOCYTES NFR BLD AUTO: 0.3 % (ref 0–0.5)
LYMPHOCYTES # BLD AUTO: 1 K/UL (ref 1–4.8)
LYMPHOCYTES NFR BLD: 8.6 % (ref 18–48)
MCH RBC QN AUTO: 29.9 PG (ref 27–31)
MCHC RBC AUTO-ENTMCNC: 32.4 G/DL (ref 32–36)
MCV RBC AUTO: 92 FL (ref 82–98)
MONOCYTES # BLD AUTO: 0.4 K/UL (ref 0.3–1)
MONOCYTES NFR BLD: 3.1 % (ref 4–15)
NEUTROPHILS # BLD AUTO: 10.2 K/UL (ref 1.8–7.7)
NEUTROPHILS NFR BLD: 87.9 % (ref 38–73)
NRBC BLD-RTO: 0 /100 WBC
PLATELET # BLD AUTO: 201 K/UL (ref 150–450)
PMV BLD AUTO: 8.8 FL (ref 9.2–12.9)
POCT GLUCOSE: 138 MG/DL (ref 70–110)
POCT GLUCOSE: 144 MG/DL (ref 70–110)
POCT GLUCOSE: 146 MG/DL (ref 70–110)
POCT GLUCOSE: 147 MG/DL (ref 70–110)
POTASSIUM SERPL-SCNC: 4.7 MMOL/L (ref 3.5–5.1)
RBC # BLD AUTO: 4.95 M/UL (ref 4–5.4)
SODIUM SERPL-SCNC: 139 MMOL/L (ref 136–145)
WBC # BLD AUTO: 11.56 K/UL (ref 3.9–12.7)

## 2023-09-11 PROCEDURE — 94640 AIRWAY INHALATION TREATMENT: CPT

## 2023-09-11 PROCEDURE — 94760 N-INVAS EAR/PLS OXIMETRY 1: CPT

## 2023-09-11 PROCEDURE — 99233 SBSQ HOSP IP/OBS HIGH 50: CPT | Mod: ,,, | Performed by: INTERNAL MEDICINE

## 2023-09-11 PROCEDURE — 99233 PR SUBSEQUENT HOSPITAL CARE,LEVL III: ICD-10-PCS | Mod: ,,, | Performed by: INTERNAL MEDICINE

## 2023-09-11 PROCEDURE — 85025 COMPLETE CBC W/AUTO DIFF WBC: CPT | Performed by: NURSE PRACTITIONER

## 2023-09-11 PROCEDURE — 25000003 PHARM REV CODE 250: Performed by: NURSE PRACTITIONER

## 2023-09-11 PROCEDURE — 85730 THROMBOPLASTIN TIME PARTIAL: CPT | Performed by: FAMILY MEDICINE

## 2023-09-11 PROCEDURE — 27100171 HC OXYGEN HIGH FLOW UP TO 24 HOURS

## 2023-09-11 PROCEDURE — 63600175 PHARM REV CODE 636 W HCPCS: Performed by: NURSE PRACTITIONER

## 2023-09-11 PROCEDURE — 25000242 PHARM REV CODE 250 ALT 637 W/ HCPCS: Performed by: INTERNAL MEDICINE

## 2023-09-11 PROCEDURE — 25000242 PHARM REV CODE 250 ALT 637 W/ HCPCS: Performed by: NURSE PRACTITIONER

## 2023-09-11 PROCEDURE — 99900035 HC TECH TIME PER 15 MIN (STAT)

## 2023-09-11 PROCEDURE — 80048 BASIC METABOLIC PNL TOTAL CA: CPT | Performed by: NURSE PRACTITIONER

## 2023-09-11 PROCEDURE — 21400001 HC TELEMETRY ROOM

## 2023-09-11 PROCEDURE — 36415 COLL VENOUS BLD VENIPUNCTURE: CPT | Performed by: FAMILY MEDICINE

## 2023-09-11 PROCEDURE — 94761 N-INVAS EAR/PLS OXIMETRY MLT: CPT

## 2023-09-11 PROCEDURE — 94660 CPAP INITIATION&MGMT: CPT

## 2023-09-11 RX ADMIN — FUROSEMIDE 40 MG: 10 INJECTION, SOLUTION INTRAMUSCULAR; INTRAVENOUS at 04:09

## 2023-09-11 RX ADMIN — HYDRALAZINE HYDROCHLORIDE 10 MG: 20 INJECTION, SOLUTION INTRAMUSCULAR; INTRAVENOUS at 04:09

## 2023-09-11 RX ADMIN — SENNOSIDES AND DOCUSATE SODIUM 1 TABLET: 50; 8.6 TABLET ORAL at 08:09

## 2023-09-11 RX ADMIN — BUDESONIDE 0.5 MG: 0.5 INHALANT ORAL at 07:09

## 2023-09-11 RX ADMIN — SACUBITRIL AND VALSARTAN 1 TABLET: 24; 26 TABLET, FILM COATED ORAL at 08:09

## 2023-09-11 RX ADMIN — METHYLPREDNISOLONE SODIUM SUCCINATE 40 MG: 125 INJECTION, POWDER, FOR SOLUTION INTRAMUSCULAR; INTRAVENOUS at 05:09

## 2023-09-11 RX ADMIN — HEPARIN SODIUM 14 UNITS/KG/HR: 10000 INJECTION, SOLUTION INTRAVENOUS at 01:09

## 2023-09-11 RX ADMIN — ASPIRIN 81 MG: 81 TABLET, COATED ORAL at 09:09

## 2023-09-11 RX ADMIN — CARVEDILOL 6.25 MG: 6.25 TABLET, FILM COATED ORAL at 09:09

## 2023-09-11 RX ADMIN — LEVOTHYROXINE SODIUM 50 MCG: 50 TABLET ORAL at 06:09

## 2023-09-11 RX ADMIN — CARVEDILOL 6.25 MG: 6.25 TABLET, FILM COATED ORAL at 08:09

## 2023-09-11 RX ADMIN — METHYLPREDNISOLONE SODIUM SUCCINATE 40 MG: 125 INJECTION, POWDER, FOR SOLUTION INTRAMUSCULAR; INTRAVENOUS at 11:09

## 2023-09-11 RX ADMIN — ARFORMOTEROL TARTRATE 15 MCG: 15 SOLUTION RESPIRATORY (INHALATION) at 07:09

## 2023-09-11 RX ADMIN — METHYLPREDNISOLONE SODIUM SUCCINATE 40 MG: 125 INJECTION, POWDER, FOR SOLUTION INTRAMUSCULAR; INTRAVENOUS at 12:09

## 2023-09-11 RX ADMIN — CEFTRIAXONE 1 G: 1 INJECTION, POWDER, FOR SOLUTION INTRAMUSCULAR; INTRAVENOUS at 06:09

## 2023-09-11 RX ADMIN — SERTRALINE HYDROCHLORIDE 50 MG: 50 TABLET ORAL at 09:09

## 2023-09-11 RX ADMIN — LEVALBUTEROL HYDROCHLORIDE 1.25 MG: 0.63 SOLUTION RESPIRATORY (INHALATION) at 04:09

## 2023-09-11 RX ADMIN — BUDESONIDE 0.5 MG: 0.5 INHALANT ORAL at 08:09

## 2023-09-11 RX ADMIN — METHYLPREDNISOLONE SODIUM SUCCINATE 40 MG: 125 INJECTION, POWDER, FOR SOLUTION INTRAMUSCULAR; INTRAVENOUS at 06:09

## 2023-09-11 RX ADMIN — FUROSEMIDE 40 MG: 10 INJECTION, SOLUTION INTRAMUSCULAR; INTRAVENOUS at 06:09

## 2023-09-11 RX ADMIN — PANTOPRAZOLE SODIUM 40 MG: 40 TABLET, DELAYED RELEASE ORAL at 09:09

## 2023-09-11 RX ADMIN — ATORVASTATIN CALCIUM 40 MG: 40 TABLET, FILM COATED ORAL at 09:09

## 2023-09-11 RX ADMIN — ARFORMOTEROL TARTRATE 15 MCG: 15 SOLUTION RESPIRATORY (INHALATION) at 08:09

## 2023-09-11 RX ADMIN — SENNOSIDES AND DOCUSATE SODIUM 1 TABLET: 50; 8.6 TABLET ORAL at 09:09

## 2023-09-11 RX ADMIN — POLYETHYLENE GLYCOL 3350 17 G: 17 POWDER, FOR SOLUTION ORAL at 09:09

## 2023-09-11 RX ADMIN — LEVALBUTEROL HYDROCHLORIDE 1.25 MG: 0.63 SOLUTION RESPIRATORY (INHALATION) at 07:09

## 2023-09-11 RX ADMIN — LEVALBUTEROL HYDROCHLORIDE 1.25 MG: 0.63 SOLUTION RESPIRATORY (INHALATION) at 12:09

## 2023-09-11 RX ADMIN — SACUBITRIL AND VALSARTAN 1 TABLET: 24; 26 TABLET, FILM COATED ORAL at 09:09

## 2023-09-11 NOTE — ASSESSMENT & PLAN NOTE
Patient is identified as having Diastolic (HFpEF) heart failure that is Acute on chronic. CHF is currently uncontrolled due to Continued edema of extremities. Latest ECHO performed and demonstrates- Results for orders placed during the hospital encounter of 03/29/23    Echo    Interpretation Summary  · The left ventricle is normal in size with moderate concentric hypertrophy and mildly decreased systolic function.  · The estimated ejection fraction is 45%.  · Grade I left ventricular diastolic dysfunction.  · Moderate right ventricular enlargement.  · Moderate right atrial enlargement.  · Mild-to-moderate aortic regurgitation.  · There is mild aortic valve stenosis.  · Aortic valve area is 1.44 cm2; peak velocity is 2.09 m/s; mean gradient is 10 mmHg.  · Mild-to-moderate mitral regurgitation.  · Moderate tricuspid regurgitation.  · Moderate pulmonic regurgitation.  · The estimated PA systolic pressure is 69 mmHg.  · There is moderate pulmonary hypertension.  · Moderate left atrial enlargement.  . Continue Beta Blocker and Furosemide and monitor clinical status closely. Monitor on telemetry. Patient is on CHF pathway.  Monitor strict Is&Os and daily weights.  Place on fluid restriction of 2 L. Cardiology has been consulted. Continue to stress to patient importance of self efficacy and  on diet for CHF. Last BNP reviewed- and noted below   Recent Labs   Lab 09/09/23  0618   BNP 1,532*       9/11/23  -Improving  -Continue IV diuresis  -Continue BB, Entresto  -Repeat BNP in AM

## 2023-09-11 NOTE — SUBJECTIVE & OBJECTIVE
Interval History: pt in bed upon exam with BIPAP in place for comfort. Diuresis in progress. Troponin trending down. Cardiology following with inpatient vs outpatient ischmic evaluation to be determined.     Review of Systems   Constitutional:  Positive for activity change and fatigue. Negative for appetite change, chills and fever.   HENT:  Negative for congestion, postnasal drip, sinus pressure and trouble swallowing.    Respiratory:  Positive for shortness of breath. Negative for cough, chest tightness and wheezing.    Cardiovascular:  Positive for leg swelling. Negative for chest pain and palpitations.   Gastrointestinal:  Negative for abdominal distention, abdominal pain, nausea and vomiting.   Genitourinary:  Negative for difficulty urinating, dysuria, flank pain, frequency and urgency.   Musculoskeletal:  Negative for arthralgias, back pain and myalgias.   Skin:  Negative for color change and wound.   Neurological:  Positive for weakness. Negative for dizziness and headaches.   Psychiatric/Behavioral:  Negative for agitation and confusion. The patient is not nervous/anxious.      Objective:     Vital Signs (Most Recent):  Temp: 98.4 °F (36.9 °C) (09/11/23 1538)  Pulse: 60 (09/11/23 1658)  Resp: 18 (09/11/23 1623)  BP: 135/63 (09/11/23 1658)  SpO2: (!) 93 % (09/11/23 1623) Vital Signs (24h Range):  Temp:  [97.5 °F (36.4 °C)-98.6 °F (37 °C)] 98.4 °F (36.9 °C)  Pulse:  [53-64] 60  Resp:  [16-28] 18  SpO2:  [93 %-98 %] 93 %  BP: (135-188)/(63-98) 135/63     Weight: 122.5 kg (270 lb 1 oz)  Body mass index is 46.36 kg/m².    Intake/Output Summary (Last 24 hours) at 9/11/2023 1733  Last data filed at 9/11/2023 0914  Gross per 24 hour   Intake 215.67 ml   Output 1450 ml   Net -1234.33 ml         Physical Exam  HENT:      Nose: Nose normal.      Mouth/Throat:      Pharynx: Oropharynx is clear.   Cardiovascular:      Rate and Rhythm: Normal rate and regular rhythm.   Pulmonary:      Breath sounds: Examination of the  right-lower field reveals rales. Examination of the left-lower field reveals rales. Rales present.   Musculoskeletal:      Cervical back: Normal range of motion.   Neurological:      Mental Status: She is alert.             Significant Labs: All pertinent labs within the past 24 hours have been reviewed.  CBC:   Recent Labs   Lab 09/10/23  0521 09/11/23  0519   WBC 8.34 11.56   HGB 13.9 14.8   HCT 44.4 45.7    201     CMP:   Recent Labs   Lab 09/10/23  0521 09/11/23  0519    139   K 4.5 4.7   CL 99 98   CO2 32* 33*    138*   BUN 15 20   CREATININE 1.0 1.2   CALCIUM 9.9 10.4   ANIONGAP 10 8       Significant Imaging: I have reviewed all pertinent imaging results/findings within the past 24 hours.

## 2023-09-11 NOTE — PROGRESS NOTES
[facility] - Telemetry ([facility]  [facility] Medicine  Progress Note    Patient Name: Carmen Tan  MRN: [id number]  Patient Class: IP- Inpatient   Admission Date: 9/9/2023  Length of Stay: 2 days  Attending Physician: Elmer Nichols MD  Primary Care Provider: Lucian Barry MD        Subjective:     Principal Problem:Acute on chronic respiratory failure with hypoxia        HPI:  The patient is a 73 year old female with DM, COPD on home oxygen 4 liters, Diastolic CHF, HTN, hx PE who presented to [facility] ED with Generalized weakness, Fatigue, SOB, and Dysuria - onset 2 days ago that progressively worsened. Pt also reports BLE edema that worsened over the past week.     n the ED, CXR shows chronic scarring no focal consolidation. Elevated troponin of 200, elevated BNP. Labs otherwise unremarkable. COVID negative. Patient denied any chest pain, EKG non-ischemia. Patient given Aspirin 325 mg, Lovenox 1mg/kg, and Lasix 40 IV x1 as well as a breathing treatment. Po Bactrim for UTI    The patient was transferred to [facility] for admission under hospital medicine and for cardiology consultation       Overview/Hospital Course:  Patient sitting on side of the bed eating. No distress noted. Reports feeling much better. On continuous oxygen. Diuresed well overnight. Will continue to diurese.  On 9/11/23, diuresis continued. Cardiology following with inpatient vs outpatient ischemic work up to be evaluated. Troponin remains elevated with downward trend noted. Urine culture grew E. Coli (50,000-99,999) with sensitivities reviewed and antibiotics continued. Echo on 9/9/23 showed EF 40-45%  mildly reduced systolic function- mild to moderate aortic/pulmonic regurgitation with a centrally directed jet. Mitral/Tricuspid moderate regurgitation. BIPAP placed as needed for comfort and respiratory support.       Interval History: pt in bed upon exam with BIPAP in place for comfort. Diuresis in progress. Troponin trending down.  Cardiology following with inpatient vs outpatient ischmic evaluation to be determined.     Review of Systems   Constitutional:  Positive for activity change and fatigue. Negative for appetite change, chills and fever.   HENT:  Negative for congestion, postnasal drip, sinus pressure and trouble swallowing.    Respiratory:  Positive for shortness of breath. Negative for cough, chest tightness and wheezing.    Cardiovascular:  Positive for leg swelling. Negative for chest pain and palpitations.   Gastrointestinal:  Negative for abdominal distention, abdominal pain, nausea and vomiting.   Genitourinary:  Negative for difficulty urinating, dysuria, flank pain, frequency and urgency.   Musculoskeletal:  Negative for arthralgias, back pain and myalgias.   Skin:  Negative for color change and wound.   Neurological:  Positive for weakness. Negative for dizziness and headaches.   Psychiatric/Behavioral:  Negative for agitation and confusion. The patient is not nervous/anxious.      Objective:     Vital Signs (Most Recent):  Temp: 98.4 °F (36.9 °C) (09/11/23 1538)  Pulse: 60 (09/11/23 1658)  Resp: 18 (09/11/23 1623)  BP: 135/63 (09/11/23 1658)  SpO2: (!) 93 % (09/11/23 1623) Vital Signs (24h Range):  Temp:  [97.5 °F (36.4 °C)-98.6 °F (37 °C)] 98.4 °F (36.9 °C)  Pulse:  [53-64] 60  Resp:  [16-28] 18  SpO2:  [93 %-98 %] 93 %  BP: (135-188)/(63-98) 135/63     Weight: 122.5 kg (270 lb 1 oz)  Body mass index is 46.36 kg/m².    Intake/Output Summary (Last 24 hours) at 9/11/2023 1733  Last data filed at 9/11/2023 0914  Gross per 24 hour   Intake 215.67 ml   Output 1450 ml   Net -1234.33 ml         Physical Exam  HENT:      Nose: Nose normal.      Mouth/Throat:      Pharynx: Oropharynx is clear.   Cardiovascular:      Rate and Rhythm: Normal rate and regular rhythm.   Pulmonary:      Breath sounds: Examination of the right-lower field reveals rales. Examination of the left-lower field reveals rales. Rales present.   Musculoskeletal:       Cervical back: Normal range of motion.   Neurological:      Mental Status: She is alert.             Significant Labs: All pertinent labs within the past 24 hours have been reviewed.  CBC:   Recent Labs   Lab 09/10/23  0521 09/11/23 0519   WBC 8.34 11.56   HGB 13.9 14.8   HCT 44.4 45.7    201     CMP:   Recent Labs   Lab 09/10/23  0521 09/11/23 0519    139   K 4.5 4.7   CL 99 98   CO2 32* 33*    138*   BUN 15 20   CREATININE 1.0 1.2   CALCIUM 9.9 10.4   ANIONGAP 10 8       Significant Imaging: I have reviewed all pertinent imaging results/findings within the past 24 hours.      Assessment/Plan:      * Acute on chronic respiratory failure with hypoxia  Patient with Hypoxic Respiratory failure which is Acute on chronic.  she is on home oxygen at 4 LPM. Supplemental oxygen was provided and noted- Oxygen Concentration (%):  [30-36] 36    .   Signs/symptoms of respiratory failure include- increased work of breathing. Contributing diagnoses includes - CHF and COPD Labs and images were reviewed. Patient Has not had a recent ABG. Will treat underlying causes and adjust management of respiratory failure     Elevated troponin  Serial troponin   Cardiology recommended Lovenox 1mg/kg to ED provider   Will start IV Heparin  cont ASA, BB, Statin, Entresto   Echo   Consult cardiology     9/10/23  Denies chest pain.   Troponin trending down  Continue current meds     -9/11/23- downward trend- inpatient vs outpatient ischemic work up to be determined once compensated     Hypertension  BP stable  Cont home meds Coreg and Entresto  Monitor     Acute cystitis without hematuria  IV Rocephin   Blood cx remain negative  Urine culture grew E. Coli (50,000-99,999)- sensitivities noted         Diabetes mellitus, type 2  Patient's FSGs are controlled on current medication regimen.  Last A1c reviewed-   Lab Results   Component Value Date    HGBA1C 5.3 09/09/2023    HGBA1C 5.3 09/09/2023     Most recent fingerstick  glucose reviewed-   Recent Labs   Lab 09/10/23  2046 09/11/23  0649 09/11/23  1118 09/11/23  1717   POCTGLUCOSE 155* 147* 144* 146*     Current correctional scale  Low  Maintain anti-hyperglycemic dose as follows-   Antihyperglycemics (From admission, onward)    Start     Stop Route Frequency Ordered    09/09/23 0545  insulin aspart U-100 pen 0-5 Units         -- SubQ Before meals & nightly PRN 09/09/23 0447        Hold Oral hypoglycemics while patient is in the hospital.    Acute on chronic combined systolic and diastolic congestive heart failure  Patient is identified as having Diastolic (HFpEF) heart failure that is Acute on chronic. CHF is currently uncontrolled due to Rales/crackles on pulmonary exam and Pulmonary edema/pleural effusion on CXR. Latest ECHO performed and demonstrates- Results for orders placed during the hospital encounter of 03/29/23    Echo    Interpretation Summary  · The left ventricle is normal in size with moderate concentric hypertrophy and mildly decreased systolic function.  · The estimated ejection fraction is 45%.  · Grade I left ventricular diastolic dysfunction.  · Moderate right ventricular enlargement.  · Moderate right atrial enlargement.  · Mild-to-moderate aortic regurgitation.  · There is mild aortic valve stenosis.  · Aortic valve area is 1.44 cm2; peak velocity is 2.09 m/s; mean gradient is 10 mmHg.  · Mild-to-moderate mitral regurgitation.  · Moderate tricuspid regurgitation.  · Moderate pulmonic regurgitation.  · The estimated PA systolic pressure is 69 mmHg.  · There is moderate pulmonary hypertension.  · Moderate left atrial enlargement.  . Continue Beta Blocker, Furosemide, Aldactone and ARNI and monitor clinical status closely. Monitor on telemetry. Patient is on CHF pathway.  Monitor strict Is&Os and daily weights.  Place on fluid restriction of 1.5 L. Cardiology has been consulted. Continue to stress to patient importance of self efficacy and  on diet for CHF.  Last BNP reviewed- and noted below   Recent Labs   Lab 09/09/23 0618   BNP 1,532*     9/10/23  -Diuresing well  -Continue IV lasix      9/11/23- diuresis continued- BIPAP as needed for support     COPD exacerbation  IV Steroids  LABA. CITLALY. ICS neb txs      Severe obesity (BMI >= 40)  Body mass index is 46.36 kg/m². Morbid obesity complicates all aspects of disease management from diagnostic modalities to treatment. Weight loss encouraged and health benefits explained to patient.           VTE Risk Mitigation (From admission, onward)         Ordered     heparin 25,000 units in dextrose 5% (100 units/ml) IV bolus from bag - ADDITIONAL PRN BOLUS - 60 units/kg  As needed (PRN)        Question:  Heparin Infusion Adjustment (DO NOT MODIFY ANSWER)  Answer:  \[email or web address]\epic\Images\Pharmacy\HeparinInfusions\heparin LOW INTENSITY nomogram for [facility] [id number].pdf    09/09/23 0547     heparin 25,000 units in dextrose 5% (100 units/ml) IV bolus from bag - ADDITIONAL PRN BOLUS - 30 units/kg  As needed (PRN)        Question:  Heparin Infusion Adjustment (DO NOT MODIFY ANSWER)  Answer:  \[email or web address]\epic\Images\Pharmacy\HeparinInfusions\heparin LOW INTENSITY nomogram for [facility] [id number]    09/09/23 0547     heparin 25,000 units in dextrose 5% 250 mL (100 units/mL) infusion LOW INTENSITY nomogram - [facility]  Continuous        Question Answer Comment   Heparin Infusion Adjustment (DO NOT MODIFY ANSWER) \\[email or web address]\epic\Images\Pharmacy\HeparinInfusions\heparin LOW INTENSITY nomogram for [facility] [id number]    Begin at (in units/kg/hr) 12        09/09/23 0547     IP VTE HIGH RISK PATIENT  Once         09/09/23 0447     Place sequential compression device  Until discontinued         09/09/23 0447                Discharge Planning   TIGRE:      Code Status: Full Code   Is the patient medically ready for discharge?:     Reason for patient still in hospital (select all that apply): Patient trending condition, Laboratory test, Treatment and Consult  recommendations  Discharge Plan A: Home                  Corrina Herrmann NP  Department of Hospital Medicine   [facility] - Telemetry ([facility])

## 2023-09-11 NOTE — PROGRESS NOTES
"[facility]) nurse navigator notified of HFTCC candidate in need of education and introduction to 4-6 week program.      PT aao x 3 while lying in bed. Introduced self to pt as HFTCC nurse navigator.     Patient given "Home Care Guide for Heart Failure Patients" , "Heart Failure [facility]itional Care Clinic" flyer and "Daily weight and symptom tracker".  Encouraged pt to review information.      Reviewed the following key points of HFTCC program with pt and family:   1.) Take your medications as directed.    2.) Weight yourself daily   3.) Follow low salt and limited fluid diet.    4.) Stop smoking and start exercising   5.) Go to your appointments and call your team.      Pt reminded to follow Symptom tracker and to call at the onset of symptoms according to tracker.     Reviewed plan for follow up once discharged to include phone calls, in person and virtual visits to assist pt optimizing their heart failure medication regimen and encouraging healthy lifestyle modifications.  Reminded pt that program will assist them over the next 4-6 weeks and then patient will be transferred to long term care provider .  Reminded pt how to contact HFTCC navigator via phone and or via [employer].     Pt  instructed appointment with DARRYL James will be printed on hospital discharge paperwork.     Pt also reminded HF nurse will call 48-72 hours after discharge to check on them.     PT verbalize read back of information given.  Encouraged pt and family to read over information often and contact team with any questions or concerns.      "

## 2023-09-11 NOTE — ASSESSMENT & PLAN NOTE
Patient is identified as having Diastolic (HFpEF) heart failure that is Acute on chronic. CHF is currently uncontrolled due to Rales/crackles on pulmonary exam and Pulmonary edema/pleural effusion on CXR. Latest ECHO performed and demonstrates- Results for orders placed during the hospital encounter of 03/29/23    Echo    Interpretation Summary  · The left ventricle is normal in size with moderate concentric hypertrophy and mildly decreased systolic function.  · The estimated ejection fraction is 45%.  · Grade I left ventricular diastolic dysfunction.  · Moderate right ventricular enlargement.  · Moderate right atrial enlargement.  · Mild-to-moderate aortic regurgitation.  · There is mild aortic valve stenosis.  · Aortic valve area is 1.44 cm2; peak velocity is 2.09 m/s; mean gradient is 10 mmHg.  · Mild-to-moderate mitral regurgitation.  · Moderate tricuspid regurgitation.  · Moderate pulmonic regurgitation.  · The estimated PA systolic pressure is 69 mmHg.  · There is moderate pulmonary hypertension.  · Moderate left atrial enlargement.  . Continue Beta Blocker, Furosemide, Aldactone and ARNI and monitor clinical status closely. Monitor on telemetry. Patient is on CHF pathway.  Monitor strict Is&Os and daily weights.  Place on fluid restriction of 1.5 L. Cardiology has been consulted. Continue to stress to patient importance of self efficacy and  on diet for CHF. Last BNP reviewed- and noted below   Recent Labs   Lab 09/09/23  0618   BNP 1,532*     9/10/23  -Diuresing well  -Continue IV lasix      9/11/23- diuresis continued- BIPAP as needed for support

## 2023-09-11 NOTE — ASSESSMENT & PLAN NOTE
Patient with Hypercapnic and Hypoxic Respiratory failure which is Acute on chronic.  she is not on home oxygen. Supplemental oxygen was provided and noted- Oxygen Concentration (%):  [30-36] 36    .   Signs/symptoms of respiratory failure include- tachypnea. Contributing diagnoses includes - COPD Labs and images were reviewed. Patient Has recent ABG, which has been reviewed. Will treat underlying causes and adjust management of respiratory failure as follows- hypercapnic    9/11/23  -Mgmt as per hospital medicine  -Continue IV diuresis for additional day

## 2023-09-11 NOTE — ASSESSMENT & PLAN NOTE
IV Rocephin   Blood cx remain negative  Urine culture grew E. Coli (50,000-99,999)- sensitivities noted

## 2023-09-11 NOTE — ASSESSMENT & PLAN NOTE
Serial troponin   Cardiology recommended Lovenox 1mg/kg to ED provider   Will start IV Heparin  cont ASA, BB, Statin, Entresto   Echo   Consult cardiology     9/10/23  Denies chest pain.   Troponin trending down  Continue current meds     -9/11/23- downward trend

## 2023-09-11 NOTE — ASSESSMENT & PLAN NOTE
Body mass index is 46.36 kg/m². Morbid obesity complicates all aspects of disease management from diagnostic modalities to treatment. Weight loss encouraged and health benefits explained to patient.

## 2023-09-11 NOTE — PROGRESS NOTES
[facility] - Telemetry ([facility])  Cardiology  Progress Note    Patient Name: Carmen Tan  MRN: [id number]  Admission Date: 9/9/2023  Hospital Length of Stay: 2 days  Code Status: Full Code   Attending Physician: Elmer Nichols MD   Primary Care Physician: Lucian Barry MD  Expected Discharge Date:   Principal Problem:Acute on chronic respiratory failure with hypoxia    Subjective:   HPI:   The patient is a 73 year old female with DM, COPD on home oxygen 4 liters, Diastolic CHF, HTN, hx PE who presented to [facility] ED with Generalized weakness, Fatigue, SOB, and Dysuria - onset 2 days ago that progressively worsened. Pt also reports BLE edema that worsened over the past week.      In the ED, CXR showed Subtle hazy opacities at the perihilar aspects of the lungs may represent mild edema. Elevated troponin of 200, elevated BNP 5085.  Labs otherwise unremarkable. COVID negative. Patient denied any chest pain, EKG non-ischemia. Patient given Aspirin 325 mg, Lovenox 1mg/kg, and Lasix 40 IV x1 as well as a breathing treatment. Po Bactrim for UTI     The patient was transferred to [facility] for admission under hospital medicine and for cardiology consultation        Hospital Course:   9/10/23-Patient seen and examined. Stable and continue diuresis    9/11/23-Patient seen and examined today, lying in bed with CPAP on. Feels ok. SOB improving. No CP. Labs reviewed, creatinine 1.2.         Review of Systems   Reason unable to perform ROS: limited as patient was on cpap.   Cardiovascular:  Positive for dyspnea on exertion (improving).   Respiratory:  Positive for shortness of breath (improving).      Objective:     Vital Signs (Most Recent):  Temp: 97.8 °F (36.6 °C) (09/11/23 0804)  Pulse: 64 (09/11/23 0804)  Resp: 20 (09/11/23 0804)  BP: (!) 169/72 (09/11/23 0804)  SpO2: 97 % (09/11/23 0804) Vital Signs (24h Range):  Temp:  [97.5 °F (36.4 °C)-98.4 °F (36.9 °C)] 97.8 °F (36.6 °C)  Pulse:  [53-64] 64  Resp:  [16-33]  20  SpO2:  [95 %-98 %] 97 %  BP: (134-169)/(70-84) 169/72     Weight: 122.5 kg (270 lb 1 oz)  Body mass index is 46.36 kg/m².     SpO2: 97 %         Intake/Output Summary (Last 24 hours) at 9/11/2023 1337  Last data filed at 9/11/2023 0914  Gross per 24 hour   Intake 215.67 ml   Output 1450 ml   Net -1234.33 ml       Lines/Drains/Airways       Drain  Duration             Female External Urinary Catheter 09/10/23 0700 1 day              Peripheral Intravenous Line  Duration                  Peripheral IV - Single Lumen 09/09/23 1200 20 G;2 in Anterior;Left Forearm 2 days         Peripheral IV - Single Lumen 09/09/23 1507 20 G;2 in Anterior;Right Forearm 1 day                       Physical Exam  Vitals and nursing note reviewed.   Constitutional:       General: She is not in acute distress.     Appearance: She is well-developed. She is obese. She is not diaphoretic.      Comments: On cpap   HENT:      Head: Normocephalic and atraumatic.   Eyes:      General:         Right eye: No discharge.         Left eye: No discharge.      Pupils: Pupils are equal, round, and reactive to light.   Neck:      Thyroid: No thyromegaly.      Vascular: No JVD.      Trachea: No tracheal deviation.   Cardiovascular:      Rate and Rhythm: Normal rate and regular rhythm.      Heart sounds: Normal heart sounds, S1 normal and S2 normal. No murmur heard.  Pulmonary:      Effort: Pulmonary effort is normal. No respiratory distress.      Breath sounds: No wheezing.      Comments: Diminished BS at bases  Abdominal:      General: There is no distension.      Tenderness: There is no rebound.   Musculoskeletal:      Cervical back: Neck supple.      Right lower leg: Edema present.      Left lower leg: Edema present.   Skin:     General: Skin is warm and dry.      Findings: No erythema.   Neurological:      General: No focal deficit present.      Mental Status: She is alert and oriented to person, place, and time.   Psychiatric:         Mood and  Affect: Mood normal.         Behavior: Behavior normal.         Thought Content: Thought content normal.            Significant Labs: CMP   Recent Labs   Lab 09/10/23  0521 09/11/23  0519    139   K 4.5 4.7   CL 99 98   CO2 32* 33*    138*   BUN 15 20   CREATININE 1.0 1.2   CALCIUM 9.9 10.4   ANIONGAP 10 8   , CBC   Recent Labs   Lab 09/10/23  0521 09/11/23  0519   WBC 8.34 11.56   HGB 13.9 14.8   HCT 44.4 45.7    201   , Troponin   Recent Labs   Lab 09/09/23  1807   TROPONINI 0.100*   , and All pertinent lab results from the last 24 hours have been reviewed.    Significant Imaging: Echocardiogram: Transthoracic echo (TTE) complete (Cupid Only):   Results for orders placed or performed during the hospital encounter of 09/09/23   Echo   Result Value Ref Range    BSA 2.36 m2    LVOT stroke volume 82.38 cm3    LVIDd 5.03 3.5 - 6.0 cm    LV Systolic Volume 68.43 mL    LV Systolic Volume Index 30.7 mL/m2    LVIDs 3.96 2.1 - 4.0 cm    LV Diastolic Volume 120.14 mL    LV Diastolic Volume Index 53.87 mL/m2    IVS 1.11 (A) 0.6 - 1.1 cm    LVOT diameter 1.95 cm    LVOT area 3.0 cm2    FS 21 (A) 28 - 44 %    Left Ventricle Relative Wall Thickness 0.54 cm    Posterior Wall 1.35 (A) 0.6 - 1.1 cm    LV mass 244.31 g    LV Mass Index 110 g/m2    MV Peak E Berhane 0.93 m/s    TDI LATERAL 0.13 m/s    TDI SEPTAL 0.08 m/s    E/E' ratio 8.86 m/s    MV Peak A Berhane 0.90 m/s    TR Max Berhane 3.30 m/s    E/A ratio 1.03     IVRT 137.01 msec    E wave deceleration time 232.88 msec    LV SEPTAL E/E' RATIO 11.63 m/s    LV LATERAL E/E' RATIO 7.15 m/s    LVOT peak berhane 1.35 m/s    Left Ventricular Outflow Tract Mean Velocity 0.87 cm/s    Left Ventricular Outflow Tract Mean Gradient 3.55 mmHg    LA size 3.64 cm    Left Atrium Major Axis 5.78 cm    Left Atrium Minor Axis 6.21 cm    RV mid diameter 5.15 cm    RVOT peak VTI 18.1 cm    RA Major Axis 4.94 cm    AV regurgitation pressure 1/2 time 850.443145830730033 ms    AR Max Berhane 4.57 m/s     AV mean gradient 10 mmHg    AV peak gradient 17 mmHg    Ao peak polina 2.09 m/s    Ao VTI 39.30 cm    LVOT peak VTI 27.60 cm    AV valve area 2.10 cm²    AV Velocity Ratio 0.65     AV index (prosthetic) 0.70     KARYNA by Velocity Ratio 1.93 cm²    Mr max polina 5.59 m/s    Triscuspid Valve Regurgitation Peak Gradient 44 mmHg    PV mean gradient 2 mmHg    RVOT peak polina 0.82 m/s    Ao root annulus 3.18 cm    STJ 3.59 cm    Ascending aorta 3.46 cm    IVC diameter 2.31 cm    Mean e' 0.11 m/s    ZLVIDS -1.49     ZLVIDD -4.49     LA Volume Index 33.2 mL/m2    LA volume 74.10 cm3    LA WIDTH 4.0 cm    TAPSE 2.40 cm    RA Width 3.2 cm    TV resting pulmonary artery pressure 47 mmHg    RV TB RVSP 6 mmHg    Est. RA pres 3 mmHg    Narrative      Left Ventricle: The left ventricle is normal in size. Mildly increased   wall thickness. Normal wall motion. There is mildly reduced systolic   function with a visually estimated ejection fraction of 40 - 45%. There is   normal diastolic function.    Right Ventricle: Normal right ventricular cavity size. Wall thickness   is normal. Right ventricle wall motion  is normal. Systolic function is   normal.    Aortic Valve: There is mild to moderate aortic regurgitation with a   centrally directed jet.    Mitral Valve: There is moderate regurgitation.    Tricuspid Valve: There is moderate regurgitation with a centrally   directed jet.    Pulmonic Valve: There is mild to moderate regurgitation.    Pulmonary Artery: The estimated pulmonary artery systolic pressure is   47 mmHg.    IVC/SVC: Normal venous pressure at 3 mmHg.     , EKG: Reviewed, and X-Ray: CXR: X-Ray Chest 1 View (CXR): No results found for this visit on 09/09/23. and X-Ray Chest PA and Lateral (CXR): No results found for this visit on 09/09/23.    Assessment and Plan:   Patient who presents with SOB/respiratoy failure/decompensated CHF. Continue IV diuresis and OMT. Can consider ischemic workup IP vs OP once compensated.      * Acute on chronic respiratory failure with hypoxia  Patient with Hypercapnic and Hypoxic Respiratory failure which is Acute on chronic.  she is not on home oxygen. Supplemental oxygen was provided and noted- Oxygen Concentration (%):  [30-36] 36    .   Signs/symptoms of respiratory failure include- tachypnea. Contributing diagnoses includes - COPD Labs and images were reviewed. Patient Has recent ABG, which has been reviewed. Will treat underlying causes and adjust management of respiratory failure as follows- hypercapnic    9/11/23  -Mgmt as per hospital medicine  -Continue IV diuresis for additional day    Elevated troponin  Trend  Cardiac echo results pending    9/11/23  -Flat  -No CP symptoms  -Suspect elevation due to demand ischemia from fluid overload  -Echo showed EF of 40-45%, mild-mod AI  -Continue Coreg, Entresto, ASA  -Can consider ischemic workup IP vs OP once compensated    Hypertension  Continue coreg, entresto    Acute cystitis without hematuria  treated    Acute on chronic combined systolic and diastolic congestive heart failure  Patient is identified as having Diastolic (HFpEF) heart failure that is Acute on chronic. CHF is currently uncontrolled due to Continued edema of extremities. Latest ECHO performed and demonstrates- Results for orders placed during the hospital encounter of 03/29/23    Echo    Interpretation Summary  · The left ventricle is normal in size with moderate concentric hypertrophy and mildly decreased systolic function.  · The estimated ejection fraction is 45%.  · Grade I left ventricular diastolic dysfunction.  · Moderate right ventricular enlargement.  · Moderate right atrial enlargement.  · Mild-to-moderate aortic regurgitation.  · There is mild aortic valve stenosis.  · Aortic valve area is 1.44 cm2; peak velocity is 2.09 m/s; mean gradient is 10 mmHg.  · Mild-to-moderate mitral regurgitation.  · Moderate tricuspid regurgitation.  · Moderate pulmonic regurgitation.  ·  The estimated PA systolic pressure is 69 mmHg.  · There is moderate pulmonary hypertension.  · Moderate left atrial enlargement.  . Continue Beta Blocker and Furosemide and monitor clinical status closely. Monitor on telemetry. Patient is on CHF pathway.  Monitor strict Is&Os and daily weights.  Place on fluid restriction of 2 L. Cardiology has been consulted. Continue to stress to patient importance of self efficacy and  on diet for CHF. Last BNP reviewed- and noted below   Recent Labs   Lab 09/09/23 0618   BNP 1,532*       9/11/23  -Improving  -Continue IV diuresis  -Continue BB, Entresto  -Repeat BNP in AM      COPD exacerbation  -Mgmt as per hospital medicine    Severe obesity (BMI >= 40)  -Weight loss        VTE Risk Mitigation (From admission, onward)         Ordered     heparin 25,000 units in dextrose 5% (100 units/ml) IV bolus from bag - ADDITIONAL PRN BOLUS - 60 units/kg  As needed (PRN)        Question:  Heparin Infusion Adjustment (DO NOT MODIFY ANSWER)  Answer:  \\[email or web address]\Images\Pharmacy\HeparinInfusions\heparin LOW INTENSITY nomogram for [facility] [id number]    09/09/23 0547     heparin 25,000 units in dextrose 5% (100 units/ml) IV bolus from bag - ADDITIONAL PRN BOLUS - 30 units/kg  As needed (PRN)        Question:  Heparin Infusion Adjustment (DO NOT MODIFY ANSWER)  Answer:  \\[email or web address]\Images\Pharmacy\HeparinInfusions\heparin LOW INTENSITY nomogram for [facility] [id number].pdf    09/09/23 0547     heparin 25,000 units in dextrose 5% 250 mL (100 units/mL) infusion LOW INTENSITY nomogram - [facility]  Continuous        Question Answer Comment   Heparin Infusion Adjustment (DO NOT MODIFY ANSWER) \\[email or web address]\epic\Images\Pharmacy\HeparinInfusions\heparin LOW INTENSITY nomogram for [facility] [id number]    Begin at (in units/kg/hr) 12        09/09/23 0547     IP VTE HIGH RISK PATIENT  Once         09/09/23 0447     Place sequential compression device  Until discontinued         09/09/23 0447                 Chelle Fierro PA-C  Cardiology  [facility] - Telemetry ([facility])

## 2023-09-11 NOTE — SUBJECTIVE & OBJECTIVE
Review of Systems   Reason unable to perform ROS: limited as patient was on cpap.   Cardiovascular:  Positive for dyspnea on exertion (improving).   Respiratory:  Positive for shortness of breath (improving).      Objective:     Vital Signs (Most Recent):  Temp: 97.8 °F (36.6 °C) (09/11/23 0804)  Pulse: 64 (09/11/23 0804)  Resp: 20 (09/11/23 0804)  BP: (!) 169/72 (09/11/23 0804)  SpO2: 97 % (09/11/23 0804) Vital Signs (24h Range):  Temp:  [97.5 °F (36.4 °C)-98.4 °F (36.9 °C)] 97.8 °F (36.6 °C)  Pulse:  [53-64] 64  Resp:  [16-33] 20  SpO2:  [95 %-98 %] 97 %  BP: (134-169)/(70-84) 169/72     Weight: 122.5 kg (270 lb 1 oz)  Body mass index is 46.36 kg/m².     SpO2: 97 %         Intake/Output Summary (Last 24 hours) at 9/11/2023 1337  Last data filed at 9/11/2023 0914  Gross per 24 hour   Intake 215.67 ml   Output 1450 ml   Net -1234.33 ml       Lines/Drains/Airways       Drain  Duration             Female External Urinary Catheter 09/10/23 0700 1 day              Peripheral Intravenous Line  Duration                  Peripheral IV - Single Lumen 09/09/23 1200 20 G;2 in Anterior;Left Forearm 2 days         Peripheral IV - Single Lumen 09/09/23 1507 20 G;2 in Anterior;Right Forearm 1 day                       Physical Exam  Vitals and nursing note reviewed.   Constitutional:       General: She is not in acute distress.     Appearance: She is well-developed. She is obese. She is not diaphoretic.      Comments: On cpap   HENT:      Head: Normocephalic and atraumatic.   Eyes:      General:         Right eye: No discharge.         Left eye: No discharge.      Pupils: Pupils are equal, round, and reactive to light.   Neck:      Thyroid: No thyromegaly.      Vascular: No JVD.      Trachea: No tracheal deviation.   Cardiovascular:      Rate and Rhythm: Normal rate and regular rhythm.      Heart sounds: Normal heart sounds, S1 normal and S2 normal. No murmur heard.  Pulmonary:      Effort: Pulmonary effort is normal. No  respiratory distress.      Breath sounds: No wheezing.      Comments: Diminished BS at bases  Abdominal:      General: There is no distension.      Tenderness: There is no rebound.   Musculoskeletal:      Cervical back: Neck supple.      Right lower leg: Edema present.      Left lower leg: Edema present.   Skin:     General: Skin is warm and dry.      Findings: No erythema.   Neurological:      General: No focal deficit present.      Mental Status: She is alert and oriented to person, place, and time.   Psychiatric:         Mood and Affect: Mood normal.         Behavior: Behavior normal.         Thought Content: Thought content normal.            Significant Labs: CMP   Recent Labs   Lab 09/10/23  0521 09/11/23  0519    139   K 4.5 4.7   CL 99 98   CO2 32* 33*    138*   BUN 15 20   CREATININE 1.0 1.2   CALCIUM 9.9 10.4   ANIONGAP 10 8   , CBC   Recent Labs   Lab 09/10/23  0521 09/11/23  0519   WBC 8.34 11.56   HGB 13.9 14.8   HCT 44.4 45.7    201   , Troponin   Recent Labs   Lab 09/09/23  1807   TROPONINI 0.100*   , and All pertinent lab results from the last 24 hours have been reviewed.    Significant Imaging: Echocardiogram: Transthoracic echo (TTE) complete (Cupid Only):   Results for orders placed or performed during the hospital encounter of 09/09/23   Echo   Result Value Ref Range    BSA 2.36 m2    LVOT stroke volume 82.38 cm3    LVIDd 5.03 3.5 - 6.0 cm    LV Systolic Volume 68.43 mL    LV Systolic Volume Index 30.7 mL/m2    LVIDs 3.96 2.1 - 4.0 cm    LV Diastolic Volume 120.14 mL    LV Diastolic Volume Index 53.87 mL/m2    IVS 1.11 (A) 0.6 - 1.1 cm    LVOT diameter 1.95 cm    LVOT area 3.0 cm2    FS 21 (A) 28 - 44 %    Left Ventricle Relative Wall Thickness 0.54 cm    Posterior Wall 1.35 (A) 0.6 - 1.1 cm    LV mass 244.31 g    LV Mass Index 110 g/m2    MV Peak E Berhane 0.93 m/s    TDI LATERAL 0.13 m/s    TDI SEPTAL 0.08 m/s    E/E' ratio 8.86 m/s    MV Peak A Berhane 0.90 m/s    TR Max Berhane 3.30  m/s    E/A ratio 1.03     IVRT 137.01 msec    E wave deceleration time 232.88 msec    LV SEPTAL E/E' RATIO 11.63 m/s    LV LATERAL E/E' RATIO 7.15 m/s    LVOT peak berhane 1.35 m/s    Left Ventricular Outflow Tract Mean Velocity 0.87 cm/s    Left Ventricular Outflow Tract Mean Gradient 3.55 mmHg    LA size 3.64 cm    Left Atrium Major Axis 5.78 cm    Left Atrium Minor Axis 6.21 cm    RV mid diameter 5.15 cm    RVOT peak VTI 18.1 cm    RA Major Axis 4.94 cm    AV regurgitation pressure 1/2 time 850.769008039484143 ms    AR Max Berhane 4.57 m/s    AV mean gradient 10 mmHg    AV peak gradient 17 mmHg    Ao peak berhane 2.09 m/s    Ao VTI 39.30 cm    LVOT peak VTI 27.60 cm    AV valve area 2.10 cm²    AV Velocity Ratio 0.65     AV index (prosthetic) 0.70     KARYNA by Velocity Ratio 1.93 cm²    Mr max berhane 5.59 m/s    Triscuspid Valve Regurgitation Peak Gradient 44 mmHg    PV mean gradient 2 mmHg    RVOT peak berhane 0.82 m/s    Ao root annulus 3.18 cm    STJ 3.59 cm    Ascending aorta 3.46 cm    IVC diameter 2.31 cm    Mean e' 0.11 m/s    ZLVIDS -1.49     ZLVIDD -4.49     LA Volume Index 33.2 mL/m2    LA volume 74.10 cm3    LA WIDTH 4.0 cm    TAPSE 2.40 cm    RA Width 3.2 cm    TV resting pulmonary artery pressure 47 mmHg    RV TB RVSP 6 mmHg    Est. RA pres 3 mmHg    Narrative      Left Ventricle: The left ventricle is normal in size. Mildly increased   wall thickness. Normal wall motion. There is mildly reduced systolic   function with a visually estimated ejection fraction of 40 - 45%. There is   normal diastolic function.    Right Ventricle: Normal right ventricular cavity size. Wall thickness   is normal. Right ventricle wall motion  is normal. Systolic function is   normal.    Aortic Valve: There is mild to moderate aortic regurgitation with a   centrally directed jet.    Mitral Valve: There is moderate regurgitation.    Tricuspid Valve: There is moderate regurgitation with a centrally   directed jet.    Pulmonic Valve: There is  mild to moderate regurgitation.    Pulmonary Artery: The estimated pulmonary artery systolic pressure is   47 mmHg.    IVC/SVC: Normal venous pressure at 3 mmHg.     , EKG: Reviewed, and X-Ray: CXR: X-Ray Chest 1 View (CXR): No results found for this visit on 09/09/23. and X-Ray Chest PA and Lateral (CXR): No results found for this visit on 09/09/23.

## 2023-09-11 NOTE — PLAN OF CARE
Problem: Adult Inpatient Plan of Care  Goal: Plan of Care Review  Outcome: Ongoing, Progressing  Goal: Patient-Specific Goal (Individualized)  Outcome: Ongoing, Progressing  Goal: Absence of Hospital-Acquired Illness or Injury  Outcome: Ongoing, Progressing  Goal: Optimal Comfort and Wellbeing  Outcome: Ongoing, Progressing  Goal: Readiness for Transition of Care  Outcome: Ongoing, Progressing     Problem: Diabetes Comorbidity  Goal: Blood Glucose Level Within Targeted Range  Outcome: Ongoing, Progressing     Problem: Bariatric Environmental Safety  Goal: Safety Maintained with Care  Outcome: Ongoing, Progressing     Problem: Skin Injury Risk Increased  Goal: Skin Health and Integrity  Outcome: Ongoing, Progressing     Problem: Fall Injury Risk  Goal: Absence of Fall and Fall-Related Injury  Outcome: Ongoing, Progressing

## 2023-09-11 NOTE — ASSESSMENT & PLAN NOTE
Trend  Cardiac echo results pending    9/11/23  -Flat  -No CP symptoms  -Suspect elevation due to demand ischemia from fluid overload  -Echo showed EF of 40-45%, mild-mod AI  -Continue Coreg, Entresto, ASA  -Can consider ischemic workup IP vs OP once compensated

## 2023-09-11 NOTE — ASSESSMENT & PLAN NOTE
Patient's FSGs are controlled on current medication regimen.  Last A1c reviewed-   Lab Results   Component Value Date    HGBA1C 5.3 09/09/2023    HGBA1C 5.3 09/09/2023     Most recent fingerstick glucose reviewed-   Recent Labs   Lab 09/10/23  2046 09/11/23  0649 09/11/23  1118 09/11/23  1717   POCTGLUCOSE 155* 147* 144* 146*     Current correctional scale  Low  Maintain anti-hyperglycemic dose as follows-   Antihyperglycemics (From admission, onward)    Start     Stop Route Frequency Ordered    09/09/23 0545  insulin aspart U-100 pen 0-5 Units         -- SubQ Before meals & nightly PRN 09/09/23 0447        Hold Oral hypoglycemics while patient is in the hospital.

## 2023-09-12 LAB
ANION GAP SERPL CALC-SCNC: 11 MMOL/L (ref 8–16)
APTT PPP: 55.3 SEC (ref 21–32)
BASOPHILS # BLD AUTO: 0.03 K/UL (ref 0–0.2)
BASOPHILS NFR BLD: 0.2 % (ref 0–1.9)
BUN SERPL-MCNC: 28 MG/DL (ref 8–23)
CALCIUM SERPL-MCNC: 10.1 MG/DL (ref 8.7–10.5)
CHLORIDE SERPL-SCNC: 96 MMOL/L (ref 95–110)
CO2 SERPL-SCNC: 33 MMOL/L (ref 23–29)
CREAT SERPL-MCNC: 1.1 MG/DL (ref 0.5–1.4)
DIFFERENTIAL METHOD: ABNORMAL
EOSINOPHIL # BLD AUTO: 0 K/UL (ref 0–0.5)
EOSINOPHIL NFR BLD: 0 % (ref 0–8)
ERYTHROCYTE [DISTWIDTH] IN BLOOD BY AUTOMATED COUNT: 14.2 % (ref 11.5–14.5)
EST. GFR  (NO RACE VARIABLE): 53 ML/MIN/1.73 M^2
GLUCOSE SERPL-MCNC: 123 MG/DL (ref 70–110)
HCT VFR BLD AUTO: 45.4 % (ref 37–48.5)
HGB BLD-MCNC: 15.5 G/DL (ref 12–16)
IMM GRANULOCYTES # BLD AUTO: 0.05 K/UL (ref 0–0.04)
IMM GRANULOCYTES NFR BLD AUTO: 0.3 % (ref 0–0.5)
LYMPHOCYTES # BLD AUTO: 0.9 K/UL (ref 1–4.8)
LYMPHOCYTES NFR BLD: 6.2 % (ref 18–48)
MCH RBC QN AUTO: 32 PG (ref 27–31)
MCHC RBC AUTO-ENTMCNC: 34.1 G/DL (ref 32–36)
MCV RBC AUTO: 94 FL (ref 82–98)
MONOCYTES # BLD AUTO: 0.6 K/UL (ref 0.3–1)
MONOCYTES NFR BLD: 3.9 % (ref 4–15)
NEUTROPHILS # BLD AUTO: 13.1 K/UL (ref 1.8–7.7)
NEUTROPHILS NFR BLD: 89.4 % (ref 38–73)
NRBC BLD-RTO: 0 /100 WBC
PLATELET # BLD AUTO: 227 K/UL (ref 150–450)
PMV BLD AUTO: 8.7 FL (ref 9.2–12.9)
POCT GLUCOSE: 121 MG/DL (ref 70–110)
POCT GLUCOSE: 128 MG/DL (ref 70–110)
POCT GLUCOSE: 130 MG/DL (ref 70–110)
POCT GLUCOSE: 134 MG/DL (ref 70–110)
POTASSIUM SERPL-SCNC: 4.5 MMOL/L (ref 3.5–5.1)
RBC # BLD AUTO: 4.85 M/UL (ref 4–5.4)
SODIUM SERPL-SCNC: 140 MMOL/L (ref 136–145)
WBC # BLD AUTO: 14.67 K/UL (ref 3.9–12.7)

## 2023-09-12 PROCEDURE — 94640 AIRWAY INHALATION TREATMENT: CPT

## 2023-09-12 PROCEDURE — 94660 CPAP INITIATION&MGMT: CPT

## 2023-09-12 PROCEDURE — 25000242 PHARM REV CODE 250 ALT 637 W/ HCPCS: Performed by: INTERNAL MEDICINE

## 2023-09-12 PROCEDURE — 85025 COMPLETE CBC W/AUTO DIFF WBC: CPT | Performed by: NURSE PRACTITIONER

## 2023-09-12 PROCEDURE — 63600175 PHARM REV CODE 636 W HCPCS: Performed by: INTERNAL MEDICINE

## 2023-09-12 PROCEDURE — 25000242 PHARM REV CODE 250 ALT 637 W/ HCPCS: Performed by: NURSE PRACTITIONER

## 2023-09-12 PROCEDURE — 80048 BASIC METABOLIC PNL TOTAL CA: CPT | Performed by: NURSE PRACTITIONER

## 2023-09-12 PROCEDURE — 63600175 PHARM REV CODE 636 W HCPCS: Performed by: NURSE PRACTITIONER

## 2023-09-12 PROCEDURE — 85730 THROMBOPLASTIN TIME PARTIAL: CPT | Performed by: NURSE PRACTITIONER

## 2023-09-12 PROCEDURE — 25000003 PHARM REV CODE 250: Performed by: NURSE PRACTITIONER

## 2023-09-12 PROCEDURE — 27100171 HC OXYGEN HIGH FLOW UP TO 24 HOURS

## 2023-09-12 PROCEDURE — 99900035 HC TECH TIME PER 15 MIN (STAT)

## 2023-09-12 PROCEDURE — 36415 COLL VENOUS BLD VENIPUNCTURE: CPT | Performed by: NURSE PRACTITIONER

## 2023-09-12 PROCEDURE — 94761 N-INVAS EAR/PLS OXIMETRY MLT: CPT

## 2023-09-12 PROCEDURE — 21400001 HC TELEMETRY ROOM

## 2023-09-12 PROCEDURE — 99233 PR SUBSEQUENT HOSPITAL CARE,LEVL III: ICD-10-PCS | Mod: ,,, | Performed by: INTERNAL MEDICINE

## 2023-09-12 PROCEDURE — 99233 SBSQ HOSP IP/OBS HIGH 50: CPT | Mod: ,,, | Performed by: INTERNAL MEDICINE

## 2023-09-12 RX ORDER — ACETAMINOPHEN 325 MG/1
650 TABLET ORAL EVERY 6 HOURS PRN
Status: DISCONTINUED | OUTPATIENT
Start: 2023-09-12 | End: 2023-09-19 | Stop reason: HOSPADM

## 2023-09-12 RX ORDER — ENOXAPARIN SODIUM 100 MG/ML
40 INJECTION SUBCUTANEOUS EVERY 12 HOURS
Status: DISCONTINUED | OUTPATIENT
Start: 2023-09-12 | End: 2023-09-15

## 2023-09-12 RX ADMIN — SERTRALINE HYDROCHLORIDE 50 MG: 50 TABLET ORAL at 09:09

## 2023-09-12 RX ADMIN — FUROSEMIDE 40 MG: 10 INJECTION, SOLUTION INTRAMUSCULAR; INTRAVENOUS at 05:09

## 2023-09-12 RX ADMIN — ATORVASTATIN CALCIUM 40 MG: 40 TABLET, FILM COATED ORAL at 09:09

## 2023-09-12 RX ADMIN — METHYLPREDNISOLONE SODIUM SUCCINATE 40 MG: 125 INJECTION, POWDER, FOR SOLUTION INTRAMUSCULAR; INTRAVENOUS at 05:09

## 2023-09-12 RX ADMIN — CARVEDILOL 6.25 MG: 6.25 TABLET, FILM COATED ORAL at 09:09

## 2023-09-12 RX ADMIN — HYDRALAZINE HYDROCHLORIDE 10 MG: 20 INJECTION, SOLUTION INTRAMUSCULAR; INTRAVENOUS at 04:09

## 2023-09-12 RX ADMIN — ACETAMINOPHEN 650 MG: 325 TABLET ORAL at 12:09

## 2023-09-12 RX ADMIN — PANTOPRAZOLE SODIUM 40 MG: 40 TABLET, DELAYED RELEASE ORAL at 09:09

## 2023-09-12 RX ADMIN — METHYLPREDNISOLONE SODIUM SUCCINATE 40 MG: 125 INJECTION, POWDER, FOR SOLUTION INTRAMUSCULAR; INTRAVENOUS at 11:09

## 2023-09-12 RX ADMIN — POLYETHYLENE GLYCOL 3350 17 G: 17 POWDER, FOR SOLUTION ORAL at 09:09

## 2023-09-12 RX ADMIN — SACUBITRIL AND VALSARTAN 1 TABLET: 24; 26 TABLET, FILM COATED ORAL at 08:09

## 2023-09-12 RX ADMIN — BUDESONIDE 0.5 MG: 0.5 INHALANT ORAL at 07:09

## 2023-09-12 RX ADMIN — LEVALBUTEROL HYDROCHLORIDE 1.25 MG: 0.63 SOLUTION RESPIRATORY (INHALATION) at 04:09

## 2023-09-12 RX ADMIN — SENNOSIDES AND DOCUSATE SODIUM 1 TABLET: 50; 8.6 TABLET ORAL at 09:09

## 2023-09-12 RX ADMIN — ENOXAPARIN SODIUM 40 MG: 40 INJECTION SUBCUTANEOUS at 08:09

## 2023-09-12 RX ADMIN — SACUBITRIL AND VALSARTAN 1 TABLET: 24; 26 TABLET, FILM COATED ORAL at 09:09

## 2023-09-12 RX ADMIN — LEVALBUTEROL HYDROCHLORIDE 1.25 MG: 0.63 SOLUTION RESPIRATORY (INHALATION) at 11:09

## 2023-09-12 RX ADMIN — LEVALBUTEROL HYDROCHLORIDE 1.25 MG: 0.63 SOLUTION RESPIRATORY (INHALATION) at 12:09

## 2023-09-12 RX ADMIN — ARFORMOTEROL TARTRATE 15 MCG: 15 SOLUTION RESPIRATORY (INHALATION) at 07:09

## 2023-09-12 RX ADMIN — LEVOTHYROXINE SODIUM 50 MCG: 50 TABLET ORAL at 05:09

## 2023-09-12 RX ADMIN — CEFTRIAXONE 1 G: 1 INJECTION, POWDER, FOR SOLUTION INTRAMUSCULAR; INTRAVENOUS at 05:09

## 2023-09-12 RX ADMIN — CARVEDILOL 6.25 MG: 6.25 TABLET, FILM COATED ORAL at 08:09

## 2023-09-12 RX ADMIN — LEVALBUTEROL HYDROCHLORIDE 1.25 MG: 0.63 SOLUTION RESPIRATORY (INHALATION) at 07:09

## 2023-09-12 RX ADMIN — SENNOSIDES AND DOCUSATE SODIUM 1 TABLET: 50; 8.6 TABLET ORAL at 08:09

## 2023-09-12 RX ADMIN — FUROSEMIDE 40 MG: 10 INJECTION, SOLUTION INTRAMUSCULAR; INTRAVENOUS at 04:09

## 2023-09-12 RX ADMIN — ACETAMINOPHEN 650 MG: 325 TABLET ORAL at 11:09

## 2023-09-12 RX ADMIN — HYDRALAZINE HYDROCHLORIDE 10 MG: 20 INJECTION, SOLUTION INTRAMUSCULAR; INTRAVENOUS at 05:09

## 2023-09-12 RX ADMIN — HEPARIN SODIUM 14 UNITS/KG/HR: 10000 INJECTION, SOLUTION INTRAVENOUS at 11:09

## 2023-09-12 RX ADMIN — ASPIRIN 81 MG: 81 TABLET, COATED ORAL at 09:09

## 2023-09-12 NOTE — ASSESSMENT & PLAN NOTE
Patient is identified as having Diastolic (HFpEF) heart failure that is Acute on chronic. CHF is currently uncontrolled due to Continued edema of extremities. Latest ECHO performed and demonstrates- Results for orders placed during the hospital encounter of 03/29/23    Echo    Interpretation Summary  · The left ventricle is normal in size with moderate concentric hypertrophy and mildly decreased systolic function.  · The estimated ejection fraction is 45%.  · Grade I left ventricular diastolic dysfunction.  · Moderate right ventricular enlargement.  · Moderate right atrial enlargement.  · Mild-to-moderate aortic regurgitation.  · There is mild aortic valve stenosis.  · Aortic valve area is 1.44 cm2; peak velocity is 2.09 m/s; mean gradient is 10 mmHg.  · Mild-to-moderate mitral regurgitation.  · Moderate tricuspid regurgitation.  · Moderate pulmonic regurgitation.  · The estimated PA systolic pressure is 69 mmHg.  · There is moderate pulmonary hypertension.  · Moderate left atrial enlargement.  . Continue Beta Blocker and Furosemide and monitor clinical status closely. Monitor on telemetry. Patient is on CHF pathway.  Monitor strict Is&Os and daily weights.  Place on fluid restriction of 2 L. Cardiology has been consulted. Continue to stress to patient importance of self efficacy and  on diet for CHF. Last BNP reviewed- and noted below   Recent Labs   Lab 09/09/23  0618   BNP 1,532*       9/11/23  -Improving  -Continue IV diuresis  -Continue BB, Entresto  -Repeat BNP in AM    9/12/23  -Continue same meds/mgmt  -Continue IV diuresis, BB, Entresto  -Reassess in AM

## 2023-09-12 NOTE — ASSESSMENT & PLAN NOTE
Trend  Cardiac echo results pending    9/11/23  -Flat  -No CP symptoms  -Suspect elevation due to demand ischemia from fluid overload  -Echo showed EF of 40-45%, mild-mod AI  -Continue Coreg, Entresto, ASA  -Can consider ischemic workup IP vs OP once compensated    9/12/23  -Ok to d/c heparin gtt  -Continue Coreg, Entresto, ASA  -Probable MPI stress test tmw

## 2023-09-12 NOTE — ASSESSMENT & PLAN NOTE
Patient's FSGs are controlled on current medication regimen.  Last A1c reviewed-   Lab Results   Component Value Date    HGBA1C 5.3 09/09/2023    HGBA1C 5.3 09/09/2023     Most recent fingerstick glucose reviewed-   Recent Labs   Lab 09/11/23  1717 09/11/23 2032 09/12/23  0545 09/12/23  1122   POCTGLUCOSE 146* 138* 130* 128*     Current correctional scale  Low  Maintain anti-hyperglycemic dose as follows-   Antihyperglycemics (From admission, onward)    Start     Stop Route Frequency Ordered    09/09/23 0545  insulin aspart U-100 pen 0-5 Units         -- SubQ Before meals & nightly PRN 09/09/23 0447        Hold Oral hypoglycemics while patient is in the hospital.

## 2023-09-12 NOTE — PROGRESS NOTES
[facility] - Tele[facility] ([facility])  [facility] Medicine  Progress Note    Patient Name: Carmen Tan  MRN: [id number]  Patient Class: IP- Inpatient   Admission Date: 9/9/2023  Length of Stay: 3 days  Attending Physician: Elmer Nichols MD  Primary Care Provider: Lucian Barry MD        Subjective:     Principal Problem:Acute on chronic respiratory failure with hypoxia        HPI:  The patient is a 73 year old female with DM, COPD on home oxygen 4 liters, Diastolic CHF, HTN, hx PE who presented to [facility] ED with Generalized weakness, Fatigue, SOB, and Dysuria - onset 2 days ago that progressively worsened. Pt also reports BLE edema that worsened over the past week.     n the ED, CXR shows chronic scarring no focal consolidation. Elevated troponin of 200, elevated BNP. Labs otherwise unremarkable. COVID negative. Patient denied any chest pain, EKG non-ischemia. Patient given Aspirin 325 mg, Lovenox 1mg/kg, and Lasix 40 IV x1 as well as a breathing treatment. Po Bactrim for UTI    The patient was transferred to [facility] for admission under hospital medicine and for cardiology consultation       Overview/Hospital Course:  Patient sitting on side of the bed eating. No distress noted. Reports feeling much better. On continuous oxygen. Diuresed well overnight. Will continue to diurese.  On 9/11/23, diuresis continued. Cardiology following with inpatient vs outpatient ischemic work up to be evaluated. Troponin remains elevated with downward trend noted. Urine culture grew E. Coli (50,000-99,999) with sensitivities reviewed and antibiotics continued. Echo on 9/9/23 showed EF 40-45%  mildly reduced systolic function- mild to moderate aortic/pulmonic regurgitation with a centrally directed jet. Mitral/Tricuspid moderate regurgitation. BIPAP placed as needed for comfort and respiratory support. On 9/12/23, diuresis continued with 4.8 L removed. BIPAP continued nightly and as needed. Stress test planned for tomorrow per  Cardiology.       Interval History: pt in bed upon exam with BIPAP in place- transitioned to nasal cannula. Diuresis continued. Leukocytosis noted. Stress test planned for tomorrow.     Review of Systems   Constitutional:  Positive for activity change and fatigue. Negative for appetite change, chills and fever.   HENT:  Negative for congestion, postnasal drip, sinus pressure and trouble swallowing.    Respiratory:  Positive for shortness of breath. Negative for cough, chest tightness and wheezing.    Cardiovascular:  Positive for leg swelling. Negative for chest pain and palpitations.   Gastrointestinal:  Negative for abdominal distention, abdominal pain, nausea and vomiting.   Genitourinary:  Negative for difficulty urinating, dysuria, flank pain, frequency and urgency.   Musculoskeletal:  Negative for arthralgias, back pain and myalgias.   Skin:  Negative for color change and wound.   Neurological:  Positive for weakness. Negative for dizziness and headaches.   Psychiatric/Behavioral:  Negative for agitation and confusion. The patient is not nervous/anxious.      Objective:     Vital Signs (Most Recent):  Temp: 98.6 °F (37 °C) (09/12/23 1146)  Pulse: 62 (09/12/23 1300)  Resp: 18 (09/12/23 1146)  BP: (!) 155/72 (09/12/23 1146)  SpO2: (!) 94 % (09/12/23 1146) Vital Signs (24h Range):  Temp:  [98 °F (36.7 °C)-98.6 °F (37 °C)] 98.6 °F (37 °C)  Pulse:  [58-77] 62  Resp:  [18-30] 18  SpO2:  [90 %-98 %] 94 %  BP: (135-188)/(63-98) 155/72     Weight: 122.5 kg (270 lb 1 oz)  Body mass index is 46.36 kg/m².    Intake/Output Summary (Last 24 hours) at 9/12/2023 1507  Last data filed at 9/11/2023 2030  Gross per 24 hour   Intake --   Output 1700 ml   Net -1700 ml         Physical Exam  HENT:      Nose: Nose normal.      Mouth/Throat:      Pharynx: Oropharynx is clear.   Cardiovascular:      Rate and Rhythm: Normal rate and regular rhythm.   Pulmonary:      Breath sounds: Examination of the right-lower field reveals rales.  Examination of the left-lower field reveals rales. Rales present.   Abdominal:      General: Bowel sounds are normal. There is no distension.      Palpations: Abdomen is soft.      Tenderness: There is no abdominal tenderness.   Genitourinary:     Comments: Deferred   Musculoskeletal:         General: Normal range of motion.      Cervical back: Normal range of motion.   Skin:     General: Skin is warm and dry.   Neurological:      Mental Status: She is alert and oriented to person, place, and time.   Psychiatric:         Mood and Affect: Mood normal.         Behavior: Behavior normal.             Significant Labs: All pertinent labs within the past 24 hours have been reviewed.  CBC:   Recent Labs   Lab 09/11/23 0519 09/12/23  0537   WBC 11.56 14.67*   HGB 14.8 15.5   HCT 45.7 45.4    227     CMP:   Recent Labs   Lab 09/11/23 0519 09/12/23  0536    140   K 4.7 4.5   CL 98 96   CO2 33* 33*   * 123*   BUN 20 28*   CREATININE 1.2 1.1   CALCIUM 10.4 10.1   ANIONGAP 8 11     POCT Glucose:   Recent Labs   Lab 09/11/23 2032 09/12/23  0545 09/12/23  1122   POCTGLUCOSE 138* 130* 128*       Significant Imaging: I have reviewed all pertinent imaging results/findings within the past 24 hours.      Assessment/Plan:      * Acute on chronic respiratory failure with hypoxia  Patient with Hypoxic Respiratory failure which is Acute on chronic.  she is on home oxygen at 4 LPM. Supplemental oxygen was provided and noted- Oxygen Concentration (%):  [30-36] 30    .   Signs/symptoms of respiratory failure include- increased work of breathing. Contributing diagnoses includes - CHF and COPD Labs and images were reviewed. Patient Has not had a recent ABG. Will treat underlying causes and adjust management of respiratory failure     Elevated troponin  Serial troponin   Cardiology recommended Lovenox 1mg/kg to ED provider   Will start IV Heparin  cont ASA, BB, Statin, Entresto   Echo   Consult cardiology      9/10/23  Denies chest pain.   Troponin trending down  Continue current meds     -9/11/23- downward trend   -9/12/23- Stress test to be planned for tomorrow     Hypertension  BP stable  Cont home meds Coreg and Entresto  Monitor     Acute cystitis without hematuria  IV Rocephin   Blood cx remain negative  Urine culture grew E. Coli (50,000-99,999)- sensitivities noted         Diabetes mellitus, type 2  Patient's FSGs are controlled on current medication regimen.  Last A1c reviewed-   Lab Results   Component Value Date    HGBA1C 5.3 09/09/2023    HGBA1C 5.3 09/09/2023     Most recent fingerstick glucose reviewed-   Recent Labs   Lab 09/11/23  1717 09/11/23  2032 09/12/23  0545 09/12/23  1122   POCTGLUCOSE 146* 138* 130* 128*     Current correctional scale  Low  Maintain anti-hyperglycemic dose as follows-   Antihyperglycemics (From admission, onward)    Start     Stop Route Frequency Ordered    09/09/23 0545  insulin aspart U-100 pen 0-5 Units         -- SubQ Before meals & nightly PRN 09/09/23 0447        Hold Oral hypoglycemics while patient is in the hospital.    Acute on chronic combined systolic and diastolic congestive heart failure  Patient is identified as having Diastolic (HFpEF) heart failure that is Acute on chronic. CHF is currently uncontrolled due to Rales/crackles on pulmonary exam and Pulmonary edema/pleural effusion on CXR. Latest ECHO performed and demonstrates- Results for orders placed during the hospital encounter of 03/29/23    Echo    Interpretation Summary  · The left ventricle is normal in size with moderate concentric hypertrophy and mildly decreased systolic function.  · The estimated ejection fraction is 45%.  · Grade I left ventricular diastolic dysfunction.  · Moderate right ventricular enlargement.  · Moderate right atrial enlargement.  · Mild-to-moderate aortic regurgitation.  · There is mild aortic valve stenosis.  · Aortic valve area is 1.44 cm2; peak velocity is 2.09 m/s; mean  gradient is 10 mmHg.  · Mild-to-moderate mitral regurgitation.  · Moderate tricuspid regurgitation.  · Moderate pulmonic regurgitation.  · The estimated PA systolic pressure is 69 mmHg.  · There is moderate pulmonary hypertension.  · Moderate left atrial enlargement.  . Continue Beta Blocker, Furosemide, Aldactone and ARNI and monitor clinical status closely. Monitor on telemetry. Patient is on CHF pathway.  Monitor strict Is&Os and daily weights.  Place on fluid restriction of 1.5 L. Cardiology has been consulted. Continue to stress to patient importance of self efficacy and  on diet for CHF. Last BNP reviewed- and noted below   Recent Labs   Lab 09/09/23 0618   BNP 1,532*     9/10/23  -Diuresing well  -Continue IV lasix      9/11/23- diuresis continued- BIPAP as needed for support   -9/12/23- diuresis continued- 4.8 L removed      COPD exacerbation  IV Steroids  LABA. CITLALY. ICS neb txs  -supplemental oxygen as needed       Severe obesity (BMI >= 40)  Body mass index is 46.36 kg/m². Morbid obesity complicates all aspects of disease management from diagnostic modalities to treatment. Weight loss encouraged and health benefits explained to patient.           VTE Risk Mitigation (From admission, onward)         Ordered     enoxaparin injection 40 mg  Every 12 hours         09/12/23 1326     IP VTE HIGH RISK PATIENT  Once         09/09/23 0447     Place sequential compression device  Until discontinued         09/09/23 0447                Discharge Planning   TIGRE:      Code Status: Full Code   Is the patient medically ready for discharge?:     Reason for patient still in hospital (select all that apply): Patient trending condition, Laboratory test, Treatment and Consult recommendations  Discharge Plan A: Home                  Corrina Herrmann NP  Department of Hospital Medicine   [facility] - Telemetry ([facility])

## 2023-09-12 NOTE — PROGRESS NOTES
[facility] - Telemetry ([facility])  Cardiology  Progress Note    Patient Name: Carmen Tan  MRN: [id number]  Admission Date: 9/9/2023  Hospital Length of Stay: 3 days  Code Status: Full Code   Attending Physician: Elmer Nichols MD   Primary Care Physician: Lucian Barry MD  Expected Discharge Date:   Principal Problem:Acute on chronic respiratory failure with hypoxia    Subjective:   HPI:  The patient is a 73 year old female with DM, COPD on home oxygen 4 liters, Diastolic CHF, HTN, hx PE who presented to [facility] ED with Generalized weakness, Fatigue, SOB, and Dysuria - onset 2 days ago that progressively worsened. Pt also reports BLE edema that worsened over the past week.      In the ED, CXR showed Subtle hazy opacities at the perihilar aspects of the lungs may represent mild edema. Elevated troponin of 200, elevated BNP 5085.  Labs otherwise unremarkable. COVID negative. Patient denied any chest pain, EKG non-ischemia. Patient given Aspirin 325 mg, Lovenox 1mg/kg, and Lasix 40 IV x1 as well as a breathing treatment. Po Bactrim for UTI     The patient was transferred to [facility] for admission under hospital medicine and for cardiology consultation     Hospital Course:   9/10/23-Patient seen and examined. Stable and continue diuresis    9/11/23-Patient seen and examined today, lying in bed with CPAP on. Feels ok. SOB improving. No CP. Labs reviewed, creatinine 1.2.     9/12/23-Patient seen and examined today, resting with CPAP on. Continues to improve. Diuresing well. Labs reviewed/stable. Possible MPI stress test tmw.          Review of Systems   Constitutional: Negative.   HENT: Negative.     Eyes: Negative.    Cardiovascular:  Positive for dyspnea on exertion (improving).   Respiratory:  Positive for shortness of breath (improving).    Endocrine: Negative.    Hematologic/Lymphatic: Negative.    Skin: Negative.    Musculoskeletal: Negative.    Gastrointestinal: Negative.    Genitourinary: Negative.     Neurological: Negative.    Psychiatric/Behavioral: Negative.     Allergic/Immunologic: Negative.      Objective:     Vital Signs (Most Recent):  Temp: 98.6 °F (37 °C) (09/12/23 1146)  Pulse: 60 (09/12/23 1146)  Resp: 18 (09/12/23 1146)  BP: (!) 155/72 (09/12/23 1146)  SpO2: (!) 94 % (09/12/23 1146) Vital Signs (24h Range):  Temp:  [98 °F (36.7 °C)-98.6 °F (37 °C)] 98.6 °F (37 °C)  Pulse:  [54-77] 60  Resp:  [16-30] 18  SpO2:  [90 %-98 %] 94 %  BP: (135-188)/(63-98) 155/72     Weight: 122.5 kg (270 lb 1 oz)  Body mass index is 46.36 kg/m².     SpO2: (!) 94 %         Intake/Output Summary (Last 24 hours) at 9/12/2023 1329  Last data filed at 9/11/2023 2030  Gross per 24 hour   Intake --   Output 1700 ml   Net -1700 ml       Lines/Drains/Airways       Drain  Duration             Female External Urinary Catheter 09/10/23 0700 2 days              Peripheral Intravenous Line  Duration                  Peripheral IV - Single Lumen 09/09/23 1200 20 G;2 in Anterior;Left Forearm 3 days         Peripheral IV - Single Lumen 09/09/23 1507 20 G;2 in Anterior;Right Forearm 2 days                       Physical Exam  Vitals and nursing note reviewed.   Constitutional:       General: She is not in acute distress.     Appearance: Normal appearance. She is well-developed. She is obese. She is not diaphoretic.      Comments: CPAP on   HENT:      Head: Normocephalic and atraumatic.   Eyes:      General:         Right eye: No discharge.         Left eye: No discharge.      Pupils: Pupils are equal, round, and reactive to light.   Neck:      Thyroid: No thyromegaly.      Vascular: No JVD.      Trachea: No tracheal deviation.   Cardiovascular:      Rate and Rhythm: Normal rate and regular rhythm.      Heart sounds: Normal heart sounds, S1 normal and S2 normal. No murmur heard.  Pulmonary:      Effort: Pulmonary effort is normal. No respiratory distress.      Breath sounds: No wheezing.      Comments: Diminished BS  Abdominal:       "General: There is no distension.      Tenderness: There is no rebound.   Musculoskeletal:      Cervical back: Neck supple.      Right lower leg: Edema (trace) present.      Left lower leg: Edema (trace) present.   Skin:     General: Skin is warm and dry.      Findings: No erythema.   Neurological:      Mental Status: She is alert and oriented to person, place, and time.   Psychiatric:         Mood and Affect: Mood normal.         Behavior: Behavior normal.         Thought Content: Thought content normal.            Significant Labs: CMP   Recent Labs   Lab 09/11/23  0519 09/12/23  0536    140   K 4.7 4.5   CL 98 96   CO2 33* 33*   * 123*   BUN 20 28*   CREATININE 1.2 1.1   CALCIUM 10.4 10.1   ANIONGAP 8 11   , CBC   Recent Labs   Lab 09/11/23  0519 09/12/23  0537   WBC 11.56 14.67*   HGB 14.8 15.5   HCT 45.7 45.4    227   , Troponin No results for input(s): "TROPONINI" in the last 48 hours., and All pertinent lab results from the last 24 hours have been reviewed.    Significant Imaging: Echocardiogram: Transthoracic echo (TTE) complete (Cupid Only):   Results for orders placed or performed during the hospital encounter of 09/09/23   Echo   Result Value Ref Range    BSA 2.36 m2    LVOT stroke volume 82.38 cm3    LVIDd 5.03 3.5 - 6.0 cm    LV Systolic Volume 68.43 mL    LV Systolic Volume Index 30.7 mL/m2    LVIDs 3.96 2.1 - 4.0 cm    LV Diastolic Volume 120.14 mL    LV Diastolic Volume Index 53.87 mL/m2    IVS 1.11 (A) 0.6 - 1.1 cm    LVOT diameter 1.95 cm    LVOT area 3.0 cm2    FS 21 (A) 28 - 44 %    Left Ventricle Relative Wall Thickness 0.54 cm    Posterior Wall 1.35 (A) 0.6 - 1.1 cm    LV mass 244.31 g    LV Mass Index 110 g/m2    MV Peak E Berhane 0.93 m/s    TDI LATERAL 0.13 m/s    TDI SEPTAL 0.08 m/s    E/E' ratio 8.86 m/s    MV Peak A Berhane 0.90 m/s    TR Max Berhane 3.30 m/s    E/A ratio 1.03     IVRT 137.01 msec    E wave deceleration time 232.88 msec    LV SEPTAL E/E' RATIO 11.63 m/s    LV " LATERAL E/E' RATIO 7.15 m/s    LVOT peak berhane 1.35 m/s    Left Ventricular Outflow Tract Mean Velocity 0.87 cm/s    Left Ventricular Outflow Tract Mean Gradient 3.55 mmHg    LA size 3.64 cm    Left Atrium Major Axis 5.78 cm    Left Atrium Minor Axis 6.21 cm    RV mid diameter 5.15 cm    RVOT peak VTI 18.1 cm    RA Major Axis 4.94 cm    AV regurgitation pressure 1/2 time [id number] ms    AR Max Berhane 4.57 m/s    AV mean gradient 10 mmHg    AV peak gradient 17 mmHg    Ao peak berhane 2.09 m/s    Ao VTI 39.30 cm    LVOT peak VTI 27.60 cm    AV valve area 2.10 cm²    AV Velocity Ratio 0.65     AV index (prosthetic) 0.70     KARYNA by Velocity Ratio 1.93 cm²    Mr max berhane 5.59 m/s    Triscuspid Valve Regurgitation Peak Gradient 44 mmHg    PV mean gradient 2 mmHg    RVOT peak berhane 0.82 m/s    Ao root annulus 3.18 cm    STJ 3.59 cm    Ascending aorta 3.46 cm    IVC diameter 2.31 cm    Mean e' 0.11 m/s    ZLVIDS -1.49     ZLVIDD -4.49     LA Volume Index 33.2 mL/m2    LA volume 74.10 cm3    LA WIDTH 4.0 cm    TAPSE 2.40 cm    RA Width 3.2 cm    TV resting pulmonary artery pressure 47 mmHg    RV TB RVSP 6 mmHg    Est. RA pres 3 mmHg    Narrative      Left Ventricle: The left ventricle is normal in size. Mildly increased   wall thickness. Normal wall motion. There is mildly reduced systolic   function with a visually estimated ejection fraction of 40 - 45%. There is   normal diastolic function.    Right Ventricle: Normal right ventricular cavity size. Wall thickness   is normal. Right ventricle wall motion  is normal. Systolic function is   normal.    Aortic Valve: There is mild to moderate aortic regurgitation with a   centrally directed jet.    Mitral Valve: There is moderate regurgitation.    Tricuspid Valve: There is moderate regurgitation with a centrally   directed jet.    Pulmonic Valve: There is mild to moderate regurgitation.    Pulmonary Artery: The estimated pulmonary artery systolic pressure is   47 mmHg.     IVC/SVC: Normal venous pressure at 3 mmHg.     , EKG: Reviewed, and X-Ray: CXR: X-Ray Chest 1 View (CXR): No results found for this visit on 09/09/23. and X-Ray Chest PA and Lateral (CXR): No results found for this visit on 09/09/23.    Assessment and Plan:   Patient who presents with respiratory failure/decompensated CHF/elevated troponin. Improving, continue same meds/mgmt. Probable MPI stress test tmw. Patient previously on Xarelto as OP due to history of PE/PAF, this was d/c'd after prior hospitalization in 4/23 due to hematuria. Will need to readdress.     * Acute on chronic respiratory failure with hypoxia  Patient with Hypercapnic and Hypoxic Respiratory failure which is Acute on chronic.  she is not on home oxygen. Supplemental oxygen was provided and noted- Oxygen Concentration (%):  [30-36] 30    .   Signs/symptoms of respiratory failure include- tachypnea. Contributing diagnoses includes - COPD Labs and images were reviewed. Patient Has recent ABG, which has been reviewed. Will treat underlying causes and adjust management of respiratory failure as follows- hypercapnic    9/11/23  -Mgmt as per hospital medicine  -Continue IV diuresis for additional day    9/12/23  -Continue same mgmt/IV diuresis     Elevated troponin  Trend  Cardiac echo results pending    9/11/23  -Flat  -No CP symptoms  -Suspect elevation due to demand ischemia from fluid overload  -Echo showed EF of 40-45%, mild-mod AI  -Continue Coreg, Entresto, ASA  -Can consider ischemic workup IP vs OP once compensated    9/12/23  -Ok to d/c heparin gtt  -Continue Coreg, Entresto, ASA  -Probable MPI stress test tmw    Hypertension  Continue coreg, entresto    Acute cystitis without hematuria  treated    Acute on chronic combined systolic and diastolic congestive heart failure  Patient is identified as having Diastolic (HFpEF) heart failure that is Acute on chronic. CHF is currently uncontrolled due to Continued edema of extremities. Latest ECHO  performed and demonstrates- Results for orders placed during the hospital encounter of 03/29/23    Echo    Interpretation Summary  · The left ventricle is normal in size with moderate concentric hypertrophy and mildly decreased systolic function.  · The estimated ejection fraction is 45%.  · Grade I left ventricular diastolic dysfunction.  · Moderate right ventricular enlargement.  · Moderate right atrial enlargement.  · Mild-to-moderate aortic regurgitation.  · There is mild aortic valve stenosis.  · Aortic valve area is 1.44 cm2; peak velocity is 2.09 m/s; mean gradient is 10 mmHg.  · Mild-to-moderate mitral regurgitation.  · Moderate tricuspid regurgitation.  · Moderate pulmonic regurgitation.  · The estimated PA systolic pressure is 69 mmHg.  · There is moderate pulmonary hypertension.  · Moderate left atrial enlargement.  . Continue Beta Blocker and Furosemide and monitor clinical status closely. Monitor on telemetry. Patient is on CHF pathway.  Monitor strict Is&Os and daily weights.  Place on fluid restriction of 2 L. Cardiology has been consulted. Continue to stress to patient importance of self efficacy and  on diet for CHF. Last BNP reviewed- and noted below   Recent Labs   Lab 09/09/23 0618   BNP 1,532*       9/11/23  -Improving  -Continue IV diuresis  -Continue BB, Entresto  -Repeat BNP in AM    9/12/23  -Continue same meds/mgmt  -Continue IV diuresis, BB, Entresto  -Reassess in AM      COPD exacerbation  -Mgmt as per hospital medicine    Severe obesity (BMI >= 40)  -Weight loss        VTE Risk Mitigation (From admission, onward)         Ordered     enoxaparin injection 40 mg  Every 12 hours         09/12/23 1326     IP VTE HIGH RISK PATIENT  Once         09/09/23 0447     Place sequential compression device  Until discontinued         09/09/23 0447                Chelle Fierro PA-C  Cardiology  [facility] - Telemetry ([facility])

## 2023-09-12 NOTE — ASSESSMENT & PLAN NOTE
Patient with Hypercapnic and Hypoxic Respiratory failure which is Acute on chronic.  she is not on home oxygen. Supplemental oxygen was provided and noted- Oxygen Concentration (%):  [30-36] 30    .   Signs/symptoms of respiratory failure include- tachypnea. Contributing diagnoses includes - COPD Labs and images were reviewed. Patient Has recent ABG, which has been reviewed. Will treat underlying causes and adjust management of respiratory failure as follows- hypercapnic    9/11/23  -Mgmt as per hospital medicine  -Continue IV diuresis for additional day    9/12/23  -Continue same mgmt/IV diuresis

## 2023-09-12 NOTE — SUBJECTIVE & OBJECTIVE
Review of Systems   Constitutional: Negative.   HENT: Negative.     Eyes: Negative.    Cardiovascular:  Positive for dyspnea on exertion (improving).   Respiratory:  Positive for shortness of breath (improving).    Endocrine: Negative.    Hematologic/Lymphatic: Negative.    Skin: Negative.    Musculoskeletal: Negative.    Gastrointestinal: Negative.    Genitourinary: Negative.    Neurological: Negative.    Psychiatric/Behavioral: Negative.     Allergic/Immunologic: Negative.      Objective:     Vital Signs (Most Recent):  Temp: 98.6 °F (37 °C) (09/12/23 1146)  Pulse: 60 (09/12/23 1146)  Resp: 18 (09/12/23 1146)  BP: (!) 155/72 (09/12/23 1146)  SpO2: (!) 94 % (09/12/23 1146) Vital Signs (24h Range):  Temp:  [98 °F (36.7 °C)-98.6 °F (37 °C)] 98.6 °F (37 °C)  Pulse:  [54-77] 60  Resp:  [16-30] 18  SpO2:  [90 %-98 %] 94 %  BP: (135-188)/(63-98) 155/72     Weight: 122.5 kg (270 lb 1 oz)  Body mass index is 46.36 kg/m².     SpO2: (!) 94 %         Intake/Output Summary (Last 24 hours) at 9/12/2023 1329  Last data filed at 9/11/2023 2030  Gross per 24 hour   Intake --   Output 1700 ml   Net -1700 ml       Lines/Drains/Airways       Drain  Duration             Female External Urinary Catheter 09/10/23 0700 2 days              Peripheral Intravenous Line  Duration                  Peripheral IV - Single Lumen 09/09/23 1200 20 G;2 in Anterior;Left Forearm 3 days         Peripheral IV - Single Lumen 09/09/23 1507 20 G;2 in Anterior;Right Forearm 2 days                       Physical Exam  Vitals and nursing note reviewed.   Constitutional:       General: She is not in acute distress.     Appearance: Normal appearance. She is well-developed. She is obese. She is not diaphoretic.      Comments: CPAP on   HENT:      Head: Normocephalic and atraumatic.   Eyes:      General:         Right eye: No discharge.         Left eye: No discharge.      Pupils: Pupils are equal, round, and reactive to light.   Neck:      Thyroid: No  "thyromegaly.      Vascular: No JVD.      Trachea: No tracheal deviation.   Cardiovascular:      Rate and Rhythm: Normal rate and regular rhythm.      Heart sounds: Normal heart sounds, S1 normal and S2 normal. No murmur heard.  Pulmonary:      Effort: Pulmonary effort is normal. No respiratory distress.      Breath sounds: No wheezing.      Comments: Diminished BS  Abdominal:      General: There is no distension.      Tenderness: There is no rebound.   Musculoskeletal:      Cervical back: Neck supple.      Right lower leg: Edema (trace) present.      Left lower leg: Edema (trace) present.   Skin:     General: Skin is warm and dry.      Findings: No erythema.   Neurological:      Mental Status: She is alert and oriented to person, place, and time.   Psychiatric:         Mood and Affect: Mood normal.         Behavior: Behavior normal.         Thought Content: Thought content normal.            Significant Labs: CMP   Recent Labs   Lab 09/11/23  0519 09/12/23  0536    140   K 4.7 4.5   CL 98 96   CO2 33* 33*   * 123*   BUN 20 28*   CREATININE 1.2 1.1   CALCIUM 10.4 10.1   ANIONGAP 8 11   , CBC   Recent Labs   Lab 09/11/23  0519 09/12/23  0537   WBC 11.56 14.67*   HGB 14.8 15.5   HCT 45.7 45.4    227   , Troponin No results for input(s): "TROPONINI" in the last 48 hours., and All pertinent lab results from the last 24 hours have been reviewed.    Significant Imaging: Echocardiogram: Transthoracic echo (TTE) complete (Cupid Only):   Results for orders placed or performed during the hospital encounter of 09/09/23   Echo   Result Value Ref Range    BSA 2.36 m2    LVOT stroke volume 82.38 cm3    LVIDd 5.03 3.5 - 6.0 cm    LV Systolic Volume 68.43 mL    LV Systolic Volume Index 30.7 mL/m2    LVIDs 3.96 2.1 - 4.0 cm    LV Diastolic Volume 120.14 mL    LV Diastolic Volume Index 53.87 mL/m2    IVS 1.11 (A) 0.6 - 1.1 cm    LVOT diameter 1.95 cm    LVOT area 3.0 cm2    FS 21 (A) 28 - 44 %    Left Ventricle " Relative Wall Thickness 0.54 cm    Posterior Wall 1.35 (A) 0.6 - 1.1 cm    LV mass 244.31 g    LV Mass Index 110 g/m2    MV Peak E Berhane 0.93 m/s    TDI LATERAL 0.13 m/s    TDI SEPTAL 0.08 m/s    E/E' ratio 8.86 m/s    MV Peak A Berhane 0.90 m/s    TR Max Berhane 3.30 m/s    E/A ratio 1.03     IVRT 137.01 msec    E wave deceleration time 232.88 msec    LV SEPTAL E/E' RATIO 11.63 m/s    LV LATERAL E/E' RATIO 7.15 m/s    LVOT peak berhane 1.35 m/s    Left Ventricular Outflow Tract Mean Velocity 0.87 cm/s    Left Ventricular Outflow Tract Mean Gradient 3.55 mmHg    LA size 3.64 cm    Left Atrium Major Axis 5.78 cm    Left Atrium Minor Axis 6.21 cm    RV mid diameter 5.15 cm    RVOT peak VTI 18.1 cm    RA Major Axis 4.94 cm    AV regurgitation pressure 1/2 time 850.914607459527832 ms    AR Max Berhane 4.57 m/s    AV mean gradient 10 mmHg    AV peak gradient 17 mmHg    Ao peak berhane 2.09 m/s    Ao VTI 39.30 cm    LVOT peak VTI 27.60 cm    AV valve area 2.10 cm²    AV Velocity Ratio 0.65     AV index (prosthetic) 0.70     KARYNA by Velocity Ratio 1.93 cm²    Mr max berhane 5.59 m/s    Triscuspid Valve Regurgitation Peak Gradient 44 mmHg    PV mean gradient 2 mmHg    RVOT peak berhane 0.82 m/s    Ao root annulus 3.18 cm    STJ 3.59 cm    Ascending aorta 3.46 cm    IVC diameter 2.31 cm    Mean e' 0.11 m/s    ZLVIDS -1.49     ZLVIDD -4.49     LA Volume Index 33.2 mL/m2    LA volume 74.10 cm3    LA WIDTH 4.0 cm    TAPSE 2.40 cm    RA Width 3.2 cm    TV resting pulmonary artery pressure 47 mmHg    RV TB RVSP 6 mmHg    Est. RA pres 3 mmHg    Narrative      Left Ventricle: The left ventricle is normal in size. Mildly increased   wall thickness. Normal wall motion. There is mildly reduced systolic   function with a visually estimated ejection fraction of 40 - 45%. There is   normal diastolic function.    Right Ventricle: Normal right ventricular cavity size. Wall thickness   is normal. Right ventricle wall motion  is normal. Systolic function is   normal.     Aortic Valve: There is mild to moderate aortic regurgitation with a   centrally directed jet.    Mitral Valve: There is moderate regurgitation.    Tricuspid Valve: There is moderate regurgitation with a centrally   directed jet.    Pulmonic Valve: There is mild to moderate regurgitation.    Pulmonary Artery: The estimated pulmonary artery systolic pressure is   47 mmHg.    IVC/SVC: Normal venous pressure at 3 mmHg.     , EKG: Reviewed, and X-Ray: CXR: X-Ray Chest 1 View (CXR): No results found for this visit on 09/09/23. and X-Ray Chest PA and Lateral (CXR): No results found for this visit on 09/09/23.

## 2023-09-12 NOTE — ASSESSMENT & PLAN NOTE
Patient is identified as having Diastolic (HFpEF) heart failure that is Acute on chronic. CHF is currently uncontrolled due to Rales/crackles on pulmonary exam and Pulmonary edema/pleural effusion on CXR. Latest ECHO performed and demonstrates- Results for orders placed during the hospital encounter of 03/29/23    Echo    Interpretation Summary  · The left ventricle is normal in size with moderate concentric hypertrophy and mildly decreased systolic function.  · The estimated ejection fraction is 45%.  · Grade I left ventricular diastolic dysfunction.  · Moderate right ventricular enlargement.  · Moderate right atrial enlargement.  · Mild-to-moderate aortic regurgitation.  · There is mild aortic valve stenosis.  · Aortic valve area is 1.44 cm2; peak velocity is 2.09 m/s; mean gradient is 10 mmHg.  · Mild-to-moderate mitral regurgitation.  · Moderate tricuspid regurgitation.  · Moderate pulmonic regurgitation.  · The estimated PA systolic pressure is 69 mmHg.  · There is moderate pulmonary hypertension.  · Moderate left atrial enlargement.  . Continue Beta Blocker, Furosemide, Aldactone and ARNI and monitor clinical status closely. Monitor on telemetry. Patient is on CHF pathway.  Monitor strict Is&Os and daily weights.  Place on fluid restriction of 1.5 L. Cardiology has been consulted. Continue to stress to patient importance of self efficacy and  on diet for CHF. Last BNP reviewed- and noted below   Recent Labs   Lab 09/09/23  0618   BNP 1,532*     9/10/23  -Diuresing well  -Continue IV lasix      9/11/23- diuresis continued- BIPAP as needed for support   -9/12/23- diuresis continued- 4.8 L removed

## 2023-09-12 NOTE — PLAN OF CARE
Problem: Adult Inpatient Plan of Care  Goal: Plan of Care Review  Outcome: Ongoing, Progressing  Goal: Absence of Hospital-Acquired Illness or Injury  Outcome: Ongoing, Progressing     Problem: Skin Injury Risk Increased  Goal: Skin Health and Integrity  Outcome: Ongoing, Progressing     Problem: Fall Injury Risk  Goal: Absence of Fall and Fall-Related Injury  Outcome: Ongoing, Progressing

## 2023-09-12 NOTE — ASSESSMENT & PLAN NOTE
Serial troponin   Cardiology recommended Lovenox 1mg/kg to ED provider   Will start IV Heparin  cont ASA, BB, Statin, Entresto   Echo   Consult cardiology     9/10/23  Denies chest pain.   Troponin trending down  Continue current meds     -9/11/23- downward trend   -9/12/23- Stress test to be planned for tomorrow

## 2023-09-12 NOTE — SUBJECTIVE & OBJECTIVE
Interval History: pt in bed upon exam with BIPAP in place- transitioned to nasal cannula. Diuresis continued. Leukocytosis noted. Stress test planned for tomorrow.     Review of Systems   Constitutional:  Positive for activity change and fatigue. Negative for appetite change, chills and fever.   HENT:  Negative for congestion, postnasal drip, sinus pressure and trouble swallowing.    Respiratory:  Positive for shortness of breath. Negative for cough, chest tightness and wheezing.    Cardiovascular:  Positive for leg swelling. Negative for chest pain and palpitations.   Gastrointestinal:  Negative for abdominal distention, abdominal pain, nausea and vomiting.   Genitourinary:  Negative for difficulty urinating, dysuria, flank pain, frequency and urgency.   Musculoskeletal:  Negative for arthralgias, back pain and myalgias.   Skin:  Negative for color change and wound.   Neurological:  Positive for weakness. Negative for dizziness and headaches.   Psychiatric/Behavioral:  Negative for agitation and confusion. The patient is not nervous/anxious.      Objective:     Vital Signs (Most Recent):  Temp: 98.6 °F (37 °C) (09/12/23 1146)  Pulse: 62 (09/12/23 1300)  Resp: 18 (09/12/23 1146)  BP: (!) 155/72 (09/12/23 1146)  SpO2: (!) 94 % (09/12/23 1146) Vital Signs (24h Range):  Temp:  [98 °F (36.7 °C)-98.6 °F (37 °C)] 98.6 °F (37 °C)  Pulse:  [58-77] 62  Resp:  [18-30] 18  SpO2:  [90 %-98 %] 94 %  BP: (135-188)/(63-98) 155/72     Weight: 122.5 kg (270 lb 1 oz)  Body mass index is 46.36 kg/m².    Intake/Output Summary (Last 24 hours) at 9/12/2023 1507  Last data filed at 9/11/2023 2030  Gross per 24 hour   Intake --   Output 1700 ml   Net -1700 ml         Physical Exam  HENT:      Nose: Nose normal.      Mouth/Throat:      Pharynx: Oropharynx is clear.   Cardiovascular:      Rate and Rhythm: Normal rate and regular rhythm.   Pulmonary:      Breath sounds: Examination of the right-lower field reveals rales. Examination of the  left-lower field reveals rales. Rales present.   Abdominal:      General: Bowel sounds are normal. There is no distension.      Palpations: Abdomen is soft.      Tenderness: There is no abdominal tenderness.   Genitourinary:     Comments: Deferred   Musculoskeletal:         General: Normal range of motion.      Cervical back: Normal range of motion.   Skin:     General: Skin is warm and dry.   Neurological:      Mental Status: She is alert and oriented to person, place, and time.   Psychiatric:         Mood and Affect: Mood normal.         Behavior: Behavior normal.             Significant Labs: All pertinent labs within the past 24 hours have been reviewed.  CBC:   Recent Labs   Lab 09/11/23  0519 09/12/23  0537   WBC 11.56 14.67*   HGB 14.8 15.5   HCT 45.7 45.4    227     CMP:   Recent Labs   Lab 09/11/23  0519 09/12/23  0536    140   K 4.7 4.5   CL 98 96   CO2 33* 33*   * 123*   BUN 20 28*   CREATININE 1.2 1.1   CALCIUM 10.4 10.1   ANIONGAP 8 11     POCT Glucose:   Recent Labs   Lab 09/11/23  2032 09/12/23  0545 09/12/23  1122   POCTGLUCOSE 138* 130* 128*       Significant Imaging: I have reviewed all pertinent imaging results/findings within the past 24 hours.

## 2023-09-12 NOTE — ASSESSMENT & PLAN NOTE
Patient with Hypoxic Respiratory failure which is Acute on chronic.  she is on home oxygen at 4 LPM. Supplemental oxygen was provided and noted- Oxygen Concentration (%):  [30-36] 30    .   Signs/symptoms of respiratory failure include- increased work of breathing. Contributing diagnoses includes - CHF and COPD Labs and images were reviewed. Patient Has not had a recent ABG. Will treat underlying causes and adjust management of respiratory failure

## 2023-09-13 LAB
ANION GAP SERPL CALC-SCNC: 11 MMOL/L (ref 8–16)
BNP SERPL-MCNC: 197 PG/ML (ref 0–99)
BUN SERPL-MCNC: 33 MG/DL (ref 8–23)
CALCIUM SERPL-MCNC: 10.1 MG/DL (ref 8.7–10.5)
CHLORIDE SERPL-SCNC: 96 MMOL/L (ref 95–110)
CO2 SERPL-SCNC: 34 MMOL/L (ref 23–29)
CREAT SERPL-MCNC: 1.3 MG/DL (ref 0.5–1.4)
EST. GFR  (NO RACE VARIABLE): 44 ML/MIN/1.73 M^2
GLUCOSE SERPL-MCNC: 113 MG/DL (ref 70–110)
POCT GLUCOSE: 108 MG/DL (ref 70–110)
POCT GLUCOSE: 116 MG/DL (ref 70–110)
POCT GLUCOSE: 117 MG/DL (ref 70–110)
POCT GLUCOSE: 120 MG/DL (ref 70–110)
POTASSIUM SERPL-SCNC: 4.4 MMOL/L (ref 3.5–5.1)
SODIUM SERPL-SCNC: 141 MMOL/L (ref 136–145)

## 2023-09-13 PROCEDURE — 25000242 PHARM REV CODE 250 ALT 637 W/ HCPCS: Performed by: NURSE PRACTITIONER

## 2023-09-13 PROCEDURE — 36415 COLL VENOUS BLD VENIPUNCTURE: CPT | Performed by: NURSE PRACTITIONER

## 2023-09-13 PROCEDURE — 99233 SBSQ HOSP IP/OBS HIGH 50: CPT | Mod: ,,, | Performed by: INTERNAL MEDICINE

## 2023-09-13 PROCEDURE — 94761 N-INVAS EAR/PLS OXIMETRY MLT: CPT

## 2023-09-13 PROCEDURE — 25000003 PHARM REV CODE 250: Performed by: NURSE PRACTITIONER

## 2023-09-13 PROCEDURE — 83880 ASSAY OF NATRIURETIC PEPTIDE: CPT | Performed by: PHYSICIAN ASSISTANT

## 2023-09-13 PROCEDURE — 63600175 PHARM REV CODE 636 W HCPCS: Performed by: NURSE PRACTITIONER

## 2023-09-13 PROCEDURE — 27100171 HC OXYGEN HIGH FLOW UP TO 24 HOURS

## 2023-09-13 PROCEDURE — 21400001 HC TELEMETRY ROOM

## 2023-09-13 PROCEDURE — 36415 COLL VENOUS BLD VENIPUNCTURE: CPT | Performed by: PHYSICIAN ASSISTANT

## 2023-09-13 PROCEDURE — 25000242 PHARM REV CODE 250 ALT 637 W/ HCPCS: Performed by: INTERNAL MEDICINE

## 2023-09-13 PROCEDURE — 99233 PR SUBSEQUENT HOSPITAL CARE,LEVL III: ICD-10-PCS | Mod: ,,, | Performed by: INTERNAL MEDICINE

## 2023-09-13 PROCEDURE — 94660 CPAP INITIATION&MGMT: CPT

## 2023-09-13 PROCEDURE — 94640 AIRWAY INHALATION TREATMENT: CPT

## 2023-09-13 PROCEDURE — 25000003 PHARM REV CODE 250: Performed by: INTERNAL MEDICINE

## 2023-09-13 PROCEDURE — 63600175 PHARM REV CODE 636 W HCPCS: Performed by: INTERNAL MEDICINE

## 2023-09-13 PROCEDURE — 99900035 HC TECH TIME PER 15 MIN (STAT)

## 2023-09-13 PROCEDURE — 80048 BASIC METABOLIC PNL TOTAL CA: CPT | Performed by: NURSE PRACTITIONER

## 2023-09-13 RX ORDER — ISOSORBIDE MONONITRATE 30 MG/1
30 TABLET, EXTENDED RELEASE ORAL DAILY
Status: DISCONTINUED | OUTPATIENT
Start: 2023-09-13 | End: 2023-09-19 | Stop reason: HOSPADM

## 2023-09-13 RX ADMIN — ACETAMINOPHEN 650 MG: 325 TABLET ORAL at 06:09

## 2023-09-13 RX ADMIN — PANTOPRAZOLE SODIUM 40 MG: 40 TABLET, DELAYED RELEASE ORAL at 08:09

## 2023-09-13 RX ADMIN — SENNOSIDES AND DOCUSATE SODIUM 1 TABLET: 50; 8.6 TABLET ORAL at 08:09

## 2023-09-13 RX ADMIN — METHYLPREDNISOLONE SODIUM SUCCINATE 40 MG: 125 INJECTION, POWDER, FOR SOLUTION INTRAMUSCULAR; INTRAVENOUS at 05:09

## 2023-09-13 RX ADMIN — ENOXAPARIN SODIUM 40 MG: 40 INJECTION SUBCUTANEOUS at 08:09

## 2023-09-13 RX ADMIN — LEVALBUTEROL HYDROCHLORIDE 1.25 MG: 0.63 SOLUTION RESPIRATORY (INHALATION) at 04:09

## 2023-09-13 RX ADMIN — SERTRALINE HYDROCHLORIDE 50 MG: 50 TABLET ORAL at 08:09

## 2023-09-13 RX ADMIN — CEFTRIAXONE 1 G: 1 INJECTION, POWDER, FOR SOLUTION INTRAMUSCULAR; INTRAVENOUS at 05:09

## 2023-09-13 RX ADMIN — FUROSEMIDE 40 MG: 10 INJECTION, SOLUTION INTRAMUSCULAR; INTRAVENOUS at 05:09

## 2023-09-13 RX ADMIN — LEVOTHYROXINE SODIUM 50 MCG: 50 TABLET ORAL at 05:09

## 2023-09-13 RX ADMIN — ATORVASTATIN CALCIUM 40 MG: 40 TABLET, FILM COATED ORAL at 08:09

## 2023-09-13 RX ADMIN — METHYLPREDNISOLONE SODIUM SUCCINATE 40 MG: 125 INJECTION, POWDER, FOR SOLUTION INTRAMUSCULAR; INTRAVENOUS at 11:09

## 2023-09-13 RX ADMIN — CARVEDILOL 6.25 MG: 6.25 TABLET, FILM COATED ORAL at 09:09

## 2023-09-13 RX ADMIN — ARFORMOTEROL TARTRATE 15 MCG: 15 SOLUTION RESPIRATORY (INHALATION) at 07:09

## 2023-09-13 RX ADMIN — LEVALBUTEROL HYDROCHLORIDE 1.25 MG: 0.63 SOLUTION RESPIRATORY (INHALATION) at 07:09

## 2023-09-13 RX ADMIN — SACUBITRIL AND VALSARTAN 1 TABLET: 24; 26 TABLET, FILM COATED ORAL at 09:09

## 2023-09-13 RX ADMIN — POLYETHYLENE GLYCOL 3350 17 G: 17 POWDER, FOR SOLUTION ORAL at 08:09

## 2023-09-13 RX ADMIN — ASPIRIN 81 MG: 81 TABLET, COATED ORAL at 08:09

## 2023-09-13 RX ADMIN — CARVEDILOL 6.25 MG: 6.25 TABLET, FILM COATED ORAL at 08:09

## 2023-09-13 RX ADMIN — SACUBITRIL AND VALSARTAN 1 TABLET: 24; 26 TABLET, FILM COATED ORAL at 08:09

## 2023-09-13 RX ADMIN — ISOSORBIDE MONONITRATE 30 MG: 30 TABLET, EXTENDED RELEASE ORAL at 09:09

## 2023-09-13 RX ADMIN — SENNOSIDES AND DOCUSATE SODIUM 1 TABLET: 50; 8.6 TABLET ORAL at 09:09

## 2023-09-13 RX ADMIN — BUDESONIDE 0.5 MG: 0.5 INHALANT ORAL at 07:09

## 2023-09-13 RX ADMIN — ENOXAPARIN SODIUM 40 MG: 40 INJECTION SUBCUTANEOUS at 09:09

## 2023-09-13 NOTE — SUBJECTIVE & OBJECTIVE
Review of Systems   Constitutional: Positive for malaise/fatigue.   HENT: Negative.     Eyes: Negative.    Cardiovascular:  Positive for dyspnea on exertion (improved).   Respiratory:  Positive for shortness of breath (improved).    Endocrine: Negative.    Hematologic/Lymphatic: Negative.    Skin: Negative.    Musculoskeletal:  Positive for arthritis.   Gastrointestinal: Negative.    Genitourinary: Negative.    Neurological: Negative.    Psychiatric/Behavioral: Negative.     Allergic/Immunologic: Negative.      Objective:     Vital Signs (Most Recent):  Temp: 97.6 °F (36.4 °C) (09/13/23 1206)  Pulse: 61 (09/13/23 1206)  Resp: 20 (09/13/23 1206)  BP: (!) 126/59 (09/13/23 1206)  SpO2: 96 % (09/13/23 1206) Vital Signs (24h Range):  Temp:  [97.4 °F (36.3 °C)-99.2 °F (37.3 °C)] 97.6 °F (36.4 °C)  Pulse:  [58-71] 61  Resp:  [16-30] 20  SpO2:  [94 %-97 %] 96 %  BP: (119-177)/(59-80) 126/59     Weight: 116.6 kg (257 lb 0.9 oz)  Body mass index is 44.12 kg/m².     SpO2: 96 %         Intake/Output Summary (Last 24 hours) at 9/13/2023 1327  Last data filed at 9/13/2023 1033  Gross per 24 hour   Intake 288.47 ml   Output 2600 ml   Net -2311.53 ml       Lines/Drains/Airways       Drain  Duration             Female External Urinary Catheter 09/10/23 0700 3 days              Peripheral Intravenous Line  Duration                  Peripheral IV - Single Lumen 09/09/23 1200 20 G;2 in Anterior;Left Forearm 4 days         Peripheral IV - Single Lumen 09/09/23 1507 20 G;2 in Anterior;Right Forearm 3 days                       Physical Exam  Vitals and nursing note reviewed.   Constitutional:       General: She is not in acute distress.     Appearance: Normal appearance. She is well-developed. She is not diaphoretic.      Comments: ON supplemental O2   HENT:      Head: Normocephalic and atraumatic.   Eyes:      General:         Right eye: No discharge.         Left eye: No discharge.      Pupils: Pupils are equal, round, and reactive  to light.   Neck:      Thyroid: No thyromegaly.      Vascular: No JVD.      Trachea: No tracheal deviation.   Cardiovascular:      Rate and Rhythm: Normal rate and regular rhythm.      Heart sounds: Normal heart sounds, S1 normal and S2 normal. No murmur heard.  Pulmonary:      Effort: Pulmonary effort is normal. No respiratory distress.      Breath sounds: No wheezing.      Comments: Slightly diminished at bases, no rales  Abdominal:      General: There is no distension.   Musculoskeletal:      Cervical back: Neck supple.      Right lower leg: No edema.      Left lower leg: No edema.   Skin:     General: Skin is warm and dry.      Findings: No erythema.   Neurological:      General: No focal deficit present.      Mental Status: She is alert and oriented to person, place, and time.   Psychiatric:         Mood and Affect: Mood normal.         Behavior: Behavior normal.         Thought Content: Thought content normal.            Significant Labs: CMP   Recent Labs   Lab 09/12/23  0536 09/13/23  0520    141   K 4.5 4.4   CL 96 96   CO2 33* 34*   * 113*   BUN 28* 33*   CREATININE 1.1 1.3   CALCIUM 10.1 10.1   ANIONGAP 11 11   , CBC   Recent Labs   Lab 09/12/23  0537   WBC 14.67*   HGB 15.5   HCT 45.4      , and All pertinent lab results from the last 24 hours have been reviewed.    Significant Imaging: Echocardiogram: Transthoracic echo (TTE) complete (Cupid Only):   Results for orders placed or performed during the hospital encounter of 09/09/23   Echo   Result Value Ref Range    BSA 2.36 m2    LVOT stroke volume 82.38 cm3    LVIDd 5.03 3.5 - 6.0 cm    LV Systolic Volume 68.43 mL    LV Systolic Volume Index 30.7 mL/m2    LVIDs 3.96 2.1 - 4.0 cm    LV Diastolic Volume 120.14 mL    LV Diastolic Volume Index 53.87 mL/m2    IVS 1.11 (A) 0.6 - 1.1 cm    LVOT diameter 1.95 cm    LVOT area 3.0 cm2    FS 21 (A) 28 - 44 %    Left Ventricle Relative Wall Thickness 0.54 cm    Posterior Wall 1.35 (A) 0.6 - 1.1  cm    LV mass 244.31 g    LV Mass Index 110 g/m2    MV Peak E Berhane 0.93 m/s    TDI LATERAL 0.13 m/s    TDI SEPTAL 0.08 m/s    E/E' ratio 8.86 m/s    MV Peak A Berhane 0.90 m/s    TR Max Berhane 3.30 m/s    E/A ratio 1.03     IVRT 137.01 msec    E wave deceleration time 232.88 msec    LV SEPTAL E/E' RATIO 11.63 m/s    LV LATERAL E/E' RATIO 7.15 m/s    LVOT peak berhane 1.35 m/s    Left Ventricular Outflow Tract Mean Velocity 0.87 cm/s    Left Ventricular Outflow Tract Mean Gradient 3.55 mmHg    LA size 3.64 cm    Left Atrium Major Axis 5.78 cm    Left Atrium Minor Axis 6.21 cm    RV mid diameter 5.15 cm    RVOT peak VTI 18.1 cm    RA Major Axis 4.94 cm    AV regurgitation pressure 1/2 time [id number] ms    AR Max Berhane 4.57 m/s    AV mean gradient 10 mmHg    AV peak gradient 17 mmHg    Ao peak berhane 2.09 m/s    Ao VTI 39.30 cm    LVOT peak VTI 27.60 cm    AV valve area 2.10 cm²    AV Velocity Ratio 0.65     AV index (prosthetic) 0.70     KARYNA by Velocity Ratio 1.93 cm²    Mr max berhane 5.59 m/s    Triscuspid Valve Regurgitation Peak Gradient 44 mmHg    PV mean gradient 2 mmHg    RVOT peak berhane 0.82 m/s    Ao root annulus 3.18 cm    STJ 3.59 cm    Ascending aorta 3.46 cm    IVC diameter 2.31 cm    Mean e' 0.11 m/s    ZLVIDS -1.49     ZLVIDD -4.49     LA Volume Index 33.2 mL/m2    LA volume 74.10 cm3    LA WIDTH 4.0 cm    TAPSE 2.40 cm    RA Width 3.2 cm    TV resting pulmonary artery pressure 47 mmHg    RV TB RVSP 6 mmHg    Est. RA pres 3 mmHg    Narrative      Left Ventricle: The left ventricle is normal in size. Mildly increased   wall thickness. Normal wall motion. There is mildly reduced systolic   function with a visually estimated ejection fraction of 40 - 45%. There is   normal diastolic function.    Right Ventricle: Normal right ventricular cavity size. Wall thickness   is normal. Right ventricle wall motion  is normal. Systolic function is   normal.    Aortic Valve: There is mild to moderate aortic regurgitation with a    centrally directed jet.    Mitral Valve: There is moderate regurgitation.    Tricuspid Valve: There is moderate regurgitation with a centrally   directed jet.    Pulmonic Valve: There is mild to moderate regurgitation.    Pulmonary Artery: The estimated pulmonary artery systolic pressure is   47 mmHg.    IVC/SVC: Normal venous pressure at 3 mmHg.     , EKG: Reviewed, and X-Ray: CXR: X-Ray Chest 1 View (CXR): No results found for this visit on 09/09/23.

## 2023-09-13 NOTE — NURSING
Received bedside report from RUI Boyle and care assumed.  Patient in bed with BiPAP in use.  She states she feels better with the mask on.  Denies any SOB or other discomfort at this time.  Assessment per flowsheet.  Safety maintained.  Will continue to follow current plan of care.

## 2023-09-13 NOTE — ASSESSMENT & PLAN NOTE
Patient is identified as having Diastolic (HFpEF) heart failure that is Acute on chronic. CHF is currently uncontrolled due to Continued edema of extremities. Latest ECHO performed and demonstrates- Results for orders placed during the hospital encounter of 03/29/23    Echo    Interpretation Summary  · The left ventricle is normal in size with moderate concentric hypertrophy and mildly decreased systolic function.  · The estimated ejection fraction is 45%.  · Grade I left ventricular diastolic dysfunction.  · Moderate right ventricular enlargement.  · Moderate right atrial enlargement.  · Mild-to-moderate aortic regurgitation.  · There is mild aortic valve stenosis.  · Aortic valve area is 1.44 cm2; peak velocity is 2.09 m/s; mean gradient is 10 mmHg.  · Mild-to-moderate mitral regurgitation.  · Moderate tricuspid regurgitation.  · Moderate pulmonic regurgitation.  · The estimated PA systolic pressure is 69 mmHg.  · There is moderate pulmonary hypertension.  · Moderate left atrial enlargement.  . Continue Beta Blocker and Furosemide and monitor clinical status closely. Monitor on telemetry. Patient is on CHF pathway.  Monitor strict Is&Os and daily weights.  Place on fluid restriction of 2 L. Cardiology has been consulted. Continue to stress to patient importance of self efficacy and  on diet for CHF. Last BNP reviewed- and noted below   Recent Labs   Lab 09/13/23  0902   *       9/11/23  -Improving  -Continue IV diuresis  -Continue BB, Entresto  -Repeat BNP in AM    9/12/23  -Continue same meds/mgmt  -Continue IV diuresis, BB, Entresto  -Reassess in AM    9/13/23  -Volume status much improved, BNP trended down significantly  -Stop IV Lasix  -Continue BB, Entresto  -Reassess in AM  -MPI stress test ordered (rest part today)

## 2023-09-13 NOTE — PROGRESS NOTES
[facility] Tele[facility] ([facility]  Cardiology  Progress Note    Patient Name: Carmen Tan  MRN: [id number]  Admission Date: 9/9/2023  Hospital Length of Stay: 4 days  Code Status: Full Code   Attending Physician: Elmer Nichols MD   Primary Care Physician: Lucian Barry MD  Expected Discharge Date:   Principal Problem:Acute on chronic respiratory failure with hypoxia    Subjective:   HPI:  The patient is a 73 year old female with DM, COPD on home oxygen 4 liters, Diastolic CHF, HTN, hx PE who presented to [facility] ED with Generalized weakness, Fatigue, SOB, and Dysuria - onset 2 days ago that progressively worsened. Pt also reports BLE edema that worsened over the past week.      In the ED, CXR showed Subtle hazy opacities at the perihilar aspects of the lungs may represent mild edema. Elevated troponin of 200, elevated BNP 5085.  Labs otherwise unremarkable. COVID negative. Patient denied any chest pain, EKG non-ischemia. Patient given Aspirin 325 mg, Lovenox 1mg/kg, and Lasix 40 IV x1 as well as a breathing treatment. Po Bactrim for UTI     The patient was transferred to [facility] for admission under hospital medicine and for cardiology consultation     Hospital Course:   9/10/23-Patient seen and examined. Stable and continue diuresis    9/11/23-Patient seen and examined today, lying in bed with CPAP on. Feels ok. SOB improving. No CP. Labs reviewed, creatinine 1.2.     9/12/23-Patient seen and examined today, resting with CPAP on. Continues to improve. Diuresing well. Labs reviewed/stable. Possible MPI stress test tmw.    9/13/23-Patient seen and examined today, sitting up in bed. SOB much improved, on supplemental O2. No CP. BNP trended down significantly. Labs reviewed, mild bump in BUN/creatinine, IV Lasix d/c'd. Stress test ordered (rest portion today).        Review of Systems   Constitutional: Positive for malaise/fatigue.   HENT: Negative.     Eyes: Negative.    Cardiovascular:  Positive for dyspnea on  exertion (improved).   Respiratory:  Positive for shortness of breath (improved).    Endocrine: Negative.    Hematologic/Lymphatic: Negative.    Skin: Negative.    Musculoskeletal:  Positive for arthritis.   Gastrointestinal: Negative.    Genitourinary: Negative.    Neurological: Negative.    Psychiatric/Behavioral: Negative.     Allergic/Immunologic: Negative.      Objective:     Vital Signs (Most Recent):  Temp: 97.6 °F (36.4 °C) (09/13/23 1206)  Pulse: 61 (09/13/23 1206)  Resp: 20 (09/13/23 1206)  BP: (!) 126/59 (09/13/23 1206)  SpO2: 96 % (09/13/23 1206) Vital Signs (24h Range):  Temp:  [97.4 °F (36.3 °C)-99.2 °F (37.3 °C)] 97.6 °F (36.4 °C)  Pulse:  [58-71] 61  Resp:  [16-30] 20  SpO2:  [94 %-97 %] 96 %  BP: (119-177)/(59-80) 126/59     Weight: 116.6 kg (257 lb 0.9 oz)  Body mass index is 44.12 kg/m².     SpO2: 96 %         Intake/Output Summary (Last 24 hours) at 9/13/2023 1327  Last data filed at 9/13/2023 1033  Gross per 24 hour   Intake 288.47 ml   Output 2600 ml   Net -2311.53 ml       Lines/Drains/Airways       Drain  Duration             Female External Urinary Catheter 09/10/23 0700 3 days              Peripheral Intravenous Line  Duration                  Peripheral IV - Single Lumen 09/09/23 1200 20 G;2 in Anterior;Left Forearm 4 days         Peripheral IV - Single Lumen 09/09/23 1507 20 G;2 in Anterior;Right Forearm 3 days                       Physical Exam  Vitals and nursing note reviewed.   Constitutional:       General: She is not in acute distress.     Appearance: Normal appearance. She is well-developed. She is not diaphoretic.      Comments: ON supplemental O2   HENT:      Head: Normocephalic and atraumatic.   Eyes:      General:         Right eye: No discharge.         Left eye: No discharge.      Pupils: Pupils are equal, round, and reactive to light.   Neck:      Thyroid: No thyromegaly.      Vascular: No JVD.      Trachea: No tracheal deviation.   Cardiovascular:      Rate and Rhythm:  Normal rate and regular rhythm.      Heart sounds: Normal heart sounds, S1 normal and S2 normal. No murmur heard.  Pulmonary:      Effort: Pulmonary effort is normal. No respiratory distress.      Breath sounds: No wheezing.      Comments: Slightly diminished at bases, no rales  Abdominal:      General: There is no distension.   Musculoskeletal:      Cervical back: Neck supple.      Right lower leg: No edema.      Left lower leg: No edema.   Skin:     General: Skin is warm and dry.      Findings: No erythema.   Neurological:      General: No focal deficit present.      Mental Status: She is alert and oriented to person, place, and time.   Psychiatric:         Mood and Affect: Mood normal.         Behavior: Behavior normal.         Thought Content: Thought content normal.            Significant Labs: CMP   Recent Labs   Lab 09/12/23  0536 09/13/23  0520    141   K 4.5 4.4   CL 96 96   CO2 33* 34*   * 113*   BUN 28* 33*   CREATININE 1.1 1.3   CALCIUM 10.1 10.1   ANIONGAP 11 11   , CBC   Recent Labs   Lab 09/12/23  0537   WBC 14.67*   HGB 15.5   HCT 45.4      , and All pertinent lab results from the last 24 hours have been reviewed.    Significant Imaging: Echocardiogram: Transthoracic echo (TTE) complete (Cupid Only):   Results for orders placed or performed during the hospital encounter of 09/09/23   Echo   Result Value Ref Range    BSA 2.36 m2    LVOT stroke volume 82.38 cm3    LVIDd 5.03 3.5 - 6.0 cm    LV Systolic Volume 68.43 mL    LV Systolic Volume Index 30.7 mL/m2    LVIDs 3.96 2.1 - 4.0 cm    LV Diastolic Volume 120.14 mL    LV Diastolic Volume Index 53.87 mL/m2    IVS 1.11 (A) 0.6 - 1.1 cm    LVOT diameter 1.95 cm    LVOT area 3.0 cm2    FS 21 (A) 28 - 44 %    Left Ventricle Relative Wall Thickness 0.54 cm    Posterior Wall 1.35 (A) 0.6 - 1.1 cm    LV mass 244.31 g    LV Mass Index 110 g/m2    MV Peak E Berhane 0.93 m/s    TDI LATERAL 0.13 m/s    TDI SEPTAL 0.08 m/s    E/E' ratio 8.86 m/s     MV Peak A Berhane 0.90 m/s    TR Max Berhane 3.30 m/s    E/A ratio 1.03     IVRT 137.01 msec    E wave deceleration time 232.88 msec    LV SEPTAL E/E' RATIO 11.63 m/s    LV LATERAL E/E' RATIO 7.15 m/s    LVOT peak berhane 1.35 m/s    Left Ventricular Outflow Tract Mean Velocity 0.87 cm/s    Left Ventricular Outflow Tract Mean Gradient 3.55 mmHg    LA size 3.64 cm    Left Atrium Major Axis 5.78 cm    Left Atrium Minor Axis 6.21 cm    RV mid diameter 5.15 cm    RVOT peak VTI 18.1 cm    RA Major Axis 4.94 cm    AV regurgitation pressure 1/2 time 850.842161533136990 ms    AR Max Berhane 4.57 m/s    AV mean gradient 10 mmHg    AV peak gradient 17 mmHg    Ao peak berhane 2.09 m/s    Ao VTI 39.30 cm    LVOT peak VTI 27.60 cm    AV valve area 2.10 cm²    AV Velocity Ratio 0.65     AV index (prosthetic) 0.70     KARYNA by Velocity Ratio 1.93 cm²    Mr max berhane 5.59 m/s    Triscuspid Valve Regurgitation Peak Gradient 44 mmHg    PV mean gradient 2 mmHg    RVOT peak berhane 0.82 m/s    Ao root annulus 3.18 cm    STJ 3.59 cm    Ascending aorta 3.46 cm    IVC diameter 2.31 cm    Mean e' 0.11 m/s    ZLVIDS -1.49     ZLVIDD -4.49     LA Volume Index 33.2 mL/m2    LA volume 74.10 cm3    LA WIDTH 4.0 cm    TAPSE 2.40 cm    RA Width 3.2 cm    TV resting pulmonary artery pressure 47 mmHg    RV TB RVSP 6 mmHg    Est. RA pres 3 mmHg    Narrative      Left Ventricle: The left ventricle is normal in size. Mildly increased   wall thickness. Normal wall motion. There is mildly reduced systolic   function with a visually estimated ejection fraction of 40 - 45%. There is   normal diastolic function.    Right Ventricle: Normal right ventricular cavity size. Wall thickness   is normal. Right ventricle wall motion  is normal. Systolic function is   normal.    Aortic Valve: There is mild to moderate aortic regurgitation with a   centrally directed jet.    Mitral Valve: There is moderate regurgitation.    Tricuspid Valve: There is moderate regurgitation with a centrally    directed jet.    Pulmonic Valve: There is mild to moderate regurgitation.    Pulmonary Artery: The estimated pulmonary artery systolic pressure is   47 mmHg.    IVC/SVC: Normal venous pressure at 3 mmHg.     , EKG: Reviewed, and X-Ray: CXR: X-Ray Chest 1 View (CXR): No results found for this visit on 09/09/23.    Assessment and Plan:   Patient who presents with SOB in setting of COPD exacerbation/decompensated CHF. Volume status improved, BNP trended down significantly. MPI stress test pending.     * Acute on chronic respiratory failure with hypoxia  Patient with Hypercapnic and Hypoxic Respiratory failure which is Acute on chronic.  she is not on home oxygen. Supplemental oxygen was provided and noted- Oxygen Concentration (%):  [30] 30    .   Signs/symptoms of respiratory failure include- tachypnea. Contributing diagnoses includes - COPD Labs and images were reviewed. Patient Has recent ABG, which has been reviewed. Will treat underlying causes and adjust management of respiratory failure as follows- hypercapnic    9/11/23  -Mgmt as per hospital medicine  -Continue IV diuresis for additional day    9/12/23  -Continue same mgmt/IV diuresis     9/13/23  -Much improved, BNP trended down, d/c IV Lasix  -Reassess in AM    Elevated troponin  Trend  Cardiac echo results pending    9/11/23  -Flat  -No CP symptoms  -Suspect elevation due to demand ischemia from fluid overload  -Echo showed EF of 40-45%, mild-mod AI  -Continue Coreg, Entresto, ASA  -Can consider ischemic workup IP vs OP once compensated    9/12/23  -Ok to d/c heparin gtt  -Continue Coreg, Entresto, ASA  -Probable MPI stress test tmw    9/13/23  -CP free  -Continue ASA, Coreg, Entresto  -MPI stress test pending    Hypertension  Continue coreg, entresto    Acute cystitis without hematuria  treated    Acute on chronic combined systolic and diastolic congestive heart failure  Patient is identified as having Diastolic (HFpEF) heart failure that is Acute on  chronic. CHF is currently uncontrolled due to Continued edema of extremities. Latest ECHO performed and demonstrates- Results for orders placed during the hospital encounter of 03/29/23    Echo    Interpretation Summary  · The left ventricle is normal in size with moderate concentric hypertrophy and mildly decreased systolic function.  · The estimated ejection fraction is 45%.  · Grade I left ventricular diastolic dysfunction.  · Moderate right ventricular enlargement.  · Moderate right atrial enlargement.  · Mild-to-moderate aortic regurgitation.  · There is mild aortic valve stenosis.  · Aortic valve area is 1.44 cm2; peak velocity is 2.09 m/s; mean gradient is 10 mmHg.  · Mild-to-moderate mitral regurgitation.  · Moderate tricuspid regurgitation.  · Moderate pulmonic regurgitation.  · The estimated PA systolic pressure is 69 mmHg.  · There is moderate pulmonary hypertension.  · Moderate left atrial enlargement.  . Continue Beta Blocker and Furosemide and monitor clinical status closely. Monitor on telemetry. Patient is on CHF pathway.  Monitor strict Is&Os and daily weights.  Place on fluid restriction of 2 L. Cardiology has been consulted. Continue to stress to patient importance of self efficacy and  on diet for CHF. Last BNP reviewed- and noted below   Recent Labs   Lab 09/13/23  0902   *       9/11/23  -Improving  -Continue IV diuresis  -Continue BB, Entresto  -Repeat BNP in AM    9/12/23  -Continue same meds/mgmt  -Continue IV diuresis, BB, Entresto  -Reassess in AM    9/13/23  -Volume status much improved, BNP trended down significantly  -Stop IV Lasix  -Continue BB, Entresto  -Reassess in AM  -MPI stress test ordered (rest part today)    COPD exacerbation  -Mgmt as per hospital medicine    Severe obesity (BMI >= 40)  -Weight loss        VTE Risk Mitigation (From admission, onward)         Ordered     enoxaparin injection 40 mg  Every 12 hours         09/12/23 1326     IP VTE HIGH RISK PATIENT   Once         09/09/23 0447     Place sequential compression device  Until discontinued         09/09/23 0447                Chelle Fierro PA-C  Cardiology  [facility] - Telemetry ([facility])

## 2023-09-13 NOTE — PLAN OF CARE
Patient in bed with BIPAP mask on.  Denies SOB or CP. Patient continue to receive IV lasix as well as solumedrol IV. Purewick in place with patient voiding without difficulty. No c/o dysuria noted or voiced.  Blood glucose monitored as ordered. Patient NSR to SB on monitor with NADN. Will continue to follow current plan of care.

## 2023-09-13 NOTE — ASSESSMENT & PLAN NOTE
Patient is identified as having Diastolic (HFpEF) heart failure that is Acute on chronic. CHF is currently uncontrolled due to Rales/crackles on pulmonary exam and Pulmonary edema/pleural effusion on CXR. Latest ECHO performed and demonstrates- Results for orders placed during the hospital encounter of 03/29/23    Echo    Interpretation Summary  · The left ventricle is normal in size with moderate concentric hypertrophy and mildly decreased systolic function.  · The estimated ejection fraction is 45%.  · Grade I left ventricular diastolic dysfunction.  · Moderate right ventricular enlargement.  · Moderate right atrial enlargement.  · Mild-to-moderate aortic regurgitation.  · There is mild aortic valve stenosis.  · Aortic valve area is 1.44 cm2; peak velocity is 2.09 m/s; mean gradient is 10 mmHg.  · Mild-to-moderate mitral regurgitation.  · Moderate tricuspid regurgitation.  · Moderate pulmonic regurgitation.  · The estimated PA systolic pressure is 69 mmHg.  · There is moderate pulmonary hypertension.  · Moderate left atrial enlargement.  . Continue Beta Blocker, Furosemide, Aldactone and ARNI and monitor clinical status closely. Monitor on telemetry. Patient is on CHF pathway.  Monitor strict Is&Os and daily weights.  Place on fluid restriction of 1.5 L. Cardiology has been consulted. Continue to stress to patient importance of self efficacy and  on diet for CHF. Last BNP reviewed- and noted below   Recent Labs   Lab 09/13/23  0902   *     9/10/23  -Diuresing well  -Continue IV lasix      9/11/23- diuresis continued- BIPAP as needed for support   -9/12/23- diuresis continued- 4.8 L removed    -9/13/23- diuresis completed- 7.1 L removed

## 2023-09-13 NOTE — PROGRESS NOTES
[facility] - Telemetry ([facility])  [facility] Medicine  Progress Note    Patient Name: Carmen Tan  MRN: [id number]  Patient Class: IP- Inpatient   Admission Date: 9/9/2023  Length of Stay: 4 days  Attending Physician: Elmer Nichols MD  Primary Care Provider: Lucian Barry MD        Subjective:     Principal Problem:Acute on chronic respiratory failure with hypoxia        HPI:  The patient is a 73 year old female with DM, COPD on home oxygen 4 liters, Diastolic CHF, HTN, hx PE who presented to [facility] ED with Generalized weakness, Fatigue, SOB, and Dysuria - onset 2 days ago that progressively worsened. Pt also reports BLE edema that worsened over the past week.     n the ED, CXR shows chronic scarring no focal consolidation. Elevated troponin of 200, elevated BNP. Labs otherwise unremarkable. COVID negative. Patient denied any chest pain, EKG non-ischemia. Patient given Aspirin 325 mg, Lovenox 1mg/kg, and Lasix 40 IV x1 as well as a breathing treatment. Po Bactrim for UTI    The patient was transferred to [facility] for admission under hospital medicine and for cardiology consultation       Overview/Hospital Course:  Patient sitting on side of the bed eating. No distress noted. Reports feeling much better. On continuous oxygen. Diuresed well overnight. Will continue to diurese.  On 9/11/23, diuresis continued. Cardiology following with inpatient vs outpatient ischemic work up to be evaluated. Troponin remains elevated with downward trend noted. Urine culture grew E. Coli (50,000-99,999) with sensitivities reviewed and antibiotics continued. Echo on 9/9/23 showed EF 40-45%  mildly reduced systolic function- mild to moderate aortic/pulmonic regurgitation with a centrally directed jet. Mitral/Tricuspid moderate regurgitation. BIPAP placed as needed for comfort and respiratory support. On 9/12/23, diuresis continued with 4.8 L removed. BIPAP continued nightly and as needed. Stress test planned for tomorrow per  Cardiology. On 9/13/23, pt verbalized symptom improvement with IV diuresis completed. Respiratory support maintained on home oxygen dose of 4L NC. Resting portion of stress test performed today with second portion to be completed on tomorrow.       Interval History: pt sitting up to side of bed upon exam with oxygen in place and no signs of acute distress. IV diuresis completed and resting portion of stress test completed. Cardiology following.     Review of Systems   Constitutional:  Positive for activity change and fatigue. Negative for appetite change, chills and fever.   HENT:  Negative for congestion, postnasal drip, sinus pressure and trouble swallowing.    Respiratory:  Positive for shortness of breath (improved). Negative for cough, chest tightness and wheezing.    Cardiovascular:  Positive for leg swelling (improved). Negative for chest pain and palpitations.   Gastrointestinal:  Negative for abdominal distention, abdominal pain, nausea and vomiting.   Genitourinary:  Negative for difficulty urinating, dysuria, flank pain, frequency and urgency.   Musculoskeletal:  Negative for arthralgias, back pain and myalgias.   Skin:  Negative for color change and wound.   Neurological:  Positive for weakness (improved). Negative for dizziness and headaches.   Psychiatric/Behavioral:  Negative for agitation and confusion. The patient is not nervous/anxious.      Objective:     Vital Signs (Most Recent):  Temp: 97.6 °F (36.4 °C) (09/13/23 1206)  Pulse: 60 (09/13/23 1332)  Resp: (!) 33 (09/13/23 1332)  BP: (!) 126/59 (09/13/23 1206)  SpO2: 95 % (09/13/23 1332) Vital Signs (24h Range):  Temp:  [97.4 °F (36.3 °C)-99.2 °F (37.3 °C)] 97.6 °F (36.4 °C)  Pulse:  [58-71] 60  Resp:  [16-33] 33  SpO2:  [94 %-97 %] 95 %  BP: (119-177)/(59-80) 126/59     Weight: 116.6 kg (257 lb 0.9 oz)  Body mass index is 44.12 kg/m².    Intake/Output Summary (Last 24 hours) at 9/13/2023 1430  Last data filed at 9/13/2023 1033  Gross per 24 hour    Intake 288.47 ml   Output 2600 ml   Net -2311.53 ml         Physical Exam  HENT:      Nose: Nose normal.      Mouth/Throat:      Pharynx: Oropharynx is clear.   Cardiovascular:      Rate and Rhythm: Normal rate and regular rhythm.   Pulmonary:      Effort: Pulmonary effort is normal.      Breath sounds: Normal breath sounds.   Abdominal:      General: Bowel sounds are normal. There is no distension.      Palpations: Abdomen is soft.      Tenderness: There is no abdominal tenderness.   Genitourinary:     Comments: Deferred   Musculoskeletal:         General: Normal range of motion.      Cervical back: Normal range of motion.   Skin:     General: Skin is warm and dry.   Neurological:      Mental Status: She is alert and oriented to person, place, and time.   Psychiatric:         Mood and Affect: Mood normal.         Behavior: Behavior normal.             Significant Labs: All pertinent labs within the past 24 hours have been reviewed.  CBC:   Recent Labs   Lab 09/12/23  0537   WBC 14.67*   HGB 15.5   HCT 45.4        CMP:   Recent Labs   Lab 09/12/23  0536 09/13/23  0520    141   K 4.5 4.4   CL 96 96   CO2 33* 34*   * 113*   BUN 28* 33*   CREATININE 1.1 1.3   CALCIUM 10.1 10.1   ANIONGAP 11 11       Significant Imaging: I have reviewed all pertinent imaging results/findings within the past 24 hours.      Assessment/Plan:      * Acute on chronic respiratory failure with hypoxia  Patient with Hypoxic Respiratory failure which is Acute on chronic.  she is on home oxygen at 4 LPM. Supplemental oxygen was provided and noted- Oxygen Concentration (%):  [30] 30    .   Signs/symptoms of respiratory failure include- increased work of breathing. Contributing diagnoses includes - CHF and COPD Labs and images were reviewed. Patient Has not had a recent ABG. Will treat underlying causes and adjust management of respiratory failure     Elevated troponin  Serial troponin   Cardiology recommended Lovenox 1mg/kg  to ED provider   Will start IV Heparin  cont ASA, BB, Statin, Entresto   Echo   Consult cardiology     9/10/23  Denies chest pain.   Troponin trending down  Continue current meds     -9/11/23- downward trend   -9/12/23- Stress test to be planned for tomorrow   -9/13/23- resting portion of stress test completed     Hypertension  BP stable  Cont home meds Coreg and Entresto  Monitor     Acute cystitis without hematuria  IV Rocephin completed   Blood cx remain negative  Urine culture grew E. Coli (50,000-99,999)- sensitivities noted         Diabetes mellitus, type 2  Patient's FSGs are controlled on current medication regimen.  Last A1c reviewed-   Lab Results   Component Value Date    HGBA1C 5.3 09/09/2023    HGBA1C 5.3 09/09/2023     Most recent fingerstick glucose reviewed-   Recent Labs   Lab 09/12/23  1640 09/12/23 2056 09/13/23  0532 09/13/23  1143   POCTGLUCOSE 134* 121* 116* 117*     Current correctional scale  Low  Maintain anti-hyperglycemic dose as follows-   Antihyperglycemics (From admission, onward)    Start     Stop Route Frequency Ordered    09/09/23 0545  insulin aspart U-100 pen 0-5 Units         -- SubQ Before meals & nightly PRN 09/09/23 0447        Hold Oral hypoglycemics while patient is in the hospital.    Acute on chronic combined systolic and diastolic congestive heart failure  Patient is identified as having Diastolic (HFpEF) heart failure that is Acute on chronic. CHF is currently uncontrolled due to Rales/crackles on pulmonary exam and Pulmonary edema/pleural effusion on CXR. Latest ECHO performed and demonstrates- Results for orders placed during the hospital encounter of 03/29/23    Echo    Interpretation Summary  · The left ventricle is normal in size with moderate concentric hypertrophy and mildly decreased systolic function.  · The estimated ejection fraction is 45%.  · Grade I left ventricular diastolic dysfunction.  · Moderate right ventricular enlargement.  · Moderate right atrial  enlargement.  · Mild-to-moderate aortic regurgitation.  · There is mild aortic valve stenosis.  · Aortic valve area is 1.44 cm2; peak velocity is 2.09 m/s; mean gradient is 10 mmHg.  · Mild-to-moderate mitral regurgitation.  · Moderate tricuspid regurgitation.  · Moderate pulmonic regurgitation.  · The estimated PA systolic pressure is 69 mmHg.  · There is moderate pulmonary hypertension.  · Moderate left atrial enlargement.  . Continue Beta Blocker, Furosemide, Aldactone and ARNI and monitor clinical status closely. Monitor on telemetry. Patient is on CHF pathway.  Monitor strict Is&Os and daily weights.  Place on fluid restriction of 1.5 L. Cardiology has been consulted. Continue to stress to patient importance of self efficacy and  on diet for CHF. Last BNP reviewed- and noted below   Recent Labs   Lab 09/13/23 0902   *     9/10/23  -Diuresing well  -Continue IV lasix      9/11/23- diuresis continued- BIPAP as needed for support   -9/12/23- diuresis continued- 4.8 L removed    -9/13/23- diuresis completed- 7.1 L removed     COPD exacerbation  IV Steroids  LABA. CITLALY. ICS neb txs  -supplemental oxygen at home dose of 4 L      Severe obesity (BMI >= 40)  Body mass index is 44.12 kg/m². Morbid obesity complicates all aspects of disease management from diagnostic modalities to treatment. Weight loss encouraged and health benefits explained to patient.         Paroxysmal A-fib  Patient with Paroxysmal (<7 days) atrial fibrillation which is controlled currently with Beta Blocker. Patient is currently in sinus rhythm.[id number] Score: 3. HASBLED Score: 3. Anticoagulation per Heparin infusion completed-hx of intolerance of anticoagulation noted due to gross hematuria        VTE Risk Mitigation (From admission, onward)         Ordered     enoxaparin injection 40 mg  Every 12 hours         09/12/23 1326     IP VTE HIGH RISK PATIENT  Once         09/09/23 0447     Place sequential compression device  Until  discontinued         09/09/23 0447                Discharge Planning   TIGRE:      Code Status: Full Code   Is the patient medically ready for discharge?:     Reason for patient still in hospital (select all that apply): Patient trending condition, Laboratory test, Treatment, Imaging, Consult recommendations and PT / OT recommendations  Discharge Plan A: Home                  Corrina Herrmann NP  Department of Hospital Medicine   [facility] ([facility])

## 2023-09-13 NOTE — ASSESSMENT & PLAN NOTE
Patient with Hypoxic Respiratory failure which is Acute on chronic.  she is on home oxygen at 4 LPM. Supplemental oxygen was provided and noted- Oxygen Concentration (%):  [30] 30    .   Signs/symptoms of respiratory failure include- increased work of breathing. Contributing diagnoses includes - CHF and COPD Labs and images were reviewed. Patient Has not had a recent ABG. Will treat underlying causes and adjust management of respiratory failure

## 2023-09-13 NOTE — ASSESSMENT & PLAN NOTE
Patient's FSGs are controlled on current medication regimen.  Last A1c reviewed-   Lab Results   Component Value Date    HGBA1C 5.3 09/09/2023    HGBA1C 5.3 09/09/2023     Most recent fingerstick glucose reviewed-   Recent Labs   Lab 09/12/23  1640 09/12/23 2056 09/13/23  0532 09/13/23  1143   POCTGLUCOSE 134* 121* 116* 117*     Current correctional scale  Low  Maintain anti-hyperglycemic dose as follows-   Antihyperglycemics (From admission, onward)    Start     Stop Route Frequency Ordered    09/09/23 0545  insulin aspart U-100 pen 0-5 Units         -- SubQ Before meals & nightly PRN 09/09/23 0447        Hold Oral hypoglycemics while patient is in the hospital.

## 2023-09-13 NOTE — ASSESSMENT & PLAN NOTE
Serial troponin   Cardiology recommended Lovenox 1mg/kg to ED provider   Will start IV Heparin  cont ASA, BB, Statin, Entresto   Echo   Consult cardiology     9/10/23  Denies chest pain.   Troponin trending down  Continue current meds     -9/11/23- downward trend   -9/12/23- Stress test to be planned for tomorrow   -9/13/23- resting portion of stress test completed

## 2023-09-13 NOTE — SUBJECTIVE & OBJECTIVE
Interval History: pt sitting up to side of bed upon exam with oxygen in place and no signs of acute distress. IV diuresis completed and resting portion of stress test completed. Cardiology following.     Review of Systems   Constitutional:  Positive for activity change and fatigue. Negative for appetite change, chills and fever.   HENT:  Negative for congestion, postnasal drip, sinus pressure and trouble swallowing.    Respiratory:  Positive for shortness of breath (improved). Negative for cough, chest tightness and wheezing.    Cardiovascular:  Positive for leg swelling (improved). Negative for chest pain and palpitations.   Gastrointestinal:  Negative for abdominal distention, abdominal pain, nausea and vomiting.   Genitourinary:  Negative for difficulty urinating, dysuria, flank pain, frequency and urgency.   Musculoskeletal:  Negative for arthralgias, back pain and myalgias.   Skin:  Negative for color change and wound.   Neurological:  Positive for weakness (improved). Negative for dizziness and headaches.   Psychiatric/Behavioral:  Negative for agitation and confusion. The patient is not nervous/anxious.      Objective:     Vital Signs (Most Recent):  Temp: 97.6 °F (36.4 °C) (09/13/23 1206)  Pulse: 60 (09/13/23 1332)  Resp: (!) 33 (09/13/23 1332)  BP: (!) 126/59 (09/13/23 1206)  SpO2: 95 % (09/13/23 1332) Vital Signs (24h Range):  Temp:  [97.4 °F (36.3 °C)-99.2 °F (37.3 °C)] 97.6 °F (36.4 °C)  Pulse:  [58-71] 60  Resp:  [16-33] 33  SpO2:  [94 %-97 %] 95 %  BP: (119-177)/(59-80) 126/59     Weight: 116.6 kg (257 lb 0.9 oz)  Body mass index is 44.12 kg/m².    Intake/Output Summary (Last 24 hours) at 9/13/2023 1458  Last data filed at 9/13/2023 1033  Gross per 24 hour   Intake 288.47 ml   Output 2600 ml   Net -2311.53 ml         Physical Exam  HENT:      Nose: Nose normal.      Mouth/Throat:      Pharynx: Oropharynx is clear.   Cardiovascular:      Rate and Rhythm: Normal rate and regular rhythm.   Pulmonary:       Effort: Pulmonary effort is normal.      Breath sounds: Normal breath sounds.   Abdominal:      General: Bowel sounds are normal. There is no distension.      Palpations: Abdomen is soft.      Tenderness: There is no abdominal tenderness.   Genitourinary:     Comments: Deferred   Musculoskeletal:         General: Normal range of motion.      Cervical back: Normal range of motion.   Skin:     General: Skin is warm and dry.   Neurological:      Mental Status: She is alert and oriented to person, place, and time.   Psychiatric:         Mood and Affect: Mood normal.         Behavior: Behavior normal.             Significant Labs: All pertinent labs within the past 24 hours have been reviewed.  CBC:   Recent Labs   Lab 09/12/23  0537   WBC 14.67*   HGB 15.5   HCT 45.4        CMP:   Recent Labs   Lab 09/12/23  0536 09/13/23  0520    141   K 4.5 4.4   CL 96 96   CO2 33* 34*   * 113*   BUN 28* 33*   CREATININE 1.1 1.3   CALCIUM 10.1 10.1   ANIONGAP 11 11       Significant Imaging: I have reviewed all pertinent imaging results/findings within the past 24 hours.

## 2023-09-13 NOTE — ASSESSMENT & PLAN NOTE
Patient with Hypercapnic and Hypoxic Respiratory failure which is Acute on chronic.  she is not on home oxygen. Supplemental oxygen was provided and noted- Oxygen Concentration (%):  [30] 30    .   Signs/symptoms of respiratory failure include- tachypnea. Contributing diagnoses includes - COPD Labs and images were reviewed. Patient Has recent ABG, which has been reviewed. Will treat underlying causes and adjust management of respiratory failure as follows- hypercapnic    9/11/23  -Mgmt as per hospital medicine  -Continue IV diuresis for additional day    9/12/23  -Continue same mgmt/IV diuresis     9/13/23  -Much improved, BNP trended down, d/c IV Lasix  -Reassess in AM

## 2023-09-13 NOTE — ASSESSMENT & PLAN NOTE
Body mass index is 44.12 kg/m². Morbid obesity complicates all aspects of disease management from diagnostic modalities to treatment. Weight loss encouraged and health benefits explained to patient.

## 2023-09-13 NOTE — ASSESSMENT & PLAN NOTE
Patient with Paroxysmal (<7 days) atrial fibrillation which is controlled currently with Beta Blocker. Patient is currently in sinus rhythm.[id number] Score: 3. HASBLED Score: 3. Anticoagulation per Heparin infusion completed-hx of intolerance of anticoagulation noted due to gross hematuria

## 2023-09-13 NOTE — ASSESSMENT & PLAN NOTE
IV Rocephin completed   Blood cx remain negative  Urine culture grew E. Coli (50,000-99,999)- sensitivities noted

## 2023-09-14 LAB
ANION GAP SERPL CALC-SCNC: 11 MMOL/L (ref 8–16)
BACTERIA BLD CULT: NORMAL
BACTERIA BLD CULT: NORMAL
BASOPHILS # BLD AUTO: 0.01 K/UL (ref 0–0.2)
BASOPHILS NFR BLD: 0.1 % (ref 0–1.9)
BUN SERPL-MCNC: 43 MG/DL (ref 8–23)
CALCIUM SERPL-MCNC: 9.5 MG/DL (ref 8.7–10.5)
CHLORIDE SERPL-SCNC: 95 MMOL/L (ref 95–110)
CO2 SERPL-SCNC: 34 MMOL/L (ref 23–29)
CREAT SERPL-MCNC: 1.2 MG/DL (ref 0.5–1.4)
DIFFERENTIAL METHOD: ABNORMAL
EOSINOPHIL # BLD AUTO: 0 K/UL (ref 0–0.5)
EOSINOPHIL NFR BLD: 0 % (ref 0–8)
ERYTHROCYTE [DISTWIDTH] IN BLOOD BY AUTOMATED COUNT: 13.8 % (ref 11.5–14.5)
EST. GFR  (NO RACE VARIABLE): 48 ML/MIN/1.73 M^2
GLUCOSE SERPL-MCNC: 118 MG/DL (ref 70–110)
HCT VFR BLD AUTO: 46.8 % (ref 37–48.5)
HGB BLD-MCNC: 15.4 G/DL (ref 12–16)
IMM GRANULOCYTES # BLD AUTO: 0.08 K/UL (ref 0–0.04)
IMM GRANULOCYTES NFR BLD AUTO: 0.7 % (ref 0–0.5)
LYMPHOCYTES # BLD AUTO: 0.7 K/UL (ref 1–4.8)
LYMPHOCYTES NFR BLD: 6.1 % (ref 18–48)
MCH RBC QN AUTO: 30.2 PG (ref 27–31)
MCHC RBC AUTO-ENTMCNC: 32.9 G/DL (ref 32–36)
MCV RBC AUTO: 92 FL (ref 82–98)
MONOCYTES # BLD AUTO: 0.5 K/UL (ref 0.3–1)
MONOCYTES NFR BLD: 4.3 % (ref 4–15)
NEUTROPHILS # BLD AUTO: 10.1 K/UL (ref 1.8–7.7)
NEUTROPHILS NFR BLD: 88.8 % (ref 38–73)
NRBC BLD-RTO: 0 /100 WBC
PLATELET # BLD AUTO: 200 K/UL (ref 150–450)
PMV BLD AUTO: 9 FL (ref 9.2–12.9)
POCT GLUCOSE: 123 MG/DL (ref 70–110)
POTASSIUM SERPL-SCNC: 4.5 MMOL/L (ref 3.5–5.1)
RBC # BLD AUTO: 5.1 M/UL (ref 4–5.4)
SODIUM SERPL-SCNC: 140 MMOL/L (ref 136–145)
WBC # BLD AUTO: 11.35 K/UL (ref 3.9–12.7)

## 2023-09-14 PROCEDURE — 97530 THERAPEUTIC ACTIVITIES: CPT

## 2023-09-14 PROCEDURE — 97162 PT EVAL MOD COMPLEX 30 MIN: CPT

## 2023-09-14 PROCEDURE — 94640 AIRWAY INHALATION TREATMENT: CPT

## 2023-09-14 PROCEDURE — 99233 SBSQ HOSP IP/OBS HIGH 50: CPT | Mod: ,,, | Performed by: INTERNAL MEDICINE

## 2023-09-14 PROCEDURE — 25000003 PHARM REV CODE 250: Performed by: NURSE PRACTITIONER

## 2023-09-14 PROCEDURE — 94660 CPAP INITIATION&MGMT: CPT

## 2023-09-14 PROCEDURE — 27100171 HC OXYGEN HIGH FLOW UP TO 24 HOURS

## 2023-09-14 PROCEDURE — 63600175 PHARM REV CODE 636 W HCPCS: Performed by: INTERNAL MEDICINE

## 2023-09-14 PROCEDURE — 97166 OT EVAL MOD COMPLEX 45 MIN: CPT

## 2023-09-14 PROCEDURE — 63600175 PHARM REV CODE 636 W HCPCS: Performed by: NURSE PRACTITIONER

## 2023-09-14 PROCEDURE — 21400001 HC TELEMETRY ROOM

## 2023-09-14 PROCEDURE — 99233 PR SUBSEQUENT HOSPITAL CARE,LEVL III: ICD-10-PCS | Mod: ,,, | Performed by: INTERNAL MEDICINE

## 2023-09-14 PROCEDURE — 94761 N-INVAS EAR/PLS OXIMETRY MLT: CPT

## 2023-09-14 PROCEDURE — 63600175 PHARM REV CODE 636 W HCPCS: Performed by: PHYSICIAN ASSISTANT

## 2023-09-14 PROCEDURE — 36415 COLL VENOUS BLD VENIPUNCTURE: CPT | Performed by: NURSE PRACTITIONER

## 2023-09-14 PROCEDURE — 80048 BASIC METABOLIC PNL TOTAL CA: CPT | Performed by: NURSE PRACTITIONER

## 2023-09-14 PROCEDURE — 25000242 PHARM REV CODE 250 ALT 637 W/ HCPCS: Performed by: INTERNAL MEDICINE

## 2023-09-14 PROCEDURE — 25000242 PHARM REV CODE 250 ALT 637 W/ HCPCS: Performed by: NURSE PRACTITIONER

## 2023-09-14 PROCEDURE — 25000003 PHARM REV CODE 250: Performed by: INTERNAL MEDICINE

## 2023-09-14 PROCEDURE — 94760 N-INVAS EAR/PLS OXIMETRY 1: CPT

## 2023-09-14 PROCEDURE — 99900035 HC TECH TIME PER 15 MIN (STAT)

## 2023-09-14 PROCEDURE — 85025 COMPLETE CBC W/AUTO DIFF WBC: CPT | Performed by: NURSE PRACTITIONER

## 2023-09-14 RX ORDER — FUROSEMIDE 10 MG/ML
40 INJECTION INTRAMUSCULAR; INTRAVENOUS ONCE
Status: COMPLETED | OUTPATIENT
Start: 2023-09-14 | End: 2023-09-14

## 2023-09-14 RX ADMIN — ISOSORBIDE MONONITRATE 30 MG: 30 TABLET, EXTENDED RELEASE ORAL at 08:09

## 2023-09-14 RX ADMIN — SENNOSIDES AND DOCUSATE SODIUM 1 TABLET: 50; 8.6 TABLET ORAL at 08:09

## 2023-09-14 RX ADMIN — SACUBITRIL AND VALSARTAN 1 TABLET: 24; 26 TABLET, FILM COATED ORAL at 08:09

## 2023-09-14 RX ADMIN — ENOXAPARIN SODIUM 40 MG: 40 INJECTION SUBCUTANEOUS at 08:09

## 2023-09-14 RX ADMIN — METHYLPREDNISOLONE SODIUM SUCCINATE 40 MG: 125 INJECTION, POWDER, FOR SOLUTION INTRAMUSCULAR; INTRAVENOUS at 07:09

## 2023-09-14 RX ADMIN — PANTOPRAZOLE SODIUM 40 MG: 40 TABLET, DELAYED RELEASE ORAL at 08:09

## 2023-09-14 RX ADMIN — ARFORMOTEROL TARTRATE 15 MCG: 15 SOLUTION RESPIRATORY (INHALATION) at 07:09

## 2023-09-14 RX ADMIN — SERTRALINE HYDROCHLORIDE 50 MG: 50 TABLET ORAL at 08:09

## 2023-09-14 RX ADMIN — LEVALBUTEROL HYDROCHLORIDE 1.25 MG: 0.63 SOLUTION RESPIRATORY (INHALATION) at 03:09

## 2023-09-14 RX ADMIN — BUDESONIDE 0.5 MG: 0.5 INHALANT ORAL at 08:09

## 2023-09-14 RX ADMIN — METHYLPREDNISOLONE SODIUM SUCCINATE 40 MG: 125 INJECTION, POWDER, FOR SOLUTION INTRAMUSCULAR; INTRAVENOUS at 11:09

## 2023-09-14 RX ADMIN — LEVALBUTEROL HYDROCHLORIDE 1.25 MG: 0.63 SOLUTION RESPIRATORY (INHALATION) at 12:09

## 2023-09-14 RX ADMIN — ATORVASTATIN CALCIUM 40 MG: 40 TABLET, FILM COATED ORAL at 08:09

## 2023-09-14 RX ADMIN — POLYETHYLENE GLYCOL 3350 17 G: 17 POWDER, FOR SOLUTION ORAL at 08:09

## 2023-09-14 RX ADMIN — ASPIRIN 81 MG: 81 TABLET, COATED ORAL at 08:09

## 2023-09-14 RX ADMIN — LEVALBUTEROL HYDROCHLORIDE 1.25 MG: 0.63 SOLUTION RESPIRATORY (INHALATION) at 07:09

## 2023-09-14 RX ADMIN — ACETAMINOPHEN 650 MG: 325 TABLET ORAL at 07:09

## 2023-09-14 RX ADMIN — BUDESONIDE 0.5 MG: 0.5 INHALANT ORAL at 07:09

## 2023-09-14 RX ADMIN — METHYLPREDNISOLONE SODIUM SUCCINATE 40 MG: 125 INJECTION, POWDER, FOR SOLUTION INTRAMUSCULAR; INTRAVENOUS at 05:09

## 2023-09-14 RX ADMIN — ARFORMOTEROL TARTRATE 15 MCG: 15 SOLUTION RESPIRATORY (INHALATION) at 08:09

## 2023-09-14 RX ADMIN — FUROSEMIDE 40 MG: 10 INJECTION, SOLUTION INTRAMUSCULAR; INTRAVENOUS at 01:09

## 2023-09-14 RX ADMIN — CARVEDILOL 6.25 MG: 6.25 TABLET, FILM COATED ORAL at 08:09

## 2023-09-14 RX ADMIN — LEVOTHYROXINE SODIUM 50 MCG: 50 TABLET ORAL at 07:09

## 2023-09-14 NOTE — PROGRESS NOTES
[facility] - Tele[facility] ([facility])  Cardiology  Progress Note    Patient Name: Carmen Tan  MRN: [id number]  Admission Date: 9/9/2023  Hospital Length of Stay: 5 days  Code Status: Full Code   Attending Physician: Elmer Nichols MD   Primary Care Physician: Lucian Barry MD  Expected Discharge Date:   Principal Problem:Acute on chronic respiratory failure with hypoxia    Subjective:   HPI:  The patient is a 73 year old female with DM, COPD on home oxygen 4 liters, Diastolic CHF, HTN, hx PE who presented to [facility] ED with Generalized weakness, Fatigue, SOB, and Dysuria - onset 2 days ago that progressively worsened. Pt also reports BLE edema that worsened over the past week.      In the ED, CXR showed Subtle hazy opacities at the perihilar aspects of the lungs may represent mild edema. Elevated troponin of 200, elevated BNP 5085.  Labs otherwise unremarkable. COVID negative. Patient denied any chest pain, EKG non-ischemia. Patient given Aspirin 325 mg, Lovenox 1mg/kg, and Lasix 40 IV x1 as well as a breathing treatment. Po Bactrim for UTI     The patient was transferred to [facility] for admission under hospital medicine and for cardiology consultation     Hospital Course:   9/10/23-Patient seen and examined. Stable and continue diuresis    9/11/23-Patient seen and examined today, lying in bed with CPAP on. Feels ok. SOB improving. No CP. Labs reviewed, creatinine 1.2.     9/12/23-Patient seen and examined today, resting with CPAP on. Continues to improve. Diuresing well. Labs reviewed/stable. Possible MPI stress test tmw.    9/13/23-Patient seen and examined today, sitting up in bed. SOB much improved, on supplemental O2. No CP. BNP trended down significantly. Labs reviewed, mild bump in BUN/creatinine, IV Lasix d/c'd. Stress test ordered (rest portion today).    9/14/23-Patient seen and examined today, on CPAP. Wheezing, unable to complete stress test today. Give additional dose of IV Lasix today. Labs  reviewed.           Review of Systems   Constitutional: Positive for malaise/fatigue.   HENT: Negative.     Eyes: Negative.    Cardiovascular:  Positive for dyspnea on exertion.   Respiratory:  Positive for wheezing.    Hematologic/Lymphatic: Negative.    Skin: Negative.    Musculoskeletal:  Positive for arthritis and joint pain.   Gastrointestinal: Negative.    Genitourinary: Negative.    Neurological: Negative.    Psychiatric/Behavioral: Negative.     Allergic/Immunologic: Negative.      Objective:     Vital Signs (Most Recent):  Temp: 98 °F (36.7 °C) (09/14/23 0709)  Pulse: 66 (09/14/23 0910)  Resp: (!) 25 (09/14/23 0722)  BP: (!) 117/56 (09/14/23 0709)  SpO2: 95 % (09/14/23 0722) Vital Signs (24h Range):  Temp:  [97.6 °F (36.4 °C)-98.6 °F (37 °C)] 98 °F (36.7 °C)  Pulse:  [60-70] 66  Resp:  [18-33] 25  SpO2:  [93 %-97 %] 95 %  BP: (117-172)/(56-79) 117/56     Weight: 116.6 kg (257 lb 0.9 oz)  Body mass index is 44.12 kg/m².     SpO2: 95 %       No intake or output data in the 24 hours ending 09/14/23 1121    Lines/Drains/Airways       Drain  Duration             Female External Urinary Catheter 09/10/23 0700 4 days              Peripheral Intravenous Line  Duration                  Peripheral IV - Single Lumen 09/09/23 1200 20 G;2 in Anterior;Left Forearm 4 days         Peripheral IV - Single Lumen 09/09/23 1507 20 G;2 in Anterior;Right Forearm 4 days                       Physical Exam  Vitals and nursing note reviewed.   Constitutional:       General: She is not in acute distress.     Appearance: She is well-developed. She is not diaphoretic.      Comments: On CPAP   HENT:      Head: Normocephalic and atraumatic.   Eyes:      General:         Right eye: No discharge.         Left eye: No discharge.      Pupils: Pupils are equal, round, and reactive to light.   Neck:      Thyroid: No thyromegaly.      Vascular: No JVD.      Trachea: No tracheal deviation.   Cardiovascular:      Rate and Rhythm: Normal rate and  "regular rhythm.      Heart sounds: Normal heart sounds, S1 normal and S2 normal. No murmur heard.     No friction rub. No gallop.   Pulmonary:      Effort: Pulmonary effort is normal. No respiratory distress.      Breath sounds: Wheezing and rhonchi present. No rales.   Abdominal:      General: There is no distension.      Palpations: Abdomen is soft.      Tenderness: There is no rebound.   Musculoskeletal:      Cervical back: Neck supple.      Right lower leg: No edema.      Left lower leg: No edema.   Skin:     General: Skin is warm and dry.      Findings: No erythema.   Neurological:      Mental Status: She is alert and oriented to person, place, and time.   Psychiatric:         Mood and Affect: Mood normal.         Behavior: Behavior normal.         Thought Content: Thought content normal.            Significant Labs: CMP   Recent Labs   Lab 09/13/23  0520 09/14/23  0457    140   K 4.4 4.5   CL 96 95   CO2 34* 34*   * 118*   BUN 33* 43*   CREATININE 1.3 1.2   CALCIUM 10.1 9.5   ANIONGAP 11 11   , CBC   Recent Labs   Lab 09/14/23 0457   WBC 11.35   HGB 15.4   HCT 46.8      , INR No results for input(s): "INR", "PROTIME" in the last 48 hours., Troponin No results for input(s): "TROPONINI" in the last 48 hours., and All pertinent lab results from the last 24 hours have been reviewed.    Significant Imaging: Echocardiogram: Transthoracic echo (TTE) complete (Cupid Only):   Results for orders placed or performed during the hospital encounter of 09/09/23   Echo   Result Value Ref Range    BSA 2.36 m2    LVOT stroke volume 82.38 cm3    LVIDd 5.03 3.5 - 6.0 cm    LV Systolic Volume 68.43 mL    LV Systolic Volume Index 30.7 mL/m2    LVIDs 3.96 2.1 - 4.0 cm    LV Diastolic Volume 120.14 mL    LV Diastolic Volume Index 53.87 mL/m2    IVS 1.11 (A) 0.6 - 1.1 cm    LVOT diameter 1.95 cm    LVOT area 3.0 cm2    FS 21 (A) 28 - 44 %    Left Ventricle Relative Wall Thickness 0.54 cm    Posterior Wall 1.35 (A) " 0.6 - 1.1 cm    LV mass 244.31 g    LV Mass Index 110 g/m2    MV Peak E Berhane 0.93 m/s    TDI LATERAL 0.13 m/s    TDI SEPTAL 0.08 m/s    E/E' ratio 8.86 m/s    MV Peak A Berhane 0.90 m/s    TR Max Berhane 3.30 m/s    E/A ratio 1.03     IVRT 137.01 msec    E wave deceleration time 232.88 msec    LV SEPTAL E/E' RATIO 11.63 m/s    LV LATERAL E/E' RATIO 7.15 m/s    LVOT peak berhane 1.35 m/s    Left Ventricular Outflow Tract Mean Velocity 0.87 cm/s    Left Ventricular Outflow Tract Mean Gradient 3.55 mmHg    LA size 3.64 cm    Left Atrium Major Axis 5.78 cm    Left Atrium Minor Axis 6.21 cm    RV mid diameter 5.15 cm    RVOT peak VTI 18.1 cm    RA Major Axis 4.94 cm    AV regurgitation pressure 1/2 time 850.966299878597992 ms    AR Max Berhane 4.57 m/s    AV mean gradient 10 mmHg    AV peak gradient 17 mmHg    Ao peak berhane 2.09 m/s    Ao VTI 39.30 cm    LVOT peak VTI 27.60 cm    AV valve area 2.10 cm²    AV Velocity Ratio 0.65     AV index (prosthetic) 0.70     KARYNA by Velocity Ratio 1.93 cm²    Mr max berhane 5.59 m/s    Triscuspid Valve Regurgitation Peak Gradient 44 mmHg    PV mean gradient 2 mmHg    RVOT peak berhane 0.82 m/s    Ao root annulus 3.18 cm    STJ 3.59 cm    Ascending aorta 3.46 cm    IVC diameter 2.31 cm    Mean e' 0.11 m/s    ZLVIDS -1.49     ZLVIDD -4.49     LA Volume Index 33.2 mL/m2    LA volume 74.10 cm3    LA WIDTH 4.0 cm    TAPSE 2.40 cm    RA Width 3.2 cm    TV resting pulmonary artery pressure 47 mmHg    RV TB RVSP 6 mmHg    Est. RA pres 3 mmHg    Narrative      Left Ventricle: The left ventricle is normal in size. Mildly increased   wall thickness. Normal wall motion. There is mildly reduced systolic   function with a visually estimated ejection fraction of 40 - 45%. There is   normal diastolic function.    Right Ventricle: Normal right ventricular cavity size. Wall thickness   is normal. Right ventricle wall motion  is normal. Systolic function is   normal.    Aortic Valve: There is mild to moderate aortic  regurgitation with a   centrally directed jet.    Mitral Valve: There is moderate regurgitation.    Tricuspid Valve: There is moderate regurgitation with a centrally   directed jet.    Pulmonic Valve: There is mild to moderate regurgitation.    Pulmonary Artery: The estimated pulmonary artery systolic pressure is   47 mmHg.    IVC/SVC: Normal venous pressure at 3 mmHg.     , EKG: Reviewed, and X-Ray: CXR: X-Ray Chest 1 View (CXR): No results found for this visit on 09/09/23. and X-Ray Chest PA and Lateral (CXR): No results found for this visit on 09/09/23.    Assessment and Plan:   Patient who presents with respiratory failure in setting of underlying lung issues and CHF. Volume status improved but wheezing today. Give additional dose of IV Lasix. Continue steroids. Will re-attempt stress tmw.    * Acute on chronic respiratory failure with hypoxia  Patient with Hypercapnic and Hypoxic Respiratory failure which is Acute on chronic.  she is not on home oxygen. Supplemental oxygen was provided and noted- Oxygen Concentration (%):  [30] 30    .   Signs/symptoms of respiratory failure include- tachypnea. Contributing diagnoses includes - COPD Labs and images were reviewed. Patient Has recent ABG, which has been reviewed. Will treat underlying causes and adjust management of respiratory failure as follows- hypercapnic    9/11/23  -Mgmt as per hospital medicine  -Continue IV diuresis for additional day    9/12/23  -Continue same mgmt/IV diuresis     9/13/23  -Much improved, BNP trended down, d/c IV Lasix  -Reassess in AM    9/14/23  -Using CPAP, wheezing this AM  -Continue steroids  -Give additional dose of IV Lasix    Elevated troponin  Trend  Cardiac echo results pending    9/11/23  -Flat  -No CP symptoms  -Suspect elevation due to demand ischemia from fluid overload  -Echo showed EF of 40-45%, mild-mod AI  -Continue Coreg, Entresto, ASA  -Can consider ischemic workup IP vs OP once compensated    9/12/23  -Ok to d/c  heparin gtt  -Continue Coreg, Entresto, ASA  -Probable MPI stress test tmw    9/13/23  -CP free  -Continue ASA, Coreg, Entresto  -MPI stress test pending    9/14/23  -Wheezing today will re-attempt stress tmw    Hypertension  Continue coreg, entresto    Acute cystitis without hematuria  treated    Acute on chronic combined systolic and diastolic congestive heart failure  Patient is identified as having Diastolic (HFpEF) heart failure that is Acute on chronic. CHF is currently uncontrolled due to Continued edema of extremities. Latest ECHO performed and demonstrates- Results for orders placed during the hospital encounter of 03/29/23    Echo    Interpretation Summary  · The left ventricle is normal in size with moderate concentric hypertrophy and mildly decreased systolic function.  · The estimated ejection fraction is 45%.  · Grade I left ventricular diastolic dysfunction.  · Moderate right ventricular enlargement.  · Moderate right atrial enlargement.  · Mild-to-moderate aortic regurgitation.  · There is mild aortic valve stenosis.  · Aortic valve area is 1.44 cm2; peak velocity is 2.09 m/s; mean gradient is 10 mmHg.  · Mild-to-moderate mitral regurgitation.  · Moderate tricuspid regurgitation.  · Moderate pulmonic regurgitation.  · The estimated PA systolic pressure is 69 mmHg.  · There is moderate pulmonary hypertension.  · Moderate left atrial enlargement.  . Continue Beta Blocker and Furosemide and monitor clinical status closely. Monitor on telemetry. Patient is on CHF pathway.  Monitor strict Is&Os and daily weights.  Place on fluid restriction of 2 L. Cardiology has been consulted. Continue to stress to patient importance of self efficacy and  on diet for CHF. Last BNP reviewed- and noted below   Recent Labs   Lab 09/13/23  0902   *       9/11/23  -Improving  -Continue IV diuresis  -Continue BB, Entresto  -Repeat BNP in AM    9/12/23  -Continue same meds/mgmt  -Continue IV diuresis, BB  Entresto  -Reassess in AM    9/13/23  -Volume status much improved, BNP trended down significantly  -Stop IV Lasix  -Continue BB, Entresto  -Reassess in AM  -MPI stress test ordered (rest part today)    9/14/23  -Give additional dose of IV Lasix today  -Continue BB, Entresto    COPD exacerbation  -Mgmt as per hospital medicine    Severe obesity (BMI >= 40)  -Weight loss    Paroxysmal A-fib  -AC previously d/c'd in 4/23 due to hematuria  -Need to readdress prior to d/c        VTE Risk Mitigation (From admission, onward)         Ordered     enoxaparin injection 40 mg  Every 12 hours         09/12/23 1326     IP VTE HIGH RISK PATIENT  Once         09/09/23 0447     Place sequential compression device  Until discontinued         09/09/23 0447                Chelle Fierro PA-C  Cardiology  [facility] - Telemetry ([facility])

## 2023-09-14 NOTE — ASSESSMENT & PLAN NOTE
Serial troponin   Cardiology recommended Lovenox 1mg/kg to ED provider   Will start IV Heparin  cont ASA, BB, Statin, Entresto   Echo   Consult cardiology     9/10/23  Denies chest pain.   Troponin trending down  Continue current meds     -9/11/23- downward trend   -9/12/23- Stress test to be planned for tomorrow   -9/13/23- resting portion of stress test completed   -9/14/23- 2nd portion of stress test not completed due to wheezing- IV Lasix given - repeat analysis to be attempted on 9/15/23

## 2023-09-14 NOTE — ASSESSMENT & PLAN NOTE
Trend  Cardiac echo results pending    9/11/23  -Flat  -No CP symptoms  -Suspect elevation due to demand ischemia from fluid overload  -Echo showed EF of 40-45%, mild-mod AI  -Continue Coreg, Entresto, ASA  -Can consider ischemic workup IP vs OP once compensated    9/12/23  -Ok to d/c heparin gtt  -Continue Coreg, Entresto, ASA  -Probable MPI stress test tmw    9/13/23  -CP free  -Continue ASA, Coreg, Entresto  -MPI stress test pending    9/14/23  -Wheezing today will re-attempt stress tmw

## 2023-09-14 NOTE — PLAN OF CARE
OT abdulaziz completed. Pt bed mobility SBA. EOB> Stand with CGA. Pt ambulated 20 ft with RW with Min A. Stand > sit CGA.    ADLs: Pt SBA for UBD    D/C rec: HHOT

## 2023-09-14 NOTE — ASSESSMENT & PLAN NOTE
Patient is identified as having Diastolic (HFpEF) heart failure that is Acute on chronic. CHF is currently uncontrolled due to Continued edema of extremities. Latest ECHO performed and demonstrates- Results for orders placed during the hospital encounter of 03/29/23    Echo    Interpretation Summary  · The left ventricle is normal in size with moderate concentric hypertrophy and mildly decreased systolic function.  · The estimated ejection fraction is 45%.  · Grade I left ventricular diastolic dysfunction.  · Moderate right ventricular enlargement.  · Moderate right atrial enlargement.  · Mild-to-moderate aortic regurgitation.  · There is mild aortic valve stenosis.  · Aortic valve area is 1.44 cm2; peak velocity is 2.09 m/s; mean gradient is 10 mmHg.  · Mild-to-moderate mitral regurgitation.  · Moderate tricuspid regurgitation.  · Moderate pulmonic regurgitation.  · The estimated PA systolic pressure is 69 mmHg.  · There is moderate pulmonary hypertension.  · Moderate left atrial enlargement.  . Continue Beta Blocker and Furosemide and monitor clinical status closely. Monitor on telemetry. Patient is on CHF pathway.  Monitor strict Is&Os and daily weights.  Place on fluid restriction of 2 L. Cardiology has been consulted. Continue to stress to patient importance of self efficacy and  on diet for CHF. Last BNP reviewed- and noted below   Recent Labs   Lab 09/13/23  0902   *       9/11/23  -Improving  -Continue IV diuresis  -Continue BB, Entresto  -Repeat BNP in AM    9/12/23  -Continue same meds/mgmt  -Continue IV diuresis, BB, Entresto  -Reassess in AM    9/13/23  -Volume status much improved, BNP trended down significantly  -Stop IV Lasix  -Continue BB, Entresto  -Reassess in AM  -MPI stress test ordered (rest part today)    9/14/23  -Give additional dose of IV Lasix today  -Continue BB, Entresto

## 2023-09-14 NOTE — ASSESSMENT & PLAN NOTE
Patient with Hypercapnic and Hypoxic Respiratory failure which is Acute on chronic.  she is not on home oxygen. Supplemental oxygen was provided and noted- Oxygen Concentration (%):  [30] 30    .   Signs/symptoms of respiratory failure include- tachypnea. Contributing diagnoses includes - COPD Labs and images were reviewed. Patient Has recent ABG, which has been reviewed. Will treat underlying causes and adjust management of respiratory failure as follows- hypercapnic    9/11/23  -Mgmt as per hospital medicine  -Continue IV diuresis for additional day    9/12/23  -Continue same mgmt/IV diuresis     9/13/23  -Much improved, BNP trended down, d/c IV Lasix  -Reassess in AM    9/14/23  -Using CPAP, wheezing this AM  -Continue steroids  -Give additional dose of IV Lasix

## 2023-09-14 NOTE — PT/OT/SLP EVAL
Occupational Therapy Evaluation and Treatment    Name: Carmen Tan  MRN: [id number]  Admitting Diagnosis: Acute on chronic respiratory failure with hypoxia  Recent Surgery: * No surgery found *      Recommendations:     Discharge Recommendations: home health OT  Level of Assistance Recommended: 24 hours supervision  Discharge Equipment Recommendations: to be determined by next level of care  Barriers to discharge: None    Assessment:     Carmen Tan is a 73 y.o. female with a medical diagnosis of Acute on chronic respiratory failure with hypoxia. She presents with performance deficits affecting function including weakness, impaired endurance, impaired self care skills, impaired functional mobility, gait instability, impaired balance, decreased upper extremity function, decreased lower extremity function, edema, impaired cardiopulmonary response to activity.    Rehab Prognosis: Fair; patient would benefit from acute OT services to address these deficits and reach maximum level of function.    Plan:     Patient to be seen 2 x/week to address the above listed problems via self-care/home management, therapeutic activities, therapeutic exercises  Plan of Care Expires: 09/28/23  Plan of Care Reviewed with: patient    Subjective     Chief Complaint: None  Patient Comments/Goals: To go home  Pain/Comfort:  Pain Rating 1: 0/10  Pain Rating Post-Intervention 1: 0/10    Occupational Profile:  Pt declined removing BIPAP during Occupational Profile interviewing.  Living Environment: Patient lives with their cousin and son in a mobile home with  ramp to enter the home.  Prior Level of Function: Prior to admission, patient  reports she required assistance with her ADLs and used a rollator for community mobility.  Pt reports a sitter who travels to her home 2 days a week for A in ADLs during the day. Pt reports son and cousin provided her with A on the other days.  Roles and Routines: Patient was driving and not working  prior to admission.  Equipment Used at Home: bedside commode, oxygen, rollator, wheelchair, shower chair  DME owned (not currently used): none  Assistance Upon Discharge: family and sitter(s)    Objective:     Communicated with nurseYenny, and completed a chart review via Epic prior to session. Patient found sitting edge of bed with telemetry, oxygen, PureWick, peripheral IV, BIPAP upon OT entry to room.    General Precautions: Standard, fall, respiratory   Orthopedic Precautions: N/A   Braces: N/A    Respiratory Status: Nasal cannula, flow 4 L/min, Placed on nasal canula for mobility as cleared by nurse, returned to BIPAP end of session per patient preference.    Occupational Performance  Gait belt applied - Yes    Bed Mobility:   Pt found EOB.     Functional Mobility/Transfers:  Sit <> Stand Transfer with contact guard assistance with rolling walker  Functional Mobility: Pt ambulated ~20 ft with RW SBA/CGA for increased activity tolerance for ADL completion. Quick to fatigue. Following ambulation pt O2 stat 93%    Activities of Daily Living:  Upper Body Dressing: stand by assistance    Cognitive/Visual Perceptual:  Cognitive/Psychosocial Skills:    -     Oriented to: Person, Place, Time, Situation  -     Follows Commands/attention: Follows two-step commands  -     Communication: clear/fluent when on nasal cannula, difficult to understand with BIPAP mask donned.     Physical Exam:  Balance:    -     Sitting: stand by assistance  -     Standing: stand by assistance  Edema:  Moderate edema present in BLE, pt reports that as normal.  Sensation:    -       Impaired  Pt reports tingling to L fingertips.  Dominant hand: Right  Upper Extremity Range of Motion:     -       Right Upper Extremity: WFL  -       Left Upper Extremity: WFL  Upper Extremity Strength:    -       Right Upper Extremity: grossly 4/5  -       Left Upper Extremity: grossly 4/5   Strength:    -       Right Upper Extremity: good  -       Left Upper  Extremity: good    AMPAC 6 Click ADL:  AMPAC Total Score: 19    Treatment & Education:  Therapist provided facilitation and instruction of proper body mechanics, energy conservation, and fall prevention strategies during tasks listed above  Patient educated on role and benefits of OT, POC, and goals for therapy  Patient educated on importance of OOB activities with staff member assistance and sitting OOB majority of the day  Pt educated and encouraged on completion of BUE AROM therex throughout the day for activity tolerance and endurance  for ADL completion.  Pt educated on the call dont fall policy and to press call buytton for needs.      Patient left up in chair with all lines intact, call button in reach, RN notified for PureWick management, and chair alarm on. Per pt request - donned BIPAP mask and declined nasal canula.    GOALS:   Multidisciplinary Problems       Occupational Therapy Goals          Problem: Occupational Therapy    Goal Priority Disciplines Outcome Interventions   Occupational Therapy Goal     OT, PT/OT     Description: Goals to be met by: 09/28/23     Patient will increase functional independence with ADLs by performing:    Toileting from bedside commode with Minimal Assistance for hygiene and clothing management.   Step transfer with Contact Guard Assistance  Increased functional strength to BUE grossly by 1/2 MMT grades.                         History:     Past Medical History:   Diagnosis Date    Abdominal hernia     Bacteremia 4/5/2023    CHF (congestive heart failure)     COPD (chronic obstructive pulmonary disease)     Diabetes mellitus, type 2 2/16/2022    GERD (gastroesophageal reflux disease)     History of home oxygen therapy     Hypertension     Hypertensive emergency 3/30/2023    Migraine headache     Pulmonary embolism 06/01/2017    Small bowel obstruction     Thyroid disease          Past Surgical History:   Procedure Laterality Date    COLONOSCOPY      HYSTERECTOMY      INNER  EAR SURGERY      UPPER GASTROINTESTINAL ENDOSCOPY         Time Tracking:     OT Date of Treatment: 09/14/23  OT Start Time: 0845  OT Stop Time: 0910  OT Total Time (min): 25 min    Billable Minutes: Evaluation 15 and Therapeutic Activity 10    9/14/2023

## 2023-09-14 NOTE — PT/OT/SLP EVAL
Physical Therapy Evaluation    Patient Name:  Carmen Tan   MRN:  [id number]    Recommendations:     Discharge Recommendations: home health PT (WITH FAMILY ASSIST AS NEEDED)   Discharge Equipment Recommendations: none   Barriers to discharge: None    Assessment:     Carmen Tan is a 73 y.o. female admitted with a medical diagnosis of Acute on chronic respiratory failure with hypoxia.  She presents with the following impairments/functional limitations: weakness, impaired endurance, impaired functional mobility, gait instability, impaired balance, impaired cardiopulmonary response to activity, decreased safety awareness, decreased coordination, edema.    Rehab Prognosis: Fair; patient would benefit from acute skilled PT services to address these deficits and reach maximum level of function.    Recent Surgery: * No surgery found *     Plan:     During this hospitalization, patient to be seen 3 x/week to address the identified rehab impairments via gait training, therapeutic activities, therapeutic exercises and progress toward the following goals:    Plan of Care Expires:  09/28/23    Subjective     Chief Complaint: NONE, WANTS TO KEEP BIPAP ON  Patient/Family Comments/goals:   Pain/Comfort:  Pain Rating 1: 0/10    Patients cultural, spiritual, Restoration conflicts given the current situation:      Living Environment:  PT LIVES WITH SON AND COUSIN, MOBILE HOME WITH RAMP TO ENTER, AMB WITH ROLLATOR WALKER HOUSEHOLD DISTANCES, LIMITED COMMUNITY DISTANCES, DRIVES, SITTER 2X A WEEK, SITTER ASSIST WITH ADL'S,   Prior to admission, patients level of function was OFELIA WITH ROLLATOR.  Equipment used at home: rollator, wheelchair, bedside commode, oxygen, shower chair, nebulizer.  DME owned (not currently used): none.  Upon discharge, patient will have assistance from FAMILY.    Objective:     Communicated with NURSE MEJÍA prior to session.  Patient found  SEATED AT EOB  with telemetry, peripheral IV, oxygen, BIPAP,  "PureWick  upon PT entry to room.    General Precautions: Standard, fall, respiratory  Orthopedic Precautions:N/A   Braces: N/A  Respiratory Status:  BIPAP, 4L O2-PT WANTING TO STAY ON BIPAP, "FEELS BETTER"  DONNED 4L FOR AMBULATION    Exams:  Cognitive Exam:  Patient is oriented to Person, Place, Time, and Situation  Postural Exam:  Patient presented with the following abnormalities:    -       Rounded shoulders  Sensation:    -       Intact  Skin Integrity/Edema:      -       Edema: Moderate BLE  RLE ROM: WFL  RLE Strength: GROSSLY 3+/5  LLE ROM: WFL  LLE Strength: GROSSLY 3+/5    Functional Mobility:  Bed Mobility:     Scooting: stand by assistance  Transfers:     Sit to Stand:  contact guard assistance with rolling walker  Bed to Chair: contact guard assistance with  rolling walker  using  Step Transfer  Gait: PT AMB 20' WITH RW AND CGA, SLOW STEADY GAIT, NO LOB, MILD SOB WITH O2 IN TOW, QUICK TO FATIGUE, CUES FOR UPRIGHT POSTURE DUE TO FF OVER RW WITH FATIGUE  Balance: GOOD SITTING BALANCE, FAIR DYNAMIC BALANCE DURING GAIT    AM-PAC 6 CLICK MOBILITY  Total Score:12     Treatment & Education:  PT EDUCATION:  - ROLE OF P.T. AND POC IN ACUTE CARE HOSPITAL SETTING  - RW USE AND SAFETY DURING TF'S AND GAIT  - IMPORTANCE OF ACTIVITY PACING FOR ENERGY CONSERVATION  - ENCOURAGED TO INCREASE TIME OOB IN CHAIR TO TOLERANCE   - EDUCATED IN  AND ENCOURAGED TO CONTINUE THERAPUETIC EXERCISES THROUGHOUT THE DAY TO INCREASE ACTIVITY TOLERANCE AND DECREASE RISK FOR PNEUMONIA AND BLOOD CLOTS: HIP FLEX/EXT, HIP ABD/ADD, QUAD SET, HEEL SLIDE, AP  - RISK FOR FALLS DUE TO GENERALIZED WEAKNESS, EDUCATED ON "CALL DON'T FALL", ENCOURAGED TO CALL FOR ASSISTANCE WITH ALL NEEDS SUCH AS BED<>CHAIR TRANSFERS OR TRIPS TO BATHROOM, PT AGREEABLE TO SAFETY PRECAUTIONS    Patient left up in chair with all lines intact, call button in reach, chair alarm on, and NURSE notified.    GOALS:   Multidisciplinary Problems       Physical Therapy Goals  "         Problem: Physical Therapy    Goal Priority Disciplines Outcome Goal Variances Interventions   Physical Therapy Goal     PT, PT/OT      Description: LTG'S TO BE MET IN 14 DAYS (9-28-23)  PT WILL BE OFELIA FOR BED MOBILITY  PT WILL BE OFEILA FOR BED<>CHAIR TF'S  PT WILL  FEET WITH RW AND SPV  PT WILL INC AMPAC SCORE BY 2 POINTS TO PROGRESS GROSS FUNC MOBILITY                         History:     Past Medical History:   Diagnosis Date    Abdominal hernia     Bacteremia 4/5/2023    CHF (congestive heart failure)     COPD (chronic obstructive pulmonary disease)     Diabetes mellitus, type 2 2/16/2022    GERD (gastroesophageal reflux disease)     History of home oxygen therapy     Hypertension     Hypertensive emergency 3/30/2023    Migraine headache     Pulmonary embolism 06/01/2017    Small bowel obstruction     Thyroid disease        Past Surgical History:   Procedure Laterality Date    COLONOSCOPY      HYSTERECTOMY      INNER EAR SURGERY      UPPER GASTROINTESTINAL ENDOSCOPY         Time Tracking:     PT Received On: 09/14/23  PT Start Time: 0820     PT Stop Time: 0900  PT Total Time (min): 40 min     Billable Minutes: Evaluation 15 and Therapeutic Activity 25    09/14/2023

## 2023-09-14 NOTE — SUBJECTIVE & OBJECTIVE
Interval History: pt transferred from chair to bed prior to exam and appeared fatigued with tachypnea and labored respirations. Stress test not completed. Wheezing noted upon auscultation. Symptoms improved with IV Lasix and CPAP placement. Stress test to be attempted on tomorrow. Cardiology following.     Review of Systems   Constitutional:  Positive for activity change and fatigue. Negative for appetite change, chills and fever.   HENT:  Negative for congestion, postnasal drip, sinus pressure and trouble swallowing.    Respiratory:  Positive for shortness of breath (improved) and wheezing. Negative for cough and chest tightness.    Cardiovascular:  Positive for leg swelling (improved). Negative for chest pain and palpitations.   Gastrointestinal:  Negative for abdominal distention, abdominal pain, nausea and vomiting.   Genitourinary:  Negative for difficulty urinating, dysuria, flank pain, frequency and urgency.   Musculoskeletal:  Negative for arthralgias, back pain and myalgias.   Skin:  Negative for color change and wound.   Neurological:  Positive for weakness (improved). Negative for dizziness and headaches.   Psychiatric/Behavioral:  Negative for agitation and confusion. The patient is not nervous/anxious.      Objective:     Vital Signs (Most Recent):  Temp: 98.6 °F (37 °C) (09/14/23 1226)  Pulse: 61 (09/14/23 1310)  Resp: 18 (09/14/23 1226)  BP: (!) 113/53 (09/14/23 1226)  SpO2: 99 % (09/14/23 1226) Vital Signs (24h Range):  Temp:  [97.8 °F (36.6 °C)-98.6 °F (37 °C)] 98.6 °F (37 °C)  Pulse:  [60-69] 61  Resp:  [18-30] 18  SpO2:  [93 %-99 %] 99 %  BP: (113-172)/(53-79) 113/53     Weight: 116.6 kg (257 lb 0.9 oz)  Body mass index is 44.12 kg/m².  No intake or output data in the 24 hours ending 09/14/23 1521      Physical Exam  HENT:      Nose: Nose normal.      Mouth/Throat:      Pharynx: Oropharynx is clear.   Cardiovascular:      Rate and Rhythm: Normal rate and regular rhythm.   Pulmonary:      Effort:  Tachypnea present.      Breath sounds: Examination of the right-lower field reveals wheezing. Examination of the left-lower field reveals wheezing. Wheezing present.   Abdominal:      General: Bowel sounds are normal. There is no distension.      Palpations: Abdomen is soft.      Tenderness: There is no abdominal tenderness.   Genitourinary:     Comments: Deferred   Musculoskeletal:         General: Normal range of motion.      Cervical back: Normal range of motion.   Skin:     General: Skin is warm and dry.   Neurological:      Mental Status: She is alert and oriented to person, place, and time.   Psychiatric:         Mood and Affect: Mood normal.         Behavior: Behavior normal.             Significant Labs: All pertinent labs within the past 24 hours have been reviewed.  CBC:   Recent Labs   Lab 09/14/23  0457   WBC 11.35   HGB 15.4   HCT 46.8        CMP:   Recent Labs   Lab 09/13/23  0520 09/14/23  0457    140   K 4.4 4.5   CL 96 95   CO2 34* 34*   * 118*   BUN 33* 43*   CREATININE 1.3 1.2   CALCIUM 10.1 9.5   ANIONGAP 11 11       Significant Imaging: I have reviewed all pertinent imaging results/findings within the past 24 hours.

## 2023-09-14 NOTE — SUBJECTIVE & OBJECTIVE
Review of Systems   Constitutional: Positive for malaise/fatigue.   HENT: Negative.     Eyes: Negative.    Cardiovascular:  Positive for dyspnea on exertion.   Respiratory:  Positive for wheezing.    Hematologic/Lymphatic: Negative.    Skin: Negative.    Musculoskeletal:  Positive for arthritis and joint pain.   Gastrointestinal: Negative.    Genitourinary: Negative.    Neurological: Negative.    Psychiatric/Behavioral: Negative.     Allergic/Immunologic: Negative.      Objective:     Vital Signs (Most Recent):  Temp: 98 °F (36.7 °C) (09/14/23 0709)  Pulse: 66 (09/14/23 0910)  Resp: (!) 25 (09/14/23 0722)  BP: (!) 117/56 (09/14/23 0709)  SpO2: 95 % (09/14/23 0722) Vital Signs (24h Range):  Temp:  [97.6 °F (36.4 °C)-98.6 °F (37 °C)] 98 °F (36.7 °C)  Pulse:  [60-70] 66  Resp:  [18-33] 25  SpO2:  [93 %-97 %] 95 %  BP: (117-172)/(56-79) 117/56     Weight: 116.6 kg (257 lb 0.9 oz)  Body mass index is 44.12 kg/m².     SpO2: 95 %       No intake or output data in the 24 hours ending 09/14/23 1121    Lines/Drains/Airways       Drain  Duration             Female External Urinary Catheter 09/10/23 0700 4 days              Peripheral Intravenous Line  Duration                  Peripheral IV - Single Lumen 09/09/23 1200 20 G;2 in Anterior;Left Forearm 4 days         Peripheral IV - Single Lumen 09/09/23 1507 20 G;2 in Anterior;Right Forearm 4 days                       Physical Exam  Vitals and nursing note reviewed.   Constitutional:       General: She is not in acute distress.     Appearance: She is well-developed. She is not diaphoretic.      Comments: On CPAP   HENT:      Head: Normocephalic and atraumatic.   Eyes:      General:         Right eye: No discharge.         Left eye: No discharge.      Pupils: Pupils are equal, round, and reactive to light.   Neck:      Thyroid: No thyromegaly.      Vascular: No JVD.      Trachea: No tracheal deviation.   Cardiovascular:      Rate and Rhythm: Normal rate and regular rhythm.  "     Heart sounds: Normal heart sounds, S1 normal and S2 normal. No murmur heard.     No friction rub. No gallop.   Pulmonary:      Effort: Pulmonary effort is normal. No respiratory distress.      Breath sounds: Wheezing and rhonchi present. No rales.   Abdominal:      General: There is no distension.      Palpations: Abdomen is soft.      Tenderness: There is no rebound.   Musculoskeletal:      Cervical back: Neck supple.      Right lower leg: No edema.      Left lower leg: No edema.   Skin:     General: Skin is warm and dry.      Findings: No erythema.   Neurological:      Mental Status: She is alert and oriented to person, place, and time.   Psychiatric:         Mood and Affect: Mood normal.         Behavior: Behavior normal.         Thought Content: Thought content normal.            Significant Labs: CMP   Recent Labs   Lab 09/13/23  0520 09/14/23  0457    140   K 4.4 4.5   CL 96 95   CO2 34* 34*   * 118*   BUN 33* 43*   CREATININE 1.3 1.2   CALCIUM 10.1 9.5   ANIONGAP 11 11   , CBC   Recent Labs   Lab 09/14/23 0457   WBC 11.35   HGB 15.4   HCT 46.8      , INR No results for input(s): "INR", "PROTIME" in the last 48 hours., Troponin No results for input(s): "TROPONINI" in the last 48 hours., and All pertinent lab results from the last 24 hours have been reviewed.    Significant Imaging: Echocardiogram: Transthoracic echo (TTE) complete (Cupid Only):   Results for orders placed or performed during the hospital encounter of 09/09/23   Echo   Result Value Ref Range    BSA 2.36 m2    LVOT stroke volume 82.38 cm3    LVIDd 5.03 3.5 - 6.0 cm    LV Systolic Volume 68.43 mL    LV Systolic Volume Index 30.7 mL/m2    LVIDs 3.96 2.1 - 4.0 cm    LV Diastolic Volume 120.14 mL    LV Diastolic Volume Index 53.87 mL/m2    IVS 1.11 (A) 0.6 - 1.1 cm    LVOT diameter 1.95 cm    LVOT area 3.0 cm2    FS 21 (A) 28 - 44 %    Left Ventricle Relative Wall Thickness 0.54 cm    Posterior Wall 1.35 (A) 0.6 - 1.1 cm    " LV mass 244.31 g    LV Mass Index 110 g/m2    MV Peak E Berhane 0.93 m/s    TDI LATERAL 0.13 m/s    TDI SEPTAL 0.08 m/s    E/E' ratio 8.86 m/s    MV Peak A Berhane 0.90 m/s    TR Max Berhane 3.30 m/s    E/A ratio 1.03     IVRT 137.01 msec    E wave deceleration time 232.88 msec    LV SEPTAL E/E' RATIO 11.63 m/s    LV LATERAL E/E' RATIO 7.15 m/s    LVOT peak berhane 1.35 m/s    Left Ventricular Outflow Tract Mean Velocity 0.87 cm/s    Left Ventricular Outflow Tract Mean Gradient 3.55 mmHg    LA size 3.64 cm    Left Atrium Major Axis 5.78 cm    Left Atrium Minor Axis 6.21 cm    RV mid diameter 5.15 cm    RVOT peak VTI 18.1 cm    RA Major Axis 4.94 cm    AV regurgitation pressure 1/2 time [id number] ms    AR Max Berhane 4.57 m/s    AV mean gradient 10 mmHg    AV peak gradient 17 mmHg    Ao peak berhane 2.09 m/s    Ao VTI 39.30 cm    LVOT peak VTI 27.60 cm    AV valve area 2.10 cm²    AV Velocity Ratio 0.65     AV index (prosthetic) 0.70     KARYNA by Velocity Ratio 1.93 cm²    Mr max berhane 5.59 m/s    Triscuspid Valve Regurgitation Peak Gradient 44 mmHg    PV mean gradient 2 mmHg    RVOT peak berhane 0.82 m/s    Ao root annulus 3.18 cm    STJ 3.59 cm    Ascending aorta 3.46 cm    IVC diameter 2.31 cm    Mean e' 0.11 m/s    ZLVIDS -1.49     ZLVIDD -4.49     LA Volume Index 33.2 mL/m2    LA volume 74.10 cm3    LA WIDTH 4.0 cm    TAPSE 2.40 cm    RA Width 3.2 cm    TV resting pulmonary artery pressure 47 mmHg    RV TB RVSP 6 mmHg    Est. RA pres 3 mmHg    Narrative      Left Ventricle: The left ventricle is normal in size. Mildly increased   wall thickness. Normal wall motion. There is mildly reduced systolic   function with a visually estimated ejection fraction of 40 - 45%. There is   normal diastolic function.    Right Ventricle: Normal right ventricular cavity size. Wall thickness   is normal. Right ventricle wall motion  is normal. Systolic function is   normal.    Aortic Valve: There is mild to moderate aortic regurgitation with a    centrally directed jet.    Mitral Valve: There is moderate regurgitation.    Tricuspid Valve: There is moderate regurgitation with a centrally   directed jet.    Pulmonic Valve: There is mild to moderate regurgitation.    Pulmonary Artery: The estimated pulmonary artery systolic pressure is   47 mmHg.    IVC/SVC: Normal venous pressure at 3 mmHg.     , EKG: Reviewed, and X-Ray: CXR: X-Ray Chest 1 View (CXR): No results found for this visit on 09/09/23. and X-Ray Chest PA and Lateral (CXR): No results found for this visit on 09/09/23.

## 2023-09-14 NOTE — ASSESSMENT & PLAN NOTE
IV Steroids  LABA. CITLALY. ICS neb txs  -supplemental oxygen at home dose of 4 L  -CPAP in place while napping and nightly

## 2023-09-14 NOTE — ASSESSMENT & PLAN NOTE
Patient's FSGs are controlled on current medication regimen.  Last A1c reviewed-   Lab Results   Component Value Date    HGBA1C 5.3 09/09/2023    HGBA1C 5.3 09/09/2023     Most recent fingerstick glucose reviewed-   Recent Labs   Lab 09/13/23  1708 09/13/23  2339   POCTGLUCOSE 120* 108     Current correctional scale  Low  Maintain anti-hyperglycemic dose as follows-   Antihyperglycemics (From admission, onward)    Start     Stop Route Frequency Ordered    09/09/23 0545  insulin aspart U-100 pen 0-5 Units         -- SubQ Before meals & nightly PRN 09/09/23 0447        Hold Oral hypoglycemics while patient is in the hospital.

## 2023-09-14 NOTE — PLAN OF CARE
Pt oriented x4.  VSS.  Pt remained afebrile throughout this shift.   All meds administered per order.   Pt remained free of falls this shift.   Plan of care reviewed. Patient verbalizes understanding.   Pt moving/turning independently.   Patient SB-SR on monitor.   Bed low, side rails up x 2, wheels locked, call light in reach.   Patient instructed to call for assistance.  Will continue to monitor.

## 2023-09-14 NOTE — ASSESSMENT & PLAN NOTE
Patient with Paroxysmal (<7 days) atrial fibrillation which is controlled currently with Beta Blocker. Patient is currently in sinus rhythm.[id number] Score: 3. HASBLED Score: 3. Anticoagulation per Heparin infusion completed-hx of intolerance of anticoagulation noted due to gross hematuria    -will start ASA

## 2023-09-14 NOTE — ASSESSMENT & PLAN NOTE
Patient is identified as having Diastolic (HFpEF) heart failure that is Acute on chronic. CHF is currently uncontrolled due to Rales/crackles on pulmonary exam and Pulmonary edema/pleural effusion on CXR. Latest ECHO performed and demonstrates- Results for orders placed during the hospital encounter of 03/29/23    Echo    Interpretation Summary  · The left ventricle is normal in size with moderate concentric hypertrophy and mildly decreased systolic function.  · The estimated ejection fraction is 45%.  · Grade I left ventricular diastolic dysfunction.  · Moderate right ventricular enlargement.  · Moderate right atrial enlargement.  · Mild-to-moderate aortic regurgitation.  · There is mild aortic valve stenosis.  · Aortic valve area is 1.44 cm2; peak velocity is 2.09 m/s; mean gradient is 10 mmHg.  · Mild-to-moderate mitral regurgitation.  · Moderate tricuspid regurgitation.  · Moderate pulmonic regurgitation.  · The estimated PA systolic pressure is 69 mmHg.  · There is moderate pulmonary hypertension.  · Moderate left atrial enlargement.  . Continue Beta Blocker, Furosemide, Aldactone and ARNI and monitor clinical status closely. Monitor on telemetry. Patient is on CHF pathway.  Monitor strict Is&Os and daily weights.  Place on fluid restriction of 1.5 L. Cardiology has been consulted. Continue to stress to patient importance of self efficacy and  on diet for CHF. Last BNP reviewed- and noted below   Recent Labs   Lab 09/13/23  0902   *     9/10/23  -Diuresing well  -Continue IV lasix      9/11/23- diuresis continued- BIPAP as needed for support   -9/12/23- diuresis continued- 4.8 L removed    -9/13/23- diuresis completed- 7.1 L removed   -9/14/23- IV Lasix given

## 2023-09-14 NOTE — PROGRESS NOTES
[facility] - Telemetry ([facility])  [facility] Medicine  Progress Note    Patient Name: Carmen Tan  MRN: [id number]  Patient Class: IP- Inpatient   Admission Date: 9/9/2023  Length of Stay: 5 days  Attending Physician: Elmer Nichols MD  Primary Care Provider: Lucian Barry MD        Subjective:     Principal Problem:Acute on chronic respiratory failure with hypoxia        HPI:  The patient is a 73 year old female with DM, COPD on home oxygen 4 liters, Diastolic CHF, HTN, hx PE who presented to [facility] ED with Generalized weakness, Fatigue, SOB, and Dysuria - onset 2 days ago that progressively worsened. Pt also reports BLE edema that worsened over the past week.     n the ED, CXR shows chronic scarring no focal consolidation. Elevated troponin of 200, elevated BNP. Labs otherwise unremarkable. COVID negative. Patient denied any chest pain, EKG non-ischemia. Patient given Aspirin 325 mg, Lovenox 1mg/kg, and Lasix 40 IV x1 as well as a breathing treatment. Po Bactrim for UTI    The patient was transferred to [facility] for admission under hospital medicine and for cardiology consultation       Overview/Hospital Course:  Patient sitting on side of the bed eating. No distress noted. Reports feeling much better. On continuous oxygen. Diuresed well overnight. Will continue to diurese.  On 9/11/23, diuresis continued. Cardiology following with inpatient vs outpatient ischemic work up to be evaluated. Troponin remains elevated with downward trend noted. Urine culture grew E. Coli (50,000-99,999) with sensitivities reviewed and antibiotics continued. Echo on 9/9/23 showed EF 40-45%  mildly reduced systolic function- mild to moderate aortic/pulmonic regurgitation with a centrally directed jet. Mitral/Tricuspid moderate regurgitation. BIPAP placed as needed for comfort and respiratory support. On 9/12/23, diuresis continued with 4.8 L removed. BIPAP continued nightly and as needed. Stress test planned for tomorrow per  Cardiology. On 9/13/23, pt verbalized symptom improvement with IV diuresis completed. Respiratory support maintained on home oxygen dose of 4L NC. Resting portion of stress test performed today with second portion to be completed on tomorrow. On 9/14/23, pt was unable to complete second portion of stress test due to wheezing. IV Lasix continued. Pt verbalized symptom improvement with CPAP in place. Stress test to be attempted in am.       Interval History: pt transferred from chair to bed prior to exam and appeared fatigued with tachypnea and labored respirations. Stress test not completed. Wheezing noted upon auscultation. Symptoms improved with IV Lasix and CPAP placement. Stress test to be attempted on tomorrow. Cardiology following.     Review of Systems   Constitutional:  Positive for activity change and fatigue. Negative for appetite change, chills and fever.   HENT:  Negative for congestion, postnasal drip, sinus pressure and trouble swallowing.    Respiratory:  Positive for shortness of breath (improved) and wheezing. Negative for cough and chest tightness.    Cardiovascular:  Positive for leg swelling (improved). Negative for chest pain and palpitations.   Gastrointestinal:  Negative for abdominal distention, abdominal pain, nausea and vomiting.   Genitourinary:  Negative for difficulty urinating, dysuria, flank pain, frequency and urgency.   Musculoskeletal:  Negative for arthralgias, back pain and myalgias.   Skin:  Negative for color change and wound.   Neurological:  Positive for weakness (improved). Negative for dizziness and headaches.   Psychiatric/Behavioral:  Negative for agitation and confusion. The patient is not nervous/anxious.      Objective:     Vital Signs (Most Recent):  Temp: 98.6 °F (37 °C) (09/14/23 1226)  Pulse: 61 (09/14/23 1310)  Resp: 18 (09/14/23 1226)  BP: (!) 113/53 (09/14/23 1226)  SpO2: 99 % (09/14/23 1226) Vital Signs (24h Range):  Temp:  [97.8 °F (36.6 °C)-98.6 °F (37 °C)]  98.6 °F (37 °C)  Pulse:  [60-69] 61  Resp:  [18-30] 18  SpO2:  [93 %-99 %] 99 %  BP: (113-172)/(53-79) 113/53     Weight: 116.6 kg (257 lb 0.9 oz)  Body mass index is 44.12 kg/m².  No intake or output data in the 24 hours ending 09/14/23 1521      Physical Exam  HENT:      Nose: Nose normal.      Mouth/Throat:      Pharynx: Oropharynx is clear.   Cardiovascular:      Rate and Rhythm: Normal rate and regular rhythm.   Pulmonary:      Effort: Tachypnea present.      Breath sounds: Examination of the right-lower field reveals wheezing. Examination of the left-lower field reveals wheezing. Wheezing present.   Abdominal:      General: Bowel sounds are normal. There is no distension.      Palpations: Abdomen is soft.      Tenderness: There is no abdominal tenderness.   Genitourinary:     Comments: Deferred   Musculoskeletal:         General: Normal range of motion.      Cervical back: Normal range of motion.   Skin:     General: Skin is warm and dry.   Neurological:      Mental Status: She is alert and oriented to person, place, and time.   Psychiatric:         Mood and Affect: Mood normal.         Behavior: Behavior normal.             Significant Labs: All pertinent labs within the past 24 hours have been reviewed.  CBC:   Recent Labs   Lab 09/14/23  0457   WBC 11.35   HGB 15.4   HCT 46.8        CMP:   Recent Labs   Lab 09/13/23  0520 09/14/23  0457    140   K 4.4 4.5   CL 96 95   CO2 34* 34*   * 118*   BUN 33* 43*   CREATININE 1.3 1.2   CALCIUM 10.1 9.5   ANIONGAP 11 11       Significant Imaging: I have reviewed all pertinent imaging results/findings within the past 24 hours.      Assessment/Plan:      * Acute on chronic respiratory failure with hypoxia  Patient with Hypoxic Respiratory failure which is Acute on chronic.  she is on home oxygen at 4 LPM. Supplemental oxygen was provided and noted- Oxygen Concentration (%):  [30] 30    .   Signs/symptoms of respiratory failure include- increased work  of breathing. Contributing diagnoses includes - CHF and COPD Labs and images were reviewed. Patient Has not had a recent ABG. Will treat underlying causes and adjust management of respiratory failure     Elevated troponin  Serial troponin   Cardiology recommended Lovenox 1mg/kg to ED provider   Will start IV Heparin  cont ASA, BB, Statin, Entresto   Echo   Consult cardiology     9/10/23  Denies chest pain.   Troponin trending down  Continue current meds     -9/11/23- downward trend   -9/12/23- Stress test to be planned for tomorrow   -9/13/23- resting portion of stress test completed   -9/14/23- 2nd portion of stress test not completed due to wheezing- IV Lasix given - repeat stress test to be attempted on 9/15/23    Hypertension  BP stable  Cont home meds Coreg and Entresto  Monitor     Acute cystitis without hematuria  IV Rocephin completed   Blood cx remain negative  Urine culture grew E. Coli (50,000-99,999)- sensitivities noted         Diabetes mellitus, type 2  Patient's FSGs are controlled on current medication regimen.  Last A1c reviewed-   Lab Results   Component Value Date    HGBA1C 5.3 09/09/2023    HGBA1C 5.3 09/09/2023     Most recent fingerstick glucose reviewed-   Recent Labs   Lab 09/13/23  1708 09/13/23  2339   POCTGLUCOSE 120* 108     Current correctional scale  Low  Maintain anti-hyperglycemic dose as follows-   Antihyperglycemics (From admission, onward)    Start     Stop Route Frequency Ordered    09/09/23 0545  insulin aspart U-100 pen 0-5 Units         -- SubQ Before meals & nightly PRN 09/09/23 0447        Hold Oral hypoglycemics while patient is in the hospital.    Acute on chronic combined systolic and diastolic congestive heart failure  Patient is identified as having Diastolic (HFpEF) heart failure that is Acute on chronic. CHF is currently uncontrolled due to Rales/crackles on pulmonary exam and Pulmonary edema/pleural effusion on CXR. Latest ECHO performed and demonstrates- Results for  orders placed during the hospital encounter of 03/29/23    Echo    Interpretation Summary  · The left ventricle is normal in size with moderate concentric hypertrophy and mildly decreased systolic function.  · The estimated ejection fraction is 45%.  · Grade I left ventricular diastolic dysfunction.  · Moderate right ventricular enlargement.  · Moderate right atrial enlargement.  · Mild-to-moderate aortic regurgitation.  · There is mild aortic valve stenosis.  · Aortic valve area is 1.44 cm2; peak velocity is 2.09 m/s; mean gradient is 10 mmHg.  · Mild-to-moderate mitral regurgitation.  · Moderate tricuspid regurgitation.  · Moderate pulmonic regurgitation.  · The estimated PA systolic pressure is 69 mmHg.  · There is moderate pulmonary hypertension.  · Moderate left atrial enlargement.  . Continue Beta Blocker, Furosemide, Aldactone and ARNI and monitor clinical status closely. Monitor on telemetry. Patient is on CHF pathway.  Monitor strict Is&Os and daily weights.  Place on fluid restriction of 1.5 L. Cardiology has been consulted. Continue to stress to patient importance of self efficacy and  on diet for CHF. Last BNP reviewed- and noted below   Recent Labs   Lab 09/13/23  0902   *     9/10/23  -Diuresing well  -Continue IV lasix      9/11/23- diuresis continued- BIPAP as needed for support   -9/12/23- diuresis continued- 4.8 L removed    -9/13/23- diuresis completed- 7.1 L removed   -9/14/23- IV Lasix given     COPD exacerbation  IV Steroids  LABA. CITLALY. ICS neb txs  -supplemental oxygen at home dose of 4 L  -CPAP in place while napping and nightly      Severe obesity (BMI >= 40)  Body mass index is 44.12 kg/m². Morbid obesity complicates all aspects of disease management from diagnostic modalities to treatment. Weight loss encouraged and health benefits explained to patient.         Paroxysmal A-fib  Patient with Paroxysmal (<7 days) atrial fibrillation which is controlled currently with Beta  Blocker. Patient is currently in sinus rhythm.[id number] Score: 3. HASBLED Score: 3. Anticoagulation per Heparin infusion completed-hx of intolerance of anticoagulation noted due to gross hematuria    -will start ASA       VTE Risk Mitigation (From admission, onward)         Ordered     enoxaparin injection 40 mg  Every 12 hours         09/12/23 1326     IP VTE HIGH RISK PATIENT  Once         09/09/23 0447     Place sequential compression device  Until discontinued         09/09/23 0447                Discharge Planning   TIGRE:      Code Status: Full Code   Is the patient medically ready for discharge?:     Reason for patient still in hospital (select all that apply): Patient trending condition, Laboratory test, Treatment, Imaging and Consult recommendations  Discharge Plan A: Home                  Corrina Herrmann NP  Department of Hospital Medicine   [facility] - Telemetry ([facility])

## 2023-09-15 PROBLEM — R79.89 D-DIMER, ELEVATED: Status: ACTIVE | Noted: 2023-09-15

## 2023-09-15 LAB
ANION GAP SERPL CALC-SCNC: 10 MMOL/L (ref 8–16)
BNP SERPL-MCNC: 78 PG/ML (ref 0–99)
BUN SERPL-MCNC: 47 MG/DL (ref 8–23)
CALCIUM SERPL-MCNC: 9.8 MG/DL (ref 8.7–10.5)
CHLORIDE SERPL-SCNC: 99 MMOL/L (ref 95–110)
CO2 SERPL-SCNC: 30 MMOL/L (ref 23–29)
CREAT SERPL-MCNC: 1.2 MG/DL (ref 0.5–1.4)
D DIMER PPP IA.FEU-MCNC: 3.35 MG/L FEU
EST. GFR  (NO RACE VARIABLE): 48 ML/MIN/1.73 M^2
GLUCOSE SERPL-MCNC: 110 MG/DL (ref 70–110)
POCT GLUCOSE: 114 MG/DL (ref 70–110)
POCT GLUCOSE: 119 MG/DL (ref 70–110)
POCT GLUCOSE: 133 MG/DL (ref 70–110)
POTASSIUM SERPL-SCNC: 5.2 MMOL/L (ref 3.5–5.1)
SODIUM SERPL-SCNC: 139 MMOL/L (ref 136–145)
TROPONIN I SERPL DL<=0.01 NG/ML-MCNC: 0.02 NG/ML (ref 0–0.03)

## 2023-09-15 PROCEDURE — 85379 FIBRIN DEGRADATION QUANT: CPT | Performed by: INTERNAL MEDICINE

## 2023-09-15 PROCEDURE — 94761 N-INVAS EAR/PLS OXIMETRY MLT: CPT

## 2023-09-15 PROCEDURE — 25500020 PHARM REV CODE 255: Performed by: FAMILY MEDICINE

## 2023-09-15 PROCEDURE — 27100171 HC OXYGEN HIGH FLOW UP TO 24 HOURS

## 2023-09-15 PROCEDURE — 80048 BASIC METABOLIC PNL TOTAL CA: CPT | Performed by: NURSE PRACTITIONER

## 2023-09-15 PROCEDURE — 36415 COLL VENOUS BLD VENIPUNCTURE: CPT | Performed by: NURSE PRACTITIONER

## 2023-09-15 PROCEDURE — 21400001 HC TELEMETRY ROOM

## 2023-09-15 PROCEDURE — 25000003 PHARM REV CODE 250: Performed by: NURSE PRACTITIONER

## 2023-09-15 PROCEDURE — 99233 PR SUBSEQUENT HOSPITAL CARE,LEVL III: ICD-10-PCS | Mod: ,,, | Performed by: INTERNAL MEDICINE

## 2023-09-15 PROCEDURE — 94660 CPAP INITIATION&MGMT: CPT

## 2023-09-15 PROCEDURE — 99900035 HC TECH TIME PER 15 MIN (STAT)

## 2023-09-15 PROCEDURE — 25000242 PHARM REV CODE 250 ALT 637 W/ HCPCS: Performed by: INTERNAL MEDICINE

## 2023-09-15 PROCEDURE — 36415 COLL VENOUS BLD VENIPUNCTURE: CPT | Performed by: INTERNAL MEDICINE

## 2023-09-15 PROCEDURE — 63600175 PHARM REV CODE 636 W HCPCS: Performed by: INTERNAL MEDICINE

## 2023-09-15 PROCEDURE — 25000242 PHARM REV CODE 250 ALT 637 W/ HCPCS: Performed by: NURSE PRACTITIONER

## 2023-09-15 PROCEDURE — 63600175 PHARM REV CODE 636 W HCPCS: Performed by: PHYSICIAN ASSISTANT

## 2023-09-15 PROCEDURE — 94640 AIRWAY INHALATION TREATMENT: CPT

## 2023-09-15 PROCEDURE — 25000003 PHARM REV CODE 250: Performed by: INTERNAL MEDICINE

## 2023-09-15 PROCEDURE — 83880 ASSAY OF NATRIURETIC PEPTIDE: CPT | Performed by: INTERNAL MEDICINE

## 2023-09-15 PROCEDURE — 99233 SBSQ HOSP IP/OBS HIGH 50: CPT | Mod: ,,, | Performed by: INTERNAL MEDICINE

## 2023-09-15 PROCEDURE — 84484 ASSAY OF TROPONIN QUANT: CPT | Performed by: INTERNAL MEDICINE

## 2023-09-15 PROCEDURE — 63600175 PHARM REV CODE 636 W HCPCS: Performed by: NURSE PRACTITIONER

## 2023-09-15 RX ORDER — ENOXAPARIN SODIUM 150 MG/ML
1 INJECTION SUBCUTANEOUS EVERY 12 HOURS
Status: DISCONTINUED | OUTPATIENT
Start: 2023-09-15 | End: 2023-09-16

## 2023-09-15 RX ORDER — FUROSEMIDE 10 MG/ML
60 INJECTION INTRAMUSCULAR; INTRAVENOUS ONCE
Status: COMPLETED | OUTPATIENT
Start: 2023-09-15 | End: 2023-09-15

## 2023-09-15 RX ADMIN — SACUBITRIL AND VALSARTAN 1 TABLET: 24; 26 TABLET, FILM COATED ORAL at 09:09

## 2023-09-15 RX ADMIN — ARFORMOTEROL TARTRATE 15 MCG: 15 SOLUTION RESPIRATORY (INHALATION) at 08:09

## 2023-09-15 RX ADMIN — BUDESONIDE 0.5 MG: 0.5 INHALANT ORAL at 08:09

## 2023-09-15 RX ADMIN — ENOXAPARIN SODIUM 40 MG: 40 INJECTION SUBCUTANEOUS at 09:09

## 2023-09-15 RX ADMIN — CARVEDILOL 6.25 MG: 6.25 TABLET, FILM COATED ORAL at 09:09

## 2023-09-15 RX ADMIN — PANTOPRAZOLE SODIUM 40 MG: 40 TABLET, DELAYED RELEASE ORAL at 08:09

## 2023-09-15 RX ADMIN — SENNOSIDES AND DOCUSATE SODIUM 1 TABLET: 50; 8.6 TABLET ORAL at 09:09

## 2023-09-15 RX ADMIN — CARVEDILOL 6.25 MG: 6.25 TABLET, FILM COATED ORAL at 08:09

## 2023-09-15 RX ADMIN — METHYLPREDNISOLONE SODIUM SUCCINATE 40 MG: 125 INJECTION, POWDER, FOR SOLUTION INTRAMUSCULAR; INTRAVENOUS at 11:09

## 2023-09-15 RX ADMIN — ATORVASTATIN CALCIUM 40 MG: 40 TABLET, FILM COATED ORAL at 09:09

## 2023-09-15 RX ADMIN — FUROSEMIDE 60 MG: 10 INJECTION, SOLUTION INTRAMUSCULAR; INTRAVENOUS at 03:09

## 2023-09-15 RX ADMIN — LEVALBUTEROL HYDROCHLORIDE 1.25 MG: 0.63 SOLUTION RESPIRATORY (INHALATION) at 12:09

## 2023-09-15 RX ADMIN — METHYLPREDNISOLONE SODIUM SUCCINATE 40 MG: 125 INJECTION, POWDER, FOR SOLUTION INTRAMUSCULAR; INTRAVENOUS at 12:09

## 2023-09-15 RX ADMIN — LEVALBUTEROL HYDROCHLORIDE 1.25 MG: 0.63 SOLUTION RESPIRATORY (INHALATION) at 04:09

## 2023-09-15 RX ADMIN — ASPIRIN 81 MG: 81 TABLET, COATED ORAL at 08:09

## 2023-09-15 RX ADMIN — LEVOTHYROXINE SODIUM 50 MCG: 50 TABLET ORAL at 05:09

## 2023-09-15 RX ADMIN — IOHEXOL 100 ML: 350 INJECTION, SOLUTION INTRAVENOUS at 05:09

## 2023-09-15 RX ADMIN — METHYLPREDNISOLONE SODIUM SUCCINATE 40 MG: 125 INJECTION, POWDER, FOR SOLUTION INTRAMUSCULAR; INTRAVENOUS at 05:09

## 2023-09-15 RX ADMIN — METHYLPREDNISOLONE SODIUM SUCCINATE 40 MG: 125 INJECTION, POWDER, FOR SOLUTION INTRAMUSCULAR; INTRAVENOUS at 06:09

## 2023-09-15 RX ADMIN — ENOXAPARIN SODIUM 120 MG: 120 INJECTION SUBCUTANEOUS at 09:09

## 2023-09-15 RX ADMIN — ISOSORBIDE MONONITRATE 30 MG: 30 TABLET, EXTENDED RELEASE ORAL at 08:09

## 2023-09-15 RX ADMIN — SERTRALINE HYDROCHLORIDE 50 MG: 50 TABLET ORAL at 09:09

## 2023-09-15 RX ADMIN — POLYETHYLENE GLYCOL 3350 17 G: 17 POWDER, FOR SOLUTION ORAL at 09:09

## 2023-09-15 RX ADMIN — SACUBITRIL AND VALSARTAN 1 TABLET: 24; 26 TABLET, FILM COATED ORAL at 08:09

## 2023-09-15 RX ADMIN — LEVALBUTEROL HYDROCHLORIDE 1.25 MG: 0.63 SOLUTION RESPIRATORY (INHALATION) at 08:09

## 2023-09-15 NOTE — SUBJECTIVE & OBJECTIVE
Interval History: pt in bed upon exam and reports wheezing despite continued diuresis (7.8 L removed). D-dimer elevated. CTA of chest and US BLE results pending. Lovenox BID initiated.     Review of Systems   Constitutional:  Positive for activity change and fatigue. Negative for appetite change, chills and fever.   HENT:  Negative for congestion, postnasal drip, sinus pressure and trouble swallowing.    Respiratory:  Positive for shortness of breath and wheezing. Negative for cough and chest tightness.    Cardiovascular:  Positive for leg swelling (improved). Negative for chest pain and palpitations.   Gastrointestinal:  Negative for abdominal distention, abdominal pain, nausea and vomiting.   Genitourinary:  Negative for difficulty urinating, dysuria, flank pain, frequency and urgency.   Musculoskeletal:  Negative for arthralgias, back pain and myalgias.   Skin:  Negative for color change and wound.   Neurological:  Positive for weakness (improved). Negative for dizziness and headaches.   Psychiatric/Behavioral:  Negative for agitation and confusion. The patient is not nervous/anxious.      Objective:     Vital Signs (Most Recent):  Temp: 97.2 °F (36.2 °C) (09/15/23 1559)  Pulse: (!) 56 (09/15/23 1559)  Resp: 19 (09/15/23 1559)  BP: (!) 160/72 (09/15/23 1559)  SpO2: (!) 92 % (09/15/23 1559) Vital Signs (24h Range):  Temp:  [97.2 °F (36.2 °C)-98.7 °F (37.1 °C)] 97.2 °F (36.2 °C)  Pulse:  [52-68] 56  Resp:  [16-29] 19  SpO2:  [92 %-100 %] 92 %  BP: (131-164)/(60-75) 160/72     Weight: 116.6 kg (257 lb 0.9 oz)  Body mass index is 44.12 kg/m².    Intake/Output Summary (Last 24 hours) at 9/15/2023 1609  Last data filed at 9/15/2023 1153  Gross per 24 hour   Intake --   Output 1050 ml   Net -1050 ml         Physical Exam  HENT:      Nose: Nose normal.      Mouth/Throat:      Pharynx: Oropharynx is clear.   Cardiovascular:      Rate and Rhythm: Normal rate and regular rhythm.   Pulmonary:      Effort: Tachypnea present.       Breath sounds: Examination of the right-lower field reveals decreased breath sounds and wheezing. Examination of the left-lower field reveals decreased breath sounds and wheezing. Decreased breath sounds and wheezing present.   Abdominal:      General: Bowel sounds are normal. There is no distension.      Palpations: Abdomen is soft.      Tenderness: There is no abdominal tenderness.   Genitourinary:     Comments: Deferred   Musculoskeletal:         General: Normal range of motion.      Cervical back: Normal range of motion.   Skin:     General: Skin is warm and dry.   Neurological:      Mental Status: She is alert and oriented to person, place, and time.   Psychiatric:         Mood and Affect: Mood normal.         Behavior: Behavior normal.             Significant Labs: All pertinent labs within the past 24 hours have been reviewed.  CBC:   Recent Labs   Lab 09/14/23 0457   WBC 11.35   HGB 15.4   HCT 46.8        CMP:   Recent Labs   Lab 09/14/23 0457 09/15/23  0514    139   K 4.5 5.2*   CL 95 99   CO2 34* 30*   * 110   BUN 43* 47*   CREATININE 1.2 1.2   CALCIUM 9.5 9.8   ANIONGAP 11 10       Significant Imaging: I have reviewed all pertinent imaging results/findings within the past 24 hours.

## 2023-09-15 NOTE — SUBJECTIVE & OBJECTIVE
Review of Systems   Constitutional: Positive for malaise/fatigue.   HENT: Negative.     Eyes: Negative.    Cardiovascular:  Positive for dyspnea on exertion.   Respiratory:  Positive for shortness of breath and wheezing.    Endocrine: Negative.    Hematologic/Lymphatic: Negative.    Skin: Negative.    Musculoskeletal:  Positive for arthritis and joint pain.   Gastrointestinal: Negative.    Genitourinary: Negative.    Neurological: Negative.    Psychiatric/Behavioral: Negative.     Allergic/Immunologic: Negative.      Objective:     Vital Signs (Most Recent):  Temp: 98.5 °F (36.9 °C) (09/15/23 1153)  Pulse: (!) 56 (09/15/23 1153)  Resp: 19 (09/15/23 1153)  BP: (!) 161/74 (09/15/23 1153)  SpO2: 97 % (09/15/23 1153) Vital Signs (24h Range):  Temp:  [97.9 °F (36.6 °C)-98.7 °F (37.1 °C)] 98.5 °F (36.9 °C)  Pulse:  [52-68] 56  Resp:  [16-29] 19  SpO2:  [93 %-100 %] 97 %  BP: (131-164)/(60-75) 161/74     Weight: 116.6 kg (257 lb 0.9 oz)  Body mass index is 44.12 kg/m².     SpO2: 97 %         Intake/Output Summary (Last 24 hours) at 9/15/2023 1315  Last data filed at 9/15/2023 1153  Gross per 24 hour   Intake --   Output 1050 ml   Net -1050 ml       Lines/Drains/Airways       Drain  Duration             Female External Urinary Catheter 09/10/23 0700 5 days              Peripheral Intravenous Line  Duration                  Peripheral IV - Single Lumen 09/09/23 1200 20 G;2 in Anterior;Left Forearm 6 days         Peripheral IV - Single Lumen 09/09/23 1507 20 G;2 in Anterior;Right Forearm 5 days                       Physical Exam  Vitals and nursing note reviewed.   Constitutional:       General: She is not in acute distress.     Appearance: She is well-developed. She is ill-appearing. She is not diaphoretic.      Comments: On supplemental O2   HENT:      Head: Normocephalic and atraumatic.   Eyes:      General:         Right eye: No discharge.         Left eye: No discharge.      Pupils: Pupils are equal, round, and  "reactive to light.   Neck:      Thyroid: No thyromegaly.      Vascular: No JVD.      Trachea: No tracheal deviation.   Cardiovascular:      Rate and Rhythm: Normal rate and regular rhythm.      Heart sounds: Normal heart sounds, S1 normal and S2 normal. No murmur heard.  Pulmonary:      Effort: Pulmonary effort is normal. No respiratory distress.      Breath sounds: Rhonchi present. No rales.   Abdominal:      General: There is no distension.      Tenderness: There is no rebound.   Musculoskeletal:      Cervical back: Neck supple.      Right lower leg: No edema.      Left lower leg: No edema.   Skin:     General: Skin is warm and dry.      Findings: No erythema.   Neurological:      General: No focal deficit present.      Mental Status: She is alert and oriented to person, place, and time.   Psychiatric:         Mood and Affect: Mood normal.         Behavior: Behavior normal.         Thought Content: Thought content normal.            Significant Labs: CMP   Recent Labs   Lab 09/14/23  0457 09/15/23  0514    139   K 4.5 5.2*   CL 95 99   CO2 34* 30*   * 110   BUN 43* 47*   CREATININE 1.2 1.2   CALCIUM 9.5 9.8   ANIONGAP 11 10   , CBC   Recent Labs   Lab 09/14/23  0457   WBC 11.35   HGB 15.4   HCT 46.8      , Troponin No results for input(s): "TROPONINI" in the last 48 hours., and All pertinent lab results from the last 24 hours have been reviewed.    Significant Imaging: Echocardiogram: Transthoracic echo (TTE) complete (Cupid Only):   Results for orders placed or performed during the hospital encounter of 09/09/23   Echo   Result Value Ref Range    BSA 2.36 m2    LVOT stroke volume 82.38 cm3    LVIDd 5.03 3.5 - 6.0 cm    LV Systolic Volume 68.43 mL    LV Systolic Volume Index 30.7 mL/m2    LVIDs 3.96 2.1 - 4.0 cm    LV Diastolic Volume 120.14 mL    LV Diastolic Volume Index 53.87 mL/m2    IVS 1.11 (A) 0.6 - 1.1 cm    LVOT diameter 1.95 cm    LVOT area 3.0 cm2    FS 21 (A) 28 - 44 %    Left " Ventricle Relative Wall Thickness 0.54 cm    Posterior Wall 1.35 (A) 0.6 - 1.1 cm    LV mass 244.31 g    LV Mass Index 110 g/m2    MV Peak E Berhane 0.93 m/s    TDI LATERAL 0.13 m/s    TDI SEPTAL 0.08 m/s    E/E' ratio 8.86 m/s    MV Peak A Berhane 0.90 m/s    TR Max Berhane 3.30 m/s    E/A ratio 1.03     IVRT 137.01 msec    E wave deceleration time 232.88 msec    LV SEPTAL E/E' RATIO 11.63 m/s    LV LATERAL E/E' RATIO 7.15 m/s    LVOT peak berhane 1.35 m/s    Left Ventricular Outflow Tract Mean Velocity 0.87 cm/s    Left Ventricular Outflow Tract Mean Gradient 3.55 mmHg    LA size 3.64 cm    Left Atrium Major Axis 5.78 cm    Left Atrium Minor Axis 6.21 cm    RV mid diameter 5.15 cm    RVOT peak VTI 18.1 cm    RA Major Axis 4.94 cm    AV regurgitation pressure 1/2 time [id number] ms    AR Max Berhane 4.57 m/s    AV mean gradient 10 mmHg    AV peak gradient 17 mmHg    Ao peak berhane 2.09 m/s    Ao VTI 39.30 cm    LVOT peak VTI 27.60 cm    AV valve area 2.10 cm²    AV Velocity Ratio 0.65     AV index (prosthetic) 0.70     KARYNA by Velocity Ratio 1.93 cm²    Mr max berhane 5.59 m/s    Triscuspid Valve Regurgitation Peak Gradient 44 mmHg    PV mean gradient 2 mmHg    RVOT peak berhane 0.82 m/s    Ao root annulus 3.18 cm    STJ 3.59 cm    Ascending aorta 3.46 cm    IVC diameter 2.31 cm    Mean e' 0.11 m/s    ZLVIDS -1.49     ZLVIDD -4.49     LA Volume Index 33.2 mL/m2    LA volume 74.10 cm3    LA WIDTH 4.0 cm    TAPSE 2.40 cm    RA Width 3.2 cm    TV resting pulmonary artery pressure 47 mmHg    RV TB RVSP 6 mmHg    Est. RA pres 3 mmHg    Narrative      Left Ventricle: The left ventricle is normal in size. Mildly increased   wall thickness. Normal wall motion. There is mildly reduced systolic   function with a visually estimated ejection fraction of 40 - 45%. There is   normal diastolic function.    Right Ventricle: Normal right ventricular cavity size. Wall thickness   is normal. Right ventricle wall motion  is normal. Systolic function is    normal.    Aortic Valve: There is mild to moderate aortic regurgitation with a   centrally directed jet.    Mitral Valve: There is moderate regurgitation.    Tricuspid Valve: There is moderate regurgitation with a centrally   directed jet.    Pulmonic Valve: There is mild to moderate regurgitation.    Pulmonary Artery: The estimated pulmonary artery systolic pressure is   47 mmHg.    IVC/SVC: Normal venous pressure at 3 mmHg.     , EKG: REviewed, and X-Ray: CXR: X-Ray Chest 1 View (CXR): No results found for this visit on 09/09/23. and X-Ray Chest PA and Lateral (CXR): No results found for this visit on 09/09/23.

## 2023-09-15 NOTE — PLAN OF CARE
Nutrition Recommendations 9/15:  1. Recommend pt continues on Diabetic 2000 calorie, Cardiac, 1500 mL Fluid Restriction diet   2. Recommend Boost glucose control TID if < 50% PO intake of meals   3. Daily weights  4. Collaboration of care with medical providers    Goals:   1. Pt will tolerate and consume > 75% EEN and EPN prior to RD follow up    Marya Mitchell, Registration Eligible, Provisional LDN

## 2023-09-15 NOTE — ASSESSMENT & PLAN NOTE
Patient is identified as having Diastolic (HFpEF) heart failure that is Acute on chronic. CHF is currently uncontrolled due to Continued edema of extremities. Latest ECHO performed and demonstrates- Results for orders placed during the hospital encounter of 03/29/23    Echo    Interpretation Summary  · The left ventricle is normal in size with moderate concentric hypertrophy and mildly decreased systolic function.  · The estimated ejection fraction is 45%.  · Grade I left ventricular diastolic dysfunction.  · Moderate right ventricular enlargement.  · Moderate right atrial enlargement.  · Mild-to-moderate aortic regurgitation.  · There is mild aortic valve stenosis.  · Aortic valve area is 1.44 cm2; peak velocity is 2.09 m/s; mean gradient is 10 mmHg.  · Mild-to-moderate mitral regurgitation.  · Moderate tricuspid regurgitation.  · Moderate pulmonic regurgitation.  · The estimated PA systolic pressure is 69 mmHg.  · There is moderate pulmonary hypertension.  · Moderate left atrial enlargement.  . Continue Beta Blocker and Furosemide and monitor clinical status closely. Monitor on telemetry. Patient is on CHF pathway.  Monitor strict Is&Os and daily weights.  Place on fluid restriction of 2 L. Cardiology has been consulted. Continue to stress to patient importance of self efficacy and  on diet for CHF. Last BNP reviewed- and noted below   Recent Labs   Lab 09/13/23  0902   *       9/11/23  -Improving  -Continue IV diuresis  -Continue BB, Entresto  -Repeat BNP in AM    9/12/23  -Continue same meds/mgmt  -Continue IV diuresis, BB, Entresto  -Reassess in AM    9/13/23  -Volume status much improved, BNP trended down significantly  -Stop IV Lasix  -Continue BB, Entresto  -Reassess in AM  -MPI stress test ordered (rest part today)    9/14/23  -Give additional dose of IV Lasix today  -Continue BB, Entresto    9/15/23  -Give additional dose of IV Lasix today  -Continue BB, Entresto

## 2023-09-15 NOTE — PROGRESS NOTES
[facility] - Telemetry ([facility])  Adult Nutrition  Progress Note    SUMMARY       Recommendations    Recommendation/Intervention:   1. Recommend pt continues on Diabetic 2000 calorie, Cardiac, 1500 mL Fluid Restriction diet   2. Recommend Boost glucose control TID if < 50% PO intake of meals   3. Daily weights    Goals:   1. Pt will tolerate and consume > 75% EEN and EPN prior to RD follow up  Nutrition Goal Status: new  Communication of RD Recs: other (comment) (POC, sticky note)    Assessment and Plan    Nutrition Problem  Obesity     Related to (etiology):   Excessive energy intake     Signs and Symptoms (as evidenced by):   BMI (44.12) more than standard for age and sex     Interventions/Recommendations (treatment strategy):  1. Decreased carbohydrate, Sodium and Fat modified, Fluid restricted diet  2. Collaboration of care with medical providers     Nutrition Diagnosis Status:   New       Malnutrition Assessment     Skin (Micronutrient): edema (Adan score = 18 (mild risk)       Fluid Accumulation (Malnutrition): moderate                         Reason for Assessment    Reason For Assessment: RD follow-up  Diagnosis:  (Acute on chronic respiratory failure with hypoxia)  Relevant Medical History: A FIb, Severe obesity, COPD, CHF, T2DM, Acute cystitis w/ out hematuria, HTN, Elevated troponin  Hx: SOB, DVT, Anxiety and depression, Thyroid disease  General Information Comments:   9/15/23: 73 y.o. Female admitted for Acute on chronic respiratory failure with hypoxia. Pt currently on Diabetic 2000 calorie, Cardiac, 1500 mL fluid restriction diet. H&P noted that the pt presented to [facility] ED w/ Generalized weakness, Fatigue, SOB, and Dysuria x 2 days PTA, progressively worsened, and BLE edema, worsened x the past week, pt was transferred to [facility] for cardiology consultation. Hospital Medicine Physician noted on 9/14 that the pt reported feeling much better, improvement with CPAP in place and no distress  "noted as pt ate on the side of the bed, unable to complete second portion of stress test d/t wheezing, repeat stress test to be attempted today. Visited pt at bedside, pt sleeping w/ CPAP. Spoke to RN via secure chat, inquired about the pt's appetite, % PO intake and if experiencing any N/V/D, RN stated "She did not eat breakfast. She said they were going to do a heart test on heart. I do see she is pending a stress test. Not sure if someone told her it is today or if she was NPO yesterday for it". Reviewed chart: No PO intake charted x 5 days; LBM 9/13 (x 2 days nio BM); Skin: WDL; Adan score: 18 (mild risk), Edema: 3+ moderate bilateral foot/ankle/leg. Labs, meds, weight reviewed. Weight charted 4/25 248 lbs, 9/13 257 lbs (BMI 44.12, Morbid obesity), +9 lb wt gain x ~5 months, edema noted. No NFPE warranted at this time, BMI > 40. RD will continue to monitor.    Nutrition Discharge Planning: Diabetic 2000 calorie, Cardiac, 1500 mL Fluid Restriction diet    Nutrition Risk Screen    Nutrition Risk Screen: no indicators present    Nutrition/Diet History    Spiritual, Cultural Beliefs, Jain Practices, Values that Affect Care: no  Food Allergies: NKFA  Factors Affecting Nutritional Intake: None identified at this time    Anthropometrics    Temp: 98.5 °F (36.9 °C)  Height: 5' 4" (162.6 cm)  Height (inches): 64 in  Weight Method: Bed Scale  Weight: 116.6 kg (257 lb 0.9 oz)  Weight (lb): 257.06 lb  Ideal Body Weight (IBW), Female: 120 lb  % Ideal Body Weight, Female (lb): 214.22 %  BMI (Calculated): 44.1  BMI Grade: greater than 40 - morbid obesity     Wt Readings from Last 15 Encounters:   09/15/23 116.6 kg (257 lb 0.9 oz)   09/08/23 113.4 kg (250 lb)   04/25/23 112.6 kg (248 lb 3.8 oz)   04/02/23 132.9 kg (293 lb)   10/08/22 117.9 kg (260 lb)   02/15/22 118.3 kg (260 lb 14.4 oz)   02/08/22 (!) 145.2 kg (320 lb)   01/31/22 (!) 148.5 kg (327 lb 6.1 oz)   08/12/21 (!) 151.5 kg (334 lb)   07/29/17 (!) 151.6 kg (334 " lb 3.5 oz)   06/13/17 (!) 183.7 kg (404 lb 15.8 oz)   04/26/17 (!) 161.9 kg (356 lb 14.8 oz)     Lab/Procedures/Meds    Pertinent Labs Reviewed: reviewed  Pertinent Labs Comments: K+ (H), BUN (H), eGFR 48  Pertinent Medications Comments: sertraine, senna docusate, polyethylene glycol, pantoprazole, levothyroxine, enoxaparin, carvedilol, atorvastatin, aspirin  BMP  Lab Results   Component Value Date     09/15/2023    K 5.2 (H) 09/15/2023    CL 99 09/15/2023    CO2 30 (H) 09/15/2023    BUN 47 (H) 09/15/2023    CREATININE 1.2 09/15/2023    CALCIUM 9.8 09/15/2023    ANIONGAP 10 09/15/2023    EGFRNORACEVR 48 (A) 09/15/2023     Recent Labs   Lab 09/15/23  1137   POCTGLUCOSE 119*     Lab Results   Component Value Date    HGBA1C 5.3 09/09/2023    HGBA1C 5.3 09/09/2023     Scheduled Meds:   arformoteroL  15 mcg Nebulization BID    aspirin  81 mg Oral Daily    atorvastatin  40 mg Oral Daily    budesonide  0.5 mg Nebulization Q12H    carvediloL  6.25 mg Oral BID    enoxparin  1 mg/kg Subcutaneous Q12H (prophylaxis, 0900/2100)    furosemide (LASIX) injection  60 mg Intravenous Once    isosorbide mononitrate  30 mg Oral Daily    levalbuterol  1.2495 mg Nebulization Q8H    levothyroxine  50 mcg Oral Before breakfast    methylPREDNISolone sodium succinate injection  40 mg Intravenous Q6H    pantoprazole  40 mg Oral Daily    polyethylene glycol  17 g Oral Daily    sacubitriL-valsartan  1 tablet Oral BID    senna-docusate 8.6-50 mg  1 tablet Oral BID    sertraline  50 mg Oral Daily     Continuous Infusions:  PRN Meds:.acetaminophen, dextrose 10%, dextrose 10%, glucagon (human recombinant), glucose, glucose, hydrALAZINE, insulin aspart U-100, melatonin, ondansetron, sodium chloride 0.9%    Physical Findings/Assessment         Estimated/Assessed Needs    Weight Used For Calorie Calculations: 116.6 kg (257 lb 0.9 oz)  Energy Calorie Requirements (kcal): 1661 kcals (MSJ no AF (Obese, BMI 44.12)  Energy Need Method: [place]  Darinel  Protein Requirements: 70-88 g (1.0-1.25 g/kg Adj BW (Older age, Obese 214.22% IBW)  Weight Used For Protein Calculations: 70 kg (154 lb 5.2 oz) (Adj BW)  Fluid Requirements (mL): 1661 mL (1 mL/kcal)  Estimated Fluid Requirement Method: RDA Method  RDA Method (mL): 1661  CHO Requirement: 208 g (1661 kcals/8)      Nutrition Prescription Ordered    Current Diet Order: Diabetic 2000 calorie, Cardiac, 1500 mL fluid restriction diet    Evaluation of Received Nutrient/Fluid Intake  I/O: (Net since admit)   9/15: -7830.8 mL    Energy Calories Required:  (Currently no intake charted)  Protein Required:  (Currently no intake charted)  Fluid Required: not meeting needs  Total Fluid Intake (mL): 700  Tolerance: tolerating  % Intake of Estimated Energy Needs: 0 - 25 %  % Meal Intake: 0%     Nutrition Risk    Level of Risk/Frequency of Follow-up: high (F/u x 2 weekly)     Monitor and Evaluation    Food and Nutrient Intake: energy intake, food and beverage intake  Food and Nutrient Adminstration: diet order  Knowledge/Beliefs/Attitudes: food and nutrition knowledge/skill, beliefs and attitudes  Anthropometric Measurements: weight, weight change, body mass index  Biochemical Data, Medical Tests and Procedures: electrolyte and renal panel, glucose/endocrine profile, inflammatory profile     Nutrition Follow-Up    RD Follow-up?: Yes  Marya Mitchell, Registration Eligible, Provisional LDN

## 2023-09-15 NOTE — ASSESSMENT & PLAN NOTE
-AC previously d/c'd in 4/23 due to hematuria  -Need to readdress prior to d/c    9/15/23  -Discussed with Dr. Howell, will initiate full dose Lovenox and monitor H/H

## 2023-09-15 NOTE — ASSESSMENT & PLAN NOTE
- D-dimer 3.35  -CTA of chest pending   -US of BLE pending   -pt taken off anticoagulation due to hematuria - pt was on ASA   -Lovenox BID    [facility]  S:[phone]  J:[phone] has accepted the pt for [facility]. The pt does not have a secondary insurance, so the admissions coordinator, Xiomara Ibarra asked to speak directly to the pts wife.  Bailey's number was provided to the wife & she called the luli

## 2023-09-15 NOTE — ASSESSMENT & PLAN NOTE
Trend  Cardiac echo results pending    9/11/23  -Flat  -No CP symptoms  -Suspect elevation due to demand ischemia from fluid overload  -Echo showed EF of 40-45%, mild-mod AI  -Continue Coreg, Entresto, ASA  -Can consider ischemic workup IP vs OP once compensated    9/12/23  -Ok to d/c heparin gtt  -Continue Coreg, Entresto, ASA  -Probable MPI stress test tmw    9/13/23  -CP free  -Continue ASA, Coreg, Entresto  -MPI stress test pending    9/14/23  -Wheezing today will re-attempt stress tmw    9/15/23  -Still wheezing, re-attempt stress test tmw, may have to be done as OP

## 2023-09-15 NOTE — PLAN OF CARE
Problem: Skin Injury Risk Increased  Goal: Skin Health and Integrity  Outcome: Ongoing, Progressing  Intervention: Optimize Skin Protection  Flowsheets (Taken 9/15/2023 1848)  Pressure Reduction Techniques: frequent weight shift encouraged  Pressure Reduction Devices: pressure-redistributing mattress utilized  Skin Protection: adhesive use limited     Problem: Fall Injury Risk  Goal: Absence of Fall and Fall-Related Injury  Outcome: Ongoing, Progressing  Intervention: Identify and Manage Contributors  Flowsheets (Taken 9/15/2023 1848)  Self-Care Promotion: independence encouraged  Medication Review/Management: medications reviewed

## 2023-09-15 NOTE — ASSESSMENT & PLAN NOTE
Serial troponin   Cardiology recommended Lovenox 1mg/kg to ED provider   Will start IV Heparin  cont ASA, BB, Statin, Entresto   Echo   Consult cardiology     9/10/23  Denies chest pain.   Troponin trending down  Continue current meds     -9/11/23- downward trend   -9/12/23- Stress test to be planned for tomorrow   -9/13/23- resting portion of stress test completed   -9/14/23- 2nd portion of stress test not completed due to wheezing- IV Lasix given - repeat analysis to be attempted on 9/15/23  -9/15/23- stress test not completed secondary to symptoms- D-dimer elevated with CTA chest and US of BLE pending

## 2023-09-15 NOTE — PT/OT/SLP PROGRESS
"Physical Therapy      Patient Name:  Carmen Tan   MRN:  [id number]    Presented to pt's room at 1100. Pt sitting EOB eating. Pt refused all OOB mobility and TherEx at this time reporting "They are about to come get me for a stress test." Will continue efforts.    Elizabeth Rodríguez, PT, DPT  9/15/2023   1100    "

## 2023-09-15 NOTE — ASSESSMENT & PLAN NOTE
Patient's FSGs are controlled on current medication regimen.  Last A1c reviewed-   Lab Results   Component Value Date    HGBA1C 5.3 09/09/2023    HGBA1C 5.3 09/09/2023     Most recent fingerstick glucose reviewed-   Recent Labs   Lab 09/14/23  2238 09/15/23  0543 09/15/23  1137   POCTGLUCOSE 123* 114* 119*     Current correctional scale  Low  Maintain anti-hyperglycemic dose as follows-   Antihyperglycemics (From admission, onward)    Start     Stop Route Frequency Ordered    09/09/23 0545  insulin aspart U-100 pen 0-5 Units         -- SubQ Before meals & nightly PRN 09/09/23 0447        Hold Oral hypoglycemics while patient is in the hospital.

## 2023-09-15 NOTE — PROGRESS NOTES
[facility] Tele[facility] ([facility])  Cardiology  Progress Note    Patient Name: Carmen Tan  MRN: [id number]  Admission Date: 9/9/2023  Hospital Length of Stay: 6 days  Code Status: Full Code   Attending Physician: Elmer Nichols MD   Primary Care Physician: Lucian Barry MD  Expected Discharge Date:   Principal Problem:Acute on chronic respiratory failure with hypoxia    Subjective:   HPI:  The patient is a 73 year old female with DM, COPD on home oxygen 4 liters, Diastolic CHF, HTN, hx PE who presented to [facility] ED with Generalized weakness, Fatigue, SOB, and Dysuria - onset 2 days ago that progressively worsened. Pt also reports BLE edema that worsened over the past week.      In the ED, CXR showed Subtle hazy opacities at the perihilar aspects of the lungs may represent mild edema. Elevated troponin of 200, elevated BNP 5085.  Labs otherwise unremarkable. COVID negative. Patient denied any chest pain, EKG non-ischemia. Patient given Aspirin 325 mg, Lovenox 1mg/kg, and Lasix 40 IV x1 as well as a breathing treatment. Po Bactrim for UTI     The patient was transferred to [facility] for admission under hospital medicine and for cardiology consultation     Hospital Course:   9/10/23-Patient seen and examined. Stable and continue diuresis    9/11/23-Patient seen and examined today, lying in bed with CPAP on. Feels ok. SOB improving. No CP. Labs reviewed, creatinine 1.2.     9/12/23-Patient seen and examined today, resting with CPAP on. Continues to improve. Diuresing well. Labs reviewed/stable. Possible MPI stress test tmw.    9/13/23-Patient seen and examined today, sitting up in bed. SOB much improved, on supplemental O2. No CP. BNP trended down significantly. Labs reviewed, mild bump in BUN/creatinine, IV Lasix d/c'd. Stress test ordered (rest portion today).    9/14/23-Patient seen and examined today, on CPAP. Wheezing, unable to complete stress test today. Give additional dose of IV Lasix today. Labs  reviewed.     9/15/23-Patient seen and examined today, wearing CPAP. Breathing improved. Sill wheezing, unable to complete stress test. Labs reviewed. CBC pending.         Review of Systems   Constitutional: Positive for malaise/fatigue.   HENT: Negative.     Eyes: Negative.    Cardiovascular:  Positive for dyspnea on exertion.   Respiratory:  Positive for shortness of breath and wheezing.    Endocrine: Negative.    Hematologic/Lymphatic: Negative.    Skin: Negative.    Musculoskeletal:  Positive for arthritis and joint pain.   Gastrointestinal: Negative.    Genitourinary: Negative.    Neurological: Negative.    Psychiatric/Behavioral: Negative.     Allergic/Immunologic: Negative.      Objective:     Vital Signs (Most Recent):  Temp: 98.5 °F (36.9 °C) (09/15/23 1153)  Pulse: (!) 56 (09/15/23 1153)  Resp: 19 (09/15/23 1153)  BP: (!) 161/74 (09/15/23 1153)  SpO2: 97 % (09/15/23 1153) Vital Signs (24h Range):  Temp:  [97.9 °F (36.6 °C)-98.7 °F (37.1 °C)] 98.5 °F (36.9 °C)  Pulse:  [52-68] 56  Resp:  [16-29] 19  SpO2:  [93 %-100 %] 97 %  BP: (131-164)/(60-75) 161/74     Weight: 116.6 kg (257 lb 0.9 oz)  Body mass index is 44.12 kg/m².     SpO2: 97 %         Intake/Output Summary (Last 24 hours) at 9/15/2023 1315  Last data filed at 9/15/2023 1153  Gross per 24 hour   Intake --   Output 1050 ml   Net -1050 ml       Lines/Drains/Airways       Drain  Duration             Female External Urinary Catheter 09/10/23 0700 5 days              Peripheral Intravenous Line  Duration                  Peripheral IV - Single Lumen 09/09/23 1200 20 G;2 in Anterior;Left Forearm 6 days         Peripheral IV - Single Lumen 09/09/23 1507 20 G;2 in Anterior;Right Forearm 5 days                       Physical Exam  Vitals and nursing note reviewed.   Constitutional:       General: She is not in acute distress.     Appearance: She is well-developed. She is ill-appearing. She is not diaphoretic.      Comments: On supplemental O2   HENT:       "Head: Normocephalic and atraumatic.   Eyes:      General:         Right eye: No discharge.         Left eye: No discharge.      Pupils: Pupils are equal, round, and reactive to light.   Neck:      Thyroid: No thyromegaly.      Vascular: No JVD.      Trachea: No tracheal deviation.   Cardiovascular:      Rate and Rhythm: Normal rate and regular rhythm.      Heart sounds: Normal heart sounds, S1 normal and S2 normal. No murmur heard.  Pulmonary:      Effort: Pulmonary effort is normal. No respiratory distress.      Breath sounds: Rhonchi present. No rales.   Abdominal:      General: There is no distension.      Tenderness: There is no rebound.   Musculoskeletal:      Cervical back: Neck supple.      Right lower leg: No edema.      Left lower leg: No edema.   Skin:     General: Skin is warm and dry.      Findings: No erythema.   Neurological:      General: No focal deficit present.      Mental Status: She is alert and oriented to person, place, and time.   Psychiatric:         Mood and Affect: Mood normal.         Behavior: Behavior normal.         Thought Content: Thought content normal.            Significant Labs: CMP   Recent Labs   Lab 09/14/23  0457 09/15/23  0514    139   K 4.5 5.2*   CL 95 99   CO2 34* 30*   * 110   BUN 43* 47*   CREATININE 1.2 1.2   CALCIUM 9.5 9.8   ANIONGAP 11 10   , CBC   Recent Labs   Lab 09/14/23  0457   WBC 11.35   HGB 15.4   HCT 46.8      , Troponin No results for input(s): "TROPONINI" in the last 48 hours., and All pertinent lab results from the last 24 hours have been reviewed.    Significant Imaging: Echocardiogram: Transthoracic echo (TTE) complete (Cupid Only):   Results for orders placed or performed during the hospital encounter of 09/09/23   Echo   Result Value Ref Range    BSA 2.36 m2    LVOT stroke volume 82.38 cm3    LVIDd 5.03 3.5 - 6.0 cm    LV Systolic Volume 68.43 mL    LV Systolic Volume Index 30.7 mL/m2    LVIDs 3.96 2.1 - 4.0 cm    LV Diastolic " Volume 120.14 mL    LV Diastolic Volume Index 53.87 mL/m2    IVS 1.11 (A) 0.6 - 1.1 cm    LVOT diameter 1.95 cm    LVOT area 3.0 cm2    FS 21 (A) 28 - 44 %    Left Ventricle Relative Wall Thickness 0.54 cm    Posterior Wall 1.35 (A) 0.6 - 1.1 cm    LV mass 244.31 g    LV Mass Index 110 g/m2    MV Peak E Berhane 0.93 m/s    TDI LATERAL 0.13 m/s    TDI SEPTAL 0.08 m/s    E/E' ratio 8.86 m/s    MV Peak A Berhane 0.90 m/s    TR Max Berhane 3.30 m/s    E/A ratio 1.03     IVRT 137.01 msec    E wave deceleration time 232.88 msec    LV SEPTAL E/E' RATIO 11.63 m/s    LV LATERAL E/E' RATIO 7.15 m/s    LVOT peak berhane 1.35 m/s    Left Ventricular Outflow Tract Mean Velocity 0.87 cm/s    Left Ventricular Outflow Tract Mean Gradient 3.55 mmHg    LA size 3.64 cm    Left Atrium Major Axis 5.78 cm    Left Atrium Minor Axis 6.21 cm    RV mid diameter 5.15 cm    RVOT peak VTI 18.1 cm    RA Major Axis 4.94 cm    AV regurgitation pressure 1/2 time 850.034044321814225 ms    AR Max Berhane 4.57 m/s    AV mean gradient 10 mmHg    AV peak gradient 17 mmHg    Ao peak berhane 2.09 m/s    Ao VTI 39.30 cm    LVOT peak VTI 27.60 cm    AV valve area 2.10 cm²    AV Velocity Ratio 0.65     AV index (prosthetic) 0.70     KARYNA by Velocity Ratio 1.93 cm²    Mr max berhane 5.59 m/s    Triscuspid Valve Regurgitation Peak Gradient 44 mmHg    PV mean gradient 2 mmHg    RVOT peak berhane 0.82 m/s    Ao root annulus 3.18 cm    STJ 3.59 cm    Ascending aorta 3.46 cm    IVC diameter 2.31 cm    Mean e' 0.11 m/s    ZLVIDS -1.49     ZLVIDD -4.49     LA Volume Index 33.2 mL/m2    LA volume 74.10 cm3    LA WIDTH 4.0 cm    TAPSE 2.40 cm    RA Width 3.2 cm    TV resting pulmonary artery pressure 47 mmHg    RV TB RVSP 6 mmHg    Est. RA pres 3 mmHg    Narrative      Left Ventricle: The left ventricle is normal in size. Mildly increased   wall thickness. Normal wall motion. There is mildly reduced systolic   function with a visually estimated ejection fraction of 40 - 45%. There is   normal  diastolic function.    Right Ventricle: Normal right ventricular cavity size. Wall thickness   is normal. Right ventricle wall motion  is normal. Systolic function is   normal.    Aortic Valve: There is mild to moderate aortic regurgitation with a   centrally directed jet.    Mitral Valve: There is moderate regurgitation.    Tricuspid Valve: There is moderate regurgitation with a centrally   directed jet.    Pulmonic Valve: There is mild to moderate regurgitation.    Pulmonary Artery: The estimated pulmonary artery systolic pressure is   47 mmHg.    IVC/SVC: Normal venous pressure at 3 mmHg.     , EKG: REviewed, and X-Ray: CXR: X-Ray Chest 1 View (CXR): No results found for this visit on 09/09/23. and X-Ray Chest PA and Lateral (CXR): No results found for this visit on 09/09/23.    Assessment and Plan:   Patient who presents with respiratory failure/COPD exacerbation/decompensated CHF. Still wheezing, unable to complete stress test today. Give additional dose of IV Lasix. Re-attempt tmw. Discussed AC with MD, full dose Lovenox initiated, monitor H/H and for any s/s of bleeding.    * Acute on chronic respiratory failure with hypoxia  Patient with Hypercapnic and Hypoxic Respiratory failure which is Acute on chronic.  she is not on home oxygen. Supplemental oxygen was provided and noted- Oxygen Concentration (%):  [30] 30    .   Signs/symptoms of respiratory failure include- tachypnea. Contributing diagnoses includes - COPD Labs and images were reviewed. Patient Has recent ABG, which has been reviewed. Will treat underlying causes and adjust management of respiratory failure as follows- hypercapnic    9/11/23  -Mgmt as per hospital medicine  -Continue IV diuresis for additional day    9/12/23  -Continue same mgmt/IV diuresis     9/13/23  -Much improved, BNP trended down, d/c IV Lasix  -Reassess in AM    9/14/23  -Using CPAP, wheezing this AM  -Continue steroids  -Give additional dose of IV  Lasix    9/15/23  -Unfortunately still wheezing  -Additional dose of IV Lasix today  -Continue steroids    Elevated troponin  Trend  Cardiac echo results pending    9/11/23  -Flat  -No CP symptoms  -Suspect elevation due to demand ischemia from fluid overload  -Echo showed EF of 40-45%, mild-mod AI  -Continue Coreg, Entresto, ASA  -Can consider ischemic workup IP vs OP once compensated    9/12/23  -Ok to d/c heparin gtt  -Continue Coreg, Entresto, ASA  -Probable MPI stress test tmw    9/13/23  -CP free  -Continue ASA, Coreg, Entresto  -MPI stress test pending    9/14/23  -Wheezing today will re-attempt stress tmw    9/15/23  -Still wheezing, re-attempt stress test tmw, may have to be done as OP    Hypertension  Continue coreg, entresto    Acute cystitis without hematuria  treated    Acute on chronic combined systolic and diastolic congestive heart failure  Patient is identified as having Diastolic (HFpEF) heart failure that is Acute on chronic. CHF is currently uncontrolled due to Continued edema of extremities. Latest ECHO performed and demonstrates- Results for orders placed during the hospital encounter of 03/29/23    Echo    Interpretation Summary  · The left ventricle is normal in size with moderate concentric hypertrophy and mildly decreased systolic function.  · The estimated ejection fraction is 45%.  · Grade I left ventricular diastolic dysfunction.  · Moderate right ventricular enlargement.  · Moderate right atrial enlargement.  · Mild-to-moderate aortic regurgitation.  · There is mild aortic valve stenosis.  · Aortic valve area is 1.44 cm2; peak velocity is 2.09 m/s; mean gradient is 10 mmHg.  · Mild-to-moderate mitral regurgitation.  · Moderate tricuspid regurgitation.  · Moderate pulmonic regurgitation.  · The estimated PA systolic pressure is 69 mmHg.  · There is moderate pulmonary hypertension.  · Moderate left atrial enlargement.  . Continue Beta Blocker and Furosemide and monitor clinical status  closely. Monitor on telemetry. Patient is on CHF pathway.  Monitor strict Is&Os and daily weights.  Place on fluid restriction of 2 L. Cardiology has been consulted. Continue to stress to patient importance of self efficacy and  on diet for CHF. Last BNP reviewed- and noted below   Recent Labs   Lab 09/13/23  0902   *       9/11/23  -Improving  -Continue IV diuresis  -Continue BB, Entresto  -Repeat BNP in AM    9/12/23  -Continue same meds/mgmt  -Continue IV diuresis, BB, Entresto  -Reassess in AM    9/13/23  -Volume status much improved, BNP trended down significantly  -Stop IV Lasix  -Continue BB, Entresto  -Reassess in AM  -MPI stress test ordered (rest part today)    9/14/23  -Give additional dose of IV Lasix today  -Continue BB, Entresto    9/15/23  -Give additional dose of IV Lasix today  -Continue BB, Entresto      COPD exacerbation  -Mgmt as per hospital medicine    Severe obesity (BMI >= 40)  -Weight loss    Paroxysmal A-fib  -AC previously d/c'd in 4/23 due to hematuria  -Need to readdress prior to d/c    9/15/23  -Discussed with Dr. Howell, will initiate full dose Lovenox and monitor H/H          VTE Risk Mitigation (From admission, onward)         Ordered     enoxaparin injection 120 mg  Every 12 hours         09/15/23 1314     IP VTE HIGH RISK PATIENT  Once         09/09/23 0447     Place sequential compression device  Until discontinued         09/09/23 0447                Chelle Fierro PA-C  Cardiology  [facility] - Telemetry ([facility])

## 2023-09-15 NOTE — ASSESSMENT & PLAN NOTE
Patient is identified as having Diastolic (HFpEF) heart failure that is Acute on chronic. CHF is currently uncontrolled due to Rales/crackles on pulmonary exam and Pulmonary edema/pleural effusion on CXR. Latest ECHO performed and demonstrates- Results for orders placed during the hospital encounter of 03/29/23    Echo    Interpretation Summary  · The left ventricle is normal in size with moderate concentric hypertrophy and mildly decreased systolic function.  · The estimated ejection fraction is 45%.  · Grade I left ventricular diastolic dysfunction.  · Moderate right ventricular enlargement.  · Moderate right atrial enlargement.  · Mild-to-moderate aortic regurgitation.  · There is mild aortic valve stenosis.  · Aortic valve area is 1.44 cm2; peak velocity is 2.09 m/s; mean gradient is 10 mmHg.  · Mild-to-moderate mitral regurgitation.  · Moderate tricuspid regurgitation.  · Moderate pulmonic regurgitation.  · The estimated PA systolic pressure is 69 mmHg.  · There is moderate pulmonary hypertension.  · Moderate left atrial enlargement.  . Continue Beta Blocker, Furosemide, Aldactone and ARNI and monitor clinical status closely. Monitor on telemetry. Patient is on CHF pathway.  Monitor strict Is&Os and daily weights.  Place on fluid restriction of 1.5 L. Cardiology has been consulted. Continue to stress to patient importance of self efficacy and  on diet for CHF. Last BNP reviewed- and noted below   Recent Labs   Lab 09/15/23  1249   BNP 78     9/10/23  -Diuresing well  -Continue IV lasix      9/11/23- diuresis continued- BIPAP as needed for support   -9/12/23- diuresis continued- 4.8 L removed    -9/13/23- diuresis completed- 7.1 L removed   -9/14/23- IV Lasix given   -9/15/23- diuresis continued- 7.8L renoved

## 2023-09-15 NOTE — ASSESSMENT & PLAN NOTE
Patient with Paroxysmal (<7 days) atrial fibrillation which is controlled currently with Beta Blocker. Patient is currently in sinus rhythm.[id number] Score: 3. HASBLED Score: 3. Anticoagulation per Heparin infusion completed-hx of intolerance of anticoagulation noted due to gross hematuria    -will start ASA

## 2023-09-15 NOTE — PROGRESS NOTES
[facility] Tele[facility] ([facility])  [facility] Medicine  Progress Note    Patient Name: Carmen Tan  MRN: [id number]  Patient Class: IP- Inpatient   Admission Date: 9/9/2023  Length of Stay: 6 days  Attending Physician: Elmer Nichols MD  Primary Care Provider: Lucian Barry MD        Subjective:     Principal Problem:Acute on chronic respiratory failure with hypoxia        HPI:  The patient is a 73 year old female with DM, COPD on home oxygen 4 liters, Diastolic CHF, HTN, hx PE who presented to [facility] ED with Generalized weakness, Fatigue, SOB, and Dysuria - onset 2 days ago that progressively worsened. Pt also reports BLE edema that worsened over the past week.     n the ED, CXR shows chronic scarring no focal consolidation. Elevated troponin of 200, elevated BNP. Labs otherwise unremarkable. COVID negative. Patient denied any chest pain, EKG non-ischemia. Patient given Aspirin 325 mg, Lovenox 1mg/kg, and Lasix 40 IV x1 as well as a breathing treatment. Po Bactrim for UTI    The patient was transferred to [facility] for admission under hospital medicine and for cardiology consultation       Overview/Hospital Course:  Patient sitting on side of the bed eating. No distress noted. Reports feeling much better. On continuous oxygen. Diuresed well overnight. Will continue to diurese.  On 9/11/23, diuresis continued. Cardiology following with inpatient vs outpatient ischemic work up to be evaluated. Troponin remains elevated with downward trend noted. Urine culture grew E. Coli (50,000-99,999) with sensitivities reviewed and antibiotics continued. Echo on 9/9/23 showed EF 40-45%  mildly reduced systolic function- mild to moderate aortic/pulmonic regurgitation with a centrally directed jet. Mitral/Tricuspid moderate regurgitation. BIPAP placed as needed for comfort and respiratory support. On 9/12/23, diuresis continued with 4.8 L removed. BIPAP continued nightly and as needed. Stress test planned for tomorrow per  Cardiology. On 9/13/23, pt verbalized symptom improvement with IV diuresis completed. Respiratory support maintained on home oxygen dose of 4L NC. Resting portion of stress test performed today with second portion to be completed on tomorrow. On 9/14/23, pt was unable to complete second portion of stress test due to wheezing. IV Lasix continued. Pt verbalized symptom improvement with CPAP in place. Stress test to be attempted in am. On 9/15/23, stress test not completed due to continued wheezing. Diuresis continued and D-dimer of 3.35 noted. Lovenox initiated at therapeutic dose per Cardiology. CTA of chest and US of BLE pending. BNP and troponin normalized. Oxygen requirements supported with nasal cannula at home dose and CPAP. May complete stress test outpatient post acute event pending imaging results.       Interval History: pt in bed upon exam and reports wheezing despite continued diuresis (7.8 L removed). BNP and Troponin normalized. D-dimer elevated. CTA of chest and US BLE results pending. Lovenox BID initiated.     Review of Systems   Constitutional:  Positive for activity change and fatigue. Negative for appetite change, chills and fever.   HENT:  Negative for congestion, postnasal drip, sinus pressure and trouble swallowing.    Respiratory:  Positive for shortness of breath and wheezing. Negative for cough and chest tightness.    Cardiovascular:  Positive for leg swelling (improved). Negative for chest pain and palpitations.   Gastrointestinal:  Negative for abdominal distention, abdominal pain, nausea and vomiting.   Genitourinary:  Negative for difficulty urinating, dysuria, flank pain, frequency and urgency.   Musculoskeletal:  Negative for arthralgias, back pain and myalgias.   Skin:  Negative for color change and wound.   Neurological:  Positive for weakness (improved). Negative for dizziness and headaches.   Psychiatric/Behavioral:  Negative for agitation and confusion. The patient is not  nervous/anxious.      Objective:     Vital Signs (Most Recent):  Temp: 97.2 °F (36.2 °C) (09/15/23 1559)  Pulse: (!) 56 (09/15/23 1559)  Resp: 19 (09/15/23 1559)  BP: (!) 160/72 (09/15/23 1559)  SpO2: (!) 92 % (09/15/23 1559) Vital Signs (24h Range):  Temp:  [97.2 °F (36.2 °C)-98.7 °F (37.1 °C)] 97.2 °F (36.2 °C)  Pulse:  [52-68] 56  Resp:  [16-29] 19  SpO2:  [92 %-100 %] 92 %  BP: (131-164)/(60-75) 160/72     Weight: 116.6 kg (257 lb 0.9 oz)  Body mass index is 44.12 kg/m².    Intake/Output Summary (Last 24 hours) at 9/15/2023 1609  Last data filed at 9/15/2023 1153  Gross per 24 hour   Intake --   Output 1050 ml   Net -1050 ml         Physical Exam  HENT:      Nose: Nose normal.      Mouth/Throat:      Pharynx: Oropharynx is clear.   Cardiovascular:      Rate and Rhythm: Normal rate and regular rhythm.   Pulmonary:      Effort: Tachypnea present.      Breath sounds: Examination of the right-lower field reveals decreased breath sounds and wheezing. Examination of the left-lower field reveals decreased breath sounds and wheezing. Decreased breath sounds and wheezing present.   Abdominal:      General: Bowel sounds are normal. There is no distension.      Palpations: Abdomen is soft.      Tenderness: There is no abdominal tenderness.   Genitourinary:     Comments: Deferred   Musculoskeletal:         General: Normal range of motion.      Cervical back: Normal range of motion.   Skin:     General: Skin is warm and dry.   Neurological:      Mental Status: She is alert and oriented to person, place, and time.   Psychiatric:         Mood and Affect: Mood normal.         Behavior: Behavior normal.             Significant Labs: All pertinent labs within the past 24 hours have been reviewed.  CBC:   Recent Labs   Lab 09/14/23 0457   WBC 11.35   HGB 15.4   HCT 46.8        CMP:   Recent Labs   Lab 09/14/23  0457 09/15/23  0514    139   K 4.5 5.2*   CL 95 99   CO2 34* 30*   * 110   BUN 43* 47*   CREATININE  1.2 1.2   CALCIUM 9.5 9.8   ANIONGAP 11 10       Significant Imaging: I have reviewed all pertinent imaging results/findings within the past 24 hours.      Assessment/Plan:      * Acute on chronic respiratory failure with hypoxia  Patient with Hypoxic Respiratory failure which is Acute on chronic.  she is on home oxygen at 4 LPM. Supplemental oxygen was provided and noted- Oxygen Concentration (%):  [30] 30    .   Signs/symptoms of respiratory failure include- increased work of breathing. Contributing diagnoses includes - CHF and COPD Labs and images were reviewed. Patient Has not had a recent ABG. Will treat underlying causes and adjust management of respiratory failure     D-dimer, elevated  - D-dimer 3.35  -CTA of chest pending   -US of BLE pending   -pt taken off anticoagulation due to hematuria - pt was on ASA   -Lovenox BID     Elevated troponin  Serial troponin   Cardiology recommended Lovenox 1mg/kg to ED provider   Will start IV Heparin  cont ASA, BB, Statin, Entresto   Echo   Consult cardiology     9/10/23  Denies chest pain.   Troponin trending down  Continue current meds     -9/11/23- downward trend   -9/12/23- Stress test to be planned for tomorrow   -9/13/23- resting portion of stress test completed   -9/14/23- 2nd portion of stress test not completed due to wheezing- IV Lasix given - repeat analysis to be attempted on 9/15/23  -9/15/23- stress test not completed secondary to symptoms- D-dimer elevated with CTA chest and US of BLE pending     Hypertension  BP stable  Cont home meds Coreg and Entresto  Monitor     Acute cystitis without hematuria  IV Rocephin completed   Blood cx remain negative  Urine culture grew E. Coli (50,000-99,999)- sensitivities noted         Diabetes mellitus, type 2  Patient's FSGs are controlled on current medication regimen.  Last A1c reviewed-   Lab Results   Component Value Date    HGBA1C 5.3 09/09/2023    HGBA1C 5.3 09/09/2023     Most recent fingerstick glucose reviewed-    Recent Labs   Lab 09/14/23  2238 09/15/23  0543 09/15/23  1137   POCTGLUCOSE 123* 114* 119*     Current correctional scale  Low  Maintain anti-hyperglycemic dose as follows-   Antihyperglycemics (From admission, onward)    Start     Stop Route Frequency Ordered    09/09/23 0545  insulin aspart U-100 pen 0-5 Units         -- SubQ Before meals & nightly PRN 09/09/23 0447        Hold Oral hypoglycemics while patient is in the hospital.    Acute on chronic combined systolic and diastolic congestive heart failure  Patient is identified as having Diastolic (HFpEF) heart failure that is Acute on chronic. CHF is currently uncontrolled due to Rales/crackles on pulmonary exam and Pulmonary edema/pleural effusion on CXR. Latest ECHO performed and demonstrates- Results for orders placed during the hospital encounter of 03/29/23    Echo    Interpretation Summary  · The left ventricle is normal in size with moderate concentric hypertrophy and mildly decreased systolic function.  · The estimated ejection fraction is 45%.  · Grade I left ventricular diastolic dysfunction.  · Moderate right ventricular enlargement.  · Moderate right atrial enlargement.  · Mild-to-moderate aortic regurgitation.  · There is mild aortic valve stenosis.  · Aortic valve area is 1.44 cm2; peak velocity is 2.09 m/s; mean gradient is 10 mmHg.  · Mild-to-moderate mitral regurgitation.  · Moderate tricuspid regurgitation.  · Moderate pulmonic regurgitation.  · The estimated PA systolic pressure is 69 mmHg.  · There is moderate pulmonary hypertension.  · Moderate left atrial enlargement.  . Continue Beta Blocker, Furosemide, Aldactone and ARNI and monitor clinical status closely. Monitor on telemetry. Patient is on CHF pathway.  Monitor strict Is&Os and daily weights.  Place on fluid restriction of 1.5 L. Cardiology has been consulted. Continue to stress to patient importance of self efficacy and  on diet for CHF. Last BNP reviewed- and noted below    Recent Labs   Lab 09/15/23  1249   BNP 78     9/10/23  -Diuresing well  -Continue IV lasix      9/11/23- diuresis continued- BIPAP as needed for support   -9/12/23- diuresis continued- 4.8 L removed    -9/13/23- diuresis completed- 7.1 L removed   -9/14/23- IV Lasix given   -9/15/23- diuresis continued- 7.8L removed=-controlled BNP and Troponin within normal limits     COPD exacerbation  IV Steroids  LABA. CITLALY. ICS neb txs  -supplemental oxygen at home dose of 4 L  -CPAP in place while napping and nightly      Severe obesity (BMI >= 40)  Body mass index is 44.12 kg/m². Morbid obesity complicates all aspects of disease management from diagnostic modalities to treatment. Weight loss encouraged and health benefits explained to patient.         Paroxysmal A-fib  Patient with Paroxysmal (<7 days) atrial fibrillation which is controlled currently with Beta Blocker. Patient is currently in sinus rhythm.[id number] Score: 3. HASBLED Score: 3. Anticoagulation per Heparin infusion completed-hx of intolerance of anticoagulation noted due to gross hematuria    -will start ASA         VTE Risk Mitigation (From admission, onward)         Ordered     enoxaparin injection 120 mg  Every 12 hours         09/15/23 1314     IP VTE HIGH RISK PATIENT  Once         09/09/23 0447     Place sequential compression device  Until discontinued         09/09/23 0447                Discharge Planning   TIGRE:      Code Status: Full Code   Is the patient medically ready for discharge?:     Reason for patient still in hospital (select all that apply): Patient trending condition, Laboratory test, Treatment, Imaging and Consult recommendations  Discharge Plan A: Home                  Corrina Herrmann NP  Department of Hospital Medicine   [facility] - Telemetry ([facility])

## 2023-09-15 NOTE — ASSESSMENT & PLAN NOTE
Patient with Hypercapnic and Hypoxic Respiratory failure which is Acute on chronic.  she is not on home oxygen. Supplemental oxygen was provided and noted- Oxygen Concentration (%):  [30] 30    .   Signs/symptoms of respiratory failure include- tachypnea. Contributing diagnoses includes - COPD Labs and images were reviewed. Patient Has recent ABG, which has been reviewed. Will treat underlying causes and adjust management of respiratory failure as follows- hypercapnic    9/11/23  -Mgmt as per hospital medicine  -Continue IV diuresis for additional day    9/12/23  -Continue same mgmt/IV diuresis     9/13/23  -Much improved, BNP trended down, d/c IV Lasix  -Reassess in AM    9/14/23  -Using CPAP, wheezing this AM  -Continue steroids  -Give additional dose of IV Lasix    9/15/23  -Unfortunately still wheezing  -Additional dose of IV Lasix today  -Continue steroids

## 2023-09-16 PROBLEM — Z72.0 TOBACCO ABUSE: Status: ACTIVE | Noted: 2023-09-16

## 2023-09-16 LAB
ALLENS TEST: ABNORMAL
ANION GAP SERPL CALC-SCNC: 11 MMOL/L (ref 8–16)
BUN SERPL-MCNC: 44 MG/DL (ref 8–23)
CALCIUM SERPL-MCNC: 9.4 MG/DL (ref 8.7–10.5)
CHLORIDE SERPL-SCNC: 96 MMOL/L (ref 95–110)
CO2 SERPL-SCNC: 33 MMOL/L (ref 23–29)
CREAT SERPL-MCNC: 1.1 MG/DL (ref 0.5–1.4)
DELSYS: ABNORMAL
EST. GFR  (NO RACE VARIABLE): 53 ML/MIN/1.73 M^2
FIO2: 30
GLUCOSE SERPL-MCNC: 119 MG/DL (ref 70–110)
HCO3 UR-SCNC: 42.1 MMOL/L (ref 24–28)
MODE: ABNORMAL
PCO2 BLDA: 72.5 MMHG (ref 35–45)
PEEP: 16
PH SMN: 7.37 [PH] (ref 7.35–7.45)
PO2 BLDA: 71 MMHG (ref 80–100)
POC BE: 17 MMOL/L
POC SATURATED O2: 92 % (ref 95–100)
POCT GLUCOSE: 108 MG/DL (ref 70–110)
POCT GLUCOSE: 114 MG/DL (ref 70–110)
POCT GLUCOSE: 133 MG/DL (ref 70–110)
POCT GLUCOSE: 150 MG/DL (ref 70–110)
POTASSIUM SERPL-SCNC: 4.5 MMOL/L (ref 3.5–5.1)
SAMPLE: ABNORMAL
SITE: ABNORMAL
SODIUM SERPL-SCNC: 140 MMOL/L (ref 136–145)

## 2023-09-16 PROCEDURE — 82803 BLOOD GASES ANY COMBINATION: CPT

## 2023-09-16 PROCEDURE — 80048 BASIC METABOLIC PNL TOTAL CA: CPT | Performed by: NURSE PRACTITIONER

## 2023-09-16 PROCEDURE — 21400001 HC TELEMETRY ROOM

## 2023-09-16 PROCEDURE — 25000003 PHARM REV CODE 250: Performed by: NURSE PRACTITIONER

## 2023-09-16 PROCEDURE — 25000242 PHARM REV CODE 250 ALT 637 W/ HCPCS: Performed by: NURSE PRACTITIONER

## 2023-09-16 PROCEDURE — 94660 CPAP INITIATION&MGMT: CPT

## 2023-09-16 PROCEDURE — 63600175 PHARM REV CODE 636 W HCPCS: Performed by: NURSE PRACTITIONER

## 2023-09-16 PROCEDURE — 36600 WITHDRAWAL OF ARTERIAL BLOOD: CPT

## 2023-09-16 PROCEDURE — 27000221 HC OXYGEN, UP TO 24 HOURS

## 2023-09-16 PROCEDURE — 94640 AIRWAY INHALATION TREATMENT: CPT

## 2023-09-16 PROCEDURE — 97530 THERAPEUTIC ACTIVITIES: CPT | Mod: CQ

## 2023-09-16 PROCEDURE — 99900035 HC TECH TIME PER 15 MIN (STAT)

## 2023-09-16 PROCEDURE — 25000003 PHARM REV CODE 250: Performed by: INTERNAL MEDICINE

## 2023-09-16 PROCEDURE — 25000242 PHARM REV CODE 250 ALT 637 W/ HCPCS: Performed by: INTERNAL MEDICINE

## 2023-09-16 PROCEDURE — 27100171 HC OXYGEN HIGH FLOW UP TO 24 HOURS

## 2023-09-16 PROCEDURE — 63600175 PHARM REV CODE 636 W HCPCS: Performed by: PHYSICIAN ASSISTANT

## 2023-09-16 PROCEDURE — 36415 COLL VENOUS BLD VENIPUNCTURE: CPT | Performed by: NURSE PRACTITIONER

## 2023-09-16 PROCEDURE — 94761 N-INVAS EAR/PLS OXIMETRY MLT: CPT

## 2023-09-16 RX ORDER — ENOXAPARIN SODIUM 150 MG/ML
1 INJECTION SUBCUTANEOUS EVERY 12 HOURS
Status: DISCONTINUED | OUTPATIENT
Start: 2023-09-16 | End: 2023-09-16

## 2023-09-16 RX ORDER — ENOXAPARIN SODIUM 150 MG/ML
1 INJECTION SUBCUTANEOUS EVERY 12 HOURS
Status: DISCONTINUED | OUTPATIENT
Start: 2023-09-16 | End: 2023-09-19 | Stop reason: HOSPADM

## 2023-09-16 RX ORDER — IPRATROPIUM BROMIDE AND ALBUTEROL SULFATE 2.5; .5 MG/3ML; MG/3ML
3 SOLUTION RESPIRATORY (INHALATION) EVERY 6 HOURS
Status: COMPLETED | OUTPATIENT
Start: 2023-09-16 | End: 2023-09-17

## 2023-09-16 RX ORDER — ALPRAZOLAM 0.25 MG/1
0.25 TABLET ORAL 3 TIMES DAILY PRN
Status: DISCONTINUED | OUTPATIENT
Start: 2023-09-16 | End: 2023-09-16

## 2023-09-16 RX ADMIN — ARFORMOTEROL TARTRATE 15 MCG: 15 SOLUTION RESPIRATORY (INHALATION) at 07:09

## 2023-09-16 RX ADMIN — CARVEDILOL 6.25 MG: 6.25 TABLET, FILM COATED ORAL at 09:09

## 2023-09-16 RX ADMIN — SACUBITRIL AND VALSARTAN 1 TABLET: 24; 26 TABLET, FILM COATED ORAL at 08:09

## 2023-09-16 RX ADMIN — METHYLPREDNISOLONE SODIUM SUCCINATE 40 MG: 125 INJECTION, POWDER, FOR SOLUTION INTRAMUSCULAR; INTRAVENOUS at 05:09

## 2023-09-16 RX ADMIN — LEVALBUTEROL HYDROCHLORIDE 1.25 MG: 0.63 SOLUTION RESPIRATORY (INHALATION) at 01:09

## 2023-09-16 RX ADMIN — ASPIRIN 81 MG: 81 TABLET, COATED ORAL at 09:09

## 2023-09-16 RX ADMIN — ACETAMINOPHEN 650 MG: 325 TABLET ORAL at 04:09

## 2023-09-16 RX ADMIN — METHYLPREDNISOLONE SODIUM SUCCINATE 40 MG: 125 INJECTION, POWDER, FOR SOLUTION INTRAMUSCULAR; INTRAVENOUS at 01:09

## 2023-09-16 RX ADMIN — POLYETHYLENE GLYCOL 3350 17 G: 17 POWDER, FOR SOLUTION ORAL at 09:09

## 2023-09-16 RX ADMIN — SACUBITRIL AND VALSARTAN 1 TABLET: 24; 26 TABLET, FILM COATED ORAL at 09:09

## 2023-09-16 RX ADMIN — BUDESONIDE 0.5 MG: 0.5 INHALANT ORAL at 08:09

## 2023-09-16 RX ADMIN — SENNOSIDES AND DOCUSATE SODIUM 1 TABLET: 50; 8.6 TABLET ORAL at 08:09

## 2023-09-16 RX ADMIN — LEVOTHYROXINE SODIUM 50 MCG: 50 TABLET ORAL at 05:09

## 2023-09-16 RX ADMIN — ARFORMOTEROL TARTRATE 15 MCG: 15 SOLUTION RESPIRATORY (INHALATION) at 08:09

## 2023-09-16 RX ADMIN — ATORVASTATIN CALCIUM 40 MG: 40 TABLET, FILM COATED ORAL at 09:09

## 2023-09-16 RX ADMIN — ISOSORBIDE MONONITRATE 30 MG: 30 TABLET, EXTENDED RELEASE ORAL at 09:09

## 2023-09-16 RX ADMIN — SERTRALINE HYDROCHLORIDE 50 MG: 50 TABLET ORAL at 09:09

## 2023-09-16 RX ADMIN — ENOXAPARIN SODIUM 120 MG: 120 INJECTION SUBCUTANEOUS at 08:09

## 2023-09-16 RX ADMIN — METHYLPREDNISOLONE SODIUM SUCCINATE 40 MG: 125 INJECTION, POWDER, FOR SOLUTION INTRAMUSCULAR; INTRAVENOUS at 11:09

## 2023-09-16 RX ADMIN — SENNOSIDES AND DOCUSATE SODIUM 1 TABLET: 50; 8.6 TABLET ORAL at 09:09

## 2023-09-16 RX ADMIN — IPRATROPIUM BROMIDE AND ALBUTEROL SULFATE 3 ML: 2.5; .5 SOLUTION RESPIRATORY (INHALATION) at 07:09

## 2023-09-16 RX ADMIN — ENOXAPARIN SODIUM 120 MG: 120 INJECTION SUBCUTANEOUS at 09:09

## 2023-09-16 RX ADMIN — LEVALBUTEROL HYDROCHLORIDE 1.25 MG: 0.63 SOLUTION RESPIRATORY (INHALATION) at 08:09

## 2023-09-16 RX ADMIN — CARVEDILOL 6.25 MG: 6.25 TABLET, FILM COATED ORAL at 08:09

## 2023-09-16 RX ADMIN — BUDESONIDE 0.5 MG: 0.5 INHALANT ORAL at 07:09

## 2023-09-16 RX ADMIN — PANTOPRAZOLE SODIUM 40 MG: 40 TABLET, DELAYED RELEASE ORAL at 09:09

## 2023-09-16 RX ADMIN — IPRATROPIUM BROMIDE AND ALBUTEROL SULFATE 3 ML: 2.5; .5 SOLUTION RESPIRATORY (INHALATION) at 02:09

## 2023-09-16 NOTE — PROGRESS NOTES
[facility] ([facility])  Cardiology  Progress Note    Patient Name: Carmen Tan  MRN: [id number]  Admission Date: 9/9/2023  Hospital Length of Stay: 7 days  Code Status: Full Code   Attending Physician: Elmer Nichols MD   Primary Care Physician: Lucian Barry MD  Expected Discharge Date:   Principal Problem:Acute on chronic respiratory failure with hypoxia    Subjective:     Hospital Course:   9/10/23-Patient seen and examined. Stable and continue diuresis    9/11/23-Patient seen and examined today, lying in bed with CPAP on. Feels ok. SOB improving. No CP. Labs reviewed, creatinine 1.2.     9/12/23-Patient seen and examined today, resting with CPAP on. Continues to improve. Diuresing well. Labs reviewed/stable. Possible MPI stress test tmw.    9/13/23-Patient seen and examined today, sitting up in bed. SOB much improved, on supplemental O2. No CP. BNP trended down significantly. Labs reviewed, mild bump in BUN/creatinine, IV Lasix d/c'd. Stress test ordered (rest portion today).    9/14/23-Patient seen and examined today, on CPAP. Wheezing, unable to complete stress test today. Give additional dose of IV Lasix today. Labs reviewed.     9/15/23-Patient seen and examined today, wearing CPAP. Breathing improved. Sill wheezing, unable to complete stress test. Labs reviewed. CBC pending.     9/16/23- BP remains elevated this morning. Remains on CPAP. O2 sats are low 90s this morning. CT negative for PE. Lower extremity US negative for DVT. Continues to diurese well. -9L so far.            Review of Systems   Constitutional: Positive for malaise/fatigue.   HENT: Negative.     Eyes: Negative.    Cardiovascular:  Positive for dyspnea on exertion.   Respiratory:  Positive for shortness of breath and wheezing.    Endocrine: Negative.    Hematologic/Lymphatic: Negative.    Skin: Negative.    Musculoskeletal:  Positive for arthritis and joint pain.   Gastrointestinal: Negative.    Genitourinary: Negative.     Neurological: Negative.    Psychiatric/Behavioral: Negative.     Allergic/Immunologic: Negative.      Objective:     Vital Signs (Most Recent):  Temp: 97.6 °F (36.4 °C) (09/16/23 1213)  Pulse: 62 (09/16/23 1414)  Resp: (!) 27 (09/16/23 1414)  BP: 131/63 (09/16/23 1213)  SpO2: (!) 92 % (09/16/23 1414) Vital Signs (24h Range):  Temp:  [97.2 °F (36.2 °C)-98.7 °F (37.1 °C)] 97.6 °F (36.4 °C)  Pulse:  [53-80] 62  Resp:  [18-27] 27  SpO2:  [91 %-98 %] 92 %  BP: (127-188)/(60-87) 131/63     Weight: 116.6 kg (257 lb 0.9 oz)  Body mass index is 44.12 kg/m².     SpO2: (!) 92 %         Intake/Output Summary (Last 24 hours) at 9/16/2023 1455  Last data filed at 9/16/2023 0620  Gross per 24 hour   Intake --   Output 800 ml   Net -800 ml         Lines/Drains/Airways       Drain  Duration             Female External Urinary Catheter 09/10/23 0700 6 days              Peripheral Intravenous Line  Duration                  Peripheral IV - Single Lumen 09/09/23 1507 20 G;2 in Anterior;Right Forearm 6 days         Peripheral IV - Single Lumen 09/15/23 2300 20 G Left Forearm <1 day                       Physical Exam  Vitals and nursing note reviewed.   Constitutional:       General: She is not in acute distress.     Appearance: She is well-developed. She is ill-appearing. She is not diaphoretic.      Comments: On supplemental O2   HENT:      Head: Normocephalic and atraumatic.   Eyes:      General:         Right eye: No discharge.         Left eye: No discharge.      Pupils: Pupils are equal, round, and reactive to light.   Neck:      Thyroid: No thyromegaly.      Vascular: No JVD.      Trachea: No tracheal deviation.   Cardiovascular:      Rate and Rhythm: Normal rate and regular rhythm.      Heart sounds: Normal heart sounds, S1 normal and S2 normal. No murmur heard.  Pulmonary:      Effort: Pulmonary effort is normal. No respiratory distress.      Breath sounds: Rhonchi present. No rales.   Abdominal:      General: There is no  "distension.      Tenderness: There is no rebound.   Musculoskeletal:      Cervical back: Neck supple.      Right lower leg: No edema.      Left lower leg: No edema.   Skin:     General: Skin is warm and dry.      Findings: No erythema.   Neurological:      General: No focal deficit present.      Mental Status: She is alert and oriented to person, place, and time.   Psychiatric:         Mood and Affect: Mood normal.         Behavior: Behavior normal.         Thought Content: Thought content normal.            Significant Labs: CMP   Recent Labs   Lab 09/15/23  0514 09/16/23  0543    140   K 5.2* 4.5   CL 99 96   CO2 30* 33*    119*   BUN 47* 44*   CREATININE 1.2 1.1   CALCIUM 9.8 9.4   ANIONGAP 10 11     , CBC   No results for input(s): "WBC", "HGB", "HCT", "PLT" in the last 48 hours.  , Troponin   Recent Labs   Lab 09/15/23  1249   TROPONINI 0.021   , and All pertinent lab results from the last 24 hours have been reviewed.    Significant Imaging: Echocardiogram: Transthoracic echo (TTE) complete (Cupid Only):   Results for orders placed or performed during the hospital encounter of 09/09/23   Echo   Result Value Ref Range    BSA 2.36 m2    LVOT stroke volume 82.38 cm3    LVIDd 5.03 3.5 - 6.0 cm    LV Systolic Volume 68.43 mL    LV Systolic Volume Index 30.7 mL/m2    LVIDs 3.96 2.1 - 4.0 cm    LV Diastolic Volume 120.14 mL    LV Diastolic Volume Index 53.87 mL/m2    IVS 1.11 (A) 0.6 - 1.1 cm    LVOT diameter 1.95 cm    LVOT area 3.0 cm2    FS 21 (A) 28 - 44 %    Left Ventricle Relative Wall Thickness 0.54 cm    Posterior Wall 1.35 (A) 0.6 - 1.1 cm    LV mass 244.31 g    LV Mass Index 110 g/m2    MV Peak E Berhane 0.93 m/s    TDI LATERAL 0.13 m/s    TDI SEPTAL 0.08 m/s    E/E' ratio 8.86 m/s    MV Peak A Berhane 0.90 m/s    TR Max Berhane 3.30 m/s    E/A ratio 1.03     IVRT 137.01 msec    E wave deceleration time 232.88 msec    LV SEPTAL E/E' RATIO 11.63 m/s    LV LATERAL E/E' RATIO 7.15 m/s    LVOT peak berhane 1.35 m/s "    Left Ventricular Outflow Tract Mean Velocity 0.87 cm/s    Left Ventricular Outflow Tract Mean Gradient 3.55 mmHg    LA size 3.64 cm    Left Atrium Major Axis 5.78 cm    Left Atrium Minor Axis 6.21 cm    RV mid diameter 5.15 cm    RVOT peak VTI 18.1 cm    RA Major Axis 4.94 cm    AV regurgitation pressure 1/2 time 850.136981923987416 ms    AR Max Berhane 4.57 m/s    AV mean gradient 10 mmHg    AV peak gradient 17 mmHg    Ao peak berhane 2.09 m/s    Ao VTI 39.30 cm    LVOT peak VTI 27.60 cm    AV valve area 2.10 cm²    AV Velocity Ratio 0.65     AV index (prosthetic) 0.70     KARYNA by Velocity Ratio 1.93 cm²    Mr max berhane 5.59 m/s    Triscuspid Valve Regurgitation Peak Gradient 44 mmHg    PV mean gradient 2 mmHg    RVOT peak berhane 0.82 m/s    Ao root annulus 3.18 cm    STJ 3.59 cm    Ascending aorta 3.46 cm    IVC diameter 2.31 cm    Mean e' 0.11 m/s    ZLVIDS -1.49     ZLVIDD -4.49     LA Volume Index 33.2 mL/m2    LA volume 74.10 cm3    LA WIDTH 4.0 cm    TAPSE 2.40 cm    RA Width 3.2 cm    TV resting pulmonary artery pressure 47 mmHg    RV TB RVSP 6 mmHg    Est. RA pres 3 mmHg    Narrative      Left Ventricle: The left ventricle is normal in size. Mildly increased   wall thickness. Normal wall motion. There is mildly reduced systolic   function with a visually estimated ejection fraction of 40 - 45%. There is   normal diastolic function.    Right Ventricle: Normal right ventricular cavity size. Wall thickness   is normal. Right ventricle wall motion  is normal. Systolic function is   normal.    Aortic Valve: There is mild to moderate aortic regurgitation with a   centrally directed jet.    Mitral Valve: There is moderate regurgitation.    Tricuspid Valve: There is moderate regurgitation with a centrally   directed jet.    Pulmonic Valve: There is mild to moderate regurgitation.    Pulmonary Artery: The estimated pulmonary artery systolic pressure is   47 mmHg.    IVC/SVC: Normal venous pressure at 3 mmHg.     , EKG:  REviewed, and X-Ray: CXR: X-Ray Chest 1 View (CXR): No results found for this visit on 09/09/23. and X-Ray Chest PA and Lateral (CXR): No results found for this visit on 09/09/23.    Assessment and Plan:     Brief HPI: rec Nuclear stress Monday or outpt if breathing is not improved. Needs long term a/c upon DC     * Acute on chronic respiratory failure with hypoxia  Patient with Hypercapnic and Hypoxic Respiratory failure which is Acute on chronic.  she is not on home oxygen. Supplemental oxygen was provided and noted- Oxygen Concentration (%):  [30] 30    .   Signs/symptoms of respiratory failure include- tachypnea. Contributing diagnoses includes - COPD Labs and images were reviewed. Patient Has recent ABG, which has been reviewed. Will treat underlying causes and adjust management of respiratory failure as follows- hypercapnic    9/11/23  -Mgmt as per hospital medicine  -Continue IV diuresis for additional day    9/12/23  -Continue same mgmt/IV diuresis     9/13/23  -Much improved, BNP trended down, d/c IV Lasix  -Reassess in AM    9/14/23  -Using CPAP, wheezing this AM  -Continue steroids  -Give additional dose of IV Lasix    9/15/23  -Unfortunately still wheezing  -Additional dose of IV Lasix today  -Continue steroids    9/16/23  -Remains on CPAP  - O2 sats are low 90s this morning  - CT negative for PE. Lower extremity   -US negative for DVT  -Continues to diurese well. -9L so far.     D-dimer, elevated  Neg PE    Elevated troponin  Trend  Cardiac echo results pending    9/11/23  -Flat  -No CP symptoms  -Suspect elevation due to demand ischemia from fluid overload  -Echo showed EF of 40-45%, mild-mod AI  -Continue Coreg, Entresto, ASA  -Can consider ischemic workup IP vs OP once compensated    9/12/23  -Ok to d/c heparin gtt  -Continue Coreg, Entresto, ASA  -Probable MPI stress test tmw    9/13/23  -CP free  -Continue ASA, Coreg, Entresto  -MPI stress test pending    9/14/23  -Wheezing today will re-attempt  stress tmw    9/15/23  -Still wheezing, re-attempt stress test tmw, may have to be done as OP    9/16/23 - rec outpt nuclear stress or Monday if pt's breathing is normal     Hypertension  Continue coreg entresto    9/16/23  - BP remains elevated this morning      Acute cystitis without hematuria  treated    Acute on chronic combined systolic and diastolic congestive heart failure  Patient is identified as having Diastolic (HFpEF) heart failure that is Acute on chronic. CHF is currently uncontrolled due to Continued edema of extremities. Latest ECHO performed and demonstrates- Results for orders placed during the hospital encounter of 03/29/23    Echo    Interpretation Summary  · The left ventricle is normal in size with moderate concentric hypertrophy and mildly decreased systolic function.  · The estimated ejection fraction is 45%.  · Grade I left ventricular diastolic dysfunction.  · Moderate right ventricular enlargement.  · Moderate right atrial enlargement.  · Mild-to-moderate aortic regurgitation.  · There is mild aortic valve stenosis.  · Aortic valve area is 1.44 cm2; peak velocity is 2.09 m/s; mean gradient is 10 mmHg.  · Mild-to-moderate mitral regurgitation.  · Moderate tricuspid regurgitation.  · Moderate pulmonic regurgitation.  · The estimated PA systolic pressure is 69 mmHg.  · There is moderate pulmonary hypertension.  · Moderate left atrial enlargement.  . Continue Beta Blocker and Furosemide and monitor clinical status closely. Monitor on telemetry. Patient is on CHF pathway.  Monitor strict Is&Os and daily weights.  Place on fluid restriction of 2 L. Cardiology has been consulted. Continue to stress to patient importance of self efficacy and  on diet for CHF. Last BNP reviewed- and noted below   Recent Labs   Lab 09/13/23  0902   *       9/11/23  -Improving  -Continue IV diuresis  -Continue BB, Entresto  -Repeat BNP in AM    9/12/23  -Continue same meds/mgmt  -Continue IV diuresis,  BB, Entresto  -Reassess in AM    9/13/23  -Volume status much improved, BNP trended down significantly  -Stop IV Lasix  -Continue BB, Entresto  -Reassess in AM  -MPI stress test ordered (rest part today)    9/14/23  -Give additional dose of IV Lasix today  -Continue BB, Entresto    9/15/23  -Give additional dose of IV Lasix today  -Continue BB, Entresto      COPD exacerbation  -Mgmt as per hospital medicine           Severe obesity (BMI >= 40)  -Weight loss    Paroxysmal A-fib  -AC previously d/c'd in 4/23 due to hematuria  -Need to readdress prior to d/c    9/15/23  -Discussed with Dr. Howell, will initiate full dose Lovenox and monitor H/H          VTE Risk Mitigation (From admission, onward)         Ordered     enoxaparin injection 120 mg  Every 12 hours         09/16/23 1259     IP VTE HIGH RISK PATIENT  Once         09/09/23 0447     Place sequential compression device  Until discontinued         09/09/23 0447                Alexander Howell Md, MD  Cardiology  [facility] ([facility])

## 2023-09-16 NOTE — CONSULTS
[facility] - Tele[facility] ([facility])  Pulmonology  Consult Note    Patient Name: Carmen Tan  MRN: [id number]  Admission Date: 9/9/2023  Hospital Length of Stay: 7 days  Code Status: Full Code  Attending Physician: Elmer Nichols MD  Primary Care Provider: Lucian Barry MD   Principal Problem: Acute on chronic respiratory failure with hypoxia    [unfilled]  Subjective:     HPI:  Ms. Tan is a 73-year-old female history of chronic hypercapnic and hypoxic respiratory failure on 4 L nasal cannula, diabetes, diastolic heart failure, PE and continued tobacco abuse who presented with shortness of breath.  Patient presented with an elevated BNP.  She was admitted and started on diuretics as well as a Cardiology consult.  She was also given antibiotics for a urinary tract infection.  She was also started on nebulizers and steroids.  Patient was hospitalized multiple times in April for similar.  Patient states she does have a machine at home.  From the notes she is been on BiPAP in the past.  She also is on 4 L nasal cannula.  She states she wears her device at home when sleeping as well when napping wishes about several hours a day.  Patient is currently on prednisone as well as nebs.  Patient takes inhaler Dulera at home.  She does continue to smoke.  She states she does smoke 1 pack a day.  She states she is not seen a pulmonologist in the past.  I do see pulmonology telephone message from 2017, she states she is not had pulmonary function test.  She currently is on 4 L nasal cannula.  She is currently wearing a BiPAP machine and states she put it back on secondary to wanting to take a nap.  She currently denies wheezing.  She is had a slight cough.  She is been getting diuretics per Cardiology.  Pulmonary consult for assistance with her COPD and chronic respiratory failure.      Past Medical History:   Diagnosis Date    Abdominal hernia     Bacteremia 4/5/2023    CHF (congestive heart failure)     COPD (chronic  obstructive pulmonary disease)     Diabetes mellitus, type 2 2/16/2022    GERD (gastroesophageal reflux disease)     History of home oxygen therapy     Hypertension     Hypertensive emergency 3/30/2023    Migraine headache     Pulmonary embolism 06/01/2017    Small bowel obstruction     Thyroid disease        Past Surgical History:   Procedure Laterality Date    COLONOSCOPY      HYSTERECTOMY      INNER EAR SURGERY      UPPER GASTROINTESTINAL ENDOSCOPY         Review of patient's allergies indicates:   Allergen Reactions    Pcn [penicillins] Swelling       Family History    None       Tobacco Use    Smoking status: Former    Smokeless tobacco: Never   Substance and Sexual Activity    Alcohol use: No    Drug use: No    Sexual activity: Not on file         Review of Systems   Constitutional: Negative.    HENT: Negative.     Respiratory:  Positive for shortness of breath. Negative for cough and wheezing.    Cardiovascular: Negative.    Gastrointestinal: Negative.    Genitourinary: Negative.    Musculoskeletal: Negative.    Neurological: Negative.      Objective:     Vital Signs (Most Recent):  Temp: 98.2 °F (36.8 °C) (09/16/23 1551)  Pulse: (!) 50 (09/16/23 1551)  Resp: (!) 23 (09/16/23 1551)  BP: 137/84 (09/16/23 1551)  SpO2: (!) 94 % (09/16/23 1551) Vital Signs (24h Range):  Temp:  [97.6 °F (36.4 °C)-98.7 °F (37.1 °C)] 98.2 °F (36.8 °C)  Pulse:  [50-80] 50  Resp:  [18-27] 23  SpO2:  [91 %-98 %] 94 %  BP: (127-188)/(60-87) 137/84     Weight: 116.6 kg (257 lb 0.9 oz)  Body mass index is 44.12 kg/m².      Intake/Output Summary (Last 24 hours) at 9/16/2023 1610  Last data filed at 9/16/2023 0620  Gross per 24 hour   Intake --   Output 800 ml   Net -800 ml        Physical Exam  Vitals and nursing note reviewed.   Constitutional:       General: She is not in acute distress.     Appearance: She is obese.   Eyes:      General:         Right eye: No discharge.         Left eye: No discharge.  "  Cardiovascular:      Rate and Rhythm: Normal rate.      Heart sounds: No murmur heard.     No gallop.   Pulmonary:      Effort: No respiratory distress.      Breath sounds: No wheezing.   Abdominal:      General: There is no distension.      Palpations: Abdomen is soft.   Musculoskeletal:         General: No swelling or tenderness.      Cervical back: Neck supple.   Neurological:      General: No focal deficit present.      Mental Status: She is alert and oriented to person, place, and time.          Vents:  Oxygen Concentration (%): 30 (09/16/23 1500)    Lines/Drains/Airways       Drain  Duration             Female External Urinary Catheter 09/10/23 0700 6 days              Peripheral Intravenous Line  Duration                  Peripheral IV - Single Lumen 09/09/23 1507 20 G;2 in Anterior;Right Forearm 7 days         Peripheral IV - Single Lumen 09/15/23 2300 20 G Left Forearm <1 day                    Significant Labs:    CBC/Anemia Profile:  No results for input(s): "WBC", "HGB", "HCT", "PLT", "MCV", "RDW", "IRON", "FERRITIN", "RETIC", "FOLATE", "[id number]", "OCCULTBLOOD" in the last 48 hours.     Chemistries:  Recent Labs   Lab 09/15/23  0514 09/16/23  0543    140   K 5.2* 4.5   CL 99 96   CO2 30* 33*   BUN 47* 44*   CREATININE 1.2 1.1   CALCIUM 9.8 9.4       All pertinent labs within the past 24 hours have been reviewed.    Significant Imaging:   I have reviewed all pertinent imaging results/findings within the past 24 hours.  Ct scan reviewed cardiomegaly       ABG  Recent Labs   Lab 09/16/23  1113   PH 7.371   PO2 71*   PCO2 72.5*   HCO3 42.1*   BE 17     Assessment/Plan:     Pulmonary  * Acute on chronic respiratory failure with hypoxia  Patient with Hypercapnic and Hypoxic Respiratory failure which is Chronic.  she is on home oxygen at 4 LPM. Supplemental oxygen was provided and noted- Oxygen Concentration (%):  [30] 30    .   Signs/symptoms of respiratory failure include- dyspnea. Contributing " diagnoses includes - CHF Labs and images were reviewed. Patient Has recent ABG, which has been reviewed. Will treat underlying causes and adjust management of respiratory failure as follows-     This is likely multi factorial.  Patient has a reduced EF.  She is obese.  She does have a history of COPD per the records.  I do not see PFTs.  She states she is not been seen by pulmonologist.  She does continue to smoke.  She has been getting diuresed per Cardiology.     Patient appears compensated on her ABG.  She is currently on 4 L nasal cannula 4 L at home.  She does have a CPAP or BiPAP at device at home which she states that she does wear during naps and q.h.s..  She is currently on nebs as well as prednisone would continue to taper prednisone. She currently has wheezing.    Would recommend pulmonary follow up.  She does continue to still smoke.  Have educated on tobacco cessation.  Would resume home Dulera at discharge.    Endocrine  Severe obesity (BMI >= 40)  Contributing to chronic issues.    Other  Tobacco abuse  Patient states she was smoking a pack a day up until admission.  Have educated on cessation.            I will follow up as needed. Please call with questions.   Juliet Pyle MD  Pulmonology  [facility] - Tele[facility] ([facility])

## 2023-09-16 NOTE — ASSESSMENT & PLAN NOTE
Patient's FSGs are controlled on current medication regimen.  Last A1c reviewed-   Lab Results   Component Value Date    HGBA1C 5.3 09/09/2023    HGBA1C 5.3 09/09/2023     Most recent fingerstick glucose reviewed-   Recent Labs   Lab 09/15/23  1137 09/15/23  2120 09/16/23  0752   POCTGLUCOSE 119* 133* 108     Current correctional scale  Low  Maintain anti-hyperglycemic dose as follows-   Antihyperglycemics (From admission, onward)    Start     Stop Route Frequency Ordered    09/09/23 0545  insulin aspart U-100 pen 0-5 Units         -- SubQ Before meals & nightly PRN 09/09/23 0447        Hold Oral hypoglycemics while patient is in the hospital.

## 2023-09-16 NOTE — PROGRESS NOTES
[facility] - Telemetry ([facility])  Cardiology  Progress Note    Patient Name: Carmen Tan  MRN: [id number]  Admission Date: 9/9/2023  Hospital Length of Stay: 7 days  Code Status: Full Code   Attending Physician: Elmer Nichols MD   Primary Care Physician: Lucian Barry MD  Expected Discharge Date:   Principal Problem:Acute on chronic respiratory failure with hypoxia    Subjective:   HPI:  The patient is a 73 year old female with DM, COPD on home oxygen 4 liters, Diastolic CHF, HTN, hx PE who presented to [facility] ED with Generalized weakness, Fatigue, SOB, and Dysuria - onset 2 days ago that progressively worsened. Pt also reports BLE edema that worsened over the past week.      In the ED, CXR showed Subtle hazy opacities at the perihilar aspects of the lungs may represent mild edema. Elevated troponin of 200, elevated BNP 5085.  Labs otherwise unremarkable. COVID negative. Patient denied any chest pain, EKG non-ischemia. Patient given Aspirin 325 mg, Lovenox 1mg/kg, and Lasix 40 IV x1 as well as a breathing treatment. Po Bactrim for UTI     The patient was transferred to [facility] for admission under hospital medicine and for cardiology consultation     Hospital Course:   9/10/23-Patient seen and examined. Stable and continue diuresis    9/11/23-Patient seen and examined today, lying in bed with CPAP on. Feels ok. SOB improving. No CP. Labs reviewed, creatinine 1.2.     9/12/23-Patient seen and examined today, resting with CPAP on. Continues to improve. Diuresing well. Labs reviewed/stable. Possible MPI stress test tmw.    9/13/23-Patient seen and examined today, sitting up in bed. SOB much improved, on supplemental O2. No CP. BNP trended down significantly. Labs reviewed, mild bump in BUN/creatinine, IV Lasix d/c'd. Stress test ordered (rest portion today).    9/14/23-Patient seen and examined today, on CPAP. Wheezing, unable to complete stress test today. Give additional dose of IV Lasix today. Labs  reviewed.     9/15/23-Patient seen and examined today, wearing CPAP. Breathing improved. Sill wheezing, unable to complete stress test. Labs reviewed. CBC pending.     9/16/23- BP remains elevated this morning. Remains on CPAP. O2 sats are low 90s this morning. CT negative for PE. Lower extremity US negative for DVT. Continues to diurese well. -9L so far.     Physical Exam  Vitals and nursing note reviewed.   Constitutional:       General: She is not in acute distress.     Appearance: She is well-developed. She is ill-appearing. She is not diaphoretic.      Comments: On supplemental O2   HENT:      Head: Normocephalic and atraumatic.   Eyes:      General:         Right eye: No discharge.         Left eye: No discharge.      Pupils: Pupils are equal, round, and reactive to light.   Neck:      Thyroid: No thyromegaly.      Vascular: No JVD.      Trachea: No tracheal deviation.   Cardiovascular:      Rate and Rhythm: Normal rate and regular rhythm.      Heart sounds: Normal heart sounds, S1 normal and S2 normal. No murmur heard.  Pulmonary:      Effort: Pulmonary effort is normal. No respiratory distress.      Breath sounds: Rhonchi present. No rales.   Abdominal:      General: There is no distension.      Tenderness: There is no rebound.   Musculoskeletal:      Cervical back: Neck supple.      Right lower leg: No edema.      Left lower leg: No edema.   Skin:     General: Skin is warm and dry.      Findings: No erythema.   Neurological:      General: No focal deficit present.      Mental Status: She is alert and oriented to person, place, and time.   Psychiatric:         Mood and Affect: Mood normal.         Behavior: Behavior normal.         Thought Content: Thought content normal  Assessment and Plan:   Patient who presents with respiratory failure/COPD exacerbation/decompensated CHF. Still wheezing, unable to complete stress test today. Give additional dose of IV Lasix. Re-attempt tmw. Discussed AC with MD, full  dose Lovenox initiated, monitor H/H and for any s/s of bleeding.    * Acute on chronic respiratory failure with hypoxia  Patient with Hypercapnic and Hypoxic Respiratory failure which is Acute on chronic.  she is not on home oxygen. Supplemental oxygen was provided and noted- Oxygen Concentration (%):  [30] 30    .   Signs/symptoms of respiratory failure include- tachypnea. Contributing diagnoses includes - COPD Labs and images were reviewed. Patient Has recent ABG, which has been reviewed. Will treat underlying causes and adjust management of respiratory failure as follows- hypercapnic    9/11/23  -Mgmt as per hospital medicine  -Continue IV diuresis for additional day    9/12/23  -Continue same mgmt/IV diuresis     9/13/23  -Much improved, BNP trended down, d/c IV Lasix  -Reassess in AM    9/14/23  -Using CPAP, wheezing this AM  -Continue steroids  -Give additional dose of IV Lasix    9/15/23  -Unfortunately still wheezing  -Additional dose of IV Lasix today  -Continue steroids    Elevated troponin  Trend  Cardiac echo results pending    9/11/23  -Flat  -No CP symptoms  -Suspect elevation due to demand ischemia from fluid overload  -Echo showed EF of 40-45%, mild-mod AI  -Continue Coreg, Entresto, ASA  -Can consider ischemic workup IP vs OP once compensated    9/12/23  -Ok to d/c heparin gtt  -Continue Coreg, Entresto, ASA  -Probable MPI stress test tmw    9/13/23  -CP free  -Continue ASA, Coreg, Entresto  -MPI stress test pending    9/14/23  -Wheezing today will re-attempt stress tmw    9/15/23  -Still wheezing, re-attempt stress test tmw, may have to be done as OP    Hypertension  Continue coreg, entresto    Acute cystitis without hematuria  treated    Acute on chronic combined systolic and diastolic congestive heart failure  Patient is identified as having Diastolic (HFpEF) heart failure that is Acute on chronic. CHF is currently uncontrolled due to Continued edema of extremities. Latest ECHO performed and  demonstrates- Results for orders placed during the hospital encounter of 03/29/23    Echo    Interpretation Summary  · The left ventricle is normal in size with moderate concentric hypertrophy and mildly decreased systolic function.  · The estimated ejection fraction is 45%.  · Grade I left ventricular diastolic dysfunction.  · Moderate right ventricular enlargement.  · Moderate right atrial enlargement.  · Mild-to-moderate aortic regurgitation.  · There is mild aortic valve stenosis.  · Aortic valve area is 1.44 cm2; peak velocity is 2.09 m/s; mean gradient is 10 mmHg.  · Mild-to-moderate mitral regurgitation.  · Moderate tricuspid regurgitation.  · Moderate pulmonic regurgitation.  · The estimated PA systolic pressure is 69 mmHg.  · There is moderate pulmonary hypertension.  · Moderate left atrial enlargement.  . Continue Beta Blocker and Furosemide and monitor clinical status closely. Monitor on telemetry. Patient is on CHF pathway.  Monitor strict Is&Os and daily weights.  Place on fluid restriction of 2 L. Cardiology has been consulted. Continue to stress to patient importance of self efficacy and  on diet for CHF. Last BNP reviewed- and noted below   Recent Labs   Lab 09/13/23  0902   *       9/11/23  -Improving  -Continue IV diuresis  -Continue BB, Entresto  -Repeat BNP in AM    9/12/23  -Continue same meds/mgmt  -Continue IV diuresis, BB, Entresto  -Reassess in AM    9/13/23  -Volume status much improved, BNP trended down significantly  -Stop IV Lasix  -Continue BB, Entresto  -Reassess in AM  -MPI stress test ordered (rest part today)    9/14/23  -Give additional dose of IV Lasix today  -Continue BB, Entresto    9/15/23  -Give additional dose of IV Lasix today  -Continue BB, Entresto      COPD exacerbation  -Mgmt as per hospital medicine    Severe obesity (BMI >= 40)  -Weight loss    Paroxysmal A-fib  -AC previously d/c'd in 4/23 due to hematuria  -Need to readdress prior to  d/c    9/15/23  -Discussed with Dr. Howell, will initiate full dose Lovenox and monitor H/H          VTE Risk Mitigation (From admission, onward)           Ordered     enoxaparin injection 120 mg  Every 12 hours         09/15/23 1314     IP VTE HIGH RISK PATIENT  Once         09/09/23 0447     Place sequential compression device  Until discontinued         09/09/23 0447                    Ankita Berg  Cardiology  [facility] - Telemetry ([facility])

## 2023-09-16 NOTE — ASSESSMENT & PLAN NOTE
Patient with Hypercapnic and Hypoxic Respiratory failure which is Chronic.  she is on home oxygen at 4 LPM. Supplemental oxygen was provided and noted- Oxygen Concentration (%):  [30] 30    .   Signs/symptoms of respiratory failure include- dyspnea. Contributing diagnoses includes - CHF Labs and images were reviewed. Patient Has recent ABG, which has been reviewed. Will treat underlying causes and adjust management of respiratory failure as follows-     This is likely multi factorial.  Patient has a reduced EF.  She is obese.  She does have a history of COPD per the records.  I do not see PFTs.  She states she is not been seen by pulmonologist.  She does continue to smoke.  She has been getting diuresed per Cardiology.     Patient appears compensated on her ABG.  She is currently on 4 L nasal cannula 4 L at home.  She does have a CPAP or BiPAP at device at home which she states that she does wear during naps and q.h.s..  She is currently on nebs as well as prednisone would continue to taper prednisone. She currently has wheezing.    Would recommend pulmonary follow up.  She does continue to still smoke.  Have educated on tobacco cessation.  Would resume home Dulera at discharge.

## 2023-09-16 NOTE — CARE UPDATE
Case discussed with Cardiology. Stress test to be performed outpatient once acute respiratory event resolved. Lovenox BID continued per recommendation with transition to Eliquis planned when appropriate.

## 2023-09-16 NOTE — PROGRESS NOTES
[facility] - Telemetry ([facility])  [facility] Medicine  Progress Note    Patient Name: Carmen Tan  MRN: [id number]  Patient Class: IP- Inpatient   Admission Date: 9/9/2023  Length of Stay: 7 days  Attending Physician: Elmer Nichols MD  Primary Care Provider: Lucian Barry MD        Subjective:     Principal Problem:Acute on chronic respiratory failure with hypoxia        HPI:  The patient is a 73 year old female with DM, COPD on home oxygen 4 liters, Diastolic CHF, HTN, hx PE who presented to [facility] ED with Generalized weakness, Fatigue, SOB, and Dysuria - onset 2 days ago that progressively worsened. Pt also reports BLE edema that worsened over the past week.     n the ED, CXR shows chronic scarring no focal consolidation. Elevated troponin of 200, elevated BNP. Labs otherwise unremarkable. COVID negative. Patient denied any chest pain, EKG non-ischemia. Patient given Aspirin 325 mg, Lovenox 1mg/kg, and Lasix 40 IV x1 as well as a breathing treatment. Po Bactrim for UTI    The patient was transferred to [facility] for admission under hospital medicine and for cardiology consultation       Overview/Hospital Course:  Patient sitting on side of the bed eating. No distress noted. Reports feeling much better. On continuous oxygen. Diuresed well overnight. Will continue to diurese.  On 9/11/23, diuresis continued. Cardiology following with inpatient vs outpatient ischemic work up to be evaluated. Troponin remains elevated with downward trend noted. Urine culture grew E. Coli (50,000-99,999) with sensitivities reviewed and antibiotics continued. Echo on 9/9/23 showed EF 40-45%  mildly reduced systolic function- mild to moderate aortic/pulmonic regurgitation with a centrally directed jet. Mitral/Tricuspid moderate regurgitation. BIPAP placed as needed for comfort and respiratory support. On 9/12/23, diuresis continued with 4.8 L removed. BIPAP continued nightly and as needed. Stress test planned for tomorrow per  Cardiology. On 9/13/23, pt verbalized symptom improvement with IV diuresis completed. Respiratory support maintained on home oxygen dose of 4L NC. Resting portion of stress test performed today with second portion to be completed on tomorrow. On 9/14/23, pt was unable to complete second portion of stress test due to wheezing. IV Lasix continued. Pt verbalized symptom improvement with CPAP in place. Stress test to be attempted in am. On 9/15/23, stress test not completed due to continued wheezing. Diuresis continued and D-dimer of 3.35 noted. Lovenox initiated at therapeutic dose per Cardiology. CTA of chest and US of BLE pending. Oxygen requirements supported with nasal cannula at home dose and CPAP. May complete stress test outpatient post acute event pending imaging results. On 9/16/23, CTA of chest showed no pulmonary embolism, no dissection, and no pneumonia.  Cardiomegaly.  Mild basilar atelectasis. Incentive spirometry encouraged. US of BLE showed no obvious signs of DVT in bilateral lower extremity. Lovenox at therapeutic dose held. IV steroids transitioned to oral dosing.         Interval History: pt in bed upon exam and reports drowsiness, anxiety, and dyspnea. CTA of chest and BLE negative for PE/DVT. Pt requesting  increased use of CPAP while awake. Chest xray unremarkable. IV steroids transitioned to oral dosing. Atelectasis noted on imaging with IS encouraged. ABG pending.     Review of Systems  Objective:     Vital Signs (Most Recent):  Temp: 97.6 °F (36.4 °C) (09/16/23 0800)  Pulse: 62 (09/16/23 0840)  Resp: (!) 22 (09/16/23 0840)  BP: (!) 188/87 (09/16/23 0800)  SpO2: (!) 91 % (09/16/23 0840) Vital Signs (24h Range):  Temp:  [97.2 °F (36.2 °C)-98.7 °F (37.1 °C)] 97.6 °F (36.4 °C)  Pulse:  [53-80] 62  Resp:  [18-27] 22  SpO2:  [91 %-98 %] 91 %  BP: (141-188)/(65-87) 188/87     Weight: 116.6 kg (257 lb 0.9 oz)  Body mass index is 44.12 kg/m².    Intake/Output Summary (Last 24 hours) at 9/16/2023  "1019  Last data filed at 9/16/2023 0620  Gross per 24 hour   Intake --   Output 1150 ml   Net -1150 ml         Physical Exam  HENT:      Nose: Nose normal.      Mouth/Throat:      Pharynx: Oropharynx is clear.   Cardiovascular:      Rate and Rhythm: Normal rate and regular rhythm.   Pulmonary:      Effort: Tachypnea present.      Breath sounds: Examination of the right-lower field reveals decreased breath sounds and wheezing. Examination of the left-lower field reveals decreased breath sounds and wheezing. Decreased breath sounds and wheezing present.   Abdominal:      General: Bowel sounds are normal. There is no distension.      Palpations: Abdomen is soft.      Tenderness: There is no abdominal tenderness.   Genitourinary:     Comments: Deferred   Musculoskeletal:         General: Normal range of motion.      Cervical back: Normal range of motion.   Skin:     General: Skin is warm and dry.   Neurological:      Mental Status: She is alert and oriented to person, place, and time.   Psychiatric:         Mood and Affect: Mood normal.         Behavior: Behavior normal.             Significant Labs: All pertinent labs within the past 24 hours have been reviewed.  CBC: No results for input(s): "WBC", "HGB", "HCT", "PLT" in the last 48 hours.  CMP:   Recent Labs   Lab 09/15/23  0514 09/16/23  0543    140   K 5.2* 4.5   CL 99 96   CO2 30* 33*    119*   BUN 47* 44*   CREATININE 1.2 1.1   CALCIUM 9.8 9.4   ANIONGAP 10 11     POCT Glucose:   Recent Labs   Lab 09/15/23  1137 09/15/23  2120 09/16/23  0752   POCTGLUCOSE 119* 133* 108       Significant Imaging: I have reviewed all pertinent imaging results/findings within the past 24 hours.      Assessment/Plan:      * Acute on chronic respiratory failure with hypoxia  Patient with Hypoxic Respiratory failure which is Acute on chronic.  she is on home oxygen at 4 LPM. Supplemental oxygen was provided and noted- Oxygen Concentration (%):  [30] 30    . "   Signs/symptoms of respiratory failure include- increased work of breathing. Contributing diagnoses includes - CHF and COPD Labs and images were reviewed. Patient Has not had a recent ABG. Will treat underlying causes and adjust management of respiratory failure     D-dimer, elevated  - D-dimer 3.35  -CTA of chest negative for PE  -US of BLE negative for DVT  -pt taken off anticoagulation due to hematuria - pt was on ASA   -Lovenox BID stopped per imaging results     Elevated troponin  Serial troponin   Cardiology recommended Lovenox 1mg/kg to ED provider   Will start IV Heparin  cont ASA, BB, Statin, Entresto   Echo reviewed with results showing  mildly reduced systolic function with a visually estimated ejection fraction of 40 - 45%. There is normal diastolic function. Mild to moderate aortic regurgitation with a centrally directed jet. Moderate mitral regurgitation. Moderate tricuspid regurgitation with a centrally directed jet. Mild to moderate pulmonic regurgitation. Pulmonary artery systolic pressure is 47 mmHg.    Consult cardiology     9/10/23  Denies chest pain.   Troponin trending down  Continue current meds     -9/11/23- downward trend   -9/12/23- Stress test to be planned for tomorrow   -9/13/23- resting portion of stress test completed   -9/14/23- 2nd portion of stress test not completed due to wheezing- IV Lasix given - repeat analysis to be attempted on 9/15/23  -9/15/23- stress test not completed secondary to symptoms- D-dimer elevated with CTA chest and US of BLE pending. Troponin normalized   -9/16/23- CTA chest and US of BLE negative for PE/DVT.       Hypertension  BP stable  Cont home meds Coreg and Entresto  Monitor     Acute cystitis without hematuria  IV Rocephin completed   Blood cx remain negative  Urine culture grew E. Coli (50,000-99,999)- sensitivities noted         Diabetes mellitus, type 2  Patient's FSGs are controlled on current medication regimen.  Last A1c reviewed-   Lab Results    Component Value Date    HGBA1C 5.3 09/09/2023    HGBA1C 5.3 09/09/2023     Most recent fingerstick glucose reviewed-   Recent Labs   Lab 09/15/23  1137 09/15/23  2120 09/16/23  0752   POCTGLUCOSE 119* 133* 108     Current correctional scale  Low  Maintain anti-hyperglycemic dose as follows-   Antihyperglycemics (From admission, onward)    Start     Stop Route Frequency Ordered    09/09/23 0545  insulin aspart U-100 pen 0-5 Units         -- SubQ Before meals & nightly PRN 09/09/23 0447        Hold Oral hypoglycemics while patient is in the hospital.    Acute on chronic combined systolic and diastolic congestive heart failure  Patient is identified as having Diastolic (HFpEF) heart failure that is Acute on chronic. CHF is currently uncontrolled due to Rales/crackles on pulmonary exam and Pulmonary edema/pleural effusion on CXR. Latest ECHO performed and demonstrates- Results for orders placed during the hospital encounter of 03/29/23    Echo    Interpretation Summary  · The left ventricle is normal in size with moderate concentric hypertrophy and mildly decreased systolic function.  · The estimated ejection fraction is 45%.  · Grade I left ventricular diastolic dysfunction.  · Moderate right ventricular enlargement.  · Moderate right atrial enlargement.  · Mild-to-moderate aortic regurgitation.  · There is mild aortic valve stenosis.  · Aortic valve area is 1.44 cm2; peak velocity is 2.09 m/s; mean gradient is 10 mmHg.  · Mild-to-moderate mitral regurgitation.  · Moderate tricuspid regurgitation.  · Moderate pulmonic regurgitation.  · The estimated PA systolic pressure is 69 mmHg.  · There is moderate pulmonary hypertension.  · Moderate left atrial enlargement.  . Continue Beta Blocker, Furosemide, Aldactone and ARNI and monitor clinical status closely. Monitor on telemetry. Patient is on CHF pathway.  Monitor strict Is&Os and daily weights.  Place on fluid restriction of 1.5 L. Cardiology has been consulted.  Continue to stress to patient importance of self efficacy and  on diet for CHF. Last BNP reviewed- and noted below   Recent Labs   Lab 09/15/23  1249   BNP 78     9/10/23  -Diuresing well  -Continue IV lasix      9/11/23- diuresis continued- BIPAP as needed for support   -9/12/23- diuresis continued- 4.8 L removed    -9/13/23- diuresis completed- 7.1 L removed   -9/14/23- IV Lasix given   -9/15/23- diuresis continued- 7.8L removed   -9/16/23- BNP and Troponin negative     COPD exacerbation  IV Steroids- transition to oral dosing   LABA. CITLALY. ICS neb txs  -supplemental oxygen at home dose of 4 L  -CPAP in place while napping and nightly  -will initiate Buspar for anxiety if needed   -ABG pending - will consider Pulmonology consult as needed       Severe obesity (BMI >= 40)  Body mass index is 44.12 kg/m². Morbid obesity complicates all aspects of disease management from diagnostic modalities to treatment. Weight loss encouraged and health benefits explained to patient.         Paroxysmal A-fib  Patient with Paroxysmal (<7 days) atrial fibrillation which is controlled currently with Beta Blocker. Patient is currently in sinus rhythm.[id number] Score: 3. HASBLED Score: 3. Anticoagulation per Heparin infusion completed-hx of intolerance of anticoagulation noted due to gross hematuria    -ASA continued          VTE Risk Mitigation (From admission, onward)         Ordered     IP VTE HIGH RISK PATIENT  Once         09/09/23 0447     Place sequential compression device  Until discontinued         09/09/23 0447                Discharge Planning   TIGRE:      Code Status: Full Code   Is the patient medically ready for discharge?:     Reason for patient still in hospital (select all that apply): Patient trending condition, Treatment, Imaging and Consult recommendations  Discharge Plan A: Home                  Corrina Herramnn NP  Department of Hospital Medicine   [facility] - Telemetry ([facility])

## 2023-09-16 NOTE — SUBJECTIVE & OBJECTIVE
Interval History: pt in bed upon exam and reports drowsiness, anxiety, and dyspnea. CTA of chest and BLE negative for PE/DVT. Pt requesting  increased use of CPAP while awake. Chest xray unremarkable. IV steroids transitioned to oral dosing. Atelectasis noted on imaging with IS encouraged. ABG pending.     Review of Systems  Objective:     Vital Signs (Most Recent):  Temp: 97.6 °F (36.4 °C) (09/16/23 0800)  Pulse: 62 (09/16/23 0840)  Resp: (!) 22 (09/16/23 0840)  BP: (!) 188/87 (09/16/23 0800)  SpO2: (!) 91 % (09/16/23 0840) Vital Signs (24h Range):  Temp:  [97.2 °F (36.2 °C)-98.7 °F (37.1 °C)] 97.6 °F (36.4 °C)  Pulse:  [53-80] 62  Resp:  [18-27] 22  SpO2:  [91 %-98 %] 91 %  BP: (141-188)/(65-87) 188/87     Weight: 116.6 kg (257 lb 0.9 oz)  Body mass index is 44.12 kg/m².    Intake/Output Summary (Last 24 hours) at 9/16/2023 1019  Last data filed at 9/16/2023 0620  Gross per 24 hour   Intake --   Output 1150 ml   Net -1150 ml         Physical Exam  HENT:      Nose: Nose normal.      Mouth/Throat:      Pharynx: Oropharynx is clear.   Cardiovascular:      Rate and Rhythm: Normal rate and regular rhythm.   Pulmonary:      Effort: Tachypnea present.      Breath sounds: Examination of the right-lower field reveals decreased breath sounds and wheezing. Examination of the left-lower field reveals decreased breath sounds and wheezing. Decreased breath sounds and wheezing present.   Abdominal:      General: Bowel sounds are normal. There is no distension.      Palpations: Abdomen is soft.      Tenderness: There is no abdominal tenderness.   Genitourinary:     Comments: Deferred   Musculoskeletal:         General: Normal range of motion.      Cervical back: Normal range of motion.   Skin:     General: Skin is warm and dry.   Neurological:      Mental Status: She is alert and oriented to person, place, and time.   Psychiatric:         Mood and Affect: Mood normal.         Behavior: Behavior normal.             Significant  "Labs: All pertinent labs within the past 24 hours have been reviewed.  CBC: No results for input(s): "WBC", "HGB", "HCT", "PLT" in the last 48 hours.  CMP:   Recent Labs   Lab 09/15/23  0514 09/16/23  0543    140   K 5.2* 4.5   CL 99 96   CO2 30* 33*    119*   BUN 47* 44*   CREATININE 1.2 1.1   CALCIUM 9.8 9.4   ANIONGAP 10 11     POCT Glucose:   Recent Labs   Lab 09/15/23  1137 09/15/23  2120 09/16/23  0752   POCTGLUCOSE 119* 133* 108       Significant Imaging: I have reviewed all pertinent imaging results/findings within the past 24 hours.  "

## 2023-09-16 NOTE — PT/OT/SLP PROGRESS
Physical Therapy  Treatment    aCrmen Tan   MRN: [id number]   Admitting Diagnosis: Acute on chronic respiratory failure with hypoxia       PT Start Time: 0900     PT Stop Time: 0915    PT Total Time (min): 15 min       Billable Minutes:  Therapeutic Activity 15    Treatment Type: Treatment  PT/PTA: PTA     Number of PTA visits since last PT visit: 1       General Precautions: Standard, fall  Orthopedic Precautions: N/A  Braces: N/A       Subjective:  Communicated with KODY prior to session.      Pain/Comfort  Pain Rating 1: 0/10  Pain Rating Post-Intervention 1: 0/10    Treatment and Education:    BED MOB MIN A     SITTING EOB WITHOUT BACK SUPPORT X 10: LONG ARCH QUAD, HIP FLEXION, AP, BENCH PRESS, BICEPS, HAND OPEN/CLOSE AND SHOULDER FLEXION ALL WITH SBA INDICATING GOOD BALANCE    SHE PARTICIPATED WELL WITH THIS SESSION    ADDITIONAL ACTIVITY NOT PERFORMED TODAY DUE TO HER BEING ON BIPAP     AM-PAC 6 CLICK MOBILITY  How much help from another person does this patient currently need?   1 = Unable, Total/Dependent Assistance  2 = A lot, Maximum/Moderate Assistance  3 = A little, Minimum/Contact Guard/Supervision  4 = None, Modified [place]/Independent    Turning over in bed (including adjusting bedclothes, sheets and blankets)?: 3  Sitting down on and standing up from a chair with arms (e.g., wheelchair, bedside commode, etc.):  (NA)  Moving from lying on back to sitting on the side of the bed?: 3  Moving to and from a bed to a chair (including a wheelchair)?:  (NA)  Need to walk in hospital room?:  (NA)  Climbing 3-5 steps with a railing?: 1    AM-PAC Raw Score CMS G-Code Modifier Level of Impairment Assistance   6 % Total / Unable   7 - 9 CM 80 - 100% Maximal Assist   10 - 14 CL 60 - 80% Moderate Assist   15 - 19 CK 40 - 60% Moderate Assist   20 - 22 CJ 20 - 40% Minimal Assist   23 CI 1-20% SBA / CGA   24 CH 0% Independent/ Mod I     Patient left supine with all lines intact, call button in  reach, and bed alarm on.    Assessment:  Carmen Tan is a 73 y.o. female with a medical diagnosis of Acute on chronic respiratory failure with hypoxia and presents with .    Rehab identified problem list/impairments: weakness, gait instability, decreased upper extremity function, impaired cardiopulmonary response to activity, decreased ROM, decreased lower extremity function, impaired endurance, impaired self care skills, impaired functional mobility    Rehab potential is fair.    Activity tolerance: Good    Discharge recommendations: home health PT      Barriers to discharge:      Equipment recommendations: walker, rolling, wheelchair, bedside commode, bath bench     GOALS:   Multidisciplinary Problems       Physical Therapy Goals          Problem: Physical Therapy    Goal Priority Disciplines Outcome Goal Variances Interventions   Physical Therapy Goal     PT, PT/OT      Description: LTG'S TO BE MET IN 14 DAYS (9-28-23)  PT WILL BE [facility] FOR BED MOBILITY  PT WILL BE [facility] FOR BED<>CHAIR TF'S  PT WILL  FEET WITH RW AND SPV  PT WILL INC AMPAC SCORE BY 2 POINTS TO PROGRESS GROSS FUNC MOBILITY                         PLAN:    Patient to be seen 3 x/week to address the above listed problems via gait training, therapeutic activities, therapeutic exercises  Plan of Care expires: 09/28/23  Plan of Care reviewed with: patient         09/16/2023

## 2023-09-16 NOTE — SUBJECTIVE & OBJECTIVE
Past Medical History:   Diagnosis Date    Abdominal hernia     Bacteremia 4/5/2023    CHF (congestive heart failure)     COPD (chronic obstructive pulmonary disease)     Diabetes mellitus, type 2 2/16/2022    GERD (gastroesophageal reflux disease)     History of home oxygen therapy     Hypertension     Hypertensive emergency 3/30/2023    Migraine headache     Pulmonary embolism 06/01/2017    Small bowel obstruction     Thyroid disease        Past Surgical History:   Procedure Laterality Date    COLONOSCOPY      HYSTERECTOMY      INNER EAR SURGERY      UPPER GASTROINTESTINAL ENDOSCOPY         Review of patient's allergies indicates:   Allergen Reactions    Pcn [penicillins] Swelling       Family History    None       Tobacco Use    Smoking status: Former    Smokeless tobacco: Never   Substance and Sexual Activity    Alcohol use: No    Drug use: No    Sexual activity: Not on file         Review of Systems   Constitutional: Negative.    HENT: Negative.     Respiratory:  Positive for shortness of breath. Negative for cough and wheezing.    Cardiovascular: Negative.    Gastrointestinal: Negative.    Genitourinary: Negative.    Musculoskeletal: Negative.    Neurological: Negative.      Objective:     Vital Signs (Most Recent):  Temp: 98.2 °F (36.8 °C) (09/16/23 1551)  Pulse: (!) 50 (09/16/23 1551)  Resp: (!) 23 (09/16/23 1551)  BP: 137/84 (09/16/23 1551)  SpO2: (!) 94 % (09/16/23 1551) Vital Signs (24h Range):  Temp:  [97.6 °F (36.4 °C)-98.7 °F (37.1 °C)] 98.2 °F (36.8 °C)  Pulse:  [50-80] 50  Resp:  [18-27] 23  SpO2:  [91 %-98 %] 94 %  BP: (127-188)/(60-87) 137/84     Weight: 116.6 kg (257 lb 0.9 oz)  Body mass index is 44.12 kg/m².      Intake/Output Summary (Last 24 hours) at 9/16/2023 1615  Last data filed at 9/16/2023 0620  Gross per 24 hour   Intake --   Output 800 ml   Net -800 ml        Physical Exam  Vitals and nursing note reviewed.   Constitutional:       General: She is not in acute distress.     Appearance:  "She is obese.   Eyes:      General:         Right eye: No discharge.         Left eye: No discharge.   Cardiovascular:      Rate and Rhythm: Normal rate.      Heart sounds: No murmur heard.     No gallop.   Pulmonary:      Effort: No respiratory distress.      Breath sounds: No wheezing.   Abdominal:      General: There is no distension.      Palpations: Abdomen is soft.   Musculoskeletal:         General: No swelling or tenderness.      Cervical back: Neck supple.   Neurological:      General: No focal deficit present.      Mental Status: She is alert and oriented to person, place, and time.          Vents:  Oxygen Concentration (%): 30 (09/16/23 1500)    Lines/Drains/Airways       Drain  Duration             Female External Urinary Catheter 09/10/23 0700 6 days              Peripheral Intravenous Line  Duration                  Peripheral IV - Single Lumen 09/09/23 1507 20 G;2 in Anterior;Right Forearm 7 days         Peripheral IV - Single Lumen 09/15/23 2300 20 G Left Forearm <1 day                    Significant Labs:    CBC/Anemia Profile:  No results for input(s): "WBC", "HGB", "HCT", "PLT", "MCV", "RDW", "IRON", "FERRITIN", "RETIC", "FOLATE", "[id number]", "OCCULTBLOOD" in the last 48 hours.     Chemistries:  Recent Labs   Lab 09/15/23  0514 09/16/23  0543    140   K 5.2* 4.5   CL 99 96   CO2 30* 33*   BUN 47* 44*   CREATININE 1.2 1.1   CALCIUM 9.8 9.4       All pertinent labs within the past 24 hours have been reviewed.    Significant Imaging:   I have reviewed all pertinent imaging results/findings within the past 24 hours.  Ct scan reviewed cardiomegaly   "

## 2023-09-16 NOTE — ASSESSMENT & PLAN NOTE
IV Steroids- transition to oral dosing   LABA. CITLALY. ICS neb txs  -supplemental oxygen at home dose of 4 L  -CPAP in place while napping and nightly  -will initiate Buspar for anxiety   -ABG pending

## 2023-09-16 NOTE — SUBJECTIVE & OBJECTIVE
Review of Systems   Constitutional: Positive for malaise/fatigue.   HENT: Negative.     Eyes: Negative.    Cardiovascular:  Positive for dyspnea on exertion.   Respiratory:  Positive for shortness of breath and wheezing.    Endocrine: Negative.    Hematologic/Lymphatic: Negative.    Skin: Negative.    Musculoskeletal:  Positive for arthritis and joint pain.   Gastrointestinal: Negative.    Genitourinary: Negative.    Neurological: Negative.    Psychiatric/Behavioral: Negative.     Allergic/Immunologic: Negative.      Objective:     Vital Signs (Most Recent):  Temp: 97.6 °F (36.4 °C) (09/16/23 1213)  Pulse: 62 (09/16/23 1414)  Resp: (!) 27 (09/16/23 1414)  BP: 131/63 (09/16/23 1213)  SpO2: (!) 92 % (09/16/23 1414) Vital Signs (24h Range):  Temp:  [97.2 °F (36.2 °C)-98.7 °F (37.1 °C)] 97.6 °F (36.4 °C)  Pulse:  [53-80] 62  Resp:  [18-27] 27  SpO2:  [91 %-98 %] 92 %  BP: (127-188)/(60-87) 131/63     Weight: 116.6 kg (257 lb 0.9 oz)  Body mass index is 44.12 kg/m².     SpO2: (!) 92 %         Intake/Output Summary (Last 24 hours) at 9/16/2023 6505  Last data filed at 9/16/2023 0620  Gross per 24 hour   Intake --   Output 800 ml   Net -800 ml         Lines/Drains/Airways       Drain  Duration             Female External Urinary Catheter 09/10/23 0700 6 days              Peripheral Intravenous Line  Duration                  Peripheral IV - Single Lumen 09/09/23 1507 20 G;2 in Anterior;Right Forearm 6 days         Peripheral IV - Single Lumen 09/15/23 2300 20 G Left Forearm <1 day                       Physical Exam  Vitals and nursing note reviewed.   Constitutional:       General: She is not in acute distress.     Appearance: She is well-developed. She is ill-appearing. She is not diaphoretic.      Comments: On supplemental O2   HENT:      Head: Normocephalic and atraumatic.   Eyes:      General:         Right eye: No discharge.         Left eye: No discharge.      Pupils: Pupils are equal, round, and reactive to  "light.   Neck:      Thyroid: No thyromegaly.      Vascular: No JVD.      Trachea: No tracheal deviation.   Cardiovascular:      Rate and Rhythm: Normal rate and regular rhythm.      Heart sounds: Normal heart sounds, S1 normal and S2 normal. No murmur heard.  Pulmonary:      Effort: Pulmonary effort is normal. No respiratory distress.      Breath sounds: Rhonchi present. No rales.   Abdominal:      General: There is no distension.      Tenderness: There is no rebound.   Musculoskeletal:      Cervical back: Neck supple.      Right lower leg: No edema.      Left lower leg: No edema.   Skin:     General: Skin is warm and dry.      Findings: No erythema.   Neurological:      General: No focal deficit present.      Mental Status: She is alert and oriented to person, place, and time.   Psychiatric:         Mood and Affect: Mood normal.         Behavior: Behavior normal.         Thought Content: Thought content normal.            Significant Labs: CMP   Recent Labs   Lab 09/15/23  0514 09/16/23  0543    140   K 5.2* 4.5   CL 99 96   CO2 30* 33*    119*   BUN 47* 44*   CREATININE 1.2 1.1   CALCIUM 9.8 9.4   ANIONGAP 10 11     , CBC   No results for input(s): "WBC", "HGB", "HCT", "PLT" in the last 48 hours.  , Troponin   Recent Labs   Lab 09/15/23  1249   TROPONINI 0.021   , and All pertinent lab results from the last 24 hours have been reviewed.    Significant Imaging: Echocardiogram: Transthoracic echo (TTE) complete (Cupid Only):   Results for orders placed or performed during the hospital encounter of 09/09/23   Echo   Result Value Ref Range    BSA 2.36 m2    LVOT stroke volume 82.38 cm3    LVIDd 5.03 3.5 - 6.0 cm    LV Systolic Volume 68.43 mL    LV Systolic Volume Index 30.7 mL/m2    LVIDs 3.96 2.1 - 4.0 cm    LV Diastolic Volume 120.14 mL    LV Diastolic Volume Index 53.87 mL/m2    IVS 1.11 (A) 0.6 - 1.1 cm    LVOT diameter 1.95 cm    LVOT area 3.0 cm2    FS 21 (A) 28 - 44 %    Left Ventricle Relative " Wall Thickness 0.54 cm    Posterior Wall 1.35 (A) 0.6 - 1.1 cm    LV mass 244.31 g    LV Mass Index 110 g/m2    MV Peak E Berhane 0.93 m/s    TDI LATERAL 0.13 m/s    TDI SEPTAL 0.08 m/s    E/E' ratio 8.86 m/s    MV Peak A Berhane 0.90 m/s    TR Max Berhane 3.30 m/s    E/A ratio 1.03     IVRT 137.01 msec    E wave deceleration time 232.88 msec    LV SEPTAL E/E' RATIO 11.63 m/s    LV LATERAL E/E' RATIO 7.15 m/s    LVOT peak berhane 1.35 m/s    Left Ventricular Outflow Tract Mean Velocity 0.87 cm/s    Left Ventricular Outflow Tract Mean Gradient 3.55 mmHg    LA size 3.64 cm    Left Atrium Major Axis 5.78 cm    Left Atrium Minor Axis 6.21 cm    RV mid diameter 5.15 cm    RVOT peak VTI 18.1 cm    RA Major Axis 4.94 cm    AV regurgitation pressure 1/2 time 850.468420185450769 ms    AR Max Berhane 4.57 m/s    AV mean gradient 10 mmHg    AV peak gradient 17 mmHg    Ao peak berhane 2.09 m/s    Ao VTI 39.30 cm    LVOT peak VTI 27.60 cm    AV valve area 2.10 cm²    AV Velocity Ratio 0.65     AV index (prosthetic) 0.70     KARYNA by Velocity Ratio 1.93 cm²    Mr max berhane 5.59 m/s    Triscuspid Valve Regurgitation Peak Gradient 44 mmHg    PV mean gradient 2 mmHg    RVOT peak berhane 0.82 m/s    Ao root annulus 3.18 cm    STJ 3.59 cm    Ascending aorta 3.46 cm    IVC diameter 2.31 cm    Mean e' 0.11 m/s    ZLVIDS -1.49     ZLVIDD -4.49     LA Volume Index 33.2 mL/m2    LA volume 74.10 cm3    LA WIDTH 4.0 cm    TAPSE 2.40 cm    RA Width 3.2 cm    TV resting pulmonary artery pressure 47 mmHg    RV TB RVSP 6 mmHg    Est. RA pres 3 mmHg    Narrative      Left Ventricle: The left ventricle is normal in size. Mildly increased   wall thickness. Normal wall motion. There is mildly reduced systolic   function with a visually estimated ejection fraction of 40 - 45%. There is   normal diastolic function.    Right Ventricle: Normal right ventricular cavity size. Wall thickness   is normal. Right ventricle wall motion  is normal. Systolic function is   normal.    Aortic  Valve: There is mild to moderate aortic regurgitation with a   centrally directed jet.    Mitral Valve: There is moderate regurgitation.    Tricuspid Valve: There is moderate regurgitation with a centrally   directed jet.    Pulmonic Valve: There is mild to moderate regurgitation.    Pulmonary Artery: The estimated pulmonary artery systolic pressure is   47 mmHg.    IVC/SVC: Normal venous pressure at 3 mmHg.     , EKG: REviewed, and X-Ray: CXR: X-Ray Chest 1 View (CXR): No results found for this visit on 09/09/23. and X-Ray Chest PA and Lateral (CXR): No results found for this visit on 09/09/23.

## 2023-09-16 NOTE — ASSESSMENT & PLAN NOTE
Patient with Hypercapnic and Hypoxic Respiratory failure which is Acute on chronic.  she is not on home oxygen. Supplemental oxygen was provided and noted- Oxygen Concentration (%):  [30] 30    .   Signs/symptoms of respiratory failure include- tachypnea. Contributing diagnoses includes - COPD Labs and images were reviewed. Patient Has recent ABG, which has been reviewed. Will treat underlying causes and adjust management of respiratory failure as follows- hypercapnic    9/11/23  -Mgmt as per hospital medicine  -Continue IV diuresis for additional day    9/12/23  -Continue same mgmt/IV diuresis     9/13/23  -Much improved, BNP trended down, d/c IV Lasix  -Reassess in AM    9/14/23  -Using CPAP, wheezing this AM  -Continue steroids  -Give additional dose of IV Lasix    9/15/23  -Unfortunately still wheezing  -Additional dose of IV Lasix today  -Continue steroids    9/16/23  -Remains on CPAP  - O2 sats are low 90s this morning  - CT negative for PE. Lower extremity   -US negative for DVT  -Continues to diurese well. -9L so far.

## 2023-09-16 NOTE — PLAN OF CARE
BED MOB MIN A     SITTING EOB WITHOUT BACK SUPPORT X 10: LONG ARCH QUAD, HIP FLEXION, AP, BENCH PRESS, BICEPS, HAND OPEN/CLOSE AND SHOULDER FLEXION ALL WITH SBA INDICATING GOOD BALANCE    SHE PARTICIPATED WELL WITH THIS SESSION    ADDITIONAL ACTIVITY NOT PERFORMED TODAY DUE TO HER BEING ON BIPAP

## 2023-09-16 NOTE — ASSESSMENT & PLAN NOTE
Serial troponin   Cardiology recommended Lovenox 1mg/kg to ED provider   Will start IV Heparin  cont ASA, BB, Statin, Entresto   Echo reviewed with results showing  mildly reduced systolic function with a visually estimated ejection fraction of 40 - 45%. There is normal diastolic function. Mild to moderate aortic regurgitation with a centrally directed jet. Moderate mitral regurgitation. Moderate tricuspid regurgitation with a centrally directed jet. Mild to moderate pulmonic regurgitation. Pulmonary artery systolic pressure is 47 mmHg.    Consult cardiology     9/10/23  Denies chest pain.   Troponin trending down  Continue current meds     -9/11/23- downward trend   -9/12/23- Stress test to be planned for tomorrow   -9/13/23- resting portion of stress test completed   -9/14/23- 2nd portion of stress test not completed due to wheezing- IV Lasix given - repeat analysis to be attempted on 9/15/23  -9/15/23- stress test not completed secondary to symptoms- D-dimer elevated with CTA chest and US of BLE pending. Troponin normalized   -9/16/23- CTA chest and US of BLE negative for PE/DVT.

## 2023-09-16 NOTE — ASSESSMENT & PLAN NOTE
Patient with Paroxysmal (<7 days) atrial fibrillation which is controlled currently with Beta Blocker. Patient is currently in sinus rhythm.[id number] Score: 3. HASBLED Score: 3. Anticoagulation per Heparin infusion completed-hx of intolerance of anticoagulation noted due to gross hematuria    -ASA continued

## 2023-09-16 NOTE — ASSESSMENT & PLAN NOTE
Trend  Cardiac echo results pending    9/11/23  -Flat  -No CP symptoms  -Suspect elevation due to demand ischemia from fluid overload  -Echo showed EF of 40-45%, mild-mod AI  -Continue Coreg, Entresto, ASA  -Can consider ischemic workup IP vs OP once compensated    9/12/23  -Ok to d/c heparin gtt  -Continue Coreg, Entresto, ASA  -Probable MPI stress test tmw    9/13/23  -CP free  -Continue ASA, Coreg, Entresto  -MPI stress test pending    9/14/23  -Wheezing today will re-attempt stress tmw    9/15/23  -Still wheezing, re-attempt stress test tmw, may have to be done as OP    9/16/23 - rec outpt nuclear stress or Monday if pt's breathing is normal

## 2023-09-16 NOTE — PLAN OF CARE
A231/A231 JAKE Tan is a 73 y.o.female admitted on 9/9/2023 for Acute on chronic respiratory failure with hypoxia   Code Status: Full Code MRN: [id number]   Review of patient's allergies indicates:   Allergen Reactions    Pcn [penicillins] Swelling     Past Medical History:   Diagnosis Date    Abdominal hernia     Bacteremia 4/5/2023    CHF (congestive heart failure)     COPD (chronic obstructive pulmonary disease)     Diabetes mellitus, type 2 2/16/2022    GERD (gastroesophageal reflux disease)     History of home oxygen therapy     Hypertension     Hypertensive emergency 3/30/2023    Migraine headache     Pulmonary embolism 06/01/2017    Small bowel obstruction     Thyroid disease       PRN meds    acetaminophen, 650 mg, Q6H PRN  dextrose 10%, 12.5 g, PRN  dextrose 10%, 25 g, PRN  glucagon (human recombinant), 1 mg, PRN  glucose, 16 g, PRN  glucose, 24 g, PRN  hydrALAZINE, 10 mg, Q4H PRN  insulin aspart U-100, 0-5 Units, QID (AC + HS) PRN  melatonin, 6 mg, Nightly PRN  ondansetron, 4 mg, Q8H PRN  sodium chloride 0.9%, 10 mL, PRN      Medications given as ordered. Lovenox for VTE. Bipap in use. Accuchecks completed, no coverage given. Chart check completed. Will continue plan of care.      Orientation: oriented x 4  [place] Coma Scale Score: 15     Lead Monitored: Lead II Rhythm: normal sinus rhythm    Cardiac/Telemetry Box Number: [id number]  VTE Required Core Measure: Pharmacological prophylaxis initiated/maintained Last Bowel Movement: 09/13/23  Diet diabetic [facility]; 2000 Calorie; Cardiac (Low Na/Chol); Fluid - 1500mL  Voiding Characteristics: external catheter  Adan Score: 20  Fall Risk Score: 15  Accucheck []   Freq?      Lines/Drains/Airways       Drain  Duration             Female External Urinary Catheter 09/10/23 0700 6 days              Peripheral Intravenous Line  Duration                  Peripheral IV - Single Lumen 09/09/23 1507 20 G;2 in Anterior;Right Forearm 7 days          Peripheral IV - Single Lumen 09/15/23 2300 20 G Left Forearm <1 day

## 2023-09-16 NOTE — HPI
Carmen Tan is a 73-year-old female history of chronic hypercapnic and hypoxic respiratory failure on 4 L nasal cannula, diabetes mellitus, chronic diastolic heart failure, prior PE, and continued tobacco abuse who presented with shortness of breath.  Patient presented with an elevated BNP and admitted for aggressive diuretics and Cardiology consult. She was also given antibiotics for a urinary tract infection. She was also started on nebulizers and steroids.  Patient was hospitalized multiple times in April for similar.  Patient states she does have a machine at home but uncertain the type of device (CPAP, BiPAP, AVAPS). She reports she gets her supplies / equipment from her "[employer]". She also is on 4 L nasal cannula.  She reports wearing her device at home when sleeping, as well as, when napping several hours a day.  Patient is currently on prednisone and nebs.  Patient takes inhaler Dulera at home. She continues to smoke 1 pack per day.  She has not seen a pulmonologist in the past by reports.  I do see pulmonology telephone message from 2017, she states she is not had pulmonary function test.  She currently is on 4 L nasal cannula.  She is currently wearing a BiPAP machine and states she put it back on secondary to wanting to take a nap.  She currently denies wheezing.  She is had a slight cough.  She is been getting diuretics per Cardiology.  Pulmonary consult for assistance with her COPD and chronic respiratory failure.    Interval history:  9/17: Sitting up on side of bed. Persistent complaints of shortness of breath but reports she is at her baseline. Remains on 4L/NC. No acute complaints.   9/18: OOB to chain on NIPPV, transitioned to home flow 4L NC, no new issues or c/o noted, no family, pt without questions this AM   Additional Notes: Observe. Recheck at [facility]. Additional Notes: Observe. Recheck at [facility].

## 2023-09-16 NOTE — ASSESSMENT & PLAN NOTE
- D-dimer 3.35  -CTA of chest negative for PE  -US of BLE negative for DVT  -pt taken off anticoagulation due to hematuria - pt was on ASA   -Lovenox BID stopped per imaging results

## 2023-09-17 PROBLEM — F17.200 TOBACCO DEPENDENCE: Status: ACTIVE | Noted: 2023-09-16

## 2023-09-17 LAB
ANION GAP SERPL CALC-SCNC: 6 MMOL/L (ref 8–16)
BASOPHILS # BLD AUTO: 0.01 K/UL (ref 0–0.2)
BASOPHILS NFR BLD: 0.1 % (ref 0–1.9)
BUN SERPL-MCNC: 42 MG/DL (ref 8–23)
CALCIUM SERPL-MCNC: 9.3 MG/DL (ref 8.7–10.5)
CHLORIDE SERPL-SCNC: 99 MMOL/L (ref 95–110)
CO2 SERPL-SCNC: 36 MMOL/L (ref 23–29)
CREAT SERPL-MCNC: 1 MG/DL (ref 0.5–1.4)
DIFFERENTIAL METHOD: ABNORMAL
EOSINOPHIL # BLD AUTO: 0 K/UL (ref 0–0.5)
EOSINOPHIL NFR BLD: 0.1 % (ref 0–8)
ERYTHROCYTE [DISTWIDTH] IN BLOOD BY AUTOMATED COUNT: 16.7 % (ref 11.5–14.5)
EST. GFR  (NO RACE VARIABLE): 60 ML/MIN/1.73 M^2
GLUCOSE SERPL-MCNC: 78 MG/DL (ref 70–110)
HCT VFR BLD AUTO: 42.3 % (ref 37–48.5)
HGB BLD-MCNC: 15.1 G/DL (ref 12–16)
IMM GRANULOCYTES # BLD AUTO: 0.07 K/UL (ref 0–0.04)
IMM GRANULOCYTES NFR BLD AUTO: 0.6 % (ref 0–0.5)
LYMPHOCYTES # BLD AUTO: 1.4 K/UL (ref 1–4.8)
LYMPHOCYTES NFR BLD: 11.8 % (ref 18–48)
MCH RBC QN AUTO: 34.8 PG (ref 27–31)
MCHC RBC AUTO-ENTMCNC: 35.7 G/DL (ref 32–36)
MCV RBC AUTO: 98 FL (ref 82–98)
MONOCYTES # BLD AUTO: 1.1 K/UL (ref 0.3–1)
MONOCYTES NFR BLD: 9 % (ref 4–15)
NEUTROPHILS # BLD AUTO: 9.3 K/UL (ref 1.8–7.7)
NEUTROPHILS NFR BLD: 78.4 % (ref 38–73)
NRBC BLD-RTO: 0 /100 WBC
PLATELET # BLD AUTO: 200 K/UL (ref 150–450)
PMV BLD AUTO: 8.8 FL (ref 9.2–12.9)
POCT GLUCOSE: 102 MG/DL (ref 70–110)
POCT GLUCOSE: 111 MG/DL (ref 70–110)
POCT GLUCOSE: 126 MG/DL (ref 70–110)
POCT GLUCOSE: 86 MG/DL (ref 70–110)
POTASSIUM SERPL-SCNC: 4.4 MMOL/L (ref 3.5–5.1)
RBC # BLD AUTO: 4.34 M/UL (ref 4–5.4)
SODIUM SERPL-SCNC: 141 MMOL/L (ref 136–145)
WBC # BLD AUTO: 11.84 K/UL (ref 3.9–12.7)

## 2023-09-17 PROCEDURE — 94640 AIRWAY INHALATION TREATMENT: CPT

## 2023-09-17 PROCEDURE — 25000242 PHARM REV CODE 250 ALT 637 W/ HCPCS: Performed by: NURSE PRACTITIONER

## 2023-09-17 PROCEDURE — 25000003 PHARM REV CODE 250: Performed by: NURSE PRACTITIONER

## 2023-09-17 PROCEDURE — 25000242 PHARM REV CODE 250 ALT 637 W/ HCPCS: Performed by: HOSPITALIST

## 2023-09-17 PROCEDURE — 99900035 HC TECH TIME PER 15 MIN (STAT)

## 2023-09-17 PROCEDURE — 63600175 PHARM REV CODE 636 W HCPCS: Performed by: NURSE PRACTITIONER

## 2023-09-17 PROCEDURE — 27000221 HC OXYGEN, UP TO 24 HOURS

## 2023-09-17 PROCEDURE — 80048 BASIC METABOLIC PNL TOTAL CA: CPT | Performed by: NURSE PRACTITIONER

## 2023-09-17 PROCEDURE — 21400001 HC TELEMETRY ROOM

## 2023-09-17 PROCEDURE — 27100171 HC OXYGEN HIGH FLOW UP TO 24 HOURS

## 2023-09-17 PROCEDURE — 99233 SBSQ HOSP IP/OBS HIGH 50: CPT | Mod: ,,, | Performed by: INTERNAL MEDICINE

## 2023-09-17 PROCEDURE — 99233 PR SUBSEQUENT HOSPITAL CARE,LEVL III: ICD-10-PCS | Mod: ,,, | Performed by: INTERNAL MEDICINE

## 2023-09-17 PROCEDURE — 94761 N-INVAS EAR/PLS OXIMETRY MLT: CPT

## 2023-09-17 PROCEDURE — 94660 CPAP INITIATION&MGMT: CPT

## 2023-09-17 PROCEDURE — 85025 COMPLETE CBC W/AUTO DIFF WBC: CPT | Performed by: FAMILY MEDICINE

## 2023-09-17 PROCEDURE — 36415 COLL VENOUS BLD VENIPUNCTURE: CPT | Performed by: FAMILY MEDICINE

## 2023-09-17 PROCEDURE — 25000003 PHARM REV CODE 250: Performed by: INTERNAL MEDICINE

## 2023-09-17 RX ORDER — IPRATROPIUM BROMIDE AND ALBUTEROL SULFATE 2.5; .5 MG/3ML; MG/3ML
3 SOLUTION RESPIRATORY (INHALATION) EVERY 6 HOURS
Status: COMPLETED | OUTPATIENT
Start: 2023-09-17 | End: 2023-09-18

## 2023-09-17 RX ORDER — BUSPIRONE HYDROCHLORIDE 5 MG/1
5 TABLET ORAL 2 TIMES DAILY
Status: DISCONTINUED | OUTPATIENT
Start: 2023-09-17 | End: 2023-09-19 | Stop reason: HOSPADM

## 2023-09-17 RX ADMIN — IPRATROPIUM BROMIDE AND ALBUTEROL SULFATE 3 ML: 2.5; .5 SOLUTION RESPIRATORY (INHALATION) at 07:09

## 2023-09-17 RX ADMIN — BUDESONIDE 0.5 MG: 0.5 INHALANT ORAL at 07:09

## 2023-09-17 RX ADMIN — SACUBITRIL AND VALSARTAN 1 TABLET: 24; 26 TABLET, FILM COATED ORAL at 09:09

## 2023-09-17 RX ADMIN — PREDNISONE 60 MG: 50 TABLET ORAL at 09:09

## 2023-09-17 RX ADMIN — ENOXAPARIN SODIUM 120 MG: 120 INJECTION SUBCUTANEOUS at 08:09

## 2023-09-17 RX ADMIN — CARVEDILOL 6.25 MG: 6.25 TABLET, FILM COATED ORAL at 09:09

## 2023-09-17 RX ADMIN — LEVOTHYROXINE SODIUM 50 MCG: 50 TABLET ORAL at 05:09

## 2023-09-17 RX ADMIN — SENNOSIDES AND DOCUSATE SODIUM 1 TABLET: 50; 8.6 TABLET ORAL at 08:09

## 2023-09-17 RX ADMIN — BUSPIRONE HYDROCHLORIDE 5 MG: 5 TABLET ORAL at 08:09

## 2023-09-17 RX ADMIN — ARFORMOTEROL TARTRATE 15 MCG: 15 SOLUTION RESPIRATORY (INHALATION) at 07:09

## 2023-09-17 RX ADMIN — CARVEDILOL 6.25 MG: 6.25 TABLET, FILM COATED ORAL at 08:09

## 2023-09-17 RX ADMIN — SERTRALINE HYDROCHLORIDE 50 MG: 50 TABLET ORAL at 09:09

## 2023-09-17 RX ADMIN — SENNOSIDES AND DOCUSATE SODIUM 1 TABLET: 50; 8.6 TABLET ORAL at 09:09

## 2023-09-17 RX ADMIN — ENOXAPARIN SODIUM 120 MG: 120 INJECTION SUBCUTANEOUS at 09:09

## 2023-09-17 RX ADMIN — IPRATROPIUM BROMIDE AND ALBUTEROL SULFATE 3 ML: .5; 3 SOLUTION RESPIRATORY (INHALATION) at 01:09

## 2023-09-17 RX ADMIN — PANTOPRAZOLE SODIUM 40 MG: 40 TABLET, DELAYED RELEASE ORAL at 09:09

## 2023-09-17 RX ADMIN — IPRATROPIUM BROMIDE AND ALBUTEROL SULFATE 3 ML: .5; 3 SOLUTION RESPIRATORY (INHALATION) at 07:09

## 2023-09-17 RX ADMIN — ATORVASTATIN CALCIUM 40 MG: 40 TABLET, FILM COATED ORAL at 09:09

## 2023-09-17 RX ADMIN — ISOSORBIDE MONONITRATE 30 MG: 30 TABLET, EXTENDED RELEASE ORAL at 09:09

## 2023-09-17 RX ADMIN — HYDRALAZINE HYDROCHLORIDE 10 MG: 20 INJECTION, SOLUTION INTRAMUSCULAR; INTRAVENOUS at 08:09

## 2023-09-17 RX ADMIN — ASPIRIN 81 MG: 81 TABLET, COATED ORAL at 09:09

## 2023-09-17 RX ADMIN — IPRATROPIUM BROMIDE AND ALBUTEROL SULFATE 3 ML: 2.5; .5 SOLUTION RESPIRATORY (INHALATION) at 12:09

## 2023-09-17 RX ADMIN — POLYETHYLENE GLYCOL 3350 17 G: 17 POWDER, FOR SOLUTION ORAL at 09:09

## 2023-09-17 RX ADMIN — SACUBITRIL AND VALSARTAN 1 TABLET: 24; 26 TABLET, FILM COATED ORAL at 08:09

## 2023-09-17 RX ADMIN — BUSPIRONE HYDROCHLORIDE 5 MG: 5 TABLET ORAL at 01:09

## 2023-09-17 NOTE — ASSESSMENT & PLAN NOTE
-pt smokes 1 ppd  -states she has been smoking since 13 years of age   -pt counseled on smoking cessation and dangers of smoking while wearing supplemental oxygen with understanding verbalized

## 2023-09-17 NOTE — SUBJECTIVE & OBJECTIVE
Interval History: pt sitting up to side of bed on 4L NC with BIPAP as needed for respiratory support. Smoking cessation discussed with understanding verbalized. Stress test to be attempted on tomorrow pending respiratory status per Cardiology.  Pulmonology and Cardiology following.     Review of Systems   Constitutional:  Positive for activity change and fatigue. Negative for appetite change.   HENT:  Negative for congestion, postnasal drip, sore throat and trouble swallowing.    Respiratory:  Positive for shortness of breath and wheezing.    Cardiovascular:  Positive for leg swelling. Negative for chest pain.   Gastrointestinal:  Negative for abdominal distention, abdominal pain, nausea and vomiting.   Genitourinary:  Negative for difficulty urinating, dysuria, flank pain, frequency and urgency.   Musculoskeletal:  Negative for arthralgias, back pain and myalgias.   Skin:  Negative for color change and wound.   Neurological:  Positive for weakness. Negative for dizziness and headaches.   Psychiatric/Behavioral:  Negative for agitation, confusion and sleep disturbance. The patient is nervous/anxious.      Objective:     Vital Signs (Most Recent):  Temp: 98.6 °F (37 °C) (09/17/23 1202)  Pulse: (!) 58 (09/17/23 1202)  Resp: (!) 188 (09/17/23 1202)  BP: 131/60 (09/17/23 1202)  SpO2: (!) 93 % (09/17/23 1202) Vital Signs (24h Range):  Temp:  [97.8 °F (36.6 °C)-98.6 °F (37 °C)] 98.6 °F (37 °C)  Pulse:  [50-88] 58  Resp:  [] 188  SpO2:  [92 %-98 %] 93 %  BP: (131-146)/(60-84) 131/60     Weight: 116.6 kg (257 lb 0.9 oz)  Body mass index is 44.12 kg/m².  No intake or output data in the 24 hours ending 09/17/23 1227      Physical Exam  HENT:      Nose: Nose normal.      Mouth/Throat:      Pharynx: Oropharynx is clear.   Cardiovascular:      Rate and Rhythm: Normal rate and regular rhythm.   Pulmonary:      Effort: Tachypnea present.      Breath sounds: Normal breath sounds. No wheezing.   Abdominal:      General: Bowel  sounds are normal. There is no distension.      Palpations: Abdomen is soft.      Tenderness: There is no abdominal tenderness.   Genitourinary:     Comments: Deferred   Musculoskeletal:         General: Normal range of motion.      Cervical back: Normal range of motion.   Skin:     General: Skin is warm and dry.   Neurological:      Mental Status: She is alert and oriented to person, place, and time.   Psychiatric:         Mood and Affect: Mood normal.         Behavior: Behavior normal.             Significant Labs: All pertinent labs within the past 24 hours have been reviewed.  CBC:   Recent Labs   Lab 09/17/23  0512   WBC 11.84   HGB 15.1   HCT 42.3        CMP:   Recent Labs   Lab 09/16/23  0543 09/17/23  0511    141   K 4.5 4.4   CL 96 99   CO2 33* 36*   * 78   BUN 44* 42*   CREATININE 1.1 1.0   CALCIUM 9.4 9.3   ANIONGAP 11 6*       Significant Imaging: I have reviewed all pertinent imaging results/findings within the past 24 hours.

## 2023-09-17 NOTE — ASSESSMENT & PLAN NOTE
Patient is identified as having Diastolic (HFpEF) heart failure that is Acute on chronic. CHF is currently uncontrolled due to Rales/crackles on pulmonary exam and Pulmonary edema/pleural effusion on CXR. Latest ECHO performed and demonstrates- Results for orders placed during the hospital encounter of 03/29/23    Echo    Interpretation Summary  · The left ventricle is normal in size with moderate concentric hypertrophy and mildly decreased systolic function.  · The estimated ejection fraction is 45%.  · Grade I left ventricular diastolic dysfunction.  · Moderate right ventricular enlargement.  · Moderate right atrial enlargement.  · Mild-to-moderate aortic regurgitation.  · There is mild aortic valve stenosis.  · Aortic valve area is 1.44 cm2; peak velocity is 2.09 m/s; mean gradient is 10 mmHg.  · Mild-to-moderate mitral regurgitation.  · Moderate tricuspid regurgitation.  · Moderate pulmonic regurgitation.  · The estimated PA systolic pressure is 69 mmHg.  · There is moderate pulmonary hypertension.  · Moderate left atrial enlargement.  . Continue Beta Blocker, Furosemide, Aldactone and ARNI and monitor clinical status closely. Monitor on telemetry. Patient is on CHF pathway.  Monitor strict Is&Os and daily weights.  Place on fluid restriction of 1.5 L. Cardiology has been consulted. Continue to stress to patient importance of self efficacy and  on diet for CHF. Last BNP reviewed- and noted below   Recent Labs   Lab 09/15/23  1249   BNP 78     9/10/23  -Diuresing well  -Continue IV lasix      9/11/23- diuresis continued- BIPAP as needed for support   -9/12/23- diuresis continued- 4.8 L removed    -9/13/23- diuresis completed- 7.1 L removed   -9/14/23- IV Lasix given   -9/15/23- diuresis continued- 7.8L removed   -9/16/23- BNP and Troponin negative   -9/17/23- 9L removed via diuresis- Cardiology following

## 2023-09-17 NOTE — ASSESSMENT & PLAN NOTE
Serial troponin   Cardiology recommended Lovenox 1mg/kg to ED provider   Will start IV Heparin  cont ASA, BB, Statin, Entresto   Echo reviewed with results showing  mildly reduced systolic function with a visually estimated ejection fraction of 40 - 45%. There is normal diastolic function. Mild to moderate aortic regurgitation with a centrally directed jet. Moderate mitral regurgitation. Moderate tricuspid regurgitation with a centrally directed jet. Mild to moderate pulmonic regurgitation. Pulmonary artery systolic pressure is 47 mmHg.    Consult cardiology     9/10/23  Denies chest pain.   Troponin trending down  Continue current meds     -9/11/23- downward trend   -9/12/23- Stress test to be planned for tomorrow   -9/13/23- resting portion of stress test completed   -9/14/23- 2nd portion of stress test not completed due to wheezing- IV Lasix given - repeat analysis to be attempted on 9/15/23  -9/15/23- stress test not completed secondary to symptoms- D-dimer elevated with CTA chest and US of BLE pending. Troponin normalized   -9/16/23- CTA chest and US of BLE negative for PE/DVT.   -9/17/23- diuresis in progress- Lovenox BID continued- Cardiology following

## 2023-09-17 NOTE — PLAN OF CARE
Aox4.  Able to verbalize needs & follow commands.    POC reviewed with pt. Interventions implemented as appropriate.    NAEON.   VS stable. SR on tele-monitor, box # [id number].  On BiPAP. Respiratory treatments as ordered.    20g PIV to LFA patent. Saline locked. Integrity maintained.    2000 betzy ADA, cardiac diet w/ 1500 mL fluid restriction.   Skin WDI. No new skin issues.   No c/o pain.   Continent of b/b. External female catheter in place. No s/s of discomfort noted.  CBG AC/HS. No coverage required at HS.  Lovenox for VTE.  Ambulatory. Assist x 1. Activity ad nani.   Educated on s/sx of pressure injury;  verbalized understanding.  Frequent position changes encouraged. Able to reposition in bed independently.  NADN. Resting quietly in bed.   Free of falls. Hourly rounding complete.   All safety measures remain in place. SR up x2; bed low & locked. Bed alarm refused. Call light w/in reach.   Will continue to monitor throughout shift.   Chart check complete.

## 2023-09-17 NOTE — ASSESSMENT & PLAN NOTE
Patient with Paroxysmal (<7 days) atrial fibrillation which is controlled currently with Beta Blocker. Patient is currently in sinus rhythm.[id number] Score: 3. HASBLED Score: 3. Anticoagulation per Heparin infusion completed-hx of intolerance of anticoagulation noted due to gross hematuria while taking Xarelto in 4/2023 with need for anticoagulation therapy to be evaluated and reviewed outpatient with vascular specialist.  per chart review (4/3/23-4/11/23)  -ASA continued    -Lovenox BID continued

## 2023-09-17 NOTE — ASSESSMENT & PLAN NOTE
Trend  Cardiac echo results pending    9/11/23  -Flat  -No CP symptoms  -Suspect elevation due to demand ischemia from fluid overload  -Echo showed EF of 40-45%, mild-mod AI  -Continue Coreg, Entresto, ASA  -Can consider ischemic workup IP vs OP once compensated    9/12/23  -Ok to d/c heparin gtt  -Continue Coreg, Entresto, ASA  -Probable MPI stress test tmw    9/13/23  -CP free  -Continue ASA, Coreg, Entresto  -MPI stress test pending    9/14/23  -Wheezing today will re-attempt stress tmw    9/15/23  -Still wheezing, re-attempt stress test tmw, may have to be done as OP    9/17/23 - nuc stress tomorrow AM if breathing stable

## 2023-09-17 NOTE — PROGRESS NOTES
[facility]  Pulmonology Progress Note    Patient Name: Carmen Tan  MRN: [id number]  Admission Date: 9/9/2023   Hospital Length of Stay: 8 days  Code Status: Full Code    Attending Provider: James Smith MD  Primary Care Provider: Lucian Barry MD   Principal Problem: Acute on chronic respiratory failure with hypoxia  Subjective:   History of present illness:  Carmen Tan is a 73-year-old female history of chronic hypercapnic and hypoxic respiratory failure on 4 L nasal cannula, diabetes mellitus, chronic diastolic heart failure, prior PE, and continued tobacco abuse who presented with shortness of breath.  Patient presented with an elevated BNP and admitted for aggressive diuretics and Cardiology consult. She was also given antibiotics for a urinary tract infection. She was also started on nebulizers and steroids.  Patient was hospitalized multiple times in April for similar.  Patient states she does have a machine at home but uncertain the type of device (CPAP, BiPAP, AVAPS). 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 She also is on 4 L nasal cannula.  She reports wearing her device at home when sleeping, as well as, when napping several hours a day.  Patient is currently on prednisone and nebs.  Patient takes inhaler Dulera at home. She continues to smoke 1 pack per day.  She has not seen a pulmonologist in the past by reports.  I do see pulmonology telephone message from 2017, she states she is not had pulmonary function test.  She currently is on 4 L nasal cannula.  She is currently wearing a BiPAP machine and states she put it back on secondary to wanting to take a nap.  She currently denies wheezing.  She is had a slight cough.  She is been getting diuretics per Cardiology.  Pulmonary consult for assistance with her COPD and chronic respiratory failure.    Interval history:   9/17: Sitting up on side of bed. Persistent  complaints of shortness of breath but reports she is at her baseline. Remains on 4L/NC. No acute complaints.   Objective:     Vital Signs (Most Recent):  Temp: 98.6 °F (37 °C) (09/17/23 1202)  Pulse: (!) 58 (09/17/23 1358)  Resp: (!) 24 (09/17/23 1358)  BP: 131/60 (09/17/23 1202)  SpO2: (!) 93 % (09/17/23 1358) Vital Signs (24h Range):  Temp:  [97.8 °F (36.6 °C)-98.6 °F (37 °C)] 98.6 °F (37 °C)  Pulse:  [50-88] 58  Resp:  [17-27] 24  SpO2:  [92 %-98 %] 93 %  BP: (131-146)/(60-84) 131/60   Weight: 116.6 kg (257 lb 0.9 oz); Body mass index is 44.12 kg/m².    No intake or output data in the 24 hours ending 09/17/23 1400     Physical Exam  Vitals and nursing note reviewed.   Constitutional:       Appearance: She is obese.   HENT:      Head: Normocephalic.   Eyes:      Conjunctiva/sclera: Conjunctivae normal.   Cardiovascular:      Rate and Rhythm: Normal rate.   Pulmonary:      Breath sounds: Wheezing present.   Abdominal:      Palpations: Abdomen is soft.   Musculoskeletal:         General: Normal range of motion.      Cervical back: Normal range of motion and neck supple.   Skin:     General: Skin is warm and dry.   Neurological:      Mental Status: She is alert and oriented to person, place, and time.   Psychiatric:         Mood and Affect: Mood normal.       Review of Systems: Negative except as indicated in HPI    Significant Labs:  CBC/Anemia Profile:  Recent Labs   Lab 09/17/23  0512   WBC 11.84   HGB 15.1   HCT 42.3      MCV 98   RDW 16.7*   Chemistries:  Recent Labs   Lab 09/16/23  0543 09/17/23  0511    141   K 4.5 4.4   CL 96 99   CO2 33* 36*   BUN 44* 42*   CREATININE 1.1 1.0   CALCIUM 9.4 9.3   ABG  Recent Labs   Lab 09/16/23  1113   PH 7.371   PO2 71*   PCO2 72.5*   HCO3 42.1*   BE 17     Assessment/Plan:     Pulmonary  * Acute on chronic respiratory failure with hypoxia  Patient with chronic hypercapnic and hypoxic respiratory failure who is on home oxygen at 4 LPM which she is currently  receiving while inpatient. Signs/symptoms of respiratory failure include dyspnea. Contributing diagnoses includes CHF. Labs and images were reviewed. Patient Has recent ABG, which has been reviewed.     Will treat underlying causes and adjust management of respiratory failure as follows:  · likely multi factorial with a reduced EF, morbid obese   · Reported history of COPD per the records, but no PFTs on record  · States she is not followed by pulmonologist  · Continued smoking dependence; counseled on cessation  · Continue diuretics; monitor I&O trends  · Continue BiPAP qHS  · May benefit from titration study upon discharge to optimize nocturnal ventilation  · Taper prednisone  · Establish outpatient pulmonary follow-up upon discharge, needs PFTs likely has restrictive component too  · Referral sent to pulmonary clinic  · May would benefit from cardio-pulmonary rehab  · Resume home Dulera at discharge    Endocrine  Severe obesity (BMI >= 40)  · Contributing to chronic issues.    Other  Tobacco dependence  Assistance with smoking cessation was offered, including:  [x]  Medications, refused  [x]  Counseling  [x]  Printed Information on Smoking Cessation, to be done at time of discharge  [x]  Referral to a Smoking Cessation Program, refused    Patient was counseled regarding smoking for 3-10 minutes      Milagros Nash NP  Pulmonology  [employer] [facility] ([facility])

## 2023-09-17 NOTE — PROGRESS NOTES
[facility] Tele[facility] ([facility])  Cardiology  Progress Note    Patient Name: Carmen Tan  MRN: [id number]  Admission Date: 9/9/2023  Hospital Length of Stay: 8 days  Code Status: Full Code   Attending Physician: James Smith MD   Primary Care Physician: Lucian Barry MD  Expected Discharge Date:   Principal Problem:Acute on chronic respiratory failure with hypoxia    Subjective:     Hospital Course:   9/10/23-Patient seen and examined. Stable and continue diuresis    9/11/23-Patient seen and examined today, lying in bed with CPAP on. Feels ok. SOB improving. No CP. Labs reviewed, creatinine 1.2.     9/12/23-Patient seen and examined today, resting with CPAP on. Continues to improve. Diuresing well. Labs reviewed/stable. Possible MPI stress test tmw.    9/13/23-Patient seen and examined today, sitting up in bed. SOB much improved, on supplemental O2. No CP. BNP trended down significantly. Labs reviewed, mild bump in BUN/creatinine, IV Lasix d/c'd. Stress test ordered (rest portion today).    9/14/23-Patient seen and examined today, on CPAP. Wheezing, unable to complete stress test today. Give additional dose of IV Lasix today. Labs reviewed.     9/15/23-Patient seen and examined today, wearing CPAP. Breathing improved. Sill wheezing, unable to complete stress test. Labs reviewed. CBC pending.     9/16/23- BP remains elevated this morning. Remains on CPAP. O2 sats are low 90s this morning. CT negative for PE. Lower extremity US negative for DVT. Continues to diurese well. -9L so far.      9/17/23 - feels better this AM sitting up, no wheezing on exam. Discussed Nuc stress tomorrow AM if continues to have stable resp status. Vitals and labs stable        Review of Systems   Constitutional: Positive for malaise/fatigue.   HENT: Negative.     Eyes: Negative.    Cardiovascular:  Positive for dyspnea on exertion.   Respiratory:  Positive for shortness of breath and wheezing.    Endocrine: Negative.     Hematologic/Lymphatic: Negative.    Skin: Negative.    Musculoskeletal:  Positive for arthritis and joint pain.   Gastrointestinal: Negative.    Genitourinary: Negative.    Neurological: Negative.    Psychiatric/Behavioral: Negative.     Allergic/Immunologic: Negative.      Objective:     Vital Signs (Most Recent):  Temp: 98.5 °F (36.9 °C) (09/17/23 1548)  Pulse: (!) 59 (09/17/23 1548)  Resp: 18 (09/17/23 1548)  BP: (!) 152/69 (09/17/23 1548)  SpO2: (!) 93 % (09/17/23 1548) Vital Signs (24h Range):  Temp:  [97.8 °F (36.6 °C)-98.6 °F (37 °C)] 98.5 °F (36.9 °C)  Pulse:  [51-88] 59  Resp:  [17-24] 18  SpO2:  [93 %-98 %] 93 %  BP: (131-152)/(60-69) 152/69     Weight: 116.6 kg (257 lb 0.9 oz)  Body mass index is 44.12 kg/m².     SpO2: (!) 93 %         Intake/Output Summary (Last 24 hours) at 9/17/2023 1746  Last data filed at 9/17/2023 1400  Gross per 24 hour   Intake --   Output 450 ml   Net -450 ml         Lines/Drains/Airways       Drain  Duration             Female External Urinary Catheter 09/10/23 0700 7 days              Peripheral Intravenous Line  Duration                  Peripheral IV - Single Lumen 09/15/23 2300 20 G Left Forearm 1 day                       Physical Exam  Vitals and nursing note reviewed.   Constitutional:       General: She is not in acute distress.     Appearance: She is well-developed. She is ill-appearing. She is not diaphoretic.      Comments: On supplemental O2   HENT:      Head: Normocephalic and atraumatic.   Eyes:      General:         Right eye: No discharge.         Left eye: No discharge.      Pupils: Pupils are equal, round, and reactive to light.   Neck:      Thyroid: No thyromegaly.      Vascular: No JVD.      Trachea: No tracheal deviation.   Cardiovascular:      Rate and Rhythm: Normal rate and regular rhythm.      Heart sounds: Normal heart sounds, S1 normal and S2 normal. No murmur heard.  Pulmonary:      Effort: Pulmonary effort is normal. No respiratory distress.       "Breath sounds: Rhonchi present. No rales.   Abdominal:      General: There is no distension.      Tenderness: There is no rebound.   Musculoskeletal:      Cervical back: Neck supple.      Right lower leg: No edema.      Left lower leg: No edema.   Skin:     General: Skin is warm and dry.      Findings: No erythema.   Neurological:      General: No focal deficit present.      Mental Status: She is alert and oriented to person, place, and time.   Psychiatric:         Mood and Affect: Mood normal.         Behavior: Behavior normal.         Thought Content: Thought content normal.            Significant Labs: CMP   Recent Labs   Lab 09/16/23  0543 09/17/23  0511    141   K 4.5 4.4   CL 96 99   CO2 33* 36*   * 78   BUN 44* 42*   CREATININE 1.1 1.0   CALCIUM 9.4 9.3   ANIONGAP 11 6*     , CBC   Recent Labs   Lab 09/17/23  0512   WBC 11.84   HGB 15.1   HCT 42.3        , Troponin No results for input(s): "TROPONINI" in the last 48 hours.  , and All pertinent lab results from the last 24 hours have been reviewed.    Significant Imaging: Echocardiogram: Transthoracic echo (TTE) complete (Cupid Only):   Results for orders placed or performed during the hospital encounter of 09/09/23   Echo   Result Value Ref Range    BSA 2.36 m2    LVOT stroke volume 82.38 cm3    LVIDd 5.03 3.5 - 6.0 cm    LV Systolic Volume 68.43 mL    LV Systolic Volume Index 30.7 mL/m2    LVIDs 3.96 2.1 - 4.0 cm    LV Diastolic Volume 120.14 mL    LV Diastolic Volume Index 53.87 mL/m2    IVS 1.11 (A) 0.6 - 1.1 cm    LVOT diameter 1.95 cm    LVOT area 3.0 cm2    FS 21 (A) 28 - 44 %    Left Ventricle Relative Wall Thickness 0.54 cm    Posterior Wall 1.35 (A) 0.6 - 1.1 cm    LV mass 244.31 g    LV Mass Index 110 g/m2    MV Peak E Berhane 0.93 m/s    TDI LATERAL 0.13 m/s    TDI SEPTAL 0.08 m/s    E/E' ratio 8.86 m/s    MV Peak A Berhane 0.90 m/s    TR Max Berhane 3.30 m/s    E/A ratio 1.03     IVRT 137.01 msec    E wave deceleration time 232.88 msec    " LV SEPTAL E/E' RATIO 11.63 m/s    LV LATERAL E/E' RATIO 7.15 m/s    LVOT peak berhane 1.35 m/s    Left Ventricular Outflow Tract Mean Velocity 0.87 cm/s    Left Ventricular Outflow Tract Mean Gradient 3.55 mmHg    LA size 3.64 cm    Left Atrium Major Axis 5.78 cm    Left Atrium Minor Axis 6.21 cm    RV mid diameter 5.15 cm    RVOT peak VTI 18.1 cm    RA Major Axis 4.94 cm    AV regurgitation pressure 1/2 time 850.300170260354422 ms    AR Max Berhane 4.57 m/s    AV mean gradient 10 mmHg    AV peak gradient 17 mmHg    Ao peak berhane 2.09 m/s    Ao VTI 39.30 cm    LVOT peak VTI 27.60 cm    AV valve area 2.10 cm²    AV Velocity Ratio 0.65     AV index (prosthetic) 0.70     KARYNA by Velocity Ratio 1.93 cm²    Mr max berhane 5.59 m/s    Triscuspid Valve Regurgitation Peak Gradient 44 mmHg    PV mean gradient 2 mmHg    RVOT peak berhane 0.82 m/s    Ao root annulus 3.18 cm    STJ 3.59 cm    Ascending aorta 3.46 cm    IVC diameter 2.31 cm    Mean e' 0.11 m/s    ZLVIDS -1.49     ZLVIDD -4.49     LA Volume Index 33.2 mL/m2    LA volume 74.10 cm3    LA WIDTH 4.0 cm    TAPSE 2.40 cm    RA Width 3.2 cm    TV resting pulmonary artery pressure 47 mmHg    RV TB RVSP 6 mmHg    Est. RA pres 3 mmHg    Narrative      Left Ventricle: The left ventricle is normal in size. Mildly increased   wall thickness. Normal wall motion. There is mildly reduced systolic   function with a visually estimated ejection fraction of 40 - 45%. There is   normal diastolic function.    Right Ventricle: Normal right ventricular cavity size. Wall thickness   is normal. Right ventricle wall motion  is normal. Systolic function is   normal.    Aortic Valve: There is mild to moderate aortic regurgitation with a   centrally directed jet.    Mitral Valve: There is moderate regurgitation.    Tricuspid Valve: There is moderate regurgitation with a centrally   directed jet.    Pulmonic Valve: There is mild to moderate regurgitation.    Pulmonary Artery: The estimated pulmonary  artery systolic pressure is   47 mmHg.    IVC/SVC: Normal venous pressure at 3 mmHg.     , EKG: REviewed, and X-Ray: CXR: X-Ray Chest 1 View (CXR): No results found for this visit on 09/09/23. and X-Ray Chest PA and Lateral (CXR): No results found for this visit on 09/09/23.    Assessment and Plan:     Brief HPI: stable/improved breathing, nuc stress tomorrow AM if no further wheezing     * Acute on chronic respiratory failure with hypoxia  Patient with Hypercapnic and Hypoxic Respiratory failure which is Acute on chronic.  she is not on home oxygen. Supplemental oxygen was provided and noted- Oxygen Concentration (%):  [30] 30    .   Signs/symptoms of respiratory failure include- tachypnea. Contributing diagnoses includes - COPD Labs and images were reviewed. Patient Has recent ABG, which has been reviewed. Will treat underlying causes and adjust management of respiratory failure as follows- hypercapnic    9/11/23  -Mgmt as per hospital medicine  -Continue IV diuresis for additional day    9/12/23  -Continue same mgmt/IV diuresis     9/13/23  -Much improved, BNP trended down, d/c IV Lasix  -Reassess in AM    9/14/23  -Using CPAP, wheezing this AM  -Continue steroids  -Give additional dose of IV Lasix    9/15/23  -Unfortunately still wheezing  -Additional dose of IV Lasix today  -Continue steroids    9/16/23  -Remains on CPAP  - O2 sats are low 90s this morning  - CT negative for PE. Lower extremity   -US negative for DVT  -Continues to diurese well. -9L so far.     D-dimer, elevated  Neg PE    Elevated troponin  Trend  Cardiac echo results pending    9/11/23  -Flat  -No CP symptoms  -Suspect elevation due to demand ischemia from fluid overload  -Echo showed EF of 40-45%, mild-mod AI  -Continue Coreg, Entresto, ASA  -Can consider ischemic workup IP vs OP once compensated    9/12/23  -Ok to d/c heparin gtt  -Continue Coreg, Entresto, ASA  -Probable MPI stress test tmw    9/13/23  -CP free  -Continue ASA, Coreg,  Entresto  -MPI stress test pending    9/14/23  -Wheezing today will re-attempt stress tmw    9/15/23  -Still wheezing, re-attempt stress test tmw, may have to be done as OP    9/17/23 - nuc stress tomorrow AM if breathing stable     Hypertension  Continue coreg, entresto    9/16/23  - BP remains elevated this morning      Acute cystitis without hematuria  treated    Acute on chronic combined systolic and diastolic congestive heart failure  Patient is identified as having Diastolic (HFpEF) heart failure that is Acute on chronic. CHF is currently uncontrolled due to Continued edema of extremities. Latest ECHO performed and demonstrates- Results for orders placed during the hospital encounter of 03/29/23    Echo    Interpretation Summary  · The left ventricle is normal in size with moderate concentric hypertrophy and mildly decreased systolic function.  · The estimated ejection fraction is 45%.  · Grade I left ventricular diastolic dysfunction.  · Moderate right ventricular enlargement.  · Moderate right atrial enlargement.  · Mild-to-moderate aortic regurgitation.  · There is mild aortic valve stenosis.  · Aortic valve area is 1.44 cm2; peak velocity is 2.09 m/s; mean gradient is 10 mmHg.  · Mild-to-moderate mitral regurgitation.  · Moderate tricuspid regurgitation.  · Moderate pulmonic regurgitation.  · The estimated PA systolic pressure is 69 mmHg.  · There is moderate pulmonary hypertension.  · Moderate left atrial enlargement.  . Continue Beta Blocker and Furosemide and monitor clinical status closely. Monitor on telemetry. Patient is on CHF pathway.  Monitor strict Is&Os and daily weights.  Place on fluid restriction of 2 L. Cardiology has been consulted. Continue to stress to patient importance of self efficacy and  on diet for CHF. Last BNP reviewed- and noted below   Recent Labs   Lab 09/13/23  0902   *       9/11/23  -Improving  -Continue IV diuresis  -Continue BB, Entresto  -Repeat BNP in  AM    9/12/23  -Continue same meds/mgmt  -Continue IV diuresis, BB, Entresto  -Reassess in AM    9/13/23  -Volume status much improved, BNP trended down significantly  -Stop IV Lasix  -Continue BB, Entresto  -Reassess in AM  -MPI stress test ordered (rest part today)    9/14/23  -Give additional dose of IV Lasix today  -Continue BB, Entresto    9/15/23  -Give additional dose of IV Lasix today  -Continue BB, Entresto      COPD exacerbation  -Mgmt as per hospital medicine           Severe obesity (BMI >= 40)  -Weight loss    Paroxysmal A-fib  -AC previously d/c'd in 4/23 due to hematuria  -Need to readdress prior to d/c    9/15/23  -Discussed with Dr. Howell, will initiate full dose Lovenox and monitor H/H          VTE Risk Mitigation (From admission, onward)         Ordered     enoxaparin injection 120 mg  Every 12 hours         09/16/23 1259     IP VTE HIGH RISK PATIENT  Once         09/09/23 0447     Place sequential compression device  Until discontinued         09/09/23 0447                Alexander Howell Md, MD  Cardiology  [facility] - Telemetry ([facility])

## 2023-09-17 NOTE — ASSESSMENT & PLAN NOTE
Patient's FSGs are controlled on current medication regimen.  Last A1c reviewed-   Lab Results   Component Value Date    HGBA1C 5.3 09/09/2023    HGBA1C 5.3 09/09/2023     Most recent fingerstick glucose reviewed-   Recent Labs   Lab 09/16/23  1726 09/16/23  2251 09/17/23  0528 09/17/23  1117   POCTGLUCOSE 150* 114* 86 102     Current correctional scale  Low  Maintain anti-hyperglycemic dose as follows-   Antihyperglycemics (From admission, onward)    Start     Stop Route Frequency Ordered    09/09/23 0545  insulin aspart U-100 pen 0-5 Units         -- SubQ Before meals & nightly PRN 09/09/23 0447        Hold Oral hypoglycemics while patient is in the hospital.

## 2023-09-17 NOTE — PLAN OF CARE
A231/A231 JAKE Tan is a 73 y.o.female admitted on 9/9/2023 for Acute on chronic respiratory failure with hypoxia   Code Status: Full Code MRN: [id number]   Review of patient's allergies indicates:   Allergen Reactions    Pcn [penicillins] Swelling     Past Medical History:   Diagnosis Date    Abdominal hernia     Bacteremia 4/5/2023    CHF (congestive heart failure)     COPD (chronic obstructive pulmonary disease)     Diabetes mellitus, type 2 2/16/2022    GERD (gastroesophageal reflux disease)     History of home oxygen therapy     Hypertension     Hypertensive emergency 3/30/2023    Migraine headache     Pulmonary embolism 06/01/2017    Small bowel obstruction     Thyroid disease       PRN meds    acetaminophen, 650 mg, Q6H PRN  dextrose 10%, 12.5 g, PRN  dextrose 10%, 25 g, PRN  glucagon (human recombinant), 1 mg, PRN  glucose, 16 g, PRN  glucose, 24 g, PRN  hydrALAZINE, 10 mg, Q4H PRN  insulin aspart U-100, 0-5 Units, QID (AC + HS) PRN  melatonin, 6 mg, Nightly PRN  ondansetron, 4 mg, Q8H PRN  sodium chloride 0.9%, 10 mL, PRN      Bipap in use. Buspar restarted. Medications given as scheduled. NPO at midnight.Stress test scheduled Monday, 9/18. Chart check completed. Will continue plan of care.      Orientation: oriented x 4  Keron Coma Scale Score: 15     Lead Monitored: Lead II Rhythm: sinus bradycardia    Cardiac/Telemetry Box Number: [id number]  VTE Required Core Measure: Pharmacological prophylaxis initiated/maintained Last Bowel Movement: 09/16/23  Diet diabetic [facility]; 2000 Calorie; Cardiac (Low Na/Chol); Fluid - 1500mL  Voiding Characteristics: external catheter  Adan Score: 20  Fall Risk Score: 17  Accucheck []   Freq?      Lines/Drains/Airways       Drain  Duration             Female External Urinary Catheter 09/10/23 0700 7 days              Peripheral Intravenous Line  Duration                  Peripheral IV - Single Lumen 09/15/23 2300 20 G Left Forearm 1 day

## 2023-09-17 NOTE — SUBJECTIVE & OBJECTIVE
Review of Systems   Constitutional: Positive for malaise/fatigue.   HENT: Negative.     Eyes: Negative.    Cardiovascular:  Positive for dyspnea on exertion.   Respiratory:  Positive for shortness of breath and wheezing.    Endocrine: Negative.    Hematologic/Lymphatic: Negative.    Skin: Negative.    Musculoskeletal:  Positive for arthritis and joint pain.   Gastrointestinal: Negative.    Genitourinary: Negative.    Neurological: Negative.    Psychiatric/Behavioral: Negative.     Allergic/Immunologic: Negative.      Objective:     Vital Signs (Most Recent):  Temp: 98.5 °F (36.9 °C) (09/17/23 1548)  Pulse: (!) 59 (09/17/23 1548)  Resp: 18 (09/17/23 1548)  BP: (!) 152/69 (09/17/23 1548)  SpO2: (!) 93 % (09/17/23 1548) Vital Signs (24h Range):  Temp:  [97.8 °F (36.6 °C)-98.6 °F (37 °C)] 98.5 °F (36.9 °C)  Pulse:  [51-88] 59  Resp:  [17-24] 18  SpO2:  [93 %-98 %] 93 %  BP: (131-152)/(60-69) 152/69     Weight: 116.6 kg (257 lb 0.9 oz)  Body mass index is 44.12 kg/m².     SpO2: (!) 93 %         Intake/Output Summary (Last 24 hours) at 9/17/2023 1746  Last data filed at 9/17/2023 1400  Gross per 24 hour   Intake --   Output 450 ml   Net -450 ml         Lines/Drains/Airways       Drain  Duration             Female External Urinary Catheter 09/10/23 0700 7 days              Peripheral Intravenous Line  Duration                  Peripheral IV - Single Lumen 09/15/23 2300 20 G Left Forearm 1 day                       Physical Exam  Vitals and nursing note reviewed.   Constitutional:       General: She is not in acute distress.     Appearance: She is well-developed. She is ill-appearing. She is not diaphoretic.      Comments: On supplemental O2   HENT:      Head: Normocephalic and atraumatic.   Eyes:      General:         Right eye: No discharge.         Left eye: No discharge.      Pupils: Pupils are equal, round, and reactive to light.   Neck:      Thyroid: No thyromegaly.      Vascular: No JVD.      Trachea: No tracheal  "deviation.   Cardiovascular:      Rate and Rhythm: Normal rate and regular rhythm.      Heart sounds: Normal heart sounds, S1 normal and S2 normal. No murmur heard.  Pulmonary:      Effort: Pulmonary effort is normal. No respiratory distress.      Breath sounds: Rhonchi present. No rales.   Abdominal:      General: There is no distension.      Tenderness: There is no rebound.   Musculoskeletal:      Cervical back: Neck supple.      Right lower leg: No edema.      Left lower leg: No edema.   Skin:     General: Skin is warm and dry.      Findings: No erythema.   Neurological:      General: No focal deficit present.      Mental Status: She is alert and oriented to person, place, and time.   Psychiatric:         Mood and Affect: Mood normal.         Behavior: Behavior normal.         Thought Content: Thought content normal.            Significant Labs: CMP   Recent Labs   Lab 09/16/23  0543 09/17/23  0511    141   K 4.5 4.4   CL 96 99   CO2 33* 36*   * 78   BUN 44* 42*   CREATININE 1.1 1.0   CALCIUM 9.4 9.3   ANIONGAP 11 6*     , CBC   Recent Labs   Lab 09/17/23  0512   WBC 11.84   HGB 15.1   HCT 42.3        , Troponin No results for input(s): "TROPONINI" in the last 48 hours.  , and All pertinent lab results from the last 24 hours have been reviewed.    Significant Imaging: Echocardiogram: Transthoracic echo (TTE) complete (Cupid Only):   Results for orders placed or performed during the hospital encounter of 09/09/23   Echo   Result Value Ref Range    BSA 2.36 m2    LVOT stroke volume 82.38 cm3    LVIDd 5.03 3.5 - 6.0 cm    LV Systolic Volume 68.43 mL    LV Systolic Volume Index 30.7 mL/m2    LVIDs 3.96 2.1 - 4.0 cm    LV Diastolic Volume 120.14 mL    LV Diastolic Volume Index 53.87 mL/m2    IVS 1.11 (A) 0.6 - 1.1 cm    LVOT diameter 1.95 cm    LVOT area 3.0 cm2    FS 21 (A) 28 - 44 %    Left Ventricle Relative Wall Thickness 0.54 cm    Posterior Wall 1.35 (A) 0.6 - 1.1 cm    LV mass 244.31 g    LV " Mass Index 110 g/m2    MV Peak E Berhane 0.93 m/s    TDI LATERAL 0.13 m/s    TDI SEPTAL 0.08 m/s    E/E' ratio 8.86 m/s    MV Peak A Berhane 0.90 m/s    TR Max Berhane 3.30 m/s    E/A ratio 1.03     IVRT 137.01 msec    E wave deceleration time 232.88 msec    LV SEPTAL E/E' RATIO 11.63 m/s    LV LATERAL E/E' RATIO 7.15 m/s    LVOT peak berhane 1.35 m/s    Left Ventricular Outflow Tract Mean Velocity 0.87 cm/s    Left Ventricular Outflow Tract Mean Gradient 3.55 mmHg    LA size 3.64 cm    Left Atrium Major Axis 5.78 cm    Left Atrium Minor Axis 6.21 cm    RV mid diameter 5.15 cm    RVOT peak VTI 18.1 cm    RA Major Axis 4.94 cm    AV regurgitation pressure 1/2 time 850.819620949631049 ms    AR Max Berhane 4.57 m/s    AV mean gradient 10 mmHg    AV peak gradient 17 mmHg    Ao peak berhane 2.09 m/s    Ao VTI 39.30 cm    LVOT peak VTI 27.60 cm    AV valve area 2.10 cm²    AV Velocity Ratio 0.65     AV index (prosthetic) 0.70     KARYNA by Velocity Ratio 1.93 cm²    Mr max berhane 5.59 m/s    Triscuspid Valve Regurgitation Peak Gradient 44 mmHg    PV mean gradient 2 mmHg    RVOT peak berhane 0.82 m/s    Ao root annulus 3.18 cm    STJ 3.59 cm    Ascending aorta 3.46 cm    IVC diameter 2.31 cm    Mean e' 0.11 m/s    ZLVIDS -1.49     ZLVIDD -4.49     LA Volume Index 33.2 mL/m2    LA volume 74.10 cm3    LA WIDTH 4.0 cm    TAPSE 2.40 cm    RA Width 3.2 cm    TV resting pulmonary artery pressure 47 mmHg    RV TB RVSP 6 mmHg    Est. RA pres 3 mmHg    Narrative      Left Ventricle: The left ventricle is normal in size. Mildly increased   wall thickness. Normal wall motion. There is mildly reduced systolic   function with a visually estimated ejection fraction of 40 - 45%. There is   normal diastolic function.    Right Ventricle: Normal right ventricular cavity size. Wall thickness   is normal. Right ventricle wall motion  is normal. Systolic function is   normal.    Aortic Valve: There is mild to moderate aortic regurgitation with a   centrally directed jet.     Mitral Valve: There is moderate regurgitation.    Tricuspid Valve: There is moderate regurgitation with a centrally   directed jet.    Pulmonic Valve: There is mild to moderate regurgitation.    Pulmonary Artery: The estimated pulmonary artery systolic pressure is   47 mmHg.    IVC/SVC: Normal venous pressure at 3 mmHg.     , EKG: REviewed, and X-Ray: CXR: X-Ray Chest 1 View (CXR): No results found for this visit on 09/09/23. and X-Ray Chest PA and Lateral (CXR): No results found for this visit on 09/09/23.

## 2023-09-17 NOTE — ASSESSMENT & PLAN NOTE
IV Steroids- transition to oral dosing   LABA. CITLALY. ICS neb txs  -supplemental oxygen at home dose of 4 L  -CPAP in place while napping and nightly-CPAP transitioned to BIPAP as needed for continued respiratory support   -will initiate Buspar for anxiety   -ABG reviewed   -Pulmonology following

## 2023-09-17 NOTE — ASSESSMENT & PLAN NOTE
- D-dimer 3.35  -CTA of chest negative for PE  -US of BLE negative for DVT  -pt taken off anticoagulation due to hematuria   - pt on ASA   -Lovenox BID continued- Cardiology following

## 2023-09-17 NOTE — SUBJECTIVE & OBJECTIVE
Carmen Tan is a 73-year-old female history of chronic hypercapnic and hypoxic respiratory failure on 4 L nasal cannula, diabetes mellitus, chronic diastolic heart failure, prior PE, and continued tobacco abuse who presented with shortness of breath.  Patient presented with an elevated BNP and admitted for aggressive diuretics and Cardiology consult. She was also given antibiotics for a urinary tract infection. She was also started on nebulizers and steroids.  Patient was hospitalized multiple times in April for similar.  Patient states she does have a machine at home but uncertain the type of device (CPAP, BiPAP, AVAPS). She reports she gets her supplies / equipment from her [employer]. She also is on 4 L nasal cannula.  She reports wearing her device at home when sleeping, as well as, when napping several hours a day.  Patient is currently on prednisone and nebs.  Patient takes inhaler Dulera at home. She continues to smoke 1 pack per day.  She has not seen a pulmonologist in the past by reports.  I do see pulmonology telephone message from 2017, she states she is not had pulmonary function test.  She currently is on 4 L nasal cannula.  She is currently wearing a BiPAP machine and states she put it back on secondary to wanting to take a nap.  She currently denies wheezing.  She is had a slight cough.  She is been getting diuretics per Cardiology.  Pulmonary consult for assistance with her COPD and chronic respiratory failure.    Interval history:  9/17: Sitting up on side of bed. Persistent complaints of shortness of breath but reports she is at her baseline. Remains on 4L/NC. No acute complaints.   Objective:     Vital Signs (Most Recent):  Temp: 98.6 °F (37 °C) (09/17/23 1202)  Pulse: (!) 58 (09/17/23 1358)  Resp: (!) 24 (09/17/23 1358)  BP: 131/60 (09/17/23 1202)  SpO2: (!) 93 % (09/17/23 1358) Vital Signs (24h Range):  Temp:  [97.8 °F (36.6 °C)-98.6 °F (37 °C)] 98.6 °F (37 °C)  Pulse:  [50-88]  58  Resp:  [17-27] 24  SpO2:  [92 %-98 %] 93 %  BP: (131-146)/(60-84) 131/60   Weight: 116.6 kg (257 lb 0.9 oz); Body mass index is 44.12 kg/m².    No intake or output data in the 24 hours ending 09/17/23 1400     Physical Exam  Vitals and nursing note reviewed.   Constitutional:       Appearance: She is obese.   HENT:      Head: Normocephalic.   Eyes:      Conjunctiva/sclera: Conjunctivae normal.   Cardiovascular:      Rate and Rhythm: Normal rate.   Pulmonary:      Breath sounds: Wheezing present.   Abdominal:      Palpations: Abdomen is soft.   Musculoskeletal:         General: Normal range of motion.      Cervical back: Normal range of motion and neck supple.   Skin:     General: Skin is warm and dry.   Neurological:      Mental Status: She is alert and oriented to person, place, and time.   Psychiatric:         Mood and Affect: Mood normal.         Review of Systems: Negative except as indicated in HPI    Significant Labs:  CBC/Anemia Profile:  Recent Labs   Lab 09/17/23  0512   WBC 11.84   HGB 15.1   HCT 42.3      MCV 98   RDW 16.7*   Chemistries:  Recent Labs   Lab 09/16/23  0543 09/17/23  0511    141   K 4.5 4.4   CL 96 99   CO2 33* 36*   BUN 44* 42*   CREATININE 1.1 1.0   CALCIUM 9.4 9.3

## 2023-09-17 NOTE — ASSESSMENT & PLAN NOTE
Assistance with smoking cessation was offered, including:  [x]  Medications, refused  [x]  Counseling  [x]  Printed Information on Smoking Cessation, to be done at time of discharge  [x]  Referral to a Smoking Cessation Program, refused    Patient was counseled regarding smoking for 3-10 minutes

## 2023-09-17 NOTE — ASSESSMENT & PLAN NOTE
Patient with chronic hypercapnic and hypoxic respiratory failure who is on home oxygen at 4 LPM which she is currently receiving while inpatient. Signs/symptoms of respiratory failure include dyspnea. Contributing diagnoses includes CHF. Labs and images were reviewed. Patient Has recent ABG, which has been reviewed.     Will treat underlying causes and adjust management of respiratory failure as follows:  · likely multi factorial with a reduced EF, morbid obese   · Reported history of COPD per the records, but no PFTs on record  · States she is not followed by pulmonologist  · Continued smoking dependence; counseled on cessation  · Continue diuretics; monitor I&O trends  · Continue BiPAP qHS  · May benefit from titration study upon discharge to optimize nocturnal ventilation  · Taper prednisone  · Establish outpatient pulmonary follow-up upon discharge, needs PFTs likely has restrictive component too  · Referral sent to pulmonary clinic  · May would benefit from cardio-pulmonary rehab  · Resume home Dulera at discharge

## 2023-09-17 NOTE — ASSESSMENT & PLAN NOTE
Patient with Hypoxic Respiratory failure which is Acute on chronic.  she is on home oxygen at 4 LPM. Supplemental oxygen was provided and noted- Oxygen Concentration (%):  [30] 30    .   Signs/symptoms of respiratory failure include- increased work of breathing. Contributing diagnoses includes - CHF and COPD Labs and images were reviewed. Patient has had an ABG that was recently reviewed. Will treat underlying causes and adjust management of respiratory failure   -Pulmonology following   -CPAP transitioned to BIPAP for respiratory support as needed

## 2023-09-17 NOTE — PROGRESS NOTES
[facility] - Tele[facility] ([facility])  [facility] Medicine  Progress Note    Patient Name: Carmen Tan  MRN: [id number]  Patient Class: IP- Inpatient   Admission Date: 9/9/2023  Length of Stay: 8 days  Attending Physician: James Smith MD  Primary Care Provider: Lucian Barry MD        Subjective:     Principal Problem:Acute on chronic respiratory failure with hypoxia        HPI:  The patient is a 73 year old female with DM, COPD on home oxygen 4 liters, Diastolic CHF, HTN, hx PE who presented to [facility] ED with Generalized weakness, Fatigue, SOB, and Dysuria - onset 2 days ago that progressively worsened. Pt also reports BLE edema that worsened over the past week.     n the ED, CXR shows chronic scarring no focal consolidation. Elevated troponin of 200, elevated BNP. Labs otherwise unremarkable. COVID negative. Patient denied any chest pain, EKG non-ischemia. Patient given Aspirin 325 mg, Lovenox 1mg/kg, and Lasix 40 IV x1 as well as a breathing treatment. Po Bactrim for UTI    The patient was transferred to [facility] for admission under hospital medicine and for cardiology consultation       Overview/Hospital Course:  Patient sitting on side of the bed eating. No distress noted. Reports feeling much better. On continuous oxygen. Diuresed well overnight. Will continue to diurese.  On 9/11/23, diuresis continued. Cardiology following with inpatient vs outpatient ischemic work up to be evaluated. Troponin remains elevated with downward trend noted. Urine culture grew E. Coli (50,000-99,999) with sensitivities reviewed and antibiotics continued. Echo on 9/9/23 showed EF 40-45%  mildly reduced systolic function- mild to moderate aortic/pulmonic regurgitation with a centrally directed jet. Mitral/Tricuspid moderate regurgitation. BIPAP placed as needed for comfort and respiratory support. On 9/12/23, diuresis continued with 4.8 L removed. BIPAP continued nightly and as needed. Stress test planned for tomorrow per  Cardiology. On 9/13/23, pt verbalized symptom improvement with IV diuresis completed. Respiratory support maintained on home oxygen dose of 4L NC. Resting portion of stress test performed today with second portion to be completed on tomorrow. On 9/14/23, pt was unable to complete second portion of stress test due to wheezing. IV Lasix continued. Pt verbalized symptom improvement with CPAP in place. Stress test to be attempted in am. On 9/15/23, stress test not completed due to continued wheezing. Diuresis continued and D-dimer of 3.35 noted. Lovenox initiated at therapeutic dose per Cardiology. CTA of chest and US of BLE pending. Oxygen requirements supported with nasal cannula at home dose and CPAP. May complete stress test outpatient post acute event pending imaging results. On 9/16/23, CTA of chest showed no pulmonary embolism, no dissection, and no pneumonia.  Cardiomegaly.  Mild basilar atelectasis. Incentive spirometry encouraged. US of BLE showed no obvious signs of DVT in bilateral lower extremity. Lovenox at therapeutic dose held. IV steroids transitioned to oral dosing. On 9/17/23, pt improving slowly with BIPAP as needed for respiratory support. Pulmonology consulted. ABG results reviewed. Stress test to be completed on 9/18/23 pending wheezing and respiratory status per Cardiology. Pt counseled on smoking cessation and dangers of smoking while wearing supplemental oxygen with understanding verbalized.          Interval History: pt sitting up to side of bed on 4L NC with BIPAP as needed for respiratory support. Smoking cessation discussed with understanding verbalized. Stress test to be attempted on tomorrow pending respiratory status per Cardiology.  Pulmonology and Cardiology following.     Review of Systems   Constitutional:  Positive for activity change and fatigue. Negative for appetite change.   HENT:  Negative for congestion, postnasal drip, sore throat and trouble swallowing.    Respiratory:   Positive for shortness of breath and wheezing.    Cardiovascular:  Positive for leg swelling. Negative for chest pain.   Gastrointestinal:  Negative for abdominal distention, abdominal pain, nausea and vomiting.   Genitourinary:  Negative for difficulty urinating, dysuria, flank pain, frequency and urgency.   Musculoskeletal:  Negative for arthralgias, back pain and myalgias.   Skin:  Negative for color change and wound.   Neurological:  Positive for weakness. Negative for dizziness and headaches.   Psychiatric/Behavioral:  Negative for agitation, confusion and sleep disturbance. The patient is nervous/anxious.      Objective:     Vital Signs (Most Recent):  Temp: 98.6 °F (37 °C) (09/17/23 1202)  Pulse: (!) 58 (09/17/23 1202)  Resp: (!) 188 (09/17/23 1202)  BP: 131/60 (09/17/23 1202)  SpO2: (!) 93 % (09/17/23 1202) Vital Signs (24h Range):  Temp:  [97.8 °F (36.6 °C)-98.6 °F (37 °C)] 98.6 °F (37 °C)  Pulse:  [50-88] 58  Resp:  [] 188  SpO2:  [92 %-98 %] 93 %  BP: (131-146)/(60-84) 131/60     Weight: 116.6 kg (257 lb 0.9 oz)  Body mass index is 44.12 kg/m².  No intake or output data in the 24 hours ending 09/17/23 1227      Physical Exam  HENT:      Nose: Nose normal.      Mouth/Throat:      Pharynx: Oropharynx is clear.   Cardiovascular:      Rate and Rhythm: Normal rate and regular rhythm.   Pulmonary:      Effort: Tachypnea present.      Breath sounds: Normal breath sounds. No wheezing.   Abdominal:      General: Bowel sounds are normal. There is no distension.      Palpations: Abdomen is soft.      Tenderness: There is no abdominal tenderness.   Genitourinary:     Comments: Deferred   Musculoskeletal:         General: Normal range of motion.      Cervical back: Normal range of motion.   Skin:     General: Skin is warm and dry.   Neurological:      Mental Status: She is alert and oriented to person, place, and time.   Psychiatric:         Mood and Affect: Mood normal.         Behavior: Behavior normal.              Significant Labs: All pertinent labs within the past 24 hours have been reviewed.  CBC:   Recent Labs   Lab 09/17/23  0512   WBC 11.84   HGB 15.1   HCT 42.3        CMP:   Recent Labs   Lab 09/16/23  0543 09/17/23  0511    141   K 4.5 4.4   CL 96 99   CO2 33* 36*   * 78   BUN 44* 42*   CREATININE 1.1 1.0   CALCIUM 9.4 9.3   ANIONGAP 11 6*       Significant Imaging: I have reviewed all pertinent imaging results/findings within the past 24 hours.      Assessment/Plan:      * Acute on chronic respiratory failure with hypoxia  Patient with Hypoxic Respiratory failure which is Acute on chronic.  she is on home oxygen at 4 LPM. Supplemental oxygen was provided and noted- Oxygen Concentration (%):  [30] 30    .   Signs/symptoms of respiratory failure include- increased work of breathing. Contributing diagnoses includes - CHF and COPD Labs and images were reviewed. Patient has had an ABG that was recently reviewed. Will treat underlying causes and adjust management of respiratory failure   -Pulmonology following   -CPAP transitioned to BIPAP for respiratory support as needed     Tobacco abuse  -pt smokes 1 ppd  -states she has been smoking since 13 years of age   -pt counseled on smoking cessation and dangers of smoking while wearing supplemental oxygen with understanding verbalized      D-dimer, elevated  - D-dimer 3.35  -CTA of chest negative for PE  -US of BLE negative for DVT  -pt taken off anticoagulation due to hematuria   - pt on ASA   -Lovenox BID continued- Cardiology following     Elevated troponin  Serial troponin   Cardiology recommended Lovenox 1mg/kg to ED provider   Will start IV Heparin  cont ASA, BB, Statin, Entresto   Echo reviewed with results showing  mildly reduced systolic function with a visually estimated ejection fraction of 40 - 45%. There is normal diastolic function. Mild to moderate aortic regurgitation with a centrally directed jet. Moderate mitral regurgitation.  Moderate tricuspid regurgitation with a centrally directed jet. Mild to moderate pulmonic regurgitation. Pulmonary artery systolic pressure is 47 mmHg.    Consult cardiology     9/10/23  Denies chest pain.   Troponin trending down  Continue current meds     -9/11/23- downward trend   -9/12/23- Stress test to be planned for tomorrow   -9/13/23- resting portion of stress test completed   -9/14/23- 2nd portion of stress test not completed due to wheezing- IV Lasix given - repeat analysis to be attempted on 9/15/23  -9/15/23- stress test not completed secondary to symptoms- D-dimer elevated with CTA chest and US of BLE pending. Troponin normalized   -9/16/23- CTA chest and US of BLE negative for PE/DVT.   -9/17/23- diuresis in progress- Lovenox BID continued- Cardiology following       Hypertension  BP stable  Cont home meds Coreg and Entresto  Monitor     Acute cystitis without hematuria  IV Rocephin completed   Blood cx remain negative  Urine culture grew E. Coli (50,000-99,999)- sensitivities noted         Diabetes mellitus, type 2  Patient's FSGs are controlled on current medication regimen.  Last A1c reviewed-   Lab Results   Component Value Date    HGBA1C 5.3 09/09/2023    HGBA1C 5.3 09/09/2023     Most recent fingerstick glucose reviewed-   Recent Labs   Lab 09/16/23  1726 09/16/23  2251 09/17/23  0528 09/17/23  1117   POCTGLUCOSE 150* 114* 86 102     Current correctional scale  Low  Maintain anti-hyperglycemic dose as follows-   Antihyperglycemics (From admission, onward)    Start     Stop Route Frequency Ordered    09/09/23 0545  insulin aspart U-100 pen 0-5 Units         -- SubQ Before meals & nightly PRN 09/09/23 0447        Hold Oral hypoglycemics while patient is in the hospital.    Acute on chronic combined systolic and diastolic congestive heart failure  Patient is identified as having Diastolic (HFpEF) heart failure that is Acute on chronic. CHF is currently uncontrolled due to Rales/crackles on  pulmonary exam and Pulmonary edema/pleural effusion on CXR. Latest ECHO performed and demonstrates- Results for orders placed during the hospital encounter of 03/29/23    Echo    Interpretation Summary  · The left ventricle is normal in size with moderate concentric hypertrophy and mildly decreased systolic function.  · The estimated ejection fraction is 45%.  · Grade I left ventricular diastolic dysfunction.  · Moderate right ventricular enlargement.  · Moderate right atrial enlargement.  · Mild-to-moderate aortic regurgitation.  · There is mild aortic valve stenosis.  · Aortic valve area is 1.44 cm2; peak velocity is 2.09 m/s; mean gradient is 10 mmHg.  · Mild-to-moderate mitral regurgitation.  · Moderate tricuspid regurgitation.  · Moderate pulmonic regurgitation.  · The estimated PA systolic pressure is 69 mmHg.  · There is moderate pulmonary hypertension.  · Moderate left atrial enlargement.  . Continue Beta Blocker, Furosemide, Aldactone and ARNI and monitor clinical status closely. Monitor on telemetry. Patient is on CHF pathway.  Monitor strict Is&Os and daily weights.  Place on fluid restriction of 1.5 L. Cardiology has been consulted. Continue to stress to patient importance of self efficacy and  on diet for CHF. Last BNP reviewed- and noted below   Recent Labs   Lab 09/15/23  1249   BNP 78     9/10/23  -Diuresing well  -Continue IV lasix      9/11/23- diuresis continued- BIPAP as needed for support   -9/12/23- diuresis continued- 4.8 L removed    -9/13/23- diuresis completed- 7.1 L removed   -9/14/23- IV Lasix given   -9/15/23- diuresis continued- 7.8L removed   -9/16/23- BNP and Troponin negative   -9/17/23- 9L removed via diuresis- Cardiology following     COPD exacerbation  IV Steroids- transition to oral dosing   LABA. CITLALY. ICS neb txs  -supplemental oxygen at home dose of 4 L  -CPAP in place while napping and nightly-CPAP transitioned to BIPAP as needed for continued respiratory support    -will initiate Buspar for anxiety   -ABG reviewed   -Pulmonology following       Weakness  -PT/OT evaluation with home health recommended  - consulted to assist with discharge planning       Severe obesity (BMI >= 40)  Body mass index is 44.12 kg/m². Morbid obesity complicates all aspects of disease management from diagnostic modalities to treatment. Weight loss encouraged and health benefits explained to patient.         Paroxysmal A-fib  Patient with Paroxysmal (<7 days) atrial fibrillation which is controlled currently with Beta Blocker. Patient is currently in sinus rhythm.[id number] Score: 3. HASBLED Score: 3. Anticoagulation per Heparin infusion completed-hx of intolerance of anticoagulation noted due to gross hematuria while taking Xarelto in 4/2023 with need for anticoagulation therapy to be evaluated and reviewed outpatient with vascular specialist.  per chart review (4/3/23-4/11/23)  -ASA continued    -Lovenox BID continued         VTE Risk Mitigation (From admission, onward)         Ordered     enoxaparin injection 120 mg  Every 12 hours         09/16/23 1259     IP VTE HIGH RISK PATIENT  Once         09/09/23 0447     Place sequential compression device  Until discontinued         09/09/23 0447                Discharge Planning   TIGRE:      Code Status: Full Code   Is the patient medically ready for discharge?:     Reason for patient still in hospital (select all that apply): Patient trending condition, Treatment, Imaging and Consult recommendations  Discharge Plan A: Home                  Corrina Herrmann NP  Department of Hospital Medicine   [facility] - Telemetry ([facility])

## 2023-09-18 LAB
ANION GAP SERPL CALC-SCNC: 6 MMOL/L (ref 8–16)
BNP SERPL-MCNC: 79 PG/ML (ref 0–99)
BUN SERPL-MCNC: 35 MG/DL (ref 8–23)
CALCIUM SERPL-MCNC: 9.6 MG/DL (ref 8.7–10.5)
CHLORIDE SERPL-SCNC: 100 MMOL/L (ref 95–110)
CO2 SERPL-SCNC: 37 MMOL/L (ref 23–29)
CREAT SERPL-MCNC: 1 MG/DL (ref 0.5–1.4)
EST. GFR  (NO RACE VARIABLE): 60 ML/MIN/1.73 M^2
GLUCOSE SERPL-MCNC: 70 MG/DL (ref 70–110)
POCT GLUCOSE: 109 MG/DL (ref 70–110)
POCT GLUCOSE: 163 MG/DL (ref 70–110)
POCT GLUCOSE: 80 MG/DL (ref 70–110)
POTASSIUM SERPL-SCNC: 5.2 MMOL/L (ref 3.5–5.1)
SODIUM SERPL-SCNC: 143 MMOL/L (ref 136–145)

## 2023-09-18 PROCEDURE — 63600175 PHARM REV CODE 636 W HCPCS: Performed by: NURSE PRACTITIONER

## 2023-09-18 PROCEDURE — 25000003 PHARM REV CODE 250: Performed by: NURSE PRACTITIONER

## 2023-09-18 PROCEDURE — 27100171 HC OXYGEN HIGH FLOW UP TO 24 HOURS

## 2023-09-18 PROCEDURE — 25000242 PHARM REV CODE 250 ALT 637 W/ HCPCS: Performed by: NURSE PRACTITIONER

## 2023-09-18 PROCEDURE — 99232 SBSQ HOSP IP/OBS MODERATE 35: CPT | Mod: GW,HPC,,

## 2023-09-18 PROCEDURE — 97530 THERAPEUTIC ACTIVITIES: CPT

## 2023-09-18 PROCEDURE — 80048 BASIC METABOLIC PNL TOTAL CA: CPT | Performed by: NURSE PRACTITIONER

## 2023-09-18 PROCEDURE — 99900035 HC TECH TIME PER 15 MIN (STAT)

## 2023-09-18 PROCEDURE — 97110 THERAPEUTIC EXERCISES: CPT

## 2023-09-18 PROCEDURE — 99232 PR SUBSEQUENT HOSPITAL CARE,LEVL II: ICD-10-PCS | Mod: GW,HPC,,

## 2023-09-18 PROCEDURE — 25000242 PHARM REV CODE 250 ALT 637 W/ HCPCS: Performed by: HOSPITALIST

## 2023-09-18 PROCEDURE — 36415 COLL VENOUS BLD VENIPUNCTURE: CPT | Performed by: NURSE PRACTITIONER

## 2023-09-18 PROCEDURE — 25000003 PHARM REV CODE 250: Performed by: INTERNAL MEDICINE

## 2023-09-18 PROCEDURE — 94761 N-INVAS EAR/PLS OXIMETRY MLT: CPT

## 2023-09-18 PROCEDURE — 21400001 HC TELEMETRY ROOM

## 2023-09-18 PROCEDURE — 94640 AIRWAY INHALATION TREATMENT: CPT

## 2023-09-18 PROCEDURE — 83880 ASSAY OF NATRIURETIC PEPTIDE: CPT | Performed by: INTERNAL MEDICINE

## 2023-09-18 RX ADMIN — SENNOSIDES AND DOCUSATE SODIUM 1 TABLET: 50; 8.6 TABLET ORAL at 08:09

## 2023-09-18 RX ADMIN — BUDESONIDE 0.5 MG: 0.5 INHALANT ORAL at 08:09

## 2023-09-18 RX ADMIN — SACUBITRIL AND VALSARTAN 1 TABLET: 24; 26 TABLET, FILM COATED ORAL at 08:09

## 2023-09-18 RX ADMIN — BUDESONIDE 0.5 MG: 0.5 INHALANT ORAL at 07:09

## 2023-09-18 RX ADMIN — CARVEDILOL 6.25 MG: 6.25 TABLET, FILM COATED ORAL at 08:09

## 2023-09-18 RX ADMIN — ISOSORBIDE MONONITRATE 30 MG: 30 TABLET, EXTENDED RELEASE ORAL at 09:09

## 2023-09-18 RX ADMIN — BUSPIRONE HYDROCHLORIDE 5 MG: 5 TABLET ORAL at 09:09

## 2023-09-18 RX ADMIN — MELATONIN TAB 3 MG 6 MG: 3 TAB at 08:09

## 2023-09-18 RX ADMIN — ARFORMOTEROL TARTRATE 15 MCG: 15 SOLUTION RESPIRATORY (INHALATION) at 08:09

## 2023-09-18 RX ADMIN — ACETAMINOPHEN 650 MG: 325 TABLET ORAL at 08:09

## 2023-09-18 RX ADMIN — ENOXAPARIN SODIUM 120 MG: 120 INJECTION SUBCUTANEOUS at 08:09

## 2023-09-18 RX ADMIN — IPRATROPIUM BROMIDE AND ALBUTEROL SULFATE 3 ML: .5; 3 SOLUTION RESPIRATORY (INHALATION) at 08:09

## 2023-09-18 RX ADMIN — ENOXAPARIN SODIUM 120 MG: 120 INJECTION SUBCUTANEOUS at 09:09

## 2023-09-18 RX ADMIN — ARFORMOTEROL TARTRATE 15 MCG: 15 SOLUTION RESPIRATORY (INHALATION) at 07:09

## 2023-09-18 RX ADMIN — IPRATROPIUM BROMIDE AND ALBUTEROL SULFATE 3 ML: .5; 3 SOLUTION RESPIRATORY (INHALATION) at 12:09

## 2023-09-18 RX ADMIN — ASPIRIN 81 MG: 81 TABLET, COATED ORAL at 09:09

## 2023-09-18 RX ADMIN — SENNOSIDES AND DOCUSATE SODIUM 1 TABLET: 50; 8.6 TABLET ORAL at 09:09

## 2023-09-18 RX ADMIN — BUSPIRONE HYDROCHLORIDE 5 MG: 5 TABLET ORAL at 08:09

## 2023-09-18 RX ADMIN — SERTRALINE HYDROCHLORIDE 50 MG: 50 TABLET ORAL at 09:09

## 2023-09-18 RX ADMIN — POLYETHYLENE GLYCOL 3350 17 G: 17 POWDER, FOR SOLUTION ORAL at 09:09

## 2023-09-18 RX ADMIN — LEVOTHYROXINE SODIUM 50 MCG: 50 TABLET ORAL at 05:09

## 2023-09-18 RX ADMIN — PREDNISONE 60 MG: 50 TABLET ORAL at 09:09

## 2023-09-18 RX ADMIN — PANTOPRAZOLE SODIUM 40 MG: 40 TABLET, DELAYED RELEASE ORAL at 09:09

## 2023-09-18 RX ADMIN — SACUBITRIL AND VALSARTAN 1 TABLET: 24; 26 TABLET, FILM COATED ORAL at 09:09

## 2023-09-18 RX ADMIN — CARVEDILOL 6.25 MG: 6.25 TABLET, FILM COATED ORAL at 09:09

## 2023-09-18 RX ADMIN — ATORVASTATIN CALCIUM 40 MG: 40 TABLET, FILM COATED ORAL at 09:09

## 2023-09-18 NOTE — ASSESSMENT & PLAN NOTE
-AC previously d/c'd in 4/23 due to hematuria  -Need to readdress prior to d/c    9/15/23  -Discussed with Dr. Howell, will initiate full dose Lovenox and monitor H/H    9/18/23  Labs stable yesterday  Needs AC upon DC if H/H remains stable  Cont BB

## 2023-09-18 NOTE — PROGRESS NOTES
[facility] ([facility])  Cardiology  Progress Note    Patient Name: Carmen Tan  MRN: [id number]  Admission Date: 9/9/2023  Hospital Length of Stay: 9 days  Code Status: Full Code   Attending Physician: James Smith MD   Primary Care Physician: Lucian Barry MD  Expected Discharge Date:   Principal Problem:Acute on chronic respiratory failure with hypoxia    Subjective:     Hospital Course:   9/10/23-Patient seen and examined. Stable and continue diuresis    9/11/23-Patient seen and examined today, lying in bed with CPAP on. Feels ok. SOB improving. No CP. Labs reviewed, creatinine 1.2.     9/12/23-Patient seen and examined today, resting with CPAP on. Continues to improve. Diuresing well. Labs reviewed/stable. Possible MPI stress test tmw.    9/13/23-Patient seen and examined today, sitting up in bed. SOB much improved, on supplemental O2. No CP. BNP trended down significantly. Labs reviewed, mild bump in BUN/creatinine, IV Lasix d/c'd. Stress test ordered (rest portion today).    9/14/23-Patient seen and examined today, on CPAP. Wheezing, unable to complete stress test today. Give additional dose of IV Lasix today. Labs reviewed.     9/15/23-Patient seen and examined today, wearing CPAP. Breathing improved. Sill wheezing, unable to complete stress test. Labs reviewed. CBC pending.     9/16/23- BP remains elevated this morning. Remains on CPAP. O2 sats are low 90s this morning. CT negative for PE. Lower extremity US negative for DVT. Continues to diurese well. -9L so far.      9/17/23 - feels better this AM sitting up, no wheezing on exam. Discussed Nuc stress tomorrow AM if continues to have stable resp status. Vitals and labs stable    9/18/23 Pt seen and examined today, sitting up in bedside chair, Bipap on. Labs reviewed, K 5.2. No nuclear stress today, wheezing/Bipap          Review of Systems   Constitutional: Positive for malaise/fatigue.   HENT: Negative.     Eyes: Negative.     Cardiovascular: Negative.    Respiratory:  Positive for shortness of breath.    Skin: Negative.    Musculoskeletal: Negative.    Gastrointestinal: Negative.    Genitourinary: Negative.    Neurological: Negative.    Psychiatric/Behavioral: Negative.       Objective:     Vital Signs (Most Recent):  Temp: 98.2 °F (36.8 °C) (09/18/23 1223)  Pulse: 63 (09/18/23 1311)  Resp: (!) 24 (09/18/23 1311)  BP: (!) 120/56 (09/18/23 1223)  SpO2: (!) 93 % (09/18/23 1311) Vital Signs (24h Range):  Temp:  [98 °F (36.7 °C)-98.5 °F (36.9 °C)] 98.2 °F (36.8 °C)  Pulse:  [54-87] 63  Resp:  [17-26] 24  SpO2:  [87 %-96 %] 93 %  BP: (120-172)/(56-72) 120/56     Weight: 116.2 kg (256 lb 2.8 oz)  Body mass index is 43.97 kg/m².     SpO2: (!) 93 %         Intake/Output Summary (Last 24 hours) at 9/18/2023 1346  Last data filed at 9/18/2023 0103  Gross per 24 hour   Intake --   Output 1100 ml   Net -1100 ml       Lines/Drains/Airways       Drain  Duration             Female External Urinary Catheter 09/10/23 0700 8 days              Peripheral Intravenous Line  Duration                  Peripheral IV - Single Lumen 09/15/23 2300 20 G Left Forearm 2 days                       Physical Exam  Vitals and nursing note reviewed.   Constitutional:       Appearance: Normal appearance.   HENT:      Head: Normocephalic.   Eyes:      Pupils: Pupils are equal, round, and reactive to light.   Cardiovascular:      Rate and Rhythm: Normal rate and regular rhythm.      Heart sounds: Normal heart sounds, S1 normal and S2 normal. No murmur heard.     No S3 or S4 sounds.   Pulmonary:      Effort: Pulmonary effort is normal.      Breath sounds: Wheezing and rales present.   Abdominal:      General: Bowel sounds are normal.      Palpations: Abdomen is soft.   Musculoskeletal:         General: Normal range of motion.      Cervical back: Normal range of motion.   Skin:     Capillary Refill: Capillary refill takes less than 2 seconds.   Neurological:      General: No  "focal deficit present.      Mental Status: She is alert and oriented to person, place, and time.   Psychiatric:         Mood and Affect: Mood normal.         Behavior: Behavior normal.         Thought Content: Thought content normal.            Significant Labs: BMP:   Recent Labs   Lab 09/17/23  0511 09/18/23  0511   GLU 78 70    143   K 4.4 5.2*   CL 99 100   CO2 36* 37*   BUN 42* 35*   CREATININE 1.0 1.0   CALCIUM 9.3 9.6   , CMP   Recent Labs   Lab 09/17/23  0511 09/18/23  0511    143   K 4.4 5.2*   CL 99 100   CO2 36* 37*   GLU 78 70   BUN 42* 35*   CREATININE 1.0 1.0   CALCIUM 9.3 9.6   ANIONGAP 6* 6*   , CBC   Recent Labs   Lab 09/17/23  0512   WBC 11.84   HGB 15.1   HCT 42.3      , INR No results for input(s): "INR", "PROTIME" in the last 48 hours., Lipid Panel No results for input(s): "CHOL", "HDL", "LDLCALC", "TRIG", "CHOLHDL" in the last 48 hours., Troponin No results for input(s): "TROPONINI" in the last 48 hours., and All pertinent lab results from the last 24 hours have been reviewed.    Significant Imaging: Echocardiogram: Transthoracic echo (TTE) complete (Cupid Only):   Results for orders placed or performed during the hospital encounter of 09/09/23   Echo   Result Value Ref Range    BSA 2.36 m2    LVOT stroke volume 82.38 cm3    LVIDd 5.03 3.5 - 6.0 cm    LV Systolic Volume 68.43 mL    LV Systolic Volume Index 30.7 mL/m2    LVIDs 3.96 2.1 - 4.0 cm    LV Diastolic Volume 120.14 mL    LV Diastolic Volume Index 53.87 mL/m2    IVS 1.11 (A) 0.6 - 1.1 cm    LVOT diameter 1.95 cm    LVOT area 3.0 cm2    FS 21 (A) 28 - 44 %    Left Ventricle Relative Wall Thickness 0.54 cm    Posterior Wall 1.35 (A) 0.6 - 1.1 cm    LV mass 244.31 g    LV Mass Index 110 g/m2    MV Peak E Berhane 0.93 m/s    TDI LATERAL 0.13 m/s    TDI SEPTAL 0.08 m/s    E/E' ratio 8.86 m/s    MV Peak A Berhane 0.90 m/s    TR Max Berhane 3.30 m/s    E/A ratio 1.03     IVRT 137.01 msec    E wave deceleration time 232.88 msec    LV " SEPTAL E/E' RATIO 11.63 m/s    LV LATERAL E/E' RATIO 7.15 m/s    LVOT peak berhane 1.35 m/s    Left Ventricular Outflow Tract Mean Velocity 0.87 cm/s    Left Ventricular Outflow Tract Mean Gradient 3.55 mmHg    LA size 3.64 cm    Left Atrium Major Axis 5.78 cm    Left Atrium Minor Axis 6.21 cm    RV mid diameter 5.15 cm    RVOT peak VTI 18.1 cm    RA Major Axis 4.94 cm    AV regurgitation pressure 1/2 time 850.488083907099918 ms    AR Max Berhane 4.57 m/s    AV mean gradient 10 mmHg    AV peak gradient 17 mmHg    Ao peak berhane 2.09 m/s    Ao VTI 39.30 cm    LVOT peak VTI 27.60 cm    AV valve area 2.10 cm²    AV Velocity Ratio 0.65     AV index (prosthetic) 0.70     KARYNA by Velocity Ratio 1.93 cm²    Mr max berhane 5.59 m/s    Triscuspid Valve Regurgitation Peak Gradient 44 mmHg    PV mean gradient 2 mmHg    RVOT peak berhane 0.82 m/s    Ao root annulus 3.18 cm    STJ 3.59 cm    Ascending aorta 3.46 cm    IVC diameter 2.31 cm    Mean e' 0.11 m/s    ZLVIDS -1.49     ZLVIDD -4.49     LA Volume Index 33.2 mL/m2    LA volume 74.10 cm3    LA WIDTH 4.0 cm    TAPSE 2.40 cm    RA Width 3.2 cm    TV resting pulmonary artery pressure 47 mmHg    RV TB RVSP 6 mmHg    Est. RA pres 3 mmHg    Narrative      Left Ventricle: The left ventricle is normal in size. Mildly increased   wall thickness. Normal wall motion. There is mildly reduced systolic   function with a visually estimated ejection fraction of 40 - 45%. There is   normal diastolic function.    Right Ventricle: Normal right ventricular cavity size. Wall thickness   is normal. Right ventricle wall motion  is normal. Systolic function is   normal.    Aortic Valve: There is mild to moderate aortic regurgitation with a   centrally directed jet.    Mitral Valve: There is moderate regurgitation.    Tricuspid Valve: There is moderate regurgitation with a centrally   directed jet.    Pulmonic Valve: There is mild to moderate regurgitation.    Pulmonary Artery: The estimated pulmonary artery  systolic pressure is   47 mmHg.    IVC/SVC: Normal venous pressure at 3 mmHg.     , EKG: reviewed, and X-Ray: CXR: X-Ray Chest 1 View (CXR):   Results for orders placed or performed during the hospital encounter of 09/09/23   X-Ray Chest 1 View    Narrative    EXAMINATION:  XR CHEST 1 VIEW    CLINICAL HISTORY:  shortness of breath;    FINDINGS:  Single view of the chest.  Comparison 09/08/2023.    Cardiac silhouette is enlarged but stable.  The lungs demonstrate no evidence of active disease.  No evidence of pleural effusion or pneumothorax.  Bones appear intact.      Impression    No acute process seen.      Electronically signed by: Everett Francis MD  Date:    09/15/2023  Time:    16:02     Assessment and Plan:         * Acute on chronic respiratory failure with hypoxia  Patient with Hypercapnic and Hypoxic Respiratory failure which is Acute on chronic.  she is not on home oxygen. Supplemental oxygen was provided and noted- Oxygen Concentration (%):  [30] 30    .   Signs/symptoms of respiratory failure include- tachypnea. Contributing diagnoses includes - COPD Labs and images were reviewed. Patient Has recent ABG, which has been reviewed. Will treat underlying causes and adjust management of respiratory failure as follows- hypercapnic    9/11/23  -Mgmt as per hospital medicine  -Continue IV diuresis for additional day    9/12/23  -Continue same mgmt/IV diuresis     9/13/23  -Much improved, BNP trended down, d/c IV Lasix  -Reassess in AM    9/14/23  -Using CPAP, wheezing this AM  -Continue steroids  -Give additional dose of IV Lasix    9/15/23  -Unfortunately still wheezing  -Additional dose of IV Lasix today  -Continue steroids    9/16/23  -Remains on CPAP  - O2 sats are low 90s this morning  - CT negative for PE. Lower extremity   -US negative for DVT  -Continues to diurese well. -9L so far.     9/18/23  Cont diuresis    Tobacco dependence  Smoking cessation    D-dimer, elevated  Neg PE    Elevated  troponin  Trend  Cardiac echo results pending    9/11/23  -Flat  -No CP symptoms  -Suspect elevation due to demand ischemia from fluid overload  -Echo showed EF of 40-45%, mild-mod AI  -Continue Coreg, Entresto, ASA  -Can consider ischemic workup IP vs OP once compensated    9/12/23  -Ok to d/c heparin gtt  -Continue Coreg, Entresto, ASA  -Probable MPI stress test tmw    9/13/23  -CP free  -Continue ASA, Coreg, Entresto  -MPI stress test pending    9/14/23  -Wheezing today will re-attempt stress tmw    9/15/23  -Still wheezing, re-attempt stress test tmw, may have to be done as OP    9/17/23 - nuc stress tomorrow AM if breathing stable     9/18/23  Pt still on Bipap  Hold off on nuc stress, will reassess tomorrow    Hypertension  Continue coreg, entresto    9/16/23  - BP remains elevated this morning    9/18/23  stable      Acute cystitis without hematuria  treated    Acute on chronic combined systolic and diastolic congestive heart failure  Patient is identified as having Diastolic (HFpEF) heart failure that is Acute on chronic. CHF is currently uncontrolled due to Continued edema of extremities. Latest ECHO performed and demonstrates- Results for orders placed during the hospital encounter of 03/29/23    Echo    Interpretation Summary  · The left ventricle is normal in size with moderate concentric hypertrophy and mildly decreased systolic function.  · The estimated ejection fraction is 45%.  · Grade I left ventricular diastolic dysfunction.  · Moderate right ventricular enlargement.  · Moderate right atrial enlargement.  · Mild-to-moderate aortic regurgitation.  · There is mild aortic valve stenosis.  · Aortic valve area is 1.44 cm2; peak velocity is 2.09 m/s; mean gradient is 10 mmHg.  · Mild-to-moderate mitral regurgitation.  · Moderate tricuspid regurgitation.  · Moderate pulmonic regurgitation.  · The estimated PA systolic pressure is 69 mmHg.  · There is moderate pulmonary hypertension.  · Moderate left  atrial enlargement.  . Continue Beta Blocker and Furosemide and monitor clinical status closely. Monitor on telemetry. Patient is on CHF pathway.  Monitor strict Is&Os and daily weights.  Place on fluid restriction of 2 L. Cardiology has been consulted. Continue to stress to patient importance of self efficacy and  on diet for CHF. Last BNP reviewed- and noted below   Recent Labs   Lab 09/13/23  0902   *       9/11/23  -Improving  -Continue IV diuresis  -Continue BB, Entresto  -Repeat BNP in AM    9/12/23  -Continue same meds/mgmt  -Continue IV diuresis, BB, Entresto  -Reassess in AM    9/13/23  -Volume status much improved, BNP trended down significantly  -Stop IV Lasix  -Continue BB, Entresto  -Reassess in AM  -MPI stress test ordered (rest part today)    9/14/23  -Give additional dose of IV Lasix today  -Continue BB, Entresto    9/15/23  -Give additional dose of IV Lasix today  -Continue BB, Entresto      COPD exacerbation  -Mgmt as per hospital medicine           Severe obesity (BMI >= 40)  -Weight loss    Paroxysmal A-fib  -AC previously d/c'd in 4/23 due to hematuria  -Need to readdress prior to d/c    9/15/23  -Discussed with Dr. Howell, will initiate full dose Lovenox and monitor H/H    9/18/23  Labs stable yesterday  Needs AC upon DC if H/H remains stable  Cont BB          VTE Risk Mitigation (From admission, onward)         Ordered     enoxaparin injection 120 mg  Every 12 hours         09/16/23 1259     IP VTE HIGH RISK PATIENT  Once         09/09/23 0447     Place sequential compression device  Until discontinued         09/09/23 0447                Shae Krueger, NP  Cardiology  [facility] ([facility])

## 2023-09-18 NOTE — ASSESSMENT & PLAN NOTE
Patient is identified as having Diastolic (HFpEF) heart failure that is Acute on chronic. CHF is currently uncontrolled due to Rales/crackles on pulmonary exam and Pulmonary edema/pleural effusion on CXR. Latest ECHO performed and demonstrates- Results for orders placed during the hospital encounter of 03/29/23    Echo    Interpretation Summary  · The left ventricle is normal in size with moderate concentric hypertrophy and mildly decreased systolic function.  · The estimated ejection fraction is 45%.  · Grade I left ventricular diastolic dysfunction.  · Moderate right ventricular enlargement.  · Moderate right atrial enlargement.  · Mild-to-moderate aortic regurgitation.  · There is mild aortic valve stenosis.  · Aortic valve area is 1.44 cm2; peak velocity is 2.09 m/s; mean gradient is 10 mmHg.  · Mild-to-moderate mitral regurgitation.  · Moderate tricuspid regurgitation.  · Moderate pulmonic regurgitation.  · The estimated PA systolic pressure is 69 mmHg.  · There is moderate pulmonary hypertension.  · Moderate left atrial enlargement.  . Continue Beta Blocker, Furosemide, Aldactone and ARNI and monitor clinical status closely. Monitor on telemetry. Patient is on CHF pathway.  Monitor strict Is&Os and daily weights.  Place on fluid restriction of 1.5 L. Cardiology has been consulted. Continue to stress to patient importance of self efficacy and  on diet for CHF. Last BNP reviewed- and noted below   Recent Labs   Lab 09/18/23  0511   BNP 79     9/10/23  -Diuresing well  -Continue IV lasix      9/11/23- diuresis continued- BIPAP as needed for support   -9/12/23- diuresis continued- 4.8 L removed    -9/13/23- diuresis completed- 7.1 L removed   -9/14/23- IV Lasix given   -9/15/23- diuresis continued- 7.8L removed   -9/16/23- BNP and Troponin negative   -9/17/23- 9L removed via diuresis- Cardiology following   9/18/23-BNP normal today. Hold Lasix

## 2023-09-18 NOTE — ASSESSMENT & PLAN NOTE
Patient with Hypoxic Respiratory failure which is Acute on chronic.  she is on home oxygen at 4 LPM. Supplemental oxygen was provided and noted- Oxygen Concentration (%):  [30] 30    .   Signs/symptoms of respiratory failure include- increased work of breathing. Contributing diagnoses includes - CHF and COPD Labs and images were reviewed. Patient has had an ABG that was recently reviewed. Will treat underlying causes and adjust management of respiratory failure   -Pulmonology following   -CPAP transitioned to BIPAP for respiratory support as needed     9/18/23  --wears nightly BiPAP machines and daily 4L  --Ambulatory referral placed to pulmonology to establish care. Will need to speak to son because patient is on hospice.

## 2023-09-18 NOTE — PROGRESS NOTES
[facility] ([facility])  Pulmonology  Progress Note    Patient Name: Carmen Tan  MRN: [id number]  Admission Date: 9/9/2023  Hospital Length of Stay: 9 days  Code Status: Full Code  Attending Provider: James Smith MD  Primary Care Provider: Lucian Barry MD   Principal Problem: Acute on chronic respiratory failure with hypoxia    Subjective:     Carmen Tan is a 73-year-old female history of chronic hypercapnic and hypoxic respiratory failure on 4 L nasal cannula, diabetes mellitus, chronic diastolic heart failure, prior PE, and continued tobacco abuse who presented with shortness of breath.  Patient presented with an elevated BNP and admitted for aggressive diuretics and Cardiology consult. She was also given antibiotics for a urinary tract infection. She was also started on nebulizers and steroids.  Patient was hospitalized multiple times in April for similar.  Patient states she does have a machine at home but uncertain the type of device (CPAP, BiPAP, AVAPS). 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 She also is on 4 L nasal cannula.  She reports wearing her device at home when sleeping, as well as, when napping several hours a day.  Patient is currently on prednisone and nebs.  Patient takes inhaler Dulera at home. She continues to smoke 1 pack per day.  She has not seen a pulmonologist in the past by reports.  I do see pulmonology telephone message from 2017, she states she is not had pulmonary function test.  She currently is on 4 L nasal cannula.  She is currently wearing a BiPAP machine and states she put it back on secondary to wanting to take a nap.  She currently denies wheezing.  She is had a slight cough.  She is been getting diuretics per Cardiology.  Pulmonary consult for assistance with her COPD and chronic respiratory failure.    Interval history:   9/17: Sitting up on side of bed. Persistent complaints of shortness of breath but reports she is at  her baseline. Remains on 4L/NC. No acute complaints.    9/18: OOB to chain on NIPPV, transitioned to home flow 4L NC, no new issues or c/o noted, no family, pt without questions this AM    ROS complete and negative unless stated in the interval HPI    Objective:     Vital Signs (Most Recent):  Temp: 98.1 °F (36.7 °C) (09/18/23 0411)  Pulse: (!) 58 (09/18/23 0846)  Resp: 18 (09/18/23 0846)  BP: 136/65 (09/18/23 0846)  SpO2: (!) 93 % (09/18/23 0917) Vital Signs (24h Range):  Temp:  [98 °F (36.7 °C)-98.6 °F (37 °C)] 98.1 °F (36.7 °C)  Pulse:  [58-87] 58  Resp:  [18-26] 18  SpO2:  [90 %-96 %] 93 %  BP: (131-172)/(60-72) 136/65     Weight: 116.2 kg (256 lb 2.8 oz)  Body mass index is 43.97 kg/m².      Intake/Output Summary (Last 24 hours) at 9/18/2023 0926  Last data filed at 9/18/2023 0103  Gross per 24 hour   Intake --   Output 1100 ml   Net -1100 ml        Physical Exam  Vitals reviewed.   Constitutional:       General: She is awake. She is not in acute distress.     Appearance: She is morbidly obese.      Comments: OOB on NIPPV at time of my exam, transitioned to home flow 4L NC with sat of 93%   Pulmonary:      Effort: Pulmonary effort is normal. No tachypnea, bradypnea, accessory muscle usage or respiratory distress.      Breath sounds: Decreased breath sounds present. No wheezing or rhonchi.      Comments: Poor inspiratory effort  Neurological:      Mental Status: She is alert.   Psychiatric:         Behavior: Behavior is cooperative.               Vents:  Oxygen Concentration (%): 30 (09/18/23 0806)    Lines/Drains/Airways       Drain  Duration             Female External Urinary Catheter 09/10/23 0700 8 days              Peripheral Intravenous Line  Duration                  Peripheral IV - Single Lumen 09/15/23 2300 20 G Left Forearm 2 days                    Significant Labs:    CBC/Anemia Profile:  Recent Labs   Lab 09/17/23  0512   WBC 11.84   HGB 15.1   HCT 42.3      MCV 98   RDW 16.7*         Chemistries:  Recent Labs   Lab 09/17/23  0511 09/18/23  0511    143   K 4.4 5.2*   CL 99 100   CO2 36* 37*   BUN 42* 35*   CREATININE 1.0 1.0   CALCIUM 9.3 9.6       All pertinent labs within the past 24 hours have been reviewed.    Significant Imaging:  I have reviewed all pertinent imaging results/findings within the past 24 hours.      ABG  Recent Labs   Lab 09/16/23  1113   PH 7.371   PO2 71*   PCO2 72.5*   HCO3 42.1*   BE 17     Assessment/Plan:     Pulmonary  * Acute on chronic respiratory failure with hypoxia  Patient with chronic hypercapnic and hypoxic respiratory failure who is on home oxygen at 4 LPM which she is currently receiving while inpatient. Signs/symptoms of respiratory failure include dyspnea. Contributing diagnoses includes CHF. Labs and images were reviewed. Patient Has recent ABG, which has been reviewed.     Will treat underlying causes and adjust management of respiratory failure as follows:  · likely multi factorial with a reduced EF, morbid obesity   · Reported history of COPD per the records, but no PFTs on record, states she is not followed by pulmonologist, referral and secure chat sent to pulm clinic staff to establish care   · Continued smoking dependence, counseled on cessation  · Continue diuretics, monitor I&O trends  · Continue BiPAP qHS, may benefit from titration study upon discharge to optimize nocturnal ventilation  · Taper prednisone  · May would benefit from cardio-pulmonary rehab  · Resume home Dulera at discharge    Endocrine  Severe obesity (BMI >= 40)  · affecting medical decision making and complicating the above   · pt would benefit greatly from weight loss and lifestyle modifications       Other  Tobacco dependence  - affecting medical decision making and complicating the above   - cessation education ordered       Pulmonary team will remain available. Please call if we can be of assistance sooner or if questions should arise.       Silviano Costello,  NP  Pulmonology  [facility] ([facility])

## 2023-09-18 NOTE — ASSESSMENT & PLAN NOTE
Patient with Hypercapnic and Hypoxic Respiratory failure which is Acute on chronic.  she is not on home oxygen. Supplemental oxygen was provided and noted- Oxygen Concentration (%):  [30] 30    .   Signs/symptoms of respiratory failure include- tachypnea. Contributing diagnoses includes - COPD Labs and images were reviewed. Patient Has recent ABG, which has been reviewed. Will treat underlying causes and adjust management of respiratory failure as follows- hypercapnic    9/11/23  -Mgmt as per hospital medicine  -Continue IV diuresis for additional day    9/12/23  -Continue same mgmt/IV diuresis     9/13/23  -Much improved, BNP trended down, d/c IV Lasix  -Reassess in AM    9/14/23  -Using CPAP, wheezing this AM  -Continue steroids  -Give additional dose of IV Lasix    9/15/23  -Unfortunately still wheezing  -Additional dose of IV Lasix today  -Continue steroids    9/16/23  -Remains on CPAP  - O2 sats are low 90s this morning  - CT negative for PE. Lower extremity   -US negative for DVT  -Continues to diurese well. -9L so far.     9/18/23  Cont diuresis

## 2023-09-18 NOTE — SUBJECTIVE & OBJECTIVE
Carmen Tan is a 73-year-old female history of chronic hypercapnic and hypoxic respiratory failure on 4 L nasal cannula, diabetes mellitus, chronic diastolic heart failure, prior PE, and continued tobacco abuse who presented with shortness of breath.  Patient presented with an elevated BNP and admitted for aggressive diuretics and Cardiology consult. She was also given antibiotics for a urinary tract infection. She was also started on nebulizers and steroids.  Patient was hospitalized multiple times in April for similar.  Patient states she does have a machine at home but uncertain the type of device (CPAP, BiPAP, AVAPS). She reports she gets her supplies / equipment from her [employer]. She also is on 4 L nasal cannula.  She reports wearing her device at home when sleeping, as well as, when napping several hours a day.  Patient is currently on prednisone and nebs.  Patient takes inhaler Dulera at home. She continues to smoke 1 pack per day.  She has not seen a pulmonologist in the past by reports.  I do see pulmonology telephone message from 2017, she states she is not had pulmonary function test.  She currently is on 4 L nasal cannula.  She is currently wearing a BiPAP machine and states she put it back on secondary to wanting to take a nap.  She currently denies wheezing.  She is had a slight cough.  She is been getting diuretics per Cardiology.  Pulmonary consult for assistance with her COPD and chronic respiratory failure.    Interval history:  9/17: Sitting up on side of bed. Persistent complaints of shortness of breath but reports she is at her baseline. Remains on 4L/NC. No acute complaints.   9/18: OOB to chain on NIPPV, transitioned to home flow 4L NC, no new issues or c/o noted, no family, pt without questions this AM    ROS complete and negative unless stated in the interval HPI    Objective:     Vital Signs (Most Recent):  Temp: 98.1 °F (36.7 °C) (09/18/23 0411)  Pulse: (!) 58 (09/18/23  0846)  Resp: 18 (09/18/23 0846)  BP: 136/65 (09/18/23 0846)  SpO2: (!) 93 % (09/18/23 0917) Vital Signs (24h Range):  Temp:  [98 °F (36.7 °C)-98.6 °F (37 °C)] 98.1 °F (36.7 °C)  Pulse:  [58-87] 58  Resp:  [18-26] 18  SpO2:  [90 %-96 %] 93 %  BP: (131-172)/(60-72) 136/65     Weight: 116.2 kg (256 lb 2.8 oz)  Body mass index is 43.97 kg/m².      Intake/Output Summary (Last 24 hours) at 9/18/2023 0926  Last data filed at 9/18/2023 0103  Gross per 24 hour   Intake --   Output 1100 ml   Net -1100 ml        Physical Exam  Vitals reviewed.   Constitutional:       General: She is awake. She is not in acute distress.     Appearance: She is morbidly obese.      Comments: OOB on NIPPV at time of my exam, transitioned to home flow 4L NC with sat of 93%   Pulmonary:      Effort: Pulmonary effort is normal. No tachypnea, bradypnea, accessory muscle usage or respiratory distress.      Breath sounds: Decreased breath sounds present. No wheezing or rhonchi.      Comments: Poor inspiratory effort  Neurological:      Mental Status: She is alert.   Psychiatric:         Behavior: Behavior is cooperative.               Vents:  Oxygen Concentration (%): 30 (09/18/23 0806)    Lines/Drains/Airways       Drain  Duration             Female External Urinary Catheter 09/10/23 0700 8 days              Peripheral Intravenous Line  Duration                  Peripheral IV - Single Lumen 09/15/23 2300 20 G Left Forearm 2 days                    Significant Labs:    CBC/Anemia Profile:  Recent Labs   Lab 09/17/23  0512   WBC 11.84   HGB 15.1   HCT 42.3      MCV 98   RDW 16.7*        Chemistries:  Recent Labs   Lab 09/17/23  0511 09/18/23  0511    143   K 4.4 5.2*   CL 99 100   CO2 36* 37*   BUN 42* 35*   CREATININE 1.0 1.0   CALCIUM 9.3 9.6       All pertinent labs within the past 24 hours have been reviewed.    Significant Imaging:  I have reviewed all pertinent imaging results/findings within the past 24 hours.

## 2023-09-18 NOTE — SUBJECTIVE & OBJECTIVE
Review of Systems   Constitutional: Positive for malaise/fatigue.   HENT: Negative.     Eyes: Negative.    Cardiovascular: Negative.    Respiratory:  Positive for shortness of breath.    Skin: Negative.    Musculoskeletal: Negative.    Gastrointestinal: Negative.    Genitourinary: Negative.    Neurological: Negative.    Psychiatric/Behavioral: Negative.       Objective:     Vital Signs (Most Recent):  Temp: 98.2 °F (36.8 °C) (09/18/23 1223)  Pulse: 63 (09/18/23 1311)  Resp: (!) 24 (09/18/23 1311)  BP: (!) 120/56 (09/18/23 1223)  SpO2: (!) 93 % (09/18/23 1311) Vital Signs (24h Range):  Temp:  [98 °F (36.7 °C)-98.5 °F (36.9 °C)] 98.2 °F (36.8 °C)  Pulse:  [54-87] 63  Resp:  [17-26] 24  SpO2:  [87 %-96 %] 93 %  BP: (120-172)/(56-72) 120/56     Weight: 116.2 kg (256 lb 2.8 oz)  Body mass index is 43.97 kg/m².     SpO2: (!) 93 %         Intake/Output Summary (Last 24 hours) at 9/18/2023 1346  Last data filed at 9/18/2023 0103  Gross per 24 hour   Intake --   Output 1100 ml   Net -1100 ml       Lines/Drains/Airways       Drain  Duration             Female External Urinary Catheter 09/10/23 0700 8 days              Peripheral Intravenous Line  Duration                  Peripheral IV - Single Lumen 09/15/23 2300 20 G Left Forearm 2 days                       Physical Exam  Vitals and nursing note reviewed.   Constitutional:       Appearance: Normal appearance.   HENT:      Head: Normocephalic.   Eyes:      Pupils: Pupils are equal, round, and reactive to light.   Cardiovascular:      Rate and Rhythm: Normal rate and regular rhythm.      Heart sounds: Normal heart sounds, S1 normal and S2 normal. No murmur heard.     No S3 or S4 sounds.   Pulmonary:      Effort: Pulmonary effort is normal.      Breath sounds: Wheezing and rales present.   Abdominal:      General: Bowel sounds are normal.      Palpations: Abdomen is soft.   Musculoskeletal:         General: Normal range of motion.      Cervical back: Normal range of  "motion.   Skin:     Capillary Refill: Capillary refill takes less than 2 seconds.   Neurological:      General: No focal deficit present.      Mental Status: She is alert and oriented to person, place, and time.   Psychiatric:         Mood and Affect: Mood normal.         Behavior: Behavior normal.         Thought Content: Thought content normal.            Significant Labs: BMP:   Recent Labs   Lab 09/17/23  0511 09/18/23  0511   GLU 78 70    143   K 4.4 5.2*   CL 99 100   CO2 36* 37*   BUN 42* 35*   CREATININE 1.0 1.0   CALCIUM 9.3 9.6   , CMP   Recent Labs   Lab 09/17/23  0511 09/18/23  0511    143   K 4.4 5.2*   CL 99 100   CO2 36* 37*   GLU 78 70   BUN 42* 35*   CREATININE 1.0 1.0   CALCIUM 9.3 9.6   ANIONGAP 6* 6*   , CBC   Recent Labs   Lab 09/17/23  0512   WBC 11.84   HGB 15.1   HCT 42.3      , INR No results for input(s): "INR", "PROTIME" in the last 48 hours., Lipid Panel No results for input(s): "CHOL", "HDL", "LDLCALC", "TRIG", "CHOLHDL" in the last 48 hours., Troponin No results for input(s): "TROPONINI" in the last 48 hours., and All pertinent lab results from the last 24 hours have been reviewed.    Significant Imaging: Echocardiogram: Transthoracic echo (TTE) complete (Cupid Only):   Results for orders placed or performed during the hospital encounter of 09/09/23   Echo   Result Value Ref Range    BSA 2.36 m2    LVOT stroke volume 82.38 cm3    LVIDd 5.03 3.5 - 6.0 cm    LV Systolic Volume 68.43 mL    LV Systolic Volume Index 30.7 mL/m2    LVIDs 3.96 2.1 - 4.0 cm    LV Diastolic Volume 120.14 mL    LV Diastolic Volume Index 53.87 mL/m2    IVS 1.11 (A) 0.6 - 1.1 cm    LVOT diameter 1.95 cm    LVOT area 3.0 cm2    FS 21 (A) 28 - 44 %    Left Ventricle Relative Wall Thickness 0.54 cm    Posterior Wall 1.35 (A) 0.6 - 1.1 cm    LV mass 244.31 g    LV Mass Index 110 g/m2    MV Peak E Berhane 0.93 m/s    TDI LATERAL 0.13 m/s    TDI SEPTAL 0.08 m/s    E/E' ratio 8.86 m/s    MV Peak A Berhane 0.90 " m/s    TR Max Berhane 3.30 m/s    E/A ratio 1.03     IVRT 137.01 msec    E wave deceleration time 232.88 msec    LV SEPTAL E/E' RATIO 11.63 m/s    LV LATERAL E/E' RATIO 7.15 m/s    LVOT peak berhane 1.35 m/s    Left Ventricular Outflow Tract Mean Velocity 0.87 cm/s    Left Ventricular Outflow Tract Mean Gradient 3.55 mmHg    LA size 3.64 cm    Left Atrium Major Axis 5.78 cm    Left Atrium Minor Axis 6.21 cm    RV mid diameter 5.15 cm    RVOT peak VTI 18.1 cm    RA Major Axis 4.94 cm    AV regurgitation pressure 1/2 time [id number] ms    AR Max Berhane 4.57 m/s    AV mean gradient 10 mmHg    AV peak gradient 17 mmHg    Ao peak berhane 2.09 m/s    Ao VTI 39.30 cm    LVOT peak VTI 27.60 cm    AV valve area 2.10 cm²    AV Velocity Ratio 0.65     AV index (prosthetic) 0.70     KARYNA by Velocity Ratio 1.93 cm²    Mr max berhane 5.59 m/s    Triscuspid Valve Regurgitation Peak Gradient 44 mmHg    PV mean gradient 2 mmHg    RVOT peak berhane 0.82 m/s    Ao root annulus 3.18 cm    STJ 3.59 cm    Ascending aorta 3.46 cm    IVC diameter 2.31 cm    Mean e' 0.11 m/s    ZLVIDS -1.49     ZLVIDD -4.49     LA Volume Index 33.2 mL/m2    LA volume 74.10 cm3    LA WIDTH 4.0 cm    TAPSE 2.40 cm    RA Width 3.2 cm    TV resting pulmonary artery pressure 47 mmHg    RV TB RVSP 6 mmHg    Est. RA pres 3 mmHg    Narrative      Left Ventricle: The left ventricle is normal in size. Mildly increased   wall thickness. Normal wall motion. There is mildly reduced systolic   function with a visually estimated ejection fraction of 40 - 45%. There is   normal diastolic function.    Right Ventricle: Normal right ventricular cavity size. Wall thickness   is normal. Right ventricle wall motion  is normal. Systolic function is   normal.    Aortic Valve: There is mild to moderate aortic regurgitation with a   centrally directed jet.    Mitral Valve: There is moderate regurgitation.    Tricuspid Valve: There is moderate regurgitation with a centrally   directed jet.     Pulmonic Valve: There is mild to moderate regurgitation.    Pulmonary Artery: The estimated pulmonary artery systolic pressure is   47 mmHg.    IVC/SVC: Normal venous pressure at 3 mmHg.     , EKG: reviewed, and X-Ray: CXR: X-Ray Chest 1 View (CXR):   Results for orders placed or performed during the hospital encounter of 09/09/23   X-Ray Chest 1 View    Narrative    EXAMINATION:  XR CHEST 1 VIEW    CLINICAL HISTORY:  shortness of breath;    FINDINGS:  Single view of the chest.  Comparison 09/08/2023.    Cardiac silhouette is enlarged but stable.  The lungs demonstrate no evidence of active disease.  No evidence of pleural effusion or pneumothorax.  Bones appear intact.      Impression    No acute process seen.      Electronically signed by: Everett Francis MD  Date:    09/15/2023  Time:    16:02

## 2023-09-18 NOTE — PLAN OF CARE
Problem: Adult Inpatient Plan of Care  Goal: Patient-Specific Goal (Individualized)  Outcome: Ongoing, Progressing     Pt AAO   Stable vitals   Afebrile   SB-SR  4 LPM via NC   Bipap at hs   IS encourage   AC and HS   PT/OT  Lovenox for vte   Pulmonology referral  Free of falls precautions in place   24 hour chart check completed

## 2023-09-18 NOTE — ASSESSMENT & PLAN NOTE
Patient's FSGs are controlled on current medication regimen.  Last A1c reviewed-   Lab Results   Component Value Date    HGBA1C 5.3 09/09/2023    HGBA1C 5.3 09/09/2023     Most recent fingerstick glucose reviewed-   Recent Labs   Lab 09/17/23  1628 09/17/23  2047 09/18/23  0539 09/18/23  1140   POCTGLUCOSE 126* 111* 80 109     Current correctional scale  Low  Maintain anti-hyperglycemic dose as follows-   Antihyperglycemics (From admission, onward)    Start     Stop Route Frequency Ordered    09/09/23 0545  insulin aspart U-100 pen 0-5 Units         -- SubQ Before meals & nightly PRN 09/09/23 0447        Hold Oral hypoglycemics while patient is in the hospital.

## 2023-09-18 NOTE — PROGRESS NOTES
[facility] - Telemetry ([facility])  [facility] Medicine  Progress Note    Patient Name: Carmen Tan  MRN: [id number]  Patient Class: IP- Inpatient   Admission Date: 9/9/2023  Length of Stay: 9 days  Attending Physician: James Smith MD  Primary Care Provider: Lucian Barry MD        Subjective:     Principal Problem:Acute on chronic respiratory failure with hypoxia        HPI:  The patient is a 73 year old female with DM, COPD on home oxygen 4 liters, Diastolic CHF, HTN, hx PE who presented to [facility] ED with Generalized weakness, Fatigue, SOB, and Dysuria - onset 2 days ago that progressively worsened. Pt also reports BLE edema that worsened over the past week.     n the ED, CXR shows chronic scarring no focal consolidation. Elevated troponin of 200, elevated BNP. Labs otherwise unremarkable. COVID negative. Patient denied any chest pain, EKG non-ischemia. Patient given Aspirin 325 mg, Lovenox 1mg/kg, and Lasix 40 IV x1 as well as a breathing treatment. Po Bactrim for UTI    The patient was transferred to [facility] for admission under hospital medicine and for cardiology consultation       Overview/Hospital Course:  Patient sitting on side of the bed eating. No distress noted. Reports feeling much better. On continuous oxygen. Diuresed well overnight. Will continue to diurese.  On 9/11/23, diuresis continued. Cardiology following with inpatient vs outpatient ischemic work up to be evaluated. Troponin remains elevated with downward trend noted. Urine culture grew E. Coli (50,000-99,999) with sensitivities reviewed and antibiotics continued. Echo on 9/9/23 showed EF 40-45%  mildly reduced systolic function- mild to moderate aortic/pulmonic regurgitation with a centrally directed jet. Mitral/Tricuspid moderate regurgitation. BIPAP placed as needed for comfort and respiratory support. On 9/12/23, diuresis continued with 4.8 L removed. BIPAP continued nightly and as needed. Stress test planned for tomorrow per  Cardiology. On 9/13/23, pt verbalized symptom improvement with IV diuresis completed. Respiratory support maintained on home oxygen dose of 4L NC. Resting portion of stress test performed today with second portion to be completed on tomorrow. On 9/14/23, pt was unable to complete second portion of stress test due to wheezing. IV Lasix continued. Pt verbalized symptom improvement with CPAP in place. Stress test to be attempted in am. On 9/15/23, stress test not completed due to continued wheezing. Diuresis continued and D-dimer of 3.35 noted. Lovenox initiated at therapeutic dose per Cardiology. CTA of chest and US of BLE pending. Oxygen requirements supported with nasal cannula at home dose and CPAP. May complete stress test outpatient post acute event pending imaging results. On 9/16/23, CTA of chest showed no pulmonary embolism, no dissection, and no pneumonia.  Cardiomegaly.  Mild basilar atelectasis. Incentive spirometry encouraged. US of BLE showed no obvious signs of DVT in bilateral lower extremity. Lovenox at therapeutic dose held. IV steroids transitioned to oral dosing. On 9/17/23, pt improving slowly with BIPAP as needed for respiratory support. Pulmonology consulted. ABG results reviewed. Stress test to be completed on 9/18/23 pending wheezing and respiratory status per Cardiology. Pt counseled on smoking cessation and dangers of smoking while wearing supplemental oxygen with understanding verbalized. 9/18/23: evaluated by cardiology. Recommended to give an additional day prior to stress test. BNP normal. Hold diuretics. Monitor respiratory status.         Interval History: currently sitting in chair on 4L nasal cannula. She lives with her son and will return there with her established hospice. Discussed with Cardiology who will plan to complete stress test tomorrow.     Review of Systems   Constitutional:  Positive for activity change and fatigue. Negative for appetite change.   HENT:  Negative for  congestion, postnasal drip, sore throat and trouble swallowing.    Respiratory:  Positive for shortness of breath and wheezing.    Cardiovascular:  Positive for leg swelling. Negative for chest pain.   Gastrointestinal:  Negative for abdominal distention, abdominal pain, nausea and vomiting.   Genitourinary:  Negative for difficulty urinating, dysuria, flank pain, frequency and urgency.   Musculoskeletal:  Negative for arthralgias, back pain and myalgias.   Skin:  Negative for color change and wound.   Neurological:  Positive for weakness. Negative for dizziness and headaches.   Psychiatric/Behavioral:  Negative for agitation, confusion and sleep disturbance. The patient is nervous/anxious.      Objective:     Vital Signs (Most Recent):  Temp: 98.2 °F (36.8 °C) (09/18/23 1223)  Pulse: 63 (09/18/23 1311)  Resp: (!) 24 (09/18/23 1311)  BP: (!) 120/56 (09/18/23 1223)  SpO2: (!) 93 % (09/18/23 1311) Vital Signs (24h Range):  Temp:  [98 °F (36.7 °C)-98.5 °F (36.9 °C)] 98.2 °F (36.8 °C)  Pulse:  [54-87] 63  Resp:  [17-26] 24  SpO2:  [87 %-96 %] 93 %  BP: (120-172)/(56-72) 120/56     Weight: 116.2 kg (256 lb 2.8 oz)  Body mass index is 43.97 kg/m².    Intake/Output Summary (Last 24 hours) at 9/18/2023 1451  Last data filed at 9/18/2023 0103  Gross per 24 hour   Intake --   Output 650 ml   Net -650 ml         Physical Exam  Vitals reviewed.   Constitutional:       General: She is awake. She is not in acute distress.     Appearance: She is morbidly obese.      Comments: 4L nasal cannula     Pulmonary:      Effort: Pulmonary effort is normal. No tachypnea, bradypnea, accessory muscle usage or respiratory distress.      Breath sounds: Decreased breath sounds present. No wheezing or rhonchi.      Comments: Poor inspiratory effort  Neurological:      Mental Status: She is alert.   Psychiatric:         Behavior: Behavior is cooperative.          Significant Labs: All pertinent labs within the past 24 hours have been  reviewed.  CBC:   Recent Labs   Lab 09/17/23  0512   WBC 11.84   HGB 15.1   HCT 42.3        CMP:   Recent Labs   Lab 09/17/23  0511 09/18/23  0511    143   K 4.4 5.2*   CL 99 100   CO2 36* 37*   GLU 78 70   BUN 42* 35*   CREATININE 1.0 1.0   CALCIUM 9.3 9.6   ANIONGAP 6* 6*       Significant Imaging: I have reviewed all pertinent imaging results/findings within the past 24 hours.      Assessment/Plan:      * Acute on chronic respiratory failure with hypoxia  Patient with Hypoxic Respiratory failure which is Acute on chronic.  she is on home oxygen at 4 LPM. Supplemental oxygen was provided and noted- Oxygen Concentration (%):  [30] 30    .   Signs/symptoms of respiratory failure include- increased work of breathing. Contributing diagnoses includes - CHF and COPD Labs and images were reviewed. Patient has had an ABG that was recently reviewed. Will treat underlying causes and adjust management of respiratory failure   -Pulmonology following   -CPAP transitioned to BIPAP for respiratory support as needed     9/18/23  --wears nightly BiPAP machines and daily 4L  --Ambulatory referral placed to pulmonology to establish care. Will need to speak to son because patient is on hospice.    Tobacco dependence  -pt smokes 1 ppd  -states she has been smoking since 13 years of age   -pt counseled on smoking cessation and dangers of smoking while wearing supplemental oxygen with understanding verbalized      D-dimer, elevated  - D-dimer 3.35  -CTA of chest negative for PE  -US of BLE negative for DVT  -pt taken off anticoagulation due to hematuria   - pt on ASA   -Lovenox BID continued- Cardiology following     Elevated troponin  Serial troponin   Cardiology recommended Lovenox 1mg/kg to ED provider   Will start IV Heparin  cont ASA, BB, Statin, Entresto   Echo reviewed with results showing  mildly reduced systolic function with a visually estimated ejection fraction of 40 - 45%. There is normal diastolic function.  Mild to moderate aortic regurgitation with a centrally directed jet. Moderate mitral regurgitation. Moderate tricuspid regurgitation with a centrally directed jet. Mild to moderate pulmonic regurgitation. Pulmonary artery systolic pressure is 47 mmHg.    Consult cardiology     9/10/23  Denies chest pain.   Troponin trending down  Continue current meds     -9/11/23- downward trend   -9/12/23- Stress test to be planned for tomorrow   -9/13/23- resting portion of stress test completed   -9/14/23- 2nd portion of stress test not completed due to wheezing- IV Lasix given - repeat analysis to be attempted on 9/15/23  -9/15/23- stress test not completed secondary to symptoms- D-dimer elevated with CTA chest and US of BLE pending. Troponin normalized   -9/16/23- CTA chest and US of BLE negative for PE/DVT.   -9/17/23- diuresis in progress- Lovenox BID continued- Cardiology following   9/18/23-cardiology plans for stress test today. Wheezes gradually improving      Hypertension  BP stable  Cont home meds Coreg and Entresto  Monitor     Acute cystitis without hematuria  IV Rocephin completed   Blood cx remain negative  Urine culture grew E. Coli (50,000-99,999)- sensitivities noted         Diabetes mellitus, type 2  Patient's FSGs are controlled on current medication regimen.  Last A1c reviewed-   Lab Results   Component Value Date    HGBA1C 5.3 09/09/2023    HGBA1C 5.3 09/09/2023     Most recent fingerstick glucose reviewed-   Recent Labs   Lab 09/17/23  1628 09/17/23  2047 09/18/23  0539 09/18/23  1140   POCTGLUCOSE 126* 111* 80 109     Current correctional scale  Low  Maintain anti-hyperglycemic dose as follows-   Antihyperglycemics (From admission, onward)    Start     Stop Route Frequency Ordered    09/09/23 0545  insulin aspart U-100 pen 0-5 Units         -- SubQ Before meals & nightly PRN 09/09/23 0447        Hold Oral hypoglycemics while patient is in the hospital.    Acute on chronic combined systolic and diastolic  congestive heart failure  Patient is identified as having Diastolic (HFpEF) heart failure that is Acute on chronic. CHF is currently uncontrolled due to Rales/crackles on pulmonary exam and Pulmonary edema/pleural effusion on CXR. Latest ECHO performed and demonstrates- Results for orders placed during the hospital encounter of 03/29/23    Echo    Interpretation Summary  · The left ventricle is normal in size with moderate concentric hypertrophy and mildly decreased systolic function.  · The estimated ejection fraction is 45%.  · Grade I left ventricular diastolic dysfunction.  · Moderate right ventricular enlargement.  · Moderate right atrial enlargement.  · Mild-to-moderate aortic regurgitation.  · There is mild aortic valve stenosis.  · Aortic valve area is 1.44 cm2; peak velocity is 2.09 m/s; mean gradient is 10 mmHg.  · Mild-to-moderate mitral regurgitation.  · Moderate tricuspid regurgitation.  · Moderate pulmonic regurgitation.  · The estimated PA systolic pressure is 69 mmHg.  · There is moderate pulmonary hypertension.  · Moderate left atrial enlargement.  . Continue Beta Blocker, Furosemide, Aldactone and ARNI and monitor clinical status closely. Monitor on telemetry. Patient is on CHF pathway.  Monitor strict Is&Os and daily weights.  Place on fluid restriction of 1.5 L. Cardiology has been consulted. Continue to stress to patient importance of self efficacy and  on diet for CHF. Last BNP reviewed- and noted below   Recent Labs   Lab 09/18/23  0511   BNP 79     9/10/23  -Diuresing well  -Continue IV lasix      9/11/23- diuresis continued- BIPAP as needed for support   -9/12/23- diuresis continued- 4.8 L removed    -9/13/23- diuresis completed- 7.1 L removed   -9/14/23- IV Lasix given   -9/15/23- diuresis continued- 7.8L removed   -9/16/23- BNP and Troponin negative   -9/17/23- 9L removed via diuresis- Cardiology following   9/18/23-BNP normal today. Hold Lasix    COPD exacerbation  IV Steroids-  transition to oral dosing   LABA. CITLALY. ICS neb txs  -supplemental oxygen at home dose of 4 L  -CPAP in place while napping and nightly-CPAP transitioned to BIPAP as needed for continued respiratory support   -will initiate Buspar for anxiety   -ABG reviewed   -Pulmonology following       Weakness  -PT/OT evaluation with home health recommended      Severe obesity (BMI >= 40)  Body mass index is 44.12 kg/m². Morbid obesity complicates all aspects of disease management from diagnostic modalities to treatment. Weight loss encouraged and health benefits explained to patient.         Paroxysmal A-fib  Patient with Paroxysmal (<7 days) atrial fibrillation which is controlled currently with Beta Blocker. Patient is currently in sinus rhythm.[id number] Score: 3. HASBLED Score: 3. Anticoagulation per Heparin infusion completed-hx of intolerance of anticoagulation noted due to gross hematuria while taking Xarelto in 4/2023 with need for anticoagulation therapy to be evaluated and reviewed outpatient with vascular specialist.  per chart review (4/3/23-4/11/23)  -ASA continued    -Lovenox BID continued         VTE Risk Mitigation (From admission, onward)         Ordered     enoxaparin injection 120 mg  Every 12 hours         09/16/23 1259     IP VTE HIGH RISK PATIENT  Once         09/09/23 0447     Place sequential compression device  Until discontinued         09/09/23 0447                Discharge Planning   TIGRE:      Code Status: Full Code   Is the patient medically ready for discharge?:     Reason for patient still in hospital (select all that apply): Treatment  Discharge Plan A: Home Health            Antonietta Lezama NP  Department of Hospital Medicine   [facility] - Telemetry ([facility])

## 2023-09-18 NOTE — PT/OT/SLP PROGRESS
Physical Therapy Treatment    Patient Name:  Carmen Tan   MRN:  [id number]    Recommendations:     Discharge Recommendations: home health PT  Discharge Equipment Recommendations: none  Barriers to discharge: None    Assessment:     Carmen Tan is a 73 y.o. female admitted with a medical diagnosis of Acute on chronic respiratory failure with hypoxia.  She presents with the following impairments/functional limitations: weakness, impaired endurance, impaired functional mobility, gait instability, impaired cardiopulmonary response to activity, decreased safety awareness, decreased coordination.    Rehab Prognosis: Fair; patient would benefit from acute skilled PT services to address these deficits and reach maximum level of function.    Recent Surgery: * No surgery found *     Plan:     During this hospitalization, patient to be seen 3 x/week to address the identified rehab impairments via gait training, therapeutic activities, therapeutic exercises and progress toward the following goals:    Plan of Care Expires:  09/28/23    Subjective     Chief Complaint: NONE  Patient/Family Comments/goals:   Pain/Comfort:  Pain Rating 1: 0/10      Objective:     Communicated with NURSE RAMIREZ prior to session.  Patient found supine with telemetry, oxygen, BIPAP, bed alarm, PureWick, peripheral IV upon PT entry to room.     General Precautions: Standard, fall, respiratory  Orthopedic Precautions: N/A  Braces: N/A  Respiratory Status:  BIPAP     Functional Mobility:  Bed Mobility:     Rolling Left:  stand by assistance  Scooting: stand by assistance  Supine to Sit: stand by assistance  Transfers:     Sit to Stand:  contact guard assistance with no AD  Bed to Chair: minimum assistance with  no AD  using  Step Transfer  Gait: PT ADAMANTLY DECLINED TO REMOVE BIPAP AND [place] 4L OF O2 FOR GAIT TRIAL, NOTIFIED NURSE.  PT TOOK APPROX. 5 SMALL STEPS WITH DEACON TO TF FROM BED TO CHAIR NO AD.  PT ENCOURAGED TO USE BSC INSTEAD OF  "PUREWICK IN ORDER TO INCREASE MOBILITY  Balance: FAIR SITTING BALANCE, POOR+ DYNAMIC BALANCE DURING TF TO CHAIR  PT EDUCATED IN AND PERFORMED BLE THEREX X 10 REPS AROM WITH REST: HIP FLEX/EXT, LAQ, AP'S    AM-PAC 6 CLICK MOBILITY  Turning over in bed (including adjusting bedclothes, sheets and blankets)?: 4  Sitting down on and standing up from a chair with arms (e.g., wheelchair, bedside commode, etc.): 3  Moving from lying on back to sitting on the side of the bed?: 4  Moving to and from a bed to a chair (including a wheelchair)?: 2  Need to walk in hospital room?: 1  Climbing 3-5 steps with a railing?: 1  Basic Mobility Total Score: 15     Treatment & Education:  PT EDUCATION:  - ROLE OF P.T. AND POC IN ACUTE CARE HOSPITAL SETTING  - ENCOURAGED TO INCREASE TIME OOB IN CHAIR TO TOLERANCE   - TO CONTINUE THERAPUETIC EXERCISES THROUGHOUT THE DAY TO INCREASE ACTIVITY TOLERANCE AND DECREASE RISK FOR PNEUMONIA AND BLOOD CLOTS: HIP FLEX/EXT, HIP ABD/ADD, QUAD SET, HEEL SLIDE, AP  - RISK FOR FALLS DUE TO GENERALIZED WEAKNESS, EDUCATED ON "CALL DON'T FALL", ENCOURAGED TO CALL FOR ASSISTANCE WITH ALL NEEDS SUCH AS BED<>CHAIR TRANSFERS OR TRIPS TO BATHROOM, PT AGREEABLE TO SAFETY PRECAUTIONS    Patient left up in chair with all lines intact, call button in reach, chair alarm on, and NURSE notified..    GOALS:   Multidisciplinary Problems       Physical Therapy Goals          Problem: Physical Therapy    Goal Priority Disciplines Outcome Goal Variances Interventions   Physical Therapy Goal     PT, PT/OT Ongoing, Progressing     Description: LTG'S TO BE MET IN 14 DAYS (9-28-23)  PT WILL BE OFELIA FOR BED MOBILITY  PT WILL BE OFELIA FOR BED<>CHAIR TF'S  PT WILL  FEET WITH RW AND SPV  PT WILL INC AMPAC SCORE BY 2 POINTS TO PROGRESS GROSS FUNC MOBILITY                         Time Tracking:     PT Received On: 09/18/23  PT Start Time: 0850     PT Stop Time: 0915  PT Total Time (min): 25 min     Billable Minutes: Therapeutic " Activity 25    Treatment Type: Treatment  PT/PTA: PT     Number of PTA visits since last PT visit: 0     09/18/2023

## 2023-09-18 NOTE — PLAN OF CARE
FAIR TOLERANCE TO TX., SBA FOR BED MOBILITY, CGA FOR SIT<>STAND, DEACON FOR TAKING STEPS TO TF, DECLINED GAIT

## 2023-09-18 NOTE — SUBJECTIVE & OBJECTIVE
Interval History: currently sitting in chair on 4L nasal cannula. She lives with her son and will return there with her established hospice. Discussed with Cardiology who will plan to complete stress test tomorrow.     Review of Systems   Constitutional:  Positive for activity change and fatigue. Negative for appetite change.   HENT:  Negative for congestion, postnasal drip, sore throat and trouble swallowing.    Respiratory:  Positive for shortness of breath and wheezing.    Cardiovascular:  Positive for leg swelling. Negative for chest pain.   Gastrointestinal:  Negative for abdominal distention, abdominal pain, nausea and vomiting.   Genitourinary:  Negative for difficulty urinating, dysuria, flank pain, frequency and urgency.   Musculoskeletal:  Negative for arthralgias, back pain and myalgias.   Skin:  Negative for color change and wound.   Neurological:  Positive for weakness. Negative for dizziness and headaches.   Psychiatric/Behavioral:  Negative for agitation, confusion and sleep disturbance. The patient is nervous/anxious.      Objective:     Vital Signs (Most Recent):  Temp: 98.2 °F (36.8 °C) (09/18/23 1223)  Pulse: 63 (09/18/23 1311)  Resp: (!) 24 (09/18/23 1311)  BP: (!) 120/56 (09/18/23 1223)  SpO2: (!) 93 % (09/18/23 1311) Vital Signs (24h Range):  Temp:  [98 °F (36.7 °C)-98.5 °F (36.9 °C)] 98.2 °F (36.8 °C)  Pulse:  [54-87] 63  Resp:  [17-26] 24  SpO2:  [87 %-96 %] 93 %  BP: (120-172)/(56-72) 120/56     Weight: 116.2 kg (256 lb 2.8 oz)  Body mass index is 43.97 kg/m².    Intake/Output Summary (Last 24 hours) at 9/18/2023 1451  Last data filed at 9/18/2023 0103  Gross per 24 hour   Intake --   Output 650 ml   Net -650 ml         Physical Exam  Vitals reviewed.   Constitutional:       General: She is awake. She is not in acute distress.     Appearance: She is morbidly obese.      Comments: 4L nasal cannula     Pulmonary:      Effort: Pulmonary effort is normal. No tachypnea, bradypnea, accessory  muscle usage or respiratory distress.      Breath sounds: Decreased breath sounds present. No wheezing or rhonchi.      Comments: Poor inspiratory effort  Neurological:      Mental Status: She is alert.   Psychiatric:         Behavior: Behavior is cooperative.          Significant Labs: All pertinent labs within the past 24 hours have been reviewed.  CBC:   Recent Labs   Lab 09/17/23  0512   WBC 11.84   HGB 15.1   HCT 42.3        CMP:   Recent Labs   Lab 09/17/23  0511 09/18/23  0511    143   K 4.4 5.2*   CL 99 100   CO2 36* 37*   GLU 78 70   BUN 42* 35*   CREATININE 1.0 1.0   CALCIUM 9.3 9.6   ANIONGAP 6* 6*       Significant Imaging: I have reviewed all pertinent imaging results/findings within the past 24 hours.

## 2023-09-18 NOTE — ASSESSMENT & PLAN NOTE
Patient with chronic hypercapnic and hypoxic respiratory failure who is on home oxygen at 4 LPM which she is currently receiving while inpatient. Signs/symptoms of respiratory failure include dyspnea. Contributing diagnoses includes CHF. Labs and images were reviewed. Patient Has recent ABG, which has been reviewed.     Will treat underlying causes and adjust management of respiratory failure as follows:  · likely multi factorial with a reduced EF, morbid obesity   · Reported history of COPD per the records, but no PFTs on record, states she is not followed by pulmonologist, referral and secure chat sent to pulm clinic staff to establish care   · Continued smoking dependence, counseled on cessation  · Continue diuretics, monitor I&O trends  · Continue BiPAP qHS, may benefit from titration study upon discharge to optimize nocturnal ventilation  · Taper prednisone  · May would benefit from cardio-pulmonary rehab  · Resume home Dulera at discharge

## 2023-09-18 NOTE — PLAN OF CARE
Aox4.  Able to verbalize needs & follow commands.    POC reviewed with pt. Interventions implemented as appropriate.    NAEON.   VS stable. SR on tele-monitor, box # 3119.  On BiPAP. Respiratory treatments as ordered.    20g PIV to LFA patent. Saline locked. Integrity maintained.    2000 betzy ADA, cardiac diet w/ 1500 mL fluid restriction.   Skin WDI. No new skin issues.   No c/o pain.   Continent of b/b. External female catheter in place. No s/s of discomfort noted.  CBG AC/HS. No coverage required at HS.  Lovenox for VTE.  Ambulatory. Assist x 1. Activity ad nani.   Educated on s/sx of pressure injury;  verbalized understanding.  Frequent position changes encouraged. Able to reposition in bed independently.  NADN. Resting quietly in bed.   Free of falls. Hourly rounding complete.   All safety measures remain in place. SR up x2; bed low & locked. Bed alarm refused. Call light w/in reach.   Will continue to monitor throughout shift.   Chart check complete.

## 2023-09-18 NOTE — ASSESSMENT & PLAN NOTE
Serial troponin   Cardiology recommended Lovenox 1mg/kg to ED provider   Will start IV Heparin  cont ASA, BB, Statin, Entresto   Echo reviewed with results showing  mildly reduced systolic function with a visually estimated ejection fraction of 40 - 45%. There is normal diastolic function. Mild to moderate aortic regurgitation with a centrally directed jet. Moderate mitral regurgitation. Moderate tricuspid regurgitation with a centrally directed jet. Mild to moderate pulmonic regurgitation. Pulmonary artery systolic pressure is 47 mmHg.    Consult cardiology     9/10/23  Denies chest pain.   Troponin trending down  Continue current meds     -9/11/23- downward trend   -9/12/23- Stress test to be planned for tomorrow   -9/13/23- resting portion of stress test completed   -9/14/23- 2nd portion of stress test not completed due to wheezing- IV Lasix given - repeat analysis to be attempted on 9/15/23  -9/15/23- stress test not completed secondary to symptoms- D-dimer elevated with CTA chest and US of BLE pending. Troponin normalized   -9/16/23- CTA chest and US of BLE negative for PE/DVT.   -9/17/23- diuresis in progress- Lovenox BID continued- Cardiology following   9/18/23-cardiology plans for stress test today. Wheezes gradually improving

## 2023-09-18 NOTE — PLAN OF CARE
Patient had no preference for home health.  Multiple referrals sent via [employer].       09/18/23 151   Post-Acute Status   Post-Acute Authorization Home Health   Home Health Status Referrals Sent   Discharge Plan   Discharge Plan A Home Health

## 2023-09-18 NOTE — ASSESSMENT & PLAN NOTE
Trend  Cardiac echo results pending    9/11/23  -Flat  -No CP symptoms  -Suspect elevation due to demand ischemia from fluid overload  -Echo showed EF of 40-45%, mild-mod AI  -Continue Coreg, Entresto, ASA  -Can consider ischemic workup IP vs OP once compensated    9/12/23  -Ok to d/c heparin gtt  -Continue Coreg, Entresto, ASA  -Probable MPI stress test tmw    9/13/23  -CP free  -Continue ASA, Coreg, Entresto  -MPI stress test pending    9/14/23  -Wheezing today will re-attempt stress tmw    9/15/23  -Still wheezing, re-attempt stress test tmw, may have to be done as OP    9/17/23 - nuc stress tomorrow AM if breathing stable     9/18/23  Pt still on Bipap  Hold off on nuc stress, will reassess tomorrow

## 2023-09-18 NOTE — ASSESSMENT & PLAN NOTE
· affecting medical decision making and complicating the above   · pt would benefit greatly from weight loss and lifestyle modifications

## 2023-09-18 NOTE — PLAN OF CARE
Pt refused to remove BIPAP this date therefore confined to room for session. Bed Mobility SBA, Sup>EOB CGA, EOB>stand Min A, Step transfer to chair with Min A HHA x2. Stand>sit CGA.     D/C Rec: HHOT

## 2023-09-18 NOTE — PT/OT/SLP PROGRESS
Occupational Therapy   Treatment    Name: Carmen Tan  MRN: [id number]  Admitting Diagnosis:  Acute on chronic respiratory failure with hypoxia       Recommendations:     Discharge Recommendations: home health OT  Discharge Equipment Recommendations:  to be determined by next level of care  Barriers to discharge:  None    Assessment:     Carmen Tan is a 73 y.o. female with a medical diagnosis of Acute on chronic respiratory failure with hypoxia.  She presents with self care debility. Performance deficits affecting function are weakness, impaired endurance, impaired self care skills, impaired balance, gait instability, impaired functional mobility, impaired cognition, decreased upper extremity function, decreased lower extremity function, decreased safety awareness, decreased ROM, edema.     Rehab Prognosis:  Fair; patient would benefit from acute skilled OT services to address these deficits and reach maximum level of function.       Plan:     Patient to be seen 2 x/week to address the above listed problems via self-care/home management, therapeutic activities, therapeutic exercises  Plan of Care Expires: 09/28/23  Plan of Care Reviewed with: patient    Subjective     Chief Complaint: None  Patient/Family Comments/goals: None  Pain/Comfort:  Pain Rating 1: 0/10  Pain Rating Post-Intervention 1: 0/10    Objective:     Communicated with: pt nurse, Claire, and chart review via Epic prior to session.  Patient found supine with BIPAP, telemetry, PureWick, oxygen, bed alarm upon OT entry to room.    General Precautions: Standard, fall, respiratory    Orthopedic Precautions:N/A  Braces: N/A  Respiratory Status:  Found on BIPAP, nasal canula 4L O2.     Occupational Performance:   Bed Mobility:    Patient completed Rolling/Turning to Right with stand by assistance  Patient completed Supine to Sit with contact guard assistance     Functional Mobility/Transfers:  Patient completed Bed <> Chair Transfer using Step  Transfer technique with minimum assistance and of 2 persons with hand-held assist  Pt refused to remove BIPAP and transfer to oxygen for functional mobility.    Activities of Daily Living:  Toileting: Pt dependent with PureWick. Educated and encouraged use of bedside commode with calling for A from nursing to complete.    [facility] 6 Click ADL: 15    Treatment & Education:  Pt educated on the importance of OOB and upright positioning for recovery. Pt  completed therex to BUE 5 reps x 2 planes and BLE therex 5 reps x 2 planes.     Patient left up in chair with all lines intact, chair alarm on, and bed side commode nearby with nurse waiting to enter pt's room .    GOALS:   Multidisciplinary Problems       Occupational Therapy Goals          Problem: Occupational Therapy    Goal Priority Disciplines Outcome Interventions   Occupational Therapy Goal     OT, PT/OT Ongoing, Progressing    Description: Goals to be met by: 09/28/23     Patient will increase functional independence with ADLs by performing:    Toileting from bedside commode with Minimal Assistance for hygiene and clothing management.   Step transfer with Contact Guard Assistance  Increased functional strength to BUE grossly by 1/2 MMT grades.                         Time Tracking:     OT Date of Treatment: 09/18/23  OT Start Time: 0850  OT Stop Time: 0915  OT Total Time (min): 25 min    Billable Minutes:Therapeutic Activity 10  Therapeutic Exercise 15    OT/MARLA: OT      CYNDI Rubalcava  9/18/2023

## 2023-09-19 VITALS
TEMPERATURE: 98 F | HEIGHT: 64 IN | WEIGHT: 255.5 LBS | HEART RATE: 63 BPM | RESPIRATION RATE: 18 BRPM | DIASTOLIC BLOOD PRESSURE: 78 MMHG | SYSTOLIC BLOOD PRESSURE: 153 MMHG | OXYGEN SATURATION: 92 % | BODY MASS INDEX: 43.62 KG/M2

## 2023-09-19 LAB
ANION GAP SERPL CALC-SCNC: 9 MMOL/L (ref 8–16)
BUN SERPL-MCNC: 32 MG/DL (ref 8–23)
CALCIUM SERPL-MCNC: 9.5 MG/DL (ref 8.7–10.5)
CHLORIDE SERPL-SCNC: 100 MMOL/L (ref 95–110)
CO2 SERPL-SCNC: 31 MMOL/L (ref 23–29)
CREAT SERPL-MCNC: 0.9 MG/DL (ref 0.5–1.4)
EST. GFR  (NO RACE VARIABLE): >60 ML/MIN/1.73 M^2
GLUCOSE SERPL-MCNC: 81 MG/DL (ref 70–110)
POCT GLUCOSE: 114 MG/DL (ref 70–110)
POCT GLUCOSE: 73 MG/DL (ref 70–110)
POTASSIUM SERPL-SCNC: 4.8 MMOL/L (ref 3.5–5.1)
SODIUM SERPL-SCNC: 140 MMOL/L (ref 136–145)

## 2023-09-19 PROCEDURE — 27100171 HC OXYGEN HIGH FLOW UP TO 24 HOURS

## 2023-09-19 PROCEDURE — 94640 AIRWAY INHALATION TREATMENT: CPT

## 2023-09-19 PROCEDURE — 25000003 PHARM REV CODE 250: Performed by: NURSE PRACTITIONER

## 2023-09-19 PROCEDURE — 97530 THERAPEUTIC ACTIVITIES: CPT

## 2023-09-19 PROCEDURE — 94761 N-INVAS EAR/PLS OXIMETRY MLT: CPT

## 2023-09-19 PROCEDURE — 80048 BASIC METABOLIC PNL TOTAL CA: CPT | Performed by: NURSE PRACTITIONER

## 2023-09-19 PROCEDURE — 97116 GAIT TRAINING THERAPY: CPT

## 2023-09-19 PROCEDURE — 99232 PR SUBSEQUENT HOSPITAL CARE,LEVL II: ICD-10-PCS | Mod: GW,HPC,,

## 2023-09-19 PROCEDURE — 94660 CPAP INITIATION&MGMT: CPT

## 2023-09-19 PROCEDURE — 36415 COLL VENOUS BLD VENIPUNCTURE: CPT | Performed by: NURSE PRACTITIONER

## 2023-09-19 PROCEDURE — 99232 SBSQ HOSP IP/OBS MODERATE 35: CPT | Mod: GW,HPC,,

## 2023-09-19 PROCEDURE — 25000003 PHARM REV CODE 250: Performed by: INTERNAL MEDICINE

## 2023-09-19 PROCEDURE — 25000242 PHARM REV CODE 250 ALT 637 W/ HCPCS: Performed by: NURSE PRACTITIONER

## 2023-09-19 PROCEDURE — 99900035 HC TECH TIME PER 15 MIN (STAT)

## 2023-09-19 PROCEDURE — 63600175 PHARM REV CODE 636 W HCPCS: Performed by: NURSE PRACTITIONER

## 2023-09-19 RX ORDER — PREDNISONE 20 MG/1
TABLET ORAL
Qty: 26 TABLET | Refills: 0 | Status: ON HOLD | OUTPATIENT
Start: 2023-09-19 | End: 2023-10-04 | Stop reason: HOSPADM

## 2023-09-19 RX ORDER — ISOSORBIDE MONONITRATE 30 MG/1
30 TABLET, EXTENDED RELEASE ORAL DAILY
Qty: 30 TABLET | Refills: 1 | Status: SHIPPED | OUTPATIENT
Start: 2023-09-20 | End: 2024-09-19

## 2023-09-19 RX ADMIN — ISOSORBIDE MONONITRATE 30 MG: 30 TABLET, EXTENDED RELEASE ORAL at 08:09

## 2023-09-19 RX ADMIN — PANTOPRAZOLE SODIUM 40 MG: 40 TABLET, DELAYED RELEASE ORAL at 08:09

## 2023-09-19 RX ADMIN — SENNOSIDES AND DOCUSATE SODIUM 1 TABLET: 50; 8.6 TABLET ORAL at 08:09

## 2023-09-19 RX ADMIN — POLYETHYLENE GLYCOL 3350 17 G: 17 POWDER, FOR SOLUTION ORAL at 08:09

## 2023-09-19 RX ADMIN — ASPIRIN 81 MG: 81 TABLET, COATED ORAL at 08:09

## 2023-09-19 RX ADMIN — CARVEDILOL 6.25 MG: 6.25 TABLET, FILM COATED ORAL at 08:09

## 2023-09-19 RX ADMIN — SERTRALINE HYDROCHLORIDE 50 MG: 50 TABLET ORAL at 08:09

## 2023-09-19 RX ADMIN — HYDRALAZINE HYDROCHLORIDE 10 MG: 20 INJECTION, SOLUTION INTRAMUSCULAR; INTRAVENOUS at 05:09

## 2023-09-19 RX ADMIN — ENOXAPARIN SODIUM 120 MG: 120 INJECTION SUBCUTANEOUS at 08:09

## 2023-09-19 RX ADMIN — LEVOTHYROXINE SODIUM 50 MCG: 50 TABLET ORAL at 05:09

## 2023-09-19 RX ADMIN — PREDNISONE 60 MG: 50 TABLET ORAL at 08:09

## 2023-09-19 RX ADMIN — SACUBITRIL AND VALSARTAN 1 TABLET: 24; 26 TABLET, FILM COATED ORAL at 08:09

## 2023-09-19 RX ADMIN — ARFORMOTEROL TARTRATE 15 MCG: 15 SOLUTION RESPIRATORY (INHALATION) at 06:09

## 2023-09-19 RX ADMIN — BUSPIRONE HYDROCHLORIDE 5 MG: 5 TABLET ORAL at 08:09

## 2023-09-19 RX ADMIN — ATORVASTATIN CALCIUM 40 MG: 40 TABLET, FILM COATED ORAL at 08:09

## 2023-09-19 RX ADMIN — BUDESONIDE 0.5 MG: 0.5 INHALANT ORAL at 06:09

## 2023-09-19 NOTE — PT/OT/SLP PROGRESS
Physical Therapy Treatment    Patient Name:  Caremn Tan   MRN:  [id number]    Recommendations:     Discharge Recommendations: home health PT  Discharge Equipment Recommendations: none  Barriers to discharge: None    Assessment:     Carmen Tan is a 73 y.o. female admitted with a medical diagnosis of Acute on chronic respiratory failure with hypoxia.  She presents with the following impairments/functional limitations: weakness, impaired endurance, impaired functional mobility, gait instability, impaired balance, decreased safety awareness, impaired cardiopulmonary response to activity, decreased lower extremity function, decreased upper extremity function, decreased coordination, edema.    Rehab Prognosis: Fair; patient would benefit from acute skilled PT services to address these deficits and reach maximum level of function.    Recent Surgery: * No surgery found *     Plan:     During this hospitalization, patient to be seen 3 x/week to address the identified rehab impairments via gait training, therapeutic activities, therapeutic exercises and progress toward the following goals:    Plan of Care Expires:  09/28/23    Subjective     Chief Complaint: NONE, AGREEABLE TO TX.  Patient/Family Comments/goals:   Pain/Comfort:  Pain Rating 1: 4/10  Location - Side 1: Right  Location - Orientation 1: lateral  Location 1: flank  Pain Addressed 1: Reposition, Distraction      Objective:     Communicated with NURSE CARVAJAL prior to session.  Patient found  SEATED AT EOB  with oxygen, telemetry, peripheral IV, PureWick, BIPAP upon PT entry to room.     General Precautions: Standard, fall, respiratory  Orthopedic Precautions: N/A  Braces: N/A  Respiratory Status:  BIPAP TO 4L OF O2 FOR GAIT THEN BACK TO BIPAP     Functional Mobility:  Bed Mobility:     Scooting: stand by assistance  Transfers:     Sit to Stand:  contact guard assistance with rolling walker  Bed to Chair: contact guard assistance with  rolling walker   "using  Step Transfer  Gait: PT AMB 20' IN ROOM WITH RW AND CGA, SLOW PACE, QUICK TO FATIGUE, CUES OR UPRIGHT POSTURE DUE TO FF OVER RW , MILD SOB WITH O2 IN TOW  Balance: GOOD SITTING BALANCE, FAIR DYNAMIC BALANCE DURING GAIT    AM-PAC 6 CLICK MOBILITY  Turning over in bed (including adjusting bedclothes, sheets and blankets)?: 1 (NT)  Sitting down on and standing up from a chair with arms (e.g., wheelchair, bedside commode, etc.): 3  Moving from lying on back to sitting on the side of the bed?: 1 (NT)  Moving to and from a bed to a chair (including a wheelchair)?: 3  Need to walk in hospital room?: 3  Climbing 3-5 steps with a railing?: 1 (NT)  Basic Mobility Total Score: 12     Treatment & Education:  PT EDUCATION:  - ROLE OF P.T. AND POC IN ACUTE CARE HOSPITAL SETTING  - RW USE AND SAFETY DURING TF'S AND GAIT  - ENCOURAGED TO INCREASE TIME OOB IN CHAIR TO TOLERANCE   - TO CONTINUE THERAPUETIC EXERCISES THROUGHOUT THE DAY TO INCREASE ACTIVITY TOLERANCE AND DECREASE RISK FOR PNEUMONIA AND BLOOD CLOTS: HIP FLEX/EXT, HIP ABD/ADD, QUAD SET, HEEL SLIDE, AP  - RISK FOR FALLS DUE TO GENERALIZED WEAKNESS, EDUCATED ON "CALL DON'T FALL", ENCOURAGED TO CALL FOR ASSISTANCE WITH ALL NEEDS SUCH AS BED<>CHAIR TRANSFERS OR TRIPS TO BATHROOM, PT AGREEABLE TO SAFETY PRECAUTIONS    Patient left up in chair with all lines intact, call button in reach, chair alarm on, and NURSE notified..    GOALS:   Multidisciplinary Problems       Physical Therapy Goals          Problem: Physical Therapy    Goal Priority Disciplines Outcome Goal Variances Interventions   Physical Therapy Goal     PT, PT/OT Ongoing, Progressing     Description: LTG'S TO BE MET IN 14 DAYS (9-28-23)  PT WILL BE OFELIA FOR BED MOBILITY  PT WILL BE OFELIA FOR BED<>CHAIR TF'S  PT WILL  FEET WITH RW AND SPV  PT WILL INC AMPAC SCORE BY 2 POINTS TO PROGRESS GROSS FUNC MOBILITY                         Time Tracking:     PT Received On: 09/19/23  PT Start Time: 1100     PT " Stop Time: 1125  PT Total Time (min): 25 min     Billable Minutes: Gait Training 10 and Therapeutic Activity 15    Treatment Type: Treatment  PT/PTA: PT     Number of PTA visits since last PT visit: 0     09/19/2023

## 2023-09-19 NOTE — PT/OT/SLP PROGRESS
"Occupational Therapy   Treatment    Name: Carmen Tan  MRN: [id number]  Admitting Diagnosis:  Acute on chronic respiratory failure with hypoxia       Recommendations:     Discharge Recommendations: home health OT (24/7 SPV and A)  Discharge Equipment Recommendations:  none  Barriers to discharge:  None    Assessment:     Carmen Tan is a 73 y.o. female with a medical diagnosis of Acute on chronic respiratory failure with hypoxia.  She presents with the following performance deficits affecting function are weakness, impaired endurance, impaired self care skills, impaired functional mobility, impaired balance, impaired cardiopulmonary response to activity.     Rehab Prognosis:  Fair and Poor; patient would benefit from acute skilled OT services to address these deficits and reach maximum level of function.       Plan:     Patient to be seen 2 x/week to address the above listed problems via self-care/home management, therapeutic activities, therapeutic exercises  Plan of Care Expires: 09/28/23  Plan of Care Reviewed with: patient    Subjective     Chief Complaint: Reported "I will walk."  Patient/Family Comments/goals: none reported  Pain/Comfort:  Pain Rating 1: 2/10  Location - Side 1: Right  Location 1: flank  Pain Addressed 1:  (activity pacing)  Pain Rating Post-Intervention 1: 2/10    Objective:     Communicated with: NurseJairo, prior to session.  Patient found supine with peripheral IV, PureWick, telemetry, BIPAP upon OT entry to room.    General Precautions: Standard, fall, respiratory    Orthopedic Precautions:N/A  Braces: N/A  Respiratory Status:  BiPAP on at entry to room. Self transitioned to 4L NC for mobility. Returned to BiPAP at end of session     Occupational Performance:     Bed Mobility:    EOB at entry and in chair at exit. Not assessed    Functional Mobility/Transfers:  Patient completed Sit <> Stand Transfer with stand by assistance  with  rolling walker   Patient completed Bed <> " Chair Transfer using Step Transfer technique with contact guard assistance with rolling walker  Functional Mobility: Patient completed x20ft functional mobility with RW and CGA to increase dynamic standing balance and activity tolerance needed for ADL completion.  O2 in tow throughout session.    Activities of Daily Living:  Grooming: setup completed simple grooming while seated in bedside chair on 4L of O2 with setup for item retrieval only.      [facility] 6 Click ADL: 16    Treatment & Education:  Patient tolerated session fair. Rapid fatigue with all activity requiring prolonged rest breaks. Patient educated on energy conservation and activity pacing throughout. Encouraged completion of B UE AROM therex throughout the day to increase functional strength and activity tolerance needed for ADL completion. Educated on benefits of OOB activity and importance of calling for A to transfer back to bed. Flat affected throughout with minimal verbalizations, but did state understanding to education. Will continue to progress activity as able.    Patient left up in chair with all lines intact, call button in reach, and chair alarm on    GOALS:   Multidisciplinary Problems       Occupational Therapy Goals          Problem: Occupational Therapy    Goal Priority Disciplines Outcome Interventions   Occupational Therapy Goal     OT, PT/OT Ongoing, Progressing    Description: Goals to be met by: 09/28/23     Patient will increase functional independence with ADLs by performing:    Toileting from bedside commode with Minimal Assistance for hygiene and clothing management.   Step transfer with Contact Guard Assistance  Increased functional strength to BUE grossly by 1/2 MMT grades.                         Time Tracking:     OT Date of Treatment: 09/19/23  OT Start Time: 1100  OT Stop Time: 1125  OT Total Time (min): 25 min    Billable Minutes:Therapeutic Activity 25    Katalina Ennis OT  9/19/2023   

## 2023-09-19 NOTE — PLAN OF CARE
09/19/23 1546   Post-Acute Status   Post-Acute Authorization Home Health   Home Health Status Set-up Complete/Auth obtained   Coverage [facility] managed Medicare   Discharge Delays None known at this time   Discharge Plan   Discharge Plan A Home Health       Patient as [facility] set up upon DC.

## 2023-09-19 NOTE — SUBJECTIVE & OBJECTIVE
Review of Systems   Constitutional: Positive for malaise/fatigue.   HENT: Negative.     Eyes: Negative.    Cardiovascular: Negative.    Respiratory:  Positive for shortness of breath.    Skin: Negative.    Musculoskeletal: Negative.    Gastrointestinal: Negative.    Genitourinary: Negative.    Neurological: Negative.    Psychiatric/Behavioral: Negative.       Objective:     Vital Signs (Most Recent):  Temp: 97.3 °F (36.3 °C) (09/19/23 1304)  Pulse: (!) 59 (09/19/23 1304)  Resp: 18 (09/19/23 1304)  BP: 121/66 (09/19/23 1304)  SpO2: 95 % (09/19/23 1341) Vital Signs (24h Range):  Temp:  [97.3 °F (36.3 °C)-98.6 °F (37 °C)] 97.3 °F (36.3 °C)  Pulse:  [56-74] 59  Resp:  [17-22] 18  SpO2:  [91 %-96 %] 95 %  BP: (121-173)/(66-75) 121/66     Weight: 115.9 kg (255 lb 8.2 oz)  Body mass index is 43.86 kg/m².     SpO2: 95 %         Intake/Output Summary (Last 24 hours) at 9/19/2023 1450  Last data filed at 9/19/2023 0102  Gross per 24 hour   Intake 360 ml   Output 1050 ml   Net -690 ml       Lines/Drains/Airways       Drain  Duration             Female External Urinary Catheter 09/10/23 0700 9 days              Peripheral Intravenous Line  Duration                  Peripheral IV - Single Lumen 09/15/23 2300 20 G Left Forearm 3 days                       Physical Exam  Vitals and nursing note reviewed.   Constitutional:       Appearance: Normal appearance.   HENT:      Head: Normocephalic.   Eyes:      Pupils: Pupils are equal, round, and reactive to light.   Cardiovascular:      Rate and Rhythm: Normal rate and regular rhythm.      Heart sounds: Normal heart sounds, S1 normal and S2 normal. No murmur heard.     No S3 or S4 sounds.   Pulmonary:      Effort: Pulmonary effort is normal.      Breath sounds: Wheezing present.   Abdominal:      General: Bowel sounds are normal.      Palpations: Abdomen is soft.   Musculoskeletal:         General: Normal range of motion.      Cervical back: Normal range of motion.   Skin:      "Capillary Refill: Capillary refill takes less than 2 seconds.   Neurological:      General: No focal deficit present.      Mental Status: She is alert and oriented to person, place, and time.   Psychiatric:         Mood and Affect: Mood normal.         Behavior: Behavior normal.         Thought Content: Thought content normal.            Significant Labs: BMP:   Recent Labs   Lab 09/18/23  0511 09/19/23  0505   GLU 70 81    140   K 5.2* 4.8    100   CO2 37* 31*   BUN 35* 32*   CREATININE 1.0 0.9   CALCIUM 9.6 9.5   , CMP   Recent Labs   Lab 09/18/23  0511 09/19/23  0505    140   K 5.2* 4.8    100   CO2 37* 31*   GLU 70 81   BUN 35* 32*   CREATININE 1.0 0.9   CALCIUM 9.6 9.5   ANIONGAP 6* 9   , CBC No results for input(s): "WBC", "HGB", "HCT", "PLT" in the last 48 hours., INR No results for input(s): "INR", "PROTIME" in the last 48 hours., Lipid Panel No results for input(s): "CHOL", "HDL", "LDLCALC", "TRIG", "CHOLHDL" in the last 48 hours., Troponin No results for input(s): "TROPONINI" in the last 48 hours., and All pertinent lab results from the last 24 hours have been reviewed.    Significant Imaging: Echocardiogram: Transthoracic echo (TTE) complete (Cupid Only):   Results for orders placed or performed during the hospital encounter of 09/09/23   Echo   Result Value Ref Range    BSA 2.36 m2    LVOT stroke volume 82.38 cm3    LVIDd 5.03 3.5 - 6.0 cm    LV Systolic Volume 68.43 mL    LV Systolic Volume Index 30.7 mL/m2    LVIDs 3.96 2.1 - 4.0 cm    LV Diastolic Volume 120.14 mL    LV Diastolic Volume Index 53.87 mL/m2    IVS 1.11 (A) 0.6 - 1.1 cm    LVOT diameter 1.95 cm    LVOT area 3.0 cm2    FS 21 (A) 28 - 44 %    Left Ventricle Relative Wall Thickness 0.54 cm    Posterior Wall 1.35 (A) 0.6 - 1.1 cm    LV mass 244.31 g    LV Mass Index 110 g/m2    MV Peak E Berhane 0.93 m/s    TDI LATERAL 0.13 m/s    TDI SEPTAL 0.08 m/s    E/E' ratio 8.86 m/s    MV Peak A Berhane 0.90 m/s    TR Max Berhane 3.30 m/s "    E/A ratio 1.03     IVRT 137.01 msec    E wave deceleration time 232.88 msec    LV SEPTAL E/E' RATIO 11.63 m/s    LV LATERAL E/E' RATIO 7.15 m/s    LVOT peak berhane 1.35 m/s    Left Ventricular Outflow Tract Mean Velocity 0.87 cm/s    Left Ventricular Outflow Tract Mean Gradient 3.55 mmHg    LA size 3.64 cm    Left Atrium Major Axis 5.78 cm    Left Atrium Minor Axis 6.21 cm    RV mid diameter 5.15 cm    RVOT peak VTI 18.1 cm    RA Major Axis 4.94 cm    AV regurgitation pressure 1/2 time [id number] ms    AR Max Berhane 4.57 m/s    AV mean gradient 10 mmHg    AV peak gradient 17 mmHg    Ao peak berhane 2.09 m/s    Ao VTI 39.30 cm    LVOT peak VTI 27.60 cm    AV valve area 2.10 cm²    AV Velocity Ratio 0.65     AV index (prosthetic) 0.70     KARYNA by Velocity Ratio 1.93 cm²    Mr max berhane 5.59 m/s    Triscuspid Valve Regurgitation Peak Gradient 44 mmHg    PV mean gradient 2 mmHg    RVOT peak berhane 0.82 m/s    Ao root annulus 3.18 cm    STJ 3.59 cm    Ascending aorta 3.46 cm    IVC diameter 2.31 cm    Mean e' 0.11 m/s    ZLVIDS -1.49     ZLVIDD -4.49     LA Volume Index 33.2 mL/m2    LA volume 74.10 cm3    LA WIDTH 4.0 cm    TAPSE 2.40 cm    RA Width 3.2 cm    TV resting pulmonary artery pressure 47 mmHg    RV TB RVSP 6 mmHg    Est. RA pres 3 mmHg    Narrative      Left Ventricle: The left ventricle is normal in size. Mildly increased   wall thickness. Normal wall motion. There is mildly reduced systolic   function with a visually estimated ejection fraction of 40 - 45%. There is   normal diastolic function.    Right Ventricle: Normal right ventricular cavity size. Wall thickness   is normal. Right ventricle wall motion  is normal. Systolic function is   normal.    Aortic Valve: There is mild to moderate aortic regurgitation with a   centrally directed jet.    Mitral Valve: There is moderate regurgitation.    Tricuspid Valve: There is moderate regurgitation with a centrally   directed jet.    Pulmonic Valve: There is mild to  moderate regurgitation.    Pulmonary Artery: The estimated pulmonary artery systolic pressure is   47 mmHg.    IVC/SVC: Normal venous pressure at 3 mmHg.     , EKG: reviewed, Stress Test: reviewed, and X-Ray: CXR: X-Ray Chest 1 View (CXR):   Results for orders placed or performed during the hospital encounter of 09/09/23   X-Ray Chest 1 View    Narrative    EXAMINATION:  XR CHEST 1 VIEW    CLINICAL HISTORY:  shortness of breath;    FINDINGS:  Single view of the chest.  Comparison 09/08/2023.    Cardiac silhouette is enlarged but stable.  The lungs demonstrate no evidence of active disease.  No evidence of pleural effusion or pneumothorax.  Bones appear intact.      Impression    No acute process seen.      Electronically signed by: Everett Francis MD  Date:    09/15/2023  Time:    16:02

## 2023-09-19 NOTE — ASSESSMENT & PLAN NOTE
Trend  Cardiac echo results pending    9/11/23  -Flat  -No CP symptoms  -Suspect elevation due to demand ischemia from fluid overload  -Echo showed EF of 40-45%, mild-mod AI  -Continue Coreg, Entresto, ASA  -Can consider ischemic workup IP vs OP once compensated    9/12/23  -Ok to d/c heparin gtt  -Continue Coreg, Entresto, ASA  -Probable MPI stress test tmw    9/13/23  -CP free  -Continue ASA, Coreg, Entresto  -MPI stress test pending    9/14/23  -Wheezing today will re-attempt stress tmw    9/15/23  -Still wheezing, re-attempt stress test tmw, may have to be done as OP    9/17/23 - nuc stress tomorrow AM if breathing stable     9/18/23  Pt still on Bipap  Hold off on nuc stress, will reassess tomorrow    9/19/23  Denies any CP on exam  Follow up OP stress

## 2023-09-19 NOTE — PLAN OF CARE
FRANCOIS discovered that Patient was on services with [facility]. FRANCOIS contacted [facility] and spoke to Cyndy. Per Cyndy, patient was discharged from services on 9/8/23 and they were not willing to accept back.    FRANCOIS let NP know and stated that DC plan could switch to Home Health. Per NP, she went and spoke to patient who was agreeable to HH. Referrals were marked interested by [facility] [place] and [facility] for possible acceptance. FRANCOIS messaged NP to place HH orders, which she did. FRANCOIS uploaded HH orders. FRANCOIS will continue to follow to attempt at getting Patient set up with HH.     FRANCOIS will continue to follow and assist as needed.

## 2023-09-19 NOTE — PROGRESS NOTES
[facility] Tele[facility] ([facility])  Cardiology  Progress Note    Patient Name: Carmen Tan  MRN: [id number]  Admission Date: 9/9/2023  Hospital Length of Stay: 10 days  Code Status: Full Code   Attending Physician: James Smith MD   Primary Care Physician: Lucian Barry MD  Expected Discharge Date: 9/19/2023  Principal Problem:Acute on chronic respiratory failure with hypoxia    Subjective:     Hospital Course:   9/10/23-Patient seen and examined. Stable and continue diuresis    9/11/23-Patient seen and examined today, lying in bed with CPAP on. Feels ok. SOB improving. No CP. Labs reviewed, creatinine 1.2.     9/12/23-Patient seen and examined today, resting with CPAP on. Continues to improve. Diuresing well. Labs reviewed/stable. Possible MPI stress test tmw.    9/13/23-Patient seen and examined today, sitting up in bed. SOB much improved, on supplemental O2. No CP. BNP trended down significantly. Labs reviewed, mild bump in BUN/creatinine, IV Lasix d/c'd. Stress test ordered (rest portion today).    9/14/23-Patient seen and examined today, on CPAP. Wheezing, unable to complete stress test today. Give additional dose of IV Lasix today. Labs reviewed.     9/15/23-Patient seen and examined today, wearing CPAP. Breathing improved. Sill wheezing, unable to complete stress test. Labs reviewed. CBC pending.     9/16/23- BP remains elevated this morning. Remains on CPAP. O2 sats are low 90s this morning. CT negative for PE. Lower extremity US negative for DVT. Continues to diurese well. -9L so far.      9/17/23 - feels better this AM sitting up, no wheezing on exam. Discussed Nuc stress tomorrow AM if continues to have stable resp status. Vitals and labs stable    9/18/23 Pt seen and examined today, sitting up in bedside chair, Bipap on. Labs reviewed, K 5.2. No nuclear stress today, wheezing/Bipap    9/19/23 Pt seen and examined today, resting in bed with Bipap on. Pt reports she does not wear her Bipap all day.  Still with ellen wheezing on exam. Labs reviewed, chart reviewed          Review of Systems   Constitutional: Positive for malaise/fatigue.   HENT: Negative.     Eyes: Negative.    Cardiovascular: Negative.    Respiratory:  Positive for shortness of breath.    Skin: Negative.    Musculoskeletal: Negative.    Gastrointestinal: Negative.    Genitourinary: Negative.    Neurological: Negative.    Psychiatric/Behavioral: Negative.       Objective:     Vital Signs (Most Recent):  Temp: 97.3 °F (36.3 °C) (09/19/23 1304)  Pulse: (!) 59 (09/19/23 1304)  Resp: 18 (09/19/23 1304)  BP: 121/66 (09/19/23 1304)  SpO2: 95 % (09/19/23 1341) Vital Signs (24h Range):  Temp:  [97.3 °F (36.3 °C)-98.6 °F (37 °C)] 97.3 °F (36.3 °C)  Pulse:  [56-74] 59  Resp:  [17-22] 18  SpO2:  [91 %-96 %] 95 %  BP: (121-173)/(66-75) 121/66     Weight: 115.9 kg (255 lb 8.2 oz)  Body mass index is 43.86 kg/m².     SpO2: 95 %         Intake/Output Summary (Last 24 hours) at 9/19/2023 1450  Last data filed at 9/19/2023 0102  Gross per 24 hour   Intake 360 ml   Output 1050 ml   Net -690 ml       Lines/Drains/Airways       Drain  Duration             Female External Urinary Catheter 09/10/23 0700 9 days              Peripheral Intravenous Line  Duration                  Peripheral IV - Single Lumen 09/15/23 2300 20 G Left Forearm 3 days                       Physical Exam  Vitals and nursing note reviewed.   Constitutional:       Appearance: Normal appearance.   HENT:      Head: Normocephalic.   Eyes:      Pupils: Pupils are equal, round, and reactive to light.   Cardiovascular:      Rate and Rhythm: Normal rate and regular rhythm.      Heart sounds: Normal heart sounds, S1 normal and S2 normal. No murmur heard.     No S3 or S4 sounds.   Pulmonary:      Effort: Pulmonary effort is normal.      Breath sounds: Wheezing present.   Abdominal:      General: Bowel sounds are normal.      Palpations: Abdomen is soft.   Musculoskeletal:         General: Normal range of  "motion.      Cervical back: Normal range of motion.   Skin:     Capillary Refill: Capillary refill takes less than 2 seconds.   Neurological:      General: No focal deficit present.      Mental Status: She is alert and oriented to person, place, and time.   Psychiatric:         Mood and Affect: Mood normal.         Behavior: Behavior normal.         Thought Content: Thought content normal.            Significant Labs: BMP:   Recent Labs   Lab 09/18/23  0511 09/19/23  0505   GLU 70 81    140   K 5.2* 4.8    100   CO2 37* 31*   BUN 35* 32*   CREATININE 1.0 0.9   CALCIUM 9.6 9.5   , CMP   Recent Labs   Lab 09/18/23  0511 09/19/23  0505    140   K 5.2* 4.8    100   CO2 37* 31*   GLU 70 81   BUN 35* 32*   CREATININE 1.0 0.9   CALCIUM 9.6 9.5   ANIONGAP 6* 9   , CBC No results for input(s): "WBC", "HGB", "HCT", "PLT" in the last 48 hours., INR No results for input(s): "INR", "PROTIME" in the last 48 hours., Lipid Panel No results for input(s): "CHOL", "HDL", "LDLCALC", "TRIG", "CHOLHDL" in the last 48 hours., Troponin No results for input(s): "TROPONINI" in the last 48 hours., and All pertinent lab results from the last 24 hours have been reviewed.    Significant Imaging: Echocardiogram: Transthoracic echo (TTE) complete (Cupid Only):   Results for orders placed or performed during the hospital encounter of 09/09/23   Echo   Result Value Ref Range    BSA 2.36 m2    LVOT stroke volume 82.38 cm3    LVIDd 5.03 3.5 - 6.0 cm    LV Systolic Volume 68.43 mL    LV Systolic Volume Index 30.7 mL/m2    LVIDs 3.96 2.1 - 4.0 cm    LV Diastolic Volume 120.14 mL    LV Diastolic Volume Index 53.87 mL/m2    IVS 1.11 (A) 0.6 - 1.1 cm    LVOT diameter 1.95 cm    LVOT area 3.0 cm2    FS 21 (A) 28 - 44 %    Left Ventricle Relative Wall Thickness 0.54 cm    Posterior Wall 1.35 (A) 0.6 - 1.1 cm    LV mass 244.31 g    LV Mass Index 110 g/m2    MV Peak E Berhane 0.93 m/s    TDI LATERAL 0.13 m/s    TDI SEPTAL 0.08 m/s    E/E' " ratio 8.86 m/s    MV Peak A Berhane 0.90 m/s    TR Max Berhane 3.30 m/s    E/A ratio 1.03     IVRT 137.01 msec    E wave deceleration time 232.88 msec    LV SEPTAL E/E' RATIO 11.63 m/s    LV LATERAL E/E' RATIO 7.15 m/s    LVOT peak berhane 1.35 m/s    Left Ventricular Outflow Tract Mean Velocity 0.87 cm/s    Left Ventricular Outflow Tract Mean Gradient 3.55 mmHg    LA size 3.64 cm    Left Atrium Major Axis 5.78 cm    Left Atrium Minor Axis 6.21 cm    RV mid diameter 5.15 cm    RVOT peak VTI 18.1 cm    RA Major Axis 4.94 cm    AV regurgitation pressure 1/2 time 850.656882688553914 ms    AR Max Berhane 4.57 m/s    AV mean gradient 10 mmHg    AV peak gradient 17 mmHg    Ao peak berhane 2.09 m/s    Ao VTI 39.30 cm    LVOT peak VTI 27.60 cm    AV valve area 2.10 cm²    AV Velocity Ratio 0.65     AV index (prosthetic) 0.70     KARYNA by Velocity Ratio 1.93 cm²    Mr max berhane 5.59 m/s    Triscuspid Valve Regurgitation Peak Gradient 44 mmHg    PV mean gradient 2 mmHg    RVOT peak berhane 0.82 m/s    Ao root annulus 3.18 cm    STJ 3.59 cm    Ascending aorta 3.46 cm    IVC diameter 2.31 cm    Mean e' 0.11 m/s    ZLVIDS -1.49     ZLVIDD -4.49     LA Volume Index 33.2 mL/m2    LA volume 74.10 cm3    LA WIDTH 4.0 cm    TAPSE 2.40 cm    RA Width 3.2 cm    TV resting pulmonary artery pressure 47 mmHg    RV TB RVSP 6 mmHg    Est. RA pres 3 mmHg    Narrative      Left Ventricle: The left ventricle is normal in size. Mildly increased   wall thickness. Normal wall motion. There is mildly reduced systolic   function with a visually estimated ejection fraction of 40 - 45%. There is   normal diastolic function.    Right Ventricle: Normal right ventricular cavity size. Wall thickness   is normal. Right ventricle wall motion  is normal. Systolic function is   normal.    Aortic Valve: There is mild to moderate aortic regurgitation with a   centrally directed jet.    Mitral Valve: There is moderate regurgitation.    Tricuspid Valve: There is moderate regurgitation  with a centrally   directed jet.    Pulmonic Valve: There is mild to moderate regurgitation.    Pulmonary Artery: The estimated pulmonary artery systolic pressure is   47 mmHg.    IVC/SVC: Normal venous pressure at 3 mmHg.     , EKG: reviewed, Stress Test: reviewed, and X-Ray: CXR: X-Ray Chest 1 View (CXR):   Results for orders placed or performed during the hospital encounter of 09/09/23   X-Ray Chest 1 View    Narrative    EXAMINATION:  XR CHEST 1 VIEW    CLINICAL HISTORY:  shortness of breath;    FINDINGS:  Single view of the chest.  Comparison 09/08/2023.    Cardiac silhouette is enlarged but stable.  The lungs demonstrate no evidence of active disease.  No evidence of pleural effusion or pneumothorax.  Bones appear intact.      Impression    No acute process seen.      Electronically signed by: Everett Francis MD  Date:    09/15/2023  Time:    16:02     Assessment and Plan:       * Acute on chronic respiratory failure with hypoxia  Patient with Hypercapnic and Hypoxic Respiratory failure which is Acute on chronic.  she is not on home oxygen. Supplemental oxygen was provided and noted- Oxygen Concentration (%):  [30] 30    .   Signs/symptoms of respiratory failure include- tachypnea. Contributing diagnoses includes - COPD Labs and images were reviewed. Patient Has recent ABG, which has been reviewed. Will treat underlying causes and adjust management of respiratory failure as follows- hypercapnic    9/11/23  -Mgmt as per hospital medicine  -Continue IV diuresis for additional day    9/12/23  -Continue same mgmt/IV diuresis     9/13/23  -Much improved, BNP trended down, d/c IV Lasix  -Reassess in AM    9/14/23  -Using CPAP, wheezing this AM  -Continue steroids  -Give additional dose of IV Lasix    9/15/23  -Unfortunately still wheezing  -Additional dose of IV Lasix today  -Continue steroids    9/16/23  -Remains on CPAP  - O2 sats are low 90s this morning  - CT negative for PE. Lower extremity   -US negative for  DVT  -Continues to diurese well. -9L so far.     9/18/23  Cont diuresis    9/19/23  Pulm following    Tobacco dependence  Smoking cessation    D-dimer, elevated  Neg PE    Elevated troponin  Trend  Cardiac echo results pending    9/11/23  -Flat  -No CP symptoms  -Suspect elevation due to demand ischemia from fluid overload  -Echo showed EF of 40-45%, mild-mod AI  -Continue Coreg, Entresto, ASA  -Can consider ischemic workup IP vs OP once compensated    9/12/23  -Ok to d/c heparin gtt  -Continue Coreg, Entresto, ASA  -Probable MPI stress test tmw    9/13/23  -CP free  -Continue ASA, Coreg, Entresto  -MPI stress test pending    9/14/23  -Wheezing today will re-attempt stress tmw    9/15/23  -Still wheezing, re-attempt stress test tmw, may have to be done as OP    9/17/23 - nuc stress tomorrow AM if breathing stable     9/18/23  Pt still on Bipap  Hold off on nuc stress, will reassess tomorrow    9/19/23  Denies any CP on exam  Follow up OP stress    Hypertension  Continue coreg, entresto    9/16/23  - BP remains elevated this morning    9/18/23  stable      Acute cystitis without hematuria  treated    Acute on chronic combined systolic and diastolic congestive heart failure  Patient is identified as having Diastolic (HFpEF) heart failure that is Acute on chronic. CHF is currently uncontrolled due to Continued edema of extremities. Latest ECHO performed and demonstrates- Results for orders placed during the hospital encounter of 03/29/23    Echo    Interpretation Summary  · The left ventricle is normal in size with moderate concentric hypertrophy and mildly decreased systolic function.  · The estimated ejection fraction is 45%.  · Grade I left ventricular diastolic dysfunction.  · Moderate right ventricular enlargement.  · Moderate right atrial enlargement.  · Mild-to-moderate aortic regurgitation.  · There is mild aortic valve stenosis.  · Aortic valve area is 1.44 cm2; peak velocity is 2.09 m/s; mean gradient is 10  mmHg.  · Mild-to-moderate mitral regurgitation.  · Moderate tricuspid regurgitation.  · Moderate pulmonic regurgitation.  · The estimated PA systolic pressure is 69 mmHg.  · There is moderate pulmonary hypertension.  · Moderate left atrial enlargement.  . Continue Beta Blocker and Furosemide and monitor clinical status closely. Monitor on telemetry. Patient is on CHF pathway.  Monitor strict Is&Os and daily weights.  Place on fluid restriction of 2 L. Cardiology has been consulted. Continue to stress to patient importance of self efficacy and  on diet for CHF. Last BNP reviewed- and noted below   Recent Labs   Lab 09/13/23  0902   *       9/11/23  -Improving  -Continue IV diuresis  -Continue BB, Entresto  -Repeat BNP in AM    9/12/23  -Continue same meds/mgmt  -Continue IV diuresis, BB, Entresto  -Reassess in AM    9/13/23  -Volume status much improved, BNP trended down significantly  -Stop IV Lasix  -Continue BB, Entresto  -Reassess in AM  -MPI stress test ordered (rest part today)    9/14/23  -Give additional dose of IV Lasix today  -Continue BB, Entresto    9/15/23  -Give additional dose of IV Lasix today  -Continue BB, Entresto      COPD exacerbation  -Mgmt as per hospital medicine           Severe obesity (BMI >= 40)  -Weight loss    Paroxysmal A-fib  -AC previously d/c'd in 4/23 due to hematuria  -Need to readdress prior to d/c    9/15/23  -Discussed with Dr. Howell, will initiate full dose Lovenox and monitor H/H    9/18/23  Labs stable yesterday  Needs AC upon DC if H/H remains stable  Cont BB          VTE Risk Mitigation (From admission, onward)         Ordered     enoxaparin injection 120 mg  Every 12 hours         09/16/23 1259     IP VTE HIGH RISK PATIENT  Once         09/09/23 0447     Place sequential compression device  Until discontinued         09/09/23 0447                Shae Krueger NP  Cardiology  [facility] - Tele[facility] ([facility])

## 2023-09-19 NOTE — ASSESSMENT & PLAN NOTE
Patient with Hypercapnic and Hypoxic Respiratory failure which is Acute on chronic.  she is not on home oxygen. Supplemental oxygen was provided and noted- Oxygen Concentration (%):  [30] 30    .   Signs/symptoms of respiratory failure include- tachypnea. Contributing diagnoses includes - COPD Labs and images were reviewed. Patient Has recent ABG, which has been reviewed. Will treat underlying causes and adjust management of respiratory failure as follows- hypercapnic    9/11/23  -Mgmt as per hospital medicine  -Continue IV diuresis for additional day    9/12/23  -Continue same mgmt/IV diuresis     9/13/23  -Much improved, BNP trended down, d/c IV Lasix  -Reassess in AM    9/14/23  -Using CPAP, wheezing this AM  -Continue steroids  -Give additional dose of IV Lasix    9/15/23  -Unfortunately still wheezing  -Additional dose of IV Lasix today  -Continue steroids    9/16/23  -Remains on CPAP  - O2 sats are low 90s this morning  - CT negative for PE. Lower extremity   -US negative for DVT  -Continues to diurese well. -9L so far.     9/18/23  Cont diuresis    9/19/23  Pulm following

## 2023-09-19 NOTE — CONSULTS
Patient has 2 home health agencies reviewing, pending acceptance for services, [facility] and [facility]. SW will continue to follow in attempts to get patient set up with HH, upon DC.    SW will continue to follow and assist as needed.

## 2023-09-20 NOTE — DISCHARGE SUMMARY
[facility] - Telemetry ([facility])  [facility] Medicine  Discharge Summary      Patient Name: Carmen Tan  MRN: [id number]  GOLDIE: [id number]  Patient Class: IP- Inpatient  Admission Date: 9/9/2023  Hospital Length of Stay: 10 days  Discharge Date and Time:  09/20/2023 1:19 PM  Attending Physician: No att. providers found   Discharging Provider: Antonietta Lezama NP  Primary Care Provider: Lucian Barry MD    Primary Care Team: Networked reference to record PCT     HPI:   The patient is a 73 year old female with DM, COPD on home oxygen 4 liters, Diastolic CHF, HTN, hx PE who presented to [facility] ED with Generalized weakness, Fatigue, SOB, and Dysuria - onset 2 days ago that progressively worsened. Pt also reports BLE edema that worsened over the past week.     n the ED, CXR shows chronic scarring no focal consolidation. Elevated troponin of 200, elevated BNP. Labs otherwise unremarkable. COVID negative. Patient denied any chest pain, EKG non-ischemia. Patient given Aspirin 325 mg, Lovenox 1mg/kg, and Lasix 40 IV x1 as well as a breathing treatment. Po Bactrim for UTI    The patient was transferred to [facility] for admission under hospital medicine and for cardiology consultation       * No surgery found *      Hospital Course:   Patient sitting on side of the bed eating. No distress noted. Reports feeling much better. On continuous oxygen. Diuresed well overnight. Will continue to diurese.  On 9/11/23, diuresis continued. Cardiology following with inpatient vs outpatient ischemic work up to be evaluated. Troponin remains elevated with downward trend noted. Urine culture grew E. Coli (50,000-99,999) with sensitivities reviewed and antibiotics continued. Echo on 9/9/23 showed EF 40-45%  mildly reduced systolic function- mild to moderate aortic/pulmonic regurgitation with a centrally directed jet. Mitral/Tricuspid moderate regurgitation. BIPAP placed as needed for comfort and respiratory support. On 9/12/23,  diuresis continued with 4.8 L removed. BIPAP continued nightly and as needed. Stress test planned for tomorrow per Cardiology. On 9/13/23, pt verbalized symptom improvement with IV diuresis completed. Respiratory support maintained on home oxygen dose of 4L NC. Resting portion of stress test performed today with second portion to be completed on tomorrow. On 9/14/23, pt was unable to complete second portion of stress test due to wheezing. IV Lasix continued. Pt verbalized symptom improvement with CPAP in place. Stress test to be attempted in am. On 9/15/23, stress test not completed due to continued wheezing. Diuresis continued and D-dimer of 3.35 noted. Lovenox initiated at therapeutic dose per Cardiology. CTA of chest and US of BLE pending. Oxygen requirements supported with nasal cannula at home dose and CPAP. May complete stress test outpatient post acute event pending imaging results. On 9/16/23, CTA of chest showed no pulmonary embolism, no dissection, and no pneumonia.  Cardiomegaly.  Mild basilar atelectasis. Incentive spirometry encouraged. US of BLE showed no obvious signs of DVT in bilateral lower extremity. Lovenox at therapeutic dose held. IV steroids transitioned to oral dosing. On 9/17/23, pt improving slowly with BIPAP as needed for respiratory support. Pulmonology consulted. ABG results reviewed. Stress test to be completed on 9/18/23 pending wheezing and respiratory status per Cardiology. Pt counseled on smoking cessation and dangers of smoking while wearing supplemental oxygen with understanding verbalized. 9/18/23: evaluated by cardiology. Recommended to give an additional day prior to stress test. BNP normal. Hold diuretics. Monitor respiratory status.       9/19/23 patient continues to have wheezing related to chronic respiratory disease. Stress test deferred until outpatient. Ambulatory referral order placed. Prior to admission patient was with [facility]. Contacted the company who is not  willing to accept patient back because she still wants to see doctors if needed. She will keep pulmonologist for outpatient PFT and possible medication adjustment. She continues to smoke and was counseled on cessation. She has remained stable and is ready for discharge.       Goals of Care Treatment Preferences:  Code Status: Full Code      Consults:   Consults (From admission, onward)        Status Ordering Provider     Inpatient consult to Social Work  Once        Provider:  (Not yet assigned)    Completed DAHLIA BAEZ     Inpatient consult to Pulmonology  Once        Provider:  Juliet Pyle MD    Completed LAURA WILLIS     Inpatient consult to Social Work/Case Management  Once        Provider:  (Not yet assigned)    Completed LAURA WILLIS     Inpatient consult to Cardiology  Once        Provider:  Lorenzo Sampson MD    Completed EMMA ROSAS     Inpatient consult to Registered Dietitian/Nutritionist  Once        Provider:  (Not yet assigned)    Completed EMMA ROSAS          No new Assessment & Plan notes have been filed under this hospital service since the last note was generated.  Service: Hospital Medicine    Final Active Diagnoses:    Diagnosis Date Noted POA    PRINCIPAL PROBLEM:  Acute on chronic respiratory failure with hypoxia [J96.21] 02/01/2022 Yes    Tobacco dependence [F17.200] 09/16/2023 Yes    D-dimer, elevated [R79.89] 09/15/2023 Unknown    Acute cystitis without hematuria [N30.00] 09/09/2023 Yes    Hypertension [I10] 09/09/2023 Yes    Elevated troponin [R77.8] 09/09/2023 Yes    Acute on chronic combined systolic and diastolic congestive heart failure [I50.43] 02/16/2022 Yes    COPD exacerbation [J44.1] 02/16/2022 Yes    Diabetes mellitus, type 2 [E11.9] 02/16/2022 Yes    Weakness [R53.1] 02/05/2022 Yes    Severe obesity (BMI >= 40) [E66.01] 02/04/2022 Yes    Paroxysmal A-fib [I48.0] 06/08/2017 Yes     Chronic      Problems Resolved During this Admission:  "      Discharged Condition: stable    Disposition: Hospice/Home    Follow Up:   Follow-up Information     [facility] Call in 3 day(s).    Specialty: Home Health Services  Why: home health  The Medical Team - [facility] has been for you upon discharge. Please give them a call if no one has reached out within 3 days of discharge.  Contact information:  [address] [address]  [phone]             Lucian Barry MD. Schedule an appointment as soon as possible for a visit in 1 week(s).    Specialty: Internal Medicine  Why: hospital follow up  Contact information:  [place] [address] [address]  [phone]                       Patient Instructions:      Ambulatory referral/consult to Pulmonology   Standing Status: Future   Referral Priority: Routine Referral Type: Consultation   Referral Reason: Specialty Services Required   Requested Specialty: Pulmonary Disease   Number of Visits Requested: 1     Ambulatory referral/consult to Cardiology   Standing Status: Future   Referral Priority: Routine Referral Type: Consultation   Referral Reason: Specialty Services Required   Requested Specialty: Cardiology   Number of Visits Requested: 1     Ambulatory referral/consult to [facility]   Standing Status: Future   Referral Priority: Routine Referral Type: Consultation   Referral Reason: Specialty Services Required   Number of Visits Requested: 1       Significant Diagnostic Studies: Labs:   CMP   Recent Labs   Lab 09/19/23  0505      K 4.8      CO2 31*   GLU 81   BUN 32*   CREATININE 0.9   CALCIUM 9.5   ANIONGAP 9    and CBC No results for input(s): "WBC", "HGB", "HCT", "PLT" in the last 48 hours.    Pending Diagnostic Studies:     None         Medications:  Reconciled Home Medications:      Medication List      START taking these medications    isosorbide mononitrate 30 MG 24 hr tablet  Commonly known as: IMDUR  Take 1 tablet (30 mg " total) by mouth once daily.     predniSONE 20 MG tablet  Commonly known as: DELTASONE  Take 3 tablets (60mg) by mouth daily for 4 days, THEN take 2 tablets by mouth daily for 4 days, THEN take 1 tablet by mouth daily for 4 days, THEN take 1/2 tablet by mouth daily for 4 days.     sacubitriL-valsartan 24-26 mg per tablet  Commonly known as: ENTRESTO  Take 1 tablet by mouth 2 (two) times daily.        CONTINUE taking these medications    albuterol-ipratropium 2.5 mg-0.5 mg/3 mL nebulizer solution  Commonly known as: DUO-NEB  Take 3 mLs by nebulization every 6 (six) hours while awake. Rescue     budesonide 0.25 mg/2 mL nebulizer solution  Commonly known as: PULMICORT  Take 2 mLs (0.25 mg total) by nebulization every 12 (twelve) hours. Controller     carvediloL 6.25 MG tablet  Commonly known as: COREG  Take 1 tablet (6.25 mg total) by mouth 2 (two) times daily.     * furosemide 80 MG tablet  Commonly known as: LASIX  Take 80 mg by mouth daily as needed.     * furosemide 40 MG tablet  Commonly known as: LASIX  Take 1 tablet (40 mg total) by mouth once daily.     gabapentin 600 MG tablet  Commonly known as: NEURONTIN  Take 600 mg by mouth 2 (two) times daily.     insulin detemir U-100 (Levemir) 100 unit/mL (3 mL) Inpn pen  Inject 15 Units into the skin once daily.     ketotifen 0.025 % (0.035 %) ophthalmic solution  Commonly known as: ZADITOR  Place 1 drop into both eyes 2 (two) times daily. for 14 days     levothyroxine 50 mcg Cap  Commonly known as: TIROSINT  Take by mouth once daily.     LINZESS 145 mcg Cap capsule  Generic drug: linaCLOtide  Take 145 mcg by mouth before breakfast.     LIPITOR 40 MG tablet  Generic drug: atorvastatin  Take 1 tablet (40 mg total) by mouth once daily.     pantoprazole 20 MG tablet  Commonly known as: PROTONIX  Take 40 mg by mouth once daily.     vitamin D 1000 units Tab  Commonly known as: VITAMIN D3  Take 185 mg by mouth once daily.         * This list has 2 medication(s) that are the  same as other medications prescribed for you. Read the directions carefully, and ask your doctor or other care provider to review them with you.                Indwelling Lines/Drains at time of discharge:   Lines/Drains/Airways     None                 Time spent on the discharge of patient: >35 minutes         Antonietta Lezama NP  Department of Hospital Medicine  [facility] - Telemetry ([facility])

## 2023-09-20 NOTE — NURSING
Pt given discharge education, educated, states understanding, denies any questions. IV and telemetry removed. Pt sent home with o2 tank approval. Vital signs stable. D/c via w/c accompanied by family friend.

## 2023-09-21 ENCOUNTER — TELEPHONE (OUTPATIENT)
Dept: CARDIOLOGY | Facility: CLINIC | Age: 73
End: 2023-09-21
Payer: MEDICARE

## 2023-09-21 NOTE — TELEPHONE ENCOUNTER
I called. Pt. No answer. No vm for me to leave a message. Will continue to try to reach pt by phone    ----- Message from Ankita Carreon RN sent at 2023  9:38 AM CDT -----  Regardin hour call

## 2023-09-22 ENCOUNTER — TELEPHONE (OUTPATIENT)
Dept: CARDIOLOGY | Facility: CLINIC | Age: 73
End: 2023-09-22
Payer: MEDICARE

## 2023-09-22 ENCOUNTER — TELEPHONE (OUTPATIENT)
Dept: CARDIOLOGY | Facility: HOSPITAL | Age: 73
End: 2023-09-22
Payer: MEDICARE

## 2023-09-22 PROBLEM — I50.9 HEART FAILURE: Status: ACTIVE | Noted: 2023-09-22

## 2023-09-22 PROBLEM — I50.9 CONGESTIVE HEART FAILURE: Status: ACTIVE | Noted: 2023-09-22

## 2023-09-22 NOTE — TELEPHONE ENCOUNTER
U/a tp Lvm for pt to return call to clinic in regards to results:     Please call and have pt follow up with her primary cardiologist Dr. Espinosa

## 2023-09-22 NOTE — TELEPHONE ENCOUNTER
I called pt. No answer. No vm for me to leave a message       ----- Message from Ankita Carreon RN sent at 2023  9:38 AM CDT -----  Regardin hour call

## 2023-09-25 ENCOUNTER — TELEPHONE (OUTPATIENT)
Dept: CARDIOLOGY | Facility: CLINIC | Age: 73
End: 2023-09-25
Payer: MEDICARE

## 2023-09-25 NOTE — TELEPHONE ENCOUNTER
I called pt. No answer. No VM for me to leave a message.    Multiple unsuccessful attempts t o reach pt.  Heart failure episode closed due to failure to reach pt.    If pt status, diagnosis, or treatment plan changes , please place AMB referral to Heart Failure Clinic ([id number]).     ----- Message from Ankita Carreon RN sent at 2023  9:38 AM CDT -----  Regardin hour call

## 2023-09-27 ENCOUNTER — HOSPITAL ENCOUNTER (OUTPATIENT)
Dept: RADIOLOGY | Facility: HOSPITAL | Age: 73
Discharge: HOME | DRG: 189 | End: 2023-09-27
Attending: INTERNAL MEDICINE
Payer: MEDICARE

## 2023-09-27 DIAGNOSIS — J96.10 CHRONIC RESPIRATORY FAILURE: Primary | ICD-10-CM

## 2023-09-27 DIAGNOSIS — J96.10 CHRONIC RESPIRATORY FAILURE: ICD-10-CM

## 2023-09-27 PROCEDURE — 71046 X-RAY EXAM CHEST 2 VIEWS: CPT | Mod: TC

## 2023-09-29 ENCOUNTER — HOSPITAL ENCOUNTER (INPATIENT)
Facility: HOSPITAL | Age: 73
LOS: 5 days | Discharge: NURSING, REHAB OR LONG-TERM CARE | DRG: 189 | End: 2023-10-04
Attending: EMERGENCY MEDICINE | Admitting: STUDENT IN AN ORGANIZED HEALTH CARE EDUCATION/TRAINING PROGRAM
Payer: MEDICARE

## 2023-09-29 DIAGNOSIS — R07.9 CHEST PAIN AT REST: ICD-10-CM

## 2023-09-29 DIAGNOSIS — F17.200 TOBACCO DEPENDENCE: ICD-10-CM

## 2023-09-29 DIAGNOSIS — R41.82 AMS (ALTERED MENTAL STATUS): ICD-10-CM

## 2023-09-29 DIAGNOSIS — J96.01 ACUTE RESPIRATORY FAILURE WITH HYPOXIA AND HYPERCARBIA: ICD-10-CM

## 2023-09-29 DIAGNOSIS — J96.02 ACUTE RESPIRATORY FAILURE WITH HYPOXIA AND HYPERCARBIA: ICD-10-CM

## 2023-09-29 DIAGNOSIS — R06.89 HYPERCAPNIA: ICD-10-CM

## 2023-09-29 DIAGNOSIS — I50.43 ACUTE ON CHRONIC COMBINED SYSTOLIC AND DIASTOLIC CONGESTIVE HEART FAILURE: ICD-10-CM

## 2023-09-29 DIAGNOSIS — J44.1 COPD EXACERBATION: Primary | ICD-10-CM

## 2023-09-29 DIAGNOSIS — N39.0 URINARY TRACT INFECTION WITHOUT HEMATURIA, SITE UNSPECIFIED: ICD-10-CM

## 2023-09-29 DIAGNOSIS — N30.00 ACUTE CYSTITIS WITHOUT HEMATURIA: ICD-10-CM

## 2023-09-29 DIAGNOSIS — E66.01 SEVERE OBESITY (BMI >= 40): ICD-10-CM

## 2023-09-29 PROBLEM — I50.32 CHRONIC DIASTOLIC CONGESTIVE HEART FAILURE: Chronic | Status: ACTIVE | Noted: 2023-09-29

## 2023-09-29 PROBLEM — E04.2 MULTIPLE THYROID NODULES: Chronic | Status: ACTIVE | Noted: 2023-09-29

## 2023-09-29 PROBLEM — K80.20 GALLSTONES: Status: ACTIVE | Noted: 2023-09-29

## 2023-09-29 PROBLEM — R31.9 URINARY TRACT INFECTION WITH HEMATURIA: Status: ACTIVE | Noted: 2023-09-29

## 2023-09-29 LAB
ALBUMIN SERPL BCP-MCNC: 2.6 G/DL (ref 3.5–5.2)
ALP SERPL-CCNC: 117 U/L (ref 55–135)
ALT SERPL W/O P-5'-P-CCNC: 23 U/L (ref 10–44)
AMMONIA PLAS-SCNC: 35 UMOL/L (ref 10–50)
ANION GAP SERPL CALC-SCNC: -2 MMOL/L (ref 3–11)
AST SERPL-CCNC: 13 U/L (ref 10–40)
BACTERIA #/AREA URNS HPF: NEGATIVE /HPF
BASOPHILS # BLD AUTO: 0.01 K/UL (ref 0–0.2)
BASOPHILS NFR BLD: 0.1 % (ref 0–1.9)
BILIRUB SERPL-MCNC: 0.4 MG/DL (ref 0.1–1)
BILIRUB UR QL STRIP: NEGATIVE
BIPAP: ABNORMAL
BIPAP: ABNORMAL
BUN SERPL-MCNC: 19 MG/DL (ref 8–23)
CALCIUM SERPL-MCNC: 9.8 MG/DL (ref 8.7–10.5)
CHLORIDE SERPL-SCNC: 108 MMOL/L (ref 95–110)
CLARITY UR: ABNORMAL
CO2 SERPL-SCNC: 36 MMOL/L (ref 23–29)
COLOR UR: YELLOW
CREAT SERPL-MCNC: 1.1 MG/DL (ref 0.5–1.4)
CTP QC/QA: YES
CTP QC/QA: YES
DIFFERENTIAL METHOD: ABNORMAL
EOSINOPHIL # BLD AUTO: 0.2 K/UL (ref 0–0.5)
EOSINOPHIL NFR BLD: 2 % (ref 0–8)
ERYTHROCYTE [DISTWIDTH] IN BLOOD BY AUTOMATED COUNT: 15.1 % (ref 11.5–14.5)
EST. GFR  (NO RACE VARIABLE): 53.4 ML/MIN/1.73 M^2
FIO2: 50 %
FIO2: 50 %
GLUCOSE SERPL-MCNC: 114 MG/DL (ref 70–110)
GLUCOSE UR QL STRIP: NEGATIVE
HCT VFR BLD AUTO: 44.3 % (ref 37–48.5)
HGB BLD-MCNC: 13 G/DL (ref 12–16)
HGB UR QL STRIP: ABNORMAL
HYALINE CASTS #/AREA URNS LPF: 1.1 /LPF
IMM GRANULOCYTES # BLD AUTO: 0.06 K/UL (ref 0–0.04)
IMM GRANULOCYTES NFR BLD AUTO: 0.7 % (ref 0–0.5)
KETONES UR QL STRIP: NEGATIVE
LACTATE SERPL-SCNC: 0.3 MMOL/L (ref 0.5–2.2)
LEUKOCYTE ESTERASE UR QL STRIP: ABNORMAL
LYMPHOCYTES # BLD AUTO: 1.2 K/UL (ref 1–4.8)
LYMPHOCYTES NFR BLD: 13.6 % (ref 18–48)
Lab: ABNORMAL
Lab: ABNORMAL
MAGNESIUM SERPL-MCNC: 2.2 MG/DL (ref 1.6–2.6)
MCH RBC QN AUTO: 29.5 PG (ref 27–31)
MCHC RBC AUTO-ENTMCNC: 29.3 G/DL (ref 32–36)
MCV RBC AUTO: 101 FL (ref 82–98)
MICROSCOPIC COMMENT: ABNORMAL
MODIFIED ALLEN'S TEST: ABNORMAL
MODIFIED ALLEN'S TEST: ABNORMAL
MONOCYTES # BLD AUTO: 0.6 K/UL (ref 0.3–1)
MONOCYTES NFR BLD: 6.6 % (ref 4–15)
NEUTROPHILS # BLD AUTO: 7 K/UL (ref 1.8–7.7)
NEUTROPHILS NFR BLD: 77 % (ref 38–73)
NITRITE UR QL STRIP: NEGATIVE
NOTIFIED BY: ABNORMAL
NOTIFIED BY: ABNORMAL
NRBC BLD-RTO: 0 /100 WBC
O2DEVICE: ABNORMAL
O2DEVICE: ABNORMAL
PCO2 BLDA: 93 MMHG (ref 35–45)
PH SMN: 7.13 [PH] (ref 7.34–7.45)
PH SMN: 7.2 [PH] (ref 7.34–7.45)
PH UR STRIP: 6 [PH] (ref 5–8)
PLATELET # BLD AUTO: 172 K/UL (ref 150–450)
PMV BLD AUTO: 9.4 FL (ref 9.2–12.9)
PO2 BLDA: 65.8 MMHG (ref 80–100)
PO2 BLDA: 79.9 MMHG (ref 80–100)
POC BASE DEFICIT: 8.7 MMOL/L (ref -2–2)
POC HCO3: 29.3 MMOL/L (ref 24–28)
POC MOLECULAR INFLUENZA A AGN: NEGATIVE
POC MOLECULAR INFLUENZA B AGN: NEGATIVE
POC NOTIFIED NOTE: ABNORMAL
POC NOTIFIED NOTE: ABNORMAL
POC PERFORMED BY: ABNORMAL
POC PERFORMED BY: ABNORMAL
POC SATURATED O2: 92.4 % (ref 95–100)
POC SATURATED O2: 94.6 % (ref 95–100)
POC TEMPERATURE: 37 C
POC TEMPERATURE: 37 C
POCT GLUCOSE: 84 MG/DL (ref 70–110)
POTASSIUM SERPL-SCNC: 4.8 MMOL/L (ref 3.5–5.1)
PROT SERPL-MCNC: 6.4 G/DL (ref 6–8.4)
PROT UR QL STRIP: ABNORMAL
PROVIDER NOTIFIED: ABNORMAL
PROVIDER NOTIFIED: ABNORMAL
RBC # BLD AUTO: 4.41 M/UL (ref 4–5.4)
RBC #/AREA URNS HPF: 20 /HPF (ref 0–4)
SARS-COV-2 RDRP RESP QL NAA+PROBE: NEGATIVE
SODIUM SERPL-SCNC: 142 MMOL/L (ref 136–145)
SP GR UR STRIP: 1.01 (ref 1–1.03)
SPECIMEN SOURCE: ABNORMAL
SPECIMEN SOURCE: ABNORMAL
SQUAMOUS #/AREA URNS HPF: 0 /HPF
TSH SERPL DL<=0.005 MIU/L-ACNC: 0.66 UIU/ML (ref 0.4–4)
URN SPEC COLLECT METH UR: ABNORMAL
UROBILINOGEN UR STRIP-ACNC: 1 EU/DL
WBC # BLD AUTO: 9.09 K/UL (ref 3.9–12.7)
WBC #/AREA URNS HPF: >100 /HPF (ref 0–5)

## 2023-09-29 PROCEDURE — 25000242 PHARM REV CODE 250 ALT 637 W/ HCPCS: Performed by: EMERGENCY MEDICINE

## 2023-09-29 PROCEDURE — 87040 BLOOD CULTURE FOR BACTERIA: CPT | Performed by: EMERGENCY MEDICINE

## 2023-09-29 PROCEDURE — 87502 INFLUENZA DNA AMP PROBE: CPT

## 2023-09-29 PROCEDURE — 27000190 HC CPAP FULL FACE MASK W/VALVE

## 2023-09-29 PROCEDURE — 99900031 HC PATIENT EDUCATION (STAT)

## 2023-09-29 PROCEDURE — 36600 WITHDRAWAL OF ARTERIAL BLOOD: CPT

## 2023-09-29 PROCEDURE — 81000 URINALYSIS NONAUTO W/SCOPE: CPT | Performed by: EMERGENCY MEDICINE

## 2023-09-29 PROCEDURE — 87635 SARS-COV-2 COVID-19 AMP PRB: CPT | Performed by: EMERGENCY MEDICINE

## 2023-09-29 PROCEDURE — 94761 N-INVAS EAR/PLS OXIMETRY MLT: CPT

## 2023-09-29 PROCEDURE — 80053 COMPREHEN METABOLIC PANEL: CPT | Performed by: EMERGENCY MEDICINE

## 2023-09-29 PROCEDURE — 99900035 HC TECH TIME PER 15 MIN (STAT)

## 2023-09-29 PROCEDURE — 21400001 HC TELEMETRY ROOM

## 2023-09-29 PROCEDURE — 27000221 HC OXYGEN, UP TO 24 HOURS

## 2023-09-29 PROCEDURE — 99223 1ST HOSP IP/OBS HIGH 75: CPT | Mod: AI,GV,HPC, | Performed by: STUDENT IN AN ORGANIZED HEALTH CARE EDUCATION/TRAINING PROGRAM

## 2023-09-29 PROCEDURE — 99291 CRITICAL CARE FIRST HOUR: CPT

## 2023-09-29 PROCEDURE — 82140 ASSAY OF AMMONIA: CPT | Performed by: EMERGENCY MEDICINE

## 2023-09-29 PROCEDURE — 82803 BLOOD GASES ANY COMBINATION: CPT

## 2023-09-29 PROCEDURE — 87186 SC STD MICRODIL/AGAR DIL: CPT | Performed by: EMERGENCY MEDICINE

## 2023-09-29 PROCEDURE — 84443 ASSAY THYROID STIM HORMONE: CPT | Performed by: EMERGENCY MEDICINE

## 2023-09-29 PROCEDURE — 83735 ASSAY OF MAGNESIUM: CPT | Performed by: EMERGENCY MEDICINE

## 2023-09-29 PROCEDURE — 87077 CULTURE AEROBIC IDENTIFY: CPT | Performed by: EMERGENCY MEDICINE

## 2023-09-29 PROCEDURE — 99223 PR INITIAL HOSPITAL CARE,LEVL III: ICD-10-PCS | Mod: AI,GV,HPC, | Performed by: STUDENT IN AN ORGANIZED HEALTH CARE EDUCATION/TRAINING PROGRAM

## 2023-09-29 PROCEDURE — 36415 COLL VENOUS BLD VENIPUNCTURE: CPT | Performed by: EMERGENCY MEDICINE

## 2023-09-29 PROCEDURE — 85025 COMPLETE CBC W/AUTO DIFF WBC: CPT | Performed by: EMERGENCY MEDICINE

## 2023-09-29 PROCEDURE — 94660 CPAP INITIATION&MGMT: CPT

## 2023-09-29 PROCEDURE — 63600175 PHARM REV CODE 636 W HCPCS: Performed by: EMERGENCY MEDICINE

## 2023-09-29 PROCEDURE — 25000003 PHARM REV CODE 250: Performed by: EMERGENCY MEDICINE

## 2023-09-29 PROCEDURE — 25000003 PHARM REV CODE 250: Performed by: STUDENT IN AN ORGANIZED HEALTH CARE EDUCATION/TRAINING PROGRAM

## 2023-09-29 PROCEDURE — 83605 ASSAY OF LACTIC ACID: CPT | Performed by: EMERGENCY MEDICINE

## 2023-09-29 PROCEDURE — 25500020 PHARM REV CODE 255: Performed by: STUDENT IN AN ORGANIZED HEALTH CARE EDUCATION/TRAINING PROGRAM

## 2023-09-29 PROCEDURE — 94640 AIRWAY INHALATION TREATMENT: CPT | Mod: XB

## 2023-09-29 PROCEDURE — 96365 THER/PROPH/DIAG IV INF INIT: CPT

## 2023-09-29 PROCEDURE — 87086 URINE CULTURE/COLONY COUNT: CPT | Performed by: EMERGENCY MEDICINE

## 2023-09-29 PROCEDURE — 87088 URINE BACTERIA CULTURE: CPT | Performed by: EMERGENCY MEDICINE

## 2023-09-29 RX ORDER — GLUCAGON 1 MG
1 KIT INJECTION
Status: DISCONTINUED | OUTPATIENT
Start: 2023-09-29 | End: 2023-10-04 | Stop reason: HOSPADM

## 2023-09-29 RX ORDER — BUSPIRONE HYDROCHLORIDE 5 MG/1
5 TABLET ORAL 2 TIMES DAILY
Status: DISCONTINUED | OUTPATIENT
Start: 2023-09-29 | End: 2023-10-04 | Stop reason: HOSPADM

## 2023-09-29 RX ORDER — ISOSORBIDE MONONITRATE 30 MG/1
30 TABLET, EXTENDED RELEASE ORAL DAILY
Status: DISCONTINUED | OUTPATIENT
Start: 2023-09-30 | End: 2023-10-04 | Stop reason: HOSPADM

## 2023-09-29 RX ORDER — HYDRALAZINE HYDROCHLORIDE 20 MG/ML
10 INJECTION INTRAMUSCULAR; INTRAVENOUS EVERY 4 HOURS PRN
Status: DISCONTINUED | OUTPATIENT
Start: 2023-09-29 | End: 2023-10-04 | Stop reason: HOSPADM

## 2023-09-29 RX ORDER — SERTRALINE HYDROCHLORIDE 50 MG/1
50 TABLET, FILM COATED ORAL DAILY
Status: DISCONTINUED | OUTPATIENT
Start: 2023-09-30 | End: 2023-10-04 | Stop reason: HOSPADM

## 2023-09-29 RX ORDER — INSULIN ASPART 100 [IU]/ML
0-5 INJECTION, SOLUTION INTRAVENOUS; SUBCUTANEOUS
Status: DISCONTINUED | OUTPATIENT
Start: 2023-09-29 | End: 2023-10-04 | Stop reason: HOSPADM

## 2023-09-29 RX ORDER — ALBUTEROL SULFATE 2.5 MG/.5ML
2.5 SOLUTION RESPIRATORY (INHALATION)
Status: COMPLETED | OUTPATIENT
Start: 2023-09-29 | End: 2023-09-29

## 2023-09-29 RX ORDER — ACETAMINOPHEN 325 MG/1
650 TABLET ORAL EVERY 6 HOURS PRN
Status: DISCONTINUED | OUTPATIENT
Start: 2023-09-29 | End: 2023-10-04 | Stop reason: HOSPADM

## 2023-09-29 RX ORDER — IPRATROPIUM BROMIDE AND ALBUTEROL SULFATE 2.5; .5 MG/3ML; MG/3ML
3 SOLUTION RESPIRATORY (INHALATION)
Status: DISCONTINUED | OUTPATIENT
Start: 2023-09-30 | End: 2023-10-04 | Stop reason: HOSPADM

## 2023-09-29 RX ORDER — ASPIRIN 81 MG/1
81 TABLET ORAL DAILY
Status: DISCONTINUED | OUTPATIENT
Start: 2023-09-30 | End: 2023-10-04 | Stop reason: HOSPADM

## 2023-09-29 RX ORDER — BUDESONIDE 0.5 MG/2ML
0.5 INHALANT ORAL EVERY 12 HOURS
Status: DISCONTINUED | OUTPATIENT
Start: 2023-09-30 | End: 2023-10-04 | Stop reason: HOSPADM

## 2023-09-29 RX ORDER — TALC
6 POWDER (GRAM) TOPICAL NIGHTLY PRN
Status: DISCONTINUED | OUTPATIENT
Start: 2023-09-29 | End: 2023-10-04 | Stop reason: HOSPADM

## 2023-09-29 RX ORDER — ACETYLCYSTEINE 200 MG/ML
4 SOLUTION ORAL; RESPIRATORY (INHALATION) 2 TIMES DAILY
Status: DISCONTINUED | OUTPATIENT
Start: 2023-09-30 | End: 2023-09-29

## 2023-09-29 RX ORDER — SODIUM CHLORIDE 0.9 % (FLUSH) 0.9 %
10 SYRINGE (ML) INJECTION
Status: DISCONTINUED | OUTPATIENT
Start: 2023-09-29 | End: 2023-10-04 | Stop reason: HOSPADM

## 2023-09-29 RX ORDER — LEVOTHYROXINE SODIUM 50 UG/1
50 TABLET ORAL
Status: DISCONTINUED | OUTPATIENT
Start: 2023-09-30 | End: 2023-10-04 | Stop reason: HOSPADM

## 2023-09-29 RX ORDER — PANTOPRAZOLE SODIUM 40 MG/1
40 TABLET, DELAYED RELEASE ORAL DAILY
Status: DISCONTINUED | OUTPATIENT
Start: 2023-09-30 | End: 2023-10-04 | Stop reason: HOSPADM

## 2023-09-29 RX ORDER — ACETYLCYSTEINE 200 MG/ML
4 SOLUTION ORAL; RESPIRATORY (INHALATION) 4 TIMES DAILY
Status: DISCONTINUED | OUTPATIENT
Start: 2023-09-29 | End: 2023-09-29

## 2023-09-29 RX ORDER — ONDANSETRON 2 MG/ML
4 INJECTION INTRAMUSCULAR; INTRAVENOUS EVERY 8 HOURS PRN
Status: DISCONTINUED | OUTPATIENT
Start: 2023-09-29 | End: 2023-10-04 | Stop reason: HOSPADM

## 2023-09-29 RX ORDER — ACETYLCYSTEINE 200 MG/ML
2 SOLUTION ORAL; RESPIRATORY (INHALATION) 2 TIMES DAILY
Status: DISCONTINUED | OUTPATIENT
Start: 2023-09-30 | End: 2023-10-04 | Stop reason: HOSPADM

## 2023-09-29 RX ADMIN — ALBUTEROL SULFATE 2.5 MG: 2.5 SOLUTION RESPIRATORY (INHALATION) at 12:09

## 2023-09-29 RX ADMIN — IOHEXOL 100 ML: 350 INJECTION, SOLUTION INTRAVENOUS at 05:09

## 2023-09-29 RX ADMIN — BUSPIRONE HYDROCHLORIDE 5 MG: 5 TABLET ORAL at 09:09

## 2023-09-29 RX ADMIN — CEFTRIAXONE SODIUM 1 G: 1 INJECTION, POWDER, FOR SOLUTION INTRAMUSCULAR; INTRAVENOUS at 03:09

## 2023-09-29 NOTE — ED PROVIDER NOTES
Encounter Date: 9/29/2023       History     Chief Complaint   Patient presents with    Altered Mental Status     EMS reports, pt from home with c/o lethargy and AMS due Home Health taking her CPAP away from her, pt presents with shallow breathing and odorous smell of urine.  18G Left AC, pt on 4L O2 24x7     Patient presents from home via EMS for altered mental status.  Per EMS the patient lives at home with family was recently in a nursing facility and has been at home for approximately 1 week.  When she awoke this morning patient was altered decreased level of consciousness.  Patient has a history of COPD and is supposed to be wearing a CPAP but for unknown reasons it was taken away by home health.  She is 4 L oxygen dependent per EMS.  Limited information due to patient's mental status.      Review of patient's allergies indicates:   Allergen Reactions    Pcn [penicillins] Swelling     Past Medical History:   Diagnosis Date    Abdominal hernia     Bacteremia 4/5/2023    CHF (congestive heart failure)     COPD (chronic obstructive pulmonary disease)     Diabetes mellitus, type 2 2/16/2022    GERD (gastroesophageal reflux disease)     History of home oxygen therapy     Hypertension     Hypertensive emergency 3/30/2023    Migraine headache     Pulmonary embolism 06/01/2017    Small bowel obstruction     Thyroid disease      Past Surgical History:   Procedure Laterality Date    COLONOSCOPY      HYSTERECTOMY      INNER EAR SURGERY      UPPER GASTROINTESTINAL ENDOSCOPY       No family history on file.  Social History     Tobacco Use    Smoking status: Former    Smokeless tobacco: Never   Substance Use Topics    Alcohol use: No    Drug use: No     Review of Systems   Unable to perform ROS: Mental status change       Physical Exam     Initial Vitals   BP Pulse Resp Temp SpO2   09/29/23 1213 09/29/23 1213 09/29/23 1213 09/29/23 1322 09/29/23 1213   133/77 92 (!) 34 (!) 95.4 °F (35.2 °C) (!) 79 %      MAP       --                 Physical Exam    Constitutional:   Morbidly obese   HENT:   Head: Normocephalic and atraumatic.   Neck: Neck supple.   Cardiovascular:  Normal rate and regular rhythm.           Pulmonary/Chest:   Large body habitus the patient has bilateral wheezing diffusely   Abdominal: Abdomen is soft. Bowel sounds are normal. She exhibits no distension. There is no abdominal tenderness. There is no rebound.   Musculoskeletal:      Cervical back: Neck supple.     Neurological:   Patient awakens with verbal commands and attempts to try to say her name.         ED Course   Critical Care    Date/Time: 9/29/2023 4:01 PM    Performed by: Everett Muniz MD  Authorized by: Everett Muniz MD  Direct patient critical care time: 85 minutes  Total critical care time (exclusive of procedural time) : 85 minutes  Critical care was necessary to treat or prevent imminent or life-threatening deterioration of the following conditions: respiratory failure.  Critical care was time spent personally by me on the following activities: evaluation of patient's response to treatment, examination of patient, ordering and performing treatments and interventions, ordering and review of laboratory studies, ordering and review of radiographic studies, pulse oximetry and re-evaluation of patient's condition.  Comments: IV antibiotics        Labs Reviewed   CBC W/ AUTO DIFFERENTIAL - Abnormal; Notable for the following components:       Result Value     (*)     MCHC 29.3 (*)     RDW 15.1 (*)     Immature Granulocytes 0.7 (*)     Immature Grans (Abs) 0.06 (*)     Gran % 77.0 (*)     Lymph % 13.6 (*)     All other components within normal limits   COMPREHENSIVE METABOLIC PANEL - Abnormal; Notable for the following components:    CO2 36 (*)     Glucose 114 (*)     Albumin 2.6 (*)     eGFR 53.4 (*)     Anion Gap -2 (*)     All other components within normal limits   LACTIC ACID, PLASMA - Abnormal; Notable for the following components:    Lactate  (Lactic Acid) 0.3 (*)     All other components within normal limits   URINALYSIS - Abnormal; Notable for the following components:    Appearance, UA Cloudy (*)     Protein, UA 2+ (*)     Occult Blood UA 2+ (*)     Leukocytes, UA 2+ (*)     All other components within normal limits    Narrative:     Collection Type->Urine, Clean Catch   URINALYSIS MICROSCOPIC - Abnormal; Notable for the following components:    RBC, UA 20 (*)     WBC, UA >100 (*)     Hyaline Casts, UA 1.1 (*)     All other components within normal limits    Narrative:     Collection Type->Urine, Clean Catch   CULTURE, BLOOD   CULTURE, BLOOD   MAGNESIUM   AMMONIA   TSH   SARS-COV-2 RDRP GENE   POCT INFLUENZA A/B MOLECULAR          Imaging Results              CT Head Without Contrast (Final result)  Result time 09/29/23 13:10:09      Final result by William Hardy MD (09/29/23 13:10:09)                   Impression:      No acute intracranial findings.      Electronically signed by: William Hardy MD  Date:    09/29/2023  Time:    13:10               Narrative:    EXAMINATION:  CT HEAD WITHOUT CONTRAST    CLINICAL HISTORY:  ams;    TECHNIQUE:  Iterative reconstruction technique was performed.    CT/cardiac nuclear exam/s in prior 12 months:  1.    COMPARISON:  Head CT 03/27/2016.    FINDINGS:  No hydrocephalus. No mass or mass effect. No signs of acute hemorrhage.  Mild volume loss.  Cranial bones unremarkable.                                       X-Ray Chest 1 View (Final result)  Result time 09/29/23 13:58:18      Final result by Abida Izquierdo MD (09/29/23 13:58:18)                   Impression:      No acute findings or interval radiographic changes      Electronically signed by: Abida Izquierdo MD  Date:    09/29/2023  Time:    13:58               Narrative:    EXAMINATION:  XR CHEST 1 VIEW    CLINICAL HISTORY:  Altered mental status, unspecified    COMPARISON:  09/27/2023    FINDINGS:  Enlarged cardiac silhouette, unchanged.  No vascular  congestion, acute infiltrates, pleural fluid or other detrimental change from prior                                       Medications   albuterol sulfate nebulizer solution 2.5 mg (2.5 mg Nebulization Given 9/29/23 1245)   cefTRIAXone (ROCEPHIN) 1 g in dextrose 5 % in water (D5W) 100 mL IVPB (MB+) (1 g Intravenous New Bag 9/29/23 1507)     Medical Decision Making  Amount and/or Complexity of Data Reviewed  Labs: ordered.  Radiology: ordered.    Risk  Prescription drug management.                          Medical Decision Making:   Initial Assessment:   Due to patient's history of COPD and wheezing 3 nebulizer treatments provided patient placed on BiPAP with ABG obtained.  Patient has CO2 greater than 105 with a PO2 of 79.9.  Patient saturating 100% on BiPAP has become more responsive but still somnolent with BiPAP on.  Workup currently in progress pending admission.  Differential Diagnosis:   Respiratory failure, pneumonia, hypercapnia, COPD exacerbation, medication noncompliance, failure to thrive  ED Management:  Time 3:57 p.m.   Chest x-ray shows no acute abnormalities patient does show signs of urinary tract infection ceftriaxone was home.  Patient does have a history of COPD .  She is been noncompliant with her CPAP does not have 1 but no good explanation why and no family showed up at bedside to explained.  Patient's ABG has improve not only her pH but also her CO2 is slowly going down.  Patient is more alert resting comfortably.  Chest x-ray shows no acute abnormalities ceftriaxone was home will also provide steroid Solu-Medrol and scan patient's chest to ensure there is no underlying infection not seen on x-ray secondary to body habitus.      Clinical Impression:   Final diagnoses:  [R41.82] AMS (altered mental status)  [J44.1] COPD exacerbation (Primary)  [R06.89] Hypercapnia  [N39.0] Urinary tract infection without hematuria, site unspecified        ED Disposition Condition    Admit Stable                 Everett Muniz MD  09/29/23 0596

## 2023-09-30 LAB
ALBUMIN SERPL BCP-MCNC: 2.3 G/DL (ref 3.5–5.2)
ALP SERPL-CCNC: 93 U/L (ref 55–135)
ALT SERPL W/O P-5'-P-CCNC: 17 U/L (ref 10–44)
ANION GAP SERPL CALC-SCNC: -2 MMOL/L (ref 3–11)
AST SERPL-CCNC: 11 U/L (ref 10–40)
BASOPHILS # BLD AUTO: 0.02 K/UL (ref 0–0.2)
BASOPHILS NFR BLD: 0.3 % (ref 0–1.9)
BILIRUB SERPL-MCNC: 0.3 MG/DL (ref 0.1–1)
BUN SERPL-MCNC: 16 MG/DL (ref 8–23)
CALCIUM SERPL-MCNC: 9.7 MG/DL (ref 8.7–10.5)
CHLORIDE SERPL-SCNC: 111 MMOL/L (ref 95–110)
CO2 SERPL-SCNC: 35 MMOL/L (ref 23–29)
CREAT SERPL-MCNC: 1 MG/DL (ref 0.5–1.4)
DIFFERENTIAL METHOD: ABNORMAL
EOSINOPHIL # BLD AUTO: 0.1 K/UL (ref 0–0.5)
EOSINOPHIL NFR BLD: 2.2 % (ref 0–8)
ERYTHROCYTE [DISTWIDTH] IN BLOOD BY AUTOMATED COUNT: 19.2 % (ref 11.5–14.5)
EST. GFR  (NO RACE VARIABLE): 59.9 ML/MIN/1.73 M^2
GLUCOSE SERPL-MCNC: 99 MG/DL (ref 70–110)
HCT VFR BLD AUTO: 35.1 % (ref 37–48.5)
HGB BLD-MCNC: 12.2 G/DL (ref 12–16)
IMM GRANULOCYTES # BLD AUTO: 0.02 K/UL (ref 0–0.04)
IMM GRANULOCYTES NFR BLD AUTO: 0.3 % (ref 0–0.5)
LYMPHOCYTES # BLD AUTO: 0.9 K/UL (ref 1–4.8)
LYMPHOCYTES NFR BLD: 13.8 % (ref 18–48)
MCH RBC QN AUTO: 36.5 PG (ref 27–31)
MCHC RBC AUTO-ENTMCNC: 34.8 G/DL (ref 32–36)
MCV RBC AUTO: 105 FL (ref 82–98)
MONOCYTES # BLD AUTO: 0.5 K/UL (ref 0.3–1)
MONOCYTES NFR BLD: 8.3 % (ref 4–15)
NEUTROPHILS # BLD AUTO: 4.8 K/UL (ref 1.8–7.7)
NEUTROPHILS NFR BLD: 75.1 % (ref 38–73)
NRBC BLD-RTO: 0 /100 WBC
NT-PROBNP SERPL-MCNC: 2068 PG/ML (ref 5–900)
PLATELET # BLD AUTO: 135 K/UL (ref 150–450)
PMV BLD AUTO: 9.3 FL (ref 9.2–12.9)
POCT GLUCOSE: 86 MG/DL (ref 70–110)
POCT GLUCOSE: 92 MG/DL (ref 70–110)
POCT GLUCOSE: 96 MG/DL (ref 70–110)
POCT GLUCOSE: 99 MG/DL (ref 70–110)
POTASSIUM SERPL-SCNC: 5.1 MMOL/L (ref 3.5–5.1)
PROT SERPL-MCNC: 5.7 G/DL (ref 6–8.4)
RBC # BLD AUTO: 3.34 M/UL (ref 4–5.4)
SODIUM SERPL-SCNC: 144 MMOL/L (ref 136–145)
WBC # BLD AUTO: 6.38 K/UL (ref 3.9–12.7)

## 2023-09-30 PROCEDURE — 63600175 PHARM REV CODE 636 W HCPCS: Performed by: STUDENT IN AN ORGANIZED HEALTH CARE EDUCATION/TRAINING PROGRAM

## 2023-09-30 PROCEDURE — 99233 PR SUBSEQUENT HOSPITAL CARE,LEVL III: ICD-10-PCS | Mod: GV,HPC,, | Performed by: STUDENT IN AN ORGANIZED HEALTH CARE EDUCATION/TRAINING PROGRAM

## 2023-09-30 PROCEDURE — 85025 COMPLETE CBC W/AUTO DIFF WBC: CPT | Performed by: STUDENT IN AN ORGANIZED HEALTH CARE EDUCATION/TRAINING PROGRAM

## 2023-09-30 PROCEDURE — 80053 COMPREHEN METABOLIC PANEL: CPT | Performed by: STUDENT IN AN ORGANIZED HEALTH CARE EDUCATION/TRAINING PROGRAM

## 2023-09-30 PROCEDURE — 99900031 HC PATIENT EDUCATION (STAT)

## 2023-09-30 PROCEDURE — 99900035 HC TECH TIME PER 15 MIN (STAT)

## 2023-09-30 PROCEDURE — 11000001 HC ACUTE MED/SURG PRIVATE ROOM

## 2023-09-30 PROCEDURE — 25000003 PHARM REV CODE 250: Performed by: INTERNAL MEDICINE

## 2023-09-30 PROCEDURE — 25000242 PHARM REV CODE 250 ALT 637 W/ HCPCS: Performed by: STUDENT IN AN ORGANIZED HEALTH CARE EDUCATION/TRAINING PROGRAM

## 2023-09-30 PROCEDURE — 63600175 PHARM REV CODE 636 W HCPCS: Performed by: INTERNAL MEDICINE

## 2023-09-30 PROCEDURE — 99233 SBSQ HOSP IP/OBS HIGH 50: CPT | Mod: GV,HPC,, | Performed by: STUDENT IN AN ORGANIZED HEALTH CARE EDUCATION/TRAINING PROGRAM

## 2023-09-30 PROCEDURE — 25000003 PHARM REV CODE 250: Performed by: STUDENT IN AN ORGANIZED HEALTH CARE EDUCATION/TRAINING PROGRAM

## 2023-09-30 PROCEDURE — 27000221 HC OXYGEN, UP TO 24 HOURS

## 2023-09-30 PROCEDURE — 94640 AIRWAY INHALATION TREATMENT: CPT

## 2023-09-30 PROCEDURE — 94660 CPAP INITIATION&MGMT: CPT

## 2023-09-30 PROCEDURE — 36415 COLL VENOUS BLD VENIPUNCTURE: CPT | Performed by: STUDENT IN AN ORGANIZED HEALTH CARE EDUCATION/TRAINING PROGRAM

## 2023-09-30 PROCEDURE — 83880 ASSAY OF NATRIURETIC PEPTIDE: CPT | Performed by: STUDENT IN AN ORGANIZED HEALTH CARE EDUCATION/TRAINING PROGRAM

## 2023-09-30 PROCEDURE — 94761 N-INVAS EAR/PLS OXIMETRY MLT: CPT

## 2023-09-30 PROCEDURE — 21400001 HC TELEMETRY ROOM

## 2023-09-30 RX ORDER — ENOXAPARIN SODIUM 100 MG/ML
40 INJECTION SUBCUTANEOUS EVERY 24 HOURS
Status: DISCONTINUED | OUTPATIENT
Start: 2023-09-30 | End: 2023-10-02

## 2023-09-30 RX ORDER — CEFTRIAXONE 1 G/1
1 INJECTION, POWDER, FOR SOLUTION INTRAMUSCULAR; INTRAVENOUS
Status: DISCONTINUED | OUTPATIENT
Start: 2023-09-30 | End: 2023-09-30

## 2023-09-30 RX ORDER — MUPIROCIN 20 MG/G
OINTMENT TOPICAL 2 TIMES DAILY
Status: DISCONTINUED | OUTPATIENT
Start: 2023-09-30 | End: 2023-10-04 | Stop reason: HOSPADM

## 2023-09-30 RX ORDER — FUROSEMIDE 20 MG/1
20 TABLET ORAL DAILY
Status: DISCONTINUED | OUTPATIENT
Start: 2023-09-30 | End: 2023-10-04 | Stop reason: HOSPADM

## 2023-09-30 RX ORDER — DOXYCYCLINE HYCLATE 100 MG
100 TABLET ORAL EVERY 12 HOURS
Status: DISCONTINUED | OUTPATIENT
Start: 2023-09-30 | End: 2023-10-03

## 2023-09-30 RX ADMIN — PANTOPRAZOLE SODIUM 40 MG: 40 TABLET, DELAYED RELEASE ORAL at 09:09

## 2023-09-30 RX ADMIN — IPRATROPIUM BROMIDE AND ALBUTEROL SULFATE 3 ML: .5; 3 SOLUTION RESPIRATORY (INHALATION) at 07:09

## 2023-09-30 RX ADMIN — BUDESONIDE 0.5 MG: 0.5 INHALANT RESPIRATORY (INHALATION) at 07:09

## 2023-09-30 RX ADMIN — ACETYLCYSTEINE 2 ML: 200 SOLUTION ORAL; RESPIRATORY (INHALATION) at 07:09

## 2023-09-30 RX ADMIN — ISOSORBIDE MONONITRATE 30 MG: 30 TABLET, EXTENDED RELEASE ORAL at 09:09

## 2023-09-30 RX ADMIN — ENOXAPARIN SODIUM 40 MG: 40 INJECTION SUBCUTANEOUS at 04:09

## 2023-09-30 RX ADMIN — DOXYCYCLINE HYCLATE 100 MG: 100 TABLET, COATED ORAL at 08:09

## 2023-09-30 RX ADMIN — MUPIROCIN: 20 OINTMENT TOPICAL at 11:09

## 2023-09-30 RX ADMIN — ASPIRIN 81 MG: 81 TABLET, COATED ORAL at 09:09

## 2023-09-30 RX ADMIN — MUPIROCIN: 20 OINTMENT TOPICAL at 08:09

## 2023-09-30 RX ADMIN — LEVOTHYROXINE SODIUM 50 MCG: 25 TABLET ORAL at 05:09

## 2023-09-30 RX ADMIN — BUSPIRONE HYDROCHLORIDE 5 MG: 5 TABLET ORAL at 08:09

## 2023-09-30 RX ADMIN — DOXYCYCLINE HYCLATE 100 MG: 100 TABLET, COATED ORAL at 11:09

## 2023-09-30 RX ADMIN — SERTRALINE HYDROCHLORIDE 50 MG: 50 TABLET ORAL at 09:09

## 2023-09-30 RX ADMIN — IPRATROPIUM BROMIDE AND ALBUTEROL SULFATE 3 ML: .5; 3 SOLUTION RESPIRATORY (INHALATION) at 01:09

## 2023-09-30 RX ADMIN — FUROSEMIDE 20 MG: 20 TABLET ORAL at 04:09

## 2023-09-30 RX ADMIN — CEFTRIAXONE 1 G: 1 INJECTION, POWDER, FOR SOLUTION INTRAMUSCULAR; INTRAVENOUS at 12:09

## 2023-09-30 RX ADMIN — BUSPIRONE HYDROCHLORIDE 5 MG: 5 TABLET ORAL at 09:09

## 2023-09-30 NOTE — SUBJECTIVE & OBJECTIVE
Interval History: Patient seen and examined.     Review of Systems   Unable to perform ROS: Acuity of condition   Constitutional:  Positive for activity change, appetite change and fatigue. Negative for fever.   Cardiovascular:  Positive for leg swelling. Negative for chest pain and palpitations.   Gastrointestinal:  Negative for abdominal pain, constipation, diarrhea, nausea and vomiting.   Musculoskeletal:  Positive for back pain and gait problem. Negative for neck pain.   Skin:  Positive for wound.   Neurological:  Negative for dizziness, seizures, speech difficulty and headaches.   Psychiatric/Behavioral:  Negative for confusion and sleep disturbance.      Objective:     Vital Signs (Most Recent):  Temp: 97.5 °F (36.4 °C) (09/30/23 0711)  Pulse: 67 (09/30/23 0719)  Resp: 14 (09/30/23 0719)  BP: 119/77 (09/30/23 0500)  SpO2: 99 % (09/30/23 0719) Vital Signs (24h Range):  Temp:  [95.4 °F (35.2 °C)-98.9 °F (37.2 °C)] 97.5 °F (36.4 °C)  Pulse:  [] 67  Resp:  [14-39] 14  SpO2:  [79 %-100 %] 99 %  BP: ()/(54-98) 119/77     Weight: 121.6 kg (268 lb)  Body mass index is 46 kg/m².    Intake/Output Summary (Last 24 hours) at 9/30/2023 0932  Last data filed at 9/30/2023 0525  Gross per 24 hour   Intake 340 ml   Output 450 ml   Net -110 ml         Physical Exam  Vitals and nursing note reviewed.   Constitutional:       General: She is not in acute distress.     Appearance: She is obese. She is ill-appearing. She is not toxic-appearing or diaphoretic.      Comments: somnolent   HENT:      Mouth/Throat:      Mouth: Mucous membranes are dry.      Pharynx: Oropharynx is clear.      Comments: Lips dry  Cardiovascular:      Pulses: Normal pulses.      Heart sounds: Normal heart sounds.   Pulmonary:      Breath sounds: Wheezing, rhonchi and rales present.      Comments: BiPAP mask secured  Abdominal:      General: There is no distension.      Tenderness: There is no abdominal tenderness. There is no guarding.       Comments: Limited secondary to body habitus   Musculoskeletal:      Cervical back: Neck supple.   Skin:     General: Skin is warm and dry.      Comments: Thickened skin in lower extremities, peeling bilaterally             Significant Labs: All pertinent labs within the past 24 hours have been reviewed.  Bilirubin:   Recent Labs   Lab 09/08/23  1941 09/09/23  0618 09/29/23  1238 09/30/23  0348   BILITOT 0.4 0.4 0.4 0.3     Blood Culture:   Recent Labs   Lab 09/29/23  1238 09/29/23  1245   LABBLOO No Growth to date No Growth to date     BMP:   Recent Labs   Lab 09/29/23  1238 09/30/23  0348   * 99    144   K 4.8 5.1    111*   CO2 36* 35*   BUN 19 16   CREATININE 1.1 1.0   CALCIUM 9.8 9.7   MG 2.2  --      CBC:   Recent Labs   Lab 09/29/23  1238 09/30/23  0348   WBC 9.09 6.38   HGB 13.0 12.2   HCT 44.3 35.1*    135*     CMP:   Recent Labs   Lab 09/29/23  1238 09/30/23  0348    144   K 4.8 5.1    111*   CO2 36* 35*   * 99   BUN 19 16   CREATININE 1.1 1.0   CALCIUM 9.8 9.7   PROT 6.4 5.7*   ALBUMIN 2.6* 2.3*   BILITOT 0.4 0.3   ALKPHOS 117 93   AST 13 11   ALT 23 17   ANIONGAP -2* -2*   \  Lactic Acid:   Recent Labs   Lab 09/29/23  1238   LACTATE 0.3*     Magnesium:   Recent Labs   Lab 09/29/23  1238   MG 2.2     POCT Glucose:   Recent Labs   Lab 09/29/23  2150   POCTGLUCOSE 84     Urine Studies:   Recent Labs   Lab 09/29/23  1321   COLORU Yellow   APPEARANCEUA Cloudy*   PHUR 6.0   SPECGRAV 1.015   PROTEINUA 2+*   GLUCUA Negative   KETONESU Negative   BILIRUBINUA Negative   OCCULTUA 2+*   NITRITE Negative   UROBILINOGEN 1.0   LEUKOCYTESUR 2+*   RBCUA 20*   WBCUA >100*   BACTERIA Negative   SQUAMEPITHEL 0   HYALINECASTS 1.1*       Significant Imaging: I have reviewed all pertinent imaging results/findings within the past 24 hours.

## 2023-09-30 NOTE — ASSESSMENT & PLAN NOTE
Rocephin x1 given in ER. Patient last treated rocephin during last hospitalization early this month.   UA with no bacteria, +pyuria and hematuria  - follow up urine culture

## 2023-09-30 NOTE — ASSESSMENT & PLAN NOTE
Likely secondary to acute respiratory failure with CO2 retention. CT head unremarkable, no acute intracranial findings.   Responsive to voice with BIPAP use, ABG showing last pCO2 93.  Continue to monitor, follow up neuro checks.   9/30 More alert

## 2023-09-30 NOTE — SUBJECTIVE & OBJECTIVE
Past Medical History:   Diagnosis Date    Abdominal hernia     Bacteremia 4/5/2023    CHF (congestive heart failure)     COPD (chronic obstructive pulmonary disease)     Diabetes mellitus, type 2 2/16/2022    GERD (gastroesophageal reflux disease)     History of home oxygen therapy     Hypertension     Hypertensive emergency 3/30/2023    Migraine headache     Pulmonary embolism 06/01/2017    Small bowel obstruction     Thyroid disease        Past Surgical History:   Procedure Laterality Date    COLONOSCOPY      HYSTERECTOMY      INNER EAR SURGERY      UPPER GASTROINTESTINAL ENDOSCOPY         Review of patient's allergies indicates:   Allergen Reactions    Pcn [penicillins] Swelling       No current facility-administered medications on file prior to encounter.     Current Outpatient Medications on File Prior to Encounter   Medication Sig    albuterol-ipratropium (DUO-NEB) 2.5 mg-0.5 mg/3 mL nebulizer solution Take 3 mLs by nebulization every 6 (six) hours while awake. Rescue    budesonide (PULMICORT) 0.25 mg/2 mL nebulizer solution Take 2 mLs (0.25 mg total) by nebulization every 12 (twelve) hours. Controller    carvediloL (COREG) 6.25 MG tablet Take 1 tablet (6.25 mg total) by mouth 2 (two) times daily. (Patient taking differently: Take 12.5 mg by mouth 2 (two) times daily.)    furosemide (LASIX) 40 MG tablet Take 1 tablet (40 mg total) by mouth once daily.    furosemide (LASIX) 80 MG tablet Take 80 mg by mouth daily as needed.    gabapentin (NEURONTIN) 600 MG tablet Take 600 mg by mouth 2 (two) times daily.    insulin detemir U-100, Levemir, 100 unit/mL (3 mL) SubQ InPn pen Inject 15 Units into the skin once daily.    isosorbide mononitrate (IMDUR) 30 MG 24 hr tablet Take 1 tablet (30 mg total) by mouth once daily.    ketotifen (ZADITOR) 0.025 % (0.035 %) ophthalmic solution Place 1 drop into both eyes 2 (two) times daily. for 14 days    levothyroxine (TIROSINT) 50 mcg Cap Take by mouth once daily.    linaCLOtide  (LINZESS) 145 mcg Cap capsule Take 145 mcg by mouth before breakfast.    LIPITOR 40 mg tablet Take 1 tablet (40 mg total) by mouth once daily.    pantoprazole (PROTONIX) 20 MG tablet Take 40 mg by mouth once daily.    predniSONE (DELTASONE) 20 MG tablet Take 3 tablets (60mg) by mouth daily for 4 days, THEN take 2 tablets by mouth daily for 4 days, THEN take 1 tablet by mouth daily for 4 days, THEN take 1/2 tablet by mouth daily for 4 days.    sacubitriL-valsartan (ENTRESTO) 24-26 mg per tablet Take 1 tablet by mouth 2 (two) times daily.    vitamin D 1000 units Tab Take 185 mg by mouth once daily.     Family History    None       Tobacco Use    Smoking status: Former    Smokeless tobacco: Never   Substance and Sexual Activity    Alcohol use: No    Drug use: No    Sexual activity: Not on file     Review of Systems   Unable to perform ROS: Acuity of condition     Objective:     Vital Signs (Most Recent):  Temp: 98.9 °F (37.2 °C) (09/29/23 1843)  Pulse: 96 (09/29/23 2025)  Resp: (!) 22 (09/29/23 1843)  BP: 134/78 (09/29/23 2025)  SpO2: 95 % (09/29/23 2025) Vital Signs (24h Range):  Temp:  [95.4 °F (35.2 °C)-98.9 °F (37.2 °C)] 98.9 °F (37.2 °C)  Pulse:  [] 96  Resp:  [18-34] 22  SpO2:  [79 %-100 %] 95 %  BP: ()/(61-86) 134/78     Weight: 121.6 kg (268 lb)  Body mass index is 46 kg/m².     Physical Exam  Constitutional:       General: She is not in acute distress.     Appearance: She is obese. She is ill-appearing. She is not toxic-appearing or diaphoretic.      Comments: somnolent   HENT:      Mouth/Throat:      Mouth: Mucous membranes are dry.      Pharynx: Oropharynx is clear.   Cardiovascular:      Pulses: Normal pulses.      Heart sounds: Normal heart sounds.   Pulmonary:      Breath sounds: Wheezing, rhonchi and rales present.   Abdominal:      General: There is no distension.      Tenderness: There is no abdominal tenderness. There is no guarding.      Comments: Limited secondary to body habitus    Musculoskeletal:      Cervical back: Neck supple.   Skin:     General: Skin is warm and dry.      Comments: Thickened skin in lower extremities, peeling bilaterally                Significant Labs: All pertinent labs within the past 24 hours have been reviewed.  A1C:   Recent Labs   Lab 09/09/23  0618   HGBA1C 5.3  5.3     ABGs:   Recent Labs   Lab 09/29/23  1233 09/29/23  1538   PH 7.131* 7.204*   PCO2  --  93.0*   HCO3  --  29.3*   POCSATURATED 94.6* 92.4*   PO2 79.9* 65.8*     Bilirubin:   Recent Labs   Lab 09/08/23  1941 09/09/23  0618 09/29/23  1238   BILITOT 0.4 0.4 0.4     BMP:   Recent Labs   Lab 09/29/23  1238   *      K 4.8      CO2 36*   BUN 19   CREATININE 1.1   CALCIUM 9.8   MG 2.2     CBC:   Recent Labs   Lab 09/29/23  1238   WBC 9.09   HGB 13.0   HCT 44.3        CMP:   Recent Labs   Lab 09/29/23  1238      K 4.8      CO2 36*   *   BUN 19   CREATININE 1.1   CALCIUM 9.8   PROT 6.4   ALBUMIN 2.6*   BILITOT 0.4   ALKPHOS 117   AST 13   ALT 23   ANIONGAP -2*     Lactic Acid:   Recent Labs   Lab 09/29/23  1238   LACTATE 0.3*     Magnesium:   Recent Labs   Lab 09/29/23  1238   MG 2.2     POCT Glucose:   Recent Labs   Lab 09/29/23  2150   POCTGLUCOSE 84     TSH:   Recent Labs   Lab 09/29/23  1238   TSH 0.657     Urine Studies:   Recent Labs   Lab 09/29/23  1321   COLORU Yellow   APPEARANCEUA Cloudy*   PHUR 6.0   SPECGRAV 1.015   PROTEINUA 2+*   GLUCUA Negative   KETONESU Negative   BILIRUBINUA Negative   OCCULTUA 2+*   NITRITE Negative   UROBILINOGEN 1.0   LEUKOCYTESUR 2+*   RBCUA 20*   WBCUA >100*   BACTERIA Negative   SQUAMEPITHEL 0   HYALINECASTS 1.1*     CT Chest With Contrast  Narrative: EXAMINATION:  CT CHEST WITH CONTRAST    CLINICAL HISTORY:  eval for PNA;    TECHNIQUE:  Iterative reconstruction technique was used.    CT/Cardiac Nuclear exams in prior 12 months: 1    COMPARISON:  09/15/2023.    FINDINGS:  There is a nodular thyroid with multiple nodules  greater in the left and in the isthmus.  There is an enlarged main pulmonary artery measuring 5.6 cm transverse.  There is stones in the gallbladder.  Bibasilar discoid atelectasis with subtle infiltrate at the right lung base and right middle lobe some mucous plugging.  There appears to be some mucous plugging in the right lower lobe bronchi.  Subtle infiltrate or discoid atelectasis in left upper lobe along fissure  Impression: 1. Large nodular thyroid with multiple nodules  2. Enlarged main pulmonary artery  3. Gallstones  4. Right basilar and middle lobe mild infiltrate with mucous plugging throughout the right lower lobe bronchi and minimal left basilar discoid atelectasis.  5. Discoid atelectasis or subtle infiltrate in the left upper lobe along the fissure posteriorly    Electronically signed by: Fabiola Rodgers MD  Date:    09/29/2023  Time:    17:37  X-Ray Chest 1 View  Narrative: EXAMINATION:  XR CHEST 1 VIEW    CLINICAL HISTORY:  Altered mental status, unspecified    COMPARISON:  09/27/2023    FINDINGS:  Enlarged cardiac silhouette, unchanged.  No vascular congestion, acute infiltrates, pleural fluid or other detrimental change from prior  Impression: No acute findings or interval radiographic changes    Electronically signed by: Abida Izquierdo MD  Date:    09/29/2023  Time:    13:58  CT Head Without Contrast  Narrative: EXAMINATION:  CT HEAD WITHOUT CONTRAST    CLINICAL HISTORY:  ams;    TECHNIQUE:  Iterative reconstruction technique was performed.    CT/cardiac nuclear exam/s in prior 12 months:  1.    COMPARISON:  Head CT 03/27/2016.    FINDINGS:  No hydrocephalus. No mass or mass effect. No signs of acute hemorrhage.  Mild volume loss.  Cranial bones unremarkable.  Impression: No acute intracranial findings.    Electronically signed by: William Hardy MD  Date:    09/29/2023  Time:    13:10     Significant Imaging: I have reviewed all pertinent imaging results/findings within the past 24 hours.

## 2023-09-30 NOTE — H&P
[facility] Medicine  History & Physical    Patient Name: Carmen Tan  MRN: [id number]  Patient Class: IP- Inpatient  Admission Date: 9/29/2023  Attending Physician: Gunnar Mott DO   Primary Care Provider: Lucian Barry MD         Patient information was obtained from past medical records and ER records.     Subjective:     Principal Problem:Acute respiratory failure with hypoxia and hypercarbia    Chief Complaint:   Chief Complaint   Patient presents with    Altered Mental Status     EMS reports, pt from home with c/o lethargy and AMS due Home Health taking her CPAP away from her, pt presents with shallow breathing and odorous smell of urine.  18G Left AC, pt on 4L O2 24x7      HPI:   Chief Complaint   Patient presents with    Altered Mental Status       EMS reports, pt from home with c/o lethargy and AMS due Home Health taking her CPAP away from her, pt presents with shallow breathing and odorous smell of urine.  18G Left AC, pt on 4L O2 24x7      Patient presents from home via EMS for altered mental status.  Per EMS the patient lives at home with family was recently in a nursing facility and has been at home for approximately 1 week.  When she awoke this morning patient was altered decreased level of consciousness.  Patient has a history of COPD and is supposed to be wearing a CPAP but for unknown reasons it was taken away by home health.  She is 4 L oxygen dependent per EMS. Limited information due to patient's mental status.     73 year old female with chronic hypercapnic and hypoxic respiratory failure on 4 L nasal cannula, diabetes mellitus type 2, diastolic heart failure, history of pulmonary embolism and continued tobacco abuse recently discharged from inpatient hospitalization for cardiac work up after presenting with shortness of breath, leg swelling, CHF exacerbation and treated for urinary tract infection.     ED course:   At time of arrival,  temp 95.4F, pulse 92, RR 34, . 77 mmHg, SpO2 79 % on room air. Placed on BiPAP, initial ABG with pH7.1, CO2 greater than 105 with pO2 of 79.9. Patient reported to become more responsive but still somnolent with BiPAP on. Repeat ABG improved to pH 7.2, pCO2 93, pO2 65.8 HCO3 29.3, FiO2 50%. CT Chest findings of nodular thyroid, enlarged pulmonary artery, gallstones, right basilar and middle lobe mild infiltrate with mucous plugging throughout right lower lobe and minimal left basilar discoid atelectasis. Subtle infiltrate in left upper lobe along fissure. Patient received Rocephin x1, solumedrol. Less somnolent with continuous BiPAP. Patient to be admitted for respiratory failure secondary to pneumonia with continued IV antibiotics, breathing treatments, supplementation oxygen and consult cardiology for medication optimization.        Past Medical History:   Diagnosis Date    Abdominal hernia     Bacteremia 4/5/2023    CHF (congestive heart failure)     COPD (chronic obstructive pulmonary disease)     Diabetes mellitus, type 2 2/16/2022    GERD (gastroesophageal reflux disease)     History of home oxygen therapy     Hypertension     Hypertensive emergency 3/30/2023    Migraine headache     Pulmonary embolism 06/01/2017    Small bowel obstruction     Thyroid disease        Past Surgical History:   Procedure Laterality Date    COLONOSCOPY      HYSTERECTOMY      INNER EAR SURGERY      UPPER GASTROINTESTINAL ENDOSCOPY         Review of patient's allergies indicates:   Allergen Reactions    Pcn [penicillins] Swelling       No current facility-administered medications on file prior to encounter.     Current Outpatient Medications on File Prior to Encounter   Medication Sig    albuterol-ipratropium (DUO-NEB) 2.5 mg-0.5 mg/3 mL nebulizer solution Take 3 mLs by nebulization every 6 (six) hours while awake. Rescue    budesonide (PULMICORT) 0.25 mg/2 mL nebulizer solution Take 2 mLs (0.25 mg total) by  nebulization every 12 (twelve) hours. Controller    carvediloL (COREG) 6.25 MG tablet Take 1 tablet (6.25 mg total) by mouth 2 (two) times daily. (Patient taking differently: Take 12.5 mg by mouth 2 (two) times daily.)    furosemide (LASIX) 40 MG tablet Take 1 tablet (40 mg total) by mouth once daily.    furosemide (LASIX) 80 MG tablet Take 80 mg by mouth daily as needed.    gabapentin (NEURONTIN) 600 MG tablet Take 600 mg by mouth 2 (two) times daily.    insulin detemir U-100, Levemir, 100 unit/mL (3 mL) SubQ InPn pen Inject 15 Units into the skin once daily.    isosorbide mononitrate (IMDUR) 30 MG 24 hr tablet Take 1 tablet (30 mg total) by mouth once daily.    ketotifen (ZADITOR) 0.025 % (0.035 %) ophthalmic solution Place 1 drop into both eyes 2 (two) times daily. for 14 days    levothyroxine (TIROSINT) 50 mcg Cap Take by mouth once daily.    linaCLOtide (LINZESS) 145 mcg Cap capsule Take 145 mcg by mouth before breakfast.    LIPITOR 40 mg tablet Take 1 tablet (40 mg total) by mouth once daily.    pantoprazole (PROTONIX) 20 MG tablet Take 40 mg by mouth once daily.    predniSONE (DELTASONE) 20 MG tablet Take 3 tablets (60mg) by mouth daily for 4 days, THEN take 2 tablets by mouth daily for 4 days, THEN take 1 tablet by mouth daily for 4 days, THEN take 1/2 tablet by mouth daily for 4 days.    sacubitriL-valsartan (ENTRESTO) 24-26 mg per tablet Take 1 tablet by mouth 2 (two) times daily.    vitamin D 1000 units Tab Take 185 mg by mouth once daily.     Family History    None       Tobacco Use    Smoking status: Former    Smokeless tobacco: Never   Substance and Sexual Activity    Alcohol use: No    Drug use: No    Sexual activity: Not on file     Review of Systems   Unable to perform ROS: Acuity of condition     Objective:     Vital Signs (Most Recent):  Temp: 98.9 °F (37.2 °C) (09/29/23 1843)  Pulse: 96 (09/29/23 2025)  Resp: (!) 22 (09/29/23 1843)  BP: 134/78 (09/29/23 2025)  SpO2: 95 %  (09/29/23 2025) Vital Signs (24h Range):  Temp:  [95.4 °F (35.2 °C)-98.9 °F (37.2 °C)] 98.9 °F (37.2 °C)  Pulse:  [] 96  Resp:  [18-34] 22  SpO2:  [79 %-100 %] 95 %  BP: ()/(61-86) 134/78     Weight: 121.6 kg (268 lb)  Body mass index is 46 kg/m².     Physical Exam  Constitutional:       General: She is not in acute distress.     Appearance: She is obese. She is ill-appearing. She is not toxic-appearing or diaphoretic.      Comments: somnolent   HENT:      Mouth/Throat:      Mouth: Mucous membranes are dry.      Pharynx: Oropharynx is clear.   Cardiovascular:      Pulses: Normal pulses.      Heart sounds: Normal heart sounds.   Pulmonary:      Breath sounds: Wheezing, rhonchi and rales present.   Abdominal:      General: There is no distension.      Tenderness: There is no abdominal tenderness. There is no guarding.      Comments: Limited secondary to body habitus   Musculoskeletal:      Cervical back: Neck supple.   Skin:     General: Skin is warm and dry.      Comments: Thickened skin in lower extremities, peeling bilaterally                Significant Labs: All pertinent labs within the past 24 hours have been reviewed.  A1C:   Recent Labs   Lab 09/09/23  0618   HGBA1C 5.3  5.3     ABGs:   Recent Labs   Lab 09/29/23  1233 09/29/23  1538   PH 7.131* 7.204*   PCO2  --  93.0*   HCO3  --  29.3*   POCSATURATED 94.6* 92.4*   PO2 79.9* 65.8*     Bilirubin:   Recent Labs   Lab 09/08/23  1941 09/09/23  0618 09/29/23  1238   BILITOT 0.4 0.4 0.4     BMP:   Recent Labs   Lab 09/29/23  1238   *      K 4.8      CO2 36*   BUN 19   CREATININE 1.1   CALCIUM 9.8   MG 2.2     CBC:   Recent Labs   Lab 09/29/23  1238   WBC 9.09   HGB 13.0   HCT 44.3        CMP:   Recent Labs   Lab 09/29/23  1238      K 4.8      CO2 36*   *   BUN 19   CREATININE 1.1   CALCIUM 9.8   PROT 6.4   ALBUMIN 2.6*   BILITOT 0.4   ALKPHOS 117   AST 13   ALT 23   ANIONGAP -2*     Lactic Acid:   Recent  Labs   Lab 09/29/23  1238   LACTATE 0.3*     Magnesium:   Recent Labs   Lab 09/29/23  1238   MG 2.2     POCT Glucose:   Recent Labs   Lab 09/29/23  2150   POCTGLUCOSE 84     TSH:   Recent Labs   Lab 09/29/23  1238   TSH 0.657     Urine Studies:   Recent Labs   Lab 09/29/23  1321   COLORU Yellow   APPEARANCEUA Cloudy*   PHUR 6.0   SPECGRAV 1.015   PROTEINUA 2+*   GLUCUA Negative   KETONESU Negative   BILIRUBINUA Negative   OCCULTUA 2+*   NITRITE Negative   UROBILINOGEN 1.0   LEUKOCYTESUR 2+*   RBCUA 20*   WBCUA >100*   BACTERIA Negative   SQUAMEPITHEL 0   HYALINECASTS 1.1*     CT Chest With Contrast  Narrative: EXAMINATION:  CT CHEST WITH CONTRAST    CLINICAL HISTORY:  eval for PNA;    TECHNIQUE:  Iterative reconstruction technique was used.    CT/Cardiac Nuclear exams in prior 12 months: 1    COMPARISON:  09/15/2023.    FINDINGS:  There is a nodular thyroid with multiple nodules greater in the left and in the isthmus.  There is an enlarged main pulmonary artery measuring 5.6 cm transverse.  There is stones in the gallbladder.  Bibasilar discoid atelectasis with subtle infiltrate at the right lung base and right middle lobe some mucous plugging.  There appears to be some mucous plugging in the right lower lobe bronchi.  Subtle infiltrate or discoid atelectasis in left upper lobe along fissure  Impression: 1. Large nodular thyroid with multiple nodules  2. Enlarged main pulmonary artery  3. Gallstones  4. Right basilar and middle lobe mild infiltrate with mucous plugging throughout the right lower lobe bronchi and minimal left basilar discoid atelectasis.  5. Discoid atelectasis or subtle infiltrate in the left upper lobe along the fissure posteriorly    Electronically signed by: Fabiola Rodgers MD  Date:    09/29/2023  Time:    17:37  X-Ray Chest 1 View  Narrative: EXAMINATION:  XR CHEST 1 VIEW    CLINICAL HISTORY:  Altered mental status, unspecified    COMPARISON:  09/27/2023    FINDINGS:  Enlarged cardiac silhouette,  unchanged.  No vascular congestion, acute infiltrates, pleural fluid or other detrimental change from prior  Impression: No acute findings or interval radiographic changes    Electronically signed by: Abida Izquierdo MD  Date:    09/29/2023  Time:    13:58  CT Head Without Contrast  Narrative: EXAMINATION:  CT HEAD WITHOUT CONTRAST    CLINICAL HISTORY:  ams;    TECHNIQUE:  Iterative reconstruction technique was performed.    CT/cardiac nuclear exam/s in prior 12 months:  1.    COMPARISON:  Head CT 03/27/2016.    FINDINGS:  No hydrocephalus. No mass or mass effect. No signs of acute hemorrhage.  Mild volume loss.  Cranial bones unremarkable.  Impression: No acute intracranial findings.    Electronically signed by: William Hardy MD  Date:    09/29/2023  Time:    13:10     Significant Imaging: I have reviewed all pertinent imaging results/findings within the past 24 hours.    Assessment/Plan:     * Acute respiratory failure with hypoxia and hypercarbia  Patient with Hypercapnic Respiratory failure which is Chronic.  she is on home oxygen at 4 LPM. Supplemental oxygen was provided and noted- Oxygen Concentration (%):  [50-60] 50    Signs/symptoms of respiratory failure include- tachypnea, increased work of breathing, respiratory distress and lethargy. Contributing diagnoses includes - ARDS, CHF and COPD Labs and images were reviewed. Patient Has recent ABG, which has been reviewed. Will treat underlying causes and adjust management of respiratory failure as follows- Breathing treatments, IV steroids, IV antibiotics, supplemental oxygen, BIPAP      Gallstones  Findings on CT chest.   Lab Results   Component Value Date    ALT 23 09/29/2023    AST 13 09/29/2023    ALKPHOS 117 09/29/2023    BILITOT 0.4 09/29/2023   Asymptomatic. Continue to monitor.       Multiple thyroid nodules  Findings on CT chest with contrast.   Lab Results   Component Value Date    TSH 0.657 09/29/2023    FREET4 1.09 09/09/2023   Euthyroid. Follow  up endocrinology outpatient for continued monitoring.         Chronic diastolic congestive heart failure  Patient is identified as having Diastolic (HFpEF) heart failure that is Chronic. CHF is currently uncontrolled due to Dyspnea not returned to baseline after x1 doses of IV diuretic and Rales/crackles on pulmonary exam. Latest ECHO performed and demonstrates- Results for orders placed during the hospital encounter of 09/09/23    Echo  Interpretation Summary    Left Ventricle: The left ventricle is normal in size. Mildly increased wall thickness. Normal wall motion. There is mildly reduced systolic function with a visually estimated ejection fraction of 40 - 45%. There is normal diastolic function.    Right Ventricle: Normal right ventricular cavity size. Wall thickness is normal. Right ventricle wall motion  is normal. Systolic function is normal.    Aortic Valve: There is mild to moderate aortic regurgitation with a centrally directed jet.    Mitral Valve: There is moderate regurgitation.    Tricuspid Valve: There is moderate regurgitation with a centrally directed jet.    Pulmonic Valve: There is mild to moderate regurgitation.    Pulmonary Artery: The estimated pulmonary artery systolic pressure is 47 mmHg.    IVC/SVC: Normal venous pressure at 3 mmHg.    Continue Beta Blocker, Entresto, Aldactone and Nitrate/Vasodilator and monitor clinical status closely. Monitor on telemetry. Patient is on CHF pathway.  Monitor strict Is&Os and daily weights.  Place on fluid restriction of 1.5 L. Cardiology has been consulted. Continue to stress to patient importance of self efficacy and  on diet for CHF. Last BNP reviewed- and noted below No results for input(s).  Monitor I/Os, daily weight  Continuous telemetry and pulse oximetry    Hypercapnia  Chronic. Patient requires oxygen supplementation at home, O2 4L via nasal cannula.   See management above.       Urinary tract infection with hematuria  Rocephin x1  given in ER. Patient last treated rocephin during last hospitalization early this month.   UA with no bacteria, +pyuria and hematuria  - follow up urine culture      AMS (altered mental status)  Likely secondary to acute respiratory failure with CO2 retention. CT head unremarkable, no acute intracranial findings.   Responsive to voice with BIPAP use, ABG showing last pCO2 93.  Continue to monitor, follow up neuro checks.       Gross hematuria  Holding anticoagulation. Trend Hg/HCT and f/u urine culture results.       Severe obesity (BMI >= 40)  Body mass index is 46 kg/m². Morbid obesity complicates all aspects of disease management from diagnostic modalities to treatment. Weight loss encouraged and health benefits explained to patient.           VTE Risk Mitigation (From admission, onward)         Ordered     Place sequential compression device  Until discontinued         09/29/23 2107     IP VTE HIGH RISK PATIENT  Once         09/29/23 1625     Place sequential compression device  Until discontinued         09/29/23 1625                           Gunnar Mott DO  Department of Hospital Medicine  [place] - Intensive Care

## 2023-09-30 NOTE — PT/OT/SLP PROGRESS
Physical Therapy      Patient Name:  Carmen Tan   MRN:  [id number]    Patient not seen today secondary to spoke to patient's nurse and she reported patient not currently appropriate for PT intervention at this time. Patient currently on bi-pap treatment. . Will follow-up 10/2/2023..

## 2023-09-30 NOTE — ASSESSMENT & PLAN NOTE
Chronic. Patient requires oxygen supplementation at home, O2 4L via nasal cannula.   See management above.

## 2023-09-30 NOTE — ASSESSMENT & PLAN NOTE
Patient is identified as having Diastolic (HFpEF) heart failure that is Chronic. CHF is currently uncontrolled due to Dyspnea not returned to baseline after x1 doses of IV diuretic and Rales/crackles on pulmonary exam. Latest ECHO performed and demonstrates- Results for orders placed during the hospital encounter of 09/09/23    Echo  Interpretation Summary    Left Ventricle: The left ventricle is normal in size. Mildly increased wall thickness. Normal wall motion. There is mildly reduced systolic function with a visually estimated ejection fraction of 40 - 45%. There is normal diastolic function.    Right Ventricle: Normal right ventricular cavity size. Wall thickness is normal. Right ventricle wall motion  is normal. Systolic function is normal.    Aortic Valve: There is mild to moderate aortic regurgitation with a centrally directed jet.    Mitral Valve: There is moderate regurgitation.    Tricuspid Valve: There is moderate regurgitation with a centrally directed jet.    Pulmonic Valve: There is mild to moderate regurgitation.    Pulmonary Artery: The estimated pulmonary artery systolic pressure is 47 mmHg.    IVC/SVC: Normal venous pressure at 3 mmHg.    Continue Beta Blocker, Entresto, Aldactone and Nitrate/Vasodilator and monitor clinical status closely. Monitor on telemetry. Patient is on CHF pathway.  Monitor strict Is&Os and daily weights.  Place on fluid restriction of 1.5 L. Cardiology has been consulted. Continue to stress to patient importance of self efficacy and  on diet for CHF. Last BNP reviewed- and noted below No results for input(s).  Monitor I/Os, daily weight  Continuous telemetry and pulse oximetry

## 2023-09-30 NOTE — PROGRESS NOTES
[facility]  Progress Note    Patient Name: Carmen Tan  MRN: [id number]  Patient Class: IP- Inpatient   Admission Date: 9/29/2023  Length of Stay: 1 days  Attending Physician: Gunnar Mott DO  Primary Care Provider: Lucian Barry MD        Subjective:     Principal Problem:Acute respiratory failure with hypoxia and hypercarbia        HPI:  Chief Complaint   Patient presents with    Altered Mental Status       EMS reports, pt from home with c/o lethargy and AMS due Home Health taking her CPAP away from her, pt presents with shallow breathing and odorous smell of urine.  18G Left AC, pt on 4L O2 24x7      Patient presents from home via EMS for altered mental status.  Per EMS the patient lives at home with family was recently in a nursing facility and has been at home for approximately 1 week.  When she awoke this morning patient was altered decreased level of consciousness.  Patient has a history of COPD and is supposed to be wearing a CPAP but for unknown reasons it was taken away by home health.  She is 4 L oxygen dependent per EMS. Limited information due to patient's mental status.     73 year old female with chronic hypercapnic and hypoxic respiratory failure on 4 L nasal cannula, diabetes mellitus type 2, diastolic heart failure, history of pulmonary embolism and continued tobacco abuse recently discharged from inpatient hospitalization for cardiac work up after presenting with shortness of breath, leg swelling, CHF exacerbation and treated for urinary tract infection.     ED course:   At time of arrival, temp 95.4F, pulse 92, RR 34, . 77 mmHg, SpO2 79 % on room air. Placed on BiPAP, initial ABG with pH7.1, CO2 greater than 105 with pO2 of 79.9. Patient reported to become more responsive but still somnolent with BiPAP on. Repeat ABG improved to pH 7.2, pCO2 93, pO2 65.8 HCO3 29.3, FiO2 50%. CT Chest findings of nodular thyroid, enlarged pulmonary  artery, gallstones, right basilar and middle lobe mild infiltrate with mucous plugging throughout right lower lobe and minimal left basilar discoid atelectasis. Subtle infiltrate in left upper lobe along fissure. Patient received Rocephin x1, solumedrol. Less somnolent with continuous BiPAP. Patient to be admitted for respiratory failure secondary to pneumonia with continued IV antibiotics, breathing treatments, supplementation oxygen and consult cardiology for medication optimization.        Overview/Hospital Course:  9/30 Overnight on BiPAP I/E 16/7cm H2O. Patient more alert, following commands appropriately. Head nods for affirmation. Continue with scheduled breathing treatments, including mucolytics, aggressive pulmonary hygiene care. Follow up PT and OT.       Interval History: Patient seen and examined.     Review of Systems   Unable to perform ROS: Acuity of condition   Constitutional:  Positive for activity change, appetite change and fatigue. Negative for fever.   Cardiovascular:  Positive for leg swelling. Negative for chest pain and palpitations.   Gastrointestinal:  Negative for abdominal pain, constipation, diarrhea, nausea and vomiting.   Musculoskeletal:  Positive for back pain and gait problem. Negative for neck pain.   Skin:  Positive for wound.   Neurological:  Negative for dizziness, seizures, speech difficulty and headaches.   Psychiatric/Behavioral:  Negative for confusion and sleep disturbance.      Objective:     Vital Signs (Most Recent):  Temp: 97.5 °F (36.4 °C) (09/30/23 0711)  Pulse: 67 (09/30/23 0719)  Resp: 14 (09/30/23 0719)  BP: 119/77 (09/30/23 0500)  SpO2: 99 % (09/30/23 0719) Vital Signs (24h Range):  Temp:  [95.4 °F (35.2 °C)-98.9 °F (37.2 °C)] 97.5 °F (36.4 °C)  Pulse:  [] 67  Resp:  [14-39] 14  SpO2:  [79 %-100 %] 99 %  BP: ()/(54-98) 119/77     Weight: 121.6 kg (268 lb)  Body mass index is 46 kg/m².    Intake/Output Summary (Last 24 hours) at 9/30/2023 0932  Last  data filed at 9/30/2023 0525  Gross per 24 hour   Intake 340 ml   Output 450 ml   Net -110 ml         Physical Exam  Vitals and nursing note reviewed.   Constitutional:       General: She is not in acute distress.     Appearance: She is obese. She is ill-appearing. She is not toxic-appearing or diaphoretic.      Comments: somnolent   HENT:      Mouth/Throat:      Mouth: Mucous membranes are dry.      Pharynx: Oropharynx is clear.      Comments: Lips dry  Cardiovascular:      Pulses: Normal pulses.      Heart sounds: Normal heart sounds.   Pulmonary:      Breath sounds: Wheezing, rhonchi and rales present.      Comments: BiPAP mask secured  Abdominal:      General: There is no distension.      Tenderness: There is no abdominal tenderness. There is no guarding.      Comments: Limited secondary to body habitus   Musculoskeletal:      Cervical back: Neck supple.   Skin:     General: Skin is warm and dry.      Comments: Thickened skin in lower extremities, peeling bilaterally             Significant Labs: All pertinent labs within the past 24 hours have been reviewed.  Bilirubin:   Recent Labs   Lab 09/08/23  1941 09/09/23  0618 09/29/23  1238 09/30/23  0348   BILITOT 0.4 0.4 0.4 0.3     Blood Culture:   Recent Labs   Lab 09/29/23  1238 09/29/23  1245   LABBLOO No Growth to date No Growth to date     BMP:   Recent Labs   Lab 09/29/23  1238 09/30/23  0348   * 99    144   K 4.8 5.1    111*   CO2 36* 35*   BUN 19 16   CREATININE 1.1 1.0   CALCIUM 9.8 9.7   MG 2.2  --      CBC:   Recent Labs   Lab 09/29/23  1238 09/30/23  0348   WBC 9.09 6.38   HGB 13.0 12.2   HCT 44.3 35.1*    135*     CMP:   Recent Labs   Lab 09/29/23  1238 09/30/23  0348    144   K 4.8 5.1    111*   CO2 36* 35*   * 99   BUN 19 16   CREATININE 1.1 1.0   CALCIUM 9.8 9.7   PROT 6.4 5.7*   ALBUMIN 2.6* 2.3*   BILITOT 0.4 0.3   ALKPHOS 117 93   AST 13 11   ALT 23 17   ANIONGAP -2* -2*   \  Lactic Acid:   Recent Labs    Lab 09/29/23  1238   LACTATE 0.3*     Magnesium:   Recent Labs   Lab 09/29/23  1238   MG 2.2     POCT Glucose:   Recent Labs   Lab 09/29/23  2150   POCTGLUCOSE 84     Urine Studies:   Recent Labs   Lab 09/29/23  1321   COLORU Yellow   APPEARANCEUA Cloudy*   PHUR 6.0   SPECGRAV 1.015   PROTEINUA 2+*   GLUCUA Negative   KETONESU Negative   BILIRUBINUA Negative   OCCULTUA 2+*   NITRITE Negative   UROBILINOGEN 1.0   LEUKOCYTESUR 2+*   RBCUA 20*   WBCUA >100*   BACTERIA Negative   SQUAMEPITHEL 0   HYALINECASTS 1.1*       Significant Imaging: I have reviewed all pertinent imaging results/findings within the past 24 hours.      Assessment/Plan:      * Acute respiratory failure with hypoxia and hypercarbia  Patient with Hypercapnic Respiratory failure which is Chronic.  she is on home oxygen at 4 LPM. Supplemental oxygen was provided and noted- Oxygen Concentration (%):  [50-60] 50    Signs/symptoms of respiratory failure include- tachypnea, increased work of breathing, respiratory distress and lethargy. Contributing diagnoses includes - ARDS, CHF and COPD Labs and images were reviewed. Patient Has recent ABG, which has been reviewed. Will treat underlying causes and adjust management of respiratory failure as follows- Breathing treatments, IV steroids, IV antibiotics, supplemental oxygen, BIPAP      Gallstones  Findings on CT chest.   Lab Results   Component Value Date    ALT 17 09/30/2023    AST 11 09/30/2023    ALKPHOS 93 09/30/2023    BILITOT 0.3 09/30/2023   Asymptomatic. Continue to monitor.       Multiple thyroid nodules  Findings on CT chest with contrast.   Lab Results   Component Value Date    TSH 0.657 09/29/2023    FREET4 1.09 09/09/2023   Euthyroid. Follow up endocrinology outpatient for continued monitoring.         Chronic diastolic congestive heart failure  Patient is identified as having Diastolic (HFpEF) heart failure that is Chronic. CHF is currently uncontrolled due to Dyspnea not returned to baseline  after x1 doses of IV diuretic and Rales/crackles on pulmonary exam. Latest ECHO performed and demonstrates- Results for orders placed during the hospital encounter of 09/09/23    Echo  Interpretation Summary    Left Ventricle: The left ventricle is normal in size. Mildly increased wall thickness. Normal wall motion. There is mildly reduced systolic function with a visually estimated ejection fraction of 40 - 45%. There is normal diastolic function.    Right Ventricle: Normal right ventricular cavity size. Wall thickness is normal. Right ventricle wall motion  is normal. Systolic function is normal.    Aortic Valve: There is mild to moderate aortic regurgitation with a centrally directed jet.    Mitral Valve: There is moderate regurgitation.    Tricuspid Valve: There is moderate regurgitation with a centrally directed jet.    Pulmonic Valve: There is mild to moderate regurgitation.    Pulmonary Artery: The estimated pulmonary artery systolic pressure is 47 mmHg.    IVC/SVC: Normal venous pressure at 3 mmHg.    Continue Beta Blocker, Entresto, Aldactone and Nitrate/Vasodilator and monitor clinical status closely. Monitor on telemetry. Patient is on CHF pathway.  Monitor strict Is&Os and daily weights.  Place on fluid restriction of 1.5 L. Cardiology has been consulted. Continue to stress to patient importance of self efficacy and  on diet for CHF. Last BNP reviewed- and noted below No results for input(s).  Monitor I/Os, daily weight  Continuous telemetry and pulse oximetry    Hypercapnia  Chronic. Patient requires oxygen supplementation at home, O2 4L via nasal cannula.   See management above.       Urinary tract infection with hematuria  Rocephin x1 given in ER. Patient last treated rocephin during last hospitalization early this month.   UA with no bacteria, +pyuria and hematuria  - follow up urine culture      AMS (altered mental status)  Likely secondary to acute respiratory failure with CO2  retention. CT head unremarkable, no acute intracranial findings.   Responsive to voice with BIPAP use, ABG showing last pCO2 93.  Continue to monitor, follow up neuro checks.   9/30 More alert    Gross hematuria  Holding anticoagulation. Trend Hg/HCT and f/u urine culture results.   Lab Results   Component Value Date    HGB 12.2 09/30/2023    HGB 13.0 09/29/2023    HGB 15.1 09/17/2023         Severe obesity (BMI >= 40)  Body mass index is 46 kg/m². Morbid obesity complicates all aspects of disease management from diagnostic modalities to treatment. Weight loss encouraged and health benefits explained to patient.           VTE Risk Mitigation (From admission, onward)         Ordered     Place sequential compression device  Until discontinued         09/29/23 2107     IP VTE HIGH RISK PATIENT  Once         09/29/23 1625     Place sequential compression device  Until discontinued         09/29/23 1625                Discharge Planning   TIGRE:      Code Status: Full Code   Is the patient medically ready for discharge?:     Reason for patient still in hospital (select all that apply): Patient trending condition, Treatment, Consult recommendations, PT / OT recommendations and Pending disposition  Discharge Plan A: Home Health                  Gunnar Mott DO  Department of Hospital Medicine   [facility]

## 2023-09-30 NOTE — HOSPITAL COURSE
9/30 Overnight on BiPAP I/E 16/7cm H2O. Patient more alert, following commands appropriately. Head nods for affirmation. Continue with scheduled breathing treatments, including mucolytics, aggressive pulmonary hygiene care. Follow up PT and OT.   10/1 BiPAP QHS maintaining SpO2 >98%. Patient without oxygen supplementation desaturates into 80% ile. Patient educated on importance of keeping oxygen supplementation while eating. Urine culture positive for Ecoli >100K, currently covering for upper respiratory infection with Rocephin and doxycycline. Agreeable to discharge to nursing facility. Consult CM in place, TB screening ordered.   10/2 PT and OT efforts limited with BiPAP, but patient seen regaining strength and support self with walker and assistance. Awaiting placement to SNF for continued PT and OT efforts with medication management.   10/3 No acute events overnight. Compliant with BiPAP use. Patient endorses no new changes. Awaiting placement to SNF. Adjust IV antibiotics to cover for nosocomial infection and ESBL MDRO urinary tract infection.   10/4 Continuous BiPAP. Patient endorses feeling better. Continue with antibiotics for UTI and right side pneumonia. Continue daily PT and OT, awaiting SNF placement.

## 2023-09-30 NOTE — NURSING
Patient admitted from ER per stretcher, awake alert on 6 LPM N/C upon arrival, Resp. Is here to connect patient to the BIPAP as ordered.  HR is tach a-fib. Patient c/o that she is cold, temp afebrile.Her legs 4+ edema bilateral scd applied. [place]wick is in place, connected to wall suction.Patient connected to the monitor.

## 2023-09-30 NOTE — ASSESSMENT & PLAN NOTE
Findings on CT chest with contrast.   Lab Results   Component Value Date    TSH 0.657 09/29/2023    FREET4 1.09 09/09/2023   Euthyroid. Follow up endocrinology outpatient for continued monitoring.

## 2023-09-30 NOTE — ASSESSMENT & PLAN NOTE
Patient with Hypercapnic Respiratory failure which is Chronic.  she is on home oxygen at 4 LPM. Supplemental oxygen was provided and noted- Oxygen Concentration (%):  [50-60] 50    Signs/symptoms of respiratory failure include- tachypnea, increased work of breathing, respiratory distress and lethargy. Contributing diagnoses includes - ARDS, CHF and COPD Labs and images were reviewed. Patient Has recent ABG, which has been reviewed. Will treat underlying causes and adjust management of respiratory failure as follows- Breathing treatments, IV steroids, IV antibiotics, supplemental oxygen, BIPAP

## 2023-09-30 NOTE — ASSESSMENT & PLAN NOTE
Findings on CT chest.   Lab Results   Component Value Date    ALT 23 09/29/2023    AST 13 09/29/2023    ALKPHOS 117 09/29/2023    BILITOT 0.4 09/29/2023   Asymptomatic. Continue to monitor.

## 2023-09-30 NOTE — ASSESSMENT & PLAN NOTE
Holding anticoagulation. Trend Hg/HCT and f/u urine culture results.   Lab Results   Component Value Date    HGB 12.2 09/30/2023    HGB 13.0 09/29/2023    HGB 15.1 09/17/2023

## 2023-09-30 NOTE — ASSESSMENT & PLAN NOTE
Likely secondary to acute respiratory failure with CO2 retention. CT head unremarkable, no acute intracranial findings.   Responsive to voice with BIPAP use, ABG showing last pCO2 93.  Continue to monitor, follow up neuro checks.

## 2023-09-30 NOTE — PLAN OF CARE
"[facility] - Intensive Care  Initial Discharge Assessment       Primary Care Provider: Lucian Barry MD    Admission Diagnosis: Hypercapnia [R06.89]  COPD exacerbation [J44.1]  Acute cystitis without hematuria [N30.00]  Acute respiratory failure with hypoxia and hypercarbia [J96.01, J96.02]  Urinary tract infection without hematuria, site unspecified [N39.0]  AMS (altered mental status) [R41.82]    Admission Date: 9/29/2023  Expected Discharge Date:     Transition of Care Barriers: Social    Payor: [employer] MANAGED MEDICARE / Plan: [employer] MEDICARE HMO / Product Type: Capitation /     Extended Emergency Contact Information  Primary Emergency Contact: Esther Tan   [facility]  Home Phone: [phone]  Relation: Daughter  Secondary Emergency Contact: Salvatore Tan  Mobile Phone: [phone]  Relation: Son    Discharge Plan A: Home Health  Discharge Plan B: Skilled Nursing Facility      [facility] #[id number] - [facility] [address] [facility] [address] [place] [address] & [facility]  [address] [facility] [address] [facility] [address]  Phone: [phone] Fax: [phone]    [facility][id number] - [place], [place] - [address] [facility] [address]  [address] [facility] [address]  Phone: [phone] Fax: [phone]      Initial Assessment (most recent)       Adult Discharge Assessment - 09/30/23 0930          Discharge Assessment    Assessment Type Discharge Planning Assessment     Confirmed/corrected address, phone number and insurance Yes     Confirmed Demographics Correct on Facesheet     Source of Information family     If unable to respond/provide information was family/caregiver contacted? Yes     Contact Name/Number Esther Thomason [phone]     When was your last doctors appointment? --   "about a week ago."    Communicated TIGRE with patient/caregiver Yes     Reason For Admission SOB     People in Home child(edmar), adult     Do you expect to return to " your current living situation? Other (see comments)   TBD    Prior to hospitilization cognitive status: Unable to Assess     Current cognitive status: Unable to Assess     Walking or Climbing Stairs ambulation difficulty, requires equipment     Mobility Management Requires rollator for ambulation     Dressing/Bathing bathing difficulty, assistance 1 person     Dressing/Bathing Management Requires assistance from family.     Equipment Currently Used at Home rollator;oxygen     Readmission within 30 days? Yes     Patient currently being followed by outpatient case management? No     Do you currently have service(s) that help you manage your care at home? Yes     Name and Contact number of agency [facility]     Is the pt/caregiver preference to resume services with current agency Yes     Do you take prescription medications? Yes     Do you have prescription coverage? Yes     Coverage [employer]     Do you have any problems affording any of your prescribed medications? No     Is the patient taking medications as prescribed? --   unable to determine    Who is going to help you get home at discharge? Alfonzo Tan     How do you get to doctors appointments? family or friend will provide     Are you on dialysis? No     Do you take coumadin? No     DME Needed Upon Discharge  --   Possible C-Pap    Discharge Plan discussed with: Adult children     Transition of Care Barriers Social     SDOH Lack of primary/family support     Discharge Plan A Home Health     Discharge Plan B Skilled Nursing Facility                      DCP obtained via phone w/patient's daughter Esther Thomason. Patient is not alert at this time to answer any question. Patient's daughter is concerned that patient is not being taken care of by her brother Alfonzo. She states they have bee trying to get patient in a nursing home where she could be taken care of. Also, Ms. Santana states that c-pap was taken away from patient at last admission to hospital but  she believes patient needs that c-pap if she is to return home. CM/SW to remain on case until discharge. Once patient is more alert, we will discuss SNF placement.

## 2023-09-30 NOTE — ASSESSMENT & PLAN NOTE
Body mass index is 46 kg/m². Morbid obesity complicates all aspects of disease management from diagnostic modalities to treatment. Weight loss encouraged and health benefits explained to patient.

## 2023-09-30 NOTE — HPI
Chief Complaint   Patient presents with    Altered Mental Status       EMS reports, pt from home with c/o lethargy and AMS due Home Health taking her CPAP away from her, pt presents with shallow breathing and odorous smell of urine.  18G Left AC, pt on 4L O2 24x7      Patient presents from home via EMS for altered mental status.  Per EMS the patient lives at home with family was recently in a nursing facility and has been at home for approximately 1 week.  When she awoke this morning patient was altered decreased level of consciousness.  Patient has a history of COPD and is supposed to be wearing a CPAP but for unknown reasons it was taken away by home health.  She is 4 L oxygen dependent per EMS. Limited information due to patient's mental status.     73 year old female with chronic hypercapnic and hypoxic respiratory failure on 4 L nasal cannula, diabetes mellitus type 2, diastolic heart failure, history of pulmonary embolism and continued tobacco abuse recently discharged from inpatient hospitalization for cardiac work up after NSTEMI presenting with shortness of breath, leg swelling, CHF exacerbation and treated for urinary tract infection.     ED course:   At time of arrival, temp 95.4F, pulse 92, RR 34, . 77 mmHg, SpO2 79 % on room air. Placed on BiPAP, initial ABG with pH7.1, CO2 greater than 105 with pO2 of 79.9. Patient reported to become more responsive but still somnolent with BiPAP on. Repeat ABG improved to pH 7.2, pCO2 93, pO2 65.8 HCO3 29.3, FiO2 50%. CT Chest findings of nodular thyroid, enlarged pulmonary artery, gallstones, right basilar and middle lobe mild infiltrate with mucous plugging throughout right lower lobe and minimal left basilar discoid atelectasis. Subtle infiltrate in left upper lobe along fissure. Patient received Rocephin x1, solumedrol. Less somnolent with continuous BiPAP. Patient to be admitted for respiratory failure secondary to pneumonia with continued IV  antibiotics, breathing treatments, supplementation oxygen and consult cardiology for medication optimization.

## 2023-09-30 NOTE — ASSESSMENT & PLAN NOTE
Findings on CT chest.   Lab Results   Component Value Date    ALT 17 09/30/2023    AST 11 09/30/2023    ALKPHOS 93 09/30/2023    BILITOT 0.3 09/30/2023   Asymptomatic. Continue to monitor.

## 2023-09-30 NOTE — CONSULTS
ELIOT Espinosa MD  [address] [place] [address],  [address] [place] [address]  Phone:  [phone]  Fax:  [phone]  Email: [email or web address]  _______________________________________________________________  Cardiac consultation:    Today's Date:  9/30/2023  Patient Name: Carmen Tan    MRN: [id number]    Code Status: Full Code     Attending Physician: Gunnar Mott DO   Consulting Physician: ELIOT Espinosa MD  ______________________________________________________________________    Reason for Consult:  Congestive heart failure    Temp: 97.5 °F (36.4 °C) (09/30/23 1113)  Pulse: 67 (09/30/23 1113)  Resp: (!) 24 (09/30/23 1113)  BP: 124/73 (09/30/23 1113)  SpO2: 98 % (09/30/23 1113)  Subjective:   The patient is a 73-year-old lady whose past medical history is remarkable for:  Coronary artery disease:  Status post non ST segment elevated MI (09/08/2023) :  Transferred and evaluated at [facility].  Congestive heart failure  Hypertension  COPD with chronic respiratory failure: Persistent tobacco usage.  Diabetes mellitus  History of PE    Echocardiogram (09/09/2023):   EF 40-45%  Mild-to-moderate AI  Mild aortic valve stenosis  Moderate MR   Moderate TR  Pulmonary hypertension (mild)    CTA of the chest (09/16/2023):  No pulmonary embolus or dissection    Lower extremity ultrasound:  No evidence of DVT.    Stress test ([facility]):  Stress images never completed secondary to wheezing.  Had been scheduled for outpatient  stress test completion.  ----------------------------------------------  The patient presented through the ED on 09/29/2023, with altered mental status.  In the ED, the patient was noted to be in respiratory failure with a CO2 of greater than 105 and an O2 of 79.  She was started on BiPAP at which time she became more responsive.    Chest x-ray:  No acute findings; mild cardiomegaly.      CT scan of the chest (09/29/2023):    1. Large nodular  thyroid with multiple nodules  2. Enlarged main pulmonary artery  3. Gallstones  4. Right basilar and middle lobe mild infiltrate with mucous plugging throughout the right lower lobe bronchi and minimal left basilar discoid atelectasis.  5. Discoid atelectasis or subtle infiltrate in the left upper lobe along the fissure posteriorly    Head CT scan (09/29/2023):   FINDINGS:  No hydrocephalus. No mass or mass effect. No signs of acute hemorrhage.  Mild volume loss.  Cranial bones unremarkable.     Impression:  No acute intracranial findings.     Past Medical History:  Past Medical History:   Diagnosis Date    Abdominal hernia     Bacteremia 4/5/2023    CHF (congestive heart failure)     COPD (chronic obstructive pulmonary disease)     Diabetes mellitus, type 2 2/16/2022    GERD (gastroesophageal reflux disease)     History of home oxygen therapy     Hypertension     Hypertensive emergency 3/30/2023    Migraine headache     Pulmonary embolism 06/01/2017    Small bowel obstruction     Thyroid disease        Past Surgical History:   Procedure Laterality Date    COLONOSCOPY      HYSTERECTOMY      INNER EAR SURGERY      UPPER GASTROINTESTINAL ENDOSCOPY         History reviewed. No pertinent family history.    Social History     Socioeconomic History    Marital status:    Tobacco Use    Smoking status: Former    Smokeless tobacco: Never   Substance and Sexual Activity    Alcohol use: No    Drug use: No     Social Determinants of Health     Financial Resource Strain: Low Risk  (9/10/2023)    Overall Financial Resource Strain (CARDIA)     Difficulty of Paying Living Expenses: Not hard at all   Food Insecurity: No Food Insecurity (9/10/2023)    Hunger Vital Sign     Worried About Running Out of Food in the Last Year: Never true     Ran Out of Food in the Last Year: Never true   Transportation Needs: No Transportation Needs (9/10/2023)    PRAPARE - Transportation     Lack of Transportation (Medical): No     Lack of  Transportation (Non-Medical): No   Physical Activity: Inactive (9/10/2023)    Exercise Vital Sign     Days of Exercise per Week: 0 days     Minutes of Exercise per Session: 0 min   Social Connections: Unknown (9/10/2023)    Social Connection and Isolation Panel [NHANES]     Frequency of Communication with Friends and Family: More than three times a week     Frequency of Social Gatherings with Friends and Family: More than three times a week     Attends Club or Organization Meetings: Never     Marital Status:    Housing Stability: Unknown (9/10/2023)    Housing Stability Vital Sign     Unable to Pay for Housing in the Last Year: No     Unstable Housing in the Last Year: No       Medications:  Current Facility-Administered Medications   Medication Dose Route Frequency Provider Last Rate Last Admin    acetaminophen tablet 650 mg  650 mg Oral Q6H PRN Gunnar Mott, DO        acetylcysteine 200 mg/ml (20%) solution 2 mL  2 mL Nebulization BID Gunnar Mott, DO   2 mL at 09/30/23 0719    albuterol-ipratropium 2.5 mg-0.5 mg/3 mL nebulizer solution 3 mL  3 mL Nebulization Q6H WAKE Gunnar Mott, DO   3 mL at 09/30/23 0719    aspirin EC tablet 81 mg  81 mg Oral Daily Gunnar Mott, DO   81 mg at 09/30/23 0958    budesonide nebulizer solution 0.5 mg  0.5 mg Nebulization Q12H Gunnar Mott, DO   0.5 mg at 09/30/23 0719    busPIRone tablet 5 mg  5 mg Oral BID Gunnar Mott, DO   5 mg at 09/30/23 0958    cefTRIAXone (ROCEPHIN) 1 g in dextrose 5 % in water (D5W) 100 mL IVPB (MB+)  1 g Intravenous Q24H Gunnar Mott, DO   Stopped at 09/30/23 1241    dextrose 10% bolus 125 mL 125 mL  12.5 g Intravenous PRN Gunnar Mott, DO        dextrose 10% bolus 250 mL 250 mL  25 g Intravenous PRN Gunnar Mott, DO        doxycycline tablet 100 mg  100 mg Oral Q12H Gunnar Mott, DO   100 mg at 09/30/23 1151    glucagon (human recombinant) injection 1 mg  1 mg  Intramuscular PRN Gunnar Mott DO        hydrALAZINE injection 10 mg  10 mg Intravenous Q4H PRN Gunnar Mott,         insulin aspart U-100 pen 0-5 Units  0-5 Units Subcutaneous QID (AC + HS) PRN Gunnar Mott,         isosorbide mononitrate 24 hr tablet 30 mg  30 mg Oral Daily Gunnar Mott, DO   30 mg at 09/30/23 0958    levothyroxine tablet 50 mcg  50 mcg Oral Before breakfast Gunnar Mott, DO   50 mcg at 09/30/23 0516    melatonin tablet 6 mg  6 mg Oral Nightly PRN Gunnar Mott DO        mupirocin 2 % ointment   Nasal BID Gunnar Mott DO   Given at 09/30/23 1151    ondansetron injection 4 mg  4 mg Intravenous Q8H PRN Gunnar Mott DO        pantoprazole EC tablet 40 mg  40 mg Oral Daily Gunnar Mott, DO   40 mg at 09/30/23 0957    sertraline tablet 50 mg  50 mg Oral Daily Gunnar Mott, DO   50 mg at 09/30/23 0957    sodium chloride 0.9% flush 10 mL  10 mL Intravenous PRN Gunnar Mott DO           Allergies:  Review of patient's allergies indicates:   Allergen Reactions    Pcn [penicillins] Swelling        Labs: CMP   Recent Labs   Lab 09/29/23  1238 09/30/23  0348    144   K 4.8 5.1    111*   CO2 36* 35*   * 99   BUN 19 16   CREATININE 1.1 1.0   CALCIUM 9.8 9.7   PROT 6.4 5.7*   ALBUMIN 2.6* 2.3*   BILITOT 0.4 0.3   ALKPHOS 117 93   AST 13 11   ALT 23 17   ANIONGAP -2* -2*    and CBC   Recent Labs   Lab 09/29/23  1238 09/30/23  0348   WBC 9.09 6.38   HGB 13.0 12.2   HCT 44.3 35.1*    135*       Vital Signs (Most Recent):  Temp: 97.5 °F (36.4 °C) (09/30/23 1113)  Pulse: 67 (09/30/23 1113)  Resp: (!) 24 (09/30/23 1113)  BP: 124/73 (09/30/23 1113)  SpO2: 98 % (09/30/23 1113) Vital Signs (24h Range):  Temp:  [95.4 °F (35.2 °C)-98.9 °F (37.2 °C)] 97.5 °F (36.4 °C)  Pulse:  [] 67  Resp:  [14-39] 24  SpO2:  [92 %-100 %] 98 %  BP: ()/(54-98) 124/73     Weight: 121.6 kg (268 lb)  Body mass  index is 46 kg/m².    SpO2: 98 %         Intake/Output Summary (Last 24 hours) at 9/30/2023 1242  Last data filed at 9/30/2023 0930  Gross per 24 hour   Intake 415 ml   Output 450 ml   Net -35 ml       Review of Systems   Respiratory:  Positive for shortness of breath, sputum production and wheezing.        Physical Exam  General:  Appears comfortable.  Heent:   No JVD.  Lungs:   Diffuse rhonchi and wheezing.  Heart:   Regular rate rhythm; no appreciable murmurs, gallops, clicks, or rubs.  Extremities:   No pretibial edema.    Assessment and Plan:     1.  Coronary artery disease: Status post non-STEMI.  She is not complaining of chest pain.  Inpatient stress testing could not be completed secondary to underlying COPD with wheezing.  In addition, the patient continues to smoke.  My recommendation at this time will be conservative management.  Aspirin 81 mg daily  Continue with isosorbide mononitrate.  Hold beta-blocker therapy secondary to diffuse wheezing.  Discontinue tobacco usage.  Follow-up with the [facility] cardiology service in [place] to complete stress test.    2. Congestive heart failure:    Patient with both systolic and diastolic dysfunction.  She appears to be fairly well compensated.  BNP is elevated:  2068.    Will institute Lasix at 20 mg p.o. daily.    3. Hypertension:    Blood pressures currently well controlled.    Would recommend ARB therapy if blood pressure should begin to increase.    4.  COPD with chronic respiratory failure: Persistent tobacco usage.    Patient being aggressively managed.    5.  Diabetes mellitus:    As per internal medicine.     6.History of PE :    Ultrasound from several weeks ago showed no evidence of recurrent DVT.  Add Lovenox for DVT prophylaxis

## 2023-10-01 LAB
ALBUMIN SERPL BCP-MCNC: 2.3 G/DL (ref 3.5–5.2)
ALP SERPL-CCNC: 93 U/L (ref 55–135)
ALT SERPL W/O P-5'-P-CCNC: 16 U/L (ref 10–44)
ANION GAP SERPL CALC-SCNC: -3 MMOL/L (ref 3–11)
AST SERPL-CCNC: 9 U/L (ref 10–40)
BASOPHILS # BLD AUTO: 0.02 K/UL (ref 0–0.2)
BASOPHILS NFR BLD: 0.3 % (ref 0–1.9)
BILIRUB SERPL-MCNC: 0.5 MG/DL (ref 0.1–1)
BUN SERPL-MCNC: 16 MG/DL (ref 8–23)
CALCIUM SERPL-MCNC: 9.9 MG/DL (ref 8.7–10.5)
CHLORIDE SERPL-SCNC: 108 MMOL/L (ref 95–110)
CO2 SERPL-SCNC: 39 MMOL/L (ref 23–29)
CREAT SERPL-MCNC: 0.9 MG/DL (ref 0.5–1.4)
DIFFERENTIAL METHOD: ABNORMAL
EOSINOPHIL # BLD AUTO: 0.2 K/UL (ref 0–0.5)
EOSINOPHIL NFR BLD: 2.1 % (ref 0–8)
ERYTHROCYTE [DISTWIDTH] IN BLOOD BY AUTOMATED COUNT: 17.2 % (ref 11.5–14.5)
EST. GFR  (NO RACE VARIABLE): >60 ML/MIN/1.73 M^2
GLUCOSE SERPL-MCNC: 101 MG/DL (ref 70–110)
HCT VFR BLD AUTO: 36.6 % (ref 37–48.5)
HGB BLD-MCNC: 11.9 G/DL (ref 12–16)
IMM GRANULOCYTES # BLD AUTO: 0.01 K/UL (ref 0–0.04)
IMM GRANULOCYTES NFR BLD AUTO: 0.1 % (ref 0–0.5)
LYMPHOCYTES # BLD AUTO: 1 K/UL (ref 1–4.8)
LYMPHOCYTES NFR BLD: 13.3 % (ref 18–48)
MCH RBC QN AUTO: 33.2 PG (ref 27–31)
MCHC RBC AUTO-ENTMCNC: 32.5 G/DL (ref 32–36)
MCV RBC AUTO: 102 FL (ref 82–98)
MONOCYTES # BLD AUTO: 0.5 K/UL (ref 0.3–1)
MONOCYTES NFR BLD: 7.5 % (ref 4–15)
NEUTROPHILS # BLD AUTO: 5.5 K/UL (ref 1.8–7.7)
NEUTROPHILS NFR BLD: 76.7 % (ref 38–73)
NRBC BLD-RTO: 0 /100 WBC
PLATELET # BLD AUTO: 143 K/UL (ref 150–450)
PMV BLD AUTO: 9.1 FL (ref 9.2–12.9)
POCT GLUCOSE: 105 MG/DL (ref 70–110)
POTASSIUM SERPL-SCNC: 4.6 MMOL/L (ref 3.5–5.1)
PROT SERPL-MCNC: 5.8 G/DL (ref 6–8.4)
RBC # BLD AUTO: 3.58 M/UL (ref 4–5.4)
SODIUM SERPL-SCNC: 144 MMOL/L (ref 136–145)
WBC # BLD AUTO: 7.16 K/UL (ref 3.9–12.7)

## 2023-10-01 PROCEDURE — 63600175 PHARM REV CODE 636 W HCPCS: Performed by: STUDENT IN AN ORGANIZED HEALTH CARE EDUCATION/TRAINING PROGRAM

## 2023-10-01 PROCEDURE — 99900035 HC TECH TIME PER 15 MIN (STAT)

## 2023-10-01 PROCEDURE — 27000221 HC OXYGEN, UP TO 24 HOURS

## 2023-10-01 PROCEDURE — 94761 N-INVAS EAR/PLS OXIMETRY MLT: CPT

## 2023-10-01 PROCEDURE — 30200315 PPD INTRADERMAL TEST REV CODE 302: Performed by: STUDENT IN AN ORGANIZED HEALTH CARE EDUCATION/TRAINING PROGRAM

## 2023-10-01 PROCEDURE — 99900031 HC PATIENT EDUCATION (STAT)

## 2023-10-01 PROCEDURE — 25000003 PHARM REV CODE 250: Performed by: STUDENT IN AN ORGANIZED HEALTH CARE EDUCATION/TRAINING PROGRAM

## 2023-10-01 PROCEDURE — 25000242 PHARM REV CODE 250 ALT 637 W/ HCPCS: Performed by: STUDENT IN AN ORGANIZED HEALTH CARE EDUCATION/TRAINING PROGRAM

## 2023-10-01 PROCEDURE — 63600175 PHARM REV CODE 636 W HCPCS: Performed by: INTERNAL MEDICINE

## 2023-10-01 PROCEDURE — 36415 COLL VENOUS BLD VENIPUNCTURE: CPT | Performed by: STUDENT IN AN ORGANIZED HEALTH CARE EDUCATION/TRAINING PROGRAM

## 2023-10-01 PROCEDURE — 99233 PR SUBSEQUENT HOSPITAL CARE,LEVL III: ICD-10-PCS | Mod: ,,, | Performed by: STUDENT IN AN ORGANIZED HEALTH CARE EDUCATION/TRAINING PROGRAM

## 2023-10-01 PROCEDURE — 80053 COMPREHEN METABOLIC PANEL: CPT | Performed by: STUDENT IN AN ORGANIZED HEALTH CARE EDUCATION/TRAINING PROGRAM

## 2023-10-01 PROCEDURE — 86580 TB INTRADERMAL TEST: CPT | Performed by: STUDENT IN AN ORGANIZED HEALTH CARE EDUCATION/TRAINING PROGRAM

## 2023-10-01 PROCEDURE — 25000003 PHARM REV CODE 250: Performed by: INTERNAL MEDICINE

## 2023-10-01 PROCEDURE — 11000001 HC ACUTE MED/SURG PRIVATE ROOM

## 2023-10-01 PROCEDURE — A4216 STERILE WATER/SALINE, 10 ML: HCPCS | Performed by: STUDENT IN AN ORGANIZED HEALTH CARE EDUCATION/TRAINING PROGRAM

## 2023-10-01 PROCEDURE — 21400001 HC TELEMETRY ROOM

## 2023-10-01 PROCEDURE — 94660 CPAP INITIATION&MGMT: CPT

## 2023-10-01 PROCEDURE — 94640 AIRWAY INHALATION TREATMENT: CPT

## 2023-10-01 PROCEDURE — 85025 COMPLETE CBC W/AUTO DIFF WBC: CPT | Performed by: STUDENT IN AN ORGANIZED HEALTH CARE EDUCATION/TRAINING PROGRAM

## 2023-10-01 PROCEDURE — 99233 SBSQ HOSP IP/OBS HIGH 50: CPT | Mod: ,,, | Performed by: STUDENT IN AN ORGANIZED HEALTH CARE EDUCATION/TRAINING PROGRAM

## 2023-10-01 RX ADMIN — PANTOPRAZOLE SODIUM 40 MG: 40 TABLET, DELAYED RELEASE ORAL at 09:10

## 2023-10-01 RX ADMIN — LEVOTHYROXINE SODIUM 50 MCG: 25 TABLET ORAL at 05:10

## 2023-10-01 RX ADMIN — BUSPIRONE HYDROCHLORIDE 5 MG: 5 TABLET ORAL at 08:10

## 2023-10-01 RX ADMIN — FUROSEMIDE 20 MG: 20 TABLET ORAL at 09:10

## 2023-10-01 RX ADMIN — ACETYLCYSTEINE 2 ML: 200 SOLUTION ORAL; RESPIRATORY (INHALATION) at 07:10

## 2023-10-01 RX ADMIN — IPRATROPIUM BROMIDE AND ALBUTEROL SULFATE 3 ML: .5; 3 SOLUTION RESPIRATORY (INHALATION) at 07:10

## 2023-10-01 RX ADMIN — ISOSORBIDE MONONITRATE 30 MG: 30 TABLET, EXTENDED RELEASE ORAL at 09:10

## 2023-10-01 RX ADMIN — ACETYLCYSTEINE 2 ML: 200 SOLUTION ORAL; RESPIRATORY (INHALATION) at 08:10

## 2023-10-01 RX ADMIN — CEFTRIAXONE 1 G: 1 INJECTION, POWDER, FOR SOLUTION INTRAMUSCULAR; INTRAVENOUS at 12:10

## 2023-10-01 RX ADMIN — TUBERCULIN PURIFIED PROTEIN DERIVATIVE 5 UNITS: 5 INJECTION, SOLUTION INTRADERMAL at 12:10

## 2023-10-01 RX ADMIN — BUSPIRONE HYDROCHLORIDE 5 MG: 5 TABLET ORAL at 09:10

## 2023-10-01 RX ADMIN — DOXYCYCLINE HYCLATE 100 MG: 100 TABLET, COATED ORAL at 08:10

## 2023-10-01 RX ADMIN — ENOXAPARIN SODIUM 40 MG: 40 INJECTION SUBCUTANEOUS at 04:10

## 2023-10-01 RX ADMIN — IPRATROPIUM BROMIDE AND ALBUTEROL SULFATE 3 ML: .5; 3 SOLUTION RESPIRATORY (INHALATION) at 08:10

## 2023-10-01 RX ADMIN — BUDESONIDE 0.5 MG: 0.5 INHALANT RESPIRATORY (INHALATION) at 07:10

## 2023-10-01 RX ADMIN — MUPIROCIN: 20 OINTMENT TOPICAL at 08:10

## 2023-10-01 RX ADMIN — IPRATROPIUM BROMIDE AND ALBUTEROL SULFATE 3 ML: .5; 3 SOLUTION RESPIRATORY (INHALATION) at 01:10

## 2023-10-01 RX ADMIN — DOXYCYCLINE HYCLATE 100 MG: 100 TABLET, COATED ORAL at 09:10

## 2023-10-01 RX ADMIN — ASPIRIN 81 MG: 81 TABLET, COATED ORAL at 09:10

## 2023-10-01 RX ADMIN — SERTRALINE HYDROCHLORIDE 50 MG: 50 TABLET ORAL at 09:10

## 2023-10-01 RX ADMIN — BUDESONIDE 0.5 MG: 0.5 INHALANT RESPIRATORY (INHALATION) at 08:10

## 2023-10-01 RX ADMIN — MUPIROCIN: 20 OINTMENT TOPICAL at 09:10

## 2023-10-01 NOTE — ASSESSMENT & PLAN NOTE
Holding anticoagulation. Trend Hg/HCT and f/u urine culture results.   Lab Results   Component Value Date    HGB 11.9 (L) 10/01/2023    HGB 12.2 09/30/2023    HGB 13.0 09/29/2023

## 2023-10-01 NOTE — PLAN OF CARE
Poc reviewed with patient, no acute events. Continuous Bipap remained throughout shift. Blood glucose stable. Pt denies needs at this time.     Problem: Adult Inpatient Plan of Care  Goal: Plan of Care Review  Outcome: Ongoing, Progressing  Goal: Patient-Specific Goal (Individualized)  Outcome: Ongoing, Progressing  Goal: Absence of Hospital-Acquired Illness or Injury  Outcome: Ongoing, Progressing  Goal: Optimal Comfort and Wellbeing  Outcome: Ongoing, Progressing  Goal: Readiness for Transition of Care  Outcome: Ongoing, Progressing     Problem: Bariatric Environmental Safety  Goal: Safety Maintained with Care  Outcome: Ongoing, Progressing     Problem: Diabetes Comorbidity  Goal: Blood Glucose Level Within Targeted Range  Outcome: Ongoing, Progressing     Problem: Skin Injury Risk Increased  Goal: Skin Health and Integrity  Outcome: Ongoing, Progressing

## 2023-10-01 NOTE — PLAN OF CARE
Discussed discharge planning w/patient. Patient AAO x 4 sitting at EOB with bi-pap on. Discussed SNF placement as there is some family concerns and Dr. Mott's recommendation are SNF. Patient in agreement and states she is willing to go to [facility].

## 2023-10-01 NOTE — ASSESSMENT & PLAN NOTE
Patient with Hypercapnic Respiratory failure which is Chronic.  she is on home oxygen at 4 LPM. Supplemental oxygen was provided and noted- Oxygen Concentration (%):  [36-40] 40    Signs/symptoms of respiratory failure include- tachypnea, increased work of breathing, respiratory distress and lethargy. Contributing diagnoses includes - ARDS, CHF and COPD Labs and images were reviewed. Patient Has recent ABG, which has been reviewed. Will treat underlying causes and adjust management of respiratory failure as follows- Breathing treatments, IV steroids, IV antibiotics, supplemental oxygen, BIPAP

## 2023-10-01 NOTE — ASSESSMENT & PLAN NOTE
Chronic. Patient requires oxygen supplementation at home, O2 4L via nasal cannula.   See management above.   - continue NIV at bedtime

## 2023-10-01 NOTE — SUBJECTIVE & OBJECTIVE
Interval History: Patient seen and examined.     Review of Systems   Constitutional:  Positive for activity change. Negative for appetite change, fatigue and fever.   HENT:  Negative for postnasal drip, sinus pain and sore throat.    Eyes:  Negative for pain and visual disturbance.   Respiratory:  Negative for cough, chest tightness, shortness of breath and stridor.    Gastrointestinal:  Negative for abdominal pain, constipation, diarrhea, nausea and vomiting.   Neurological:  Negative for light-headedness and headaches.   Psychiatric/Behavioral:  Negative for agitation, behavioral problems, confusion and decreased concentration. The patient is not nervous/anxious and is not hyperactive.      Objective:     Vital Signs (Most Recent):  Temp: 98.7 °F (37.1 °C) (10/01/23 1139)  Pulse: 61 (10/01/23 1455)  Resp: (!) 30 (10/01/23 1354)  BP: (!) 164/85 (10/01/23 1139)  SpO2: 97 % (10/01/23 1455) Vital Signs (24h Range):  Temp:  [96.4 °F (35.8 °C)-98.7 °F (37.1 °C)] 98.7 °F (37.1 °C)  Pulse:  [61-80] 61  Resp:  [18-39] 30  SpO2:  [91 %-100 %] 97 %  BP: (122-164)/(73-85) 164/85     Weight: 121.6 kg (268 lb)  Body mass index is 46 kg/m².    Intake/Output Summary (Last 24 hours) at 10/1/2023 1504  Last data filed at 10/1/2023 0518  Gross per 24 hour   Intake 236 ml   Output 800 ml   Net -564 ml         Physical Exam  Vitals and nursing note reviewed.   Constitutional:       General: She is not in acute distress.     Appearance: She is obese. She is ill-appearing. She is not toxic-appearing or diaphoretic.   HENT:      Mouth/Throat:      Mouth: Mucous membranes are dry.      Pharynx: Oropharynx is clear.      Comments: Lips dry  Cardiovascular:      Pulses: Normal pulses.      Heart sounds: Normal heart sounds.   Pulmonary:      Breath sounds: Wheezing and rales present. No rhonchi.      Comments: BiPAP mask secured  Abdominal:      General: There is no distension.      Tenderness: There is no abdominal tenderness. There is no  guarding.      Comments: Limited secondary to body habitus   Musculoskeletal:      Cervical back: Neck supple.      Right lower leg: No edema.      Left lower leg: No edema.   Skin:     General: Skin is warm and dry.      Comments: Thickened skin in lower extremities, peeling bilaterally   Neurological:      Mental Status: She is alert and oriented to person, place, and time. Mental status is at baseline.   Psychiatric:         Mood and Affect: Mood normal.         Behavior: Behavior normal.             Significant Labs: All pertinent labs within the past 24 hours have been reviewed.  A1C:   Recent Labs   Lab 09/09/23  0618   HGBA1C 5.3  5.3     Bilirubin:   Recent Labs   Lab 09/08/23  1941 09/09/23  0618 09/29/23  1238 09/30/23  0348 10/01/23  0520   BILITOT 0.4 0.4 0.4 0.3 0.5     BMP:   Recent Labs   Lab 10/01/23  0520         K 4.6      CO2 39*   BUN 16   CREATININE 0.9   CALCIUM 9.9     CBC:   Recent Labs   Lab 09/30/23  0348 10/01/23  0520   WBC 6.38 7.16   HGB 12.2 11.9*   HCT 35.1* 36.6*   * 143*     POCT Glucose:   Recent Labs   Lab 09/30/23  1118 09/30/23  1528 09/30/23  2212   POCTGLUCOSE 86 92 96     TSH:   Recent Labs   Lab 09/29/23  1238   TSH 0.657     Significant Imaging: I have reviewed all pertinent imaging results/findings within the past 24 hours.

## 2023-10-01 NOTE — ASSESSMENT & PLAN NOTE
Rocephin x1 given in ER. Patient last treated rocephin during last hospitalization early this month.   UA with no bacteria, +pyuria and hematuria  Urine culture positive for E coli   - D2 antibiotics with rocephin

## 2023-10-01 NOTE — ASSESSMENT & PLAN NOTE
Likely secondary to acute respiratory failure with CO2 retention. CT head unremarkable, no acute intracranial findings.   Responsive to voice with BIPAP use, ABG showing last pCO2 93.  Continue to monitor, follow up neuro checks.   9/30 More alert  10/1 Baseline now, speaking and tolerating PO intake

## 2023-10-01 NOTE — PROGRESS NOTES
[facility] Medicine  Progress Note    Patient Name: Carmen Tan  MRN: [id number]  Patient Class: IP- Inpatient   Admission Date: 9/29/2023  Length of Stay: 2 days  Attending Physician: Gunnar Mott DO  Primary Care Provider: Lucian Barry MD        Subjective:     Principal Problem:Acute respiratory failure with hypoxia and hypercarbia        HPI:  Chief Complaint   Patient presents with    Altered Mental Status       EMS reports, pt from home with c/o lethargy and AMS due Home Health taking her CPAP away from her, pt presents with shallow breathing and odorous smell of urine.  18G Left AC, pt on 4L O2 24x7      Patient presents from home via EMS for altered mental status.  Per EMS the patient lives at home with family was recently in a nursing facility and has been at home for approximately 1 week.  When she awoke this morning patient was altered decreased level of consciousness.  Patient has a history of COPD and is supposed to be wearing a CPAP but for unknown reasons it was taken away by home health.  She is 4 L oxygen dependent per EMS. Limited information due to patient's mental status.     73 year old female with chronic hypercapnic and hypoxic respiratory failure on 4 L nasal cannula, diabetes mellitus type 2, diastolic heart failure, history of pulmonary embolism and continued tobacco abuse recently discharged from inpatient hospitalization for cardiac work up after presenting with shortness of breath, leg swelling, CHF exacerbation and treated for urinary tract infection.     ED course:   At time of arrival, temp 95.4F, pulse 92, RR 34, . 77 mmHg, SpO2 79 % on room air. Placed on BiPAP, initial ABG with pH7.1, CO2 greater than 105 with pO2 of 79.9. Patient reported to become more responsive but still somnolent with BiPAP on. Repeat ABG improved to pH 7.2, pCO2 93, pO2 65.8 HCO3 29.3, FiO2 50%. CT Chest findings of nodular thyroid, enlarged pulmonary artery,  gallstones, right basilar and middle lobe mild infiltrate with mucous plugging throughout right lower lobe and minimal left basilar discoid atelectasis. Subtle infiltrate in left upper lobe along fissure. Patient received Rocephin x1, solumedrol. Less somnolent with continuous BiPAP. Patient to be admitted for respiratory failure secondary to pneumonia with continued IV antibiotics, breathing treatments, supplementation oxygen and consult cardiology for medication optimization.        Overview/Hospital Course:  9/30 Overnight on BiPAP I/E 16/7cm H2O. Patient more alert, following commands appropriately. Head nods for affirmation. Continue with scheduled breathing treatments, including mucolytics, aggressive pulmonary hygiene care. Follow up PT and OT.   10/1 BiPAP QHS maintaining SpO2 >98%. Patient without oxygen supplementation desaturates into 80% ile. Patient educated on importance of keeping oxygen supplementation while eating. Urine culture positive for Ecoli >100K, currently covering for upper respiratory infection with Rocephin and doxycycline. Agreeable to discharge to nursing facility. Consult CM in place, TB screening ordered.       Interval History: Patient seen and examined.     Review of Systems   Constitutional:  Positive for activity change. Negative for appetite change, fatigue and fever.   HENT:  Negative for postnasal drip, sinus pain and sore throat.    Eyes:  Negative for pain and visual disturbance.   Respiratory:  Negative for cough, chest tightness, shortness of breath and stridor.    Gastrointestinal:  Negative for abdominal pain, constipation, diarrhea, nausea and vomiting.   Neurological:  Negative for light-headedness and headaches.   Psychiatric/Behavioral:  Negative for agitation, behavioral problems, confusion and decreased concentration. The patient is not nervous/anxious and is not hyperactive.      Objective:     Vital Signs (Most Recent):  Temp: 98.7 °F (37.1 °C) (10/01/23  1139)  Pulse: 61 (10/01/23 1455)  Resp: (!) 30 (10/01/23 1354)  BP: (!) 164/85 (10/01/23 1139)  SpO2: 97 % (10/01/23 1455) Vital Signs (24h Range):  Temp:  [96.4 °F (35.8 °C)-98.7 °F (37.1 °C)] 98.7 °F (37.1 °C)  Pulse:  [61-80] 61  Resp:  [18-39] 30  SpO2:  [91 %-100 %] 97 %  BP: (122-164)/(73-85) 164/85     Weight: 121.6 kg (268 lb)  Body mass index is 46 kg/m².    Intake/Output Summary (Last 24 hours) at 10/1/2023 1504  Last data filed at 10/1/2023 0518  Gross per 24 hour   Intake 236 ml   Output 800 ml   Net -564 ml         Physical Exam  Vitals and nursing note reviewed.   Constitutional:       General: She is not in acute distress.     Appearance: She is obese. She is ill-appearing. She is not toxic-appearing or diaphoretic.   HENT:      Mouth/Throat:      Mouth: Mucous membranes are dry.      Pharynx: Oropharynx is clear.      Comments: Lips dry  Cardiovascular:      Pulses: Normal pulses.      Heart sounds: Normal heart sounds.   Pulmonary:      Breath sounds: Wheezing and rales present. No rhonchi.      Comments: BiPAP mask secured  Abdominal:      General: There is no distension.      Tenderness: There is no abdominal tenderness. There is no guarding.      Comments: Limited secondary to body habitus   Musculoskeletal:      Cervical back: Neck supple.      Right lower leg: No edema.      Left lower leg: No edema.   Skin:     General: Skin is warm and dry.      Comments: Thickened skin in lower extremities, peeling bilaterally   Neurological:      Mental Status: She is alert and oriented to person, place, and time. Mental status is at baseline.   Psychiatric:         Mood and Affect: Mood normal.         Behavior: Behavior normal.             Significant Labs: All pertinent labs within the past 24 hours have been reviewed.  A1C:   Recent Labs   Lab 09/09/23  0618   HGBA1C 5.3  5.3     Bilirubin:   Recent Labs   Lab 09/08/23  1941 09/09/23  0618 09/29/23  1238 09/30/23  0348 10/01/23  0520   BILITOT 0.4 0.4  0.4 0.3 0.5     BMP:   Recent Labs   Lab 10/01/23  0520         K 4.6      CO2 39*   BUN 16   CREATININE 0.9   CALCIUM 9.9     CBC:   Recent Labs   Lab 09/30/23  0348 10/01/23  0520   WBC 6.38 7.16   HGB 12.2 11.9*   HCT 35.1* 36.6*   * 143*     POCT Glucose:   Recent Labs   Lab 09/30/23  1118 09/30/23  1528 09/30/23  2212   POCTGLUCOSE 86 92 96     TSH:   Recent Labs   Lab 09/29/23  1238   TSH 0.657     Significant Imaging: I have reviewed all pertinent imaging results/findings within the past 24 hours.      Assessment/Plan:      * Acute respiratory failure with hypoxia and hypercarbia  Patient with Hypercapnic Respiratory failure which is Chronic.  she is on home oxygen at 4 LPM. Supplemental oxygen was provided and noted- Oxygen Concentration (%):  [36-40] 40    Signs/symptoms of respiratory failure include- tachypnea, increased work of breathing, respiratory distress and lethargy. Contributing diagnoses includes - ARDS, CHF and COPD Labs and images were reviewed. Patient Has recent ABG, which has been reviewed. Will treat underlying causes and adjust management of respiratory failure as follows- Breathing treatments, IV steroids, IV antibiotics, supplemental oxygen, BIPAP      Gallstones  Findings on CT chest.   Lab Results   Component Value Date    ALT 16 10/01/2023    AST 9 (L) 10/01/2023    ALKPHOS 93 10/01/2023    BILITOT 0.5 10/01/2023   Asymptomatic. Continue to monitor.       Multiple thyroid nodules  Findings on CT chest with contrast.   Lab Results   Component Value Date    TSH 0.657 09/29/2023    FREET4 1.09 09/09/2023   Euthyroid. Follow up endocrinology outpatient for continued monitoring.         Chronic diastolic congestive heart failure  Patient is identified as having Diastolic (HFpEF) heart failure that is Chronic. CHF is currently uncontrolled due to Dyspnea not returned to baseline after x1 doses of IV diuretic and Rales/crackles on pulmonary exam. Latest ECHO  performed and demonstrates- Results for orders placed during the hospital encounter of 09/09/23    Echo  Interpretation Summary    Left Ventricle: The left ventricle is normal in size. Mildly increased wall thickness. Normal wall motion. There is mildly reduced systolic function with a visually estimated ejection fraction of 40 - 45%. There is normal diastolic function.    Right Ventricle: Normal right ventricular cavity size. Wall thickness is normal. Right ventricle wall motion  is normal. Systolic function is normal.    Aortic Valve: There is mild to moderate aortic regurgitation with a centrally directed jet.    Mitral Valve: There is moderate regurgitation.    Tricuspid Valve: There is moderate regurgitation with a centrally directed jet.    Pulmonic Valve: There is mild to moderate regurgitation.    Pulmonary Artery: The estimated pulmonary artery systolic pressure is 47 mmHg.    IVC/SVC: Normal venous pressure at 3 mmHg.    Continue Beta Blocker, Entresto, Aldactone and Nitrate/Vasodilator and monitor clinical status closely. Monitor on telemetry. Patient is on CHF pathway.  Monitor strict Is&Os and daily weights.  Place on fluid restriction of 1.5 L. Cardiology has been consulted. Continue to stress to patient importance of self efficacy and  on diet for CHF. Last BNP reviewed- and noted below No results for input(s).  Monitor I/Os, daily weight  Continuous telemetry and pulse oximetry    Hypercapnia  Chronic. Patient requires oxygen supplementation at home, O2 4L via nasal cannula.   See management above.   - continue NIV at bedtime    Urinary tract infection with hematuria  Rocephin x1 given in ER. Patient last treated rocephin during last hospitalization early this month.   UA with no bacteria, +pyuria and hematuria  Urine culture positive for E coli   - D2 antibiotics with rocephin       AMS (altered mental status)  Likely secondary to acute respiratory failure with CO2 retention. CT head  unremarkable, no acute intracranial findings.   Responsive to voice with BIPAP use, ABG showing last pCO2 93.  Continue to monitor, follow up neuro checks.   9/30 More alert  10/1 Baseline now, speaking and tolerating PO intake    Gross hematuria  Holding anticoagulation. Trend Hg/HCT and f/u urine culture results.   Lab Results   Component Value Date    HGB 11.9 (L) 10/01/2023    HGB 12.2 09/30/2023    HGB 13.0 09/29/2023         Severe obesity (BMI >= 40)  Body mass index is 46 kg/m². Morbid obesity complicates all aspects of disease management from diagnostic modalities to treatment. Weight loss encouraged and health benefits explained to patient.           VTE Risk Mitigation (From admission, onward)         Ordered     enoxaparin injection 40 mg  Every 24 hours         09/30/23 1421     Place sequential compression device  Until discontinued         09/29/23 2107     IP VTE HIGH RISK PATIENT  Once         09/29/23 1625     Place sequential compression device  Until discontinued         09/29/23 1625                Discharge Planning   TIGRE:      Code Status: Full Code   Is the patient medically ready for discharge?:     Reason for patient still in hospital (select all that apply): Patient trending condition, Treatment, Consult recommendations, PT / OT recommendations and Pending disposition  Discharge Plan A: Home Health                  Gunnar Mott DO  Department of Hospital Medicine   [facility]

## 2023-10-01 NOTE — ASSESSMENT & PLAN NOTE
Findings on CT chest.   Lab Results   Component Value Date    ALT 16 10/01/2023    AST 9 (L) 10/01/2023    ALKPHOS 93 10/01/2023    BILITOT 0.5 10/01/2023   Asymptomatic. Continue to monitor.

## 2023-10-02 LAB
ALBUMIN SERPL BCP-MCNC: 2.3 G/DL (ref 3.5–5.2)
ALP SERPL-CCNC: 86 U/L (ref 55–135)
ALT SERPL W/O P-5'-P-CCNC: 14 U/L (ref 10–44)
ANION GAP SERPL CALC-SCNC: -3 MMOL/L (ref 3–11)
AST SERPL-CCNC: 10 U/L (ref 10–40)
BACTERIA UR CULT: ABNORMAL
BASOPHILS # BLD AUTO: 0.01 K/UL (ref 0–0.2)
BASOPHILS NFR BLD: 0.2 % (ref 0–1.9)
BILIRUB SERPL-MCNC: 0.6 MG/DL (ref 0.1–1)
BUN SERPL-MCNC: 13 MG/DL (ref 8–23)
CALCIUM SERPL-MCNC: 9.8 MG/DL (ref 8.7–10.5)
CHLORIDE SERPL-SCNC: 105 MMOL/L (ref 95–110)
CO2 SERPL-SCNC: 41 MMOL/L (ref 23–29)
CREAT SERPL-MCNC: 0.9 MG/DL (ref 0.5–1.4)
DIFFERENTIAL METHOD: ABNORMAL
EOSINOPHIL # BLD AUTO: 0.2 K/UL (ref 0–0.5)
EOSINOPHIL NFR BLD: 2.8 % (ref 0–8)
ERYTHROCYTE [DISTWIDTH] IN BLOOD BY AUTOMATED COUNT: 19.5 % (ref 11.5–14.5)
EST. GFR  (NO RACE VARIABLE): >60 ML/MIN/1.73 M^2
GLUCOSE SERPL-MCNC: 92 MG/DL (ref 70–110)
HCT VFR BLD AUTO: 30.9 % (ref 37–48.5)
HGB BLD-MCNC: 11.2 G/DL (ref 12–16)
IMM GRANULOCYTES # BLD AUTO: 0.01 K/UL (ref 0–0.04)
IMM GRANULOCYTES NFR BLD AUTO: 0.2 % (ref 0–0.5)
LYMPHOCYTES # BLD AUTO: 1 K/UL (ref 1–4.8)
LYMPHOCYTES NFR BLD: 18 % (ref 18–48)
MCH RBC QN AUTO: 37.2 PG (ref 27–31)
MCHC RBC AUTO-ENTMCNC: 36.2 G/DL (ref 32–36)
MCV RBC AUTO: 103 FL (ref 82–98)
MONOCYTES # BLD AUTO: 0.5 K/UL (ref 0.3–1)
MONOCYTES NFR BLD: 9.1 % (ref 4–15)
NEUTROPHILS # BLD AUTO: 4 K/UL (ref 1.8–7.7)
NEUTROPHILS NFR BLD: 69.7 % (ref 38–73)
NRBC BLD-RTO: 0 /100 WBC
PLATELET # BLD AUTO: 152 K/UL (ref 150–450)
PMV BLD AUTO: 9.2 FL (ref 9.2–12.9)
POCT GLUCOSE: 145 MG/DL (ref 70–110)
POCT GLUCOSE: 86 MG/DL (ref 70–110)
POTASSIUM SERPL-SCNC: 4.4 MMOL/L (ref 3.5–5.1)
PROT SERPL-MCNC: 5.6 G/DL (ref 6–8.4)
RBC # BLD AUTO: 3.01 M/UL (ref 4–5.4)
SODIUM SERPL-SCNC: 143 MMOL/L (ref 136–145)
WBC # BLD AUTO: 5.73 K/UL (ref 3.9–12.7)

## 2023-10-02 PROCEDURE — 63600175 PHARM REV CODE 636 W HCPCS: Performed by: STUDENT IN AN ORGANIZED HEALTH CARE EDUCATION/TRAINING PROGRAM

## 2023-10-02 PROCEDURE — 97166 OT EVAL MOD COMPLEX 45 MIN: CPT

## 2023-10-02 PROCEDURE — 25000242 PHARM REV CODE 250 ALT 637 W/ HCPCS: Performed by: STUDENT IN AN ORGANIZED HEALTH CARE EDUCATION/TRAINING PROGRAM

## 2023-10-02 PROCEDURE — 21400001 HC TELEMETRY ROOM

## 2023-10-02 PROCEDURE — 99233 PR SUBSEQUENT HOSPITAL CARE,LEVL III: ICD-10-PCS | Mod: ,,, | Performed by: STUDENT IN AN ORGANIZED HEALTH CARE EDUCATION/TRAINING PROGRAM

## 2023-10-02 PROCEDURE — 80053 COMPREHEN METABOLIC PANEL: CPT | Performed by: STUDENT IN AN ORGANIZED HEALTH CARE EDUCATION/TRAINING PROGRAM

## 2023-10-02 PROCEDURE — 94761 N-INVAS EAR/PLS OXIMETRY MLT: CPT

## 2023-10-02 PROCEDURE — 27000207 HC ISOLATION

## 2023-10-02 PROCEDURE — 94660 CPAP INITIATION&MGMT: CPT

## 2023-10-02 PROCEDURE — 99900031 HC PATIENT EDUCATION (STAT)

## 2023-10-02 PROCEDURE — 99233 SBSQ HOSP IP/OBS HIGH 50: CPT | Mod: ,,, | Performed by: STUDENT IN AN ORGANIZED HEALTH CARE EDUCATION/TRAINING PROGRAM

## 2023-10-02 PROCEDURE — 25000003 PHARM REV CODE 250: Performed by: INTERNAL MEDICINE

## 2023-10-02 PROCEDURE — 85025 COMPLETE CBC W/AUTO DIFF WBC: CPT | Performed by: STUDENT IN AN ORGANIZED HEALTH CARE EDUCATION/TRAINING PROGRAM

## 2023-10-02 PROCEDURE — 94640 AIRWAY INHALATION TREATMENT: CPT

## 2023-10-02 PROCEDURE — 36415 COLL VENOUS BLD VENIPUNCTURE: CPT | Performed by: STUDENT IN AN ORGANIZED HEALTH CARE EDUCATION/TRAINING PROGRAM

## 2023-10-02 PROCEDURE — 63600175 PHARM REV CODE 636 W HCPCS: Performed by: INTERNAL MEDICINE

## 2023-10-02 PROCEDURE — 11000001 HC ACUTE MED/SURG PRIVATE ROOM

## 2023-10-02 PROCEDURE — 27000221 HC OXYGEN, UP TO 24 HOURS

## 2023-10-02 PROCEDURE — 25000003 PHARM REV CODE 250: Performed by: STUDENT IN AN ORGANIZED HEALTH CARE EDUCATION/TRAINING PROGRAM

## 2023-10-02 PROCEDURE — 99900035 HC TECH TIME PER 15 MIN (STAT)

## 2023-10-02 PROCEDURE — 97162 PT EVAL MOD COMPLEX 30 MIN: CPT

## 2023-10-02 RX ORDER — VALSARTAN 40 MG/1
40 TABLET ORAL DAILY
Status: DISCONTINUED | OUTPATIENT
Start: 2023-10-02 | End: 2023-10-04 | Stop reason: HOSPADM

## 2023-10-02 RX ORDER — ENOXAPARIN SODIUM 100 MG/ML
40 INJECTION SUBCUTANEOUS EVERY 12 HOURS
Status: DISCONTINUED | OUTPATIENT
Start: 2023-10-02 | End: 2023-10-04 | Stop reason: HOSPADM

## 2023-10-02 RX ADMIN — IPRATROPIUM BROMIDE AND ALBUTEROL SULFATE 3 ML: .5; 3 SOLUTION RESPIRATORY (INHALATION) at 07:10

## 2023-10-02 RX ADMIN — LEVOTHYROXINE SODIUM 50 MCG: 25 TABLET ORAL at 05:10

## 2023-10-02 RX ADMIN — DOXYCYCLINE HYCLATE 100 MG: 100 TABLET, COATED ORAL at 10:10

## 2023-10-02 RX ADMIN — FUROSEMIDE 20 MG: 20 TABLET ORAL at 09:10

## 2023-10-02 RX ADMIN — ASPIRIN 81 MG: 81 TABLET, COATED ORAL at 09:10

## 2023-10-02 RX ADMIN — MUPIROCIN: 20 OINTMENT TOPICAL at 10:10

## 2023-10-02 RX ADMIN — ISOSORBIDE MONONITRATE 30 MG: 30 TABLET, EXTENDED RELEASE ORAL at 09:10

## 2023-10-02 RX ADMIN — ENOXAPARIN SODIUM 40 MG: 40 INJECTION SUBCUTANEOUS at 10:10

## 2023-10-02 RX ADMIN — ENOXAPARIN SODIUM 40 MG: 40 INJECTION SUBCUTANEOUS at 09:10

## 2023-10-02 RX ADMIN — BUSPIRONE HYDROCHLORIDE 5 MG: 5 TABLET ORAL at 10:10

## 2023-10-02 RX ADMIN — SERTRALINE HYDROCHLORIDE 50 MG: 50 TABLET ORAL at 09:10

## 2023-10-02 RX ADMIN — BUDESONIDE 0.5 MG: 0.5 INHALANT RESPIRATORY (INHALATION) at 07:10

## 2023-10-02 RX ADMIN — BUSPIRONE HYDROCHLORIDE 5 MG: 5 TABLET ORAL at 09:10

## 2023-10-02 RX ADMIN — ACETYLCYSTEINE 2 ML: 200 SOLUTION ORAL; RESPIRATORY (INHALATION) at 07:10

## 2023-10-02 RX ADMIN — DOXYCYCLINE HYCLATE 100 MG: 100 TABLET, COATED ORAL at 09:10

## 2023-10-02 RX ADMIN — PANTOPRAZOLE SODIUM 40 MG: 40 TABLET, DELAYED RELEASE ORAL at 09:10

## 2023-10-02 RX ADMIN — IPRATROPIUM BROMIDE AND ALBUTEROL SULFATE 3 ML: .5; 3 SOLUTION RESPIRATORY (INHALATION) at 01:10

## 2023-10-02 RX ADMIN — CEFTRIAXONE 1 G: 1 INJECTION, POWDER, FOR SOLUTION INTRAMUSCULAR; INTRAVENOUS at 01:10

## 2023-10-02 RX ADMIN — MUPIROCIN: 20 OINTMENT TOPICAL at 09:10

## 2023-10-02 NOTE — PLAN OF CARE
Plan of care reviewed and ongoing. Continuous BiPAP maintained throughout shift. Medication administered per MAR. Glucose monitored and WNL. Pt denies needs or discomforts.     Problem: Adult Inpatient Plan of Care  Goal: Plan of Care Review  Outcome: Ongoing, Progressing  Goal: Patient-Specific Goal (Individualized)  Outcome: Ongoing, Progressing  Goal: Absence of Hospital-Acquired Illness or Injury  Outcome: Ongoing, Progressing  Goal: Optimal Comfort and Wellbeing  Outcome: Ongoing, Progressing  Goal: Readiness for Transition of Care  Outcome: Ongoing, Progressing     Problem: Bariatric Environmental Safety  Goal: Safety Maintained with Care  Outcome: Ongoing, Progressing     Problem: Diabetes Comorbidity  Goal: Blood Glucose Level Within Targeted Range  Outcome: Ongoing, Progressing     Problem: Skin Injury Risk Increased  Goal: Skin Health and Integrity  Outcome: Ongoing, Progressing

## 2023-10-02 NOTE — PLAN OF CARE
10/02/23 0848   Post-Acute Status   Post-Acute Authorization Placement   Post-Acute Placement Status Referrals Sent   Coverage [employer] [facility] MEDICARE - HUMAN MEDICARE O   Discharge Delays None known at this time   Discharge Plan   Discharge Plan A Skilled Nursing Facility   Discharge Plan B New Nursing Home placement - [facility] care facility     New referral was sent to [facility]. [facility] and she is aware of the referral.

## 2023-10-02 NOTE — PROGRESS NOTES
Pharmacist Renal Dose Adjustment Note    Carmen Tan is a 73 y.o. female being treated with the medication Enoxaparin    Patient Data:    Vital Signs (Most Recent):  Temp: 97.4 °F (36.3 °C) (10/02/23 0735)  Pulse: 70 (10/02/23 0739)  Resp: (!) 26 (10/02/23 0739)  BP: 134/73 (10/02/23 0735)  SpO2: (!) 93 % (10/02/23 0746) Vital Signs (72h Range):  Temp:  [95.4 °F (35.2 °C)-98.9 °F (37.2 °C)]   Pulse:  []   Resp:  [14-39]   BP: ()/(54-98)   SpO2:  [79 %-100 %]      Recent Labs   Lab 09/30/23  0348 10/01/23  0520 10/02/23  0539   CREATININE 1.0 0.9 0.9     Serum creatinine: 0.9 mg/dL 10/02/23 0539  Estimated creatinine clearance: 72.7 mL/min    Medication: Enoxaparin dose:  40 mg frequency  QD will be changed to medication: Enoxaparin dose: 40 mg frequency: Q12H due to BMI > 40    Pharmacist's Name: Wilmer Paz  Pharmacist's Extension: [phone]

## 2023-10-02 NOTE — PT/OT/SLP EVAL
Physical Therapy Evaluation     Patient Name: Carmen Tan   MRN: [id number]  Recent Surgery: * No surgery found *      Recommendations:     Discharge Recommendations: nursing facility, skilled   Discharge Equipment Recommendations: none   Barriers to discharge: Decreased caregiver support and Ongoing medical treatment    Assessment:     Carmen Tan is a 73 y.o. female admitted with a medical diagnosis of Acute respiratory failure with hypoxia and hypercarbia. She presents with the following impairments/functional limitations: weakness, gait instability, decreased ROM, impaired cardiopulmonary response to activity, impaired endurance, impaired balance, decreased lower extremity function, impaired joint extensibility, decreased safety awareness, impaired muscle length, impaired self care skills, impaired functional mobility, decreased coordination. Patient was recently discharge from another [facility] facility , re-admitted to this hospital due to UTI and respiratory issues.  Daughter reports that they do not have a BI-PAP at home. At the time of evaluation, patient is on BI-PAP and can not be weaned per attending nurse Teo Antonio. Patient required minimal assistance of 1 person for supine <> sit, contact guard  assistance with sit <> stand using a RW, ambulated ~12 feet with rolling walker and BI-PAP, distance ambulated is limited by BI-PAP machine.  Patient was able to transfer to the bedside commode with minimal assistance, had a continent bowel and bladder episode on the bedside commode, moderate assistance with toileting.  We will continue to work with patient to increase functional strength and endurance. Awaiting  nursing home placement.     Rehab Prognosis: Good and Fair; patient would benefit from acute PT services to address these deficits and reach maximum level of function.    Plan:     During this hospitalization, patient to be seen 5 x/week to address the above listed problems via gait  "training, therapeutic activities, therapeutic exercises, neuromuscular re-education    Plan of Care Expires: 10/09/23    Subjective     Chief Complaint: "I just got out of [facility]."  Patient Comments/Goals:   "They want me to go to a nursing home."  Pain/Comfort:  Pain Rating 1: 0/10  Pain Rating Post-Intervention 1: 0/10    Social History:  Living Environment: Patient lives with their cousin and son in a mobile home with  has a ramp.  Prior Level of Function: Prior to admission, patient ambulated household distances using rolling walker  Equipment Used at Home: rollator, oxygen  DME owned (not currently used): none  Assistance Upon Discharge: facility staff at the nursing home     Objective:     Communicated with nurse and patient  and daughter  prior to session. Patient found HOB elevated with peripheral IV, PureWick, BIPAP upon PT entry to room.    General Precautions: Standard, fall   Orthopedic Precautions: N/A   Braces: N/A    Respiratory Status:  BI-PAP     Exams:  Cognition: Patient is oriented to Person, Place, Time, Situation  RLE ROM: WFL  RLE Strength: WFL  LLE ROM: WFL  LLE Strength: WFL  Gross Motor Coordination: WFL  Postural Exam: Patient presented with the following abnormalities:    -       Rounded shoulders  Skin Integrity/Edema:     -       Edema: Moderate on both ankles     Functional Mobility:  Gait belt applied - Yes  Bed Mobility  Rolling Left: minimum assistance  Rolling Right: minimum assistance  Scooting: minimum assistance  Supine to Sit: minimum assistance for LE management and trunk management  Sit to Supine: minimum assistance for LE management and trunk management  Transfers  Sit to Stand: contact guard assistance with rolling walker and with cues for hand placement and foot placement  Toilet Transfer: contact guard assistance with rolling walker and with cues for hand placement and foot placement using Step Transfer  Gait  Patient ambulated 12 feet  with rolling walker and " minimum assistance. Patient demonstrates decreased step length and decreased david. .. portable Supplemental O2 BI-PAP  utilized.  Balance  Sitting: modified independence  Standing: contact guard assistance      Therapeutic Activities and Exercises:   Patient educated on role of acute care PT and PT POC, safety while in hospital including calling nurse for mobility, and call light usage  Patient educated about importance of OOB mobility and remaining up in chair most of the day.  Toileting and toilet transfers     AM-PAC 6 CLICK MOBILITY  Total Score:16    Patient left HOB elevated with all lines intact, call button in reach, RN notified, and bed alarm on.    GOALS:   Multidisciplinary Problems       Physical Therapy Goals          Problem: Physical Therapy    Goal Priority Disciplines Outcome Goal Variances Interventions   Physical Therapy Goal     PT, PT/OT Ongoing, Progressing     Description: Goals to be met by: 10/9/2023    Patient will increase functional independence with mobility by performin. Supine to sit with Modified Independent.  2. Sit to supine with Modified Independent.  3. Bed to chair transfer with Supervision or Set-up Assistance with proper A.D.  using Step Transfer technique.  4. Sit to Stand with Supervision or Set-up Assistance with proper A.D. .  5. Gait  x 50  feet with Standby Assistance with proper A.D. .  6. Lower extremity exercise program x10 reps.                          History:     Past Medical History:   Diagnosis Date    Abdominal hernia     Bacteremia 2023    CHF (congestive heart failure)     COPD (chronic obstructive pulmonary disease)     Diabetes mellitus, type 2 2022    GERD (gastroesophageal reflux disease)     History of home oxygen therapy     Hypertension     Hypertensive emergency 3/30/2023    Migraine headache     Pulmonary embolism 2017    Small bowel obstruction     Thyroid disease        Past Surgical History:   Procedure Laterality Date     COLONOSCOPY      HYSTERECTOMY      INNER EAR SURGERY      UPPER GASTROINTESTINAL ENDOSCOPY         Time Tracking:     PT Received On: 10/02/23  PT Start Time: 0830  PT Stop Time: 0900  PT Total Time (min): 30 min     Billable Minutes: Evaluation moderate complexity     10/2/2023

## 2023-10-02 NOTE — ASSESSMENT & PLAN NOTE
Rocephin x1 given in ER. Patient last treated rocephin during last hospitalization early this month.   UA with no bacteria, +pyuria and hematuria  Urine culture positive for E coli   - D3 antibiotics with rocephin and doxycycline

## 2023-10-02 NOTE — ASSESSMENT & PLAN NOTE
Findings on CT chest.   Lab Results   Component Value Date    ALT 14 10/02/2023    AST 10 10/02/2023    ALKPHOS 86 10/02/2023    BILITOT 0.6 10/02/2023   Asymptomatic. Continue to monitor.

## 2023-10-02 NOTE — ASSESSMENT & PLAN NOTE
Stable Hg/HCT.  Lab Results   Component Value Date    HGB 11.2 (L) 10/02/2023    HGB 11.9 (L) 10/01/2023    HGB 12.2 09/30/2023     Per cardiology resume DVT ppx with lovenox BID  - monitor Hg/HCT and hematuria

## 2023-10-02 NOTE — ASSESSMENT & PLAN NOTE
Chronic. Patient requires oxygen supplementation at home, O2 4L via nasal cannula.   See management above.   10/2 Patient alert and participating in PT and OT efforts. No longer has NIV device as it was taken away by her insurance company and no longer has home NIV device.   Counseled on the importance of daily chest physiotherapy and deep breathing exercises to practice.  - continuous NIV  - requires O2 4L via nasal cannula when NIV not in use   - continue aggressive CPT efforts

## 2023-10-02 NOTE — ASSESSMENT & PLAN NOTE
Body mass index is 46.07 kg/m². Morbid obesity complicates all aspects of disease management from diagnostic modalities to treatment. Weight loss encouraged and health benefits explained to patient.

## 2023-10-02 NOTE — PROGRESS NOTES
[facility] Medicine  Progress Note    Patient Name: Carmen Tan  MRN: [id number]  Patient Class: IP- Inpatient   Admission Date: 9/29/2023  Length of Stay: 3 days  Attending Physician: Gunnar Mott DO  Primary Care Provider: Lucian Barry MD    Subjective:     Principal Problem:Acute respiratory failure with hypoxia and hypercarbia        HPI:  Chief Complaint   Patient presents with    Altered Mental Status       EMS reports, pt from home with c/o lethargy and AMS due Home Health taking her CPAP away from her, pt presents with shallow breathing and odorous smell of urine.  18G Left AC, pt on 4L O2 24x7      Patient presents from home via EMS for altered mental status.  Per EMS the patient lives at home with family was recently in a nursing facility and has been at home for approximately 1 week.  When she awoke this morning patient was altered decreased level of consciousness.  Patient has a history of COPD and is supposed to be wearing a CPAP but for unknown reasons it was taken away by home health.  She is 4 L oxygen dependent per EMS. Limited information due to patient's mental status.     73 year old female with chronic hypercapnic and hypoxic respiratory failure on 4 L nasal cannula, diabetes mellitus type 2, diastolic heart failure, history of pulmonary embolism and continued tobacco abuse recently discharged from inpatient hospitalization for cardiac work up after NSTEMI presenting with shortness of breath, leg swelling, CHF exacerbation and treated for urinary tract infection.     ED course:   At time of arrival, temp 95.4F, pulse 92, RR 34, . 77 mmHg, SpO2 79 % on room air. Placed on BiPAP, initial ABG with pH7.1, CO2 greater than 105 with pO2 of 79.9. Patient reported to become more responsive but still somnolent with BiPAP on. Repeat ABG improved to pH 7.2, pCO2 93, pO2 65.8 HCO3 29.3, FiO2 50%. CT Chest findings of nodular thyroid, enlarged pulmonary  artery, gallstones, right basilar and middle lobe mild infiltrate with mucous plugging throughout right lower lobe and minimal left basilar discoid atelectasis. Subtle infiltrate in left upper lobe along fissure. Patient received Rocephin x1, solumedrol. Less somnolent with continuous BiPAP. Patient to be admitted for respiratory failure secondary to pneumonia with continued IV antibiotics, breathing treatments, supplementation oxygen and consult cardiology for medication optimization.        Overview/Hospital Course:  9/30 Overnight on BiPAP I/E 16/7cm H2O. Patient more alert, following commands appropriately. Head nods for affirmation. Continue with scheduled breathing treatments, including mucolytics, aggressive pulmonary hygiene care. Follow up PT and OT.   10/1 BiPAP QHS maintaining SpO2 >98%. Patient without oxygen supplementation desaturates into 80% ile. Patient educated on importance of keeping oxygen supplementation while eating. Urine culture positive for Ecoli >100K, currently covering for upper respiratory infection with Rocephin and doxycycline. Agreeable to discharge to nursing facility. Consult CM in place, TB screening ordered.   10/2 PT and OT efforts limited with BiPAP, but patient seen regaining strength and support self with walker and assistance. Awaiting placement to SNF for continued PT and OT efforts with medication management.       Interval History: Patient seen and examined.     Review of Systems   Constitutional:  Positive for activity change. Negative for appetite change, fatigue and fever.   HENT:  Negative for postnasal drip, sinus pain and sore throat.    Eyes:  Negative for pain and visual disturbance.   Respiratory:  Negative for cough, chest tightness, shortness of breath and stridor.    Gastrointestinal:  Negative for abdominal pain, constipation, diarrhea, nausea and vomiting.   Neurological:  Negative for light-headedness and headaches.   Psychiatric/Behavioral:  Negative for  agitation, behavioral problems, confusion and decreased concentration. The patient is not nervous/anxious and is not hyperactive.      Objective:     Vital Signs (Most Recent):  Temp: 98.8 °F (37.1 °C) (10/02/23 1641)  Pulse: (!) 58 (10/02/23 1641)  Resp: (!) 27 (10/02/23 1641)  BP: (!) 157/74 (10/02/23 1641)  SpO2: 97 % (10/02/23 1641) Vital Signs (24h Range):  Temp:  [96.3 °F (35.7 °C)-98.8 °F (37.1 °C)] 98.8 °F (37.1 °C)  Pulse:  [58-79] 58  Resp:  [18-31] 27  SpO2:  [93 %-100 %] 97 %  BP: (114-165)/(59-78) 157/74     Weight: 121.7 kg (268 lb 6.4 oz)  Body mass index is 46.07 kg/m².    Intake/Output Summary (Last 24 hours) at 10/2/2023 1759  Last data filed at 10/2/2023 0600  Gross per 24 hour   Intake 960 ml   Output 1450 ml   Net -490 ml         Physical Exam  Vitals and nursing note reviewed.   Constitutional:       General: She is not in acute distress.     Appearance: She is obese. She is ill-appearing. She is not toxic-appearing or diaphoretic.   HENT:      Mouth/Throat:      Mouth: Mucous membranes are dry.      Pharynx: Oropharynx is clear.      Comments: Lips dry  Cardiovascular:      Pulses: Normal pulses.      Heart sounds: Normal heart sounds.   Pulmonary:      Breath sounds: Wheezing (faint, breath sounds reaching lung bases) present. No rhonchi or rales.      Comments: BiPAP mask secured  Abdominal:      General: There is no distension.      Tenderness: There is no abdominal tenderness. There is no guarding.      Comments: Limited secondary to body habitus   Musculoskeletal:      Cervical back: Neck supple.      Right lower leg: No edema.      Left lower leg: No edema.   Skin:     General: Skin is warm and dry.      Comments: Thickened skin in lower extremities, peeling bilaterally   Neurological:      Mental Status: She is alert and oriented to person, place, and time. Mental status is at baseline.   Psychiatric:         Mood and Affect: Mood normal.         Behavior: Behavior normal.              Significant Labs: All pertinent labs within the past 24 hours have been reviewed.  BMP:   Recent Labs   Lab 10/02/23  0539   GLU 92      K 4.4      CO2 41*   BUN 13   CREATININE 0.9   CALCIUM 9.8     CBC:   Recent Labs   Lab 10/01/23  0520 10/02/23  0539   WBC 7.16 5.73   HGB 11.9* 11.2*   HCT 36.6* 30.9*   * 152     CMP:   Recent Labs   Lab 10/01/23  0520 10/02/23  0539    143   K 4.6 4.4    105   CO2 39* 41*    92   BUN 16 13   CREATININE 0.9 0.9   CALCIUM 9.9 9.8   PROT 5.8* 5.6*   ALBUMIN 2.3* 2.3*   BILITOT 0.5 0.6   ALKPHOS 93 86   AST 9* 10   ALT 16 14   ANIONGAP -3* -3*     TSH:   Recent Labs   Lab 09/29/23  1238   TSH 0.657       Significant Imaging: I have reviewed all pertinent imaging results/findings within the past 24 hours.      Assessment/Plan:      * Acute respiratory failure with hypoxia and hypercarbia  Patient with Hypercapnic Respiratory failure which is Chronic.  she is on home oxygen at 4 LPM. Supplemental oxygen was provided and noted- Oxygen Concentration (%):  [36-40] 40    Signs/symptoms of respiratory failure include- tachypnea, increased work of breathing, respiratory distress and lethargy. Contributing diagnoses includes - ARDS, CHF and COPD Labs and images were reviewed. Patient Has recent ABG, which has been reviewed. Will treat underlying causes and adjust management of respiratory failure as follows- Breathing treatments, IV steroids, IV antibiotics, supplemental oxygen, BIPAP      Gallstones  Findings on CT chest.   Lab Results   Component Value Date    ALT 14 10/02/2023    AST 10 10/02/2023    ALKPHOS 86 10/02/2023    BILITOT 0.6 10/02/2023   Asymptomatic. Continue to monitor.       Multiple thyroid nodules  Findings on CT chest with contrast.   Lab Results   Component Value Date    TSH 0.657 09/29/2023    FREET4 1.09 09/09/2023   Euthyroid. Follow up endocrinology outpatient for continued monitoring.         Chronic diastolic congestive  heart failure  Patient is identified as having Diastolic (HFpEF) heart failure that is Chronic. CHF is currently uncontrolled due to Dyspnea not returned to baseline after x1 doses of IV diuretic and Rales/crackles on pulmonary exam. Latest ECHO performed and demonstrates- Results for orders placed during the hospital encounter of 09/09/23    Echo  Interpretation Summary    Left Ventricle: The left ventricle is normal in size. Mildly increased wall thickness. Normal wall motion. There is mildly reduced systolic function with a visually estimated ejection fraction of 40 - 45%. There is normal diastolic function.    Right Ventricle: Normal right ventricular cavity size. Wall thickness is normal. Right ventricle wall motion  is normal. Systolic function is normal.    Aortic Valve: There is mild to moderate aortic regurgitation with a centrally directed jet.    Mitral Valve: There is moderate regurgitation.    Tricuspid Valve: There is moderate regurgitation with a centrally directed jet.    Pulmonic Valve: There is mild to moderate regurgitation.    Pulmonary Artery: The estimated pulmonary artery systolic pressure is 47 mmHg.    IVC/SVC: Normal venous pressure at 3 mmHg.    Continue Beta Blocker, Entresto, Aldactone and Nitrate/Vasodilator and monitor clinical status closely. Monitor on telemetry. Patient is on CHF pathway.  Monitor strict Is&Os and daily weights.  Place on fluid restriction of 1.5 L. Cardiology has been consulted. Continue to stress to patient importance of self efficacy and  on diet for CHF. Last BNP reviewed- and noted below No results for input(s).  Monitor I/Os, daily weight  Continuous telemetry and pulse oximetry    Hypercapnia  Chronic. Patient requires oxygen supplementation at home, O2 4L via nasal cannula.   See management above.   10/2 Patient alert and participating in PT and OT efforts. No longer has NIV device as it was taken away by her insurance company and no longer  has home NIV device.   Counseled on the importance of daily chest physiotherapy and deep breathing exercises to practice.  - continuous NIV  - requires O2 4L via nasal cannula when NIV not in use   - continue aggressive CPT efforts        Urinary tract infection with hematuria  Rocephin x1 given in ER. Patient last treated rocephin during last hospitalization early this month.   UA with no bacteria, +pyuria and hematuria  Urine culture positive for E coli   - D3 antibiotics with rocephin and doxycycline       AMS (altered mental status)  Likely secondary to acute respiratory failure with CO2 retention. CT head unremarkable, no acute intracranial findings.   Responsive to voice with BIPAP use, ABG showing last pCO2 93.  Continue to monitor, follow up neuro checks.   9/30 More alert  10/1 Baseline now, speaking and tolerating PO intake    Gross hematuria  Stable Hg/HCT.  Lab Results   Component Value Date    HGB 11.2 (L) 10/02/2023    HGB 11.9 (L) 10/01/2023    HGB 12.2 09/30/2023     Per cardiology resume DVT ppx with lovenox BID  - monitor Hg/HCT and hematuria    Severe obesity (BMI >= 40)  Body mass index is 46.07 kg/m². Morbid obesity complicates all aspects of disease management from diagnostic modalities to treatment. Weight loss encouraged and health benefits explained to patient.           VTE Risk Mitigation (From admission, onward)         Ordered     enoxaparin injection 40 mg  Every 12 hours         10/02/23 0752     Place sequential compression device  Until discontinued         09/29/23 2107     IP VTE HIGH RISK PATIENT  Once         09/29/23 1625     Place sequential compression device  Until discontinued         09/29/23 1625                Discharge Planning   TIGRE:      Code Status: Full Code   Is the patient medically ready for discharge?:     Reason for patient still in hospital (select all that apply): Treatment, PT / OT recommendations and Pending disposition  Discharge Plan A: Skilled Nursing  Facility   Discharge Delays: None known at this time              Gunnar Mott DO  Department of Hospital Medicine   [facility]

## 2023-10-02 NOTE — SUBJECTIVE & OBJECTIVE
Interval History: Patient seen and examined.     Review of Systems   Constitutional:  Positive for activity change. Negative for appetite change, fatigue and fever.   HENT:  Negative for postnasal drip, sinus pain and sore throat.    Eyes:  Negative for pain and visual disturbance.   Respiratory:  Negative for cough, chest tightness, shortness of breath and stridor.    Gastrointestinal:  Negative for abdominal pain, constipation, diarrhea, nausea and vomiting.   Neurological:  Negative for light-headedness and headaches.   Psychiatric/Behavioral:  Negative for agitation, behavioral problems, confusion and decreased concentration. The patient is not nervous/anxious and is not hyperactive.      Objective:     Vital Signs (Most Recent):  Temp: 98.8 °F (37.1 °C) (10/02/23 1641)  Pulse: (!) 58 (10/02/23 1641)  Resp: (!) 27 (10/02/23 1641)  BP: (!) 157/74 (10/02/23 1641)  SpO2: 97 % (10/02/23 1641) Vital Signs (24h Range):  Temp:  [96.3 °F (35.7 °C)-98.8 °F (37.1 °C)] 98.8 °F (37.1 °C)  Pulse:  [58-79] 58  Resp:  [18-31] 27  SpO2:  [93 %-100 %] 97 %  BP: (114-165)/(59-78) 157/74     Weight: 121.7 kg (268 lb 6.4 oz)  Body mass index is 46.07 kg/m².    Intake/Output Summary (Last 24 hours) at 10/2/2023 1758  Last data filed at 10/2/2023 0600  Gross per 24 hour   Intake 960 ml   Output 1450 ml   Net -490 ml         Physical Exam  Vitals and nursing note reviewed.   Constitutional:       General: She is not in acute distress.     Appearance: She is obese. She is ill-appearing. She is not toxic-appearing or diaphoretic.   HENT:      Mouth/Throat:      Mouth: Mucous membranes are dry.      Pharynx: Oropharynx is clear.      Comments: Lips dry  Cardiovascular:      Pulses: Normal pulses.      Heart sounds: Normal heart sounds.   Pulmonary:      Breath sounds: Wheezing (faint, breath sounds reaching lung bases) present. No rhonchi or rales.      Comments: BiPAP mask secured  Abdominal:      General: There is no distension.       Tenderness: There is no abdominal tenderness. There is no guarding.      Comments: Limited secondary to body habitus   Musculoskeletal:      Cervical back: Neck supple.      Right lower leg: No edema.      Left lower leg: No edema.   Skin:     General: Skin is warm and dry.      Comments: Thickened skin in lower extremities, peeling bilaterally   Neurological:      Mental Status: She is alert and oriented to person, place, and time. Mental status is at baseline.   Psychiatric:         Mood and Affect: Mood normal.         Behavior: Behavior normal.             Significant Labs: All pertinent labs within the past 24 hours have been reviewed.  BMP:   Recent Labs   Lab 10/02/23  0539   GLU 92      K 4.4      CO2 41*   BUN 13   CREATININE 0.9   CALCIUM 9.8     CBC:   Recent Labs   Lab 10/01/23  0520 10/02/23  0539   WBC 7.16 5.73   HGB 11.9* 11.2*   HCT 36.6* 30.9*   * 152     CMP:   Recent Labs   Lab 10/01/23  0520 10/02/23  0539    143   K 4.6 4.4    105   CO2 39* 41*    92   BUN 16 13   CREATININE 0.9 0.9   CALCIUM 9.9 9.8   PROT 5.8* 5.6*   ALBUMIN 2.3* 2.3*   BILITOT 0.5 0.6   ALKPHOS 93 86   AST 9* 10   ALT 16 14   ANIONGAP -3* -3*     TSH:   Recent Labs   Lab 09/29/23  1238   TSH 0.657       Significant Imaging: I have reviewed all pertinent imaging results/findings within the past 24 hours.

## 2023-10-02 NOTE — PLAN OF CARE
"   10/02/23 0818   Post-Acute Status   Post-Acute Authorization Placement   Post-Acute Placement Status Patient List Provided   Discharge Plan   Discharge Plan A Skilled Nursing Facility   Discharge Plan B Skilled Nursing Facility     Spoke to patient at bedside this morning. Daughter Esther hinojosa. Discussed with patient that as per our conversation, she had agreed to SNF placement at [facility]. Daughter inquired to patient as to why she would not want to go to [facility], as he spouse was at [facility]. Ms. Tan states, "I guess I can go there. I am ok with [facility]." Patient Choice Form obtained and Annmarie PARRISH Was notified. Moving forward w/SNF placement at [facility] in .  "

## 2023-10-02 NOTE — PT/OT/SLP EVAL
Occupational Therapy   Evaluation    Name: Carmen Tan  MRN: [id number]  Admitting Diagnosis: Acute respiratory failure with hypoxia and hypercarbia  Recent Surgery: * No surgery found *      Recommendations:     Discharge Recommendations: nursing facility, skilled  Discharge Equipment Recommendations:  none  Barriers to discharge:  Other (Comment) (current medical status)    Assessment:     Carmen Tan is a 73 y.o. female with a medical diagnosis of Acute respiratory failure with hypoxia and hypercarbia.  She presents with weakness and SOB. Performance deficits affecting function: weakness, impaired endurance.      Rehab Prognosis: Fair; patient would benefit from acute skilled OT services to address these deficits and reach maximum level of function.       Plan:     Patient to be seen 5 x/week to address the above listed problems via self-care/home management, therapeutic activities, therapeutic exercises  Plan of Care Expires: 10/13/23  Plan of Care Reviewed with: patient    Subjective     Chief Complaint: Weakness  Patient/Family Comments/goals: Return to PLOF    Occupational Profile:  Living Environment: Lives with family in mobile home with steps to enter  Previous level of function: Modified independent  Equipment Used at Home: bedside commode, rollator, shower chair, oxygen  Assistance upon Discharge: TBD    Pain/Comfort:  Pain Rating 1: 0/10  Pain Rating Post-Intervention 1: 0/10    Patients cultural, spiritual, Mandaeism conflicts given the current situation:      Objective:     Communicated with: Nurse prior to session.  Patient found HOB elevated with peripheral IV, PureWick, BIPAP upon OT entry to room.    General Precautions: Standard, fall  Orthopedic Precautions: N/A  Braces: N/A  Respiratory Status:  BI-PAP    Occupational Performance:    Bed Mobility:    Patient completed Rolling/Turning to Left with  minimum assistance  Patient completed Rolling/Turning to Right with minimum  assistance  Patient completed Scooting/Bridging with minimum assistance  Patient completed Supine to Sit with minimum assistance  Patient completed Sit to Supine with minimum assistance    Functional Mobility/Transfers:  Patient completed Sit <> Stand Transfer with minimum assistance  with  hand-held assist   Functional Mobility: unable to complete today    Activities of Daily Living:  Feeding:  independence    Grooming: independence    Bathing: moderate assistance    Upper Body Dressing: independence    Lower Body Dressing: minimum assistance    Toileting: moderate assistance      Cognitive/Visual Perceptual:  Cognitive/Psychosocial Skills:     -       Oriented to: Person, Place, and Situation   -       Follows Commands/attention:Follows multistep  commands  -       Communication: clear/fluent  -       Memory: No Deficits noted  -       Safety awareness/insight to disability: intact     Physical Exam:  Upper Extremity Range of Motion:     -       Right Upper Extremity: WFL  -       Left Upper Extremity: WFL  Upper Extremity Strength:    -       Right Upper Extremity: Deficits: 4/5  -       Left Upper Extremity: Deficits: 4/5    AMPAC 6 Click ADL:  AMPAC Total Score: 17    Treatment & Education:  Pt educated on the role of OT and OT POC    Patient left HOB elevated with all lines intact, call button in reach, and nurse notified    GOALS:   Multidisciplinary Problems       Occupational Therapy Goals          Problem: Occupational Therapy    Goal Priority Disciplines Outcome Interventions   Occupational Therapy Goal     OT, PT/OT     Description: Goals to be met by: 10/20/2023     Patient will increase functional independence with ADLs by performing:    Toileting from toilet with Supervision for hygiene and clothing management.   Bathing from  edge of bed with [place].  Supine to sit with [place].  Step transfer with Modified [place]                         History:     Past Medical History:   Diagnosis  Date    Abdominal hernia     Bacteremia 4/5/2023    CHF (congestive heart failure)     COPD (chronic obstructive pulmonary disease)     Diabetes mellitus, type 2 2/16/2022    GERD (gastroesophageal reflux disease)     History of home oxygen therapy     Hypertension     Hypertensive emergency 3/30/2023    Migraine headache     Pulmonary embolism 06/01/2017    Small bowel obstruction     Thyroid disease          Past Surgical History:   Procedure Laterality Date    COLONOSCOPY      HYSTERECTOMY      INNER EAR SURGERY      UPPER GASTROINTESTINAL ENDOSCOPY         Time Tracking:     OT Date of Treatment: 10/02/23  OT Start Time: 1015  OT Stop Time: 1030  OT Total Time (min): 15 min    Billable Minutes:Evaluation 15    10/2/2023

## 2023-10-02 NOTE — PLAN OF CARE
Problem: Physical Therapy  Goal: Physical Therapy Goal  Description: Goals to be met by: 10/9/2023    Patient will increase functional independence with mobility by performin. Supine to sit with Modified Independent.  2. Sit to supine with Modified Independent.  3. Bed to chair transfer with Supervision or Set-up Assistance with proper A.D.  using Step Transfer technique.  4. Sit to Stand with Supervision or Set-up Assistance with proper A.D. .  5. Gait  x 50  feet with Standby Assistance with proper A.D. .  6. Lower extremity exercise program x10 reps.     Outcome: Plan of care established

## 2023-10-03 LAB
ANION GAP SERPL CALC-SCNC: -3 MMOL/L (ref 3–11)
BUN SERPL-MCNC: 11 MG/DL (ref 8–23)
CALCIUM SERPL-MCNC: 9.9 MG/DL (ref 8.7–10.5)
CHLORIDE SERPL-SCNC: 103 MMOL/L (ref 95–110)
CO2 SERPL-SCNC: 42 MMOL/L (ref 23–29)
CREAT SERPL-MCNC: 0.9 MG/DL (ref 0.5–1.4)
EST. GFR  (NO RACE VARIABLE): >60 ML/MIN/1.73 M^2
GLUCOSE SERPL-MCNC: 109 MG/DL (ref 70–110)
MAGNESIUM SERPL-MCNC: 1.8 MG/DL (ref 1.6–2.6)
POCT GLUCOSE: 100 MG/DL (ref 70–110)
POCT GLUCOSE: 116 MG/DL (ref 70–110)
POTASSIUM SERPL-SCNC: 4.1 MMOL/L (ref 3.5–5.1)
SODIUM SERPL-SCNC: 142 MMOL/L (ref 136–145)
TB INDURATION 48 - 72 HR READ: 0 MM

## 2023-10-03 PROCEDURE — 99233 PR SUBSEQUENT HOSPITAL CARE,LEVL III: ICD-10-PCS | Mod: ,,, | Performed by: STUDENT IN AN ORGANIZED HEALTH CARE EDUCATION/TRAINING PROGRAM

## 2023-10-03 PROCEDURE — 97530 THERAPEUTIC ACTIVITIES: CPT

## 2023-10-03 PROCEDURE — 94660 CPAP INITIATION&MGMT: CPT

## 2023-10-03 PROCEDURE — 25000003 PHARM REV CODE 250: Performed by: INTERNAL MEDICINE

## 2023-10-03 PROCEDURE — 27000221 HC OXYGEN, UP TO 24 HOURS

## 2023-10-03 PROCEDURE — 63600175 PHARM REV CODE 636 W HCPCS: Performed by: STUDENT IN AN ORGANIZED HEALTH CARE EDUCATION/TRAINING PROGRAM

## 2023-10-03 PROCEDURE — 36415 COLL VENOUS BLD VENIPUNCTURE: CPT | Performed by: STUDENT IN AN ORGANIZED HEALTH CARE EDUCATION/TRAINING PROGRAM

## 2023-10-03 PROCEDURE — 63600175 PHARM REV CODE 636 W HCPCS: Performed by: INTERNAL MEDICINE

## 2023-10-03 PROCEDURE — 27000207 HC ISOLATION

## 2023-10-03 PROCEDURE — 99900035 HC TECH TIME PER 15 MIN (STAT)

## 2023-10-03 PROCEDURE — 80048 BASIC METABOLIC PNL TOTAL CA: CPT | Performed by: STUDENT IN AN ORGANIZED HEALTH CARE EDUCATION/TRAINING PROGRAM

## 2023-10-03 PROCEDURE — 97110 THERAPEUTIC EXERCISES: CPT | Mod: CO

## 2023-10-03 PROCEDURE — 25000242 PHARM REV CODE 250 ALT 637 W/ HCPCS: Performed by: STUDENT IN AN ORGANIZED HEALTH CARE EDUCATION/TRAINING PROGRAM

## 2023-10-03 PROCEDURE — 99900031 HC PATIENT EDUCATION (STAT)

## 2023-10-03 PROCEDURE — 99233 SBSQ HOSP IP/OBS HIGH 50: CPT | Mod: ,,, | Performed by: STUDENT IN AN ORGANIZED HEALTH CARE EDUCATION/TRAINING PROGRAM

## 2023-10-03 PROCEDURE — 83735 ASSAY OF MAGNESIUM: CPT | Performed by: STUDENT IN AN ORGANIZED HEALTH CARE EDUCATION/TRAINING PROGRAM

## 2023-10-03 PROCEDURE — 97110 THERAPEUTIC EXERCISES: CPT

## 2023-10-03 PROCEDURE — 21400001 HC TELEMETRY ROOM

## 2023-10-03 PROCEDURE — 25000003 PHARM REV CODE 250: Performed by: STUDENT IN AN ORGANIZED HEALTH CARE EDUCATION/TRAINING PROGRAM

## 2023-10-03 PROCEDURE — 94640 AIRWAY INHALATION TREATMENT: CPT

## 2023-10-03 PROCEDURE — 11000001 HC ACUTE MED/SURG PRIVATE ROOM

## 2023-10-03 PROCEDURE — 94761 N-INVAS EAR/PLS OXIMETRY MLT: CPT

## 2023-10-03 RX ORDER — LEVOFLOXACIN 250 MG/1
500 TABLET ORAL DAILY
Status: DISCONTINUED | OUTPATIENT
Start: 2023-10-03 | End: 2023-10-04 | Stop reason: HOSPADM

## 2023-10-03 RX ORDER — LEVOFLOXACIN 250 MG/1
500 TABLET ORAL DAILY
Status: DISCONTINUED | OUTPATIENT
Start: 2023-10-03 | End: 2023-10-03

## 2023-10-03 RX ADMIN — IPRATROPIUM BROMIDE AND ALBUTEROL SULFATE 3 ML: .5; 3 SOLUTION RESPIRATORY (INHALATION) at 07:10

## 2023-10-03 RX ADMIN — ENOXAPARIN SODIUM 40 MG: 40 INJECTION SUBCUTANEOUS at 08:10

## 2023-10-03 RX ADMIN — BUSPIRONE HYDROCHLORIDE 5 MG: 5 TABLET ORAL at 09:10

## 2023-10-03 RX ADMIN — MUPIROCIN: 20 OINTMENT TOPICAL at 08:10

## 2023-10-03 RX ADMIN — MEROPENEM 1 G: 1 INJECTION, POWDER, FOR SOLUTION INTRAVENOUS at 06:10

## 2023-10-03 RX ADMIN — ASPIRIN 81 MG: 81 TABLET, COATED ORAL at 09:10

## 2023-10-03 RX ADMIN — BUDESONIDE 0.5 MG: 0.5 INHALANT RESPIRATORY (INHALATION) at 07:10

## 2023-10-03 RX ADMIN — LEVOFLOXACIN 500 MG: 250 TABLET, FILM COATED ORAL at 10:10

## 2023-10-03 RX ADMIN — ACETYLCYSTEINE 2 ML: 200 SOLUTION ORAL; RESPIRATORY (INHALATION) at 07:10

## 2023-10-03 RX ADMIN — IPRATROPIUM BROMIDE AND ALBUTEROL SULFATE 3 ML: .5; 3 SOLUTION RESPIRATORY (INHALATION) at 01:10

## 2023-10-03 RX ADMIN — DOXYCYCLINE HYCLATE 100 MG: 100 TABLET, COATED ORAL at 09:10

## 2023-10-03 RX ADMIN — MUPIROCIN: 20 OINTMENT TOPICAL at 09:10

## 2023-10-03 RX ADMIN — FUROSEMIDE 20 MG: 20 TABLET ORAL at 09:10

## 2023-10-03 RX ADMIN — ACETYLCYSTEINE 4 ML: 200 SOLUTION ORAL; RESPIRATORY (INHALATION) at 07:10

## 2023-10-03 RX ADMIN — LEVOTHYROXINE SODIUM 50 MCG: 25 TABLET ORAL at 06:10

## 2023-10-03 RX ADMIN — PANTOPRAZOLE SODIUM 40 MG: 40 TABLET, DELAYED RELEASE ORAL at 09:10

## 2023-10-03 RX ADMIN — ENOXAPARIN SODIUM 40 MG: 40 INJECTION SUBCUTANEOUS at 09:10

## 2023-10-03 RX ADMIN — BUSPIRONE HYDROCHLORIDE 5 MG: 5 TABLET ORAL at 08:10

## 2023-10-03 RX ADMIN — ISOSORBIDE MONONITRATE 30 MG: 30 TABLET, EXTENDED RELEASE ORAL at 09:10

## 2023-10-03 RX ADMIN — MEROPENEM 1 G: 1 INJECTION, POWDER, FOR SOLUTION INTRAVENOUS at 10:10

## 2023-10-03 RX ADMIN — VALSARTAN 40 MG: 40 TABLET, FILM COATED ORAL at 09:10

## 2023-10-03 RX ADMIN — SERTRALINE HYDROCHLORIDE 50 MG: 50 TABLET ORAL at 09:10

## 2023-10-03 NOTE — PLAN OF CARE
Sent Bipapp order settings to ERIC Malcolm at [facility].  Spoke with Micheline per phone to confirm they have received order for bipapp, they are sending to [facility] now, anticipating that bipapp will be obtained by tomorrow morning.  [facility] will keep us updated on arrival of bipapp.

## 2023-10-03 NOTE — SUBJECTIVE & OBJECTIVE
Interval History: Patient seen and examined.     Review of Systems   Constitutional:  Positive for activity change. Negative for appetite change, fatigue and fever.   HENT:  Negative for postnasal drip, sinus pain and sore throat.    Eyes:  Negative for pain and visual disturbance.   Respiratory:  Negative for cough, chest tightness, shortness of breath and stridor.    Gastrointestinal:  Negative for abdominal pain, constipation, diarrhea, nausea and vomiting.   Neurological:  Negative for light-headedness and headaches.   Psychiatric/Behavioral:  Negative for agitation, behavioral problems, confusion and decreased concentration. The patient is not nervous/anxious and is not hyperactive.      Objective:     Vital Signs (Most Recent):  Temp: 98.6 °F (37 °C) (10/03/23 1117)  Pulse: 72 (10/03/23 1117)  Resp: (!) 32 (10/03/23 1117)  BP: 131/61 (10/03/23 1117)  SpO2: 97 % (10/03/23 1117) Vital Signs (24h Range):  Temp:  [98.6 °F (37 °C)-98.8 °F (37.1 °C)] 98.6 °F (37 °C)  Pulse:  [58-76] 72  Resp:  [22-35] 32  SpO2:  [96 %-100 %] 97 %  BP: (122-172)/(61-81) 131/61     Weight: 122 kg (269 lb)  Body mass index is 46.17 kg/m².    Intake/Output Summary (Last 24 hours) at 10/3/2023 1128  Last data filed at 10/3/2023 0605  Gross per 24 hour   Intake 1260 ml   Output 1050 ml   Net 210 ml         Physical Exam  Vitals and nursing note reviewed.   Constitutional:       General: She is not in acute distress.     Appearance: She is obese. She is ill-appearing. She is not toxic-appearing or diaphoretic.   HENT:      Mouth/Throat:      Mouth: Mucous membranes are dry.      Pharynx: Oropharynx is clear.      Comments: Lips dry  Cardiovascular:      Pulses: Normal pulses.      Heart sounds: Normal heart sounds.   Pulmonary:      Effort: No respiratory distress.      Breath sounds: No wheezing (faint, breath sounds reaching lung bases), rhonchi or rales.      Comments: BiPAP mask secured, FiO2 40%  Abdominal:      General: There is no  distension.      Tenderness: There is no abdominal tenderness. There is no guarding.      Comments: Limited secondary to body habitus   Musculoskeletal:      Cervical back: Neck supple.      Right lower leg: No edema.      Left lower leg: No edema.   Skin:     General: Skin is warm and dry.      Comments: Thickened skin in lower extremities, peeling bilaterally   Neurological:      Mental Status: She is alert and oriented to person, place, and time. Mental status is at baseline.   Psychiatric:         Mood and Affect: Mood normal.         Behavior: Behavior normal.          Significant Labs: All pertinent labs within the past 24 hours have been reviewed.  Bilirubin:   Recent Labs   Lab 09/09/23  0618 09/29/23  1238 09/30/23  0348 10/01/23  0520 10/02/23  0539   BILITOT 0.4 0.4 0.3 0.5 0.6     POCT Glucose:   Recent Labs   Lab 10/02/23  1839 10/02/23  2314 10/03/23  1101   POCTGLUCOSE 145* 86 116*     TSH:   Recent Labs   Lab 09/29/23  1238   TSH 0.657     Significant Imaging: I have reviewed all pertinent imaging results/findings within the past 24 hours.

## 2023-10-03 NOTE — ASSESSMENT & PLAN NOTE
Patient is identified as having Diastolic (HFpEF) heart failure that is Chronic. CHF is currently uncontrolled due to Dyspnea not returned to baseline after x1 doses of IV diuretic and Rales/crackles on pulmonary exam. Latest ECHO performed and demonstrates- Results for orders placed during the hospital encounter of 09/09/23    Echo  Interpretation Summary    Left Ventricle: The left ventricle is normal in size. Mildly increased wall thickness. Normal wall motion. There is mildly reduced systolic function with a visually estimated ejection fraction of 40 - 45%. There is normal diastolic function.    Right Ventricle: Normal right ventricular cavity size. Wall thickness is normal. Right ventricle wall motion  is normal. Systolic function is normal.    Aortic Valve: There is mild to moderate aortic regurgitation with a centrally directed jet.    Mitral Valve: There is moderate regurgitation.    Tricuspid Valve: There is moderate regurgitation with a centrally directed jet.    Pulmonic Valve: There is mild to moderate regurgitation.    Pulmonary Artery: The estimated pulmonary artery systolic pressure is 47 mmHg.    IVC/SVC: Normal venous pressure at 3 mmHg.    Continue Beta Blocker, Entresto, Aldactone and Nitrate/Vasodilator and monitor clinical status closely. Monitor on telemetry. Patient is on CHF pathway.  Monitor strict Is&Os and daily weights.  Place on fluid restriction of 1.5 L. Cardiology has been consulted. Continue to stress to patient importance of self efficacy and  on diet for CHF. Last BNP reviewed- and noted below No results for input(s).  - Monitor I/Os, daily weight  - Continuous telemetry and pulse oximetry  - Patient requires completion of second part stress test outpatient with cardiology in [place]

## 2023-10-03 NOTE — PLAN OF CARE
Plan of care reviewed with pt, no questions or concerns. 22 gauge R hand SL, TB skin test on L hand for nursing home placement, fluid restriction in place, continuous BiPAP tolerating well. Call light and personal items within reach of pt, free of pain and complaints at this time.   Problem: Adult Inpatient Plan of Care  Goal: Plan of Care Review  Outcome: Ongoing, Progressing  Goal: Absence of Hospital-Acquired Illness or Injury  Outcome: Ongoing, Progressing     Problem: Bariatric Environmental Safety  Goal: Safety Maintained with Care  Outcome: Ongoing, Progressing     Problem: Diabetes Comorbidity  Goal: Blood Glucose Level Within Targeted Range  Outcome: Ongoing, Progressing     Problem: Infection  Goal: Absence of Infection Signs and Symptoms  Outcome: Ongoing, Progressing     Problem: Adult Inpatient Plan of Care  Goal: Patient-Specific Goal (Individualized)  Outcome: Ongoing, Not Progressing  Goal: Optimal Comfort and Wellbeing  Outcome: Ongoing, Not Progressing  Goal: Readiness for Transition of Care  Outcome: Ongoing, Not Progressing     Problem: Skin Injury Risk Increased  Goal: Skin Health and Integrity  Outcome: Ongoing, Not Progressing

## 2023-10-03 NOTE — PROGRESS NOTES
[facility] Medicine  Progress Note    Patient Name: Carmen Tan  MRN: [id number]  Patient Class: IP- Inpatient   Admission Date: 9/29/2023  Length of Stay: 4 days  Attending Physician: Gunnar Mott DO  Primary Care Provider: Lucian Barry MD    Subjective:     Principal Problem:Acute respiratory failure with hypoxia and hypercarbia    HPI:  Chief Complaint   Patient presents with    Altered Mental Status       EMS reports, pt from home with c/o lethargy and AMS due Home Health taking her CPAP away from her, pt presents with shallow breathing and odorous smell of urine.  18G Left AC, pt on 4L O2 24x7      Patient presents from home via EMS for altered mental status.  Per EMS the patient lives at home with family was recently in a nursing facility and has been at home for approximately 1 week.  When she awoke this morning patient was altered decreased level of consciousness.  Patient has a history of COPD and is supposed to be wearing a CPAP but for unknown reasons it was taken away by home health.  She is 4 L oxygen dependent per EMS. Limited information due to patient's mental status.     73 year old female with chronic hypercapnic and hypoxic respiratory failure on 4 L nasal cannula, diabetes mellitus type 2, diastolic heart failure, history of pulmonary embolism and continued tobacco abuse recently discharged from inpatient hospitalization for cardiac work up after NSTEMI presenting with shortness of breath, leg swelling, CHF exacerbation and treated for urinary tract infection.     ED course:   At time of arrival, temp 95.4F, pulse 92, RR 34, . 77 mmHg, SpO2 79 % on room air. Placed on BiPAP, initial ABG with pH7.1, CO2 greater than 105 with pO2 of 79.9. Patient reported to become more responsive but still somnolent with BiPAP on. Repeat ABG improved to pH 7.2, pCO2 93, pO2 65.8 HCO3 29.3, FiO2 50%. CT Chest findings of nodular thyroid, enlarged pulmonary artery,  gallstones, right basilar and middle lobe mild infiltrate with mucous plugging throughout right lower lobe and minimal left basilar discoid atelectasis. Subtle infiltrate in left upper lobe along fissure. Patient received Rocephin x1, solumedrol. Less somnolent with continuous BiPAP. Patient to be admitted for respiratory failure secondary to pneumonia with continued IV antibiotics, breathing treatments, supplementation oxygen and consult cardiology for medication optimization.        Overview/Hospital Course:  9/30 Overnight on BiPAP I/E 16/7cm H2O. Patient more alert, following commands appropriately. Head nods for affirmation. Continue with scheduled breathing treatments, including mucolytics, aggressive pulmonary hygiene care. Follow up PT and OT.   10/1 BiPAP QHS maintaining SpO2 >98%. Patient without oxygen supplementation desaturates into 80% ile. Patient educated on importance of keeping oxygen supplementation while eating. Urine culture positive for Ecoli >100K, currently covering for upper respiratory infection with Rocephin and doxycycline. Agreeable to discharge to nursing facility. Consult CM in place, TB screening ordered.   10/2 PT and OT efforts limited with BiPAP, but patient seen regaining strength and support self with walker and assistance. Awaiting placement to SNF for continued PT and OT efforts with medication management.   10/3 No acute events overnight. Compliant with BiPAP use. Patient endorses no new changes. Awaiting placement to SNF. Adjust IV antibiotics to cover for nosocomial infection and ESBL MDRO urinary tract infection.       Interval History: Patient seen and examined.     Review of Systems   Constitutional:  Positive for activity change. Negative for appetite change, fatigue and fever.   HENT:  Negative for postnasal drip, sinus pain and sore throat.    Eyes:  Negative for pain and visual disturbance.   Respiratory:  Negative for cough, chest tightness, shortness of breath and  stridor.    Gastrointestinal:  Negative for abdominal pain, constipation, diarrhea, nausea and vomiting.   Neurological:  Negative for light-headedness and headaches.   Psychiatric/Behavioral:  Negative for agitation, behavioral problems, confusion and decreased concentration. The patient is not nervous/anxious and is not hyperactive.      Objective:     Vital Signs (Most Recent):  Temp: 98.6 °F (37 °C) (10/03/23 1117)  Pulse: 72 (10/03/23 1117)  Resp: (!) 32 (10/03/23 1117)  BP: 131/61 (10/03/23 1117)  SpO2: 97 % (10/03/23 1117) Vital Signs (24h Range):  Temp:  [98.6 °F (37 °C)-98.8 °F (37.1 °C)] 98.6 °F (37 °C)  Pulse:  [58-76] 72  Resp:  [22-35] 32  SpO2:  [96 %-100 %] 97 %  BP: (122-172)/(61-81) 131/61     Weight: 122 kg (269 lb)  Body mass index is 46.17 kg/m².    Intake/Output Summary (Last 24 hours) at 10/3/2023 1128  Last data filed at 10/3/2023 0605  Gross per 24 hour   Intake 1260 ml   Output 1050 ml   Net 210 ml         Physical Exam  Vitals and nursing note reviewed.   Constitutional:       General: She is not in acute distress.     Appearance: She is obese. She is ill-appearing. She is not toxic-appearing or diaphoretic.   HENT:      Mouth/Throat:      Mouth: Mucous membranes are dry.      Pharynx: Oropharynx is clear.      Comments: Lips dry  Cardiovascular:      Pulses: Normal pulses.      Heart sounds: Normal heart sounds.   Pulmonary:      Effort: No respiratory distress.      Breath sounds: No wheezing (faint, breath sounds reaching lung bases), rhonchi or rales.      Comments: BiPAP mask secured, FiO2 40%  Abdominal:      General: There is no distension.      Tenderness: There is no abdominal tenderness. There is no guarding.      Comments: Limited secondary to body habitus   Musculoskeletal:      Cervical back: Neck supple.      Right lower leg: No edema.      Left lower leg: No edema.   Skin:     General: Skin is warm and dry.      Comments: Thickened skin in lower extremities, peeling  bilaterally   Neurological:      Mental Status: She is alert and oriented to person, place, and time. Mental status is at baseline.   Psychiatric:         Mood and Affect: Mood normal.         Behavior: Behavior normal.          Significant Labs: All pertinent labs within the past 24 hours have been reviewed.  Bilirubin:   Recent Labs   Lab 09/09/23  0618 09/29/23  1238 09/30/23  0348 10/01/23  0520 10/02/23  0539   BILITOT 0.4 0.4 0.3 0.5 0.6     POCT Glucose:   Recent Labs   Lab 10/02/23  1839 10/02/23  2314 10/03/23  1101   POCTGLUCOSE 145* 86 116*     TSH:   Recent Labs   Lab 09/29/23  1238   TSH 0.657     Significant Imaging: I have reviewed all pertinent imaging results/findings within the past 24 hours.      Assessment/Plan:      * Acute respiratory failure with hypoxia and hypercarbia  Patient with Hypercapnic Respiratory failure which is Chronic.  she is on home oxygen at 4 LPM. Supplemental oxygen was provided and noted- Oxygen Concentration (%):  [40] 40    Signs/symptoms of respiratory failure include- tachypnea, increased work of breathing, respiratory distress and lethargy. Contributing diagnoses includes - ARDS, CHF and COPD Labs and images were reviewed. Patient Has recent ABG, which has been reviewed. Will treat underlying causes and adjust management of respiratory failure as follows- Breathing treatments, IV steroids, IV antibiotics, supplemental oxygen, BIPAP continuous and bedtime.   10/3 For above right sided infiltrate findings, patient completed 4 days of doxycycline and Rocephin, will transition to PO levaquin for 10 days for a total of 14 days therapy  - continue CPT and above breathing treatments    Gallstones  Findings on CT chest.   Lab Results   Component Value Date    ALT 14 10/02/2023    AST 10 10/02/2023    ALKPHOS 86 10/02/2023    BILITOT 0.6 10/02/2023   Asymptomatic. Continue to monitor.       Multiple thyroid nodules  Findings on CT chest with contrast.   Lab Results   Component  Value Date    TSH 0.657 09/29/2023    FREET4 1.09 09/09/2023   Euthyroid. Follow up endocrinology outpatient for continued monitoring.         Chronic diastolic congestive heart failure  Patient is identified as having Diastolic (HFpEF) heart failure that is Chronic. CHF is currently uncontrolled due to Dyspnea not returned to baseline after x1 doses of IV diuretic and Rales/crackles on pulmonary exam. Latest ECHO performed and demonstrates- Results for orders placed during the hospital encounter of 09/09/23    Echo  Interpretation Summary    Left Ventricle: The left ventricle is normal in size. Mildly increased wall thickness. Normal wall motion. There is mildly reduced systolic function with a visually estimated ejection fraction of 40 - 45%. There is normal diastolic function.    Right Ventricle: Normal right ventricular cavity size. Wall thickness is normal. Right ventricle wall motion  is normal. Systolic function is normal.    Aortic Valve: There is mild to moderate aortic regurgitation with a centrally directed jet.    Mitral Valve: There is moderate regurgitation.    Tricuspid Valve: There is moderate regurgitation with a centrally directed jet.    Pulmonic Valve: There is mild to moderate regurgitation.    Pulmonary Artery: The estimated pulmonary artery systolic pressure is 47 mmHg.    IVC/SVC: Normal venous pressure at 3 mmHg.    Continue Beta Blocker, Entresto, Aldactone and Nitrate/Vasodilator and monitor clinical status closely. Monitor on telemetry. Patient is on CHF pathway.  Monitor strict Is&Os and daily weights.  Place on fluid restriction of 1.5 L. Cardiology has been consulted. Continue to stress to patient importance of self efficacy and  on diet for CHF. Last BNP reviewed- and noted below No results for input(s).  - Monitor I/Os, daily weight  - Continuous telemetry and pulse oximetry  - Patient requires completion of second part stress test outpatient with cardiology in  [place]    Hypercapnia  Chronic. Patient requires oxygen supplementation at home, O2 4L via nasal cannula.   See management above.   10/3 SpO2 >96% on BiPAP.   Counseled on the importance of daily chest physiotherapy and deep breathing exercises to practice.  - continuous NIV day time and QHS  - BiPAP setting I/E 16/7 cm H2O back up rate of 8, FiO2 40%  - requires O2 4L via nasal cannula when BiPAP not in use   - continue aggressive CPT efforts        Urinary tract infection with hematuria  Rocephin x1 given in ER. Patient last treated rocephin during last hospitalization early this month.   UA with no bacteria, +pyuria and hematuria  10/3  Urine culture positive for ESBL, MDRO E coli  - Will transition to IV Merrem for 5 days total therapy         AMS (altered mental status)  Likely secondary to acute respiratory failure with CO2 retention. CT head unremarkable, no acute intracranial findings.   Responsive to voice with BIPAP use, ABG showing last pCO2 93.  Continue to monitor, follow up neuro checks.   9/30 More alert  Resolved  10/1 Baseline now, speaking and tolerating PO intake    Gross hematuria  Stable Hg/HCT.  Lab Results   Component Value Date    HGB 11.2 (L) 10/02/2023    HGB 11.9 (L) 10/01/2023    HGB 12.2 09/30/2023     Per cardiology resume DVT ppx with lovenox BID  10/3 stable Hg/HCT.   - monitor Hg/HCT and hematuria    Severe obesity (BMI >= 40)  Body mass index is 46.17 kg/m². Morbid obesity complicates all aspects of disease management from diagnostic modalities to treatment. Weight loss encouraged and health benefits explained to patient.         VTE Risk Mitigation (From admission, onward)         Ordered     enoxaparin injection 40 mg  Every 12 hours         10/02/23 0752     Place sequential compression device  Until discontinued         09/29/23 2107     IP VTE HIGH RISK PATIENT  Once         09/29/23 1625     Place sequential compression device  Until discontinued         09/29/23 1625                 Discharge Planning   TIGRE:      Code Status: Full Code   Is the patient medically ready for discharge?:     Reason for patient still in hospital (select all that apply): Treatment, PT / OT recommendations and Pending disposition  Discharge Plan A: Skilled Nursing Facility   Discharge Delays: None known at this time              Gunnar Mott DO  Department of Hospital Medicine   [facility]

## 2023-10-03 NOTE — DISCHARGE INSTRUCTIONS
- On discharge, patient requires continuos NIPPV access with following settings  Ipap 16 cm H2O, Epap 7 cm H2O, Backup Rate of 8, FiO2 40%  - Outpatient cardiology with follow up second part to stress test  - Complete oral levaquin for nosocomial pneumonia for total of 14 days (End date 10/13/23).  - Complete UTI treatment with ESBL, MDRL E.coli for total of 5 days with IV Merrem (End date 10/7/23).  - Continue PT and OT, chest physiotherapy daily

## 2023-10-03 NOTE — PLAN OF CARE
Patient was approved for SNF placement, per G[place], with [facility]. Gaviota plans to reach out to the patient's family regarding her trilogy machine.     Addendum: Gaviota with [facility] spoke to the patient's family, and the patient does not have a trilogy machine at home.

## 2023-10-03 NOTE — ASSESSMENT & PLAN NOTE
Patient with Hypercapnic Respiratory failure which is Chronic.  she is on home oxygen at 4 LPM. Supplemental oxygen was provided and noted- Oxygen Concentration (%):  [40] 40    Signs/symptoms of respiratory failure include- tachypnea, increased work of breathing, respiratory distress and lethargy. Contributing diagnoses includes - ARDS, CHF and COPD Labs and images were reviewed. Patient Has recent ABG, which has been reviewed. Will treat underlying causes and adjust management of respiratory failure as follows- Breathing treatments, IV steroids, IV antibiotics, supplemental oxygen, BIPAP continuous and bedtime.   10/3 For above right sided infiltrate findings, patient completed 4 days of doxycycline and Rocephin, will transition to PO levaquin for 10 days for a total of 14 days therapy  - continue CPT and above breathing treatments

## 2023-10-03 NOTE — ASSESSMENT & PLAN NOTE
Rocephin x1 given in ER. Patient last treated rocephin during last hospitalization early this month.   UA with no bacteria, +pyuria and hematuria  10/3  Urine culture positive for ESBL, MDRO E coli  - Will transition to IV Merrem for 5 days total therapy

## 2023-10-03 NOTE — ASSESSMENT & PLAN NOTE
Likely secondary to acute respiratory failure with CO2 retention. CT head unremarkable, no acute intracranial findings.   Responsive to voice with BIPAP use, ABG showing last pCO2 93.  Continue to monitor, follow up neuro checks.   9/30 More alert  Resolved  10/1 Baseline now, speaking and tolerating PO intake

## 2023-10-03 NOTE — ASSESSMENT & PLAN NOTE
Stable Hg/HCT.  Lab Results   Component Value Date    HGB 11.2 (L) 10/02/2023    HGB 11.9 (L) 10/01/2023    HGB 12.2 09/30/2023     Per cardiology resume DVT ppx with lovenox BID  10/3 stable Hg/HCT.   - monitor Hg/HCT and hematuria

## 2023-10-03 NOTE — PLAN OF CARE
Order for trilogy with settings sent to [facility].  Dr. Mott informed.  Spoke with Gaviota Malcolm at [facility], she confirms she has order for trilogy and will forward it to [facility].

## 2023-10-03 NOTE — PT/OT/SLP PROGRESS
"Physical Therapy Treatment    Patient Name:  Carmen Tan   MRN:  [id number]    Recommendations:     Discharge Recommendations: nursing facility, skilled  Discharge Equipment Recommendations: none  Barriers to discharge: None    Assessment:     Carmen Tan is a 73 y.o. female admitted with a medical diagnosis of Acute respiratory failure with hypoxia and hypercarbia.  She presents with the following impairments/functional limitations: weakness, impaired endurance, gait instability, impaired cardiopulmonary response to activity, decreased safety awareness, impaired muscle length, impaired functional mobility, impaired self care skills Patient is waiting for a nursing home placement.  Patient is now on contact isolation due to ESBL and MDRO.  She is still on constant BI-PAP .  She required contact guard assistance with supine to sit,minimal assistance with sit to supine , contact guard assistance  with sit <>stand using a rolling walker, ambulated ~14 feet with rolling walker and constant BI-PAP with contact guard assistance., increase david today.  Patient refused to stay sitting up after therapy.     Rehab Prognosis: Fair; patient would benefit from acute skilled PT services to address these deficits and reach maximum level of function.    Recent Surgery: * No surgery found *      Plan:     During this hospitalization, patient to be seen 5 x/week to address the identified rehab impairments via gait training, therapeutic activities, therapeutic exercises, neuromuscular re-education and progress toward the following goals:    Plan of Care Expires:  10/09/23    Subjective     Chief Complaint: "I feel better today."   Patient/Family Comments/goals: Unstated   Pain/Comfort:  Pain Rating 1: 0/10  Pain Rating Post-Intervention 1: 0/10      Objective:     Communicated with nurse and patient  prior to session.  Patient found HOB elevated with peripheral IV, PureWick, Other (comments), BIPAP upon PT entry to room. "     General Precautions: Standard, fall  Orthopedic Precautions: N/A  Braces: N/A  Respiratory Status:  BI-PAP      Functional Mobility:  Bed Mobility:     Rolling Left:  contact guard assistance  Rolling Right: contact guard assistance  Scooting: contact guard assistance  Bridging: contact guard assistance  Supine to Sit: contact guard assistance  Sit to Supine: minimum assistance  Transfers:     Sit to Stand:  contact guard assistance with rolling walker  Gait: ~14 feet with RW and BI-PAP  CGA, standing rest breaks in between.  Balance: Set up with static sitting, CGA with static standing using a RW       AM-PAC 6 CLICK MOBILITY  Turning over in bed (including adjusting bedclothes, sheets and blankets)?: 3  Sitting down on and standing up from a chair with arms (e.g., wheelchair, bedside commode, etc.): 3  Moving from lying on back to sitting on the side of the bed?: 3  Moving to and from a bed to a chair (including a wheelchair)?: 3  Need to walk in hospital room?: 3  Climbing 3-5 steps with a railing?: 3  Basic Mobility Total Score: 18       Treatment & Education:  Rolling side <> side  Supine <> sit  Scooting to the edge  of the bed   Sitting balance/tolerance  Sit <> stand with RW  Standing tolerance   Gait with RW and BI-PAP   Bed positioning   AROM on both LE in sitting, AP's and LAQ x 10 reps x 1      Patient left HOB elevated with all lines intact, call button in reach, bed alarm on, and nurse notified..    GOALS:   Multidisciplinary Problems       Physical Therapy Goals          Problem: Physical Therapy    Goal Priority Disciplines Outcome Goal Variances Interventions   Physical Therapy Goal     PT, PT/OT Ongoing, Progressing     Description: Goals to be met by: 10/9/2023    Patient will increase functional independence with mobility by performin. Supine to sit with Modified Independent.  2. Sit to supine with Modified Independent.  3. Bed to chair transfer with Supervision or Set-up Assistance  with proper A.D.  using Step Transfer technique.  4. Sit to Stand with Supervision or Set-up Assistance with proper A.D. .  5. Gait  x 50  feet with Standby Assistance with proper A.D. .  6. Lower extremity exercise program x10 reps.                          Time Tracking:     PT Received On: 10/03/23  PT Start Time: 0913     PT Stop Time: 0936  PT Total Time (min): 23 min     Billable Minutes: Therapeutic Activity 10 and Therapeutic Exercise 13    Treatment Type: Treatment  PT/PTA: PT           10/03/2023

## 2023-10-03 NOTE — ASSESSMENT & PLAN NOTE
Body mass index is 46.17 kg/m². Morbid obesity complicates all aspects of disease management from diagnostic modalities to treatment. Weight loss encouraged and health benefits explained to patient.

## 2023-10-03 NOTE — ASSESSMENT & PLAN NOTE
Chronic. Patient requires oxygen supplementation at home, O2 4L via nasal cannula.   See management above.   10/3 SpO2 >96% on BiPAP.   Counseled on the importance of daily chest physiotherapy and deep breathing exercises to practice.  - continuous NIV day time and QHS  - BiPAP setting I/E 16/7 cm H2O back up rate of 8, FiO2 40%  - requires O2 4L via nasal cannula when BiPAP not in use   - continue aggressive CPT efforts

## 2023-10-03 NOTE — PLAN OF CARE
Problem: Physical Therapy  Goal: Physical Therapy Goal  Description: Goals to be met by: 10/9/2023    Patient will increase functional independence with mobility by performin. Supine to sit with Modified Independent.  2. Sit to supine with Modified Independent.  3. Bed to chair transfer with Supervision or Set-up Assistance with proper A.D.  using Step Transfer technique.  4. Sit to Stand with Supervision or Set-up Assistance with proper A.D. .  5. Gait  x 50  feet with Standby Assistance with proper A.D. .  6. Lower extremity exercise program x10 reps.     Outcome: Ongoing, Progressing

## 2023-10-03 NOTE — PLAN OF CARE
10/03/23 1040   Medicare Message   Important Message from Medicare regarding Discharge Appeal Rights Given to patient/caregiver;Explained to patient/caregiver;Signed/date by patient/caregiver   Date IMM was signed 10/03/23   Time IMM was signed 1037     Reviewed Medicare IM appeal rights with patient.  Patient signs IM.  Explained Medicare IM appeal rights.  Copy given to patient.

## 2023-10-03 NOTE — PLAN OF CARE
Problem: Occupational Therapy  Goal: Occupational Therapy Goal  Description: Goals to be met by: 10/20/2023     Patient will increase functional independence with ADLs by performing:    Toileting from toilet with Supervision for hygiene and clothing management.   Bathing from  edge of bed with [place].  Supine to sit with [place].  Step transfer with Modified [place]    Outcome: Ongoing, Progressing

## 2023-10-04 VITALS
HEART RATE: 70 BPM | DIASTOLIC BLOOD PRESSURE: 68 MMHG | WEIGHT: 269 LBS | HEIGHT: 64 IN | BODY MASS INDEX: 45.93 KG/M2 | OXYGEN SATURATION: 99 % | TEMPERATURE: 98 F | RESPIRATION RATE: 24 BRPM | SYSTOLIC BLOOD PRESSURE: 139 MMHG

## 2023-10-04 LAB
POCT GLUCOSE: 77 MG/DL (ref 70–110)
POCT GLUCOSE: 96 MG/DL (ref 70–110)

## 2023-10-04 PROCEDURE — 97110 THERAPEUTIC EXERCISES: CPT

## 2023-10-04 PROCEDURE — 25000003 PHARM REV CODE 250: Performed by: STUDENT IN AN ORGANIZED HEALTH CARE EDUCATION/TRAINING PROGRAM

## 2023-10-04 PROCEDURE — 25000003 PHARM REV CODE 250: Performed by: INTERNAL MEDICINE

## 2023-10-04 PROCEDURE — 97530 THERAPEUTIC ACTIVITIES: CPT

## 2023-10-04 PROCEDURE — 99900035 HC TECH TIME PER 15 MIN (STAT)

## 2023-10-04 PROCEDURE — 94640 AIRWAY INHALATION TREATMENT: CPT

## 2023-10-04 PROCEDURE — 99900031 HC PATIENT EDUCATION (STAT)

## 2023-10-04 PROCEDURE — 99231 PR SUBSEQUENT HOSPITAL CARE,LEVL I: ICD-10-PCS | Mod: GW,,, | Performed by: STUDENT IN AN ORGANIZED HEALTH CARE EDUCATION/TRAINING PROGRAM

## 2023-10-04 PROCEDURE — 63600175 PHARM REV CODE 636 W HCPCS: Performed by: STUDENT IN AN ORGANIZED HEALTH CARE EDUCATION/TRAINING PROGRAM

## 2023-10-04 PROCEDURE — 25000242 PHARM REV CODE 250 ALT 637 W/ HCPCS: Performed by: STUDENT IN AN ORGANIZED HEALTH CARE EDUCATION/TRAINING PROGRAM

## 2023-10-04 PROCEDURE — 94660 CPAP INITIATION&MGMT: CPT

## 2023-10-04 PROCEDURE — 94761 N-INVAS EAR/PLS OXIMETRY MLT: CPT

## 2023-10-04 PROCEDURE — 27000221 HC OXYGEN, UP TO 24 HOURS

## 2023-10-04 PROCEDURE — 99231 SBSQ HOSP IP/OBS SF/LOW 25: CPT | Mod: GW,,, | Performed by: STUDENT IN AN ORGANIZED HEALTH CARE EDUCATION/TRAINING PROGRAM

## 2023-10-04 PROCEDURE — 63600175 PHARM REV CODE 636 W HCPCS: Performed by: INTERNAL MEDICINE

## 2023-10-04 RX ORDER — INSULIN ASPART 100 [IU]/ML
0-5 INJECTION, SOLUTION INTRAVENOUS; SUBCUTANEOUS
Qty: 3 ML | Refills: 0 | Status: SHIPPED | OUTPATIENT
Start: 2023-10-04 | End: 2024-10-03

## 2023-10-04 RX ORDER — BUDESONIDE 0.5 MG/2ML
0.5 INHALANT ORAL EVERY 12 HOURS
Qty: 360 ML | Refills: 3 | Status: SHIPPED | OUTPATIENT
Start: 2023-10-04

## 2023-10-04 RX ORDER — FUROSEMIDE 20 MG/1
20 TABLET ORAL DAILY
Qty: 30 TABLET | Refills: 11 | Status: SHIPPED | OUTPATIENT
Start: 2023-10-05 | End: 2024-10-04

## 2023-10-04 RX ORDER — IPRATROPIUM BROMIDE AND ALBUTEROL SULFATE 2.5; .5 MG/3ML; MG/3ML
3 SOLUTION RESPIRATORY (INHALATION)
Qty: 75 ML | Refills: 0 | Status: SHIPPED | OUTPATIENT
Start: 2023-10-04

## 2023-10-04 RX ORDER — ASPIRIN 81 MG/1
81 TABLET ORAL DAILY
Qty: 90 TABLET | Refills: 3 | Status: SHIPPED | OUTPATIENT
Start: 2023-10-05

## 2023-10-04 RX ORDER — LEVOTHYROXINE SODIUM 50 UG/1
50 TABLET ORAL
Qty: 30 TABLET | Refills: 11 | Status: SHIPPED | OUTPATIENT
Start: 2023-10-05 | End: 2024-10-04

## 2023-10-04 RX ORDER — LEVOFLOXACIN 500 MG/1
500 TABLET, FILM COATED ORAL DAILY
Qty: 8 TABLET | Refills: 0 | Status: SHIPPED | OUTPATIENT
Start: 2023-10-05 | End: 2023-10-13

## 2023-10-04 RX ORDER — BUSPIRONE HYDROCHLORIDE 5 MG/1
5 TABLET ORAL 2 TIMES DAILY
Qty: 60 TABLET | Refills: 11 | Status: SHIPPED | OUTPATIENT
Start: 2023-10-04 | End: 2024-10-03

## 2023-10-04 RX ORDER — MUPIROCIN 20 MG/G
OINTMENT TOPICAL 2 TIMES DAILY
Qty: 15 G | Refills: 0 | Status: SHIPPED | OUTPATIENT
Start: 2023-10-04 | End: 2023-10-07

## 2023-10-04 RX ORDER — NITROFURANTOIN 25; 75 MG/1; MG/1
100 CAPSULE ORAL EVERY 12 HOURS
Status: DISCONTINUED | OUTPATIENT
Start: 2023-10-04 | End: 2023-10-04 | Stop reason: HOSPADM

## 2023-10-04 RX ORDER — NITROFURANTOIN 25; 75 MG/1; MG/1
100 CAPSULE ORAL EVERY 12 HOURS
Qty: 11 CAPSULE | Refills: 0 | Status: SHIPPED | OUTPATIENT
Start: 2023-10-04

## 2023-10-04 RX ADMIN — SERTRALINE HYDROCHLORIDE 50 MG: 50 TABLET ORAL at 08:10

## 2023-10-04 RX ADMIN — BUDESONIDE 0.5 MG: 0.5 INHALANT RESPIRATORY (INHALATION) at 07:10

## 2023-10-04 RX ADMIN — PANTOPRAZOLE SODIUM 40 MG: 40 TABLET, DELAYED RELEASE ORAL at 08:10

## 2023-10-04 RX ADMIN — MEROPENEM 1 G: 1 INJECTION, POWDER, FOR SOLUTION INTRAVENOUS at 05:10

## 2023-10-04 RX ADMIN — MEROPENEM 1 G: 1 INJECTION, POWDER, FOR SOLUTION INTRAVENOUS at 11:10

## 2023-10-04 RX ADMIN — IPRATROPIUM BROMIDE AND ALBUTEROL SULFATE 3 ML: .5; 3 SOLUTION RESPIRATORY (INHALATION) at 07:10

## 2023-10-04 RX ADMIN — BUSPIRONE HYDROCHLORIDE 5 MG: 5 TABLET ORAL at 08:10

## 2023-10-04 RX ADMIN — FUROSEMIDE 20 MG: 20 TABLET ORAL at 08:10

## 2023-10-04 RX ADMIN — MUPIROCIN: 20 OINTMENT TOPICAL at 08:10

## 2023-10-04 RX ADMIN — VALSARTAN 40 MG: 40 TABLET, FILM COATED ORAL at 08:10

## 2023-10-04 RX ADMIN — ENOXAPARIN SODIUM 40 MG: 40 INJECTION SUBCUTANEOUS at 08:10

## 2023-10-04 RX ADMIN — LEVOFLOXACIN 500 MG: 250 TABLET, FILM COATED ORAL at 08:10

## 2023-10-04 RX ADMIN — LEVOTHYROXINE SODIUM 50 MCG: 25 TABLET ORAL at 06:10

## 2023-10-04 RX ADMIN — ASPIRIN 81 MG: 81 TABLET, COATED ORAL at 08:10

## 2023-10-04 RX ADMIN — ISOSORBIDE MONONITRATE 30 MG: 30 TABLET, EXTENDED RELEASE ORAL at 08:10

## 2023-10-04 RX ADMIN — ACETYLCYSTEINE 2 ML: 200 SOLUTION ORAL; RESPIRATORY (INHALATION) at 07:10

## 2023-10-04 RX ADMIN — IPRATROPIUM BROMIDE AND ALBUTEROL SULFATE 3 ML: .5; 3 SOLUTION RESPIRATORY (INHALATION) at 02:10

## 2023-10-04 NOTE — NURSING
REPORT CALLED TO VELVET MÉNDEZ AT [facility].  SALINE-LOCK D/C'D WITH CATHETER INTACT.  TELEMETRY D/C'D AS WELL.  PATIENT GIVEN DISCHARGE INSTRUCTIONS AS WELL.  VOICED COMPLETE UNDERSTANDING OF ALL INSTRUCTIONS.  JASON VARGAS LPN

## 2023-10-04 NOTE — PROGRESS NOTES
[facility] Medicine  Progress Note    Patient Name: Carmen Tan  MRN: [id number]  Patient Class: IP- Inpatient   Admission Date: 9/29/2023  Length of Stay: 5 days  Attending Physician: Gunnar Mott DO  Primary Care Provider: Lucian Barry MD        Subjective:     Principal Problem:Acute respiratory failure with hypoxia and hypercarbia        HPI:  Chief Complaint   Patient presents with    Altered Mental Status       EMS reports, pt from home with c/o lethargy and AMS due Home Health taking her CPAP away from her, pt presents with shallow breathing and odorous smell of urine.  18G Left AC, pt on 4L O2 24x7      Patient presents from home via EMS for altered mental status.  Per EMS the patient lives at home with family was recently in a nursing facility and has been at home for approximately 1 week.  When she awoke this morning patient was altered decreased level of consciousness.  Patient has a history of COPD and is supposed to be wearing a CPAP but for unknown reasons it was taken away by home health.  She is 4 L oxygen dependent per EMS. Limited information due to patient's mental status.     73 year old female with chronic hypercapnic and hypoxic respiratory failure on 4 L nasal cannula, diabetes mellitus type 2, diastolic heart failure, history of pulmonary embolism and continued tobacco abuse recently discharged from inpatient hospitalization for cardiac work up after NSTEMI presenting with shortness of breath, leg swelling, CHF exacerbation and treated for urinary tract infection.     ED course:   At time of arrival, temp 95.4F, pulse 92, RR 34, . 77 mmHg, SpO2 79 % on room air. Placed on BiPAP, initial ABG with pH7.1, CO2 greater than 105 with pO2 of 79.9. Patient reported to become more responsive but still somnolent with BiPAP on. Repeat ABG improved to pH 7.2, pCO2 93, pO2 65.8 HCO3 29.3, FiO2 50%. CT Chest findings of nodular thyroid, enlarged pulmonary  artery, gallstones, right basilar and middle lobe mild infiltrate with mucous plugging throughout right lower lobe and minimal left basilar discoid atelectasis. Subtle infiltrate in left upper lobe along fissure. Patient received Rocephin x1, solumedrol. Less somnolent with continuous BiPAP. Patient to be admitted for respiratory failure secondary to pneumonia with continued IV antibiotics, breathing treatments, supplementation oxygen and consult cardiology for medication optimization.        Overview/Hospital Course:  9/30 Overnight on BiPAP I/E 16/7cm H2O. Patient more alert, following commands appropriately. Head nods for affirmation. Continue with scheduled breathing treatments, including mucolytics, aggressive pulmonary hygiene care. Follow up PT and OT.   10/1 BiPAP QHS maintaining SpO2 >98%. Patient without oxygen supplementation desaturates into 80% ile. Patient educated on importance of keeping oxygen supplementation while eating. Urine culture positive for Ecoli >100K, currently covering for upper respiratory infection with Rocephin and doxycycline. Agreeable to discharge to nursing facility. Consult CM in place, TB screening ordered.   10/2 PT and OT efforts limited with BiPAP, but patient seen regaining strength and support self with walker and assistance. Awaiting placement to SNF for continued PT and OT efforts with medication management.   10/3 No acute events overnight. Compliant with BiPAP use. Patient endorses no new changes. Awaiting placement to SNF. Adjust IV antibiotics to cover for nosocomial infection and ESBL MDRO urinary tract infection.   10/4 Continuous BiPAP. Patient endorses feeling better. Continue with antibiotics for UTI and right side pneumonia. Continue daily PT and OT, awaiting SNF placement.       Interval History: Patient seen and examined.     Review of Systems   Constitutional:  Positive for activity change. Negative for appetite change, fatigue and fever.   HENT:  Negative  for postnasal drip, sinus pain and sore throat.    Eyes:  Negative for pain and visual disturbance.   Respiratory:  Negative for cough, chest tightness, shortness of breath and stridor.    Gastrointestinal:  Negative for abdominal pain, constipation, diarrhea, nausea and vomiting.   Neurological:  Negative for light-headedness and headaches.   Psychiatric/Behavioral:  Negative for agitation, behavioral problems, confusion and decreased concentration. The patient is not nervous/anxious and is not hyperactive.      Objective:     Vital Signs (Most Recent):  Temp: 96.7 °F (35.9 °C) (10/04/23 0515)  Pulse: 64 (10/04/23 0736)  Resp: (!) 27 (10/04/23 0736)  BP: (!) 143/67 (10/04/23 0515)  SpO2: 99 % (10/04/23 0736) Vital Signs (24h Range):  Temp:  [95.8 °F (35.4 °C)-98.6 °F (37 °C)] 96.7 °F (35.9 °C)  Pulse:  [60-76] 64  Resp:  [17-32] 27  SpO2:  [96 %-100 %] 99 %  BP: (131-162)/(61-76) 143/67     Weight: 122 kg (269 lb)  Body mass index is 46.17 kg/m².    Intake/Output Summary (Last 24 hours) at 10/4/2023 0950  Last data filed at 10/4/2023 0550  Gross per 24 hour   Intake 1186 ml   Output 1600 ml   Net -414 ml         Physical Exam  Vitals and nursing note reviewed.   Constitutional:       General: She is not in acute distress.     Appearance: She is obese. She is ill-appearing. She is not toxic-appearing or diaphoretic.   HENT:      Mouth/Throat:      Mouth: Mucous membranes are dry.      Pharynx: Oropharynx is clear.      Comments: Lips dry  Cardiovascular:      Pulses: Normal pulses.      Heart sounds: Normal heart sounds.   Pulmonary:      Effort: No respiratory distress.      Breath sounds: No wheezing (faint, breath sounds reaching lung bases), rhonchi or rales.      Comments: BiPAP mask secured, FiO2 40%  Abdominal:      General: There is no distension.      Palpations: Abdomen is soft.      Tenderness: There is no abdominal tenderness. There is no right CVA tenderness, left CVA tenderness or guarding.       Comments: Limited secondary to body habitus   Musculoskeletal:      Cervical back: Neck supple.      Right lower leg: No edema.      Left lower leg: No edema.   Skin:     General: Skin is warm and dry.   Neurological:      Mental Status: She is alert and oriented to person, place, and time. Mental status is at baseline.   Psychiatric:         Mood and Affect: Mood normal.         Behavior: Behavior normal.           Significant Labs: All pertinent labs within the past 24 hours have been reviewed.    CMP:   Recent Labs   Lab 10/03/23  0845      K 4.1      CO2 42*      BUN 11   CREATININE 0.9   CALCIUM 9.9   ANIONGAP -3*     Magnesium:   Recent Labs   Lab 10/03/23  0845   MG 1.8     POCT Glucose:   Recent Labs   Lab 10/03/23  1101 10/03/23  1557 10/03/23  2035   POCTGLUCOSE 116* 77 100     TSH:   Recent Labs   Lab 09/29/23  1238   TSH 0.657     Significant Imaging: I have reviewed all pertinent imaging results/findings within the past 24 hours.      Assessment/Plan:      * Acute respiratory failure with hypoxia and hypercarbia  Patient with Hypercapnic Respiratory failure which is Chronic.  she is on home oxygen at 4 LPM. Supplemental oxygen was provided and noted- Oxygen Concentration (%):  [40] 40    Signs/symptoms of respiratory failure include- tachypnea, increased work of breathing, respiratory distress and lethargy. Contributing diagnoses includes - ARDS, CHF and COPD Labs and images were reviewed. Patient Has recent ABG, which has been reviewed. Will treat underlying causes and adjust management of respiratory failure as follows- Breathing treatments, IV steroids, IV antibiotics, supplemental oxygen, BIPAP continuous and bedtime.   10/3 For above right sided infiltrate findings, patient completed 4 days of doxycycline and Rocephin, will transition to PO levaquin for 10 days for a total of 14 days therapy  - continue CPT and above breathing treatments    Gallstones  Findings on CT chest.   Lab  Results   Component Value Date    ALT 14 10/02/2023    AST 10 10/02/2023    ALKPHOS 86 10/02/2023    BILITOT 0.6 10/02/2023   Asymptomatic. Continue to monitor.       Multiple thyroid nodules  Findings on CT chest with contrast.   Lab Results   Component Value Date    TSH 0.657 09/29/2023    FREET4 1.09 09/09/2023   Euthyroid. Follow up endocrinology outpatient for continued monitoring.         Chronic diastolic congestive heart failure  Patient is identified as having Diastolic (HFpEF) heart failure that is Chronic. CHF is currently uncontrolled due to Dyspnea not returned to baseline after x1 doses of IV diuretic and Rales/crackles on pulmonary exam. Latest ECHO performed and demonstrates- Results for orders placed during the hospital encounter of 09/09/23    Echo  Interpretation Summary    Left Ventricle: The left ventricle is normal in size. Mildly increased wall thickness. Normal wall motion. There is mildly reduced systolic function with a visually estimated ejection fraction of 40 - 45%. There is normal diastolic function.    Right Ventricle: Normal right ventricular cavity size. Wall thickness is normal. Right ventricle wall motion  is normal. Systolic function is normal.    Aortic Valve: There is mild to moderate aortic regurgitation with a centrally directed jet.    Mitral Valve: There is moderate regurgitation.    Tricuspid Valve: There is moderate regurgitation with a centrally directed jet.    Pulmonic Valve: There is mild to moderate regurgitation.    Pulmonary Artery: The estimated pulmonary artery systolic pressure is 47 mmHg.    IVC/SVC: Normal venous pressure at 3 mmHg.    Continue Beta Blocker, Entresto, Aldactone and Nitrate/Vasodilator and monitor clinical status closely. Monitor on telemetry. Patient is on CHF pathway.  Monitor strict Is&Os and daily weights.  Place on fluid restriction of 1.5 L. Cardiology has been consulted. Continue to stress to patient importance of self efficacy  and  on diet for CHF. Last BNP reviewed- and noted below No results for input(s).  - Monitor I/Os, daily weight  - Continuous telemetry and pulse oximetry  - Patient requires completion of second part stress test outpatient with cardiology in     Hypercapnia  Chronic. Patient requires oxygen supplementation at home, O2 4L via nasal cannula.   See management above.   10/3 SpO2 >97% on BiPAP.   Counseled on the importance of daily chest physiotherapy and deep breathing exercises to practice.  - continuous NIV day time and QHS  - BiPAP setting I/E 16/7 cm H2O back up rate of 8, FiO2 40%  - requires O2 4L via nasal cannula when BiPAP not in use   - continue aggressive CPT efforts  10/4 continue with above        Urinary tract infection with hematuria  Rocephin x1 given in ER. Patient last treated rocephin during last hospitalization early this month.   UA with no bacteria, +pyuria and hematuria  10/3  Urine culture positive for ESBL, MDRO E coli  Will transition to IV Merrem for 5 days total therapy   10/4 continue with IV Merrem      AMS (altered mental status)  Likely secondary to acute respiratory failure with CO2 retention. CT head unremarkable, no acute intracranial findings.   Responsive to voice with BIPAP use, ABG showing last pCO2 93.  Continue to monitor, follow up neuro checks.   9/30 More alert  Resolved  10/1 Baseline now, speaking and tolerating PO intake    Gross hematuria  Stable Hg/HCT.  Lab Results   Component Value Date    HGB 11.2 (L) 10/02/2023    HGB 11.9 (L) 10/01/2023    HGB 12.2 09/30/2023     Per cardiology resume DVT ppx with lovenox BID  10/3 stable Hg/HCT.   - monitor Hg/HCT and hematuria    Severe obesity (BMI >= 40)  Body mass index is 46.17 kg/m². Morbid obesity complicates all aspects of disease management from diagnostic modalities to treatment. Weight loss encouraged and health benefits explained to patient.           VTE Risk Mitigation (From admission, onward)          Ordered     enoxaparin injection 40 mg  Every 12 hours         10/02/23 0752     Place sequential compression device  Until discontinued         09/29/23 2107     IP VTE HIGH RISK PATIENT  Once         09/29/23 1625     Place sequential compression device  Until discontinued         09/29/23 1625                Discharge Planning   TIGRE:      Code Status: Full Code   Is the patient medically ready for discharge?:     Reason for patient still in hospital (select all that apply): Pending disposition  Discharge Plan A: Skilled Nursing Facility   Discharge Delays: None known at this time              Gunnar Mott DO  Department of Hospital Medicine   [facility]

## 2023-10-04 NOTE — ASSESSMENT & PLAN NOTE
Chronic. Patient requires oxygen supplementation at home, O2 4L via nasal cannula.   See management above.   10/3 SpO2 >97% on BiPAP.   Counseled on the importance of daily chest physiotherapy and deep breathing exercises to practice.  - continuous NIV day time and QHS  - BiPAP setting I/E 16/7 cm H2O back up rate of 8, FiO2 40%  - requires O2 4L via nasal cannula when BiPAP not in use   - continue aggressive CPT efforts  10/4 continue with above

## 2023-10-04 NOTE — PLAN OF CARE
Plan of care reviewed with pt, no questions or concerns. 22 gauge R hand infusing antibiotics at this time, TB skin test on L hand for nursing home placement, fluid restriction in place, continuous BiPAP tolerating well. Call light and personal items within reach of pt, free of pain and complaints at this time.   Problem: Adult Inpatient Plan of Care  Goal: Plan of Care Review  Outcome: Ongoing, Progressing  Goal: Patient-Specific Goal (Individualized)  Outcome: Ongoing, Progressing  Goal: Absence of Hospital-Acquired Illness or Injury  Outcome: Ongoing, Progressing     Problem: Bariatric Environmental Safety  Goal: Safety Maintained with Care  Outcome: Ongoing, Progressing     Problem: Diabetes Comorbidity  Goal: Blood Glucose Level Within Targeted Range  Outcome: Ongoing, Progressing     Problem: Skin Injury Risk Increased  Goal: Skin Health and Integrity  Outcome: Ongoing, Progressing     Problem: Infection  Goal: Absence of Infection Signs and Symptoms  Outcome: Ongoing, Progressing     Problem: Adjustment to Illness COPD (Chronic Obstructive Pulmonary Disease)  Goal: Optimal Chronic Illness Coping  Outcome: Ongoing, Progressing     Problem: Functional Ability Impaired COPD (Chronic Obstructive Pulmonary Disease)  Goal: Optimal Level of Functional [place]  Outcome: Ongoing, Progressing     Problem: Infection COPD (Chronic Obstructive Pulmonary Disease)  Goal: Absence of Infection Signs and Symptoms  Outcome: Ongoing, Progressing     Problem: Oral Intake Inadequate COPD (Chronic Obstructive Pulmonary Disease)  Goal: Improved Nutrition Intake  Outcome: Ongoing, Progressing     Problem: Respiratory Compromise COPD (Chronic Obstructive Pulmonary Disease)  Goal: Effective Oxygenation and Ventilation  Outcome: Ongoing, Progressing     Problem: Adult Inpatient Plan of Care  Goal: Optimal Comfort and Wellbeing  Outcome: Ongoing, Not Progressing  Goal: Readiness for Transition of Care  Outcome: Ongoing, Not  Progressing

## 2023-10-04 NOTE — SUBJECTIVE & OBJECTIVE
Interval History: Patient seen and examined.     Review of Systems   Constitutional:  Positive for activity change. Negative for appetite change, fatigue and fever.   HENT:  Negative for postnasal drip, sinus pain and sore throat.    Eyes:  Negative for pain and visual disturbance.   Respiratory:  Negative for cough, chest tightness, shortness of breath and stridor.    Gastrointestinal:  Negative for abdominal pain, constipation, diarrhea, nausea and vomiting.   Neurological:  Negative for light-headedness and headaches.   Psychiatric/Behavioral:  Negative for agitation, behavioral problems, confusion and decreased concentration. The patient is not nervous/anxious and is not hyperactive.      Objective:     Vital Signs (Most Recent):  Temp: 96.7 °F (35.9 °C) (10/04/23 0515)  Pulse: 64 (10/04/23 0736)  Resp: (!) 27 (10/04/23 0736)  BP: (!) 143/67 (10/04/23 0515)  SpO2: 99 % (10/04/23 0736) Vital Signs (24h Range):  Temp:  [95.8 °F (35.4 °C)-98.6 °F (37 °C)] 96.7 °F (35.9 °C)  Pulse:  [60-76] 64  Resp:  [17-32] 27  SpO2:  [96 %-100 %] 99 %  BP: (131-162)/(61-76) 143/67     Weight: 122 kg (269 lb)  Body mass index is 46.17 kg/m².    Intake/Output Summary (Last 24 hours) at 10/4/2023 0950  Last data filed at 10/4/2023 0550  Gross per 24 hour   Intake 1186 ml   Output 1600 ml   Net -414 ml         Physical Exam  Vitals and nursing note reviewed.   Constitutional:       General: She is not in acute distress.     Appearance: She is obese. She is ill-appearing. She is not toxic-appearing or diaphoretic.   HENT:      Mouth/Throat:      Mouth: Mucous membranes are dry.      Pharynx: Oropharynx is clear.      Comments: Lips dry  Cardiovascular:      Pulses: Normal pulses.      Heart sounds: Normal heart sounds.   Pulmonary:      Effort: No respiratory distress.      Breath sounds: No wheezing (faint, breath sounds reaching lung bases), rhonchi or rales.      Comments: BiPAP mask secured, FiO2 40%  Abdominal:      General:  There is no distension.      Palpations: Abdomen is soft.      Tenderness: There is no abdominal tenderness. There is no right CVA tenderness, left CVA tenderness or guarding.      Comments: Limited secondary to body habitus   Musculoskeletal:      Cervical back: Neck supple.      Right lower leg: No edema.      Left lower leg: No edema.   Skin:     General: Skin is warm and dry.   Neurological:      Mental Status: She is alert and oriented to person, place, and time. Mental status is at baseline.   Psychiatric:         Mood and Affect: Mood normal.         Behavior: Behavior normal.           Significant Labs: All pertinent labs within the past 24 hours have been reviewed.    CMP:   Recent Labs   Lab 10/03/23  0845      K 4.1      CO2 42*      BUN 11   CREATININE 0.9   CALCIUM 9.9   ANIONGAP -3*     Magnesium:   Recent Labs   Lab 10/03/23  0845   MG 1.8     POCT Glucose:   Recent Labs   Lab 10/03/23  1101 10/03/23  1557 10/03/23  2035   POCTGLUCOSE 116* 77 100     TSH:   Recent Labs   Lab 09/29/23  1238   TSH 0.657     Significant Imaging: I have reviewed all pertinent imaging results/findings within the past 24 hours.

## 2023-10-04 NOTE — PT/OT/SLP DISCHARGE
Physical Therapy Discharge Summary    Name: Carmen Tan  MRN: [id number]   Principal Problem: Acute respiratory failure with hypoxia and hypercarbia     Patient Discharged from acute Physical Therapy on 10/4/2023.  Please refer to prior PT noted date on 10/4/2023 for functional status.     Assessment:     Patient appropriate for care in another setting.    Objective:     GOALS:   Multidisciplinary Problems       Physical Therapy Goals          Problem: Physical Therapy    Goal Priority Disciplines Outcome Goal Variances Interventions   Physical Therapy Goal     PT, PT/OT Adequate for Care Transition     Description: Goals to be met by: 10/9/2023    Patient will increase functional independence with mobility by performin. Supine to sit with Modified Independent.  2. Sit to supine with Modified Independent.  3. Bed to chair transfer with Supervision or Set-up Assistance with proper A.D.  using Step Transfer technique.  4. Sit to Stand with Supervision or Set-up Assistance with proper A.D. .  5. Gait  x 50  feet with Standby Assistance with proper A.D. .  6. Lower extremity exercise program x10 reps.                          Reasons for Discontinuation of Therapy Services  Transfer to alternate level of care.      Plan:     Patient Discharged to: Skilled Nursing Facility.      10/4/2023

## 2023-10-04 NOTE — DISCHARGE SUMMARY
[facility]  Discharge Summary      Patient Name: Carmen Tan  MRN: [id number]  GOLDIE: [id number]  Patient Class: IP- Inpatient  Admission Date: 9/29/2023  Hospital Length of Stay: 5 days  Discharge Date and Time:  10/04/2023 12:05 PM  Attending Physician: Gunnar Mott DO   Discharging Provider: Gunnar Mott DO  Primary Care Provider: Lucian Barry MD    Primary Care Team: Networked reference to record PCT     HPI:   Chief Complaint   Patient presents with    Altered Mental Status       EMS reports, pt from home with c/o lethargy and AMS due Home Health taking her CPAP away from her, pt presents with shallow breathing and odorous smell of urine.  18G Left AC, pt on 4L O2 24x7      Patient presents from home via EMS for altered mental status.  Per EMS the patient lives at home with family was recently in a nursing facility and has been at home for approximately 1 week.  When she awoke this morning patient was altered decreased level of consciousness.  Patient has a history of COPD and is supposed to be wearing a CPAP but for unknown reasons it was taken away by home health.  She is 4 L oxygen dependent per EMS. Limited information due to patient's mental status.     73 year old female with chronic hypercapnic and hypoxic respiratory failure on 4 L nasal cannula, diabetes mellitus type 2, diastolic heart failure, history of pulmonary embolism and continued tobacco abuse recently discharged from inpatient hospitalization for cardiac work up after NSTEMI presenting with shortness of breath, leg swelling, CHF exacerbation and treated for urinary tract infection.     ED course:   At time of arrival, temp 95.4F, pulse 92, RR 34, . 77 mmHg, SpO2 79 % on room air. Placed on BiPAP, initial ABG with pH7.1, CO2 greater than 105 with pO2 of 79.9. Patient reported to become more responsive but still somnolent with BiPAP on. Repeat ABG improved to pH 7.2, pCO2 93, pO2  65.8 HCO3 29.3, FiO2 50%. CT Chest findings of nodular thyroid, enlarged pulmonary artery, gallstones, right basilar and middle lobe mild infiltrate with mucous plugging throughout right lower lobe and minimal left basilar discoid atelectasis. Subtle infiltrate in left upper lobe along fissure. Patient received Rocephin x1, solumedrol. Less somnolent with continuous BiPAP. Patient to be admitted for respiratory failure secondary to pneumonia with continued IV antibiotics, breathing treatments, supplementation oxygen and consult cardiology for medication optimization.        * No surgery found *      Hospital Course:   9/30 Overnight on BiPAP I/E 16/7cm H2O. Patient more alert, following commands appropriately. Head nods for affirmation. Continue with scheduled breathing treatments, including mucolytics, aggressive pulmonary hygiene care. Follow up PT and OT.   10/1 BiPAP QHS maintaining SpO2 >98%. Patient without oxygen supplementation desaturates into 80% ile. Patient educated on importance of keeping oxygen supplementation while eating. Urine culture positive for Ecoli >100K, currently covering for upper respiratory infection with Rocephin and doxycycline. Agreeable to discharge to nursing facility. Consult CM in place, TB screening ordered.   10/2 PT and OT efforts limited with BiPAP, but patient seen regaining strength and support self with walker and assistance. Awaiting placement to SNF for continued PT and OT efforts with medication management.   10/3 No acute events overnight. Compliant with BiPAP use. Patient endorses no new changes. Awaiting placement to SNF. Adjust IV antibiotics to cover for nosocomial infection and ESBL MDRO urinary tract infection.   10/4 Continuous BiPAP. Patient endorses feeling better. Continue with antibiotics for UTI and right side pneumonia. Continue daily PT and OT, awaiting SNF placement.      ADDENDUM:  Modifications to antibiotics on discharge  Discontinue IV Merrem  Will  continue with Macrobid therapy for ESBL E coli for total of 7 days of treatment.  Completed 2 days of Merrem  Start Macrobid evening of 10/4 for 11 doses remaining.     Goals of Care Treatment Preferences:  Code Status: Full Code      Consults:   Consults (From admission, onward)          Status Ordering Provider     Inpatient consult to Social Work/Case Management  Once        Provider:  (Not yet assigned)    Completed SHONNA DANIELS     Inpatient consult to Social Work/Case Management  Once        Provider:  (Not yet assigned)    Acknowledged SHONNA DANIELS     Inpatient consult to Social Work/Case Management  Once        Provider:  (Not yet assigned)    Completed SHONNA DANIELS     Inpatient consult to Social Work/Case Management  Once        Provider:  (Not yet assigned)    Completed SHONNA DANIELS     Inpatient consult to Cardiology  Once        Provider:  David Espinosa MD    Completed SHONNA DANIELS            No new Assessment & Plan notes have been filed under this hospital service since the last note was generated.  Service: Hospital Medicine    Final Active Diagnoses:    Diagnosis Date Noted POA    PRINCIPAL PROBLEM:  Acute respiratory failure with hypoxia and hypercarbia [J96.01, J96.02] 09/29/2023 Yes     Chronic    AMS (altered mental status) [R41.82] 09/29/2023 Yes    Urinary tract infection with hematuria [N39.0, R31.9] 09/29/2023 Yes    Hypercapnia [R06.89] 09/29/2023 Yes    Chronic diastolic congestive heart failure [I50.32] 09/29/2023 Yes     Chronic    Multiple thyroid nodules [E04.2] 09/29/2023 Yes     Chronic    Gallstones [K80.20] 09/29/2023 Yes    Gross hematuria [R31.0] 04/03/2023 Yes    Severe obesity (BMI >= 40) [E66.01] 02/04/2022 Yes      Problems Resolved During this Admission:       Discharged Condition: stable    Disposition: Skilled Nursing Facility    Follow Up:   Follow-up Information       Lucian Barry MD Follow up.    Specialty: Internal  "Medicine  Why: Patient requires follow-up with the [facility] cardiology (Lorenzo Sampson MD) service in [place] to complete stress test.  Contact information:  [address]  [facility] [address] [phone]               Lorenzo Sampson MD Follow up.    Specialty: Cardiology  Contact information:  [address]  [facility] [address] [phone]                           Patient Instructions:      BIPAP FOR HOME USE   Order Comments: FiO2 40%     Order Specific Question Answer Comments   Length of need (1-99 months): 99    Type:  Bipap S/T    IPAP: cmH20 (4-30): 16    EPAP: cmH20 (4-25): 7    Breath Rate: BPM (Off, 1-30): 8    Humidification (): Heated    Choose ONE mask type and its corresponding cushions and/or pillows:  Full Face Mask, 1 per 90 days:  Full Face Cushion, (3 per 90 days)    Choose EITHER Heated or Non-Heated Tubjing  Non-Heated Tubing, 1 per 90 days    All other supplies as needed as listed below:  Headgear, 1 per 180 days    All other supplies as needed as listed below:  Chin Strap, 1 per 180 days    All other supplies as needed as listed below:  Disposable Filter, 6 per 90 days    All other supplies as needed as listed below:  Non-Disposable Filter, 1 per 180 days    All other supplies as needed as listed below:  Exhalation Port, contact payer for quantity/frequency    All other supplies as needed as listed below:  Humidifier Chamber, 1 per 180 days      NIPPV FOR HOME USE     Order Specific Question Answer Comments   Height: 5' 4" (1.626 m)    Weight: 122 kg (269 lb)    Length of need (1-99 months): 99        Significant Diagnostic Studies: Labs:   BMP:   Recent Labs   Lab 10/03/23  0845         K 4.1      CO2 42*   BUN 11   CREATININE 0.9   CALCIUM 9.9   MG 1.8   , CMP   Recent Labs   Lab 10/03/23  0845      K 4.1      CO2 42*      BUN 11   CREATININE 0.9   CALCIUM 9.9 " "  ANIONGAP -3*   , CBC No results for input(s): "WBC", "HGB", "HCT", "PLT" in the last 48 hours., INR   Lab Results   Component Value Date    INR 1.0 09/09/2023    INR 1.0 07/29/2017   , Lipid Panel   Lab Results   Component Value Date    CHOL 160 09/09/2023    HDL 52 09/09/2023    LDLCALC 96.2 09/09/2023    TRIG 59 09/09/2023    CHOLHDL 32.5 09/09/2023   , Troponin No results for input(s): "TROPONINI" in the last 168 hours., A1C:   Recent Labs   Lab 09/09/23  0618   HGBA1C 5.3  5.3    and All labs within the past 24 hours have been reviewed  Microbiology:   Blood Culture   Lab Results   Component Value Date    LABBLOO No Growth to date 09/29/2023    LABBLOO No Growth to date 09/29/2023    LABBLOO No Growth to date 09/29/2023    LABBLOO No Growth to date 09/29/2023    LABBLOO No Growth to date 09/29/2023   , Sputum Culture   Lab Results   Component Value Date    GSRESP <10 epithelial cells per low power field. 04/07/2023    GSRESP Few Gram positive cocci 04/07/2023    GSRESP Few Gram negative rods 04/07/2023    RESPIRATORYC Normal respiratory beth 04/07/2023    RESPIRATORYC No S aureus or Pseudomonas isolated. 04/07/2023    and Urine Culture    Lab Results   Component Value Date    LABURIN ESCHERICHIA COLI ESBL  >100,000 cfu/ml   (A) 09/29/2023     Radiology: X-Ray: CXR: X-Ray Chest 1 View (CXR):   Results for orders placed or performed during the hospital encounter of 09/29/23   X-Ray Chest 1 View    Narrative    EXAMINATION:  XR CHEST 1 VIEW    CLINICAL HISTORY:  Altered mental status, unspecified    COMPARISON:  09/27/2023    FINDINGS:  Enlarged cardiac silhouette, unchanged.  No vascular congestion, acute infiltrates, pleural fluid or other detrimental change from prior      Impression    No acute findings or interval radiographic changes      Electronically signed by: Abida Izquierdo MD  Date:    09/29/2023  Time:    13:58     CT Chest With Contrast  Narrative: EXAMINATION:  CT CHEST WITH CONTRAST    CLINICAL " HISTORY:  eval for PNA;    TECHNIQUE:  Iterative reconstruction technique was used.    CT/Cardiac Nuclear exams in prior 12 months: 1    COMPARISON:  09/15/2023.    FINDINGS:  There is a nodular thyroid with multiple nodules greater in the left and in the isthmus.  There is an enlarged main pulmonary artery measuring 5.6 cm transverse.  There is stones in the gallbladder.  Bibasilar discoid atelectasis with subtle infiltrate at the right lung base and right middle lobe some mucous plugging.  There appears to be some mucous plugging in the right lower lobe bronchi.  Subtle infiltrate or discoid atelectasis in left upper lobe along fissure  Impression: 1. Large nodular thyroid with multiple nodules  2. Enlarged main pulmonary artery  3. Gallstones  4. Right basilar and middle lobe mild infiltrate with mucous plugging throughout the right lower lobe bronchi and minimal left basilar discoid atelectasis.  5. Discoid atelectasis or subtle infiltrate in the left upper lobe along the fissure posteriorly    Electronically signed by: Fabiola Rodgers MD  Date:    09/29/2023  Time:    17:37    X-Ray Chest 1 View  Narrative: EXAMINATION:  XR CHEST 1 VIEW    CLINICAL HISTORY:  Altered mental status, unspecified    COMPARISON:  09/27/2023    FINDINGS:  Enlarged cardiac silhouette, unchanged.  No vascular congestion, acute infiltrates, pleural fluid or other detrimental change from prior  Impression: No acute findings or interval radiographic changes    Electronically signed by: Abida Izquierdo MD  Date:    09/29/2023  Time:    13:58    CT Head Without Contrast  Narrative: EXAMINATION:  CT HEAD WITHOUT CONTRAST    CLINICAL HISTORY:  ams;    TECHNIQUE:  Iterative reconstruction technique was performed.    CT/cardiac nuclear exam/s in prior 12 months:  1.    COMPARISON:  Head CT 03/27/2016.    FINDINGS:  No hydrocephalus. No mass or mass effect. No signs of acute hemorrhage.  Mild volume loss.  Cranial bones unremarkable.  Impression:  No acute intracranial findings.    Electronically signed by: William Hardy MD  Date:    09/29/2023  Time:    13:10    Results for orders placed during the hospital encounter of 09/09/23    Echo    Interpretation Summary    Left Ventricle: The left ventricle is normal in size. Mildly increased wall thickness. Normal wall motion. There is mildly reduced systolic function with a visually estimated ejection fraction of 40 - 45%. There is normal diastolic function.    Right Ventricle: Normal right ventricular cavity size. Wall thickness is normal. Right ventricle wall motion  is normal. Systolic function is normal.    Aortic Valve: There is mild to moderate aortic regurgitation with a centrally directed jet.    Mitral Valve: There is moderate regurgitation.    Tricuspid Valve: There is moderate regurgitation with a centrally directed jet.    Pulmonic Valve: There is mild to moderate regurgitation.    Pulmonary Artery: The estimated pulmonary artery systolic pressure is 47 mmHg.    IVC/SVC: Normal venous pressure at 3 mmHg.      Urine Culture, Routine  Abnormal    ESCHERICHIA COLI ESBL   >100,000 cfu/ml      Narrative    Collection Type->Urine, Clean Catch     Susceptibility     Escherichia coli ESBL     CULTURE, URINE    Amikacin <=16 mcg/mL S    Amox/K Clav'ate 16/8 mcg/mL R    Amp/Sulbactam 16/8 mcg/mL R    Ampicillin >16 mcg/mL R    Cefazolin >16 mcg/mL R    Cefepime >16 mcg/mL R    Ceftriaxone >32 mcg/mL R    Ciprofloxacin >2 mcg/mL R    Ertapenem <=0.5 mcg/mL S    Gentamicin <=4 mcg/mL S    Levofloxacin >4 mcg/mL R    Meropenem <=1 mcg/mL S    Nitrofurantoin <=32 mcg/mL S    Piperacillin/Tazo <=16 mcg/mL R    Tobramycin >8 mcg/mL R    Trimeth/Sulfa <=2/38 mcg/mL S      Pending Diagnostic Studies:       None           Medications:  Reconciled Home Medications:      Medication List        START taking these medications      aspirin 81 MG EC tablet  Commonly known as: ECOTRIN  Take 1 tablet (81 mg total) by mouth once  daily.  Start taking on: October 5, 2023     budesonide 0.5 mg/2 mL nebulizer solution  Commonly known as: PULMICORT  Take 2 mLs (0.5 mg total) by nebulization every 12 (twelve) hours. Controller  Replaces: budesonide 0.25 mg/2 mL nebulizer solution     busPIRone 5 MG Tab  Commonly known as: BUSPAR  Take 1 tablet (5 mg total) by mouth 2 (two) times daily.     insulin aspart U-100 100 unit/mL (3 mL) Inpn pen  Commonly known as: NovoLOG  Inject 0-5 Units into the skin before meals and at bedtime as needed (Hyperglycemia).     levoFLOXacin 500 MG tablet  Commonly known as: LEVAQUIN  Take 1 tablet (500 mg total) by mouth once daily. for 8 days  Start taking on: October 5, 2023     levothyroxine 50 MCG tablet  Commonly known as: SYNTHROID  Take 1 tablet (50 mcg total) by mouth before breakfast.  Start taking on: October 5, 2023  Replaces: levothyroxine 50 mcg Cap     mupirocin 2 % ointment  Commonly known as: BACTROBAN  by Nasal route 2 (two) times daily. for 3 days     sodium chloride 0.9 % PgBk 100 mL with meropenem 1 gram SolR 1 g  Inject 1 g into the vein every 8 (eight) hours. for 4 days            CHANGE how you take these medications      carvediloL 6.25 MG tablet  Commonly known as: COREG  Take 1 tablet (6.25 mg total) by mouth 2 (two) times daily.  What changed: how much to take     furosemide 20 MG tablet  Commonly known as: LASIX  Take 1 tablet (20 mg total) by mouth once daily.  Start taking on: October 5, 2023  What changed:   medication strength  how much to take  Another medication with the same name was removed. Continue taking this medication, and follow the directions you see here.            CONTINUE taking these medications      albuterol-ipratropium 2.5 mg-0.5 mg/3 mL nebulizer solution  Commonly known as: DUO-NEB  Take 3 mLs by nebulization every 6 (six) hours while awake. Rescue     LIPITOR 40 MG tablet  Generic drug: atorvastatin  Take 1 tablet (40 mg total) by mouth once daily.     pantoprazole  20 MG tablet  Commonly known as: PROTONIX  Take 40 mg by mouth once daily.     sacubitriL-valsartan 24-26 mg per tablet  Commonly known as: ENTRESTO  Take 1 tablet by mouth 2 (two) times daily.     vitamin D 1000 units Tab  Commonly known as: VITAMIN D3  Take 185 mg by mouth once daily.            STOP taking these medications      budesonide 0.25 mg/2 mL nebulizer solution  Commonly known as: PULMICORT  Replaced by: budesonide 0.5 mg/2 mL nebulizer solution     gabapentin 600 MG tablet  Commonly known as: NEURONTIN     insulin detemir U-100 (Levemir) 100 unit/mL (3 mL) Inpn pen     ketotifen 0.025 % (0.035 %) ophthalmic solution  Commonly known as: ZADITOR     levothyroxine 50 mcg Cap  Commonly known as: TIROSINT  Replaced by: levothyroxine 50 MCG tablet     LINZESS 145 mcg Cap capsule  Generic drug: linaCLOtide     predniSONE 20 MG tablet  Commonly known as: DELTASONE            ASK your doctor about these medications      isosorbide mononitrate 30 MG 24 hr tablet  Commonly known as: IMDUR  Take 1 tablet (30 mg total) by mouth once daily.              Indwelling Lines/Drains at time of discharge:   Lines/Drains/Airways       Drain  Duration             Female External Urinary Catheter 09/29/23 1226 4 days                    Time spent on the discharge of patient: 30 minutes         Gunnar Mott DO  Department of Hospital Medicine  [facility]

## 2023-10-04 NOTE — ASSESSMENT & PLAN NOTE
Rocephin x1 given in ER. Patient last treated rocephin during last hospitalization early this month.   UA with no bacteria, +pyuria and hematuria  10/3  Urine culture positive for ESBL, MDRO E coli  Will transition to IV Merrem for 5 days total therapy   10/4 continue with IV Merrem

## 2023-10-04 NOTE — NURSING
[id number]   [facility] TRANSPORTATION  HERE TO TRANSPORT PATIENT TO THEIR FACILITY.  PATIENT LEFT FLOOR IN STABLE CONDITION PER WHEEL CHAIR.  WITH OXYGEN VIA 4 LPM PER NASAL CANULA IN USE.   JASON VARGAS, LRAISAN

## 2023-10-04 NOTE — PLAN OF CARE
Problem: Physical Therapy  Goal: Physical Therapy Goal  Description: Goals to be met by: 10/9/2023    Patient will increase functional independence with mobility by performin. Supine to sit with Modified Independent.  2. Sit to supine with Modified Independent.  3. Bed to chair transfer with Supervision or Set-up Assistance with proper A.D.  using Step Transfer technique.  4. Sit to Stand with Supervision or Set-up Assistance with proper A.D. .  5. Gait  x 50  feet with Standby Assistance with proper A.D. .  6. Lower extremity exercise program x10 reps.     Outcome: Adequate for Care Transition, transfer to the nursing home

## 2023-10-04 NOTE — PT/OT/SLP PROGRESS
"Physical Therapy Treatment    Patient Name:  Caremn Tna   MRN:  [id number]    Recommendations:     Discharge Recommendations: nursing facility, skilled  Discharge Equipment Recommendations: none  Barriers to discharge: None    Assessment:     Carmen Tan is a 73 y.o. female admitted with a medical diagnosis of Acute respiratory failure with hypoxia and hypercarbia.  She presents with the following impairments/functional limitations: weakness, gait instability, decreased upper extremity function, impaired cardiopulmonary response to activity, impaired endurance, impaired balance, decreased lower extremity function, decreased safety awareness, impaired muscle length, impaired functional mobility, impaired self care skills Patient was found supine in bed with BI-PAP on, reports that she did not eat her breakfast yet.  Nurse reports that she can not be taken off BI-PAP to eat right now. Patient required CGA with supine to sit, CGA with sit <> stand using a RW and  BI-PAP  and she ambulated ~28 feet with RW and continuous BI-PAP.  Left sitting on the recliner chair with nurse aware.     Rehab Prognosis: Fair; patient would benefit from acute skilled PT services to address these deficits and reach maximum level of function.    Recent Surgery: * No surgery found *      Plan:     During this hospitalization, patient to be seen 5 x/week to address the identified rehab impairments via gait training, therapeutic activities, therapeutic exercises, neuromuscular re-education and progress toward the following goals:    Plan of Care Expires:  10/09/23    Subjective     Chief Complaint: "I did not eat yet."   Patient/Family Comments/goals: Unstated   Pain/Comfort:  Pain Rating 1: 0/10  Pain Rating Post-Intervention 1: 0/10      Objective:     Communicated with nurse and patient  prior to session.  Patient found HOB elevated with BIPAP, peripheral IV, PureWick, SCD upon PT entry to room.     General Precautions: Standard, " fall, contact  Orthopedic Precautions: N/A  Braces: N/A  Respiratory Status:  BI-PAP      Functional Mobility:  Bed Mobility:     Rolling Left:  contact guard assistance  Rolling Right: contact guard assistance  Scooting: contact guard assistance  Supine to Sit: contact guard assistance  Transfers:     Sit to Stand:  contact guard assistance with rolling walker  Gait: ~28 feet with RW and BI-PAP with CGA   Balance: Set up with static sitting, SBA with       AM-PAC 6 CLICK MOBILITY  Turning over in bed (including adjusting bedclothes, sheets and blankets)?: 3  Sitting down on and standing up from a chair with arms (e.g., wheelchair, bedside commode, etc.): 3  Moving from lying on back to sitting on the side of the bed?: 3  Moving to and from a bed to a chair (including a wheelchair)?: 3  Need to walk in hospital room?: 3  Climbing 3-5 steps with a railing?: 3 (based on clinical judgement)  Basic Mobility Total Score: 18       Treatment & Education:  Rolling side <> side  Supine to sit  Scooting to the edge  of the bed   Sitting balance/tolerance  Sit <> stand with RW  Standing tolerance   Gait with RW  Out of bed   AROM on both LE as tolerated       Patient left up in chair with all lines intact, call button in reach, and nurse notified..    GOALS:   Multidisciplinary Problems       Physical Therapy Goals          Problem: Physical Therapy    Goal Priority Disciplines Outcome Goal Variances Interventions   Physical Therapy Goal     PT, PT/OT Adequate for Care Transition     Description: Goals to be met by: 10/9/2023    Patient will increase functional independence with mobility by performin. Supine to sit with Modified Independent.  2. Sit to supine with Modified Independent.  3. Bed to chair transfer with Supervision or Set-up Assistance with proper A.D.  using Step Transfer technique.  4. Sit to Stand with Supervision or Set-up Assistance with proper A.D. .  5. Gait  x 50  feet with Standby Assistance with  proper A.D. .  6. Lower extremity exercise program x10 reps.                          Time Tracking:     PT Received On: 10/04/23  PT Start Time: 0930     PT Stop Time: 0953  PT Total Time (min): 23 min     Billable Minutes: Gait Training 10 and Therapeutic Activity 13    Treatment Type: Treatment  PT/PTA: PT           10/04/2023

## 2023-10-05 LAB
BACTERIA BLD CULT: NORMAL
BACTERIA BLD CULT: NORMAL

## 2023-10-05 NOTE — PHYSICIAN QUERY
PT Name: Carmen Tan  MR #: [id number]    DOCUMENTATION CLARIFICATION     CDS: Michael Hollis RN CCDS               Contact information: [email or web address]    This form is a permanent document in the medical record.     Query Date: October 5, 2023    By submitting this query, we are merely seeking further clarification of documentation. Please utilize your independent clinical judgment when addressing the question(s) below.    The Medical Record contains the following:   Indicators   Supporting Clinical Findings Location in Medical Record     x AMS, Confusion,  LOC, etc.  altered mental status, When she awoke this morning patient was altered decreased level of consciousness. Patient awakens with verbal commands and attempts to try to say her name.    AMS (altered mental status)  Likely secondary to acute respiratory failure with CO2 retention. Patient reported to become more responsive but still somnolent with BiPAP on. Less somnolent with continuous BiPAP 9/29/2023 ED provider notes        9/29/2023 H&P     x Acute/Chronic Illness COPD exacerbation  Acute respiratory failure with hypoxia and hypercarbia  chronic hypercapnic and hypoxic respiratory failure  Pneumonia  Chronic diastolic congestive heart failure  Urinary tract infection with hematuria  Morbid obesity 9/29/2023 ED provider notes  9/29/2023 H&P     x Radiology Findings No acute intracranial findings. 9/29/2023 CT head     x Electrolyte Imbalance  09/29/23 12:33 09/29/23 15:38   POC PH 7.131 (LL) 7.204 (LL)   POC PCO2  93.0 (HH)   POC PO2 79.9 (L) 65.8 (L)   POC HCO3  29.3 (H)   POC SATURATED O2 94.6 (L) 92.4 (L)   Specimen source Arterial Arterial   Base Deficit  8.7 (H)   FiO2 50.0 50.0   O2DEVICE BIPAP BIPAP    ABGs     x Medication Patient received Rocephin x1, solumedrol.  9/29/2023 H&P     x Treatment         placed on BiPAP 9/29/2023 ED provider notes     x Other GCS 9  GCS 14  GCS 15    She is been noncompliant with her CPAP does not  have 1 but no good explanation why and no family showed up at bedside to explained.    Patient more alert, following commands appropriately. Head nods for affirmation.    Baseline now, speaking and tolerating PO intake    Patient AAO x 4 9/29/2023 @ 1215 Nursing assessment  9/29/2023 @ 1844 Nursing assessment  9/30/2023 Nursing assessment     9/29/2023 ED provider notes      9/30/2023 Hospital Medicine progress notes      10/1/2023 Hospital Medicine progress notes    10/1/2023 Care Management plan of care     The noted clinical guidelines are only system guidelines and do not replace the providers clinical judgment.    The National [place] of Neurologic Disorders and Stroke (NINDS) of the NIH describes encephalopathy as any diffuse disease of the brain that alters brain function or structure.    Provider, please specify the diagnosis or diagnoses associated with above clinical findings.    [  X ] Metabolic Encephalopathy - Due to electrolyte imbalance, metabolic derangements, or infectious processes, includes Septic Encephalopathy, Uremic Encephalopathy   [   ] Other Encephalopathy (please specify): ____________________   [   ] Other neurological condition- Includes Post-ictal altered mental status (please specify condition): __________   [   ]  Clinically Undetermined       Please document in your progress notes daily for the duration of treatment until resolved, and include in your discharge summary.    References:  ESHA Spencer RN, CCDS. (2018, June 9). Notes from the Instructor: Encephalopathy tips. Retrieved October 22, 2020, from [email or web address]    ICD-9-CM Coding Clinic First Quarter 2013, Effective with discharges: October 21, 2013 [facility] § Seizure with encephalopathy due to postictal state (2013).    ICD-10-CM/PCS [employer] Integrated Codebook (Version V.20.8.10.0) [Computer software]. (2020). Retrieved October 21, 2020.    National [place]  of Neurological Disorders and Stroke. (2019, March 27). Retrieved October 22, 2020, from https://www.ninds.nih.gov/Disorders/All-Disorders/Eqnrxjnidecduv-Ubpxxjnbzfj-Pqqx    Form No. 62371

## 2023-10-27 ENCOUNTER — LAB VISIT (OUTPATIENT)
Dept: LAB | Facility: HOSPITAL | Age: 73
End: 2023-10-27
Attending: INTERNAL MEDICINE
Payer: MEDICARE

## 2023-10-27 DIAGNOSIS — N39.0 URINARY TRACT INFECTION, SITE NOT SPECIFIED: Primary | ICD-10-CM

## 2023-10-27 LAB
BACTERIA #/AREA URNS HPF: ABNORMAL /HPF
BILIRUB UR QL STRIP: NEGATIVE
CLARITY UR: ABNORMAL
COLOR UR: YELLOW
GLUCOSE UR QL STRIP: NEGATIVE
HGB UR QL STRIP: ABNORMAL
HYALINE CASTS #/AREA URNS LPF: 0 /LPF
KETONES UR QL STRIP: NEGATIVE
LEUKOCYTE ESTERASE UR QL STRIP: ABNORMAL
MICROSCOPIC COMMENT: ABNORMAL
NITRITE UR QL STRIP: NEGATIVE
PH UR STRIP: 6 [PH] (ref 5–8)
PROT UR QL STRIP: ABNORMAL
RBC #/AREA URNS HPF: 10 /HPF (ref 0–4)
SP GR UR STRIP: 1.02 (ref 1–1.03)
SQUAMOUS #/AREA URNS HPF: 3 /HPF
URN SPEC COLLECT METH UR: ABNORMAL
UROBILINOGEN UR STRIP-ACNC: 1 EU/DL
WBC #/AREA URNS HPF: >100 /HPF (ref 0–5)

## 2023-10-27 PROCEDURE — 87186 SC STD MICRODIL/AGAR DIL: CPT | Performed by: INTERNAL MEDICINE

## 2023-10-27 PROCEDURE — 81000 URINALYSIS NONAUTO W/SCOPE: CPT | Performed by: INTERNAL MEDICINE

## 2023-10-27 PROCEDURE — 87077 CULTURE AEROBIC IDENTIFY: CPT | Performed by: INTERNAL MEDICINE

## 2023-10-27 PROCEDURE — 87086 URINE CULTURE/COLONY COUNT: CPT | Performed by: INTERNAL MEDICINE

## 2023-10-27 PROCEDURE — 87088 URINE BACTERIA CULTURE: CPT | Performed by: INTERNAL MEDICINE

## 2023-10-30 LAB — BACTERIA UR CULT: ABNORMAL

## 2023-12-11 PROBLEM — I26.99 PE (PULMONARY THROMBOEMBOLISM): Chronic | Status: RESOLVED | Noted: 2017-06-08 | Resolved: 2023-12-11

## 2023-12-11 PROBLEM — J96.21 ACUTE ON CHRONIC RESPIRATORY FAILURE WITH HYPOXIA: Status: RESOLVED | Noted: 2022-02-01 | Resolved: 2023-12-11

## 2024-01-01 PROBLEM — R31.9 URINARY TRACT INFECTION WITH HEMATURIA: Status: RESOLVED | Noted: 2023-09-29 | Resolved: 2024-01-01

## 2024-01-01 PROBLEM — J96.02 ACUTE RESPIRATORY FAILURE WITH HYPOXIA AND HYPERCARBIA: Chronic | Status: RESOLVED | Noted: 2023-09-29 | Resolved: 2024-01-01

## 2024-01-01 PROBLEM — N39.0 URINARY TRACT INFECTION WITH HEMATURIA: Status: RESOLVED | Noted: 2023-09-29 | Resolved: 2024-01-01

## 2024-01-01 PROBLEM — J96.01 ACUTE RESPIRATORY FAILURE WITH HYPOXIA AND HYPERCARBIA: Chronic | Status: RESOLVED | Noted: 2023-09-29 | Resolved: 2024-01-01

## 2024-01-04 ENCOUNTER — APPOINTMENT (OUTPATIENT)
Dept: LAB | Facility: HOSPITAL | Age: 74
End: 2024-01-04
Attending: INTERNAL MEDICINE
Payer: MEDICARE

## 2024-01-04 DIAGNOSIS — N39.0 UTI (URINARY TRACT INFECTION): Primary | ICD-10-CM

## 2024-01-04 LAB
BACTERIA #/AREA URNS HPF: ABNORMAL /HPF
BILIRUB UR QL STRIP: NEGATIVE
CLARITY UR: ABNORMAL
COLOR UR: YELLOW
GLUCOSE UR QL STRIP: NEGATIVE
HGB UR QL STRIP: ABNORMAL
HYALINE CASTS #/AREA URNS LPF: 0 /LPF
KETONES UR QL STRIP: NEGATIVE
LEUKOCYTE ESTERASE UR QL STRIP: ABNORMAL
MICROSCOPIC COMMENT: ABNORMAL
NITRITE UR QL STRIP: POSITIVE
PH UR STRIP: 7 [PH] (ref 5–8)
PROT UR QL STRIP: ABNORMAL
RBC #/AREA URNS HPF: 8 /HPF (ref 0–4)
SP GR UR STRIP: 1.01 (ref 1–1.03)
SQUAMOUS #/AREA URNS HPF: 2 /HPF
URN SPEC COLLECT METH UR: ABNORMAL
UROBILINOGEN UR STRIP-ACNC: NEGATIVE EU/DL
WBC #/AREA URNS HPF: >100 /HPF (ref 0–5)

## 2024-01-04 PROCEDURE — 87077 CULTURE AEROBIC IDENTIFY: CPT | Performed by: INTERNAL MEDICINE

## 2024-01-04 PROCEDURE — 87088 URINE BACTERIA CULTURE: CPT | Performed by: INTERNAL MEDICINE

## 2024-01-04 PROCEDURE — 87186 SC STD MICRODIL/AGAR DIL: CPT | Performed by: INTERNAL MEDICINE

## 2024-01-04 PROCEDURE — 81000 URINALYSIS NONAUTO W/SCOPE: CPT | Performed by: INTERNAL MEDICINE

## 2024-01-04 PROCEDURE — 87086 URINE CULTURE/COLONY COUNT: CPT | Performed by: INTERNAL MEDICINE

## 2024-01-06 LAB — BACTERIA UR CULT: ABNORMAL

## 2024-03-06 ENCOUNTER — HOSPITAL ENCOUNTER (EMERGENCY)
Facility: HOSPITAL | Age: 74
Discharge: HOME | End: 2024-03-06
Attending: EMERGENCY MEDICINE
Payer: MEDICARE

## 2024-03-06 VITALS
HEIGHT: 64 IN | HEART RATE: 62 BPM | TEMPERATURE: 99 F | SYSTOLIC BLOOD PRESSURE: 126 MMHG | WEIGHT: 261 LBS | BODY MASS INDEX: 44.56 KG/M2 | OXYGEN SATURATION: 100 % | DIASTOLIC BLOOD PRESSURE: 57 MMHG | RESPIRATION RATE: 18 BRPM

## 2024-03-06 DIAGNOSIS — M54.41 CHRONIC BILATERAL LOW BACK PAIN WITH RIGHT-SIDED SCIATICA: Primary | ICD-10-CM

## 2024-03-06 DIAGNOSIS — G89.29 CHRONIC BILATERAL LOW BACK PAIN WITH RIGHT-SIDED SCIATICA: Primary | ICD-10-CM

## 2024-03-06 PROCEDURE — 99284 EMERGENCY DEPT VISIT MOD MDM: CPT | Mod: 25

## 2024-03-06 PROCEDURE — 96372 THER/PROPH/DIAG INJ SC/IM: CPT | Performed by: CLINICAL NURSE SPECIALIST

## 2024-03-06 PROCEDURE — 63600175 PHARM REV CODE 636 W HCPCS: Performed by: CLINICAL NURSE SPECIALIST

## 2024-03-06 RX ORDER — HYDROMORPHONE HYDROCHLORIDE 1 MG/ML
1 INJECTION, SOLUTION INTRAMUSCULAR; INTRAVENOUS; SUBCUTANEOUS ONCE
Status: COMPLETED | OUTPATIENT
Start: 2024-03-06 | End: 2024-03-06

## 2024-03-06 RX ORDER — KETOROLAC TROMETHAMINE 30 MG/ML
30 INJECTION, SOLUTION INTRAMUSCULAR; INTRAVENOUS ONCE
Status: COMPLETED | OUTPATIENT
Start: 2024-03-06 | End: 2024-03-06

## 2024-03-06 RX ORDER — DEXAMETHASONE SODIUM PHOSPHATE 4 MG/ML
8 INJECTION, SOLUTION INTRA-ARTICULAR; INTRALESIONAL; INTRAMUSCULAR; INTRAVENOUS; SOFT TISSUE ONCE
Status: COMPLETED | OUTPATIENT
Start: 2024-03-06 | End: 2024-03-06

## 2024-03-06 RX ADMIN — HYDROMORPHONE HYDROCHLORIDE 1 MG: 1 INJECTION, SOLUTION INTRAMUSCULAR; INTRAVENOUS; SUBCUTANEOUS at 03:03

## 2024-03-06 RX ADMIN — KETOROLAC TROMETHAMINE 30 MG: 30 INJECTION, SOLUTION INTRAMUSCULAR; INTRAVENOUS at 03:03

## 2024-03-06 RX ADMIN — DEXAMETHASONE SODIUM PHOSPHATE 8 MG: 4 INJECTION, SOLUTION INTRA-ARTICULAR; INTRALESIONAL; INTRAMUSCULAR; INTRAVENOUS; SOFT TISSUE at 03:03

## 2024-03-06 NOTE — ED PROVIDER NOTES
Encounter Date: 3/6/2024       History     Chief Complaint   Patient presents with    Back Pain     Shooting constant lower back pain, took tylenol 3 days ago. Pt on abx for UTI, and LBM 3 days ago. Pain 10/10     74-year-old obese female presents emergency room with lower back pain for the last 3 days.  Patient gets Ultram from family doctor but has not taken her Ultram today.  Strong urine smell coming from patient.  States she was recently diagnosed with UTI and put on antibiotics which she has been taking.  Difficulty standing to transfer to bed.  Reports 10/10 pain.        Review of patient's allergies indicates:   Allergen Reactions    Pcn [penicillins] Swelling     Past Medical History:   Diagnosis Date    Abdominal hernia     Bacteremia 4/5/2023    CHF (congestive heart failure)     COPD (chronic obstructive pulmonary disease)     Diabetes mellitus, type 2 2/16/2022    GERD (gastroesophageal reflux disease)     History of home oxygen therapy     Hypertension     Hypertensive emergency 3/30/2023    Migraine headache     Pulmonary embolism 06/01/2017    Small bowel obstruction     Thyroid disease      Past Surgical History:   Procedure Laterality Date    COLONOSCOPY      HYSTERECTOMY      INNER EAR SURGERY      UPPER GASTROINTESTINAL ENDOSCOPY       No family history on file.  Social History     Tobacco Use    Smoking status: Former    Smokeless tobacco: Never   Substance Use Topics    Alcohol use: No    Drug use: No     Review of Systems   Constitutional:  Negative for fever.   HENT:  Negative for sore throat.    Respiratory:  Negative for shortness of breath.    Cardiovascular:  Negative for chest pain.   Gastrointestinal:  Negative for nausea.   Genitourinary:  Negative for dysuria.   Musculoskeletal:  Positive for arthralgias, back pain, gait problem and myalgias.   Skin:  Negative for rash.   Neurological:  Negative for weakness.   Hematological:  Does not bruise/bleed easily.   All other systems reviewed  and are negative.      Physical Exam     Initial Vitals   BP Pulse Resp Temp SpO2   03/06/24 1255 03/06/24 1254 03/06/24 1254 03/06/24 1254 03/06/24 1254   (!) 126/57 62 20 98.9 °F (37.2 °C) 100 %      MAP       --                Physical Exam    Nursing note and vitals reviewed.  Constitutional: She appears well-developed and well-nourished.   HENT:   Head: Normocephalic and atraumatic.   Eyes: Pupils are equal, round, and reactive to light.   Neck:   Normal range of motion.  Cardiovascular:  Normal rate and regular rhythm.           Pulmonary/Chest: Breath sounds normal.   Abdominal: Abdomen is soft. Bowel sounds are normal.   Genitourinary:    Genitourinary Comments: Strong urine smell.     Musculoskeletal:         General: Normal range of motion.      Cervical back: Normal range of motion.     Neurological: She is alert and oriented to person, place, and time.   Skin: Skin is warm and dry.   Psychiatric: She has a normal mood and affect.         ED Course   Procedures  Labs Reviewed - No data to display       Imaging Results    None          Medications   ketorolac injection 30 mg (30 mg Intramuscular Given 3/6/24 1509)   dexAMETHasone injection 8 mg (8 mg Intramuscular Given 3/6/24 1507)   HYDROmorphone injection 1 mg (1 mg Intramuscular Given 3/6/24 1507)     Medical Decision Making  Risk  Prescription drug management.                                      Clinical Impression:  Final diagnoses:  [M54.41, G89.29] Chronic bilateral low back pain with right-sided sciatica (Primary)          ED Disposition Condition    Discharge Stable          ED Prescriptions    None       Follow-up Information       Follow up With Specialties Details Why Contact Info    Lucian aBrry MD Internal Medicine  As needed [address]  [facility] [address]  [phone]               Jessika Matthews NP  03/06/24 4538

## 2024-04-21 ENCOUNTER — HOSPITAL ENCOUNTER (INPATIENT)
Facility: HOSPITAL | Age: 74
LOS: 7 days | Discharge: HOME HEALTH | DRG: 291 | End: 2024-04-29
Attending: EMERGENCY MEDICINE | Admitting: INTERNAL MEDICINE
Payer: MEDICARE

## 2024-04-21 DIAGNOSIS — I50.43 ACUTE ON CHRONIC COMBINED SYSTOLIC AND DIASTOLIC CONGESTIVE HEART FAILURE: ICD-10-CM

## 2024-04-21 DIAGNOSIS — I50.9 CHRONIC CONGESTIVE HEART FAILURE, UNSPECIFIED HEART FAILURE TYPE: ICD-10-CM

## 2024-04-21 DIAGNOSIS — E87.70 HYPERVOLEMIA, UNSPECIFIED HYPERVOLEMIA TYPE: Primary | ICD-10-CM

## 2024-04-21 DIAGNOSIS — I50.9 CHF (CONGESTIVE HEART FAILURE): ICD-10-CM

## 2024-04-21 DIAGNOSIS — R06.02 SOB (SHORTNESS OF BREATH): ICD-10-CM

## 2024-04-21 LAB
ALBUMIN SERPL BCP-MCNC: 3.1 G/DL (ref 3.5–5.2)
ALP SERPL-CCNC: 127 U/L (ref 55–135)
ALT SERPL W/O P-5'-P-CCNC: 32 U/L (ref 10–44)
ANION GAP SERPL CALC-SCNC: 5 MMOL/L (ref 3–11)
AST SERPL-CCNC: 27 U/L (ref 10–40)
BASOPHILS # BLD AUTO: 0.02 K/UL (ref 0–0.2)
BASOPHILS NFR BLD: 0.3 % (ref 0–1.9)
BILIRUB SERPL-MCNC: 0.8 MG/DL (ref 0.1–1)
BUN SERPL-MCNC: 22 MG/DL (ref 8–23)
CALCIUM SERPL-MCNC: 10.2 MG/DL (ref 8.7–10.5)
CHLORIDE SERPL-SCNC: 112 MMOL/L (ref 95–110)
CO2 SERPL-SCNC: 27 MMOL/L (ref 23–29)
CREAT SERPL-MCNC: 1.2 MG/DL (ref 0.5–1.4)
DIFFERENTIAL METHOD BLD: ABNORMAL
EOSINOPHIL # BLD AUTO: 0.1 K/UL (ref 0–0.5)
EOSINOPHIL NFR BLD: 0.7 % (ref 0–8)
ERYTHROCYTE [DISTWIDTH] IN BLOOD BY AUTOMATED COUNT: 17.1 % (ref 11.5–14.5)
EST. GFR  (NO RACE VARIABLE): 47.8 ML/MIN/1.73 M^2
GLUCOSE SERPL-MCNC: 103 MG/DL (ref 70–110)
HCT VFR BLD AUTO: 39.3 % (ref 37–48.5)
HGB BLD-MCNC: 12.6 G/DL (ref 12–16)
IMM GRANULOCYTES # BLD AUTO: 0.03 K/UL (ref 0–0.04)
IMM GRANULOCYTES NFR BLD AUTO: 0.4 % (ref 0–0.5)
LYMPHOCYTES # BLD AUTO: 1.2 K/UL (ref 1–4.8)
LYMPHOCYTES NFR BLD: 15.9 % (ref 18–48)
MAGNESIUM SERPL-MCNC: 2.1 MG/DL (ref 1.6–2.6)
MCH RBC QN AUTO: 29.7 PG (ref 27–31)
MCHC RBC AUTO-ENTMCNC: 32.1 G/DL (ref 32–36)
MCV RBC AUTO: 93 FL (ref 82–98)
MONOCYTES # BLD AUTO: 0.8 K/UL (ref 0.3–1)
MONOCYTES NFR BLD: 9.9 % (ref 4–15)
NEUTROPHILS # BLD AUTO: 5.6 K/UL (ref 1.8–7.7)
NEUTROPHILS NFR BLD: 72.8 % (ref 38–73)
NRBC BLD-RTO: 0 /100 WBC
NT-PROBNP SERPL-MCNC: 9130 PG/ML (ref 5–900)
PLATELET # BLD AUTO: 200 K/UL (ref 150–450)
PMV BLD AUTO: 9.9 FL (ref 9.2–12.9)
POTASSIUM SERPL-SCNC: 4.4 MMOL/L (ref 3.5–5.1)
PROT SERPL-MCNC: 6.9 G/DL (ref 6–8.4)
RBC # BLD AUTO: 4.24 M/UL (ref 4–5.4)
SODIUM SERPL-SCNC: 144 MMOL/L (ref 136–145)
TROPONIN I SERPL DL<=0.01 NG/ML-MCNC: 26.7 PG/ML (ref 0–60)
WBC # BLD AUTO: 7.67 K/UL (ref 3.9–12.7)

## 2024-04-21 PROCEDURE — 99285 EMERGENCY DEPT VISIT HI MDM: CPT | Mod: 25

## 2024-04-21 PROCEDURE — G0378 HOSPITAL OBSERVATION PER HR: HCPCS

## 2024-04-21 PROCEDURE — 83735 ASSAY OF MAGNESIUM: CPT | Performed by: EMERGENCY MEDICINE

## 2024-04-21 PROCEDURE — 83880 ASSAY OF NATRIURETIC PEPTIDE: CPT | Performed by: EMERGENCY MEDICINE

## 2024-04-21 PROCEDURE — 27000221 HC OXYGEN, UP TO 24 HOURS

## 2024-04-21 PROCEDURE — 80053 COMPREHEN METABOLIC PANEL: CPT | Performed by: EMERGENCY MEDICINE

## 2024-04-21 PROCEDURE — 85025 COMPLETE CBC W/AUTO DIFF WBC: CPT | Performed by: EMERGENCY MEDICINE

## 2024-04-21 PROCEDURE — 25000003 PHARM REV CODE 250: Performed by: EMERGENCY MEDICINE

## 2024-04-21 PROCEDURE — 63600175 PHARM REV CODE 636 W HCPCS: Performed by: EMERGENCY MEDICINE

## 2024-04-21 PROCEDURE — 84484 ASSAY OF TROPONIN QUANT: CPT | Performed by: EMERGENCY MEDICINE

## 2024-04-21 PROCEDURE — 36415 COLL VENOUS BLD VENIPUNCTURE: CPT | Performed by: EMERGENCY MEDICINE

## 2024-04-21 PROCEDURE — 99900035 HC TECH TIME PER 15 MIN (STAT)

## 2024-04-21 PROCEDURE — 96374 THER/PROPH/DIAG INJ IV PUSH: CPT

## 2024-04-21 PROCEDURE — 99900029 HC O2 SETUP (STAT)

## 2024-04-21 PROCEDURE — 99900031 HC PATIENT EDUCATION (STAT)

## 2024-04-21 RX ORDER — ACETAMINOPHEN 325 MG/1
650 TABLET ORAL EVERY 8 HOURS PRN
Status: DISCONTINUED | OUTPATIENT
Start: 2024-04-21 | End: 2024-04-29 | Stop reason: HOSPADM

## 2024-04-21 RX ORDER — FUROSEMIDE 10 MG/ML
40 INJECTION INTRAMUSCULAR; INTRAVENOUS EVERY 12 HOURS
Status: COMPLETED | OUTPATIENT
Start: 2024-04-21 | End: 2024-04-24

## 2024-04-21 RX ORDER — TALC
6 POWDER (GRAM) TOPICAL NIGHTLY PRN
Status: DISCONTINUED | OUTPATIENT
Start: 2024-04-21 | End: 2024-04-29 | Stop reason: HOSPADM

## 2024-04-21 RX ORDER — SODIUM CHLORIDE 0.9 % (FLUSH) 0.9 %
10 SYRINGE (ML) INJECTION
Status: DISCONTINUED | OUTPATIENT
Start: 2024-04-21 | End: 2024-04-29 | Stop reason: HOSPADM

## 2024-04-21 RX ORDER — FUROSEMIDE 10 MG/ML
60 INJECTION INTRAMUSCULAR; INTRAVENOUS
Status: COMPLETED | OUTPATIENT
Start: 2024-04-21 | End: 2024-04-21

## 2024-04-21 RX ORDER — ONDANSETRON HYDROCHLORIDE 2 MG/ML
4 INJECTION, SOLUTION INTRAVENOUS EVERY 8 HOURS PRN
Status: DISCONTINUED | OUTPATIENT
Start: 2024-04-21 | End: 2024-04-29 | Stop reason: HOSPADM

## 2024-04-21 RX ADMIN — Medication 6 MG: at 08:04

## 2024-04-21 RX ADMIN — FUROSEMIDE 40 MG: 10 INJECTION, SOLUTION INTRAVENOUS at 08:04

## 2024-04-21 RX ADMIN — FUROSEMIDE 60 MG: 10 INJECTION, SOLUTION INTRAMUSCULAR; INTRAVENOUS at 01:04

## 2024-04-21 NOTE — ED NOTES
Salvatore Tan (pt's son) notified pt being admitted.  Would like me to call Esther (pt's daughter) and let her know of admission.

## 2024-04-21 NOTE — ED PROVIDER NOTES
EMERGENCY DEPARTMENT HISTORY AND PHYSICAL EXAM     This note is dictated on M*Modal word recognition program.  There are word recognition mistakes and grammatical errors that are occasionally missed on review.     Date: 4/21/2024   Patient Name: Carmen Tan       History of Presenting Illness      Chief Complaint   Patient presents with    Shortness of Breath     Pt stated that she has been experiencing worsening swelling to legs and dyspnea. Hx CHF.            Carmen Tan is a 74 y.o. female with PMHX of COPD, CHF, type 2 diabetes, paroxysmal AFib who presents to the emergency department C/O leg swelling.    Patient arrives from home due to worsening lower extremity edema.  Also reports increasing shortness of breath.  Denies chest pain.  Patient takes Lasix twice a day and states she has good urine output from it.  Reports symptoms have been gradually worsening.    Patient reports appetite has been poor but drinks fluids.  Reports about 3 bottles of water a day and two coke zeros.  Limited physical activity which she attributes to leg pain    PCP: Lucian Barry MD        No current facility-administered medications for this encounter.     Current Outpatient Medications   Medication Sig Dispense Refill    albuterol-ipratropium (DUO-NEB) 2.5 mg-0.5 mg/3 mL nebulizer solution Take 3 mLs by nebulization every 6 (six) hours while awake. Rescue 75 mL 0    aspirin (ECOTRIN) 81 MG EC tablet Take 1 tablet (81 mg total) by mouth once daily. 90 tablet 3    budesonide (PULMICORT) 0.5 mg/2 mL nebulizer solution Take 2 mLs (0.5 mg total) by nebulization every 12 (twelve) hours. Controller 360 mL 3    busPIRone (BUSPAR) 5 MG Tab Take 1 tablet (5 mg total) by mouth 2 (two) times daily. 60 tablet 11    carvediloL (COREG) 6.25 MG tablet Take 1 tablet (6.25 mg total) by mouth 2 (two) times daily. 60 tablet 11    furosemide (LASIX) 20 MG tablet Take 1 tablet (20 mg total) by mouth once daily. 30 tablet 11    insulin  aspart U-100 (NOVOLOG) 100 unit/mL (3 mL) InPn pen Inject 0-5 Units into the skin before meals and at bedtime as needed (Hyperglycemia). 3 mL 0    isosorbide mononitrate (IMDUR) 30 MG 24 hr tablet Take 1 tablet (30 mg total) by mouth once daily. 30 tablet 1    levothyroxine (SYNTHROID) 50 MCG tablet Take 1 tablet (50 mcg total) by mouth before breakfast. 30 tablet 11    LIPITOR 40 mg tablet Take 1 tablet (40 mg total) by mouth once daily. 90 tablet 6    nitrofurantoin, macrocrystal-monohydrate, (MACROBID) 100 MG capsule Take 1 capsule (100 mg total) by mouth every 12 (twelve) hours. 11 capsule 0    pantoprazole (PROTONIX) 20 MG tablet Take 40 mg by mouth once daily.      vitamin D 1000 units Tab Take 185 mg by mouth once daily.             Past History     Past Medical History:   Past Medical History:   Diagnosis Date    Abdominal hernia     Bacteremia 4/5/2023    CHF (congestive heart failure)     COPD (chronic obstructive pulmonary disease)     Diabetes mellitus, type 2 2/16/2022    GERD (gastroesophageal reflux disease)     History of home oxygen therapy     Hypertension     Hypertensive emergency 3/30/2023    Migraine headache     Pulmonary embolism 06/01/2017    Small bowel obstruction     Thyroid disease         Past Surgical History:   Past Surgical History:   Procedure Laterality Date    COLONOSCOPY      HYSTERECTOMY      INNER EAR SURGERY      UPPER GASTROINTESTINAL ENDOSCOPY          Family History:   No family history on file.     Social History:   Social History     Tobacco Use    Smoking status: Former    Smokeless tobacco: Never   Substance Use Topics    Alcohol use: No    Drug use: No        Allergies:   Review of patient's allergies indicates:   Allergen Reactions    Pcn [penicillins] Swelling          Review of Systems   Review of Systems   See HPI for pertinent positives and negatives       Physical Exam     Vitals:    04/21/24 1250 04/21/24 1251 04/21/24 1327 04/21/24 1450   BP:  118/72     Pulse:  " 95 88 74   Resp:  20 (!) 30 (!) 25   Temp: 98.2 °F (36.8 °C)      SpO2:  (!) 88% 96% 96%   Weight: 120.2 kg (265 lb)      Height: 5' 4" (1.626 m)         Physical Exam  Vitals and nursing note reviewed.   Constitutional:       General: She is not in acute distress.     Appearance: Normal appearance. She is obese. She is not ill-appearing.   HENT:      Head: Normocephalic and atraumatic.      Right Ear: External ear normal.      Left Ear: External ear normal.      Nose: Nose normal. No congestion or rhinorrhea.      Mouth/Throat:      Mouth: Mucous membranes are moist.   Eyes:      Conjunctiva/sclera: Conjunctivae normal.      Pupils: Pupils are equal, round, and reactive to light.   Cardiovascular:      Rate and Rhythm: Normal rate and regular rhythm.      Heart sounds: Murmur heard.   Pulmonary:      Effort: Pulmonary effort is normal. No tachypnea or respiratory distress.      Breath sounds: Decreased breath sounds present.   Chest:      Chest wall: No tenderness.   Musculoskeletal:         General: No deformity. Normal range of motion.      Cervical back: Normal range of motion. No rigidity.      Right lower le+ Pitting Edema present.      Left lower le+ Pitting Edema present.   Skin:     General: Skin is dry.   Neurological:      General: No focal deficit present.      Mental Status: She is alert and oriented to person, place, and time. Mental status is at baseline.   Psychiatric:         Mood and Affect: Mood normal.         Behavior: Behavior normal.              Diagnostic Study Results      Labs -   Recent Results (from the past 12 hour(s))   CBC Auto Differential    Collection Time: 24  1:17 PM   Result Value Ref Range    WBC 7.67 3.90 - 12.70 K/uL    RBC 4.24 4.00 - 5.40 M/uL    Hemoglobin 12.6 12.0 - 16.0 g/dL    Hematocrit 39.3 37.0 - 48.5 %    MCV 93 82 - 98 fL    MCH 29.7 27.0 - 31.0 pg    MCHC 32.1 32.0 - 36.0 g/dL    RDW 17.1 (H) 11.5 - 14.5 %    Platelets 200 150 - 450 K/uL    MPV 9.9 " 9.2 - 12.9 fL    Immature Granulocytes 0.4 0.0 - 0.5 %    Gran # (ANC) 5.6 1.8 - 7.7 K/uL    Immature Grans (Abs) 0.03 0.00 - 0.04 K/uL    Lymph # 1.2 1.0 - 4.8 K/uL    Mono # 0.8 0.3 - 1.0 K/uL    Eos # 0.1 0.0 - 0.5 K/uL    Baso # 0.02 0.00 - 0.20 K/uL    nRBC 0 0 /100 WBC    Gran % 72.8 38.0 - 73.0 %    Lymph % 15.9 (L) 18.0 - 48.0 %    Mono % 9.9 4.0 - 15.0 %    Eosinophil % 0.7 0.0 - 8.0 %    Basophil % 0.3 0.0 - 1.9 %    Differential Method Automated    Comprehensive Metabolic Panel    Collection Time: 04/21/24  1:17 PM   Result Value Ref Range    Sodium 144 136 - 145 mmol/L    Potassium 4.4 3.5 - 5.1 mmol/L    Chloride 112 (H) 95 - 110 mmol/L    CO2 27 23 - 29 mmol/L    Glucose 103 70 - 110 mg/dL    BUN 22 8 - 23 mg/dL    Creatinine 1.2 0.5 - 1.4 mg/dL    Calcium 10.2 8.7 - 10.5 mg/dL    Total Protein 6.9 6.0 - 8.4 g/dL    Albumin 3.1 (L) 3.5 - 5.2 g/dL    Total Bilirubin 0.8 0.1 - 1.0 mg/dL    Alkaline Phosphatase 127 55 - 135 U/L    AST 27 10 - 40 U/L    ALT 32 10 - 44 U/L    eGFR 47.8 (A) >60 mL/min/1.73 m^2    Anion Gap 5 3 - 11 mmol/L   NT-Pro Natriuretic Peptide    Collection Time: 04/21/24  1:17 PM   Result Value Ref Range    NT-proBNP 9130 (H) 5 - 900 pg/mL   Magnesium    Collection Time: 04/21/24  1:17 PM   Result Value Ref Range    Magnesium 2.1 1.6 - 2.6 mg/dL   TROPONIN I HIGH SENSITIVITY    Collection Time: 04/21/24  1:17 PM   Result Value Ref Range    Troponin I High Sensitivity 26.7 0.0 - 60.0 pg/mL        Radiologic Studies -    X-Ray Chest 1 View   Final Result      Diffuse chronic changes.         Electronically signed by: Fabiola Rodgers MD   Date:    04/21/2024   Time:    13:18           Medications given in the ED-   Medications   furosemide injection 60 mg (60 mg Intravenous Given 4/21/24 1352)           Medical Decision Making    I am the first provider for this patient.     I reviewed the vital signs, available nursing notes, past medical history, past surgical history, family history and  social history.     Vital Signs:  Reviewed the patient's vital signs.     Pulse Oximetry Analysis and Interpretation:    88% on Room Air, hypoxia        CXR  Interpretation: (Per my independent interpretation, pending formal read)   CXR read by Dr. John Paul Rojas at 1331    Cardiomegaly similar to prior, no focal infiltrate, no pleural effusion     External Test Results (Pertinent to encounter):    Records Reviewed: Nursing Notes, Current Prescription Medications, Old Medical Records, External Medical Records , and PMPAware     History Obtained By: Patient    Provider Notes: Carmen Tan is a 74 y.o. female with worsening edema, shortness of breath    Co-morbidities Considered:  Deconditioning, CHF    Differential Diagnosis:  Hypervolemia, CHF exacerbation, COPD      ED Course:      CONSULT NOTE:    3:17 PM      Dr. Rojas discussed care with?Dr Gaming, Hospitalist   It was a standard discussion,?including history of patients chief complaint, available diagnostic results, and treatment course.?Discussed case.     Patient presents with worsening lower extremity edema, shortness of breath.  88% on room air on arrival tolerating nasal cannula well.  Patient not in respiratory distress.  Marked lower extremity edema and patient appears very hypervolemic.  Will plan on an admitting patient for diuresis.             ED Course as of 04/21/24 1518   Sun Apr 21, 2024   1405 NT-proBNP(!): 9130 [MO]   1405 Troponin I High Sensitivity: 26.7 [MO]   1443 WBC: 7.67 [MO]   1443 Hemoglobin: 12.6 [MO]      ED Course User Index  [MO] John Paul Rojas MD       Problems Addressed:  Hypovolemia, CHF    Procedures:   Procedures       Diagnosis and Disposition     Critical Care:        I have spent 30 minutes of critical care time involved in lab review, consultations with specialist, family decision-making, and documentation.  During this entire length of time I was immediately available to the patient.     Critical Care:   The reason for providing this level of medical care for this critically ill patient was due a critical illness that impaired one or more vital organ systems such that there was a high probability of imminent or life threatening deterioration in the patients condition. This care involved high complexity decision making to assess, manipulate, and support vital system functions, to treat this degreee vital organ system failure and to prevent further life threatening deterioration of the patients condition.               CLINICAL IMPRESSION:         1. Hypervolemia, unspecified hypervolemia type    2. SOB (shortness of breath)    3. Chronic congestive heart failure, unspecified heart failure type              PLAN:   1. Admit to Hospital  2.      Medication List        ASK your doctor about these medications      albuterol-ipratropium 2.5 mg-0.5 mg/3 mL nebulizer solution  Commonly known as: DUO-NEB  Take 3 mLs by nebulization every 6 (six) hours while awake. Rescue     aspirin 81 MG EC tablet  Commonly known as: ECOTRIN  Take 1 tablet (81 mg total) by mouth once daily.     budesonide 0.5 mg/2 mL nebulizer solution  Commonly known as: PULMICORT  Take 2 mLs (0.5 mg total) by nebulization every 12 (twelve) hours. Controller     busPIRone 5 MG Tab  Commonly known as: BUSPAR  Take 1 tablet (5 mg total) by mouth 2 (two) times daily.     carvediloL 6.25 MG tablet  Commonly known as: COREG  Take 1 tablet (6.25 mg total) by mouth 2 (two) times daily.     furosemide 20 MG tablet  Commonly known as: LASIX  Take 1 tablet (20 mg total) by mouth once daily.     insulin aspart U-100 100 unit/mL (3 mL) Inpn pen  Commonly known as: NovoLOG  Inject 0-5 Units into the skin before meals and at bedtime as needed (Hyperglycemia).     isosorbide mononitrate 30 MG 24 hr tablet  Commonly known as: IMDUR  Take 1 tablet (30 mg total) by mouth once daily.     levothyroxine 50 MCG tablet  Commonly known as: SYNTHROID  Take 1 tablet (50 mcg  total) by mouth before breakfast.     LIPITOR 40 mg tablet  Generic drug: atorvastatin  Take 1 tablet (40 mg total) by mouth once daily.     nitrofurantoin (macrocrystal-monohydrate) 100 MG capsule  Commonly known as: MACROBID  Take 1 capsule (100 mg total) by mouth every 12 (twelve) hours.     pantoprazole 20 MG tablet  Commonly known as: PROTONIX     vitamin D 1000 units Tab  Commonly known as: VITAMIN D3             3. No follow-up provider specified.     _______________________________     Please note that this dictation was completed with [employer], the computer voice recognition software.  Quite often unanticipated grammatical, syntax, homophones, and other interpretive errors are inadvertently transcribed by the computer software.  Please disregard these errors.  Please excuse any errors that have escaped final proofreading.             John Paul Rojas MD  04/21/24 1518       John Paul Rojas MD  05/06/24 2465

## 2024-04-21 NOTE — NURSING
Pt received to room 205 as med/surg overflow at this time from ER via stretcher. Pt on 2L NC, no distress noted. Purewick changed and fresh diaper applied, hooked to cont suction @ low setting. Pt states she feeling better already. Placed on icu monitor, vitals stable.  No complaints or concerns at this time.

## 2024-04-21 NOTE — Clinical Note
Diagnosis: Hypervolemia, unspecified hypervolemia type [[id number]]   Future Attending Provider: CLAU DORMAN [[id number]]   Special Needs:: No Special Needs [1]

## 2024-04-21 NOTE — LETTER
This communication is flagged as high priority.      Patient admitted 4/21/24. Discharged 4/29/24. DC Summary attached for review. Patient needs 1 week f/u appointment.     Thank you,     Ari Painter RN Case Manager  Ph. [phone]  Fx. [phone]  Email. [email or web address]

## 2024-04-22 ENCOUNTER — CLINICAL SUPPORT (OUTPATIENT)
Dept: CARDIOLOGY | Facility: HOSPITAL | Age: 74
DRG: 291 | End: 2024-04-22
Attending: INTERNAL MEDICINE
Payer: MEDICARE

## 2024-04-22 LAB
ANION GAP SERPL CALC-SCNC: 3 MMOL/L (ref 3–11)
BASOPHILS # BLD AUTO: 0.02 K/UL (ref 0–0.2)
BASOPHILS NFR BLD: 0.3 % (ref 0–1.9)
BUN SERPL-MCNC: 21 MG/DL (ref 8–23)
CALCIUM SERPL-MCNC: 10 MG/DL (ref 8.7–10.5)
CHLORIDE SERPL-SCNC: 109 MMOL/L (ref 95–110)
CO2 SERPL-SCNC: 33 MMOL/L (ref 23–29)
CREAT SERPL-MCNC: 1.1 MG/DL (ref 0.5–1.4)
DIFFERENTIAL METHOD BLD: ABNORMAL
EOSINOPHIL # BLD AUTO: 0.1 K/UL (ref 0–0.5)
EOSINOPHIL NFR BLD: 1.1 % (ref 0–8)
ERYTHROCYTE [DISTWIDTH] IN BLOOD BY AUTOMATED COUNT: 17.5 % (ref 11.5–14.5)
EST. GFR  (NO RACE VARIABLE): 53.1 ML/MIN/1.73 M^2
GLUCOSE SERPL-MCNC: 92 MG/DL (ref 70–110)
HCT VFR BLD AUTO: 33.2 % (ref 37–48.5)
HGB BLD-MCNC: 11.3 G/DL (ref 12–16)
IMM GRANULOCYTES # BLD AUTO: 0.02 K/UL (ref 0–0.04)
IMM GRANULOCYTES NFR BLD AUTO: 0.3 % (ref 0–0.5)
LYMPHOCYTES # BLD AUTO: 1.2 K/UL (ref 1–4.8)
LYMPHOCYTES NFR BLD: 19.6 % (ref 18–48)
MCH RBC QN AUTO: 32.1 PG (ref 27–31)
MCHC RBC AUTO-ENTMCNC: 34 G/DL (ref 32–36)
MCV RBC AUTO: 94 FL (ref 82–98)
MONOCYTES # BLD AUTO: 0.7 K/UL (ref 0.3–1)
MONOCYTES NFR BLD: 10.7 % (ref 4–15)
NEUTROPHILS # BLD AUTO: 4.3 K/UL (ref 1.8–7.7)
NEUTROPHILS NFR BLD: 68 % (ref 38–73)
NRBC BLD-RTO: 0 /100 WBC
OHS QRS DURATION: 130 MS
OHS QTC CALCULATION: 425 MS
PLATELET # BLD AUTO: 196 K/UL (ref 150–450)
PMV BLD AUTO: 9.6 FL (ref 9.2–12.9)
POTASSIUM SERPL-SCNC: 4 MMOL/L (ref 3.5–5.1)
RBC # BLD AUTO: 3.52 M/UL (ref 4–5.4)
SODIUM SERPL-SCNC: 145 MMOL/L (ref 136–145)
WBC # BLD AUTO: 6.29 K/UL (ref 3.9–12.7)

## 2024-04-22 PROCEDURE — 85025 COMPLETE CBC W/AUTO DIFF WBC: CPT | Performed by: EMERGENCY MEDICINE

## 2024-04-22 PROCEDURE — 99900031 HC PATIENT EDUCATION (STAT)

## 2024-04-22 PROCEDURE — 80048 BASIC METABOLIC PNL TOTAL CA: CPT | Performed by: EMERGENCY MEDICINE

## 2024-04-22 PROCEDURE — 27100171 HC OXYGEN HIGH FLOW UP TO 24 HOURS

## 2024-04-22 PROCEDURE — 99900035 HC TECH TIME PER 15 MIN (STAT)

## 2024-04-22 PROCEDURE — 93010 ELECTROCARDIOGRAM REPORT: CPT | Mod: GW,HPC,, | Performed by: INTERNAL MEDICINE

## 2024-04-22 PROCEDURE — 63600175 PHARM REV CODE 636 W HCPCS: Performed by: EMERGENCY MEDICINE

## 2024-04-22 PROCEDURE — 5A0945A ASSISTANCE WITH RESPIRATORY VENTILATION, 24-96 CONSECUTIVE HOURS, HIGH NASAL FLOW/VELOCITY: ICD-10-PCS | Performed by: EMERGENCY MEDICINE

## 2024-04-22 PROCEDURE — 93306 TTE W/DOPPLER COMPLETE: CPT

## 2024-04-22 PROCEDURE — 11000001 HC ACUTE MED/SURG PRIVATE ROOM

## 2024-04-22 PROCEDURE — 25000003 PHARM REV CODE 250: Performed by: INTERNAL MEDICINE

## 2024-04-22 PROCEDURE — 94660 CPAP INITIATION&MGMT: CPT

## 2024-04-22 PROCEDURE — 27000190 HC CPAP FULL FACE MASK W/VALVE

## 2024-04-22 PROCEDURE — 25000003 PHARM REV CODE 250: Performed by: EMERGENCY MEDICINE

## 2024-04-22 PROCEDURE — 36415 COLL VENOUS BLD VENIPUNCTURE: CPT | Performed by: EMERGENCY MEDICINE

## 2024-04-22 PROCEDURE — 5A09457 ASSISTANCE WITH RESPIRATORY VENTILATION, 24-96 CONSECUTIVE HOURS, CONTINUOUS POSITIVE AIRWAY PRESSURE: ICD-10-PCS | Performed by: EMERGENCY MEDICINE

## 2024-04-22 PROCEDURE — 93005 ELECTROCARDIOGRAM TRACING: CPT

## 2024-04-22 PROCEDURE — 94761 N-INVAS EAR/PLS OXIMETRY MLT: CPT

## 2024-04-22 RX ORDER — BUSPIRONE HYDROCHLORIDE 5 MG/1
5 TABLET ORAL 2 TIMES DAILY
Status: DISCONTINUED | OUTPATIENT
Start: 2024-04-22 | End: 2024-04-29 | Stop reason: HOSPADM

## 2024-04-22 RX ORDER — ASPIRIN 81 MG/1
81 TABLET ORAL DAILY
Status: DISCONTINUED | OUTPATIENT
Start: 2024-04-22 | End: 2024-04-29 | Stop reason: HOSPADM

## 2024-04-22 RX ORDER — ATORVASTATIN CALCIUM 40 MG/1
40 TABLET, FILM COATED ORAL DAILY
Status: DISCONTINUED | OUTPATIENT
Start: 2024-04-22 | End: 2024-04-29 | Stop reason: HOSPADM

## 2024-04-22 RX ORDER — IPRATROPIUM BROMIDE AND ALBUTEROL SULFATE 2.5; .5 MG/3ML; MG/3ML
3 SOLUTION RESPIRATORY (INHALATION) EVERY 4 HOURS PRN
Status: DISCONTINUED | OUTPATIENT
Start: 2024-04-22 | End: 2024-04-29 | Stop reason: HOSPADM

## 2024-04-22 RX ORDER — PANTOPRAZOLE SODIUM 40 MG/1
40 TABLET, DELAYED RELEASE ORAL DAILY
Status: DISCONTINUED | OUTPATIENT
Start: 2024-04-22 | End: 2024-04-29 | Stop reason: HOSPADM

## 2024-04-22 RX ORDER — CARVEDILOL 3.12 MG/1
6.25 TABLET ORAL 2 TIMES DAILY
Status: DISCONTINUED | OUTPATIENT
Start: 2024-04-22 | End: 2024-04-29

## 2024-04-22 RX ORDER — LEVOTHYROXINE SODIUM 50 UG/1
50 TABLET ORAL
Status: DISCONTINUED | OUTPATIENT
Start: 2024-04-22 | End: 2024-04-29 | Stop reason: HOSPADM

## 2024-04-22 RX ADMIN — FUROSEMIDE 40 MG: 10 INJECTION, SOLUTION INTRAVENOUS at 09:04

## 2024-04-22 RX ADMIN — BUSPIRONE HYDROCHLORIDE 5 MG: 5 TABLET ORAL at 09:04

## 2024-04-22 RX ADMIN — BUSPIRONE HYDROCHLORIDE 5 MG: 5 TABLET ORAL at 08:04

## 2024-04-22 RX ADMIN — LEVOTHYROXINE SODIUM 50 MCG: 50 TABLET ORAL at 09:04

## 2024-04-22 RX ADMIN — CARVEDILOL 6.25 MG: 3.12 TABLET, FILM COATED ORAL at 08:04

## 2024-04-22 RX ADMIN — CARVEDILOL 6.25 MG: 3.12 TABLET, FILM COATED ORAL at 10:04

## 2024-04-22 RX ADMIN — ATORVASTATIN CALCIUM 40 MG: 40 TABLET, FILM COATED ORAL at 09:04

## 2024-04-22 RX ADMIN — ASPIRIN 81 MG: 81 TABLET ORAL at 09:04

## 2024-04-22 RX ADMIN — ACETAMINOPHEN 650 MG: 325 TABLET ORAL at 12:04

## 2024-04-22 RX ADMIN — FUROSEMIDE 40 MG: 10 INJECTION, SOLUTION INTRAVENOUS at 08:04

## 2024-04-22 RX ADMIN — PANTOPRAZOLE SODIUM 40 MG: 40 TABLET, DELAYED RELEASE ORAL at 09:04

## 2024-04-22 RX ADMIN — ONDANSETRON 4 MG: 2 INJECTION INTRAMUSCULAR; INTRAVENOUS at 07:04

## 2024-04-22 RX ADMIN — Medication 6 MG: at 08:04

## 2024-04-22 NOTE — H&P
[facility] Medicine  History & Physical    Patient Name: Carmen Tan  MRN: [id number]  Patient Class: IP- Inpatient  Admission Date: 2024  Attending Physician: Veda Gleason MD   Primary Care Provider: Lucian Barry MD         Patient information was obtained from patient and ER records.     Subjective:     Principal Problem:Acute on chronic combined systolic and diastolic congestive heart failure    Chief Complaint:   Chief Complaint   Patient presents with    Shortness of Breath     Pt stated that she has been experiencing worsening swelling to legs and dyspnea. Hx CHF.         HPI: Carmen Tan is a 74 y.o. female with PMHX of COPD, CHF, type 2 diabetes, paroxysmal AFib who presents to the emergency department C/O leg swelling.     Patient arrives from home due to worsening lower extremity edema.  Also reports increasing shortness of breath.  Denies chest pain.  Patient takes Lasix twice a day and states she has good urine output from it.  Reports symptoms have been gradually worsening.     Patient reports appetite has been poor but drinks fluids.  Reports about 3 bottles of water a day and two coke zeros.  Limited physical activity which she attributes to leg pain    Pt had to attend her husbands  and so when sxs had started worsenign she had stayed home initially - after  she came in.  Pt reports this am - feelign little better after diuresisng some over night.  She does use O2 conintuously at home and wears a bipap at home at night and prn    Past Medical History:   Diagnosis Date    Abdominal hernia     Bacteremia 2023    CHF (congestive heart failure)     COPD (chronic obstructive pulmonary disease)     Diabetes mellitus, type 2 2022    GERD (gastroesophageal reflux disease)     History of home oxygen therapy     Hypertension     Hypertensive emergency 3/30/2023    Migraine headache     Pulmonary embolism 2017    Small bowel  obstruction     Thyroid disease        Past Surgical History:   Procedure Laterality Date    COLONOSCOPY      HYSTERECTOMY      INNER EAR SURGERY      UPPER GASTROINTESTINAL ENDOSCOPY         Review of patient's allergies indicates:   Allergen Reactions    Pcn [penicillins] Swelling       Current Facility-Administered Medications   Medication Dose Route Frequency Provider Last Rate Last Admin    acetaminophen tablet 650 mg  650 mg Oral Q8H PRN John Paul Rojas MD   650 mg at 04/22/24 0001    albuterol-ipratropium 2.5 mg-0.5 mg/3 mL nebulizer solution 3 mL  3 mL Nebulization Q4H PRN Veda Gleason MD        aspirin EC tablet 81 mg  81 mg Oral Daily Veda Gleason MD   81 mg at 04/22/24 0946    atorvastatin tablet 40 mg  40 mg Oral Daily Veda Gleason MD   40 mg at 04/22/24 0946    busPIRone tablet 5 mg  5 mg Oral BID Veda Gleason MD   5 mg at 04/22/24 0946    carvediloL tablet 6.25 mg  6.25 mg Oral BID Veda Gleason MD   6.25 mg at 04/22/24 1013    furosemide injection 40 mg  40 mg Intravenous Q12H John Paul Rojas MD   40 mg at 04/22/24 0946    levothyroxine tablet 50 mcg  50 mcg Oral Before breakfast Veda Gleason MD   50 mcg at 04/22/24 0946    melatonin tablet 6 mg  6 mg Oral Nightly PRN John Paul Rojas MD   6 mg at 04/21/24 2026    ondansetron injection 4 mg  4 mg Intravenous Q8H PRN John Paul Rojas MD        pantoprazole EC tablet 40 mg  40 mg Oral Daily Veda Gleason MD   40 mg at 04/22/24 0946    sodium chloride 0.9% flush 10 mL  10 mL Intravenous PRN John Paul Rojas MD         Family History    None       Tobacco Use    Smoking status: Former    Smokeless tobacco: Never   Substance and Sexual Activity    Alcohol use: No    Drug use: No    Sexual activity: Not on file     Review of Systems   Constitutional:  Positive for activity change. Negative for appetite change, chills, fatigue and fever.   HENT:  Negative for drooling, ear pain,  nosebleeds, postnasal drip and sinus pressure.    Respiratory:  Positive for shortness of breath and wheezing. Negative for cough and chest tightness.    Cardiovascular:  Positive for leg swelling. Negative for chest pain and palpitations.   Gastrointestinal:  Positive for abdominal pain and nausea. Negative for blood in stool, constipation and diarrhea.   Musculoskeletal:  Positive for arthralgias and back pain.   Skin:  Negative for rash and wound.   Neurological:  Positive for weakness. Negative for syncope and numbness.     Objective:     Vital Signs (Most Recent):  Temp: 97 °F (36.1 °C) (04/22/24 0400)  Pulse: 60 (04/22/24 1501)  Resp: (!) 26 (04/22/24 1501)  BP: 135/67 (hr 70) (04/22/24 1013)  SpO2: 100 % (04/22/24 1501) Vital Signs (24h Range):  Temp:  [96.9 °F (36.1 °C)-97.1 °F (36.2 °C)] 97 °F (36.1 °C)  Pulse:  [60-81] 60  Resp:  [17-39] 26  SpO2:  [91 %-100 %] 100 %  BP: (125-158)/(61-92) 135/67     Weight: 120.2 kg (265 lb)  Body mass index is 45.49 kg/m².     Physical Exam  Constitutional:       Appearance: She is obese.   HENT:      Nose: Nose normal.      Mouth/Throat:      Mouth: Mucous membranes are moist.   Eyes:      Extraocular Movements: Extraocular movements intact.      Pupils: Pupils are equal, round, and reactive to light.   Cardiovascular:      Rate and Rhythm: Normal rate and regular rhythm.   Pulmonary:      Effort: Pulmonary effort is normal.      Comments: Decreased BS at bases - O2 via NC  Abdominal:      General: Bowel sounds are normal. There is no distension.      Palpations: Abdomen is soft.      Tenderness: There is no abdominal tenderness.   Musculoskeletal:      Cervical back: Normal range of motion.      Right lower leg: Edema present.      Left lower leg: Edema present.   Skin:     General: Skin is warm and dry.      Capillary Refill: Capillary refill takes 2 to 3 seconds.   Neurological:      General: No focal deficit present.      Mental Status: She is alert and oriented to  person, place, and time.              CRANIAL NERVES     CN III, IV, VI   Pupils are equal, round, and reactive to light.       Significant Labs: All pertinent labs within the past 24 hours have been reviewed.  Recent Lab Results         04/22/24  0820   04/22/24  0506        Anion Gap   3       Baso #   0.02       Basophil %   0.3       BUN   21       Calcium   10.0       Chloride   109       CO2   33       Creatinine   1.1       Differential Method   Automated       eGFR   53.1       Eos #   0.1       Eos %   1.1       Glucose   92       Gran # (ANC)   4.3       Gran %   68.0       Hematocrit   33.2       Hemoglobin   11.3       Immature Grans (Abs)   0.02  Comment: Mild elevation in immature granulocytes is non specific and   can be seen in a variety of conditions including stress response,   acute inflammation, trauma and pregnancy. Correlation with other   laboratory and clinical findings is essential.         Immature Granulocytes   0.3       Lymph #   1.2       Lymph %   19.6       MCH   32.1       MCHC   34.0       MCV   94       Mono #   0.7       Mono %   10.7       MPV   9.6       nRBC   0       QRS Duration 130         OHS QTC Calculation 425         Platelet Count   196       Potassium   4.0       RBC   3.52       RDW   17.5       Sodium   145       WBC   6.29               Significant Imaging: I have reviewed all pertinent imaging results/findings within the past 24 hours.  Assessment/Plan:     * Acute on chronic combined systolic and diastolic congestive heart failure  Patient is identified as having Systolic (HFrEF) heart failure that is Acute on chronic. CHF is currently uncontrolled due to Continued edema of extremities and Dyspnea not returned to baseline after 2 doses of IV diuretic. Latest ECHO performed and demonstrates- Results for orders placed during the hospital encounter of 09/09/23    Echo    Interpretation Summary    Left Ventricle: The left ventricle is normal in size. Mildly increased  "wall thickness. Normal wall motion. There is mildly reduced systolic function with a visually estimated ejection fraction of 40 - 45%. There is normal diastolic function.    Right Ventricle: Normal right ventricular cavity size. Wall thickness is normal. Right ventricle wall motion  is normal. Systolic function is normal.    Aortic Valve: There is mild to moderate aortic regurgitation with a centrally directed jet.    Mitral Valve: There is moderate regurgitation.    Tricuspid Valve: There is moderate regurgitation with a centrally directed jet.    Pulmonic Valve: There is mild to moderate regurgitation.    Pulmonary Artery: The estimated pulmonary artery systolic pressure is 47 mmHg.    IVC/SVC: Normal venous pressure at 3 mmHg.  . Continue Beta Blocker and Furosemide and monitor clinical status closely. Monitor on telemetry. Patient is on CHF pathway.  Monitor strict Is&Os and daily weights.  Place on fluid restriction of 2 L. Cardiology has been consulted. Continue to stress to patient importance of self efficacy and  on diet for CHF. Last BNP reviewed- and noted below No results for input(s): "BNP", "BNPTRIAGEBLO" in the last 168 hours.    SOB (shortness of breath)  Improving with diuresis - cont O2      Diabetes mellitus, type 2  Patient's FSGs are controlled on current medication regimen.  Last A1c reviewed-   Lab Results   Component Value Date    HGBA1C 5.3 09/09/2023    HGBA1C 5.3 09/09/2023     Most recent fingerstick glucose reviewed- No results for input(s): "POCTGLUCOSE" in the last 24 hours.  Current correctional scale  Low  Maintain anti-hyperglycemic dose as follows-   Antihyperglycemics (From admission, onward)      None          Hold Oral hypoglycemics while patient is in the hospital.    Severe obesity (BMI >= 40)  Body mass index is 45.49 kg/m². Morbid obesity complicates all aspects of disease management from diagnostic modalities to treatment. Weight loss encouraged and health benefits " explained to patient.           VTE Risk Mitigation (From admission, onward)           Ordered     IP VTE HIGH RISK PATIENT  Once         04/21/24 1721     Place sequential compression device  Until discontinued         04/21/24 1721                                    Veda Gleason MD  Department of Hospital Medicine  [place] [facility]

## 2024-04-22 NOTE — ASSESSMENT & PLAN NOTE
"Patient's FSGs are controlled on current medication regimen.  Last A1c reviewed-   Lab Results   Component Value Date    HGBA1C 5.3 09/09/2023    HGBA1C 5.3 09/09/2023     Most recent fingerstick glucose reviewed- No results for input(s): "POCTGLUCOSE" in the last 24 hours.  Current correctional scale  Low  Maintain anti-hyperglycemic dose as follows-   Antihyperglycemics (From admission, onward)      None          Hold Oral hypoglycemics while patient is in the hospital.  "

## 2024-04-22 NOTE — PLAN OF CARE
Care plan reviewed and updated. AAOX3. Alternates with CPAP and NC. Tolerated meals. External cath in use. Echo completed today. Cardiology consult completed.

## 2024-04-22 NOTE — ASSESSMENT & PLAN NOTE
Body mass index is 45.49 kg/m². Morbid obesity complicates all aspects of disease management from diagnostic modalities to treatment. Weight loss encouraged and health benefits explained to patient.

## 2024-04-22 NOTE — CONSULTS
[place] - Intensive Care  Cardiology  Consult Note    Patient Name: Carmen Tan  MRN: [id number]  Admission Date: 4/21/2024  Hospital Length of Stay: 1 days  Code Status: Full Code   Attending Provider: Veda Gleason MD   Consulting Provider: Spencer Rascon MD  Primary Care Physician: Lucian Barry MD  Principal Problem:Acute on chronic combined systolic and diastolic congestive heart failure    Patient information was obtained from patient, past medical records, and ER records.     Inpatient consult to Cardiology  Consult performed by: Carly Chew PA-C  Consult ordered by: Veda Gleason MD      Consult performed by: Spencer Rascon MD  Subjective:     Chief Complaint:  shortness of breath, BLE edema      HPI:   This is a 74 to female with PMHx COPD, heart failure EF 40-45% 9/2023, HTN, ELLE (on CPAP), CKD who presented to the ED with worsening shortness of breath and lower extremity edema. She is on diuretic therapy at home, which she admits adherence too. She reports she has not been following with a cardiologist. She is unknown to [facility].     On arrival, proBNP noted to be elevated at 9130. HS troponin negative. Unremarkable CMP - electrolytes within range, renal function stable 22/1.2. She is currently on CPAP. She was admitted diuresis and supplemental oxygen.     Today, she reports breathing is minimally improved since admission. She reports progressive BLE edema at home and increasing shortness of breath. She denies any chest pain. She is diuresing well with net negative I/Os. Echocardiogram pending.     Of note, during prior hospital admission in September of last year echocardiogram revealed moderately reduced EF 40-45% - she was lost to follow-up in cardiology clinic after discharge.     Past Medical History:   Diagnosis Date    Abdominal hernia     Bacteremia 4/5/2023    CHF (congestive heart failure)     COPD (chronic obstructive pulmonary disease)     Diabetes mellitus, type 2  2/16/2022    GERD (gastroesophageal reflux disease)     History of home oxygen therapy     Hypertension     Hypertensive emergency 3/30/2023    Migraine headache     Pulmonary embolism 06/01/2017    Small bowel obstruction     Thyroid disease        Past Surgical History:   Procedure Laterality Date    COLONOSCOPY      HYSTERECTOMY      INNER EAR SURGERY      UPPER GASTROINTESTINAL ENDOSCOPY         Review of patient's allergies indicates:   Allergen Reactions    Pcn [penicillins] Swelling       Current Facility-Administered Medications   Medication Dose Route Frequency Provider Last Rate Last Admin    acetaminophen tablet 650 mg  650 mg Oral Q8H PRN John Paul Rojas MD   650 mg at 04/23/24 0818    albuterol-ipratropium 2.5 mg-0.5 mg/3 mL nebulizer solution 3 mL  3 mL Nebulization Q4H PRN Veda Gleason MD        aluminum-magnesium hydroxide-simethicone 200-200-20 mg/5 mL suspension 30 mL  30 mL Oral Q6H PRN Veda Gleason MD   30 mL at 04/23/24 0814    aspirin EC tablet 81 mg  81 mg Oral Daily Veda Gleason MD   81 mg at 04/22/24 0946    atorvastatin tablet 40 mg  40 mg Oral Daily Veda Gleason MD   40 mg at 04/22/24 0946    busPIRone tablet 5 mg  5 mg Oral BID Veda Gleason MD   5 mg at 04/22/24 2038    carvediloL tablet 6.25 mg  6.25 mg Oral BID Veda Gleason MD   6.25 mg at 04/22/24 2038    furosemide injection 40 mg  40 mg Intravenous Q12H John Paul Rojas MD   40 mg at 04/22/24 2038    levothyroxine tablet 50 mcg  50 mcg Oral Before breakfast Veda Gleason MD   50 mcg at 04/23/24 0537    melatonin tablet 6 mg  6 mg Oral Nightly PRN John Paul Rojas MD   6 mg at 04/22/24 2039    mupirocin 2 % ointment   Nasal BID Veda Gleason MD        ondansetron injection 4 mg  4 mg Intravenous Q8H PRN John Paul Rojas MD   4 mg at 04/22/24 1920    pantoprazole EC tablet 40 mg  40 mg Oral Daily Veda Gleason MD   40 mg at 04/22/24 0946    sodium  chloride 0.9% flush 10 mL  10 mL Intravenous PRN John Paul Rojas MD         Family History    None       Tobacco Use    Smoking status: Former    Smokeless tobacco: Never   Substance and Sexual Activity    Alcohol use: No    Drug use: No    Sexual activity: Not on file     Review of Systems   Cardiovascular:  Positive for dyspnea on exertion and leg swelling. Negative for chest pain, orthopnea and palpitations.   Respiratory:  Positive for shortness of breath.    Neurological:  Negative for light-headedness and weakness.   All other systems reviewed and are negative.    Objective:     Vital Signs (Most Recent):  Temp: 97.8 °F (36.6 °C) (04/23/24 0400)  Pulse: 78 (04/23/24 0530)  Resp: (!) 23 (04/23/24 0530)  BP: 109/75 (04/23/24 0530)  SpO2: (!) 93 % (04/23/24 0530) Vital Signs (24h Range):  Temp:  [97.5 °F (36.4 °C)-98.1 °F (36.7 °C)] 97.8 °F (36.6 °C)  Pulse:  [60-79] 78  Resp:  [22-28] 23  SpO2:  [92 %-100 %] 93 %  BP: (109-135)/(54-75) 109/75     Weight: 120.2 kg (265 lb)  Body mass index is 45.49 kg/m².    SpO2: (!) 93 %         Intake/Output Summary (Last 24 hours) at 4/23/2024 0819  Last data filed at 4/23/2024 0608  Gross per 24 hour   Intake 840 ml   Output 1650 ml   Net -810 ml       Lines/Drains/Airways       Drain  Duration             Female External Urinary Catheter w/ Suction 04/21/24 2 days              Peripheral Intravenous Line  Duration                  Peripheral IV - Single Lumen 04/21/24 20 G Right Antecubital 2 days                    Physical Exam  Vitals and nursing note reviewed.   Constitutional:       Appearance: Normal appearance. She is obese.   HENT:      Head: Normocephalic and atraumatic.      Nose: Nose normal.      Mouth/Throat:      Pharynx: Oropharynx is clear.      Comments: CPAP in place  Eyes:      Extraocular Movements: Extraocular movements intact.   Cardiovascular:      Rate and Rhythm: Normal rate and regular rhythm.      Pulses: Normal pulses.      Heart sounds:  Normal heart sounds. No murmur heard.     No friction rub. No gallop.   Pulmonary:      Breath sounds: Normal breath sounds.      Comments: Increased work of breathing  Musculoskeletal:      Right lower leg: Edema present.      Left lower leg: Edema present.   Skin:     General: Skin is warm.      Capillary Refill: Capillary refill takes less than 2 seconds.   Neurological:      General: No focal deficit present.      Mental Status: She is alert and oriented to person, place, and time.         Significant Labs: BMP:   Recent Labs   Lab 04/21/24  1317 04/22/24  0506 04/23/24  0501    92 97    145 144   K 4.4 4.0 4.0   * 109 106   CO2 27 33* 37*   BUN 22 21 23   CREATININE 1.2 1.1 1.2   CALCIUM 10.2 10.0 10.1   MG 2.1  --  1.9   , CMP   Recent Labs   Lab 04/21/24  1317 04/22/24  0506 04/23/24  0501    145 144   K 4.4 4.0 4.0   * 109 106   CO2 27 33* 37*    92 97   BUN 22 21 23   CREATININE 1.2 1.1 1.2   CALCIUM 10.2 10.0 10.1   PROT 6.9  --  6.4   ALBUMIN 3.1*  --  2.8*   BILITOT 0.8  --  0.6   ALKPHOS 127  --  107   AST 27  --  12   ALT 32  --  21   ANIONGAP 5 3 1*   , CBC   Recent Labs   Lab 04/21/24  1317 04/22/24  0506 04/23/24  0501   WBC 7.67 6.29 6.37   HGB 12.6 11.3* 12.0   HCT 39.3 33.2* 33.2*    196 212   , and All pertinent lab results from the last 24 hours have been reviewed.    Significant Imaging:   TTE 9/9/23:     Left Ventricle: The left ventricle is normal in size. Mildly increased wall thickness. Normal wall motion. There is mildly reduced systolic function with a visually estimated ejection fraction of 40 - 45%. There is normal diastolic function.    Right Ventricle: Normal right ventricular cavity size. Wall thickness is normal. Right ventricle wall motion  is normal. Systolic function is normal.    Aortic Valve: There is mild to moderate aortic regurgitation with a centrally directed jet.    Mitral Valve: There is moderate regurgitation.    Tricuspid  Valve: There is moderate regurgitation with a centrally directed jet.    Pulmonic Valve: There is mild to moderate regurgitation.    Pulmonary Artery: The estimated pulmonary artery systolic pressure is 47 mmHg.    IVC/SVC: Normal venous pressure at 3 mmHg.    Assessment and Plan:    HFmrEF, 40-45%   -pt presenting with worsening LE edema and SOB   -last TTE as above - EF 40-45% - has not followed with outpt cardiology - will likely need ischemic evaluation and close outpatient follow-up.   -repeat TTE pending -will adjust HF medications pending results of echo - continue carvedilol  -continue diuresis with lasix 40 mg IV bid - continues with good urine output - continue to monitor volume status - appears overloaded with BLE edema  -HR and BP stable     Acute on chronic respiratory failure  -continue CPAP/supplemental O2 as needed   -breathing improved with diuresis   -continue to monitor   -continue management per primary        Active Diagnoses:    Diagnosis Date Noted POA    PRINCIPAL PROBLEM:  Acute on chronic combined systolic and diastolic congestive heart failure [I50.43] 02/16/2022 Yes    SOB (shortness of breath) [R06.02] 03/30/2023 Yes    Diabetes mellitus, type 2 [E11.9] 02/16/2022 Yes    Severe obesity (BMI >= 40) [E66.01] 02/04/2022 Yes      Problems Resolved During this Admission:       VTE Risk Mitigation (From admission, onward)           Ordered     IP VTE HIGH RISK PATIENT  Once         04/21/24 1721     Place sequential compression device  Until discontinued         04/21/24 1721                    Thank you for your consult.     DARRYL Blanc-C - scribed for Dr. Rascon   Cardiology     Provider's Attestation:  I, Spencer Rascon MD, confirm that DARRYL Valencia worked under my direction at all times.  Documentation shall reflect findings and decisions made by myself in the course of the patient's treatment.  I have reviewed the notes and assessment, and I concur with her documentation.  CMS  guidelines regarding the use of scribes shall be adhered to.  MD Spencer Galloway MD  Cardiology   [place] - Intensive [facility]

## 2024-04-22 NOTE — ASSESSMENT & PLAN NOTE
"Patient is identified as having Systolic (HFrEF) heart failure that is Acute on chronic. CHF is currently uncontrolled due to Continued edema of extremities and Dyspnea not returned to baseline after 2 doses of IV diuretic. Latest ECHO performed and demonstrates- Results for orders placed during the hospital encounter of 09/09/23    Echo    Interpretation Summary    Left Ventricle: The left ventricle is normal in size. Mildly increased wall thickness. Normal wall motion. There is mildly reduced systolic function with a visually estimated ejection fraction of 40 - 45%. There is normal diastolic function.    Right Ventricle: Normal right ventricular cavity size. Wall thickness is normal. Right ventricle wall motion  is normal. Systolic function is normal.    Aortic Valve: There is mild to moderate aortic regurgitation with a centrally directed jet.    Mitral Valve: There is moderate regurgitation.    Tricuspid Valve: There is moderate regurgitation with a centrally directed jet.    Pulmonic Valve: There is mild to moderate regurgitation.    Pulmonary Artery: The estimated pulmonary artery systolic pressure is 47 mmHg.    IVC/SVC: Normal venous pressure at 3 mmHg.  . Continue Beta Blocker and Furosemide and monitor clinical status closely. Monitor on telemetry. Patient is on CHF pathway.  Monitor strict Is&Os and daily weights.  Place on fluid restriction of 2 L. Cardiology has been consulted. Continue to stress to patient importance of self efficacy and  on diet for CHF. Last BNP reviewed- and noted below No results for input(s): "BNP", "BNPTRIAGEBLO" in the last 168 hours.  "

## 2024-04-22 NOTE — EICU
eICU Intervention    Received request for CPAP orders as patient uses CPAP at home  She is not an ICU patient and has no camera capabilities but reportedly not having any respiratory issues    Ordered CPAP, RT to retrieve patient's home settings and adjust accordingly

## 2024-04-22 NOTE — PLAN OF CARE
No issues overnight. Denies SOB or pain. VSS, afebrile. CPAP orders obtained for sleep per patient request, rested well. UOP 1100ml per mickie, no BM

## 2024-04-22 NOTE — HPI
Carmen Tan is a 74 y.o. female with PMHX of COPD, CHF, type 2 diabetes, paroxysmal AFib who presents to the emergency department C/O leg swelling.     Patient arrives from home due to worsening lower extremity edema.  Also reports increasing shortness of breath.  Denies chest pain.  Patient takes Lasix twice a day and states she has good urine output from it.  Reports symptoms have been gradually worsening.     Patient reports appetite has been poor but drinks fluids.  Reports about 3 bottles of water a day and two coke zeros.  Limited physical activity which she attributes to leg pain    Pt had to attend her husbands  and so when sxs had started worsenign she had stayed home initially - after  she came in.  Pt reports this am - feelign little better after diuresisng some over night.  She does use O2 conintuously at home and wears a bipap at home at night and prn

## 2024-04-22 NOTE — SUBJECTIVE & OBJECTIVE
Past Medical History:   Diagnosis Date    Abdominal hernia     Bacteremia 4/5/2023    CHF (congestive heart failure)     COPD (chronic obstructive pulmonary disease)     Diabetes mellitus, type 2 2/16/2022    GERD (gastroesophageal reflux disease)     History of home oxygen therapy     Hypertension     Hypertensive emergency 3/30/2023    Migraine headache     Pulmonary embolism 06/01/2017    Small bowel obstruction     Thyroid disease        Past Surgical History:   Procedure Laterality Date    COLONOSCOPY      HYSTERECTOMY      INNER EAR SURGERY      UPPER GASTROINTESTINAL ENDOSCOPY         Review of patient's allergies indicates:   Allergen Reactions    Pcn [penicillins] Swelling       Current Facility-Administered Medications   Medication Dose Route Frequency Provider Last Rate Last Admin    acetaminophen tablet 650 mg  650 mg Oral Q8H PRN John Paul Rojas MD   650 mg at 04/22/24 0001    albuterol-ipratropium 2.5 mg-0.5 mg/3 mL nebulizer solution 3 mL  3 mL Nebulization Q4H PRN Veda Gleason MD        aspirin EC tablet 81 mg  81 mg Oral Daily Veda Gleason MD   81 mg at 04/22/24 0946    atorvastatin tablet 40 mg  40 mg Oral Daily Veda Gleason MD   40 mg at 04/22/24 0946    busPIRone tablet 5 mg  5 mg Oral BID Veda Gleason MD   5 mg at 04/22/24 0946    carvediloL tablet 6.25 mg  6.25 mg Oral BID Veda Gleason MD   6.25 mg at 04/22/24 1013    furosemide injection 40 mg  40 mg Intravenous Q12H John Paul Rojas MD   40 mg at 04/22/24 0946    levothyroxine tablet 50 mcg  50 mcg Oral Before breakfast Veda Gleason MD   50 mcg at 04/22/24 0946    melatonin tablet 6 mg  6 mg Oral Nightly PRN John Paul Rojas MD   6 mg at 04/21/24 2026    ondansetron injection 4 mg  4 mg Intravenous Q8H PRN John Paul Rojas MD        pantoprazole EC tablet 40 mg  40 mg Oral Daily Veda Gleason MD   40 mg at 04/22/24 0946    sodium chloride 0.9% flush 10 mL  10 mL  Intravenous John Paul Nava MD         Family History    None       Tobacco Use    Smoking status: Former    Smokeless tobacco: Never   Substance and Sexual Activity    Alcohol use: No    Drug use: No    Sexual activity: Not on file     Review of Systems   Constitutional:  Positive for activity change. Negative for appetite change, chills, fatigue and fever.   HENT:  Negative for drooling, ear pain, nosebleeds, postnasal drip and sinus pressure.    Respiratory:  Positive for shortness of breath and wheezing. Negative for cough and chest tightness.    Cardiovascular:  Positive for leg swelling. Negative for chest pain and palpitations.   Gastrointestinal:  Positive for abdominal pain and nausea. Negative for blood in stool, constipation and diarrhea.   Musculoskeletal:  Positive for arthralgias and back pain.   Skin:  Negative for rash and wound.   Neurological:  Positive for weakness. Negative for syncope and numbness.     Objective:     Vital Signs (Most Recent):  Temp: 97 °F (36.1 °C) (04/22/24 0400)  Pulse: 60 (04/22/24 1501)  Resp: (!) 26 (04/22/24 1501)  BP: 135/67 (hr 70) (04/22/24 1013)  SpO2: 100 % (04/22/24 1501) Vital Signs (24h Range):  Temp:  [96.9 °F (36.1 °C)-97.1 °F (36.2 °C)] 97 °F (36.1 °C)  Pulse:  [60-81] 60  Resp:  [17-39] 26  SpO2:  [91 %-100 %] 100 %  BP: (125-158)/(61-92) 135/67     Weight: 120.2 kg (265 lb)  Body mass index is 45.49 kg/m².     Physical Exam  Constitutional:       Appearance: She is obese.   HENT:      Nose: Nose normal.      Mouth/Throat:      Mouth: Mucous membranes are moist.   Eyes:      Extraocular Movements: Extraocular movements intact.      Pupils: Pupils are equal, round, and reactive to light.   Cardiovascular:      Rate and Rhythm: Normal rate and regular rhythm.   Pulmonary:      Effort: Pulmonary effort is normal.      Comments: Decreased BS at bases - O2 via NC  Abdominal:      General: Bowel sounds are normal. There is no distension.      Palpations:  Abdomen is soft.      Tenderness: There is no abdominal tenderness.   Musculoskeletal:      Cervical back: Normal range of motion.      Right lower leg: Edema present.      Left lower leg: Edema present.   Skin:     General: Skin is warm and dry.      Capillary Refill: Capillary refill takes 2 to 3 seconds.   Neurological:      General: No focal deficit present.      Mental Status: She is alert and oriented to person, place, and time.              CRANIAL NERVES     CN III, IV, VI   Pupils are equal, round, and reactive to light.       Significant Labs: All pertinent labs within the past 24 hours have been reviewed.  Recent Lab Results         04/22/24  0820   04/22/24  0506        Anion Gap   3       Baso #   0.02       Basophil %   0.3       BUN   21       Calcium   10.0       Chloride   109       CO2   33       Creatinine   1.1       Differential Method   Automated       eGFR   53.1       Eos #   0.1       Eos %   1.1       Glucose   92       Gran # (ANC)   4.3       Gran %   68.0       Hematocrit   33.2       Hemoglobin   11.3       Immature Grans (Abs)   0.02  Comment: Mild elevation in immature granulocytes is non specific and   can be seen in a variety of conditions including stress response,   acute inflammation, trauma and pregnancy. Correlation with other   laboratory and clinical findings is essential.         Immature Granulocytes   0.3       Lymph #   1.2       Lymph %   19.6       MCH   32.1       MCHC   34.0       MCV   94       Mono #   0.7       Mono %   10.7       MPV   9.6       nRBC   0       QRS Duration 130         OHS QTC Calculation 425         Platelet Count   196       Potassium   4.0       RBC   3.52       RDW   17.5       Sodium   145       WBC   6.29               Significant Imaging: I have reviewed all pertinent imaging results/findings within the past 24 hours.

## 2024-04-22 NOTE — PLAN OF CARE
[facility] - Intensive Care  Initial Discharge Assessment       Primary Care Provider: Lucian Barry MD    Admission Diagnosis: SOB (shortness of breath) [R06.02]  Hypervolemia, unspecified hypervolemia type [E87.70]  Chronic congestive heart failure, unspecified heart failure type [I50.9]  Acute on chronic combined systolic and diastolic congestive heart failure [I50.43]    Admission Date: 4/21/2024  Expected Discharge Date:     Transition of Care Barriers: None    Payor: HUMANA MANAGED MEDICARE / Plan: HUMANA MEDICARE HMO / Product Type: Capitation /     Extended Emergency Contact Information  Primary Emergency Contact: TanEsther walker   [facility]  Home Phone: [phone]  Relation: Daughter  Secondary Emergency Contact: DominickSalvatore  Mobile Phone: [phone]  Relation: Son    Discharge Plan A: Home, Home Health  Discharge Plan B: Home      [id number] - [place], [place] - [address] [facility] AT [facility] [address] [facility]  [address] [facility] [address]  Phone: [phone] Fax: [phone]    [id number] - [place], [place] - [address] AT [facility] [address]  [address]  [facility] [address]  Phone: [phone] Fax: [phone]      Initial Assessment (most recent)       Adult Discharge Assessment - 04/22/24 0940          Discharge Assessment    Assessment Type Discharge Planning Assessment     Confirmed/corrected address, phone number and insurance Yes     Confirmed Demographics Correct on Facesheet     Source of Information family     If unable to respond/provide information was family/caregiver contacted? Yes     Contact Name/Number Esther Tan (daughter) [phone]     Communicated TIGRE with patient/caregiver Date not available/Unable to determine     Reason For Admission Shortness of Breath     People in Home child(edmar), adult     Do you expect to return to your current living situation? Yes     Do you  have help at home or someone to help you manage your care at home? No   Patient does not require assistance at this time.    Prior to hospitilization cognitive status: Alert/Oriented     Current cognitive status: Alert/Oriented     Walking or Climbing Stairs Difficulty yes     Walking or Climbing Stairs ambulation difficulty, requires equipment     Mobility Management walker, wheelchair     Dressing/Bathing Difficulty no     Home Layout Able to live on 1st floor     Equipment Currently Used at Home walker, rolling;wheelchair;CPAP;oxygen;glucometer     Readmission within 30 days? No     Patient currently being followed by outpatient case management? No     Do you currently have service(s) that help you manage your care at home? No     Do you take prescription medications? Yes     Do you have prescription coverage? Yes     Do you have any problems affording any of your prescribed medications? No     Is the patient taking medications as prescribed? yes     Who is going to help you get home at discharge? family     How do you get to doctors appointments? family or friend will provide     Are you on dialysis? No     Do you take coumadin? No     Discharge Plan A Home;Home Health     Discharge Plan B Home     DME Needed Upon Discharge  none     Discharge Plan discussed with: Adult children     Transition of Care Barriers None        Physical Activity    On average, how many days per week do you engage in moderate to strenuous exercise (like a brisk walk)? Patient unable to answer   Patient on CPAP    On average, how many minutes do you engage in exercise at this level? Patient unable to answer        Financial Resource Strain    How hard is it for you to pay for the very basics like food, housing, medical care, and heating? Patient unable to answer        Housing Stability    In the last 12 months, was there a time when you were not able to pay the mortgage or rent on time? Patient unable to answer     At any time in the  past 12 months, were you homeless or living in a shelter (including now)? Patient unable to answer        Transportation Needs    In the past 12 months, has lack of transportation kept you from medical appointments or from getting medications? Patient unable to answer     In the past 12 months, has lack of transportation kept you from meetings, work, or from getting things needed for daily living? Patient unable to answer        Food Insecurity    Within the past 12 months, you worried that your food would run out before you got the money to buy more. Patient unable to answer     Within the past 12 months, the food you bought just didn't last and you didn't have money to get more. Patient unable to answer        Stress    Do you feel stress - tense, restless, nervous, or anxious, or unable to sleep at night because your mind is troubled all the time - these days? Patient unable to answer        Social Connections    In a typical week, how many times do you talk on the phone with family, friends, or neighbors? Patient unable to answer     How often do you get together with friends or relatives? Patient unable to answer     How often do you attend Tenriism or Shinto services? Patient unable to answer     Do you belong to any clubs or organizations such as Tenriism groups, unions, fraternal or athletic groups, or school groups? Patient unable to answer     How often do you attend meetings of the clubs or organizations you belong to? Patient unable to answer     Are you , , , , never , or living with a partner? Patient unable to answer                   SW called patient to complete assessment but patient unable to complete due to being on BIPAP.  Discharge assessment completed with daughter Esther.  Daughter would like home health when discharged.  SW will remain available for any  needs or concerns.

## 2024-04-23 LAB
ALBUMIN SERPL BCP-MCNC: 2.8 G/DL (ref 3.5–5.2)
ALP SERPL-CCNC: 107 U/L (ref 55–135)
ALT SERPL W/O P-5'-P-CCNC: 21 U/L (ref 10–44)
ANION GAP SERPL CALC-SCNC: 1 MMOL/L (ref 3–11)
AST SERPL-CCNC: 12 U/L (ref 10–40)
BASOPHILS # BLD AUTO: 0.01 K/UL (ref 0–0.2)
BASOPHILS NFR BLD: 0.2 % (ref 0–1.9)
BILIRUB SERPL-MCNC: 0.6 MG/DL (ref 0.1–1)
BUN SERPL-MCNC: 23 MG/DL (ref 8–23)
CALCIUM SERPL-MCNC: 10.1 MG/DL (ref 8.7–10.5)
CHLORIDE SERPL-SCNC: 106 MMOL/L (ref 95–110)
CO2 SERPL-SCNC: 37 MMOL/L (ref 23–29)
CREAT SERPL-MCNC: 1.2 MG/DL (ref 0.5–1.4)
DIFFERENTIAL METHOD BLD: ABNORMAL
EOSINOPHIL # BLD AUTO: 0.1 K/UL (ref 0–0.5)
EOSINOPHIL NFR BLD: 0.9 % (ref 0–8)
ERYTHROCYTE [DISTWIDTH] IN BLOOD BY AUTOMATED COUNT: 18.1 % (ref 11.5–14.5)
EST. GFR  (NO RACE VARIABLE): 47.8 ML/MIN/1.73 M^2
GLUCOSE SERPL-MCNC: 97 MG/DL (ref 70–110)
HCT VFR BLD AUTO: 33.2 % (ref 37–48.5)
HGB BLD-MCNC: 12 G/DL (ref 12–16)
IMM GRANULOCYTES # BLD AUTO: 0.02 K/UL (ref 0–0.04)
IMM GRANULOCYTES NFR BLD AUTO: 0.3 % (ref 0–0.5)
LYMPHOCYTES # BLD AUTO: 1.4 K/UL (ref 1–4.8)
LYMPHOCYTES NFR BLD: 22 % (ref 18–48)
MAGNESIUM SERPL-MCNC: 1.9 MG/DL (ref 1.6–2.6)
MCH RBC QN AUTO: 35.2 PG (ref 27–31)
MCHC RBC AUTO-ENTMCNC: 36.1 G/DL (ref 32–36)
MCV RBC AUTO: 97 FL (ref 82–98)
MONOCYTES # BLD AUTO: 0.7 K/UL (ref 0.3–1)
MONOCYTES NFR BLD: 11.3 % (ref 4–15)
NEUTROPHILS # BLD AUTO: 4.2 K/UL (ref 1.8–7.7)
NEUTROPHILS NFR BLD: 65.3 % (ref 38–73)
NRBC BLD-RTO: 0 /100 WBC
PLATELET # BLD AUTO: 212 K/UL (ref 150–450)
PMV BLD AUTO: 9.5 FL (ref 9.2–12.9)
POTASSIUM SERPL-SCNC: 4 MMOL/L (ref 3.5–5.1)
PROT SERPL-MCNC: 6.4 G/DL (ref 6–8.4)
RBC # BLD AUTO: 3.41 M/UL (ref 4–5.4)
SODIUM SERPL-SCNC: 144 MMOL/L (ref 136–145)
WBC # BLD AUTO: 6.37 K/UL (ref 3.9–12.7)

## 2024-04-23 PROCEDURE — 99900035 HC TECH TIME PER 15 MIN (STAT)

## 2024-04-23 PROCEDURE — 99900031 HC PATIENT EDUCATION (STAT)

## 2024-04-23 PROCEDURE — 83735 ASSAY OF MAGNESIUM: CPT | Performed by: INTERNAL MEDICINE

## 2024-04-23 PROCEDURE — 27100171 HC OXYGEN HIGH FLOW UP TO 24 HOURS

## 2024-04-23 PROCEDURE — 85025 COMPLETE CBC W/AUTO DIFF WBC: CPT | Performed by: INTERNAL MEDICINE

## 2024-04-23 PROCEDURE — 80053 COMPREHEN METABOLIC PANEL: CPT | Performed by: INTERNAL MEDICINE

## 2024-04-23 PROCEDURE — 25000003 PHARM REV CODE 250: Performed by: INTERNAL MEDICINE

## 2024-04-23 PROCEDURE — 25000003 PHARM REV CODE 250: Performed by: EMERGENCY MEDICINE

## 2024-04-23 PROCEDURE — 63600175 PHARM REV CODE 636 W HCPCS: Performed by: EMERGENCY MEDICINE

## 2024-04-23 PROCEDURE — 97161 PT EVAL LOW COMPLEX 20 MIN: CPT

## 2024-04-23 PROCEDURE — 97166 OT EVAL MOD COMPLEX 45 MIN: CPT

## 2024-04-23 PROCEDURE — 11000001 HC ACUTE MED/SURG PRIVATE ROOM

## 2024-04-23 PROCEDURE — 36415 COLL VENOUS BLD VENIPUNCTURE: CPT | Performed by: INTERNAL MEDICINE

## 2024-04-23 PROCEDURE — 94660 CPAP INITIATION&MGMT: CPT

## 2024-04-23 PROCEDURE — 94761 N-INVAS EAR/PLS OXIMETRY MLT: CPT

## 2024-04-23 RX ORDER — ALUMINUM HYDROXIDE, MAGNESIUM HYDROXIDE, AND SIMETHICONE 1200; 120; 1200 MG/30ML; MG/30ML; MG/30ML
30 SUSPENSION ORAL EVERY 6 HOURS PRN
Status: DISCONTINUED | OUTPATIENT
Start: 2024-04-23 | End: 2024-04-29 | Stop reason: HOSPADM

## 2024-04-23 RX ORDER — MUPIROCIN 20 MG/G
OINTMENT TOPICAL 2 TIMES DAILY
Status: COMPLETED | OUTPATIENT
Start: 2024-04-23 | End: 2024-04-27

## 2024-04-23 RX ADMIN — ASPIRIN 81 MG: 81 TABLET ORAL at 10:04

## 2024-04-23 RX ADMIN — FUROSEMIDE 40 MG: 10 INJECTION, SOLUTION INTRAVENOUS at 08:04

## 2024-04-23 RX ADMIN — PANTOPRAZOLE SODIUM 40 MG: 40 TABLET, DELAYED RELEASE ORAL at 10:04

## 2024-04-23 RX ADMIN — MUPIROCIN: 20 OINTMENT TOPICAL at 10:04

## 2024-04-23 RX ADMIN — ACETAMINOPHEN 650 MG: 325 TABLET ORAL at 04:04

## 2024-04-23 RX ADMIN — Medication 6 MG: at 08:04

## 2024-04-23 RX ADMIN — LEVOTHYROXINE SODIUM 50 MCG: 50 TABLET ORAL at 05:04

## 2024-04-23 RX ADMIN — MUPIROCIN: 20 OINTMENT TOPICAL at 08:04

## 2024-04-23 RX ADMIN — ACETAMINOPHEN 650 MG: 325 TABLET ORAL at 08:04

## 2024-04-23 RX ADMIN — BUSPIRONE HYDROCHLORIDE 5 MG: 5 TABLET ORAL at 10:04

## 2024-04-23 RX ADMIN — BUSPIRONE HYDROCHLORIDE 5 MG: 5 TABLET ORAL at 08:04

## 2024-04-23 RX ADMIN — CARVEDILOL 6.25 MG: 3.12 TABLET, FILM COATED ORAL at 08:04

## 2024-04-23 RX ADMIN — ATORVASTATIN CALCIUM 40 MG: 40 TABLET, FILM COATED ORAL at 10:04

## 2024-04-23 RX ADMIN — ALUMINUM HYDROXIDE, MAGNESIUM HYDROXIDE, AND SIMETHICONE 30 ML: 200; 200; 20 SUSPENSION ORAL at 08:04

## 2024-04-23 RX ADMIN — FUROSEMIDE 40 MG: 10 INJECTION, SOLUTION INTRAVENOUS at 10:04

## 2024-04-23 RX ADMIN — CARVEDILOL 6.25 MG: 3.12 TABLET, FILM COATED ORAL at 10:04

## 2024-04-23 NOTE — PLAN OF CARE
Problem: Adult Inpatient Plan of Care  Goal: Plan of Care Review  Outcome: Progressing  Goal: Patient-Specific Goal (Individualized)  Outcome: Progressing  Goal: Absence of Hospital-Acquired Illness or Injury  Outcome: Progressing  Goal: Optimal Comfort and Wellbeing  Outcome: Progressing  Goal: Readiness for Transition of Care  Outcome: Progressing     Problem: Bariatric Environmental Safety  Goal: Safety Maintained with Care  Outcome: Progressing     Problem: Skin Injury Risk Increased  Goal: Skin Health and Integrity  Outcome: Progressing     Problem: Fall Injury Risk  Goal: Absence of Fall and Fall-Related Injury  Outcome: Progressing     Problem: Adjustment to Illness (Heart Failure)  Goal: Optimal Coping  Outcome: Progressing     Problem: Cardiac Output Decreased (Heart Failure)  Goal: Optimal Cardiac Output  Outcome: Progressing     Problem: Dysrhythmia (Heart Failure)  Goal: Stable Heart Rate and Rhythm  Outcome: Progressing     Problem: Fluid Imbalance (Heart Failure)  Goal: Fluid Balance  Outcome: Progressing     Problem: Functional Ability Impaired (Heart Failure)  Goal: Optimal Functional Ability  Outcome: Progressing     Problem: Respiratory Compromise (Heart Failure)  Goal: Effective Oxygenation and Ventilation  Outcome: Progressing     Problem: Sleep Disordered Breathing (Heart Failure)  Goal: Effective Breathing Pattern During Sleep  Outcome: Progressing

## 2024-04-23 NOTE — PLAN OF CARE
Problem: Occupational Therapy  Goal: Occupational Therapy Goal  Description: Goals to be met by: 05/01/24     Patient will increase functional independence with ADLs by performing:    Feeding with Modified Simms.  UE Dressing with Set-up Assistance.  LE Dressing with Minimal Assistance.  Grooming while seated with Modified Simms.  Toileting from toilet with Minimal Assistance for hygiene and clothing management.   Bathing from  edge of bed with Minimal Assistance.  Toilet transfer to bedside commode with Contact Guard Assistance.    Outcome: Established OT POC

## 2024-04-23 NOTE — PROGRESS NOTES
[facility]  Progress Note    Patient Name: Carmen Tan  MRN: [id number]  Patient Class: IP- Inpatient   Admission Date: 2024  Length of Stay: 1 days  Attending Physician: Veda Gleason MD  Primary Care Provider: Lucian Barry MD        Subjective:     Principal Problem:Acute on chronic combined systolic and diastolic congestive heart failure        HPI:  Carmen Tan is a 74 y.o. female with PMHX of COPD, CHF, type 2 diabetes, paroxysmal AFib who presents to the emergency department C/O leg swelling.     Patient arrives from home due to worsening lower extremity edema.  Also reports increasing shortness of breath.  Denies chest pain.  Patient takes Lasix twice a day and states she has good urine output from it.  Reports symptoms have been gradually worsening.     Patient reports appetite has been poor but drinks fluids.  Reports about 3 bottles of water a day and two coke zeros.  Limited physical activity which she attributes to leg pain    Pt had to attend her husbands  and so when sxs had started worsenign she had stayed home initially - after  she came in.  Pt reports this am - feelign little better after diuresisng some over night.  She does use O2 conintuously at home and wears a bipap at home at night and prn    Overview/Hospital Course:   pt feelign better- diuresisng well - less swellign to legs - improvement in breathing.      Past Medical History:   Diagnosis Date    Abdominal hernia     Bacteremia 2023    CHF (congestive heart failure)     COPD (chronic obstructive pulmonary disease)     Diabetes mellitus, type 2 2022    GERD (gastroesophageal reflux disease)     History of home oxygen therapy     Hypertension     Hypertensive emergency 3/30/2023    Migraine headache     Pulmonary embolism 2017    Small bowel obstruction     Thyroid disease        Past Surgical History:   Procedure Laterality Date    COLONOSCOPY       HYSTERECTOMY      INNER EAR SURGERY      UPPER GASTROINTESTINAL ENDOSCOPY         Review of patient's allergies indicates:   Allergen Reactions    Pcn [penicillins] Swelling       Current Facility-Administered Medications   Medication Dose Route Frequency Provider Last Rate Last Admin    acetaminophen tablet 650 mg  650 mg Oral Q8H PRN John Paul Rojas MD   650 mg at 04/23/24 0818    albuterol-ipratropium 2.5 mg-0.5 mg/3 mL nebulizer solution 3 mL  3 mL Nebulization Q4H PRN Veda Gleason MD        aluminum-magnesium hydroxide-simethicone 200-200-20 mg/5 mL suspension 30 mL  30 mL Oral Q6H PRN Veda Gleason MD   30 mL at 04/23/24 0814    aspirin EC tablet 81 mg  81 mg Oral Daily Veda Gleason MD   81 mg at 04/23/24 1001    atorvastatin tablet 40 mg  40 mg Oral Daily Veda Gleason MD   40 mg at 04/23/24 1000    busPIRone tablet 5 mg  5 mg Oral BID Veda Gleason MD   5 mg at 04/23/24 1000    carvediloL tablet 6.25 mg  6.25 mg Oral BID Veda Gleason MD   6.25 mg at 04/23/24 1001    furosemide injection 40 mg  40 mg Intravenous Q12H John Paul Rojas MD   40 mg at 04/23/24 1001    levothyroxine tablet 50 mcg  50 mcg Oral Before breakfast Veda Gleason MD   50 mcg at 04/23/24 0537    melatonin tablet 6 mg  6 mg Oral Nightly PRN John Paul Rojas MD   6 mg at 04/22/24 2039    mupirocin 2 % ointment   Nasal BID Veda Gleason MD   Given at 04/23/24 1000    ondansetron injection 4 mg  4 mg Intravenous Q8H PRN John Paul Rojas MD   4 mg at 04/22/24 1920    pantoprazole EC tablet 40 mg  40 mg Oral Daily Veda Gleason MD   40 mg at 04/23/24 1000    sodium chloride 0.9% flush 10 mL  10 mL Intravenous PRN John Paul Rojas MD         Family History    None       Tobacco Use    Smoking status: Former    Smokeless tobacco: Never   Substance and Sexual Activity    Alcohol use: No    Drug use: No    Sexual activity: Not on file     Review of  Systems   Constitutional:  Positive for activity change. Negative for appetite change, chills, fatigue and fever.   HENT:  Negative for drooling, ear pain, nosebleeds, postnasal drip and sinus pressure.    Respiratory:  Positive for shortness of breath and wheezing. Negative for cough and chest tightness.    Cardiovascular:  Positive for leg swelling. Negative for chest pain and palpitations.   Gastrointestinal:  Positive for abdominal pain and nausea. Negative for blood in stool, constipation and diarrhea.   Musculoskeletal:  Positive for arthralgias and back pain.   Skin:  Negative for rash and wound.   Neurological:  Positive for weakness. Negative for syncope and numbness.     Objective:     Vital Signs (Most Recent):  Temp: 97.8 °F (36.6 °C) (04/23/24 0400)  Pulse: 70 (04/23/24 0705)  Resp: (!) 28 (04/23/24 0705)  BP: (!) 108/59 (hr 74) (04/23/24 1001)  SpO2: 98 % (04/23/24 0705) Vital Signs (24h Range):  Temp:  [97.5 °F (36.4 °C)-98.1 °F (36.7 °C)] 97.8 °F (36.6 °C)  Pulse:  [60-79] 70  Resp:  [22-28] 28  SpO2:  [92 %-100 %] 98 %  BP: (108-134)/(54-75) 108/59     Weight: 120.2 kg (265 lb)  Body mass index is 45.49 kg/m².     Physical Exam  Constitutional:       Appearance: She is obese.   HENT:      Nose: Nose normal.      Mouth/Throat:      Mouth: Mucous membranes are moist.   Eyes:      Extraocular Movements: Extraocular movements intact.      Pupils: Pupils are equal, round, and reactive to light.   Cardiovascular:      Rate and Rhythm: Normal rate and regular rhythm.   Pulmonary:      Effort: Pulmonary effort is normal.      Comments: Decreased BS at bases - O2 via NC  Abdominal:      General: Bowel sounds are normal. There is no distension.      Palpations: Abdomen is soft.      Tenderness: There is no abdominal tenderness.   Musculoskeletal:      Cervical back: Normal range of motion.      Right lower leg: Edema present.      Left lower leg: Edema present.   Skin:     General: Skin is warm and dry.       Capillary Refill: Capillary refill takes 2 to 3 seconds.   Neurological:      General: No focal deficit present.      Mental Status: She is alert and oriented to person, place, and time.              CRANIAL NERVES     CN III, IV, VI   Pupils are equal, round, and reactive to light.       Significant Labs: All pertinent labs within the past 24 hours have been reviewed.  Recent Lab Results         04/23/24  0501        Albumin 2.8              ALT 21       Anion Gap 1       AST 12       Baso # 0.01       Basophil % 0.2       BILIRUBIN TOTAL 0.6  Comment: For infants and newborns, interpretation of results should be based  on gestational age, weight and in agreement with clinical  observations.    Premature Infant recommended reference ranges:  Up to 24 hours.............<8.0 mg/dL  Up to 48 hours............<12.0 mg/dL  3-5 days..................<15.0 mg/dL  6-29 days.................<15.0 mg/dL    For patients on Eltrombopag therapy, use of Dimension [place] TBIL is   not   recommended.         BUN 23       Calcium 10.1       Chloride 106       CO2 37       Creatinine 1.2       Differential Method Automated       eGFR 47.8       Eos # 0.1       Eos % 0.9       Glucose 97       Gran # (ANC) 4.2       Gran % 65.3       Hematocrit 33.2       Hemoglobin 12.0       Immature Grans (Abs) 0.02  Comment: Mild elevation in immature granulocytes is non specific and   can be seen in a variety of conditions including stress response,   acute inflammation, trauma and pregnancy. Correlation with other   laboratory and clinical findings is essential.         Immature Granulocytes 0.3       Lymph # 1.4       Lymph % 22.0       Magnesium  1.9       MCH 35.2       MCHC 36.1       MCV 97       Mono # 0.7       Mono % 11.3       MPV 9.5       nRBC 0       Platelet Count 212       Potassium 4.0       PROTEIN TOTAL 6.4       RBC 3.41       RDW 18.1       Sodium 144       WBC 6.37               Significant Imaging: I have reviewed all  "pertinent imaging results/findings within the past 24 hours.    Assessment/Plan:      * Acute on chronic combined systolic and diastolic congestive heart failure  Patient is identified as having Systolic (HFrEF) heart failure that is Acute on chronic. CHF is currently uncontrolled due to Continued edema of extremities and Dyspnea not returned to baseline after 2 doses of IV diuretic. Latest ECHO performed and demonstrates- Results for orders placed during the hospital encounter of 09/09/23    Echo    Interpretation Summary    Left Ventricle: The left ventricle is normal in size. Mildly increased wall thickness. Normal wall motion. There is mildly reduced systolic function with a visually estimated ejection fraction of 40 - 45%. There is normal diastolic function.    Right Ventricle: Normal right ventricular cavity size. Wall thickness is normal. Right ventricle wall motion  is normal. Systolic function is normal.    Aortic Valve: There is mild to moderate aortic regurgitation with a centrally directed jet.    Mitral Valve: There is moderate regurgitation.    Tricuspid Valve: There is moderate regurgitation with a centrally directed jet.    Pulmonic Valve: There is mild to moderate regurgitation.    Pulmonary Artery: The estimated pulmonary artery systolic pressure is 47 mmHg.    IVC/SVC: Normal venous pressure at 3 mmHg.  . Continue Beta Blocker and Furosemide and monitor clinical status closely. Monitor on telemetry. Patient is on CHF pathway.  Monitor strict Is&Os and daily weights.  Place on fluid restriction of 2 L. Cardiology has been consulted. Continue to stress to patient importance of self efficacy and  on diet for CHF. Last BNP reviewed- and noted below No results for input(s): "BNP", "BNPTRIAGEBLO" in the last 168 hours.    4/23 cont diuresis - await final echo read    SOB (shortness of breath)  Improving with diuresis - cont O2      Diabetes mellitus, type 2  Patient's FSGs are controlled on " "current medication regimen.  Last A1c reviewed-   Lab Results   Component Value Date    HGBA1C 5.3 09/09/2023    HGBA1C 5.3 09/09/2023     Most recent fingerstick glucose reviewed- No results for input(s): "POCTGLUCOSE" in the last 24 hours.  Current correctional scale  Low  Maintain anti-hyperglycemic dose as follows-   Antihyperglycemics (From admission, onward)      None          Hold Oral hypoglycemics while patient is in the hospital.    Severe obesity (BMI >= 40)  Body mass index is 45.49 kg/m². Morbid obesity complicates all aspects of disease management from diagnostic modalities to treatment. Weight loss encouraged and health benefits explained to patient.           VTE Risk Mitigation (From admission, onward)           Ordered     IP VTE HIGH RISK PATIENT  Once         04/21/24 1721     Place sequential compression device  Until discontinued         04/21/24 1721                    Discharge Planning   TIGRE:      Code Status: Full Code   Is the patient medically ready for discharge?:     Reason for patient still in hospital (select all that apply): Treatment  Discharge Plan A: Home, Home Health                  Veda Gleason MD  Department of Hospital Medicine   [employer] - Intensive Care    "

## 2024-04-23 NOTE — PT/OT/SLP EVAL
Physical Therapy Evaluation    Patient Name:  Carmen Tan   MRN:  [id number]    Recommendations:     Discharge Recommendations: Low Intensity Therapy   Discharge Equipment Recommendations: none   Barriers to discharge: Inaccessible home and Decreased caregiver support    Assessment:     Carmen Tan is a 74 y.o. female admitted with a medical diagnosis of Acute on chronic combined systolic and diastolic congestive heart failure.  She presents with the following impairments/functional limitations: weakness, impaired endurance, impaired self care skills, impaired functional mobility, gait instability, pain, decreased safety awareness, decreased lower extremity function, edema, decreased ROM . Pt able to complete bed mobility, sitting at edge of bed, sit to stand and short distance ambulation with HHA x contact guard assistance/Min Assistance. Pt limited by c/o fear from falling during gait mobility task. .    Rehab Prognosis: Fair; patient would benefit from acute skilled PT services to address these deficits and reach maximum level of function.    Recent Surgery: * No surgery found *      Plan:     During this hospitalization, patient to be seen 5 x/week to address the identified rehab impairments via gait training, therapeutic activities, therapeutic exercises and progress toward the following goals:    Plan of Care Expires:  04/29/24    Subjective     Chief Complaint: SOB, weakness, LE swelling  Patient/Family Comments/goals: to get better  Pain/Comfort:  Pain Rating 1: 9/10  Location - Side 1: Left  Location 1: knee  Pain Addressed 1: Cessation of Activity, Distraction, Reposition  Pain Rating Post-Intervention 1: 0/10    Patients cultural, spiritual, Scientologist conflicts given the current situation:      Living Environment:  Prior to admission, patients level of function was pt reports of being able to ambulate with RW at a household level. Pt reports of having the oxygen at home but only uses at night.  Resides with her son in a home with a ramp to enter..  Equipment used at home: wheelchair, rollator, cane, straight, bedside commode, oxygen.  DME owned (not currently used): none.  Upon discharge, patient will have assistance from pt's son. .    Objective:     Communicated with patient prior to session.  Patient found supine with telemetry, pulse ox (continuous), peripheral IV, blood pressure cuff, PureWick  upon PT entry to room.    General Precautions: Standard, fall  Orthopedic Precautions:N/A   Braces: N/A  Respiratory Status:  BIPAP    Exams:  Cognitive Exam:  Patient is oriented to Person, Place, Time, and Situation  Gross Motor Coordination:  WFL  RLE ROM: decrease AROM due to inc. LE swelling  RLE Strength: grossly 3/5  LLE ROM: decrease AROM due to pain and LE swelling  LLE Strength: grossly 3/5    Functional Mobility:  Bed Mobility:     Rolling Left:  contact guard assistance and minimum assistance  Scooting: contact guard assistance and minimum assistance  Bridging: contact guard assistance and minimum assistance  Supine to Sit: contact guard assistance and minimum assistance  Sit to Supine: contact guard assistance and minimum assistance  Transfers:     Sit to Stand:  contact guard assistance and minimum assistance with hand-held assist  Gait: pt able to take 5 steps with minimal assistance with pt limited with gait task due to fear from falling and inc. Fatigue/pain      AM-PAC 6 CLICK MOBILITY  Total Score:16       Treatment & Education:  Pt able to tolerate bed mobility and OOB activity     Patient left HOB elevated with all lines intact and call button in reach.    GOALS:   Multidisciplinary Problems       Physical Therapy Goals          Problem: Physical Therapy    Goal Priority Disciplines Outcome Goal Variances Interventions   Physical Therapy Goal     PT, PT/OT Progressing     Description: Goals to be met by: 4/29/24    Patient will increase functional independence with mobility by  performin. Supine to sit with Modified Independent  2. Sit to supine with Modified Independent  3. Bed to chair transfer with Modified Independentwith or without rolling walker using Step Transfer TECHNIQUE  4. Gait  x 50  feet with Standby Assistance with or without rolling walker  5. Lower extremity exercise program x10 reps per handout, with assistance as needed                          History:     Past Medical History:   Diagnosis Date    Abdominal hernia     Bacteremia 2023    CHF (congestive heart failure)     COPD (chronic obstructive pulmonary disease)     Diabetes mellitus, type 2 2022    GERD (gastroesophageal reflux disease)     History of home oxygen therapy     Hypertension     Hypertensive emergency 3/30/2023    Migraine headache     Pulmonary embolism 2017    Small bowel obstruction     Thyroid disease        Past Surgical History:   Procedure Laterality Date    COLONOSCOPY      HYSTERECTOMY      INNER EAR SURGERY      UPPER GASTROINTESTINAL ENDOSCOPY         Time Tracking:     PT Received On: 24  PT Start Time: 1030     PT Stop Time: 1050  PT Total Time (min): 20 min     Billable Minutes: Evaluation 20      2024

## 2024-04-23 NOTE — PT/OT/SLP EVAL
Occupational Therapy   Evaluation    Name: Carmen Tan  MRN: [id number]  Admitting Diagnosis: Acute on chronic combined systolic and diastolic congestive heart failure  Recent Surgery: * No surgery found *      Recommendations:     Discharge Recommendations: Low Intensity Therapy  Discharge Equipment Recommendations:  none  Barriers to discharge:  Other (Comment) (Medical status)    Assessment:     Carmen Tan is a 74 y.o. female with a medical diagnosis of Acute on chronic combined systolic and diastolic congestive heart failure.  She presents with functional deficits impacting independence with ADL's including functional mobility. Performance deficits affecting function: weakness, impaired endurance, impaired self care skills, impaired functional mobility, impaired balance, decreased upper extremity function, decreased lower extremity function, decreased safety awareness, pain, decreased ROM, edema, impaired cardiopulmonary response to activity, impaired joint extensibility, impaired muscle length.      Rehab Prognosis: Good and Fair; patient would benefit from acute skilled OT services to address these deficits and reach maximum level of function.       Plan:     Patient to be seen 5 x/week to address the above listed problems via self-care/home management, therapeutic activities, therapeutic exercises  Plan of Care Expires: 05/01/24  Plan of Care Reviewed with: patient    Subjective     Chief Complaint: SOB, weakness, and pain  Patient/Family Comments/goals: Pt would like to return home with son in which patient currently grieving due to recent loss of spouse.     Occupational Profile:  Living Environment: Pt lives with her son in a mobile home with ramp to enter/exit.  Previous level of function: Independent with ADL's  Roles and Routines: ADL's, but son performs IADL's  Equipment Used at Home: wheelchair, rollator, lift device, cane, straight, bedside commode, other (see comments) (Lift device is for  tub to lower self)  Assistance upon Discharge: Son    Pain/Comfort:  Pain Rating 1: 9/10  Location - Side 1: Left  Location 1: knee  Pain Addressed 1: Reposition, Cessation of Activity, Distraction, Nurse notified  Pain Rating Post-Intervention 1: 9/10    Patients cultural, spiritual, Jewish conflicts given the current situation: no    Objective:     Communicated with: nurse prior to session.  Patient found HOB elevated with telemetry, pulse ox (continuous), peripheral IV, oxygen, blood pressure cuff, PureWick upon OT entry to room.    General Precautions: Standard, fall, respiratory  Orthopedic Precautions: N/A  Braces: N/A  Respiratory Status:  BiPAP    Occupational Performance:    Bed Mobility:    Patient completed Rolling/Turning to Left with  contact guard assistance  Patient completed Rolling/Turning to Right with contact guard assistance  Patient completed Scooting/Bridging with minimum assistance  Patient completed Supine to Sit with minimum assistance  Patient completed Sit to Supine with moderate assistance    Functional Mobility/Transfers:  Patient completed Sit <> Stand Transfer with moderate assistance  with  no assistive device   Patient completed Toilet Transfer Step Transfer technique with moderate assistance with  bedside commode    Activities of Daily Living:  Feeding:  setup assistance    Grooming: setup assistance    Bathing: moderate assistance    Upper Body Dressing: minimum assistance    Lower Body Dressing: maximal assistance    Toileting: moderate assistance      Cognitive/Visual Perceptual:  Cognitive/Psychosocial Skills:  -       Oriented to: Person, Place, Time, and Situation   -       Follows Commands/attention:Follows multistep  commands  -       Communication: clear/fluent  -       Memory: No Deficits noted  -       Safety awareness/insight to disability: impaired   -       Mood/Affect/Coping skills/emotional control: Cooperative and Pleasant    Physical Exam:  Postural  examination/scapula alignment: -       Rounded shoulders  -       Forward head  Sensation: -       Intact  bilateral UE's  Dominant hand: -       Right  Upper Extremity Range of Motion:  -       Right Upper Extremity: WFL  -       Left Upper Extremity: WFL  Upper Extremity Strength: -       Right Upper Extremity: grossly 4-/5  -       Left Upper Extremity: grossly 4-/5  Fine Motor Coordination: WFL  Gross motor coordination: WFL    AMPAC 6 Click ADL:  AMPAC Total Score: 19    Treatment & Education:  Pt was provided education / instruction regarding role of OT and established OT POC.    Patient left HOB elevated with all lines intact, call button in reach, and nurse notified    GOALS:   Goals to be met by: 05/01/24     Patient will increase functional independence with ADLs by performing:    Feeding with Modified [place].  UE Dressing with Set-up Assistance.  LE Dressing with Minimal Assistance.  Grooming while seated with Modified [place].  Toileting from toilet with Minimal Assistance for hygiene and clothing management.   Bathing from  edge of bed with Minimal Assistance.  Toilet transfer to bedside commode with Contact Guard Assistance.      History:     Past Medical History:   Diagnosis Date    Abdominal hernia     Bacteremia 4/5/2023    CHF (congestive heart failure)     COPD (chronic obstructive pulmonary disease)     Diabetes mellitus, type 2 2/16/2022    GERD (gastroesophageal reflux disease)     History of home oxygen therapy     Hypertension     Hypertensive emergency 3/30/2023    Migraine headache     Pulmonary embolism 06/01/2017    Small bowel obstruction     Thyroid disease          Past Surgical History:   Procedure Laterality Date    COLONOSCOPY      HYSTERECTOMY      INNER EAR SURGERY      UPPER GASTROINTESTINAL ENDOSCOPY         Time Tracking:     OT Date of Treatment: 04/23/24  OT Start Time: 1101  OT Stop Time: 1132  OT Total Time (min): 31 min    Billable Minutes:Evaluation  31    4/23/2024

## 2024-04-23 NOTE — PLAN OF CARE
Problem: Physical Therapy  Goal: Physical Therapy Goal  Description: Goals to be met by: 24    Patient will increase functional independence with mobility by performin. Supine to sit with Modified Independent  2. Sit to supine with Modified Independent  3. Bed to chair transfer with Modified Independentwith or without rolling walker using Step Transfer TECHNIQUE  4. Gait  x 50  feet with Standby Assistance with or without rolling walker  5. Lower extremity exercise program x10 reps per handout, with assistance as needed     Outcome: Progressing; POC inititated today

## 2024-04-23 NOTE — ASSESSMENT & PLAN NOTE
"Patient is identified as having Systolic (HFrEF) heart failure that is Acute on chronic. CHF is currently uncontrolled due to Continued edema of extremities and Dyspnea not returned to baseline after 2 doses of IV diuretic. Latest ECHO performed and demonstrates- Results for orders placed during the hospital encounter of 09/09/23    Echo    Interpretation Summary    Left Ventricle: The left ventricle is normal in size. Mildly increased wall thickness. Normal wall motion. There is mildly reduced systolic function with a visually estimated ejection fraction of 40 - 45%. There is normal diastolic function.    Right Ventricle: Normal right ventricular cavity size. Wall thickness is normal. Right ventricle wall motion  is normal. Systolic function is normal.    Aortic Valve: There is mild to moderate aortic regurgitation with a centrally directed jet.    Mitral Valve: There is moderate regurgitation.    Tricuspid Valve: There is moderate regurgitation with a centrally directed jet.    Pulmonic Valve: There is mild to moderate regurgitation.    Pulmonary Artery: The estimated pulmonary artery systolic pressure is 47 mmHg.    IVC/SVC: Normal venous pressure at 3 mmHg.  . Continue Beta Blocker and Furosemide and monitor clinical status closely. Monitor on telemetry. Patient is on CHF pathway.  Monitor strict Is&Os and daily weights.  Place on fluid restriction of 2 L. Cardiology has been consulted. Continue to stress to patient importance of self efficacy and  on diet for CHF. Last BNP reviewed- and noted below No results for input(s): "BNP", "BNPTRIAGEBLO" in the last 168 hours.    4/23 cont diuresis - await final echo read  "

## 2024-04-23 NOTE — SUBJECTIVE & OBJECTIVE
Past Medical History:   Diagnosis Date    Abdominal hernia     Bacteremia 4/5/2023    CHF (congestive heart failure)     COPD (chronic obstructive pulmonary disease)     Diabetes mellitus, type 2 2/16/2022    GERD (gastroesophageal reflux disease)     History of home oxygen therapy     Hypertension     Hypertensive emergency 3/30/2023    Migraine headache     Pulmonary embolism 06/01/2017    Small bowel obstruction     Thyroid disease        Past Surgical History:   Procedure Laterality Date    COLONOSCOPY      HYSTERECTOMY      INNER EAR SURGERY      UPPER GASTROINTESTINAL ENDOSCOPY         Review of patient's allergies indicates:   Allergen Reactions    Pcn [penicillins] Swelling       Current Facility-Administered Medications   Medication Dose Route Frequency Provider Last Rate Last Admin    acetaminophen tablet 650 mg  650 mg Oral Q8H PRN John Paul Rojas MD   650 mg at 04/23/24 0818    albuterol-ipratropium 2.5 mg-0.5 mg/3 mL nebulizer solution 3 mL  3 mL Nebulization Q4H PRN Veda Gleason MD        aluminum-magnesium hydroxide-simethicone 200-200-20 mg/5 mL suspension 30 mL  30 mL Oral Q6H PRN Veda Gleason MD   30 mL at 04/23/24 0814    aspirin EC tablet 81 mg  81 mg Oral Daily Veda Gleason MD   81 mg at 04/23/24 1001    atorvastatin tablet 40 mg  40 mg Oral Daily Veda Gleason MD   40 mg at 04/23/24 1000    busPIRone tablet 5 mg  5 mg Oral BID Veda Gleason MD   5 mg at 04/23/24 1000    carvediloL tablet 6.25 mg  6.25 mg Oral BID Veda Gleason MD   6.25 mg at 04/23/24 1001    furosemide injection 40 mg  40 mg Intravenous Q12H John Paul Rojas MD   40 mg at 04/23/24 1001    levothyroxine tablet 50 mcg  50 mcg Oral Before breakfast Veda Gleason MD   50 mcg at 04/23/24 0537    melatonin tablet 6 mg  6 mg Oral Nightly PRN John Paul Rojas MD   6 mg at 04/22/24 2039    mupirocin 2 % ointment   Nasal BID Veda Gleason MD   Given at 04/23/24  1000    ondansetron injection 4 mg  4 mg Intravenous Q8H PRN John Paul Rojas MD   4 mg at 04/22/24 1920    pantoprazole EC tablet 40 mg  40 mg Oral Daily Veda Gleason MD   40 mg at 04/23/24 1000    sodium chloride 0.9% flush 10 mL  10 mL Intravenous PRN John Paul Rojas MD         Family History    None       Tobacco Use    Smoking status: Former    Smokeless tobacco: Never   Substance and Sexual Activity    Alcohol use: No    Drug use: No    Sexual activity: Not on file     Review of Systems   Constitutional:  Positive for activity change. Negative for appetite change, chills, fatigue and fever.   HENT:  Negative for drooling, ear pain, nosebleeds, postnasal drip and sinus pressure.    Respiratory:  Positive for shortness of breath and wheezing. Negative for cough and chest tightness.    Cardiovascular:  Positive for leg swelling. Negative for chest pain and palpitations.   Gastrointestinal:  Positive for abdominal pain and nausea. Negative for blood in stool, constipation and diarrhea.   Musculoskeletal:  Positive for arthralgias and back pain.   Skin:  Negative for rash and wound.   Neurological:  Positive for weakness. Negative for syncope and numbness.     Objective:     Vital Signs (Most Recent):  Temp: 97.8 °F (36.6 °C) (04/23/24 0400)  Pulse: 70 (04/23/24 0705)  Resp: (!) 28 (04/23/24 0705)  BP: (!) 108/59 (hr 74) (04/23/24 1001)  SpO2: 98 % (04/23/24 0705) Vital Signs (24h Range):  Temp:  [97.5 °F (36.4 °C)-98.1 °F (36.7 °C)] 97.8 °F (36.6 °C)  Pulse:  [60-79] 70  Resp:  [22-28] 28  SpO2:  [92 %-100 %] 98 %  BP: (108-134)/(54-75) 108/59     Weight: 120.2 kg (265 lb)  Body mass index is 45.49 kg/m².     Physical Exam  Constitutional:       Appearance: She is obese.   HENT:      Nose: Nose normal.      Mouth/Throat:      Mouth: Mucous membranes are moist.   Eyes:      Extraocular Movements: Extraocular movements intact.      Pupils: Pupils are equal, round, and reactive to light.    Cardiovascular:      Rate and Rhythm: Normal rate and regular rhythm.   Pulmonary:      Effort: Pulmonary effort is normal.      Comments: Decreased BS at bases - O2 via NC  Abdominal:      General: Bowel sounds are normal. There is no distension.      Palpations: Abdomen is soft.      Tenderness: There is no abdominal tenderness.   Musculoskeletal:      Cervical back: Normal range of motion.      Right lower leg: Edema present.      Left lower leg: Edema present.   Skin:     General: Skin is warm and dry.      Capillary Refill: Capillary refill takes 2 to 3 seconds.   Neurological:      General: No focal deficit present.      Mental Status: She is alert and oriented to person, place, and time.              CRANIAL NERVES     CN III, IV, VI   Pupils are equal, round, and reactive to light.       Significant Labs: All pertinent labs within the past 24 hours have been reviewed.  Recent Lab Results         04/23/24  0501        Albumin 2.8              ALT 21       Anion Gap 1       AST 12       Baso # 0.01       Basophil % 0.2       BILIRUBIN TOTAL 0.6  Comment: For infants and newborns, interpretation of results should be based  on gestational age, weight and in agreement with clinical  observations.    Premature Infant recommended reference ranges:  Up to 24 hours.............<8.0 mg/dL  Up to 48 hours............<12.0 mg/dL  3-5 days..................<15.0 mg/dL  6-29 days.................<15.0 mg/dL    For patients on Eltrombopag therapy, use of Dimension [place] TBIL is   not   recommended.         BUN 23       Calcium 10.1       Chloride 106       CO2 37       Creatinine 1.2       Differential Method Automated       eGFR 47.8       Eos # 0.1       Eos % 0.9       Glucose 97       Gran # (ANC) 4.2       Gran % 65.3       Hematocrit 33.2       Hemoglobin 12.0       Immature Grans (Abs) 0.02  Comment: Mild elevation in immature granulocytes is non specific and   can be seen in a variety of conditions  including stress response,   acute inflammation, trauma and pregnancy. Correlation with other   laboratory and clinical findings is essential.         Immature Granulocytes 0.3       Lymph # 1.4       Lymph % 22.0       Magnesium  1.9       MCH 35.2       MCHC 36.1       MCV 97       Mono # 0.7       Mono % 11.3       MPV 9.5       nRBC 0       Platelet Count 212       Potassium 4.0       PROTEIN TOTAL 6.4       RBC 3.41       RDW 18.1       Sodium 144       WBC 6.37               Significant Imaging: I have reviewed all pertinent imaging results/findings within the past 24 hours.

## 2024-04-23 NOTE — PROGRESS NOTES
[facility] - Intensive Care  Cardiology  Progress Note    Patient Name: Carmen Tan  MRN: [id number]  Admission Date: 4/21/2024  Hospital Length of Stay: 1 days  Code Status: Full Code   Attending Provider: Veda Gleason MD   Primary Care Physician: Lucian Barry MD  Principal Problem:Acute on chronic combined systolic and diastolic congestive heart failure    Patient information was obtained from patient, past medical records, and ER records.     Subjective:     Chief Complaint:  shortness of breath, BLE edema      HPI:   This is a 74 to female with PMHx COPD, heart failure EF 40-45% 9/2023, HTN, ELLE (on CPAP), CKD who presented to the ED with worsening shortness of breath and lower extremity edema. She is on diuretic therapy at home, which she admits adherence too. She reports she has not been following with a cardiologist. She is unknown to [facility].     On arrival, proBNP noted to be elevated at 9130. HS troponin negative. Unremarkable CMP - electrolytes within range, renal function stable 22/1.2. She is currently on CPAP. She was admitted diuresis and supplemental oxygen.     Today, she reports breathing is minimally improved since admission. She reports progressive BLE edema at home and increasing shortness of breath. She denies any chest pain. She is diuresing well with net negative I/Os. Echocardiogram pending.     Of note, during prior hospital admission in September of last year echocardiogram revealed moderately reduced EF 40-45% - she was lost to follow-up in cardiology clinic after discharge.     Interval Hx:  4/23/24: No acute events overnight. Pt's breathing has improved since admission and has been transitioned to room air. She appears sob with conversation and reports though improved she is not back to her baseline. She denies any chest pain at this time. Echo read pending at this time.     Past Medical History:   Diagnosis Date    Abdominal hernia     Bacteremia 4/5/2023    CHF (congestive  heart failure)     COPD (chronic obstructive pulmonary disease)     Diabetes mellitus, type 2 2/16/2022    GERD (gastroesophageal reflux disease)     History of home oxygen therapy     Hypertension     Hypertensive emergency 3/30/2023    Migraine headache     Pulmonary embolism 06/01/2017    Small bowel obstruction     Thyroid disease        Past Surgical History:   Procedure Laterality Date    COLONOSCOPY      HYSTERECTOMY      INNER EAR SURGERY      UPPER GASTROINTESTINAL ENDOSCOPY         Review of patient's allergies indicates:   Allergen Reactions    Pcn [penicillins] Swelling       Current Facility-Administered Medications   Medication Dose Route Frequency Provider Last Rate Last Admin    acetaminophen tablet 650 mg  650 mg Oral Q8H PRN John Paul Rojas MD   650 mg at 04/23/24 1628    albuterol-ipratropium 2.5 mg-0.5 mg/3 mL nebulizer solution 3 mL  3 mL Nebulization Q4H PRN Veda Gleason MD        aluminum-magnesium hydroxide-simethicone 200-200-20 mg/5 mL suspension 30 mL  30 mL Oral Q6H PRN Veda Gleason MD   30 mL at 04/23/24 0814    aspirin EC tablet 81 mg  81 mg Oral Daily Veda Gleason MD   81 mg at 04/23/24 1001    atorvastatin tablet 40 mg  40 mg Oral Daily Veda Gleason MD   40 mg at 04/23/24 1000    busPIRone tablet 5 mg  5 mg Oral BID Veda Gleason MD   5 mg at 04/23/24 1000    carvediloL tablet 6.25 mg  6.25 mg Oral BID Veda Gleason MD   6.25 mg at 04/23/24 1001    furosemide injection 40 mg  40 mg Intravenous Q12H John Paul Rojas MD   40 mg at 04/23/24 1001    levothyroxine tablet 50 mcg  50 mcg Oral Before breakfast Veda Gleason MD   50 mcg at 04/23/24 0537    melatonin tablet 6 mg  6 mg Oral Nightly PRN John Paul Rojas MD   6 mg at 04/22/24 2039    mupirocin 2 % ointment   Nasal BID Veda Gleason MD   Given at 04/23/24 1000    ondansetron injection 4 mg  4 mg Intravenous Q8H PRN John Paul Rojas MD   4 mg at  04/22/24 1920    pantoprazole EC tablet 40 mg  40 mg Oral Daily Veda Gleason MD   40 mg at 04/23/24 1000    sodium chloride 0.9% flush 10 mL  10 mL Intravenous PRN John Paul Rojas MD         Family History    None       Tobacco Use    Smoking status: Former    Smokeless tobacco: Never   Substance and Sexual Activity    Alcohol use: No    Drug use: No    Sexual activity: Not on file     Review of Systems   Cardiovascular:  Positive for dyspnea on exertion and leg swelling. Negative for chest pain, orthopnea and palpitations.   Respiratory:  Positive for shortness of breath.    Neurological:  Negative for light-headedness and weakness.   All other systems reviewed and are negative.    Objective:     Vital Signs (Most Recent):  Temp: 97.8 °F (36.6 °C) (04/23/24 0400)  Pulse: 62 (04/23/24 1525)  Resp: (!) 29 (04/23/24 1525)  BP: (!) 108/59 (hr 74) (04/23/24 1001)  SpO2: 98 % (04/23/24 1525) Vital Signs (24h Range):  Temp:  [97.6 °F (36.4 °C)-98.1 °F (36.7 °C)] 97.8 °F (36.6 °C)  Pulse:  [62-79] 62  Resp:  [22-29] 29  SpO2:  [92 %-98 %] 98 %  BP: (108-133)/(54-75) 108/59     Weight: 120.2 kg (265 lb)  Body mass index is 45.49 kg/m².    SpO2: 98 %         Intake/Output Summary (Last 24 hours) at 4/23/2024 1657  Last data filed at 4/23/2024 0608  Gross per 24 hour   Intake 180 ml   Output 1650 ml   Net -1470 ml       Lines/Drains/Airways       Drain  Duration             Female External Urinary Catheter w/ Suction 04/21/24 2 days              Peripheral Intravenous Line  Duration                  Peripheral IV - Single Lumen 04/21/24 20 G Right Antecubital 2 days                    Physical Exam  Vitals and nursing note reviewed.   Constitutional:       Appearance: Normal appearance. She is obese.   HENT:      Head: Normocephalic and atraumatic.      Nose: Nose normal.      Mouth/Throat:      Mouth: Mucous membranes are moist.      Pharynx: Oropharynx is clear.   Eyes:      Extraocular Movements: Extraocular  movements intact.   Cardiovascular:      Rate and Rhythm: Normal rate and regular rhythm.      Pulses: Normal pulses.      Heart sounds: Normal heart sounds. No murmur heard.     No friction rub. No gallop.   Pulmonary:      Breath sounds: Wheezing present.      Comments: SOB with conversation  NC in place  Musculoskeletal:      Right lower leg: Edema present.      Left lower leg: Edema present.   Skin:     General: Skin is warm.      Capillary Refill: Capillary refill takes less than 2 seconds.   Neurological:      General: No focal deficit present.      Mental Status: She is alert and oriented to person, place, and time.         Significant Labs: BMP:   Recent Labs   Lab 04/22/24  0506 04/23/24  0501   GLU 92 97    144   K 4.0 4.0    106   CO2 33* 37*   BUN 21 23   CREATININE 1.1 1.2   CALCIUM 10.0 10.1   MG  --  1.9   , CMP   Recent Labs   Lab 04/22/24  0506 04/23/24  0501    144   K 4.0 4.0    106   CO2 33* 37*   GLU 92 97   BUN 21 23   CREATININE 1.1 1.2   CALCIUM 10.0 10.1   PROT  --  6.4   ALBUMIN  --  2.8*   BILITOT  --  0.6   ALKPHOS  --  107   AST  --  12   ALT  --  21   ANIONGAP 3 1*   , CBC   Recent Labs   Lab 04/22/24  0506 04/23/24  0501   WBC 6.29 6.37   HGB 11.3* 12.0   HCT 33.2* 33.2*    212   , and All pertinent lab results from the last 24 hours have been reviewed.    Significant Imaging:   TTE 9/9/23:     Left Ventricle: The left ventricle is normal in size. Mildly increased wall thickness. Normal wall motion. There is mildly reduced systolic function with a visually estimated ejection fraction of 40 - 45%. There is normal diastolic function.    Right Ventricle: Normal right ventricular cavity size. Wall thickness is normal. Right ventricle wall motion  is normal. Systolic function is normal.    Aortic Valve: There is mild to moderate aortic regurgitation with a centrally directed jet.    Mitral Valve: There is moderate regurgitation.    Tricuspid Valve: There is  moderate regurgitation with a centrally directed jet.    Pulmonic Valve: There is mild to moderate regurgitation.    Pulmonary Artery: The estimated pulmonary artery systolic pressure is 47 mmHg.    IVC/SVC: Normal venous pressure at 3 mmHg.    Assessment and Plan:    HFmrEF, 40-45%   -pt presenting with worsening LE edema and SOB   -last TTE as above - EF 40-45% - has not followed with outpt cardiology - will likely need ischemic evaluation and close outpatient follow-up.   -repeat TTE pending -will adjust HF medications pending results of echo - continue carvedilol  -continue diuresis with lasix 40 mg IV bid - continues with good urine output - continue to monitor volume status - appears overloaded with BLE edema  -HR and BP stable     Acute on chronic respiratory failure  -continue CPAP/supplemental O2 as needed - weaned to NC - breathing improving  -breathing improved with diuresis   -continue to monitor   -continue management per primary        Active Diagnoses:    Diagnosis Date Noted POA    PRINCIPAL PROBLEM:  Acute on chronic combined systolic and diastolic congestive heart failure [I50.43] 02/16/2022 Yes    SOB (shortness of breath) [R06.02] 03/30/2023 Yes    Diabetes mellitus, type 2 [E11.9] 02/16/2022 Yes    Severe obesity (BMI >= 40) [E66.01] 02/04/2022 Yes      Problems Resolved During this Admission:       VTE Risk Mitigation (From admission, onward)           Ordered     IP VTE HIGH RISK PATIENT  Once         04/21/24 1721     Place sequential compression device  Until discontinued         04/21/24 1721                    Thank you for your consult.     DARRYL Blanc-C - scribed for Dr. Rascon   Cardiology     Provider's Attestation:  I, Spencer Rascon MD, confirm that DARRYL Valencia worked under my direction at all times.  Documentation shall reflect findings and decisions made by myself in the course of the patient's treatment.  I have performed a face to face evaluation of the patient and  reviewed the medical record. In addition, I have performed the substantive portion of the medical decision making. I have reviewed the notes and assessment, and I concur with her documentation.  CMS guidelines regarding the use of scribes shall be adhered to.  MD Spencer Galloway MD  Cardiology   [facility]

## 2024-04-23 NOTE — HOSPITAL COURSE
4/23 ND pt feelign better- diuresisng well - less swellign to legs - improvement in breathing.    4/24 ND pt feelign better- breathign has imrpoved - swellign has improved  4/25 ND continues to improve daily - less swelling; breathing imrpoved  4/26 ND pt confused this am- waving towel around - swellign/edmea has imrpoved  4/27 WC:  Patient alert and oriented this morning.  Willing continues to improve.  Continue therapy efforts.  4/28 WC:  Patient resting this morning endorses some improvement in his lower extremity discomfort with gabapentin.  Continue therapy efforts.  Consider skilled nursing evaluation on Monday versus home with home health depending on the patient's cognition and functional status.  4/29 ND pt back to baseline feelign better- dc home with HH - cont home o2 and home cpap

## 2024-04-24 LAB
ALBUMIN SERPL BCP-MCNC: 2.8 G/DL (ref 3.5–5.2)
ALP SERPL-CCNC: 97 U/L (ref 55–135)
ALT SERPL W/O P-5'-P-CCNC: 18 U/L (ref 10–44)
ANION GAP SERPL CALC-SCNC: -1 MMOL/L (ref 3–11)
AORTIC ROOT ANNULUS: 3.42 CM
AORTIC VALVE CUSP SEPERATION: 1.03 CM
AST SERPL-CCNC: 11 U/L (ref 10–40)
AV INDEX (PROSTH): 0.59
AV MEAN GRADIENT: 8 MMHG
AV PEAK GRADIENT: 16 MMHG
AV REGURGITATION PRESSURE HALF TIME: 442.94 MS
AV VALVE AREA BY VELOCITY RATIO: 1.83 CM²
AV VALVE AREA: 1.92 CM²
AV VELOCITY RATIO: 0.57
BASOPHILS # BLD AUTO: 0.02 K/UL (ref 0–0.2)
BASOPHILS NFR BLD: 0.3 % (ref 0–1.9)
BILIRUB SERPL-MCNC: 0.5 MG/DL (ref 0.1–1)
BSA FOR ECHO PROCEDURE: 2.33 M2
BUN SERPL-MCNC: 19 MG/DL (ref 8–23)
CALCIUM SERPL-MCNC: 10.3 MG/DL (ref 8.7–10.5)
CHLORIDE SERPL-SCNC: 102 MMOL/L (ref 95–110)
CO2 SERPL-SCNC: 41 MMOL/L (ref 23–29)
CREAT SERPL-MCNC: 1.1 MG/DL (ref 0.5–1.4)
CV ECHO LV RWT: 0.51 CM
DIFFERENTIAL METHOD BLD: ABNORMAL
DOP CALC AO PEAK VEL: 2 M/S
DOP CALC AO VTI: 40.6 CM
DOP CALC LVOT AREA: 3.2 CM2
DOP CALC LVOT DIAMETER: 2.03 CM
DOP CALC LVOT PEAK VEL: 1.13 M/S
DOP CALC LVOT STROKE VOLUME: 77.96 CM3
DOP CALC RVOT PEAK VEL: 1.04 M/S
DOP CALC RVOT VTI: 20.7 CM
DOP CALCLVOT PEAK VEL VTI: 24.1 CM
E WAVE DECELERATION TIME: 251.6 MSEC
E/A RATIO: 0.7
E/E' RATIO: 9.87 M/S
ECHO LV POSTERIOR WALL: 1.46 CM (ref 0.6–1.1)
EOSINOPHIL # BLD AUTO: 0.1 K/UL (ref 0–0.5)
EOSINOPHIL NFR BLD: 0.8 % (ref 0–8)
ERYTHROCYTE [DISTWIDTH] IN BLOOD BY AUTOMATED COUNT: 16.8 % (ref 11.5–14.5)
EST. GFR  (NO RACE VARIABLE): 53.1 ML/MIN/1.73 M^2
FRACTIONAL SHORTENING: 31 % (ref 28–44)
GLUCOSE SERPL-MCNC: 97 MG/DL (ref 70–110)
HCT VFR BLD AUTO: 35 % (ref 37–48.5)
HGB BLD-MCNC: 12 G/DL (ref 12–16)
IMM GRANULOCYTES # BLD AUTO: 0.02 K/UL (ref 0–0.04)
IMM GRANULOCYTES NFR BLD AUTO: 0.3 % (ref 0–0.5)
INTERVENTRICULAR SEPTUM: 1.22 CM (ref 0.6–1.1)
IVC DIAMETER: 2.81 CM
LA MAJOR: 6.98 CM
LEFT ATRIUM SIZE: 5.24 CM
LEFT INTERNAL DIMENSION IN SYSTOLE: 3.91 CM (ref 2.1–4)
LEFT VENTRICLE DIASTOLIC VOLUME INDEX: 72.84 ML/M2
LEFT VENTRICLE DIASTOLIC VOLUME: 160.24 ML
LEFT VENTRICLE MASS INDEX: 153 G/M2
LEFT VENTRICLE SYSTOLIC VOLUME INDEX: 30.1 ML/M2
LEFT VENTRICLE SYSTOLIC VOLUME: 66.18 ML
LEFT VENTRICULAR INTERNAL DIMENSION IN DIASTOLE: 5.7 CM (ref 3.5–6)
LEFT VENTRICULAR MASS: 336.14 G
LV LATERAL E/E' RATIO: 8.22 M/S
LV SEPTAL E/E' RATIO: 12.33 M/S
LVOT MG: 2.94 MMHG
LVOT MV: 0.81 CM/S
LYMPHOCYTES # BLD AUTO: 1.7 K/UL (ref 1–4.8)
LYMPHOCYTES NFR BLD: 27.4 % (ref 18–48)
MAGNESIUM SERPL-MCNC: 1.9 MG/DL (ref 1.6–2.6)
MCH RBC QN AUTO: 33.1 PG (ref 27–31)
MCHC RBC AUTO-ENTMCNC: 34.3 G/DL (ref 32–36)
MCV RBC AUTO: 96 FL (ref 82–98)
MONOCYTES # BLD AUTO: 0.7 K/UL (ref 0.3–1)
MONOCYTES NFR BLD: 11.8 % (ref 4–15)
MV PEAK A VEL: 1.06 M/S
MV PEAK E VEL: 0.74 M/S
MV STENOSIS PRESSURE HALF TIME: 72.96 MS
MV VALVE AREA P 1/2 METHOD: 3.02 CM2
NEUTROPHILS # BLD AUTO: 3.7 K/UL (ref 1.8–7.7)
NEUTROPHILS NFR BLD: 59.4 % (ref 38–73)
NRBC BLD-RTO: 0 /100 WBC
OHS CV RV/LV RATIO: 0.51 CM
PISA AR MAX VEL: 4.85 M/S
PISA MRMAX VEL: 5.41 M/S
PISA TR MAX VEL: 3 M/S
PLATELET # BLD AUTO: 223 K/UL (ref 150–450)
PMV BLD AUTO: 9.7 FL (ref 9.2–12.9)
POTASSIUM SERPL-SCNC: 3.9 MMOL/L (ref 3.5–5.1)
PROT SERPL-MCNC: 6.3 G/DL (ref 6–8.4)
PV MEAN GRADIENT: 2 MMHG
RA MAJOR: 6.17 CM
RBC # BLD AUTO: 3.63 M/UL (ref 4–5.4)
RIGHT VENTRICULAR END-DIASTOLIC DIMENSION: 2.92 CM
SODIUM SERPL-SCNC: 142 MMOL/L (ref 136–145)
TDI LATERAL: 0.09 M/S
TDI SEPTAL: 0.06 M/S
TDI: 0.08 M/S
TR MAX PG: 36 MMHG
WBC # BLD AUTO: 6.21 K/UL (ref 3.9–12.7)
Z-SCORE OF LEFT VENTRICULAR DIMENSION IN END DIASTOLE: -2.76
Z-SCORE OF LEFT VENTRICULAR DIMENSION IN END SYSTOLE: -1.22

## 2024-04-24 PROCEDURE — 11000001 HC ACUTE MED/SURG PRIVATE ROOM

## 2024-04-24 PROCEDURE — 85025 COMPLETE CBC W/AUTO DIFF WBC: CPT | Performed by: INTERNAL MEDICINE

## 2024-04-24 PROCEDURE — 27100171 HC OXYGEN HIGH FLOW UP TO 24 HOURS

## 2024-04-24 PROCEDURE — 94761 N-INVAS EAR/PLS OXIMETRY MLT: CPT

## 2024-04-24 PROCEDURE — 97530 THERAPEUTIC ACTIVITIES: CPT

## 2024-04-24 PROCEDURE — 94660 CPAP INITIATION&MGMT: CPT

## 2024-04-24 PROCEDURE — 97530 THERAPEUTIC ACTIVITIES: CPT | Mod: CO

## 2024-04-24 PROCEDURE — 99900035 HC TECH TIME PER 15 MIN (STAT)

## 2024-04-24 PROCEDURE — 25000003 PHARM REV CODE 250: Performed by: INTERNAL MEDICINE

## 2024-04-24 PROCEDURE — 97110 THERAPEUTIC EXERCISES: CPT | Mod: CO

## 2024-04-24 PROCEDURE — 25000003 PHARM REV CODE 250: Performed by: EMERGENCY MEDICINE

## 2024-04-24 PROCEDURE — 63600175 PHARM REV CODE 636 W HCPCS: Performed by: EMERGENCY MEDICINE

## 2024-04-24 PROCEDURE — 99900031 HC PATIENT EDUCATION (STAT)

## 2024-04-24 PROCEDURE — 80053 COMPREHEN METABOLIC PANEL: CPT | Performed by: INTERNAL MEDICINE

## 2024-04-24 PROCEDURE — 83735 ASSAY OF MAGNESIUM: CPT | Performed by: INTERNAL MEDICINE

## 2024-04-24 PROCEDURE — 36415 COLL VENOUS BLD VENIPUNCTURE: CPT | Performed by: INTERNAL MEDICINE

## 2024-04-24 RX ORDER — VALSARTAN 80 MG/1
80 TABLET ORAL ONCE
Status: COMPLETED | OUTPATIENT
Start: 2024-04-24 | End: 2024-04-24

## 2024-04-24 RX ORDER — TRAZODONE HYDROCHLORIDE 50 MG/1
50 TABLET ORAL NIGHTLY
Status: DISCONTINUED | OUTPATIENT
Start: 2024-04-24 | End: 2024-04-24

## 2024-04-24 RX ORDER — FUROSEMIDE 40 MG/1
40 TABLET ORAL DAILY
Status: DISCONTINUED | OUTPATIENT
Start: 2024-04-25 | End: 2024-04-29 | Stop reason: HOSPADM

## 2024-04-24 RX ORDER — VALSARTAN 80 MG/1
160 TABLET ORAL DAILY
Status: DISCONTINUED | OUTPATIENT
Start: 2024-04-25 | End: 2024-04-29 | Stop reason: HOSPADM

## 2024-04-24 RX ADMIN — PANTOPRAZOLE SODIUM 40 MG: 40 TABLET, DELAYED RELEASE ORAL at 08:04

## 2024-04-24 RX ADMIN — VALSARTAN 80 MG: 80 TABLET, FILM COATED ORAL at 06:04

## 2024-04-24 RX ADMIN — BUSPIRONE HYDROCHLORIDE 5 MG: 5 TABLET ORAL at 08:04

## 2024-04-24 RX ADMIN — TRAZODONE HYDROCHLORIDE 50 MG: 50 TABLET ORAL at 08:04

## 2024-04-24 RX ADMIN — FUROSEMIDE 40 MG: 10 INJECTION, SOLUTION INTRAVENOUS at 08:04

## 2024-04-24 RX ADMIN — MUPIROCIN: 20 OINTMENT TOPICAL at 08:04

## 2024-04-24 RX ADMIN — CARVEDILOL 6.25 MG: 3.12 TABLET, FILM COATED ORAL at 08:04

## 2024-04-24 RX ADMIN — ACETAMINOPHEN 650 MG: 325 TABLET ORAL at 08:04

## 2024-04-24 RX ADMIN — LEVOTHYROXINE SODIUM 50 MCG: 50 TABLET ORAL at 05:04

## 2024-04-24 RX ADMIN — ASPIRIN 81 MG: 81 TABLET ORAL at 08:04

## 2024-04-24 RX ADMIN — ACETAMINOPHEN 650 MG: 325 TABLET ORAL at 02:04

## 2024-04-24 RX ADMIN — ATORVASTATIN CALCIUM 40 MG: 40 TABLET, FILM COATED ORAL at 08:04

## 2024-04-24 NOTE — SUBJECTIVE & OBJECTIVE
Past Medical History:   Diagnosis Date    Abdominal hernia     Bacteremia 4/5/2023    CHF (congestive heart failure)     COPD (chronic obstructive pulmonary disease)     Diabetes mellitus, type 2 2/16/2022    GERD (gastroesophageal reflux disease)     History of home oxygen therapy     Hypertension     Hypertensive emergency 3/30/2023    Migraine headache     Pulmonary embolism 06/01/2017    Small bowel obstruction     Thyroid disease        Past Surgical History:   Procedure Laterality Date    COLONOSCOPY      HYSTERECTOMY      INNER EAR SURGERY      UPPER GASTROINTESTINAL ENDOSCOPY         Review of patient's allergies indicates:   Allergen Reactions    Pcn [penicillins] Swelling       Current Facility-Administered Medications   Medication Dose Route Frequency Provider Last Rate Last Admin    acetaminophen tablet 650 mg  650 mg Oral Q8H PRN John Paul Rojas MD   650 mg at 04/24/24 0854    albuterol-ipratropium 2.5 mg-0.5 mg/3 mL nebulizer solution 3 mL  3 mL Nebulization Q4H PRN Veda Gleason MD        aluminum-magnesium hydroxide-simethicone 200-200-20 mg/5 mL suspension 30 mL  30 mL Oral Q6H PRN Veda Gleason MD   30 mL at 04/23/24 0814    aspirin EC tablet 81 mg  81 mg Oral Daily Veda Gleason MD   81 mg at 04/24/24 0854    atorvastatin tablet 40 mg  40 mg Oral Daily Veda Gleasno MD   40 mg at 04/24/24 0854    busPIRone tablet 5 mg  5 mg Oral BID Veda Gleason MD   5 mg at 04/24/24 0854    carvediloL tablet 6.25 mg  6.25 mg Oral BID Veda Gleason MD   6.25 mg at 04/24/24 0854    levothyroxine tablet 50 mcg  50 mcg Oral Before breakfast Veda Gleason MD   50 mcg at 04/24/24 0556    melatonin tablet 6 mg  6 mg Oral Nightly PRN John Paul Rojas MD   6 mg at 04/23/24 2044    mupirocin 2 % ointment   Nasal BID Veda Gleason MD   Given at 04/24/24 0859    ondansetron injection 4 mg  4 mg Intravenous Q8H PRN John Paul Rojas MD   4 mg at 04/22/24  1920    pantoprazole EC tablet 40 mg  40 mg Oral Daily Veda Gleason MD   40 mg at 04/24/24 0854    sodium chloride 0.9% flush 10 mL  10 mL Intravenous PRN John Paul Rojas MD        traZODone tablet 50 mg  50 mg Oral QHS Veda Gleason MD         Family History    None       Tobacco Use    Smoking status: Former    Smokeless tobacco: Never   Substance and Sexual Activity    Alcohol use: No    Drug use: No    Sexual activity: Not on file     Review of Systems   Constitutional:  Positive for activity change. Negative for appetite change, chills, fatigue and fever.   HENT:  Negative for drooling, ear pain, nosebleeds, postnasal drip and sinus pressure.    Respiratory:  Positive for shortness of breath and wheezing. Negative for cough and chest tightness.    Cardiovascular:  Positive for leg swelling. Negative for chest pain and palpitations.   Gastrointestinal:  Positive for abdominal pain and nausea. Negative for blood in stool, constipation and diarrhea.   Musculoskeletal:  Positive for arthralgias and back pain.   Skin:  Negative for rash and wound.   Neurological:  Positive for weakness. Negative for syncope and numbness.     Objective:     Vital Signs (Most Recent):  Temp: 98 °F (36.7 °C) (04/24/24 1501)  Pulse: (!) 59 (04/24/24 1520)  Resp: (!) 25 (04/24/24 1520)  BP: 139/76 (04/24/24 0854)  SpO2: 100 % (04/24/24 1520) Vital Signs (24h Range):  Temp:  [96.9 °F (36.1 °C)-98.1 °F (36.7 °C)] 98 °F (36.7 °C)  Pulse:  [55-70] 59  Resp:  [21-31] 25  SpO2:  [94 %-100 %] 100 %  BP: (122-139)/(66-76) 139/76     Weight: 120.2 kg (265 lb)  Body mass index is 45.49 kg/m².     Physical Exam  Constitutional:       Appearance: She is obese.   HENT:      Nose: Nose normal.      Mouth/Throat:      Mouth: Mucous membranes are moist.   Eyes:      Extraocular Movements: Extraocular movements intact.      Pupils: Pupils are equal, round, and reactive to light.   Cardiovascular:      Rate and Rhythm: Normal rate and  regular rhythm.   Pulmonary:      Effort: Pulmonary effort is normal.      Comments: Decreased BS at bases - O2 via NC  Abdominal:      General: Bowel sounds are normal. There is no distension.      Palpations: Abdomen is soft.      Tenderness: There is no abdominal tenderness.   Musculoskeletal:      Cervical back: Normal range of motion.      Right lower leg: Edema present.      Left lower leg: Edema present.   Skin:     General: Skin is warm and dry.      Capillary Refill: Capillary refill takes 2 to 3 seconds.   Neurological:      General: No focal deficit present.      Mental Status: She is alert and oriented to person, place, and time.              CRANIAL NERVES     CN III, IV, VI   Pupils are equal, round, and reactive to light.       Significant Labs: All pertinent labs within the past 24 hours have been reviewed.  Recent Lab Results         04/24/24  0341        Albumin 2.8       ALP 97       ALT 18       Anion Gap -1       AST 11       Baso # 0.02       Basophil % 0.3       BILIRUBIN TOTAL 0.5  Comment: For infants and newborns, interpretation of results should be based  on gestational age, weight and in agreement with clinical  observations.    Premature Infant recommended reference ranges:  Up to 24 hours.............<8.0 mg/dL  Up to 48 hours............<12.0 mg/dL  3-5 days..................<15.0 mg/dL  6-29 days.................<15.0 mg/dL    For patients on Eltrombopag therapy, use of Dimension [place] TBIL is   not   recommended.         BUN 19       Calcium 10.3       Chloride 102       CO2 41  Comment: CO2 critical result(s) called and verbal readback obtained from   NAMITA TRACY ICU RN by RAPHAEL 04/24/2024 04:56         Creatinine 1.1       Differential Method Automated       eGFR 53.1       Eos # 0.1       Eos % 0.8       Glucose 97       Gran # (ANC) 3.7       Gran % 59.4       Hematocrit 35.0       Hemoglobin 12.0       Immature Grans (Abs) 0.02  Comment: Mild elevation in immature granulocytes is  non specific and   can be seen in a variety of conditions including stress response,   acute inflammation, trauma and pregnancy. Correlation with other   laboratory and clinical findings is essential.         Immature Granulocytes 0.3       Lymph # 1.7       Lymph % 27.4       Magnesium  1.9       MCH 33.1       MCHC 34.3       MCV 96       Mono # 0.7       Mono % 11.8       MPV 9.7       nRBC 0       Platelet Count 223       Potassium 3.9       PROTEIN TOTAL 6.3       RBC 3.63       RDW 16.8       Sodium 142       WBC 6.21               Significant Imaging: I have reviewed all pertinent imaging results/findings within the past 24 hours.

## 2024-04-24 NOTE — PLAN OF CARE
No issues overnight, patient rested on and off. Remained on CPAP all shift. VSS, afebrile. UOP 1500ml, no BM noted.

## 2024-04-24 NOTE — ASSESSMENT & PLAN NOTE
"Patient is identified as having Systolic (HFrEF) heart failure that is Acute on chronic. CHF is currently uncontrolled due to Continued edema of extremities and Dyspnea not returned to baseline after 2 doses of IV diuretic. Latest ECHO performed and demonstrates- Results for orders placed during the hospital encounter of 09/09/23    Echo    Interpretation Summary    Left Ventricle: The left ventricle is normal in size. Mildly increased wall thickness. Normal wall motion. There is mildly reduced systolic function with a visually estimated ejection fraction of 40 - 45%. There is normal diastolic function.    Right Ventricle: Normal right ventricular cavity size. Wall thickness is normal. Right ventricle wall motion  is normal. Systolic function is normal.    Aortic Valve: There is mild to moderate aortic regurgitation with a centrally directed jet.    Mitral Valve: There is moderate regurgitation.    Tricuspid Valve: There is moderate regurgitation with a centrally directed jet.    Pulmonic Valve: There is mild to moderate regurgitation.    Pulmonary Artery: The estimated pulmonary artery systolic pressure is 47 mmHg.    IVC/SVC: Normal venous pressure at 3 mmHg.  . Continue Beta Blocker and Furosemide and monitor clinical status closely. Monitor on telemetry. Patient is on CHF pathway.  Monitor strict Is&Os and daily weights.  Place on fluid restriction of 2 L. Cardiology has been consulted. Continue to stress to patient importance of self efficacy and  on diet for CHF. Last BNP reviewed- and noted below No results for input(s): "BNP", "BNPTRIAGEBLO" in the last 168 hours.    4/23 cont diuresis - await final echo read  4/24 cont diuresis - decrease lasix to qd - add arb (valsartan 160mg qd) - edema has imrpoved  "

## 2024-04-24 NOTE — PLAN OF CARE
Problem: Physical Therapy  Goal: Physical Therapy Goal  Description: Goals to be met by: 24    Patient will increase functional independence with mobility by performin. Supine to sit with Modified Independent  2. Sit to supine with Modified Independent  3. Bed to chair transfer with Modified Independentwith or without rolling walker using Step Transfer TECHNIQUE  4. Gait  x 50  feet with Standby Assistance with or without rolling walker  5. Lower extremity exercise program x10 reps per handout, with assistance as needed     Outcome: Progressing

## 2024-04-24 NOTE — PLAN OF CARE
Problem: Occupational Therapy  Goal: Occupational Therapy Goal  Description: Goals to be met by: 05/01/24     Patient will increase functional independence with ADLs by performing:    Feeding with Modified [place].  UE Dressing with Set-up Assistance.  LE Dressing with Minimal Assistance.  Grooming while seated with Modified [place].  Toileting from toilet with Minimal Assistance for hygiene and clothing management.   Bathing from  edge of bed with Minimal Assistance.  Toilet transfer to bedside commode with Contact Guard Assistance.    Outcome: Progressing

## 2024-04-24 NOTE — PROGRESS NOTES
[facility] - Intensive Care  Cardiology  Progress Note    Patient Name: Carmen aTn  MRN: [id number]  Admission Date: 4/21/2024  Hospital Length of Stay: 2 days  Code Status: Full Code   Attending Provider: Veda Gleason MD   Primary Care Physician: Lucian Barry MD  Principal Problem:Acute on chronic combined systolic and diastolic congestive heart failure    Patient information was obtained from patient, past medical records, and ER records.     Subjective:     Chief Complaint:  shortness of breath, BLE edema      HPI:   This is a 74 to female with PMHx COPD, heart failure EF 40-45% 9/2023, HTN, ELLE (on CPAP), CKD who presented to the ED with worsening shortness of breath and lower extremity edema. She is on diuretic therapy at home, which she admits adherence too. She reports she has not been following with a cardiologist. She is unknown to [facility].     On arrival, proBNP noted to be elevated at 9130. HS troponin negative. Unremarkable CMP - electrolytes within range, renal function stable 22/1.2. She is currently on CPAP. She was admitted diuresis and supplemental oxygen.     Today, she reports breathing is minimally improved since admission. She reports progressive BLE edema at home and increasing shortness of breath. She denies any chest pain. She is diuresing well with net negative I/Os. Echocardiogram pending.     Of note, during prior hospital admission in September of last year echocardiogram revealed moderately reduced EF 40-45% - she was lost to follow-up in cardiology clinic after discharge.     Interval Hx:  4/23/24: No acute events overnight. Pt's breathing has improved since admission and has been transitioned to NC. She appears sob with conversation and reports though improved she is not back to her baseline. She denies any chest pain at this time. Echo read pending at this time.    4/24/24: No acute events overnight. She is doing much better and continues to diurese well. She is stable  from a CV standpoint at this time and her hemodynamics are well controlled. She will need follow-up with cardiology clinic on discharge for continued management and care of HFpEF and mild AS noted on echocardiogram. Her LVEF is preserved at 60-65%.     Past Medical History:   Diagnosis Date    Abdominal hernia     Bacteremia 4/5/2023    CHF (congestive heart failure)     COPD (chronic obstructive pulmonary disease)     Diabetes mellitus, type 2 2/16/2022    GERD (gastroesophageal reflux disease)     History of home oxygen therapy     Hypertension     Hypertensive emergency 3/30/2023    Migraine headache     Pulmonary embolism 06/01/2017    Small bowel obstruction     Thyroid disease        Past Surgical History:   Procedure Laterality Date    COLONOSCOPY      HYSTERECTOMY      INNER EAR SURGERY      UPPER GASTROINTESTINAL ENDOSCOPY         Review of patient's allergies indicates:   Allergen Reactions    Pcn [penicillins] Swelling       Current Facility-Administered Medications   Medication Dose Route Frequency Provider Last Rate Last Admin    acetaminophen tablet 650 mg  650 mg Oral Q8H PRN Jonh Paul Rojas MD   650 mg at 04/24/24 0854    albuterol-ipratropium 2.5 mg-0.5 mg/3 mL nebulizer solution 3 mL  3 mL Nebulization Q4H PRN Veda Gleason MD        aluminum-magnesium hydroxide-simethicone 200-200-20 mg/5 mL suspension 30 mL  30 mL Oral Q6H PRN Veda Gleason MD   30 mL at 04/23/24 0814    aspirin EC tablet 81 mg  81 mg Oral Daily Veda Gleason MD   81 mg at 04/24/24 0854    atorvastatin tablet 40 mg  40 mg Oral Daily Veda Gleason MD   40 mg at 04/24/24 0854    busPIRone tablet 5 mg  5 mg Oral BID Veda Gleason MD   5 mg at 04/24/24 2058    carvediloL tablet 6.25 mg  6.25 mg Oral BID Veda Gleason MD   6.25 mg at 04/24/24 2059    [START ON 4/25/2024] furosemide tablet 40 mg  40 mg Oral Daily Veda Gleason MD        levothyroxine tablet 50 mcg  50 mcg Oral  Before breakfast Veda Gleason MD   50 mcg at 04/24/24 0556    melatonin tablet 6 mg  6 mg Oral Nightly PRN John Paul Rojas MD   6 mg at 04/23/24 2044    mupirocin 2 % ointment   Nasal BID Veda Gleason MD   Given at 04/24/24 2059    ondansetron injection 4 mg  4 mg Intravenous Q8H PRN John Paul Rojas MD   4 mg at 04/22/24 1920    pantoprazole EC tablet 40 mg  40 mg Oral Daily Veda Gleason MD   40 mg at 04/24/24 0854    sodium chloride 0.9% flush 10 mL  10 mL Intravenous PRN John Paul Rojas MD        traZODone tablet 50 mg  50 mg Oral QHS Veda Gleason MD   50 mg at 04/24/24 2059    [START ON 4/25/2024] valsartan tablet 160 mg  160 mg Oral Daily Veda Gleason MD         Family History    None       Tobacco Use    Smoking status: Former    Smokeless tobacco: Never   Substance and Sexual Activity    Alcohol use: No    Drug use: No    Sexual activity: Not on file     Review of Systems   Cardiovascular:  Positive for leg swelling. Negative for chest pain, dyspnea on exertion, orthopnea and palpitations.   Respiratory:  Positive for shortness of breath.    Neurological:  Negative for light-headedness and weakness.   All other systems reviewed and are negative.    Objective:     Vital Signs (Most Recent):  Temp: 98.4 °F (36.9 °C) (04/24/24 2000)  Pulse: 61 (04/24/24 2115)  Resp: (!) 24 (04/24/24 2115)  BP: (!) 155/80 (04/24/24 2000)  SpO2: (!) 79 % (04/24/24 2115) Vital Signs (24h Range):  Temp:  [96.9 °F (36.1 °C)-98.4 °F (36.9 °C)] 98.4 °F (36.9 °C)  Pulse:  [52-80] 61  Resp:  [20-33] 24  SpO2:  [79 %-100 %] 79 %  BP: (130-155)/(65-82) 155/80     Weight: 120.2 kg (265 lb)  Body mass index is 45.49 kg/m².    SpO2: (!) 79 %         Intake/Output Summary (Last 24 hours) at 4/24/2024 2243  Last data filed at 4/24/2024 1800  Gross per 24 hour   Intake 1080 ml   Output 3302 ml   Net -2222 ml       Lines/Drains/Airways       Drain  Duration                  Urethral  Catheter 04/24/24 2030 Non-latex 16 Fr. <1 day              Peripheral Intravenous Line  Duration                  Peripheral IV - Single Lumen 04/21/24 20 G Right Antecubital 3 days                    Physical Exam  Vitals and nursing note reviewed.   Constitutional:       Appearance: Normal appearance. She is obese.   HENT:      Head: Normocephalic and atraumatic.      Nose: Nose normal.      Mouth/Throat:      Mouth: Mucous membranes are moist.      Pharynx: Oropharynx is clear.   Eyes:      Extraocular Movements: Extraocular movements intact.   Cardiovascular:      Rate and Rhythm: Normal rate and regular rhythm.      Pulses: Normal pulses.      Heart sounds: Normal heart sounds. No murmur heard.     No friction rub. No gallop.   Pulmonary:      Breath sounds: Wheezing present.      Comments: SOB with conversation  NC in place  Musculoskeletal:      Right lower leg: Edema present.      Left lower leg: Edema present.      Comments: Edema improving; 1+ bilaterally   Skin:     General: Skin is warm.      Capillary Refill: Capillary refill takes less than 2 seconds.   Neurological:      General: No focal deficit present.      Mental Status: She is alert and oriented to person, place, and time.         Significant Labs: BMP:   Recent Labs   Lab 04/23/24  0501 04/24/24  0341   GLU 97 97    142   K 4.0 3.9    102   CO2 37* 41*   BUN 23 19   CREATININE 1.2 1.1   CALCIUM 10.1 10.3   MG 1.9 1.9   , CMP   Recent Labs   Lab 04/23/24  0501 04/24/24  0341    142   K 4.0 3.9    102   CO2 37* 41*   GLU 97 97   BUN 23 19   CREATININE 1.2 1.1   CALCIUM 10.1 10.3   PROT 6.4 6.3   ALBUMIN 2.8* 2.8*   BILITOT 0.6 0.5   ALKPHOS 107 97   AST 12 11   ALT 21 18   ANIONGAP 1* -1*   , CBC   Recent Labs   Lab 04/23/24  0501 04/24/24  0341   WBC 6.37 6.21   HGB 12.0 12.0   HCT 33.2* 35.0*    223   , and All pertinent lab results from the last 24 hours have been reviewed.    Significant Imaging:   TTE 9/9/23:      Left Ventricle: The left ventricle is normal in size. Mildly increased wall thickness. Normal wall motion. There is mildly reduced systolic function with a visually estimated ejection fraction of 40 - 45%. There is normal diastolic function.    Right Ventricle: Normal right ventricular cavity size. Wall thickness is normal. Right ventricle wall motion  is normal. Systolic function is normal.    Aortic Valve: There is mild to moderate aortic regurgitation with a centrally directed jet.    Mitral Valve: There is moderate regurgitation.    Tricuspid Valve: There is moderate regurgitation with a centrally directed jet.    Pulmonic Valve: There is mild to moderate regurgitation.    Pulmonary Artery: The estimated pulmonary artery systolic pressure is 47 mmHg.    IVC/SVC: Normal venous pressure at 3 mmHg.    TTE 4/23/24:     Left Ventricle: The left ventricle is normal in size. Mildly increased wall thickness. There is normal systolic function with a visually estimated ejection fraction of 60 - 65%. Grade I diastolic dysfunction.    Right Ventricle: Normal right ventricular cavity size. Systolic function is normal.    Left Atrium: Left atrium is severely dilated.    Right Atrium: Right atrium is moderately dilated.    Aortic Valve: The aortic valve is a trileaflet valve. There is mild stenosis. Aortic valve area by VTI is 1.92 cm². Aortic valve peak velocity is 2.00 m/s. Mean gradient is 8 mmHg. The dimensionless index is 0.59.    Pulmonic Valve: There is mild regurgitation.    No pericardial effusion.    Assessment and Plan:    HFmrEF, 40-45% - EF improved to 60-65%  -pt presenting with worsening LE edema and SOB   -last TTE as above - EF 40-45% - has not followed with outpt cardiology - will likely need ischemic evaluation and close outpatient follow-up.   -Repeat TTE revealing improved EF 60-65% - continue carvedilol   -diuresis with lasix 40 mg IV bid - can transition to po - continues with good urine output -  continue to monitor volume status  -HR and BP stable     Acute on chronic respiratory failure  -continue CPAP/supplemental O2 as needed - weaned to NC - breathing improving  -breathing improved with diuresis   -continue to monitor   -continue management per primary     Pt stable from cardiovascular standpoint, will sign off at this time. Please reconsult if needed, will be available for questions. Please arrange for pt to have follow-up to establish cardiology care outpatient on discharge.        Active Diagnoses:    Diagnosis Date Noted POA    PRINCIPAL PROBLEM:  Acute on chronic combined systolic and diastolic congestive heart failure [I50.43] 02/16/2022 Yes    SOB (shortness of breath) [R06.02] 03/30/2023 Yes    Diabetes mellitus, type 2 [E11.9] 02/16/2022 Yes    Severe obesity (BMI >= 40) [E66.01] 02/04/2022 Yes      Problems Resolved During this Admission:       VTE Risk Mitigation (From admission, onward)           Ordered     IP VTE HIGH RISK PATIENT  Once         04/21/24 1721     Place sequential compression device  Until discontinued         04/21/24 1721                    Thank you for your consult.     DARRYL Blanc-C - scribed for Dr. Rascon   Cardiology     Provider's Attestation:  I, Spencer Rascon MD, confirm that DARRYL Valencia worked under my direction at all times.  Documentation shall reflect findings and decisions made by myself in the course of the patient's treatment.  I have performed a face to face evaluation of the patient and reviewed the medical record. In addition, I have performed the substantive portion of the medical decision making. I have reviewed the notes and assessment, and I concur with her documentation.  CMS guidelines regarding the use of scribes shall be adhered to.  MD Spencer Galloway MD  Cardiology   [facility] [place] [facility]

## 2024-04-24 NOTE — PLAN OF CARE
Care plan reviewed and on-going. Cont with NC/CPAP. Therapy consult completed. Tolerating diet. Maalox given today. Tylenol given for H/A. SCD's in use.

## 2024-04-24 NOTE — PT/OT/SLP PROGRESS
Occupational Therapy   Treatment    Name: Carmen Tan  MRN: [id number]  Admitting Diagnosis:  Acute on chronic combined systolic and diastolic congestive heart failure       Recommendations:     Discharge Recommendations: Low Intensity Therapy  Discharge Equipment Recommendations:  none  Barriers to discharge:       Assessment:     Carmen Tan is a 74 y.o. female with a medical diagnosis of Acute on chronic combined systolic and diastolic congestive heart failure.  She presents with performance deficits affecting function are weakness, impaired endurance, impaired self care skills, impaired functional mobility, impaired balance, decreased upper extremity function, decreased lower extremity function, decreased safety awareness, pain, decreased ROM, edema, impaired cardiopulmonary response to activity, impaired joint extensibility, impaired muscle length.     Rehab Prognosis:  Good; patient would benefit from acute skilled OT services to address these deficits and reach maximum level of function.       Plan:     Patient to be seen 5 x/week to address the above listed problems via self-care/home management, therapeutic activities, therapeutic exercises  Plan of Care Expires: 05/01/24  Plan of Care Reviewed with: patient    Subjective     Chief Complaint: SOB, weakness, pain  Patient/Family Comments/goals: none stated  Pain/Comfort:  Pain Rating 1: 9/10  Location 1: head (headache)  Pain Rating Post-Intervention 1: 9/10 (nurse notified, medicine given.)    Objective:     Communicated with: Nurse prior to session.  Patient found HOB elevated with telemetry, pulse ox (continuous), peripheral IV, hannon catheter, oxygen upon OT entry to room.    General Precautions: Standard, fall, respiratory    Orthopedic Precautions:N/A  Braces: N/A  Respiratory Status:  BiPAP     Occupational Performance:     Bed Mobility:    Patient completed Rolling/Turning to Left with  contact guard assistance  Patient completed  Rolling/Turning to Right with contact guard assistance  Patient completed Scooting/Bridging with minimum assistance  Patient completed Supine to Sit with minimum assistance  Patient completed Sit to Supine with minimum assistance         Treatment & Education:  Patient sat at EOB for session.Patient maintained balance with minimal verbal cues during session. Patient performed BUE exercises for 3 sets of 10 for shoulder flexion/extension, elbow flexion/extension, scapular protraction retraction. Patient required rest breaks in between sets to control breathing. Patient educated on roles and goals of OT.     Patient left HOB elevated with all lines intact, call button in reach, and nurse notified    GOALS:   Multidisciplinary Problems       Occupational Therapy Goals          Problem: Occupational Therapy    Goal Priority Disciplines Outcome Interventions   Occupational Therapy Goal     OT, PT/OT Progressing    Description: Goals to be met by: 05/01/24     Patient will increase functional independence with ADLs by performing:    Feeding with Modified [place].  UE Dressing with Set-up Assistance.  LE Dressing with Minimal Assistance.  Grooming while seated with Modified [place].  Toileting from toilet with Minimal Assistance for hygiene and clothing management.   Bathing from  edge of bed with Minimal Assistance.  Toilet transfer to bedside commode with Contact Guard Assistance.                         Time Tracking:     OT Date of Treatment: 04/24/24  OT Start Time: 0827  OT Stop Time: 0856  OT Total Time (min): 29 min    Billable Minutes:Therapeutic Activity 11  Therapeutic Exercise 18    OT/MARLA: MARLA     Number of MARLA visits since last OT visit: 1 4/24/2024

## 2024-04-24 NOTE — PT/OT/SLP PROGRESS
Physical Therapy Treatment    Patient Name:  Carmen Tan   MRN:  [id number]    Recommendations:     Discharge Recommendations: Low Intensity Therapy  Discharge Equipment Recommendations: none  Barriers to discharge:  Medical condition    Assessment:     Carmen Tan is a 74 y.o. female admitted with a medical diagnosis of Acute on chronic combined systolic and diastolic congestive heart failure.  She presents with the following impairments/functional limitations: weakness, gait instability, decreased upper extremity function, impaired endurance, impaired cardiopulmonary response to activity, impaired balance, decreased lower extremity function, decreased safety awareness, impaired muscle length, impaired self care skills, impaired functional mobility. Patient presents with weakness in B LE and core weakness this visit. She is requiring Mod A for supine to sit and Mod A for sit to supine. She is unable to perform sit to stand this visit secondary to weakness and fatigue. She is requiring assistance to scoot up in bed from two person lift.     Rehab Prognosis: Fair; patient would benefit from acute skilled PT services to address these deficits and reach maximum level of function.    Recent Surgery: * No surgery found *      Plan:     During this hospitalization, patient to be seen 5 x/week to address the identified rehab impairments via gait training, therapeutic activities, therapeutic exercises, neuromuscular re-education and progress toward the following goals:    Plan of Care Expires:  04/29/24    Subjective     Chief Complaint: weakness and fatigue  Patient/Family Comments/goals: return home  Pain/Comfort:  Pain Rating 1: 0/10      Objective:     Communicated with patient and nursing prior to session.  Patient found HOB elevated with telemetry, BIPAP, hannon catheter, peripheral IV, pulse ox (continuous) upon PT entry to room.     General Precautions: Standard, fall, respiratory  Orthopedic Precautions:  N/A  Braces: N/A  Respiratory Status: BiPAP     Functional Mobility:  Bed Mobility:     Rolling Left:  minimum assistance  Rolling Right: minimum assistance  Scooting: moderate assistance and maximal assistance  Bridging: moderate assistance and maximal assistance  Supine to Sit: moderate assistance  Sit to Supine: moderate assistance      AM-PAC 6 CLICK MOBILITY  Turning over in bed (including adjusting bedclothes, sheets and blankets)?: 3  Sitting down on and standing up from a chair with arms (e.g., wheelchair, bedside commode, etc.): 3  Moving from lying on back to sitting on the side of the bed?: 2  Moving to and from a bed to a chair (including a wheelchair)?: 2  Need to walk in hospital room?: 2  Climbing 3-5 steps with a railing?: 2  Basic Mobility Total Score: 14       Treatment & Education:  Patient performed seated LAQ 2x10 reps with no significant deficits noted  Patient was able to scoot while sitting EOB 2x    Patient left HOB elevated with all lines intact, call button in reach, and nurse notified. Patient was being cleaned by nursing from soiling her bed.    GOALS:   Multidisciplinary Problems       Physical Therapy Goals          Problem: Physical Therapy    Goal Priority Disciplines Outcome Goal Variances Interventions   Physical Therapy Goal     PT, PT/OT Progressing     Description: Goals to be met by: 24    Patient will increase functional independence with mobility by performin. Supine to sit with Modified Independent  2. Sit to supine with Modified Independent  3. Bed to chair transfer with Modified Independentwith or without rolling walker using Step Transfer TECHNIQUE  4. Gait  x 50  feet with Standby Assistance with or without rolling walker  5. Lower extremity exercise program x10 reps per handout, with assistance as needed                          Time Tracking:     PT Received On: 24  PT Start Time: 1639     PT Stop Time: 1651  PT Total Time (min): 12 min     Billable  Minutes: Therapeutic Activity 12 minutes    Treatment Type: Treatment  PT/PTA: PT     Number of PTA visits since last PT visit: 0     04/24/2024

## 2024-04-24 NOTE — PROGRESS NOTES
[facility] Medicine  Progress Note    Patient Name: Carmen Tan  MRN: [id number]  Patient Class: IP- Inpatient   Admission Date: 2024  Length of Stay: 2 days  Attending Physician: Veda Gleason MD  Primary Care Provider: Lucian Barry MD        Subjective:     Principal Problem:Acute on chronic combined systolic and diastolic congestive heart failure        HPI:  Carmen Tan is a 74 y.o. female with PMHX of COPD, CHF, type 2 diabetes, paroxysmal AFib who presents to the emergency department C/O leg swelling.     Patient arrives from home due to worsening lower extremity edema.  Also reports increasing shortness of breath.  Denies chest pain.  Patient takes Lasix twice a day and states she has good urine output from it.  Reports symptoms have been gradually worsening.     Patient reports appetite has been poor but drinks fluids.  Reports about 3 bottles of water a day and two coke zeros.  Limited physical activity which she attributes to leg pain    Pt had to attend her husbands  and so when sxs had started worsenign she had stayed home initially - after  she came in.  Pt reports this am - feelign little better after diuresisng some over night.  She does use O2 conintuously at home and wears a bipap at home at night and prn    Overview/Hospital Course:   pt feelign better- diuresisng well - less swellign to legs - improvement in breathing.     pt feelign better- breathign has imrpoved - swellign has improved    Past Medical History:   Diagnosis Date    Abdominal hernia     Bacteremia 2023    CHF (congestive heart failure)     COPD (chronic obstructive pulmonary disease)     Diabetes mellitus, type 2 2022    GERD (gastroesophageal reflux disease)     History of home oxygen therapy     Hypertension     Hypertensive emergency 3/30/2023    Migraine headache     Pulmonary embolism 2017    Small bowel obstruction     Thyroid  disease        Past Surgical History:   Procedure Laterality Date    COLONOSCOPY      HYSTERECTOMY      INNER EAR SURGERY      UPPER GASTROINTESTINAL ENDOSCOPY         Review of patient's allergies indicates:   Allergen Reactions    Pcn [penicillins] Swelling       Current Facility-Administered Medications   Medication Dose Route Frequency Provider Last Rate Last Admin    acetaminophen tablet 650 mg  650 mg Oral Q8H PRN John Paul Rojas MD   650 mg at 04/24/24 0854    albuterol-ipratropium 2.5 mg-0.5 mg/3 mL nebulizer solution 3 mL  3 mL Nebulization Q4H PRN Veda Gleason MD        aluminum-magnesium hydroxide-simethicone 200-200-20 mg/5 mL suspension 30 mL  30 mL Oral Q6H PRN Veda Gleason MD   30 mL at 04/23/24 0814    aspirin EC tablet 81 mg  81 mg Oral Daily Veda Gleason MD   81 mg at 04/24/24 0854    atorvastatin tablet 40 mg  40 mg Oral Daily Veda Gleason MD   40 mg at 04/24/24 0854    busPIRone tablet 5 mg  5 mg Oral BID Veda Gleason MD   5 mg at 04/24/24 0854    carvediloL tablet 6.25 mg  6.25 mg Oral BID Veda Gleason MD   6.25 mg at 04/24/24 0854    levothyroxine tablet 50 mcg  50 mcg Oral Before breakfast Veda Gleason MD   50 mcg at 04/24/24 0556    melatonin tablet 6 mg  6 mg Oral Nightly PRN John Paul Rojas MD   6 mg at 04/23/24 2044    mupirocin 2 % ointment   Nasal BID Veda Gleason MD   Given at 04/24/24 0859    ondansetron injection 4 mg  4 mg Intravenous Q8H PRN John Paul Rojas MD   4 mg at 04/22/24 1920    pantoprazole EC tablet 40 mg  40 mg Oral Daily Veda Gleason MD   40 mg at 04/24/24 0854    sodium chloride 0.9% flush 10 mL  10 mL Intravenous PRN John Paul Rojas MD        traZODone tablet 50 mg  50 mg Oral QHS Veda Gleason MD         Family History    None       Tobacco Use    Smoking status: Former    Smokeless tobacco: Never   Substance and Sexual Activity    Alcohol use: No    Drug use: No     Sexual activity: Not on file     Review of Systems   Constitutional:  Positive for activity change. Negative for appetite change, chills, fatigue and fever.   HENT:  Negative for drooling, ear pain, nosebleeds, postnasal drip and sinus pressure.    Respiratory:  Positive for shortness of breath and wheezing. Negative for cough and chest tightness.    Cardiovascular:  Positive for leg swelling. Negative for chest pain and palpitations.   Gastrointestinal:  Positive for abdominal pain and nausea. Negative for blood in stool, constipation and diarrhea.   Musculoskeletal:  Positive for arthralgias and back pain.   Skin:  Negative for rash and wound.   Neurological:  Positive for weakness. Negative for syncope and numbness.     Objective:     Vital Signs (Most Recent):  Temp: 98 °F (36.7 °C) (04/24/24 1501)  Pulse: (!) 59 (04/24/24 1520)  Resp: (!) 25 (04/24/24 1520)  BP: 139/76 (04/24/24 0854)  SpO2: 100 % (04/24/24 1520) Vital Signs (24h Range):  Temp:  [96.9 °F (36.1 °C)-98.1 °F (36.7 °C)] 98 °F (36.7 °C)  Pulse:  [55-70] 59  Resp:  [21-31] 25  SpO2:  [94 %-100 %] 100 %  BP: (122-139)/(66-76) 139/76     Weight: 120.2 kg (265 lb)  Body mass index is 45.49 kg/m².     Physical Exam  Constitutional:       Appearance: She is obese.   HENT:      Nose: Nose normal.      Mouth/Throat:      Mouth: Mucous membranes are moist.   Eyes:      Extraocular Movements: Extraocular movements intact.      Pupils: Pupils are equal, round, and reactive to light.   Cardiovascular:      Rate and Rhythm: Normal rate and regular rhythm.   Pulmonary:      Effort: Pulmonary effort is normal.      Comments: Decreased BS at bases - O2 via NC  Abdominal:      General: Bowel sounds are normal. There is no distension.      Palpations: Abdomen is soft.      Tenderness: There is no abdominal tenderness.   Musculoskeletal:      Cervical back: Normal range of motion.      Right lower leg: Edema present.      Left lower leg: Edema present.   Skin:      General: Skin is warm and dry.      Capillary Refill: Capillary refill takes 2 to 3 seconds.   Neurological:      General: No focal deficit present.      Mental Status: She is alert and oriented to person, place, and time.              CRANIAL NERVES     CN III, IV, VI   Pupils are equal, round, and reactive to light.       Significant Labs: All pertinent labs within the past 24 hours have been reviewed.  Recent Lab Results         04/24/24  0341        Albumin 2.8       ALP 97       ALT 18       Anion Gap -1       AST 11       Baso # 0.02       Basophil % 0.3       BILIRUBIN TOTAL 0.5  Comment: For infants and newborns, interpretation of results should be based  on gestational age, weight and in agreement with clinical  observations.    Premature Infant recommended reference ranges:  Up to 24 hours.............<8.0 mg/dL  Up to 48 hours............<12.0 mg/dL  3-5 days..................<15.0 mg/dL  6-29 days.................<15.0 mg/dL    For patients on Eltrombopag therapy, use of Dimension [place] TBIL is   not   recommended.         BUN 19       Calcium 10.3       Chloride 102       CO2 41  Comment: CO2 critical result(s) called and verbal readback obtained from   NAMITA TRACY ICU RN by RAPHAEL 04/24/2024 04:56         Creatinine 1.1       Differential Method Automated       eGFR 53.1       Eos # 0.1       Eos % 0.8       Glucose 97       Gran # (ANC) 3.7       Gran % 59.4       Hematocrit 35.0       Hemoglobin 12.0       Immature Grans (Abs) 0.02  Comment: Mild elevation in immature granulocytes is non specific and   can be seen in a variety of conditions including stress response,   acute inflammation, trauma and pregnancy. Correlation with other   laboratory and clinical findings is essential.         Immature Granulocytes 0.3       Lymph # 1.7       Lymph % 27.4       Magnesium  1.9       MCH 33.1       MCHC 34.3       MCV 96       Mono # 0.7       Mono % 11.8       MPV 9.7       nRBC 0       Platelet Count 223     "   Potassium 3.9       PROTEIN TOTAL 6.3       RBC 3.63       RDW 16.8       Sodium 142       WBC 6.21               Significant Imaging: I have reviewed all pertinent imaging results/findings within the past 24 hours.    Assessment/Plan:      * Acute on chronic combined systolic and diastolic congestive heart failure  Patient is identified as having Systolic (HFrEF) heart failure that is Acute on chronic. CHF is currently uncontrolled due to Continued edema of extremities and Dyspnea not returned to baseline after 2 doses of IV diuretic. Latest ECHO performed and demonstrates- Results for orders placed during the hospital encounter of 09/09/23    Echo    Interpretation Summary    Left Ventricle: The left ventricle is normal in size. Mildly increased wall thickness. Normal wall motion. There is mildly reduced systolic function with a visually estimated ejection fraction of 40 - 45%. There is normal diastolic function.    Right Ventricle: Normal right ventricular cavity size. Wall thickness is normal. Right ventricle wall motion  is normal. Systolic function is normal.    Aortic Valve: There is mild to moderate aortic regurgitation with a centrally directed jet.    Mitral Valve: There is moderate regurgitation.    Tricuspid Valve: There is moderate regurgitation with a centrally directed jet.    Pulmonic Valve: There is mild to moderate regurgitation.    Pulmonary Artery: The estimated pulmonary artery systolic pressure is 47 mmHg.    IVC/SVC: Normal venous pressure at 3 mmHg.  . Continue Beta Blocker and Furosemide and monitor clinical status closely. Monitor on telemetry. Patient is on CHF pathway.  Monitor strict Is&Os and daily weights.  Place on fluid restriction of 2 L. Cardiology has been consulted. Continue to stress to patient importance of self efficacy and  on diet for CHF. Last BNP reviewed- and noted below No results for input(s): "BNP", "BNPTRIAGEBLO" in the last 168 hours.    4/23 cont diuresis " "- await final echo read  4/24 cont diuresis - decrease lasix to qd - add arb (valsartan 160mg qd) - edema has imrpoved    SOB (shortness of breath)  Improving with diuresis - cont O2      Diabetes mellitus, type 2  Patient's FSGs are controlled on current medication regimen.  Last A1c reviewed-   Lab Results   Component Value Date    HGBA1C 5.3 09/09/2023    HGBA1C 5.3 09/09/2023     Most recent fingerstick glucose reviewed- No results for input(s): "POCTGLUCOSE" in the last 24 hours.  Current correctional scale  Low  Maintain anti-hyperglycemic dose as follows-   Antihyperglycemics (From admission, onward)      None          Hold Oral hypoglycemics while patient is in the hospital.    Severe obesity (BMI >= 40)  Body mass index is 45.49 kg/m². Morbid obesity complicates all aspects of disease management from diagnostic modalities to treatment. Weight loss encouraged and health benefits explained to patient.           VTE Risk Mitigation (From admission, onward)           Ordered     IP VTE HIGH RISK PATIENT  Once         04/21/24 1721     Place sequential compression device  Until discontinued         04/21/24 1721                    Discharge Planning   TIGRE:      Code Status: Full Code   Is the patient medically ready for discharge?:     Reason for patient still in hospital (select all that apply): Treatment  Discharge Plan A: Home, Home Health                  Veda Gleason MD  Department of Hospital Medicine   [facility] - Intensive [facility]    "

## 2024-04-25 LAB
ALBUMIN SERPL BCP-MCNC: 2.8 G/DL (ref 3.5–5.2)
ALP SERPL-CCNC: 99 U/L (ref 55–135)
ALT SERPL W/O P-5'-P-CCNC: 16 U/L (ref 10–44)
ANION GAP SERPL CALC-SCNC: 1 MMOL/L (ref 3–11)
AST SERPL-CCNC: 9 U/L (ref 10–40)
BASOPHILS # BLD AUTO: 0.02 K/UL (ref 0–0.2)
BASOPHILS NFR BLD: 0.3 % (ref 0–1.9)
BILIRUB SERPL-MCNC: 0.5 MG/DL (ref 0.1–1)
BUN SERPL-MCNC: 21 MG/DL (ref 8–23)
CALCIUM SERPL-MCNC: 10.5 MG/DL (ref 8.7–10.5)
CHLORIDE SERPL-SCNC: 102 MMOL/L (ref 95–110)
CO2 SERPL-SCNC: 39 MMOL/L (ref 23–29)
CREAT SERPL-MCNC: 1.1 MG/DL (ref 0.5–1.4)
DIFFERENTIAL METHOD BLD: ABNORMAL
EOSINOPHIL # BLD AUTO: 0.1 K/UL (ref 0–0.5)
EOSINOPHIL NFR BLD: 1.3 % (ref 0–8)
ERYTHROCYTE [DISTWIDTH] IN BLOOD BY AUTOMATED COUNT: 16 % (ref 11.5–14.5)
EST. GFR  (NO RACE VARIABLE): 53.1 ML/MIN/1.73 M^2
GLUCOSE SERPL-MCNC: 104 MG/DL (ref 70–110)
HCT VFR BLD AUTO: 37.1 % (ref 37–48.5)
HGB BLD-MCNC: 12.3 G/DL (ref 12–16)
IMM GRANULOCYTES # BLD AUTO: 0.03 K/UL (ref 0–0.04)
IMM GRANULOCYTES NFR BLD AUTO: 0.4 % (ref 0–0.5)
LYMPHOCYTES # BLD AUTO: 1.6 K/UL (ref 1–4.8)
LYMPHOCYTES NFR BLD: 22.4 % (ref 18–48)
MAGNESIUM SERPL-MCNC: 2 MG/DL (ref 1.6–2.6)
MCH RBC QN AUTO: 31.6 PG (ref 27–31)
MCHC RBC AUTO-ENTMCNC: 33.2 G/DL (ref 32–36)
MCV RBC AUTO: 95 FL (ref 82–98)
MONOCYTES # BLD AUTO: 0.8 K/UL (ref 0.3–1)
MONOCYTES NFR BLD: 10.9 % (ref 4–15)
NEUTROPHILS # BLD AUTO: 4.6 K/UL (ref 1.8–7.7)
NEUTROPHILS NFR BLD: 64.7 % (ref 38–73)
NRBC BLD-RTO: 0 /100 WBC
PLATELET # BLD AUTO: 212 K/UL (ref 150–450)
PMV BLD AUTO: 9.1 FL (ref 9.2–12.9)
POTASSIUM SERPL-SCNC: 3.9 MMOL/L (ref 3.5–5.1)
PROT SERPL-MCNC: 6.4 G/DL (ref 6–8.4)
RBC # BLD AUTO: 3.89 M/UL (ref 4–5.4)
SODIUM SERPL-SCNC: 142 MMOL/L (ref 136–145)
WBC # BLD AUTO: 7.05 K/UL (ref 3.9–12.7)

## 2024-04-25 PROCEDURE — 85025 COMPLETE CBC W/AUTO DIFF WBC: CPT | Performed by: INTERNAL MEDICINE

## 2024-04-25 PROCEDURE — 99900031 HC PATIENT EDUCATION (STAT)

## 2024-04-25 PROCEDURE — 94660 CPAP INITIATION&MGMT: CPT

## 2024-04-25 PROCEDURE — 36415 COLL VENOUS BLD VENIPUNCTURE: CPT | Performed by: INTERNAL MEDICINE

## 2024-04-25 PROCEDURE — 94761 N-INVAS EAR/PLS OXIMETRY MLT: CPT

## 2024-04-25 PROCEDURE — 80053 COMPREHEN METABOLIC PANEL: CPT | Performed by: INTERNAL MEDICINE

## 2024-04-25 PROCEDURE — 27100171 HC OXYGEN HIGH FLOW UP TO 24 HOURS

## 2024-04-25 PROCEDURE — 97530 THERAPEUTIC ACTIVITIES: CPT

## 2024-04-25 PROCEDURE — 83735 ASSAY OF MAGNESIUM: CPT | Performed by: INTERNAL MEDICINE

## 2024-04-25 PROCEDURE — 25000003 PHARM REV CODE 250: Performed by: INTERNAL MEDICINE

## 2024-04-25 PROCEDURE — 99900035 HC TECH TIME PER 15 MIN (STAT)

## 2024-04-25 PROCEDURE — 11000001 HC ACUTE MED/SURG PRIVATE ROOM

## 2024-04-25 RX ORDER — TRAZODONE HYDROCHLORIDE 50 MG/1
50 TABLET ORAL NIGHTLY
Status: DISCONTINUED | OUTPATIENT
Start: 2024-04-25 | End: 2024-04-26

## 2024-04-25 RX ADMIN — LEVOTHYROXINE SODIUM 50 MCG: 50 TABLET ORAL at 06:04

## 2024-04-25 RX ADMIN — BUSPIRONE HYDROCHLORIDE 5 MG: 5 TABLET ORAL at 08:04

## 2024-04-25 RX ADMIN — TRAZODONE HYDROCHLORIDE 50 MG: 50 TABLET ORAL at 08:04

## 2024-04-25 RX ADMIN — CARVEDILOL 6.25 MG: 3.12 TABLET, FILM COATED ORAL at 08:04

## 2024-04-25 RX ADMIN — ASPIRIN 81 MG: 81 TABLET ORAL at 08:04

## 2024-04-25 RX ADMIN — FUROSEMIDE 40 MG: 40 TABLET ORAL at 08:04

## 2024-04-25 RX ADMIN — ATORVASTATIN CALCIUM 40 MG: 40 TABLET, FILM COATED ORAL at 08:04

## 2024-04-25 RX ADMIN — PANTOPRAZOLE SODIUM 40 MG: 40 TABLET, DELAYED RELEASE ORAL at 08:04

## 2024-04-25 RX ADMIN — VALSARTAN 160 MG: 80 TABLET, FILM COATED ORAL at 08:04

## 2024-04-25 RX ADMIN — MUPIROCIN: 20 OINTMENT TOPICAL at 08:04

## 2024-04-25 NOTE — PLAN OF CARE
No issues overnight. Trazodone did not help much for sleep but patient rested quietly. VSS, afebrile. UOP 850ml plus 2 large wet diapers. No BM noted

## 2024-04-25 NOTE — SUBJECTIVE & OBJECTIVE
Past Medical History:   Diagnosis Date    Abdominal hernia     Bacteremia 4/5/2023    CHF (congestive heart failure)     COPD (chronic obstructive pulmonary disease)     Diabetes mellitus, type 2 2/16/2022    GERD (gastroesophageal reflux disease)     History of home oxygen therapy     Hypertension     Hypertensive emergency 3/30/2023    Migraine headache     Pulmonary embolism 06/01/2017    Small bowel obstruction     Thyroid disease        Past Surgical History:   Procedure Laterality Date    COLONOSCOPY      HYSTERECTOMY      INNER EAR SURGERY      UPPER GASTROINTESTINAL ENDOSCOPY         Review of patient's allergies indicates:   Allergen Reactions    Pcn [penicillins] Swelling       Current Facility-Administered Medications   Medication Dose Route Frequency Provider Last Rate Last Admin    acetaminophen tablet 650 mg  650 mg Oral Q8H PRN John Paul Rojas MD   650 mg at 04/24/24 0854    albuterol-ipratropium 2.5 mg-0.5 mg/3 mL nebulizer solution 3 mL  3 mL Nebulization Q4H PRN Veda Gleason MD        aluminum-magnesium hydroxide-simethicone 200-200-20 mg/5 mL suspension 30 mL  30 mL Oral Q6H PRN Veda Gleason MD   30 mL at 04/23/24 0814    aspirin EC tablet 81 mg  81 mg Oral Daily Veda Gleason MD   81 mg at 04/24/24 0854    atorvastatin tablet 40 mg  40 mg Oral Daily Veda Gleason MD   40 mg at 04/24/24 0854    busPIRone tablet 5 mg  5 mg Oral BID Veda Gleason MD   5 mg at 04/24/24 2058    carvediloL tablet 6.25 mg  6.25 mg Oral BID Veda Gleason MD   6.25 mg at 04/24/24 2059    furosemide tablet 40 mg  40 mg Oral Daily Veda Gleason MD        levothyroxine tablet 50 mcg  50 mcg Oral Before breakfast Veda Gleason MD   50 mcg at 04/25/24 0618    melatonin tablet 6 mg  6 mg Oral Nightly PRN John Paul Rojas MD   6 mg at 04/23/24 2044    mupirocin 2 % ointment   Nasal BID Veda Gleason MD   Given at 04/24/24 2059    ondansetron injection 4  mg  4 mg Intravenous Q8H PRN John Paul Rojas MD   4 mg at 04/22/24 1920    pantoprazole EC tablet 40 mg  40 mg Oral Daily Veda Gleason MD   40 mg at 04/24/24 0854    sodium chloride 0.9% flush 10 mL  10 mL Intravenous PRN John Paul Rojas MD        traZODone tablet 50 mg  50 mg Oral QHS Veda Gleason MD        valsartan tablet 160 mg  160 mg Oral Daily Veda Gleason MD         Family History    None       Tobacco Use    Smoking status: Former    Smokeless tobacco: Never   Substance and Sexual Activity    Alcohol use: No    Drug use: No    Sexual activity: Not on file     Review of Systems   Constitutional:  Positive for activity change. Negative for appetite change, chills, fatigue and fever.   HENT:  Negative for drooling, ear pain, nosebleeds, postnasal drip and sinus pressure.    Respiratory:  Positive for shortness of breath and wheezing. Negative for cough and chest tightness.    Cardiovascular:  Positive for leg swelling. Negative for chest pain and palpitations.   Gastrointestinal:  Positive for abdominal pain and nausea. Negative for blood in stool, constipation and diarrhea.   Musculoskeletal:  Positive for arthralgias and back pain.   Skin:  Negative for rash and wound.   Neurological:  Positive for weakness. Negative for syncope and numbness.     Objective:     Vital Signs (Most Recent):  Temp: 97.8 °F (36.6 °C) (04/25/24 0701)  Pulse: 64 (04/25/24 0800)  Resp: (!) 27 (04/25/24 0800)  BP: (!) 148/88 (04/25/24 0800)  SpO2: 99 % (04/25/24 0800) Vital Signs (24h Range):  Temp:  [97.1 °F (36.2 °C)-98.4 °F (36.9 °C)] 97.8 °F (36.6 °C)  Pulse:  [52-80] 64  Resp:  [20-33] 27  SpO2:  [79 %-100 %] 99 %  BP: (129-155)/(60-88) 148/88     Weight: 120.2 kg (265 lb)  Body mass index is 45.49 kg/m².     Physical Exam  Constitutional:       Appearance: She is obese.   HENT:      Nose: Nose normal.      Mouth/Throat:      Mouth: Mucous membranes are moist.   Eyes:      Extraocular  Movements: Extraocular movements intact.      Pupils: Pupils are equal, round, and reactive to light.   Cardiovascular:      Rate and Rhythm: Normal rate and regular rhythm.   Pulmonary:      Effort: Pulmonary effort is normal.      Comments: Decreased BS at bases - O2 via NC  Abdominal:      General: Bowel sounds are normal. There is no distension.      Palpations: Abdomen is soft.      Tenderness: There is no abdominal tenderness.   Musculoskeletal:      Cervical back: Normal range of motion.      Right lower leg: Edema present.      Left lower leg: Edema present.      Comments: Edema improving   Skin:     General: Skin is warm and dry.      Capillary Refill: Capillary refill takes 2 to 3 seconds.   Neurological:      General: No focal deficit present.      Mental Status: She is alert and oriented to person, place, and time.              CRANIAL NERVES     CN III, IV, VI   Pupils are equal, round, and reactive to light.       Significant Labs: All pertinent labs within the past 24 hours have been reviewed.  Recent Lab Results         04/25/24  0357        Albumin 2.8       ALP 99       ALT 16       Anion Gap 1       AST 9       Baso # 0.02       Basophil % 0.3       BILIRUBIN TOTAL 0.5  Comment: For infants and newborns, interpretation of results should be based  on gestational age, weight and in agreement with clinical  observations.    Premature Infant recommended reference ranges:  Up to 24 hours.............<8.0 mg/dL  Up to 48 hours............<12.0 mg/dL  3-5 days..................<15.0 mg/dL  6-29 days.................<15.0 mg/dL    For patients on Eltrombopag therapy, use of Dimension [place] TBIL is   not   recommended.         BUN 21       Calcium 10.5       Chloride 102       CO2 39       Creatinine 1.1       Differential Method Automated       eGFR 53.1       Eos # 0.1       Eos % 1.3       Glucose 104       Gran # (ANC) 4.6       Gran % 64.7       Hematocrit 37.1       Hemoglobin 12.3       Immature  Grans (Abs) 0.03  Comment: Mild elevation in immature granulocytes is non specific and   can be seen in a variety of conditions including stress response,   acute inflammation, trauma and pregnancy. Correlation with other   laboratory and clinical findings is essential.         Immature Granulocytes 0.4       Lymph # 1.6       Lymph % 22.4       Magnesium  2.0       MCH 31.6       MCHC 33.2       MCV 95       Mono # 0.8       Mono % 10.9       MPV 9.1       nRBC 0       Platelet Count 212       Potassium 3.9       PROTEIN TOTAL 6.4       RBC 3.89       RDW 16.0       Sodium 142       WBC 7.05               Significant Imaging: I have reviewed all pertinent imaging results/findings within the past 24 hours.

## 2024-04-25 NOTE — PLAN OF CARE
No acute events during shift. Vitals stable, no fever. Patient requests to stay on CPAP during the shift, goes on 2L [place] to eat meals, then wears cpap afterwards. Pt had BM today. No concerns at this time.

## 2024-04-25 NOTE — PROGRESS NOTES
[facility] Medicine  Progress Note    Patient Name: Carmen Tan  MRN: [id number]  Patient Class: IP- Inpatient   Admission Date: 2024  Length of Stay: 3 days  Attending Physician: Veda Gleason MD  Primary Care Provider: Lucian Barry MD        Subjective:     Principal Problem:Acute on chronic combined systolic and diastolic congestive heart failure        HPI:  Carmen Tan is a 74 y.o. female with PMHX of COPD, CHF, type 2 diabetes, paroxysmal AFib who presents to the emergency department C/O leg swelling.     Patient arrives from home due to worsening lower extremity edema.  Also reports increasing shortness of breath.  Denies chest pain.  Patient takes Lasix twice a day and states she has good urine output from it.  Reports symptoms have been gradually worsening.     Patient reports appetite has been poor but drinks fluids.  Reports about 3 bottles of water a day and two coke zeros.  Limited physical activity which she attributes to leg pain    Pt had to attend her husbands  and so when sxs had started worsenign she had stayed home initially - after  she came in.  Pt reports this am - feelign little better after diuresisng some over night.  She does use O2 conintuously at home and wears a bipap at home at night and prn    Overview/Hospital Course:   pt feelign better- diuresisng well - less swellign to legs - improvement in breathing.     pt feelign better- breathign has imrpoved - swellign has improved   continues to improve daily - less swelling; breathing imrpoved    Past Medical History:   Diagnosis Date    Abdominal hernia     Bacteremia 2023    CHF (congestive heart failure)     COPD (chronic obstructive pulmonary disease)     Diabetes mellitus, type 2 2022    GERD (gastroesophageal reflux disease)     History of home oxygen therapy     Hypertension     Hypertensive emergency 3/30/2023    Migraine headache      Pulmonary embolism 06/01/2017    Small bowel obstruction     Thyroid disease        Past Surgical History:   Procedure Laterality Date    COLONOSCOPY      HYSTERECTOMY      INNER EAR SURGERY      UPPER GASTROINTESTINAL ENDOSCOPY         Review of patient's allergies indicates:   Allergen Reactions    Pcn [penicillins] Swelling       Current Facility-Administered Medications   Medication Dose Route Frequency Provider Last Rate Last Admin    acetaminophen tablet 650 mg  650 mg Oral Q8H PRN John Paul Rojas MD   650 mg at 04/24/24 0854    albuterol-ipratropium 2.5 mg-0.5 mg/3 mL nebulizer solution 3 mL  3 mL Nebulization Q4H PRN Veda Gleason MD        aluminum-magnesium hydroxide-simethicone 200-200-20 mg/5 mL suspension 30 mL  30 mL Oral Q6H PRN Veda Gleason MD   30 mL at 04/23/24 0814    aspirin EC tablet 81 mg  81 mg Oral Daily Veda Gleason MD   81 mg at 04/24/24 0854    atorvastatin tablet 40 mg  40 mg Oral Daily Veda Gleason MD   40 mg at 04/24/24 0854    busPIRone tablet 5 mg  5 mg Oral BID Veda Gleason MD   5 mg at 04/24/24 2058    carvediloL tablet 6.25 mg  6.25 mg Oral BID Veda Gleason MD   6.25 mg at 04/24/24 2059    furosemide tablet 40 mg  40 mg Oral Daily Veda Gleason MD        levothyroxine tablet 50 mcg  50 mcg Oral Before breakfast Veda Gleason MD   50 mcg at 04/25/24 0618    melatonin tablet 6 mg  6 mg Oral Nightly PRN John Paul Rojas MD   6 mg at 04/23/24 2044    mupirocin 2 % ointment   Nasal BID Veda Gleason MD   Given at 04/24/24 2059    ondansetron injection 4 mg  4 mg Intravenous Q8H PRN John Paul Rojas MD   4 mg at 04/22/24 1920    pantoprazole EC tablet 40 mg  40 mg Oral Daily Veda Gleason MD   40 mg at 04/24/24 0854    sodium chloride 0.9% flush 10 mL  10 mL Intravenous PRN John Paul Rojas MD        traZODone tablet 50 mg  50 mg Oral QHS Veda Gleason MD        valsartan tablet 160 mg   160 mg Oral Daily Veda Gleason MD         Family History    None       Tobacco Use    Smoking status: Former    Smokeless tobacco: Never   Substance and Sexual Activity    Alcohol use: No    Drug use: No    Sexual activity: Not on file     Review of Systems   Constitutional:  Positive for activity change. Negative for appetite change, chills, fatigue and fever.   HENT:  Negative for drooling, ear pain, nosebleeds, postnasal drip and sinus pressure.    Respiratory:  Positive for shortness of breath and wheezing. Negative for cough and chest tightness.    Cardiovascular:  Positive for leg swelling. Negative for chest pain and palpitations.   Gastrointestinal:  Positive for abdominal pain and nausea. Negative for blood in stool, constipation and diarrhea.   Musculoskeletal:  Positive for arthralgias and back pain.   Skin:  Negative for rash and wound.   Neurological:  Positive for weakness. Negative for syncope and numbness.     Objective:     Vital Signs (Most Recent):  Temp: 97.8 °F (36.6 °C) (04/25/24 0701)  Pulse: 64 (04/25/24 0800)  Resp: (!) 27 (04/25/24 0800)  BP: (!) 148/88 (04/25/24 0800)  SpO2: 99 % (04/25/24 0800) Vital Signs (24h Range):  Temp:  [97.1 °F (36.2 °C)-98.4 °F (36.9 °C)] 97.8 °F (36.6 °C)  Pulse:  [52-80] 64  Resp:  [20-33] 27  SpO2:  [79 %-100 %] 99 %  BP: (129-155)/(60-88) 148/88     Weight: 120.2 kg (265 lb)  Body mass index is 45.49 kg/m².     Physical Exam  Constitutional:       Appearance: She is obese.   HENT:      Nose: Nose normal.      Mouth/Throat:      Mouth: Mucous membranes are moist.   Eyes:      Extraocular Movements: Extraocular movements intact.      Pupils: Pupils are equal, round, and reactive to light.   Cardiovascular:      Rate and Rhythm: Normal rate and regular rhythm.   Pulmonary:      Effort: Pulmonary effort is normal.      Comments: Decreased BS at bases - O2 via NC  Abdominal:      General: Bowel sounds are normal. There is no distension.      Palpations:  Abdomen is soft.      Tenderness: There is no abdominal tenderness.   Musculoskeletal:      Cervical back: Normal range of motion.      Right lower leg: Edema present.      Left lower leg: Edema present.      Comments: Edema improving   Skin:     General: Skin is warm and dry.      Capillary Refill: Capillary refill takes 2 to 3 seconds.   Neurological:      General: No focal deficit present.      Mental Status: She is alert and oriented to person, place, and time.              CRANIAL NERVES     CN III, IV, VI   Pupils are equal, round, and reactive to light.       Significant Labs: All pertinent labs within the past 24 hours have been reviewed.  Recent Lab Results         04/25/24  0357        Albumin 2.8       ALP 99       ALT 16       Anion Gap 1       AST 9       Baso # 0.02       Basophil % 0.3       BILIRUBIN TOTAL 0.5  Comment: For infants and newborns, interpretation of results should be based  on gestational age, weight and in agreement with clinical  observations.    Premature Infant recommended reference ranges:  Up to 24 hours.............<8.0 mg/dL  Up to 48 hours............<12.0 mg/dL  3-5 days..................<15.0 mg/dL  6-29 days.................<15.0 mg/dL    For patients on Eltrombopag therapy, use of Dimension [place] TBIL is   not   recommended.         BUN 21       Calcium 10.5       Chloride 102       CO2 39       Creatinine 1.1       Differential Method Automated       eGFR 53.1       Eos # 0.1       Eos % 1.3       Glucose 104       Gran # (ANC) 4.6       Gran % 64.7       Hematocrit 37.1       Hemoglobin 12.3       Immature Grans (Abs) 0.03  Comment: Mild elevation in immature granulocytes is non specific and   can be seen in a variety of conditions including stress response,   acute inflammation, trauma and pregnancy. Correlation with other   laboratory and clinical findings is essential.         Immature Granulocytes 0.4       Lymph # 1.6       Lymph % 22.4       Magnesium  2.0       MCH  31.6       MCHC 33.2       MCV 95       Mono # 0.8       Mono % 10.9       MPV 9.1       nRBC 0       Platelet Count 212       Potassium 3.9       PROTEIN TOTAL 6.4       RBC 3.89       RDW 16.0       Sodium 142       WBC 7.05               Significant Imaging: I have reviewed all pertinent imaging results/findings within the past 24 hours.    Assessment/Plan:      * Acute on chronic combined systolic and diastolic congestive heart failure  Patient is identified as having Systolic (HFrEF) heart failure that is Acute on chronic. CHF is currently uncontrolled due to Continued edema of extremities and Dyspnea not returned to baseline after 2 doses of IV diuretic. Latest ECHO performed and demonstrates- Results for orders placed during the hospital encounter of 09/09/23    Echo    Interpretation Summary    Left Ventricle: The left ventricle is normal in size. Mildly increased wall thickness. Normal wall motion. There is mildly reduced systolic function with a visually estimated ejection fraction of 40 - 45%. There is normal diastolic function.    Right Ventricle: Normal right ventricular cavity size. Wall thickness is normal. Right ventricle wall motion  is normal. Systolic function is normal.    Aortic Valve: There is mild to moderate aortic regurgitation with a centrally directed jet.    Mitral Valve: There is moderate regurgitation.    Tricuspid Valve: There is moderate regurgitation with a centrally directed jet.    Pulmonic Valve: There is mild to moderate regurgitation.    Pulmonary Artery: The estimated pulmonary artery systolic pressure is 47 mmHg.    IVC/SVC: Normal venous pressure at 3 mmHg.  . Continue Beta Blocker and Furosemide and monitor clinical status closely. Monitor on telemetry. Patient is on CHF pathway.  Monitor strict Is&Os and daily weights.  Place on fluid restriction of 2 L. Cardiology has been consulted. Continue to stress to patient importance of self efficacy and  on diet for CHF.  "Last BNP reviewed- and noted below No results for input(s): "BNP", "BNPTRIAGEBLO" in the last 168 hours.    4/23 cont diuresis - await final echo read  4/24 cont diuresis - decrease lasix to qd - add arb (valsartan 160mg qd) - edema has imrpoved  4/25 conts to improve daily - on lasix qd, valsartan added - edema continues to improve - bblocker not added due to bradycardia    SOB (shortness of breath)  Improving with diuresis - cont O2      Diabetes mellitus, type 2  Patient's FSGs are controlled on current medication regimen.  Last A1c reviewed-   Lab Results   Component Value Date    HGBA1C 5.3 09/09/2023    HGBA1C 5.3 09/09/2023     Most recent fingerstick glucose reviewed- No results for input(s): "POCTGLUCOSE" in the last 24 hours.  Current correctional scale  Low  Maintain anti-hyperglycemic dose as follows-   Antihyperglycemics (From admission, onward)      None          Hold Oral hypoglycemics while patient is in the hospital.    Severe obesity (BMI >= 40)  Body mass index is 45.49 kg/m². Morbid obesity complicates all aspects of disease management from diagnostic modalities to treatment. Weight loss encouraged and health benefits explained to patient.           VTE Risk Mitigation (From admission, onward)           Ordered     IP VTE HIGH RISK PATIENT  Once         04/21/24 1721     Place sequential compression device  Until discontinued         04/21/24 1721                    Discharge Planning   TIGRE:      Code Status: Full Code   Is the patient medically ready for discharge?:     Reason for patient still in hospital (select all that apply): Treatment  Discharge Plan A: Home, Home Health                  Veda Gleason MD  Department of Hospital Medicine   [place] - Intensive Care    "

## 2024-04-25 NOTE — PT/OT/SLP PROGRESS
Physical Therapy      Patient Name:  Carmen Tan   MRN:  [id number]    Patient not seen today secondary to Patient unwilling to participate, Pain. Patient reports she cannot perform treatment this visit secondary to her back pain. She was educated on mobility decreasing low back symptoms, but she continues to refuse. Will follow-up 4/26/24.

## 2024-04-25 NOTE — PLAN OF CARE
Problem: Occupational Therapy  Goal: Occupational Therapy Goal  Description: Goals to be met by: 05/01/24     Patient will increase functional independence with ADLs by performing:    Feeding with Modified [place].  UE Dressing with Set-up Assistance.  LE Dressing with Minimal Assistance.  Grooming while seated with Modified [place].  Toileting from toilet with Minimal Assistance for hygiene and clothing management.   Bathing from  edge of bed with Minimal Assistance.  Toilet transfer to bedside commode with Contact Guard Assistance.    Outcome: Emily Solis OT

## 2024-04-25 NOTE — ASSESSMENT & PLAN NOTE
"Patient is identified as having Systolic (HFrEF) heart failure that is Acute on chronic. CHF is currently uncontrolled due to Continued edema of extremities and Dyspnea not returned to baseline after 2 doses of IV diuretic. Latest ECHO performed and demonstrates- Results for orders placed during the hospital encounter of 09/09/23    Echo    Interpretation Summary    Left Ventricle: The left ventricle is normal in size. Mildly increased wall thickness. Normal wall motion. There is mildly reduced systolic function with a visually estimated ejection fraction of 40 - 45%. There is normal diastolic function.    Right Ventricle: Normal right ventricular cavity size. Wall thickness is normal. Right ventricle wall motion  is normal. Systolic function is normal.    Aortic Valve: There is mild to moderate aortic regurgitation with a centrally directed jet.    Mitral Valve: There is moderate regurgitation.    Tricuspid Valve: There is moderate regurgitation with a centrally directed jet.    Pulmonic Valve: There is mild to moderate regurgitation.    Pulmonary Artery: The estimated pulmonary artery systolic pressure is 47 mmHg.    IVC/SVC: Normal venous pressure at 3 mmHg.  . Continue Beta Blocker and Furosemide and monitor clinical status closely. Monitor on telemetry. Patient is on CHF pathway.  Monitor strict Is&Os and daily weights.  Place on fluid restriction of 2 L. Cardiology has been consulted. Continue to stress to patient importance of self efficacy and  on diet for CHF. Last BNP reviewed- and noted below No results for input(s): "BNP", "BNPTRIAGEBLO" in the last 168 hours.    4/23 cont diuresis - await final echo read  4/24 cont diuresis - decrease lasix to qd - add arb (valsartan 160mg qd) - edema has imrpoved  4/25 conts to improve daily - on lasix qd, valsartan added - edema continues to improve - bblocker not added due to bradycardia  "

## 2024-04-25 NOTE — PLAN OF CARE
Care plan reviewed and updated. Tolerating diet. External cath. Sat on side of bed with therapy. Cont with NC/CPAP. Meds adjusted.

## 2024-04-25 NOTE — NURSING
16fr hannon catheter placed per sterile technique for accurate I&O. Ordered per Angelia Campbell NP

## 2024-04-25 NOTE — PLAN OF CARE
04/25/24 1303   Medicare Message   Important Message from Medicare regarding Discharge Appeal Rights Given to patient/caregiver;Explained to patient/caregiver;Signed/date by patient/caregiver   Date IMM was signed 04/25/24   Time IMM was signed [id number]

## 2024-04-25 NOTE — PT/OT/SLP PROGRESS
"Occupational Therapy   Treatment    Name: Carmen Tan  MRN: [id number]  Admitting Diagnosis:  Acute on chronic combined systolic and diastolic congestive heart failure       Recommendations:     Discharge Recommendations: Low Intensity Therapy  Discharge Equipment Recommendations:  none  Barriers to discharge:  Other (Comment) (medical status)    Assessment:     Carmen Tan is a 74 y.o. female with a medical diagnosis of Acute on chronic combined systolic and diastolic congestive heart failure.  She presents with generalized weakness and debility. Performance deficits affecting function are weakness, impaired endurance, impaired self care skills, impaired functional mobility, gait instability, impaired balance, decreased lower extremity function, decreased safety awareness, pain. Patient completed OOB/EOB mobility tasks to improve endurance, strength and balance when completed seated or standing tasks. Patient assist level varied from supervision to moderate assist given task. Continue occupational therapy per plan of care.     Rehab Prognosis:  Good; patient would benefit from acute skilled OT services to address these deficits and reach maximum level of function.       Plan:     Patient to be seen 5 x/week to address the above listed problems via self-care/home management, therapeutic activities, therapeutic exercises  Plan of Care Expires: 05/01/24  Plan of Care Reviewed with: patient    Subjective     Chief Complaint: "My back is killing me. It was a 9/10 yesterday, now its 10/10."   Patient/Family Comments/goals: To return to PLOF  Pain/Comfort:  Pain Rating 1: 10/10  Location 1: back  Pain Addressed 1: Reposition, Distraction, Nurse notified  Pain Rating Post-Intervention 1: 10/10    Objective:     Communicated with: Nurse and patient prior to session.  Patient found HOB elevated with telemetry, BIPAP, hannon catheter, peripheral IV, pulse ox (continuous), CPAP upon OT entry to room.    General " Precautions: Standard, fall, respiratory    Orthopedic Precautions:N/A  Braces: N/A  Respiratory Status: Nasal cannula, flow 2 L/min     Occupational Performance:     Bed Mobility:    Patient completed Rolling/Turning to Left with  supervision  Patient completed Scooting/Bridging with minimum assistance  Patient completed Supine to Sit with moderate assistance  Patient completed Sit to Supine with minimum assistance   Completed with use of bed rails and HOB elevated. Additional time given for rest breaks during transition and when scooting to EOB while seated.     Functional Mobility/Transfers:  Patient completed Sit <> Stand Transfer with contact guard assistance  with  rolling walker  x2 trials.  Functional Mobility: Patient completed 2 steps forward/backward and 4 side steps to right with RW CGA. Cues given for sequence and RW management.     Activities of Daily Living:  Lower Body Dressing: maximal assistance for adjusting socks while supine in bed.       [facility] 6 Click ADL: 19    Treatment & Education:  Pt tolerated tx well. Pt educated on benefits of actively participating in occupational therapy and POC. Pt educated on fall prevention and use of call button for assistance. Pt encouraged to complete B UE therex throughout day to improve endurance and muscle strength. Patient educated on benefits of OOB/EOB ax decrease back pain and prevent deconditioning and PNA. Patient to complete therex EOB, however fatigued and reported back pain therefore returned to supine.  Pt verbalized understanding of all education.      Patient left HOB elevated with all lines intact, call button in reach, and nurse present    GOALS:   Multidisciplinary Problems       Occupational Therapy Goals          Problem: Occupational Therapy    Goal Priority Disciplines Outcome Interventions   Occupational Therapy Goal     OT, PT/OT Progressing    Description: Goals to be met by: 05/01/24     Patient will increase functional independence with  ADLs by performing:    Feeding with Modified [place].  UE Dressing with Set-up Assistance.  LE Dressing with Minimal Assistance.  Grooming while seated with Modified [place].  Toileting from toilet with Minimal Assistance for hygiene and clothing management.   Bathing from  edge of bed with Minimal Assistance.  Toilet transfer to bedside commode with Contact Guard Assistance.                         Time Tracking:     OT Date of Treatment: 04/25/24  OT Start Time: 0919  OT Stop Time: 0950  OT Total Time (min): 31 min    Billable Minutes:Therapeutic Activity 31 mins    OT/MARLA: OT     Number of MARLA visits since last OT visit: 0    4/25/2024

## 2024-04-26 PROBLEM — R41.0 DISORIENTATION: Status: ACTIVE | Noted: 2024-04-26

## 2024-04-26 LAB
ALBUMIN SERPL BCP-MCNC: 3 G/DL (ref 3.5–5.2)
ALP SERPL-CCNC: 102 U/L (ref 55–135)
ALT SERPL W/O P-5'-P-CCNC: 16 U/L (ref 10–44)
ANION GAP SERPL CALC-SCNC: 2 MMOL/L (ref 3–11)
AST SERPL-CCNC: 10 U/L (ref 10–40)
BACTERIA #/AREA URNS HPF: NEGATIVE /HPF
BASOPHILS # BLD AUTO: 0.02 K/UL (ref 0–0.2)
BASOPHILS NFR BLD: 0.3 % (ref 0–1.9)
BILIRUB SERPL-MCNC: 0.6 MG/DL (ref 0.1–1)
BILIRUB UR QL STRIP: NEGATIVE
BUN SERPL-MCNC: 21 MG/DL (ref 8–23)
CALCIUM SERPL-MCNC: 10.6 MG/DL (ref 8.7–10.5)
CHLORIDE SERPL-SCNC: 105 MMOL/L (ref 95–110)
CLARITY UR: ABNORMAL
CO2 SERPL-SCNC: 35 MMOL/L (ref 23–29)
COLOR UR: ABNORMAL
CREAT SERPL-MCNC: 1.2 MG/DL (ref 0.5–1.4)
DIFFERENTIAL METHOD BLD: ABNORMAL
EOSINOPHIL # BLD AUTO: 0.1 K/UL (ref 0–0.5)
EOSINOPHIL NFR BLD: 1.3 % (ref 0–8)
ERYTHROCYTE [DISTWIDTH] IN BLOOD BY AUTOMATED COUNT: 16 % (ref 11.5–14.5)
EST. GFR  (NO RACE VARIABLE): 47.8 ML/MIN/1.73 M^2
GLUCOSE SERPL-MCNC: 101 MG/DL (ref 70–110)
GLUCOSE UR QL STRIP: NEGATIVE
HCT VFR BLD AUTO: 39.3 % (ref 37–48.5)
HGB BLD-MCNC: 12.8 G/DL (ref 12–16)
HGB UR QL STRIP: ABNORMAL
HYALINE CASTS #/AREA URNS LPF: 5.1 /LPF
IMM GRANULOCYTES # BLD AUTO: 0.02 K/UL (ref 0–0.04)
IMM GRANULOCYTES NFR BLD AUTO: 0.3 % (ref 0–0.5)
KETONES UR QL STRIP: NEGATIVE
LEUKOCYTE ESTERASE UR QL STRIP: ABNORMAL
LYMPHOCYTES # BLD AUTO: 1.6 K/UL (ref 1–4.8)
LYMPHOCYTES NFR BLD: 22.9 % (ref 18–48)
MAGNESIUM SERPL-MCNC: 2.1 MG/DL (ref 1.6–2.6)
MCH RBC QN AUTO: 31.3 PG (ref 27–31)
MCHC RBC AUTO-ENTMCNC: 32.6 G/DL (ref 32–36)
MCV RBC AUTO: 96 FL (ref 82–98)
MICROSCOPIC COMMENT: ABNORMAL
MONOCYTES # BLD AUTO: 0.6 K/UL (ref 0.3–1)
MONOCYTES NFR BLD: 9.2 % (ref 4–15)
NEUTROPHILS # BLD AUTO: 4.6 K/UL (ref 1.8–7.7)
NEUTROPHILS NFR BLD: 66 % (ref 38–73)
NITRITE UR QL STRIP: NEGATIVE
NRBC BLD-RTO: 0 /100 WBC
PH UR STRIP: >8 [PH] (ref 5–8)
PLATELET # BLD AUTO: 209 K/UL (ref 150–450)
PMV BLD AUTO: 8.7 FL (ref 9.2–12.9)
POTASSIUM SERPL-SCNC: 4.1 MMOL/L (ref 3.5–5.1)
PROT SERPL-MCNC: 7 G/DL (ref 6–8.4)
PROT UR QL STRIP: ABNORMAL
RBC # BLD AUTO: 4.09 M/UL (ref 4–5.4)
RBC #/AREA URNS HPF: >100 /HPF (ref 0–4)
SODIUM SERPL-SCNC: 142 MMOL/L (ref 136–145)
SP GR UR STRIP: 1.02 (ref 1–1.03)
SQUAMOUS #/AREA URNS HPF: 1 /HPF
URN SPEC COLLECT METH UR: ABNORMAL
UROBILINOGEN UR STRIP-ACNC: 1 EU/DL
WBC # BLD AUTO: 6.95 K/UL (ref 3.9–12.7)
WBC #/AREA URNS HPF: 22 /HPF (ref 0–5)

## 2024-04-26 PROCEDURE — 25000003 PHARM REV CODE 250: Performed by: INTERNAL MEDICINE

## 2024-04-26 PROCEDURE — 97116 GAIT TRAINING THERAPY: CPT

## 2024-04-26 PROCEDURE — 81000 URINALYSIS NONAUTO W/SCOPE: CPT | Performed by: INTERNAL MEDICINE

## 2024-04-26 PROCEDURE — 83735 ASSAY OF MAGNESIUM: CPT | Performed by: INTERNAL MEDICINE

## 2024-04-26 PROCEDURE — 27100171 HC OXYGEN HIGH FLOW UP TO 24 HOURS

## 2024-04-26 PROCEDURE — 25000003 PHARM REV CODE 250: Performed by: EMERGENCY MEDICINE

## 2024-04-26 PROCEDURE — 85025 COMPLETE CBC W/AUTO DIFF WBC: CPT | Performed by: INTERNAL MEDICINE

## 2024-04-26 PROCEDURE — 99900035 HC TECH TIME PER 15 MIN (STAT)

## 2024-04-26 PROCEDURE — 36415 COLL VENOUS BLD VENIPUNCTURE: CPT | Performed by: INTERNAL MEDICINE

## 2024-04-26 PROCEDURE — 99900031 HC PATIENT EDUCATION (STAT)

## 2024-04-26 PROCEDURE — 97535 SELF CARE MNGMENT TRAINING: CPT

## 2024-04-26 PROCEDURE — 94640 AIRWAY INHALATION TREATMENT: CPT

## 2024-04-26 PROCEDURE — 80053 COMPREHEN METABOLIC PANEL: CPT | Performed by: INTERNAL MEDICINE

## 2024-04-26 PROCEDURE — 27000221 HC OXYGEN, UP TO 24 HOURS

## 2024-04-26 PROCEDURE — 25000242 PHARM REV CODE 250 ALT 637 W/ HCPCS: Performed by: INTERNAL MEDICINE

## 2024-04-26 PROCEDURE — 94761 N-INVAS EAR/PLS OXIMETRY MLT: CPT

## 2024-04-26 PROCEDURE — 87086 URINE CULTURE/COLONY COUNT: CPT | Performed by: INTERNAL MEDICINE

## 2024-04-26 PROCEDURE — 63600175 PHARM REV CODE 636 W HCPCS: Performed by: INTERNAL MEDICINE

## 2024-04-26 PROCEDURE — 94660 CPAP INITIATION&MGMT: CPT

## 2024-04-26 PROCEDURE — 11000001 HC ACUTE MED/SURG PRIVATE ROOM

## 2024-04-26 RX ORDER — LEVOFLOXACIN 5 MG/ML
500 INJECTION, SOLUTION INTRAVENOUS
Status: DISCONTINUED | OUTPATIENT
Start: 2024-04-26 | End: 2024-04-26

## 2024-04-26 RX ORDER — LEVOFLOXACIN 5 MG/ML
750 INJECTION, SOLUTION INTRAVENOUS
Status: DISCONTINUED | OUTPATIENT
Start: 2024-04-26 | End: 2024-04-29

## 2024-04-26 RX ADMIN — MUPIROCIN: 20 OINTMENT TOPICAL at 08:04

## 2024-04-26 RX ADMIN — PANTOPRAZOLE SODIUM 40 MG: 40 TABLET, DELAYED RELEASE ORAL at 08:04

## 2024-04-26 RX ADMIN — BUSPIRONE HYDROCHLORIDE 5 MG: 5 TABLET ORAL at 08:04

## 2024-04-26 RX ADMIN — LEVOFLOXACIN 750 MG: 750 INJECTION, SOLUTION INTRAVENOUS at 10:04

## 2024-04-26 RX ADMIN — ASPIRIN 81 MG: 81 TABLET ORAL at 08:04

## 2024-04-26 RX ADMIN — CARVEDILOL 6.25 MG: 3.12 TABLET, FILM COATED ORAL at 08:04

## 2024-04-26 RX ADMIN — FUROSEMIDE 40 MG: 40 TABLET ORAL at 08:04

## 2024-04-26 RX ADMIN — LEVOTHYROXINE SODIUM 50 MCG: 50 TABLET ORAL at 05:04

## 2024-04-26 RX ADMIN — Medication 6 MG: at 08:04

## 2024-04-26 RX ADMIN — ATORVASTATIN CALCIUM 40 MG: 40 TABLET, FILM COATED ORAL at 08:04

## 2024-04-26 RX ADMIN — VALSARTAN 160 MG: 80 TABLET, FILM COATED ORAL at 08:04

## 2024-04-26 RX ADMIN — IPRATROPIUM BROMIDE AND ALBUTEROL SULFATE 3 ML: .5; 3 SOLUTION RESPIRATORY (INHALATION) at 07:04

## 2024-04-26 NOTE — PROGRESS NOTES
[facility] Medicine  Progress Note    Patient Name: Carmen Tan  MRN: [id number]  Patient Class: IP- Inpatient   Admission Date: 2024  Length of Stay: 4 days  Attending Physician: Veda Gleason MD  Primary Care Provider: Lucian Barry MD        Subjective:     Principal Problem:Acute on chronic combined systolic and diastolic congestive heart failure        HPI:  Carmen Tan is a 74 y.o. female with PMHX of COPD, CHF, type 2 diabetes, paroxysmal AFib who presents to the emergency department C/O leg swelling.     Patient arrives from home due to worsening lower extremity edema.  Also reports increasing shortness of breath.  Denies chest pain.  Patient takes Lasix twice a day and states she has good urine output from it.  Reports symptoms have been gradually worsening.     Patient reports appetite has been poor but drinks fluids.  Reports about 3 bottles of water a day and two coke zeros.  Limited physical activity which she attributes to leg pain    Pt had to attend her husbands  and so when sxs had started worsenign she had stayed home initially - after  she came in.  Pt reports this am - feelign little better after diuresisng some over night.  She does use O2 conintuously at home and wears a bipap at home at night and prn    Overview/Hospital Course:   pt feelign better- diuresisng well - less swellign to legs - improvement in breathing.     pt feelign better- breathign has imrpoved - swellign has improved   continues to improve daily - less swelling; breathing imrpoved   pt confused this am- waving towel around - swellign/edmea has imrpoved    Past Medical History:   Diagnosis Date    Abdominal hernia     Bacteremia 2023    CHF (congestive heart failure)     COPD (chronic obstructive pulmonary disease)     Diabetes mellitus, type 2 2022    GERD (gastroesophageal reflux disease)     History of home oxygen therapy      Hypertension     Hypertensive emergency 3/30/2023    Migraine headache     Pulmonary embolism 06/01/2017    Small bowel obstruction     Thyroid disease        Past Surgical History:   Procedure Laterality Date    COLONOSCOPY      HYSTERECTOMY      INNER EAR SURGERY      UPPER GASTROINTESTINAL ENDOSCOPY         Review of patient's allergies indicates:   Allergen Reactions    Pcn [penicillins] Swelling       Current Facility-Administered Medications   Medication Dose Route Frequency Provider Last Rate Last Admin    acetaminophen tablet 650 mg  650 mg Oral Q8H PRN John Paul Rojas MD   650 mg at 04/24/24 0854    albuterol-ipratropium 2.5 mg-0.5 mg/3 mL nebulizer solution 3 mL  3 mL Nebulization Q4H PRN Veda Gleason MD   3 mL at 04/26/24 0732    aluminum-magnesium hydroxide-simethicone 200-200-20 mg/5 mL suspension 30 mL  30 mL Oral Q6H PRN Veda Gleason MD   30 mL at 04/23/24 0814    aspirin EC tablet 81 mg  81 mg Oral Daily Veda Gleason MD   81 mg at 04/26/24 0837    atorvastatin tablet 40 mg  40 mg Oral Daily Veda Gleason MD   40 mg at 04/26/24 0837    busPIRone tablet 5 mg  5 mg Oral BID Veda Gleason MD   5 mg at 04/26/24 0837    carvediloL tablet 6.25 mg  6.25 mg Oral BID Veda Gleason MD   6.25 mg at 04/26/24 0836    furosemide tablet 40 mg  40 mg Oral Daily Veda Gleason MD   40 mg at 04/26/24 0838    levoFLOXacin 750 mg/150 mL IVPB 750 mg  750 mg Intravenous Q24H Veda Gleason MD   Stopped at 04/26/24 1156    levothyroxine tablet 50 mcg  50 mcg Oral Before breakfast Veda Gleason MD   50 mcg at 04/26/24 0532    melatonin tablet 6 mg  6 mg Oral Nightly PRN John Paul Rojas MD   6 mg at 04/23/24 2044    mupirocin 2 % ointment   Nasal BID Veda Gleason MD   Given at 04/26/24 0854    ondansetron injection 4 mg  4 mg Intravenous Q8H PRN John Paul Rojas MD   4 mg at 04/22/24 1920    pantoprazole EC tablet 40 mg  40 mg Oral  Daily Veda Gleason MD   40 mg at 04/26/24 0838    sodium chloride 0.9% flush 10 mL  10 mL Intravenous PRN John Paul Rojas MD        valsartan tablet 160 mg  160 mg Oral Daily Veda Gleason MD   160 mg at 04/26/24 0838     Family History    None       Tobacco Use    Smoking status: Former    Smokeless tobacco: Never   Substance and Sexual Activity    Alcohol use: No    Drug use: No    Sexual activity: Not on file     Review of Systems   Constitutional:  Positive for activity change. Negative for appetite change, chills, fatigue and fever.   HENT:  Negative for drooling, ear pain, nosebleeds, postnasal drip and sinus pressure.    Respiratory:  Positive for shortness of breath and wheezing. Negative for cough and chest tightness.    Cardiovascular:  Positive for leg swelling. Negative for chest pain and palpitations.   Gastrointestinal:  Positive for abdominal pain and nausea. Negative for blood in stool, constipation and diarrhea.   Musculoskeletal:  Positive for arthralgias and back pain.   Skin:  Negative for rash and wound.   Neurological:  Positive for weakness. Negative for syncope and numbness.     Objective:     Vital Signs (Most Recent):  Temp: 97.9 °F (36.6 °C) (04/26/24 1201)  Pulse: 72 (04/26/24 1230)  Resp: (!) 29 (04/26/24 1230)  BP: (!) 154/86 (04/26/24 1201)  SpO2: (!) 83 % (04/26/24 1230) Vital Signs (24h Range):  Temp:  [97.6 °F (36.4 °C)-97.9 °F (36.6 °C)] 97.9 °F (36.6 °C)  Pulse:  [62-86] 72  Resp:  [21-42] 29  SpO2:  [64 %-100 %] 83 %  BP: (124-154)/(62-89) 154/86     Weight: 120.2 kg (265 lb)  Body mass index is 45.49 kg/m².     Physical Exam  Constitutional:       Appearance: She is obese.   HENT:      Nose: Nose normal.      Mouth/Throat:      Mouth: Mucous membranes are moist.   Eyes:      Extraocular Movements: Extraocular movements intact.      Pupils: Pupils are equal, round, and reactive to light.   Cardiovascular:      Rate and Rhythm: Normal rate and regular rhythm.    Pulmonary:      Effort: Pulmonary effort is normal.      Comments: Decreased BS at bases - O2 via NC  Abdominal:      General: Bowel sounds are normal. There is no distension.      Palpations: Abdomen is soft.      Tenderness: There is no abdominal tenderness.   Genitourinary:     Comments: Hardwick in place  Musculoskeletal:      Cervical back: Normal range of motion.      Right lower leg: Edema present.      Left lower leg: Edema present.      Comments: Edema improving   Skin:     General: Skin is warm and dry.      Capillary Refill: Capillary refill takes 2 to 3 seconds.   Neurological:      General: No focal deficit present.      Mental Status: She is alert and oriented to person, place, and time.              CRANIAL NERVES     CN III, IV, VI   Pupils are equal, round, and reactive to light.       Significant Labs: All pertinent labs within the past 24 hours have been reviewed.  Recent Lab Results         04/26/24  0815   04/26/24  0351        Albumin   3.0       ALP   102       ALT   16       Anion Gap   2       Appearance, UA Cloudy         AST   10       Bacteria, UA Negative         Baso #   0.02       Basophil %   0.3       Bilirubin (UA) Negative         BILIRUBIN TOTAL   0.6  Comment: For infants and newborns, interpretation of results should be based  on gestational age, weight and in agreement with clinical  observations.    Premature Infant recommended reference ranges:  Up to 24 hours.............<8.0 mg/dL  Up to 48 hours............<12.0 mg/dL  3-5 days..................<15.0 mg/dL  6-29 days.................<15.0 mg/dL    For patients on Eltrombopag therapy, use of Dimension Clements TBIL is   not   recommended.         BUN   21       Calcium   10.6       Chloride   105       CO2   35       Color, UA Orange         Creatinine   1.2       Differential Method   Automated       eGFR   47.8       Eos #   0.1       Eos %   1.3       Glucose   101       Glucose, UA Negative         Gran # (ANC)   4.6        Gran %   66.0       Hematocrit   39.3       Hemoglobin   12.8       Hyaline Casts, UA 5.1         Immature Grans (Abs)   0.02  Comment: Mild elevation in immature granulocytes is non specific and   can be seen in a variety of conditions including stress response,   acute inflammation, trauma and pregnancy. Correlation with other   laboratory and clinical findings is essential.         Immature Granulocytes   0.3       Ketones, UA Negative         Leukocyte Esterase, UA 2+         Lymph #   1.6       Lymph %   22.9       Magnesium    2.1       MCH   31.3       MCHC   32.6       MCV   96       Microscopic Comment SEE COMMENT  Comment: Other formed elements not mentioned in the report are not   present in the microscopic examination.            Mono #   0.6       Mono %   9.2       MPV   8.7       NITRITE UA Negative         nRBC   0       Blood, UA 3+         pH, UA >8.0         Platelet Count   209       Potassium   4.1       PROTEIN TOTAL   7.0       Protein, UA 2+  Comment: Recommend a 24 hour urine protein or a urine   protein/creatinine ratio if globulin induced proteinuria is  clinically suspected.           RBC   4.09       RBC, UA >100         RDW   16.0       Sodium   142       Specific Gravity, UA 1.020         Specimen UA Urine, Catheterized         Squam Epithel, UA 1         UROBILINOGEN UA 1.0         WBC, UA 22         WBC   6.95               Significant Imaging: I have reviewed all pertinent imaging results/findings within the past 24 hours.    Assessment/Plan:      * Acute on chronic combined systolic and diastolic congestive heart failure  Patient is identified as having Systolic (HFrEF) heart failure that is Acute on chronic. CHF is currently uncontrolled due to Continued edema of extremities and Dyspnea not returned to baseline after 2 doses of IV diuretic. Latest ECHO performed and demonstrates- Results for orders placed during the hospital encounter of 09/09/23    Echo    Interpretation  "Summary    Left Ventricle: The left ventricle is normal in size. Mildly increased wall thickness. Normal wall motion. There is mildly reduced systolic function with a visually estimated ejection fraction of 40 - 45%. There is normal diastolic function.    Right Ventricle: Normal right ventricular cavity size. Wall thickness is normal. Right ventricle wall motion  is normal. Systolic function is normal.    Aortic Valve: There is mild to moderate aortic regurgitation with a centrally directed jet.    Mitral Valve: There is moderate regurgitation.    Tricuspid Valve: There is moderate regurgitation with a centrally directed jet.    Pulmonic Valve: There is mild to moderate regurgitation.    Pulmonary Artery: The estimated pulmonary artery systolic pressure is 47 mmHg.    IVC/SVC: Normal venous pressure at 3 mmHg.  . Continue Beta Blocker and Furosemide and monitor clinical status closely. Monitor on telemetry. Patient is on CHF pathway.  Monitor strict Is&Os and daily weights.  Place on fluid restriction of 2 L. Cardiology has been consulted. Continue to stress to patient importance of self efficacy and  on diet for CHF. Last BNP reviewed- and noted below No results for input(s): "BNP", "BNPTRIAGEBLO" in the last 168 hours.    4/23 cont diuresis - await final echo read  4/24 cont diuresis - decrease lasix to qd - add arb (valsartan 160mg qd) - edema has imrpoved  4/25 conts to improve daily - on lasix qd, valsartan added - edema continues to improve - bblocker not added due to bradycardia    Disorientation  Concern for uti - check ua - start abxs if appropriate  Dc sleep med      SOB (shortness of breath)  Improving with diuresis - cont O2      Diabetes mellitus, type 2  Patient's FSGs are controlled on current medication regimen.  Last A1c reviewed-   Lab Results   Component Value Date    HGBA1C 5.3 09/09/2023    HGBA1C 5.3 09/09/2023     Most recent fingerstick glucose reviewed- No results for input(s): " ""POCTGLUCOSE" in the last 24 hours.  Current correctional scale  Low  Maintain anti-hyperglycemic dose as follows-   Antihyperglycemics (From admission, onward)      None          Hold Oral hypoglycemics while patient is in the hospital.    Severe obesity (BMI >= 40)  Body mass index is 45.49 kg/m². Morbid obesity complicates all aspects of disease management from diagnostic modalities to treatment. Weight loss encouraged and health benefits explained to patient.           VTE Risk Mitigation (From admission, onward)           Ordered     IP VTE HIGH RISK PATIENT  Once         04/21/24 1721     Place sequential compression device  Until discontinued         04/21/24 1721                    Discharge Planning   TIGRE:      Code Status: Full Code   Is the patient medically ready for discharge?:     Reason for patient still in hospital (select all that apply): Treatment  Discharge Plan A: Home, Home Health                  Veda Gleason MD  Department of Hospital Medicine   [place] - Intensive Care    "

## 2024-04-26 NOTE — PLAN OF CARE
Problem: Adult Inpatient Plan of Care  Goal: Plan of Care Review  Outcome: Progressing  Goal: Patient-Specific Goal (Individualized)  Outcome: Progressing  Goal: Absence of Hospital-Acquired Illness or Injury  Outcome: Progressing  Goal: Optimal Comfort and Wellbeing  Outcome: Progressing  Goal: Readiness for Transition of Care  Outcome: Progressing     Problem: Fall Injury Risk  Goal: Absence of Fall and Fall-Related Injury  Outcome: Progressing     Problem: Skin Injury Risk Increased  Goal: Skin Health and Integrity  Outcome: Progressing     Problem: Adjustment to Illness (Heart Failure)  Goal: Optimal Coping  Outcome: Progressing     Problem: Fluid Imbalance (Heart Failure)  Goal: Fluid Balance  Outcome: Progressing     Problem: Dysrhythmia (Heart Failure)  Goal: Stable Heart Rate and Rhythm  Outcome: Progressing     Problem: Cardiac Output Decreased (Heart Failure)  Goal: Optimal Cardiac Output  Outcome: Progressing     Problem: Functional Ability Impaired (Heart Failure)  Goal: Optimal Functional Ability  Outcome: Progressing     Problem: Oral Intake Inadequate (Heart Failure)  Goal: Optimal Nutrition Intake  Outcome: Progressing     Problem: Respiratory Compromise (Heart Failure)  Goal: Effective Oxygenation and Ventilation  Outcome: Progressing     Problem: Sleep Disordered Breathing (Heart Failure)  Goal: Effective Breathing Pattern During Sleep  Outcome: Progressing     Problem: Infection  Goal: Absence of Infection Signs and Symptoms  Outcome: Progressing

## 2024-04-26 NOTE — ASSESSMENT & PLAN NOTE
"Patient is identified as having Systolic (HFrEF) heart failure that is Acute on chronic. CHF is currently uncontrolled due to Continued edema of extremities and Dyspnea not returned to baseline after 2 doses of IV diuretic. Latest ECHO performed and demonstrates- Results for orders placed during the hospital encounter of 09/09/23    Echo    Interpretation Summary    Left Ventricle: The left ventricle is normal in size. Mildly increased wall thickness. Normal wall motion. There is mildly reduced systolic function with a visually estimated ejection fraction of 40 - 45%. There is normal diastolic function.    Right Ventricle: Normal right ventricular cavity size. Wall thickness is normal. Right ventricle wall motion  is normal. Systolic function is normal.    Aortic Valve: There is mild to moderate aortic regurgitation with a centrally directed jet.    Mitral Valve: There is moderate regurgitation.    Tricuspid Valve: There is moderate regurgitation with a centrally directed jet.    Pulmonic Valve: There is mild to moderate regurgitation.    Pulmonary Artery: The estimated pulmonary artery systolic pressure is 47 mmHg.    IVC/SVC: Normal venous pressure at 3 mmHg.  . Continue Beta Blocker and Furosemide and monitor clinical status closely. Monitor on telemetry. Patient is on CHF pathway.  Monitor strict Is&Os and daily weights.  Place on fluid restriction of 2 L. Cardiology has been consulted. Continue to stress to patient importance of self efficacy and  on diet for CHF. Last BNP reviewed- and noted below No results for input(s): "BNP", "BNPTRIAGEBLO" in the last 168 hours.    4/23 cont diuresis - await final echo read  4/24 cont diuresis - decrease lasix to qd - add arb (valsartan 160mg qd) - edema has imrpoved  4/25 conts to improve daily - on lasix qd, valsartan added - edema continues to improve - bblocker not added due to bradycardia  "

## 2024-04-26 NOTE — PT/OT/SLP PROGRESS
"Occupational Therapy   Treatment    Name: Carmen Tan  MRN: [id number]  Admitting Diagnosis:  Acute on chronic combined systolic and diastolic congestive heart failure       Recommendations:     Discharge Recommendations: Low Intensity Therapy  Discharge Equipment Recommendations:  none  Barriers to discharge:  Other (Comment) (Medical status)    Assessment:     Carmen Tan is a 74 y.o. female with a medical diagnosis of Acute on chronic combined systolic and diastolic congestive heart failure.  She presents with generalized weakness and debility. Performance deficits affecting function are weakness, impaired endurance, impaired self care skills, impaired functional mobility, impaired balance, decreased lower extremity function, decreased safety awareness, pain. Patient performed self care tasks and functional mobility to improve safety and independence during ADLs. Patient assistance level varied from Mod A to CGA for mobility and Max A for dressing. See objective for details. Acute occupational therapy services continue to be recommended to address above deficits. Patient progressing towards goals., continue occupational therapy per plan of care.     Rehab Prognosis:  Good; patient would benefit from acute skilled OT services to address these deficits and reach maximum level of function.       Plan:     Patient to be seen 5 x/week to address the above listed problems via self-care/home management, therapeutic activities, therapeutic exercises  Plan of Care Expires: 05/01/24  Plan of Care Reviewed with: patient, daughter    Subjective     Chief Complaint: "I want to get to that [facility] [place]."   Patient/Family Comments/goals: To return to prior level of function   Pain/Comfort:  Pain Rating 1: 0/10    Objective:     Communicated with: Nurse and patient prior to session.  Patient found HOB elevated with blood pressure cuff, oxygen, PureWick, peripheral IV, pulse ox (continuous), telemetry upon OT entry to " room.    General Precautions: Standard, fall, respiratory    Orthopedic Precautions:N/A  Braces: N/A  Respiratory Status: Nasal cannula, flow 2 L/min     Occupational Performance:     Bed Mobility:    Patient completed Scooting/Bridging with moderate assistance  Patient completed Supine to Sit with moderate assistance   With use of bed rails and HOB elevated. Patient required assistance with scooting anteriorly while seated EOB to shift weight left/right.     Functional Mobility/Transfers:  Patient completed Sit <> Stand Transfer with contact guard assistance  with  rolling walker   Patient completed Toilet Transfer Step Transfer technique with contact guard assistance with  rolling walker. Step transfer from bed to BSC with RW.   Functional Mobility: Patient completed functional mobility for ~ 2 feet from BSC to chair with RW. CGA for balance and verbal cues for RW management. Patient did not demo LOB or SOB.     Activities of Daily Living:  Lower Body Dressing: maximal assistance to don brief. Assist with threading bilateral LE  and pulling above hips while standing.   Toileting: maximal assistance to urinate while seated on BSC. Assist with pericare and LE clothing management.       [facility] 6 Click ADL: 18    Treatment & Education:  Pt tolerated tx well. Pt educated on benefits of actively participating in occupational therapy and POC. Pt educated on fall prevention and use of call button for assistance. Pt encouraged to complete B UE therex throughout day to improve endurance and muscle strength. Pt verbalized understanding of all education.      Patient left up in chair with all lines intact, call button in reach, and nurse present    GOALS:   Multidisciplinary Problems       Occupational Therapy Goals          Problem: Occupational Therapy    Goal Priority Disciplines Outcome Interventions   Occupational Therapy Goal     OT, PT/OT Progressing    Description: Goals to be met by: 05/01/24     Patient will increase  functional independence with ADLs by performing:    Feeding with Modified [place].  UE Dressing with Set-up Assistance.  LE Dressing with Minimal Assistance.  Grooming while seated with Modified [place].  Toileting from toilet with Minimal Assistance for hygiene and clothing management.   Bathing from  edge of bed with Minimal Assistance.  Toilet transfer to bedside commode with Contact Guard Assistance.                         Time Tracking:     OT Date of Treatment: 04/26/24  OT Start Time: 0938  OT Stop Time: 1005  OT Total Time (min): 27 min    Billable Minutes:Self Care/Home Management 27 Mins    OT/MARLA: OT     Number of MARLA visits since last OT visit: 0    4/26/2024

## 2024-04-26 NOTE — PLAN OF CARE
Problem: Occupational Therapy  Goal: Occupational Therapy Goal  Description: Goals to be met by: 05/01/24     Patient will increase functional independence with ADLs by performing:    Feeding with Modified [place].  UE Dressing with Set-up Assistance.  LE Dressing with Minimal Assistance.  Grooming while seated with Modified [place].  Toileting from toilet with Minimal Assistance for hygiene and clothing management.   Bathing from  edge of bed with Minimal Assistance.  Toilet transfer to bedside commode with Contact Guard Assistance.    Outcome: Emily Solis OT

## 2024-04-26 NOTE — NURSING
"Removed hannon and placed external catheter. Patient is alert and oriented, however, she is experiencing hallucinations. Patient was waving towel in air and side of bed. When asked, she stated "I'm trying to keep my  away from me, he's pinching my legs, his spirit". Discussed with Dr. Gleason, ordered UA.   Playing gospel music in room and reassurance provided frequently.   "

## 2024-04-26 NOTE — PLAN OF CARE
Problem: Adult Inpatient Plan of Care  Goal: Plan of Care Review  Outcome: Progressing     Problem: Adult Inpatient Plan of Care  Goal: Absence of Hospital-Acquired Illness or Injury  Outcome: Progressing     Problem: Adult Inpatient Plan of Care  Goal: Optimal Comfort and Wellbeing  Outcome: Progressing     Problem: Adult Inpatient Plan of Care  Goal: Readiness for Transition of Care  Outcome: Progressing

## 2024-04-26 NOTE — PLAN OF CARE
Problem: Physical Therapy  Goal: Physical Therapy Goal  Description: Goals to be met by: 24    Patient will increase functional independence with mobility by performin. Supine to sit with Modified Independent  2. Sit to supine with Modified Independent  3. Bed to chair transfer with Modified Independentwith or without rolling walker using Step Transfer TECHNIQUE  4. Gait  x 50  feet with Standby Assistance with or without rolling walker  5. Lower extremity exercise program x10 reps per handout, with assistance as needed     2024 1447 by Victor Manuel Montero, PT  Outcome: Progressing  2024 1445 by Victor Manuel Montero, PT  Outcome: Progressing

## 2024-04-26 NOTE — PT/OT/SLP PROGRESS
"Physical Therapy Treatment    Patient Name:  Carmen Tan   MRN:  [id number]    Recommendations:     Discharge Recommendations: Low Intensity Therapy  Discharge Equipment Recommendations: none  Barriers to discharge: None    Assessment:     Carmen Tan is a 74 y.o. female admitted with a medical diagnosis of Acute on chronic combined systolic and diastolic congestive heart failure.  She presents with the following impairments/functional limitations: weakness, impaired endurance, impaired muscle length, impaired self care skills, decreased upper extremity function, impaired functional mobility, decreased lower extremity function, gait instability, decreased safety awareness, pain, impaired balance. Patient is able to perform gait training this visit in her room. She was very nervous about the "spirits" in her room, so PT and nursing had to convince patient to perform treatment this visit. She was able to perform sit to stand with CGA and ambulate 10 feet with RW and CGA    Rehab Prognosis: Good; patient would benefit from acute skilled PT services to address these deficits and reach maximum level of function.    Recent Surgery: * No surgery found *      Plan:     During this hospitalization, patient to be seen 5 x/week to address the identified rehab impairments via gait training, therapeutic activities, therapeutic exercises, neuromuscular re-education and progress toward the following goals:    Plan of Care Expires:  05/03/24    Subjective     Chief Complaint: low back pain   Patient/Family Comments/goals: return home  Pain/Comfort:  Pain Rating 1:  (Patient reports back pain but does not quantify)  Location - Side 1: Bilateral  Location - Orientation 1: lower  Location 1: back  Pain Addressed 1: Reposition, Cessation of Activity      Objective:     Communicated with nursing prior to session.  Patient found up in chair with blood pressure cuff, telemetry, pulse ox (continuous), PureWick, oxygen upon PT " entry to room.     General Precautions: Standard, fall, respiratory  Orthopedic Precautions: N/A  Braces: N/A  Respiratory Status: Nasal cannula, flow 2.0 L/min     Functional Mobility:  Bed Mobility:     Rolling Left:  minimum assistance  Rolling Right: minimum assistance  Bridging: moderate assistance  Supine to Sit: minimum assistance  Sit to Supine: minimum assistance  Transfers:     Sit to Stand:  contact guard assistance with rolling walker  Gait: 10 feet with RW and CGA with no LOB noted      AM-PAC 6 CLICK MOBILITY  Turning over in bed (including adjusting bedclothes, sheets and blankets)?: 3  Sitting down on and standing up from a chair with arms (e.g., wheelchair, bedside commode, etc.): 3  Moving from lying on back to sitting on the side of the bed?: 2  Moving to and from a bed to a chair (including a wheelchair)?: 2  Need to walk in hospital room?: 3  Climbing 3-5 steps with a railing?: 2  Basic Mobility Total Score: 15       Treatment & Education:  Gait training 10 feet  Static stand with RW for 5 minutes  Sit<>Stand  Bed mobility     Patient left HOB elevated with all lines intact, call button in reach, nursing notified, and nursing present..    GOALS:   Multidisciplinary Problems       Physical Therapy Goals          Problem: Physical Therapy    Goal Priority Disciplines Outcome Goal Variances Interventions   Physical Therapy Goal     PT, PT/OT Progressing     Description: Goals to be met by: 24    Patient will increase functional independence with mobility by performin. Supine to sit with Modified Independent  2. Sit to supine with Modified Independent  3. Bed to chair transfer with Modified Independentwith or without rolling walker using Step Transfer TECHNIQUE  4. Gait  x 50  feet with Standby Assistance with or without rolling walker  5. Lower extremity exercise program x10 reps per handout, with assistance as needed                          Time Tracking:     PT Received On:  04/26/24  PT Start Time: 1430     PT Stop Time: 1445  PT Total Time (min): 15 min     Billable Minutes: Gait Training 15 minutes    Treatment Type: Treatment  PT/PTA: PT     Number of PTA visits since last PT visit: 0     04/26/2024

## 2024-04-26 NOTE — PROGRESS NOTES
Pharmacist Renal Dose Adjustment Note    Carmen Tan is a 74 y.o. female being treated with the medication Levaquin    Patient Data:    Vital Signs (Most Recent):  Temp: 97.9 °F (36.6 °C) (04/26/24 0701)  Pulse: 83 (04/26/24 0901)  Resp: (!) 22 (04/26/24 0901)  BP: 136/89 (04/26/24 0901)  SpO2: 98 % (04/26/24 0901) Vital Signs (72h Range):  Temp:  [96.9 °F (36.1 °C)-98.4 °F (36.9 °C)]   Pulse:  [52-83]   Resp:  [20-33]   BP: ()/(58-89)   SpO2:  [79 %-100 %]      Recent Labs   Lab 04/24/24  0341 04/25/24  0357 04/26/24  0351   CREATININE 1.1 1.1 1.2     Serum creatinine: 1.2 mg/dL 04/26/24 0351  Estimated creatinine clearance: 53.3 mL/min    Medication: levaquin dose:  500 mg frequency  Q24H  will be changed to medication: levaquin  dose: 750 mg frequency: Q24H    Pharmacist's Name: Wilmer Paz  Pharmacist's Extension: [phone]

## 2024-04-26 NOTE — SUBJECTIVE & OBJECTIVE
Past Medical History:   Diagnosis Date    Abdominal hernia     Bacteremia 4/5/2023    CHF (congestive heart failure)     COPD (chronic obstructive pulmonary disease)     Diabetes mellitus, type 2 2/16/2022    GERD (gastroesophageal reflux disease)     History of home oxygen therapy     Hypertension     Hypertensive emergency 3/30/2023    Migraine headache     Pulmonary embolism 06/01/2017    Small bowel obstruction     Thyroid disease        Past Surgical History:   Procedure Laterality Date    COLONOSCOPY      HYSTERECTOMY      INNER EAR SURGERY      UPPER GASTROINTESTINAL ENDOSCOPY         Review of patient's allergies indicates:   Allergen Reactions    Pcn [penicillins] Swelling       Current Facility-Administered Medications   Medication Dose Route Frequency Provider Last Rate Last Admin    acetaminophen tablet 650 mg  650 mg Oral Q8H PRN John Paul Rojas MD   650 mg at 04/24/24 0854    albuterol-ipratropium 2.5 mg-0.5 mg/3 mL nebulizer solution 3 mL  3 mL Nebulization Q4H PRN Veda Gleason MD   3 mL at 04/26/24 0732    aluminum-magnesium hydroxide-simethicone 200-200-20 mg/5 mL suspension 30 mL  30 mL Oral Q6H PRN Veda Gleason MD   30 mL at 04/23/24 0814    aspirin EC tablet 81 mg  81 mg Oral Daily Veda Gleason MD   81 mg at 04/26/24 0837    atorvastatin tablet 40 mg  40 mg Oral Daily Veda Gleason MD   40 mg at 04/26/24 0837    busPIRone tablet 5 mg  5 mg Oral BID Veda Gleason MD   5 mg at 04/26/24 0837    carvediloL tablet 6.25 mg  6.25 mg Oral BID Veda Gleason MD   6.25 mg at 04/26/24 0836    furosemide tablet 40 mg  40 mg Oral Daily Veda Gleason MD   40 mg at 04/26/24 0838    levoFLOXacin 750 mg/150 mL IVPB 750 mg  750 mg Intravenous Q24H Veda Gleason MD   Stopped at 04/26/24 1156    levothyroxine tablet 50 mcg  50 mcg Oral Before breakfast Veda Gleason MD   50 mcg at 04/26/24 0532    melatonin tablet 6 mg  6 mg Oral Nightly PRN  John Paul Rojas MD   6 mg at 04/23/24 2044    mupirocin 2 % ointment   Nasal BID Veda Gleason MD   Given at 04/26/24 0854    ondansetron injection 4 mg  4 mg Intravenous Q8H PRN John Paul Rojas MD   4 mg at 04/22/24 1920    pantoprazole EC tablet 40 mg  40 mg Oral Daily Veda Gleason MD   40 mg at 04/26/24 0838    sodium chloride 0.9% flush 10 mL  10 mL Intravenous PRN John Paul Rojas MD        valsartan tablet 160 mg  160 mg Oral Daily Veda Gleason MD   160 mg at 04/26/24 0838     Family History    None       Tobacco Use    Smoking status: Former    Smokeless tobacco: Never   Substance and Sexual Activity    Alcohol use: No    Drug use: No    Sexual activity: Not on file     Review of Systems   Constitutional:  Positive for activity change. Negative for appetite change, chills, fatigue and fever.   HENT:  Negative for drooling, ear pain, nosebleeds, postnasal drip and sinus pressure.    Respiratory:  Positive for shortness of breath and wheezing. Negative for cough and chest tightness.    Cardiovascular:  Positive for leg swelling. Negative for chest pain and palpitations.   Gastrointestinal:  Positive for abdominal pain and nausea. Negative for blood in stool, constipation and diarrhea.   Musculoskeletal:  Positive for arthralgias and back pain.   Skin:  Negative for rash and wound.   Neurological:  Positive for weakness. Negative for syncope and numbness.     Objective:     Vital Signs (Most Recent):  Temp: 97.9 °F (36.6 °C) (04/26/24 1201)  Pulse: 72 (04/26/24 1230)  Resp: (!) 29 (04/26/24 1230)  BP: (!) 154/86 (04/26/24 1201)  SpO2: (!) 83 % (04/26/24 1230) Vital Signs (24h Range):  Temp:  [97.6 °F (36.4 °C)-97.9 °F (36.6 °C)] 97.9 °F (36.6 °C)  Pulse:  [62-86] 72  Resp:  [21-42] 29  SpO2:  [64 %-100 %] 83 %  BP: (124-154)/(62-89) 154/86     Weight: 120.2 kg (265 lb)  Body mass index is 45.49 kg/m².     Physical Exam  Constitutional:       Appearance: She is obese.    HENT:      Nose: Nose normal.      Mouth/Throat:      Mouth: Mucous membranes are moist.   Eyes:      Extraocular Movements: Extraocular movements intact.      Pupils: Pupils are equal, round, and reactive to light.   Cardiovascular:      Rate and Rhythm: Normal rate and regular rhythm.   Pulmonary:      Effort: Pulmonary effort is normal.      Comments: Decreased BS at bases - O2 via NC  Abdominal:      General: Bowel sounds are normal. There is no distension.      Palpations: Abdomen is soft.      Tenderness: There is no abdominal tenderness.   Genitourinary:     Comments: Hardwick in place  Musculoskeletal:      Cervical back: Normal range of motion.      Right lower leg: Edema present.      Left lower leg: Edema present.      Comments: Edema improving   Skin:     General: Skin is warm and dry.      Capillary Refill: Capillary refill takes 2 to 3 seconds.   Neurological:      General: No focal deficit present.      Mental Status: She is alert and oriented to person, place, and time.              CRANIAL NERVES     CN III, IV, VI   Pupils are equal, round, and reactive to light.       Significant Labs: All pertinent labs within the past 24 hours have been reviewed.  Recent Lab Results         04/26/24  0815   04/26/24  0351        Albumin   3.0       ALP   102       ALT   16       Anion Gap   2       Appearance, UA Cloudy         AST   10       Bacteria, UA Negative         Baso #   0.02       Basophil %   0.3       Bilirubin (UA) Negative         BILIRUBIN TOTAL   0.6  Comment: For infants and newborns, interpretation of results should be based  on gestational age, weight and in agreement with clinical  observations.    Premature Infant recommended reference ranges:  Up to 24 hours.............<8.0 mg/dL  Up to 48 hours............<12.0 mg/dL  3-5 days..................<15.0 mg/dL  6-29 days.................<15.0 mg/dL    For patients on Eltrombopag therapy, use of Dimension [place] TBIL is   not   recommended.          BUN   21       Calcium   10.6       Chloride   105       CO2   35       Color, UA Orange         Creatinine   1.2       Differential Method   Automated       eGFR   47.8       Eos #   0.1       Eos %   1.3       Glucose   101       Glucose, UA Negative         Gran # (ANC)   4.6       Gran %   66.0       Hematocrit   39.3       Hemoglobin   12.8       Hyaline Casts, UA 5.1         Immature Grans (Abs)   0.02  Comment: Mild elevation in immature granulocytes is non specific and   can be seen in a variety of conditions including stress response,   acute inflammation, trauma and pregnancy. Correlation with other   laboratory and clinical findings is essential.         Immature Granulocytes   0.3       Ketones, UA Negative         Leukocyte Esterase, UA 2+         Lymph #   1.6       Lymph %   22.9       Magnesium    2.1       MCH   31.3       MCHC   32.6       MCV   96       Microscopic Comment SEE COMMENT  Comment: Other formed elements not mentioned in the report are not   present in the microscopic examination.            Mono #   0.6       Mono %   9.2       MPV   8.7       NITRITE UA Negative         nRBC   0       Blood, UA 3+         pH, UA >8.0         Platelet Count   209       Potassium   4.1       PROTEIN TOTAL   7.0       Protein, UA 2+  Comment: Recommend a 24 hour urine protein or a urine   protein/creatinine ratio if globulin induced proteinuria is  clinically suspected.           RBC   4.09       RBC, UA >100         RDW   16.0       Sodium   142       Specific Gravity, UA 1.020         Specimen UA Urine, Catheterized         Squam Epithel, UA 1         UROBILINOGEN UA 1.0         WBC, UA 22         WBC   6.95               Significant Imaging: I have reviewed all pertinent imaging results/findings within the past 24 hours.

## 2024-04-27 LAB
ALBUMIN SERPL BCP-MCNC: 2.9 G/DL (ref 3.5–5.2)
ALP SERPL-CCNC: 97 U/L (ref 55–135)
ALT SERPL W/O P-5'-P-CCNC: 15 U/L (ref 10–44)
ANION GAP SERPL CALC-SCNC: 1 MMOL/L (ref 3–11)
AST SERPL-CCNC: 7 U/L (ref 10–40)
BACTERIA UR CULT: NO GROWTH
BASOPHILS # BLD AUTO: 0.02 K/UL (ref 0–0.2)
BASOPHILS NFR BLD: 0.3 % (ref 0–1.9)
BILIRUB SERPL-MCNC: 0.8 MG/DL (ref 0.1–1)
BUN SERPL-MCNC: 20 MG/DL (ref 8–23)
CALCIUM SERPL-MCNC: 10.8 MG/DL (ref 8.7–10.5)
CHLORIDE SERPL-SCNC: 104 MMOL/L (ref 95–110)
CO2 SERPL-SCNC: 36 MMOL/L (ref 23–29)
CREAT SERPL-MCNC: 1.1 MG/DL (ref 0.5–1.4)
DIFFERENTIAL METHOD BLD: ABNORMAL
EOSINOPHIL # BLD AUTO: 0.1 K/UL (ref 0–0.5)
EOSINOPHIL NFR BLD: 1.6 % (ref 0–8)
ERYTHROCYTE [DISTWIDTH] IN BLOOD BY AUTOMATED COUNT: 15.4 % (ref 11.5–14.5)
EST. GFR  (NO RACE VARIABLE): 53.1 ML/MIN/1.73 M^2
GLUCOSE SERPL-MCNC: 112 MG/DL (ref 70–110)
HCT VFR BLD AUTO: 40.8 % (ref 37–48.5)
HGB BLD-MCNC: 12.8 G/DL (ref 12–16)
IMM GRANULOCYTES # BLD AUTO: 0.02 K/UL (ref 0–0.04)
IMM GRANULOCYTES NFR BLD AUTO: 0.3 % (ref 0–0.5)
LYMPHOCYTES # BLD AUTO: 1.4 K/UL (ref 1–4.8)
LYMPHOCYTES NFR BLD: 20.5 % (ref 18–48)
MAGNESIUM SERPL-MCNC: 2 MG/DL (ref 1.6–2.6)
MCH RBC QN AUTO: 29.3 PG (ref 27–31)
MCHC RBC AUTO-ENTMCNC: 31.4 G/DL (ref 32–36)
MCV RBC AUTO: 93 FL (ref 82–98)
MONOCYTES # BLD AUTO: 0.8 K/UL (ref 0.3–1)
MONOCYTES NFR BLD: 10.9 % (ref 4–15)
NEUTROPHILS # BLD AUTO: 4.6 K/UL (ref 1.8–7.7)
NEUTROPHILS NFR BLD: 66.4 % (ref 38–73)
NRBC BLD-RTO: 0 /100 WBC
PLATELET # BLD AUTO: 224 K/UL (ref 150–450)
PMV BLD AUTO: 8.5 FL (ref 9.2–12.9)
POCT GLUCOSE: 114 MG/DL (ref 70–110)
POCT GLUCOSE: 96 MG/DL (ref 70–110)
POTASSIUM SERPL-SCNC: 4.1 MMOL/L (ref 3.5–5.1)
PROT SERPL-MCNC: 6.6 G/DL (ref 6–8.4)
RBC # BLD AUTO: 4.37 M/UL (ref 4–5.4)
SODIUM SERPL-SCNC: 141 MMOL/L (ref 136–145)
WBC # BLD AUTO: 6.96 K/UL (ref 3.9–12.7)

## 2024-04-27 PROCEDURE — 27000221 HC OXYGEN, UP TO 24 HOURS

## 2024-04-27 PROCEDURE — 25000003 PHARM REV CODE 250: Performed by: INTERNAL MEDICINE

## 2024-04-27 PROCEDURE — 94660 CPAP INITIATION&MGMT: CPT

## 2024-04-27 PROCEDURE — 36415 COLL VENOUS BLD VENIPUNCTURE: CPT | Performed by: INTERNAL MEDICINE

## 2024-04-27 PROCEDURE — 83735 ASSAY OF MAGNESIUM: CPT | Performed by: INTERNAL MEDICINE

## 2024-04-27 PROCEDURE — 63600175 PHARM REV CODE 636 W HCPCS: Performed by: INTERNAL MEDICINE

## 2024-04-27 PROCEDURE — 94761 N-INVAS EAR/PLS OXIMETRY MLT: CPT

## 2024-04-27 PROCEDURE — 27100171 HC OXYGEN HIGH FLOW UP TO 24 HOURS

## 2024-04-27 PROCEDURE — 11000001 HC ACUTE MED/SURG PRIVATE ROOM

## 2024-04-27 PROCEDURE — 85025 COMPLETE CBC W/AUTO DIFF WBC: CPT | Performed by: INTERNAL MEDICINE

## 2024-04-27 PROCEDURE — 80053 COMPREHEN METABOLIC PANEL: CPT | Performed by: INTERNAL MEDICINE

## 2024-04-27 PROCEDURE — 25000003 PHARM REV CODE 250: Performed by: EMERGENCY MEDICINE

## 2024-04-27 PROCEDURE — 99900035 HC TECH TIME PER 15 MIN (STAT)

## 2024-04-27 PROCEDURE — 99900031 HC PATIENT EDUCATION (STAT)

## 2024-04-27 RX ORDER — GABAPENTIN 100 MG/1
100 CAPSULE ORAL 3 TIMES DAILY
Status: DISCONTINUED | OUTPATIENT
Start: 2024-04-27 | End: 2024-04-27

## 2024-04-27 RX ORDER — GABAPENTIN 100 MG/1
100 CAPSULE ORAL 3 TIMES DAILY
Status: DISCONTINUED | OUTPATIENT
Start: 2024-04-27 | End: 2024-04-29 | Stop reason: HOSPADM

## 2024-04-27 RX ADMIN — ACETAMINOPHEN 650 MG: 325 TABLET ORAL at 03:04

## 2024-04-27 RX ADMIN — Medication 6 MG: at 08:04

## 2024-04-27 RX ADMIN — PANTOPRAZOLE SODIUM 40 MG: 40 TABLET, DELAYED RELEASE ORAL at 08:04

## 2024-04-27 RX ADMIN — GABAPENTIN 100 MG: 100 CAPSULE ORAL at 08:04

## 2024-04-27 RX ADMIN — FUROSEMIDE 40 MG: 40 TABLET ORAL at 08:04

## 2024-04-27 RX ADMIN — MUPIROCIN: 20 OINTMENT TOPICAL at 08:04

## 2024-04-27 RX ADMIN — GABAPENTIN 100 MG: 100 CAPSULE ORAL at 09:04

## 2024-04-27 RX ADMIN — VALSARTAN 160 MG: 80 TABLET, FILM COATED ORAL at 08:04

## 2024-04-27 RX ADMIN — LEVOFLOXACIN 750 MG: 750 INJECTION, SOLUTION INTRAVENOUS at 10:04

## 2024-04-27 RX ADMIN — ATORVASTATIN CALCIUM 40 MG: 40 TABLET, FILM COATED ORAL at 08:04

## 2024-04-27 RX ADMIN — LEVOTHYROXINE SODIUM 50 MCG: 50 TABLET ORAL at 05:04

## 2024-04-27 RX ADMIN — CARVEDILOL 6.25 MG: 3.12 TABLET, FILM COATED ORAL at 08:04

## 2024-04-27 RX ADMIN — BUSPIRONE HYDROCHLORIDE 5 MG: 5 TABLET ORAL at 08:04

## 2024-04-27 RX ADMIN — ASPIRIN 81 MG: 81 TABLET ORAL at 08:04

## 2024-04-27 RX ADMIN — GABAPENTIN 100 MG: 100 CAPSULE ORAL at 02:04

## 2024-04-27 NOTE — PROGRESS NOTES
[facility] Medicine  Progress Note    Patient Name: Carmen Tan  MRN: [id number]  Patient Class: IP- Inpatient   Admission Date: 2024  Length of Stay: 5 days  Attending Physician: Veda Gleason MD  Primary Care Provider: Lucian Barry MD        Subjective:     Principal Problem:Acute on chronic combined systolic and diastolic congestive heart failure        HPI:  Carmen Tan is a 74 y.o. female with PMHX of COPD, CHF, type 2 diabetes, paroxysmal AFib who presents to the emergency department C/O leg swelling.     Patient arrives from home due to worsening lower extremity edema.  Also reports increasing shortness of breath.  Denies chest pain.  Patient takes Lasix twice a day and states she has good urine output from it.  Reports symptoms have been gradually worsening.     Patient reports appetite has been poor but drinks fluids.  Reports about 3 bottles of water a day and two coke zeros.  Limited physical activity which she attributes to leg pain    Pt had to attend her husbands  and so when sxs had started worsenign she had stayed home initially - after  she came in.  Pt reports this am - feelign little better after diuresisng some over night.  She does use O2 conintuously at home and wears a bipap at home at night and prn    Overview/Hospital Course:   pt feelign better- diuresisng well - less swellign to legs - improvement in breathing.     pt feelign better- breathign has imrpoved - swellign has improved   ND continues to improve daily - less swelling; breathing imrpoved   pt confused this am- waving towel around - swellign/edmea has imrpoved   WC:  Patient alert and oriented this morning.  Willing continues to improve.  Continue therapy efforts.    Past Medical History:   Diagnosis Date    Abdominal hernia     Bacteremia 2023    CHF (congestive heart failure)     COPD (chronic obstructive pulmonary disease)      Diabetes mellitus, type 2 2/16/2022    GERD (gastroesophageal reflux disease)     History of home oxygen therapy     Hypertension     Hypertensive emergency 3/30/2023    Migraine headache     Pulmonary embolism 06/01/2017    Small bowel obstruction     Thyroid disease        Past Surgical History:   Procedure Laterality Date    COLONOSCOPY      HYSTERECTOMY      INNER EAR SURGERY      UPPER GASTROINTESTINAL ENDOSCOPY         Review of patient's allergies indicates:   Allergen Reactions    Pcn [penicillins] Swelling       Current Facility-Administered Medications   Medication Dose Route Frequency Provider Last Rate Last Admin    acetaminophen tablet 650 mg  650 mg Oral Q8H PRN John Paul Rojas MD   650 mg at 04/24/24 0854    albuterol-ipratropium 2.5 mg-0.5 mg/3 mL nebulizer solution 3 mL  3 mL Nebulization Q4H PRN Veda Gleason MD   3 mL at 04/26/24 0732    aluminum-magnesium hydroxide-simethicone 200-200-20 mg/5 mL suspension 30 mL  30 mL Oral Q6H PRN Veda Gleason MD   30 mL at 04/23/24 0814    aspirin EC tablet 81 mg  81 mg Oral Daily Veda Gleason MD   81 mg at 04/26/24 0837    atorvastatin tablet 40 mg  40 mg Oral Daily Veda Gleason MD   40 mg at 04/26/24 0837    busPIRone tablet 5 mg  5 mg Oral BID Veda Gleason MD   5 mg at 04/26/24 2041    carvediloL tablet 6.25 mg  6.25 mg Oral BID Veda Gleason MD   6.25 mg at 04/26/24 2041    furosemide tablet 40 mg  40 mg Oral Daily Veda Gleason MD   40 mg at 04/26/24 0838    levoFLOXacin 750 mg/150 mL IVPB 750 mg  750 mg Intravenous Q24H Veda Gleason MD   Stopped at 04/26/24 1156    levothyroxine tablet 50 mcg  50 mcg Oral Before breakfast Veda Gleason MD   50 mcg at 04/27/24 0520    melatonin tablet 6 mg  6 mg Oral Nightly PRN John Paul Rojas MD   6 mg at 04/26/24 2041    mupirocin 2 % ointment   Nasal BID Veda Gleason MD   Given at 04/26/24 2041    ondansetron injection 4 mg  4 mg  Intravenous Q8H PRJohn Paul Preston MD   4 mg at 04/22/24 1920    pantoprazole EC tablet 40 mg  40 mg Oral Daily Veda Gleason MD   40 mg at 04/26/24 0838    sodium chloride 0.9% flush 10 mL  10 mL Intravenous John Paul Nava MD        valsartan tablet 160 mg  160 mg Oral Daily Veda Gleason MD   160 mg at 04/26/24 0838     Family History    None       Tobacco Use    Smoking status: Former    Smokeless tobacco: Never   Substance and Sexual Activity    Alcohol use: No    Drug use: No    Sexual activity: Not on file     Review of Systems   Constitutional:  Positive for activity change. Negative for appetite change, chills, fatigue and fever.   HENT:  Negative for drooling, ear pain, nosebleeds, postnasal drip and sinus pressure.    Respiratory:  Positive for shortness of breath and wheezing. Negative for cough and chest tightness.    Cardiovascular:  Positive for leg swelling. Negative for chest pain and palpitations.   Gastrointestinal:  Positive for abdominal pain and nausea. Negative for blood in stool, constipation and diarrhea.   Musculoskeletal:  Positive for arthralgias and back pain.   Skin:  Negative for rash and wound.   Neurological:  Positive for weakness. Negative for syncope and numbness.     Objective:     Vital Signs (Most Recent):  Temp: 98.3 °F (36.8 °C) (04/27/24 0731)  Pulse: 79 (04/27/24 0745)  Resp: (!) 25 (04/27/24 0745)  BP: (!) 109/57 (04/27/24 0731)  SpO2: (!) 94 % (04/27/24 0745) Vital Signs (24h Range):  Temp:  [96.9 °F (36.1 °C)-98.9 °F (37.2 °C)] 98.3 °F (36.8 °C)  Pulse:  [59-91] 79  Resp:  [20-42] 25  SpO2:  [51 %-100 %] 94 %  BP: (109-154)/(57-91) 109/57     Weight: 120.2 kg (265 lb)  Body mass index is 45.49 kg/m².     Physical Exam  Constitutional:       Appearance: She is obese.   HENT:      Nose: Nose normal.      Mouth/Throat:      Mouth: Mucous membranes are moist.   Eyes:      Extraocular Movements: Extraocular movements intact.      Pupils: Pupils  are equal, round, and reactive to light.   Cardiovascular:      Rate and Rhythm: Normal rate and regular rhythm.   Pulmonary:      Effort: Pulmonary effort is normal.      Comments: Decreased BS at bases - O2 via NC  Abdominal:      General: Bowel sounds are normal. There is no distension.      Palpations: Abdomen is soft.      Tenderness: There is no abdominal tenderness.   Genitourinary:     Comments: Hardwick in place  Musculoskeletal:      Cervical back: Normal range of motion.      Right lower leg: Edema present.      Left lower leg: Edema present.      Comments: Edema improving   Skin:     General: Skin is warm and dry.      Capillary Refill: Capillary refill takes 2 to 3 seconds.   Neurological:      General: No focal deficit present.      Mental Status: She is alert and oriented to person, place, and time.              CRANIAL NERVES     CN III, IV, VI   Pupils are equal, round, and reactive to light.       Significant Labs: All pertinent labs within the past 24 hours have been reviewed.  Recent Lab Results         04/27/24  0340        Albumin 2.9       ALP 97       ALT 15       Anion Gap 1       AST 7       Baso # 0.02       Basophil % 0.3       BILIRUBIN TOTAL 0.8  Comment: For infants and newborns, interpretation of results should be based  on gestational age, weight and in agreement with clinical  observations.    Premature Infant recommended reference ranges:  Up to 24 hours.............<8.0 mg/dL  Up to 48 hours............<12.0 mg/dL  3-5 days..................<15.0 mg/dL  6-29 days.................<15.0 mg/dL    For patients on Eltrombopag therapy, use of Dimension [place] TBIL is   not   recommended.         BUN 20       Calcium 10.8       Chloride 104       CO2 36       Creatinine 1.1       Differential Method Automated       eGFR 53.1       Eos # 0.1       Eos % 1.6       Glucose 112       Gran # (ANC) 4.6       Gran % 66.4       Hematocrit 40.8       Hemoglobin 12.8       Immature Grans (Abs)  0.02  Comment: Mild elevation in immature granulocytes is non specific and   can be seen in a variety of conditions including stress response,   acute inflammation, trauma and pregnancy. Correlation with other   laboratory and clinical findings is essential.         Immature Granulocytes 0.3       Lymph # 1.4       Lymph % 20.5       Magnesium  2.0       MCH 29.3       MCHC 31.4       MCV 93       Mono # 0.8       Mono % 10.9       MPV 8.5       nRBC 0       Platelet Count 224       Potassium 4.1       PROTEIN TOTAL 6.6       RBC 4.37       RDW 15.4       Sodium 141       WBC 6.96               Significant Imaging: I have reviewed all pertinent imaging results/findings within the past 24 hours.    Assessment/Plan:      * Acute on chronic combined systolic and diastolic congestive heart failure  Patient is identified as having Systolic (HFrEF) heart failure that is Acute on chronic. CHF is currently uncontrolled due to Continued edema of extremities and Dyspnea not returned to baseline after 2 doses of IV diuretic. Latest ECHO performed and demonstrates- Results for orders placed during the hospital encounter of 09/09/23    Echo    Interpretation Summary    Left Ventricle: The left ventricle is normal in size. Mildly increased wall thickness. Normal wall motion. There is mildly reduced systolic function with a visually estimated ejection fraction of 40 - 45%. There is normal diastolic function.    Right Ventricle: Normal right ventricular cavity size. Wall thickness is normal. Right ventricle wall motion  is normal. Systolic function is normal.    Aortic Valve: There is mild to moderate aortic regurgitation with a centrally directed jet.    Mitral Valve: There is moderate regurgitation.    Tricuspid Valve: There is moderate regurgitation with a centrally directed jet.    Pulmonic Valve: There is mild to moderate regurgitation.    Pulmonary Artery: The estimated pulmonary artery systolic pressure is 47 mmHg.     "IVC/SVC: Normal venous pressure at 3 mmHg.  . Continue Beta Blocker and Furosemide and monitor clinical status closely. Monitor on telemetry. Patient is on CHF pathway.  Monitor strict Is&Os and daily weights.  Place on fluid restriction of 2 L. Cardiology has been consulted. Continue to stress to patient importance of self efficacy and  on diet for CHF. Last BNP reviewed- and noted below No results for input(s): "BNP", "BNPTRIAGEBLO" in the last 168 hours.    4/23 cont diuresis - await final echo read  4/24 cont diuresis - decrease lasix to qd - add arb (valsartan 160mg qd) - edema has imrpoved  4/25 conts to improve daily - on lasix qd, valsartan added - edema continues to improve - bblocker not added due to bradycardia    Disorientation  Concern for uti - check ua - start abxs if appropriate  Dc sleep med      SOB (shortness of breath)  Improving with diuresis - cont O2      Diabetes mellitus, type 2  Patient's FSGs are controlled on current medication regimen.  Last A1c reviewed-   Lab Results   Component Value Date    HGBA1C 5.3 09/09/2023    HGBA1C 5.3 09/09/2023     Most recent fingerstick glucose reviewed- No results for input(s): "POCTGLUCOSE" in the last 24 hours.  Current correctional scale  Low  Maintain anti-hyperglycemic dose as follows-   Antihyperglycemics (From admission, onward)      None          Hold Oral hypoglycemics while patient is in the hospital.    Severe obesity (BMI >= 40)  Body mass index is 45.49 kg/m². Morbid obesity complicates all aspects of disease management from diagnostic modalities to treatment. Weight loss encouraged and health benefits explained to patient.           VTE Risk Mitigation (From admission, onward)           Ordered     IP VTE HIGH RISK PATIENT  Once         04/21/24 1721     Place sequential compression device  Until discontinued         04/21/24 1721                    Discharge Planning   TIGRE: 4/29/2024     Code Status: Full Code   Is the patient " medically ready for discharge?:     Reason for patient still in hospital (select all that apply): Treatment  Discharge Plan A: Home, Home Health                  Oscar Ge Jr, MD  Department of Hospital Medicine   [place] [facility]

## 2024-04-27 NOTE — NURSING
1900 Lying in bed talking on phone. Alert and oriented times 4. Denies tactile hallucinations at this time. Denies SOB and no distress assessed at this time. Discussed plan of care with patient. NSR on cardiac monitor. CPAP in progress. External female catheter intact and patent connected to wall suction. SCD's to BLE's. Repositioning every 2 hours. Will continue to monitor.     0515 Patient slept about half of the night. She no tactile hallucinations. Uneventful night. NSR on monitor. CPAP in progress Fi02 30%. External female catheter intact and patent to wall suction. SCD's to BLE's. No distress assessed at this time. Repositioning remains in progress every 2 hours. Will continue to monitor.   What Type Of Note Output Would You Prefer (Optional)?: Standard Output How Severe Are Your Spot(S)?: mild Hpi Title: Evaluation of Skin Lesions

## 2024-04-27 NOTE — PLAN OF CARE
Resting at present with cpap in place - able to sit up this am in chair - tolerated - gait very unsteady - sr pulses present - abdomen soft nontender - bs x 4 - diaper in place with purewick in place - no bm today - urine more yellow than on previous shifts - blood sugars monitor - new medication started for picking feeling in legs - seems to improving symptoms - max temp 98.3 - antibiotic given as ordered - will continue to monitor

## 2024-04-27 NOTE — SUBJECTIVE & OBJECTIVE
Past Medical History:   Diagnosis Date    Abdominal hernia     Bacteremia 4/5/2023    CHF (congestive heart failure)     COPD (chronic obstructive pulmonary disease)     Diabetes mellitus, type 2 2/16/2022    GERD (gastroesophageal reflux disease)     History of home oxygen therapy     Hypertension     Hypertensive emergency 3/30/2023    Migraine headache     Pulmonary embolism 06/01/2017    Small bowel obstruction     Thyroid disease        Past Surgical History:   Procedure Laterality Date    COLONOSCOPY      HYSTERECTOMY      INNER EAR SURGERY      UPPER GASTROINTESTINAL ENDOSCOPY         Review of patient's allergies indicates:   Allergen Reactions    Pcn [penicillins] Swelling       Current Facility-Administered Medications   Medication Dose Route Frequency Provider Last Rate Last Admin    acetaminophen tablet 650 mg  650 mg Oral Q8H PRN John Paul Rojas MD   650 mg at 04/24/24 0854    albuterol-ipratropium 2.5 mg-0.5 mg/3 mL nebulizer solution 3 mL  3 mL Nebulization Q4H PRN Veda Gleason MD   3 mL at 04/26/24 0732    aluminum-magnesium hydroxide-simethicone 200-200-20 mg/5 mL suspension 30 mL  30 mL Oral Q6H PRN Veda Gleason MD   30 mL at 04/23/24 0814    aspirin EC tablet 81 mg  81 mg Oral Daily Veda Gleason MD   81 mg at 04/26/24 0837    atorvastatin tablet 40 mg  40 mg Oral Daily Veda Gleason MD   40 mg at 04/26/24 0837    busPIRone tablet 5 mg  5 mg Oral BID Veda Gleason MD   5 mg at 04/26/24 2041    carvediloL tablet 6.25 mg  6.25 mg Oral BID Veda Gleason MD   6.25 mg at 04/26/24 2041    furosemide tablet 40 mg  40 mg Oral Daily Veda Gleason MD   40 mg at 04/26/24 0838    levoFLOXacin 750 mg/150 mL IVPB 750 mg  750 mg Intravenous Q24H Veda Gleason MD   Stopped at 04/26/24 1156    levothyroxine tablet 50 mcg  50 mcg Oral Before breakfast Veda Gleason MD   50 mcg at 04/27/24 0520    melatonin tablet 6 mg  6 mg Oral Nightly PRN  John Paul Rojas MD   6 mg at 04/26/24 2041    mupirocin 2 % ointment   Nasal BID Veda Gleason MD   Given at 04/26/24 2041    ondansetron injection 4 mg  4 mg Intravenous Q8H PRN John Paul Rojas MD   4 mg at 04/22/24 1920    pantoprazole EC tablet 40 mg  40 mg Oral Daily Veda Gleason MD   40 mg at 04/26/24 0838    sodium chloride 0.9% flush 10 mL  10 mL Intravenous PRN John Paul Rojas MD        valsartan tablet 160 mg  160 mg Oral Daily Veda Gleason MD   160 mg at 04/26/24 0838     Family History    None       Tobacco Use    Smoking status: Former    Smokeless tobacco: Never   Substance and Sexual Activity    Alcohol use: No    Drug use: No    Sexual activity: Not on file     Review of Systems   Constitutional:  Positive for activity change. Negative for appetite change, chills, fatigue and fever.   HENT:  Negative for drooling, ear pain, nosebleeds, postnasal drip and sinus pressure.    Respiratory:  Positive for shortness of breath and wheezing. Negative for cough and chest tightness.    Cardiovascular:  Positive for leg swelling. Negative for chest pain and palpitations.   Gastrointestinal:  Positive for abdominal pain and nausea. Negative for blood in stool, constipation and diarrhea.   Musculoskeletal:  Positive for arthralgias and back pain.   Skin:  Negative for rash and wound.   Neurological:  Positive for weakness. Negative for syncope and numbness.     Objective:     Vital Signs (Most Recent):  Temp: 98.3 °F (36.8 °C) (04/27/24 0731)  Pulse: 79 (04/27/24 0745)  Resp: (!) 25 (04/27/24 0745)  BP: (!) 109/57 (04/27/24 0731)  SpO2: (!) 94 % (04/27/24 0745) Vital Signs (24h Range):  Temp:  [96.9 °F (36.1 °C)-98.9 °F (37.2 °C)] 98.3 °F (36.8 °C)  Pulse:  [59-91] 79  Resp:  [20-42] 25  SpO2:  [51 %-100 %] 94 %  BP: (109-154)/(57-91) 109/57     Weight: 120.2 kg (265 lb)  Body mass index is 45.49 kg/m².     Physical Exam  Constitutional:       Appearance: She is obese.    HENT:      Nose: Nose normal.      Mouth/Throat:      Mouth: Mucous membranes are moist.   Eyes:      Extraocular Movements: Extraocular movements intact.      Pupils: Pupils are equal, round, and reactive to light.   Cardiovascular:      Rate and Rhythm: Normal rate and regular rhythm.   Pulmonary:      Effort: Pulmonary effort is normal.      Comments: Decreased BS at bases - O2 via NC  Abdominal:      General: Bowel sounds are normal. There is no distension.      Palpations: Abdomen is soft.      Tenderness: There is no abdominal tenderness.   Genitourinary:     Comments: Hardwick in place  Musculoskeletal:      Cervical back: Normal range of motion.      Right lower leg: Edema present.      Left lower leg: Edema present.      Comments: Edema improving   Skin:     General: Skin is warm and dry.      Capillary Refill: Capillary refill takes 2 to 3 seconds.   Neurological:      General: No focal deficit present.      Mental Status: She is alert and oriented to person, place, and time.              CRANIAL NERVES     CN III, IV, VI   Pupils are equal, round, and reactive to light.       Significant Labs: All pertinent labs within the past 24 hours have been reviewed.  Recent Lab Results         04/27/24  0340        Albumin 2.9       ALP 97       ALT 15       Anion Gap 1       AST 7       Baso # 0.02       Basophil % 0.3       BILIRUBIN TOTAL 0.8  Comment: For infants and newborns, interpretation of results should be based  on gestational age, weight and in agreement with clinical  observations.    Premature Infant recommended reference ranges:  Up to 24 hours.............<8.0 mg/dL  Up to 48 hours............<12.0 mg/dL  3-5 days..................<15.0 mg/dL  6-29 days.................<15.0 mg/dL    For patients on Eltrombopag therapy, use of Dimension [place] TBIL is   not   recommended.         BUN 20       Calcium 10.8       Chloride 104       CO2 36       Creatinine 1.1       Differential Method Automated        eGFR 53.1       Eos # 0.1       Eos % 1.6       Glucose 112       Gran # (ANC) 4.6       Gran % 66.4       Hematocrit 40.8       Hemoglobin 12.8       Immature Grans (Abs) 0.02  Comment: Mild elevation in immature granulocytes is non specific and   can be seen in a variety of conditions including stress response,   acute inflammation, trauma and pregnancy. Correlation with other   laboratory and clinical findings is essential.         Immature Granulocytes 0.3       Lymph # 1.4       Lymph % 20.5       Magnesium  2.0       MCH 29.3       MCHC 31.4       MCV 93       Mono # 0.8       Mono % 10.9       MPV 8.5       nRBC 0       Platelet Count 224       Potassium 4.1       PROTEIN TOTAL 6.6       RBC 4.37       RDW 15.4       Sodium 141       WBC 6.96               Significant Imaging: I have reviewed all pertinent imaging results/findings within the past 24 hours.

## 2024-04-28 LAB
ALBUMIN SERPL BCP-MCNC: 2.8 G/DL (ref 3.5–5.2)
ALP SERPL-CCNC: 95 U/L (ref 55–135)
ALT SERPL W/O P-5'-P-CCNC: 13 U/L (ref 10–44)
ANION GAP SERPL CALC-SCNC: 1 MMOL/L (ref 3–11)
AST SERPL-CCNC: 7 U/L (ref 10–40)
BASOPHILS # BLD AUTO: 0.02 K/UL (ref 0–0.2)
BASOPHILS NFR BLD: 0.3 % (ref 0–1.9)
BILIRUB SERPL-MCNC: 0.5 MG/DL (ref 0.1–1)
BUN SERPL-MCNC: 25 MG/DL (ref 8–23)
CALCIUM SERPL-MCNC: 10.4 MG/DL (ref 8.7–10.5)
CHLORIDE SERPL-SCNC: 103 MMOL/L (ref 95–110)
CO2 SERPL-SCNC: 38 MMOL/L (ref 23–29)
CREAT SERPL-MCNC: 1.3 MG/DL (ref 0.5–1.4)
DIFFERENTIAL METHOD BLD: ABNORMAL
EOSINOPHIL # BLD AUTO: 0.1 K/UL (ref 0–0.5)
EOSINOPHIL NFR BLD: 1.7 % (ref 0–8)
ERYTHROCYTE [DISTWIDTH] IN BLOOD BY AUTOMATED COUNT: 15.8 % (ref 11.5–14.5)
EST. GFR  (NO RACE VARIABLE): 43.4 ML/MIN/1.73 M^2
GLUCOSE SERPL-MCNC: 105 MG/DL (ref 70–110)
HCT VFR BLD AUTO: 41.6 % (ref 37–48.5)
HGB BLD-MCNC: 12.8 G/DL (ref 12–16)
IMM GRANULOCYTES # BLD AUTO: 0.02 K/UL (ref 0–0.04)
IMM GRANULOCYTES NFR BLD AUTO: 0.3 % (ref 0–0.5)
LYMPHOCYTES # BLD AUTO: 1.5 K/UL (ref 1–4.8)
LYMPHOCYTES NFR BLD: 23.3 % (ref 18–48)
MAGNESIUM SERPL-MCNC: 2 MG/DL (ref 1.6–2.6)
MCH RBC QN AUTO: 29.9 PG (ref 27–31)
MCHC RBC AUTO-ENTMCNC: 30.8 G/DL (ref 32–36)
MCV RBC AUTO: 97 FL (ref 82–98)
MONOCYTES # BLD AUTO: 0.6 K/UL (ref 0.3–1)
MONOCYTES NFR BLD: 9.2 % (ref 4–15)
NEUTROPHILS # BLD AUTO: 4.1 K/UL (ref 1.8–7.7)
NEUTROPHILS NFR BLD: 65.2 % (ref 38–73)
NRBC BLD-RTO: 0 /100 WBC
PLATELET # BLD AUTO: 216 K/UL (ref 150–450)
PMV BLD AUTO: 8.9 FL (ref 9.2–12.9)
POTASSIUM SERPL-SCNC: 4.1 MMOL/L (ref 3.5–5.1)
PROT SERPL-MCNC: 6.6 G/DL (ref 6–8.4)
RBC # BLD AUTO: 4.28 M/UL (ref 4–5.4)
SODIUM SERPL-SCNC: 142 MMOL/L (ref 136–145)
WBC # BLD AUTO: 6.3 K/UL (ref 3.9–12.7)

## 2024-04-28 PROCEDURE — 80053 COMPREHEN METABOLIC PANEL: CPT | Performed by: INTERNAL MEDICINE

## 2024-04-28 PROCEDURE — 94761 N-INVAS EAR/PLS OXIMETRY MLT: CPT

## 2024-04-28 PROCEDURE — 94660 CPAP INITIATION&MGMT: CPT

## 2024-04-28 PROCEDURE — 63600175 PHARM REV CODE 636 W HCPCS: Performed by: INTERNAL MEDICINE

## 2024-04-28 PROCEDURE — 99900035 HC TECH TIME PER 15 MIN (STAT)

## 2024-04-28 PROCEDURE — 25000003 PHARM REV CODE 250: Performed by: INTERNAL MEDICINE

## 2024-04-28 PROCEDURE — 83735 ASSAY OF MAGNESIUM: CPT | Performed by: INTERNAL MEDICINE

## 2024-04-28 PROCEDURE — 25000003 PHARM REV CODE 250: Performed by: EMERGENCY MEDICINE

## 2024-04-28 PROCEDURE — 11000001 HC ACUTE MED/SURG PRIVATE ROOM

## 2024-04-28 PROCEDURE — 27100171 HC OXYGEN HIGH FLOW UP TO 24 HOURS

## 2024-04-28 PROCEDURE — 99900031 HC PATIENT EDUCATION (STAT)

## 2024-04-28 PROCEDURE — 36415 COLL VENOUS BLD VENIPUNCTURE: CPT | Performed by: INTERNAL MEDICINE

## 2024-04-28 PROCEDURE — 85025 COMPLETE CBC W/AUTO DIFF WBC: CPT | Performed by: INTERNAL MEDICINE

## 2024-04-28 RX ADMIN — GABAPENTIN 100 MG: 100 CAPSULE ORAL at 08:04

## 2024-04-28 RX ADMIN — CARVEDILOL 6.25 MG: 3.12 TABLET, FILM COATED ORAL at 08:04

## 2024-04-28 RX ADMIN — ASPIRIN 81 MG: 81 TABLET ORAL at 08:04

## 2024-04-28 RX ADMIN — PANTOPRAZOLE SODIUM 40 MG: 40 TABLET, DELAYED RELEASE ORAL at 08:04

## 2024-04-28 RX ADMIN — GABAPENTIN 100 MG: 100 CAPSULE ORAL at 03:04

## 2024-04-28 RX ADMIN — LEVOTHYROXINE SODIUM 50 MCG: 50 TABLET ORAL at 05:04

## 2024-04-28 RX ADMIN — FUROSEMIDE 40 MG: 40 TABLET ORAL at 08:04

## 2024-04-28 RX ADMIN — ACETAMINOPHEN 650 MG: 325 TABLET ORAL at 08:04

## 2024-04-28 RX ADMIN — BUSPIRONE HYDROCHLORIDE 5 MG: 5 TABLET ORAL at 08:04

## 2024-04-28 RX ADMIN — VALSARTAN 160 MG: 80 TABLET, FILM COATED ORAL at 08:04

## 2024-04-28 RX ADMIN — LEVOFLOXACIN 750 MG: 750 INJECTION, SOLUTION INTRAVENOUS at 10:04

## 2024-04-28 RX ADMIN — ATORVASTATIN CALCIUM 40 MG: 40 TABLET, FILM COATED ORAL at 08:04

## 2024-04-28 NOTE — PLAN OF CARE
Problem: Adult Inpatient Plan of Care  Goal: Plan of Care Review  Outcome: Progressing  Goal: Patient-Specific Goal (Individualized)  Outcome: Progressing  Goal: Absence of Hospital-Acquired Illness or Injury  Outcome: Progressing  Goal: Optimal Comfort and Wellbeing  Outcome: Progressing  Goal: Readiness for Transition of Care  Outcome: Progressing     Problem: Skin Injury Risk Increased  Goal: Skin Health and Integrity  Outcome: Progressing     Problem: Fall Injury Risk  Goal: Absence of Fall and Fall-Related Injury  Outcome: Progressing     Problem: Adjustment to Illness (Heart Failure)  Goal: Optimal Coping  Outcome: Progressing     Problem: Cardiac Output Decreased (Heart Failure)  Goal: Optimal Cardiac Output  Outcome: Progressing     Problem: Dysrhythmia (Heart Failure)  Goal: Stable Heart Rate and Rhythm  Outcome: Met     Problem: Fluid Imbalance (Heart Failure)  Goal: Fluid Balance  Outcome: Met     Problem: Functional Ability Impaired (Heart Failure)  Goal: Optimal Functional Ability  Outcome: Progressing     Problem: Oral Intake Inadequate (Heart Failure)  Goal: Optimal Nutrition Intake  Outcome: Met     Problem: Respiratory Compromise (Heart Failure)  Goal: Effective Oxygenation and Ventilation  Outcome: Met     Problem: Sleep Disordered Breathing (Heart Failure)  Goal: Effective Breathing Pattern During Sleep  Outcome: Met     Problem: Infection  Goal: Absence of Infection Signs and Symptoms  Outcome: Progressing     Problem: Comorbidity Management  Goal: Maintenance of Heart Failure Symptom Control  Outcome: Progressing   Patient slept all night, she feels rested. She is not confused, no c/o . VSS,

## 2024-04-28 NOTE — ASSESSMENT & PLAN NOTE
Patient's FSGs are controlled on current medication regimen.  Last A1c reviewed-   Lab Results   Component Value Date    HGBA1C 5.3 09/09/2023    HGBA1C 5.3 09/09/2023     Most recent fingerstick glucose reviewed-   Recent Labs   Lab 04/27/24  1027 04/27/24  1710   POCTGLUCOSE 114* 96     Current correctional scale  Low  Maintain anti-hyperglycemic dose as follows-   Antihyperglycemics (From admission, onward)    None        Hold Oral hypoglycemics while patient is in the hospital.

## 2024-04-28 NOTE — NURSING
1908 Patient is lying in the bed awake alert orientated X 4. She is watching TV states she is dozing now and then. She has no C/O of pain, not confused, denies tactile and visual hallucinations She is on the CPAP 30% FIO2, denies SOB, mild edema noted in the feet and ankles. SCD placed back on patient, off for a break noted. Sinus bradycardia noted on the monitor. Encouraged patient to perform ROM, and turn herself etc. She stated she understood.   Response sent via [employer].

## 2024-04-28 NOTE — SUBJECTIVE & OBJECTIVE
Past Medical History:   Diagnosis Date    Abdominal hernia     Bacteremia 4/5/2023    CHF (congestive heart failure)     COPD (chronic obstructive pulmonary disease)     Diabetes mellitus, type 2 2/16/2022    GERD (gastroesophageal reflux disease)     History of home oxygen therapy     Hypertension     Hypertensive emergency 3/30/2023    Migraine headache     Pulmonary embolism 06/01/2017    Small bowel obstruction     Thyroid disease        Past Surgical History:   Procedure Laterality Date    COLONOSCOPY      HYSTERECTOMY      INNER EAR SURGERY      UPPER GASTROINTESTINAL ENDOSCOPY         Review of patient's allergies indicates:   Allergen Reactions    Pcn [penicillins] Swelling       Current Facility-Administered Medications   Medication Dose Route Frequency Provider Last Rate Last Admin    acetaminophen tablet 650 mg  650 mg Oral Q8H PRN John Paul Rojas MD   650 mg at 04/27/24 1525    albuterol-ipratropium 2.5 mg-0.5 mg/3 mL nebulizer solution 3 mL  3 mL Nebulization Q4H PRN Veda Gleason MD   3 mL at 04/26/24 0732    aluminum-magnesium hydroxide-simethicone 200-200-20 mg/5 mL suspension 30 mL  30 mL Oral Q6H PRN Veda Gleason MD   30 mL at 04/23/24 0814    aspirin EC tablet 81 mg  81 mg Oral Daily Veda Gleason MD   81 mg at 04/28/24 0808    atorvastatin tablet 40 mg  40 mg Oral Daily Veda Gleason MD   40 mg at 04/28/24 0808    busPIRone tablet 5 mg  5 mg Oral BID Veda Gleason MD   5 mg at 04/28/24 0808    carvediloL tablet 6.25 mg  6.25 mg Oral BID Veda Gleason MD   6.25 mg at 04/28/24 0809    furosemide tablet 40 mg  40 mg Oral Daily Veda Gleason MD   40 mg at 04/28/24 0808    gabapentin capsule 100 mg  100 mg Oral TID Oscar Ge Jr., MD   100 mg at 04/28/24 0808    levoFLOXacin 750 mg/150 mL IVPB 750 mg  750 mg Intravenous Q24H Veda Gleason MD   Stopped at 04/27/24 1153    levothyroxine tablet 50 mcg  50 mcg Oral Before breakfast  Veda Gleason MD   50 mcg at 04/28/24 0508    melatonin tablet 6 mg  6 mg Oral Nightly PRN John Paul Rojas MD   6 mg at 04/27/24 2053    ondansetron injection 4 mg  4 mg Intravenous Q8H PRN John Paul Rojas MD   4 mg at 04/22/24 1920    pantoprazole EC tablet 40 mg  40 mg Oral Daily Veda Gleason MD   40 mg at 04/28/24 0808    sodium chloride 0.9% flush 10 mL  10 mL Intravenous PRN John Paul Rojas MD        valsartan tablet 160 mg  160 mg Oral Daily Veda Gleason MD   160 mg at 04/28/24 0809     Family History    None       Tobacco Use    Smoking status: Former    Smokeless tobacco: Never   Substance and Sexual Activity    Alcohol use: No    Drug use: No    Sexual activity: Not on file     Review of Systems   Constitutional:  Positive for activity change. Negative for appetite change, chills, fatigue and fever.   HENT:  Negative for drooling, ear pain, nosebleeds, postnasal drip and sinus pressure.    Respiratory:  Positive for shortness of breath and wheezing. Negative for cough and chest tightness.    Cardiovascular:  Positive for leg swelling. Negative for chest pain and palpitations.   Gastrointestinal:  Positive for abdominal pain and nausea. Negative for blood in stool, constipation and diarrhea.   Musculoskeletal:  Positive for arthralgias and back pain.   Skin:  Negative for rash and wound.   Neurological:  Positive for weakness. Negative for syncope and numbness.     Objective:     Vital Signs (Most Recent):  Temp: 98.6 °F (37 °C) (04/28/24 0701)  Pulse: 82 (04/28/24 0901)  Resp: 19 (04/28/24 0901)  BP: 137/73 (oral meds given) (04/28/24 0834)  SpO2: 95 % (04/28/24 0901) Vital Signs (24h Range):  Temp:  [97.1 °F (36.2 °C)-98.6 °F (37 °C)] 98.6 °F (37 °C)  Pulse:  [55-83] 82  Resp:  [15-32] 19  SpO2:  [90 %-99 %] 95 %  BP: ()/(52-81) 137/73     Weight: 120.2 kg (265 lb)  Body mass index is 45.49 kg/m².     Physical Exam  Constitutional:       Appearance: She is  obese.   HENT:      Nose: Nose normal.      Mouth/Throat:      Mouth: Mucous membranes are moist.   Eyes:      Extraocular Movements: Extraocular movements intact.      Pupils: Pupils are equal, round, and reactive to light.   Cardiovascular:      Rate and Rhythm: Normal rate and regular rhythm.   Pulmonary:      Effort: Pulmonary effort is normal.      Comments: Decreased BS at bases - O2 via NC  Abdominal:      General: Bowel sounds are normal. There is no distension.      Palpations: Abdomen is soft.      Tenderness: There is no abdominal tenderness.   Genitourinary:     Comments: Hardwick in place  Musculoskeletal:      Cervical back: Normal range of motion.      Right lower leg: Edema present.      Left lower leg: Edema present.      Comments: Edema improving   Skin:     General: Skin is warm and dry.      Capillary Refill: Capillary refill takes 2 to 3 seconds.   Neurological:      General: No focal deficit present.      Mental Status: She is alert and oriented to person, place, and time.              CRANIAL NERVES     CN III, IV, VI   Pupils are equal, round, and reactive to light.       Significant Labs: All pertinent labs within the past 24 hours have been reviewed.  Recent Lab Results         04/28/24  0342   04/27/24  1710   04/27/24  1027        Albumin 2.8           ALP 95           ALT 13           Anion Gap 1           AST 7           Baso # 0.02           Basophil % 0.3           BILIRUBIN TOTAL 0.5  Comment: For infants and newborns, interpretation of results should be based  on gestational age, weight and in agreement with clinical  observations.    Premature Infant recommended reference ranges:  Up to 24 hours.............<8.0 mg/dL  Up to 48 hours............<12.0 mg/dL  3-5 days..................<15.0 mg/dL  6-29 days.................<15.0 mg/dL    For patients on Eltrombopag therapy, use of Dimension [place] TBIL is   not   recommended.             BUN 25           Calcium 10.4           Chloride  103           CO2 38           Creatinine 1.3           Differential Method Automated           eGFR 43.4           Eos # 0.1           Eos % 1.7           Glucose 105           Gran # (ANC) 4.1           Gran % 65.2           Hematocrit 41.6           Hemoglobin 12.8           Immature Grans (Abs) 0.02  Comment: Mild elevation in immature granulocytes is non specific and   can be seen in a variety of conditions including stress response,   acute inflammation, trauma and pregnancy. Correlation with other   laboratory and clinical findings is essential.             Immature Granulocytes 0.3           Lymph # 1.5           Lymph % 23.3           Magnesium  2.0           MCH 29.9           MCHC 30.8           MCV 97           Mono # 0.6           Mono % 9.2           MPV 8.9           nRBC 0           Platelet Count 216           POCT Glucose   96   114       Potassium 4.1           PROTEIN TOTAL 6.6           RBC 4.28           RDW 15.8           Sodium 142           WBC 6.30                   Significant Imaging: I have reviewed all pertinent imaging results/findings within the past 24 hours.

## 2024-04-28 NOTE — PROGRESS NOTES
[facility] Medicine  Progress Note    Patient Name: Carmen Tan  MRN: [id number]  Patient Class: IP- Inpatient   Admission Date: 2024  Length of Stay: 6 days  Attending Physician: Veda Gleason MD  Primary Care Provider: Lucian Barry MD        Subjective:     Principal Problem:Acute on chronic combined systolic and diastolic congestive heart failure        HPI:  Carmen Tan is a 74 y.o. female with PMHX of COPD, CHF, type 2 diabetes, paroxysmal AFib who presents to the emergency department C/O leg swelling.     Patient arrives from home due to worsening lower extremity edema.  Also reports increasing shortness of breath.  Denies chest pain.  Patient takes Lasix twice a day and states she has good urine output from it.  Reports symptoms have been gradually worsening.     Patient reports appetite has been poor but drinks fluids.  Reports about 3 bottles of water a day and two coke zeros.  Limited physical activity which she attributes to leg pain    Pt had to attend her husbands  and so when sxs had started worsenign she had stayed home initially - after  she came in.  Pt reports this am - feelign little better after diuresisng some over night.  She does use O2 conintuously at home and wears a bipap at home at night and prn    Overview/Hospital Course:   pt feelign better- diuresisng well - less swellign to legs - improvement in breathing.     pt feelign better- breathign has imrpoved - swellign has improved   ND continues to improve daily - less swelling; breathing imrpoved   pt confused this am- waving towel around - swellign/edmea has imrpoved   WC:  Patient alert and oriented this morning.  Willing continues to improve.  Continue therapy efforts.   WC:  Patient resting this morning endorses some improvement in his lower extremity discomfort with gabapentin.  Continue therapy efforts.  Consider skilled nursing  evaluation on Monday versus home with home health depending on the patient's cognition and functional status.    Past Medical History:   Diagnosis Date    Abdominal hernia     Bacteremia 4/5/2023    CHF (congestive heart failure)     COPD (chronic obstructive pulmonary disease)     Diabetes mellitus, type 2 2/16/2022    GERD (gastroesophageal reflux disease)     History of home oxygen therapy     Hypertension     Hypertensive emergency 3/30/2023    Migraine headache     Pulmonary embolism 06/01/2017    Small bowel obstruction     Thyroid disease        Past Surgical History:   Procedure Laterality Date    COLONOSCOPY      HYSTERECTOMY      INNER EAR SURGERY      UPPER GASTROINTESTINAL ENDOSCOPY         Review of patient's allergies indicates:   Allergen Reactions    Pcn [penicillins] Swelling       Current Facility-Administered Medications   Medication Dose Route Frequency Provider Last Rate Last Admin    acetaminophen tablet 650 mg  650 mg Oral Q8H PRN John Paul Rojas MD   650 mg at 04/27/24 1525    albuterol-ipratropium 2.5 mg-0.5 mg/3 mL nebulizer solution 3 mL  3 mL Nebulization Q4H PRN Veda Gleason MD   3 mL at 04/26/24 0732    aluminum-magnesium hydroxide-simethicone 200-200-20 mg/5 mL suspension 30 mL  30 mL Oral Q6H PRN Veda Gleason MD   30 mL at 04/23/24 0814    aspirin EC tablet 81 mg  81 mg Oral Daily Veda Gleason MD   81 mg at 04/28/24 0808    atorvastatin tablet 40 mg  40 mg Oral Daily Veda Gleason MD   40 mg at 04/28/24 0808    busPIRone tablet 5 mg  5 mg Oral BID Veda Gleason MD   5 mg at 04/28/24 0808    carvediloL tablet 6.25 mg  6.25 mg Oral BID Veda Gleason MD   6.25 mg at 04/28/24 0809    furosemide tablet 40 mg  40 mg Oral Daily Veda Gleason MD   40 mg at 04/28/24 0808    gabapentin capsule 100 mg  100 mg Oral TID Oscar Ge Jr., MD   100 mg at 04/28/24 0808    levoFLOXacin 750 mg/150 mL IVPB 750 mg  750 mg Intravenous Q24H  Veda Gleason MD   Stopped at 04/27/24 1153    levothyroxine tablet 50 mcg  50 mcg Oral Before breakfast Veda Gleason MD   50 mcg at 04/28/24 0508    melatonin tablet 6 mg  6 mg Oral Nightly PRN John Paul Rojas MD   6 mg at 04/27/24 2053    ondansetron injection 4 mg  4 mg Intravenous Q8H PRN John Paul Rojas MD   4 mg at 04/22/24 1920    pantoprazole EC tablet 40 mg  40 mg Oral Daily Veda Gleason MD   40 mg at 04/28/24 0808    sodium chloride 0.9% flush 10 mL  10 mL Intravenous PRN John Paul Rojas MD        valsartan tablet 160 mg  160 mg Oral Daily Veda Gleason MD   160 mg at 04/28/24 0809     Family History    None       Tobacco Use    Smoking status: Former    Smokeless tobacco: Never   Substance and Sexual Activity    Alcohol use: No    Drug use: No    Sexual activity: Not on file     Review of Systems   Constitutional:  Positive for activity change. Negative for appetite change, chills, fatigue and fever.   HENT:  Negative for drooling, ear pain, nosebleeds, postnasal drip and sinus pressure.    Respiratory:  Positive for shortness of breath and wheezing. Negative for cough and chest tightness.    Cardiovascular:  Positive for leg swelling. Negative for chest pain and palpitations.   Gastrointestinal:  Positive for abdominal pain and nausea. Negative for blood in stool, constipation and diarrhea.   Musculoskeletal:  Positive for arthralgias and back pain.   Skin:  Negative for rash and wound.   Neurological:  Positive for weakness. Negative for syncope and numbness.     Objective:     Vital Signs (Most Recent):  Temp: 98.6 °F (37 °C) (04/28/24 0701)  Pulse: 82 (04/28/24 0901)  Resp: 19 (04/28/24 0901)  BP: 137/73 (oral meds given) (04/28/24 0834)  SpO2: 95 % (04/28/24 0901) Vital Signs (24h Range):  Temp:  [97.1 °F (36.2 °C)-98.6 °F (37 °C)] 98.6 °F (37 °C)  Pulse:  [55-83] 82  Resp:  [15-32] 19  SpO2:  [90 %-99 %] 95 %  BP: ()/(52-81) 137/73     Weight:  120.2 kg (265 lb)  Body mass index is 45.49 kg/m².     Physical Exam  Constitutional:       Appearance: She is obese.   HENT:      Nose: Nose normal.      Mouth/Throat:      Mouth: Mucous membranes are moist.   Eyes:      Extraocular Movements: Extraocular movements intact.      Pupils: Pupils are equal, round, and reactive to light.   Cardiovascular:      Rate and Rhythm: Normal rate and regular rhythm.   Pulmonary:      Effort: Pulmonary effort is normal.      Comments: Decreased BS at bases - O2 via NC  Abdominal:      General: Bowel sounds are normal. There is no distension.      Palpations: Abdomen is soft.      Tenderness: There is no abdominal tenderness.   Genitourinary:     Comments: Hardwick in place  Musculoskeletal:      Cervical back: Normal range of motion.      Right lower leg: Edema present.      Left lower leg: Edema present.      Comments: Edema improving   Skin:     General: Skin is warm and dry.      Capillary Refill: Capillary refill takes 2 to 3 seconds.   Neurological:      General: No focal deficit present.      Mental Status: She is alert and oriented to person, place, and time.              CRANIAL NERVES     CN III, IV, VI   Pupils are equal, round, and reactive to light.       Significant Labs: All pertinent labs within the past 24 hours have been reviewed.  Recent Lab Results         04/28/24  0342   04/27/24  1710   04/27/24  1027        Albumin 2.8           ALP 95           ALT 13           Anion Gap 1           AST 7           Baso # 0.02           Basophil % 0.3           BILIRUBIN TOTAL 0.5  Comment: For infants and newborns, interpretation of results should be based  on gestational age, weight and in agreement with clinical  observations.    Premature Infant recommended reference ranges:  Up to 24 hours.............<8.0 mg/dL  Up to 48 hours............<12.0 mg/dL  3-5 days..................<15.0 mg/dL  6-29 days.................<15.0 mg/dL    For patients on Eltrombopag therapy, use  of Dimension [place] TBIL is   not   recommended.             BUN 25           Calcium 10.4           Chloride 103           CO2 38           Creatinine 1.3           Differential Method Automated           eGFR 43.4           Eos # 0.1           Eos % 1.7           Glucose 105           Gran # (ANC) 4.1           Gran % 65.2           Hematocrit 41.6           Hemoglobin 12.8           Immature Grans (Abs) 0.02  Comment: Mild elevation in immature granulocytes is non specific and   can be seen in a variety of conditions including stress response,   acute inflammation, trauma and pregnancy. Correlation with other   laboratory and clinical findings is essential.             Immature Granulocytes 0.3           Lymph # 1.5           Lymph % 23.3           Magnesium  2.0           MCH 29.9           MCHC 30.8           MCV 97           Mono # 0.6           Mono % 9.2           MPV 8.9           nRBC 0           Platelet Count 216           POCT Glucose   96   114       Potassium 4.1           PROTEIN TOTAL 6.6           RBC 4.28           RDW 15.8           Sodium 142           WBC 6.30                   Significant Imaging: I have reviewed all pertinent imaging results/findings within the past 24 hours.    Assessment/Plan:      * Acute on chronic combined systolic and diastolic congestive heart failure  Patient is identified as having Systolic (HFrEF) heart failure that is Acute on chronic. CHF is currently uncontrolled due to Continued edema of extremities and Dyspnea not returned to baseline after 2 doses of IV diuretic. Latest ECHO performed and demonstrates- Results for orders placed during the hospital encounter of 09/09/23    Echo    Interpretation Summary    Left Ventricle: The left ventricle is normal in size. Mildly increased wall thickness. Normal wall motion. There is mildly reduced systolic function with a visually estimated ejection fraction of 40 - 45%. There is normal diastolic function.    Right  "Ventricle: Normal right ventricular cavity size. Wall thickness is normal. Right ventricle wall motion  is normal. Systolic function is normal.    Aortic Valve: There is mild to moderate aortic regurgitation with a centrally directed jet.    Mitral Valve: There is moderate regurgitation.    Tricuspid Valve: There is moderate regurgitation with a centrally directed jet.    Pulmonic Valve: There is mild to moderate regurgitation.    Pulmonary Artery: The estimated pulmonary artery systolic pressure is 47 mmHg.    IVC/SVC: Normal venous pressure at 3 mmHg.  . Continue Beta Blocker and Furosemide and monitor clinical status closely. Monitor on telemetry. Patient is on CHF pathway.  Monitor strict Is&Os and daily weights.  Place on fluid restriction of 2 L. Cardiology has been consulted. Continue to stress to patient importance of self efficacy and  on diet for CHF. Last BNP reviewed- and noted below No results for input(s): "BNP", "BNPTRIAGEBLO" in the last 168 hours.    4/23 cont diuresis - await final echo read  4/24 cont diuresis - decrease lasix to qd - add arb (valsartan 160mg qd) - edema has imrpoved  4/25 conts to improve daily - on lasix qd, valsartan added - edema continues to improve - bblocker not added due to bradycardia    Disorientation  Concern for uti - check ua - start abxs if appropriate  Dc sleep med      SOB (shortness of breath)  Improving with diuresis - cont O2      Diabetes mellitus, type 2  Patient's FSGs are controlled on current medication regimen.  Last A1c reviewed-   Lab Results   Component Value Date    HGBA1C 5.3 09/09/2023    HGBA1C 5.3 09/09/2023     Most recent fingerstick glucose reviewed-   Recent Labs   Lab 04/27/24  1027 04/27/24  1710   POCTGLUCOSE 114* 96     Current correctional scale  Low  Maintain anti-hyperglycemic dose as follows-   Antihyperglycemics (From admission, onward)      None          Hold Oral hypoglycemics while patient is in the hospital.    Severe " obesity (BMI >= 40)  Body mass index is 45.49 kg/m². Morbid obesity complicates all aspects of disease management from diagnostic modalities to treatment. Weight loss encouraged and health benefits explained to patient.           VTE Risk Mitigation (From admission, onward)           Ordered     IP VTE HIGH RISK PATIENT  Once         04/21/24 1721     Place sequential compression device  Until discontinued         04/21/24 1721                    Discharge Planning   TIGRE: 4/29/2024     Code Status: Full Code   Is the patient medically ready for discharge?:     Reason for patient still in hospital (select all that apply): Treatment  Discharge Plan A: Home, Home Health                  Oscar Ge Jr, MD  Department of Hospital Medicine   [place] [facility]

## 2024-04-28 NOTE — PLAN OF CARE
04/28/24 0909   Medicare Message   Important Message from Medicare regarding Discharge Appeal Rights Given to patient/caregiver;Explained to patient/caregiver;Signed/date by patient/caregiver   Date IMM was signed 04/28/24   Time IMM was signed 0906

## 2024-04-28 NOTE — PLAN OF CARE
Problem: Adult Inpatient Plan of Care  Goal: Plan of Care Review  Outcome: Progressing     Problem: Adult Inpatient Plan of Care  Goal: Absence of Hospital-Acquired Illness or Injury  Outcome: Progressing     Problem: Adult Inpatient Plan of Care  Goal: Optimal Comfort and Wellbeing  Outcome: Progressing     Problem: Bariatric Environmental Safety  Goal: Safety Maintained with Care  Outcome: Progressing

## 2024-04-29 VITALS
RESPIRATION RATE: 24 BRPM | BODY MASS INDEX: 45.24 KG/M2 | DIASTOLIC BLOOD PRESSURE: 61 MMHG | TEMPERATURE: 98 F | WEIGHT: 265 LBS | HEART RATE: 70 BPM | OXYGEN SATURATION: 90 % | SYSTOLIC BLOOD PRESSURE: 119 MMHG | HEIGHT: 64 IN

## 2024-04-29 LAB
ALBUMIN SERPL BCP-MCNC: 2.7 G/DL (ref 3.5–5.2)
ALP SERPL-CCNC: 91 U/L (ref 55–135)
ALT SERPL W/O P-5'-P-CCNC: 12 U/L (ref 10–44)
ANION GAP SERPL CALC-SCNC: 1 MMOL/L (ref 3–11)
AST SERPL-CCNC: 5 U/L (ref 10–40)
BASOPHILS # BLD AUTO: 0.02 K/UL (ref 0–0.2)
BASOPHILS NFR BLD: 0.3 % (ref 0–1.9)
BILIRUB SERPL-MCNC: 0.4 MG/DL (ref 0.1–1)
BUN SERPL-MCNC: 31 MG/DL (ref 8–23)
CALCIUM SERPL-MCNC: 10.2 MG/DL (ref 8.7–10.5)
CHLORIDE SERPL-SCNC: 103 MMOL/L (ref 95–110)
CO2 SERPL-SCNC: 38 MMOL/L (ref 23–29)
CREAT SERPL-MCNC: 1.4 MG/DL (ref 0.5–1.4)
DIFFERENTIAL METHOD BLD: ABNORMAL
EOSINOPHIL # BLD AUTO: 0.1 K/UL (ref 0–0.5)
EOSINOPHIL NFR BLD: 1.9 % (ref 0–8)
ERYTHROCYTE [DISTWIDTH] IN BLOOD BY AUTOMATED COUNT: 15.8 % (ref 11.5–14.5)
EST. GFR  (NO RACE VARIABLE): 39.7 ML/MIN/1.73 M^2
GLUCOSE SERPL-MCNC: 104 MG/DL (ref 70–110)
HCT VFR BLD AUTO: 40.7 % (ref 37–48.5)
HGB BLD-MCNC: 12.3 G/DL (ref 12–16)
IMM GRANULOCYTES # BLD AUTO: 0.03 K/UL (ref 0–0.04)
IMM GRANULOCYTES NFR BLD AUTO: 0.4 % (ref 0–0.5)
LYMPHOCYTES # BLD AUTO: 1.6 K/UL (ref 1–4.8)
LYMPHOCYTES NFR BLD: 22.8 % (ref 18–48)
MAGNESIUM SERPL-MCNC: 2.1 MG/DL (ref 1.6–2.6)
MCH RBC QN AUTO: 29.4 PG (ref 27–31)
MCHC RBC AUTO-ENTMCNC: 30.2 G/DL (ref 32–36)
MCV RBC AUTO: 97 FL (ref 82–98)
MONOCYTES # BLD AUTO: 0.8 K/UL (ref 0.3–1)
MONOCYTES NFR BLD: 10.8 % (ref 4–15)
NEUTROPHILS # BLD AUTO: 4.4 K/UL (ref 1.8–7.7)
NEUTROPHILS NFR BLD: 63.8 % (ref 38–73)
NRBC BLD-RTO: 0 /100 WBC
PLATELET # BLD AUTO: 209 K/UL (ref 150–450)
PMV BLD AUTO: 8.9 FL (ref 9.2–12.9)
POTASSIUM SERPL-SCNC: 4.5 MMOL/L (ref 3.5–5.1)
PROT SERPL-MCNC: 6.4 G/DL (ref 6–8.4)
RBC # BLD AUTO: 4.18 M/UL (ref 4–5.4)
SODIUM SERPL-SCNC: 142 MMOL/L (ref 136–145)
WBC # BLD AUTO: 6.92 K/UL (ref 3.9–12.7)

## 2024-04-29 PROCEDURE — 80053 COMPREHEN METABOLIC PANEL: CPT | Performed by: INTERNAL MEDICINE

## 2024-04-29 PROCEDURE — 94660 CPAP INITIATION&MGMT: CPT

## 2024-04-29 PROCEDURE — 99900031 HC PATIENT EDUCATION (STAT)

## 2024-04-29 PROCEDURE — 83735 ASSAY OF MAGNESIUM: CPT | Performed by: INTERNAL MEDICINE

## 2024-04-29 PROCEDURE — 36415 COLL VENOUS BLD VENIPUNCTURE: CPT | Performed by: INTERNAL MEDICINE

## 2024-04-29 PROCEDURE — 25000003 PHARM REV CODE 250: Performed by: INTERNAL MEDICINE

## 2024-04-29 PROCEDURE — 94761 N-INVAS EAR/PLS OXIMETRY MLT: CPT

## 2024-04-29 PROCEDURE — 99900035 HC TECH TIME PER 15 MIN (STAT)

## 2024-04-29 PROCEDURE — 85025 COMPLETE CBC W/AUTO DIFF WBC: CPT | Performed by: INTERNAL MEDICINE

## 2024-04-29 PROCEDURE — 27000221 HC OXYGEN, UP TO 24 HOURS

## 2024-04-29 PROCEDURE — 27100171 HC OXYGEN HIGH FLOW UP TO 24 HOURS

## 2024-04-29 RX ORDER — VALSARTAN 160 MG/1
160 TABLET ORAL DAILY
Qty: 30 TABLET | Refills: 1 | Status: SHIPPED | OUTPATIENT
Start: 2024-04-29 | End: 2025-04-29

## 2024-04-29 RX ORDER — LEVOFLOXACIN 500 MG/1
500 TABLET, FILM COATED ORAL DAILY
Qty: 4 TABLET | Refills: 0 | Status: SHIPPED | OUTPATIENT
Start: 2024-04-29

## 2024-04-29 RX ORDER — ACETAMINOPHEN 325 MG/1
650 TABLET ORAL EVERY 8 HOURS PRN
Start: 2024-04-29

## 2024-04-29 RX ORDER — FUROSEMIDE 40 MG/1
40 TABLET ORAL DAILY
Qty: 30 TABLET | Refills: 1 | Status: SHIPPED | OUTPATIENT
Start: 2024-04-29 | End: 2025-04-29

## 2024-04-29 RX ORDER — CARVEDILOL 3.12 MG/1
3.12 TABLET ORAL 2 TIMES DAILY
Qty: 60 TABLET | Refills: 1 | Status: SHIPPED | OUTPATIENT
Start: 2024-04-29 | End: 2025-04-29

## 2024-04-29 RX ORDER — LEVOFLOXACIN 5 MG/ML
750 INJECTION, SOLUTION INTRAVENOUS
Status: DISCONTINUED | OUTPATIENT
Start: 2024-04-30 | End: 2024-04-29 | Stop reason: HOSPADM

## 2024-04-29 RX ORDER — VALSARTAN 160 MG/1
TABLET ORAL
Qty: 90 TABLET | OUTPATIENT
Start: 2024-04-29

## 2024-04-29 RX ORDER — GABAPENTIN 100 MG/1
100 CAPSULE ORAL 3 TIMES DAILY
Qty: 90 CAPSULE | Refills: 1 | Status: SHIPPED | OUTPATIENT
Start: 2024-04-29 | End: 2025-04-29

## 2024-04-29 RX ORDER — CARVEDILOL 3.12 MG/1
3.12 TABLET ORAL 2 TIMES DAILY
Status: DISCONTINUED | OUTPATIENT
Start: 2024-04-29 | End: 2024-04-29 | Stop reason: HOSPADM

## 2024-04-29 RX ADMIN — BUSPIRONE HYDROCHLORIDE 5 MG: 5 TABLET ORAL at 08:04

## 2024-04-29 RX ADMIN — GABAPENTIN 100 MG: 100 CAPSULE ORAL at 08:04

## 2024-04-29 RX ADMIN — CARVEDILOL 3.12 MG: 3.12 TABLET, FILM COATED ORAL at 08:04

## 2024-04-29 RX ADMIN — FUROSEMIDE 40 MG: 40 TABLET ORAL at 08:04

## 2024-04-29 RX ADMIN — VALSARTAN 160 MG: 80 TABLET, FILM COATED ORAL at 08:04

## 2024-04-29 RX ADMIN — PANTOPRAZOLE SODIUM 40 MG: 40 TABLET, DELAYED RELEASE ORAL at 08:04

## 2024-04-29 RX ADMIN — LEVOTHYROXINE SODIUM 50 MCG: 50 TABLET ORAL at 05:04

## 2024-04-29 RX ADMIN — ASPIRIN 81 MG: 81 TABLET ORAL at 08:04

## 2024-04-29 RX ADMIN — ATORVASTATIN CALCIUM 40 MG: 40 TABLET, FILM COATED ORAL at 08:04

## 2024-04-29 NOTE — ASSESSMENT & PLAN NOTE
Concern for uti - check ua - start abxs if appropriate  Dc sleep med  4/29 complete treatment for uit

## 2024-04-29 NOTE — NURSING
Pt being d/c at this time via wheelchair with all belongings in hand, pt's daughter, Tracey and JAMAAL Arellano with patient. Pt in stable condition.

## 2024-04-29 NOTE — PROGRESS NOTES
Pharmacist Renal Dose Adjustment Note    Carmen Tan is a 74 y.o. female being treated with the medication levaquin    Patient Data:    Vital Signs (Most Recent):  Temp: 97.6 °F (36.4 °C) (04/29/24 0701)  Pulse: (!) 55 (04/29/24 0410)  Resp: (!) 22 (04/29/24 0410)  BP: (!) 83/53 (04/29/24 0400)  SpO2: 96 % (04/29/24 0410) Vital Signs (72h Range):  Temp:  [96.7 °F (35.9 °C)-98.9 °F (37.2 °C)]   Pulse:  [54-91]   Resp:  [15-42]   BP: ()/(52-91)   SpO2:  [51 %-100 %]      Recent Labs   Lab 04/27/24  0340 04/28/24  0342 04/29/24  0413   CREATININE 1.1 1.3 1.4     Serum creatinine: 1.4 mg/dL 04/29/24 0413  Estimated creatinine clearance: 45.7 mL/min    Medication:levaquin dose: 750mg frequency q24h will be changed to medication:levaquin dose:750mg frequency:q48h    Pharmacist's Name: Fredy Jordan  Pharmacist's Extension: [phone]

## 2024-04-29 NOTE — DISCHARGE SUMMARY
[facility] Medicine  Discharge Summary      Patient Name: Carmen Tan  MRN: [id number]  GOLDIE: [id number]  Patient Class: IP- Inpatient  Admission Date: 2024  Hospital Length of Stay: 7 days  Discharge Date and Time:  2024 8:18 AM  Attending Physician: Veda Gleason MD   Discharging Provider: Veda Gleason MD  Primary Care Provider: Lucian Barry MD    Primary Care Team: Networked reference to record PCT     HPI:   Carmen Tan is a 74 y.o. female with PMHX of COPD, CHF, type 2 diabetes, paroxysmal AFib who presents to the emergency department C/O leg swelling.     Patient arrives from home due to worsening lower extremity edema.  Also reports increasing shortness of breath.  Denies chest pain.  Patient takes Lasix twice a day and states she has good urine output from it.  Reports symptoms have been gradually worsening.     Patient reports appetite has been poor but drinks fluids.  Reports about 3 bottles of water a day and two coke zeros.  Limited physical activity which she attributes to leg pain    Pt had to attend her husbands  and so when sxs had started worsenign she had stayed home initially - after  she came in.  Pt reports this am - feelign little better after diuresisng some over night.  She does use O2 conintuously at home and wears a bipap at home at night and prn    * No surgery found *      Hospital Course:    pt feelign better- diuresisng well - less swellign to legs - improvement in breathing.     pt feelign better- breathign has imrpoved - swellign has improved   continues to improve daily - less swelling; breathing imrpoved   pt confused this am- waving towel around - swellign/edmea has imrpoved   WC:  Patient alert and oriented this morning.  Willing continues to improve.  Continue therapy efforts.   WC:  Patient resting this morning endorses some improvement in his lower extremity  discomfort with gabapentin.  Continue therapy efforts.  Consider skilled nursing evaluation on Monday versus home with home health depending on the patient's cognition and functional status.  4/29 ND pt back to baseline feelign better- dc home with HH - cont home o2 and home cpap     Goals of Care Treatment Preferences:  Code Status: Full Code      Consults:   Consults (From admission, onward)          Status Ordering Provider     Inpatient consult to Cardiology  Once        Provider:  Spencer Rascon MD    Completed CLAU DORMAN            Neuro  Disorientation  Concern for uti - check ua - start abxs if appropriate  Dc sleep med  4/29 complete treatment for uit    Pulmonary  SOB (shortness of breath)  Improving with diuresis - cont O2      Cardiac/Vascular  * Acute on chronic combined systolic and diastolic congestive heart failure  Patient is identified as having Systolic (HFrEF) heart failure that is Acute on chronic. CHF is currently uncontrolled due to Continued edema of extremities and Dyspnea not returned to baseline after 2 doses of IV diuretic. Latest ECHO performed and demonstrates- Results for orders placed during the hospital encounter of 09/09/23    Echo    Interpretation Summary    Left Ventricle: The left ventricle is normal in size. Mildly increased wall thickness. Normal wall motion. There is mildly reduced systolic function with a visually estimated ejection fraction of 40 - 45%. There is normal diastolic function.    Right Ventricle: Normal right ventricular cavity size. Wall thickness is normal. Right ventricle wall motion  is normal. Systolic function is normal.    Aortic Valve: There is mild to moderate aortic regurgitation with a centrally directed jet.    Mitral Valve: There is moderate regurgitation.    Tricuspid Valve: There is moderate regurgitation with a centrally directed jet.    Pulmonic Valve: There is mild to moderate regurgitation.    Pulmonary Artery: The estimated  "pulmonary artery systolic pressure is 47 mmHg.    IVC/SVC: Normal venous pressure at 3 mmHg.  . Continue Beta Blocker and Furosemide and monitor clinical status closely. Monitor on telemetry. Patient is on CHF pathway.  Monitor strict Is&Os and daily weights.  Place on fluid restriction of 2 L. Cardiology has been consulted. Continue to stress to patient importance of self efficacy and  on diet for CHF. Last BNP reviewed- and noted below No results for input(s): "BNP", "BNPTRIAGEBLO" in the last 168 hours.    4/23 cont diuresis - await final echo read  4/24 cont diuresis - decrease lasix to qd - add arb (valsartan 160mg qd) - edema has imrpoved  4/25 conts to improve daily - on lasix qd, valsartan added - edema continues to improve - bblocker not added due to bradycardia  4/29 dc home with low dose b-blocker, valsartan and lasix - monitor weight at home - follow up with pcp    Endocrine  Diabetes mellitus, type 2  Patient's FSGs are controlled on current medication regimen.  Last A1c reviewed-   Lab Results   Component Value Date    HGBA1C 5.3 09/09/2023    HGBA1C 5.3 09/09/2023     Most recent fingerstick glucose reviewed- No results for input(s): "POCTGLUCOSE" in the last 24 hours.    Current correctional scale  Low  Maintain anti-hyperglycemic dose as follows-   Antihyperglycemics (From admission, onward)      None          Hold Oral hypoglycemics while patient is in the hospital.    Severe obesity (BMI >= 40)  Body mass index is 45.49 kg/m². Morbid obesity complicates all aspects of disease management from diagnostic modalities to treatment. Weight loss encouraged and health benefits explained to patient.           Final Active Diagnoses:    Diagnosis Date Noted POA    PRINCIPAL PROBLEM:  Acute on chronic combined systolic and diastolic congestive heart failure [I50.43] 02/16/2022 Yes    Disorientation [R41.0] 04/26/2024 No    SOB (shortness of breath) [R06.02] 03/30/2023 Yes    Diabetes mellitus, type 2 " [E11.9] 02/16/2022 Yes    Severe obesity (BMI >= 40) [E66.01] 02/04/2022 Yes      Problems Resolved During this Admission:       Discharged Condition: stable    Disposition: Home-Health Care [facility] [place] [facility]    Follow Up:   Follow-up Information       Lucian Barry MD Follow up.    Specialty: Internal Medicine  Contact information:  [address]  [facility] [address]  [phone]                           Patient Instructions:      Ambulatory referral/consult to Home Health   Standing Status: Future   Referral Priority: Routine Referral Type: Home Health   Referral Reason: Specialty Services Required   Referred to Provider: [facility] hospital Requested Specialty: Home Health Services   Number of Visits Requested: 1     Diet diabetic     Diet Cardiac     Activity as tolerated       Significant Diagnostic Studies: Labs: CMP   Recent Labs   Lab 04/28/24  0342 04/29/24  0413    142   K 4.1 4.5    103   CO2 38* 38*    104   BUN 25* 31*   CREATININE 1.3 1.4   CALCIUM 10.4 10.2   PROT 6.6 6.4   ALBUMIN 2.8* 2.7*   BILITOT 0.5 0.4   ALKPHOS 95 91   AST 7* 5*   ALT 13 12   ANIONGAP 1* 1*   , CBC   Recent Labs   Lab 04/28/24  0342 04/29/24  0413   WBC 6.30 6.92   HGB 12.8 12.3   HCT 41.6 40.7    209   , and All labs within the past 24 hours have been reviewed    Pending Diagnostic Studies:       None           Medications:  Reconciled Home Medications:      Medication List        START taking these medications      acetaminophen 325 MG tablet  Commonly known as: TYLENOL  Take 2 tablets (650 mg total) by mouth every 8 (eight) hours as needed for Pain or Temperature greater than (100, headache).     gabapentin 100 MG capsule  Commonly known as: NEURONTIN  Take 1 capsule (100 mg total) by mouth 3 (three) times daily.     levoFLOXacin 500 MG tablet  Commonly known as: LEVAQUIN  Take 1 tablet (500 mg total) by mouth once daily.     valsartan 160 MG tablet  Commonly known as: DIOVAN  Take 1  tablet (160 mg total) by mouth once daily.            CHANGE how you take these medications      carvediloL 3.125 MG tablet  Commonly known as: COREG  Take 1 tablet (3.125 mg total) by mouth 2 (two) times daily.  What changed:   medication strength  how much to take     furosemide 40 MG tablet  Commonly known as: LASIX  Take 1 tablet (40 mg total) by mouth once daily.  What changed:   medication strength  how much to take            CONTINUE taking these medications      albuterol-ipratropium 2.5 mg-0.5 mg/3 mL nebulizer solution  Commonly known as: DUO-NEB  Take 3 mLs by nebulization every 6 (six) hours while awake. Rescue     aspirin 81 MG EC tablet  Commonly known as: ECOTRIN  Take 1 tablet (81 mg total) by mouth once daily.     budesonide 0.5 mg/2 mL nebulizer solution  Commonly known as: PULMICORT  Take 2 mLs (0.5 mg total) by nebulization every 12 (twelve) hours. Controller     busPIRone 5 MG Tab  Commonly known as: BUSPAR  Take 1 tablet (5 mg total) by mouth 2 (two) times daily.     insulin aspart U-100 100 unit/mL (3 mL) Inpn pen  Commonly known as: NovoLOG  Inject 0-5 Units into the skin before meals and at bedtime as needed (Hyperglycemia).     levothyroxine 50 MCG tablet  Commonly known as: SYNTHROID  Take 1 tablet (50 mcg total) by mouth before breakfast.     LIPITOR 40 mg tablet  Generic drug: atorvastatin  Take 1 tablet (40 mg total) by mouth once daily.     pantoprazole 20 MG tablet  Commonly known as: PROTONIX  Take 40 mg by mouth once daily.     vitamin D 1000 units Tab  Commonly known as: VITAMIN D3  Take 185 mg by mouth once daily.            STOP taking these medications      isosorbide mononitrate 30 MG 24 hr tablet  Commonly known as: IMDUR     nitrofurantoin (macrocrystal-monohydrate) 100 MG capsule  Commonly known as: MACROBID              Indwelling Lines/Drains at time of discharge:   Lines/Drains/Airways       Drain  Duration             Female External Urinary Catheter w/ Suction 04/26/24  0830 2 days                    Time spent on the discharge of patient: 35 minutes         Veda Gleason MD  Department of Hospital Medicine  [place] - Intensive Care

## 2024-04-29 NOTE — PLAN OF CARE
04/29/24 0822   Post-Acute Status   Post-Acute Authorization Home Health   Home Health Status Referrals Sent   Discharge Plan   Discharge Plan A Home with family;Home Health   Discharge Plan B Home with family;Home Health     New referral. The patient agreed to home health services with [facility]. The referral was sent via [employer]. Megan with the agency is aware of the referral.     Addendum: [facility] accepted this patient.

## 2024-04-29 NOTE — NURSING
Pt educated on all d/c instructions, verbalized understanding. Called Tracey, pt's daughter, for ride home.

## 2024-04-29 NOTE — ASSESSMENT & PLAN NOTE
"Patient is identified as having Systolic (HFrEF) heart failure that is Acute on chronic. CHF is currently uncontrolled due to Continued edema of extremities and Dyspnea not returned to baseline after 2 doses of IV diuretic. Latest ECHO performed and demonstrates- Results for orders placed during the hospital encounter of 09/09/23    Echo    Interpretation Summary    Left Ventricle: The left ventricle is normal in size. Mildly increased wall thickness. Normal wall motion. There is mildly reduced systolic function with a visually estimated ejection fraction of 40 - 45%. There is normal diastolic function.    Right Ventricle: Normal right ventricular cavity size. Wall thickness is normal. Right ventricle wall motion  is normal. Systolic function is normal.    Aortic Valve: There is mild to moderate aortic regurgitation with a centrally directed jet.    Mitral Valve: There is moderate regurgitation.    Tricuspid Valve: There is moderate regurgitation with a centrally directed jet.    Pulmonic Valve: There is mild to moderate regurgitation.    Pulmonary Artery: The estimated pulmonary artery systolic pressure is 47 mmHg.    IVC/SVC: Normal venous pressure at 3 mmHg.  . Continue Beta Blocker and Furosemide and monitor clinical status closely. Monitor on telemetry. Patient is on CHF pathway.  Monitor strict Is&Os and daily weights.  Place on fluid restriction of 2 L. Cardiology has been consulted. Continue to stress to patient importance of self efficacy and  on diet for CHF. Last BNP reviewed- and noted below No results for input(s): "BNP", "BNPTRIAGEBLO" in the last 168 hours.    4/23 cont diuresis - await final echo read  4/24 cont diuresis - decrease lasix to qd - add arb (valsartan 160mg qd) - edema has imrpoved  4/25 conts to improve daily - on lasix qd, valsartan added - edema continues to improve - bblocker not added due to bradycardia  4/29 dc home with low dose b-blocker, valsartan and lasix - monitor " weight at home - follow up with pcp

## 2024-04-29 NOTE — PT/OT/SLP DISCHARGE
Physical Therapy Discharge Summary    Name: Carmen Tan  MRN: [id number]   Principal Problem: Acute on chronic combined systolic and diastolic congestive heart failure     Patient Discharged from acute Physical Therapy on 24.  Please refer to prior PT noted date on 24 for functional status.     Assessment:     Patient appropriate for care in another setting.    Objective:     GOALS:   Multidisciplinary Problems       Physical Therapy Goals          Problem: Physical Therapy    Goal Priority Disciplines Outcome Goal Variances Interventions   Physical Therapy Goal     PT, PT/OT Progressing     Description: Goals to be met by: 24    Patient will increase functional independence with mobility by performin. Supine to sit with Modified Independent  2. Sit to supine with Modified Independent  3. Bed to chair transfer with Modified Independentwith or without rolling walker using Step Transfer TECHNIQUE  4. Gait  x 50  feet with Standby Assistance with or without rolling walker  5. Lower extremity exercise program x10 reps per handout, with assistance as needed                          Reasons for Discontinuation of Therapy Services  Transfer to alternate level of care.      Plan:     Patient Discharged to: Home with Home Health Service.      2024

## 2024-04-29 NOTE — NURSING
Report given to Viry at [facility]. All info given, verbalized understanding.

## 2024-04-29 NOTE — PLAN OF CARE
Problem: Adult Inpatient Plan of Care  Goal: Plan of Care Review  Outcome: Progressing  Goal: Patient-Specific Goal (Individualized)  Outcome: Progressing  Goal: Absence of Hospital-Acquired Illness or Injury  Outcome: Progressing  Goal: Optimal Comfort and Wellbeing  Outcome: Progressing  Goal: Readiness for Transition of Care  Outcome: Progressing     Problem: Bariatric Environmental Safety  Goal: Safety Maintained with Care  Outcome: Progressing     Problem: Skin Injury Risk Increased  Goal: Skin Health and Integrity  Outcome: Progressing     Problem: Fall Injury Risk  Goal: Absence of Fall and Fall-Related Injury  Outcome: Progressing     Problem: Cardiac Output Decreased (Heart Failure)  Goal: Optimal Cardiac Output  Outcome: Progressing     Problem: Functional Ability Impaired (Heart Failure)  Goal: Optimal Functional Ability  Outcome: Progressing     Problem: Infection  Goal: Absence of Infection Signs and Symptoms  Outcome: Progressing     Problem: Comorbidity Management  Goal: Maintenance of COPD Symptom Control  Outcome: Progressing   Patient slept well last night. VSS, afebrile. Encouraged patient to ambulate, to increase her strength. No c/o of SOB.

## 2024-04-30 ENCOUNTER — PATIENT OUTREACH (OUTPATIENT)
Dept: ADMINISTRATIVE | Facility: CLINIC | Age: 74
End: 2024-04-30
Payer: MEDICARE

## 2024-04-30 NOTE — PROGRESS NOTES
C3 nurse spoke with Carmen Tan  for a TCC post hospital discharge follow up call. The patient has a scheduled HOSFU appointment with Lucian Barry MD  on 5/2/24 @ 3528.

## 2024-05-20 ENCOUNTER — HOSPITAL ENCOUNTER (EMERGENCY)
Facility: HOSPITAL | Age: 74
Discharge: HOME | End: 2024-05-20
Attending: EMERGENCY MEDICINE
Payer: MEDICARE

## 2024-05-20 VITALS
RESPIRATION RATE: 20 BRPM | HEART RATE: 64 BPM | SYSTOLIC BLOOD PRESSURE: 135 MMHG | OXYGEN SATURATION: 95 % | TEMPERATURE: 99 F | DIASTOLIC BLOOD PRESSURE: 73 MMHG

## 2024-05-20 DIAGNOSIS — G89.29 CHRONIC BILATERAL LOW BACK PAIN WITHOUT SCIATICA: Primary | ICD-10-CM

## 2024-05-20 DIAGNOSIS — M54.50 CHRONIC BILATERAL LOW BACK PAIN WITHOUT SCIATICA: Primary | ICD-10-CM

## 2024-05-20 LAB
BILIRUB UR QL STRIP: NEGATIVE
CLARITY UR: CLEAR
COLOR UR: YELLOW
GLUCOSE UR QL STRIP: NEGATIVE
HGB UR QL STRIP: NEGATIVE
KETONES UR QL STRIP: NEGATIVE
LEUKOCYTE ESTERASE UR QL STRIP: NEGATIVE
NITRITE UR QL STRIP: NEGATIVE
PH UR STRIP: >8 [PH] (ref 5–8)
PROT UR QL STRIP: NEGATIVE
SP GR UR STRIP: 1.01 (ref 1–1.03)
URN SPEC COLLECT METH UR: ABNORMAL
UROBILINOGEN UR STRIP-ACNC: 1 EU/DL

## 2024-05-20 PROCEDURE — 81003 URINALYSIS AUTO W/O SCOPE: CPT | Performed by: CLINICAL NURSE SPECIALIST

## 2024-05-20 PROCEDURE — 63600175 PHARM REV CODE 636 W HCPCS: Performed by: CLINICAL NURSE SPECIALIST

## 2024-05-20 PROCEDURE — 99284 EMERGENCY DEPT VISIT MOD MDM: CPT | Mod: 25

## 2024-05-20 PROCEDURE — 96372 THER/PROPH/DIAG INJ SC/IM: CPT | Performed by: CLINICAL NURSE SPECIALIST

## 2024-05-20 RX ORDER — DEXAMETHASONE SODIUM PHOSPHATE 4 MG/ML
8 INJECTION, SOLUTION INTRA-ARTICULAR; INTRALESIONAL; INTRAMUSCULAR; INTRAVENOUS; SOFT TISSUE
Status: COMPLETED | OUTPATIENT
Start: 2024-05-20 | End: 2024-05-20

## 2024-05-20 RX ORDER — MORPHINE SULFATE 2 MG/ML
2 INJECTION, SOLUTION INTRAMUSCULAR; INTRAVENOUS ONCE
Status: COMPLETED | OUTPATIENT
Start: 2024-05-20 | End: 2024-05-20

## 2024-05-20 RX ORDER — KETOROLAC TROMETHAMINE 10 MG/1
10 TABLET, FILM COATED ORAL EVERY 6 HOURS
Qty: 20 TABLET | Refills: 0 | Status: SHIPPED | OUTPATIENT
Start: 2024-05-20 | End: 2024-05-25

## 2024-05-20 RX ORDER — TIZANIDINE 4 MG/1
4 TABLET ORAL EVERY 6 HOURS PRN
Qty: 15 TABLET | Refills: 0 | Status: SHIPPED | OUTPATIENT
Start: 2024-05-20 | End: 2024-05-30

## 2024-05-20 RX ORDER — KETOROLAC TROMETHAMINE 30 MG/ML
30 INJECTION, SOLUTION INTRAMUSCULAR; INTRAVENOUS ONCE
Status: COMPLETED | OUTPATIENT
Start: 2024-05-20 | End: 2024-05-20

## 2024-05-20 RX ADMIN — DEXAMETHASONE SODIUM PHOSPHATE 8 MG: 4 INJECTION INTRA-ARTICULAR; INTRALESIONAL; INTRAMUSCULAR; INTRAVENOUS; SOFT TISSUE at 03:05

## 2024-05-20 RX ADMIN — MORPHINE SULFATE 2 MG: 2 INJECTION, SOLUTION INTRAMUSCULAR; INTRAVENOUS at 03:05

## 2024-05-20 RX ADMIN — KETOROLAC TROMETHAMINE 30 MG: 30 INJECTION, SOLUTION INTRAMUSCULAR at 03:05

## 2024-05-20 NOTE — ED PROVIDER NOTES
Encounter Date: 5/20/2024       History     Chief Complaint   Patient presents with    Back Pain     Pt reports lower back pain x 1 month, denies any injury. Reports pain radiates to flank area     74-year-old female presents to the emergency room with lower back pain radiating hips for the last month.  Denies any injury, trauma, fall.        Review of patient's allergies indicates:   Allergen Reactions    Pcn [penicillins] Swelling     Past Medical History:   Diagnosis Date    Abdominal hernia     Bacteremia 4/5/2023    CHF (congestive heart failure)     COPD (chronic obstructive pulmonary disease)     Diabetes mellitus, type 2 2/16/2022    GERD (gastroesophageal reflux disease)     History of home oxygen therapy     Hypertension     Hypertensive emergency 3/30/2023    Migraine headache     Pulmonary embolism 06/01/2017    Small bowel obstruction     Thyroid disease      Past Surgical History:   Procedure Laterality Date    COLONOSCOPY      HYSTERECTOMY      INNER EAR SURGERY      UPPER GASTROINTESTINAL ENDOSCOPY       No family history on file.  Social History     Tobacco Use    Smoking status: Former    Smokeless tobacco: Never   Substance Use Topics    Alcohol use: No    Drug use: No     Review of Systems   Constitutional:  Negative for fever.   HENT:  Negative for sore throat.    Respiratory:  Negative for shortness of breath.    Cardiovascular:  Negative for chest pain.   Gastrointestinal:  Negative for nausea.   Genitourinary:  Negative for dysuria.   Musculoskeletal:  Positive for back pain and gait problem.   Skin:  Negative for rash.   Neurological:  Negative for weakness.   Hematological:  Does not bruise/bleed easily.   All other systems reviewed and are negative.      Physical Exam     Initial Vitals [05/20/24 1514]   BP Pulse Resp Temp SpO2   135/73 64 18 98.5 °F (36.9 °C) 95 %      MAP       --         Physical Exam    Nursing note and vitals reviewed.  Constitutional: She appears well-developed and  well-nourished.   HENT:   Head: Normocephalic and atraumatic.   Eyes: Pupils are equal, round, and reactive to light.   Neck:   Normal range of motion.  Cardiovascular:  Normal rate and regular rhythm.           Pulmonary/Chest: Breath sounds normal.   Abdominal: Abdomen is soft. Bowel sounds are normal.   Musculoskeletal:         General: Normal range of motion.      Cervical back: Normal range of motion.     Neurological: She is alert and oriented to person, place, and time.   Skin: Skin is warm and dry.   Psychiatric: She has a normal mood and affect.         ED Course   Procedures  Labs Reviewed   URINALYSIS, REFLEX TO URINE CULTURE - Abnormal; Notable for the following components:       Result Value    pH, UA >8.0 (*)     All other components within normal limits    Narrative:     Preferred Collection Type->Urine, Clean Catch  Specimen Source->Urine          Imaging Results              X-Ray Lumbar Spine Ap And Lateral (Final result)  Result time 05/20/24 16:18:39      Final result by Faustino Pelletier MD (05/20/24 16:18:39)                   Impression:      Diffuse spondylotic change as above without evidence for acute radiographic abnormality.      Electronically signed by: Faustino Pelletier MD  Date:    05/20/2024  Time:    16:18               Narrative:    EXAMINATION:  XR LUMBAR SPINE AP AND LATERAL    CLINICAL HISTORY:  back pain;    TECHNIQUE:  Lumbar spine three views    COMPARISON:  04/25/2024    FINDINGS:  Five non-rib-bearing lumbar type vertebral bodies are present which demonstrate maintained height and alignment with no acute fracture or subluxation detected.  Mild disc space narrowing at L3-L4 and L5-S1 and to a lesser extent posteriorly at L4-L5.  Diffuse marginal osteophytes are present.                                       Medications   morphine injection 2 mg (2 mg Intramuscular Given 5/20/24 1555)   ketorolac injection 30 mg (30 mg Intramuscular Given 5/20/24 1554)   dexAMETHasone injection 8 mg  (8 mg Intramuscular Given 5/20/24 8553)     Medical Decision Making  Amount and/or Complexity of Data Reviewed  Radiology: ordered.    Risk  Prescription drug management.                                      Clinical Impression:  Final diagnoses:  [M54.50, G89.29] Chronic bilateral low back pain without sciatica (Primary)          ED Disposition Condition    Discharge Stable          ED Prescriptions       Medication Sig Dispense Start Date End Date Auth. Provider    ketorolac (TORADOL) 10 mg tablet Take 1 tablet (10 mg total) by mouth every 6 (six) hours. for 5 days 20 tablet 5/20/2024 5/25/2024 Jessika Matthews NP    tiZANidine (ZANAFLEX) 4 MG tablet Take 1 tablet (4 mg total) by mouth every 6 (six) hours as needed. 15 tablet 5/20/2024 5/30/2024 Jessika Matthews NP          Follow-up Information       Follow up With Specialties Details Why Contact Info    Lucian Barry MD Internal Medicine  As needed [address] [id number]  [facility] [address]  [phone]               Jessika Matthews NP  05/20/24 9692

## 2024-05-26 ENCOUNTER — HOSPITAL ENCOUNTER (EMERGENCY)
Facility: HOSPITAL | Age: 74
Discharge: HOME | End: 2024-05-26
Attending: EMERGENCY MEDICINE
Payer: MEDICARE

## 2024-05-26 VITALS
SYSTOLIC BLOOD PRESSURE: 195 MMHG | TEMPERATURE: 98 F | HEART RATE: 67 BPM | HEIGHT: 64 IN | BODY MASS INDEX: 38.58 KG/M2 | RESPIRATION RATE: 18 BRPM | DIASTOLIC BLOOD PRESSURE: 81 MMHG | OXYGEN SATURATION: 99 % | WEIGHT: 226 LBS

## 2024-05-26 DIAGNOSIS — R06.02 SOB (SHORTNESS OF BREATH): ICD-10-CM

## 2024-05-26 DIAGNOSIS — I50.9 ACUTE CONGESTIVE HEART FAILURE, UNSPECIFIED HEART FAILURE TYPE: Primary | ICD-10-CM

## 2024-05-26 LAB
ALBUMIN SERPL BCP-MCNC: 3.2 G/DL (ref 3.5–5.2)
ALP SERPL-CCNC: 103 U/L (ref 55–135)
ALT SERPL W/O P-5'-P-CCNC: 14 U/L (ref 10–44)
ANION GAP SERPL CALC-SCNC: 2 MMOL/L (ref 3–11)
AST SERPL-CCNC: 21 U/L (ref 10–40)
BASOPHILS # BLD AUTO: 0.01 K/UL (ref 0–0.2)
BASOPHILS NFR BLD: 0.2 % (ref 0–1.9)
BILIRUB SERPL-MCNC: 0.8 MG/DL (ref 0.1–1)
BUN SERPL-MCNC: 26 MG/DL (ref 8–23)
CALCIUM SERPL-MCNC: 10 MG/DL (ref 8.7–10.5)
CHLORIDE SERPL-SCNC: 109 MMOL/L (ref 95–110)
CO2 SERPL-SCNC: 30 MMOL/L (ref 23–29)
CREAT SERPL-MCNC: 1.4 MG/DL (ref 0.5–1.4)
DIFFERENTIAL METHOD BLD: ABNORMAL
EOSINOPHIL # BLD AUTO: 0.1 K/UL (ref 0–0.5)
EOSINOPHIL NFR BLD: 0.8 % (ref 0–8)
ERYTHROCYTE [DISTWIDTH] IN BLOOD BY AUTOMATED COUNT: 14.9 % (ref 11.5–14.5)
EST. GFR  (NO RACE VARIABLE): 39.7 ML/MIN/1.73 M^2
GLUCOSE SERPL-MCNC: 97 MG/DL (ref 70–110)
HCT VFR BLD AUTO: 39 % (ref 37–48.5)
HGB BLD-MCNC: 12.9 G/DL (ref 12–16)
IMM GRANULOCYTES # BLD AUTO: 0.01 K/UL (ref 0–0.04)
IMM GRANULOCYTES NFR BLD AUTO: 0.2 % (ref 0–0.5)
LYMPHOCYTES # BLD AUTO: 1.5 K/UL (ref 1–4.8)
LYMPHOCYTES NFR BLD: 24.2 % (ref 18–48)
MCH RBC QN AUTO: 29.8 PG (ref 27–31)
MCHC RBC AUTO-ENTMCNC: 33.1 G/DL (ref 32–36)
MCV RBC AUTO: 90 FL (ref 82–98)
MONOCYTES # BLD AUTO: 0.5 K/UL (ref 0.3–1)
MONOCYTES NFR BLD: 7.2 % (ref 4–15)
NEUTROPHILS # BLD AUTO: 4.2 K/UL (ref 1.8–7.7)
NEUTROPHILS NFR BLD: 67.4 % (ref 38–73)
NRBC BLD-RTO: 0 /100 WBC
NT-PROBNP SERPL-MCNC: 8221 PG/ML (ref 5–900)
OHS QRS DURATION: 136 MS
OHS QTC CALCULATION: 415 MS
PLATELET # BLD AUTO: 196 K/UL (ref 150–450)
PMV BLD AUTO: 9.7 FL (ref 9.2–12.9)
POTASSIUM SERPL-SCNC: 4.5 MMOL/L (ref 3.5–5.1)
PROT SERPL-MCNC: 7.4 G/DL (ref 6–8.4)
RBC # BLD AUTO: 4.33 M/UL (ref 4–5.4)
SODIUM SERPL-SCNC: 141 MMOL/L (ref 136–145)
TROPONIN I SERPL DL<=0.01 NG/ML-MCNC: 19.2 PG/ML (ref 0–60)
WBC # BLD AUTO: 6.21 K/UL (ref 3.9–12.7)

## 2024-05-26 PROCEDURE — 84484 ASSAY OF TROPONIN QUANT: CPT | Performed by: CLINICAL NURSE SPECIALIST

## 2024-05-26 PROCEDURE — 85025 COMPLETE CBC W/AUTO DIFF WBC: CPT | Performed by: CLINICAL NURSE SPECIALIST

## 2024-05-26 PROCEDURE — 63600175 PHARM REV CODE 636 W HCPCS: Performed by: CLINICAL NURSE SPECIALIST

## 2024-05-26 PROCEDURE — 99285 EMERGENCY DEPT VISIT HI MDM: CPT | Mod: 25

## 2024-05-26 PROCEDURE — 80053 COMPREHEN METABOLIC PANEL: CPT | Performed by: CLINICAL NURSE SPECIALIST

## 2024-05-26 PROCEDURE — 93005 ELECTROCARDIOGRAM TRACING: CPT

## 2024-05-26 PROCEDURE — 96372 THER/PROPH/DIAG INJ SC/IM: CPT | Performed by: CLINICAL NURSE SPECIALIST

## 2024-05-26 PROCEDURE — 83880 ASSAY OF NATRIURETIC PEPTIDE: CPT | Performed by: CLINICAL NURSE SPECIALIST

## 2024-05-26 PROCEDURE — 93010 ELECTROCARDIOGRAM REPORT: CPT | Mod: GW,,, | Performed by: INTERNAL MEDICINE

## 2024-05-26 RX ORDER — FUROSEMIDE 10 MG/ML
60 INJECTION INTRAMUSCULAR; INTRAVENOUS ONCE
Status: COMPLETED | OUTPATIENT
Start: 2024-05-26 | End: 2024-05-26

## 2024-05-26 RX ORDER — FUROSEMIDE 10 MG/ML
60 INJECTION INTRAMUSCULAR; INTRAVENOUS
Status: DISCONTINUED | OUTPATIENT
Start: 2024-05-26 | End: 2024-05-26

## 2024-05-26 RX ADMIN — FUROSEMIDE 60 MG: 10 INJECTION, SOLUTION INTRAMUSCULAR; INTRAVENOUS at 03:05

## 2024-05-26 NOTE — ED PROVIDER NOTES
Encounter Date: 5/26/2024       History     Chief Complaint   Patient presents with    Leg Swelling     Pt reports increased swelling to bilateral legs since Thursday. She reports hx of CHF. Pt reports compliance with diuretics. Pt reports increased SOB- has home O2 to use as needed.      79-year-old female presents to the emergency room for bilateral leg swelling, shortness of breath since Thursday.  Patient has a history of CHF.  Patient currently on diuretics and is compliant per patient.  Patient is having to use her home O2 to sleep.  Requested O2 be placed on now.  Room air sat 94%.  Denies any chest pain        Review of patient's allergies indicates:   Allergen Reactions    Pcn [penicillins] Swelling     Past Medical History:   Diagnosis Date    Abdominal hernia     Bacteremia 4/5/2023    CHF (congestive heart failure)     COPD (chronic obstructive pulmonary disease)     Diabetes mellitus, type 2 2/16/2022    GERD (gastroesophageal reflux disease)     History of home oxygen therapy     Hypertension     Hypertensive emergency 3/30/2023    Migraine headache     Pulmonary embolism 06/01/2017    Small bowel obstruction     Thyroid disease      Past Surgical History:   Procedure Laterality Date    COLONOSCOPY      HYSTERECTOMY      INNER EAR SURGERY      UPPER GASTROINTESTINAL ENDOSCOPY       No family history on file.  Social History     Tobacco Use    Smoking status: Former    Smokeless tobacco: Never   Substance Use Topics    Alcohol use: No    Drug use: No     Review of Systems   Constitutional:  Negative for fever.   HENT:  Negative for sore throat.    Respiratory:  Positive for shortness of breath.    Cardiovascular:  Negative for chest pain.   Gastrointestinal:  Negative for nausea.   Genitourinary:  Negative for dysuria.   Musculoskeletal:  Negative for back pain.   Skin:  Negative for rash.   Neurological:  Negative for weakness.   Hematological:  Does not bruise/bleed easily.   All other systems  reviewed and are negative.      Physical Exam     Initial Vitals [05/26/24 1241]   BP Pulse Resp Temp SpO2   (!) 184/83 (!) 58 20 98.3 °F (36.8 °C) (!) 94 %      MAP       --         Physical Exam    Nursing note and vitals reviewed.  Constitutional: She appears well-developed and well-nourished.   HENT:   Head: Normocephalic and atraumatic.   Eyes: Pupils are equal, round, and reactive to light.   Neck:   Normal range of motion.  Cardiovascular:  Normal rate and regular rhythm.           Pulmonary/Chest:   Diminished breath sounds bilaterally   Abdominal: Abdomen is soft. Bowel sounds are normal.   Musculoskeletal:         General: Tenderness and edema present.      Cervical back: Normal range of motion.     Neurological: She is alert and oriented to person, place, and time.   Skin: Skin is warm and dry.   Psychiatric: She has a normal mood and affect.         ED Course   Procedures  Labs Reviewed   NT-PRO NATRIURETIC PEPTIDE - Abnormal; Notable for the following components:       Result Value    NT-proBNP 8221 (*)     All other components within normal limits   CBC W/ AUTO DIFFERENTIAL - Abnormal; Notable for the following components:    RDW 14.9 (*)     All other components within normal limits   COMPREHENSIVE METABOLIC PANEL - Abnormal; Notable for the following components:    CO2 30 (*)     BUN 26 (*)     Albumin 3.2 (*)     eGFR 39.7 (*)     Anion Gap 2 (*)     All other components within normal limits   TROPONIN I HIGH SENSITIVITY     EKG Readings: (Independently Interpreted)   Initial Reading: No STEMI. Rhythm: Normal Sinus Rhythm. Heart Rate: 63. Conduction: 1st Degree AV Block.       Imaging Results              X-Ray Chest AP Portable (Final result)  Result time 05/26/24 14:16:20      Final result by Abida Izquierdo MD (05/26/24 14:16:20)                   Impression:      No acute findings      Electronically signed by: Abida Izquierdo MD  Date:    05/26/2024  Time:    14:16               Narrative:     EXAMINATION:  XR CHEST AP PORTABLE    CLINICAL HISTORY:  Shortness of breath    COMPARISON:  04/21/2024    FINDINGS:  No acute infiltrates.  No pleural fluid or pneumothorax.  Enlarged cardiac silhouette, unchanged.                                       Medications   furosemide injection 60 mg (60 mg Intramuscular Given 5/26/24 0714)     Medical Decision Making  Amount and/or Complexity of Data Reviewed  Labs: ordered.  Radiology: ordered.    Risk  Prescription drug management.                                      Clinical Impression:  Final diagnoses:  [R06.02] SOB (shortness of breath)  [I50.9] Acute congestive heart failure, unspecified heart failure type (Primary)          ED Disposition Condition    Discharge Stable          ED Prescriptions    None       Follow-up Information       Follow up With Specialties Details Why Contact Info    Lucian Baryr MD Internal Medicine  As needed [address] [facility] [address]  [facility] [address]  [phone]               Jessika Matthews, ISHAN  05/26/24 3826

## 2024-05-27 NOTE — PHYSICIAN QUERY
Due to Documentation conflict please clarify the type of       Acute on Chronic Heart Failure      ..    Acute on Chronic Combined Systolic and Diastolic Heart Failure        To Respond To Query  Respond from In Basket  Navigate to the Hospital Chart Completion folder.  Highlight the Physician Query message and click New Note to complete the query.   Response appears in a new window.  After reviewing the chart, respond to the query by selecting the clinically appropriate option from the list.  Click Sign  Unselected options will disappear after signing the note.

## 2024-05-28 NOTE — PHYSICIAN QUERY
Provider, on   4/24 Cardiology Progress Note   section of the medical record indicates   Acute on chronic respiratory failure        Due to the conflicting clinical picture, please clinically validate the diagnosis of Acute on chronic respiratory failure .    The respiratory condition is confirmed. Additional clinical support/decision making indicators for the diagnosis include (please specify): Patient has acute on chronic respiratory failure in addition to combined heart failure. Respiratory failure with hypoxemia has been documented throughout the hospital stay with the indicated therapies.

## 2024-06-20 ENCOUNTER — HOSPITAL ENCOUNTER (EMERGENCY)
Facility: HOSPITAL | Age: 74
Discharge: HOME | End: 2024-06-20
Attending: EMERGENCY MEDICINE
Payer: MEDICARE

## 2024-06-20 VITALS
TEMPERATURE: 99 F | WEIGHT: 226 LBS | RESPIRATION RATE: 18 BRPM | HEART RATE: 85 BPM | DIASTOLIC BLOOD PRESSURE: 56 MMHG | SYSTOLIC BLOOD PRESSURE: 108 MMHG | OXYGEN SATURATION: 95 % | BODY MASS INDEX: 38.58 KG/M2 | HEIGHT: 64 IN

## 2024-06-20 DIAGNOSIS — I50.9 CONGESTIVE HEART FAILURE, UNSPECIFIED HF CHRONICITY, UNSPECIFIED HEART FAILURE TYPE: ICD-10-CM

## 2024-06-20 DIAGNOSIS — R60.9 EDEMA, UNSPECIFIED TYPE: Primary | ICD-10-CM

## 2024-06-20 LAB
ANION GAP SERPL CALC-SCNC: 4 MMOL/L (ref 3–11)
BUN SERPL-MCNC: 20 MG/DL (ref 8–23)
CALCIUM SERPL-MCNC: 10.1 MG/DL (ref 8.7–10.5)
CHLORIDE SERPL-SCNC: 103 MMOL/L (ref 95–110)
CO2 SERPL-SCNC: 31 MMOL/L (ref 23–29)
CREAT SERPL-MCNC: 1.3 MG/DL (ref 0.5–1.4)
EST. GFR  (NO RACE VARIABLE): 43.4 ML/MIN/1.73 M^2
GLUCOSE SERPL-MCNC: 89 MG/DL (ref 70–110)
POTASSIUM SERPL-SCNC: 4.6 MMOL/L (ref 3.5–5.1)
SODIUM SERPL-SCNC: 138 MMOL/L (ref 136–145)

## 2024-06-20 PROCEDURE — 80048 BASIC METABOLIC PNL TOTAL CA: CPT | Performed by: EMERGENCY MEDICINE

## 2024-06-20 PROCEDURE — 63600175 PHARM REV CODE 636 W HCPCS: Performed by: EMERGENCY MEDICINE

## 2024-06-20 PROCEDURE — 99284 EMERGENCY DEPT VISIT MOD MDM: CPT | Mod: 25

## 2024-06-20 PROCEDURE — 36415 COLL VENOUS BLD VENIPUNCTURE: CPT | Performed by: EMERGENCY MEDICINE

## 2024-06-20 PROCEDURE — 96374 THER/PROPH/DIAG INJ IV PUSH: CPT

## 2024-06-20 RX ORDER — FUROSEMIDE 10 MG/ML
80 INJECTION INTRAMUSCULAR; INTRAVENOUS
Status: COMPLETED | OUTPATIENT
Start: 2024-06-20 | End: 2024-06-20

## 2024-06-20 RX ADMIN — FUROSEMIDE 80 MG: 10 INJECTION, SOLUTION INTRAVENOUS at 03:06

## 2024-06-20 NOTE — ED PROVIDER NOTES
EMERGENCY DEPARTMENT HISTORY AND PHYSICAL EXAM     This note is dictated on M*Modal word recognition program.  There are word recognition mistakes and grammatical errors that are occasionally missed on review.     Date: 6/20/2024   Patient Name: Carmen Tan       History of Presenting Illness      Chief Complaint   Patient presents with    Edema     Pt to ED with complaints of worsening dyspnea / lower extremity edema.         Luca Tan is a 74 y.o. female with PMHX of CHF, paroxysmal AFib, diabetes, obesity who presents to the emergency department C/O lower extremity edema.    Patient complains of worsening lower extremity edema.  She states that she was out for Lasix for about 2 weeks and just had it filled yesterday.  Took a dose yesterday evening.  Reports urinary response to Lasix.  Patient is on baseline nasal cannula.  Work of breathing normal on room air.      PCP: Lucian Barry MD        No current facility-administered medications for this encounter.     Current Outpatient Medications   Medication Sig Dispense Refill    acetaminophen (TYLENOL) 325 MG tablet Take 2 tablets (650 mg total) by mouth every 8 (eight) hours as needed for Pain or Temperature greater than (100, headache). (Patient not taking: Reported on 4/30/2024)      albuterol-ipratropium (DUO-NEB) 2.5 mg-0.5 mg/3 mL nebulizer solution Take 3 mLs by nebulization every 6 (six) hours while awake. Rescue 75 mL 0    aspirin (ECOTRIN) 81 MG EC tablet Take 1 tablet (81 mg total) by mouth once daily. (Patient not taking: Reported on 4/30/2024) 90 tablet 3    budesonide (PULMICORT) 0.5 mg/2 mL nebulizer solution Take 2 mLs (0.5 mg total) by nebulization every 12 (twelve) hours. Controller 360 mL 3    busPIRone (BUSPAR) 5 MG Tab Take 1 tablet (5 mg total) by mouth 2 (two) times daily. 60 tablet 11    carvediloL (COREG) 3.125 MG tablet Take 1 tablet (3.125 mg total) by mouth 2 (two) times daily. 60 tablet 1    furosemide (LASIX)  40 MG tablet Take 1 tablet (40 mg total) by mouth once daily. 30 tablet 1    gabapentin (NEURONTIN) 100 MG capsule Take 1 capsule (100 mg total) by mouth 3 (three) times daily. 90 capsule 1    insulin aspart U-100 (NOVOLOG) 100 unit/mL (3 mL) InPn pen Inject 0-5 Units into the skin before meals and at bedtime as needed (Hyperglycemia). (Patient not taking: Reported on 4/30/2024) 3 mL 0    levoFLOXacin (LEVAQUIN) 500 MG tablet Take 1 tablet (500 mg total) by mouth once daily. 4 tablet 0    levothyroxine (SYNTHROID) 50 MCG tablet Take 1 tablet (50 mcg total) by mouth before breakfast. 30 tablet 11    LIPITOR 40 mg tablet Take 1 tablet (40 mg total) by mouth once daily. 90 tablet 6    pantoprazole (PROTONIX) 20 MG tablet Take 40 mg by mouth once daily. (Patient not taking: Reported on 4/30/2024)      valsartan (DIOVAN) 160 MG tablet Take 1 tablet (160 mg total) by mouth once daily. 30 tablet 1    vitamin D 1000 units Tab Take 185 mg by mouth once daily. (Patient not taking: Reported on 4/30/2024)             Past History     Past Medical History:   Past Medical History:   Diagnosis Date    Abdominal hernia     Bacteremia 4/5/2023    CHF (congestive heart failure)     COPD (chronic obstructive pulmonary disease)     Diabetes mellitus, type 2 2/16/2022    GERD (gastroesophageal reflux disease)     History of home oxygen therapy     Hypertension     Hypertensive emergency 3/30/2023    Migraine headache     Pulmonary embolism 06/01/2017    Small bowel obstruction     Thyroid disease         Past Surgical History:   Past Surgical History:   Procedure Laterality Date    COLONOSCOPY      HYSTERECTOMY      INNER EAR SURGERY      UPPER GASTROINTESTINAL ENDOSCOPY          Family History:   No family history on file.     Social History:   Social History     Tobacco Use    Smoking status: Former    Smokeless tobacco: Never   Substance Use Topics    Alcohol use: No    Drug use: No        Allergies:   Review of patient's allergies  "indicates:   Allergen Reactions    Pcn [penicillins] Swelling          Review of Systems   Review of Systems   See HPI for pertinent positives and negatives       Physical Exam     Vitals:    06/20/24 1452 06/20/24 1617 06/20/24 1625   BP: (!) 113/55 (!) 110/56    Pulse: 70 82    Resp: 18  20   Temp: 98.7 °F (37.1 °C)     SpO2: 96%  95%   Weight: 102.5 kg (226 lb)     Height: 5' 4" (1.626 m)        Physical Exam  Vitals and nursing note reviewed.   Constitutional:       General: She is not in acute distress.     Appearance: Normal appearance. She is obese. She is ill-appearing.      Comments: Chronically ill-appearing obese female lying in bed   HENT:      Head: Normocephalic and atraumatic.      Right Ear: External ear normal.      Left Ear: External ear normal.      Nose: Nose normal. No congestion or rhinorrhea.      Mouth/Throat:      Mouth: Mucous membranes are moist.   Eyes:      Conjunctiva/sclera: Conjunctivae normal.      Pupils: Pupils are equal, round, and reactive to light.   Cardiovascular:      Rate and Rhythm: Normal rate and regular rhythm.   Pulmonary:      Effort: Pulmonary effort is normal. No respiratory distress.      Breath sounds: Normal breath sounds.   Abdominal:      Palpations: Abdomen is soft.   Musculoskeletal:         General: No deformity. Normal range of motion.      Cervical back: Normal range of motion. No rigidity.      Right lower leg: 3+ Pitting Edema present.      Left lower leg: 3+ Pitting Edema present.   Skin:     General: Skin is dry.   Neurological:      General: No focal deficit present.      Mental Status: She is alert and oriented to person, place, and time. Mental status is at baseline.   Psychiatric:         Mood and Affect: Mood normal.         Behavior: Behavior normal.              Diagnostic Study Results      Labs -   Recent Results (from the past 12 hour(s))   Basic Metabolic Panel    Collection Time: 06/20/24  3:15 PM   Result Value Ref Range    Sodium 138 136 - " 145 mmol/L    Potassium 4.6 3.5 - 5.1 mmol/L    Chloride 103 95 - 110 mmol/L    CO2 31 (H) 23 - 29 mmol/L    Glucose 89 70 - 110 mg/dL    BUN 20 8 - 23 mg/dL    Creatinine 1.3 0.5 - 1.4 mg/dL    Calcium 10.1 8.7 - 10.5 mg/dL    Anion Gap 4 3 - 11 mmol/L    eGFR 43.4 (A) >60 mL/min/1.73 m^2        Radiologic Studies -    No orders to display        Medications given in the ED-   Medications   furosemide injection 80 mg (80 mg Intravenous Given 6/20/24 1510)           Medical Decision Making    I am the first provider for this patient.     I reviewed the vital signs, available nursing notes, past medical history, past surgical history, family history and social history.     Vital Signs:  Reviewed the patient's vital signs.     Pulse Oximetry Analysis and Interpretation:    96% on Room Air, normal    External Test Results (Pertinent to encounter):    Records Reviewed: Nursing Notes, Current Prescription Medications, Old Medical Records, and External Medical Records     History Obtained By: Patient and Daughter    Provider Notes: Carmen Tan is a 74 y.o. female with lower extremity edema, hypervolemia    Co-morbidities Considered:  Medication noncompliance, CHF    Differential Diagnosis:  Hypovolemia, CHF exacerbation      ED Course:    4:44 PM  Patient with good response to IV Lasix.  Denies complaints.  Normal work of breathing on baseline nasal cannula.  No acute distress.  Appropriate for discharge at this time.  Advised close follow-up with primary care provider.  Reasons to return to ED discussed.    ED Course as of 06/20/24 1645   Thu Jun 20, 2024   1537 Sodium: 138 [MO]   1537 Potassium: 4.6 [MO]   1537 Creatinine: 1.3 [MO]      ED Course User Index  [MO] John Paul Rojas MD       Problems Addressed:  Edema    Procedures:   Procedures       Diagnosis and Disposition     Critical Care:      DISCHARGE NOTE:       Carmen Tan's  results have been reviewed with her.  She has been counseled regarding  her diagnosis, treatment, and plan.  She verbally conveys understanding and agreement of the signs, symptoms, diagnosis, treatment and prognosis and additionally agrees to follow up as discussed.  She also agrees with the care-plan and conveys that all of her questions have been answered.  I have also provided discharge instructions for her that include: educational information regarding their diagnosis and treatment, and list of reasons why they would want to return to the ED prior to their follow-up appointment, should her condition change. She has been provided with education for proper emergency department utilization.         CLINICAL IMPRESSION:         1. Edema, unspecified type    2. Congestive heart failure, unspecified HF chronicity, unspecified heart failure type              PLAN:   1. Discharge Home  2.      Medication List        ASK your doctor about these medications      acetaminophen 325 MG tablet  Commonly known as: TYLENOL  Take 2 tablets (650 mg total) by mouth every 8 (eight) hours as needed for Pain or Temperature greater than (100, headache).     albuterol-ipratropium 2.5 mg-0.5 mg/3 mL nebulizer solution  Commonly known as: DUO-NEB  Take 3 mLs by nebulization every 6 (six) hours while awake. Rescue     aspirin 81 MG EC tablet  Commonly known as: ECOTRIN  Take 1 tablet (81 mg total) by mouth once daily.     budesonide 0.5 mg/2 mL nebulizer solution  Commonly known as: PULMICORT  Take 2 mLs (0.5 mg total) by nebulization every 12 (twelve) hours. Controller     busPIRone 5 MG Tab  Commonly known as: BUSPAR  Take 1 tablet (5 mg total) by mouth 2 (two) times daily.     carvediloL 3.125 MG tablet  Commonly known as: COREG  Take 1 tablet (3.125 mg total) by mouth 2 (two) times daily.     furosemide 40 MG tablet  Commonly known as: LASIX  Take 1 tablet (40 mg total) by mouth once daily.     gabapentin 100 MG capsule  Commonly known as: NEURONTIN  Take 1 capsule (100 mg total) by mouth 3 (three)  times daily.     insulin aspart U-100 100 unit/mL (3 mL) Inpn pen  Commonly known as: NovoLOG  Inject 0-5 Units into the skin before meals and at bedtime as needed (Hyperglycemia).     levoFLOXacin 500 MG tablet  Commonly known as: LEVAQUIN  Take 1 tablet (500 mg total) by mouth once daily.     levothyroxine 50 MCG tablet  Commonly known as: SYNTHROID  Take 1 tablet (50 mcg total) by mouth before breakfast.     LIPITOR 40 mg tablet  Generic drug: atorvastatin  Take 1 tablet (40 mg total) by mouth once daily.     pantoprazole 20 MG tablet  Commonly known as: PROTONIX     valsartan 160 MG tablet  Commonly known as: DIOVAN  Take 1 tablet (160 mg total) by mouth once daily.     vitamin D 1000 units Tab  Commonly known as: VITAMIN D3             3. Lucian Barry MD  [address] [place] [id number]  [phone]    Schedule an appointment as soon as possible for a visit   Primary care follow up       _______________________________     Please note that this dictation was completed with [employer], the computer voice recognition software.  Quite often unanticipated grammatical, syntax, homophones, and other interpretive errors are inadvertently transcribed by the computer software.  Please disregard these errors.  Please excuse any errors that have escaped final proofreading.             John Paul Rojas MD  06/20/24 [id number]

## 2024-06-24 ENCOUNTER — HOSPITAL ENCOUNTER (EMERGENCY)
Facility: HOSPITAL | Age: 74
Discharge: HOME | End: 2024-06-24
Attending: EMERGENCY MEDICINE
Payer: MEDICARE

## 2024-06-24 VITALS
RESPIRATION RATE: 20 BRPM | SYSTOLIC BLOOD PRESSURE: 125 MMHG | TEMPERATURE: 98 F | DIASTOLIC BLOOD PRESSURE: 69 MMHG | OXYGEN SATURATION: 97 % | BODY MASS INDEX: 39.48 KG/M2 | WEIGHT: 230 LBS | HEART RATE: 70 BPM

## 2024-06-24 DIAGNOSIS — G89.29 CHRONIC MIDLINE LOW BACK PAIN WITH SCIATICA, SCIATICA LATERALITY UNSPECIFIED: Primary | ICD-10-CM

## 2024-06-24 DIAGNOSIS — M54.40 CHRONIC MIDLINE LOW BACK PAIN WITH SCIATICA, SCIATICA LATERALITY UNSPECIFIED: Primary | ICD-10-CM

## 2024-06-24 PROCEDURE — 63600175 PHARM REV CODE 636 W HCPCS: Performed by: NURSE PRACTITIONER

## 2024-06-24 PROCEDURE — 99284 EMERGENCY DEPT VISIT MOD MDM: CPT | Mod: 25

## 2024-06-24 PROCEDURE — 96372 THER/PROPH/DIAG INJ SC/IM: CPT | Performed by: NURSE PRACTITIONER

## 2024-06-24 RX ORDER — HYDROMORPHONE HYDROCHLORIDE 1 MG/ML
1 INJECTION, SOLUTION INTRAMUSCULAR; INTRAVENOUS; SUBCUTANEOUS
Status: COMPLETED | OUTPATIENT
Start: 2024-06-24 | End: 2024-06-24

## 2024-06-24 RX ADMIN — HYDROMORPHONE HYDROCHLORIDE 1 MG: 1 INJECTION, SOLUTION INTRAMUSCULAR; INTRAVENOUS; SUBCUTANEOUS at 01:06

## 2024-06-24 NOTE — ED PROVIDER NOTES
Encounter Date: 6/24/2024       History     Chief Complaint   Patient presents with    Back Pain     Pt to ED via EMS - complaints of worsening chronic lower back pain. Denied recnet injury/trauma.      Chronic low back pain with no new features or injury. No urinary issues or other complaints.     The history is provided by the patient.     Review of patient's allergies indicates:   Allergen Reactions    Pcn [penicillins] Swelling     Past Medical History:   Diagnosis Date    Abdominal hernia     Bacteremia 4/5/2023    CHF (congestive heart failure)     COPD (chronic obstructive pulmonary disease)     Diabetes mellitus, type 2 2/16/2022    GERD (gastroesophageal reflux disease)     History of home oxygen therapy     Hypertension     Hypertensive emergency 3/30/2023    Migraine headache     Pulmonary embolism 06/01/2017    Small bowel obstruction     Thyroid disease      Past Surgical History:   Procedure Laterality Date    COLONOSCOPY      HYSTERECTOMY      INNER EAR SURGERY      UPPER GASTROINTESTINAL ENDOSCOPY       No family history on file.  Social History     Tobacco Use    Smoking status: Former    Smokeless tobacco: Never   Substance Use Topics    Alcohol use: No    Drug use: No     Review of Systems   Musculoskeletal:  Positive for arthralgias and back pain.   All other systems reviewed and are negative.      Physical Exam     Initial Vitals   BP Pulse Resp Temp SpO2   06/24/24 1305 06/24/24 1305 06/24/24 1305 06/24/24 1312 06/24/24 1305   (!) 150/68 66 20 98.4 °F (36.9 °C) 99 %      MAP       --                Physical Exam    Nursing note and vitals reviewed.  Constitutional: She appears well-developed and well-nourished. She is Obese . She is cooperative. Nasal cannula in place.   HENT:   Head: Normocephalic and atraumatic.   Nose: Nose normal.   Eyes: Conjunctivae are normal.   Neck: Neck supple.   Normal range of motion.  Cardiovascular:  Normal rate and intact distal pulses.           Pulmonary/Chest:  "Breath sounds normal.   Abdominal: Abdomen is soft. Bowel sounds are normal.   Musculoskeletal:         General: Edema present.      Cervical back: Normal range of motion and neck supple.     Neurological: She is alert and oriented to person, place, and time. She has normal strength.   Skin: Skin is warm and dry. Capillary refill takes less than 2 seconds.   Psychiatric: She has a normal mood and affect. Her behavior is normal. Judgment and thought content normal.         ED Course   Procedures  Labs Reviewed - No data to display       Imaging Results    None          Medications   HYDROmorphone injection 1 mg (1 mg Intramuscular Given 6/24/24 1319)     Medical Decision Making  Chronic low back pain and "needs a shot or something". Has never seen orthopedics     Differential Dx: Low back pain. Lumbar radiculopathy, Strain, Chronic pain    Amount and/or Complexity of Data Reviewed  Discussion of management or test interpretation with external provider(s): She has Gabapentin and Tramadol at home. Will give an injection of Dilaudid and refer to ortho. May need an MRI as she reports has been going on for over 4 months. No recent trauma and recent lumbar spine xray reviewed. Follow-up with PCP and ortho or return to ER for new or worsening issues.     Risk  Prescription drug management.                                      Clinical Impression:  Final diagnoses:  [M54.40, G89.29] Chronic midline low back pain with sciatica, sciatica laterality unspecified (Primary)          ED Disposition Condition    Discharge Stable          ED Prescriptions    None       Follow-up Information       Follow up With Specialties Details Why Contact Info Additional Information    [facility] Orthopedics Schedule an appointment as soon as possible for a visit in 3 days As needed, If symptoms worsen, For further evaluation, Hospital follow-up [facility] Amari Penny  [facility] [address]  [facility] [address] [phone] [address]      "        Fallon, Gene, NP  06/24/24 7391

## 2024-06-25 ENCOUNTER — HOSPITAL ENCOUNTER (EMERGENCY)
Facility: HOSPITAL | Age: 74
Discharge: HOME | End: 2024-06-25
Attending: EMERGENCY MEDICINE
Payer: MEDICARE

## 2024-06-25 VITALS
SYSTOLIC BLOOD PRESSURE: 152 MMHG | RESPIRATION RATE: 18 BRPM | TEMPERATURE: 98 F | OXYGEN SATURATION: 98 % | DIASTOLIC BLOOD PRESSURE: 75 MMHG | WEIGHT: 230.69 LBS | HEART RATE: 70 BPM | HEIGHT: 64 IN | BODY MASS INDEX: 39.38 KG/M2

## 2024-06-25 DIAGNOSIS — M54.42 CHRONIC MIDLINE LOW BACK PAIN WITH LEFT-SIDED SCIATICA: Primary | ICD-10-CM

## 2024-06-25 DIAGNOSIS — G89.29 CHRONIC MIDLINE LOW BACK PAIN WITH LEFT-SIDED SCIATICA: Primary | ICD-10-CM

## 2024-06-25 PROCEDURE — 63600175 PHARM REV CODE 636 W HCPCS: Performed by: EMERGENCY MEDICINE

## 2024-06-25 PROCEDURE — 96372 THER/PROPH/DIAG INJ SC/IM: CPT | Performed by: EMERGENCY MEDICINE

## 2024-06-25 PROCEDURE — 99284 EMERGENCY DEPT VISIT MOD MDM: CPT | Mod: 25

## 2024-06-25 RX ORDER — HYDROMORPHONE HYDROCHLORIDE 1 MG/ML
0.5 INJECTION, SOLUTION INTRAMUSCULAR; INTRAVENOUS; SUBCUTANEOUS
Status: COMPLETED | OUTPATIENT
Start: 2024-06-25 | End: 2024-06-25

## 2024-06-25 RX ADMIN — HYDROMORPHONE HYDROCHLORIDE 0.5 MG: 1 INJECTION, SOLUTION INTRAMUSCULAR; INTRAVENOUS; SUBCUTANEOUS at 03:06

## 2024-06-25 NOTE — ED PROVIDER NOTES
Encounter Date: 6/25/2024       History     Chief Complaint   Patient presents with    Back Pain     Pt to ER with lower back pain.  Pt seen in ER yesterday, pt states pain came back this morning     75 yo female with history as below here with chronic low back pain. No new feature. No fever. No trauma. No urinary complaint. No new feature.       Review of patient's allergies indicates:   Allergen Reactions    Pcn [penicillins] Swelling     Past Medical History:   Diagnosis Date    Abdominal hernia     Bacteremia 04/05/2023    CHF (congestive heart failure)     COPD (chronic obstructive pulmonary disease)     Diabetes mellitus, type 2 02/16/2022    GERD (gastroesophageal reflux disease)     History of home oxygen therapy     Hypertension     Hypertensive emergency 03/30/2023    Migraine headache     On home O2     Pulmonary embolism 06/01/2017    Small bowel obstruction     Thyroid disease      Past Surgical History:   Procedure Laterality Date    COLONOSCOPY      HYSTERECTOMY      INNER EAR SURGERY      UPPER GASTROINTESTINAL ENDOSCOPY       No family history on file.  Social History     Tobacco Use    Smoking status: Former    Smokeless tobacco: Never   Substance Use Topics    Alcohol use: No    Drug use: No     Review of Systems   Constitutional: Negative.    Respiratory: Negative.     Cardiovascular: Negative.    Gastrointestinal: Negative.    Musculoskeletal:  Positive for back pain.   All other systems reviewed and are negative.      Physical Exam     Initial Vitals [06/25/24 1445]   BP Pulse Resp Temp SpO2   135/75 70 20 98.2 °F (36.8 °C) 99 %      MAP       --         Physical Exam    Nursing note and vitals reviewed.  Constitutional: She is not diaphoretic. No distress.   HENT:   Head: Normocephalic and atraumatic.   Eyes: EOM are normal. Pupils are equal, round, and reactive to light.   Neck: Neck supple.   Normal range of motion.  Cardiovascular:  Normal rate, regular rhythm and intact distal pulses.            Pulmonary/Chest: Breath sounds normal. No respiratory distress. She has no wheezes. She has no rales.   Abdominal: Abdomen is soft. Bowel sounds are normal. She exhibits no distension. There is no abdominal tenderness. There is no rebound.   Musculoskeletal:         General: No tenderness or edema. Normal range of motion.      Cervical back: Normal range of motion and neck supple.     Neurological: She is alert and oriented to person, place, and time. She has normal strength and normal reflexes. GCS score is 15. GCS eye subscore is 4. GCS verbal subscore is 5. GCS motor subscore is 6.   Skin: Skin is warm and dry. Capillary refill takes less than 2 seconds.         ED Course   Procedures  Labs Reviewed - No data to display       Imaging Results    None          Medications   HYDROmorphone injection 0.5 mg (0.5 mg Subcutaneous Given 6/25/24 1508)     Medical Decision Making  Chronic pain. No new features or complaints. Counseled to seek pain management and follow up with PCP.     Problems Addressed:  Chronic midline low back pain with left-sided sciatica: chronic illness or injury    Risk  Prescription drug management.                                      Clinical Impression:  Final diagnoses:  [M54.42, G89.29] Chronic midline low back pain with left-sided sciatica (Primary)          ED Disposition Condition    Discharge Stable          ED Prescriptions    None       Follow-up Information       Follow up With Specialties Details Why Contact Info    Lucian Barry MD Internal Medicine Schedule an appointment as soon as possible for a visit   [address] [place] [id number]  [phone]               Alfonzo Stephen MD  06/27/24 7194

## 2024-07-01 ENCOUNTER — HOSPITAL ENCOUNTER (OUTPATIENT)
Dept: RADIOLOGY | Facility: HOSPITAL | Age: 74
Discharge: HOME | End: 2024-07-01
Attending: INTERNAL MEDICINE
Payer: MEDICARE

## 2024-07-01 DIAGNOSIS — M54.50 LOW BACK PAIN: Primary | ICD-10-CM

## 2024-07-01 DIAGNOSIS — M54.50 LOW BACK PAIN: ICD-10-CM

## 2024-07-01 PROCEDURE — 72100 X-RAY EXAM L-S SPINE 2/3 VWS: CPT | Mod: TC

## 2024-07-28 ENCOUNTER — HOSPITAL ENCOUNTER (INPATIENT)
Facility: HOSPITAL | Age: 74
LOS: 3 days | Discharge: HOME HEALTH | DRG: 291 | End: 2024-08-01
Attending: EMERGENCY MEDICINE | Admitting: EMERGENCY MEDICINE
Payer: MEDICARE

## 2024-07-28 DIAGNOSIS — J96.22 ACUTE ON CHRONIC RESPIRATORY FAILURE WITH HYPOXIA AND HYPERCAPNIA: ICD-10-CM

## 2024-07-28 DIAGNOSIS — R06.02 SHORTNESS OF BREATH: ICD-10-CM

## 2024-07-28 DIAGNOSIS — J96.21 ACUTE ON CHRONIC RESPIRATORY FAILURE WITH HYPOXIA AND HYPERCAPNIA: ICD-10-CM

## 2024-07-28 DIAGNOSIS — I50.9 ACUTE ON CHRONIC CONGESTIVE HEART FAILURE, UNSPECIFIED HEART FAILURE TYPE: Primary | ICD-10-CM

## 2024-07-28 DIAGNOSIS — I50.33 ACUTE ON CHRONIC DIASTOLIC CONGESTIVE HEART FAILURE: ICD-10-CM

## 2024-07-28 DIAGNOSIS — I50.9 HEART FAILURE: ICD-10-CM

## 2024-07-28 LAB
ALBUMIN SERPL BCP-MCNC: 2.8 G/DL (ref 3.5–5.2)
ALP SERPL-CCNC: 207 U/L (ref 55–135)
ALT SERPL W/O P-5'-P-CCNC: 46 U/L (ref 10–44)
ANION GAP SERPL CALC-SCNC: 3 MMOL/L (ref 3–11)
AST SERPL-CCNC: 46 U/L (ref 10–40)
BASOPHILS # BLD AUTO: 0.01 K/UL (ref 0–0.2)
BASOPHILS NFR BLD: 0.1 % (ref 0–1.9)
BILIRUB SERPL-MCNC: 0.5 MG/DL (ref 0.1–1)
BUN SERPL-MCNC: 22 MG/DL (ref 8–23)
CALCIUM SERPL-MCNC: 9.6 MG/DL (ref 8.7–10.5)
CHLORIDE SERPL-SCNC: 109 MMOL/L (ref 95–110)
CO2 SERPL-SCNC: 33 MMOL/L (ref 23–29)
CREAT SERPL-MCNC: 1.3 MG/DL (ref 0.5–1.4)
DIFFERENTIAL METHOD BLD: ABNORMAL
EOSINOPHIL # BLD AUTO: 0.1 K/UL (ref 0–0.5)
EOSINOPHIL NFR BLD: 1.5 % (ref 0–8)
ERYTHROCYTE [DISTWIDTH] IN BLOOD BY AUTOMATED COUNT: 17.5 % (ref 11.5–14.5)
EST. GFR  (NO RACE VARIABLE): 43.4 ML/MIN/1.73 M^2
FIO2: 36 %
GLUCOSE SERPL-MCNC: 97 MG/DL (ref 70–110)
HCT VFR BLD AUTO: 39.2 % (ref 37–48.5)
HGB BLD-MCNC: 12.2 G/DL (ref 12–16)
IMM GRANULOCYTES # BLD AUTO: 0.03 K/UL (ref 0–0.04)
IMM GRANULOCYTES NFR BLD AUTO: 0.4 % (ref 0–0.5)
LPM: 4
LYMPHOCYTES # BLD AUTO: 1.2 K/UL (ref 1–4.8)
LYMPHOCYTES NFR BLD: 16.6 % (ref 18–48)
Lab: ABNORMAL
MCH RBC QN AUTO: 28.7 PG (ref 27–31)
MCHC RBC AUTO-ENTMCNC: 31.1 G/DL (ref 32–36)
MCV RBC AUTO: 92 FL (ref 82–98)
MODIFIED ALLEN'S TEST: ABNORMAL
MONOCYTES # BLD AUTO: 0.8 K/UL (ref 0.3–1)
MONOCYTES NFR BLD: 10.6 % (ref 4–15)
NEUTROPHILS # BLD AUTO: 5.1 K/UL (ref 1.8–7.7)
NEUTROPHILS NFR BLD: 70.8 % (ref 38–73)
NOTIFIED BY: ABNORMAL
NRBC BLD-RTO: 0 /100 WBC
NT-PROBNP SERPL-MCNC: 3782 PG/ML (ref 5–900)
O2DEVICE: ABNORMAL
PCO2 BLDA: 62.7 MMHG (ref 35–45)
PH SMN: 7.31 [PH] (ref 7.34–7.45)
PLATELET # BLD AUTO: 189 K/UL (ref 150–450)
PMV BLD AUTO: 9.7 FL (ref 9.2–12.9)
PO2 BLDA: 61.1 MMHG (ref 80–100)
POC BASE DEFICIT: 5.5 MMOL/L (ref -2–2)
POC HCO3: 27.7 MMOL/L (ref 24–28)
POC NOTIFIED NOTE: ABNORMAL
POC PERFORMED BY: ABNORMAL
POC SATURATED O2: 86.9 % (ref 95–100)
POC TEMPERATURE: 37 C
POTASSIUM SERPL-SCNC: 4.3 MMOL/L (ref 3.5–5.1)
PROT SERPL-MCNC: 6.5 G/DL (ref 6–8.4)
PROVIDER NOTIFIED: ABNORMAL
RBC # BLD AUTO: 4.25 M/UL (ref 4–5.4)
SODIUM SERPL-SCNC: 145 MMOL/L (ref 136–145)
SPECIMEN SOURCE: ABNORMAL
TROPONIN I SERPL DL<=0.01 NG/ML-MCNC: 17.9 PG/ML (ref 0–60)
WBC # BLD AUTO: 7.27 K/UL (ref 3.9–12.7)

## 2024-07-28 PROCEDURE — 36600 WITHDRAWAL OF ARTERIAL BLOOD: CPT

## 2024-07-28 PROCEDURE — 82803 BLOOD GASES ANY COMBINATION: CPT

## 2024-07-28 PROCEDURE — 85025 COMPLETE CBC W/AUTO DIFF WBC: CPT | Performed by: EMERGENCY MEDICINE

## 2024-07-28 PROCEDURE — 36415 COLL VENOUS BLD VENIPUNCTURE: CPT | Performed by: EMERGENCY MEDICINE

## 2024-07-28 PROCEDURE — 80053 COMPREHEN METABOLIC PANEL: CPT | Performed by: EMERGENCY MEDICINE

## 2024-07-28 PROCEDURE — 25000242 PHARM REV CODE 250 ALT 637 W/ HCPCS: Performed by: EMERGENCY MEDICINE

## 2024-07-28 PROCEDURE — 94640 AIRWAY INHALATION TREATMENT: CPT

## 2024-07-28 PROCEDURE — 99900031 HC PATIENT EDUCATION (STAT)

## 2024-07-28 PROCEDURE — 99900035 HC TECH TIME PER 15 MIN (STAT)

## 2024-07-28 PROCEDURE — 83880 ASSAY OF NATRIURETIC PEPTIDE: CPT | Performed by: EMERGENCY MEDICINE

## 2024-07-28 PROCEDURE — 99285 EMERGENCY DEPT VISIT HI MDM: CPT | Mod: 25

## 2024-07-28 PROCEDURE — 83036 HEMOGLOBIN GLYCOSYLATED A1C: CPT | Performed by: EMERGENCY MEDICINE

## 2024-07-28 PROCEDURE — 84484 ASSAY OF TROPONIN QUANT: CPT | Performed by: EMERGENCY MEDICINE

## 2024-07-28 RX ORDER — IPRATROPIUM BROMIDE AND ALBUTEROL SULFATE 2.5; .5 MG/3ML; MG/3ML
3 SOLUTION RESPIRATORY (INHALATION)
Status: COMPLETED | OUTPATIENT
Start: 2024-07-28 | End: 2024-07-28

## 2024-07-28 RX ADMIN — IPRATROPIUM BROMIDE AND ALBUTEROL SULFATE 3 ML: 2.5; .5 SOLUTION RESPIRATORY (INHALATION) at 10:07

## 2024-07-29 ENCOUNTER — CLINICAL SUPPORT (OUTPATIENT)
Dept: CARDIOLOGY | Facility: HOSPITAL | Age: 74
End: 2024-07-29
Attending: EMERGENCY MEDICINE
Payer: MEDICARE

## 2024-07-29 PROBLEM — I50.33 ACUTE ON CHRONIC DIASTOLIC CONGESTIVE HEART FAILURE: Status: ACTIVE | Noted: 2022-02-16

## 2024-07-29 LAB
ALBUMIN SERPL BCP-MCNC: 2.5 G/DL (ref 3.5–5.2)
ALP SERPL-CCNC: 172 U/L (ref 55–135)
ALT SERPL W/O P-5'-P-CCNC: 39 U/L (ref 10–44)
ANION GAP SERPL CALC-SCNC: 2 MMOL/L (ref 3–11)
AST SERPL-CCNC: 32 U/L (ref 10–40)
BASOPHILS # BLD AUTO: 0.01 K/UL (ref 0–0.2)
BASOPHILS NFR BLD: 0.2 % (ref 0–1.9)
BILIRUB SERPL-MCNC: 0.4 MG/DL (ref 0.1–1)
BUN SERPL-MCNC: 21 MG/DL (ref 8–23)
CALCIUM SERPL-MCNC: 9.5 MG/DL (ref 8.7–10.5)
CHLORIDE SERPL-SCNC: 110 MMOL/L (ref 95–110)
CO2 SERPL-SCNC: 34 MMOL/L (ref 23–29)
CREAT SERPL-MCNC: 1.2 MG/DL (ref 0.5–1.4)
DIFFERENTIAL METHOD BLD: ABNORMAL
EOSINOPHIL # BLD AUTO: 0.1 K/UL (ref 0–0.5)
EOSINOPHIL NFR BLD: 1.1 % (ref 0–8)
ERYTHROCYTE [DISTWIDTH] IN BLOOD BY AUTOMATED COUNT: 17.4 % (ref 11.5–14.5)
EST. GFR  (NO RACE VARIABLE): 47.8 ML/MIN/1.73 M^2
ESTIMATED AVG GLUCOSE: 105 MG/DL (ref 68–131)
GLUCOSE SERPL-MCNC: 94 MG/DL (ref 70–110)
HBA1C MFR BLD: 5.3 % (ref 4–5.6)
HCT VFR BLD AUTO: 36.9 % (ref 37–48.5)
HGB BLD-MCNC: 11.5 G/DL (ref 12–16)
IMM GRANULOCYTES # BLD AUTO: 0.02 K/UL (ref 0–0.04)
IMM GRANULOCYTES NFR BLD AUTO: 0.3 % (ref 0–0.5)
LYMPHOCYTES # BLD AUTO: 1.3 K/UL (ref 1–4.8)
LYMPHOCYTES NFR BLD: 20.2 % (ref 18–48)
MCH RBC QN AUTO: 29 PG (ref 27–31)
MCHC RBC AUTO-ENTMCNC: 31.2 G/DL (ref 32–36)
MCV RBC AUTO: 93 FL (ref 82–98)
MONOCYTES # BLD AUTO: 0.6 K/UL (ref 0.3–1)
MONOCYTES NFR BLD: 9.6 % (ref 4–15)
NEUTROPHILS # BLD AUTO: 4.4 K/UL (ref 1.8–7.7)
NEUTROPHILS NFR BLD: 68.6 % (ref 38–73)
NRBC BLD-RTO: 0 /100 WBC
PLATELET # BLD AUTO: 177 K/UL (ref 150–450)
PMV BLD AUTO: 9.8 FL (ref 9.2–12.9)
POTASSIUM SERPL-SCNC: 4.2 MMOL/L (ref 3.5–5.1)
PROT SERPL-MCNC: 6 G/DL (ref 6–8.4)
RBC # BLD AUTO: 3.96 M/UL (ref 4–5.4)
SODIUM SERPL-SCNC: 146 MMOL/L (ref 136–145)
WBC # BLD AUTO: 6.35 K/UL (ref 3.9–12.7)

## 2024-07-29 PROCEDURE — 94660 CPAP INITIATION&MGMT: CPT

## 2024-07-29 PROCEDURE — 63600175 PHARM REV CODE 636 W HCPCS: Performed by: EMERGENCY MEDICINE

## 2024-07-29 PROCEDURE — 27000221 HC OXYGEN, UP TO 24 HOURS

## 2024-07-29 PROCEDURE — 97166 OT EVAL MOD COMPLEX 45 MIN: CPT

## 2024-07-29 PROCEDURE — 25000242 PHARM REV CODE 250 ALT 637 W/ HCPCS: Performed by: EMERGENCY MEDICINE

## 2024-07-29 PROCEDURE — 93306 TTE W/DOPPLER COMPLETE: CPT

## 2024-07-29 PROCEDURE — 94761 N-INVAS EAR/PLS OXIMETRY MLT: CPT

## 2024-07-29 PROCEDURE — 85025 COMPLETE CBC W/AUTO DIFF WBC: CPT | Performed by: EMERGENCY MEDICINE

## 2024-07-29 PROCEDURE — 25000242 PHARM REV CODE 250 ALT 637 W/ HCPCS: Performed by: INTERNAL MEDICINE

## 2024-07-29 PROCEDURE — 80053 COMPREHEN METABOLIC PANEL: CPT | Performed by: EMERGENCY MEDICINE

## 2024-07-29 PROCEDURE — 94640 AIRWAY INHALATION TREATMENT: CPT

## 2024-07-29 PROCEDURE — 36415 COLL VENOUS BLD VENIPUNCTURE: CPT | Performed by: EMERGENCY MEDICINE

## 2024-07-29 PROCEDURE — 25000003 PHARM REV CODE 250: Performed by: EMERGENCY MEDICINE

## 2024-07-29 PROCEDURE — 63600175 PHARM REV CODE 636 W HCPCS: Performed by: INTERNAL MEDICINE

## 2024-07-29 PROCEDURE — 25000003 PHARM REV CODE 250: Performed by: INTERNAL MEDICINE

## 2024-07-29 PROCEDURE — 21400001 HC TELEMETRY ROOM

## 2024-07-29 PROCEDURE — 99900035 HC TECH TIME PER 15 MIN (STAT)

## 2024-07-29 PROCEDURE — 27000190 HC CPAP FULL FACE MASK W/VALVE

## 2024-07-29 PROCEDURE — 99900031 HC PATIENT EDUCATION (STAT)

## 2024-07-29 PROCEDURE — 5A09357 ASSISTANCE WITH RESPIRATORY VENTILATION, LESS THAN 24 CONSECUTIVE HOURS, CONTINUOUS POSITIVE AIRWAY PRESSURE: ICD-10-PCS | Performed by: EMERGENCY MEDICINE

## 2024-07-29 PROCEDURE — 27100171 HC OXYGEN HIGH FLOW UP TO 24 HOURS

## 2024-07-29 RX ORDER — VALSARTAN 40 MG/1
160 TABLET ORAL DAILY
Status: DISCONTINUED | OUTPATIENT
Start: 2024-07-29 | End: 2024-08-01 | Stop reason: HOSPADM

## 2024-07-29 RX ORDER — LEVOTHYROXINE SODIUM 50 UG/1
50 TABLET ORAL
Status: DISCONTINUED | OUTPATIENT
Start: 2024-07-29 | End: 2024-08-01 | Stop reason: HOSPADM

## 2024-07-29 RX ORDER — ATORVASTATIN CALCIUM 40 MG/1
40 TABLET, FILM COATED ORAL DAILY
Status: DISCONTINUED | OUTPATIENT
Start: 2024-07-29 | End: 2024-08-01 | Stop reason: HOSPADM

## 2024-07-29 RX ORDER — CARVEDILOL 3.12 MG/1
3.12 TABLET ORAL 2 TIMES DAILY
Status: DISCONTINUED | OUTPATIENT
Start: 2024-07-29 | End: 2024-08-01 | Stop reason: HOSPADM

## 2024-07-29 RX ORDER — ONDANSETRON HYDROCHLORIDE 2 MG/ML
4 INJECTION, SOLUTION INTRAVENOUS EVERY 8 HOURS PRN
Status: DISCONTINUED | OUTPATIENT
Start: 2024-07-29 | End: 2024-08-01 | Stop reason: HOSPADM

## 2024-07-29 RX ORDER — BUDESONIDE 0.5 MG/2ML
0.5 INHALANT ORAL EVERY 12 HOURS
Status: DISCONTINUED | OUTPATIENT
Start: 2024-07-29 | End: 2024-08-01 | Stop reason: HOSPADM

## 2024-07-29 RX ORDER — TALC
6 POWDER (GRAM) TOPICAL NIGHTLY PRN
Status: DISCONTINUED | OUTPATIENT
Start: 2024-07-29 | End: 2024-08-01 | Stop reason: HOSPADM

## 2024-07-29 RX ORDER — FAMOTIDINE 20 MG/1
20 TABLET, FILM COATED ORAL DAILY
Status: DISCONTINUED | OUTPATIENT
Start: 2024-07-29 | End: 2024-08-01 | Stop reason: HOSPADM

## 2024-07-29 RX ORDER — ASPIRIN 81 MG/1
81 TABLET ORAL DAILY
Status: DISCONTINUED | OUTPATIENT
Start: 2024-07-29 | End: 2024-08-01 | Stop reason: HOSPADM

## 2024-07-29 RX ORDER — IPRATROPIUM BROMIDE AND ALBUTEROL SULFATE 2.5; .5 MG/3ML; MG/3ML
3 SOLUTION RESPIRATORY (INHALATION) EVERY 4 HOURS PRN
Status: DISCONTINUED | OUTPATIENT
Start: 2024-07-29 | End: 2024-08-01 | Stop reason: HOSPADM

## 2024-07-29 RX ORDER — SODIUM CHLORIDE 0.9 % (FLUSH) 0.9 %
10 SYRINGE (ML) INJECTION
Status: DISCONTINUED | OUTPATIENT
Start: 2024-07-29 | End: 2024-08-01 | Stop reason: HOSPADM

## 2024-07-29 RX ORDER — ACETAMINOPHEN 325 MG/1
650 TABLET ORAL EVERY 8 HOURS PRN
Status: DISCONTINUED | OUTPATIENT
Start: 2024-07-29 | End: 2024-08-01 | Stop reason: HOSPADM

## 2024-07-29 RX ADMIN — IPRATROPIUM BROMIDE AND ALBUTEROL SULFATE 3 ML: 2.5; .5 SOLUTION RESPIRATORY (INHALATION) at 05:07

## 2024-07-29 RX ADMIN — BUDESONIDE 0.5 MG: 0.5 INHALANT RESPIRATORY (INHALATION) at 07:07

## 2024-07-29 RX ADMIN — FUROSEMIDE 10 MG/HR: 10 INJECTION, SOLUTION INTRAMUSCULAR; INTRAVENOUS at 05:07

## 2024-07-29 RX ADMIN — Medication 6 MG: at 02:07

## 2024-07-29 RX ADMIN — ASPIRIN 81 MG: 81 TABLET, COATED ORAL at 08:07

## 2024-07-29 RX ADMIN — IPRATROPIUM BROMIDE AND ALBUTEROL SULFATE 3 ML: 2.5; .5 SOLUTION RESPIRATORY (INHALATION) at 07:07

## 2024-07-29 RX ADMIN — LEVOTHYROXINE SODIUM 50 MCG: 50 TABLET ORAL at 06:07

## 2024-07-29 RX ADMIN — VALSARTAN 160 MG: 40 TABLET, FILM COATED ORAL at 08:07

## 2024-07-29 RX ADMIN — FAMOTIDINE 20 MG: 20 TABLET, FILM COATED ORAL at 08:07

## 2024-07-29 RX ADMIN — Medication 6 MG: at 08:07

## 2024-07-29 RX ADMIN — FUROSEMIDE 10 MG/HR: 10 INJECTION, SOLUTION INTRAMUSCULAR; INTRAVENOUS at 12:07

## 2024-07-29 RX ADMIN — ATORVASTATIN CALCIUM 40 MG: 40 TABLET, FILM COATED ORAL at 08:07

## 2024-07-29 RX ADMIN — CARVEDILOL 3.12 MG: 3.12 TABLET, FILM COATED ORAL at 08:07

## 2024-07-29 NOTE — ED PROVIDER NOTES
"Encounter Date: 7/28/2024       History     Chief Complaint   Patient presents with    Shortness of Breath     Pt to ED complaining of SOB and bilateral lower extremity edema. Hx of CHF. Pt says she is compliant with medications     74 year female with a history of CHF, COPD, diabetes, hypertension, on home oxygen presents the ER with gradually worsening shortness of breath since earlier today, history of this multiple times in the past.  Patient states "I have too much fluid".  Denies chest pain.  No nausea vomiting diarrhea.  Not ill appearing, no fever.  No sinus congestion.      Review of patient's allergies indicates:   Allergen Reactions    Pcn [penicillins] Swelling     Past Medical History:   Diagnosis Date    Abdominal hernia     Bacteremia 04/05/2023    CHF (congestive heart failure)     COPD (chronic obstructive pulmonary disease)     Diabetes mellitus, type 2 02/16/2022    GERD (gastroesophageal reflux disease)     History of home oxygen therapy     Hypertension     Hypertensive emergency 03/30/2023    Migraine headache     On home O2     Pulmonary embolism 06/01/2017    Small bowel obstruction     Thyroid disease      Past Surgical History:   Procedure Laterality Date    COLONOSCOPY      HYSTERECTOMY      INNER EAR SURGERY      UPPER GASTROINTESTINAL ENDOSCOPY       No family history on file.  Social History     Tobacco Use    Smoking status: Former    Smokeless tobacco: Never   Substance Use Topics    Alcohol use: No    Drug use: No     Review of Systems   Constitutional:  Negative for fever.   HENT:  Negative for sore throat.    Respiratory:  Positive for shortness of breath.    Cardiovascular:  Positive for leg swelling. Negative for chest pain.   Gastrointestinal:  Negative for nausea.   Genitourinary:  Negative for dysuria.   Musculoskeletal:  Negative for back pain.   Skin:  Negative for rash.   Neurological:  Negative for weakness.   Hematological:  Does not bruise/bleed " easily.   All other systems reviewed and are negative.      Physical Exam     Initial Vitals   BP Pulse Resp Temp SpO2   07/28/24 2234 07/28/24 2233 07/28/24 2231 07/28/24 2233 07/28/24 2231   (!) 168/79 93 (!) 24 98.8 °F (37.1 °C) (!) 78 %      MAP       --                Physical Exam    Nursing note and vitals reviewed.  Constitutional: She appears well-developed and well-nourished. She is not diaphoretic. No distress.   HENT:   Head: Normocephalic and atraumatic.   Mouth/Throat: Oropharynx is clear and moist.   Eyes: Conjunctivae and EOM are normal. Pupils are equal, round, and reactive to light.   Neck: Neck supple. No tracheal deviation present. No JVD present.   Normal range of motion.  Cardiovascular:  Normal rate, regular rhythm, normal heart sounds and intact distal pulses.           No murmur heard.  Pulmonary/Chest: No stridor. No respiratory distress. She has wheezes. She has no rhonchi. She has rales. She exhibits no tenderness.   Abdominal: Abdomen is soft. Bowel sounds are normal. She exhibits no distension. There is no abdominal tenderness. There is no rebound and no guarding.   Musculoskeletal:         General: Edema present. No tenderness. Normal range of motion.      Cervical back: Normal range of motion and neck supple.     Neurological: She is alert and oriented to person, place, and time. She has normal strength. No cranial nerve deficit. GCS score is 15. GCS eye subscore is 4. GCS verbal subscore is 5. GCS motor subscore is 6.   Skin: Skin is warm and dry. Capillary refill takes less than 2 seconds.         ED Course   Procedures  Labs Reviewed   CBC W/ AUTO DIFFERENTIAL - Abnormal       Result Value    WBC 7.27      RBC 4.25      Hemoglobin 12.2      Hematocrit 39.2      MCV 92      MCH 28.7      MCHC 31.1 (*)     RDW 17.5 (*)     Platelets 189      MPV 9.7      Immature Granulocytes 0.4      Gran # (ANC) 5.1      Immature Grans (Abs) 0.03      Lymph # 1.2      Mono # 0.8      Eos # 0.1       Baso # 0.01      nRBC 0      Gran % 70.8      Lymph % 16.6 (*)     Mono % 10.6      Eosinophil % 1.5      Basophil % 0.1      Differential Method Automated     COMPREHENSIVE METABOLIC PANEL - Abnormal    Sodium 145      Potassium 4.3      Chloride 109      CO2 33 (*)     Glucose 97      BUN 22      Creatinine 1.3      Calcium 9.6      Total Protein 6.5      Albumin 2.8 (*)     Total Bilirubin 0.5      Alkaline Phosphatase 207 (*)     AST 46 (*)     ALT 46 (*)     eGFR 43.4 (*)     Anion Gap 3     NT-PRO NATRIURETIC PEPTIDE - Abnormal    NT-proBNP 3782 (*)    TROPONIN I HIGH SENSITIVITY    Troponin I High Sensitivity 17.9            Imaging Results              X-Ray Chest 1 View (In process)  Result time 07/28/24 22:56:45                     Medications   furosemide (Lasix) 200 mg in 0.9% NaCl SolP 100 mL continuous infusion (conc: 2 mg/mL) (has no administration in time range)   albuterol-ipratropium 2.5 mg-0.5 mg/3 mL nebulizer solution 3 mL (3 mLs Nebulization Given 7/28/24 2245)     Medical Decision Making  Amount and/or Complexity of Data Reviewed  Labs: ordered.  Radiology: ordered.    Risk  Prescription drug management.  Decision regarding hospitalization.               ED Course as of 07/29/24 0026   Sun Jul 28, 2024   2310 Chest x-ray with cardiomegaly [SD]   Mon Jul 29, 2024   0025 Discussed case with  - will admit due to original oxygen saturation being 76%, BiPAP therapy if needed, Lasix drip.  Improved after oxygen therapy [SD]      ED Course User Index  [SD] Deon Osorio MD               Medical Decision Making:   Differential Diagnosis:   Acute on chronic congestive heart failure, COPD, shortness of breath, pulmonary edema             Clinical Impression:  Final diagnoses:  [R06.02] Shortness of breath  [I50.9] Acute on chronic congestive heart failure, unspecified heart failure type (Primary)  [J96.21, J96.22] Acute on chronic respiratory failure with hypoxia and hypercapnia           ED Disposition Condition    Admit Stable                Deon Osorio MD  07/29/24 0011       Deon Osorio MD  07/29/24 0026

## 2024-07-29 NOTE — ASSESSMENT & PLAN NOTE
Body mass index is 39.48 kg/m². Morbid obesity complicates all aspects of disease management from diagnostic modalities to treatment. Weight loss encouraged and health benefits explained to patient.

## 2024-07-29 NOTE — PLAN OF CARE
POC reviewed w/ pt and purposeful rounding ongoing. IV lasix infusion continued per orders. PRN breathing treatment given by respiratory x1 for SOB w/ relief obtained. VSS on continuous BIPAP; pt does we 4L NC when eating. Pt denies needs at this time.     Problem: Adult Inpatient Plan of Care  Goal: Plan of Care Review  Outcome: Progressing  Goal: Patient-Specific Goal (Individualized)  Outcome: Progressing  Goal: Absence of Hospital-Acquired Illness or Injury  Outcome: Progressing  Goal: Optimal Comfort and Wellbeing  Outcome: Progressing  Goal: Readiness for Transition of Care  Outcome: Progressing     Problem: Skin Injury Risk Increased  Goal: Skin Health and Integrity  Outcome: Progressing     Problem: Diabetes Comorbidity  Goal: Blood Glucose Level Within Targeted Range  Outcome: Progressing

## 2024-07-29 NOTE — PT/OT/SLP PROGRESS
Physical Therapy      Patient Name:  Carmen Tan   MRN:  [id number]    Patient not seen today secondary to Nurse/ RICARDO hold. Will follow-up 7/29/24.

## 2024-07-29 NOTE — ASSESSMENT & PLAN NOTE
"Patient's FSGs are controlled on current medication regimen.  Last A1c reviewed-   Lab Results   Component Value Date    HGBA1C 5.3 07/28/2024     Most recent fingerstick glucose reviewed- No results for input(s): "POCTGLUCOSE" in the last 24 hours.  Current correctional scale  Low  Maintain anti-hyperglycemic dose as follows-   Antihyperglycemics (From admission, onward)      None          Hold Oral hypoglycemics while patient is in the hospital.  "

## 2024-07-29 NOTE — ASSESSMENT & PLAN NOTE
"Patient is identified as having Diastolic (HFpEF) heart failure that is Acute on chronic. CHF is currently uncontrolled due to Continued edema of extremities. Latest ECHO performed and demonstrates- Results for orders placed during the hospital encounter of 04/21/24    Echo    Interpretation Summary    Left Ventricle: The left ventricle is normal in size. Mildly increased wall thickness. There is normal systolic function with a visually estimated ejection fraction of 60 - 65%. Grade I diastolic dysfunction.    Right Ventricle: Normal right ventricular cavity size. Systolic function is normal.    Left Atrium: Left atrium is severely dilated.    Right Atrium: Right atrium is moderately dilated.    Aortic Valve: The aortic valve is a trileaflet valve. There is mild stenosis. Aortic valve area by VTI is 1.92 cm². Aortic valve peak velocity is 2.00 m/s. Mean gradient is 8 mmHg. The dimensionless index is 0.59.    Pulmonic Valve: There is mild regurgitation.    No pericardial effusion.  . Continue Beta Blocker, ACE/ARB, and Furosemide and monitor clinical status closely. Monitor on telemetry. Patient is on CHF pathway.  Monitor strict Is&Os and daily weights.  Place on fluid restriction of 1 L. Cardiology has been consulted. Continue to stress to patient importance of self efficacy and  on diet for CHF. Last BNP reviewed- and noted below No results for input(s): "BNP", "BNPTRIAGEBLO" in the last 168 hours.  "

## 2024-07-29 NOTE — PLAN OF CARE
[facility]  Initial Discharge Assessment       Primary Care Provider: Lucian Barry MD    Admission Diagnosis: Shortness of breath [R06.02]  Acute on chronic diastolic congestive heart failure [I50.33]  Acute on chronic respiratory failure with hypoxia and hypercapnia [J96.21, J96.22]  Acute on chronic congestive heart failure, unspecified heart failure type [I50.9]    Admission Date: 7/28/2024  Expected Discharge Date:          Payor: HUMANA MANAGED MEDICARE / Plan: HUMANA MEDICARE HMO / Product Type: Capitation /     Extended Emergency Contact Information  Primary Emergency Contact: Esther Tan   [facility]  Home Phone: [phone]  Relation: Daughter  Secondary Emergency Contact: DominickSalvatore  Mobile Phone: [phone]  Relation: Son    Discharge Plan A: Home with family, Home Health  Discharge Plan B: Other, Home with family, Home Health (TBD)      [id number] - [place], [place] - [address] [place] [address] AT [facility] [address] [place] [address] & [facility]  [facility] [address] [facility] [address]  Phone: [phone] Fax: [phone]    [id number] - [place], [place] - [address] AT [facility] OF [facility] & [address]  [address]  [facility] [address]  Phone: [phone] Fax: [phone]      Initial Assessment (most recent)       Adult Discharge Assessment - 07/29/24 1541          Discharge Assessment    Assessment Type Discharge Planning Assessment     Confirmed/corrected address, phone number and insurance Yes   [address] [facility], [address]    Confirmed Demographics Correct on Facesheet     Source of Information patient     When was your last doctors appointment? 05/02/24     Reason For Admission Acute on chronic diastolic congestive heart failure     People in Home child(edmar), adult;grandchild(edmar)     Do you expect to return to your current living situation? Yes     Do you have help at home or someone to help you manage your care at  home? Yes     Who are your caregiver(s) and their phone number(s)? The patient reports that her son and granddaughter assist her with her care.     Prior to hospitilization cognitive status: Alert/Oriented     Current cognitive status: Alert/Oriented     Walking or Climbing Stairs Difficulty yes     Walking or Climbing Stairs ambulation difficulty, requires equipment;ambulation difficulty, assistance 1 person;transferring difficulty, assistance 1 person     Mobility Management rollator, cane, and wheelchair (as needed)     Dressing/Bathing Difficulty yes     Dressing/Bathing bathing difficulty, requires equipment;bathing difficulty, assistance 1 person;dressing difficulty, assistance 1 person     Dressing/Bathing Management shower chair     Do you have any problems with: Errands/Grocery;Needs other help   The patient reports she gets Meals on Wheels through [employer] on Aging.    Home Accessibility other (see comments)   The patient reports that she has a ramp attached to her home.    Home Layout Able to live on 1st floor     Equipment Currently Used at Home bedside commode;oxygen;CPAP;wheelchair;rollator;cane, straight;shower chair     Readmission within 30 days? No     Patient currently being followed by outpatient case management? Unable to determine (comments)     Do you currently have service(s) that help you manage your care at home? Yes     Name and Contact number of agency [phone]     Is the pt/caregiver preference to resume services with current agency Yes     Do you take prescription medications? Yes     Do you have prescription coverage? Yes     Coverage HUMANA MANAGED MEDICARE - HU[employer]NA MEDICARE HMO     Do you have any problems affording any of your prescribed medications? TBD     Is the patient taking medications as prescribed? yes     Who is going to help you get home at discharge? family     How do you get to doctors appointments? family or friend will provide     Discharge Plan A  "Home with family;Home Health     Discharge Plan B Other;Home with family;Home Health   TBD    DME Needed Upon Discharge  other (see comments)     Discharge Plan discussed with: Patient        Physical Activity    On average, how many days per week do you engage in moderate to strenuous exercise (like a brisk walk)? 0 days     On average, how many minutes do you engage in exercise at this level? 0 min        Financial Resource Strain    How hard is it for you to pay for the very basics like food, housing, medical care, and heating? Not very hard        Housing Stability    In the last 12 months, was there a time when you were not able to pay the mortgage or rent on time? No   "It is getting harder."    At any time in the past 12 months, were you homeless or living in a shelter (including now)? No        Transportation Needs    Has the lack of transportation kept you from medical appointments, meetings, work or from getting things needed for daily living? No        Food Insecurity    Within the past 12 months, you worried that your food would run out before you got the money to buy more. Never true     Within the past 12 months, the food you bought just didn't last and you didn't have money to get more. Never true        Stress    Do you feel stress - tense, restless, nervous, or anxious, or unable to sleep at night because your mind is troubled all the time - these days? Only a little        Social Isolation    How often do you feel lonely or isolated from those around you?  Never        Utilities    In the past 12 months has the electric, gas, oil, or water company threatened to shut off services in your home? No                 Discharge assessment completed with patient. The patient expressed that she lives at home with her family, and she would like to return back home upon discharge. The patient does require assistance and DME with her care. The patient does have home health with [facility], and she would like " to resume services.     The patient was open to resources for community services if needed..  Discharge planning checklist given to patient with instructions to contact  for any needs.  SW will follow as needed.

## 2024-07-29 NOTE — PLAN OF CARE
Problem: Occupational Therapy  Goal: Occupational Therapy Goal  Description: Goals to be met by: 08/07/24     Patient will increase functional independence with ADLs by performing:    Feeding with Modified [place].  UE Dressing with Set-up Assistance.  LE Dressing with Moderate Assistance.  Grooming while seated with Modified [place].  Toileting from bedside commode with Stand-by Assistance for hygiene and clothing management.   Bathing from  edge of bed with Minimal Assistance.  Toilet transfer to bedside commode with Set-up Assistance.    Outcome: Established OT POC

## 2024-07-29 NOTE — ED NOTES
Cleaned of urinary incontinence, purewik in place, diapered and fresh gown placed on pt.  Breakfast tray served.

## 2024-07-29 NOTE — H&P
"  [facility] Emergency Department  [facility] Medicine  History & Physical    Patient Name: Carmen Tan  MRN: [id number]  Patient Class: IP- Inpatient  Admission Date: 7/28/2024  Attending Physician: Oscar Ge Jr., MD   Primary Care Provider: Lucian Barry MD         Patient information was obtained from ER records.     Subjective:     Principal Problem:Acute on chronic diastolic congestive heart failure    Chief Complaint:   Chief Complaint   Patient presents with    Shortness of Breath     Pt to ED complaining of SOB and bilateral lower extremity edema. Hx of CHF. Pt says she is compliant with medications        HPI:   Chief Complaint   Patient presents with    Shortness of Breath       Pt to ED complaining of SOB and bilateral lower extremity edema. Hx of CHF. Pt says she is compliant with medications      74 year female with a history of CHF, COPD, diabetes, hypertension, on home oxygen presents the ER with gradually worsening shortness of breath since earlier today, history of this multiple times in the past.  Patient states "I have too much fluid".  Denies chest pain.  No nausea vomiting diarrhea.  Not ill appearing, no fever.  No sinus congestion.     Updated HPI: Above history confirm patient presents with significant volume overload.  Endorses worsening shortness of breath for the last couple of days.  Multiple admissions in the past with similar complaints.    Past Medical History:   Diagnosis Date    Abdominal hernia     Bacteremia 04/05/2023    CHF (congestive heart failure)     COPD (chronic obstructive pulmonary disease)     Diabetes mellitus, type 2 02/16/2022    GERD (gastroesophageal reflux disease)     History of home oxygen therapy     Hypertension     Hypertensive emergency 03/30/2023    Migraine headache     On home O2     Pulmonary embolism 06/01/2017    Small bowel obstruction     Thyroid disease        Past Surgical History:   Procedure Laterality Date    COLONOSCOPY      " HYSTERECTOMY      INNER EAR SURGERY      UPPER GASTROINTESTINAL ENDOSCOPY         Review of patient's allergies indicates:   Allergen Reactions    Pcn [penicillins] Swelling       No current facility-administered medications on file prior to encounter.     Current Outpatient Medications on File Prior to Encounter   Medication Sig    acetaminophen (TYLENOL) 325 MG tablet Take 2 tablets (650 mg total) by mouth every 8 (eight) hours as needed for Pain or Temperature greater than (100, headache). (Patient not taking: Reported on 4/30/2024)    albuterol-ipratropium (DUO-NEB) 2.5 mg-0.5 mg/3 mL nebulizer solution Take 3 mLs by nebulization every 6 (six) hours while awake. Rescue    aspirin (ECOTRIN) 81 MG EC tablet Take 1 tablet (81 mg total) by mouth once daily. (Patient not taking: Reported on 4/30/2024)    budesonide (PULMICORT) 0.5 mg/2 mL nebulizer solution Take 2 mLs (0.5 mg total) by nebulization every 12 (twelve) hours. Controller    busPIRone (BUSPAR) 5 MG Tab Take 1 tablet (5 mg total) by mouth 2 (two) times daily.    carvediloL (COREG) 3.125 MG tablet Take 1 tablet (3.125 mg total) by mouth 2 (two) times daily.    furosemide (LASIX) 40 MG tablet Take 1 tablet (40 mg total) by mouth once daily.    gabapentin (NEURONTIN) 100 MG capsule Take 1 capsule (100 mg total) by mouth 3 (three) times daily.    insulin aspart U-100 (NOVOLOG) 100 unit/mL (3 mL) InPn pen Inject 0-5 Units into the skin before meals and at bedtime as needed (Hyperglycemia). (Patient not taking: Reported on 4/30/2024)    levoFLOXacin (LEVAQUIN) 500 MG tablet Take 1 tablet (500 mg total) by mouth once daily.    levothyroxine (SYNTHROID) 50 MCG tablet Take 1 tablet (50 mcg total) by mouth before breakfast.    LIPITOR 40 mg tablet Take 1 tablet (40 mg total) by mouth once daily.    pantoprazole (PROTONIX) 20 MG tablet Take 40 mg by mouth once daily. (Patient not taking: Reported on 4/30/2024)    valsartan (DIOVAN) 160 MG tablet Take 1 tablet (160 mg  total) by mouth once daily.    vitamin D 1000 units Tab Take 185 mg by mouth once daily. (Patient not taking: Reported on 4/30/2024)     Family History    None       Tobacco Use    Smoking status: Former    Smokeless tobacco: Never   Substance and Sexual Activity    Alcohol use: No    Drug use: No    Sexual activity: Not on file     Review of Systems   Constitutional:  Negative for chills and fever.   HENT:  Negative for rhinorrhea, sneezing and sore throat.    Eyes:  Negative for pain and visual disturbance.   Respiratory:  Positive for shortness of breath. Negative for cough.    Cardiovascular:  Positive for leg swelling. Negative for chest pain.   Gastrointestinal:  Negative for abdominal pain, constipation and diarrhea.   Endocrine: Negative for cold intolerance and heat intolerance.   Genitourinary:  Negative for difficulty urinating.   Musculoskeletal:  Negative for arthralgias and joint swelling.   Skin:  Negative for rash.   Allergic/Immunologic: Negative for environmental allergies.   Neurological:  Negative for dizziness, syncope and weakness.   Hematological:  Does not bruise/bleed easily.   Psychiatric/Behavioral:  Negative for dysphoric mood. The patient is not nervous/anxious.      Objective:     Vital Signs (Most Recent):  Temp: 98.8 °F (37.1 °C) (07/28/24 2233)  Pulse: (!) 58 (07/29/24 0716)  Resp: (!) 29 (07/29/24 0716)  BP: (!) 155/73 (07/29/24 0700)  SpO2: 98 % (07/29/24 0716) Vital Signs (24h Range):  Temp:  [98.8 °F (37.1 °C)] 98.8 °F (37.1 °C)  Pulse:  [54-93] 58  Resp:  [13-29] 29  SpO2:  [78 %-98 %] 98 %  BP: (126-169)/(58-90) 155/73     Weight: 104.3 kg (230 lb)  Body mass index is 39.48 kg/m².     Physical Exam  Constitutional:       Appearance: She is obese.   HENT:      Nose: Nose normal.      Mouth/Throat:      Mouth: Mucous membranes are moist.   Eyes:      Extraocular Movements: Extraocular movements intact.      Pupils: Pupils are equal, round, and reactive to light.   Cardiovascular:       Rate and Rhythm: Normal rate and regular rhythm.   Pulmonary:      Effort: Respiratory distress present.      Breath sounds: Wheezing and rales present.      Comments: Decreased BS at bases - bipap  Abdominal:      General: Bowel sounds are normal. There is no distension.      Palpations: Abdomen is soft.      Tenderness: There is no abdominal tenderness.   Genitourinary:     Comments: Hardwick in place  Musculoskeletal:      Cervical back: Normal range of motion.      Right lower leg: Edema present.      Left lower leg: Edema present.      Comments: Edema improving   Skin:     General: Skin is warm and dry.      Capillary Refill: Capillary refill takes 2 to 3 seconds.   Neurological:      General: No focal deficit present.      Mental Status: She is alert and oriented to person, place, and time.              CRANIAL NERVES     CN III, IV, VI   Pupils are equal, round, and reactive to light.       Significant Labs: All pertinent labs within the past 24 hours have been reviewed.    Significant Imaging: I have reviewed all pertinent imaging results/findings within the past 24 hours.  Assessment/Plan:     * Acute on chronic diastolic congestive heart failure  Patient is identified as having Diastolic (HFpEF) heart failure that is Acute on chronic. CHF is currently uncontrolled due to Continued edema of extremities. Latest ECHO performed and demonstrates- Results for orders placed during the hospital encounter of 04/21/24    Echo    Interpretation Summary    Left Ventricle: The left ventricle is normal in size. Mildly increased wall thickness. There is normal systolic function with a visually estimated ejection fraction of 60 - 65%. Grade I diastolic dysfunction.    Right Ventricle: Normal right ventricular cavity size. Systolic function is normal.    Left Atrium: Left atrium is severely dilated.    Right Atrium: Right atrium is moderately dilated.    Aortic Valve: The aortic valve is a trileaflet valve. There is mild  "stenosis. Aortic valve area by VTI is 1.92 cm². Aortic valve peak velocity is 2.00 m/s. Mean gradient is 8 mmHg. The dimensionless index is 0.59.    Pulmonic Valve: There is mild regurgitation.    No pericardial effusion.  . Continue Beta Blocker, ACE/ARB, and Furosemide and monitor clinical status closely. Monitor on telemetry. Patient is on CHF pathway.  Monitor strict Is&Os and daily weights.  Place on fluid restriction of 1 L. Cardiology has been consulted. Continue to stress to patient importance of self efficacy and  on diet for CHF. Last BNP reviewed- and noted below No results for input(s): "BNP", "BNPTRIAGEBLO" in the last 168 hours.    Anxiety and depression  Continue home medical therapy.      Diabetes mellitus, type 2  Patient's FSGs are controlled on current medication regimen.  Last A1c reviewed-   Lab Results   Component Value Date    HGBA1C 5.3 07/28/2024     Most recent fingerstick glucose reviewed- No results for input(s): "POCTGLUCOSE" in the last 24 hours.  Current correctional scale  Low  Maintain anti-hyperglycemic dose as follows-   Antihyperglycemics (From admission, onward)      None          Hold Oral hypoglycemics while patient is in the hospital.    COPD exacerbation  Patient's COPD is controlled currently.  Patient is currently on COPD Pathway. Continue scheduled inhalers Steroids, Antibiotics, and Supplemental oxygen and monitor respiratory status closely.     Severe obesity (BMI >= 40)  Body mass index is 39.48 kg/m². Morbid obesity complicates all aspects of disease management from diagnostic modalities to treatment. Weight loss encouraged and health benefits explained to patient.         Paroxysmal A-fib  Rate controlled.      VTE Risk Mitigation (From admission, onward)           Ordered     IP VTE HIGH RISK PATIENT  Once         07/29/24 0059     Place sequential compression device  Until discontinued         07/29/24 0059     Place ANAMARIA hose  Until discontinued         " 07/29/24 0059                                    Oscar Ge Jr, MD  Department of Hospital Medicine  [place] - Emergency Department

## 2024-07-29 NOTE — PT/OT/SLP EVAL
Occupational Therapy   Evaluation    Name: Carmen Tan  MRN: [id number]  Admitting Diagnosis: Acute on chronic diastolic congestive heart failure  Recent Surgery: * No surgery found *      Recommendations:     Discharge Recommendations: Low Intensity Therapy  Discharge Equipment Recommendations:  none  Barriers to discharge:  Other (Comment) (Medical status)    Assessment:     Carmen Tan is a 74 y.o. female with a medical diagnosis of Acute on chronic diastolic congestive heart failure.  She presents with functional deficits impacting independence with ADL's including functional mobility. Performance deficits affecting function: weakness, impaired endurance, impaired self care skills, impaired functional mobility, impaired balance, decreased upper extremity function, decreased lower extremity function, decreased safety awareness, pain, decreased ROM, edema, impaired cardiopulmonary response to activity, impaired joint extensibility, impaired muscle length.      Rehab Prognosis: Fair; patient would benefit from acute skilled OT services to address these deficits and reach maximum level of function.       Plan:     Patient to be seen 4 x/week to address the above listed problems via self-care/home management, therapeutic activities, therapeutic exercises  Plan of Care Expires: 08/07/24  Plan of Care Reviewed with: patient    Subjective     Chief Complaint: pain and edema   Patient/Family Comments/goals: Pt would like to return home with family.    Occupational Profile:  Living Environment: Pt lives with her son and granddaughter in a mobile home with ramp to enter/exit.   Previous level of function: Modified Independent  Roles and Routines: ADL's  Equipment Used at Home: wheelchair, shower chair, rollator, oxygen, grab bar, drop arm commode, cane, quad, bedside commode  Assistance upon Discharge: Family    Pain/Comfort:  Pain Rating 1: 9/10  Location - Side 1: Bilateral  Location - Orientation 1:  lower  Location 1: leg  Pain Addressed 1: Reposition, Distraction, Cessation of Activity, Nurse notified  Pain Rating Post-Intervention 1: 9/10    Patients cultural, spiritual, Taoism conflicts given the current situation: no    Objective:     Communicated with: nurse prior to session.  Patient found HOB elevated with BIPAP, telemetry, pulse ox (continuous), peripheral IV, PureWick, oxygen upon OT entry to room.    General Precautions: Standard, fall  Orthopedic Precautions: N/A  Braces: N/A  Respiratory Status:  BiPAP    Occupational Performance:    Bed Mobility:    Patient completed Rolling/Turning to Left with  minimum assistance  Patient completed Rolling/Turning to Right with minimum assistance  Patient completed Scooting/Bridging with minimum assistance  Patient completed Supine to Sit with moderate assistance  Patient completed Sit to Supine with moderate assistance    Functional Mobility/Transfers:  Patient completed Sit <> Stand Transfer with stand by assistance  with  rolling walker   Patient completed Bed <> Chair Transfer using Step Transfer technique with contact guard assistance with rolling walker  Patient completed Toilet Transfer Step Transfer technique with contact guard assistance with  rolling walker and bedside commode  Functional Mobility: Pt ambulated 7' between surfaces requiring steadying assist utilizing RW.     Activities of Daily Living:  Feeding:  setup assistance    Grooming: setup assistance    Bathing: moderate assistance    Upper Body Dressing: minimum assistance    Lower Body Dressing: maximal assistance    Toileting: moderate assistance      Cognitive/Visual Perceptual:  Cognitive/Psychosocial Skills:  -       Oriented to: Person, Place, Time, and Situation   -       Follows Commands/attention:Follows multistep  commands  -       Communication: clear/fluent  -       Memory: No Deficits noted  -       Safety awareness/insight to disability: impaired   -       Mood/Affect/Coping  skills/emotional control: Cooperative and Pleasant    Physical Exam:  Postural examination/scapula alignment: -       Rounded shoulders  -       Forward head  Edema:  Moderate bilateral lower legs  Sensation: -       Intact  bilateral UE's  Dominant hand: -       Right  Upper Extremity Range of Motion:  -       Right Upper Extremity: WFL  -       Left Upper Extremity: WFL  Upper Extremity Strength: -       Right Upper Extremity: 4- to 4/5  -       Left Upper Extremity: 4- to 4/5  Fine Motor Coordination: -       Intact  Gross motor coordination: WFL    AMPAC 6 Click ADL:  AMPAC Total Score: 19    Treatment & Education:  Pt was provided education / instruction regarding role of OT and established OT POC.    Patient left up in chair with all lines intact, call button in reach, and nurse notified    GOALS:   Goals to be met by: 08/07/24     Patient will increase functional independence with ADLs by performing:    Feeding with Modified [place].  UE Dressing with Set-up Assistance.  LE Dressing with Moderate Assistance.  Grooming while seated with Modified [place].  Toileting from bedside commode with Stand-by Assistance for hygiene and clothing management.   Bathing from  edge of bed with Minimal Assistance.  Toilet transfer to bedside commode with Set-up Assistance.        History:     Past Medical History:   Diagnosis Date    Abdominal hernia     Bacteremia 04/05/2023    CHF (congestive heart failure)     COPD (chronic obstructive pulmonary disease)     Diabetes mellitus, type 2 02/16/2022    GERD (gastroesophageal reflux disease)     History of home oxygen therapy     Hypertension     Hypertensive emergency 03/30/2023    Migraine headache     On home O2     Pulmonary embolism 06/01/2017    Small bowel obstruction     Thyroid disease          Past Surgical History:   Procedure Laterality Date    COLONOSCOPY      HYSTERECTOMY      INNER EAR SURGERY      UPPER GASTROINTESTINAL ENDOSCOPY         Time Tracking:      OT Date of Treatment: 07/29/24  OT Start Time: 1410  OT Stop Time: 1443  OT Total Time (min): 33 min    Billable Minutes:Evaluation 33    7/29/2024

## 2024-07-29 NOTE — CONSULTS
[facility] - Emergency Department  Cardiology  Consult Note    Patient Name: Carmen Tan  MRN: [id number]  Admission Date: 7/28/2024  Hospital Length of Stay: 1 days  Code Status: Full Code   Attending Provider: Oscar Ge Jr., MD   Consulting Provider: Spencer Rascon MD  Primary Care Physician: Lucian Barry MD  Principal Problem:Acute on chronic diastolic congestive heart failure    Patient information was obtained from patient, past medical records, and ER records.     Inpatient consult to Cardiology  Consult performed by: Carly Chew PA-C  Consult ordered by: Oscar Ge Jr., MD        Subjective:     Chief Complaint:  shortness of breath, edema     HPI:   This is a 75 yo female with pmhx HFpEF, COPD, morbid obesity, DM II, pAF who presented to the emergency department with 2 day history of progressively worsening shortness of breath and LE edema. She reports history of similar symptoms; however, this episode is worse than previous. She denies chest pain, orthopnea, palpitations, or presyncope.     On arrival, her proBNP was elevated at 3782; however improved from previous admission in May 2024 (8221). HS troponin negative; 17.9. Electrolytes without gross abnormality. CXR revealing chronic changes with no acute evidence of pulmonary process. No definite infiltrate or pleural effusion.     Pt was started on lasix gtt and BiPAP. She reports resting symptoms have improved; however, remains short of breath with exertion.     PMHx: She had an echocardiogram in April 2024 with preserved EF 60-65%. There was noted mild aortic stenosis and dilated atria. She has history of diminished EF in September 2023; EF 40-45%. Will get updated echocardiogram to reassess LVEF in the setting of acute HFpEF exacerbation.     Past Medical History:   Diagnosis Date    Abdominal hernia     Bacteremia 04/05/2023    CHF (congestive heart failure)     COPD (chronic obstructive pulmonary disease)     Diabetes  mellitus, type 2 02/16/2022    GERD (gastroesophageal reflux disease)     History of home oxygen therapy     Hypertension     Hypertensive emergency 03/30/2023    Migraine headache     On home O2     Pulmonary embolism 06/01/2017    Small bowel obstruction     Thyroid disease        Past Surgical History:   Procedure Laterality Date    COLONOSCOPY      HYSTERECTOMY      INNER EAR SURGERY      UPPER GASTROINTESTINAL ENDOSCOPY         Review of patient's allergies indicates:   Allergen Reactions    Pcn [penicillins] Swelling       No current facility-administered medications on file prior to encounter.     Current Outpatient Medications on File Prior to Encounter   Medication Sig    acetaminophen (TYLENOL) 325 MG tablet Take 2 tablets (650 mg total) by mouth every 8 (eight) hours as needed for Pain or Temperature greater than (100, headache).    albuterol-ipratropium (DUO-NEB) 2.5 mg-0.5 mg/3 mL nebulizer solution Take 3 mLs by nebulization every 6 (six) hours while awake. Rescue    aspirin (ECOTRIN) 81 MG EC tablet Take 1 tablet (81 mg total) by mouth once daily.    budesonide (PULMICORT) 0.5 mg/2 mL nebulizer solution Take 2 mLs (0.5 mg total) by nebulization every 12 (twelve) hours. Controller    busPIRone (BUSPAR) 5 MG Tab Take 1 tablet (5 mg total) by mouth 2 (two) times daily.    carvediloL (COREG) 3.125 MG tablet Take 1 tablet (3.125 mg total) by mouth 2 (two) times daily.    furosemide (LASIX) 40 MG tablet Take 1 tablet (40 mg total) by mouth once daily.    gabapentin (NEURONTIN) 100 MG capsule Take 1 capsule (100 mg total) by mouth 3 (three) times daily.    insulin aspart U-100 (NOVOLOG) 100 unit/mL (3 mL) InPn pen Inject 0-5 Units into the skin before meals and at bedtime as needed (Hyperglycemia).    levoFLOXacin (LEVAQUIN) 500 MG tablet Take 1 tablet (500 mg total) by mouth once daily.    levothyroxine (SYNTHROID) 50 MCG tablet Take 1 tablet (50 mcg total) by mouth before breakfast.    pantoprazole  (PROTONIX) 20 MG tablet Take 40 mg by mouth once daily.    valsartan (DIOVAN) 160 MG tablet Take 1 tablet (160 mg total) by mouth once daily.    vitamin D 1000 units Tab Take 185 mg by mouth once daily.    LIPITOR 40 mg tablet Take 1 tablet (40 mg total) by mouth once daily.     Family History    None       Tobacco Use    Smoking status: Former    Smokeless tobacco: Never   Substance and Sexual Activity    Alcohol use: No    Drug use: No    Sexual activity: Not on file     Review of Systems   Cardiovascular:  Positive for dyspnea on exertion and leg swelling. Negative for chest pain, irregular heartbeat, orthopnea and syncope.   Respiratory:  Positive for shortness of breath. Negative for cough.    Neurological:  Negative for light-headedness and weakness.   All other systems reviewed and are negative.    Objective:     Vital Signs (Most Recent):  Temp: 97.1 °F (36.2 °C) (07/30/24 0258)  Pulse: 71 (07/30/24 0745)  Resp: 20 (07/30/24 0714)  BP: (!) 142/72 (07/30/24 0258)  SpO2: 97 % (07/30/24 0714) Vital Signs (24h Range):  Temp:  [97 °F (36.1 °C)-98.6 °F (37 °C)] 98.6 °F (37 °C)  Pulse:  [47-86] 74  Resp:  [18-28] 18  SpO2:  [91 %-98 %] 96 %  BP: (114-146)/(57-74) 140/66     Weight: 104.3 kg (229 lb 15 oz)  Body mass index is 39.47 kg/m².    SpO2: 97 %         Intake/Output Summary (Last 24 hours) at 7/30/2024 0854  Last data filed at 7/30/2024 0600  Gross per 24 hour   Intake 360 ml   Output 9100 ml   Net -8740 ml       Lines/Drains/Airways       Drain  Duration             Female External Urinary Catheter w/ Suction 07/29/24 0100 1 day              Peripheral Intravenous Line  Duration                  Peripheral IV - Single Lumen 07/28/24 2241 20 G Left Antecubital 1 day         Peripheral IV - Single Lumen 07/28/24 2247 20 G Posterior;Right Hand 1 day                    Physical Exam  Vitals and nursing note reviewed.   Constitutional:       Appearance: Normal appearance. She is morbidly obese.   HENT:       Head: Normocephalic and atraumatic.      Nose:      Comments: Seen on BiPAP; resting comfortably.     Mouth/Throat:      Mouth: Mucous membranes are moist.      Pharynx: Oropharynx is clear.   Eyes:      Extraocular Movements: Extraocular movements intact.   Cardiovascular:      Rate and Rhythm: Normal rate and regular rhythm.      Pulses: Normal pulses.      Heart sounds: Normal heart sounds. No murmur heard.     No friction rub. No gallop.   Pulmonary:      Effort: Pulmonary effort is normal.      Breath sounds: Normal breath sounds. No wheezing, rhonchi or rales.   Musculoskeletal:      Right lower leg: Edema present.      Left lower leg: Edema present.      Comments: Mild 1-2+ pitting edema   Skin:     General: Skin is warm.      Capillary Refill: Capillary refill takes less than 2 seconds.   Neurological:      Mental Status: She is alert and oriented to person, place, and time.         Significant Labs: BMP:   Recent Labs   Lab 07/28/24 2256 07/29/24 0427 07/30/24  0529   GLU 97 94 73    146* 147*   K 4.3 4.2 3.5    110 101   CO2 33* 34* 40*   BUN 22 21 21   CREATININE 1.3 1.2 1.2   CALCIUM 9.6 9.5 10.2   MG  --   --  1.6   , CMP   Recent Labs   Lab 07/28/24 2256 07/29/24 0427 07/30/24  0529    146* 147*   K 4.3 4.2 3.5    110 101   CO2 33* 34* 40*   GLU 97 94 73   BUN 22 21 21   CREATININE 1.3 1.2 1.2   CALCIUM 9.6 9.5 10.2   PROT 6.5 6.0 6.8   ALBUMIN 2.8* 2.5* 2.9*   BILITOT 0.5 0.4 0.7   ALKPHOS 207* 172* 162*   AST 46* 32 17   ALT 46* 39 32   ANIONGAP 3 2* 6   , CBC   Recent Labs   Lab 07/28/24 2256 07/29/24 0427 07/30/24  0529   WBC 7.27 6.35 5.65   HGB 12.2 11.5* 13.0   HCT 39.2 36.9* 37.5    177 205   , and All pertinent lab results from the last 24 hours have been reviewed.    Significant Imaging:   TTE 4/22/24:     Left Ventricle: The left ventricle is normal in size. Mildly increased wall thickness. There is normal systolic function with a visually estimated  ejection fraction of 60 - 65%. Grade I diastolic dysfunction.    Right Ventricle: Normal right ventricular cavity size. Systolic function is normal.    Left Atrium: Left atrium is severely dilated.    Right Atrium: Right atrium is moderately dilated.    Aortic Valve: The aortic valve is a trileaflet valve. There is mild stenosis. Aortic valve area by VTI is 1.92 cm². Aortic valve peak velocity is 2.00 m/s. Mean gradient is 8 mmHg. The dimensionless index is 0.59.    Pulmonic Valve: There is mild regurgitation.    No pericardial effusion.    Assessment and Plan:    HFpEF, EF 60-65% (recovered from 40-45%, 9/2023)  -repeat Echocardiogram showed normal LVSF  -continue lasix infusion, likely switch to intermittent dosing tomorrow - volume status improving - electrolytes and renal function stable  -continue carvedilol and valsartan   -continue to monitor  -needs close outpatient follow-up with cardiology     COPD  -continue management per primary   -continue BiPAP, wean as tolerated   -continue to monitor     pAF  -currently sinus rhythm; rate controlled  -continue BB   -recommend starting Eliquis 5 mg po bid - CHADSVASc 5  -continue to monitor     DM II   -continue management per primary        Active Diagnoses:    Diagnosis Date Noted POA    PRINCIPAL PROBLEM:  Acute on chronic diastolic congestive heart failure [I50.33] 02/16/2022 Yes    Anxiety and depression [F41.9, F32.A] 04/12/2023 Yes    COPD exacerbation [J44.1] 02/16/2022 Yes    Diabetes mellitus, type 2 [E11.9] 02/16/2022 Yes    Severe obesity (BMI >= 40) [E66.01] 02/04/2022 Yes    Paroxysmal A-fib [I48.0] 06/08/2017 Yes     Chronic      Problems Resolved During this Admission:       VTE Risk Mitigation (From admission, onward)           Ordered     IP VTE HIGH RISK PATIENT  Once         07/29/24 0059     Place sequential compression device  Until discontinued         07/29/24 0059     Place ANAMARIA hose  Until discontinued         07/29/24 0059                     Thank you for your consult.     DARRYL Blanc-C - scribed for Dr. Rascon   Cardiology     Provider's Attestation:  I, Spencer Rascon MD, confirm that DARRYL Valencia worked under my direction at all times.  Documentation shall reflect findings and decisions made by myself in the course of the patient's treatment.  I have performed a face to face evaluation of the patient and reviewed the medical record. In addition, I have performed the substantive portion of the medical decision making. I have reviewed the notes and assessment, and I concur with her documentation.  CMS guidelines regarding the use of scribes shall be adhered to.  MD Spencer Galloway MD  Cardiology   [facility] - Emergency Department

## 2024-07-29 NOTE — SUBJECTIVE & OBJECTIVE
Past Medical History:   Diagnosis Date    Abdominal hernia     Bacteremia 04/05/2023    CHF (congestive heart failure)     COPD (chronic obstructive pulmonary disease)     Diabetes mellitus, type 2 02/16/2022    GERD (gastroesophageal reflux disease)     History of home oxygen therapy     Hypertension     Hypertensive emergency 03/30/2023    Migraine headache     On home O2     Pulmonary embolism 06/01/2017    Small bowel obstruction     Thyroid disease        Past Surgical History:   Procedure Laterality Date    COLONOSCOPY      HYSTERECTOMY      INNER EAR SURGERY      UPPER GASTROINTESTINAL ENDOSCOPY         Review of patient's allergies indicates:   Allergen Reactions    Pcn [penicillins] Swelling       No current facility-administered medications on file prior to encounter.     Current Outpatient Medications on File Prior to Encounter   Medication Sig    acetaminophen (TYLENOL) 325 MG tablet Take 2 tablets (650 mg total) by mouth every 8 (eight) hours as needed for Pain or Temperature greater than (100, headache). (Patient not taking: Reported on 4/30/2024)    albuterol-ipratropium (DUO-NEB) 2.5 mg-0.5 mg/3 mL nebulizer solution Take 3 mLs by nebulization every 6 (six) hours while awake. Rescue    aspirin (ECOTRIN) 81 MG EC tablet Take 1 tablet (81 mg total) by mouth once daily. (Patient not taking: Reported on 4/30/2024)    budesonide (PULMICORT) 0.5 mg/2 mL nebulizer solution Take 2 mLs (0.5 mg total) by nebulization every 12 (twelve) hours. Controller    busPIRone (BUSPAR) 5 MG Tab Take 1 tablet (5 mg total) by mouth 2 (two) times daily.    carvediloL (COREG) 3.125 MG tablet Take 1 tablet (3.125 mg total) by mouth 2 (two) times daily.    furosemide (LASIX) 40 MG tablet Take 1 tablet (40 mg total) by mouth once daily.    gabapentin (NEURONTIN) 100 MG capsule Take 1 capsule (100 mg total) by mouth 3 (three) times daily.    insulin aspart U-100 (NOVOLOG) 100 unit/mL (3 mL) InPn pen Inject 0-5 Units into the skin  before meals and at bedtime as needed (Hyperglycemia). (Patient not taking: Reported on 4/30/2024)    levoFLOXacin (LEVAQUIN) 500 MG tablet Take 1 tablet (500 mg total) by mouth once daily.    levothyroxine (SYNTHROID) 50 MCG tablet Take 1 tablet (50 mcg total) by mouth before breakfast.    LIPITOR 40 mg tablet Take 1 tablet (40 mg total) by mouth once daily.    pantoprazole (PROTONIX) 20 MG tablet Take 40 mg by mouth once daily. (Patient not taking: Reported on 4/30/2024)    valsartan (DIOVAN) 160 MG tablet Take 1 tablet (160 mg total) by mouth once daily.    vitamin D 1000 units Tab Take 185 mg by mouth once daily. (Patient not taking: Reported on 4/30/2024)     Family History    None       Tobacco Use    Smoking status: Former    Smokeless tobacco: Never   Substance and Sexual Activity    Alcohol use: No    Drug use: No    Sexual activity: Not on file     Review of Systems   Constitutional:  Negative for chills and fever.   HENT:  Negative for rhinorrhea, sneezing and sore throat.    Eyes:  Negative for pain and visual disturbance.   Respiratory:  Positive for shortness of breath. Negative for cough.    Cardiovascular:  Positive for leg swelling. Negative for chest pain.   Gastrointestinal:  Negative for abdominal pain, constipation and diarrhea.   Endocrine: Negative for cold intolerance and heat intolerance.   Genitourinary:  Negative for difficulty urinating.   Musculoskeletal:  Negative for arthralgias and joint swelling.   Skin:  Negative for rash.   Allergic/Immunologic: Negative for environmental allergies.   Neurological:  Negative for dizziness, syncope and weakness.   Hematological:  Does not bruise/bleed easily.   Psychiatric/Behavioral:  Negative for dysphoric mood. The patient is not nervous/anxious.      Objective:     Vital Signs (Most Recent):  Temp: 98.8 °F (37.1 °C) (07/28/24 2233)  Pulse: (!) 58 (07/29/24 0716)  Resp: (!) 29 (07/29/24 0716)  BP: (!) 155/73 (07/29/24 0700)  SpO2: 98 % (07/29/24  0716) Vital Signs (24h Range):  Temp:  [98.8 °F (37.1 °C)] 98.8 °F (37.1 °C)  Pulse:  [54-93] 58  Resp:  [13-29] 29  SpO2:  [78 %-98 %] 98 %  BP: (126-169)/(58-90) 155/73     Weight: 104.3 kg (230 lb)  Body mass index is 39.48 kg/m².     Physical Exam  Constitutional:       Appearance: She is obese.   HENT:      Nose: Nose normal.      Mouth/Throat:      Mouth: Mucous membranes are moist.   Eyes:      Extraocular Movements: Extraocular movements intact.      Pupils: Pupils are equal, round, and reactive to light.   Cardiovascular:      Rate and Rhythm: Normal rate and regular rhythm.   Pulmonary:      Effort: Respiratory distress present.      Breath sounds: Wheezing and rales present.      Comments: Decreased BS at bases - bipap  Abdominal:      General: Bowel sounds are normal. There is no distension.      Palpations: Abdomen is soft.      Tenderness: There is no abdominal tenderness.   Genitourinary:     Comments: [place] in place  Musculoskeletal:      Cervical back: Normal range of motion.      Right lower leg: Edema present.      Left lower leg: Edema present.      Comments: Edema improving   Skin:     General: Skin is warm and dry.      Capillary Refill: Capillary refill takes 2 to 3 seconds.   Neurological:      General: No focal deficit present.      Mental Status: She is alert and oriented to person, place, and time.              CRANIAL NERVES     CN III, IV, VI   Pupils are equal, round, and reactive to light.       Significant Labs: All pertinent labs within the past 24 hours have been reviewed.    Significant Imaging: I have reviewed all pertinent imaging results/findings within the past 24 hours.

## 2024-07-30 VITALS
SYSTOLIC BLOOD PRESSURE: 140 MMHG | HEART RATE: 74 BPM | RESPIRATION RATE: 18 BRPM | OXYGEN SATURATION: 96 % | TEMPERATURE: 99 F | DIASTOLIC BLOOD PRESSURE: 66 MMHG

## 2024-07-30 LAB
ALBUMIN SERPL BCP-MCNC: 2.9 G/DL (ref 3.5–5.2)
ALP SERPL-CCNC: 162 U/L (ref 55–135)
ALT SERPL W/O P-5'-P-CCNC: 32 U/L (ref 10–44)
ANION GAP SERPL CALC-SCNC: 6 MMOL/L (ref 3–11)
AORTIC ROOT ANNULUS: 3.18 CM
AORTIC VALVE CUSP SEPERATION: 1.2 CM
AST SERPL-CCNC: 17 U/L (ref 10–40)
AV INDEX (PROSTH): 0.58
AV MEAN GRADIENT: 10 MMHG
AV PEAK GRADIENT: 19 MMHG
AV REGURGITATION PRESSURE HALF TIME: 555.72 MS
AV VALVE AREA BY VELOCITY RATIO: 1.8 CM²
AV VALVE AREA: 1.84 CM²
AV VELOCITY RATIO: 0.57
BASOPHILS # BLD AUTO: 0.02 K/UL (ref 0–0.2)
BASOPHILS NFR BLD: 0.4 % (ref 0–1.9)
BILIRUB SERPL-MCNC: 0.7 MG/DL (ref 0.1–1)
BSA FOR ECHO PROCEDURE: 2.17 M2
BUN SERPL-MCNC: 21 MG/DL (ref 8–23)
CALCIUM SERPL-MCNC: 10.2 MG/DL (ref 8.7–10.5)
CHLORIDE SERPL-SCNC: 101 MMOL/L (ref 95–110)
CO2 SERPL-SCNC: 40 MMOL/L (ref 23–29)
CREAT SERPL-MCNC: 1.2 MG/DL (ref 0.5–1.4)
CV ECHO LV RWT: 0.46 CM
DIFFERENTIAL METHOD BLD: ABNORMAL
DOP CALC AO PEAK VEL: 2.18 M/S
DOP CALC AO VTI: 54.4 CM
DOP CALC LVOT AREA: 3.2 CM2
DOP CALC LVOT DIAMETER: 2.01 CM
DOP CALC LVOT PEAK VEL: 1.24 M/S
DOP CALC LVOT STROKE VOLUME: 100.22 CM3
DOP CALCLVOT PEAK VEL VTI: 31.6 CM
E WAVE DECELERATION TIME: 240.41 MSEC
E/A RATIO: 0.85
E/E' RATIO: 10.5 M/S
ECHO LV POSTERIOR WALL: 1.2 CM (ref 0.6–1.1)
EOSINOPHIL # BLD AUTO: 0.1 K/UL (ref 0–0.5)
EOSINOPHIL NFR BLD: 1.6 % (ref 0–8)
ERYTHROCYTE [DISTWIDTH] IN BLOOD BY AUTOMATED COUNT: 17.4 % (ref 11.5–14.5)
EST. GFR  (NO RACE VARIABLE): 47.8 ML/MIN/1.73 M^2
FRACTIONAL SHORTENING: 21 % (ref 28–44)
GLUCOSE SERPL-MCNC: 73 MG/DL (ref 70–110)
HCT VFR BLD AUTO: 37.5 % (ref 37–48.5)
HGB BLD-MCNC: 13 G/DL (ref 12–16)
IMM GRANULOCYTES # BLD AUTO: 0.01 K/UL (ref 0–0.04)
IMM GRANULOCYTES NFR BLD AUTO: 0.2 % (ref 0–0.5)
INTERVENTRICULAR SEPTUM: 1.23 CM (ref 0.6–1.1)
IVC DIAMETER: 3.28 CM
LA MAJOR: 7.13 CM
LEFT INTERNAL DIMENSION IN SYSTOLE: 4.12 CM (ref 2.1–4)
LEFT VENTRICLE DIASTOLIC VOLUME INDEX: 62.33 ML/M2
LEFT VENTRICLE DIASTOLIC VOLUME: 129.65 ML
LEFT VENTRICLE MASS INDEX: 122 G/M2
LEFT VENTRICLE SYSTOLIC VOLUME INDEX: 36.1 ML/M2
LEFT VENTRICLE SYSTOLIC VOLUME: 75.06 ML
LEFT VENTRICULAR INTERNAL DIMENSION IN DIASTOLE: 5.2 CM (ref 3.5–6)
LEFT VENTRICULAR MASS: 253.18 G
LV LATERAL E/E' RATIO: 7.5 M/S
LV SEPTAL E/E' RATIO: 17.5 M/S
LVED V (TEICH): 129.65 ML
LVES V (TEICH): 75.06 ML
LVOT MG: 3.04 MMHG
LVOT MV: 0.81 CM/S
LYMPHOCYTES # BLD AUTO: 1.4 K/UL (ref 1–4.8)
LYMPHOCYTES NFR BLD: 25.3 % (ref 18–48)
MAGNESIUM SERPL-MCNC: 1.6 MG/DL (ref 1.6–2.6)
MCH RBC QN AUTO: 33.2 PG (ref 27–31)
MCHC RBC AUTO-ENTMCNC: 34.7 G/DL (ref 32–36)
MCV RBC AUTO: 96 FL (ref 82–98)
MONOCYTES # BLD AUTO: 0.7 K/UL (ref 0.3–1)
MONOCYTES NFR BLD: 11.5 % (ref 4–15)
MV PEAK A VEL: 1.23 M/S
MV PEAK E VEL: 1.05 M/S
MV STENOSIS PRESSURE HALF TIME: 69.72 MS
MV VALVE AREA P 1/2 METHOD: 3.16 CM2
NEUTROPHILS # BLD AUTO: 3.5 K/UL (ref 1.8–7.7)
NEUTROPHILS NFR BLD: 61 % (ref 38–73)
NRBC BLD-RTO: 0 /100 WBC
OHS CV RV/LV RATIO: 0.48 CM
PISA AR MAX VEL: 4.68 M/S
PISA MRMAX VEL: 4.91 M/S
PISA TR MAX VEL: 2.18 M/S
PLATELET # BLD AUTO: 205 K/UL (ref 150–450)
PMV BLD AUTO: 10 FL (ref 9.2–12.9)
POTASSIUM SERPL-SCNC: 3.5 MMOL/L (ref 3.5–5.1)
PROT SERPL-MCNC: 6.8 G/DL (ref 6–8.4)
PV PEAK GRADIENT: 3 MMHG
PV PEAK VELOCITY: 0.89 M/S
RA MAJOR: 5.53 CM
RA PRESSURE ESTIMATED: 15 MMHG
RBC # BLD AUTO: 3.92 M/UL (ref 4–5.4)
RIGHT VENTRICULAR END-DIASTOLIC DIMENSION: 2.48 CM
RV TB RVSP: 17 MMHG
SODIUM SERPL-SCNC: 147 MMOL/L (ref 136–145)
TDI LATERAL: 0.14 M/S
TDI SEPTAL: 0.06 M/S
TDI: 0.1 M/S
TR MAX PG: 19 MMHG
TRICUSPID ANNULAR PLANE SYSTOLIC EXCURSION: 2.1 CM
TV REST PULMONARY ARTERY PRESSURE: 34 MMHG
WBC # BLD AUTO: 5.65 K/UL (ref 3.9–12.7)
Z-SCORE OF LEFT VENTRICULAR DIMENSION IN END DIASTOLE: -2.03
Z-SCORE OF LEFT VENTRICULAR DIMENSION IN END SYSTOLE: 0.44

## 2024-07-30 PROCEDURE — 25000003 PHARM REV CODE 250: Performed by: INTERNAL MEDICINE

## 2024-07-30 PROCEDURE — 99900035 HC TECH TIME PER 15 MIN (STAT)

## 2024-07-30 PROCEDURE — 85025 COMPLETE CBC W/AUTO DIFF WBC: CPT | Performed by: INTERNAL MEDICINE

## 2024-07-30 PROCEDURE — 97110 THERAPEUTIC EXERCISES: CPT | Mod: CO

## 2024-07-30 PROCEDURE — 25000242 PHARM REV CODE 250 ALT 637 W/ HCPCS: Performed by: INTERNAL MEDICINE

## 2024-07-30 PROCEDURE — 94640 AIRWAY INHALATION TREATMENT: CPT

## 2024-07-30 PROCEDURE — 99900031 HC PATIENT EDUCATION (STAT)

## 2024-07-30 PROCEDURE — 36415 COLL VENOUS BLD VENIPUNCTURE: CPT | Performed by: INTERNAL MEDICINE

## 2024-07-30 PROCEDURE — 94660 CPAP INITIATION&MGMT: CPT

## 2024-07-30 PROCEDURE — 21400001 HC TELEMETRY ROOM

## 2024-07-30 PROCEDURE — 63600175 PHARM REV CODE 636 W HCPCS

## 2024-07-30 PROCEDURE — 97162 PT EVAL MOD COMPLEX 30 MIN: CPT

## 2024-07-30 PROCEDURE — 27100171 HC OXYGEN HIGH FLOW UP TO 24 HOURS

## 2024-07-30 PROCEDURE — 80053 COMPREHEN METABOLIC PANEL: CPT | Performed by: INTERNAL MEDICINE

## 2024-07-30 PROCEDURE — 25000003 PHARM REV CODE 250

## 2024-07-30 PROCEDURE — 83735 ASSAY OF MAGNESIUM: CPT | Performed by: INTERNAL MEDICINE

## 2024-07-30 PROCEDURE — 94761 N-INVAS EAR/PLS OXIMETRY MLT: CPT

## 2024-07-30 RX ORDER — DOCUSATE SODIUM 100 MG/1
100 CAPSULE, LIQUID FILLED ORAL 2 TIMES DAILY
Status: DISCONTINUED | OUTPATIENT
Start: 2024-07-30 | End: 2024-08-01 | Stop reason: HOSPADM

## 2024-07-30 RX ORDER — FUROSEMIDE 10 MG/ML
40 INJECTION INTRAMUSCULAR; INTRAVENOUS EVERY 12 HOURS
Status: DISCONTINUED | OUTPATIENT
Start: 2024-07-30 | End: 2024-08-01 | Stop reason: HOSPADM

## 2024-07-30 RX ADMIN — FUROSEMIDE 40 MG: 10 INJECTION, SOLUTION INTRAVENOUS at 08:07

## 2024-07-30 RX ADMIN — Medication 6 MG: at 08:07

## 2024-07-30 RX ADMIN — DOCUSATE SODIUM 100 MG: 100 CAPSULE, LIQUID FILLED ORAL at 08:07

## 2024-07-30 RX ADMIN — ATORVASTATIN CALCIUM 40 MG: 40 TABLET, FILM COATED ORAL at 09:07

## 2024-07-30 RX ADMIN — LEVOTHYROXINE SODIUM 50 MCG: 50 TABLET ORAL at 05:07

## 2024-07-30 RX ADMIN — CARVEDILOL 3.12 MG: 3.12 TABLET, FILM COATED ORAL at 08:07

## 2024-07-30 RX ADMIN — BUDESONIDE 0.5 MG: 0.5 INHALANT RESPIRATORY (INHALATION) at 07:07

## 2024-07-30 RX ADMIN — ASPIRIN 81 MG: 81 TABLET, COATED ORAL at 09:07

## 2024-07-30 RX ADMIN — APIXABAN 5 MG: 5 TABLET, FILM COATED ORAL at 08:07

## 2024-07-30 RX ADMIN — FAMOTIDINE 20 MG: 20 TABLET, FILM COATED ORAL at 09:07

## 2024-07-30 RX ADMIN — IPRATROPIUM BROMIDE AND ALBUTEROL SULFATE 3 ML: 2.5; .5 SOLUTION RESPIRATORY (INHALATION) at 03:07

## 2024-07-30 RX ADMIN — FUROSEMIDE 40 MG: 10 INJECTION, SOLUTION INTRAVENOUS at 10:07

## 2024-07-30 RX ADMIN — VALSARTAN 160 MG: 40 TABLET, FILM COATED ORAL at 09:07

## 2024-07-30 RX ADMIN — IPRATROPIUM BROMIDE AND ALBUTEROL SULFATE 3 ML: 2.5; .5 SOLUTION RESPIRATORY (INHALATION) at 06:07

## 2024-07-30 RX ADMIN — APIXABAN 5 MG: 5 TABLET, FILM COATED ORAL at 09:07

## 2024-07-30 RX ADMIN — CARVEDILOL 3.12 MG: 3.12 TABLET, FILM COATED ORAL at 09:07

## 2024-07-30 RX ADMIN — IPRATROPIUM BROMIDE AND ALBUTEROL SULFATE 3 ML: 2.5; .5 SOLUTION RESPIRATORY (INHALATION) at 07:07

## 2024-07-30 NOTE — PLAN OF CARE
Problem: Occupational Therapy  Goal: Occupational Therapy Goal  Description: Goals to be met by: 08/07/24     Patient will increase functional independence with ADLs by performing:    Feeding with Modified [place].  UE Dressing with Set-up Assistance.  LE Dressing with Moderate Assistance.  Grooming while seated with Modified [place].  Toileting from bedside commode with Stand-by Assistance for hygiene and clothing management.   Bathing from  edge of bed with Minimal Assistance.  Toilet transfer to bedside commode with Set-up Assistance.    Outcome: Progressing

## 2024-07-30 NOTE — PROGRESS NOTES
[facility] Surg  Cardiology  Progress Note    Patient Name: Carmen Tan  MRN: [id number]  Admission Date: 7/28/2024  Hospital Length of Stay: 1 days  Code Status: Full Code   Attending Physician: Oscar Ge Jr., MD   Primary Care Physician: Lucian Barry MD  Expected Discharge Date:   Principal Problem:Acute on chronic diastolic congestive heart failure    Subjective:     Hospital Course:   This is a 75 yo female with pmhx HFpEF, COPD, morbid obesity, DM II, pAF who presented to the emergency department with 2 day history of progressively worsening shortness of breath and LE edema. She reports history of similar symptoms; however, this episode is worse than previous. She denies chest pain, orthopnea, palpitations, or presyncope.      On arrival, her proBNP was elevated at 3782; however improved from previous admission in May 2024 (8221). HS troponin negative; 17.9. Electrolytes without gross abnormality. CXR revealing chronic changes with no acute evidence of pulmonary process. No definite infiltrate or pleural effusion.      Pt was started on lasix gtt and BiPAP. She reports resting symptoms have improved; however, remains short of breath with exertion.      PMHx: She had an echocardiogram in April 2024 with preserved EF 60-65%. There was noted mild aortic stenosis and dilated atria. She has history of diminished EF in September 2023; EF 40-45%. Will get updated echocardiogram to reassess LVEF in the setting of acute HFpEF exacerbation.     Interval History:   7/30/24:   Echocardiogram with prserved EF 55-60%; will continue with valsartan. HR stable, continue on carvedilol. On telemetry, she demonstrates brief episodes of Afib/flutter lasting 1-2 seconds before self converting to sinus rhythm; pt does have hx of pAF. She will be started on Eliquis for stoke ppx. She continues on BiPAP. Volume status is improving - renal function remains at baseline BUN/Cr 21/1.2; electrolytes without significant  derangement; mildly elevated sodium 147. We will transition to IV Lasix 40 mg bid today and continue to monitor improvement.     Review of Systems   Cardiovascular:  Positive for dyspnea on exertion and leg swelling. Negative for chest pain, irregular heartbeat, palpitations and syncope.   Respiratory:  Positive for shortness of breath.    All other systems reviewed and are negative.    Objective:     Vital Signs (Most Recent):  Temp: 96.6 °F (35.9 °C) (07/30/24 1121)  Pulse: 103 (07/30/24 1121)  Resp: 18 (07/30/24 1121)  BP: 120/76 (07/30/24 1121)  SpO2: 98 % (07/30/24 1121) Vital Signs (24h Range):  Temp:  [96.6 °F (35.9 °C)-98.6 °F (37 °C)] 96.6 °F (35.9 °C)  Pulse:  [] 103  Resp:  [18-28] 18  SpO2:  [96 %-98 %] 98 %  BP: (120-146)/(66-76) 120/76     Weight: 104.3 kg (229 lb 15 oz)  Body mass index is 39.47 kg/m².    SpO2: 98 %         Intake/Output Summary (Last 24 hours) at 7/30/2024 1315  Last data filed at 7/30/2024 0600  Gross per 24 hour   Intake 360 ml   Output 9100 ml   Net -8740 ml       Lines/Drains/Airways       Drain  Duration             Female External Urinary Catheter w/ Suction 07/29/24 0100 1 day              Peripheral Intravenous Line  Duration                  Peripheral IV - Single Lumen 07/28/24 2241 20 G Left Antecubital 1 day         Peripheral IV - Single Lumen 07/28/24 2247 20 G Posterior;Right Hand 1 day                    Physical Exam  Vitals and nursing note reviewed.   Constitutional:       Appearance: Normal appearance. She is obese.      Comments: On BiPAP   HENT:      Head: Normocephalic and atraumatic.      Nose: Nose normal.      Mouth/Throat:      Mouth: Mucous membranes are moist.      Pharynx: Oropharynx is clear.   Eyes:      Extraocular Movements: Extraocular movements intact.   Cardiovascular:      Rate and Rhythm: Normal rate and regular rhythm.      Pulses: Normal pulses.      Heart sounds: Normal heart sounds. No murmur heard.     No friction rub. No gallop.    Pulmonary:      Effort: Pulmonary effort is normal.      Comments: Coarse breath sounds  Musculoskeletal:      Right lower leg: Edema present.      Left lower leg: Edema present.   Skin:     General: Skin is warm.      Capillary Refill: Capillary refill takes less than 2 seconds.   Neurological:      Mental Status: She is alert and oriented to person, place, and time.         Significant Labs: BMP:   Recent Labs   Lab 07/28/24 2256 07/29/24 0427 07/30/24  0529   GLU 97 94 73    146* 147*   K 4.3 4.2 3.5    110 101   CO2 33* 34* 40*   BUN 22 21 21   CREATININE 1.3 1.2 1.2   CALCIUM 9.6 9.5 10.2   MG  --   --  1.6   , CMP   Recent Labs   Lab 07/28/24 2256 07/29/24 0427 07/30/24  0529    146* 147*   K 4.3 4.2 3.5    110 101   CO2 33* 34* 40*   GLU 97 94 73   BUN 22 21 21   CREATININE 1.3 1.2 1.2   CALCIUM 9.6 9.5 10.2   PROT 6.5 6.0 6.8   ALBUMIN 2.8* 2.5* 2.9*   BILITOT 0.5 0.4 0.7   ALKPHOS 207* 172* 162*   AST 46* 32 17   ALT 46* 39 32   ANIONGAP 3 2* 6   , CBC   Recent Labs   Lab 07/28/24 2256 07/29/24 0427 07/30/24  0529   WBC 7.27 6.35 5.65   HGB 12.2 11.5* 13.0   HCT 39.2 36.9* 37.5    177 205   , and All pertinent lab results from the last 24 hours have been reviewed.    Significant Imaging:   TTE 7/29/24:     Left Ventricle: The left ventricle is normal in size. Mildly increased wall thickness. There is normal systolic function with a visually estimated ejection fraction of 55 - 60%. Grade I diastolic dysfunction.    Right Ventricle: Normal right ventricular cavity size. Systolic function is normal.    Left Atrium: Left atrium is severely dilated. Atrial septum is bulging to the right.    Right Atrium: Right atrium is moderately dilated.    Aortic Valve: The aortic valve is a trileaflet valve. Mildly restricted motion. There is mild stenosis. Aortic valve area by VTI is 1.84 cm². Aortic valve peak velocity is 2.18 m/s. Mean gradient is 10 mmHg. The dimensionless index is  0.58. There is mild aortic regurgitation.    Mitral Valve: There is mild regurgitation.    Tricuspid Valve: There is mild regurgitation.    Pulmonic Valve: There is mild to moderate regurgitation.    No pericardial effusion.    Assessment and Plan:    HFpEF, 55-60% (recovered from 45%, 9/2023)  -repeat Echocardiogram with preserved EF   -will transition to IV 40 mg Lasix BID today - continue to monitor volume status closely  -continue carvedilol, valsartan   -continue to monitor  -needs close outpatient follow-up with cardiology      COPD  -continue management per primary   -continue BiPAP, wean as tolerated   -continue to monitor      pAF  -telemetry with brief paroxysmal episodes of Afib/flutter; rate controlled   -self convert to sinus rhythm after 1-2 seconds   -continue BB   -start Eliquis 5 mg po bid - CHADSVASc 5  -continue to monitor      DM II   -continue management per primary            Active Diagnoses:    Diagnosis Date Noted POA    PRINCIPAL PROBLEM:  Acute on chronic diastolic congestive heart failure [I50.33] 02/16/2022 Yes    Anxiety and depression [F41.9, F32.A] 04/12/2023 Yes    COPD exacerbation [J44.1] 02/16/2022 Yes    Diabetes mellitus, type 2 [E11.9] 02/16/2022 Yes    Severe obesity (BMI >= 40) [E66.01] 02/04/2022 Yes    Paroxysmal A-fib [I48.0] 06/08/2017 Yes     Chronic      Problems Resolved During this Admission:       VTE Risk Mitigation (From admission, onward)           Ordered     apixaban tablet 5 mg  2 times daily         07/30/24 0904     IP VTE HIGH RISK PATIENT  Once         07/29/24 0059     Place sequential compression device  Until discontinued         07/29/24 0059     Place ANAMARIA hose  Until discontinued         07/29/24 0059                  DARRYL Blanc-C - scribed for Dr. Rascon   Cardiology     Provider's Attestation:  I, Spencer Rascon MD, confirm that DARRYL Valencia worked under my direction at all times.  Documentation shall reflect findings and decisions made by  myself in the course of the patient's treatment.  I have performed a face to face evaluation of the patient and reviewed the medical record. In addition, I have performed the substantive portion of the medical decision making. I have reviewed the notes and assessment, and I concur with her documentation.  CMS guidelines regarding the use of scribes shall be adhered to.  MD Spencer Galloway MD  Cardiology  [facility]

## 2024-07-30 NOTE — ASSESSMENT & PLAN NOTE
Body mass index is 39.47 kg/m². Morbid obesity complicates all aspects of disease management from diagnostic modalities to treatment. Weight loss encouraged and health benefits explained to patient.

## 2024-07-30 NOTE — PT/OT/SLP EVAL
Physical Therapy Evaluation    Patient Name:  Carmen Tan   MRN:  [id number]    Recommendations:     Discharge Recommendations: Low Intensity Therapy   Discharge Equipment Recommendations: none   Barriers to discharge:  Current functional independence     Assessment:     Carmen Tan is a 74 y.o. female admitted with a medical diagnosis of Acute on chronic diastolic congestive heart failure.  She presents with the following impairments/functional limitations: weakness, impaired endurance, impaired self care skills, impaired functional mobility, decreased lower extremity function, gait instability, decreased safety awareness, pain, impaired balance, impaired cardiopulmonary response to activity. Patient presents with pain in B LE with activity and weight bearing this visit. She is able to perform bed mobility and transfers with assistance. She is able to perform 2 steps this visit with RW, then patient requested to sit secondary to B LE pain     Rehab Prognosis: Fair; patient would benefit from acute skilled PT services to address these deficits and reach maximum level of function.    Recent Surgery: * No surgery found *      Plan:     During this hospitalization, patient to be seen 5 x/week to address the identified rehab impairments via gait training, therapeutic activities, therapeutic exercises, neuromuscular re-education and progress toward the following goals:    Plan of Care Expires:  08/09/24    Subjective     Chief Complaint: B LE pain   Patient/Family Comments/goals: return to PLOF   Pain/Comfort:  Pain Rating 1: 9/10  Location - Side 1: Bilateral  Location 1: leg  Pain Addressed 1: Cessation of Activity  Pain Rating Post-Intervention 1: 9/10    Patients cultural, spiritual, Lutheran conflicts given the current situation: no    Living Environment:  Patient lives with family in mobile home that has a ramp   Prior to admission, patients level of function was Mod I.  Equipment used at home: bedside  commode, cane, straight, rollator, shower chair, CPAP, oxygen.  DME owned (not currently used): none.  Upon discharge, patient will have assistance from family.    Objective:     Communicated with nursing prior to session.  Patient found HOB elevated with telemetry, oxygen, pulse ox (continuous), PureWick  upon PT entry to room.    General Precautions: Standard, fall  Orthopedic Precautions:N/A   Braces: N/A  Respiratory Status: Nasal cannula, flow 4.0 L/min    Exams:  Cognitive Exam:  Patient is oriented to Person, Place, Time, and Situation  Gross Motor Coordination:  WFL  RLE ROM: WFL  RLE Strength: Deficits: gross deficits noted  LLE ROM: WFL  LLE Strength: Deficits: gross deficits noted     Functional Mobility:  Bed Mobility:     Rolling Left:  minimum assistance  Rolling Right: minimum assistance  Bridging: moderate assistance  Supine to Sit: minimum assistance  Sit to Supine: minimum assistance  Transfers:     Sit to Stand:  contact guard assistance with rolling walker  Gait: 2 feet with RW with SBA and significant cuing for proper use of RW   Balance: No LOB noted this visit       AM-PAC 6 CLICK MOBILITY  Total Score:17       Treatment & Education:  Patient educated on HEP to perform in bed with heel slides, QS, and SLR. Patient educated on POC compliance and proper use of RW in order to avoid leaning on RW in order to decrease fall risk     Patient left HOB elevated with all lines intact, call button in reach, nursing notified, and nursing present.    GOALS:   Multidisciplinary Problems       Physical Therapy Goals          Problem: Physical Therapy    Goal Priority Disciplines Outcome Goal Variances Interventions   Physical Therapy Goal     PT, PT/OT Progressing     Description: Patient will increase functional independence with mobility by performin. Supine to sit with Supervision or Set-up Assistance  2. Sit to supine with Supervision or Set-up Assistance  3. Bed to chair transfer with Supervision  or Set-up Assistancewith or without rolling walker using Stand Pivot TECHNIQUE  4. Gait  x 100  feet with Supervision or Set-up Assistance with or without rolling walker  5. Lower extremity exercise program x10 reps per handout, with assistance as needed                        History:     Past Medical History:   Diagnosis Date    Abdominal hernia     Bacteremia 04/05/2023    CHF (congestive heart failure)     COPD (chronic obstructive pulmonary disease)     Diabetes mellitus, type 2 02/16/2022    GERD (gastroesophageal reflux disease)     History of home oxygen therapy     Hypertension     Hypertensive emergency 03/30/2023    Migraine headache     On home O2     Pulmonary embolism 06/01/2017    Small bowel obstruction     Thyroid disease        Past Surgical History:   Procedure Laterality Date    COLONOSCOPY      HYSTERECTOMY      INNER EAR SURGERY      UPPER GASTROINTESTINAL ENDOSCOPY         Time Tracking:     PT Received On: 07/30/24  PT Start Time: 1034     PT Stop Time: 1055  PT Total Time (min): 21 min     Billable Minutes: Evaluation 21 minutes      07/30/2024

## 2024-07-30 NOTE — HOSPITAL COURSE
7/30: Patient diuresing nicely with current dose of Lasix appreciate cardiology recommendations.  7/31: No acute events overnight patient responding to Lasix nicely.  Converted to IV push per Cardiology recommendations.  Continue treatment per their recommendations.  8/1: No acute events overnight.  The patient has diuresed nicely throughout her hospital stay.  Medication adjustments have been performed by Cardiology.  Patient is stable for discharge home with home health.

## 2024-07-30 NOTE — PT/OT/SLP PROGRESS
Occupational Therapy   Treatment    Name: Carmen Tan  MRN: [id number]  Admitting Diagnosis:  Acute on chronic diastolic congestive heart failure       Recommendations:     Discharge Recommendations: Low Intensity Therapy  Discharge Equipment Recommendations:  none  Barriers to discharge:  Other (Comment) (medical status)    Assessment:     Carmen Tan is a 74 y.o. female with a medical diagnosis of Acute on chronic diastolic congestive heart failure.  She presents with good participation. Performance deficits affecting function are weakness, impaired endurance, impaired self care skills, impaired functional mobility, gait instability, impaired balance, decreased coordination, decreased upper extremity function, decreased lower extremity function, decreased safety awareness, decreased ROM, edema, impaired cardiopulmonary response to activity, impaired joint extensibility, impaired muscle length.     Rehab Prognosis:  Good; patient would benefit from acute skilled OT services to address these deficits and reach maximum level of function.       Plan:     Patient to be seen 4 x/week to address the above listed problems via self-care/home management, therapeutic activities, therapeutic exercises  Plan of Care Expires: 08/07/24  Plan of Care Reviewed with: patient    Subjective     Chief Complaint: reported none  Patient/Family Comments/goals: Those cold to go away  Pain/Comfort:  Pain Rating 1: 0/10  Pain Rating Post-Intervention 1: 0/10    Objective:     Communicated with: occupational therapist and RN prior to session.  Patient found sitting edge of bed with telemetry, oxygen, pulse ox (continuous), PureWick upon OT entry to room.    General Precautions: Standard, fall    Orthopedic Precautions:N/A  Braces: N/A  Respiratory Status: Nasal cannula, flow 4 L/min     Occupational Performance:     [facility] 6 Click ADL: 19    Treatment & Education:  Patient engaged in active range of motion therapeutic exercise for 2  x 15 sitting EOB in the following planes: shoulder flexion/extension, shoulder abduction/adduction, elbow flexion/extension, scapular retractions/protraction, scapular elevation/depression, shoulder rotations (forward and reverse), wrist flexion/extension, wrist radial/ulnar deviation, forearm supination/pronation, and composite fist. Patient needed rest breaks between each set due to impaired muscle endurance and activity tolerance. Patient educated to continue to complete exercise throughout the day to increase strength and endurance to facilitate an increase in ADL/IADLs. Patient verbally understood. Pt was provided education / instruction regarding role of OT and established OT POC.       Patient left sitting edge of bed with  nurse notified    GOALS:   Multidisciplinary Problems       Occupational Therapy Goals          Problem: Occupational Therapy    Goal Priority Disciplines Outcome Interventions   Occupational Therapy Goal     OT, PT/OT Progressing    Description: Goals to be met by: 08/07/24     Patient will increase functional independence with ADLs by performing:    Feeding with Modified [place].  UE Dressing with Set-up Assistance.  LE Dressing with Moderate Assistance.  Grooming while seated with Modified [place].  Toileting from bedside commode with Stand-by Assistance for hygiene and clothing management.   Bathing from  edge of bed with Minimal Assistance.  Toilet transfer to bedside commode with Set-up Assistance.                         Time Tracking:     OT Date of Treatment: 07/30/24  OT Start Time: 1558  OT Stop Time: 1611  OT Total Time (min): 13 min    Billable Minutes:Therapeutic Exercise 13 min    OT/MARLA: MARLA     Number of MARLA visits since last OT visit: 1 7/30/2024  Juliet DELACRUZ

## 2024-07-30 NOTE — PLAN OF CARE
Problem: Physical Therapy  Goal: Physical Therapy Goal  Description: Patient will increase functional independence with mobility by performin. Supine to sit with Supervision or Set-up Assistance  2. Sit to supine with Supervision or Set-up Assistance  3. Bed to chair transfer with Supervision or Set-up Assistancewith or without rolling walker using Stand Pivot TECHNIQUE  4. Gait  x 100  feet with Supervision or Set-up Assistance with or without rolling walker  5. Lower extremity exercise program x10 reps per handout, with assistance as needed   Outcome: Progressing

## 2024-07-30 NOTE — PROGRESS NOTES
"[facility] Medicine  Progress Note    Patient Name: Carmen Tan  MRN: [id number]  Patient Class: IP- Inpatient   Admission Date: 7/28/2024  Length of Stay: 1 days  Attending Physician: Oscar Ge Jr., MD  Primary Care Provider: Lucian Barry MD        Subjective:     Principal Problem:Acute on chronic diastolic congestive heart failure        HPI:  Chief Complaint   Patient presents with    Shortness of Breath       Pt to ED complaining of SOB and bilateral lower extremity edema. Hx of CHF. Pt says she is compliant with medications      74 year female with a history of CHF, COPD, diabetes, hypertension, on home oxygen presents the ER with gradually worsening shortness of breath since earlier today, history of this multiple times in the past.  Patient states "I have too much fluid".  Denies chest pain.  No nausea vomiting diarrhea.  Not ill appearing, no fever.  No sinus congestion.     Updated HPI: Above history confirm patient presents with significant volume overload.  Endorses worsening shortness of breath for the last couple of days.  Multiple admissions in the past with similar complaints.    Overview/Hospital Course:  7/30: Patient diuresing nicely with current dose of Lasix appreciate cardiology recommendations.    Past Medical History:   Diagnosis Date    Abdominal hernia     Bacteremia 04/05/2023    CHF (congestive heart failure)     COPD (chronic obstructive pulmonary disease)     Diabetes mellitus, type 2 02/16/2022    GERD (gastroesophageal reflux disease)     History of home oxygen therapy     Hypertension     Hypertensive emergency 03/30/2023    Migraine headache     On home O2     Pulmonary embolism 06/01/2017    Small bowel obstruction     Thyroid disease        Past Surgical History:   Procedure Laterality Date    COLONOSCOPY      HYSTERECTOMY      INNER EAR SURGERY      UPPER GASTROINTESTINAL ENDOSCOPY         Review of patient's allergies indicates:   Allergen " Reactions    Pcn [penicillins] Swelling       No current facility-administered medications on file prior to encounter.     Current Outpatient Medications on File Prior to Encounter   Medication Sig    acetaminophen (TYLENOL) 325 MG tablet Take 2 tablets (650 mg total) by mouth every 8 (eight) hours as needed for Pain or Temperature greater than (100, headache).    albuterol-ipratropium (DUO-NEB) 2.5 mg-0.5 mg/3 mL nebulizer solution Take 3 mLs by nebulization every 6 (six) hours while awake. Rescue    aspirin (ECOTRIN) 81 MG EC tablet Take 1 tablet (81 mg total) by mouth once daily.    budesonide (PULMICORT) 0.5 mg/2 mL nebulizer solution Take 2 mLs (0.5 mg total) by nebulization every 12 (twelve) hours. Controller    busPIRone (BUSPAR) 5 MG Tab Take 1 tablet (5 mg total) by mouth 2 (two) times daily.    carvediloL (COREG) 3.125 MG tablet Take 1 tablet (3.125 mg total) by mouth 2 (two) times daily.    furosemide (LASIX) 40 MG tablet Take 1 tablet (40 mg total) by mouth once daily.    gabapentin (NEURONTIN) 100 MG capsule Take 1 capsule (100 mg total) by mouth 3 (three) times daily.    insulin aspart U-100 (NOVOLOG) 100 unit/mL (3 mL) InPn pen Inject 0-5 Units into the skin before meals and at bedtime as needed (Hyperglycemia).    levoFLOXacin (LEVAQUIN) 500 MG tablet Take 1 tablet (500 mg total) by mouth once daily.    levothyroxine (SYNTHROID) 50 MCG tablet Take 1 tablet (50 mcg total) by mouth before breakfast.    pantoprazole (PROTONIX) 20 MG tablet Take 40 mg by mouth once daily.    valsartan (DIOVAN) 160 MG tablet Take 1 tablet (160 mg total) by mouth once daily.    vitamin D 1000 units Tab Take 185 mg by mouth once daily.    LIPITOR 40 mg tablet Take 1 tablet (40 mg total) by mouth once daily.     Family History    None       Tobacco Use    Smoking status: Former    Smokeless tobacco: Never   Substance and Sexual Activity    Alcohol use: No    Drug use: No    Sexual activity: Not on file     Review of Systems    Constitutional:  Negative for chills and fever.   HENT:  Negative for rhinorrhea, sneezing and sore throat.    Eyes:  Negative for pain and visual disturbance.   Respiratory:  Positive for shortness of breath. Negative for cough.    Cardiovascular:  Positive for leg swelling. Negative for chest pain.   Gastrointestinal:  Negative for abdominal pain, constipation and diarrhea.   Endocrine: Negative for cold intolerance and heat intolerance.   Genitourinary:  Negative for difficulty urinating.   Musculoskeletal:  Negative for arthralgias and joint swelling.   Skin:  Negative for rash.   Allergic/Immunologic: Negative for environmental allergies.   Neurological:  Negative for dizziness, syncope and weakness.   Hematological:  Does not bruise/bleed easily.   Psychiatric/Behavioral:  Negative for dysphoric mood. The patient is not nervous/anxious.      Objective:     Vital Signs (Most Recent):  Temp: 97.1 °F (36.2 °C) (07/30/24 0258)  Pulse: 71 (07/30/24 0745)  Resp: 20 (07/30/24 0714)  BP: (!) 142/72 (07/30/24 0258)  SpO2: 97 % (07/30/24 0714) Vital Signs (24h Range):  Temp:  [97 °F (36.1 °C)-98.6 °F (37 °C)] 97.1 °F (36.2 °C)  Pulse:  [47-77] 71  Resp:  [18-28] 20  SpO2:  [91 %-98 %] 97 %  BP: (114-146)/(57-74) 142/72     Weight: 104.3 kg (229 lb 15 oz)  Body mass index is 39.47 kg/m².     Physical Exam  Constitutional:       Appearance: She is obese.   HENT:      Nose: Nose normal.      Mouth/Throat:      Mouth: Mucous membranes are moist.   Eyes:      Extraocular Movements: Extraocular movements intact.      Pupils: Pupils are equal, round, and reactive to light.   Cardiovascular:      Rate and Rhythm: Normal rate and regular rhythm.   Pulmonary:      Effort: Respiratory distress present.      Breath sounds: Wheezing and rales present.      Comments: Decreased BS at bases - bipap  Abdominal:      General: Bowel sounds are normal. There is no distension.      Palpations: Abdomen is soft.      Tenderness: There is no  abdominal tenderness.   Genitourinary:     Comments: Hardwick in place  Musculoskeletal:      Cervical back: Normal range of motion.      Right lower leg: Edema present.      Left lower leg: Edema present.      Comments: Edema improving   Skin:     General: Skin is warm and dry.      Capillary Refill: Capillary refill takes 2 to 3 seconds.   Neurological:      General: No focal deficit present.      Mental Status: She is alert and oriented to person, place, and time.              CRANIAL NERVES     CN III, IV, VI   Pupils are equal, round, and reactive to light.       Significant Labs: All pertinent labs within the past 24 hours have been reviewed.    Significant Imaging: I have reviewed all pertinent imaging results/findings within the past 24 hours.    Assessment/Plan:      * Acute on chronic diastolic congestive heart failure  Patient is identified as having Diastolic (HFpEF) heart failure that is Acute on chronic. CHF is currently uncontrolled due to Continued edema of extremities. Latest ECHO performed and demonstrates- Results for orders placed during the hospital encounter of 04/21/24    Echo    Interpretation Summary    Left Ventricle: The left ventricle is normal in size. Mildly increased wall thickness. There is normal systolic function with a visually estimated ejection fraction of 60 - 65%. Grade I diastolic dysfunction.    Right Ventricle: Normal right ventricular cavity size. Systolic function is normal.    Left Atrium: Left atrium is severely dilated.    Right Atrium: Right atrium is moderately dilated.    Aortic Valve: The aortic valve is a trileaflet valve. There is mild stenosis. Aortic valve area by VTI is 1.92 cm². Aortic valve peak velocity is 2.00 m/s. Mean gradient is 8 mmHg. The dimensionless index is 0.59.    Pulmonic Valve: There is mild regurgitation.    No pericardial effusion.  . Continue Beta Blocker, ACE/ARB, and Furosemide and monitor clinical status closely. Monitor on telemetry.  "Patient is on CHF pathway.  Monitor strict Is&Os and daily weights.  Place on fluid restriction of 1 L. Cardiology has been consulted. Continue to stress to patient importance of self efficacy and  on diet for CHF. Last BNP reviewed- and noted below No results for input(s): "BNP", "BNPTRIAGEBLO" in the last 168 hours.    Anxiety and depression  Continue home medical therapy.      Diabetes mellitus, type 2  Patient's FSGs are controlled on current medication regimen.  Last A1c reviewed-   Lab Results   Component Value Date    HGBA1C 5.3 07/28/2024     Most recent fingerstick glucose reviewed- No results for input(s): "POCTGLUCOSE" in the last 24 hours.  Current correctional scale  Low  Maintain anti-hyperglycemic dose as follows-   Antihyperglycemics (From admission, onward)      None          Hold Oral hypoglycemics while patient is in the hospital.    COPD exacerbation  Patient's COPD is controlled currently.  Patient is currently on COPD Pathway. Continue scheduled inhalers Steroids, Antibiotics, and Supplemental oxygen and monitor respiratory status closely.     Severe obesity (BMI >= 40)  Body mass index is 39.47 kg/m². Morbid obesity complicates all aspects of disease management from diagnostic modalities to treatment. Weight loss encouraged and health benefits explained to patient.         Paroxysmal A-fib  Rate controlled.      VTE Risk Mitigation (From admission, onward)           Ordered     IP VTE HIGH RISK PATIENT  Once         07/29/24 0059     Place sequential compression device  Until discontinued         07/29/24 0059     Place ANAMARIA hose  Until discontinued         07/29/24 0059                    Discharge Planning   TIGRE:      Code Status: Full Code   Is the patient medically ready for discharge?:     Reason for patient still in hospital (select all that apply): Treatment  Discharge Plan A: Home with family, Home Health                  Oscar Ge Jr, MD  Department of Hospital Medicine "   [facility]

## 2024-07-31 LAB
ALBUMIN SERPL BCP-MCNC: 2.5 G/DL (ref 3.5–5.2)
ALP SERPL-CCNC: 134 U/L (ref 55–135)
ALT SERPL W/O P-5'-P-CCNC: 21 U/L (ref 10–44)
ANION GAP SERPL CALC-SCNC: -1 MMOL/L (ref 3–11)
AST SERPL-CCNC: 11 U/L (ref 10–40)
BASOPHILS # BLD AUTO: 0.02 K/UL (ref 0–0.2)
BASOPHILS NFR BLD: 0.3 % (ref 0–1.9)
BILIRUB SERPL-MCNC: 0.6 MG/DL (ref 0.1–1)
BUN SERPL-MCNC: 24 MG/DL (ref 8–23)
CALCIUM SERPL-MCNC: 9.9 MG/DL (ref 8.7–10.5)
CHLORIDE SERPL-SCNC: 102 MMOL/L (ref 95–110)
CO2 SERPL-SCNC: 42 MMOL/L (ref 23–29)
CREAT SERPL-MCNC: 1.3 MG/DL (ref 0.5–1.4)
DIFFERENTIAL METHOD BLD: ABNORMAL
EOSINOPHIL # BLD AUTO: 0.1 K/UL (ref 0–0.5)
EOSINOPHIL NFR BLD: 1.8 % (ref 0–8)
ERYTHROCYTE [DISTWIDTH] IN BLOOD BY AUTOMATED COUNT: 16.4 % (ref 11.5–14.5)
EST. GFR  (NO RACE VARIABLE): 43.4 ML/MIN/1.73 M^2
GLUCOSE SERPL-MCNC: 80 MG/DL (ref 70–110)
HCT VFR BLD AUTO: 39 % (ref 37–48.5)
HGB BLD-MCNC: 12.3 G/DL (ref 12–16)
IMM GRANULOCYTES # BLD AUTO: 0.02 K/UL (ref 0–0.04)
IMM GRANULOCYTES NFR BLD AUTO: 0.3 % (ref 0–0.5)
LYMPHOCYTES # BLD AUTO: 1.6 K/UL (ref 1–4.8)
LYMPHOCYTES NFR BLD: 25.4 % (ref 18–48)
MAGNESIUM SERPL-MCNC: 1.7 MG/DL (ref 1.6–2.6)
MCH RBC QN AUTO: 29.4 PG (ref 27–31)
MCHC RBC AUTO-ENTMCNC: 31.5 G/DL (ref 32–36)
MCV RBC AUTO: 93 FL (ref 82–98)
MONOCYTES # BLD AUTO: 0.7 K/UL (ref 0.3–1)
MONOCYTES NFR BLD: 10.6 % (ref 4–15)
NEUTROPHILS # BLD AUTO: 3.8 K/UL (ref 1.8–7.7)
NEUTROPHILS NFR BLD: 61.6 % (ref 38–73)
NRBC BLD-RTO: 0 /100 WBC
PLATELET # BLD AUTO: 195 K/UL (ref 150–450)
PMV BLD AUTO: 9.3 FL (ref 9.2–12.9)
POTASSIUM SERPL-SCNC: 3.8 MMOL/L (ref 3.5–5.1)
PROT SERPL-MCNC: 5.9 G/DL (ref 6–8.4)
RBC # BLD AUTO: 4.18 M/UL (ref 4–5.4)
SODIUM SERPL-SCNC: 143 MMOL/L (ref 136–145)
WBC # BLD AUTO: 6.15 K/UL (ref 3.9–12.7)

## 2024-07-31 PROCEDURE — 94761 N-INVAS EAR/PLS OXIMETRY MLT: CPT

## 2024-07-31 PROCEDURE — 85025 COMPLETE CBC W/AUTO DIFF WBC: CPT | Performed by: INTERNAL MEDICINE

## 2024-07-31 PROCEDURE — 25000003 PHARM REV CODE 250: Performed by: INTERNAL MEDICINE

## 2024-07-31 PROCEDURE — 80053 COMPREHEN METABOLIC PANEL: CPT | Performed by: INTERNAL MEDICINE

## 2024-07-31 PROCEDURE — 94660 CPAP INITIATION&MGMT: CPT

## 2024-07-31 PROCEDURE — 83735 ASSAY OF MAGNESIUM: CPT | Performed by: INTERNAL MEDICINE

## 2024-07-31 PROCEDURE — 25000242 PHARM REV CODE 250 ALT 637 W/ HCPCS: Performed by: INTERNAL MEDICINE

## 2024-07-31 PROCEDURE — 63600175 PHARM REV CODE 636 W HCPCS

## 2024-07-31 PROCEDURE — 94640 AIRWAY INHALATION TREATMENT: CPT

## 2024-07-31 PROCEDURE — 27100171 HC OXYGEN HIGH FLOW UP TO 24 HOURS

## 2024-07-31 PROCEDURE — 99900031 HC PATIENT EDUCATION (STAT)

## 2024-07-31 PROCEDURE — 99900035 HC TECH TIME PER 15 MIN (STAT)

## 2024-07-31 PROCEDURE — 36415 COLL VENOUS BLD VENIPUNCTURE: CPT | Performed by: INTERNAL MEDICINE

## 2024-07-31 PROCEDURE — 21400001 HC TELEMETRY ROOM

## 2024-07-31 PROCEDURE — 97530 THERAPEUTIC ACTIVITIES: CPT

## 2024-07-31 PROCEDURE — 25000003 PHARM REV CODE 250

## 2024-07-31 RX ADMIN — APIXABAN 5 MG: 5 TABLET, FILM COATED ORAL at 09:07

## 2024-07-31 RX ADMIN — FAMOTIDINE 20 MG: 20 TABLET, FILM COATED ORAL at 09:07

## 2024-07-31 RX ADMIN — ASPIRIN 81 MG: 81 TABLET, COATED ORAL at 09:07

## 2024-07-31 RX ADMIN — FUROSEMIDE 40 MG: 10 INJECTION, SOLUTION INTRAVENOUS at 11:07

## 2024-07-31 RX ADMIN — LEVOTHYROXINE SODIUM 50 MCG: 50 TABLET ORAL at 05:07

## 2024-07-31 RX ADMIN — DOCUSATE SODIUM 100 MG: 100 CAPSULE, LIQUID FILLED ORAL at 09:07

## 2024-07-31 RX ADMIN — IPRATROPIUM BROMIDE AND ALBUTEROL SULFATE 3 ML: 2.5; .5 SOLUTION RESPIRATORY (INHALATION) at 12:07

## 2024-07-31 RX ADMIN — Medication 6 MG: at 09:07

## 2024-07-31 RX ADMIN — CARVEDILOL 3.12 MG: 3.12 TABLET, FILM COATED ORAL at 09:07

## 2024-07-31 RX ADMIN — VALSARTAN 160 MG: 40 TABLET, FILM COATED ORAL at 09:07

## 2024-07-31 RX ADMIN — ATORVASTATIN CALCIUM 40 MG: 40 TABLET, FILM COATED ORAL at 09:07

## 2024-07-31 RX ADMIN — FUROSEMIDE 40 MG: 10 INJECTION, SOLUTION INTRAVENOUS at 09:07

## 2024-07-31 RX ADMIN — BUDESONIDE 0.5 MG: 0.5 INHALANT RESPIRATORY (INHALATION) at 08:07

## 2024-07-31 RX ADMIN — IPRATROPIUM BROMIDE AND ALBUTEROL SULFATE 3 ML: 2.5; .5 SOLUTION RESPIRATORY (INHALATION) at 08:07

## 2024-07-31 RX ADMIN — BUDESONIDE 0.5 MG: 0.5 INHALANT RESPIRATORY (INHALATION) at 07:07

## 2024-07-31 NOTE — PLAN OF CARE
Plan of care reviewed with patient. Pt is free of falls and injury. NADN, VSS, afebrile. Pt tolerating medications well. Questions and concerns addressed.     Problem: Adult Inpatient Plan of Care  Goal: Plan of Care Review  Outcome: Progressing  Goal: Patient-Specific Goal (Individualized)  Outcome: Progressing  Goal: Absence of Hospital-Acquired Illness or Injury  Outcome: Progressing  Goal: Optimal Comfort and Wellbeing  Outcome: Progressing  Goal: Readiness for Transition of Care  Outcome: Progressing     Problem: Skin Injury Risk Increased  Goal: Skin Health and Integrity  Outcome: Progressing     Problem: Diabetes Comorbidity  Goal: Blood Glucose Level Within Targeted Range  Outcome: Progressing     Problem: Comorbidity Management  Goal: Maintenance of Behavioral Health Symptom Control  Outcome: Progressing  Goal: Maintenance of COPD Symptom Control  Outcome: Progressing  Goal: Blood Pressure in Desired Range  Outcome: Progressing  Goal: Bariatric Home Regimen Maintained  Outcome: Progressing     Problem: Fall Injury Risk  Goal: Absence of Fall and Fall-Related Injury  Outcome: Progressing     Problem: Mobility Impairment  Goal: Optimal Mobility  Outcome: Progressing     Problem: Heart Failure  Goal: Optimal Coping  Outcome: Progressing  Goal: Optimal Cardiac Output  Outcome: Progressing  Goal: Stable Heart Rate and Rhythm  Outcome: Progressing  Goal: Optimal Functional Ability  Outcome: Progressing  Goal: Fluid and Electrolyte Balance  Outcome: Progressing  Goal: Improved Oral Intake  Outcome: Progressing  Goal: Effective Oxygenation and Ventilation  Outcome: Progressing  Goal: Effective Breathing Pattern During Sleep  Outcome: Progressing     Problem: Breathing Pattern Ineffective  Goal: Effective Breathing Pattern  Outcome: Progressing     Problem: Gas Exchange Impaired  Goal: Optimal Gas Exchange  Outcome: Progressing

## 2024-07-31 NOTE — PROGRESS NOTES
[facility]  Cardiology  Progress Note    Patient Name: Carmen Tan  MRN: [id number]  Admission Date: 7/28/2024  Hospital Length of Stay: 3 days  Code Status: Prior   Attending Physician: No att. providers found   Primary Care Physician: Lucian Barry MD  Expected Discharge Date: 8/1/2024  Principal Problem:Acute on chronic diastolic congestive heart failure    Subjective:     Hospital Course:   This is a 73 yo female with pmhx HFpEF, COPD, morbid obesity, DM II, pAF who presented to the emergency department with 2 day history of progressively worsening shortness of breath and LE edema. She reports history of similar symptoms; however, this episode is worse than previous. She denies chest pain, orthopnea, palpitations, or presyncope.      On arrival, her proBNP was elevated at 3782; however improved from previous admission in May 2024 (8221). HS troponin negative; 17.9. Electrolytes without gross abnormality. CXR revealing chronic changes with no acute evidence of pulmonary process. No definite infiltrate or pleural effusion.      Pt was started on lasix gtt and BiPAP. She reports resting symptoms have improved; however, remains short of breath with exertion.      PMHx: She had an echocardiogram in April 2024 with preserved EF 60-65%. There was noted mild aortic stenosis and dilated atria. She has history of diminished EF in September 2023; EF 40-45%. Will get updated echocardiogram to reassess LVEF in the setting of acute HFpEF exacerbation.     Interval History:   7/30/24:   Echocardiogram with prserved EF 55-60%; will continue with valsartan. HR stable, continue on carvedilol. On telemetry, she demonstrates brief episodes of Afib/flutter lasting 1-2 seconds before self converting to sinus rhythm; pt does have hx of pAF. She will be started on Eliquis for stoke ppx. She continues on BiPAP. Volume status is improving - renal function remains at baseline BUN/Cr 21/1.2; electrolytes without  significant derangement; mildly elevated sodium 147. We will transition to IV Lasix 40 mg bid today and continue to monitor improvement.     7/31/24:  No acute events overnight. BP and HR remains stable on current regimen. She continues on BiPAP; weaning as tolerated. She continues to diuresis well; tolerating change to IV bid lasix. She states subjectively she feels much improving since admission. She denies any chest pain at this time, and her peripheral edema has improved. Renal function and electrolytes remain stable.     Review of Systems   Cardiovascular:  Positive for dyspnea on exertion. Negative for chest pain, irregular heartbeat, leg swelling, palpitations and syncope.   Respiratory:  Positive for shortness of breath.    All other systems reviewed and are negative.    Objective:     Vital Signs (Most Recent):  Temp: 97.2 °F (36.2 °C) (08/01/24 0755)  Pulse: (!) 52 (08/01/24 1210)  Resp: (!) 27 (08/01/24 1114)  BP: 122/66 (08/01/24 0834)  SpO2: 97 % (08/01/24 1114) Vital Signs (24h Range):        Weight: 104.3 kg (229 lb 15 oz)  Body mass index is 39.47 kg/m².    SpO2: 97 %       No intake or output data in the 24 hours ending 08/05/24 1325      Lines/Drains/Airways       None                   Physical Exam  Vitals and nursing note reviewed.   Constitutional:       Appearance: Normal appearance. She is obese.      Comments: On BiPAP   HENT:      Head: Normocephalic and atraumatic.      Nose: Nose normal.      Mouth/Throat:      Mouth: Mucous membranes are moist.      Pharynx: Oropharynx is clear.   Eyes:      Extraocular Movements: Extraocular movements intact.   Cardiovascular:      Rate and Rhythm: Normal rate and regular rhythm.      Pulses: Normal pulses.      Heart sounds: Normal heart sounds. No murmur heard.     No friction rub. No gallop.   Pulmonary:      Effort: Pulmonary effort is normal.      Comments: Coarse breath sounds  Musculoskeletal:      Right lower leg: Edema present.      Left lower  "leg: Edema present.      Comments: Peripheral edema improving, 1-2+ bilaterally.   Skin:     General: Skin is warm.      Capillary Refill: Capillary refill takes less than 2 seconds.   Neurological:      Mental Status: She is alert and oriented to person, place, and time.         Significant Labs: BMP:   No results for input(s): "GLU", "NA", "K", "CL", "CO2", "BUN", "CREATININE", "CALCIUM", "MG" in the last 48 hours.  , CMP   No results for input(s): "NA", "K", "CL", "CO2", "GLU", "BUN", "CREATININE", "CALCIUM", "PROT", "ALBUMIN", "BILITOT", "ALKPHOS", "AST", "ALT", "ANIONGAP", "ESTGFRAFRICA", "EGFRNONAA" in the last 48 hours.  , CBC   No results for input(s): "WBC", "HGB", "HCT", "PLT" in the last 48 hours.  , and All pertinent lab results from the last 24 hours have been reviewed.    Significant Imaging:   TTE 7/29/24:     Left Ventricle: The left ventricle is normal in size. Mildly increased wall thickness. There is normal systolic function with a visually estimated ejection fraction of 55 - 60%. Grade I diastolic dysfunction.    Right Ventricle: Normal right ventricular cavity size. Systolic function is normal.    Left Atrium: Left atrium is severely dilated. Atrial septum is bulging to the right.    Right Atrium: Right atrium is moderately dilated.    Aortic Valve: The aortic valve is a trileaflet valve. Mildly restricted motion. There is mild stenosis. Aortic valve area by VTI is 1.84 cm². Aortic valve peak velocity is 2.18 m/s. Mean gradient is 10 mmHg. The dimensionless index is 0.58. There is mild aortic regurgitation.    Mitral Valve: There is mild regurgitation.    Tricuspid Valve: There is mild regurgitation.    Pulmonic Valve: There is mild to moderate regurgitation.    No pericardial effusion.    Assessment and Plan:    HFpEF, 55-60% (recovered from 45%, 9/2023)  -repeat Echocardiogram with preserved EF   -continue IV 40 mg Lasix BID - continue to monitor volume status closely  -plan to transition to " po lasix tomorrow or Friday as volume status continues to improve  -continue carvedilol, valsartan   -continue to monitor  -needs close outpatient follow-up with cardiology      COPD  -continue management per primary   -continue BiPAP, wean as tolerated   -continue to monitor      pAF  -telemetry with brief paroxysmal episodes of Afib/flutter; rate controlled   -self convert to sinus rhythm after 1-2 seconds   -continue BB   -continue Eliquis 5 mg po bid - CHADSVASc 5  -continue to monitor      DM II   -continue management per primary            Active Diagnoses:    Diagnosis Date Noted POA    PRINCIPAL PROBLEM:  Acute on chronic diastolic congestive heart failure [I50.33] 02/16/2022 Yes    Anxiety and depression [F41.9, F32.A] 04/12/2023 Yes    COPD exacerbation [J44.1] 02/16/2022 Yes    Diabetes mellitus, type 2 [E11.9] 02/16/2022 Yes    Severe obesity (BMI >= 40) [E66.01] 02/04/2022 Yes    Paroxysmal A-fib [I48.0] 06/08/2017 Yes     Chronic      Problems Resolved During this Admission:       VTE Risk Mitigation (From admission, onward)           Ordered     IP VTE HIGH RISK PATIENT  Once         07/29/24 0059                  DARRYL Blanc-C - scribed for Dr. Rascon   Cardiology     Provider's Attestation:  I, Spencer Rascon MD, confirm that DARRYL Valencia worked under my direction at all times.  Documentation shall reflect findings and decisions made by myself in the course of the patient's treatment.  I have performed a face to face evaluation of the patient and reviewed the medical record. In addition, I have performed the substantive portion of the medical decision making. I have reviewed the notes and assessment, and I concur with her documentation.  CMS guidelines regarding the use of scribes shall be adhered to.  MD Spencer Galloway MD  Cardiology  [facility]

## 2024-07-31 NOTE — ASSESSMENT & PLAN NOTE
"Patient is identified as having Diastolic (HFpEF) heart failure that is Acute on chronic. CHF is currently uncontrolled due to Continued edema of extremities. Latest ECHO performed and demonstrates- Results for orders placed during the hospital encounter of 04/21/24    Echo    Interpretation Summary    Left Ventricle: The left ventricle is normal in size. Mildly increased wall thickness. There is normal systolic function with a visually estimated ejection fraction of 60 - 65%. Grade I diastolic dysfunction.    Right Ventricle: Normal right ventricular cavity size. Systolic function is normal.    Left Atrium: Left atrium is severely dilated.    Right Atrium: Right atrium is moderately dilated.    Aortic Valve: The aortic valve is a trileaflet valve. There is mild stenosis. Aortic valve area by VTI is 1.92 cm². Aortic valve peak velocity is 2.00 m/s. Mean gradient is 8 mmHg. The dimensionless index is 0.59.    Pulmonic Valve: There is mild regurgitation.    No pericardial effusion.  . Continue Beta Blocker, ACE/ARB, and Furosemide and monitor clinical status closely. Monitor on telemetry. Patient is on CHF pathway.  Monitor strict Is&Os and daily weights.  Place on fluid restriction of 1 L. Cardiology has been consulted. Continue to stress to patient importance of self efficacy and  on diet for CHF. Last BNP reviewed- and noted below No results for input(s): "BNP", "BNPTRIAGEBLO" in the last 168 hours.    Continue treatment per Cardiology recommendations  "

## 2024-07-31 NOTE — PLAN OF CARE
07/31/24 1528   Medicare Message   Important Message from Medicare regarding Discharge Appeal Rights Given to patient/caregiver;Explained to patient/caregiver;Signed/date by patient/caregiver   Date IMM was signed 07/31/24   Time IMM was signed 1528     Delivered copy of Important Medicare Message to bedside.  Reviewed I with patient.  Patient signs for delivery of IM.

## 2024-07-31 NOTE — PT/OT/SLP PROGRESS
Occupational Therapy   Treatment    Name: Carmen Tan  MRN: [id number]  Admitting Diagnosis:  Acute on chronic diastolic congestive heart failure       Recommendations:     Discharge Recommendations: Low Intensity Therapy  Discharge Equipment Recommendations:  none  Barriers to discharge:  Other (Comment) (Medical status)    Assessment:     Carmen Tan is a 74 y.o. female with a medical diagnosis of Acute on chronic diastolic congestive heart failure.  She presents with improved functional performance with transfers as noted by SBA in which she did not require steadying assist on this date. Performance deficits affecting function are weakness, impaired endurance, impaired self care skills, impaired functional mobility, gait instability, impaired balance, decreased coordination, decreased upper extremity function, decreased lower extremity function, decreased safety awareness, decreased ROM, edema, impaired cardiopulmonary response to activity, impaired joint extensibility, impaired muscle length.     Rehab Prognosis:  Good; patient would benefit from acute skilled OT services to address these deficits and reach maximum level of function.       Plan:     Patient to be seen 4 x/week to address the above listed problems via self-care/home management, therapeutic activities, therapeutic exercises  Plan of Care Expires: 08/07/24  Plan of Care Reviewed with: patient    Subjective     Chief Complaint: lack of activity  Patient/Family Comments/goals: Pt would like to return home with family.   Pain/Comfort:  Pain Rating 1: 0/10  Pain Rating Post-Intervention 1: 0/10    Objective:     Communicated with: nurse prior to session.  Patient found HOB elevated with telemetry, oxygen, pulse ox (continuous), PureWick upon OT entry to room.    General Precautions: Standard, fall    Orthopedic Precautions:N/A  Braces: N/A  Respiratory Status:  BiPAP     Occupational Performance:     Bed Mobility:    Patient completed  Rolling/Turning to Left with  minimum assistance  Patient completed Rolling/Turning to Right with minimum assistance  Patient completed Scooting/Bridging with minimum assistance  Patient completed Supine to Sit with minimum assistance  Patient completed Sit to Supine with minimum assistance     Functional Mobility/Transfers:  Patient completed Sit <> Stand Transfer with stand by assistance  with  rolling walker   Patient completed Toilet Transfer Step Transfer technique with stand by assistance with  rolling walker  Functional Mobility: Pt ambulated 11' between surfaces requiring steadying assist utilizing RW.     Treatment & Education:  Pt was cooperative and motivated without verbal encouragement while exhibiting positive affect. She performed bed mobility requiring min assist secondary to mostly stabilization of trunk and some assist with (L) LE with sit to supine transition. Pt then participated in functional transfer retraining to / from to / from bed and BSC emphasizing fall prevention and use of assistive devices requiring SBA for safety secondary to verbal cueing for safety awareness and technique. Also, she participated in therapeutic exercise performing 3x5 seated pushups requiring verbal and tactile cueing for technique challenging him to lift buttocks off seated surface to end range elbow extension in order to strengthen triceps brachii to improve performance with sit <> stand transitions particularly from lower surfaces. Pt ambulated 11' between surfaces requiring steadying assist utilizing RW.     Patient left HOB elevated with all lines intact, call button in reach, and nurse notified    GOALS:   Multidisciplinary Problems       Occupational Therapy Goals          Problem: Occupational Therapy    Goal Priority Disciplines Outcome Interventions   Occupational Therapy Goal     OT, PT/OT Progressing    Description: Goals to be met by: 08/07/24     Patient will increase functional independence with ADLs  by performing:    Feeding with Modified [place].  UE Dressing with Set-up Assistance.  LE Dressing with Moderate Assistance.  Grooming while seated with Modified [place].  Toileting from bedside commode with Stand-by Assistance for hygiene and clothing management.   Bathing from  edge of bed with Minimal Assistance.  Toilet transfer to bedside commode with Set-up Assistance.                         Time Tracking:     OT Date of Treatment: 07/31/24  OT Start Time: 1340  OT Stop Time: 1405  OT Total Time (min): 25 min    Billable Minutes:Therapeutic Activity 10  Therapeutic Exercise 15    OT/MARLA: OT     Number of MARLA visits since last OT visit: 1 7/31/2024

## 2024-07-31 NOTE — SUBJECTIVE & OBJECTIVE
Past Medical History:   Diagnosis Date    Abdominal hernia     Bacteremia 04/05/2023    CHF (congestive heart failure)     COPD (chronic obstructive pulmonary disease)     Diabetes mellitus, type 2 02/16/2022    GERD (gastroesophageal reflux disease)     History of home oxygen therapy     Hypertension     Hypertensive emergency 03/30/2023    Migraine headache     On home O2     Pulmonary embolism 06/01/2017    Small bowel obstruction     Thyroid disease        Past Surgical History:   Procedure Laterality Date    COLONOSCOPY      HYSTERECTOMY      INNER EAR SURGERY      UPPER GASTROINTESTINAL ENDOSCOPY         Review of patient's allergies indicates:   Allergen Reactions    Pcn [penicillins] Swelling       No current facility-administered medications on file prior to encounter.     Current Outpatient Medications on File Prior to Encounter   Medication Sig    acetaminophen (TYLENOL) 325 MG tablet Take 2 tablets (650 mg total) by mouth every 8 (eight) hours as needed for Pain or Temperature greater than (100, headache).    albuterol-ipratropium (DUO-NEB) 2.5 mg-0.5 mg/3 mL nebulizer solution Take 3 mLs by nebulization every 6 (six) hours while awake. Rescue    aspirin (ECOTRIN) 81 MG EC tablet Take 1 tablet (81 mg total) by mouth once daily.    budesonide (PULMICORT) 0.5 mg/2 mL nebulizer solution Take 2 mLs (0.5 mg total) by nebulization every 12 (twelve) hours. Controller    busPIRone (BUSPAR) 5 MG Tab Take 1 tablet (5 mg total) by mouth 2 (two) times daily.    carvediloL (COREG) 3.125 MG tablet Take 1 tablet (3.125 mg total) by mouth 2 (two) times daily.    furosemide (LASIX) 40 MG tablet Take 1 tablet (40 mg total) by mouth once daily.    gabapentin (NEURONTIN) 100 MG capsule Take 1 capsule (100 mg total) by mouth 3 (three) times daily.    insulin aspart U-100 (NOVOLOG) 100 unit/mL (3 mL) InPn pen Inject 0-5 Units into the skin before meals and at bedtime as needed (Hyperglycemia).    levoFLOXacin (LEVAQUIN) 500  MG tablet Take 1 tablet (500 mg total) by mouth once daily.    levothyroxine (SYNTHROID) 50 MCG tablet Take 1 tablet (50 mcg total) by mouth before breakfast.    pantoprazole (PROTONIX) 20 MG tablet Take 40 mg by mouth once daily.    valsartan (DIOVAN) 160 MG tablet Take 1 tablet (160 mg total) by mouth once daily.    vitamin D 1000 units Tab Take 185 mg by mouth once daily.    LIPITOR 40 mg tablet Take 1 tablet (40 mg total) by mouth once daily.     Family History    None       Tobacco Use    Smoking status: Former    Smokeless tobacco: Never   Substance and Sexual Activity    Alcohol use: No    Drug use: No    Sexual activity: Not on file     Review of Systems   Constitutional:  Negative for chills and fever.   HENT:  Negative for rhinorrhea, sneezing and sore throat.    Eyes:  Negative for pain and visual disturbance.   Respiratory:  Positive for shortness of breath. Negative for cough.    Cardiovascular:  Positive for leg swelling. Negative for chest pain.   Gastrointestinal:  Negative for abdominal pain, constipation and diarrhea.   Endocrine: Negative for cold intolerance and heat intolerance.   Genitourinary:  Negative for difficulty urinating.   Musculoskeletal:  Negative for arthralgias and joint swelling.   Skin:  Negative for rash.   Allergic/Immunologic: Negative for environmental allergies.   Neurological:  Negative for dizziness, syncope and weakness.   Hematological:  Does not bruise/bleed easily.   Psychiatric/Behavioral:  Negative for dysphoric mood. The patient is not nervous/anxious.      Objective:     Vital Signs (Most Recent):  Temp: 97.4 °F (36.3 °C) (07/31/24 0410)  Pulse: 62 (07/31/24 0723)  Resp: 20 (07/31/24 0704)  BP: 119/63 (07/31/24 0410)  SpO2: 98 % (07/31/24 0704) Vital Signs (24h Range):  Temp:  [96.6 °F (35.9 °C)-101.3 °F (38.5 °C)] 97.4 °F (36.3 °C)  Pulse:  [] 62  Resp:  [18-24] 20  SpO2:  [95 %-99 %] 98 %  BP: (116-128)/(60-76) 119/63     Weight: 104.3 kg (229 lb 15  oz)  Body mass index is 39.47 kg/m².     Physical Exam  Constitutional:       Appearance: She is obese.   HENT:      Nose: Nose normal.      Mouth/Throat:      Mouth: Mucous membranes are moist.   Eyes:      Extraocular Movements: Extraocular movements intact.      Pupils: Pupils are equal, round, and reactive to light.   Cardiovascular:      Rate and Rhythm: Normal rate and regular rhythm.   Pulmonary:      Effort: Respiratory distress present.      Breath sounds: Wheezing and rales present.      Comments: Decreased BS at bases - bipap  Abdominal:      General: Bowel sounds are normal. There is no distension.      Palpations: Abdomen is soft.      Tenderness: There is no abdominal tenderness.   Genitourinary:     Comments: Hardwick in place  Musculoskeletal:      Cervical back: Normal range of motion.      Right lower leg: Edema present.      Left lower leg: Edema present.      Comments: Edema improving   Skin:     General: Skin is warm and dry.      Capillary Refill: Capillary refill takes 2 to 3 seconds.   Neurological:      General: No focal deficit present.      Mental Status: She is alert and oriented to person, place, and time.              CRANIAL NERVES     CN III, IV, VI   Pupils are equal, round, and reactive to light.       Significant Labs: All pertinent labs within the past 24 hours have been reviewed.    Significant Imaging: I have reviewed all pertinent imaging results/findings within the past 24 hours.

## 2024-07-31 NOTE — PT/OT/SLP PROGRESS
Physical Therapy      Patient Name:  Carmen Tan   MRN:  [id number]    Patient not seen today secondary to Other (Comment). Patient is unable to perform treatment this visit secondary to BiPapp and she reports she is not feeling well. Will follow-up as appropriate.

## 2024-07-31 NOTE — PROGRESS NOTES
"[facility] Medicine  Progress Note    Patient Name: Carmen Tan  MRN: [id number]  Patient Class: IP- Inpatient   Admission Date: 7/28/2024  Length of Stay: 2 days  Attending Physician: Oscar Ge Jr., MD  Primary Care Provider: Luican Barry MD        Subjective:     Principal Problem:Acute on chronic diastolic congestive heart failure        HPI:  Chief Complaint   Patient presents with    Shortness of Breath       Pt to ED complaining of SOB and bilateral lower extremity edema. Hx of CHF. Pt says she is compliant with medications      74 year female with a history of CHF, COPD, diabetes, hypertension, on home oxygen presents the ER with gradually worsening shortness of breath since earlier today, history of this multiple times in the past.  Patient states "I have too much fluid".  Denies chest pain.  No nausea vomiting diarrhea.  Not ill appearing, no fever.  No sinus congestion.     Updated HPI: Above history confirm patient presents with significant volume overload.  Endorses worsening shortness of breath for the last couple of days.  Multiple admissions in the past with similar complaints.    Overview/Hospital Course:  7/30: Patient diuresing nicely with current dose of Lasix appreciate cardiology recommendations.  7/31: No acute events overnight patient responding to Lasix nicely.  Converted to IV push per Cardiology recommendations.  Continue treatment per their recommendations.    Past Medical History:   Diagnosis Date    Abdominal hernia     Bacteremia 04/05/2023    CHF (congestive heart failure)     COPD (chronic obstructive pulmonary disease)     Diabetes mellitus, type 2 02/16/2022    GERD (gastroesophageal reflux disease)     History of home oxygen therapy     Hypertension     Hypertensive emergency 03/30/2023    Migraine headache     On home O2     Pulmonary embolism 06/01/2017    Small bowel obstruction     Thyroid disease        Past Surgical History:   Procedure " Laterality Date    COLONOSCOPY      HYSTERECTOMY      INNER EAR SURGERY      UPPER GASTROINTESTINAL ENDOSCOPY         Review of patient's allergies indicates:   Allergen Reactions    Pcn [penicillins] Swelling       No current facility-administered medications on file prior to encounter.     Current Outpatient Medications on File Prior to Encounter   Medication Sig    acetaminophen (TYLENOL) 325 MG tablet Take 2 tablets (650 mg total) by mouth every 8 (eight) hours as needed for Pain or Temperature greater than (100, headache).    albuterol-ipratropium (DUO-NEB) 2.5 mg-0.5 mg/3 mL nebulizer solution Take 3 mLs by nebulization every 6 (six) hours while awake. Rescue    aspirin (ECOTRIN) 81 MG EC tablet Take 1 tablet (81 mg total) by mouth once daily.    budesonide (PULMICORT) 0.5 mg/2 mL nebulizer solution Take 2 mLs (0.5 mg total) by nebulization every 12 (twelve) hours. Controller    busPIRone (BUSPAR) 5 MG Tab Take 1 tablet (5 mg total) by mouth 2 (two) times daily.    carvediloL (COREG) 3.125 MG tablet Take 1 tablet (3.125 mg total) by mouth 2 (two) times daily.    furosemide (LASIX) 40 MG tablet Take 1 tablet (40 mg total) by mouth once daily.    gabapentin (NEURONTIN) 100 MG capsule Take 1 capsule (100 mg total) by mouth 3 (three) times daily.    insulin aspart U-100 (NOVOLOG) 100 unit/mL (3 mL) InPn pen Inject 0-5 Units into the skin before meals and at bedtime as needed (Hyperglycemia).    levoFLOXacin (LEVAQUIN) 500 MG tablet Take 1 tablet (500 mg total) by mouth once daily.    levothyroxine (SYNTHROID) 50 MCG tablet Take 1 tablet (50 mcg total) by mouth before breakfast.    pantoprazole (PROTONIX) 20 MG tablet Take 40 mg by mouth once daily.    valsartan (DIOVAN) 160 MG tablet Take 1 tablet (160 mg total) by mouth once daily.    vitamin D 1000 units Tab Take 185 mg by mouth once daily.    LIPITOR 40 mg tablet Take 1 tablet (40 mg total) by mouth once daily.     Family History    None       Tobacco Use     Smoking status: Former    Smokeless tobacco: Never   Substance and Sexual Activity    Alcohol use: No    Drug use: No    Sexual activity: Not on file     Review of Systems   Constitutional:  Negative for chills and fever.   HENT:  Negative for rhinorrhea, sneezing and sore throat.    Eyes:  Negative for pain and visual disturbance.   Respiratory:  Positive for shortness of breath. Negative for cough.    Cardiovascular:  Positive for leg swelling. Negative for chest pain.   Gastrointestinal:  Negative for abdominal pain, constipation and diarrhea.   Endocrine: Negative for cold intolerance and heat intolerance.   Genitourinary:  Negative for difficulty urinating.   Musculoskeletal:  Negative for arthralgias and joint swelling.   Skin:  Negative for rash.   Allergic/Immunologic: Negative for environmental allergies.   Neurological:  Negative for dizziness, syncope and weakness.   Hematological:  Does not bruise/bleed easily.   Psychiatric/Behavioral:  Negative for dysphoric mood. The patient is not nervous/anxious.      Objective:     Vital Signs (Most Recent):  Temp: 97.4 °F (36.3 °C) (07/31/24 0410)  Pulse: 62 (07/31/24 0723)  Resp: 20 (07/31/24 0704)  BP: 119/63 (07/31/24 0410)  SpO2: 98 % (07/31/24 0704) Vital Signs (24h Range):  Temp:  [96.6 °F (35.9 °C)-101.3 °F (38.5 °C)] 97.4 °F (36.3 °C)  Pulse:  [] 62  Resp:  [18-24] 20  SpO2:  [95 %-99 %] 98 %  BP: (116-128)/(60-76) 119/63     Weight: 104.3 kg (229 lb 15 oz)  Body mass index is 39.47 kg/m².     Physical Exam  Constitutional:       Appearance: She is obese.   HENT:      Nose: Nose normal.      Mouth/Throat:      Mouth: Mucous membranes are moist.   Eyes:      Extraocular Movements: Extraocular movements intact.      Pupils: Pupils are equal, round, and reactive to light.   Cardiovascular:      Rate and Rhythm: Normal rate and regular rhythm.   Pulmonary:      Effort: Respiratory distress present.      Breath sounds: Wheezing and rales present.       Comments: Decreased BS at bases - bipap  Abdominal:      General: Bowel sounds are normal. There is no distension.      Palpations: Abdomen is soft.      Tenderness: There is no abdominal tenderness.   Genitourinary:     Comments: Hardwick in place  Musculoskeletal:      Cervical back: Normal range of motion.      Right lower leg: Edema present.      Left lower leg: Edema present.      Comments: Edema improving   Skin:     General: Skin is warm and dry.      Capillary Refill: Capillary refill takes 2 to 3 seconds.   Neurological:      General: No focal deficit present.      Mental Status: She is alert and oriented to person, place, and time.              CRANIAL NERVES     CN III, IV, VI   Pupils are equal, round, and reactive to light.       Significant Labs: All pertinent labs within the past 24 hours have been reviewed.    Significant Imaging: I have reviewed all pertinent imaging results/findings within the past 24 hours.    Assessment/Plan:      * Acute on chronic diastolic congestive heart failure  Patient is identified as having Diastolic (HFpEF) heart failure that is Acute on chronic. CHF is currently uncontrolled due to Continued edema of extremities. Latest ECHO performed and demonstrates- Results for orders placed during the hospital encounter of 04/21/24    Echo    Interpretation Summary    Left Ventricle: The left ventricle is normal in size. Mildly increased wall thickness. There is normal systolic function with a visually estimated ejection fraction of 60 - 65%. Grade I diastolic dysfunction.    Right Ventricle: Normal right ventricular cavity size. Systolic function is normal.    Left Atrium: Left atrium is severely dilated.    Right Atrium: Right atrium is moderately dilated.    Aortic Valve: The aortic valve is a trileaflet valve. There is mild stenosis. Aortic valve area by VTI is 1.92 cm². Aortic valve peak velocity is 2.00 m/s. Mean gradient is 8 mmHg. The dimensionless index is 0.59.    Pulmonic  "Valve: There is mild regurgitation.    No pericardial effusion.  . Continue Beta Blocker, ACE/ARB, and Furosemide and monitor clinical status closely. Monitor on telemetry. Patient is on CHF pathway.  Monitor strict Is&Os and daily weights.  Place on fluid restriction of 1 L. Cardiology has been consulted. Continue to stress to patient importance of self efficacy and  on diet for CHF. Last BNP reviewed- and noted below No results for input(s): "BNP", "BNPTRIAGEBLO" in the last 168 hours.    Continue treatment per Cardiology recommendations    Anxiety and depression  Continue home medical therapy.      Diabetes mellitus, type 2  Patient's FSGs are controlled on current medication regimen.  Last A1c reviewed-   Lab Results   Component Value Date    HGBA1C 5.3 07/28/2024     Most recent fingerstick glucose reviewed- No results for input(s): "POCTGLUCOSE" in the last 24 hours.  Current correctional scale  Low  Maintain anti-hyperglycemic dose as follows-   Antihyperglycemics (From admission, onward)      None          Hold Oral hypoglycemics while patient is in the hospital.    COPD exacerbation  Patient's COPD is controlled currently.  Patient is currently on COPD Pathway. Continue scheduled inhalers Steroids, Antibiotics, and Supplemental oxygen and monitor respiratory status closely.     Severe obesity (BMI >= 40)  Body mass index is 39.47 kg/m². Morbid obesity complicates all aspects of disease management from diagnostic modalities to treatment. Weight loss encouraged and health benefits explained to patient.         Paroxysmal A-fib  Rate controlled.      VTE Risk Mitigation (From admission, onward)           Ordered     apixaban tablet 5 mg  2 times daily         07/30/24 0904     IP VTE HIGH RISK PATIENT  Once         07/29/24 0059     Place sequential compression device  Until discontinued         07/29/24 0059     Place ANAMARIA hose  Until discontinued         07/29/24 0059                    Discharge " Planning   TIGRE:      Code Status: Full Code   Is the patient medically ready for discharge?:     Reason for patient still in hospital (select all that apply): Treatment  Discharge Plan A: Home with family, Home Health                  Oscar Ge Jr, MD  Department of [facility] Medicine   [facility]

## 2024-08-01 VITALS
OXYGEN SATURATION: 97 % | RESPIRATION RATE: 27 BRPM | DIASTOLIC BLOOD PRESSURE: 66 MMHG | WEIGHT: 229.94 LBS | HEIGHT: 64 IN | BODY MASS INDEX: 39.26 KG/M2 | SYSTOLIC BLOOD PRESSURE: 122 MMHG | HEART RATE: 52 BPM | TEMPERATURE: 97 F

## 2024-08-01 LAB
ALBUMIN SERPL BCP-MCNC: 2.5 G/DL (ref 3.5–5.2)
ALP SERPL-CCNC: 121 U/L (ref 55–135)
ALT SERPL W/O P-5'-P-CCNC: 17 U/L (ref 10–44)
ANION GAP SERPL CALC-SCNC: 3 MMOL/L (ref 3–11)
AST SERPL-CCNC: 11 U/L (ref 10–40)
BASOPHILS # BLD AUTO: 0.02 K/UL (ref 0–0.2)
BASOPHILS NFR BLD: 0.4 % (ref 0–1.9)
BILIRUB SERPL-MCNC: 0.5 MG/DL (ref 0.1–1)
BUN SERPL-MCNC: 22 MG/DL (ref 8–23)
CALCIUM SERPL-MCNC: 9.8 MG/DL (ref 8.7–10.5)
CHLORIDE SERPL-SCNC: 100 MMOL/L (ref 95–110)
CO2 SERPL-SCNC: 41 MMOL/L (ref 23–29)
CREAT SERPL-MCNC: 1.2 MG/DL (ref 0.5–1.4)
DIFFERENTIAL METHOD BLD: ABNORMAL
EOSINOPHIL # BLD AUTO: 0.1 K/UL (ref 0–0.5)
EOSINOPHIL NFR BLD: 2.2 % (ref 0–8)
ERYTHROCYTE [DISTWIDTH] IN BLOOD BY AUTOMATED COUNT: 16.6 % (ref 11.5–14.5)
EST. GFR  (NO RACE VARIABLE): 47.8 ML/MIN/1.73 M^2
GLUCOSE SERPL-MCNC: 73 MG/DL (ref 70–110)
HCT VFR BLD AUTO: 38.6 % (ref 37–48.5)
HGB BLD-MCNC: 12.4 G/DL (ref 12–16)
IMM GRANULOCYTES # BLD AUTO: 0.01 K/UL (ref 0–0.04)
IMM GRANULOCYTES NFR BLD AUTO: 0.2 % (ref 0–0.5)
LYMPHOCYTES # BLD AUTO: 1.2 K/UL (ref 1–4.8)
LYMPHOCYTES NFR BLD: 24.2 % (ref 18–48)
MAGNESIUM SERPL-MCNC: 1.7 MG/DL (ref 1.6–2.6)
MCH RBC QN AUTO: 30.5 PG (ref 27–31)
MCHC RBC AUTO-ENTMCNC: 32.1 G/DL (ref 32–36)
MCV RBC AUTO: 95 FL (ref 82–98)
MONOCYTES # BLD AUTO: 0.7 K/UL (ref 0.3–1)
MONOCYTES NFR BLD: 14.1 % (ref 4–15)
NEUTROPHILS # BLD AUTO: 2.9 K/UL (ref 1.8–7.7)
NEUTROPHILS NFR BLD: 58.9 % (ref 38–73)
NRBC BLD-RTO: 0 /100 WBC
PLATELET # BLD AUTO: 199 K/UL (ref 150–450)
PMV BLD AUTO: 9.5 FL (ref 9.2–12.9)
POTASSIUM SERPL-SCNC: 3.8 MMOL/L (ref 3.5–5.1)
PROT SERPL-MCNC: 6 G/DL (ref 6–8.4)
RBC # BLD AUTO: 4.07 M/UL (ref 4–5.4)
SODIUM SERPL-SCNC: 144 MMOL/L (ref 136–145)
WBC # BLD AUTO: 4.96 K/UL (ref 3.9–12.7)

## 2024-08-01 PROCEDURE — 94761 N-INVAS EAR/PLS OXIMETRY MLT: CPT

## 2024-08-01 PROCEDURE — 99900035 HC TECH TIME PER 15 MIN (STAT)

## 2024-08-01 PROCEDURE — 94640 AIRWAY INHALATION TREATMENT: CPT

## 2024-08-01 PROCEDURE — 25000003 PHARM REV CODE 250

## 2024-08-01 PROCEDURE — 36415 COLL VENOUS BLD VENIPUNCTURE: CPT | Performed by: INTERNAL MEDICINE

## 2024-08-01 PROCEDURE — 99900031 HC PATIENT EDUCATION (STAT)

## 2024-08-01 PROCEDURE — 63600175 PHARM REV CODE 636 W HCPCS

## 2024-08-01 PROCEDURE — 94660 CPAP INITIATION&MGMT: CPT

## 2024-08-01 PROCEDURE — 83735 ASSAY OF MAGNESIUM: CPT | Performed by: INTERNAL MEDICINE

## 2024-08-01 PROCEDURE — 25000003 PHARM REV CODE 250: Performed by: INTERNAL MEDICINE

## 2024-08-01 PROCEDURE — 25000242 PHARM REV CODE 250 ALT 637 W/ HCPCS: Performed by: INTERNAL MEDICINE

## 2024-08-01 PROCEDURE — 85025 COMPLETE CBC W/AUTO DIFF WBC: CPT | Performed by: INTERNAL MEDICINE

## 2024-08-01 PROCEDURE — 80053 COMPREHEN METABOLIC PANEL: CPT | Performed by: INTERNAL MEDICINE

## 2024-08-01 PROCEDURE — 27100171 HC OXYGEN HIGH FLOW UP TO 24 HOURS

## 2024-08-01 RX ORDER — FUROSEMIDE 40 MG/1
40 TABLET ORAL 2 TIMES DAILY
Qty: 60 TABLET | Refills: 1 | Status: ON HOLD | OUTPATIENT
Start: 2024-08-01 | End: 2024-08-16 | Stop reason: HOSPADM

## 2024-08-01 RX ADMIN — FUROSEMIDE 40 MG: 10 INJECTION, SOLUTION INTRAVENOUS at 09:08

## 2024-08-01 RX ADMIN — IPRATROPIUM BROMIDE AND ALBUTEROL SULFATE 3 ML: 2.5; .5 SOLUTION RESPIRATORY (INHALATION) at 11:08

## 2024-08-01 RX ADMIN — DOCUSATE SODIUM 100 MG: 100 CAPSULE, LIQUID FILLED ORAL at 08:08

## 2024-08-01 RX ADMIN — BUDESONIDE 0.5 MG: 0.5 INHALANT RESPIRATORY (INHALATION) at 07:08

## 2024-08-01 RX ADMIN — CARVEDILOL 3.12 MG: 3.12 TABLET, FILM COATED ORAL at 08:08

## 2024-08-01 RX ADMIN — APIXABAN 5 MG: 5 TABLET, FILM COATED ORAL at 08:08

## 2024-08-01 RX ADMIN — FAMOTIDINE 20 MG: 20 TABLET, FILM COATED ORAL at 08:08

## 2024-08-01 RX ADMIN — ATORVASTATIN CALCIUM 40 MG: 40 TABLET, FILM COATED ORAL at 08:08

## 2024-08-01 RX ADMIN — VALSARTAN 160 MG: 40 TABLET, FILM COATED ORAL at 08:08

## 2024-08-01 RX ADMIN — ASPIRIN 81 MG: 81 TABLET, COATED ORAL at 08:08

## 2024-08-01 NOTE — PLAN OF CARE
Problem: Adult Inpatient Plan of Care  Goal: Plan of Care Review  Outcome: Progressing  Goal: Absence of Hospital-Acquired Illness or Injury  Outcome: Progressing     Problem: Adult Inpatient Plan of Care  Goal: Patient-Specific Goal (Individualized)  Outcome: Not Progressing  Goal: Optimal Comfort and Wellbeing  Outcome: Not Progressing  Goal: Readiness for Transition of Care  Outcome: Not Progressing     Problem: Skin Injury Risk Increased  Goal: Skin Health and Integrity  Outcome: Not Progressing     Problem: Diabetes Comorbidity  Goal: Blood Glucose Level Within Targeted Range  Outcome: Not Progressing     Problem: Comorbidity Management  Goal: Maintenance of Behavioral Health Symptom Control  Outcome: Not Progressing  Goal: Maintenance of COPD Symptom Control  Outcome: Not Progressing  Goal: Blood Pressure in Desired Range  Outcome: Not Progressing  Goal: Bariatric Home Regimen Maintained  Outcome: Not Progressing     Problem: Fall Injury Risk  Goal: Absence of Fall and Fall-Related Injury  Outcome: Not Progressing     Problem: Mobility Impairment  Goal: Optimal Mobility  Outcome: Not Progressing     Problem: Heart Failure  Goal: Optimal Coping  Outcome: Not Progressing  Goal: Optimal Cardiac Output  Outcome: Not Progressing  Goal: Stable Heart Rate and Rhythm  Outcome: Not Progressing  Goal: Optimal Functional Ability  Outcome: Not Progressing  Goal: Fluid and Electrolyte Balance  Outcome: Not Progressing  Goal: Improved Oral Intake  Outcome: Not Progressing  Goal: Effective Oxygenation and Ventilation  Outcome: Not Progressing  Goal: Effective Breathing Pattern During Sleep  Outcome: Not Progressing     Problem: Breathing Pattern Ineffective  Goal: Effective Breathing Pattern  Outcome: Not Progressing     Problem: Gas Exchange Impaired  Goal: Optimal Gas Exchange  Outcome: Not Progressing

## 2024-08-01 NOTE — PLAN OF CARE
Problem: Physical Therapy  Goal: Physical Therapy Goal  Description: Patient will increase functional independence with mobility by performin. Supine to sit with Supervision or Set-up Assistance  2. Sit to supine with Supervision or Set-up Assistance  3. Bed to chair transfer with Supervision or Set-up Assistancewith or without rolling walker using Stand Pivot TECHNIQUE  4. Gait  x 100  feet with Supervision or Set-up Assistance with or without rolling walker  5. Lower extremity exercise program x10 reps per handout, with assistance as needed   Outcome: Adequate for Care Transition

## 2024-08-01 NOTE — DISCHARGE SUMMARY
"[facility] Medicine  Discharge Summary      Patient Name: Carmen Tan  MRN: [id number]  GOLDIE: [id number]  Patient Class: IP- Inpatient  Admission Date: 7/28/2024  Hospital Length of Stay: 3 days  Discharge Date and Time:  08/01/2024 7:50 AM  Attending Physician: Oscar Ge Jr., MD   Discharging Provider: Oscar Ge Jr, MD  Primary Care Provider: Lucian Barry MD    Primary Care Team: Networked reference to record PCT     HPI:   Chief Complaint   Patient presents with    Shortness of Breath       Pt to ED complaining of SOB and bilateral lower extremity edema. Hx of CHF. Pt says she is compliant with medications      74 year female with a history of CHF, COPD, diabetes, hypertension, on home oxygen presents the ER with gradually worsening shortness of breath since earlier today, history of this multiple times in the past.  Patient states "I have too much fluid".  Denies chest pain.  No nausea vomiting diarrhea.  Not ill appearing, no fever.  No sinus congestion.     Updated HPI: Above history confirm patient presents with significant volume overload.  Endorses worsening shortness of breath for the last couple of days.  Multiple admissions in the past with similar complaints.    * No surgery found *      Hospital Course:   7/30: Patient diuresing nicely with current dose of Lasix appreciate cardiology recommendations.  7/31: No acute events overnight patient responding to Lasix nicely.  Converted to IV push per Cardiology recommendations.  Continue treatment per their recommendations.  8/1: No acute events overnight.  The patient has diuresed nicely throughout her hospital stay.  Medication adjustments have been performed by Cardiology.  Patient is stable for discharge home with home health.     Goals of Care Treatment Preferences:  Code Status: Full Code      SDOH Screening:  The patient was screened for utility difficulties, food insecurity, transport difficulties, housing " insecurity, and interpersonal safety and there were no concerns identified this admission.     Consults:   Consults (From admission, onward)          Status Ordering Provider     Inpatient consult to Social Work/Case Management  Once        Provider:  (Not yet assigned)    Completed CHRISTINA BOJORQUEZ JR     Inpatient consult to Cardiology  Once        Provider:  Spencer Rascon MD    Completed CHRISTINA BOJORQUEZ JR            Psychiatric  Anxiety and depression  Continue home medical therapy.      Pulmonary  COPD exacerbation  Patient's COPD is controlled currently.  Patient is currently on COPD Pathway. Continue scheduled inhalers Steroids, Antibiotics, and Supplemental oxygen and monitor respiratory status closely.     Cardiac/Vascular  * Acute on chronic diastolic congestive heart failure  Patient is identified as having Diastolic (HFpEF) heart failure that is Acute on chronic. CHF is currently uncontrolled due to Continued edema of extremities. Latest ECHO performed and demonstrates- Results for orders placed during the hospital encounter of 04/21/24    Echo    Interpretation Summary    Left Ventricle: The left ventricle is normal in size. Mildly increased wall thickness. There is normal systolic function with a visually estimated ejection fraction of 60 - 65%. Grade I diastolic dysfunction.    Right Ventricle: Normal right ventricular cavity size. Systolic function is normal.    Left Atrium: Left atrium is severely dilated.    Right Atrium: Right atrium is moderately dilated.    Aortic Valve: The aortic valve is a trileaflet valve. There is mild stenosis. Aortic valve area by VTI is 1.92 cm². Aortic valve peak velocity is 2.00 m/s. Mean gradient is 8 mmHg. The dimensionless index is 0.59.    Pulmonic Valve: There is mild regurgitation.    No pericardial effusion.  . Continue Beta Blocker, ACE/ARB, and Furosemide and monitor clinical status closely. Monitor on telemetry. Patient is on CHF pathway.  Monitor  "strict Is&Os and daily weights.  Place on fluid restriction of 1 L. Cardiology has been consulted. Continue to stress to patient importance of self efficacy and  on diet for CHF. Last BNP reviewed- and noted below No results for input(s): "BNP", "BNPTRIAGEBLO" in the last 168 hours.    Continue treatment per Cardiology recommendations    Paroxysmal A-fib  Rate controlled.    Endocrine  Diabetes mellitus, type 2  Patient's FSGs are controlled on current medication regimen.  Last A1c reviewed-   Lab Results   Component Value Date    HGBA1C 5.3 07/28/2024     Most recent fingerstick glucose reviewed- No results for input(s): "POCTGLUCOSE" in the last 24 hours.  Current correctional scale  Low  Maintain anti-hyperglycemic dose as follows-   Antihyperglycemics (From admission, onward)      None          Hold Oral hypoglycemics while patient is in the hospital.    Severe obesity (BMI >= 40)  Body mass index is 39.47 kg/m². Morbid obesity complicates all aspects of disease management from diagnostic modalities to treatment. Weight loss encouraged and health benefits explained to patient.           Final Active Diagnoses:    Diagnosis Date Noted POA    PRINCIPAL PROBLEM:  Acute on chronic diastolic congestive heart failure [I50.33] 02/16/2022 Yes    Anxiety and depression [F41.9, F32.A] 04/12/2023 Yes    COPD exacerbation [J44.1] 02/16/2022 Yes    Diabetes mellitus, type 2 [E11.9] 02/16/2022 Yes    Severe obesity (BMI >= 40) [E66.01] 02/04/2022 Yes    Paroxysmal A-fib [I48.0] 06/08/2017 Yes     Chronic      Problems Resolved During this Admission:       Discharged Condition: fair    Disposition: Home-Health Care [facility]    Follow Up:   Follow-up Information       Lucian Barry MD Follow up in 1 week(s).    Specialty: Internal Medicine  Contact information:  [address] [place] [address]  [phone]               Spencer Rascon MD Follow up in 1 week(s).    Specialty: Cardiology  Contact " information:  [address] [facility] [address]  [phone]                           Patient Instructions:      Diet Cardiac     Activity as tolerated       Significant Diagnostic Studies: Labs: All labs within the past 24 hours have been reviewed    Pending Diagnostic Studies:       Procedure Component Value Units Date/Time    Comprehensive Metabolic Panel [[id number]] Collected: 08/01/24 0514    Order Status: Sent Lab Status: In process Updated: 08/01/24 0722    Specimen: Blood     Magnesium [[id number]] Collected: 08/01/24 0514    Order Status: Sent Lab Status: In process Updated: 08/01/24 0722    Specimen: Blood            Medications:  Reconciled Home Medications:      Medication List        START taking these medications      apixaban 5 mg Tab  Commonly known as: ELIQUIS  Take 1 tablet (5 mg total) by mouth 2 (two) times daily.            CHANGE how you take these medications      furosemide 40 MG tablet  Commonly known as: LASIX  Take 1 tablet (40 mg total) by mouth 2 (two) times a day.  What changed: when to take this            CONTINUE taking these medications      acetaminophen 325 MG tablet  Commonly known as: TYLENOL  Take 2 tablets (650 mg total) by mouth every 8 (eight) hours as needed for Pain or Temperature greater than (100, headache).     albuterol-ipratropium 2.5 mg-0.5 mg/3 mL nebulizer solution  Commonly known as: DUO-NEB  Take 3 mLs by nebulization every 6 (six) hours while awake. Rescue     aspirin 81 MG EC tablet  Commonly known as: ECOTRIN  Take 1 tablet (81 mg total) by mouth once daily.     budesonide 0.5 mg/2 mL nebulizer solution  Commonly known as: PULMICORT  Take 2 mLs (0.5 mg total) by nebulization every 12 (twelve) hours. Controller     busPIRone 5 MG Tab  Commonly known as: BUSPAR  Take 1 tablet (5 mg total) by mouth 2 (two) times daily.     carvediloL 3.125 MG tablet  Commonly known as: COREG  Take 1 tablet (3.125 mg total) by mouth 2 (two) times daily.      gabapentin 100 MG capsule  Commonly known as: NEURONTIN  Take 1 capsule (100 mg total) by mouth 3 (three) times daily.     insulin aspart U-100 100 unit/mL (3 mL) Inpn pen  Commonly known as: NovoLOG  Inject 0-5 Units into the skin before meals and at bedtime as needed (Hyperglycemia).     levothyroxine 50 MCG tablet  Commonly known as: SYNTHROID  Take 1 tablet (50 mcg total) by mouth before breakfast.     LIPITOR 40 mg tablet  Generic drug: atorvastatin  Take 1 tablet (40 mg total) by mouth once daily.     pantoprazole 20 MG tablet  Commonly known as: PROTONIX  Take 40 mg by mouth once daily.     valsartan 160 MG tablet  Commonly known as: DIOVAN  Take 1 tablet (160 mg total) by mouth once daily.     vitamin D 1000 units Tab  Commonly known as: VITAMIN D3  Take 185 mg by mouth once daily.            STOP taking these medications      levoFLOXacin 500 MG tablet  Commonly known as: LEVAQUIN              Indwelling Lines/Drains at time of discharge:   Lines/Drains/Airways       Drain  Duration             Female External Urinary Catheter w/ Suction 07/29/24 0100 3 days                    Time spent on the discharge of patient: 60 minutes         Oscar Ge Jr, MD  Department of Hospital Medicine  [facility]

## 2024-08-01 NOTE — ASSESSMENT & PLAN NOTE
"Patient is identified as having Diastolic (HFpEF) heart failure that is Acute on chronic. CHF is currently uncontrolled due to Continued edema of extremities. Latest ECHO performed and demonstrates- Results for orders placed during the hospital encounter of 04/21/24    Echo    Interpretation Summary    Left Ventricle: The left ventricle is normal in size. Mildly increased wall thickness. There is normal systolic function with a visually estimated ejection fraction of 60 - 65%. Grade I diastolic dysfunction.    Right Ventricle: Normal right ventricular cavity size. Systolic function is normal.    Left Atrium: Left atrium is severely dilated.    Right Atrium: Right atrium is moderately dilated.    Aortic Valve: The aortic valve is a trileaflet valve. There is mild stenosis. Aortic valve area by VTI is 1.92 cm². Aortic valve peak velocity is 2.00 m/s. Mean gradient is 8 mmHg. The dimensionless index is 0.59.    Pulmonic Valve: There is mild regurgitation.    No pericardial effusion.  . Continue Beta Blocker, ACE/ARB, and Furosemide and monitor clinical status closely. Monitor on telemetry. Patient is on CHF pathway.  Monitor strict Is&Os and daily weights.  Place on fluid restriction of 1 L. Cardiology has been consulted. Continue to stress to patient importance of self efficacy and  on diet for CHF. Last BNP reviewed- and noted below No results for input(s): "BNP", "BNPTRIAGEBLO" in the last 168 hours.    Continue treatment per Cardiology recommendations  "

## 2024-08-01 NOTE — PLAN OF CARE
Scheduled follow up appointments for patient with Dr. Rascon August 12 at 11:30, and Dr. Jhonny Thomas 7 at 10:00.  Informed patient's nurse.

## 2024-08-01 NOTE — PT/OT/SLP DISCHARGE
Physical Therapy Discharge Summary    Name: Carmen Tan  MRN: [id number]   Principal Problem: Acute on chronic diastolic congestive heart failure     Patient Discharged from acute Physical Therapy on 24.  Please refer to prior PT noted date on 24 for functional status.     Assessment:     Patient appropriate for care in another setting.    Objective:     GOALS:   Multidisciplinary Problems       Physical Therapy Goals          Problem: Physical Therapy    Goal Priority Disciplines Outcome Goal Variances Interventions   Physical Therapy Goal     PT, PT/OT Adequate for Care Transition     Description: Patient will increase functional independence with mobility by performin. Supine to sit with Supervision or Set-up Assistance  2. Sit to supine with Supervision or Set-up Assistance  3. Bed to chair transfer with Supervision or Set-up Assistancewith or without rolling walker using Stand Pivot TECHNIQUE  4. Gait  x 100  feet with Supervision or Set-up Assistance with or without rolling walker  5. Lower extremity exercise program x10 reps per handout, with assistance as needed                        Reasons for Discontinuation of Therapy Services  Transfer to alternate level of care.      Plan:     Patient Discharged to: Home with Home Health Service.      2024

## 2024-08-07 ENCOUNTER — HOSPITAL ENCOUNTER (INPATIENT)
Facility: HOSPITAL | Age: 74
LOS: 9 days | Discharge: NURSING, REHAB OR LONG-TERM CARE | DRG: 189 | End: 2024-08-16
Attending: FAMILY MEDICINE | Admitting: INTERNAL MEDICINE
Payer: MEDICARE

## 2024-08-07 DIAGNOSIS — E86.1 HYPOTENSION DUE TO HYPOVOLEMIA: ICD-10-CM

## 2024-08-07 DIAGNOSIS — N30.90 CYSTITIS: ICD-10-CM

## 2024-08-07 DIAGNOSIS — I50.9 ACUTE ON CHRONIC HEART FAILURE, UNSPECIFIED HEART FAILURE TYPE: ICD-10-CM

## 2024-08-07 DIAGNOSIS — J96.21 ACUTE ON CHRONIC RESPIRATORY FAILURE WITH HYPOXIA AND HYPERCAPNIA: ICD-10-CM

## 2024-08-07 DIAGNOSIS — R09.02 HYPOXIA: ICD-10-CM

## 2024-08-07 DIAGNOSIS — R06.02 SOB (SHORTNESS OF BREATH): Primary | ICD-10-CM

## 2024-08-07 DIAGNOSIS — J96.22 ACUTE ON CHRONIC RESPIRATORY FAILURE WITH HYPOXIA AND HYPERCAPNIA: ICD-10-CM

## 2024-08-07 DIAGNOSIS — J81.0 ACUTE PULMONARY EDEMA: ICD-10-CM

## 2024-08-07 DIAGNOSIS — R09.89 CHEST CONGESTION: ICD-10-CM

## 2024-08-07 DIAGNOSIS — R00.1 BRADYCARDIA: ICD-10-CM

## 2024-08-07 LAB
ALBUMIN SERPL BCP-MCNC: 2.5 G/DL (ref 3.5–5.2)
ALP SERPL-CCNC: 89 U/L (ref 55–135)
ALT SERPL W/O P-5'-P-CCNC: 12 U/L (ref 10–44)
ANION GAP SERPL CALC-SCNC: 5 MMOL/L (ref 3–11)
AST SERPL-CCNC: 18 U/L (ref 10–40)
BACTERIA #/AREA URNS HPF: NEGATIVE /HPF
BASOPHILS # BLD AUTO: ABNORMAL K/UL (ref 0–0.2)
BASOPHILS NFR BLD: 0 % (ref 0–1.9)
BILIRUB SERPL-MCNC: 0.6 MG/DL (ref 0.1–1)
BILIRUB UR QL STRIP: NEGATIVE
BUN SERPL-MCNC: 62 MG/DL (ref 8–23)
CALCIUM SERPL-MCNC: 8.9 MG/DL (ref 8.7–10.5)
CHLORIDE SERPL-SCNC: 101 MMOL/L (ref 95–110)
CLARITY UR: ABNORMAL
CO2 SERPL-SCNC: 35 MMOL/L (ref 23–29)
COLOR UR: YELLOW
CREAT SERPL-MCNC: 3.1 MG/DL (ref 0.5–1.4)
CTP QC/QA: YES
CTP QC/QA: YES
DIFFERENTIAL METHOD BLD: ABNORMAL
EOSINOPHIL # BLD AUTO: ABNORMAL K/UL (ref 0–0.5)
EOSINOPHIL NFR BLD: 0 % (ref 0–8)
ERYTHROCYTE [DISTWIDTH] IN BLOOD BY AUTOMATED COUNT: 17.3 % (ref 11.5–14.5)
EST. GFR  (NO RACE VARIABLE): 15.3 ML/MIN/1.73 M^2
GLUCOSE SERPL-MCNC: 89 MG/DL (ref 70–110)
GLUCOSE UR QL STRIP: NEGATIVE
HCT VFR BLD AUTO: 39.6 % (ref 37–48.5)
HGB BLD-MCNC: 12.3 G/DL (ref 12–16)
HGB UR QL STRIP: NEGATIVE
HYALINE CASTS #/AREA URNS LPF: 7.6 /LPF
IMM GRANULOCYTES # BLD AUTO: ABNORMAL K/UL (ref 0–0.04)
IMM GRANULOCYTES NFR BLD AUTO: ABNORMAL % (ref 0–0.5)
KETONES UR QL STRIP: NEGATIVE
LACTATE SERPL-SCNC: 1.4 MMOL/L (ref 0.5–2.2)
LEUKOCYTE ESTERASE UR QL STRIP: ABNORMAL
LYMPHOCYTES # BLD AUTO: ABNORMAL K/UL (ref 1–4.8)
LYMPHOCYTES NFR BLD: 11 % (ref 18–48)
MCH RBC QN AUTO: 28.6 PG (ref 27–31)
MCHC RBC AUTO-ENTMCNC: 31.1 G/DL (ref 32–36)
MCV RBC AUTO: 92 FL (ref 82–98)
METAMYELOCYTES NFR BLD MANUAL: 10 %
MICROSCOPIC COMMENT: ABNORMAL
MONOCYTES # BLD AUTO: ABNORMAL K/UL (ref 0.3–1)
MONOCYTES NFR BLD: 2 % (ref 4–15)
MYELOCYTES NFR BLD MANUAL: 6 %
NEUTROPHILS # BLD AUTO: ABNORMAL K/UL (ref 1.8–7.7)
NEUTROPHILS NFR BLD: 66 % (ref 38–73)
NEUTS BAND NFR BLD MANUAL: 5 %
NITRITE UR QL STRIP: NEGATIVE
NRBC BLD-RTO: 0 /100 WBC
PH UR STRIP: 5 [PH] (ref 5–8)
PLATELET # BLD AUTO: 228 K/UL (ref 150–450)
PLATELET BLD QL SMEAR: ABNORMAL
PMV BLD AUTO: 10 FL (ref 9.2–12.9)
POC MOLECULAR INFLUENZA A AGN: NEGATIVE
POC MOLECULAR INFLUENZA B AGN: NEGATIVE
POTASSIUM SERPL-SCNC: 4.9 MMOL/L (ref 3.5–5.1)
PROT SERPL-MCNC: 6.6 G/DL (ref 6–8.4)
PROT UR QL STRIP: ABNORMAL
RBC # BLD AUTO: 4.3 M/UL (ref 4–5.4)
RBC #/AREA URNS HPF: 0 /HPF (ref 0–4)
SARS-COV-2 RDRP RESP QL NAA+PROBE: NEGATIVE
SODIUM SERPL-SCNC: 141 MMOL/L (ref 136–145)
SP GR UR STRIP: 1.01 (ref 1–1.03)
SQUAMOUS #/AREA URNS HPF: 4 /HPF
URN SPEC COLLECT METH UR: ABNORMAL
UROBILINOGEN UR STRIP-ACNC: NEGATIVE EU/DL
WBC # BLD AUTO: 26.87 K/UL (ref 3.9–12.7)
WBC #/AREA URNS HPF: 61 /HPF (ref 0–5)

## 2024-08-07 PROCEDURE — 27100171 HC OXYGEN HIGH FLOW UP TO 24 HOURS

## 2024-08-07 PROCEDURE — 93010 ELECTROCARDIOGRAM REPORT: CPT | Mod: GW,,, | Performed by: INTERNAL MEDICINE

## 2024-08-07 PROCEDURE — 94761 N-INVAS EAR/PLS OXIMETRY MLT: CPT

## 2024-08-07 PROCEDURE — 51702 INSERT TEMP BLADDER CATH: CPT

## 2024-08-07 PROCEDURE — 87635 SARS-COV-2 COVID-19 AMP PRB: CPT | Performed by: FAMILY MEDICINE

## 2024-08-07 PROCEDURE — 99291 CRITICAL CARE FIRST HOUR: CPT

## 2024-08-07 PROCEDURE — 20000000 HC ICU ROOM

## 2024-08-07 PROCEDURE — 83605 ASSAY OF LACTIC ACID: CPT | Performed by: FAMILY MEDICINE

## 2024-08-07 PROCEDURE — 87086 URINE CULTURE/COLONY COUNT: CPT | Performed by: FAMILY MEDICINE

## 2024-08-07 PROCEDURE — 27000190 HC CPAP FULL FACE MASK W/VALVE

## 2024-08-07 PROCEDURE — 87040 BLOOD CULTURE FOR BACTERIA: CPT | Mod: 59 | Performed by: FAMILY MEDICINE

## 2024-08-07 PROCEDURE — 81000 URINALYSIS NONAUTO W/SCOPE: CPT | Performed by: FAMILY MEDICINE

## 2024-08-07 PROCEDURE — 80053 COMPREHEN METABOLIC PANEL: CPT | Performed by: FAMILY MEDICINE

## 2024-08-07 PROCEDURE — 96365 THER/PROPH/DIAG IV INF INIT: CPT

## 2024-08-07 PROCEDURE — 96375 TX/PRO/DX INJ NEW DRUG ADDON: CPT

## 2024-08-07 PROCEDURE — 63600175 PHARM REV CODE 636 W HCPCS: Performed by: FAMILY MEDICINE

## 2024-08-07 PROCEDURE — 85007 BL SMEAR W/DIFF WBC COUNT: CPT | Performed by: FAMILY MEDICINE

## 2024-08-07 PROCEDURE — 99900031 HC PATIENT EDUCATION (STAT)

## 2024-08-07 PROCEDURE — 85027 COMPLETE CBC AUTOMATED: CPT | Performed by: FAMILY MEDICINE

## 2024-08-07 PROCEDURE — 36415 COLL VENOUS BLD VENIPUNCTURE: CPT | Performed by: FAMILY MEDICINE

## 2024-08-07 PROCEDURE — 25000003 PHARM REV CODE 250: Performed by: FAMILY MEDICINE

## 2024-08-07 PROCEDURE — 99900035 HC TECH TIME PER 15 MIN (STAT)

## 2024-08-07 PROCEDURE — 93005 ELECTROCARDIOGRAM TRACING: CPT

## 2024-08-07 RX ORDER — FUROSEMIDE 10 MG/ML
80 INJECTION INTRAMUSCULAR; INTRAVENOUS
Status: COMPLETED | OUTPATIENT
Start: 2024-08-07 | End: 2024-08-07

## 2024-08-07 RX ADMIN — FUROSEMIDE 80 MG: 10 INJECTION, SOLUTION INTRAVENOUS at 09:08

## 2024-08-07 RX ADMIN — CEFTRIAXONE 1 G: 1 INJECTION, POWDER, FOR SOLUTION INTRAMUSCULAR; INTRAVENOUS at 09:08

## 2024-08-08 PROBLEM — J96.22 ACUTE ON CHRONIC RESPIRATORY FAILURE WITH HYPOXIA AND HYPERCAPNIA: Status: ACTIVE | Noted: 2022-02-01

## 2024-08-08 PROBLEM — I95.9 HYPOTENSION: Status: ACTIVE | Noted: 2024-08-08

## 2024-08-08 LAB
LACTATE SERPL-SCNC: 0.8 MMOL/L (ref 0.5–2.2)
OHS QRS DURATION: 130 MS
OHS QTC CALCULATION: 459 MS
POCT GLUCOSE: 118 MG/DL (ref 70–110)
POCT GLUCOSE: 84 MG/DL (ref 70–110)
POCT GLUCOSE: 85 MG/DL (ref 70–110)
POCT GLUCOSE: 86 MG/DL (ref 70–110)
TRIGL SERPL-MCNC: 49 MG/DL (ref 30–150)

## 2024-08-08 PROCEDURE — 63600175 PHARM REV CODE 636 W HCPCS: Performed by: INTERNAL MEDICINE

## 2024-08-08 PROCEDURE — 84478 ASSAY OF TRIGLYCERIDES: CPT | Performed by: INTERNAL MEDICINE

## 2024-08-08 PROCEDURE — 27100171 HC OXYGEN HIGH FLOW UP TO 24 HOURS

## 2024-08-08 PROCEDURE — A4216 STERILE WATER/SALINE, 10 ML: HCPCS | Performed by: INTERNAL MEDICINE

## 2024-08-08 PROCEDURE — 99900035 HC TECH TIME PER 15 MIN (STAT)

## 2024-08-08 PROCEDURE — 25000003 PHARM REV CODE 250: Performed by: FAMILY MEDICINE

## 2024-08-08 PROCEDURE — 25000242 PHARM REV CODE 250 ALT 637 W/ HCPCS: Performed by: INTERNAL MEDICINE

## 2024-08-08 PROCEDURE — 36415 COLL VENOUS BLD VENIPUNCTURE: CPT | Performed by: FAMILY MEDICINE

## 2024-08-08 PROCEDURE — 94640 AIRWAY INHALATION TREATMENT: CPT

## 2024-08-08 PROCEDURE — 99900031 HC PATIENT EDUCATION (STAT)

## 2024-08-08 PROCEDURE — 83605 ASSAY OF LACTIC ACID: CPT | Performed by: FAMILY MEDICINE

## 2024-08-08 PROCEDURE — 36415 COLL VENOUS BLD VENIPUNCTURE: CPT | Performed by: INTERNAL MEDICINE

## 2024-08-08 PROCEDURE — 94660 CPAP INITIATION&MGMT: CPT

## 2024-08-08 PROCEDURE — 63600175 PHARM REV CODE 636 W HCPCS: Performed by: FAMILY MEDICINE

## 2024-08-08 PROCEDURE — 94761 N-INVAS EAR/PLS OXIMETRY MLT: CPT

## 2024-08-08 PROCEDURE — 20000000 HC ICU ROOM

## 2024-08-08 PROCEDURE — 25000003 PHARM REV CODE 250: Performed by: INTERNAL MEDICINE

## 2024-08-08 PROCEDURE — 5A09357 ASSISTANCE WITH RESPIRATORY VENTILATION, LESS THAN 24 CONSECUTIVE HOURS, CONTINUOUS POSITIVE AIRWAY PRESSURE: ICD-10-PCS | Performed by: INTERNAL MEDICINE

## 2024-08-08 RX ORDER — ENOXAPARIN SODIUM 100 MG/ML
40 INJECTION SUBCUTANEOUS EVERY 24 HOURS
Status: DISCONTINUED | OUTPATIENT
Start: 2024-08-08 | End: 2024-08-16

## 2024-08-08 RX ORDER — ASPIRIN 81 MG/1
81 TABLET ORAL DAILY
Status: DISCONTINUED | OUTPATIENT
Start: 2024-08-08 | End: 2024-08-16 | Stop reason: HOSPADM

## 2024-08-08 RX ORDER — ENOXAPARIN SODIUM 100 MG/ML
30 INJECTION SUBCUTANEOUS EVERY 24 HOURS
Status: DISCONTINUED | OUTPATIENT
Start: 2024-08-08 | End: 2024-08-08

## 2024-08-08 RX ORDER — POLYETHYLENE GLYCOL 3350 17 G/17G
17 POWDER, FOR SOLUTION ORAL DAILY
Status: DISCONTINUED | OUTPATIENT
Start: 2024-08-08 | End: 2024-08-16 | Stop reason: HOSPADM

## 2024-08-08 RX ORDER — IBUPROFEN 200 MG
24 TABLET ORAL
Status: DISCONTINUED | OUTPATIENT
Start: 2024-08-08 | End: 2024-08-16 | Stop reason: HOSPADM

## 2024-08-08 RX ORDER — METHYLPREDNISOLONE SOD SUCC 125 MG
125 VIAL (EA) INJECTION EVERY 6 HOURS
Status: DISCONTINUED | OUTPATIENT
Start: 2024-08-08 | End: 2024-08-11

## 2024-08-08 RX ORDER — SODIUM CHLORIDE 9 MG/ML
1000 INJECTION, SOLUTION INTRAVENOUS
Status: COMPLETED | OUTPATIENT
Start: 2024-08-08 | End: 2024-08-08

## 2024-08-08 RX ORDER — IPRATROPIUM BROMIDE AND ALBUTEROL SULFATE 2.5; .5 MG/3ML; MG/3ML
3 SOLUTION RESPIRATORY (INHALATION)
Status: DISCONTINUED | OUTPATIENT
Start: 2024-08-08 | End: 2024-08-16 | Stop reason: HOSPADM

## 2024-08-08 RX ORDER — ENOXAPARIN SODIUM 100 MG/ML
40 INJECTION SUBCUTANEOUS EVERY 24 HOURS
Status: DISCONTINUED | OUTPATIENT
Start: 2024-08-08 | End: 2024-08-08 | Stop reason: SDUPTHER

## 2024-08-08 RX ORDER — SODIUM CHLORIDE 0.9 % (FLUSH) 0.9 %
10 SYRINGE (ML) INJECTION
Status: DISCONTINUED | OUTPATIENT
Start: 2024-08-08 | End: 2024-08-10

## 2024-08-08 RX ORDER — ATORVASTATIN CALCIUM 40 MG/1
40 TABLET, FILM COATED ORAL DAILY
Status: DISCONTINUED | OUTPATIENT
Start: 2024-08-08 | End: 2024-08-16 | Stop reason: HOSPADM

## 2024-08-08 RX ORDER — FUROSEMIDE 10 MG/ML
40 INJECTION INTRAMUSCULAR; INTRAVENOUS EVERY 12 HOURS
Status: DISCONTINUED | OUTPATIENT
Start: 2024-08-08 | End: 2024-08-08

## 2024-08-08 RX ORDER — ONDANSETRON HYDROCHLORIDE 2 MG/ML
4 INJECTION, SOLUTION INTRAVENOUS EVERY 8 HOURS PRN
Status: DISCONTINUED | OUTPATIENT
Start: 2024-08-08 | End: 2024-08-16 | Stop reason: HOSPADM

## 2024-08-08 RX ORDER — TALC
6 POWDER (GRAM) TOPICAL NIGHTLY PRN
Status: DISCONTINUED | OUTPATIENT
Start: 2024-08-08 | End: 2024-08-16 | Stop reason: HOSPADM

## 2024-08-08 RX ORDER — SODIUM CHLORIDE 0.9 % (FLUSH) 0.9 %
10 SYRINGE (ML) INJECTION EVERY 6 HOURS
Status: DISCONTINUED | OUTPATIENT
Start: 2024-08-08 | End: 2024-08-10

## 2024-08-08 RX ORDER — IBUPROFEN 200 MG
24 TABLET ORAL
Status: DISCONTINUED | OUTPATIENT
Start: 2024-08-08 | End: 2024-08-08

## 2024-08-08 RX ORDER — IBUPROFEN 200 MG
16 TABLET ORAL
Status: DISCONTINUED | OUTPATIENT
Start: 2024-08-08 | End: 2024-08-08

## 2024-08-08 RX ORDER — INSULIN ASPART 100 [IU]/ML
0-5 INJECTION, SOLUTION INTRAVENOUS; SUBCUTANEOUS
Status: DISCONTINUED | OUTPATIENT
Start: 2024-08-08 | End: 2024-08-16 | Stop reason: HOSPADM

## 2024-08-08 RX ORDER — GLUCAGON 1 MG
1 KIT INJECTION
Status: DISCONTINUED | OUTPATIENT
Start: 2024-08-08 | End: 2024-08-16 | Stop reason: HOSPADM

## 2024-08-08 RX ORDER — SODIUM CHLORIDE 0.9 % (FLUSH) 0.9 %
10 SYRINGE (ML) INJECTION
Status: DISCONTINUED | OUTPATIENT
Start: 2024-08-08 | End: 2024-08-16 | Stop reason: HOSPADM

## 2024-08-08 RX ORDER — INSULIN ASPART 100 [IU]/ML
0-5 INJECTION, SOLUTION INTRAVENOUS; SUBCUTANEOUS
Status: DISCONTINUED | OUTPATIENT
Start: 2024-08-08 | End: 2024-08-08

## 2024-08-08 RX ORDER — DOXYCYCLINE HYCLATE 100 MG
100 TABLET ORAL EVERY 12 HOURS
Status: DISCONTINUED | OUTPATIENT
Start: 2024-08-08 | End: 2024-08-08

## 2024-08-08 RX ORDER — GLUCAGON 1 MG
1 KIT INJECTION
Status: DISCONTINUED | OUTPATIENT
Start: 2024-08-08 | End: 2024-08-08

## 2024-08-08 RX ORDER — SODIUM CHLORIDE 9 MG/ML
INJECTION, SOLUTION INTRAVENOUS CONTINUOUS
Status: DISCONTINUED | OUTPATIENT
Start: 2024-08-08 | End: 2024-08-09

## 2024-08-08 RX ORDER — ACETAMINOPHEN 325 MG/1
650 TABLET ORAL EVERY 8 HOURS PRN
Status: DISCONTINUED | OUTPATIENT
Start: 2024-08-08 | End: 2024-08-16 | Stop reason: HOSPADM

## 2024-08-08 RX ORDER — MORPHINE SULFATE 4 MG/ML
4 INJECTION, SOLUTION INTRAMUSCULAR; INTRAVENOUS EVERY 4 HOURS PRN
Status: DISCONTINUED | OUTPATIENT
Start: 2024-08-08 | End: 2024-08-16 | Stop reason: HOSPADM

## 2024-08-08 RX ORDER — LEVOTHYROXINE SODIUM 50 UG/1
50 TABLET ORAL
Status: DISCONTINUED | OUTPATIENT
Start: 2024-08-09 | End: 2024-08-16 | Stop reason: HOSPADM

## 2024-08-08 RX ORDER — IBUPROFEN 200 MG
16 TABLET ORAL
Status: DISCONTINUED | OUTPATIENT
Start: 2024-08-08 | End: 2024-08-16 | Stop reason: HOSPADM

## 2024-08-08 RX ORDER — MUPIROCIN 20 MG/G
OINTMENT TOPICAL 2 TIMES DAILY
Status: COMPLETED | OUTPATIENT
Start: 2024-08-08 | End: 2024-08-12

## 2024-08-08 RX ADMIN — IPRATROPIUM BROMIDE AND ALBUTEROL SULFATE 3 ML: 2.5; .5 SOLUTION RESPIRATORY (INHALATION) at 03:08

## 2024-08-08 RX ADMIN — CEFTRIAXONE 1 G: 1 INJECTION, POWDER, FOR SOLUTION INTRAMUSCULAR; INTRAVENOUS at 10:08

## 2024-08-08 RX ADMIN — MUPIROCIN: 20 OINTMENT TOPICAL at 08:08

## 2024-08-08 RX ADMIN — SODIUM CHLORIDE: 9 INJECTION, SOLUTION INTRAVENOUS at 08:08

## 2024-08-08 RX ADMIN — IPRATROPIUM BROMIDE AND ALBUTEROL SULFATE 3 ML: 2.5; .5 SOLUTION RESPIRATORY (INHALATION) at 08:08

## 2024-08-08 RX ADMIN — ATORVASTATIN CALCIUM 40 MG: 40 TABLET, FILM COATED ORAL at 01:08

## 2024-08-08 RX ADMIN — Medication 10 ML: at 05:08

## 2024-08-08 RX ADMIN — MUPIROCIN: 20 OINTMENT TOPICAL at 12:08

## 2024-08-08 RX ADMIN — METHYLPREDNISOLONE SODIUM SUCCINATE 125 MG: 125 INJECTION, POWDER, FOR SOLUTION INTRAMUSCULAR; INTRAVENOUS at 12:08

## 2024-08-08 RX ADMIN — SODIUM CHLORIDE 1000 ML: 9 INJECTION, SOLUTION INTRAVENOUS at 05:08

## 2024-08-08 RX ADMIN — SODIUM CHLORIDE: 9 INJECTION, SOLUTION INTRAVENOUS at 11:08

## 2024-08-08 RX ADMIN — AZITHROMYCIN MONOHYDRATE 500 MG: 500 INJECTION, POWDER, LYOPHILIZED, FOR SOLUTION INTRAVENOUS at 01:08

## 2024-08-08 RX ADMIN — ASPIRIN 81 MG: 81 TABLET, COATED ORAL at 01:08

## 2024-08-08 RX ADMIN — FUROSEMIDE 40 MG: 10 INJECTION, SOLUTION INTRAVENOUS at 09:08

## 2024-08-08 RX ADMIN — ENOXAPARIN SODIUM 40 MG: 40 INJECTION SUBCUTANEOUS at 04:08

## 2024-08-08 RX ADMIN — Medication 10 ML: at 11:08

## 2024-08-08 RX ADMIN — LEUCINE, PHENYLALANINE, LYSINE, METHIONINE, ISOLEUCINE, VALINE, HISTIDINE, THREONINE, TRYPTOPHAN, ALANINE, GLYCINE, ARGININE, PROLINE, SERINE, TYROSINE, SODIUM ACETATE, DIBASIC POTASSIUM PHOSPHATE, MAGNESIUM CHLORIDE, SODIUM CHLORIDE, CALCIUM CHLORIDE, DEXTROSE
311; 238; 247; 170; 255; 247; 204; 179; 77; 880; 438; 489; 289; 213; 17; 297; 261; 51; 77; 33; 5 INJECTION INTRAVENOUS at 04:08

## 2024-08-08 RX ADMIN — METHYLPREDNISOLONE SODIUM SUCCINATE 125 MG: 125 INJECTION, POWDER, FOR SOLUTION INTRAMUSCULAR; INTRAVENOUS at 05:08

## 2024-08-08 RX ADMIN — METHYLPREDNISOLONE SODIUM SUCCINATE 125 MG: 125 INJECTION, POWDER, FOR SOLUTION INTRAMUSCULAR; INTRAVENOUS at 11:08

## 2024-08-08 NOTE — ASSESSMENT & PLAN NOTE
"Patient is identified as having Diastolic (HFpEF) heart failure that is Chronic. CHF is currently controlled. Latest ECHO performed and demonstrates- Results for orders placed during the hospital encounter of 07/28/24    Echo Saline Bubble? No; Ultrasound enhancing contrast? No    Interpretation Summary    Left Ventricle: The left ventricle is normal in size. Mildly increased wall thickness. There is normal systolic function with a visually estimated ejection fraction of 55 - 60%. Grade I diastolic dysfunction.    Right Ventricle: Normal right ventricular cavity size. Systolic function is normal.    Left Atrium: Left atrium is severely dilated. Atrial septum is bulging to the right.    Right Atrium: Right atrium is moderately dilated.    Aortic Valve: The aortic valve is a trileaflet valve. Mildly restricted motion. There is mild stenosis. Aortic valve area by VTI is 1.84 cm². Aortic valve peak velocity is 2.18 m/s. Mean gradient is 10 mmHg. The dimensionless index is 0.58. There is mild aortic regurgitation.    Mitral Valve: There is mild regurgitation.    Tricuspid Valve: There is mild regurgitation.    Pulmonic Valve: There is mild to moderate regurgitation.    No pericardial effusion.  . Continue Beta Blocker, ACE/ARB, and Furosemide and monitor clinical status closely. Monitor on telemetry. Patient is on CHF pathway.  Monitor strict Is&Os and daily weights.  Place on fluid restriction of 1 L. Cardiology has been consulted. Continue to stress to patient importance of self efficacy and  on diet for CHF. Last BNP reviewed- and noted below No results for input(s): "BNP", "BNPTRIAGEBLO" in the last 168 hours.  "

## 2024-08-08 NOTE — ASSESSMENT & PLAN NOTE
Patient's FSGs are controlled on current medication regimen.  Last A1c reviewed-   Lab Results   Component Value Date    HGBA1C 5.3 07/28/2024     Most recent fingerstick glucose reviewed-   Recent Labs   Lab 08/08/24  0512 08/08/24  0709   POCTGLUCOSE 86 85     Current correctional scale  stable  Maintain anti-hyperglycemic dose as follows-   Antihyperglycemics (From admission, onward)      Start     Stop Route Frequency Ordered    08/08/24 0118  insulin aspart U-100 pen 0-5 Units         -- SubQ Before meals & nightly PRN 08/08/24 0118          Hold Oral hypoglycemics while patient is in the hospital.

## 2024-08-08 NOTE — ASSESSMENT & PLAN NOTE
Body mass index is 45.3 kg/m². Morbid obesity complicates all aspects of disease management from diagnostic modalities to treatment. Weight loss encouraged and health benefits explained to patient.

## 2024-08-08 NOTE — PLAN OF CARE
Recommendations  1. Rec'd Renal Consistent CHO diet.   2. Rec'd ClinimixE 4.25/5 @ 60mL/hr to provide 408kcal (18% EEN), 51g of protein (118% EPN), and 1200mL TFV.        -Add 250mL of 20% lipids 3x/week to provide additional calories.        -Check Triglycerides before adding lipids.   3. RD to follow and make rec's accordingly.  Goals:   1. Pt will be started on PPN by next RD follow up.  Nutrition Goal Status: new

## 2024-08-08 NOTE — PLAN OF CARE
[place] - Intensive Care  Initial Discharge Assessment       Primary Care Provider: Lucian Barry MD    Admission Diagnosis: Acute pulmonary edema [J81.0]  SOB (shortness of breath) [R06.02]  Chest congestion [R09.89]  Hypoxia [R09.02]  Cystitis [N30.90]  Acute on chronic heart failure, unspecified heart failure type [I50.9]    Admission Date: 2024  Expected Discharge Date:          Payor: [employer] MANAGED MEDICARE / Plan: [employer] MEDICARE HMO / Product Type: Capitation /     Extended Emergency Contact Information  Primary Emergency Contact: Salvatore Tan  Mobile Phone: [phone]  Relation: Son  Secondary Emergency Contact: TanAlfonzo walker  Address: [id number] [place] [address]  Home Phone: [phone]  Mobile Phone: [phone]  Relation: Son   needed? No    Discharge Plan A: Skilled Nursing Facility  Discharge Plan B: Other (TBD)      [id number] - [place], [place] - [address] [place] [address] AT [facility] [address] [place] [address] & [facility]  [address] [facility] [address]  Phone: [phone] Fax: [phone]    [facility] #[id number] - [facility] [address] [facility] AT [facility] OF [facility] & [address]  [address] [facility] [address]  Phone: [phone] Fax: [phone]      Initial Assessment (most recent)       Adult Discharge Assessment - 24 1515          Discharge Assessment    Assessment Type Discharge Planning Assessment     Confirmed/corrected address, phone number and insurance --   The patient was able to verify her name and . The patient was unable to verify her address.    Source of Information patient;family;health record     When was your last doctors appointment? --   Unable to determine. The patient expressed that she had a follow-up appointment today.    Reason For Admission Acute on chronic respiratory failure with hypoxia and hypercapnia     People in Home child(edmar), adult     Do you expect to  return to your current living situation? Other (see comments)     Do you have help at home or someone to help you manage your care at home? Yes     Who are your caregiver(s) and their phone number(s)? Alfonzo (son) [phone]     Prior to hospitilization cognitive status: Alert/Oriented     Current cognitive status: --   The patient had moments of confusion, but she was able to answer a few questions.    Walking or Climbing Stairs Difficulty yes     Walking or Climbing Stairs ambulation difficulty, requires equipment;ambulation difficulty, assistance 1 person;transferring difficulty, assistance 1 person     Mobility Management rollator     Dressing/Bathing Difficulty yes     Dressing/Bathing bathing difficulty, requires equipment;bathing difficulty, assistance 1 person;dressing difficulty, assistance 1 person     Do you have any problems with: Errands/Grocery;Needs other help     Home Accessibility --   ramp attached to her home    Home Layout Able to live on 1st floor     Equipment Currently Used at Home CPAP;oxygen;rollator;wheelchair;shower chair     Readmission within 30 days? Yes     Patient currently being followed by outpatient case management? Unable to determine (comments)     Do you currently have service(s) that help you manage your care at home? Yes     Name and Contact number of agency [phone]   [employer] through [place] [facility].    Is the pt/caregiver preference to resume services with current agency No     Do you take prescription medications? Yes     Do you have prescription coverage? Yes     Coverage [employer]     Do you have any problems affording any of your prescribed medications? TBD     Who is going to help you get home at discharge? TBD     How do you get to doctors appointments? family or friend will provide     Are you on dialysis? No     Discharge Plan A Skilled Nursing Facility     Discharge Plan B Other   TBD    Discharge Plan discussed with:  "Patient                     Readmission Assessment (most recent)       Readmission Assessment - 08/08/24 1211          Readmission    Was this a planned readmission? No     Why were you hospitalized in the last 30 days? Acute on chronic congestive heart failure, unspecified heart failure type     Why were you readmitted? Alarmed about signs/symptoms     When you left the hospital how did you feel? "I felt good."     When you left the hospital where did you go? Home with Family   The patient resume home health with [facility].    When did you start not feeling well? The patient expressed that she noticed she did not feel well yesterday, so she came to the hospital.     Did you try to see or did see a doctor or nurse before you came? No     Why? The patient stated that her appointment was today, but she was admitted to the hospital.                 Discharge assessment is completed. Spoke to the patient in her room. She gave me permission to reach out to her family via phone. The patient lives with her son and granddaughter. She requires assistance and DME with her care. I spoke to the patient about possible placement, and at this moment she is in agreement. I was able to speak with her children via phone, and they are all in agreement as well.     I plan to meet with them in person tomorrow to discuss the discharge plan of care. Contact information was left in the patient's room. SW/CM will remain available. Discharge assessment completed with patient. Discharge planning checklist given to patient/family with instructions to contact  for any needs.          "

## 2024-08-08 NOTE — CONSULTS
[place] - Intensive Care  Adult Nutrition  Consult Note    SUMMARY     Recommendations  1. Rec'd Renal Consistent CHO diet.   2. Rec'd ClinimixE 4.25/5 @ 60mL/hr to provide 408kcal (18% EEN), 51g of protein (118% EPN), and 1200mL TFV.        -Add 250mL of 20% lipids 3x/week to provide additional calories.        -Check Triglycerides before adding lipids.   3. RD to follow and make rec's accordingly.  Goals:   1. Pt will be started on PPN by next RD follow up.  Nutrition Goal Status: new  Communication of RD Recs: reviewed with physician, reviewed with pharmacy, reviewed with RN    Assessment and Plan    Nutrition Problem  Inadequate oral intake    Related to (etiology):   Continuous BIPAP in use    Signs and Symptoms (as evidenced by):   0% PO intake     Interventions/Recommendations (treatment strategy):  1. Rec'd Renal Consistent CHO diet.   2. Rec'd ClinimixE 4.25/5 @ 60mL/hr to provide 408kcal (18% EEN), 51g of protein (118% EPN), and 1200mL TFV.        -Add 250mL of 20% lipids 3x/week to provide additional calories.        -Check Triglycerides before adding lipids.   3. RD to follow and make rec's accordingly.    Nutrition Diagnosis Status:   New    Malnutrition Assessment  NFPE not warranted at this time.    Reason for Assessment    Reason For Assessment: consult (Rec's)  Diagnosis: other (see comments) (Acute on chronic respiratory failure with hypoxia and hypercapnia)  Relevant Medical History: Abdominal hernia, Bacteremia, CHF, COPD, Type 2 Diabetes mellitus, GERD, History of home oxygen therapy, Hypertension, Hypertensive emergency, Migraine headache, On home O2, Pulmonary embolism, Small bowel obstruction,     Thyroid disease  Interdisciplinary Rounds: did not attend  General Information Comments: RD consulted for PPN rec's. Pt is on continuous BIPAP and is unable to eat via PO at this time. Will provide PPN rec's and communicate with MD and pharmacy. Also provided rec's for if/when pt is medically  "appropraite to eat via PO again.  Nutrition Discharge Planning: TBD as care progresses    Nutrition Risk Screen    Nutrition Risk Screen:  (On Continuous BIPAP)    Nutrition/Diet History    Patient Reported Diet/Restrictions/Preferences: general  Spiritual, Cultural Beliefs, Methodist Practices, Values that Affect Care: no  Food Allergies: NKFA  Factors Affecting Nutritional Intake: None identified at this time    Anthropometrics    Temp: 96.9 °F (36.1 °C)  Height Method: Stated  Height: 5' 4" (162.6 cm)  Height (inches): 64 in  Weight Method: Bed Scale  Weight: 119.7 kg (263 lb 14.3 oz)  Weight (lb): 263.89 lb  Ideal Body Weight (IBW), Female: 120 lb  % Ideal Body Weight, Female (lb): 219.91 %  BMI (Calculated): 45.3  BMI Grade: greater than 40 - morbid obesity       Lab/Procedures/Meds    Pertinent Labs Reviewed: reviewed  Pertinent Labs Comments: Creatinine = 3.1; BUN = 62; GFR = 15.3  Pertinent Medications Reviewed: reviewed    Physical Findings/Assessment         Estimated/Assessed Needs    Weight Used For Calorie Calculations: 70.8 kg (156 lb) (AdjBW)  Energy Calorie Requirements (kcal): 2264 (32kcal/kg)  Energy Need Method: Kcal/kg  Protein Requirements: 32-43 (0.6-0.8g/kg IBW)  Weight Used For Protein Calculations: 54.4 kg (120 lb)  Fluid Requirements (mL): 2264 (1mL/kcal)  Estimated Fluid Requirement Method: RDA Method  RDA Method (mL): 2264         Nutrition Prescription Ordered    Current Diet Order: Diabetic 1500 calorie  Oral Nutrition Supplement: None    Evaluation of Received Nutrient/Fluid Intake    % Kcal Needs: 0%  % Protein Needs: 0%  Energy Calories Required: not meeting needs  Protein Required: not meeting needs  Tolerance: other (see comments) (PPN not initiated; Pt not tolerating PO due to continuous BIPAP use)  % Intake of Estimated Energy Needs: 0 - 25 %  % Meal Intake: 0 - 25 %    Nutrition Risk    Level of Risk/Frequency of Follow-up: moderate       Monitor and Evaluation    Food and " Nutrient Intake: energy intake, food and beverage intake, parenteral nutrition intake  Food and Nutrient Adminstration: diet order, enteral and parenteral nutrition administration  Knowledge/Beliefs/Attitudes: beliefs and attitudes, food and nutrition knowledge/skill  Physical Activity and Function: nutrition-related ADLs and IADLs  Anthropometric Measurements: height/length, weight, weight change, body mass index  Biochemical Data, Medical Tests and Procedures: electrolyte and renal panel, gastrointestinal profile, glucose/endocrine profile, inflammatory profile, lipid profile  Nutrition-Focused Physical Findings: overall appearance       Nutrition Follow-Up    RD Follow-up?: Yes

## 2024-08-08 NOTE — PLAN OF CARE
Problem: Infection  Goal: Absence of Infection Signs and Symptoms  Outcome: Progressing     Problem: Adult Inpatient Plan of Care  Goal: Plan of Care Review  Outcome: Progressing     Problem: Bariatric Environmental Safety  Goal: Safety Maintained with Care  Outcome: Progressing     Problem: Skin Injury Risk Increased  Goal: Skin Health and Integrity  Outcome: Progressing     Problem: Diabetes Comorbidity  Goal: Blood Glucose Level Within Targeted Range  Outcome: Progressing

## 2024-08-08 NOTE — CARE UPDATE
Patient arrived from ER in hospital bed. With BIPAP 60% in use. Sats 93-94% . She is awake and alert. She denies any pain at present. An order was placed for PPN and the patient only had one PIV. An order for a PICC line was obtained. Consent was obtained from her son Yassine Tan. PICC scheduled to be placed 8/9/2024 at 0700. HUNTER DOBSON, IZA on tele.

## 2024-08-08 NOTE — SUBJECTIVE & OBJECTIVE
Past Medical History:   Diagnosis Date    Abdominal hernia     Bacteremia 04/05/2023    CHF (congestive heart failure)     COPD (chronic obstructive pulmonary disease)     Diabetes mellitus, type 2 02/16/2022    GERD (gastroesophageal reflux disease)     History of home oxygen therapy     Hypertension     Hypertensive emergency 03/30/2023    Migraine headache     On home O2     Pulmonary embolism 06/01/2017    Small bowel obstruction     Thyroid disease        Past Surgical History:   Procedure Laterality Date    COLONOSCOPY      HYSTERECTOMY      INNER EAR SURGERY      UPPER GASTROINTESTINAL ENDOSCOPY         Review of patient's allergies indicates:   Allergen Reactions    Pcn [penicillins] Swelling       No current facility-administered medications on file prior to encounter.     Current Outpatient Medications on File Prior to Encounter   Medication Sig    acetaminophen (TYLENOL) 325 MG tablet Take 2 tablets (650 mg total) by mouth every 8 (eight) hours as needed for Pain or Temperature greater than (100, headache).    albuterol-ipratropium (DUO-NEB) 2.5 mg-0.5 mg/3 mL nebulizer solution Take 3 mLs by nebulization every 6 (six) hours while awake. Rescue    apixaban (ELIQUIS) 5 mg Tab Take 1 tablet (5 mg total) by mouth 2 (two) times daily.    aspirin (ECOTRIN) 81 MG EC tablet Take 1 tablet (81 mg total) by mouth once daily.    budesonide (PULMICORT) 0.5 mg/2 mL nebulizer solution Take 2 mLs (0.5 mg total) by nebulization every 12 (twelve) hours. Controller    busPIRone (BUSPAR) 5 MG Tab Take 1 tablet (5 mg total) by mouth 2 (two) times daily.    carvediloL (COREG) 3.125 MG tablet Take 1 tablet (3.125 mg total) by mouth 2 (two) times daily.    furosemide (LASIX) 40 MG tablet Take 1 tablet (40 mg total) by mouth 2 (two) times a day.    gabapentin (NEURONTIN) 100 MG capsule Take 1 capsule (100 mg total) by mouth 3 (three) times daily.    insulin aspart U-100 (NOVOLOG) 100 unit/mL (3 mL) InPn pen Inject 0-5 Units into  the skin before meals and at bedtime as needed (Hyperglycemia).    levothyroxine (SYNTHROID) 50 MCG tablet Take 1 tablet (50 mcg total) by mouth before breakfast.    LIPITOR 40 mg tablet Take 1 tablet (40 mg total) by mouth once daily.    pantoprazole (PROTONIX) 20 MG tablet Take 40 mg by mouth once daily.    valsartan (DIOVAN) 160 MG tablet Take 1 tablet (160 mg total) by mouth once daily.    vitamin D 1000 units Tab Take 185 mg by mouth once daily.     Family History    None       Tobacco Use    Smoking status: Former    Smokeless tobacco: Never   Substance and Sexual Activity    Alcohol use: No    Drug use: No    Sexual activity: Not on file     Review of Systems   Unable to perform ROS: Acuity of condition     Objective:     Vital Signs (Most Recent):  Temp: 98.6 °F (37 °C) (08/07/24 2038)  Pulse: 94 (08/08/24 1104)  Resp: (!) 28 (08/08/24 1104)  BP: (!) 96/57 (08/08/24 1104)  SpO2: 95 % (08/08/24 1104) Vital Signs (24h Range):  Temp:  [98.6 °F (37 °C)] 98.6 °F (37 °C)  Pulse:  [86-99] 94  Resp:  [19-45] 28  SpO2:  [89 %-95 %] 95 %  BP: ()/(50-62) 96/57     Weight: 119.7 kg (263 lb 14.3 oz)  Body mass index is 45.3 kg/m².     Physical Exam  Constitutional:       Appearance: She is obese.   HENT:      Nose: Nose normal.      Mouth/Throat:      Mouth: Mucous membranes are moist.   Eyes:      Extraocular Movements: Extraocular movements intact.      Pupils: Pupils are equal, round, and reactive to light.   Cardiovascular:      Rate and Rhythm: Normal rate and regular rhythm.   Pulmonary:      Effort: Respiratory distress present.      Breath sounds: Wheezing and rhonchi present. No rales.      Comments: Decreased BS at bases - bipap  Abdominal:      General: Bowel sounds are normal. There is no distension.      Palpations: Abdomen is soft.      Tenderness: There is no abdominal tenderness.   Genitourinary:     Comments: Hardwick in place  Musculoskeletal:      Cervical back: Normal range of motion.      Right  lower leg: No edema.      Left lower leg: No edema.      Comments: Edema improving   Skin:     General: Skin is warm and dry.      Capillary Refill: Capillary refill takes 2 to 3 seconds.   Neurological:      General: No focal deficit present.      Mental Status: She is alert and oriented to person, place, and time.      Motor: Weakness present.      Gait: Gait abnormal.              CRANIAL NERVES     CN III, IV, VI   Pupils are equal, round, and reactive to light.       Significant Labs: All pertinent labs within the past 24 hours have been reviewed.    Significant Imaging: I have reviewed all pertinent imaging results/findings within the past 24 hours.

## 2024-08-08 NOTE — ED PROVIDER NOTES
"Encounter Date: 8/7/2024       History     Chief Complaint   Patient presents with    Shortness of Breath     Pt to ED via EMS from residence - Room air "in the 70's with tachypnea and hypotension." Pt fell this morning and is also complaining of neck pain.      74-year-old female past medical history CHF morbid obesity presents the ED via EMS with reports of shortness of breath and possible fall.  Patient was recently discharged from the hospital 6 days ago with week-long stay for heart failure.  Family reports she has been taking all medications as prescribed.  Patient reports that she feels short of breath and noted neck pain        Review of patient's allergies indicates:   Allergen Reactions    Pcn [penicillins] Swelling     Past Medical History:   Diagnosis Date    Abdominal hernia     Bacteremia 04/05/2023    CHF (congestive heart failure)     COPD (chronic obstructive pulmonary disease)     Diabetes mellitus, type 2 02/16/2022    GERD (gastroesophageal reflux disease)     History of home oxygen therapy     Hypertension     Hypertensive emergency 03/30/2023    Migraine headache     On home O2     Pulmonary embolism 06/01/2017    Small bowel obstruction     Thyroid disease      Past Surgical History:   Procedure Laterality Date    COLONOSCOPY      HYSTERECTOMY      INNER EAR SURGERY      UPPER GASTROINTESTINAL ENDOSCOPY       No family history on file.  Social History     Tobacco Use    Smoking status: Former    Smokeless tobacco: Never   Substance Use Topics    Alcohol use: No    Drug use: No     Review of Systems   Respiratory:  Positive for shortness of breath.    Musculoskeletal:  Positive for myalgias and neck pain.   All other systems reviewed and are negative.      Physical Exam     Initial Vitals   BP Pulse Resp Temp SpO2   08/07/24 2025 08/07/24 2025 08/07/24 2025 08/07/24 2038 08/07/24 2023   (!) 91/50 94 (!) 34 98.6 °F (37 °C) (!) 92 %      MAP       --                Physical Exam    Nursing note " and vitals reviewed.  Constitutional: Vital signs are normal. She appears well-developed and well-nourished. She is not diaphoretic.  Non-toxic appearance. She does not have a sickly appearance.   Severe morbid obesity   HENT:   Head: Normocephalic and atraumatic.   Right Ear: Hearing, tympanic membrane, external ear and ear canal normal.   Left Ear: Tympanic membrane, external ear and ear canal normal.   Mouth/Throat: Uvula is midline, oropharynx is clear and moist and mucous membranes are normal.   Eyes: Conjunctivae, EOM and lids are normal. Pupils are equal, round, and reactive to light. Lids are everted and swept, no foreign bodies found. Right eye exhibits no discharge. Left eye exhibits no discharge. No scleral icterus.   Neck: Trachea normal and phonation normal. Neck supple. No thyromegaly present. No tracheal deviation present. No JVD present.   Normal range of motion.   Full passive range of motion without pain.     Cardiovascular:  Normal rate, regular rhythm, normal heart sounds, intact distal pulses and normal pulses.           Pulmonary/Chest: No stridor. No respiratory distress. She has no wheezes. She has rhonchi. She has no rales. She exhibits no tenderness.   Abdominal: Abdomen is soft. Bowel sounds are normal. She exhibits no distension. There is no abdominal tenderness. There is no rebound and no guarding.   Musculoskeletal:         General: No edema. Normal range of motion.      Cervical back: Normal, full passive range of motion without pain, normal range of motion and neck supple.      Thoracic back: Normal.      Lumbar back: Normal.     Lymphadenopathy:     She has no cervical adenopathy.   Neurological: She is alert and oriented to person, place, and time. She has normal strength. No cranial nerve deficit or sensory deficit.   Skin: Skin is intact. Capillary refill takes less than 2 seconds.   Psychiatric: She has a normal mood and affect. Her speech is normal and behavior is normal.          ED Course   Critical Care    Date/Time: 8/7/2024 11:49 PM    Performed by: Harrison Ortiz Jr., MD  Authorized by: Oscar Ge Jr., MD  Direct patient critical care time: 30 minutes  Additional history critical care time: 5 minutes  Ordering / reviewing critical care time: 15 minutes  Documentation critical care time: 18 minutes  Consulting other physicians critical care time: 15 minutes  Total critical care time (exclusive of procedural time) : 83 minutes  Critical care was necessary to treat or prevent imminent or life-threatening deterioration of the following conditions: cardiac failure, trauma, circulatory failure, CNS failure or compromise, hepatic failure, respiratory failure, toxidrome, shock, renal failure, endocrine crisis, dehydration, metabolic crisis and sepsis.  Critical care was time spent personally by me on the following activities: discussions with primary provider, discussions with consultants, development of treatment plan with patient or surrogate, evaluation of patient's response to treatment, examination of patient, obtaining history from patient or surrogate, ordering and performing treatments and interventions, ordering and review of laboratory studies, ordering and review of radiographic studies, pulse oximetry, re-evaluation of patient's condition, review of old charts, transcutaneous pacing and vascular access procedures.        Labs Reviewed   CBC W/ AUTO DIFFERENTIAL - Abnormal       Result Value    WBC 26.87 (*)     RBC 4.30      Hemoglobin 12.3      Hematocrit 39.6      MCV 92      MCH 28.6      MCHC 31.1 (*)     RDW 17.3 (*)     Platelets 228      MPV 10.0      Immature Granulocytes Test Not Performed      Gran # (ANC) Test Not Performed      Immature Grans (Abs) Test Not Performed      Lymph # Test Not Performed      Mono # Test Not Performed      Eos # Test Not Performed      Baso # Test Not Performed      nRBC 0      Gran % 66.0      Lymph % 11.0 (*)     Mono % 2.0  (*)     Eosinophil % 0.0      Basophil % 0.0      Bands 5.0      Metamyelocytes 10.0      Myelocytes 6.0      Platelet Estimate Appears normal      Differential Method Manual     COMPREHENSIVE METABOLIC PANEL - Abnormal    Sodium 141      Potassium 4.9      Chloride 101      CO2 35 (*)     Glucose 89      BUN 62 (*)     Creatinine 3.1 (*)     Calcium 8.9      Total Protein 6.6      Albumin 2.5 (*)     Total Bilirubin 0.6      Alkaline Phosphatase 89      AST 18      ALT 12      eGFR 15.3 (*)     Anion Gap 5     URINALYSIS, REFLEX TO URINE CULTURE - Abnormal    Specimen UA Urine, Clean Catch      Color, UA Yellow      Appearance, UA Cloudy (*)     pH, UA 5.0      Specific Gravity, UA 1.015      Protein, UA Trace (*)     Glucose, UA Negative      Ketones, UA Negative      Bilirubin (UA) Negative      Occult Blood UA Negative      Nitrite, UA Negative      Urobilinogen, UA Negative      Leukocytes, UA 2+ (*)     Narrative:     Preferred Collection Type->Urine, Clean Catch  Specimen Source->Urine   URINALYSIS MICROSCOPIC - Abnormal    RBC, UA 0      WBC, UA 61 (*)     Bacteria Negative      Squam Epithel, UA 4      Hyaline Casts, UA 7.6 (*)     Microscopic Comment SEE COMMENT      Narrative:     Preferred Collection Type->Urine, Clean Catch  Specimen Source->Urine   CULTURE, BLOOD   CULTURE, BLOOD   CULTURE, URINE   LACTIC ACID, PLASMA    Lactate (Lactic Acid) 1.4     LACTIC ACID, PLASMA   SARS-COV-2 RDRP GENE    POC Rapid COVID Negative       Acceptable Yes      Narrative:     This test utilizes isothermal nucleic acid amplification technology to detect the SARS-CoV-2 RdRp nucleic acid segment. The analytical sensitivity (limit of detection) is 500 copies/swab.     A POSITIVE result is indicative of the presence of SARS-CoV-2 RNA; clinical correlation with patient history and other diagnostic information is necessary to determine patient infection status.    A NEGATIVE result means that SARS-CoV-2  nucleic acids are not present above the limit of detection. A NEGATIVE result should be treated as presumptive. It does not rule out the possibility of COVID-19 and should not be the sole basis for treatment decisions. If COVID-19 is strongly suspected based on clinical and exposure history, re-testing using an alternate molecular assay should be considered.     Commercial kits are provided by [employer].   _________________________________________________________________   The authorized Fact Sheet for Healthcare Providers and the authorized Fact Sheet for Patients of the ID NOW COVID-19 are available on the FDA website:    https://www.fda.gov/media/852238/download      https://www.fda.gov/media/199897/download      POCT INFLUENZA A/B MOLECULAR    POC Molecular Influenza A Ag Negative      POC Molecular Influenza B Ag Negative       Acceptable Yes       EKG Readings: (Independently Interpreted)   Rhythm: Normal Sinus Rhythm. Heart Rate: 96. Ectopy: No Ectopy. Conduction: Normal. ST Segments: Normal ST Segments. Clinical Impression: Normal Sinus Rhythm       Imaging Results              CT Cervical Spine Without Contrast (Preliminary result)  Result time 08/07/24 22:21:21      Wet Read by Harrison Ortiz Jr., MD (08/07/24 22:21:21, [facility], Emergency Medicine)    No acute abnormality                                     CT Head Without Contrast (Preliminary result)  Result time 08/07/24 22:12:04      Wet Read by Harrison Ortiz Jr., MD (08/07/24 22:05:52, [facility], Emergency Medicine)    No acute abnormality                                     X-Ray Chest 1 View (Preliminary result)  Result time 08/07/24 21:40:26      Wet Read by Harrison Ortiz Jr., MD (08/07/24 21:40:26, [facility], Emergency Medicine)    Perihilar congestion consistent of CHF exacerbation.  No noted consolidation or infiltrate                                      Medications   furosemide injection 80 mg (80 mg Intravenous Given 8/7/24 2109)   cefTRIAXone (Rocephin) 1 g in D5W 100 mL IVPB (MB+) (0 g Intravenous Stopped 8/7/24 2158)     Medical Decision Making  Amount and/or Complexity of Data Reviewed  Labs: ordered.  Radiology: ordered and independent interpretation performed.    Risk  Prescription drug management.  Decision regarding hospitalization.                          Medical Decision Making:   History:   I obtained history from: EMS provider.  Old Medical Records: I decided to obtain old medical records.  Old Records Summarized: records from clinic visits and records from previous admission(s).  Differential Diagnosis:   Sepsis, cystitis, heart failure, pneumonia, cervical fracture, subdural  ED Management:  Patient has 2/4 sirs criteria with normal lactic acid no severe sepsis.  Patient has noted CHF exacerbation.  Patient placed on BiPAP and given IV Lasix showed improvement of respiratory rate and O2 sat.  Patient's BP also improved.  Patient will require admission for CHF exacerbation with cystitis.  We will consult Dr. campbell she was discharged less than 1 week ago  Other:   I have discussed this case with another health care provider.       <> Summary of the Discussion: Discussed patient's presentation, vital signs, lab and imaging reports with .  He accepted admission             Clinical Impression:  Final diagnoses:  [R06.02] SOB (shortness of breath)  [R09.89] Chest congestion  [I50.9] Acute on chronic heart failure, unspecified heart failure type (Primary)  [J81.0] Acute pulmonary edema  [R09.02] Hypoxia  [N30.90] Cystitis          ED Disposition Condition    Admit Stable                Harrison Ortiz Jr., MD  08/07/24 1190

## 2024-08-08 NOTE — PROGRESS NOTES
Pharmacist Renal Dose Adjustment Note    Carmen Tan is a 74 y.o. female being treated with the medication Lovenox    Patient Data:    Vital Signs (Most Recent):  Temp: 98.6 °F (37 °C) (08/07/24 2038)  Pulse: 97 (08/08/24 1133)  Resp: (!) 24 (08/08/24 1133)  BP: (!) 98/55 (08/08/24 1133)  SpO2: (!) 94 % (08/08/24 1133) Vital Signs (72h Range):  Temp:  [98.6 °F (37 °C)]   Pulse:  [86-99]   Resp:  [19-45]   BP: ()/(50-62)   SpO2:  [89 %-95 %]      Recent Labs   Lab 08/07/24 2039   CREATININE 3.1*     Serum creatinine: 3.1 mg/dL (H) 08/07/24 2039  Estimated creatinine clearance: 20.6 mL/min (A)    Medication: Lovenox dose:  30 mg frequency  QD will be changed to medication: Lovenox dose: 40 mg frequency: QD due to crcl < 30 ml/min but BMI > 40 kg/m2    Pharmacist's Name: Wilmer Paz  Pharmacist's Extension: [phone]

## 2024-08-08 NOTE — ASSESSMENT & PLAN NOTE
Patient with Hypercapnic and Hypoxic Respiratory failure which is Acute on chronic.  she is on home oxygen at 4 LPM. Supplemental oxygen was provided and noted- Oxygen Concentration (%):  [50-60] 60    .   Signs/symptoms of respiratory failure include- tachypnea, increased work of breathing, respiratory distress, use of accessory muscles, wheezing, and lethargy. Contributing diagnoses includes - CHF, COPD, Interstitial lung disease, and Obesity Hypoventilation Labs and images were reviewed. Patient Has not had a recent ABG. Will treat underlying causes and adjust management of respiratory failure as follows- treat underlying heart failure and COPD exacerbation

## 2024-08-08 NOTE — H&P
"  [facility] Emergency Department  [facility] Medicine  History & Physical    Patient Name: Carmen Tan  MRN: [id number]  Patient Class: IP- Inpatient  Admission Date: 8/7/2024  Attending Physician: Oscar Ge Jr., MD   Primary Care Provider: Lucian Barry MD         Patient information was obtained from ER records.     Subjective:     Principal Problem:Acute on chronic respiratory failure with hypoxia and hypercapnia    Chief Complaint:   Chief Complaint   Patient presents with    Shortness of Breath     Pt to ED via EMS from residence - Room air "in the 70's with tachypnea and hypotension." Pt fell this morning and is also complaining of neck pain.         HPI:   Chief Complaint   Patient presents with    Shortness of Breath       Pt to ED via EMS from residence - Room air "in the 70's with tachypnea and hypotension." Pt fell this morning and is also complaining of neck pain.       74-year-old female past medical history CHF morbid obesity presents the ED via EMS with reports of shortness of breath and possible fall.  Patient was recently discharged from the hospital 6 days ago with week-long stay for heart failure.  Family reports she has been taking all medications as prescribed.  Patient reports that she feels short of breath and noted neck pain      updated HPI:  Patient nonverbal on BiPAP.  Above history reviewed.  Patient presents with shortness of breath with possible fall.  Recently discharge from heart failure now with acute renal failure and hypotension concerning for over-diuresis.    Past Medical History:   Diagnosis Date    Abdominal hernia     Bacteremia 04/05/2023    CHF (congestive heart failure)     COPD (chronic obstructive pulmonary disease)     Diabetes mellitus, type 2 02/16/2022    GERD (gastroesophageal reflux disease)     History of home oxygen therapy     Hypertension     Hypertensive emergency 03/30/2023    Migraine headache     On home O2     Pulmonary embolism 06/01/2017    " Small bowel obstruction     Thyroid disease        Past Surgical History:   Procedure Laterality Date    COLONOSCOPY      HYSTERECTOMY      INNER EAR SURGERY      UPPER GASTROINTESTINAL ENDOSCOPY         Review of patient's allergies indicates:   Allergen Reactions    Pcn [penicillins] Swelling       No current facility-administered medications on file prior to encounter.     Current Outpatient Medications on File Prior to Encounter   Medication Sig    acetaminophen (TYLENOL) 325 MG tablet Take 2 tablets (650 mg total) by mouth every 8 (eight) hours as needed for Pain or Temperature greater than (100, headache).    albuterol-ipratropium (DUO-NEB) 2.5 mg-0.5 mg/3 mL nebulizer solution Take 3 mLs by nebulization every 6 (six) hours while awake. Rescue    apixaban (ELIQUIS) 5 mg Tab Take 1 tablet (5 mg total) by mouth 2 (two) times daily.    aspirin (ECOTRIN) 81 MG EC tablet Take 1 tablet (81 mg total) by mouth once daily.    budesonide (PULMICORT) 0.5 mg/2 mL nebulizer solution Take 2 mLs (0.5 mg total) by nebulization every 12 (twelve) hours. Controller    busPIRone (BUSPAR) 5 MG Tab Take 1 tablet (5 mg total) by mouth 2 (two) times daily.    carvediloL (COREG) 3.125 MG tablet Take 1 tablet (3.125 mg total) by mouth 2 (two) times daily.    furosemide (LASIX) 40 MG tablet Take 1 tablet (40 mg total) by mouth 2 (two) times a day.    gabapentin (NEURONTIN) 100 MG capsule Take 1 capsule (100 mg total) by mouth 3 (three) times daily.    insulin aspart U-100 (NOVOLOG) 100 unit/mL (3 mL) InPn pen Inject 0-5 Units into the skin before meals and at bedtime as needed (Hyperglycemia).    levothyroxine (SYNTHROID) 50 MCG tablet Take 1 tablet (50 mcg total) by mouth before breakfast.    LIPITOR 40 mg tablet Take 1 tablet (40 mg total) by mouth once daily.    pantoprazole (PROTONIX) 20 MG tablet Take 40 mg by mouth once daily.    valsartan (DIOVAN) 160 MG tablet Take 1 tablet (160 mg total) by mouth once daily.    vitamin D 1000  units Tab Take 185 mg by mouth once daily.     Family History    None       Tobacco Use    Smoking status: Former    Smokeless tobacco: Never   Substance and Sexual Activity    Alcohol use: No    Drug use: No    Sexual activity: Not on file     Review of Systems   Unable to perform ROS: Acuity of condition     Objective:     Vital Signs (Most Recent):  Temp: 98.6 °F (37 °C) (08/07/24 2038)  Pulse: 94 (08/08/24 1104)  Resp: (!) 28 (08/08/24 1104)  BP: (!) 96/57 (08/08/24 1104)  SpO2: 95 % (08/08/24 1104) Vital Signs (24h Range):  Temp:  [98.6 °F (37 °C)] 98.6 °F (37 °C)  Pulse:  [86-99] 94  Resp:  [19-45] 28  SpO2:  [89 %-95 %] 95 %  BP: ()/(50-62) 96/57     Weight: 119.7 kg (263 lb 14.3 oz)  Body mass index is 45.3 kg/m².     Physical Exam  Constitutional:       Appearance: She is obese.   HENT:      Nose: Nose normal.      Mouth/Throat:      Mouth: Mucous membranes are moist.   Eyes:      Extraocular Movements: Extraocular movements intact.      Pupils: Pupils are equal, round, and reactive to light.   Cardiovascular:      Rate and Rhythm: Normal rate and regular rhythm.   Pulmonary:      Effort: Respiratory distress present.      Breath sounds: Wheezing and rhonchi present. No rales.      Comments: Decreased BS at bases - bipap  Abdominal:      General: Bowel sounds are normal. There is no distension.      Palpations: Abdomen is soft.      Tenderness: There is no abdominal tenderness.   Genitourinary:     Comments: Hardwick in place  Musculoskeletal:      Cervical back: Normal range of motion.      Right lower leg: No edema.      Left lower leg: No edema.      Comments: Edema improving   Skin:     General: Skin is warm and dry.      Capillary Refill: Capillary refill takes 2 to 3 seconds.   Neurological:      General: No focal deficit present.      Mental Status: She is alert and oriented to person, place, and time.      Motor: Weakness present.      Gait: Gait abnormal.              CRANIAL NERVES     CN III,  IV, VI   Pupils are equal, round, and reactive to light.       Significant Labs: All pertinent labs within the past 24 hours have been reviewed.    Significant Imaging: I have reviewed all pertinent imaging results/findings within the past 24 hours.  Assessment/Plan:     * Acute on chronic respiratory failure with hypoxia and hypercapnia  Patient with Hypercapnic and Hypoxic Respiratory failure which is Acute on chronic.  she is on home oxygen at 4 LPM. Supplemental oxygen was provided and noted- Oxygen Concentration (%):  [50-60] 60    .   Signs/symptoms of respiratory failure include- tachypnea, increased work of breathing, respiratory distress, use of accessory muscles, wheezing, and lethargy. Contributing diagnoses includes - CHF, COPD, Interstitial lung disease, and Obesity Hypoventilation Labs and images were reviewed. Patient Has not had a recent ABG. Will treat underlying causes and adjust management of respiratory failure as follows- treat underlying heart failure and COPD exacerbation    Hypotension  Monitor with IVF      Chronic diastolic congestive heart failure  Patient is identified as having Diastolic (HFpEF) heart failure that is Chronic. CHF is currently controlled. Latest ECHO performed and demonstrates- Results for orders placed during the hospital encounter of 07/28/24    Echo Saline Bubble? No; Ultrasound enhancing contrast? No    Interpretation Summary    Left Ventricle: The left ventricle is normal in size. Mildly increased wall thickness. There is normal systolic function with a visually estimated ejection fraction of 55 - 60%. Grade I diastolic dysfunction.    Right Ventricle: Normal right ventricular cavity size. Systolic function is normal.    Left Atrium: Left atrium is severely dilated. Atrial septum is bulging to the right.    Right Atrium: Right atrium is moderately dilated.    Aortic Valve: The aortic valve is a trileaflet valve. Mildly restricted motion. There is mild stenosis.  "Aortic valve area by VTI is 1.84 cm². Aortic valve peak velocity is 2.18 m/s. Mean gradient is 10 mmHg. The dimensionless index is 0.58. There is mild aortic regurgitation.    Mitral Valve: There is mild regurgitation.    Tricuspid Valve: There is mild regurgitation.    Pulmonic Valve: There is mild to moderate regurgitation.    No pericardial effusion.  . Continue Beta Blocker, ACE/ARB, and Furosemide and monitor clinical status closely. Monitor on telemetry. Patient is on CHF pathway.  Monitor strict Is&Os and daily weights.  Place on fluid restriction of 1 L. Cardiology has been consulted. Continue to stress to patient importance of self efficacy and  on diet for CHF. Last BNP reviewed- and noted below No results for input(s): "BNP", "BNPTRIAGEBLO" in the last 168 hours.    Diabetes mellitus, type 2  Patient's FSGs are controlled on current medication regimen.  Last A1c reviewed-   Lab Results   Component Value Date    HGBA1C 5.3 07/28/2024     Most recent fingerstick glucose reviewed-   Recent Labs   Lab 08/08/24  0512 08/08/24  0709   POCTGLUCOSE 86 85     Current correctional scale  stable  Maintain anti-hyperglycemic dose as follows-   Antihyperglycemics (From admission, onward)      Start     Stop Route Frequency Ordered    08/08/24 0118  insulin aspart U-100 pen 0-5 Units         -- SubQ Before meals & nightly PRN 08/08/24 0118          Hold Oral hypoglycemics while patient is in the hospital.    COPD exacerbation  Patient's COPD is with exacerbation noted by continued dyspnea, use of accessory muscles for breathing, and worsening of baseline hypoxia currently.  Patient is currently on COPD Pathway. Continue scheduled inhalers Steroids, Antibiotics, and Supplemental oxygen and monitor respiratory status closely.     Weakness  PT/OT when stable      Severe obesity (BMI >= 40)  Body mass index is 45.3 kg/m². Morbid obesity complicates all aspects of disease management from diagnostic modalities to " treatment. Weight loss encouraged and health benefits explained to patient.         Paroxysmal A-fib  Currently in sinus rhythm       VTE Risk Mitigation (From admission, onward)           Ordered     enoxaparin injection 30 mg  Every 24 hours         08/08/24 0604     IP VTE HIGH RISK PATIENT  Once         08/08/24 0118     Place sequential compression device  Until discontinued         08/08/24 0118                                    Oscar Ge Jr, MD  Department of Hospital Medicine  [facility] - Emergency Department

## 2024-08-08 NOTE — HPI
"Chief Complaint   Patient presents with    Shortness of Breath       Pt to ED via EMS from residence - Room air "in the 70's with tachypnea and hypotension." Pt fell this morning and is also complaining of neck pain.       74-year-old female past medical history CHF morbid obesity presents the ED via EMS with reports of shortness of breath and possible fall.  Patient was recently discharged from the hospital 6 days ago with week-long stay for heart failure.  Family reports she has been taking all medications as prescribed.  Patient reports that she feels short of breath and noted neck pain      updated HPI:  Patient nonverbal on BiPAP.  Above history reviewed.  Patient presents with shortness of breath with possible fall.  Recently discharge from heart failure now with acute renal failure and hypotension concerning for over-diuresis.  "

## 2024-08-09 PROBLEM — N17.0 ACUTE RENAL FAILURE WITH TUBULAR NECROSIS: Status: ACTIVE | Noted: 2024-08-09

## 2024-08-09 LAB
ALBUMIN SERPL BCP-MCNC: 2 G/DL (ref 3.5–5.2)
ALP SERPL-CCNC: 79 U/L (ref 55–135)
ALT SERPL W/O P-5'-P-CCNC: 8 U/L (ref 10–44)
ANION GAP SERPL CALC-SCNC: 5 MMOL/L (ref 3–11)
AST SERPL-CCNC: 18 U/L (ref 10–40)
BASOPHILS # BLD AUTO: 0.02 K/UL (ref 0–0.2)
BASOPHILS NFR BLD: 0.1 % (ref 0–1.9)
BILIRUB SERPL-MCNC: 0.3 MG/DL (ref 0.1–1)
BUN SERPL-MCNC: 71 MG/DL (ref 8–23)
CALCIUM SERPL-MCNC: 8.2 MG/DL (ref 8.7–10.5)
CHLORIDE SERPL-SCNC: 103 MMOL/L (ref 95–110)
CO2 SERPL-SCNC: 33 MMOL/L (ref 23–29)
CREAT SERPL-MCNC: 2.6 MG/DL (ref 0.5–1.4)
DIFFERENTIAL METHOD BLD: ABNORMAL
EOSINOPHIL # BLD AUTO: 0 K/UL (ref 0–0.5)
EOSINOPHIL NFR BLD: 0 % (ref 0–8)
ERYTHROCYTE [DISTWIDTH] IN BLOOD BY AUTOMATED COUNT: 17.3 % (ref 11.5–14.5)
EST. GFR  (NO RACE VARIABLE): 18.9 ML/MIN/1.73 M^2
GLUCOSE SERPL-MCNC: 143 MG/DL (ref 70–110)
HCT VFR BLD AUTO: 38.1 % (ref 37–48.5)
HGB BLD-MCNC: 11.7 G/DL (ref 12–16)
IMM GRANULOCYTES # BLD AUTO: 0.36 K/UL (ref 0–0.04)
IMM GRANULOCYTES NFR BLD AUTO: 1.8 % (ref 0–0.5)
LYMPHOCYTES # BLD AUTO: 0.4 K/UL (ref 1–4.8)
LYMPHOCYTES NFR BLD: 2.1 % (ref 18–48)
MAGNESIUM SERPL-MCNC: 1.9 MG/DL (ref 1.6–2.6)
MCH RBC QN AUTO: 28.5 PG (ref 27–31)
MCHC RBC AUTO-ENTMCNC: 30.7 G/DL (ref 32–36)
MCV RBC AUTO: 93 FL (ref 82–98)
MONOCYTES # BLD AUTO: 0.6 K/UL (ref 0.3–1)
MONOCYTES NFR BLD: 2.8 % (ref 4–15)
NEUTROPHILS # BLD AUTO: 18.7 K/UL (ref 1.8–7.7)
NEUTROPHILS NFR BLD: 93.2 % (ref 38–73)
NRBC BLD-RTO: 0 /100 WBC
PLATELET # BLD AUTO: 215 K/UL (ref 150–450)
PMV BLD AUTO: 9.8 FL (ref 9.2–12.9)
POCT GLUCOSE: 121 MG/DL (ref 70–110)
POCT GLUCOSE: 154 MG/DL (ref 70–110)
POCT GLUCOSE: 174 MG/DL (ref 70–110)
POTASSIUM SERPL-SCNC: 4.6 MMOL/L (ref 3.5–5.1)
PROT SERPL-MCNC: 6.3 G/DL (ref 6–8.4)
RBC # BLD AUTO: 4.1 M/UL (ref 4–5.4)
SODIUM SERPL-SCNC: 141 MMOL/L (ref 136–145)
WBC # BLD AUTO: 20.06 K/UL (ref 3.9–12.7)

## 2024-08-09 PROCEDURE — 94660 CPAP INITIATION&MGMT: CPT

## 2024-08-09 PROCEDURE — 94640 AIRWAY INHALATION TREATMENT: CPT

## 2024-08-09 PROCEDURE — A4216 STERILE WATER/SALINE, 10 ML: HCPCS | Performed by: INTERNAL MEDICINE

## 2024-08-09 PROCEDURE — 27100171 HC OXYGEN HIGH FLOW UP TO 24 HOURS

## 2024-08-09 PROCEDURE — C1751 CATH, INF, PER/CENT/MIDLINE: HCPCS

## 2024-08-09 PROCEDURE — B4185 PARENTERAL SOL 10 GM LIPIDS: HCPCS | Performed by: INTERNAL MEDICINE

## 2024-08-09 PROCEDURE — 85025 COMPLETE CBC W/AUTO DIFF WBC: CPT | Performed by: INTERNAL MEDICINE

## 2024-08-09 PROCEDURE — 25000242 PHARM REV CODE 250 ALT 637 W/ HCPCS: Performed by: INTERNAL MEDICINE

## 2024-08-09 PROCEDURE — 36415 COLL VENOUS BLD VENIPUNCTURE: CPT | Performed by: INTERNAL MEDICINE

## 2024-08-09 PROCEDURE — 99900035 HC TECH TIME PER 15 MIN (STAT)

## 2024-08-09 PROCEDURE — 02HV33Z INSERTION OF INFUSION DEVICE INTO SUPERIOR VENA CAVA, PERCUTANEOUS APPROACH: ICD-10-PCS | Performed by: INTERNAL MEDICINE

## 2024-08-09 PROCEDURE — 80053 COMPREHEN METABOLIC PANEL: CPT | Performed by: INTERNAL MEDICINE

## 2024-08-09 PROCEDURE — 83735 ASSAY OF MAGNESIUM: CPT | Performed by: INTERNAL MEDICINE

## 2024-08-09 PROCEDURE — 63600175 PHARM REV CODE 636 W HCPCS: Performed by: INTERNAL MEDICINE

## 2024-08-09 PROCEDURE — 25000003 PHARM REV CODE 250: Performed by: INTERNAL MEDICINE

## 2024-08-09 PROCEDURE — 94761 N-INVAS EAR/PLS OXIMETRY MLT: CPT

## 2024-08-09 PROCEDURE — 36569 INSJ PICC 5 YR+ W/O IMAGING: CPT

## 2024-08-09 PROCEDURE — 20000000 HC ICU ROOM

## 2024-08-09 PROCEDURE — 99900031 HC PATIENT EDUCATION (STAT)

## 2024-08-09 RX ORDER — NOREPINEPHRINE BITARTRATE/D5W 8 MG/250ML
0-3 PLASTIC BAG, INJECTION (ML) INTRAVENOUS CONTINUOUS
Status: DISCONTINUED | OUTPATIENT
Start: 2024-08-09 | End: 2024-08-10

## 2024-08-09 RX ORDER — SODIUM CHLORIDE 0.9 % (FLUSH) 0.9 %
10 SYRINGE (ML) INJECTION EVERY 6 HOURS
Status: DISCONTINUED | OUTPATIENT
Start: 2024-08-09 | End: 2024-08-16 | Stop reason: HOSPADM

## 2024-08-09 RX ORDER — SODIUM CHLORIDE 0.9 % (FLUSH) 0.9 %
10 SYRINGE (ML) INJECTION
Status: DISCONTINUED | OUTPATIENT
Start: 2024-08-09 | End: 2024-08-10

## 2024-08-09 RX ADMIN — METHYLPREDNISOLONE SODIUM SUCCINATE 125 MG: 125 INJECTION, POWDER, FOR SOLUTION INTRAMUSCULAR; INTRAVENOUS at 05:08

## 2024-08-09 RX ADMIN — SOYBEAN OIL 250 ML: 20 INJECTION, SOLUTION INTRAVENOUS at 09:08

## 2024-08-09 RX ADMIN — MUPIROCIN: 20 OINTMENT TOPICAL at 09:08

## 2024-08-09 RX ADMIN — Medication 10 ML: at 05:08

## 2024-08-09 RX ADMIN — LEUCINE, PHENYLALANINE, LYSINE, METHIONINE, ISOLEUCINE, VALINE, HISTIDINE, THREONINE, TRYPTOPHAN, ALANINE, GLYCINE, ARGININE, PROLINE, SERINE, TYROSINE, SODIUM ACETATE, DIBASIC POTASSIUM PHOSPHATE, MAGNESIUM CHLORIDE, SODIUM CHLORIDE, CALCIUM CHLORIDE, DEXTROSE
365; 280; 290; 200; 300; 290; 240; 210; 90; 1035; 515; 575; 340; 250; 20; 340; 261; 51; 59; 33; 20 INJECTION INTRAVENOUS at 04:08

## 2024-08-09 RX ADMIN — Medication 10 ML: at 11:08

## 2024-08-09 RX ADMIN — ENOXAPARIN SODIUM 40 MG: 40 INJECTION SUBCUTANEOUS at 05:08

## 2024-08-09 RX ADMIN — AZITHROMYCIN MONOHYDRATE 500 MG: 500 INJECTION, POWDER, LYOPHILIZED, FOR SOLUTION INTRAVENOUS at 02:08

## 2024-08-09 RX ADMIN — LEVOTHYROXINE SODIUM 50 MCG: 50 TABLET ORAL at 06:08

## 2024-08-09 RX ADMIN — NOREPINEPHRINE BITARTRATE 0.05 MCG/KG/MIN: 8 INJECTION, SOLUTION INTRAVENOUS at 11:08

## 2024-08-09 RX ADMIN — METHYLPREDNISOLONE SODIUM SUCCINATE 125 MG: 125 INJECTION, POWDER, FOR SOLUTION INTRAMUSCULAR; INTRAVENOUS at 11:08

## 2024-08-09 RX ADMIN — IPRATROPIUM BROMIDE AND ALBUTEROL SULFATE 3 ML: 2.5; .5 SOLUTION RESPIRATORY (INHALATION) at 01:08

## 2024-08-09 RX ADMIN — CEFTRIAXONE 1 G: 1 INJECTION, POWDER, FOR SOLUTION INTRAMUSCULAR; INTRAVENOUS at 09:08

## 2024-08-09 RX ADMIN — IPRATROPIUM BROMIDE AND ALBUTEROL SULFATE 3 ML: 2.5; .5 SOLUTION RESPIRATORY (INHALATION) at 07:08

## 2024-08-09 RX ADMIN — METHYLPREDNISOLONE SODIUM SUCCINATE 125 MG: 125 INJECTION, POWDER, FOR SOLUTION INTRAMUSCULAR; INTRAVENOUS at 06:08

## 2024-08-09 RX ADMIN — Medication 10 ML: at 06:08

## 2024-08-09 NOTE — PLAN OF CARE
Problem: Infection  Goal: Absence of Infection Signs and Symptoms  Outcome: Progressing     Problem: Adult Inpatient Plan of Care  Goal: Plan of Care Review  Outcome: Progressing  Goal: Patient-Specific Goal (Individualized)  Outcome: Progressing  Goal: Absence of Hospital-Acquired Illness or Injury  Outcome: Progressing  Goal: Optimal Comfort and Wellbeing  Outcome: Progressing  Goal: Readiness for Transition of Care  Outcome: Progressing     Problem: Bariatric Environmental Safety  Goal: Safety Maintained with Care  Outcome: Progressing     Problem: Skin Injury Risk Increased  Goal: Skin Health and Integrity  Outcome: Progressing     Problem: Diabetes Comorbidity  Goal: Blood Glucose Level Within Targeted Range  Outcome: Progressing     Problem: Noninvasive Ventilation Acute  Goal: Effective Unassisted Ventilation and Oxygenation  Outcome: Progressing

## 2024-08-09 NOTE — NURSING
Pt awake and alert, Verbally responsive and appropriate with answers. No c/o voiced. Congested cough and trying to clear her throat. Soft B/P's, see flowsheet. O2 92%. IVF ans ordered. Hardwick with clear yellow urine. Awaiting staff to insert PICC. Afebrile. Continue to observe.

## 2024-08-09 NOTE — PROGRESS NOTES
[facility] - Intensive Care  Adult Nutrition  Progress Note    SUMMARY       Recommendations  1. Rec'd Renal Consistent CHO diet.    2. PPN: Rec'd ClinimixE 4.25/5 @ 60mL/hr to provide 408kcal (18% EEN), 51g of protein (118% EPN), and 1200mL TFV.          -Add 250mL of 20% lipids 3x/week to provide additional calories.          -Check Triglycerides before adding lipids.     3. TPN: Rec'd ClinimixE 5/20 @ 40mL/hr to provide 845kcal (37% EEN), 48g of protein (111% EPN), and 960mL TFV.   -Add 250mL of 20% lipids 3x/week to provide additional calories.   -Check Triglycerides before adding lipids.      4. RD to follow and make rec's accordingly.  Goals:   1. Pt will be started on PPN by next RD follow up.   2. Pt will be switched from PPN to TPN by next RD follow up.  Nutrition Goal Status: goal met, new  Communication of RD Recs: reviewed with physician, reviewed with Pharmacy, reviewed with RN    Assessment and Plan    Nutrition Problem  Inadequate oral intake     Related to (etiology):   Continuous BIPAP in use     Signs and Symptoms (as evidenced by):   0% PO intake      Interventions/Recommendations (treatment strategy):  1. Rec'd Renal Consistent CHO diet.    2. PPN: Rec'd ClinimixE 4.25/5 @ 60mL/hr to provide 408kcal (18% EEN), 51g of protein (118% EPN), and 1200mL TFV.          -Add 250mL of 20% lipids 3x/week to provide additional calories.          -Check Triglycerides before adding lipids.     3. TPN: Rec'd ClinimixE 5/20 @ 40mL/hr to provide 845kcal (37% EEN), 48g of protein (111% EPN), and 960mL TFV.   -Add 250mL of 20% lipids 3x/week to provide additional calories.   -Check Triglycerides before adding lipids.      4. RD to follow and make rec's accordingly.     Nutrition Diagnosis Status:   Continues       Malnutrition Assessment  NFPE not warranted at this time.     Reason for Assessment    Reason For Assessment: RD follow-up, consult  Diagnosis: other (see comments) (Acute on chronic respiratory failure  "with hypoxia and hypercapnia)  Relevant Medical History: Abdominal hernia, Bacteremia, CHF, COPD, Type 2 Diabetes mellitus, GERD, History of home oxygen therapy, Hypertension, Hypertensive emergency, Migraine headache, On home O2, Pulmonary embolism, Small bowel obstruction,     Thyroid disease  Interdisciplinary Rounds: did not attend  General Information Comments: RD re-consulted for TPN rec's. Pt was previously started on PPN yesterday after RD consult. Pt recieved PICC line today. Will provide TPN rec's and communicate with MD and pharmacy.  Nutrition Discharge Planning: TBD as care progresses    Nutrition Risk Screen    Nutrition Risk Screen: tube feeding or parenteral nutrition    Nutrition/Diet History    Patient Reported Diet/Restrictions/Preferences: general  Spiritual, Cultural Beliefs, Yarsani Practices, Values that Affect Care: no  Food Allergies: NKFA  Factors Affecting Nutritional Intake: None identified at this time    Anthropometrics    Temp: 97 °F (36.1 °C)  Height Method: Stated  Height: 5' 4" (162.6 cm)  Height (inches): 64 in  Weight Method: Bed Scale  Weight: 119.7 kg (263 lb 14.3 oz)  Weight (lb): 263.89 lb  Ideal Body Weight (IBW), Female: 120 lb  % Ideal Body Weight, Female (lb): 219.91 %  BMI (Calculated): 45.3  BMI Grade: greater than 40 - morbid obesity    Lab/Procedures/Meds    Pertinent Labs Reviewed: reviewed  Pertinent Labs Comments: Creatinine = 2.6 ; BUN = 71 ; GFR = 18.9  Pertinent Medications Reviewed: reviewed    Estimated/Assessed Needs    Weight Used For Calorie Calculations: 70.8 kg (156 lb) (AdjBW)  Energy Calorie Requirements (kcal): 2264 (32kcal/kg)  Energy Need Method: Kcal/kg  Protein Requirements: 32-43 (0.6-0.8g/kg IBW)  Weight Used For Protein Calculations: 54.4 kg (120 lb)  Fluid Requirements (mL): 2264 (1mL/kcal)  Estimated Fluid Requirement Method: RDA Method  RDA Method (mL): 2264    Nutrition Prescription Ordered    Current Diet Order: Diabetic 1500 " calorie  Current Nutrition Support Formula Ordered: Clinimix 4.25/5  Current Nutrition Support Rate Ordered: 60 (ml)  Current Nutrition Support Frequency Ordered: Continuous  Oral Nutrition Supplement: None    Evaluation of Received Nutrient/Fluid Intake    Parenteral Calories (kcal): 408  Parenteral Protein (gm): 51  Parenteral Fluid (mL): 1200  % Kcal Needs: 18%  % Protein Needs: 118%  I/O: + 1,201.2  Energy Calories Required: not meeting needs  Protein Required: meeting needs  Tolerance: tolerating  % Intake of Estimated Energy Needs: 25 - 50 %  % Meal Intake: 0 - 25 %    Nutrition Risk    Level of Risk/Frequency of Follow-up: moderate     Monitor and Evaluation    Food and Nutrient Intake: energy intake, food and beverage intake, parenteral nutrition intake  Food and Nutrient Adminstration: enteral and parenteral nutrition administration, diet order  Knowledge/Beliefs/Attitudes: food and nutrition knowledge/skill, beliefs and attitudes  Physical Activity and Function: nutrition-related ADLs and IADLs  Anthropometric Measurements: height/length, weight, weight change, body mass index  Biochemical Data, Medical Tests and Procedures: electrolyte and renal panel, gastrointestinal profile, glucose/endocrine profile, inflammatory profile, lipid profile  Nutrition-Focused Physical Findings: overall appearance     Nutrition Follow-Up    RD Follow-up?: Yes

## 2024-08-09 NOTE — PLAN OF CARE
Spoke to Jenn with [facility]. She informed me that the DON wants to how the patient does over the weekend to determine if they would be able to accept her.

## 2024-08-09 NOTE — NURSING
Notified Dr Ge that pt has been hypotensive, order received for levophed drip per titration. Also notified him that pt had PICC line placed so TPN can be ordered if needed.

## 2024-08-09 NOTE — ASSESSMENT & PLAN NOTE
BHAVESH is likely due to pre-renal azotemia due to intravascular volume depletion. Baseline creatinine is unknown. Most recent creatinine and eGFR are listed below.  Recent Labs     08/07/24 2039 08/09/24  0357   CREATININE 3.1* 2.6*   EGFRNORACEVR 15.3* 18.9*      Plan  - BHAVESH is improving  - Avoid nephrotoxins and renally dose meds for GFR listed above  - Monitor urine output, serial BMP, and adjust therapy as needed  -

## 2024-08-09 NOTE — ASSESSMENT & PLAN NOTE
"Patient is identified as having Diastolic (HFpEF) heart failure that is Chronic. CHF is currently controlled. Latest ECHO performed and demonstrates- Results for orders placed during the hospital encounter of 07/28/24    Echo Saline Bubble? No; Ultrasound enhancing contrast? No    Interpretation Summary    Left Ventricle: The left ventricle is normal in size. Mildly increased wall thickness. There is normal systolic function with a visually estimated ejection fraction of 55 - 60%. Grade I diastolic dysfunction.    Right Ventricle: Normal right ventricular cavity size. Systolic function is normal.    Left Atrium: Left atrium is severely dilated. Atrial septum is bulging to the right.    Right Atrium: Right atrium is moderately dilated.    Aortic Valve: The aortic valve is a trileaflet valve. Mildly restricted motion. There is mild stenosis. Aortic valve area by VTI is 1.84 cm². Aortic valve peak velocity is 2.18 m/s. Mean gradient is 10 mmHg. The dimensionless index is 0.58. There is mild aortic regurgitation.    Mitral Valve: There is mild regurgitation.    Tricuspid Valve: There is mild regurgitation.    Pulmonic Valve: There is mild to moderate regurgitation.    No pericardial effusion.  . Continue Beta Blocker, ACE/ARB, and Furosemide and monitor clinical status closely. Monitor on telemetry. Patient is on CHF pathway.  Monitor strict Is&Os and daily weights.  Place on fluid restriction of 1 L. Cardiology has been consulted. Continue to stress to patient importance of self efficacy and  on diet for CHF. Last BNP reviewed- and noted below No results for input(s): "BNP", "BNPTRIAGEBLO" in the last 168 hours.  "

## 2024-08-09 NOTE — ASSESSMENT & PLAN NOTE
Patient's FSGs are controlled on current medication regimen.  Last A1c reviewed-   Lab Results   Component Value Date    HGBA1C 5.3 07/28/2024     Most recent fingerstick glucose reviewed-   Recent Labs   Lab 08/08/24  1157 08/08/24  1652 08/08/24  2130   POCTGLUCOSE 84 118* 121*       Current correctional scale  stable  Maintain anti-hyperglycemic dose as follows-   Antihyperglycemics (From admission, onward)    Start     Stop Route Frequency Ordered    08/08/24 1247  insulin aspart U-100 pen 0-5 Units         -- SubQ Before meals & nightly PRN 08/08/24 1147        Hold Oral hypoglycemics while patient is in the hospital.

## 2024-08-09 NOTE — SUBJECTIVE & OBJECTIVE
Past Medical History:   Diagnosis Date    Abdominal hernia     Bacteremia 04/05/2023    CHF (congestive heart failure)     COPD (chronic obstructive pulmonary disease)     Diabetes mellitus, type 2 02/16/2022    GERD (gastroesophageal reflux disease)     History of home oxygen therapy     Hypertension     Hypertensive emergency 03/30/2023    Migraine headache     On home O2     Pulmonary embolism 06/01/2017    Small bowel obstruction     Thyroid disease        Past Surgical History:   Procedure Laterality Date    COLONOSCOPY      HYSTERECTOMY      INNER EAR SURGERY      UPPER GASTROINTESTINAL ENDOSCOPY         Review of patient's allergies indicates:   Allergen Reactions    Pcn [penicillins] Swelling       No current facility-administered medications on file prior to encounter.     Current Outpatient Medications on File Prior to Encounter   Medication Sig    acetaminophen (TYLENOL) 325 MG tablet Take 2 tablets (650 mg total) by mouth every 8 (eight) hours as needed for Pain or Temperature greater than (100, headache).    albuterol-ipratropium (DUO-NEB) 2.5 mg-0.5 mg/3 mL nebulizer solution Take 3 mLs by nebulization every 6 (six) hours while awake. Rescue    apixaban (ELIQUIS) 5 mg Tab Take 1 tablet (5 mg total) by mouth 2 (two) times daily.    aspirin (ECOTRIN) 81 MG EC tablet Take 1 tablet (81 mg total) by mouth once daily.    budesonide (PULMICORT) 0.5 mg/2 mL nebulizer solution Take 2 mLs (0.5 mg total) by nebulization every 12 (twelve) hours. Controller    busPIRone (BUSPAR) 5 MG Tab Take 1 tablet (5 mg total) by mouth 2 (two) times daily.    carvediloL (COREG) 3.125 MG tablet Take 1 tablet (3.125 mg total) by mouth 2 (two) times daily.    furosemide (LASIX) 40 MG tablet Take 1 tablet (40 mg total) by mouth 2 (two) times a day.    gabapentin (NEURONTIN) 100 MG capsule Take 1 capsule (100 mg total) by mouth 3 (three) times daily.    insulin aspart U-100 (NOVOLOG) 100 unit/mL (3 mL) InPn pen Inject 0-5 Units into  the skin before meals and at bedtime as needed (Hyperglycemia).    levothyroxine (SYNTHROID) 50 MCG tablet Take 1 tablet (50 mcg total) by mouth before breakfast.    pantoprazole (PROTONIX) 20 MG tablet Take 40 mg by mouth once daily.    valsartan (DIOVAN) 160 MG tablet Take 1 tablet (160 mg total) by mouth once daily.    vitamin D 1000 units Tab Take 185 mg by mouth once daily.    LIPITOR 40 mg tablet Take 1 tablet (40 mg total) by mouth once daily.     Family History    None       Tobacco Use    Smoking status: Former    Smokeless tobacco: Never   Substance and Sexual Activity    Alcohol use: No    Drug use: No    Sexual activity: Not on file     Review of Systems   Unable to perform ROS: Acuity of condition     Objective:     Vital Signs (Most Recent):  Temp: 98.2 °F (36.8 °C) (08/09/24 0701)  Pulse: 80 (08/09/24 0744)  Resp: (!) 30 (08/09/24 0744)  BP: (!) 112/57 (08/09/24 0701)  SpO2: 96 % (08/09/24 0744) Vital Signs (24h Range):  Temp:  [96.9 °F (36.1 °C)-98.2 °F (36.8 °C)] 98.2 °F (36.8 °C)  Pulse:  [72-97] 80  Resp:  [18-43] 30  SpO2:  [92 %-97 %] 96 %  BP: ()/(39-62) 112/57     Weight: 119.7 kg (263 lb 14.3 oz)  Body mass index is 45.3 kg/m².     Physical Exam  Constitutional:       Appearance: She is obese.   HENT:      Nose: Nose normal.      Mouth/Throat:      Mouth: Mucous membranes are moist.   Eyes:      Extraocular Movements: Extraocular movements intact.      Pupils: Pupils are equal, round, and reactive to light.   Cardiovascular:      Rate and Rhythm: Normal rate and regular rhythm.   Pulmonary:      Effort: Respiratory distress present.      Breath sounds: Wheezing and rhonchi present. No rales.      Comments: Decreased BS at bases - bipap  Abdominal:      General: Bowel sounds are normal. There is no distension.      Palpations: Abdomen is soft.      Tenderness: There is no abdominal tenderness.   Genitourinary:     Comments: Hardwick in place  Musculoskeletal:      Cervical back: Normal  range of motion.      Right lower leg: No edema.      Left lower leg: No edema.      Comments: Edema improving   Skin:     General: Skin is warm and dry.      Capillary Refill: Capillary refill takes 2 to 3 seconds.   Neurological:      General: No focal deficit present.      Mental Status: She is alert and oriented to person, place, and time.      Motor: Weakness present.      Gait: Gait abnormal.              CRANIAL NERVES     CN III, IV, VI   Pupils are equal, round, and reactive to light.       Significant Labs: All pertinent labs within the past 24 hours have been reviewed.    Significant Imaging: I have reviewed all pertinent imaging results/findings within the past 24 hours.

## 2024-08-09 NOTE — EICU
Intervention Initiated From:  [facility] / EICU    Elliott intervened regarding:  Rounding (Video assessment)    Comments:  Video rounding completed.  Pt resting quiet w/ eyes closed, on Bipap 50%.  No distress noted.  Infusing IVF's at 75 cc/hr.  B/P 112/57, HR 86, RR 29, POx 95%.

## 2024-08-09 NOTE — PROGRESS NOTES
"[facility] Medicine  Progress Note    Patient Name: Carmen Tan  MRN: [id number]  Patient Class: IP- Inpatient   Admission Date: 8/7/2024  Length of Stay: 2 days  Attending Physician: Oscar Ge Jr., MD  Primary Care Provider: Lucian Barry MD        Subjective:     Principal Problem:Acute on chronic respiratory failure with hypoxia and hypercapnia        HPI:  Chief Complaint   Patient presents with    Shortness of Breath       Pt to ED via EMS from residence - Room air "in the 70's with tachypnea and hypotension." Pt fell this morning and is also complaining of neck pain.       74-year-old female past medical history CHF morbid obesity presents the ED via EMS with reports of shortness of breath and possible fall.  Patient was recently discharged from the hospital 6 days ago with week-long stay for heart failure.  Family reports she has been taking all medications as prescribed.  Patient reports that she feels short of breath and noted neck pain      updated HPI:  Patient nonverbal on BiPAP.  Above history reviewed.  Patient presents with shortness of breath with possible fall.  Recently discharge from heart failure now with acute renal failure and hypotension concerning for over-diuresis.    Overview/Hospital Course:  8/9/24:  remains bipap dependent.  Responding to gently IVF and steroids.     Past Medical History:   Diagnosis Date    Abdominal hernia     Bacteremia 04/05/2023    CHF (congestive heart failure)     COPD (chronic obstructive pulmonary disease)     Diabetes mellitus, type 2 02/16/2022    GERD (gastroesophageal reflux disease)     History of home oxygen therapy     Hypertension     Hypertensive emergency 03/30/2023    Migraine headache     On home O2     Pulmonary embolism 06/01/2017    Small bowel obstruction     Thyroid disease        Past Surgical History:   Procedure Laterality Date    COLONOSCOPY      HYSTERECTOMY      INNER EAR SURGERY      UPPER " GASTROINTESTINAL ENDOSCOPY         Review of patient's allergies indicates:   Allergen Reactions    Pcn [penicillins] Swelling       No current facility-administered medications on file prior to encounter.     Current Outpatient Medications on File Prior to Encounter   Medication Sig    acetaminophen (TYLENOL) 325 MG tablet Take 2 tablets (650 mg total) by mouth every 8 (eight) hours as needed for Pain or Temperature greater than (100, headache).    albuterol-ipratropium (DUO-NEB) 2.5 mg-0.5 mg/3 mL nebulizer solution Take 3 mLs by nebulization every 6 (six) hours while awake. Rescue    apixaban (ELIQUIS) 5 mg Tab Take 1 tablet (5 mg total) by mouth 2 (two) times daily.    aspirin (ECOTRIN) 81 MG EC tablet Take 1 tablet (81 mg total) by mouth once daily.    budesonide (PULMICORT) 0.5 mg/2 mL nebulizer solution Take 2 mLs (0.5 mg total) by nebulization every 12 (twelve) hours. Controller    busPIRone (BUSPAR) 5 MG Tab Take 1 tablet (5 mg total) by mouth 2 (two) times daily.    carvediloL (COREG) 3.125 MG tablet Take 1 tablet (3.125 mg total) by mouth 2 (two) times daily.    furosemide (LASIX) 40 MG tablet Take 1 tablet (40 mg total) by mouth 2 (two) times a day.    gabapentin (NEURONTIN) 100 MG capsule Take 1 capsule (100 mg total) by mouth 3 (three) times daily.    insulin aspart U-100 (NOVOLOG) 100 unit/mL (3 mL) InPn pen Inject 0-5 Units into the skin before meals and at bedtime as needed (Hyperglycemia).    levothyroxine (SYNTHROID) 50 MCG tablet Take 1 tablet (50 mcg total) by mouth before breakfast.    pantoprazole (PROTONIX) 20 MG tablet Take 40 mg by mouth once daily.    valsartan (DIOVAN) 160 MG tablet Take 1 tablet (160 mg total) by mouth once daily.    vitamin D 1000 units Tab Take 185 mg by mouth once daily.    LIPITOR 40 mg tablet Take 1 tablet (40 mg total) by mouth once daily.     Family History    None       Tobacco Use    Smoking status: Former    Smokeless tobacco: Never   Substance and Sexual  Activity    Alcohol use: No    Drug use: No    Sexual activity: Not on file     Review of Systems   Unable to perform ROS: Acuity of condition     Objective:     Vital Signs (Most Recent):  Temp: 98.2 °F (36.8 °C) (08/09/24 0701)  Pulse: 80 (08/09/24 0744)  Resp: (!) 30 (08/09/24 0744)  BP: (!) 112/57 (08/09/24 0701)  SpO2: 96 % (08/09/24 0744) Vital Signs (24h Range):  Temp:  [96.9 °F (36.1 °C)-98.2 °F (36.8 °C)] 98.2 °F (36.8 °C)  Pulse:  [72-97] 80  Resp:  [18-43] 30  SpO2:  [92 %-97 %] 96 %  BP: ()/(39-62) 112/57     Weight: 119.7 kg (263 lb 14.3 oz)  Body mass index is 45.3 kg/m².     Physical Exam  Constitutional:       Appearance: She is obese.   HENT:      Nose: Nose normal.      Mouth/Throat:      Mouth: Mucous membranes are moist.   Eyes:      Extraocular Movements: Extraocular movements intact.      Pupils: Pupils are equal, round, and reactive to light.   Cardiovascular:      Rate and Rhythm: Normal rate and regular rhythm.   Pulmonary:      Effort: Respiratory distress present.      Breath sounds: Wheezing and rhonchi present. No rales.      Comments: Decreased BS at bases - bipap  Abdominal:      General: Bowel sounds are normal. There is no distension.      Palpations: Abdomen is soft.      Tenderness: There is no abdominal tenderness.   Genitourinary:     Comments: Hardwick in place  Musculoskeletal:      Cervical back: Normal range of motion.      Right lower leg: No edema.      Left lower leg: No edema.      Comments: Edema improving   Skin:     General: Skin is warm and dry.      Capillary Refill: Capillary refill takes 2 to 3 seconds.   Neurological:      General: No focal deficit present.      Mental Status: She is alert and oriented to person, place, and time.      Motor: Weakness present.      Gait: Gait abnormal.              CRANIAL NERVES     CN III, IV, VI   Pupils are equal, round, and reactive to light.       Significant Labs: All pertinent labs within the past 24 hours have been  reviewed.    Significant Imaging: I have reviewed all pertinent imaging results/findings within the past 24 hours.    Assessment/Plan:      * Acute on chronic respiratory failure with hypoxia and hypercapnia  Patient with Hypercapnic and Hypoxic Respiratory failure which is Acute on chronic.  she is on home oxygen at 4 LPM. Supplemental oxygen was provided and noted- Oxygen Concentration (%):  [50-60] 50    .   Signs/symptoms of respiratory failure include- tachypnea, increased work of breathing, respiratory distress, use of accessory muscles, wheezing, and lethargy. Contributing diagnoses includes - CHF, COPD, Interstitial lung disease, and Obesity Hypoventilation Labs and images were reviewed. Patient Has not had a recent ABG. Will treat underlying causes and adjust management of respiratory failure as follows- treat underlying heart failure and COPD exacerbation    Acute renal failure with tubular necrosis  BHAVESH is likely due to pre-renal azotemia due to intravascular volume depletion. Baseline creatinine is unknown. Most recent creatinine and eGFR are listed below.  Recent Labs     08/07/24 2039 08/09/24  0357   CREATININE 3.1* 2.6*   EGFRNORACEVR 15.3* 18.9*      Plan  - BHAVESH is improving  - Avoid nephrotoxins and renally dose meds for GFR listed above  - Monitor urine output, serial BMP, and adjust therapy as needed  -     Hypotension  Monitor with IVF      Chronic diastolic congestive heart failure  Patient is identified as having Diastolic (HFpEF) heart failure that is Chronic. CHF is currently controlled. Latest ECHO performed and demonstrates- Results for orders placed during the hospital encounter of 07/28/24    Echo Saline Bubble? No; Ultrasound enhancing contrast? No    Interpretation Summary    Left Ventricle: The left ventricle is normal in size. Mildly increased wall thickness. There is normal systolic function with a visually estimated ejection fraction of 55 - 60%. Grade I diastolic dysfunction.     "Right Ventricle: Normal right ventricular cavity size. Systolic function is normal.    Left Atrium: Left atrium is severely dilated. Atrial septum is bulging to the right.    Right Atrium: Right atrium is moderately dilated.    Aortic Valve: The aortic valve is a trileaflet valve. Mildly restricted motion. There is mild stenosis. Aortic valve area by VTI is 1.84 cm². Aortic valve peak velocity is 2.18 m/s. Mean gradient is 10 mmHg. The dimensionless index is 0.58. There is mild aortic regurgitation.    Mitral Valve: There is mild regurgitation.    Tricuspid Valve: There is mild regurgitation.    Pulmonic Valve: There is mild to moderate regurgitation.    No pericardial effusion.  . Continue Beta Blocker, ACE/ARB, and Furosemide and monitor clinical status closely. Monitor on telemetry. Patient is on CHF pathway.  Monitor strict Is&Os and daily weights.  Place on fluid restriction of 1 L. Cardiology has been consulted. Continue to stress to patient importance of self efficacy and  on diet for CHF. Last BNP reviewed- and noted below No results for input(s): "BNP", "BNPTRIAGEBLO" in the last 168 hours.    Diabetes mellitus, type 2  Patient's FSGs are controlled on current medication regimen.  Last A1c reviewed-   Lab Results   Component Value Date    HGBA1C 5.3 07/28/2024     Most recent fingerstick glucose reviewed-   Recent Labs   Lab 08/08/24  1157 08/08/24  1652 08/08/24  2130   POCTGLUCOSE 84 118* 121*       Current correctional scale  stable  Maintain anti-hyperglycemic dose as follows-   Antihyperglycemics (From admission, onward)      Start     Stop Route Frequency Ordered    08/08/24 1247  insulin aspart U-100 pen 0-5 Units         -- SubQ Before meals & nightly PRN 08/08/24 1147          Hold Oral hypoglycemics while patient is in the hospital.    COPD exacerbation  Patient's COPD is with exacerbation noted by continued dyspnea, use of accessory muscles for breathing, and worsening of baseline hypoxia " currently.  Patient is currently on COPD Pathway. Continue scheduled inhalers Steroids, Antibiotics, and Supplemental oxygen and monitor respiratory status closely.     Weakness  PT/OT when stable      Severe obesity (BMI >= 40)  Body mass index is 45.3 kg/m². Morbid obesity complicates all aspects of disease management from diagnostic modalities to treatment. Weight loss encouraged and health benefits explained to patient.         Paroxysmal A-fib  Currently in sinus rhythm       VTE Risk Mitigation (From admission, onward)           Ordered     enoxaparin injection 40 mg  Every 24 hours         08/08/24 1305     IP VTE HIGH RISK PATIENT  Once         08/08/24 0118     Place sequential compression device  Until discontinued         08/08/24 0118                    Discharge Planning   TIGRE:      Code Status: Full Code   Is the patient medically ready for discharge?:     Reason for patient still in hospital (select all that apply): Treatment  Discharge Plan A: Skilled Nursing Facility          Cc time 30 min        Oscar Ge Jr, MD  Department of Hospital Medicine   [facility]

## 2024-08-09 NOTE — ASSESSMENT & PLAN NOTE
Patient with Hypercapnic and Hypoxic Respiratory failure which is Acute on chronic.  she is on home oxygen at 4 LPM. Supplemental oxygen was provided and noted- Oxygen Concentration (%):  [50-60] 50    .   Signs/symptoms of respiratory failure include- tachypnea, increased work of breathing, respiratory distress, use of accessory muscles, wheezing, and lethargy. Contributing diagnoses includes - CHF, COPD, Interstitial lung disease, and Obesity Hypoventilation Labs and images were reviewed. Patient Has not had a recent ABG. Will treat underlying causes and adjust management of respiratory failure as follows- treat underlying heart failure and COPD exacerbation

## 2024-08-09 NOTE — PLAN OF CARE
08/09/24 1305   Post-Acute Status   Post-Acute Authorization Placement   Post-Acute Placement Status Referrals Sent   Coverage [employer] MANAGED MEDICARE - [employer] MEDICARE HMO   Discharge Delays None known at this time   Discharge Plan   Discharge Plan A Skilled Nursing Facility;New Nursing Home placement - [facility] care facility   Discharge Plan B Other     New referral. The patient agreed to SNF placement at [facility]. Referral was sent via [employer].

## 2024-08-09 NOTE — PROCEDURES
"Carmen Tan is a 74 y.o. female patient.    Temp: 98.2 °F (36.8 °C) (08/09/24 0701)  Pulse: 64 (08/09/24 1000)  Resp: (!) 24 (08/09/24 1000)  BP: (!) 87/50 (08/09/24 1000)  SpO2: 95 % (08/09/24 1000)  Weight: 119.7 kg (263 lb 14.3 oz) (08/08/24 1559)  Height: 5' 4" (162.6 cm) (08/08/24 1559)    PICC  Date/Time: 8/9/2024 10:43 AM  Performed by: Malcolm Tomas RN  Consent Done: Yes  Time out: Immediately prior to procedure a time out was called to verify the correct patient, procedure, equipment, support staff and site/side marked as required  Indications: med administration  Preparation: skin prepped with ChloraPrep  Skin prep agent dried: skin prep agent completely dried prior to procedure  Sterile barriers: all five maximum sterile barriers used - cap, mask, sterile gown, sterile gloves, and large sterile sheet  Hand hygiene: hand hygiene performed prior to central venous catheter insertion  Location details: right basilic  Catheter type: triple lumen  Catheter size: 5 Fr  Catheter Length: 35cm    Number of attempts: 1  Post-procedure: blood return through all ports and sterile dressing applied    Assessment: placement verified by x-ray          Malcolm Tomas RN  8/9/2024    "

## 2024-08-09 NOTE — CONSULTS
[place] - Intensive Care  Adult Nutrition  Consult Note    SUMMARY     Recommendations  1. Rec'd Renal Consistent CHO diet.    2. PPN: Rec'd ClinimixE 4.25/5 @ 60mL/hr to provide 408kcal (18% EEN), 51g of protein (118% EPN), and 1200mL TFV.          -Add 250mL of 20% lipids 3x/week to provide additional calories.          -Check Triglycerides before adding lipids.     3. TPN: Rec'd ClinimixE 5/20 @ 40mL/hr to provide 845kcal (37% EEN), 48g of protein (111% EPN), and 960mL TFV.   -Add 250mL of 20% lipids 3x/week to provide additional calories.   -Check Triglycerides before adding lipids.      4. RD to follow and make rec's accordingly.  Goals:   1. Pt will be started on PPN by next RD follow up.   2. Pt will be switched from PPN to TPN by next RD follow up.  Nutrition Goal Status: goal met, new  Communication of RD Recs: reviewed with physician, reviewed with Pharmacy, reviewed with RN     Assessment and Plan     Nutrition Problem  Inadequate oral intake     Related to (etiology):   Continuous BIPAP in use     Signs and Symptoms (as evidenced by):   0% PO intake      Interventions/Recommendations (treatment strategy):  1. Rec'd Renal Consistent CHO diet.    2. PPN: Rec'd ClinimixE 4.25/5 @ 60mL/hr to provide 408kcal (18% EEN), 51g of protein (118% EPN), and 1200mL TFV.          -Add 250mL of 20% lipids 3x/week to provide additional calories.          -Check Triglycerides before adding lipids.     3. TPN: Rec'd ClinimixE 5/20 @ 40mL/hr to provide 845kcal (37% EEN), 48g of protein (111% EPN), and 960mL TFV.   -Add 250mL of 20% lipids 3x/week to provide additional calories.   -Check Triglycerides before adding lipids.      4. RD to follow and make rec's accordingly.     Nutrition Diagnosis Status:   Continues    Malnutrition Assessment  NFPE not warranted at this time.     Reason for Assessment    Reason For Assessment: RD follow-up, consult  Diagnosis: other (see comments) (Acute on chronic respiratory failure with  "hypoxia and hypercapnia)  Relevant Medical History: Abdominal hernia, Bacteremia, CHF, COPD, Type 2 Diabetes mellitus, GERD, History of home oxygen therapy, Hypertension, Hypertensive emergency, Migraine headache, On home O2, Pulmonary embolism, Small bowel obstruction,     Thyroid disease  Interdisciplinary Rounds: did not attend  General Information Comments: RD re-consulted for TPN rec's. Pt was previously started on PPN yesterday after RD consult. Pt recieved PICC line today. Will provide TPN rec's and communicate with MD and pharmacy.  Nutrition Discharge Planning: TBD as care progresses    Nutrition Risk Screen    Nutrition Risk Screen: tube feeding or parenteral nutrition    Nutrition/Diet History    Patient Reported Diet/Restrictions/Preferences: general  Spiritual, Cultural Beliefs, Yarsanism Practices, Values that Affect Care: no  Food Allergies: NKFA  Factors Affecting Nutritional Intake: None identified at this time    Anthropometrics    Temp: 97 °F (36.1 °C)  Height Method: Stated  Height: 5' 4" (162.6 cm)  Height (inches): 64 in  Weight Method: Bed Scale  Weight: 119.7 kg (263 lb 14.3 oz)  Weight (lb): 263.89 lb  Ideal Body Weight (IBW), Female: 120 lb  % Ideal Body Weight, Female (lb): 219.91 %  BMI (Calculated): 45.3  BMI Grade: greater than 40 - morbid obesity    Lab/Procedures/Meds    Pertinent Labs Reviewed: reviewed  Pertinent Labs Comments: Creatinine = 2.6 ; BUN = 71 ; GFR = 18.9  Pertinent Medications Reviewed: reviewed    Estimated/Assessed Needs    Weight Used For Calorie Calculations: 70.8 kg (156 lb) (AdjBW)  Energy Calorie Requirements (kcal): 2264 (32kcal/kg)  Energy Need Method: Kcal/kg  Protein Requirements: 32-43 (0.6-0.8g/kg IBW)  Weight Used For Protein Calculations: 54.4 kg (120 lb)  Fluid Requirements (mL): 2264 (1mL/kcal)  Estimated Fluid Requirement Method: RDA Method  RDA Method (mL): 2264    Nutrition Prescription Ordered    Current Diet Order: Diabetic 1500 calorie  Current " Nutrition Support Formula Ordered: Clinimix 4.25/5  Current Nutrition Support Rate Ordered: 60 (ml)  Current Nutrition Support Frequency Ordered: Continuous  Oral Nutrition Supplement: None    Evaluation of Received Nutrient/Fluid Intake    Parenteral Calories (kcal): 408  Parenteral Protein (gm): 51  Parenteral Fluid (mL): 1200  % Kcal Needs: 18%  % Protein Needs: 118%  I/O: + 1,201.2  Energy Calories Required: not meeting needs  Protein Required: meeting needs  Tolerance: tolerating  % Intake of Estimated Energy Needs: 0 - 25 %  % Meal Intake: 0 - 25 %    Nutrition Risk    Level of Risk/Frequency of Follow-up: moderate     Monitor and Evaluation    Food and Nutrient Intake: energy intake, food and beverage intake, parenteral nutrition intake  Food and Nutrient Adminstration: enteral and parenteral nutrition administration, diet order  Knowledge/Beliefs/Attitudes: food and nutrition knowledge/skill, beliefs and attitudes  Physical Activity and Function: nutrition-related ADLs and IADLs  Anthropometric Measurements: height/length, weight, weight change, body mass index  Biochemical Data, Medical Tests and Procedures: electrolyte and renal panel, gastrointestinal profile, glucose/endocrine profile, inflammatory profile, lipid profile  Nutrition-Focused Physical Findings: overall appearance     Nutrition Follow-Up    RD Follow-up?: Yes

## 2024-08-09 NOTE — EICU
Intervention Initiated From:  [facility] / EICU    Elliott intervened regarding:  Rounding (Video assessment)    Nurse Notified:  No    Doctor Notified:  No    Comments: Rounding done. On BiPAP 50%. On IV fluid @ 75/hour and PPN @ 60/hour. B/P 83/53, HR 89, resp 40, sat 93. Side rails up x4

## 2024-08-09 NOTE — HOSPITAL COURSE
8/9/24:  remains bipap dependent.  Responding to gently IVF and steroids.   8/10/24 ND patient is sleeping but will awaken with arousal; patient reports being tired and will follow commands and squeeze your hand asked.  Still on low-dose norepi - will wean as tolerated.  Patient has some bradycardia, not on any rate-controlling medicine will continue to monitor  8/11/24 ND currently off of all pressors.  Her heart rate is ranging between 60- 80 at this time.  Appreciate cardiology recs.  Chest x-ray reviewed this a.m. with improvement in airspace.  Will give dose of IV Lasix today  08/12/2024:  Patient more alert this morning.  X-ray reviews improve aeration.  Blood cultures negative.  Continue to wean BiPAP as tolerated.  8/13: Patient remains on BiPAP.  Will attempt to wean BiPAP utilization today in order to pursue enteral feedings.  8/14: Patient tolerated p.o. intake yesterday.  Parenteral nutrition discontinued.  Stable medically from a respiratory perspective will transition to [facility] for ongoing management.  Patient needs ongoing physical and occupational therapy and ongoing evaluation by case management regarding placement at time discharge.  815:  Patient continues to clinically improve.  Awaiting skilled nursing placement once stable.  8/16:  Patient without complaints this am.  Stable for transition to snf.

## 2024-08-09 NOTE — PLAN OF CARE
Recommendations  1. Rec'd Renal Consistent CHO diet.    2. PPN: Rec'd ClinimixE 4.25/5 @ 60mL/hr to provide 408kcal (18% EEN), 51g of protein (118% EPN), and 1200mL TFV.          -Add 250mL of 20% lipids 3x/week to provide additional calories.          -Check Triglycerides before adding lipids.     3. TPN: Rec'd ClinimixE 5/20 @ 40mL/hr to provide 845kcal (37% EEN), 48g of protein (111% EPN), and 960mL TFV.   -Add 250mL of 20% lipids 3x/week to provide additional calories.   -Check Triglycerides before adding lipids.      4. RD to follow and make rec's accordingly.  Goals:   1. Pt will be started on PPN by next RD follow up.   2. Pt will be switched from PPN to TPN by next RD follow up.  Nutrition Goal Status: goal met, new

## 2024-08-09 NOTE — PLAN OF CARE
Pt remains lethargic during shift and on bipap. Levophed started today due to hypotension. Picc line placed today, PPN switched to TPN. Urine output adequate.

## 2024-08-10 PROBLEM — E03.9 HYPOTHYROID: Status: ACTIVE | Noted: 2024-08-10

## 2024-08-10 LAB
ALBUMIN SERPL BCP-MCNC: 2.1 G/DL (ref 3.5–5.2)
ALP SERPL-CCNC: 86 U/L (ref 55–135)
ALT SERPL W/O P-5'-P-CCNC: 12 U/L (ref 10–44)
ANION GAP SERPL CALC-SCNC: 6 MMOL/L (ref 3–11)
AST SERPL-CCNC: 12 U/L (ref 10–40)
BACTERIA UR CULT: NO GROWTH
BASOPHILS # BLD AUTO: 0.02 K/UL (ref 0–0.2)
BASOPHILS NFR BLD: 0.1 % (ref 0–1.9)
BILIRUB SERPL-MCNC: 0.2 MG/DL (ref 0.1–1)
BUN SERPL-MCNC: 76 MG/DL (ref 8–23)
CALCIUM SERPL-MCNC: 9.2 MG/DL (ref 8.7–10.5)
CHLORIDE SERPL-SCNC: 104 MMOL/L (ref 95–110)
CK SERPL-CCNC: 41 U/L (ref 20–180)
CO2 SERPL-SCNC: 34 MMOL/L (ref 23–29)
CREAT SERPL-MCNC: 2.2 MG/DL (ref 0.5–1.4)
DIFFERENTIAL METHOD BLD: ABNORMAL
EOSINOPHIL # BLD AUTO: 0 K/UL (ref 0–0.5)
EOSINOPHIL NFR BLD: 0 % (ref 0–8)
ERYTHROCYTE [DISTWIDTH] IN BLOOD BY AUTOMATED COUNT: 17.3 % (ref 11.5–14.5)
EST. GFR  (NO RACE VARIABLE): 23.1 ML/MIN/1.73 M^2
GLUCOSE SERPL-MCNC: 188 MG/DL (ref 70–110)
HCT VFR BLD AUTO: 39.4 % (ref 37–48.5)
HGB BLD-MCNC: 11.8 G/DL (ref 12–16)
IMM GRANULOCYTES # BLD AUTO: 0.11 K/UL (ref 0–0.04)
IMM GRANULOCYTES NFR BLD AUTO: 0.6 % (ref 0–0.5)
LYMPHOCYTES # BLD AUTO: 0.5 K/UL (ref 1–4.8)
LYMPHOCYTES NFR BLD: 2.3 % (ref 18–48)
MAGNESIUM SERPL-MCNC: 2.2 MG/DL (ref 1.6–2.6)
MCH RBC QN AUTO: 27.8 PG (ref 27–31)
MCHC RBC AUTO-ENTMCNC: 29.9 G/DL (ref 32–36)
MCV RBC AUTO: 93 FL (ref 82–98)
MONOCYTES # BLD AUTO: 0.6 K/UL (ref 0.3–1)
MONOCYTES NFR BLD: 3.1 % (ref 4–15)
NEUTROPHILS # BLD AUTO: 18.2 K/UL (ref 1.8–7.7)
NEUTROPHILS NFR BLD: 93.9 % (ref 38–73)
NRBC BLD-RTO: 0 /100 WBC
NT-PROBNP SERPL-MCNC: ABNORMAL PG/ML (ref 5–900)
OHS QRS DURATION: 134 MS
OHS QTC CALCULATION: 389 MS
PLATELET # BLD AUTO: 281 K/UL (ref 150–450)
PMV BLD AUTO: 9.9 FL (ref 9.2–12.9)
POCT GLUCOSE: 163 MG/DL (ref 70–110)
POCT GLUCOSE: 179 MG/DL (ref 70–110)
POCT GLUCOSE: 189 MG/DL (ref 70–110)
POTASSIUM SERPL-SCNC: 4.6 MMOL/L (ref 3.5–5.1)
PROT SERPL-MCNC: 6.7 G/DL (ref 6–8.4)
RBC # BLD AUTO: 4.25 M/UL (ref 4–5.4)
SODIUM SERPL-SCNC: 144 MMOL/L (ref 136–145)
TROPONIN I SERPL DL<=0.01 NG/ML-MCNC: 56 PG/ML (ref 0–60)
TSH SERPL DL<=0.005 MIU/L-ACNC: 0.63 UIU/ML (ref 0.4–4)
WBC # BLD AUTO: 19.36 K/UL (ref 3.9–12.7)

## 2024-08-10 PROCEDURE — 94660 CPAP INITIATION&MGMT: CPT

## 2024-08-10 PROCEDURE — 80053 COMPREHEN METABOLIC PANEL: CPT | Performed by: INTERNAL MEDICINE

## 2024-08-10 PROCEDURE — 25000003 PHARM REV CODE 250: Performed by: INTERNAL MEDICINE

## 2024-08-10 PROCEDURE — 36415 COLL VENOUS BLD VENIPUNCTURE: CPT | Performed by: INTERNAL MEDICINE

## 2024-08-10 PROCEDURE — 27100171 HC OXYGEN HIGH FLOW UP TO 24 HOURS

## 2024-08-10 PROCEDURE — 63600175 PHARM REV CODE 636 W HCPCS: Performed by: INTERNAL MEDICINE

## 2024-08-10 PROCEDURE — 20000000 HC ICU ROOM

## 2024-08-10 PROCEDURE — 84443 ASSAY THYROID STIM HORMONE: CPT | Performed by: INTERNAL MEDICINE

## 2024-08-10 PROCEDURE — 85025 COMPLETE CBC W/AUTO DIFF WBC: CPT | Performed by: INTERNAL MEDICINE

## 2024-08-10 PROCEDURE — 83735 ASSAY OF MAGNESIUM: CPT | Performed by: INTERNAL MEDICINE

## 2024-08-10 PROCEDURE — 94761 N-INVAS EAR/PLS OXIMETRY MLT: CPT

## 2024-08-10 PROCEDURE — 25000242 PHARM REV CODE 250 ALT 637 W/ HCPCS: Performed by: INTERNAL MEDICINE

## 2024-08-10 PROCEDURE — A4216 STERILE WATER/SALINE, 10 ML: HCPCS | Performed by: INTERNAL MEDICINE

## 2024-08-10 PROCEDURE — 83880 ASSAY OF NATRIURETIC PEPTIDE: CPT | Performed by: INTERNAL MEDICINE

## 2024-08-10 PROCEDURE — 99900031 HC PATIENT EDUCATION (STAT)

## 2024-08-10 PROCEDURE — 82550 ASSAY OF CK (CPK): CPT | Performed by: INTERNAL MEDICINE

## 2024-08-10 PROCEDURE — 99900035 HC TECH TIME PER 15 MIN (STAT)

## 2024-08-10 PROCEDURE — 84484 ASSAY OF TROPONIN QUANT: CPT | Performed by: INTERNAL MEDICINE

## 2024-08-10 PROCEDURE — 93010 ELECTROCARDIOGRAM REPORT: CPT | Mod: GW,,, | Performed by: INTERNAL MEDICINE

## 2024-08-10 PROCEDURE — 93005 ELECTROCARDIOGRAM TRACING: CPT

## 2024-08-10 PROCEDURE — 94640 AIRWAY INHALATION TREATMENT: CPT

## 2024-08-10 RX ORDER — DOPAMINE HYDROCHLORIDE 160 MG/100ML
0-20 INJECTION, SOLUTION INTRAVENOUS CONTINUOUS
Status: DISCONTINUED | OUTPATIENT
Start: 2024-08-10 | End: 2024-08-10

## 2024-08-10 RX ORDER — DOPAMINE HYDROCHLORIDE 160 MG/100ML
0-20 INJECTION, SOLUTION INTRAVENOUS CONTINUOUS
Status: DISCONTINUED | OUTPATIENT
Start: 2024-08-10 | End: 2024-08-11

## 2024-08-10 RX ORDER — FUROSEMIDE 10 MG/ML
40 INJECTION INTRAMUSCULAR; INTRAVENOUS ONCE
Status: COMPLETED | OUTPATIENT
Start: 2024-08-10 | End: 2024-08-10

## 2024-08-10 RX ADMIN — METHYLPREDNISOLONE SODIUM SUCCINATE 125 MG: 125 INJECTION, POWDER, FOR SOLUTION INTRAMUSCULAR; INTRAVENOUS at 12:08

## 2024-08-10 RX ADMIN — IPRATROPIUM BROMIDE AND ALBUTEROL SULFATE 3 ML: 2.5; .5 SOLUTION RESPIRATORY (INHALATION) at 07:08

## 2024-08-10 RX ADMIN — SODIUM CHLORIDE, PRESERVATIVE FREE 10 ML: 5 INJECTION INTRAVENOUS at 11:08

## 2024-08-10 RX ADMIN — AZITHROMYCIN MONOHYDRATE 500 MG: 500 INJECTION, POWDER, LYOPHILIZED, FOR SOLUTION INTRAVENOUS at 01:08

## 2024-08-10 RX ADMIN — MUPIROCIN: 20 OINTMENT TOPICAL at 08:08

## 2024-08-10 RX ADMIN — ENOXAPARIN SODIUM 40 MG: 40 INJECTION SUBCUTANEOUS at 04:08

## 2024-08-10 RX ADMIN — METHYLPREDNISOLONE SODIUM SUCCINATE 125 MG: 125 INJECTION, POWDER, FOR SOLUTION INTRAMUSCULAR; INTRAVENOUS at 05:08

## 2024-08-10 RX ADMIN — LEVOTHYROXINE SODIUM 50 MCG: 50 TABLET ORAL at 05:08

## 2024-08-10 RX ADMIN — ASPIRIN 81 MG: 81 TABLET, COATED ORAL at 08:08

## 2024-08-10 RX ADMIN — IPRATROPIUM BROMIDE AND ALBUTEROL SULFATE 3 ML: 2.5; .5 SOLUTION RESPIRATORY (INHALATION) at 01:08

## 2024-08-10 RX ADMIN — POLYETHYLENE GLYCOL (3350) 17 G: 17 POWDER, FOR SOLUTION ORAL at 08:08

## 2024-08-10 RX ADMIN — Medication 10 ML: at 05:08

## 2024-08-10 RX ADMIN — DOPAMINE HYDROCHLORIDE 5 MCG/KG/MIN: 160 INJECTION, SOLUTION INTRAVENOUS at 09:08

## 2024-08-10 RX ADMIN — Medication 10 ML: at 12:08

## 2024-08-10 RX ADMIN — LEUCINE, PHENYLALANINE, LYSINE, METHIONINE, ISOLEUCINE, VALINE, HISTIDINE, THREONINE, TRYPTOPHAN, ALANINE, GLYCINE, ARGININE, PROLINE, SERINE, TYROSINE, SODIUM ACETATE, DIBASIC POTASSIUM PHOSPHATE, MAGNESIUM CHLORIDE, SODIUM CHLORIDE, CALCIUM CHLORIDE, DEXTROSE
365; 280; 290; 200; 300; 290; 240; 210; 90; 1035; 515; 575; 340; 250; 20; 340; 261; 51; 59; 33; 20 INJECTION INTRAVENOUS at 04:08

## 2024-08-10 RX ADMIN — CEFTRIAXONE 1 G: 1 INJECTION, POWDER, FOR SOLUTION INTRAMUSCULAR; INTRAVENOUS at 09:08

## 2024-08-10 RX ADMIN — FUROSEMIDE 40 MG: 10 INJECTION, SOLUTION INTRAVENOUS at 11:08

## 2024-08-10 RX ADMIN — ATORVASTATIN CALCIUM 40 MG: 40 TABLET, FILM COATED ORAL at 08:08

## 2024-08-10 RX ADMIN — Medication 10 ML: at 11:08

## 2024-08-10 RX ADMIN — SODIUM CHLORIDE, PRESERVATIVE FREE 10 ML: 5 INJECTION INTRAVENOUS at 05:08

## 2024-08-10 RX ADMIN — METHYLPREDNISOLONE SODIUM SUCCINATE 125 MG: 125 INJECTION, POWDER, FOR SOLUTION INTRAMUSCULAR; INTRAVENOUS at 11:08

## 2024-08-10 RX ADMIN — MUPIROCIN: 20 OINTMENT TOPICAL at 09:08

## 2024-08-10 NOTE — NURSING
Pt asleep all night. Awake to answer questions and then back to sleep. BiPap maintained. O2 97%. No resp distress noted. PICC maintained and flushed as ordered. TPN infusing as ordered. [place] maintained. Continue to observe.

## 2024-08-10 NOTE — CARE UPDATE
08/10/24 1148   PRE-TX-O2   Device (Oxygen Therapy) nasal cannula with humidification   Flow (L/min) (Oxygen Therapy) 5   Oxygen Concentration (%) 40   SpO2 (!) 89 %   Pulse 94

## 2024-08-10 NOTE — ASSESSMENT & PLAN NOTE
Patient with Hypercapnic and Hypoxic Respiratory failure which is Acute on chronic.  she is on home oxygen at 4 LPM. Supplemental oxygen was provided and noted- Oxygen Concentration (%):  [50] 50    .   Signs/symptoms of respiratory failure include- tachypnea, increased work of breathing, respiratory distress, use of accessory muscles, wheezing, and lethargy. Contributing diagnoses includes - CHF, COPD, Interstitial lung disease, and Obesity Hypoventilation Labs and images were reviewed. Patient Has not had a recent ABG. Will treat underlying causes and adjust management of respiratory failure as follows- treat underlying heart failure and COPD exacerbation  8/10 ND continue current treatment with IV antibiotics IV steroids and nebs.  We will continue BiPAP as needed for respiratory support and O2 support

## 2024-08-10 NOTE — ASSESSMENT & PLAN NOTE
Monitor with IVF  8/10 patient currently on low-dose Levophed we will continue to wean as tolerates

## 2024-08-10 NOTE — PLAN OF CARE
Problem: Infection  Goal: Absence of Infection Signs and Symptoms  Outcome: Progressing     Problem: Adult Inpatient Plan of Care  Goal: Plan of Care Review  Outcome: Progressing  Goal: Patient-Specific Goal (Individualized)  Outcome: Progressing  Goal: Absence of Hospital-Acquired Illness or Injury  Outcome: Progressing  Goal: Optimal Comfort and Wellbeing  Outcome: Progressing  Goal: Readiness for Transition of Care  Outcome: Progressing     Problem: Bariatric Environmental Safety  Goal: Safety Maintained with Care  Outcome: Progressing     Problem: Skin Injury Risk Increased  Goal: Skin Health and Integrity  Outcome: Progressing     Problem: Diabetes Comorbidity  Goal: Blood Glucose Level Within Targeted Range  Outcome: Progressing     Problem: Noninvasive Ventilation Acute  Goal: Effective Unassisted Ventilation and Oxygenation  Outcome: Progressing     Problem: Fall Injury Risk  Goal: Absence of Fall and Fall-Related Injury  Outcome: Progressing

## 2024-08-10 NOTE — ASSESSMENT & PLAN NOTE
Patient's FSGs are controlled on current medication regimen.  Last A1c reviewed-   Lab Results   Component Value Date    HGBA1C 5.3 07/28/2024     Most recent fingerstick glucose reviewed-   Recent Labs   Lab 08/09/24  1123 08/09/24  1558 08/09/24  2047   POCTGLUCOSE 154* 174* 189*       Current correctional scale  stable  Maintain anti-hyperglycemic dose as follows-   Antihyperglycemics (From admission, onward)    Start     Stop Route Frequency Ordered    08/08/24 1247  insulin aspart U-100 pen 0-5 Units         -- SubQ Before meals & nightly PRN 08/08/24 1147        Hold Oral hypoglycemics while patient is in the hospital.

## 2024-08-10 NOTE — NURSING
Pt is more awake this afternoon, remains in A Fib (HR 80's- 120's) and on low dose Dopamine 2.5mcg/kg/min. B/P 90/68. Notified Dr Gleason and gave ok to turn off dopamine and continue to monitor HR.

## 2024-08-10 NOTE — PLAN OF CARE
Pt remains on bipap, attempted to wean pt to 5L NC but pt O2 decreased into upper 80's so bipap put back on at that time. Weaned from levophed drip. Pt with bradycardia, HR dropped down to 35 so dopamine drip started, then patient with A Fib and 1x episode of V Tach. Dopamine weaned off currently. Pt remains in A Fib, but HR and B/P stable off of vasopressors. Patient is more awake and trying to communicate more this afternoon.

## 2024-08-10 NOTE — CONSULTS
[facility] - Intensive Care  Cardiology  Consult Note    Patient Name: Carmen Tan  Patient : 1950  MRN: [id number]  Admission Date: 2024  Hospital Length of Stay: 3 days  Code Status: Full Code   Attending Provider: Oscar Ge Jr., MD   Consulting Provider: Spencer Rascon MD  Primary Care Physician: Lucian Barry MD  Principal Problem:Acute on chronic respiratory failure with hypoxia and hypercapnia      Patient information was obtained from patient and ER records.     Inpatient consult to Cardiology  Consult performed by: Spencer Rascon MD  Consult ordered by: Veda Gleason MD          Chief Complaint:  HPI:                 Past Medical History:   Diagnosis Date    Abdominal hernia     Bacteremia 2023    CHF (congestive heart failure)     COPD (chronic obstructive pulmonary disease)     Diabetes mellitus, type 2 2022    GERD (gastroesophageal reflux disease)     History of home oxygen therapy     Hypertension     Hypertensive emergency 2023    Migraine headache     On home O2     Pulmonary embolism 2017    Small bowel obstruction     Thyroid disease        Past Surgical History:   Procedure Laterality Date    COLONOSCOPY      HYSTERECTOMY      INNER EAR SURGERY      UPPER GASTROINTESTINAL ENDOSCOPY         Review of patient's allergies indicates:   Allergen Reactions    Pcn [penicillins] Swelling       No current facility-administered medications on file prior to encounter.     Current Outpatient Medications on File Prior to Encounter   Medication Sig    acetaminophen (TYLENOL) 325 MG tablet Take 2 tablets (650 mg total) by mouth every 8 (eight) hours as needed for Pain or Temperature greater than (100, headache).    albuterol-ipratropium (DUO-NEB) 2.5 mg-0.5 mg/3 mL nebulizer solution Take 3 mLs by nebulization every 6 (six) hours while awake. Rescue    apixaban (ELIQUIS) 5 mg Tab Take 1 tablet (5 mg total) by mouth 2 (two) times daily.    aspirin  (ECOTRIN) 81 MG EC tablet Take 1 tablet (81 mg total) by mouth once daily.    budesonide (PULMICORT) 0.5 mg/2 mL nebulizer solution Take 2 mLs (0.5 mg total) by nebulization every 12 (twelve) hours. Controller    busPIRone (BUSPAR) 5 MG Tab Take 1 tablet (5 mg total) by mouth 2 (two) times daily.    carvediloL (COREG) 3.125 MG tablet Take 1 tablet (3.125 mg total) by mouth 2 (two) times daily.    furosemide (LASIX) 40 MG tablet Take 1 tablet (40 mg total) by mouth 2 (two) times a day.    gabapentin (NEURONTIN) 100 MG capsule Take 1 capsule (100 mg total) by mouth 3 (three) times daily.    insulin aspart U-100 (NOVOLOG) 100 unit/mL (3 mL) InPn pen Inject 0-5 Units into the skin before meals and at bedtime as needed (Hyperglycemia).    levothyroxine (SYNTHROID) 50 MCG tablet Take 1 tablet (50 mcg total) by mouth before breakfast.    pantoprazole (PROTONIX) 20 MG tablet Take 40 mg by mouth once daily.    valsartan (DIOVAN) 160 MG tablet Take 1 tablet (160 mg total) by mouth once daily.    vitamin D 1000 units Tab Take 185 mg by mouth once daily.    LIPITOR 40 mg tablet Take 1 tablet (40 mg total) by mouth once daily.     Family History    None       Tobacco Use    Smoking status: Former    Smokeless tobacco: Never   Substance and Sexual Activity    Alcohol use: No    Drug use: No    Sexual activity: Not on file     Review of Systems   Unable to perform ROS: Acuity of condition     Objective:     Vital Signs (Most Recent):  Temp: 97.3 °F (36.3 °C) (08/10/24 1101)  Pulse: 82 (08/10/24 1101)  Resp: (!) 22 (08/10/24 1101)  BP: (!) 145/75 (08/10/24 1101)  SpO2: (!) 93 % (08/10/24 1101) Vital Signs (24h Range):  Temp:  [97 °F (36.1 °C)-97.4 °F (36.3 °C)] 97.3 °F (36.3 °C)  Pulse:  [39-83] 82  Resp:  [13-31] 22  SpO2:  [84 %-99 %] 93 %  BP: ()/(48-86) 145/75     Weight: 119.7 kg (263 lb 14.3 oz)  Body mass index is 45.3 kg/m².    SpO2: (!) 93 %         Intake/Output Summary (Last 24 hours) at 8/10/2024 1122  Last data  "filed at 8/10/2024 0942  Gross per 24 hour   Intake 4565.76 ml   Output 1620 ml   Net 2945.76 ml       Lines/Drains/Airways       Peripherally Inserted Central Catheter Line  Duration             PICC Triple Lumen 08/09/24 1042 right basilic 1 day              Drain  Duration                  Urethral Catheter 08/07/24 2248 16 Fr. 2 days              Peripheral Intravenous Line  Duration                  Peripheral IV - Single Lumen 08/07/24 2037 20 G Posterior;Right Hand 2 days                    Physical Exam  Constitutional:       Appearance: She is ill-appearing.   HENT:      Head: Normocephalic and atraumatic.   Eyes:      Extraocular Movements: Extraocular movements intact.      Conjunctiva/sclera: Conjunctivae normal.      Pupils: Pupils are equal, round, and reactive to light.   Cardiovascular:      Rate and Rhythm: Tachycardia present.      Pulses: Normal pulses.      Heart sounds: Murmur heard.   Pulmonary:      Breath sounds: Rhonchi present.   Abdominal:      General: Abdomen is flat.   Musculoskeletal:         General: Normal range of motion.      Cervical back: Normal range of motion and neck supple.   Skin:     Capillary Refill: Capillary refill takes 2 to 3 seconds.   Neurological:      General: No focal deficit present.      Mental Status: She is alert and oriented to person, place, and time. Mental status is at baseline.   Psychiatric:         Mood and Affect: Mood normal.         Significant Labs:    No results for input(s): "TROPONINI" in the last 2160 hours.   Recent Labs   Lab Result Units 05/26/24  1457 07/28/24  2256 08/10/24  1031   Troponin I High Sensitivity pg/mL 19.2 17.9 56.0     Recent Lab Results  (Last 5 results in the past 72 hours)        08/10/24  1107   08/10/24  1031   08/10/24  0335   08/09/24  2047   08/09/24  1558        Albumin     2.1           ALP     86           ALT     12           Anion Gap     6           Appearance, UA               AST     12           Bacteria, UA  "              Baso #     0.02           Basophil %     0.1           Bilirubin (UA)               BILIRUBIN TOTAL     0.2  Comment: For infants and newborns, interpretation of results should be based  on gestational age, weight and in agreement with clinical  observations.    Premature Infant recommended reference ranges:  Up to 24 hours.............<8.0 mg/dL  Up to 48 hours............<12.0 mg/dL  3-5 days..................<15.0 mg/dL  6-29 days.................<15.0 mg/dL    For patients on Eltrombopag therapy, use of Dimension [place] TBIL is   not   recommended.             BUN     76           Calcium     9.2           Chloride     104           CO2     34           Color, UA               CPK   41             Creatinine     2.2           Differential Method     Automated           eGFR     23.1           Eos #     0.0           Eos %     0.0           Glucose     188           Glucose, UA               Gran # (ANC)     18.2           Gran %     93.9           Hematocrit     39.4           Hemoglobin     11.8           Hyaline Casts, UA               Immature Grans (Abs)     0.11  Comment: Mild elevation in immature granulocytes is non specific and   can be seen in a variety of conditions including stress response,   acute inflammation, trauma and pregnancy. Correlation with other   laboratory and clinical findings is essential.             Immature Granulocytes     0.6           Ketones, UA               Lactic Acid Level               Leukocyte Esterase, UA               Lymph #     0.5           Lymph %     2.3           Magnesium      2.2           MCH     27.8           MCHC     29.9           MCV     93           Microscopic Comment               Mono #     0.6           Mono %     3.1           MPV     9.9           NITRITE UA               nRBC     0           NT-proBNP   20857             Blood, UA               pH, UA               Platelet Count     281           POCT Glucose 179       189   174        Potassium     4.6           PROTEIN TOTAL     6.7           Protein, UA               RBC     4.25           RBC, UA               RDW     17.3           Sodium     144           Spec Grav UA               Specimen UA               Squam Epithel, UA               Triglycerides               Troponin I High Sensitivity   56.0             TSH     0.632  Comment: ATTENTION: The use of Biotin Supplements may interfere with this   assay.             Urine Culture               UROBILINOGEN UA               WBC, UA               WBC     19.36                                  Significant Imaging:  Imaging Results              CT Cervical Spine Without Contrast (Final result)  Result time 08/08/24 10:33:32      Final result by Faustino Pelletier MD (08/08/24 10:33:32)                   Impression:      Diffuse cervical spondylotic change without evidence for acute osseous abnormality.    Preliminary report provided by [facility] soon after completion of this study.      Electronically signed by: Faustino Pelletier MD  Date:    08/08/2024  Time:    10:33               Narrative:    EXAMINATION:  CT CERVICAL SPINE WITHOUT CONTRAST    CLINICAL HISTORY:  Neck pain, acute, prior cervical surgery;Scoliosis;    TECHNIQUE:  Axial CT images were obtained through the cervical spine without contrast.  Multiplanar reconstructions evaluated.  Iterative reconstruction technique was used.  CT/Cardiac nuclear examinations in the past 12 months: 3    COMPARISON:  No priors available    FINDINGS:  Hypertrophic change present at the atlantoaxial joint.  Fusion of the left-sided facets at C2-C3.  Degenerative related vertebral body elongation at C4, C5, and C6.  Disc space narrowing at C5-C6 and C6-C7.  Diffuse marginal osteophytes are present.  Posterior disc osteophyte at C3-C4 narrowing the bony spinal canal to an AP diameter of 7.5 mm.  Posterior disc osteophyte at C5-C6 narrowing the bony spinal canal to an AP diameter of 6.3 mm.  No acute  fracture or subluxation is detected.  Occipital condyles are intact.  The dens is intact.                        Wet Read by Harrison Ortiz Jr., MD (08/07/24 22:21:21, [facility], Emergency Medicine)    No acute abnormality                                     CT Head Without Contrast (Final result)  Result time 08/08/24 10:26:36      Final result by Faustino Pelletier MD (08/08/24 10:26:36)                   Impression:      Chronic intracranial findings with no acute intracranial process detected.    Preliminary report provided by [facility] soon after completion of this study.      Electronically signed by: Faustino Pelletier MD  Date:    08/08/2024  Time:    10:26               Narrative:    EXAMINATION:  CT HEAD WITHOUT CONTRAST    CLINICAL HISTORY:  Altered level of consciousness    TECHNIQUE:  Axial CT images were obtained from the skull base to the vertex without contrast. Iterative reconstruction technique was used.  CT/Cardiac nuclear examinations in the past 12 months: 3    COMPARISON:  CT head 09/29/2023    FINDINGS:  No ventricular or basal cistern effacement.  No evidence of acute intracranial hemorrhage, midline shift, mass, or mass effect.  No detected extra-axial fluid collections.  Gray-white differentiation is overall maintained.  Mild nonspecific periventricular white matter change most likely related to sequela of chronic small vessel ischemia.  Imaged paranasal sinuses and mastoid air cells are clear.  Calvarium is intact.                        Wet Read by Harrison Ortiz Jr., MD (08/07/24 22:05:52, [facility], Emergency Medicine)    No acute abnormality                                     X-Ray Chest 1 View (Final result)  Result time 08/08/24 00:06:53      Final result by Abran Pelletier MD (08/08/24 00:06:53)                   Impression:      As above.      Electronically signed by: Abran Pelletier MD  Date:    08/08/2024  Time:    00:06                Narrative:    EXAMINATION:  XR CHEST 1 VIEW    CLINICAL HISTORY:  Shortness of breath    COMPARISON:  07/28/2024    FINDINGS:  Cardiac silhouette is enlarged, unchanged.  Heterogeneous opacity at the right lower lung zone could represent asymmetric edema or pneumonia.  No obvious pleural effusion.                        Wet Read by Harrison Ortiz Jr., MD (08/07/24 21:40:26, [facility], Emergency Medicine)    Perihilar congestion consistent of CHF exacerbation.  No noted consolidation or infiltrate                                          CCS Functional Classification of Angina:  No symptoms. No angina.    NYHA Functional Classification of Heart Failure: NYHA IV    ECHO: 7/29/24:    Left Ventricle: The left ventricle is normal in size. Mildly increased wall thickness. There is normal systolic function with a visually estimated ejection fraction of 55 - 60%. Grade I diastolic dysfunction.    Right Ventricle: Normal right ventricular cavity size. Systolic function is normal.    Left Atrium: Left atrium is severely dilated. Atrial septum is bulging to the right.    Right Atrium: Right atrium is moderately dilated.    Aortic Valve: The aortic valve is a trileaflet valve. Mildly restricted motion. There is mild stenosis. Aortic valve area by VTI is 1.84 cm². Aortic valve peak velocity is 2.18 m/s. Mean gradient is 10 mmHg. The dimensionless index is 0.58. There is mild aortic regurgitation.    Mitral Valve: There is mild regurgitation.    Tricuspid Valve: There is mild regurgitation.    Pulmonic Valve: There is mild to moderate regurgitation.    No pericardial effusion.        Lab Results   Component Value Date    CHOL 160 09/09/2023    CHOL 151 06/13/2017     Lab Results   Component Value Date    HDL 52 09/09/2023    HDL 42 06/13/2017     Lab Results   Component Value Date    LDLCALC 96.2 09/09/2023    LDLCALC 85.0 06/13/2017     Lab Results   Component Value Date    TRIG 49 08/08/2024    TRIG 59  09/09/2023    TRIG 120 06/13/2017     Lab Results   Component Value Date    CHOLHDL 32.5 09/09/2023    CHOLHDL 27.8 06/13/2017       MEDICATIONS:     Current Facility-Administered Medications:     acetaminophen tablet 650 mg, 650 mg, Oral, Q8H PRN, Oscar Ge Jr., MD    albuterol-ipratropium 2.5 mg-0.5 mg/3 mL nebulizer solution 3 mL, 3 mL, Nebulization, Q6H WAKE, Oscar Ge Jr., MD, 3 mL at 08/10/24 0719    Amino acid 5% - dextrose 20% (CLINIMIX-E) solution  (1L provides 50gm AA, 200gm CHO (680 kcal/L dextrose), Na 35, K 30, Mg 5, Ca 4.5, Acetate 80, Cl 39, Phos 15) (880 total kcal/L), , Intravenous, Continuous, Oscar Ge Jr., MD, Last Rate: 40 mL/hr at 08/10/24 0942, Rate Verify at 08/10/24 0942    Amino acid 5% - dextrose 20% (CLINIMIX-E) solution  (1L provides 50gm AA, 200gm CHO (680 kcal/L dextrose), Na 35, K 30, Mg 5, Ca 4.5, Acetate 80, Cl 39, Phos 15) (880 total kcal/L), , Intravenous, Continuous, Veda Gleason MD    aspirin EC tablet 81 mg, 81 mg, Oral, Daily, Oscar Ge Jr., MD, 81 mg at 08/10/24 0834    atorvastatin tablet 40 mg, 40 mg, Oral, Daily, Oscar Ge Jr., MD, 40 mg at 08/10/24 0834    azithromycin (ZITHROMAX) 500 mg in D5W 250 mL IVPB (Vial-Mate), 500 mg, Intravenous, Q24H, Oscar Ge Jr., MD, Stopped at 08/09/24 1519    cefTRIAXone (Rocephin) 1 g in D5W 100 mL IVPB (MB+), 1 g, Intravenous, Q24H, Oscar Ge Jr., MD, Stopped at 08/09/24 2136    dextrose 10% bolus 125 mL 125 mL, 12.5 g, Intravenous, PRN, Oscar Ge Jr., MD    dextrose 10% bolus 250 mL 250 mL, 25 g, Intravenous, PRN, Oscar Ge Jr., MD    DOPamine 400 mg in dextrose 5 % 250 mL infusion (premix), 0-20 mcg/kg/min, Intravenous, Continuous, Veda Gleason MD, Last Rate: 22.4 mL/hr at 08/10/24 0942, 5 mcg/kg/min at 08/10/24 0942    enoxaparin injection 40 mg, 40 mg, Subcutaneous, Q24H (prophylaxis, 1700), Oscar Ge Jr., MD, 40 mg at 08/09/24 1703     [START ON 8/12/2024] fat emulsion 20% infusion 250 mL, 250 mL, Intravenous, Q48H, Oscar Ge Jr., MD    glucagon (human recombinant) injection 1 mg, 1 mg, Intramuscular, PRN, Oscar Ge Jr., MD    glucose chewable tablet 16 g, 16 g, Oral, PRN, Oscar Ge Jr., MD    glucose chewable tablet 24 g, 24 g, Oral, PRN, Oscar Ge Jr., MD    insulin aspart U-100 pen 0-5 Units, 0-5 Units, Subcutaneous, QID (AC + HS) PRN, Oscar Ge Jr., MD    levothyroxine tablet 50 mcg, 50 mcg, Oral, Before breakfast, Oscar Ge Jr., MD, 50 mcg at 08/10/24 0546    melatonin tablet 6 mg, 6 mg, Oral, Nightly PRN, Oscar Ge Jr., MD    methylPREDNISolone sodium succinate injection 125 mg, 125 mg, Intravenous, Q6H, Oscar Ge Jr., MD, 125 mg at 08/10/24 0546    morphine injection 4 mg, 4 mg, Intravenous, Q4H PRN, Oscar Ge Jr., MD    mupirocin 2 % ointment, , Nasal, BID, Oscar Ge Jr., MD, Given at 08/10/24 0834    NORepinephrine 8 mg in dextrose 5% 250 mL infusion, 0-3 mcg/kg/min, Intravenous, Continuous, Oscar Ge Jr., MD, Stopped at 08/10/24 0850    ondansetron injection 4 mg, 4 mg, Intravenous, Q8H PRN, Oscar eG Jr., MD    polyethylene glycol packet 17 g, 17 g, Oral, Daily, Oscar Ge Jr., MD, 17 g at 08/10/24 0834    sodium chloride 0.9% flush 10 mL, 10 mL, Intravenous, PRN, Oscar Ge Jr., MD    Flushing PICC/Midline Protocol, , , Until Discontinued **AND** sodium chloride 0.9% flush 10 mL, 10 mL, Intravenous, Q6H, 10 mL at 08/10/24 0546 **AND** sodium chloride 0.9% flush 10 mL, 10 mL, Intravenous, PRN, Oscar Ge Jr., MD    Flushing PICC/Midline Protocol, , , Until Discontinued **AND** sodium chloride 0.9% flush 10 mL, 10 mL, Intravenous, Q6H **AND** sodium chloride 0.9% flush 10 mL, 10 mL, Intravenous, PRN, Oscar Ge Jr., MD      Assessment and Plan:    Bradycardia/Tachyarrhythmia: Patient presented with respiratory  distress and she experienced episodes of bradycardia followed by paroxysmal atrial fibrillation and ventricular tachycardia. Her blood pressure has remained stable. Will start low dose dopamine to maintain heart rate and will use as needed.  Acute Respiratory Failure: Patient currently on BiPap and antibiotic therapy. Continue supportive care  PAF and h/o DVT: Patient on enoxaparin  HFpEF: Likely a component of her respiratory failure as well. BNP is markedly elevated, but she does not appear to be significantly volume overloaded. Will give single diuretic.  COPD: as treated above     Active Diagnoses:    Diagnosis Date Noted POA    PRINCIPAL PROBLEM:  Acute on chronic respiratory failure with hypoxia and hypercapnia [J96.21, J96.22] 02/01/2022 Yes    Hypothyroid [E03.9] 08/10/2024 Yes    Acute renal failure with tubular necrosis [N17.0] 08/09/2024 Unknown    Hypotension [I95.9] 08/08/2024 Unknown    Chronic diastolic congestive heart failure [I50.32] 09/29/2023 Yes     Chronic    COPD exacerbation [J44.1] 02/16/2022 Yes    Diabetes mellitus, type 2 [E11.9] 02/16/2022 Yes    Weakness [R53.1] 02/05/2022 Yes    Severe obesity (BMI >= 40) [E66.01] 02/04/2022 Yes    Paroxysmal A-fib [I48.0] 06/08/2017 Yes     Chronic      Problems Resolved During this Admission:       VTE Risk Mitigation (From admission, onward)           Ordered     enoxaparin injection 40 mg  Every 24 hours         08/08/24 1305     IP VTE HIGH RISK PATIENT  Once         08/08/24 0118     Place sequential compression device  Until discontinued         08/08/24 0118                    Thank you for your consult.          Spencer Rascon MD  Cardiology  [place] - Intensive Care  08/10/2024

## 2024-08-10 NOTE — CARE UPDATE
08/10/24 1150   PRE-TX-O2   Device (Oxygen Therapy) (S)  BIPAP   Oxygen Concentration (%) (S)  50   SpO2 (!) 90 %   Pulse 85   Resp (!) 31     Pt placed back on Bipap for desaturation to 89% on NC 5lpm.

## 2024-08-10 NOTE — SUBJECTIVE & OBJECTIVE
Past Medical History:   Diagnosis Date    Abdominal hernia     Bacteremia 04/05/2023    CHF (congestive heart failure)     COPD (chronic obstructive pulmonary disease)     Diabetes mellitus, type 2 02/16/2022    GERD (gastroesophageal reflux disease)     History of home oxygen therapy     Hypertension     Hypertensive emergency 03/30/2023    Migraine headache     On home O2     Pulmonary embolism 06/01/2017    Small bowel obstruction     Thyroid disease        Past Surgical History:   Procedure Laterality Date    COLONOSCOPY      HYSTERECTOMY      INNER EAR SURGERY      UPPER GASTROINTESTINAL ENDOSCOPY         Review of patient's allergies indicates:   Allergen Reactions    Pcn [penicillins] Swelling       No current facility-administered medications on file prior to encounter.     Current Outpatient Medications on File Prior to Encounter   Medication Sig    acetaminophen (TYLENOL) 325 MG tablet Take 2 tablets (650 mg total) by mouth every 8 (eight) hours as needed for Pain or Temperature greater than (100, headache).    albuterol-ipratropium (DUO-NEB) 2.5 mg-0.5 mg/3 mL nebulizer solution Take 3 mLs by nebulization every 6 (six) hours while awake. Rescue    apixaban (ELIQUIS) 5 mg Tab Take 1 tablet (5 mg total) by mouth 2 (two) times daily.    aspirin (ECOTRIN) 81 MG EC tablet Take 1 tablet (81 mg total) by mouth once daily.    budesonide (PULMICORT) 0.5 mg/2 mL nebulizer solution Take 2 mLs (0.5 mg total) by nebulization every 12 (twelve) hours. Controller    busPIRone (BUSPAR) 5 MG Tab Take 1 tablet (5 mg total) by mouth 2 (two) times daily.    carvediloL (COREG) 3.125 MG tablet Take 1 tablet (3.125 mg total) by mouth 2 (two) times daily.    furosemide (LASIX) 40 MG tablet Take 1 tablet (40 mg total) by mouth 2 (two) times a day.    gabapentin (NEURONTIN) 100 MG capsule Take 1 capsule (100 mg total) by mouth 3 (three) times daily.    insulin aspart U-100 (NOVOLOG) 100 unit/mL (3 mL) InPn pen Inject 0-5 Units into  the skin before meals and at bedtime as needed (Hyperglycemia).    levothyroxine (SYNTHROID) 50 MCG tablet Take 1 tablet (50 mcg total) by mouth before breakfast.    pantoprazole (PROTONIX) 20 MG tablet Take 40 mg by mouth once daily.    valsartan (DIOVAN) 160 MG tablet Take 1 tablet (160 mg total) by mouth once daily.    vitamin D 1000 units Tab Take 185 mg by mouth once daily.    LIPITOR 40 mg tablet Take 1 tablet (40 mg total) by mouth once daily.     Family History    None       Tobacco Use    Smoking status: Former    Smokeless tobacco: Never   Substance and Sexual Activity    Alcohol use: No    Drug use: No    Sexual activity: Not on file     Review of Systems   Unable to perform ROS: Acuity of condition     Objective:     Vital Signs (Most Recent):  Temp: 97 °F (36.1 °C) (08/10/24 0701)  Pulse: (!) 54 (08/10/24 0700)  Resp: (!) 27 (08/10/24 0700)  BP: 129/64 (08/10/24 0700)  SpO2: 95 % (08/10/24 0700) Vital Signs (24h Range):  Temp:  [97 °F (36.1 °C)-97.4 °F (36.3 °C)] 97 °F (36.1 °C)  Pulse:  [46-80] 54  Resp:  [13-31] 27  SpO2:  [91 %-99 %] 95 %  BP: ()/(47-73) 129/64     Weight: 119.7 kg (263 lb 14.3 oz)  Body mass index is 45.3 kg/m².     Physical Exam  Constitutional:       Appearance: She is obese. She is ill-appearing.      Comments: Lethargic but arousable   HENT:      Nose: Nose normal.      Mouth/Throat:      Mouth: Mucous membranes are moist.   Eyes:      Extraocular Movements: Extraocular movements intact.      Pupils: Pupils are equal, round, and reactive to light.   Cardiovascular:      Rate and Rhythm: Regular rhythm. Bradycardia present.   Pulmonary:      Effort: Respiratory distress present.      Breath sounds: Rhonchi present. No rales.      Comments: Decreased BS at bases - bipap  Abdominal:      General: Bowel sounds are normal. There is no distension.      Palpations: Abdomen is soft.      Tenderness: There is no abdominal tenderness.   Genitourinary:     Comments: Hardwick in  place  Musculoskeletal:      Cervical back: Normal range of motion.      Right lower leg: No edema.      Left lower leg: No edema.      Comments: Edema improving   Skin:     General: Skin is warm and dry.      Capillary Refill: Capillary refill takes 2 to 3 seconds.   Neurological:      General: No focal deficit present.      Motor: Weakness present.              CRANIAL NERVES     CN III, IV, VI   Pupils are equal, round, and reactive to light.       Significant Labs: All pertinent labs within the past 24 hours have been reviewed.    Significant Imaging: I have reviewed all pertinent imaging results/findings within the past 24 hours.

## 2024-08-10 NOTE — PROGRESS NOTES
"[facility]  Progress Note    Patient Name: Carmen Tan  MRN: [id number]  Patient Class: IP- Inpatient   Admission Date: 8/7/2024  Length of Stay: 3 days  Attending Physician: Oscar Ge Jr., MD  Primary Care Provider: Lucian Barry MD        Subjective:     Principal Problem:Acute on chronic respiratory failure with hypoxia and hypercapnia        HPI:  Chief Complaint   Patient presents with    Shortness of Breath       Pt to ED via EMS from residence - Room air "in the 70's with tachypnea and hypotension." Pt fell this morning and is also complaining of neck pain.       74-year-old female past medical history CHF morbid obesity presents the ED via EMS with reports of shortness of breath and possible fall.  Patient was recently discharged from the hospital 6 days ago with week-long stay for heart failure.  Family reports she has been taking all medications as prescribed.  Patient reports that she feels short of breath and noted neck pain      updated HPI:  Patient nonverbal on BiPAP.  Above history reviewed.  Patient presents with shortness of breath with possible fall.  Recently discharge from heart failure now with acute renal failure and hypotension concerning for over-diuresis.    Overview/Hospital Course:  8/9/24:  remains bipap dependent.  Responding to gently IVF and steroids.   8/10/24 ND patient is sleeping but will awaken with arousal; patient reports being tired and will follow commands and squeeze your hand asked.  Still on low-dose norepi - will wean as tolerated.  Patient has some bradycardia, not on any rate-controlling medicine will continue to monitor    Past Medical History:   Diagnosis Date    Abdominal hernia     Bacteremia 04/05/2023    CHF (congestive heart failure)     COPD (chronic obstructive pulmonary disease)     Diabetes mellitus, type 2 02/16/2022    GERD (gastroesophageal reflux disease)     History of home oxygen therapy     Hypertension "     Hypertensive emergency 03/30/2023    Migraine headache     On home O2     Pulmonary embolism 06/01/2017    Small bowel obstruction     Thyroid disease        Past Surgical History:   Procedure Laterality Date    COLONOSCOPY      HYSTERECTOMY      INNER EAR SURGERY      UPPER GASTROINTESTINAL ENDOSCOPY         Review of patient's allergies indicates:   Allergen Reactions    Pcn [penicillins] Swelling       No current facility-administered medications on file prior to encounter.     Current Outpatient Medications on File Prior to Encounter   Medication Sig    acetaminophen (TYLENOL) 325 MG tablet Take 2 tablets (650 mg total) by mouth every 8 (eight) hours as needed for Pain or Temperature greater than (100, headache).    albuterol-ipratropium (DUO-NEB) 2.5 mg-0.5 mg/3 mL nebulizer solution Take 3 mLs by nebulization every 6 (six) hours while awake. Rescue    apixaban (ELIQUIS) 5 mg Tab Take 1 tablet (5 mg total) by mouth 2 (two) times daily.    aspirin (ECOTRIN) 81 MG EC tablet Take 1 tablet (81 mg total) by mouth once daily.    budesonide (PULMICORT) 0.5 mg/2 mL nebulizer solution Take 2 mLs (0.5 mg total) by nebulization every 12 (twelve) hours. Controller    busPIRone (BUSPAR) 5 MG Tab Take 1 tablet (5 mg total) by mouth 2 (two) times daily.    carvediloL (COREG) 3.125 MG tablet Take 1 tablet (3.125 mg total) by mouth 2 (two) times daily.    furosemide (LASIX) 40 MG tablet Take 1 tablet (40 mg total) by mouth 2 (two) times a day.    gabapentin (NEURONTIN) 100 MG capsule Take 1 capsule (100 mg total) by mouth 3 (three) times daily.    insulin aspart U-100 (NOVOLOG) 100 unit/mL (3 mL) InPn pen Inject 0-5 Units into the skin before meals and at bedtime as needed (Hyperglycemia).    levothyroxine (SYNTHROID) 50 MCG tablet Take 1 tablet (50 mcg total) by mouth before breakfast.    pantoprazole (PROTONIX) 20 MG tablet Take 40 mg by mouth once daily.    valsartan (DIOVAN) 160 MG tablet Take 1 tablet (160 mg total) by  mouth once daily.    vitamin D 1000 units Tab Take 185 mg by mouth once daily.    LIPITOR 40 mg tablet Take 1 tablet (40 mg total) by mouth once daily.     Family History    None       Tobacco Use    Smoking status: Former    Smokeless tobacco: Never   Substance and Sexual Activity    Alcohol use: No    Drug use: No    Sexual activity: Not on file     Review of Systems   Unable to perform ROS: Acuity of condition     Objective:     Vital Signs (Most Recent):  Temp: 97 °F (36.1 °C) (08/10/24 0701)  Pulse: (!) 54 (08/10/24 0700)  Resp: (!) 27 (08/10/24 0700)  BP: 129/64 (08/10/24 0700)  SpO2: 95 % (08/10/24 0700) Vital Signs (24h Range):  Temp:  [97 °F (36.1 °C)-97.4 °F (36.3 °C)] 97 °F (36.1 °C)  Pulse:  [46-80] 54  Resp:  [13-31] 27  SpO2:  [91 %-99 %] 95 %  BP: ()/(47-73) 129/64     Weight: 119.7 kg (263 lb 14.3 oz)  Body mass index is 45.3 kg/m².     Physical Exam  Constitutional:       Appearance: She is obese. She is ill-appearing.      Comments: Lethargic but arousable   HENT:      Nose: Nose normal.      Mouth/Throat:      Mouth: Mucous membranes are moist.   Eyes:      Extraocular Movements: Extraocular movements intact.      Pupils: Pupils are equal, round, and reactive to light.   Cardiovascular:      Rate and Rhythm: Regular rhythm. Bradycardia present.   Pulmonary:      Effort: Respiratory distress present.      Breath sounds: Rhonchi present. No rales.      Comments: Decreased BS at bases - bipap  Abdominal:      General: Bowel sounds are normal. There is no distension.      Palpations: Abdomen is soft.      Tenderness: There is no abdominal tenderness.   Genitourinary:     Comments: Hardwick in place  Musculoskeletal:      Cervical back: Normal range of motion.      Right lower leg: No edema.      Left lower leg: No edema.      Comments: Edema improving   Skin:     General: Skin is warm and dry.      Capillary Refill: Capillary refill takes 2 to 3 seconds.   Neurological:      General: No focal  deficit present.      Motor: Weakness present.              CRANIAL NERVES     CN III, IV, VI   Pupils are equal, round, and reactive to light.       Significant Labs: All pertinent labs within the past 24 hours have been reviewed.    Significant Imaging: I have reviewed all pertinent imaging results/findings within the past 24 hours.    Assessment/Plan:      * Acute on chronic respiratory failure with hypoxia and hypercapnia  Patient with Hypercapnic and Hypoxic Respiratory failure which is Acute on chronic.  she is on home oxygen at 4 LPM. Supplemental oxygen was provided and noted- Oxygen Concentration (%):  [50] 50    .   Signs/symptoms of respiratory failure include- tachypnea, increased work of breathing, respiratory distress, use of accessory muscles, wheezing, and lethargy. Contributing diagnoses includes - CHF, COPD, Interstitial lung disease, and Obesity Hypoventilation Labs and images were reviewed. Patient Has not had a recent ABG. Will treat underlying causes and adjust management of respiratory failure as follows- treat underlying heart failure and COPD exacerbation  8/10 ND continue current treatment with IV antibiotics IV steroids and nebs.  We will continue BiPAP as needed for respiratory support and O2 support    Hypothyroid  On Synthroid, check TSH      Acute renal failure with tubular necrosis  BHAVESH is likely due to pre-renal azotemia due to intravascular volume depletion. Baseline creatinine is unknown. Most recent creatinine and eGFR are listed below.  Recent Labs     08/07/24  2039 08/09/24  0357 08/10/24  0335   CREATININE 3.1* 2.6* 2.2*   EGFRNORACEVR 15.3* 18.9* 23.1*     8/10 creatinine slowly improving; patient getting fluids via her TPN for nutritional support   Plan  - BHAVESH is improving  - Avoid nephrotoxins and renally dose meds for GFR listed above  - Monitor urine output, serial BMP, and adjust therapy as needed  -     Hypotension  Monitor with IVF  8/10 patient currently on low-dose  "Levophed we will continue to wean as tolerates      Chronic diastolic congestive heart failure  Patient is identified as having Diastolic (HFpEF) heart failure that is Chronic. CHF is currently controlled. Latest ECHO performed and demonstrates- Results for orders placed during the hospital encounter of 07/28/24    Echo Saline Bubble? No; Ultrasound enhancing contrast? No    Interpretation Summary    Left Ventricle: The left ventricle is normal in size. Mildly increased wall thickness. There is normal systolic function with a visually estimated ejection fraction of 55 - 60%. Grade I diastolic dysfunction.    Right Ventricle: Normal right ventricular cavity size. Systolic function is normal.    Left Atrium: Left atrium is severely dilated. Atrial septum is bulging to the right.    Right Atrium: Right atrium is moderately dilated.    Aortic Valve: The aortic valve is a trileaflet valve. Mildly restricted motion. There is mild stenosis. Aortic valve area by VTI is 1.84 cm². Aortic valve peak velocity is 2.18 m/s. Mean gradient is 10 mmHg. The dimensionless index is 0.58. There is mild aortic regurgitation.    Mitral Valve: There is mild regurgitation.    Tricuspid Valve: There is mild regurgitation.    Pulmonic Valve: There is mild to moderate regurgitation.    No pericardial effusion.  . Continue Beta Blocker, ACE/ARB, and Furosemide and monitor clinical status closely. Monitor on telemetry. Patient is on CHF pathway.  Monitor strict Is&Os and daily weights.  Place on fluid restriction of 1 L. Cardiology has been consulted. Continue to stress to patient importance of self efficacy and  on diet for CHF. Last BNP reviewed- and noted below No results for input(s): "BNP", "BNPTRIAGEBLO" in the last 168 hours.  8/10 currently euvolemic, we will continue to monitor need for CHF med    Diabetes mellitus, type 2  Patient's FSGs are controlled on current medication regimen.  Last A1c reviewed-   Lab Results   Component " Value Date    HGBA1C 5.3 07/28/2024     Most recent fingerstick glucose reviewed-   Recent Labs   Lab 08/09/24  1123 08/09/24  1558 08/09/24  2047   POCTGLUCOSE 154* 174* 189*       Current correctional scale  stable  Maintain anti-hyperglycemic dose as follows-   Antihyperglycemics (From admission, onward)      Start     Stop Route Frequency Ordered    08/08/24 1247  insulin aspart U-100 pen 0-5 Units         -- SubQ Before meals & nightly PRN 08/08/24 1147          Hold Oral hypoglycemics while patient is in the hospital.    COPD exacerbation  Patient's COPD is with exacerbation noted by continued dyspnea, use of accessory muscles for breathing, and worsening of baseline hypoxia currently.  Patient is currently on COPD Pathway. Continue scheduled inhalers Steroids, Antibiotics, and Supplemental oxygen and monitor respiratory status closely.   8/10 improving continue IV antibiotics steroids and nebs, O2 protocol    Weakness  PT/OT when stable      Severe obesity (BMI >= 40)  Body mass index is 45.3 kg/m². Morbid obesity complicates all aspects of disease management from diagnostic modalities to treatment. Weight loss encouraged and health benefits explained to patient.         Paroxysmal A-fib  Currently in sinus rhythm   8/10 currently bradycardic, will check TSH, continue cardiac monitor      VTE Risk Mitigation (From admission, onward)           Ordered     enoxaparin injection 40 mg  Every 24 hours         08/08/24 1305     IP VTE HIGH RISK PATIENT  Once         08/08/24 0118     Place sequential compression device  Until discontinued         08/08/24 0118                    Discharge Planning   TIGRE:      Code Status: Full Code   Is the patient medically ready for discharge?:     Reason for patient still in hospital (select all that apply): Treatment  Discharge Plan A: Skilled Nursing Facility, New Nursing Home placement - [facility] care facility   Discharge Delays: None known at this time              Veda  ISAAC Gleason MD  Department of [facility] Medicine   [place] - Intensive Care

## 2024-08-10 NOTE — ASSESSMENT & PLAN NOTE
BHAVESH is likely due to pre-renal azotemia due to intravascular volume depletion. Baseline creatinine is unknown. Most recent creatinine and eGFR are listed below.  Recent Labs     08/07/24 2039 08/09/24  0357 08/10/24  0335   CREATININE 3.1* 2.6* 2.2*   EGFRNORACEVR 15.3* 18.9* 23.1*     8/10 creatinine slowly improving; patient getting fluids via her TPN for nutritional support   Plan  - BHAVESH is improving  - Avoid nephrotoxins and renally dose meds for GFR listed above  - Monitor urine output, serial BMP, and adjust therapy as needed  -

## 2024-08-10 NOTE — CARE UPDATE
08/10/24 1133   PRE-TX-O2   Device (Oxygen Therapy) (S)  nasal cannula with humidification   $ Is the patient on Low Flow Oxygen? Yes   Flow (L/min) (Oxygen Therapy) (S)  5   Oxygen Concentration (%) 40   SpO2 (!) (S)  92 %   Pulse 86   Resp (!) 24

## 2024-08-10 NOTE — ASSESSMENT & PLAN NOTE
"Patient is identified as having Diastolic (HFpEF) heart failure that is Chronic. CHF is currently controlled. Latest ECHO performed and demonstrates- Results for orders placed during the hospital encounter of 07/28/24    Echo Saline Bubble? No; Ultrasound enhancing contrast? No    Interpretation Summary    Left Ventricle: The left ventricle is normal in size. Mildly increased wall thickness. There is normal systolic function with a visually estimated ejection fraction of 55 - 60%. Grade I diastolic dysfunction.    Right Ventricle: Normal right ventricular cavity size. Systolic function is normal.    Left Atrium: Left atrium is severely dilated. Atrial septum is bulging to the right.    Right Atrium: Right atrium is moderately dilated.    Aortic Valve: The aortic valve is a trileaflet valve. Mildly restricted motion. There is mild stenosis. Aortic valve area by VTI is 1.84 cm². Aortic valve peak velocity is 2.18 m/s. Mean gradient is 10 mmHg. The dimensionless index is 0.58. There is mild aortic regurgitation.    Mitral Valve: There is mild regurgitation.    Tricuspid Valve: There is mild regurgitation.    Pulmonic Valve: There is mild to moderate regurgitation.    No pericardial effusion.  . Continue Beta Blocker, ACE/ARB, and Furosemide and monitor clinical status closely. Monitor on telemetry. Patient is on CHF pathway.  Monitor strict Is&Os and daily weights.  Place on fluid restriction of 1 L. Cardiology has been consulted. Continue to stress to patient importance of self efficacy and  on diet for CHF. Last BNP reviewed- and noted below No results for input(s): "BNP", "BNPTRIAGEBLO" in the last 168 hours.  8/10 currently euvolemic, we will continue to monitor need for CHF med  "

## 2024-08-10 NOTE — NURSING
EKG performed showing A Fib, dopamine @ 5mcg/kg/min. During the process of trying to obtain EKG, pt went into V Tach. Notified Dr Gleason and she ordered to consult cardiology.

## 2024-08-10 NOTE — NURSING
Pt's HR down to 35, able to wake up patient and speak to her but HR unchanged with stimulation. Levophed weaned off previously, b/p stable. Notified Dr Gleason order received for dopamine per titration.

## 2024-08-10 NOTE — ASSESSMENT & PLAN NOTE
Patient's COPD is with exacerbation noted by continued dyspnea, use of accessory muscles for breathing, and worsening of baseline hypoxia currently.  Patient is currently on COPD Pathway. Continue scheduled inhalers Steroids, Antibiotics, and Supplemental oxygen and monitor respiratory status closely.   8/10 improving continue IV antibiotics steroids and nebs, O2 protocol

## 2024-08-10 NOTE — EICU
Intervention Initiated From:  [facility] / EICU    Elliott intervened regarding:  Rounding (Video assessment)    Nurse Notified:  No    Doctor Notified:  No    Comments: Rounding done. On BiPAP 50%. On TPN 40 ml/hour, Lipids, Norepinephrine 0.05 mcg/kg/min. Side rails up X4. B/p 93/50, hr 51, Resp 22, SAT 97

## 2024-08-11 LAB
ALBUMIN SERPL BCP-MCNC: 1.9 G/DL (ref 3.5–5.2)
ALP SERPL-CCNC: 109 U/L (ref 55–135)
ALT SERPL W/O P-5'-P-CCNC: 22 U/L (ref 10–44)
ANION GAP SERPL CALC-SCNC: 3 MMOL/L (ref 3–11)
AST SERPL-CCNC: 15 U/L (ref 10–40)
BASOPHILS # BLD AUTO: 0.02 K/UL (ref 0–0.2)
BASOPHILS NFR BLD: 0.1 % (ref 0–1.9)
BILIRUB SERPL-MCNC: 0.2 MG/DL (ref 0.1–1)
BUN SERPL-MCNC: 84 MG/DL (ref 8–23)
CALCIUM SERPL-MCNC: 9.2 MG/DL (ref 8.7–10.5)
CHLORIDE SERPL-SCNC: 106 MMOL/L (ref 95–110)
CO2 SERPL-SCNC: 36 MMOL/L (ref 23–29)
CREAT SERPL-MCNC: 1.9 MG/DL (ref 0.5–1.4)
DIFFERENTIAL METHOD BLD: ABNORMAL
EOSINOPHIL # BLD AUTO: 0 K/UL (ref 0–0.5)
EOSINOPHIL NFR BLD: 0 % (ref 0–8)
ERYTHROCYTE [DISTWIDTH] IN BLOOD BY AUTOMATED COUNT: 16.7 % (ref 11.5–14.5)
EST. GFR  (NO RACE VARIABLE): 27.5 ML/MIN/1.73 M^2
GLUCOSE SERPL-MCNC: 166 MG/DL (ref 70–110)
HCT VFR BLD AUTO: 37.5 % (ref 37–48.5)
HGB BLD-MCNC: 11.9 G/DL (ref 12–16)
IMM GRANULOCYTES # BLD AUTO: 0.05 K/UL (ref 0–0.04)
IMM GRANULOCYTES NFR BLD AUTO: 0.4 % (ref 0–0.5)
LYMPHOCYTES # BLD AUTO: 0.6 K/UL (ref 1–4.8)
LYMPHOCYTES NFR BLD: 4.7 % (ref 18–48)
MAGNESIUM SERPL-MCNC: 2.1 MG/DL (ref 1.6–2.6)
MCH RBC QN AUTO: 29 PG (ref 27–31)
MCHC RBC AUTO-ENTMCNC: 31.7 G/DL (ref 32–36)
MCV RBC AUTO: 92 FL (ref 82–98)
MONOCYTES # BLD AUTO: 0.5 K/UL (ref 0.3–1)
MONOCYTES NFR BLD: 3.9 % (ref 4–15)
NEUTROPHILS # BLD AUTO: 12.2 K/UL (ref 1.8–7.7)
NEUTROPHILS NFR BLD: 90.9 % (ref 38–73)
NRBC BLD-RTO: 0 /100 WBC
PLATELET # BLD AUTO: 238 K/UL (ref 150–450)
PMV BLD AUTO: 10 FL (ref 9.2–12.9)
POCT GLUCOSE: 159 MG/DL (ref 70–110)
POCT GLUCOSE: 182 MG/DL (ref 70–110)
POCT GLUCOSE: 190 MG/DL (ref 70–110)
POCT GLUCOSE: 201 MG/DL (ref 70–110)
POTASSIUM SERPL-SCNC: 4.3 MMOL/L (ref 3.5–5.1)
PROT SERPL-MCNC: 6.1 G/DL (ref 6–8.4)
RBC # BLD AUTO: 4.1 M/UL (ref 4–5.4)
SODIUM SERPL-SCNC: 145 MMOL/L (ref 136–145)
WBC # BLD AUTO: 13.45 K/UL (ref 3.9–12.7)

## 2024-08-11 PROCEDURE — 99900031 HC PATIENT EDUCATION (STAT)

## 2024-08-11 PROCEDURE — 63600175 PHARM REV CODE 636 W HCPCS: Performed by: INTERNAL MEDICINE

## 2024-08-11 PROCEDURE — 20000000 HC ICU ROOM

## 2024-08-11 PROCEDURE — 80053 COMPREHEN METABOLIC PANEL: CPT | Performed by: INTERNAL MEDICINE

## 2024-08-11 PROCEDURE — 99900035 HC TECH TIME PER 15 MIN (STAT)

## 2024-08-11 PROCEDURE — 25000242 PHARM REV CODE 250 ALT 637 W/ HCPCS: Performed by: INTERNAL MEDICINE

## 2024-08-11 PROCEDURE — 94761 N-INVAS EAR/PLS OXIMETRY MLT: CPT

## 2024-08-11 PROCEDURE — 25000003 PHARM REV CODE 250: Performed by: INTERNAL MEDICINE

## 2024-08-11 PROCEDURE — 36415 COLL VENOUS BLD VENIPUNCTURE: CPT | Performed by: INTERNAL MEDICINE

## 2024-08-11 PROCEDURE — 27100171 HC OXYGEN HIGH FLOW UP TO 24 HOURS

## 2024-08-11 PROCEDURE — 94640 AIRWAY INHALATION TREATMENT: CPT

## 2024-08-11 PROCEDURE — A4216 STERILE WATER/SALINE, 10 ML: HCPCS | Performed by: INTERNAL MEDICINE

## 2024-08-11 PROCEDURE — 94660 CPAP INITIATION&MGMT: CPT

## 2024-08-11 PROCEDURE — 85025 COMPLETE CBC W/AUTO DIFF WBC: CPT | Performed by: INTERNAL MEDICINE

## 2024-08-11 PROCEDURE — 83735 ASSAY OF MAGNESIUM: CPT | Performed by: INTERNAL MEDICINE

## 2024-08-11 RX ORDER — METHYLPREDNISOLONE SOD SUCC 125 MG
80 VIAL (EA) INJECTION EVERY 6 HOURS
Status: DISCONTINUED | OUTPATIENT
Start: 2024-08-11 | End: 2024-08-13

## 2024-08-11 RX ORDER — FUROSEMIDE 10 MG/ML
40 INJECTION INTRAMUSCULAR; INTRAVENOUS DAILY
Status: DISCONTINUED | OUTPATIENT
Start: 2024-08-11 | End: 2024-08-16 | Stop reason: HOSPADM

## 2024-08-11 RX ADMIN — SODIUM CHLORIDE, PRESERVATIVE FREE 10 ML: 5 INJECTION INTRAVENOUS at 12:08

## 2024-08-11 RX ADMIN — SODIUM CHLORIDE, PRESERVATIVE FREE 10 ML: 5 INJECTION INTRAVENOUS at 05:08

## 2024-08-11 RX ADMIN — LEUCINE, PHENYLALANINE, LYSINE, METHIONINE, ISOLEUCINE, VALINE, HISTIDINE, THREONINE, TRYPTOPHAN, ALANINE, GLYCINE, ARGININE, PROLINE, SERINE, TYROSINE, SODIUM ACETATE, DIBASIC POTASSIUM PHOSPHATE, MAGNESIUM CHLORIDE, SODIUM CHLORIDE, CALCIUM CHLORIDE, DEXTROSE
365; 280; 290; 200; 300; 290; 240; 210; 90; 1035; 515; 575; 340; 250; 20; 340; 261; 51; 59; 33; 20 INJECTION INTRAVENOUS at 04:08

## 2024-08-11 RX ADMIN — ASPIRIN 81 MG: 81 TABLET, COATED ORAL at 08:08

## 2024-08-11 RX ADMIN — FUROSEMIDE 40 MG: 10 INJECTION, SOLUTION INTRAVENOUS at 08:08

## 2024-08-11 RX ADMIN — MUPIROCIN: 20 OINTMENT TOPICAL at 08:08

## 2024-08-11 RX ADMIN — ATORVASTATIN CALCIUM 40 MG: 40 TABLET, FILM COATED ORAL at 08:08

## 2024-08-11 RX ADMIN — METHYLPREDNISOLONE SODIUM SUCCINATE 125 MG: 125 INJECTION, POWDER, FOR SOLUTION INTRAMUSCULAR; INTRAVENOUS at 05:08

## 2024-08-11 RX ADMIN — METHYLPREDNISOLONE SODIUM SUCCINATE 80 MG: 125 INJECTION, POWDER, FOR SOLUTION INTRAMUSCULAR; INTRAVENOUS at 05:08

## 2024-08-11 RX ADMIN — IPRATROPIUM BROMIDE AND ALBUTEROL SULFATE 3 ML: 2.5; .5 SOLUTION RESPIRATORY (INHALATION) at 07:08

## 2024-08-11 RX ADMIN — METHYLPREDNISOLONE SODIUM SUCCINATE 80 MG: 125 INJECTION, POWDER, FOR SOLUTION INTRAMUSCULAR; INTRAVENOUS at 12:08

## 2024-08-11 RX ADMIN — AZITHROMYCIN MONOHYDRATE 500 MG: 500 INJECTION, POWDER, LYOPHILIZED, FOR SOLUTION INTRAVENOUS at 01:08

## 2024-08-11 RX ADMIN — ENOXAPARIN SODIUM 40 MG: 40 INJECTION SUBCUTANEOUS at 05:08

## 2024-08-11 RX ADMIN — IPRATROPIUM BROMIDE AND ALBUTEROL SULFATE 3 ML: 2.5; .5 SOLUTION RESPIRATORY (INHALATION) at 01:08

## 2024-08-11 RX ADMIN — LEVOTHYROXINE SODIUM 50 MCG: 50 TABLET ORAL at 05:08

## 2024-08-11 RX ADMIN — CEFTRIAXONE 1 G: 1 INJECTION, POWDER, FOR SOLUTION INTRAMUSCULAR; INTRAVENOUS at 09:08

## 2024-08-11 NOTE — PROGRESS NOTES
"[facility] Medicine  Progress Note    Patient Name: Carmen Tan  MRN: [id number]  Patient Class: IP- Inpatient   Admission Date: 8/7/2024  Length of Stay: 4 days  Attending Physician: Oscar Ge Jr., MD  Primary Care Provider: Lucian Barry MD        Subjective:     Principal Problem:Acute on chronic respiratory failure with hypoxia and hypercapnia  Acute Condition:         HPI:  Chief Complaint   Patient presents with    Shortness of Breath       Pt to ED via EMS from residence - Room air "in the 70's with tachypnea and hypotension." Pt fell this morning and is also complaining of neck pain.       74-year-old female past medical history CHF morbid obesity presents the ED via EMS with reports of shortness of breath and possible fall.  Patient was recently discharged from the hospital 6 days ago with week-long stay for heart failure.  Family reports she has been taking all medications as prescribed.  Patient reports that she feels short of breath and noted neck pain      updated HPI:  Patient nonverbal on BiPAP.  Above history reviewed.  Patient presents with shortness of breath with possible fall.  Recently discharge from heart failure now with acute renal failure and hypotension concerning for over-diuresis.    Overview/Hospital Course:  8/9/24:  remains bipap dependent.  Responding to gently IVF and steroids.   8/10/24 ND patient is sleeping but will awaken with arousal; patient reports being tired and will follow commands and squeeze your hand asked.  Still on low-dose norepi - will wean as tolerated.  Patient has some bradycardia, not on any rate-controlling medicine will continue to monitor  8/11/24 ND currently off of all pressors.  Her heart rate is ranging between 60- 80 at this time.  Appreciate cardiology recs.  Chest x-ray reviewed this a.m. with improvement in airspace.  Will give dose of IV Lasix today    Past Medical History:   Diagnosis Date    Abdominal hernia  "    Bacteremia 04/05/2023    CHF (congestive heart failure)     COPD (chronic obstructive pulmonary disease)     Diabetes mellitus, type 2 02/16/2022    GERD (gastroesophageal reflux disease)     History of home oxygen therapy     Hypertension     Hypertensive emergency 03/30/2023    Migraine headache     On home O2     Pulmonary embolism 06/01/2017    Small bowel obstruction     Thyroid disease        Past Surgical History:   Procedure Laterality Date    COLONOSCOPY      HYSTERECTOMY      INNER EAR SURGERY      UPPER GASTROINTESTINAL ENDOSCOPY         Review of patient's allergies indicates:   Allergen Reactions    Pcn [penicillins] Swelling       No current facility-administered medications on file prior to encounter.     Current Outpatient Medications on File Prior to Encounter   Medication Sig    acetaminophen (TYLENOL) 325 MG tablet Take 2 tablets (650 mg total) by mouth every 8 (eight) hours as needed for Pain or Temperature greater than (100, headache).    albuterol-ipratropium (DUO-NEB) 2.5 mg-0.5 mg/3 mL nebulizer solution Take 3 mLs by nebulization every 6 (six) hours while awake. Rescue    apixaban (ELIQUIS) 5 mg Tab Take 1 tablet (5 mg total) by mouth 2 (two) times daily.    aspirin (ECOTRIN) 81 MG EC tablet Take 1 tablet (81 mg total) by mouth once daily.    budesonide (PULMICORT) 0.5 mg/2 mL nebulizer solution Take 2 mLs (0.5 mg total) by nebulization every 12 (twelve) hours. Controller    busPIRone (BUSPAR) 5 MG Tab Take 1 tablet (5 mg total) by mouth 2 (two) times daily.    carvediloL (COREG) 3.125 MG tablet Take 1 tablet (3.125 mg total) by mouth 2 (two) times daily.    furosemide (LASIX) 40 MG tablet Take 1 tablet (40 mg total) by mouth 2 (two) times a day.    gabapentin (NEURONTIN) 100 MG capsule Take 1 capsule (100 mg total) by mouth 3 (three) times daily.    insulin aspart U-100 (NOVOLOG) 100 unit/mL (3 mL) InPn pen Inject 0-5 Units into the skin before meals and at bedtime as needed  (Hyperglycemia).    levothyroxine (SYNTHROID) 50 MCG tablet Take 1 tablet (50 mcg total) by mouth before breakfast.    pantoprazole (PROTONIX) 20 MG tablet Take 40 mg by mouth once daily.    valsartan (DIOVAN) 160 MG tablet Take 1 tablet (160 mg total) by mouth once daily.    vitamin D 1000 units Tab Take 185 mg by mouth once daily.    LIPITOR 40 mg tablet Take 1 tablet (40 mg total) by mouth once daily.     Family History    None       Tobacco Use    Smoking status: Former    Smokeless tobacco: Never   Substance and Sexual Activity    Alcohol use: No    Drug use: No    Sexual activity: Not on file     Review of Systems   Unable to perform ROS: Acuity of condition     Objective:     Vital Signs (Most Recent):  Temp: 98.1 °F (36.7 °C) (08/11/24 0701)  Pulse: 78 (08/11/24 0747)  Resp: (!) 26 (08/11/24 0747)  BP: 125/62 (08/11/24 0710)  SpO2: 99 % (08/11/24 0747) Vital Signs (24h Range):  Temp:  [97.2 °F (36.2 °C)-98.1 °F (36.7 °C)] 98.1 °F (36.7 °C)  Pulse:  [] 78  Resp:  [14-31] 26  SpO2:  [84 %-100 %] 99 %  BP: ()/(56-98) 125/62     Weight: 119.7 kg (263 lb 14.3 oz)  Body mass index is 45.3 kg/m².     Physical Exam  Constitutional:       Appearance: She is obese. She is ill-appearing.   HENT:      Nose: Nose normal.      Mouth/Throat:      Mouth: Mucous membranes are moist.   Eyes:      Extraocular Movements: Extraocular movements intact.      Pupils: Pupils are equal, round, and reactive to light.   Cardiovascular:      Rate and Rhythm: Regular rhythm. Bradycardia present.   Pulmonary:      Breath sounds: Rhonchi present. No rales.      Comments: Improved airflow at bases - bipap  Abdominal:      General: Bowel sounds are normal. There is no distension.      Palpations: Abdomen is soft.      Tenderness: There is no abdominal tenderness.   Genitourinary:     Comments: Hardwick in place  Musculoskeletal:      Cervical back: Normal range of motion.      Right lower leg: No edema.      Left lower leg: No  edema.   Skin:     General: Skin is warm and dry.      Capillary Refill: Capillary refill takes 2 to 3 seconds.   Neurological:      General: No focal deficit present.      Mental Status: She is alert.      Motor: Weakness present.              CRANIAL NERVES     CN III, IV, VI   Pupils are equal, round, and reactive to light.       Significant Labs: All pertinent labs within the past 24 hours have been reviewed.    Significant Imaging: I have reviewed all pertinent imaging results/findings within the past 24 hours.    Assessment/Plan:      * Acute on chronic respiratory failure with hypoxia and hypercapnia  Patient with Hypercapnic and Hypoxic Respiratory failure which is Acute on chronic.  she is on home oxygen at 4 LPM. Supplemental oxygen was provided and noted- Oxygen Concentration (%):  [5-50] 50    .   Signs/symptoms of respiratory failure include- tachypnea, increased work of breathing, respiratory distress, use of accessory muscles, wheezing, and lethargy. Contributing diagnoses includes - CHF, COPD, Interstitial lung disease, and Obesity Hypoventilation Labs and images were reviewed. Patient Has not had a recent ABG. Will treat underlying causes and adjust management of respiratory failure as follows- treat underlying heart failure and COPD exacerbation  8/10 ND continue current treatment with IV antibiotics IV steroids and nebs.  We will continue BiPAP as needed for respiratory support and O2 support  8/11 continue IV antibiotics will begin weaning steroids continue nebs.  O2 protocol will attempt to wean BiPAP today    Hypothyroid  On Synthroid, check TSH  8/11 TSH at goal; continue Synthroid    Acute renal failure with tubular necrosis  BHAVESH is likely due to pre-renal azotemia due to intravascular volume depletion. Baseline creatinine is unknown. Most recent creatinine and eGFR are listed below.  Recent Labs     08/09/24  0357 08/10/24  0335 08/11/24  0354   CREATININE 2.6* 2.2* 1.9*   EGFRNORACEVR 18.9*  23.1* 27.5*     8/10 creatinine slowly improving; patient getting fluids via her TPN for nutritional support   Plan  - BHAVESH is improving  - Avoid nephrotoxins and renally dose meds for GFR listed above  - Monitor urine output, serial BMP, and adjust therapy as needed  -   8/11 kidney function continues to slowly improve, monitor I's and O    Hypotension  Monitor with IVF  8/10 patient currently on low-dose Levophed we will continue to wean as tolerates  8/11 blood pressure improved - continue to monitor blood pressure    Chronic diastolic congestive heart failure  Patient is identified as having Diastolic (HFpEF) heart failure that is Chronic. CHF is currently controlled. Latest ECHO performed and demonstrates- Results for orders placed during the hospital encounter of 07/28/24    Echo Saline Bubble? No; Ultrasound enhancing contrast? No    Interpretation Summary    Left Ventricle: The left ventricle is normal in size. Mildly increased wall thickness. There is normal systolic function with a visually estimated ejection fraction of 55 - 60%. Grade I diastolic dysfunction.    Right Ventricle: Normal right ventricular cavity size. Systolic function is normal.    Left Atrium: Left atrium is severely dilated. Atrial septum is bulging to the right.    Right Atrium: Right atrium is moderately dilated.    Aortic Valve: The aortic valve is a trileaflet valve. Mildly restricted motion. There is mild stenosis. Aortic valve area by VTI is 1.84 cm². Aortic valve peak velocity is 2.18 m/s. Mean gradient is 10 mmHg. The dimensionless index is 0.58. There is mild aortic regurgitation.    Mitral Valve: There is mild regurgitation.    Tricuspid Valve: There is mild regurgitation.    Pulmonic Valve: There is mild to moderate regurgitation.    No pericardial effusion.  . Continue Beta Blocker, ACE/ARB, and Furosemide and monitor clinical status closely. Monitor on telemetry. Patient is on CHF pathway.  Monitor strict Is&Os and daily  "weights.  Place on fluid restriction of 1 L. Cardiology has been consulted. Continue to stress to patient importance of self efficacy and  on diet for CHF. Last BNP reviewed- and noted below No results for input(s): "BNP", "BNPTRIAGEBLO" in the last 168 hours.    8/10 currently euvolemic, we will continue to monitor need for CHF med  8/11 plan for dose of Lasix today, monitor I's and o's    Diabetes mellitus, type 2  Patient's FSGs are controlled on current medication regimen.  Last A1c reviewed-   Lab Results   Component Value Date    HGBA1C 5.3 07/28/2024     Most recent fingerstick glucose reviewed-   Recent Labs   Lab 08/10/24  1107 08/10/24  1546 08/10/24  2040   POCTGLUCOSE 179* 163* 159*       Current correctional scale  stable  Maintain anti-hyperglycemic dose as follows-   Antihyperglycemics (From admission, onward)      Start     Stop Route Frequency Ordered    08/08/24 1247  insulin aspart U-100 pen 0-5 Units         -- SubQ Before meals & nightly PRN 08/08/24 1147          Hold Oral hypoglycemics while patient is in the hospital.    COPD exacerbation  Patient's COPD is with exacerbation noted by continued dyspnea, use of accessory muscles for breathing, and worsening of baseline hypoxia currently.  Patient is currently on COPD Pathway. Continue scheduled inhalers Steroids, Antibiotics, and Supplemental oxygen and monitor respiratory status closely.   8/10 improving continue IV antibiotics steroids and nebs, O2 protocol  8/11 wean IV steroids    Weakness  PT/OT when stable      Severe obesity (BMI >= 40)  Body mass index is 45.3 kg/m². Morbid obesity complicates all aspects of disease management from diagnostic modalities to treatment. Weight loss encouraged and health benefits explained to patient.         Paroxysmal A-fib  Currently in sinus rhythm   8/10 currently bradycardic, will check TSH, continue cardiac monitor  8/11 no longer bradycardic, continue cardiac monitoring      VTE Risk " Mitigation (From admission, onward)           Ordered     enoxaparin injection 40 mg  Every 24 hours         08/08/24 1305     IP VTE HIGH RISK PATIENT  Once         08/08/24 0118     Place sequential compression device  Until discontinued         08/08/24 0118                    Discharge Planning   TIGRE:      Code Status: Full Code   Is the patient medically ready for discharge?:     Reason for patient still in hospital (select all that apply): Treatment  Discharge Plan A: Skilled Nursing Facility, New Nursing Home placement - half-way care facility   Discharge Delays: None known at this time              Veda Gleason MD  Department of Hospital Medicine   [place] [facility], [place]

## 2024-08-11 NOTE — ASSESSMENT & PLAN NOTE
"Patient is identified as having Diastolic (HFpEF) heart failure that is Chronic. CHF is currently controlled. Latest ECHO performed and demonstrates- Results for orders placed during the hospital encounter of 07/28/24    Echo Saline Bubble? No; Ultrasound enhancing contrast? No    Interpretation Summary    Left Ventricle: The left ventricle is normal in size. Mildly increased wall thickness. There is normal systolic function with a visually estimated ejection fraction of 55 - 60%. Grade I diastolic dysfunction.    Right Ventricle: Normal right ventricular cavity size. Systolic function is normal.    Left Atrium: Left atrium is severely dilated. Atrial septum is bulging to the right.    Right Atrium: Right atrium is moderately dilated.    Aortic Valve: The aortic valve is a trileaflet valve. Mildly restricted motion. There is mild stenosis. Aortic valve area by VTI is 1.84 cm². Aortic valve peak velocity is 2.18 m/s. Mean gradient is 10 mmHg. The dimensionless index is 0.58. There is mild aortic regurgitation.    Mitral Valve: There is mild regurgitation.    Tricuspid Valve: There is mild regurgitation.    Pulmonic Valve: There is mild to moderate regurgitation.    No pericardial effusion.  . Continue Beta Blocker, ACE/ARB, and Furosemide and monitor clinical status closely. Monitor on telemetry. Patient is on CHF pathway.  Monitor strict Is&Os and daily weights.  Place on fluid restriction of 1 L. Cardiology has been consulted. Continue to stress to patient importance of self efficacy and  on diet for CHF. Last BNP reviewed- and noted below No results for input(s): "BNP", "BNPTRIAGEBLO" in the last 168 hours.    8/10 currently euvolemic, we will continue to monitor need for CHF med  8/11 plan for dose of Lasix today, monitor I's and o's  "

## 2024-08-11 NOTE — SUBJECTIVE & OBJECTIVE
Past Medical History:   Diagnosis Date    Abdominal hernia     Bacteremia 04/05/2023    CHF (congestive heart failure)     COPD (chronic obstructive pulmonary disease)     Diabetes mellitus, type 2 02/16/2022    GERD (gastroesophageal reflux disease)     History of home oxygen therapy     Hypertension     Hypertensive emergency 03/30/2023    Migraine headache     On home O2     Pulmonary embolism 06/01/2017    Small bowel obstruction     Thyroid disease        Past Surgical History:   Procedure Laterality Date    COLONOSCOPY      HYSTERECTOMY      INNER EAR SURGERY      UPPER GASTROINTESTINAL ENDOSCOPY         Review of patient's allergies indicates:   Allergen Reactions    Pcn [penicillins] Swelling       No current facility-administered medications on file prior to encounter.     Current Outpatient Medications on File Prior to Encounter   Medication Sig    acetaminophen (TYLENOL) 325 MG tablet Take 2 tablets (650 mg total) by mouth every 8 (eight) hours as needed for Pain or Temperature greater than (100, headache).    albuterol-ipratropium (DUO-NEB) 2.5 mg-0.5 mg/3 mL nebulizer solution Take 3 mLs by nebulization every 6 (six) hours while awake. Rescue    apixaban (ELIQUIS) 5 mg Tab Take 1 tablet (5 mg total) by mouth 2 (two) times daily.    aspirin (ECOTRIN) 81 MG EC tablet Take 1 tablet (81 mg total) by mouth once daily.    budesonide (PULMICORT) 0.5 mg/2 mL nebulizer solution Take 2 mLs (0.5 mg total) by nebulization every 12 (twelve) hours. Controller    busPIRone (BUSPAR) 5 MG Tab Take 1 tablet (5 mg total) by mouth 2 (two) times daily.    carvediloL (COREG) 3.125 MG tablet Take 1 tablet (3.125 mg total) by mouth 2 (two) times daily.    furosemide (LASIX) 40 MG tablet Take 1 tablet (40 mg total) by mouth 2 (two) times a day.    gabapentin (NEURONTIN) 100 MG capsule Take 1 capsule (100 mg total) by mouth 3 (three) times daily.    insulin aspart U-100 (NOVOLOG) 100 unit/mL (3 mL) InPn pen Inject 0-5 Units into  the skin before meals and at bedtime as needed (Hyperglycemia).    levothyroxine (SYNTHROID) 50 MCG tablet Take 1 tablet (50 mcg total) by mouth before breakfast.    pantoprazole (PROTONIX) 20 MG tablet Take 40 mg by mouth once daily.    valsartan (DIOVAN) 160 MG tablet Take 1 tablet (160 mg total) by mouth once daily.    vitamin D 1000 units Tab Take 185 mg by mouth once daily.    LIPITOR 40 mg tablet Take 1 tablet (40 mg total) by mouth once daily.     Family History    None       Tobacco Use    Smoking status: Former    Smokeless tobacco: Never   Substance and Sexual Activity    Alcohol use: No    Drug use: No    Sexual activity: Not on file     Review of Systems   Unable to perform ROS: Acuity of condition     Objective:     Vital Signs (Most Recent):  Temp: 98.1 °F (36.7 °C) (08/11/24 0701)  Pulse: 78 (08/11/24 0747)  Resp: (!) 26 (08/11/24 0747)  BP: 125/62 (08/11/24 0710)  SpO2: 99 % (08/11/24 0747) Vital Signs (24h Range):  Temp:  [97.2 °F (36.2 °C)-98.1 °F (36.7 °C)] 98.1 °F (36.7 °C)  Pulse:  [] 78  Resp:  [14-31] 26  SpO2:  [84 %-100 %] 99 %  BP: ()/(56-98) 125/62     Weight: 119.7 kg (263 lb 14.3 oz)  Body mass index is 45.3 kg/m².     Physical Exam  Constitutional:       Appearance: She is obese. She is ill-appearing.   HENT:      Nose: Nose normal.      Mouth/Throat:      Mouth: Mucous membranes are moist.   Eyes:      Extraocular Movements: Extraocular movements intact.      Pupils: Pupils are equal, round, and reactive to light.   Cardiovascular:      Rate and Rhythm: Regular rhythm. Bradycardia present.   Pulmonary:      Breath sounds: Rhonchi present. No rales.      Comments: Improved airflow at bases - bipap  Abdominal:      General: Bowel sounds are normal. There is no distension.      Palpations: Abdomen is soft.      Tenderness: There is no abdominal tenderness.   Genitourinary:     Comments: Hardwick in place  Musculoskeletal:      Cervical back: Normal range of motion.      Right  lower leg: No edema.      Left lower leg: No edema.   Skin:     General: Skin is warm and dry.      Capillary Refill: Capillary refill takes 2 to 3 seconds.   Neurological:      General: No focal deficit present.      Mental Status: She is alert.      Motor: Weakness present.              CRANIAL NERVES     CN III, IV, VI   Pupils are equal, round, and reactive to light.       Significant Labs: All pertinent labs within the past 24 hours have been reviewed.    Significant Imaging: I have reviewed all pertinent imaging results/findings within the past 24 hours.

## 2024-08-11 NOTE — PLAN OF CARE
Patient vitals stable, remains off of vasopressors. Able to tolerate being off of bipap for meals, appetite not great. Patient requests to be placed back on bipap after meal times. Urine output adequate.

## 2024-08-11 NOTE — CARE UPDATE
08/11/24 0808   PRE-TX-O2   Device (Oxygen Therapy) (S)  nasal cannula with humidification   $ Is the patient on Low Flow Oxygen? Yes   Flow (L/min) (Oxygen Therapy) (S)  5   Oxygen Concentration (%) 40   SpO2 (S)  100 %   Pulse 79   Resp (!) 23

## 2024-08-11 NOTE — CARE UPDATE
08/11/24 1128   PRE-TX-O2   Device (Oxygen Therapy) nasal cannula with humidification  (Taken off bipap in order for pt to drink water)   Flow (L/min) (Oxygen Therapy) 5   Oxygen Concentration (%) 40   SpO2 96 %   Pulse 92   Resp (!) 28

## 2024-08-11 NOTE — ASSESSMENT & PLAN NOTE
Currently in sinus rhythm   8/10 currently bradycardic, will check TSH, continue cardiac monitor  8/11 no longer bradycardic, continue cardiac monitoring

## 2024-08-11 NOTE — ASSESSMENT & PLAN NOTE
Patient's FSGs are controlled on current medication regimen.  Last A1c reviewed-   Lab Results   Component Value Date    HGBA1C 5.3 07/28/2024     Most recent fingerstick glucose reviewed-   Recent Labs   Lab 08/10/24  1107 08/10/24  1546 08/10/24  2040   POCTGLUCOSE 179* 163* 159*       Current correctional scale  stable  Maintain anti-hyperglycemic dose as follows-   Antihyperglycemics (From admission, onward)    Start     Stop Route Frequency Ordered    08/08/24 1247  insulin aspart U-100 pen 0-5 Units         -- SubQ Before meals & nightly PRN 08/08/24 1147        Hold Oral hypoglycemics while patient is in the hospital.

## 2024-08-11 NOTE — ASSESSMENT & PLAN NOTE
BHAVESH is likely due to pre-renal azotemia due to intravascular volume depletion. Baseline creatinine is unknown. Most recent creatinine and eGFR are listed below.  Recent Labs     08/09/24  0357 08/10/24  0335 08/11/24  0354   CREATININE 2.6* 2.2* 1.9*   EGFRNORACEVR 18.9* 23.1* 27.5*     8/10 creatinine slowly improving; patient getting fluids via her TPN for nutritional support   Plan  - BHAVESH is improving  - Avoid nephrotoxins and renally dose meds for GFR listed above  - Monitor urine output, serial BMP, and adjust therapy as needed  -   8/11 kidney function continues to slowly improve, monitor I's and O

## 2024-08-11 NOTE — EICU
Intervention Initiated From:  [facility] / EICU    Elliott intervened regarding:  Rounding (Video assessment)    Nurse Notified:  Yes    Doctor Notified:  No    Comments: Rounding done. Alert. On BiPAP 50%. On TPN @ 40/hour and KVO. B/P 111/70, HR 93, resp 26, sat 100. More alert today. Side rails up x3.

## 2024-08-11 NOTE — NURSING
Pt more awake and alert during the night. No c/o voiced. Remains in Afib. BM this AM. VSS. HR 83. BiPap maintained. O2 100% with FiO2 at 50. Pt repositioned as ordered. Hardwick care. Continue to observe.

## 2024-08-11 NOTE — ASSESSMENT & PLAN NOTE
Patient with Hypercapnic and Hypoxic Respiratory failure which is Acute on chronic.  she is on home oxygen at 4 LPM. Supplemental oxygen was provided and noted- Oxygen Concentration (%):  [5-50] 50    .   Signs/symptoms of respiratory failure include- tachypnea, increased work of breathing, respiratory distress, use of accessory muscles, wheezing, and lethargy. Contributing diagnoses includes - CHF, COPD, Interstitial lung disease, and Obesity Hypoventilation Labs and images were reviewed. Patient Has not had a recent ABG. Will treat underlying causes and adjust management of respiratory failure as follows- treat underlying heart failure and COPD exacerbation  8/10 ND continue current treatment with IV antibiotics IV steroids and nebs.  We will continue BiPAP as needed for respiratory support and O2 support  8/11 continue IV antibiotics will begin weaning steroids continue nebs.  O2 protocol will attempt to wean BiPAP today

## 2024-08-11 NOTE — CARE UPDATE
08/11/24 0835   PRE-TX-O2   Device (Oxygen Therapy) BIPAP   Oxygen Concentration (%) 50   SpO2 (!) 86 %   Pulse 92   Resp (!) 31     Place back on Bipap per FRANCOISRN for desaturation on NC 5lpm

## 2024-08-11 NOTE — RESPIRATORY THERAPY
Pt taken off Bipap and placed on NC 5lpm in order to drink a cup of water. Pt placed back on Bipap.

## 2024-08-11 NOTE — ASSESSMENT & PLAN NOTE
Patient's COPD is with exacerbation noted by continued dyspnea, use of accessory muscles for breathing, and worsening of baseline hypoxia currently.  Patient is currently on COPD Pathway. Continue scheduled inhalers Steroids, Antibiotics, and Supplemental oxygen and monitor respiratory status closely.   8/10 improving continue IV antibiotics steroids and nebs, O2 protocol  8/11 wean IV steroids

## 2024-08-11 NOTE — CARE UPDATE
08/11/24 1139   PRE-TX-O2   Device (Oxygen Therapy) BIPAP  (Placed back on Bipap for Spo2 89% and for pt having some SOB.)   Oxygen Concentration (%) 50   SpO2 (!) 89 %   Pulse 104   Resp (!) 40

## 2024-08-11 NOTE — ASSESSMENT & PLAN NOTE
Monitor with IVF  8/10 patient currently on low-dose Levophed we will continue to wean as tolerates  8/11 blood pressure improved - continue to monitor blood pressure

## 2024-08-12 LAB
ALBUMIN SERPL BCP-MCNC: 2 G/DL (ref 3.5–5.2)
ALP SERPL-CCNC: 105 U/L (ref 55–135)
ALT SERPL W/O P-5'-P-CCNC: 16 U/L (ref 10–44)
ANION GAP SERPL CALC-SCNC: -1 MMOL/L (ref 3–11)
AST SERPL-CCNC: 6 U/L (ref 10–40)
BASOPHILS # BLD AUTO: 0.01 K/UL (ref 0–0.2)
BASOPHILS NFR BLD: 0.1 % (ref 0–1.9)
BILIRUB SERPL-MCNC: 0.2 MG/DL (ref 0.1–1)
BUN SERPL-MCNC: 80 MG/DL (ref 8–23)
CALCIUM SERPL-MCNC: 9.7 MG/DL (ref 8.7–10.5)
CHLORIDE SERPL-SCNC: 103 MMOL/L (ref 95–110)
CO2 SERPL-SCNC: 40 MMOL/L (ref 23–29)
CREAT SERPL-MCNC: 1.7 MG/DL (ref 0.5–1.4)
DIFFERENTIAL METHOD BLD: ABNORMAL
EOSINOPHIL # BLD AUTO: 0 K/UL (ref 0–0.5)
EOSINOPHIL NFR BLD: 0 % (ref 0–8)
ERYTHROCYTE [DISTWIDTH] IN BLOOD BY AUTOMATED COUNT: 16.5 % (ref 11.5–14.5)
EST. GFR  (NO RACE VARIABLE): 31.5 ML/MIN/1.73 M^2
GLUCOSE SERPL-MCNC: 169 MG/DL (ref 70–110)
HCT VFR BLD AUTO: 40.4 % (ref 37–48.5)
HGB BLD-MCNC: 12.7 G/DL (ref 12–16)
IMM GRANULOCYTES # BLD AUTO: 0.1 K/UL (ref 0–0.04)
IMM GRANULOCYTES NFR BLD AUTO: 0.8 % (ref 0–0.5)
LYMPHOCYTES # BLD AUTO: 0.7 K/UL (ref 1–4.8)
LYMPHOCYTES NFR BLD: 5.6 % (ref 18–48)
MAGNESIUM SERPL-MCNC: 2.2 MG/DL (ref 1.6–2.6)
MCH RBC QN AUTO: 28.5 PG (ref 27–31)
MCHC RBC AUTO-ENTMCNC: 31.4 G/DL (ref 32–36)
MCV RBC AUTO: 91 FL (ref 82–98)
MONOCYTES # BLD AUTO: 0.6 K/UL (ref 0.3–1)
MONOCYTES NFR BLD: 4.5 % (ref 4–15)
NEUTROPHILS # BLD AUTO: 10.9 K/UL (ref 1.8–7.7)
NEUTROPHILS NFR BLD: 89 % (ref 38–73)
NRBC BLD-RTO: 0 /100 WBC
PLATELET # BLD AUTO: 232 K/UL (ref 150–450)
PMV BLD AUTO: 9.8 FL (ref 9.2–12.9)
POCT GLUCOSE: 173 MG/DL (ref 70–110)
POCT GLUCOSE: 195 MG/DL (ref 70–110)
POCT GLUCOSE: 208 MG/DL (ref 70–110)
POTASSIUM SERPL-SCNC: 4.5 MMOL/L (ref 3.5–5.1)
PROT SERPL-MCNC: 6.2 G/DL (ref 6–8.4)
RBC # BLD AUTO: 4.45 M/UL (ref 4–5.4)
SODIUM SERPL-SCNC: 142 MMOL/L (ref 136–145)
WBC # BLD AUTO: 12.22 K/UL (ref 3.9–12.7)

## 2024-08-12 PROCEDURE — 85025 COMPLETE CBC W/AUTO DIFF WBC: CPT | Performed by: INTERNAL MEDICINE

## 2024-08-12 PROCEDURE — 99900031 HC PATIENT EDUCATION (STAT)

## 2024-08-12 PROCEDURE — 80053 COMPREHEN METABOLIC PANEL: CPT | Performed by: INTERNAL MEDICINE

## 2024-08-12 PROCEDURE — 25000003 PHARM REV CODE 250: Performed by: INTERNAL MEDICINE

## 2024-08-12 PROCEDURE — 63600175 PHARM REV CODE 636 W HCPCS: Performed by: INTERNAL MEDICINE

## 2024-08-12 PROCEDURE — 94660 CPAP INITIATION&MGMT: CPT

## 2024-08-12 PROCEDURE — 94761 N-INVAS EAR/PLS OXIMETRY MLT: CPT

## 2024-08-12 PROCEDURE — 36415 COLL VENOUS BLD VENIPUNCTURE: CPT | Performed by: INTERNAL MEDICINE

## 2024-08-12 PROCEDURE — 94640 AIRWAY INHALATION TREATMENT: CPT

## 2024-08-12 PROCEDURE — 83735 ASSAY OF MAGNESIUM: CPT | Performed by: INTERNAL MEDICINE

## 2024-08-12 PROCEDURE — 27100171 HC OXYGEN HIGH FLOW UP TO 24 HOURS

## 2024-08-12 PROCEDURE — 20000000 HC ICU ROOM

## 2024-08-12 PROCEDURE — 99900035 HC TECH TIME PER 15 MIN (STAT)

## 2024-08-12 PROCEDURE — B4185 PARENTERAL SOL 10 GM LIPIDS: HCPCS | Performed by: INTERNAL MEDICINE

## 2024-08-12 PROCEDURE — A4216 STERILE WATER/SALINE, 10 ML: HCPCS | Performed by: INTERNAL MEDICINE

## 2024-08-12 PROCEDURE — 25000242 PHARM REV CODE 250 ALT 637 W/ HCPCS: Performed by: INTERNAL MEDICINE

## 2024-08-12 RX ADMIN — METHYLPREDNISOLONE SODIUM SUCCINATE 80 MG: 125 INJECTION, POWDER, FOR SOLUTION INTRAMUSCULAR; INTRAVENOUS at 12:08

## 2024-08-12 RX ADMIN — SOYBEAN OIL 250 ML: 20 INJECTION, SOLUTION INTRAVENOUS at 09:08

## 2024-08-12 RX ADMIN — ASCORBIC ACID, VITAMIN A PALMITATE, CHOLECALCIFEROL, THIAMINE HYDROCHLORIDE, RIBOFLAVIN-5 PHOSPHATE SODIUM, PYRIDOXINE HYDROCHLORIDE, NIACINAMIDE, DEXPANTHENOL, ALPHA-TOCOPHEROL ACETATE, VITAMIN K1, FOLIC ACID, BIOTIN, CYANOCOBALAMIN: 200; 3300; 200; 6; 3.6; 6; 40; 15; 10; 150; 600; 60; 5 INJECTION, SOLUTION INTRAVENOUS at 04:08

## 2024-08-12 RX ADMIN — LEVOTHYROXINE SODIUM 50 MCG: 50 TABLET ORAL at 05:08

## 2024-08-12 RX ADMIN — Medication 6 MG: at 01:08

## 2024-08-12 RX ADMIN — IPRATROPIUM BROMIDE AND ALBUTEROL SULFATE 3 ML: 2.5; .5 SOLUTION RESPIRATORY (INHALATION) at 01:08

## 2024-08-12 RX ADMIN — METHYLPREDNISOLONE SODIUM SUCCINATE 80 MG: 125 INJECTION, POWDER, FOR SOLUTION INTRAMUSCULAR; INTRAVENOUS at 05:08

## 2024-08-12 RX ADMIN — SODIUM CHLORIDE, PRESERVATIVE FREE 10 ML: 5 INJECTION INTRAVENOUS at 12:08

## 2024-08-12 RX ADMIN — POLYETHYLENE GLYCOL (3350) 17 G: 17 POWDER, FOR SOLUTION ORAL at 09:08

## 2024-08-12 RX ADMIN — IPRATROPIUM BROMIDE AND ALBUTEROL SULFATE 3 ML: 2.5; .5 SOLUTION RESPIRATORY (INHALATION) at 07:08

## 2024-08-12 RX ADMIN — SODIUM CHLORIDE, PRESERVATIVE FREE 10 ML: 5 INJECTION INTRAVENOUS at 05:08

## 2024-08-12 RX ADMIN — ASPIRIN 81 MG: 81 TABLET, COATED ORAL at 09:08

## 2024-08-12 RX ADMIN — MUPIROCIN: 20 OINTMENT TOPICAL at 09:08

## 2024-08-12 RX ADMIN — AZITHROMYCIN MONOHYDRATE 500 MG: 500 INJECTION, POWDER, LYOPHILIZED, FOR SOLUTION INTRAVENOUS at 02:08

## 2024-08-12 RX ADMIN — ENOXAPARIN SODIUM 40 MG: 40 INJECTION SUBCUTANEOUS at 05:08

## 2024-08-12 RX ADMIN — FUROSEMIDE 40 MG: 10 INJECTION, SOLUTION INTRAVENOUS at 09:08

## 2024-08-12 RX ADMIN — CEFTRIAXONE 1 G: 1 INJECTION, POWDER, FOR SOLUTION INTRAMUSCULAR; INTRAVENOUS at 09:08

## 2024-08-12 RX ADMIN — MORPHINE SULFATE 4 MG: 4 INJECTION, SOLUTION INTRAMUSCULAR; INTRAVENOUS at 04:08

## 2024-08-12 RX ADMIN — ATORVASTATIN CALCIUM 40 MG: 40 TABLET, FILM COATED ORAL at 09:08

## 2024-08-12 NOTE — EICU
Intervention Initiated From:  [facility] / KLAUS Gallagher intervened regarding:  Rounding (Video assessment)    Comments: pt resting in bed with eyes closed. NAD observed. VSS.  Bipap is in use. Pt tolerating well. TPN infusing.

## 2024-08-12 NOTE — ASSESSMENT & PLAN NOTE
Patient's FSGs are controlled on current medication regimen.  Last A1c reviewed-   Lab Results   Component Value Date    HGBA1C 5.3 07/28/2024     Most recent fingerstick glucose reviewed-   Recent Labs   Lab 08/11/24  1113 08/11/24  1523 08/11/24 2056   POCTGLUCOSE 182* 201* 190*       Current correctional scale  stable  Maintain anti-hyperglycemic dose as follows-   Antihyperglycemics (From admission, onward)    Start     Stop Route Frequency Ordered    08/08/24 1247  insulin aspart U-100 pen 0-5 Units         -- SubQ Before meals & nightly PRN 08/08/24 1147        Hold Oral hypoglycemics while patient is in the hospital.

## 2024-08-12 NOTE — ASSESSMENT & PLAN NOTE
Patient's COPD is with exacerbation noted by continued dyspnea, use of accessory muscles for breathing, and worsening of baseline hypoxia currently.  Patient is currently on COPD Pathway. Continue scheduled inhalers Steroids, Antibiotics, and Supplemental oxygen and monitor respiratory status closely.   8/10 improving continue IV antibiotics steroids and nebs, O2 protocol  8/11 wean IV steroids  8/12:   continue to wean steroids as tolerated

## 2024-08-12 NOTE — EICU
Intervention Initiated From:  [facility] / EICU    Elliott intervened regarding:  Rounding (Video assessment)    Nurse Notified:  No    Doctor Notified:  No    Comments: EICU rounding done. Chart and meds reviewed.  VS stable.

## 2024-08-12 NOTE — ASSESSMENT & PLAN NOTE
Patient with Hypercapnic and Hypoxic Respiratory failure which is Acute on chronic.  she is on home oxygen at 4 LPM. Supplemental oxygen was provided and noted- Oxygen Concentration (%):  [40-50] 50    .   Signs/symptoms of respiratory failure include- tachypnea, increased work of breathing, respiratory distress, use of accessory muscles, wheezing, and lethargy. Contributing diagnoses includes - CHF, COPD, Interstitial lung disease, and Obesity Hypoventilation Labs and images were reviewed. Patient Has not had a recent ABG. Will treat underlying causes and adjust management of respiratory failure as follows- treat underlying heart failure and COPD exacerbation  8/10 ND continue current treatment with IV antibiotics IV steroids and nebs.  We will continue BiPAP as needed for respiratory support and O2 support  8/11 continue IV antibiotics will begin weaning steroids continue nebs.  O2 protocol will attempt to wean BiPAP today

## 2024-08-12 NOTE — PLAN OF CARE
08/12/24 1234   Medicare Message   Important Message from Medicare regarding Discharge Appeal Rights Given to patient/caregiver;Explained to patient/caregiver;Signed/date by patient/caregiver   Date IMM was signed 08/12/24   Time IMM was signed 1231

## 2024-08-12 NOTE — NURSING
Bipap maintained. O2 96%. Occasional congested cough without resp distress. Only mild relief with sleep aid. Clinimix-E infusing. [place] maintained. No c/o voiced. VSS, afebrile. Continue to observe.

## 2024-08-12 NOTE — PHYSICIAN QUERY
Provider, due to conflicting clinical picture,please clarify the Acuity of the Diastolic Heart Failure.    Acute on Chronic Diastolic Heart Failure (HFpEF)         08/23/20  0512     --  137 134*   K 3.0* 3.9 3.6 3.8     --  109 108   CO2 20*  --  21 17*   BUN 12  --  10 15   CREATININE 0.6  --  0.7 1.0   GLUCOSE 92  --  113* 100*     Hepatic:    No results for input(s): AST, ALT, ALB, BILITOT, ALKPHOS in the last 72 hours. Amylase:  No results found for: AMYLASE  Lipase:  No results found for: LIPASE   Mag:    Lab Results   Component Value Date    MG 2.30 08/22/2020    MG 2.60 08/21/2020     Phos:     Lab Results   Component Value Date    PHOS 3.7 08/23/2020    PHOS 2.3 08/22/2020      Coags:   Lab Results   Component Value Date    PROTIME 17.0 08/17/2020    INR 1.46 08/17/2020       Cultures:  Anaerobic culture  Lab Results   Component Value Date    LABANAE Mixed anaerobic growth present 08/18/2020     Fungus stain  No results found for requested labs within last 30 days. Gram stain  Results in Past 30 Days  Result Component Current Result Ref Range Previous Result Ref Range   Gram Stain Result 1+ WBC's (Mononuclear)  1+ WBC's (Polymorphonuclear)  2+ Gram negative rods   (8/18/2020)  Not in Time Range      Organism  Lab Results   Component Value Date/Time    ORG Escherichia coli (A) 08/18/2020 11:45 AM     Surgical culture  No results found for: CXSURG  Blood culture 1  Results in Past 30 Days  Result Component Current Result Ref Range Previous Result Ref Range   Blood Culture, Routine No Growth after 4 days of incubation. (8/16/2020)  Not in Time Range      Blood culture 2  Results in Past 30 Days  Result Component Current Result Ref Range Previous Result Ref Range   Culture, Blood 2 No Growth after 4 days of incubation. (8/16/2020)  Not in Time Range      Fecal occult  No results found for requested labs within last 30 days. GI bacterial pathogens by PCR  No results found for requested labs within last 30 days. C. difficile  No results found for requested labs within last 30 days.      Urine culture  No results found for: Ivet Snider    Pathology:  Pathology results pending     Imaging:  I have personally reviewed the following films:    No results found. Scheduled Meds:   polyethylene glycol  4,000 mL Oral Once    lactulose  20 g Oral TID    ciprofloxacin  400 mg Intravenous Q12H    pantoprazole  40 mg Oral QAM AC    sodium chloride flush  10 mL Intravenous 2 times per day    enoxaparin  40 mg Subcutaneous Daily     Continuous Infusions:   sodium chloride 100 mL/hr at 08/23/20 0432     PRN Meds:.potassium chloride **OR** potassium alternative oral replacement **OR** potassium chloride, morphine, lip balm petroleum free, sodium chloride flush, acetaminophen **OR** acetaminophen, polyethylene glycol, promethazine **OR** ondansetron      Assessment:  78 y.o. female admitted with   1. Septicemia ([facility] [address]    2. PAPITO (acute kidney injury) ([facility] [address]    3. Intra-abdominal free air of unknown etiology    4. Fall, initial encounter        Intraabdominal/pelvic abscess, status-post CT-guided drain placement on 8/18/2020  Abnormal CT of colon/liver concerning for malignancy  Sepsis   Cirrhosis  Acute kidney injury  Hyponatremia  COVID screening, pending      Plan:  1. Right hemicolectomy, possible subtotal colectomy tomorrow with Dr. Antonio Carranza screening for SNF pending; continue drain care/monitoring  2. Clear liquid diet as tolerated today, NPO at midnight; start Golytely bowel prep this afternoon  3. IV hydration; monitor and correct electrolytes  4. Antibiotics  5. Activity as tolerated, ambulate TID, up to chair for all meals--PT/OT following  6. Pulmonary toilet, incentive spirometry  7. PRN analgesics and antiemetics--minimizing narcotics as tolerated  8. DVT prophylaxis with Lovenox  9. Management of medical comorbid etiologies per primary team and consulting services    EDUCATION:  Educated patient on plan of care and disease process--all questions answered. Plans discussed with patient and nursing.   Reviewed and discussed with Dr. Josr Tirado. Signed:  Albino Cowart, APRN - CNP  8/23/2020 10:56 AM     I have personally performed a face to face diagnostic evaluation on this patient. I have interviewed and examined the patient and I agree with the assessment above. In summary, my findings and plan are the following:   Ms. Gary Jung is a 78 y.o. female who presents with   Intraabdominal/pelvic abscess, status-post CT-guided drain placement on 8/18/2020  Abnormal CT of colon/liver concerning for malignancy  Sepsis   Cirrhosis  Acute kidney injury  Hyponatremia  COVID negative     Plan:  1. Tolerating liquid diet, return to clear liquids with bowel prep this afternoon  2. Monitor bowel function, bowel prep this afternoon in preparation for sigmoidectomy/subtotal colectomy tomorrow  3. Antibiotics, keep drain in place  4. Pain control, a little worse today  5. Activity as tolerated  6. Defer management of remainder of medical comorbidities to primary and consulting teams    Farhan Tirado MD, FACS  8/23/2020  2:40 PM

## 2024-08-12 NOTE — PROGRESS NOTES
[facility] - Intensive Care  Adult Nutrition  Progress Note    SUMMARY       Recommendations  1. Rec'd Renal Consistent CHO diet.     2. PPN: Rec'd ClinimixE 4.25/5 @ 60mL/hr to provide 408kcal (18% EEN), 51g of protein (118% EPN), and 1200mL TFV.   -Add 250mL of 20% lipids 3x/week to provide additional calories.          -Check Triglycerides before adding lipids.     3. TPN: Rec'd ClinimixE 5/20 @ 40mL/hr to provide 845kcal (37% EEN), 48g of protein (111% EPN), and 960mL TFV. -Add 250mL of 20% lipids 3x/week to provide additional calories.   -Check Triglycerides before adding lipids.      4. RD to follow and make rec's accordingly.  Goals:   1. Pt will be started on PPN by next RD follow up.   2. Pt will be switched from PPN to TPN by next RD follow up.  Nutrition Goal Status: goal met, new  Communication of RD Recs: other  (Documented in POC)    Assessment and Plan    Nutrition Problem  Inadequate oral intake     Related to (etiology):   Continuous BIPAP in use     Signs and Symptoms (as evidenced by):   0% PO intake      Interventions/Recommendations (treatment strategy):  1. Rec'd Renal Consistent CHO diet.   2. Rec'd ClinimixE 4.25/5 @ 60mL/hr to provide 408kcal (18% EEN), 51g of protein (118% EPN), and 1200mL TFV.        -Add 250mL of 20% lipids 3x/week to provide additional calories.        -Check Triglycerides before adding lipids.   3. RD to follow and make rec's accordingly.     Nutrition Diagnosis Status:   New     Malnutrition Assessment  NFPE not warranted at this time.    Reason for Assessment    Reason For Assessment: RD follow-up  Diagnosis: other (see comments) (Acute on chronic respiratory failure with hypoxia and hypercapnia)  Relevant Medical History: Abdominal hernia, Bacteremia, CHF, COPD, Type 2 Diabetes mellitus, GERD, History of home oxygen therapy, Hypertension, Hypertensive emergency, Migraine headache, On home O2, Pulmonary embolism, Small bowel obstruction,     Thyroid  "disease  Interdisciplinary Rounds: did not attend  General Information Comments: Followed up on pt this morning. AS per MD note, attmpting to wean BIPAP. Pt is on TPN and tolerating. Rec'd encourage PO intake when medically appropriate. COntinue TPN for now. RD to follow and make rec's accordingly.  Nutrition Discharge Planning: TBD as care progresses    Nutrition Risk Screen    Nutrition Risk Screen: tube feeding or parenteral nutrition    Nutrition/Diet History    Patient Reported Diet/Restrictions/Preferences: general  Spiritual, Cultural Beliefs, Sabianism Practices, Values that Affect Care: no  Food Allergies: NKFA  Factors Affecting Nutritional Intake: None identified at this time    Anthropometrics    Temp: 97.6 °F (36.4 °C)  Height Method: Stated  Height: 5' 4" (162.6 cm)  Height (inches): 64 in  Weight Method: Bed Scale  Weight: 119.7 kg (263 lb 14.3 oz)  Weight (lb): 263.89 lb  Ideal Body Weight (IBW), Female: 120 lb  % Ideal Body Weight, Female (lb): 219.91 %  BMI (Calculated): 45.3  BMI Grade: greater than 40 - morbid obesity       Lab/Procedures/Meds    Pertinent Labs Reviewed: reviewed  Pertinent Labs Comments: Creatinine = 2.6 ; BUN = 71 ; GFR = 18.9  Pertinent Medications Reviewed: reviewed    Physical Findings/Assessment         Estimated/Assessed Needs    Weight Used For Calorie Calculations: 70.8 kg (156 lb) (AdjBW)  Energy Calorie Requirements (kcal): 2264 (32kcal/kg)  Energy Need Method: Kcal/kg  Protein Requirements: 32-43 (0.6-0.8g/kg IBW)  Weight Used For Protein Calculations: 54.4 kg (120 lb)  Fluid Requirements (mL): 2264 (1mL/kcal)  Estimated Fluid Requirement Method: RDA Method  RDA Method (mL): 2264         Nutrition Prescription Ordered    Current Diet Order: Diabetic 1500 calorie  Current Nutrition Support Formula Ordered: Clinimix E 5/20  Current Nutrition Support Rate Ordered: 40 (ml)  Current Nutrition Support Frequency Ordered: Continuous  Oral Nutrition Supplement: " None    Evaluation of Received Nutrient/Fluid Intake    Parenteral Calories (kcal): 327  Parenteral Protein (gm): 41  Parenteral Fluid (mL): 960  % Kcal Needs: 14%  % Protein Needs: 80%  I/O: + 566.8  Energy Calories Required: not meeting needs  Protein Required: meeting needs  Tolerance: tolerating  % Intake of Estimated Energy Needs: 0 - 25 %  % Meal Intake: 0 - 25 %    Nutrition Risk    Level of Risk/Frequency of Follow-up: moderate     Monitor and Evaluation    Food and Nutrient Intake: energy intake, food and beverage intake, parenteral nutrition intake  Food and Nutrient Adminstration: enteral and parenteral nutrition administration, diet order  Knowledge/Beliefs/Attitudes: food and nutrition knowledge/skill, beliefs and attitudes  Physical Activity and Function: nutrition-related ADLs and IADLs  Anthropometric Measurements: height/length, weight, weight change, body mass index  Biochemical Data, Medical Tests and Procedures: electrolyte and renal panel, gastrointestinal profile, glucose/endocrine profile, inflammatory profile, lipid profile  Nutrition-Focused Physical Findings: overall appearance     Nutrition Follow-Up    RD Follow-up?: Yes

## 2024-08-12 NOTE — ASSESSMENT & PLAN NOTE
Currently in sinus rhythm   8/10 currently bradycardic, will check TSH, continue cardiac monitor  8/11 no longer bradycardic, continue cardiac monitoring    stable

## 2024-08-12 NOTE — PROGRESS NOTES
"[facility] Medicine  Progress Note    Patient Name: Carmen Tan  MRN: [id number]  Patient Class: IP- Inpatient   Admission Date: 8/7/2024  Length of Stay: 5 days  Attending Physician: Oscar Ge Jr., MD  Primary Care Provider: Lucian Barry MD        Subjective:     Principal Problem:Acute on chronic respiratory failure with hypoxia and hypercapnia  Acute Condition: dyspnea        HPI:  Chief Complaint   Patient presents with    Shortness of Breath       Pt to ED via EMS from residence - Room air "in the 70's with tachypnea and hypotension." Pt fell this morning and is also complaining of neck pain.       74-year-old female past medical history CHF morbid obesity presents the ED via EMS with reports of shortness of breath and possible fall.  Patient was recently discharged from the hospital 6 days ago with week-long stay for heart failure.  Family reports she has been taking all medications as prescribed.  Patient reports that she feels short of breath and noted neck pain      updated HPI:  Patient nonverbal on BiPAP.  Above history reviewed.  Patient presents with shortness of breath with possible fall.  Recently discharge from heart failure now with acute renal failure and hypotension concerning for over-diuresis.    Overview/Hospital Course:  8/9/24:  remains bipap dependent.  Responding to gently IVF and steroids.   8/10/24 ND patient is sleeping but will awaken with arousal; patient reports being tired and will follow commands and squeeze your hand asked.  Still on low-dose norepi - will wean as tolerated.  Patient has some bradycardia, not on any rate-controlling medicine will continue to monitor  8/11/24 ND currently off of all pressors.  Her heart rate is ranging between 60- 80 at this time.  Appreciate cardiology recs.  Chest x-ray reviewed this a.m. with improvement in airspace.  Will give dose of IV Lasix today  08/12/2024:  Patient more alert this morning.  X-ray " reviews improve aeration.  Blood cultures negative.  Continue to wean BiPAP as tolerated.    Past Medical History:   Diagnosis Date    Abdominal hernia     Bacteremia 04/05/2023    CHF (congestive heart failure)     COPD (chronic obstructive pulmonary disease)     Diabetes mellitus, type 2 02/16/2022    GERD (gastroesophageal reflux disease)     History of home oxygen therapy     Hypertension     Hypertensive emergency 03/30/2023    Migraine headache     On home O2     Pulmonary embolism 06/01/2017    Small bowel obstruction     Thyroid disease        Past Surgical History:   Procedure Laterality Date    COLONOSCOPY      HYSTERECTOMY      INNER EAR SURGERY      UPPER GASTROINTESTINAL ENDOSCOPY         Review of patient's allergies indicates:   Allergen Reactions    Pcn [penicillins] Swelling       No current facility-administered medications on file prior to encounter.     Current Outpatient Medications on File Prior to Encounter   Medication Sig    acetaminophen (TYLENOL) 325 MG tablet Take 2 tablets (650 mg total) by mouth every 8 (eight) hours as needed for Pain or Temperature greater than (100, headache).    albuterol-ipratropium (DUO-NEB) 2.5 mg-0.5 mg/3 mL nebulizer solution Take 3 mLs by nebulization every 6 (six) hours while awake. Rescue    apixaban (ELIQUIS) 5 mg Tab Take 1 tablet (5 mg total) by mouth 2 (two) times daily.    aspirin (ECOTRIN) 81 MG EC tablet Take 1 tablet (81 mg total) by mouth once daily.    budesonide (PULMICORT) 0.5 mg/2 mL nebulizer solution Take 2 mLs (0.5 mg total) by nebulization every 12 (twelve) hours. Controller    busPIRone (BUSPAR) 5 MG Tab Take 1 tablet (5 mg total) by mouth 2 (two) times daily.    carvediloL (COREG) 3.125 MG tablet Take 1 tablet (3.125 mg total) by mouth 2 (two) times daily.    furosemide (LASIX) 40 MG tablet Take 1 tablet (40 mg total) by mouth 2 (two) times a day.    gabapentin (NEURONTIN) 100 MG capsule Take 1 capsule (100 mg total) by mouth 3 (three)  times daily.    insulin aspart U-100 (NOVOLOG) 100 unit/mL (3 mL) InPn pen Inject 0-5 Units into the skin before meals and at bedtime as needed (Hyperglycemia).    levothyroxine (SYNTHROID) 50 MCG tablet Take 1 tablet (50 mcg total) by mouth before breakfast.    pantoprazole (PROTONIX) 20 MG tablet Take 40 mg by mouth once daily.    valsartan (DIOVAN) 160 MG tablet Take 1 tablet (160 mg total) by mouth once daily.    vitamin D 1000 units Tab Take 185 mg by mouth once daily.    LIPITOR 40 mg tablet Take 1 tablet (40 mg total) by mouth once daily.     Family History    None       Tobacco Use    Smoking status: Former    Smokeless tobacco: Never   Substance and Sexual Activity    Alcohol use: No    Drug use: No    Sexual activity: Not on file     Review of Systems   Unable to perform ROS: Acuity of condition     Objective:     Vital Signs (Most Recent):  Temp: 97.6 °F (36.4 °C) (08/12/24 0400)  Pulse: 85 (08/12/24 0745)  Resp: (!) 25 (08/12/24 0745)  BP: 111/64 (08/12/24 0400)  SpO2: 97 % (08/12/24 0745) Vital Signs (24h Range):  Temp:  [97 °F (36.1 °C)-98.8 °F (37.1 °C)] 97.6 °F (36.4 °C)  Pulse:  [] 85  Resp:  [15-40] 25  SpO2:  [86 %-100 %] 97 %  BP: ()/(46-93) 111/64     Weight: 119.7 kg (263 lb 14.3 oz)  Body mass index is 45.3 kg/m².     Physical Exam  Constitutional:       Appearance: She is obese. She is ill-appearing.   HENT:      Nose: Nose normal.      Mouth/Throat:      Mouth: Mucous membranes are moist.   Eyes:      Extraocular Movements: Extraocular movements intact.      Pupils: Pupils are equal, round, and reactive to light.   Cardiovascular:      Rate and Rhythm: Regular rhythm. Bradycardia present.   Pulmonary:      Breath sounds: Rhonchi present. No rales.      Comments: Improved airflow at bases - bipap  Abdominal:      General: Bowel sounds are normal. There is no distension.      Palpations: Abdomen is soft.      Tenderness: There is no abdominal tenderness.   Genitourinary:      Comments: Hardwick in place  Musculoskeletal:      Cervical back: Normal range of motion.      Right lower leg: No edema.      Left lower leg: No edema.   Skin:     General: Skin is warm and dry.      Capillary Refill: Capillary refill takes 2 to 3 seconds.   Neurological:      General: No focal deficit present.      Mental Status: She is alert.      Motor: Weakness present.              CRANIAL NERVES     CN III, IV, VI   Pupils are equal, round, and reactive to light.       Significant Labs: All pertinent labs within the past 24 hours have been reviewed.    Significant Imaging: I have reviewed all pertinent imaging results/findings within the past 24 hours.    Assessment/Plan:      * Acute on chronic respiratory failure with hypoxia and hypercapnia  Patient with Hypercapnic and Hypoxic Respiratory failure which is Acute on chronic.  she is on home oxygen at 4 LPM. Supplemental oxygen was provided and noted- Oxygen Concentration (%):  [40-50] 50    .   Signs/symptoms of respiratory failure include- tachypnea, increased work of breathing, respiratory distress, use of accessory muscles, wheezing, and lethargy. Contributing diagnoses includes - CHF, COPD, Interstitial lung disease, and Obesity Hypoventilation Labs and images were reviewed. Patient Has not had a recent ABG. Will treat underlying causes and adjust management of respiratory failure as follows- treat underlying heart failure and COPD exacerbation  8/10 ND continue current treatment with IV antibiotics IV steroids and nebs.  We will continue BiPAP as needed for respiratory support and O2 support  8/11 continue IV antibiotics will begin weaning steroids continue nebs.  O2 protocol will attempt to wean BiPAP today    Hypothyroid  On Synthroid, check TSH  8/11 TSH at goal; continue Synthroid    Acute renal failure with tubular necrosis  BHAVESH is likely due to pre-renal azotemia due to intravascular volume depletion. Baseline creatinine is unknown. Most recent  creatinine and eGFR are listed below.  Recent Labs     08/10/24  0335 08/11/24  0354 08/12/24  0348   CREATININE 2.2* 1.9* 1.7*   EGFRNORACEVR 23.1* 27.5* 31.5*   8/10 creatinine slowly improving; patient getting fluids via her TPN for nutritional support   Plan  - BHAVESH is improving  - Avoid nephrotoxins and renally dose meds for GFR listed above  - Monitor urine output, serial BMP, and adjust therapy as needed  -   8/11 kidney function continues to slowly improve, monitor I's and O    Hypotension  Monitor with IVF  8/10 patient currently on low-dose Levophed we will continue to wean as tolerates  8/11 blood pressure improved - continue to monitor blood pressure    Chronic diastolic congestive heart failure  Patient is identified as having Diastolic (HFpEF) heart failure that is Chronic. CHF is currently controlled. Latest ECHO performed and demonstrates- Results for orders placed during the hospital encounter of 07/28/24    Echo Saline Bubble? No; Ultrasound enhancing contrast? No    Interpretation Summary    Left Ventricle: The left ventricle is normal in size. Mildly increased wall thickness. There is normal systolic function with a visually estimated ejection fraction of 55 - 60%. Grade I diastolic dysfunction.    Right Ventricle: Normal right ventricular cavity size. Systolic function is normal.    Left Atrium: Left atrium is severely dilated. Atrial septum is bulging to the right.    Right Atrium: Right atrium is moderately dilated.    Aortic Valve: The aortic valve is a trileaflet valve. Mildly restricted motion. There is mild stenosis. Aortic valve area by VTI is 1.84 cm². Aortic valve peak velocity is 2.18 m/s. Mean gradient is 10 mmHg. The dimensionless index is 0.58. There is mild aortic regurgitation.    Mitral Valve: There is mild regurgitation.    Tricuspid Valve: There is mild regurgitation.    Pulmonic Valve: There is mild to moderate regurgitation.    No pericardial effusion.  . Continue Beta  "Blocker, ACE/ARB, and Furosemide and monitor clinical status closely. Monitor on telemetry. Patient is on CHF pathway.  Monitor strict Is&Os and daily weights.  Place on fluid restriction of 1 L. Cardiology has been consulted. Continue to stress to patient importance of self efficacy and  on diet for CHF. Last BNP reviewed- and noted below No results for input(s): "BNP", "BNPTRIAGEBLO" in the last 168 hours.    8/10 currently euvolemic, we will continue to monitor need for CHF med  8/11 plan for dose of Lasix today, monitor I's and o's  8/12:  match I/O    Diabetes mellitus, type 2  Patient's FSGs are controlled on current medication regimen.  Last A1c reviewed-   Lab Results   Component Value Date    HGBA1C 5.3 07/28/2024     Most recent fingerstick glucose reviewed-   Recent Labs   Lab 08/11/24  1113 08/11/24  1523 08/11/24  2056   POCTGLUCOSE 182* 201* 190*       Current correctional scale  stable  Maintain anti-hyperglycemic dose as follows-   Antihyperglycemics (From admission, onward)      Start     Stop Route Frequency Ordered    08/08/24 1247  insulin aspart U-100 pen 0-5 Units         -- SubQ Before meals & nightly PRN 08/08/24 1147          Hold Oral hypoglycemics while patient is in the hospital.    COPD exacerbation  Patient's COPD is with exacerbation noted by continued dyspnea, use of accessory muscles for breathing, and worsening of baseline hypoxia currently.  Patient is currently on COPD Pathway. Continue scheduled inhalers Steroids, Antibiotics, and Supplemental oxygen and monitor respiratory status closely.   8/10 improving continue IV antibiotics steroids and nebs, O2 protocol  8/11 wean IV steroids  8/12:   continue to wean steroids as tolerated    Weakness  PT/OT when stable      Severe obesity (BMI >= 40)  Body mass index is 45.3 kg/m². Morbid obesity complicates all aspects of disease management from diagnostic modalities to treatment. Weight loss encouraged and health benefits " explained to patient.         Paroxysmal A-fib  Currently in sinus rhythm   8/10 currently bradycardic, will check TSH, continue cardiac monitor  8/11 no longer bradycardic, continue cardiac monitoring    stable      VTE Risk Mitigation (From admission, onward)           Ordered     enoxaparin injection 40 mg  Every 24 hours         08/08/24 1305     IP VTE HIGH RISK PATIENT  Once         08/08/24 0118     Place sequential compression device  Until discontinued         08/08/24 0118                    Discharge Planning   TIGRE:      Code Status: Full Code   Is the patient medically ready for discharge?:     Reason for patient still in hospital (select all that apply): Treatment  Discharge Plan A: Skilled Nursing Facility, New Nursing Home placement - [facility] care facility   Discharge Delays: None known at this time        Cc time 30 min      Oscar Ge Jr, MD  Department of Hospital Medicine   [place] [facility]

## 2024-08-12 NOTE — ASSESSMENT & PLAN NOTE
BHAVESH is likely due to pre-renal azotemia due to intravascular volume depletion. Baseline creatinine is unknown. Most recent creatinine and eGFR are listed below.  Recent Labs     08/10/24  0335 08/11/24  0354 08/12/24  0348   CREATININE 2.2* 1.9* 1.7*   EGFRNORACEVR 23.1* 27.5* 31.5*   8/10 creatinine slowly improving; patient getting fluids via her TPN for nutritional support   Plan  - BHAVESH is improving  - Avoid nephrotoxins and renally dose meds for GFR listed above  - Monitor urine output, serial BMP, and adjust therapy as needed  -   8/11 kidney function continues to slowly improve, monitor I's and O

## 2024-08-12 NOTE — CARE UPDATE
Family is concerned that patient is not getting blood to her feet and would like SCD placed. Patient refused at the beginning of the shift. Francy Peacock Nurse manager in to unit to speak with the patient patient agreed to wear SCDs with family at bedside. SCD's placed. As soon as family left patient requested pain medications due to legs and abdomen hurting. Pain medication administered. Pain resolved and SCD's continue to be used.

## 2024-08-12 NOTE — PHYSICIAN QUERY
Due to the conflicting clinical picture, please clinically validate the Acute on chronic respiratory failure with hypercapnia.   If validated, please provide additional clinical support for the diagnosis.    The respiratory condition is confirmed. Additional clinical support/decision making indicators for the diagnosis include (please specify): bipap dependent due to desats.

## 2024-08-12 NOTE — ASSESSMENT & PLAN NOTE
"Patient is identified as having Diastolic (HFpEF) heart failure that is Chronic. CHF is currently controlled. Latest ECHO performed and demonstrates- Results for orders placed during the hospital encounter of 07/28/24    Echo Saline Bubble? No; Ultrasound enhancing contrast? No    Interpretation Summary    Left Ventricle: The left ventricle is normal in size. Mildly increased wall thickness. There is normal systolic function with a visually estimated ejection fraction of 55 - 60%. Grade I diastolic dysfunction.    Right Ventricle: Normal right ventricular cavity size. Systolic function is normal.    Left Atrium: Left atrium is severely dilated. Atrial septum is bulging to the right.    Right Atrium: Right atrium is moderately dilated.    Aortic Valve: The aortic valve is a trileaflet valve. Mildly restricted motion. There is mild stenosis. Aortic valve area by VTI is 1.84 cm². Aortic valve peak velocity is 2.18 m/s. Mean gradient is 10 mmHg. The dimensionless index is 0.58. There is mild aortic regurgitation.    Mitral Valve: There is mild regurgitation.    Tricuspid Valve: There is mild regurgitation.    Pulmonic Valve: There is mild to moderate regurgitation.    No pericardial effusion.  . Continue Beta Blocker, ACE/ARB, and Furosemide and monitor clinical status closely. Monitor on telemetry. Patient is on CHF pathway.  Monitor strict Is&Os and daily weights.  Place on fluid restriction of 1 L. Cardiology has been consulted. Continue to stress to patient importance of self efficacy and  on diet for CHF. Last BNP reviewed- and noted below No results for input(s): "BNP", "BNPTRIAGEBLO" in the last 168 hours.    8/10 currently euvolemic, we will continue to monitor need for CHF med  8/11 plan for dose of Lasix today, monitor I's and o's  8/12:  match I/O  "

## 2024-08-12 NOTE — SUBJECTIVE & OBJECTIVE
Past Medical History:   Diagnosis Date    Abdominal hernia     Bacteremia 04/05/2023    CHF (congestive heart failure)     COPD (chronic obstructive pulmonary disease)     Diabetes mellitus, type 2 02/16/2022    GERD (gastroesophageal reflux disease)     History of home oxygen therapy     Hypertension     Hypertensive emergency 03/30/2023    Migraine headache     On home O2     Pulmonary embolism 06/01/2017    Small bowel obstruction     Thyroid disease        Past Surgical History:   Procedure Laterality Date    COLONOSCOPY      HYSTERECTOMY      INNER EAR SURGERY      UPPER GASTROINTESTINAL ENDOSCOPY         Review of patient's allergies indicates:   Allergen Reactions    Pcn [penicillins] Swelling       No current facility-administered medications on file prior to encounter.     Current Outpatient Medications on File Prior to Encounter   Medication Sig    acetaminophen (TYLENOL) 325 MG tablet Take 2 tablets (650 mg total) by mouth every 8 (eight) hours as needed for Pain or Temperature greater than (100, headache).    albuterol-ipratropium (DUO-NEB) 2.5 mg-0.5 mg/3 mL nebulizer solution Take 3 mLs by nebulization every 6 (six) hours while awake. Rescue    apixaban (ELIQUIS) 5 mg Tab Take 1 tablet (5 mg total) by mouth 2 (two) times daily.    aspirin (ECOTRIN) 81 MG EC tablet Take 1 tablet (81 mg total) by mouth once daily.    budesonide (PULMICORT) 0.5 mg/2 mL nebulizer solution Take 2 mLs (0.5 mg total) by nebulization every 12 (twelve) hours. Controller    busPIRone (BUSPAR) 5 MG Tab Take 1 tablet (5 mg total) by mouth 2 (two) times daily.    carvediloL (COREG) 3.125 MG tablet Take 1 tablet (3.125 mg total) by mouth 2 (two) times daily.    furosemide (LASIX) 40 MG tablet Take 1 tablet (40 mg total) by mouth 2 (two) times a day.    gabapentin (NEURONTIN) 100 MG capsule Take 1 capsule (100 mg total) by mouth 3 (three) times daily.    insulin aspart U-100 (NOVOLOG) 100 unit/mL (3 mL) InPn pen Inject 0-5 Units into  the skin before meals and at bedtime as needed (Hyperglycemia).    levothyroxine (SYNTHROID) 50 MCG tablet Take 1 tablet (50 mcg total) by mouth before breakfast.    pantoprazole (PROTONIX) 20 MG tablet Take 40 mg by mouth once daily.    valsartan (DIOVAN) 160 MG tablet Take 1 tablet (160 mg total) by mouth once daily.    vitamin D 1000 units Tab Take 185 mg by mouth once daily.    LIPITOR 40 mg tablet Take 1 tablet (40 mg total) by mouth once daily.     Family History    None       Tobacco Use    Smoking status: Former    Smokeless tobacco: Never   Substance and Sexual Activity    Alcohol use: No    Drug use: No    Sexual activity: Not on file     Review of Systems   Unable to perform ROS: Acuity of condition     Objective:     Vital Signs (Most Recent):  Temp: 97.6 °F (36.4 °C) (08/12/24 0400)  Pulse: 85 (08/12/24 0745)  Resp: (!) 25 (08/12/24 0745)  BP: 111/64 (08/12/24 0400)  SpO2: 97 % (08/12/24 0745) Vital Signs (24h Range):  Temp:  [97 °F (36.1 °C)-98.8 °F (37.1 °C)] 97.6 °F (36.4 °C)  Pulse:  [] 85  Resp:  [15-40] 25  SpO2:  [86 %-100 %] 97 %  BP: ()/(46-93) 111/64     Weight: 119.7 kg (263 lb 14.3 oz)  Body mass index is 45.3 kg/m².     Physical Exam  Constitutional:       Appearance: She is obese. She is ill-appearing.   HENT:      Nose: Nose normal.      Mouth/Throat:      Mouth: Mucous membranes are moist.   Eyes:      Extraocular Movements: Extraocular movements intact.      Pupils: Pupils are equal, round, and reactive to light.   Cardiovascular:      Rate and Rhythm: Regular rhythm. Bradycardia present.   Pulmonary:      Breath sounds: Rhonchi present. No rales.      Comments: Improved airflow at bases - bipap  Abdominal:      General: Bowel sounds are normal. There is no distension.      Palpations: Abdomen is soft.      Tenderness: There is no abdominal tenderness.   Genitourinary:     Comments: Hardwick in place  Musculoskeletal:      Cervical back: Normal range of motion.      Right lower  leg: No edema.      Left lower leg: No edema.   Skin:     General: Skin is warm and dry.      Capillary Refill: Capillary refill takes 2 to 3 seconds.   Neurological:      General: No focal deficit present.      Mental Status: She is alert.      Motor: Weakness present.              CRANIAL NERVES     CN III, IV, VI   Pupils are equal, round, and reactive to light.       Significant Labs: All pertinent labs within the past 24 hours have been reviewed.    Significant Imaging: I have reviewed all pertinent imaging results/findings within the past 24 hours.

## 2024-08-13 LAB
ALBUMIN SERPL BCP-MCNC: 1.9 G/DL (ref 3.5–5.2)
ALP SERPL-CCNC: 99 U/L (ref 55–135)
ALT SERPL W/O P-5'-P-CCNC: 13 U/L (ref 10–44)
ANION GAP SERPL CALC-SCNC: 1 MMOL/L (ref 3–11)
AST SERPL-CCNC: 8 U/L (ref 10–40)
BACTERIA BLD CULT: NORMAL
BACTERIA BLD CULT: NORMAL
BASOPHILS # BLD AUTO: 0.02 K/UL (ref 0–0.2)
BASOPHILS NFR BLD: 0.2 % (ref 0–1.9)
BILIRUB SERPL-MCNC: 0.2 MG/DL (ref 0.1–1)
BUN SERPL-MCNC: 77 MG/DL (ref 8–23)
CALCIUM SERPL-MCNC: 9.7 MG/DL (ref 8.7–10.5)
CHLORIDE SERPL-SCNC: 103 MMOL/L (ref 95–110)
CO2 SERPL-SCNC: 37 MMOL/L (ref 23–29)
CREAT SERPL-MCNC: 1.6 MG/DL (ref 0.5–1.4)
DIFFERENTIAL METHOD BLD: ABNORMAL
EOSINOPHIL # BLD AUTO: 0 K/UL (ref 0–0.5)
EOSINOPHIL NFR BLD: 0 % (ref 0–8)
ERYTHROCYTE [DISTWIDTH] IN BLOOD BY AUTOMATED COUNT: 16.3 % (ref 11.5–14.5)
EST. GFR  (NO RACE VARIABLE): 33.8 ML/MIN/1.73 M^2
GLUCOSE SERPL-MCNC: 178 MG/DL (ref 70–110)
HCT VFR BLD AUTO: 42.3 % (ref 37–48.5)
HGB BLD-MCNC: 13.1 G/DL (ref 12–16)
IMM GRANULOCYTES # BLD AUTO: 0.17 K/UL (ref 0–0.04)
IMM GRANULOCYTES NFR BLD AUTO: 1.5 % (ref 0–0.5)
LYMPHOCYTES # BLD AUTO: 0.6 K/UL (ref 1–4.8)
LYMPHOCYTES NFR BLD: 5.2 % (ref 18–48)
MAGNESIUM SERPL-MCNC: 2.2 MG/DL (ref 1.6–2.6)
MCH RBC QN AUTO: 28.1 PG (ref 27–31)
MCHC RBC AUTO-ENTMCNC: 31 G/DL (ref 32–36)
MCV RBC AUTO: 91 FL (ref 82–98)
MONOCYTES # BLD AUTO: 0.5 K/UL (ref 0.3–1)
MONOCYTES NFR BLD: 4.6 % (ref 4–15)
NEUTROPHILS # BLD AUTO: 10.3 K/UL (ref 1.8–7.7)
NEUTROPHILS NFR BLD: 88.5 % (ref 38–73)
NRBC BLD-RTO: 0 /100 WBC
PLATELET # BLD AUTO: 244 K/UL (ref 150–450)
PMV BLD AUTO: 9.6 FL (ref 9.2–12.9)
POCT GLUCOSE: 120 MG/DL (ref 70–110)
POCT GLUCOSE: 128 MG/DL (ref 70–110)
POCT GLUCOSE: 156 MG/DL (ref 70–110)
POTASSIUM SERPL-SCNC: 4.8 MMOL/L (ref 3.5–5.1)
PROT SERPL-MCNC: 6 G/DL (ref 6–8.4)
RBC # BLD AUTO: 4.66 M/UL (ref 4–5.4)
SODIUM SERPL-SCNC: 141 MMOL/L (ref 136–145)
WBC # BLD AUTO: 11.65 K/UL (ref 3.9–12.7)

## 2024-08-13 PROCEDURE — 99900031 HC PATIENT EDUCATION (STAT)

## 2024-08-13 PROCEDURE — A4216 STERILE WATER/SALINE, 10 ML: HCPCS | Performed by: INTERNAL MEDICINE

## 2024-08-13 PROCEDURE — 20000000 HC ICU ROOM

## 2024-08-13 PROCEDURE — 36415 COLL VENOUS BLD VENIPUNCTURE: CPT | Performed by: INTERNAL MEDICINE

## 2024-08-13 PROCEDURE — 99900035 HC TECH TIME PER 15 MIN (STAT)

## 2024-08-13 PROCEDURE — 63600175 PHARM REV CODE 636 W HCPCS: Performed by: INTERNAL MEDICINE

## 2024-08-13 PROCEDURE — 94660 CPAP INITIATION&MGMT: CPT

## 2024-08-13 PROCEDURE — 27100171 HC OXYGEN HIGH FLOW UP TO 24 HOURS

## 2024-08-13 PROCEDURE — 83735 ASSAY OF MAGNESIUM: CPT | Performed by: INTERNAL MEDICINE

## 2024-08-13 PROCEDURE — 25000003 PHARM REV CODE 250: Performed by: INTERNAL MEDICINE

## 2024-08-13 PROCEDURE — 25000242 PHARM REV CODE 250 ALT 637 W/ HCPCS: Performed by: INTERNAL MEDICINE

## 2024-08-13 PROCEDURE — 85025 COMPLETE CBC W/AUTO DIFF WBC: CPT | Performed by: INTERNAL MEDICINE

## 2024-08-13 PROCEDURE — 80053 COMPREHEN METABOLIC PANEL: CPT | Performed by: INTERNAL MEDICINE

## 2024-08-13 PROCEDURE — 94761 N-INVAS EAR/PLS OXIMETRY MLT: CPT

## 2024-08-13 PROCEDURE — 94640 AIRWAY INHALATION TREATMENT: CPT

## 2024-08-13 RX ORDER — METOPROLOL TARTRATE 25 MG/1
25 TABLET, FILM COATED ORAL 2 TIMES DAILY
Status: DISCONTINUED | OUTPATIENT
Start: 2024-08-13 | End: 2024-08-16 | Stop reason: HOSPADM

## 2024-08-13 RX ORDER — METOPROLOL TARTRATE 25 MG/1
25 TABLET, FILM COATED ORAL 2 TIMES DAILY
Status: DISCONTINUED | OUTPATIENT
Start: 2024-08-13 | End: 2024-08-13

## 2024-08-13 RX ORDER — METHYLPREDNISOLONE SOD SUCC 125 MG
80 VIAL (EA) INJECTION EVERY 8 HOURS
Status: DISCONTINUED | OUTPATIENT
Start: 2024-08-13 | End: 2024-08-15

## 2024-08-13 RX ADMIN — POLYETHYLENE GLYCOL (3350) 17 G: 17 POWDER, FOR SOLUTION ORAL at 09:08

## 2024-08-13 RX ADMIN — ATORVASTATIN CALCIUM 40 MG: 40 TABLET, FILM COATED ORAL at 09:08

## 2024-08-13 RX ADMIN — IPRATROPIUM BROMIDE AND ALBUTEROL SULFATE 3 ML: 2.5; .5 SOLUTION RESPIRATORY (INHALATION) at 07:08

## 2024-08-13 RX ADMIN — ASPIRIN 81 MG: 81 TABLET, COATED ORAL at 09:08

## 2024-08-13 RX ADMIN — METHYLPREDNISOLONE SODIUM SUCCINATE 80 MG: 125 INJECTION, POWDER, FOR SOLUTION INTRAMUSCULAR; INTRAVENOUS at 12:08

## 2024-08-13 RX ADMIN — SODIUM CHLORIDE, PRESERVATIVE FREE 10 ML: 5 INJECTION INTRAVENOUS at 05:08

## 2024-08-13 RX ADMIN — SODIUM CHLORIDE, PRESERVATIVE FREE 10 ML: 5 INJECTION INTRAVENOUS at 12:08

## 2024-08-13 RX ADMIN — METOPROLOL TARTRATE 25 MG: 25 TABLET, FILM COATED ORAL at 01:08

## 2024-08-13 RX ADMIN — METOPROLOL TARTRATE 25 MG: 25 TABLET, FILM COATED ORAL at 09:08

## 2024-08-13 RX ADMIN — METHYLPREDNISOLONE SODIUM SUCCINATE 80 MG: 125 INJECTION, POWDER, FOR SOLUTION INTRAMUSCULAR; INTRAVENOUS at 02:08

## 2024-08-13 RX ADMIN — METHYLPREDNISOLONE SODIUM SUCCINATE 80 MG: 125 INJECTION, POWDER, FOR SOLUTION INTRAMUSCULAR; INTRAVENOUS at 09:08

## 2024-08-13 RX ADMIN — MORPHINE SULFATE 4 MG: 4 INJECTION, SOLUTION INTRAMUSCULAR; INTRAVENOUS at 11:08

## 2024-08-13 RX ADMIN — CEFTRIAXONE 1 G: 1 INJECTION, POWDER, FOR SOLUTION INTRAMUSCULAR; INTRAVENOUS at 09:08

## 2024-08-13 RX ADMIN — Medication 6 MG: at 09:08

## 2024-08-13 RX ADMIN — FUROSEMIDE 40 MG: 10 INJECTION, SOLUTION INTRAVENOUS at 09:08

## 2024-08-13 RX ADMIN — IPRATROPIUM BROMIDE AND ALBUTEROL SULFATE 3 ML: 2.5; .5 SOLUTION RESPIRATORY (INHALATION) at 02:08

## 2024-08-13 RX ADMIN — METHYLPREDNISOLONE SODIUM SUCCINATE 80 MG: 125 INJECTION, POWDER, FOR SOLUTION INTRAMUSCULAR; INTRAVENOUS at 05:08

## 2024-08-13 RX ADMIN — ENOXAPARIN SODIUM 40 MG: 40 INJECTION SUBCUTANEOUS at 05:08

## 2024-08-13 RX ADMIN — LEVOTHYROXINE SODIUM 50 MCG: 50 TABLET ORAL at 05:08

## 2024-08-13 NOTE — PROGRESS NOTES
"[facility] Medicine  Progress Note    Patient Name: Carmen Tan  MRN: [id number]  Patient Class: IP- Inpatient   Admission Date: 8/7/2024  Length of Stay: 6 days  Attending Physician: Oscar Ge Jr., MD  Primary Care Provider: Lucian Barry MD        Subjective:     Principal Problem:Acute on chronic respiratory failure with hypoxia and hypercapnia  Acute Condition: Wean bipap.  Respiratory failure.         HPI:  Chief Complaint   Patient presents with    Shortness of Breath       Pt to ED via EMS from residence - Room air "in the 70's with tachypnea and hypotension." Pt fell this morning and is also complaining of neck pain.       74-year-old female past medical history CHF morbid obesity presents the ED via EMS with reports of shortness of breath and possible fall.  Patient was recently discharged from the hospital 6 days ago with week-long stay for heart failure.  Family reports she has been taking all medications as prescribed.  Patient reports that she feels short of breath and noted neck pain      updated HPI:  Patient nonverbal on BiPAP.  Above history reviewed.  Patient presents with shortness of breath with possible fall.  Recently discharge from heart failure now with acute renal failure and hypotension concerning for over-diuresis.    Overview/Hospital Course:  8/9/24:  remains bipap dependent.  Responding to gently IVF and steroids.   8/10/24 ND patient is sleeping but will awaken with arousal; patient reports being tired and will follow commands and squeeze your hand asked.  Still on low-dose norepi - will wean as tolerated.  Patient has some bradycardia, not on any rate-controlling medicine will continue to monitor  8/11/24 ND currently off of all pressors.  Her heart rate is ranging between 60- 80 at this time.  Appreciate cardiology recs.  Chest x-ray reviewed this a.m. with improvement in airspace.  Will give dose of IV Lasix today  08/12/2024:  Patient more " alert this morning.  X-ray reviews improve aeration.  Blood cultures negative.  Continue to wean BiPAP as tolerated.  8/13: Patient remains on BiPAP.  Will attempt to wean BiPAP utilization today in order to pursue enteral feedings.      Past Medical History:   Diagnosis Date    Abdominal hernia     Bacteremia 04/05/2023    CHF (congestive heart failure)     COPD (chronic obstructive pulmonary disease)     Diabetes mellitus, type 2 02/16/2022    GERD (gastroesophageal reflux disease)     History of home oxygen therapy     Hypertension     Hypertensive emergency 03/30/2023    Migraine headache     On home O2     Pulmonary embolism 06/01/2017    Small bowel obstruction     Thyroid disease        Past Surgical History:   Procedure Laterality Date    COLONOSCOPY      HYSTERECTOMY      INNER EAR SURGERY      UPPER GASTROINTESTINAL ENDOSCOPY         Review of patient's allergies indicates:   Allergen Reactions    Pcn [penicillins] Swelling       No current facility-administered medications on file prior to encounter.     Current Outpatient Medications on File Prior to Encounter   Medication Sig    acetaminophen (TYLENOL) 325 MG tablet Take 2 tablets (650 mg total) by mouth every 8 (eight) hours as needed for Pain or Temperature greater than (100, headache).    albuterol-ipratropium (DUO-NEB) 2.5 mg-0.5 mg/3 mL nebulizer solution Take 3 mLs by nebulization every 6 (six) hours while awake. Rescue    apixaban (ELIQUIS) 5 mg Tab Take 1 tablet (5 mg total) by mouth 2 (two) times daily.    aspirin (ECOTRIN) 81 MG EC tablet Take 1 tablet (81 mg total) by mouth once daily.    budesonide (PULMICORT) 0.5 mg/2 mL nebulizer solution Take 2 mLs (0.5 mg total) by nebulization every 12 (twelve) hours. Controller    busPIRone (BUSPAR) 5 MG Tab Take 1 tablet (5 mg total) by mouth 2 (two) times daily.    carvediloL (COREG) 3.125 MG tablet Take 1 tablet (3.125 mg total) by mouth 2 (two) times daily.    furosemide (LASIX) 40 MG tablet Take 1  tablet (40 mg total) by mouth 2 (two) times a day.    gabapentin (NEURONTIN) 100 MG capsule Take 1 capsule (100 mg total) by mouth 3 (three) times daily.    insulin aspart U-100 (NOVOLOG) 100 unit/mL (3 mL) InPn pen Inject 0-5 Units into the skin before meals and at bedtime as needed (Hyperglycemia).    levothyroxine (SYNTHROID) 50 MCG tablet Take 1 tablet (50 mcg total) by mouth before breakfast.    pantoprazole (PROTONIX) 20 MG tablet Take 40 mg by mouth once daily.    valsartan (DIOVAN) 160 MG tablet Take 1 tablet (160 mg total) by mouth once daily.    vitamin D 1000 units Tab Take 185 mg by mouth once daily.    LIPITOR 40 mg tablet Take 1 tablet (40 mg total) by mouth once daily.     Family History    None       Tobacco Use    Smoking status: Former    Smokeless tobacco: Never   Substance and Sexual Activity    Alcohol use: No    Drug use: No    Sexual activity: Not on file     Review of Systems   Unable to perform ROS: Acuity of condition     Objective:     Vital Signs (Most Recent):  Temp: 96.2 °F (35.7 °C) (08/13/24 0707)  Pulse: 93 (08/13/24 0707)  Resp: (!) 23 (08/13/24 0707)  BP: (!) 108/54 (08/13/24 0600)  SpO2: 96 % (08/13/24 0707) Vital Signs (24h Range):  Temp:  [96.2 °F (35.7 °C)-97.7 °F (36.5 °C)] 96.2 °F (35.7 °C)  Pulse:  [73-93] 93  Resp:  [16-28] 23  SpO2:  [93 %-98 %] 96 %  BP: (105-141)/(54-87) 108/54     Weight: 119.7 kg (263 lb 14.3 oz)  Body mass index is 45.3 kg/m².     Physical Exam  Constitutional:       Appearance: She is obese. She is ill-appearing.   HENT:      Nose: Nose normal.      Mouth/Throat:      Mouth: Mucous membranes are moist.   Eyes:      Extraocular Movements: Extraocular movements intact.      Pupils: Pupils are equal, round, and reactive to light.   Cardiovascular:      Rate and Rhythm: Regular rhythm. Bradycardia present.   Pulmonary:      Breath sounds: Rhonchi present. No rales.      Comments: Improved airflow at bases - bipap  Abdominal:      General: Bowel sounds  are normal. There is no distension.      Palpations: Abdomen is soft.      Tenderness: There is no abdominal tenderness.   Genitourinary:     Comments: Hardwick in place  Musculoskeletal:      Cervical back: Normal range of motion.      Right lower leg: No edema.      Left lower leg: No edema.   Skin:     General: Skin is warm and dry.      Capillary Refill: Capillary refill takes 2 to 3 seconds.   Neurological:      General: No focal deficit present.      Mental Status: She is alert.      Motor: Weakness present.              CRANIAL NERVES     CN III, IV, VI   Pupils are equal, round, and reactive to light.       Significant Labs: All pertinent labs within the past 24 hours have been reviewed.    Significant Imaging: I have reviewed all pertinent imaging results/findings within the past 24 hours.    Assessment/Plan:      * Acute on chronic respiratory failure with hypoxia and hypercapnia  Patient with Hypercapnic and Hypoxic Respiratory failure which is Acute on chronic.  she is on home oxygen at 4 LPM. Supplemental oxygen was provided and noted- Oxygen Concentration (%):  [40-50] 40    .   Signs/symptoms of respiratory failure include- tachypnea, increased work of breathing, respiratory distress, use of accessory muscles, wheezing, and lethargy. Contributing diagnoses includes - CHF, COPD, Interstitial lung disease, and Obesity Hypoventilation Labs and images were reviewed. Patient Has not had a recent ABG. Will treat underlying causes and adjust management of respiratory failure as follows- treat underlying heart failure and COPD exacerbation  8/10 ND continue current treatment with IV antibiotics IV steroids and nebs.  We will continue BiPAP as needed for respiratory support and O2 support  8/11 continue IV antibiotics will begin weaning steroids continue nebs.  O2 protocol will attempt to wean BiPAP today  8/13: Continue to wean BiPAP as tolerated.    Hypothyroid  On Synthroid, check TSH  8/11 TSH at goal;  continue Synthroid    Acute renal failure with tubular necrosis  BHAVESH is likely due to pre-renal azotemia due to intravascular volume depletion. Baseline creatinine is unknown. Most recent creatinine and eGFR are listed below.  Recent Labs     08/11/24  0354 08/12/24  0348 08/13/24  0352   CREATININE 1.9* 1.7* 1.6*   EGFRNORACEVR 27.5* 31.5* 33.8*     8/10 creatinine slowly improving; patient getting fluids via her TPN for nutritional support   Plan  - BHAVESH is improving  - Avoid nephrotoxins and renally dose meds for GFR listed above  - Monitor urine output, serial BMP, and adjust therapy as needed  -   8/11 kidney function continues to slowly improve, monitor I's and O    Hypotension  Monitor with IVF  8/10 patient currently on low-dose Levophed we will continue to wean as tolerates  8/11 blood pressure improved - continue to monitor blood pressure    BP Readings from Last 3 Encounters:   08/13/24 (!) 108/54   08/01/24 122/66   07/30/24 (!) 140/66         Chronic diastolic congestive heart failure  Patient is identified as having Diastolic (HFpEF) heart failure that is Chronic. CHF is currently controlled. Latest ECHO performed and demonstrates- Results for orders placed during the hospital encounter of 07/28/24    Echo Saline Bubble? No; Ultrasound enhancing contrast? No    Interpretation Summary    Left Ventricle: The left ventricle is normal in size. Mildly increased wall thickness. There is normal systolic function with a visually estimated ejection fraction of 55 - 60%. Grade I diastolic dysfunction.    Right Ventricle: Normal right ventricular cavity size. Systolic function is normal.    Left Atrium: Left atrium is severely dilated. Atrial septum is bulging to the right.    Right Atrium: Right atrium is moderately dilated.    Aortic Valve: The aortic valve is a trileaflet valve. Mildly restricted motion. There is mild stenosis. Aortic valve area by VTI is 1.84 cm². Aortic valve peak velocity is 2.18 m/s. Mean  "gradient is 10 mmHg. The dimensionless index is 0.58. There is mild aortic regurgitation.    Mitral Valve: There is mild regurgitation.    Tricuspid Valve: There is mild regurgitation.    Pulmonic Valve: There is mild to moderate regurgitation.    No pericardial effusion.  . Continue Beta Blocker, ACE/ARB, and Furosemide and monitor clinical status closely. Monitor on telemetry. Patient is on CHF pathway.  Monitor strict Is&Os and daily weights.  Place on fluid restriction of 1 L. Cardiology has been consulted. Continue to stress to patient importance of self efficacy and  on diet for CHF. Last BNP reviewed- and noted below No results for input(s): "BNP", "BNPTRIAGEBLO" in the last 168 hours.    8/10 currently euvolemic, we will continue to monitor need for CHF med  8/11 plan for dose of Lasix today, monitor I's and o's  8/12:  match I/O    Diabetes mellitus, type 2  Patient's FSGs are controlled on current medication regimen.  Last A1c reviewed-   Lab Results   Component Value Date    HGBA1C 5.3 07/28/2024     Most recent fingerstick glucose reviewed-   Recent Labs   Lab 08/12/24  1133 08/12/24  1642 08/12/24  2049   POCTGLUCOSE 173* 208* 195*       Current correctional scale  stable  Maintain anti-hyperglycemic dose as follows-   Antihyperglycemics (From admission, onward)      Start     Stop Route Frequency Ordered    08/08/24 1247  insulin aspart U-100 pen 0-5 Units         -- SubQ Before meals & nightly PRN 08/08/24 1147          Hold Oral hypoglycemics while patient is in the hospital.    COPD exacerbation  Patient's COPD is with exacerbation noted by continued dyspnea, use of accessory muscles for breathing, and worsening of baseline hypoxia currently.  Patient is currently on COPD Pathway. Continue scheduled inhalers Steroids, Antibiotics, and Supplemental oxygen and monitor respiratory status closely.     Continue to wean oxygen, steroids and antimicrobial therapy as tolerated.      Weakness  PT/OT " when stable      Severe obesity (BMI >= 40)  Body mass index is 45.3 kg/m². Morbid obesity complicates all aspects of disease management from diagnostic modalities to treatment. Weight loss encouraged and health benefits explained to patient.         Paroxysmal A-fib  Currently in sinus rhythm   8/10 currently bradycardic, will check TSH, continue cardiac monitor  8/11 no longer bradycardic, continue cardiac monitoring    stable      VTE Risk Mitigation (From admission, onward)           Ordered     enoxaparin injection 40 mg  Every 24 hours         08/08/24 1305     IP VTE HIGH RISK PATIENT  Once         08/08/24 0118     Place sequential compression device  Until discontinued         08/08/24 0118                    Discharge Planning   TIGRE:      Code Status: Full Code   Is the patient medically ready for discharge?:     Reason for patient still in hospital (select all that apply): Treatment  Discharge Plan A: Skilled Nursing Facility, New Nursing Home placement - detention care facility   Discharge Delays: None known at this time        Cc time 30 min      Oscar Ge Jr, MD  Department of Hospital Medicine   [place] [facility]

## 2024-08-13 NOTE — ASSESSMENT & PLAN NOTE
Patient's COPD is with exacerbation noted by continued dyspnea, use of accessory muscles for breathing, and worsening of baseline hypoxia currently.  Patient is currently on COPD Pathway. Continue scheduled inhalers Steroids, Antibiotics, and Supplemental oxygen and monitor respiratory status closely.     Continue to wean oxygen, steroids and antimicrobial therapy as tolerated.

## 2024-08-13 NOTE — PLAN OF CARE
Plan of care reviewed with patient. Pt is free of falls and injury. NADN, VSS, afebrile, tele afib, continuous BiPAP through the night, Hardwick intact and draining, SCDs in use. Pt tolerating medications well, TPN infusing, IV abx infused. Questions and concerns addressed.     Problem: Infection  Goal: Absence of Infection Signs and Symptoms  Outcome: Progressing     Problem: Adult Inpatient Plan of Care  Goal: Plan of Care Review  Outcome: Progressing  Goal: Patient-Specific Goal (Individualized)  Outcome: Progressing  Goal: Absence of Hospital-Acquired Illness or Injury  Outcome: Progressing  Goal: Optimal Comfort and Wellbeing  Outcome: Progressing  Goal: Readiness for Transition of Care  Outcome: Progressing     Problem: Bariatric Environmental Safety  Goal: Safety Maintained with Care  Outcome: Progressing     Problem: Skin Injury Risk Increased  Goal: Skin Health and Integrity  Outcome: Progressing     Problem: Diabetes Comorbidity  Goal: Blood Glucose Level Within Targeted Range  Outcome: Progressing     Problem: Noninvasive Ventilation Acute  Goal: Effective Unassisted Ventilation and Oxygenation  Outcome: Progressing     Problem: Fall Injury Risk  Goal: Absence of Fall and Fall-Related Injury  Outcome: Progressing     Problem: Breathing Pattern Ineffective  Goal: Effective Breathing Pattern  Outcome: Progressing     Problem: Gas Exchange Impaired  Goal: Optimal Gas Exchange  Outcome: Progressing     Problem: Comorbidity Management  Goal: Maintenance of COPD Symptom Control  Outcome: Progressing  Goal: Maintenance of Heart Failure Symptom Control  Outcome: Progressing  Goal: Blood Pressure in Desired Range  Outcome: Progressing  Goal: Bariatric Home Regimen Maintained  Outcome: Progressing  Goal: Maintenance of Behavioral Health Symptom Control  Outcome: Progressing     Problem: Acute Kidney Injury/Impairment  Goal: Fluid and Electrolyte Balance  Outcome: Progressing  Goal: Improved Oral Intake  Outcome:  Progressing  Goal: Effective Renal Function  Outcome: Progressing

## 2024-08-13 NOTE — ASSESSMENT & PLAN NOTE
Monitor with IVF  8/10 patient currently on low-dose Levophed we will continue to wean as tolerates  8/11 blood pressure improved - continue to monitor blood pressure    BP Readings from Last 3 Encounters:   08/13/24 (!) 108/54   08/01/24 122/66   07/30/24 (!) 140/66

## 2024-08-13 NOTE — EICU
Intervention Initiated From:  [facility] / EICU    Elliott intervened regarding:  Rounding (Video assessment)    Comments:   Video assessment complete. Pt awake and alert at this time, on NC, attempting to wean bipap. SOB noted when pt speaks, O2 sats 91%. Pt w/ no complaints at this time.

## 2024-08-13 NOTE — ASSESSMENT & PLAN NOTE
"Patient is identified as having Diastolic (HFpEF) heart failure that is Chronic. CHF is currently controlled. Latest ECHO performed and demonstrates- Results for orders placed during the hospital encounter of 07/28/24    Echo Saline Bubble? No; Ultrasound enhancing contrast? No    Interpretation Summary    Left Ventricle: The left ventricle is normal in size. Mildly increased wall thickness. There is normal systolic function with a visually estimated ejection fraction of 55 - 60%. Grade I diastolic dysfunction.    Right Ventricle: Normal right ventricular cavity size. Systolic function is normal.    Left Atrium: Left atrium is severely dilated. Atrial septum is bulging to the right.    Right Atrium: Right atrium is moderately dilated.    Aortic Valve: The aortic valve is a trileaflet valve. Mildly restricted motion. There is mild stenosis. Aortic valve area by VTI is 1.84 cm². Aortic valve peak velocity is 2.18 m/s. Mean gradient is 10 mmHg. The dimensionless index is 0.58. There is mild aortic regurgitation.    Mitral Valve: There is mild regurgitation.    Tricuspid Valve: There is mild regurgitation.    Pulmonic Valve: There is mild to moderate regurgitation.    No pericardial effusion.  . Continue Beta Blocker, ACE/ARB, and Furosemide and monitor clinical status closely. Monitor on telemetry. Patient is on CHF pathway.  Monitor strict Is&Os and daily weights.  Place on fluid restriction of 1 L. Cardiology has been consulted. Continue to stress to patient importance of self efficacy and  on diet for CHF. Last BNP reviewed- and noted below No results for input(s): "BNP", "BNPTRIAGEBLO" in the last 168 hours.    8/10 currently euvolemic, we will continue to monitor need for CHF med  8/11 plan for dose of Lasix today, monitor I's and o's  8/12:  match I/O  "

## 2024-08-13 NOTE — ASSESSMENT & PLAN NOTE
Patient with Hypercapnic and Hypoxic Respiratory failure which is Acute on chronic.  she is on home oxygen at 4 LPM. Supplemental oxygen was provided and noted- Oxygen Concentration (%):  [40-50] 40    .   Signs/symptoms of respiratory failure include- tachypnea, increased work of breathing, respiratory distress, use of accessory muscles, wheezing, and lethargy. Contributing diagnoses includes - CHF, COPD, Interstitial lung disease, and Obesity Hypoventilation Labs and images were reviewed. Patient Has not had a recent ABG. Will treat underlying causes and adjust management of respiratory failure as follows- treat underlying heart failure and COPD exacerbation  8/10 ND continue current treatment with IV antibiotics IV steroids and nebs.  We will continue BiPAP as needed for respiratory support and O2 support  8/11 continue IV antibiotics will begin weaning steroids continue nebs.  O2 protocol will attempt to wean BiPAP today  8/13: Continue to wean BiPAP as tolerated.

## 2024-08-13 NOTE — EICU
Intervention Initiated From:  [facility] / EICU    Elliott intervened regarding:  Rounding (Video assessment)    Nurse Notified:  No    Doctor Notified:  No    Comments: Rounding done. Resting on 40% BiPAP. Side rails up x4. B/P 112/76, HR 78, resp 18, sat 95. On TPN @ 40/hour and Lipid.

## 2024-08-13 NOTE — ASSESSMENT & PLAN NOTE
BHAVESH is likely due to pre-renal azotemia due to intravascular volume depletion. Baseline creatinine is unknown. Most recent creatinine and eGFR are listed below.  Recent Labs     08/11/24  0354 08/12/24  0348 08/13/24  0352   CREATININE 1.9* 1.7* 1.6*   EGFRNORACEVR 27.5* 31.5* 33.8*     8/10 creatinine slowly improving; patient getting fluids via her TPN for nutritional support   Plan  - BHAVESH is improving  - Avoid nephrotoxins and renally dose meds for GFR listed above  - Monitor urine output, serial BMP, and adjust therapy as needed  -   8/11 kidney function continues to slowly improve, monitor I's and O

## 2024-08-13 NOTE — CARE UPDATE
Patient has been on nasal cannula for 10 hours. She has tolerated well. She consumed at least 75% of her meals and her TPN has been stopped. She had a bout of Afib with RVR today and was started on metoprolol 25mg PO BID. HR afib rate in the 80's to 90's

## 2024-08-13 NOTE — ASSESSMENT & PLAN NOTE
Patient's FSGs are controlled on current medication regimen.  Last A1c reviewed-   Lab Results   Component Value Date    HGBA1C 5.3 07/28/2024     Most recent fingerstick glucose reviewed-   Recent Labs   Lab 08/12/24  1133 08/12/24  1642 08/12/24  2049   POCTGLUCOSE 173* 208* 195*       Current correctional scale  stable  Maintain anti-hyperglycemic dose as follows-   Antihyperglycemics (From admission, onward)    Start     Stop Route Frequency Ordered    08/08/24 1247  insulin aspart U-100 pen 0-5 Units         -- SubQ Before meals & nightly PRN 08/08/24 1147        Hold Oral hypoglycemics while patient is in the hospital.

## 2024-08-13 NOTE — SUBJECTIVE & OBJECTIVE
Past Medical History:   Diagnosis Date    Abdominal hernia     Bacteremia 04/05/2023    CHF (congestive heart failure)     COPD (chronic obstructive pulmonary disease)     Diabetes mellitus, type 2 02/16/2022    GERD (gastroesophageal reflux disease)     History of home oxygen therapy     Hypertension     Hypertensive emergency 03/30/2023    Migraine headache     On home O2     Pulmonary embolism 06/01/2017    Small bowel obstruction     Thyroid disease        Past Surgical History:   Procedure Laterality Date    COLONOSCOPY      HYSTERECTOMY      INNER EAR SURGERY      UPPER GASTROINTESTINAL ENDOSCOPY         Review of patient's allergies indicates:   Allergen Reactions    Pcn [penicillins] Swelling       No current facility-administered medications on file prior to encounter.     Current Outpatient Medications on File Prior to Encounter   Medication Sig    acetaminophen (TYLENOL) 325 MG tablet Take 2 tablets (650 mg total) by mouth every 8 (eight) hours as needed for Pain or Temperature greater than (100, headache).    albuterol-ipratropium (DUO-NEB) 2.5 mg-0.5 mg/3 mL nebulizer solution Take 3 mLs by nebulization every 6 (six) hours while awake. Rescue    apixaban (ELIQUIS) 5 mg Tab Take 1 tablet (5 mg total) by mouth 2 (two) times daily.    aspirin (ECOTRIN) 81 MG EC tablet Take 1 tablet (81 mg total) by mouth once daily.    budesonide (PULMICORT) 0.5 mg/2 mL nebulizer solution Take 2 mLs (0.5 mg total) by nebulization every 12 (twelve) hours. Controller    busPIRone (BUSPAR) 5 MG Tab Take 1 tablet (5 mg total) by mouth 2 (two) times daily.    carvediloL (COREG) 3.125 MG tablet Take 1 tablet (3.125 mg total) by mouth 2 (two) times daily.    furosemide (LASIX) 40 MG tablet Take 1 tablet (40 mg total) by mouth 2 (two) times a day.    gabapentin (NEURONTIN) 100 MG capsule Take 1 capsule (100 mg total) by mouth 3 (three) times daily.    insulin aspart U-100 (NOVOLOG) 100 unit/mL (3 mL) InPn pen Inject 0-5 Units into  the skin before meals and at bedtime as needed (Hyperglycemia).    levothyroxine (SYNTHROID) 50 MCG tablet Take 1 tablet (50 mcg total) by mouth before breakfast.    pantoprazole (PROTONIX) 20 MG tablet Take 40 mg by mouth once daily.    valsartan (DIOVAN) 160 MG tablet Take 1 tablet (160 mg total) by mouth once daily.    vitamin D 1000 units Tab Take 185 mg by mouth once daily.    LIPITOR 40 mg tablet Take 1 tablet (40 mg total) by mouth once daily.     Family History    None       Tobacco Use    Smoking status: Former    Smokeless tobacco: Never   Substance and Sexual Activity    Alcohol use: No    Drug use: No    Sexual activity: Not on file     Review of Systems   Unable to perform ROS: Acuity of condition     Objective:     Vital Signs (Most Recent):  Temp: 96.2 °F (35.7 °C) (08/13/24 0707)  Pulse: 93 (08/13/24 0707)  Resp: (!) 23 (08/13/24 0707)  BP: (!) 108/54 (08/13/24 0600)  SpO2: 96 % (08/13/24 0707) Vital Signs (24h Range):  Temp:  [96.2 °F (35.7 °C)-97.7 °F (36.5 °C)] 96.2 °F (35.7 °C)  Pulse:  [73-93] 93  Resp:  [16-28] 23  SpO2:  [93 %-98 %] 96 %  BP: (105-141)/(54-87) 108/54     Weight: 119.7 kg (263 lb 14.3 oz)  Body mass index is 45.3 kg/m².     Physical Exam  Constitutional:       Appearance: She is obese. She is ill-appearing.   HENT:      Nose: Nose normal.      Mouth/Throat:      Mouth: Mucous membranes are moist.   Eyes:      Extraocular Movements: Extraocular movements intact.      Pupils: Pupils are equal, round, and reactive to light.   Cardiovascular:      Rate and Rhythm: Regular rhythm. Bradycardia present.   Pulmonary:      Breath sounds: Rhonchi present. No rales.      Comments: Improved airflow at bases - bipap  Abdominal:      General: Bowel sounds are normal. There is no distension.      Palpations: Abdomen is soft.      Tenderness: There is no abdominal tenderness.   Genitourinary:     Comments: Hardwick in place  Musculoskeletal:      Cervical back: Normal range of motion.      Right  lower leg: No edema.      Left lower leg: No edema.   Skin:     General: Skin is warm and dry.      Capillary Refill: Capillary refill takes 2 to 3 seconds.   Neurological:      General: No focal deficit present.      Mental Status: She is alert.      Motor: Weakness present.              CRANIAL NERVES     CN III, IV, VI   Pupils are equal, round, and reactive to light.       Significant Labs: All pertinent labs within the past 24 hours have been reviewed.    Significant Imaging: I have reviewed all pertinent imaging results/findings within the past 24 hours.

## 2024-08-14 LAB
ALBUMIN SERPL BCP-MCNC: 2 G/DL (ref 3.5–5.2)
ALP SERPL-CCNC: 107 U/L (ref 55–135)
ALT SERPL W/O P-5'-P-CCNC: 22 U/L (ref 10–44)
ANION GAP SERPL CALC-SCNC: 3 MMOL/L (ref 3–11)
AST SERPL-CCNC: 20 U/L (ref 10–40)
BASOPHILS # BLD AUTO: 0.02 K/UL (ref 0–0.2)
BASOPHILS NFR BLD: 0.2 % (ref 0–1.9)
BILIRUB SERPL-MCNC: 0.2 MG/DL (ref 0.1–1)
BUN SERPL-MCNC: 87 MG/DL (ref 8–23)
CALCIUM SERPL-MCNC: 9.7 MG/DL (ref 8.7–10.5)
CHLORIDE SERPL-SCNC: 103 MMOL/L (ref 95–110)
CO2 SERPL-SCNC: 38 MMOL/L (ref 23–29)
CREAT SERPL-MCNC: 1.8 MG/DL (ref 0.5–1.4)
DIFFERENTIAL METHOD BLD: ABNORMAL
EOSINOPHIL # BLD AUTO: 0 K/UL (ref 0–0.5)
EOSINOPHIL NFR BLD: 0 % (ref 0–8)
ERYTHROCYTE [DISTWIDTH] IN BLOOD BY AUTOMATED COUNT: 16.1 % (ref 11.5–14.5)
EST. GFR  (NO RACE VARIABLE): 29.4 ML/MIN/1.73 M^2
GLUCOSE SERPL-MCNC: 122 MG/DL (ref 70–110)
HCT VFR BLD AUTO: 44.1 % (ref 37–48.5)
HGB BLD-MCNC: 14 G/DL (ref 12–16)
IMM GRANULOCYTES # BLD AUTO: 0.36 K/UL (ref 0–0.04)
IMM GRANULOCYTES NFR BLD AUTO: 2.8 % (ref 0–0.5)
LYMPHOCYTES # BLD AUTO: 0.8 K/UL (ref 1–4.8)
LYMPHOCYTES NFR BLD: 6 % (ref 18–48)
MAGNESIUM SERPL-MCNC: 2.2 MG/DL (ref 1.6–2.6)
MCH RBC QN AUTO: 28.4 PG (ref 27–31)
MCHC RBC AUTO-ENTMCNC: 31.7 G/DL (ref 32–36)
MCV RBC AUTO: 90 FL (ref 82–98)
MONOCYTES # BLD AUTO: 0.6 K/UL (ref 0.3–1)
MONOCYTES NFR BLD: 4.4 % (ref 4–15)
NEUTROPHILS # BLD AUTO: 11.2 K/UL (ref 1.8–7.7)
NEUTROPHILS NFR BLD: 86.6 % (ref 38–73)
NRBC BLD-RTO: 0 /100 WBC
PLATELET # BLD AUTO: 290 K/UL (ref 150–450)
PMV BLD AUTO: 9.8 FL (ref 9.2–12.9)
POCT GLUCOSE: 118 MG/DL (ref 70–110)
POCT GLUCOSE: 125 MG/DL (ref 70–110)
POCT GLUCOSE: 131 MG/DL (ref 70–110)
POTASSIUM SERPL-SCNC: 4.9 MMOL/L (ref 3.5–5.1)
PROT SERPL-MCNC: 6.2 G/DL (ref 6–8.4)
RBC # BLD AUTO: 4.93 M/UL (ref 4–5.4)
SODIUM SERPL-SCNC: 144 MMOL/L (ref 136–145)
WBC # BLD AUTO: 12.92 K/UL (ref 3.9–12.7)

## 2024-08-14 PROCEDURE — 25000242 PHARM REV CODE 250 ALT 637 W/ HCPCS: Performed by: INTERNAL MEDICINE

## 2024-08-14 PROCEDURE — 80053 COMPREHEN METABOLIC PANEL: CPT | Performed by: INTERNAL MEDICINE

## 2024-08-14 PROCEDURE — 99900031 HC PATIENT EDUCATION (STAT)

## 2024-08-14 PROCEDURE — 99900035 HC TECH TIME PER 15 MIN (STAT)

## 2024-08-14 PROCEDURE — 27100171 HC OXYGEN HIGH FLOW UP TO 24 HOURS

## 2024-08-14 PROCEDURE — 21400001 HC TELEMETRY ROOM

## 2024-08-14 PROCEDURE — 36415 COLL VENOUS BLD VENIPUNCTURE: CPT | Performed by: INTERNAL MEDICINE

## 2024-08-14 PROCEDURE — 25000003 PHARM REV CODE 250: Performed by: INTERNAL MEDICINE

## 2024-08-14 PROCEDURE — 94660 CPAP INITIATION&MGMT: CPT

## 2024-08-14 PROCEDURE — 97163 PT EVAL HIGH COMPLEX 45 MIN: CPT

## 2024-08-14 PROCEDURE — 83735 ASSAY OF MAGNESIUM: CPT | Performed by: INTERNAL MEDICINE

## 2024-08-14 PROCEDURE — 63600175 PHARM REV CODE 636 W HCPCS: Performed by: INTERNAL MEDICINE

## 2024-08-14 PROCEDURE — 94640 AIRWAY INHALATION TREATMENT: CPT

## 2024-08-14 PROCEDURE — 97166 OT EVAL MOD COMPLEX 45 MIN: CPT

## 2024-08-14 PROCEDURE — 94761 N-INVAS EAR/PLS OXIMETRY MLT: CPT

## 2024-08-14 PROCEDURE — A4216 STERILE WATER/SALINE, 10 ML: HCPCS | Performed by: INTERNAL MEDICINE

## 2024-08-14 PROCEDURE — 85025 COMPLETE CBC W/AUTO DIFF WBC: CPT | Performed by: INTERNAL MEDICINE

## 2024-08-14 RX ADMIN — METOPROLOL TARTRATE 25 MG: 25 TABLET, FILM COATED ORAL at 09:08

## 2024-08-14 RX ADMIN — SODIUM CHLORIDE, PRESERVATIVE FREE 10 ML: 5 INJECTION INTRAVENOUS at 05:08

## 2024-08-14 RX ADMIN — SODIUM CHLORIDE, PRESERVATIVE FREE 10 ML: 5 INJECTION INTRAVENOUS at 12:08

## 2024-08-14 RX ADMIN — ASPIRIN 81 MG: 81 TABLET, COATED ORAL at 09:08

## 2024-08-14 RX ADMIN — POLYETHYLENE GLYCOL (3350) 17 G: 17 POWDER, FOR SOLUTION ORAL at 09:08

## 2024-08-14 RX ADMIN — IPRATROPIUM BROMIDE AND ALBUTEROL SULFATE 3 ML: 2.5; .5 SOLUTION RESPIRATORY (INHALATION) at 08:08

## 2024-08-14 RX ADMIN — METHYLPREDNISOLONE SODIUM SUCCINATE 80 MG: 125 INJECTION, POWDER, FOR SOLUTION INTRAMUSCULAR; INTRAVENOUS at 01:08

## 2024-08-14 RX ADMIN — METHYLPREDNISOLONE SODIUM SUCCINATE 80 MG: 125 INJECTION, POWDER, FOR SOLUTION INTRAMUSCULAR; INTRAVENOUS at 09:08

## 2024-08-14 RX ADMIN — IPRATROPIUM BROMIDE AND ALBUTEROL SULFATE 3 ML: 2.5; .5 SOLUTION RESPIRATORY (INHALATION) at 01:08

## 2024-08-14 RX ADMIN — Medication 6 MG: at 09:08

## 2024-08-14 RX ADMIN — METHYLPREDNISOLONE SODIUM SUCCINATE 80 MG: 125 INJECTION, POWDER, FOR SOLUTION INTRAMUSCULAR; INTRAVENOUS at 05:08

## 2024-08-14 RX ADMIN — ENOXAPARIN SODIUM 40 MG: 40 INJECTION SUBCUTANEOUS at 05:08

## 2024-08-14 RX ADMIN — ATORVASTATIN CALCIUM 40 MG: 40 TABLET, FILM COATED ORAL at 09:08

## 2024-08-14 RX ADMIN — IPRATROPIUM BROMIDE AND ALBUTEROL SULFATE 3 ML: 2.5; .5 SOLUTION RESPIRATORY (INHALATION) at 07:08

## 2024-08-14 RX ADMIN — LEVOTHYROXINE SODIUM 50 MCG: 50 TABLET ORAL at 05:08

## 2024-08-14 RX ADMIN — FUROSEMIDE 40 MG: 10 INJECTION, SOLUTION INTRAVENOUS at 09:08

## 2024-08-14 NOTE — ASSESSMENT & PLAN NOTE
Monitor with IVF  8/10 patient currently on low-dose Levophed we will continue to wean as tolerates  8/11 blood pressure improved - continue to monitor blood pressure    BP Readings from Last 3 Encounters:   08/14/24 120/71   08/01/24 122/66   07/30/24 (!) 140/66

## 2024-08-14 NOTE — ASSESSMENT & PLAN NOTE
Patient with Hypercapnic and Hypoxic Respiratory failure which is Acute on chronic.  she is on home oxygen at 4 LPM. Supplemental oxygen was provided and noted- Oxygen Concentration (%):  [30-40] 30    .   Signs/symptoms of respiratory failure include- tachypnea, increased work of breathing, respiratory distress, use of accessory muscles, wheezing, and lethargy. Contributing diagnoses includes - CHF, COPD, Interstitial lung disease, and Obesity Hypoventilation Labs and images were reviewed. Patient Has not had a recent ABG. Will treat underlying causes and adjust management of respiratory failure as follows- treat underlying heart failure and COPD exacerbation  8/10 ND continue current treatment with IV antibiotics IV steroids and nebs.  We will continue BiPAP as needed for respiratory support and O2 support  8/11 continue IV antibiotics will begin weaning steroids continue nebs.  O2 protocol will attempt to wean BiPAP today  8/13: Continue to wean BiPAP as tolerated.  8/14: Patient now tolerating Ventimask.

## 2024-08-14 NOTE — PLAN OF CARE
Problem: Occupational Therapy  Goal: Occupational Therapy Goal  Description: Goals to be met by: 08/23/24     Patient will increase functional independence with ADLs by performing:    Feeding with Modified [place].  UE Dressing with Minimal Assistance.  LE Dressing with Moderate Assistance.  Grooming while seated with Set-up Assistance.  Toileting from toilet with Moderate Assistance for hygiene and clothing management.   Bathing from  edge of bed with Moderate Assistance.  Toilet transfer to toilet with Minimal Assistance.    Outcome: Established OT POC

## 2024-08-14 NOTE — PROGRESS NOTES
"[facility] Medicine  Progress Note    Patient Name: Carmen Tan  MRN: [id number]  Patient Class: IP- Inpatient   Admission Date: 8/7/2024  Length of Stay: 7 days  Attending Physician: Oscar Ge Jr., MD  Primary Care Provider: Lucian Barry MD        Subjective:     Principal Problem:Acute on chronic respiratory failure with hypoxia and hypercapnia  Acute Condition: respiratory failure         HPI:  Chief Complaint   Patient presents with    Shortness of Breath       Pt to ED via EMS from residence - Room air "in the 70's with tachypnea and hypotension." Pt fell this morning and is also complaining of neck pain.       74-year-old female past medical history CHF morbid obesity presents the ED via EMS with reports of shortness of breath and possible fall.  Patient was recently discharged from the hospital 6 days ago with week-long stay for heart failure.  Family reports she has been taking all medications as prescribed.  Patient reports that she feels short of breath and noted neck pain      updated HPI:  Patient nonverbal on BiPAP.  Above history reviewed.  Patient presents with shortness of breath with possible fall.  Recently discharge from heart failure now with acute renal failure and hypotension concerning for over-diuresis.    Overview/Hospital Course:  8/9/24:  remains bipap dependent.  Responding to gently IVF and steroids.   8/10/24 ND patient is sleeping but will awaken with arousal; patient reports being tired and will follow commands and squeeze your hand asked.  Still on low-dose norepi - will wean as tolerated.  Patient has some bradycardia, not on any rate-controlling medicine will continue to monitor  8/11/24 ND currently off of all pressors.  Her heart rate is ranging between 60- 80 at this time.  Appreciate cardiology recs.  Chest x-ray reviewed this a.m. with improvement in airspace.  Will give dose of IV Lasix today  08/12/2024:  Patient more alert this " morning.  X-ray reviews improve aeration.  Blood cultures negative.  Continue to wean BiPAP as tolerated.  8/13: Patient remains on BiPAP.  Will attempt to wean BiPAP utilization today in order to pursue enteral feedings.  6/14: Patient tolerated p.o. intake yesterday.  Parenteral nutrition discontinued.  Stable medically from a respiratory perspective will transition to [facility] for ongoing management.  Patient needs ongoing physical and occupational therapy and ongoing evaluation by case management regarding placement at time discharge.    Past Medical History:   Diagnosis Date    Abdominal hernia     Bacteremia 04/05/2023    CHF (congestive heart failure)     COPD (chronic obstructive pulmonary disease)     Diabetes mellitus, type 2 02/16/2022    GERD (gastroesophageal reflux disease)     History of home oxygen therapy     Hypertension     Hypertensive emergency 03/30/2023    Migraine headache     On home O2     Pulmonary embolism 06/01/2017    Small bowel obstruction     Thyroid disease        Past Surgical History:   Procedure Laterality Date    COLONOSCOPY      HYSTERECTOMY      INNER EAR SURGERY      UPPER GASTROINTESTINAL ENDOSCOPY         Review of patient's allergies indicates:   Allergen Reactions    Pcn [penicillins] Swelling       No current facility-administered medications on file prior to encounter.     Current Outpatient Medications on File Prior to Encounter   Medication Sig    acetaminophen (TYLENOL) 325 MG tablet Take 2 tablets (650 mg total) by mouth every 8 (eight) hours as needed for Pain or Temperature greater than (100, headache).    albuterol-ipratropium (DUO-NEB) 2.5 mg-0.5 mg/3 mL nebulizer solution Take 3 mLs by nebulization every 6 (six) hours while awake. Rescue    apixaban (ELIQUIS) 5 mg Tab Take 1 tablet (5 mg total) by mouth 2 (two) times daily.    aspirin (ECOTRIN) 81 MG EC tablet Take 1 tablet (81 mg total) by mouth once daily.    budesonide (PULMICORT) 0.5 mg/2 mL nebulizer  solution Take 2 mLs (0.5 mg total) by nebulization every 12 (twelve) hours. Controller    busPIRone (BUSPAR) 5 MG Tab Take 1 tablet (5 mg total) by mouth 2 (two) times daily.    carvediloL (COREG) 3.125 MG tablet Take 1 tablet (3.125 mg total) by mouth 2 (two) times daily.    furosemide (LASIX) 40 MG tablet Take 1 tablet (40 mg total) by mouth 2 (two) times a day.    gabapentin (NEURONTIN) 100 MG capsule Take 1 capsule (100 mg total) by mouth 3 (three) times daily.    insulin aspart U-100 (NOVOLOG) 100 unit/mL (3 mL) InPn pen Inject 0-5 Units into the skin before meals and at bedtime as needed (Hyperglycemia).    levothyroxine (SYNTHROID) 50 MCG tablet Take 1 tablet (50 mcg total) by mouth before breakfast.    pantoprazole (PROTONIX) 20 MG tablet Take 40 mg by mouth once daily.    valsartan (DIOVAN) 160 MG tablet Take 1 tablet (160 mg total) by mouth once daily.    vitamin D 1000 units Tab Take 185 mg by mouth once daily.    LIPITOR 40 mg tablet Take 1 tablet (40 mg total) by mouth once daily.     Family History    None       Tobacco Use    Smoking status: Former    Smokeless tobacco: Never   Substance and Sexual Activity    Alcohol use: No    Drug use: No    Sexual activity: Not on file     Review of Systems   Unable to perform ROS: Acuity of condition     Objective:     Vital Signs (Most Recent):  Temp: 97.4 °F (36.3 °C) (08/14/24 0701)  Pulse: 87 (08/14/24 0706)  Resp: 20 (08/14/24 0706)  BP: 120/71 (08/14/24 0700)  SpO2: (!) 94 % (08/14/24 0706) Vital Signs (24h Range):  Temp:  [96.7 °F (35.9 °C)-97.5 °F (36.4 °C)] 97.4 °F (36.3 °C)  Pulse:  [] 87  Resp:  [16-32] 20  SpO2:  [90 %-96 %] 94 %  BP: (102-142)/(60-93) 120/71     Weight: 119.7 kg (263 lb 14.3 oz)  Body mass index is 45.3 kg/m².     Physical Exam  Constitutional:       Appearance: She is obese. She is ill-appearing.   HENT:      Nose: Nose normal.      Mouth/Throat:      Mouth: Mucous membranes are moist.   Eyes:      Extraocular Movements:  Extraocular movements intact.      Pupils: Pupils are equal, round, and reactive to light.   Cardiovascular:      Rate and Rhythm: Regular rhythm. Bradycardia present.   Pulmonary:      Breath sounds: Rhonchi present. No rales.      Comments: Improved airflow at bases - venti  Abdominal:      General: Bowel sounds are normal. There is no distension.      Palpations: Abdomen is soft.      Tenderness: There is no abdominal tenderness.   Genitourinary:     Comments: Hardwick in place  Musculoskeletal:      Cervical back: Normal range of motion.      Right lower leg: No edema.      Left lower leg: No edema.   Skin:     General: Skin is warm and dry.      Capillary Refill: Capillary refill takes 2 to 3 seconds.   Neurological:      General: No focal deficit present.      Mental Status: She is alert.      Motor: Weakness present.              CRANIAL NERVES     CN III, IV, VI   Pupils are equal, round, and reactive to light.       Significant Labs: All pertinent labs within the past 24 hours have been reviewed.    Significant Imaging: I have reviewed all pertinent imaging results/findings within the past 24 hours.    Assessment/Plan:      * Acute on chronic respiratory failure with hypoxia and hypercapnia  Patient with Hypercapnic and Hypoxic Respiratory failure which is Acute on chronic.  she is on home oxygen at 4 LPM. Supplemental oxygen was provided and noted- Oxygen Concentration (%):  [30-40] 30    .   Signs/symptoms of respiratory failure include- tachypnea, increased work of breathing, respiratory distress, use of accessory muscles, wheezing, and lethargy. Contributing diagnoses includes - CHF, COPD, Interstitial lung disease, and Obesity Hypoventilation Labs and images were reviewed. Patient Has not had a recent ABG. Will treat underlying causes and adjust management of respiratory failure as follows- treat underlying heart failure and COPD exacerbation  8/10 ND continue current treatment with IV antibiotics IV  steroids and nebs.  We will continue BiPAP as needed for respiratory support and O2 support  8/11 continue IV antibiotics will begin weaning steroids continue nebs.  O2 protocol will attempt to wean BiPAP today  8/13: Continue to wean BiPAP as tolerated.  8/14: Patient now tolerating Ventimask.    Hypothyroid  On Synthroid, check TSH  8/11 TSH at goal; continue Synthroid    Acute renal failure with tubular necrosis  BHAVESH is likely due to pre-renal azotemia due to intravascular volume depletion. Baseline creatinine is unknown. Most recent creatinine and eGFR are listed below.  Recent Labs     08/12/24  0348 08/13/24  0352 08/14/24  0341   CREATININE 1.7* 1.6* 1.8*   EGFRNORACEVR 31.5* 33.8* 29.4*   8/10 creatinine slowly improving; patient getting fluids via her TPN for nutritional support   Plan  - BHAVESH is improving  - Avoid nephrotoxins and renally dose meds for GFR listed above  - Monitor urine output, serial BMP, and adjust therapy as needed  -   8/11 kidney function continues to slowly improve, monitor I's and O    Hypotension  Monitor with IVF  8/10 patient currently on low-dose Levophed we will continue to wean as tolerates  8/11 blood pressure improved - continue to monitor blood pressure    BP Readings from Last 3 Encounters:   08/14/24 120/71   08/01/24 122/66   07/30/24 (!) 140/66         Chronic diastolic congestive heart failure  Patient is identified as having Diastolic (HFpEF) heart failure that is Chronic. CHF is currently controlled. Latest ECHO performed and demonstrates- Results for orders placed during the hospital encounter of 07/28/24    Echo Saline Bubble? No; Ultrasound enhancing contrast? No    Interpretation Summary    Left Ventricle: The left ventricle is normal in size. Mildly increased wall thickness. There is normal systolic function with a visually estimated ejection fraction of 55 - 60%. Grade I diastolic dysfunction.    Right Ventricle: Normal right ventricular cavity size. Systolic  "function is normal.    Left Atrium: Left atrium is severely dilated. Atrial septum is bulging to the right.    Right Atrium: Right atrium is moderately dilated.    Aortic Valve: The aortic valve is a trileaflet valve. Mildly restricted motion. There is mild stenosis. Aortic valve area by VTI is 1.84 cm². Aortic valve peak velocity is 2.18 m/s. Mean gradient is 10 mmHg. The dimensionless index is 0.58. There is mild aortic regurgitation.    Mitral Valve: There is mild regurgitation.    Tricuspid Valve: There is mild regurgitation.    Pulmonic Valve: There is mild to moderate regurgitation.    No pericardial effusion.  . Continue Beta Blocker, ACE/ARB, and Furosemide and monitor clinical status closely. Monitor on telemetry. Patient is on CHF pathway.  Monitor strict Is&Os and daily weights.  Place on fluid restriction of 1 L. Cardiology has been consulted. Continue to stress to patient importance of self efficacy and  on diet for CHF. Last BNP reviewed- and noted below No results for input(s): "BNP", "BNPTRIAGEBLO" in the last 168 hours.    8/10 currently euvolemic, we will continue to monitor need for CHF med  8/11 plan for dose of Lasix today, monitor I's and o's  8/12:  match I/O    Volume overload:  Continue to monitor strict intake and output.    I/O last 3 completed shifts:  In: 2405.1 [P.O.:960; IV Piggyback:443.8]  Out: 2850 [Urine:2850]  No intake/output data recorded.         Diabetes mellitus, type 2  Patient's FSGs are controlled on current medication regimen.  Last A1c reviewed-   Lab Results   Component Value Date    HGBA1C 5.3 07/28/2024     Most recent fingerstick glucose reviewed-   Recent Labs   Lab 08/13/24  1123 08/13/24  1700 08/13/24  2102   POCTGLUCOSE 156* 128* 120*       Current correctional scale  stable  Maintain anti-hyperglycemic dose as follows-   Antihyperglycemics (From admission, onward)      Start     Stop Route Frequency Ordered    08/08/24 1247  insulin aspart U-100 pen 0-5 " Units         -- SubQ Before meals & nightly PRN 08/08/24 1147          Hold Oral hypoglycemics while patient is in the hospital.    COPD exacerbation  Patient's COPD is with exacerbation noted by continued dyspnea, use of accessory muscles for breathing, and worsening of baseline hypoxia currently.  Patient is currently on COPD Pathway. Continue scheduled inhalers Steroids, Antibiotics, and Supplemental oxygen and monitor respiratory status closely.     Continue to wean oxygen, steroids and antimicrobial therapy as tolerated.      Weakness  PT/OT when stable      Severe obesity (BMI >= 40)  Body mass index is 45.3 kg/m². Morbid obesity complicates all aspects of disease management from diagnostic modalities to treatment. Weight loss encouraged and health benefits explained to patient.         Paroxysmal A-fib  Currently in sinus rhythm   8/10 currently bradycardic, will check TSH, continue cardiac monitor  8/11 no longer bradycardic, continue cardiac monitoring    stable      VTE Risk Mitigation (From admission, onward)           Ordered     enoxaparin injection 40 mg  Every 24 hours         08/08/24 1305     IP VTE HIGH RISK PATIENT  Once         08/08/24 0118     Place sequential compression device  Until discontinued         08/08/24 0118                    Discharge Planning   TIGRE:      Code Status: Full Code   Is the patient medically ready for discharge?:     Reason for patient still in hospital (select all that apply): Treatment  Discharge Plan A: Skilled Nursing Facility, New Nursing Home placement - assisted care facility   Discharge Delays: None known at this time    Cc time 30 min          Oscar Ge Jr, MD  Department of Hospital Medicine   [place] - [facility]ensive [facility]

## 2024-08-14 NOTE — SUBJECTIVE & OBJECTIVE
Past Medical History:   Diagnosis Date    Abdominal hernia     Bacteremia 04/05/2023    CHF (congestive heart failure)     COPD (chronic obstructive pulmonary disease)     Diabetes mellitus, type 2 02/16/2022    GERD (gastroesophageal reflux disease)     History of home oxygen therapy     Hypertension     Hypertensive emergency 03/30/2023    Migraine headache     On home O2     Pulmonary embolism 06/01/2017    Small bowel obstruction     Thyroid disease        Past Surgical History:   Procedure Laterality Date    COLONOSCOPY      HYSTERECTOMY      INNER EAR SURGERY      UPPER GASTROINTESTINAL ENDOSCOPY         Review of patient's allergies indicates:   Allergen Reactions    Pcn [penicillins] Swelling       No current facility-administered medications on file prior to encounter.     Current Outpatient Medications on File Prior to Encounter   Medication Sig    acetaminophen (TYLENOL) 325 MG tablet Take 2 tablets (650 mg total) by mouth every 8 (eight) hours as needed for Pain or Temperature greater than (100, headache).    albuterol-ipratropium (DUO-NEB) 2.5 mg-0.5 mg/3 mL nebulizer solution Take 3 mLs by nebulization every 6 (six) hours while awake. Rescue    apixaban (ELIQUIS) 5 mg Tab Take 1 tablet (5 mg total) by mouth 2 (two) times daily.    aspirin (ECOTRIN) 81 MG EC tablet Take 1 tablet (81 mg total) by mouth once daily.    budesonide (PULMICORT) 0.5 mg/2 mL nebulizer solution Take 2 mLs (0.5 mg total) by nebulization every 12 (twelve) hours. Controller    busPIRone (BUSPAR) 5 MG Tab Take 1 tablet (5 mg total) by mouth 2 (two) times daily.    carvediloL (COREG) 3.125 MG tablet Take 1 tablet (3.125 mg total) by mouth 2 (two) times daily.    furosemide (LASIX) 40 MG tablet Take 1 tablet (40 mg total) by mouth 2 (two) times a day.    gabapentin (NEURONTIN) 100 MG capsule Take 1 capsule (100 mg total) by mouth 3 (three) times daily.    insulin aspart U-100 (NOVOLOG) 100 unit/mL (3 mL) InPn pen Inject 0-5 Units into  the skin before meals and at bedtime as needed (Hyperglycemia).    levothyroxine (SYNTHROID) 50 MCG tablet Take 1 tablet (50 mcg total) by mouth before breakfast.    pantoprazole (PROTONIX) 20 MG tablet Take 40 mg by mouth once daily.    valsartan (DIOVAN) 160 MG tablet Take 1 tablet (160 mg total) by mouth once daily.    vitamin D 1000 units Tab Take 185 mg by mouth once daily.    LIPITOR 40 mg tablet Take 1 tablet (40 mg total) by mouth once daily.     Family History    None       Tobacco Use    Smoking status: Former    Smokeless tobacco: Never   Substance and Sexual Activity    Alcohol use: No    Drug use: No    Sexual activity: Not on file     Review of Systems   Unable to perform ROS: Acuity of condition     Objective:     Vital Signs (Most Recent):  Temp: 97.4 °F (36.3 °C) (08/14/24 0701)  Pulse: 87 (08/14/24 0706)  Resp: 20 (08/14/24 0706)  BP: 120/71 (08/14/24 0700)  SpO2: (!) 94 % (08/14/24 0706) Vital Signs (24h Range):  Temp:  [96.7 °F (35.9 °C)-97.5 °F (36.4 °C)] 97.4 °F (36.3 °C)  Pulse:  [] 87  Resp:  [16-32] 20  SpO2:  [90 %-96 %] 94 %  BP: (102-142)/(60-93) 120/71     Weight: 119.7 kg (263 lb 14.3 oz)  Body mass index is 45.3 kg/m².     Physical Exam  Constitutional:       Appearance: She is obese. She is ill-appearing.   HENT:      Nose: Nose normal.      Mouth/Throat:      Mouth: Mucous membranes are moist.   Eyes:      Extraocular Movements: Extraocular movements intact.      Pupils: Pupils are equal, round, and reactive to light.   Cardiovascular:      Rate and Rhythm: Regular rhythm. Bradycardia present.   Pulmonary:      Breath sounds: Rhonchi present. No rales.      Comments: Improved airflow at bases - venti  Abdominal:      General: Bowel sounds are normal. There is no distension.      Palpations: Abdomen is soft.      Tenderness: There is no abdominal tenderness.   Genitourinary:     Comments: Hardwick in place  Musculoskeletal:      Cervical back: Normal range of motion.      Right  lower leg: No edema.      Left lower leg: No edema.   Skin:     General: Skin is warm and dry.      Capillary Refill: Capillary refill takes 2 to 3 seconds.   Neurological:      General: No focal deficit present.      Mental Status: She is alert.      Motor: Weakness present.              CRANIAL NERVES     CN III, IV, VI   Pupils are equal, round, and reactive to light.       Significant Labs: All pertinent labs within the past 24 hours have been reviewed.    Significant Imaging: I have reviewed all pertinent imaging results/findings within the past 24 hours.

## 2024-08-14 NOTE — PLAN OF CARE
[facility]  [facility]    Primary Care Provider: Lucian Barry MD    Expected Discharge Date:     Reassessment (most recent)       Discharge Reassessment - 08/14/24 1239          Discharge Reassessment    Assessment Type Discharge Planning Reassessment     Did the patient's condition or plan change since previous assessment? Yes     Discharge Plan discussed with: Patient;Adult children     Discharge Plan A Home with family;Home Health     Discharge Plan B Other   TBD    DME Needed Upon Discharge  other (see comments)   TBD    Why the patient remains in the hospital Requires continued medical care   The patient's tenative discharge is Friday.       Post-Acute Status    Post-Acute Placement Status Referrals Sent     Coverage [employer] MANAGED MEDICARE - [employer]MO     Discharge Delays None known at this time                 Discharge reassessment is completed. Spoke to the patient and her son, Salvatore (via phone). The patient is now refusing SNF placement. She expressed that she would like to return home. During the discussion, the patient expressed that she has some concerning family dynamics. The patient asked to speak with her son, Salvatore, via phone. She had a conversation with him. I was able to follow-up with the patient after the call. The patient expressed that she still would like to return home.     I explained to the patient that due to the information she provided about her family,  EPS would have to be notified. I was also able to share the number with the patient son, Salvatore, and he expressed that there were some finically concerns as well.     Dr. Ge was notified.      Addendum: BAKARI Gil was able to touch base with the patient as well, and the patient is now in agreement to go to [facility].

## 2024-08-14 NOTE — ASSESSMENT & PLAN NOTE
Patient's FSGs are controlled on current medication regimen.  Last A1c reviewed-   Lab Results   Component Value Date    HGBA1C 5.3 07/28/2024     Most recent fingerstick glucose reviewed-   Recent Labs   Lab 08/13/24  1123 08/13/24  1700 08/13/24  2102   POCTGLUCOSE 156* 128* 120*       Current correctional scale  stable  Maintain anti-hyperglycemic dose as follows-   Antihyperglycemics (From admission, onward)    Start     Stop Route Frequency Ordered    08/08/24 1247  insulin aspart U-100 pen 0-5 Units         -- SubQ Before meals & nightly PRN 08/08/24 1147        Hold Oral hypoglycemics while patient is in the hospital.

## 2024-08-14 NOTE — PT/OT/SLP EVAL
Occupational Therapy   Evaluation    Name: Carmen Tan  MRN: [id number]  Admitting Diagnosis: Acute on chronic respiratory failure with hypoxia and hypercapnia  Recent Surgery: * No surgery found *      Recommendations:     Discharge Recommendations: Moderate Intensity Therapy  Discharge Equipment Recommendations:     Barriers to discharge:  Other (Comment) (Medical status)    Assessment:     Carmen Tan is a 74 y.o. female with a medical diagnosis of Acute on chronic respiratory failure with hypoxia and hypercapnia.  She presents with functional deficits impacting independence with ADL's including functional mobility. Performance deficits affecting function: weakness, impaired endurance, impaired self care skills, impaired functional mobility, impaired balance, decreased coordination, decreased upper extremity function, decreased lower extremity function, decreased safety awareness, pain, decreased ROM, impaired fine motor, edema, impaired cardiopulmonary response to activity, impaired skin, impaired joint extensibility, impaired muscle length.      Rehab Prognosis: Good and Fair; patient would benefit from acute skilled OT services to address these deficits and reach maximum level of function.       Plan:     Patient to be seen 4 x/week to address the above listed problems via self-care/home management, therapeutic activities, therapeutic exercises  Plan of Care Expires: 08/23/24  Plan of Care Reviewed with: patient    Subjective     Chief Complaint: weakness  Patient/Family Comments/goals: Pt would like to return home with family support, but verbalizes understanding regarding need for SNF.     Occupational Profile:  Living Environment: Pt lives with son and grandchildren in mobile home with ramp to enter/exit.  Previous level of function: Assistance from family with ADL's  Roles and Routines: ADL's  Equipment Used at Home: wheelchair, shower chair, rollator, oxygen, CPAP  Assistance upon Discharge:  Family    Pain/Comfort:  Pain Rating 1: 0/10 (Pt verbalized no pain prior, during, and following activity.)  Pain Rating Post-Intervention 1: 0/10    Patients cultural, spiritual, Gnosticist conflicts given the current situation: no    Objective:     Communicated with: nurse prior to session.  Patient found HOB elevated with peripheral IV, oxygen, hannon catheter upon OT entry to room.    General Precautions: Standard, fall, respiratory  Orthopedic Precautions: N/A  Braces: N/A  Respiratory Status: Nasal cannula, flow 5 L/min    Occupational Performance:    Bed Mobility:    Patient completed Rolling/Turning to Left with  moderate assistance  Patient completed Rolling/Turning to Right with moderate assistance  Patient completed Scooting/Bridging with maximal assistance  Patient completed Supine to Sit with moderate assistance  Patient completed Sit to Supine with moderate assistance    Functional Mobility/Transfers:  Patient completed Sit <> Stand Transfer with moderate assistance  with  rolling walker   Patient completed Bed <> Chair Transfer using Step Transfer technique with moderate assistance with rolling walker  Patient completed Toilet Transfer Step Transfer technique with moderate assistance with  bedside commode  Functional Mobility: Pt ambulated 4' between surfaces requiring steadying assist utilizing RW.     Activities of Daily Living:  Feeding:  setup assistance    Grooming: supervision    Bathing: maximal assistance    Upper Body Dressing: moderate assistance    Lower Body Dressing: maximal assistance    Toileting: maximal assistance      Cognitive/Visual Perceptual:  Cognitive/Psychosocial Skills:  -       Oriented to: Person, Place, and Situation   -       Follows Commands/attention:Follows multistep  commands  -       Communication: anomia  -       Memory: Impaired STM  -       Safety awareness/insight to disability: impaired   -       Mood/Affect/Coping skills/emotional control: Cooperative and  Pleasant    Physical Exam:  Postural examination/scapula alignment: -       Rounded shoulders  -       Forward head  Skin integrity: Dressing intact at sacrum   Edema:  bilateral lower legs  Sensation: -       Intact  bilateral UE's  Dominant hand: -       Right  Upper Extremity Range of Motion:  -       Right Upper Extremity: WFL  -       Left Upper Extremity: WFL  Upper Extremity Strength: -       Right Upper Extremity: grossly 4-/5  -       Left Upper Extremity: Deficits: grossly 4-/5  Fine Motor Coordination: -       Impaired  Left hand, manipulation of objects   and Right hand, manipulation of objects    Gross motor coordination: Impaired bilateral hand-eye coordination    AMPAC 6 Click ADL:  AMPAC Total Score: 16    Treatment & Education:  Pt was provided education / instruction regarding role of OT and established OT POC.    Patient left HOB elevated with all lines intact and call button in reach    GOALS:   Goals to be met by: 08/23/24     Patient will increase functional independence with ADLs by performing:    Feeding with Modified [place].  UE Dressing with Minimal Assistance.  LE Dressing with Moderate Assistance.  Grooming while seated with Set-up Assistance.  Toileting from toilet with Moderate Assistance for hygiene and clothing management.   Bathing from  edge of bed with Moderate Assistance.  Toilet transfer to toilet with Minimal Assistance.      History:     Past Medical History:   Diagnosis Date    Abdominal hernia     Bacteremia 04/05/2023    CHF (congestive heart failure)     COPD (chronic obstructive pulmonary disease)     Diabetes mellitus, type 2 02/16/2022    GERD (gastroesophageal reflux disease)     History of home oxygen therapy     Hypertension     Hypertensive emergency 03/30/2023    Migraine headache     On home O2     Pulmonary embolism 06/01/2017    Small bowel obstruction     Thyroid disease          Past Surgical History:   Procedure Laterality Date    COLONOSCOPY       HYSTERECTOMY      INNER EAR SURGERY      UPPER GASTROINTESTINAL ENDOSCOPY         Time Tracking:     OT Date of Treatment: 08/14/24  OT Start Time: 1345  OT Stop Time: 1416  OT Total Time (min): 31 min    Billable Minutes:Evaluation 31 8/14/2024

## 2024-08-14 NOTE — PLAN OF CARE
FRANCOIS completed LOCET and PASSR. FRANCOIS faxed PASSR to [facility]. SW pending 142 form.       ADDENDUM: FRANCOIS received 142 form and loaded to [employer].

## 2024-08-14 NOTE — PLAN OF CARE
Problem: Physical Therapy  Goal: Physical Therapy Goal  Description: Goals to be met by: 2024    Patient will increase functional independence with mobility by performin. Supine to sit with Minimal Assistance.  2. Sit to supine with Minimal Assistance.  3. Bed to chair transfer with Minimal Assistance with rolling walker using Step Transfer technique.  4. Sit to Stand with Minimal Assistance with rolling walker.  5. Gait  x 10  feet with Minimal Assistance with rolling walker.  6. Lower extremity exercise program x10 reps, with assistance as needed.     Outcome: Plan of care  established

## 2024-08-14 NOTE — PLAN OF CARE
Pt rested. No resp distress. BiPap maintained. O2 92%. Remains in A-fib. HR in the lower 90's. No c/o lightheadedness or dizziness. Hardwick maintained. Fluids encouraged. Continue to observe.        Problem: Infection  Goal: Absence of Infection Signs and Symptoms  Outcome: Progressing     Problem: Adult Inpatient Plan of Care  Goal: Plan of Care Review  Outcome: Progressing  Goal: Patient-Specific Goal (Individualized)  Outcome: Progressing  Goal: Absence of Hospital-Acquired Illness or Injury  Outcome: Progressing  Goal: Optimal Comfort and Wellbeing  Outcome: Progressing  Goal: Readiness for Transition of Care  Outcome: Progressing     Problem: Bariatric Environmental Safety  Goal: Safety Maintained with Care  Outcome: Progressing     Problem: Skin Injury Risk Increased  Goal: Skin Health and Integrity  Outcome: Progressing     Problem: Diabetes Comorbidity  Goal: Blood Glucose Level Within Targeted Range  Outcome: Progressing     Problem: Noninvasive Ventilation Acute  Goal: Effective Unassisted Ventilation and Oxygenation  Outcome: Progressing     Problem: Fall Injury Risk  Goal: Absence of Fall and Fall-Related Injury  Outcome: Progressing     Problem: Breathing Pattern Ineffective  Goal: Effective Breathing Pattern  Outcome: Progressing     Problem: Gas Exchange Impaired  Goal: Optimal Gas Exchange  Outcome: Progressing     Problem: Comorbidity Management  Goal: Maintenance of COPD Symptom Control  Outcome: Progressing  Goal: Maintenance of Heart Failure Symptom Control  Outcome: Progressing  Goal: Blood Pressure in Desired Range  Outcome: Progressing  Goal: Bariatric Home Regimen Maintained  Outcome: Progressing  Goal: Maintenance of Behavioral Health Symptom Control  Outcome: Progressing     Problem: Acute Kidney Injury/Impairment  Goal: Fluid and Electrolyte Balance  Outcome: Progressing  Goal: Improved Oral Intake  Outcome: Progressing  Goal: Effective Renal Function  Outcome: Progressing

## 2024-08-14 NOTE — PT/OT/SLP EVAL
Physical Therapy Evaluation     Patient Name: Carmen Tan   MRN: [id number]  Recent Surgery: * No surgery found *      Recommendations:     Discharge Recommendations:  (post acute care rehabilitation)   Discharge Equipment Recommendations: hospital bed   Barriers to discharge: Increased level of assist and Ongoing medical treatment    Assessment:     Carmen Tan is a 74 y.o. female admitted with a medical diagnosis of Acute on chronic respiratory failure with hypoxia and hypercapnia. She presents with the following impairments/functional limitations: weakness, impaired joint extensibility, impaired endurance, impaired cognition, decreased ROM, impaired muscle length, impaired self care skills, impaired functional mobility, decreased lower extremity function, decreased upper extremity function, gait instability, decreased safety awareness, impaired balance, pain, impaired cardiopulmonary response to activity, edema. Patient was able to get in and out of bed with assistance from her granddaughter  and ambulate for short distances with a RW with assistance prior to this hospital re-admission.  Patient was recently discharge from this hospital on 8/1/2024.    Patient is currently in ICU with multiple c/o pain and SOB.  Patient required mod assist with rolling side <> side, mod assist with supine to sit, sat at edge of bed for about 10 minutes, diastolic BP went up to 125 mmHg and SPO2 dropped to 80% with O2 supplement. Unable to progress to standing due to patient's refusal due to c/o pain and SOB.   Progress as tolerated.       Vital signs  Supine prior to therapy  Sitting during therapy  Supine after therapy    BP  119/91 mmHg  175/125 mmHg  132/97 mmHg    HR  92  bpm  125 bpm   104  bpm    SPO2    92% on 5 liters of O2  80% on 5 liters of O2   93% on 5 liters of O2 via nasal cannula          Rehab Prognosis: Fair; patient would benefit from acute PT services to address these deficits and reach maximum level  "of function.    Plan:     During this hospitalization, patient to be seen 5 x/week to address the above listed problems via gait training, therapeutic activities, therapeutic exercises, neuromuscular re-education    Plan of Care Expires: 08/21/24    Subjective     Chief Complaint: " I am hurting."   Patient Comments/Goals: "Go back to my son."   Pain/Comfort:  Pain Rating 1: 10/10  Location - Side 1: Bilateral  Location 1: back  Pain Addressed 1: Reposition, Distraction, Cessation of Activity, Nurse notified  Pain Rating Post-Intervention 1: 8/10  Pain Rating 2: 10/10  Location - Side 2: Bilateral  Location - Orientation 2: generalized  Location 2: leg  Pain Addressed 2: Reposition, Distraction, Cessation of Activity, Nurse notified  Pain Rating Post-Intervention 2: 7/10    Social History:  Living Environment: Patient lives with their grandchildren  and son in a mobile home with  a ramp to enter   Prior Level of Function: Prior to admission, patient ambulated household distances using rolling walker and completed transfers via step transfer with minimum assistance using rolling walker  Equipment Used at Home: CPAP, oxygen, rollator, wheelchair, shower chair  DME owned (not currently used): none  Assistance Upon Discharge: family    Objective:     Communicated with nurse and patient  prior to session. Patient found HOB elevated with blood pressure cuff, hannon catheter, telemetry, peripheral IV, SCD, pulse ox (continuous), PICC line, oxygen upon PT entry to room.    General Precautions: Standard, fall, respiratory   Orthopedic Precautions: N/A   Braces: N/A    Respiratory Status: Nasal cannula, flow 5 L/min    Exams:  Cognition: Patient is oriented to Person, Place, Time, Situation  RLE ROM:  decrease on hip, knee and ankle   RLE Strength:  grossly graded 3-/5   LLE ROM:  decrease on hip, knee and ankle   LLE Strength:  grossly graded 3-/5   Fine Motor Coordination:    -       Impaired  on both UE   Gross Motor " Coordination: impaired on both LE due to pain   Postural Exam: Patient presented with the following abnormalities:    -       Rounded shoulders  -       Forward head  Sensation:  claims intact sensation on both LE   Skin Integrity/Edema:     -       Edema: Moderate on both LE     Functional Mobility:  Gait belt applied - No  Bed Mobility  Rolling Left: moderate assistance  Rolling Right: moderate assistance  Scooting: moderate assistance and maximal assistance  Supine to Sit: moderate assistance for LE management and trunk management  Sit to Supine: maximal assistance for LE management and trunk management  Transfers  Sit to Stand: activity did not occur due to pain and increase diastolic pressure   Gait: activity did not occur due to pain and increase diastolic pressure     Balance  Sitting: minimum assistance  Standing: activity did not occur due to pain and increase diastolic pressure       Therapeutic Activities and Exercises:   Patient educated on role of acute care PT and PT POC, safety while in hospital including calling nurse for mobility, and call light usage  Sitting at edge of bed and bed mobility       AM-PAC 6 CLICK MOBILITY  Total Score:8    Patient left HOB elevated with all lines intact, call button in reach, and RN notified.    GOALS:   Multidisciplinary Problems       Physical Therapy Goals          Problem: Physical Therapy    Goal Priority Disciplines Outcome Goal Variances Interventions   Physical Therapy Goal     PT, PT/OT Progressing     Description: Goals to be met by: 2024    Patient will increase functional independence with mobility by performin. Supine to sit with Minimal Assistance.  2. Sit to supine with Minimal Assistance.  3. Bed to chair transfer with Minimal Assistance with rolling walker using Step Transfer technique.  4. Sit to Stand with Minimal Assistance with rolling walker.  5. Gait  x 10  feet with Minimal Assistance with rolling walker.  6. Lower extremity  exercise program x10 reps, with assistance as needed.                          History:     Past Medical History:   Diagnosis Date    Abdominal hernia     Bacteremia 04/05/2023    CHF (congestive heart failure)     COPD (chronic obstructive pulmonary disease)     Diabetes mellitus, type 2 02/16/2022    GERD (gastroesophageal reflux disease)     History of home oxygen therapy     Hypertension     Hypertensive emergency 03/30/2023    Migraine headache     On home O2     Pulmonary embolism 06/01/2017    Small bowel obstruction     Thyroid disease        Past Surgical History:   Procedure Laterality Date    COLONOSCOPY      HYSTERECTOMY      INNER EAR SURGERY      UPPER GASTROINTESTINAL ENDOSCOPY         Time Tracking:     PT Received On: 08/14/24  PT Start Time: 0950  PT Stop Time: 1014  PT Total Time (min): 24 min     Billable Minutes: Evaluation high complexity     8/14/2024

## 2024-08-14 NOTE — NURSING
Pt resting now. BiPap on during the night. Pt requested and received sleep aid. Occasional cough without resp distress. No c/o offered. Staff attempt to repositioned, pt refused. Hardwick maintained. Pt remains in A-fib. Meds as ordered. VSS. Will continue to observe.

## 2024-08-14 NOTE — ASSESSMENT & PLAN NOTE
"Patient is identified as having Diastolic (HFpEF) heart failure that is Chronic. CHF is currently controlled. Latest ECHO performed and demonstrates- Results for orders placed during the hospital encounter of 07/28/24    Echo Saline Bubble? No; Ultrasound enhancing contrast? No    Interpretation Summary    Left Ventricle: The left ventricle is normal in size. Mildly increased wall thickness. There is normal systolic function with a visually estimated ejection fraction of 55 - 60%. Grade I diastolic dysfunction.    Right Ventricle: Normal right ventricular cavity size. Systolic function is normal.    Left Atrium: Left atrium is severely dilated. Atrial septum is bulging to the right.    Right Atrium: Right atrium is moderately dilated.    Aortic Valve: The aortic valve is a trileaflet valve. Mildly restricted motion. There is mild stenosis. Aortic valve area by VTI is 1.84 cm². Aortic valve peak velocity is 2.18 m/s. Mean gradient is 10 mmHg. The dimensionless index is 0.58. There is mild aortic regurgitation.    Mitral Valve: There is mild regurgitation.    Tricuspid Valve: There is mild regurgitation.    Pulmonic Valve: There is mild to moderate regurgitation.    No pericardial effusion.  . Continue Beta Blocker, ACE/ARB, and Furosemide and monitor clinical status closely. Monitor on telemetry. Patient is on CHF pathway.  Monitor strict Is&Os and daily weights.  Place on fluid restriction of 1 L. Cardiology has been consulted. Continue to stress to patient importance of self efficacy and  on diet for CHF. Last BNP reviewed- and noted below No results for input(s): "BNP", "BNPTRIAGEBLO" in the last 168 hours.    8/10 currently euvolemic, we will continue to monitor need for CHF med  8/11 plan for dose of Lasix today, monitor I's and o's  8/12:  match I/O    Volume overload:  Continue to monitor strict intake and output.    I/O last 3 completed shifts:  In: 2405.1 [P.O.:960; IV Piggyback:443.8]  Out: 1900 " [Urine:2850]  No intake/output data recorded.

## 2024-08-14 NOTE — ASSESSMENT & PLAN NOTE
BHAVESH is likely due to pre-renal azotemia due to intravascular volume depletion. Baseline creatinine is unknown. Most recent creatinine and eGFR are listed below.  Recent Labs     08/12/24  0348 08/13/24  0352 08/14/24  0341   CREATININE 1.7* 1.6* 1.8*   EGFRNORACEVR 31.5* 33.8* 29.4*   8/10 creatinine slowly improving; patient getting fluids via her TPN for nutritional support   Plan  - BHAVESH is improving  - Avoid nephrotoxins and renally dose meds for GFR listed above  - Monitor urine output, serial BMP, and adjust therapy as needed  -   8/11 kidney function continues to slowly improve, monitor I's and O

## 2024-08-14 NOTE — EICU
Intervention Initiated From:  [facility] / EICU    Elliott intervened regarding:  Rounding (Video assessment)    Nurse Notified:  Comments: pt is resting in bed and NAD observed. VSS. No drips are infusing.

## 2024-08-14 NOTE — CARE UPDATE
08/14/24 0725   PRE-TX-O2   Device (Oxygen Therapy) nasal cannula with humidification   $ Is the patient on Low Flow Oxygen? Yes   Flow (L/min) (Oxygen Therapy) 5   Oxygen Concentration (%) 40   SpO2 (!) 94 %

## 2024-08-15 LAB
ALBUMIN SERPL BCP-MCNC: 2.1 G/DL (ref 3.5–5.2)
ALP SERPL-CCNC: 98 U/L (ref 55–135)
ALT SERPL W/O P-5'-P-CCNC: 25 U/L (ref 10–44)
ANION GAP SERPL CALC-SCNC: 3 MMOL/L (ref 3–11)
AST SERPL-CCNC: 12 U/L (ref 10–40)
BASOPHILS # BLD AUTO: 0.01 K/UL (ref 0–0.2)
BASOPHILS NFR BLD: 0.1 % (ref 0–1.9)
BILIRUB SERPL-MCNC: 0.3 MG/DL (ref 0.1–1)
BUN SERPL-MCNC: 91 MG/DL (ref 8–23)
CALCIUM SERPL-MCNC: 9.8 MG/DL (ref 8.7–10.5)
CHLORIDE SERPL-SCNC: 101 MMOL/L (ref 95–110)
CO2 SERPL-SCNC: 36 MMOL/L (ref 23–29)
CREAT SERPL-MCNC: 1.6 MG/DL (ref 0.5–1.4)
DIFFERENTIAL METHOD BLD: ABNORMAL
EOSINOPHIL # BLD AUTO: 0 K/UL (ref 0–0.5)
EOSINOPHIL NFR BLD: 0 % (ref 0–8)
ERYTHROCYTE [DISTWIDTH] IN BLOOD BY AUTOMATED COUNT: 15.8 % (ref 11.5–14.5)
EST. GFR  (NO RACE VARIABLE): 33.8 ML/MIN/1.73 M^2
GLUCOSE SERPL-MCNC: 138 MG/DL (ref 70–110)
HCT VFR BLD AUTO: 44.1 % (ref 37–48.5)
HGB BLD-MCNC: 14.2 G/DL (ref 12–16)
IMM GRANULOCYTES # BLD AUTO: 0.21 K/UL (ref 0–0.04)
IMM GRANULOCYTES NFR BLD AUTO: 1.8 % (ref 0–0.5)
LYMPHOCYTES # BLD AUTO: 0.9 K/UL (ref 1–4.8)
LYMPHOCYTES NFR BLD: 7.7 % (ref 18–48)
MAGNESIUM SERPL-MCNC: 2.2 MG/DL (ref 1.6–2.6)
MCH RBC QN AUTO: 28 PG (ref 27–31)
MCHC RBC AUTO-ENTMCNC: 32.2 G/DL (ref 32–36)
MCV RBC AUTO: 87 FL (ref 82–98)
MONOCYTES # BLD AUTO: 0.4 K/UL (ref 0.3–1)
MONOCYTES NFR BLD: 3.4 % (ref 4–15)
NEUTROPHILS # BLD AUTO: 10.1 K/UL (ref 1.8–7.7)
NEUTROPHILS NFR BLD: 87 % (ref 38–73)
NRBC BLD-RTO: 0 /100 WBC
PLATELET # BLD AUTO: 274 K/UL (ref 150–450)
PMV BLD AUTO: 9.9 FL (ref 9.2–12.9)
POCT GLUCOSE: 138 MG/DL (ref 70–110)
POCT GLUCOSE: 139 MG/DL (ref 70–110)
POCT GLUCOSE: 143 MG/DL (ref 70–110)
POCT GLUCOSE: 169 MG/DL (ref 70–110)
POTASSIUM SERPL-SCNC: 4.8 MMOL/L (ref 3.5–5.1)
PROT SERPL-MCNC: 6 G/DL (ref 6–8.4)
RBC # BLD AUTO: 5.07 M/UL (ref 4–5.4)
SODIUM SERPL-SCNC: 140 MMOL/L (ref 136–145)
WBC # BLD AUTO: 11.66 K/UL (ref 3.9–12.7)

## 2024-08-15 PROCEDURE — 63600175 PHARM REV CODE 636 W HCPCS: Performed by: INTERNAL MEDICINE

## 2024-08-15 PROCEDURE — 99900031 HC PATIENT EDUCATION (STAT)

## 2024-08-15 PROCEDURE — 85025 COMPLETE CBC W/AUTO DIFF WBC: CPT | Performed by: INTERNAL MEDICINE

## 2024-08-15 PROCEDURE — 99900035 HC TECH TIME PER 15 MIN (STAT)

## 2024-08-15 PROCEDURE — 94761 N-INVAS EAR/PLS OXIMETRY MLT: CPT

## 2024-08-15 PROCEDURE — 21400001 HC TELEMETRY ROOM

## 2024-08-15 PROCEDURE — A4216 STERILE WATER/SALINE, 10 ML: HCPCS | Performed by: INTERNAL MEDICINE

## 2024-08-15 PROCEDURE — 27100171 HC OXYGEN HIGH FLOW UP TO 24 HOURS

## 2024-08-15 PROCEDURE — 36415 COLL VENOUS BLD VENIPUNCTURE: CPT | Performed by: INTERNAL MEDICINE

## 2024-08-15 PROCEDURE — 83735 ASSAY OF MAGNESIUM: CPT | Performed by: INTERNAL MEDICINE

## 2024-08-15 PROCEDURE — 94660 CPAP INITIATION&MGMT: CPT

## 2024-08-15 PROCEDURE — 97530 THERAPEUTIC ACTIVITIES: CPT

## 2024-08-15 PROCEDURE — 25000003 PHARM REV CODE 250: Performed by: INTERNAL MEDICINE

## 2024-08-15 PROCEDURE — 25000242 PHARM REV CODE 250 ALT 637 W/ HCPCS: Performed by: INTERNAL MEDICINE

## 2024-08-15 PROCEDURE — 80053 COMPREHEN METABOLIC PANEL: CPT | Performed by: INTERNAL MEDICINE

## 2024-08-15 PROCEDURE — 94640 AIRWAY INHALATION TREATMENT: CPT

## 2024-08-15 PROCEDURE — 97112 NEUROMUSCULAR REEDUCATION: CPT

## 2024-08-15 RX ORDER — ISOSORBIDE MONONITRATE 30 MG/1
30 TABLET, EXTENDED RELEASE ORAL DAILY
Status: DISCONTINUED | OUTPATIENT
Start: 2024-08-15 | End: 2024-08-16 | Stop reason: HOSPADM

## 2024-08-15 RX ORDER — PREDNISONE 20 MG/1
20 TABLET ORAL 2 TIMES DAILY
Status: DISCONTINUED | OUTPATIENT
Start: 2024-08-15 | End: 2024-08-16 | Stop reason: HOSPADM

## 2024-08-15 RX ADMIN — POLYETHYLENE GLYCOL (3350) 17 G: 17 POWDER, FOR SOLUTION ORAL at 08:08

## 2024-08-15 RX ADMIN — SODIUM CHLORIDE, PRESERVATIVE FREE 10 ML: 5 INJECTION INTRAVENOUS at 06:08

## 2024-08-15 RX ADMIN — SODIUM CHLORIDE, PRESERVATIVE FREE 10 ML: 5 INJECTION INTRAVENOUS at 12:08

## 2024-08-15 RX ADMIN — METOPROLOL TARTRATE 25 MG: 25 TABLET, FILM COATED ORAL at 08:08

## 2024-08-15 RX ADMIN — ATORVASTATIN CALCIUM 40 MG: 40 TABLET, FILM COATED ORAL at 08:08

## 2024-08-15 RX ADMIN — FUROSEMIDE 40 MG: 10 INJECTION, SOLUTION INTRAVENOUS at 09:08

## 2024-08-15 RX ADMIN — ASPIRIN 81 MG: 81 TABLET, COATED ORAL at 08:08

## 2024-08-15 RX ADMIN — ACETAMINOPHEN 650 MG: 325 TABLET ORAL at 02:08

## 2024-08-15 RX ADMIN — IPRATROPIUM BROMIDE AND ALBUTEROL SULFATE 3 ML: 2.5; .5 SOLUTION RESPIRATORY (INHALATION) at 07:08

## 2024-08-15 RX ADMIN — LEVOTHYROXINE SODIUM 50 MCG: 50 TABLET ORAL at 06:08

## 2024-08-15 RX ADMIN — ISOSORBIDE MONONITRATE 30 MG: 30 TABLET, EXTENDED RELEASE ORAL at 10:08

## 2024-08-15 RX ADMIN — PREDNISONE 20 MG: 20 TABLET ORAL at 10:08

## 2024-08-15 RX ADMIN — IPRATROPIUM BROMIDE AND ALBUTEROL SULFATE 3 ML: 2.5; .5 SOLUTION RESPIRATORY (INHALATION) at 02:08

## 2024-08-15 RX ADMIN — Medication 6 MG: at 08:08

## 2024-08-15 RX ADMIN — IPRATROPIUM BROMIDE AND ALBUTEROL SULFATE 3 ML: 2.5; .5 SOLUTION RESPIRATORY (INHALATION) at 08:08

## 2024-08-15 RX ADMIN — METHYLPREDNISOLONE SODIUM SUCCINATE 80 MG: 125 INJECTION, POWDER, FOR SOLUTION INTRAMUSCULAR; INTRAVENOUS at 06:08

## 2024-08-15 RX ADMIN — PREDNISONE 20 MG: 20 TABLET ORAL at 08:08

## 2024-08-15 NOTE — PT/OT/SLP PROGRESS
Occupational Therapy      Patient Name:  Carmen Tan   MRN:  [id number]    Patient not seen today secondary to Nurse/ RICARDO hold. Nurse requesting to not see patient at this time due to blood pressure all being high. Physical therapist worked with this patient this morning to assist her with toileting. Prior to physical therapist session BP was 170/108, after t/f to [facility]  BP was 175/130, and after returning back to bed BP was 176/112. Will follow-up later today if time allows or 8/16/24.    8/15/2024  Juliet DELACRUZ

## 2024-08-15 NOTE — SUBJECTIVE & OBJECTIVE
ANTICOAGULATION MANAGEMENT     Kerry Bennett 88 BHAVYA GILLIAM    Indications    Long term current use of anticoagulant therapy [Z79.01]  Paroxysmal atrial fibrillation (H) [I48.0]            Past Medical History:   Diagnosis Date    Abdominal hernia     Bacteremia 04/05/2023    CHF (congestive heart failure)     COPD (chronic obstructive pulmonary disease)     Diabetes mellitus, type 2 02/16/2022    GERD (gastroesophageal reflux disease)     History of home oxygen therapy     Hypertension     Hypertensive emergency 03/30/2023    Migraine headache     On home O2     Pulmonary embolism 06/01/2017    Small bowel obstruction     Thyroid disease        Past Surgical History:   Procedure Laterality Date    COLONOSCOPY      HYSTERECTOMY      INNER EAR SURGERY      UPPER GASTROINTESTINAL ENDOSCOPY         Review of patient's allergies indicates:   Allergen Reactions    Pcn [penicillins] Swelling       No current facility-administered medications on file prior to encounter.     Current Outpatient Medications on File Prior to Encounter   Medication Sig    acetaminophen (TYLENOL) 325 MG tablet Take 2 tablets (650 mg total) by mouth every 8 (eight) hours as needed for Pain or Temperature greater than (100, headache).    albuterol-ipratropium (DUO-NEB) 2.5 mg-0.5 mg/3 mL nebulizer solution Take 3 mLs by nebulization every 6 (six) hours while awake. Rescue    apixaban (ELIQUIS) 5 mg Tab Take 1 tablet (5 mg total) by mouth 2 (two) times daily.    aspirin (ECOTRIN) 81 MG EC tablet Take 1 tablet (81 mg total) by mouth once daily.    budesonide (PULMICORT) 0.5 mg/2 mL nebulizer solution Take 2 mLs (0.5 mg total) by nebulization every 12 (twelve) hours. Controller    busPIRone (BUSPAR) 5 MG Tab Take 1 tablet (5 mg total) by mouth 2 (two) times daily.    carvediloL (COREG) 3.125 MG tablet Take 1 tablet (3.125 mg total) by mouth 2 (two) times daily.    furosemide (LASIX) 40 MG tablet Take 1 tablet (40 mg total) by mouth 2 (two) times a day.    gabapentin (NEURONTIN) 100 MG capsule Take 1 capsule (100 mg total) by mouth 3 (three) times daily.    insulin aspart U-100 (NOVOLOG) 100 unit/mL (3 mL) InPn pen Inject 0-5 Units into  Comments:           Anticoagulation Care Providers     Provider Role Specialty Phone number    Mónica Guillory, APRN CNP Referring Internal Medicine - Pediatrics [phone]    Krysten Simms APRN CNP Referring Internal Medicine [phone]           the skin before meals and at bedtime as needed (Hyperglycemia).    levothyroxine (SYNTHROID) 50 MCG tablet Take 1 tablet (50 mcg total) by mouth before breakfast.    pantoprazole (PROTONIX) 20 MG tablet Take 40 mg by mouth once daily.    valsartan (DIOVAN) 160 MG tablet Take 1 tablet (160 mg total) by mouth once daily.    vitamin D 1000 units Tab Take 185 mg by mouth once daily.    LIPITOR 40 mg tablet Take 1 tablet (40 mg total) by mouth once daily.     Family History    None       Tobacco Use    Smoking status: Former    Smokeless tobacco: Never   Substance and Sexual Activity    Alcohol use: No    Drug use: No    Sexual activity: Not on file     Review of Systems   Unable to perform ROS: Acuity of condition     Objective:     Vital Signs (Most Recent):  Temp: 97.7 °F (36.5 °C) (08/15/24 0717)  Pulse: 80 (08/15/24 0836)  Resp: 20 (08/15/24 0732)  BP: (!) 171/92 (08/15/24 0836)  SpO2: 95 % (08/15/24 0732) Vital Signs (24h Range):  Temp:  [97.4 °F (36.3 °C)-98.3 °F (36.8 °C)] 97.7 °F (36.5 °C)  Pulse:  [70-98] 80  Resp:  [18-22] 20  SpO2:  [93 %-98 %] 95 %  BP: (119-171)/(77-93) 171/92     Weight: 119.7 kg (263 lb 14.3 oz)  Body mass index is 45.3 kg/m².     Physical Exam  Constitutional:       Appearance: She is obese. She is ill-appearing.   HENT:      Nose: Nose normal.      Mouth/Throat:      Mouth: Mucous membranes are moist.   Eyes:      Extraocular Movements: Extraocular movements intact.      Pupils: Pupils are equal, round, and reactive to light.   Cardiovascular:      Rate and Rhythm: Regular rhythm. Bradycardia present.   Pulmonary:      Breath sounds: Rhonchi present. No rales.      Comments: Improved airflow at bases - venti  Abdominal:      General: Bowel sounds are normal. There is no distension.      Palpations: Abdomen is soft.      Tenderness: There is no abdominal tenderness.   Genitourinary:     Comments: Hardwick in place  Musculoskeletal:      Cervical back: Normal range of motion.      Right  lower leg: No edema.      Left lower leg: No edema.   Skin:     General: Skin is warm and dry.      Capillary Refill: Capillary refill takes 2 to 3 seconds.   Neurological:      General: No focal deficit present.      Mental Status: She is alert.      Motor: Weakness present.              CRANIAL NERVES     CN III, IV, VI   Pupils are equal, round, and reactive to light.       Significant Labs: All pertinent labs within the past 24 hours have been reviewed.    Significant Imaging: I have reviewed all pertinent imaging results/findings within the past 24 hours.

## 2024-08-15 NOTE — PLAN OF CARE
08/15/24 1227   Medicare Message   Important Message from Medicare regarding Discharge Appeal Rights Given to patient/caregiver;Explained to patient/caregiver;Signed/date by patient/caregiver   Date IMM was signed 08/15/24   Time IMM was signed 1229

## 2024-08-15 NOTE — ASSESSMENT & PLAN NOTE
Patient's FSGs are controlled on current medication regimen.  Last A1c reviewed-   Lab Results   Component Value Date    HGBA1C 5.3 07/28/2024     Most recent fingerstick glucose reviewed-   Recent Labs   Lab 08/14/24  1146 08/14/24  1639 08/14/24  2101 08/15/24  0606   POCTGLUCOSE 125* 118* 131* 139*       Current correctional scale  stable  Maintain anti-hyperglycemic dose as follows-   Antihyperglycemics (From admission, onward)    Start     Stop Route Frequency Ordered    08/08/24 1247  insulin aspart U-100 pen 0-5 Units         -- SubQ Before meals & nightly PRN 08/08/24 1147        Hold Oral hypoglycemics while patient is in the hospital.

## 2024-08-15 NOTE — PT/OT/SLP PROGRESS
"Physical Therapy Treatment    Patient Name:  Carmen Tan   MRN:  [id number]    Recommendations:     Discharge Recommendations: Moderate Intensity Therapy  Discharge Equipment Recommendations: hospital bed  Barriers to discharge: None    Assessment:     Carmen Tan is a 74 y.o. female admitted with a medical diagnosis of Acute on chronic respiratory failure with hypoxia and hypercapnia.  She presents with the following impairments/functional limitations: weakness, impaired endurance, impaired self care skills, impaired functional mobility, gait instability, impaired balance, decreased lower extremity function, decreased safety awareness, decreased ROM, impaired skin, impaired cardiopulmonary response to activity, impaired joint extensibility, impaired muscle length These limitations are causing decreased functional mobility and independence.     Patient found supine in hospital bed needing to have a BM. BP was very elevated today, nurse notified. BP jaclyn higher after transfer to [facility]. Dynamap and manual BP consistent. Patient requesting Bipap instead of nasal canula. Continent BM during treatment. Ambulation not appropriate today secondary to high BP. Patient left supine in bed with PCT.     Rehab Prognosis: Fair; patient would benefit from acute skilled PT services to address these deficits and reach maximum level of function.    Recent Surgery: * No surgery found *      Plan:     During this hospitalization, patient to be seen 5 x/week to address the identified rehab impairments via gait training, therapeutic activities, therapeutic exercises, neuromuscular re-education and progress toward the following goals:    Plan of Care Expires:  08/21/24    Subjective     Chief Complaint: "I need my Bipap" and "I have to have a BM"  Patient/Family Comments/goals: I feel bloated and tired  Pain/Comfort:  Pain Rating 1: 0/10      Objective:     Communicated with nurse and patient prior to session.  Patient found " supine with peripheral IV, oxygen, hannon catheter upon PT entry to room.     General Precautions: Standard, respiratory, fall  Orthopedic Precautions: N/A  Braces: N/A  Respiratory Status: Nasal cannula, flow 5 L/min     Vitals:   Prior to treatment (sitting) During treatment (sitting) After treatment (supine)   /108 mmHg 175/130 mmHg 176/112 mmHg (manual)   HR 92 bpm 110 bpm    SpO2 (room air) 92% 92%          Functional Mobility:  Bed Mobility:     Rolling Left:  modified independence  Rolling Right: modified independence  Scooting: modified independence  Bridging: modified independence  Supine to Sit: modified independence  Sit to Supine: modified independence  Transfers:     Toilet Transfer: stand by assistance with  rolling walker  using  Step Transfer  Balance: SBA-CGA with RW standing, independent sitting EOB and on BSC      AM-PAC 6 CLICK MOBILITY  Turning over in bed (including adjusting bedclothes, sheets and blankets)?: 3  Sitting down on and standing up from a chair with arms (e.g., wheelchair, bedside commode, etc.): 3  Moving from lying on back to sitting on the side of the bed?: 3  Moving to and from a bed to a chair (including a wheelchair)?: 3  Need to walk in hospital room?: 3  Climbing 3-5 steps with a railing?: 2 (clinical judgment)  Basic Mobility Total Score: 17       Treatment & Education:  Bed mobility  Transfers  Balance  Education   Patient educated on role of acute care PT, POC, importance of OOB mobility, transfer safety, and call bell usage.      Patient left supine with  PCT and nurse present..    GOALS:   Multidisciplinary Problems       Physical Therapy Goals          Problem: Physical Therapy    Goal Priority Disciplines Outcome Goal Variances Interventions   Physical Therapy Goal     PT, PT/OT Progressing     Description: Goals to be met by: 2024    Patient will increase functional independence with mobility by performin. Supine to sit with Minimal Assistance.  2.  Sit to supine with Minimal Assistance.  3. Bed to chair transfer with Minimal Assistance with rolling walker using Step Transfer technique.  4. Sit to Stand with Minimal Assistance with rolling walker.  5. Gait  x 10  feet with Minimal Assistance with rolling walker.  6. Lower extremity exercise program x10 reps, with assistance as needed.                          Time Tracking:     PT Received On: 08/15/24  PT Start Time: 0930     PT Stop Time: 1018  PT Total Time (min): 48 min     Billable Minutes: Therapeutic Activity 30 and Neuromuscular Re-education 18    Treatment Type: Treatment  PT/PTA: PT     Number of PTA visits since last PT visit: 0     08/15/2024

## 2024-08-15 NOTE — PLAN OF CARE
Late entry. Patient agreed to placement at [facility], per BAKARI Daly.     Micheline with [facility] informed me that the patient was approved for SNF placement, and she will be able to admitted to their facility tomorrow.

## 2024-08-15 NOTE — PROGRESS NOTES
[facility] Surg  Adult Nutrition  Progress Note    SUMMARY       Recommendations  1. Rec'd Renal Consistent CHO diet.     2. RD to follow and make rec's accordingly.  Goals:   1. Pt will be started on PPN by next RD follow up.   2. Pt will be switched from PPN to TPN by next RD follow up.  3. Pt will continue to consume > 75% of meals by next RD follow up.  Nutrition Goal Status: goal met; new  Communication of RD Recs: other  (Documented in POC)    Assessment and Plan    Assessment and Plan     Nutrition Problem  Inadequate oral intake     Related to (etiology):   Continuous BIPAP in use     Signs and Symptoms (as evidenced by):   0% PO intake      Interventions/Recommendations (treatment strategy):  1. Rec'd Renal Consistent CHO diet.   2. Rec'd ClinimixE 4.25/5 @ 60mL/hr to provide 408kcal (18% EEN), 51g of protein (118% EPN), and 1200mL TFV.        -Add 250mL of 20% lipids 3x/week to provide additional calories.        -Check Triglycerides before adding lipids.   3. RD to follow and make rec's accordingly.     Nutrition Diagnosis Status:   Resolved    Malnutrition Assessment  NFPE not warranted at this time.    Reason for Assessment    Reason For Assessment: RD follow-up  Diagnosis: other (see comments) (Acute on chronic respiratory failure with hypoxia and hypercapnia)  Relevant Medical History: Followed up on pt this morning. Pt is eating 75% or more of meals via PO and TPN was discontinued yesterday. Continue to encourage adequate PO intake. RD to follow and make rec's accordingly.  Interdisciplinary Rounds: did not attend  General Information Comments: Followed up on pt this morning. Pt is tolerating PO intake and consuming 75% or  more of meals. RD to follow and make rec's accordingly.  Nutrition Discharge Planning: TBD as care progresses    Nutrition Risk Screen    Nutrition Risk Screen: no indicators present    Nutrition/Diet History    Patient Reported Diet/Restrictions/Preferences:  "general  Spiritual, Cultural Beliefs, Taoism Practices, Values that Affect Care: no  Food Allergies: NKFA  Factors Affecting Nutritional Intake: None identified at this time    Anthropometrics    Temp: 97.6 °F (36.4 °C)  Height Method: Stated  Height: 5' 4" (162.6 cm)  Height (inches): 64 in  Weight Method: Bed Scale  Weight: 119.7 kg (263 lb 14.3 oz)  Weight (lb): 263.89 lb  Ideal Body Weight (IBW), Female: 120 lb  % Ideal Body Weight, Female (lb): 219.91 %  BMI (Calculated): 45.3  BMI Grade: greater than 40 - morbid obesity    Lab/Procedures/Meds    Pertinent Labs Reviewed: reviewed  Pertinent Medications Reviewed: reviewed    Estimated/Assessed Needs    Weight Used For Calorie Calculations: 70.6 kg (155 lb 12 oz) (AdjBW)  Energy Calorie Requirements (kcal): 2260 (32kcal/kg AdjBW)  Energy Need Method: Kcal/kg  Protein Requirements: 32-43 (0.6-0.8g/kg IBW)  Weight Used For Protein Calculations: 54.4 kg (120 lb)  Fluid Requirements (mL): 2264 (1mL/kcal)  Estimated Fluid Requirement Method: RDA Method  RDA Method (mL): 2260    Nutrition Prescription Ordered    Current Diet Order: Diabetic 1500 calorie  Current Nutrition Support Formula Ordered: Clinimix E 5/20  Current Nutrition Support Rate Ordered: 40 (ml)  Current Nutrition Support Frequency Ordered: Continuous  Oral Nutrition Supplement: None    Evaluation of Received Nutrient/Fluid Intake    Parenteral Calories (kcal): 327  Parenteral Protein (gm): 41  Parenteral Fluid (mL): 960  % Kcal Needs: 75%  % Protein Needs: 75%  I/O: -500  Energy Calories Required: meeting needs  Protein Required: meeting needs  Tolerance: tolerating  % Intake of Estimated Energy Needs: 75 - 100 %  % Meal Intake: 75 - 100 %    Nutrition Risk    Level of Risk/Frequency of Follow-up: moderate     Monitor and Evaluation    Food and Nutrient Intake: energy intake, food and beverage intake, parenteral nutrition intake  Food and Nutrient Adminstration: enteral and parenteral nutrition " administration, diet order  Knowledge/Beliefs/Attitudes: food and nutrition knowledge/skill, beliefs and attitudes  Physical Activity and Function: nutrition-related ADLs and IADLs  Anthropometric Measurements: height/length, weight, weight change, body mass index  Biochemical Data, Medical Tests and Procedures: electrolyte and renal panel, gastrointestinal profile, glucose/endocrine profile, inflammatory profile, lipid profile  Nutrition-Focused Physical Findings: overall appearance     Nutrition Follow-Up    RD Follow-up?: Yes

## 2024-08-15 NOTE — ASSESSMENT & PLAN NOTE
Monitor with IVF  8/10 patient currently on low-dose Levophed we will continue to wean as tolerates  8/11 blood pressure improved - continue to monitor blood pressure    BP Readings from Last 3 Encounters:   08/15/24 (!) 171/92   08/01/24 122/66   07/30/24 (!) 140/66

## 2024-08-15 NOTE — PLAN OF CARE
Problem: Physical Therapy  Goal: Physical Therapy Goal  Description: Goals to be met by: 2024    Patient will increase functional independence with mobility by performin. Supine to sit with Minimal Assistance.  2. Sit to supine with Minimal Assistance.  3. Bed to chair transfer with Minimal Assistance with rolling walker using Step Transfer technique.  4. Sit to Stand with Minimal Assistance with rolling walker.  5. Gait  x 10  feet with Minimal Assistance with rolling walker.  6. Lower extremity exercise program x10 reps, with assistance as needed.     Outcome: Progressing

## 2024-08-15 NOTE — ASSESSMENT & PLAN NOTE
"Patient is identified as having Diastolic (HFpEF) heart failure that is Chronic. CHF is currently controlled. Latest ECHO performed and demonstrates- Results for orders placed during the hospital encounter of 07/28/24    Echo Saline Bubble? No; Ultrasound enhancing contrast? No    Interpretation Summary    Left Ventricle: The left ventricle is normal in size. Mildly increased wall thickness. There is normal systolic function with a visually estimated ejection fraction of 55 - 60%. Grade I diastolic dysfunction.    Right Ventricle: Normal right ventricular cavity size. Systolic function is normal.    Left Atrium: Left atrium is severely dilated. Atrial septum is bulging to the right.    Right Atrium: Right atrium is moderately dilated.    Aortic Valve: The aortic valve is a trileaflet valve. Mildly restricted motion. There is mild stenosis. Aortic valve area by VTI is 1.84 cm². Aortic valve peak velocity is 2.18 m/s. Mean gradient is 10 mmHg. The dimensionless index is 0.58. There is mild aortic regurgitation.    Mitral Valve: There is mild regurgitation.    Tricuspid Valve: There is mild regurgitation.    Pulmonic Valve: There is mild to moderate regurgitation.    No pericardial effusion.  . Continue Beta Blocker, ACE/ARB, and Furosemide and monitor clinical status closely. Monitor on telemetry. Patient is on CHF pathway.  Monitor strict Is&Os and daily weights.  Place on fluid restriction of 1 L. Cardiology has been consulted. Continue to stress to patient importance of self efficacy and  on diet for CHF. Last BNP reviewed- and noted below No results for input(s): "BNP", "BNPTRIAGEBLO" in the last 168 hours.    8/10 currently euvolemic, we will continue to monitor need for CHF med  8/11 plan for dose of Lasix today, monitor I's and o's  8/12:  match I/O    Volume overload:  Continue to monitor strict intake and output.    I/O last 3 completed shifts:  In: 573.8 [P.O.:480; IV Piggyback:93.8]  Out: " 1250 [Urine:1250]  No intake/output data recorded.

## 2024-08-15 NOTE — PLAN OF CARE
Recommendations  1. Rec'd Renal Consistent CHO diet.     2. RD to follow and make rec's accordingly.  Goals:   1. Pt will be started on PPN by next RD follow up.   2. Pt will be switched from PPN to TPN by next RD follow up.  3. Pt will continue to consume > 75% of meals by next RD follow up.  Nutrition Goal Status: goal met; new

## 2024-08-15 NOTE — NURSING
While patient working with PT patient's blood pressure 170/108 while sitting at bedside. Patient then moved to [facility] blood pressure 175/130. Manual blood pressure taken noting 176/112. Notified Dr. Ge, new medications given per orders. Blood pressure checked an hour later noting 158/92.

## 2024-08-15 NOTE — ASSESSMENT & PLAN NOTE
Patient with Hypercapnic and Hypoxic Respiratory failure which is Acute on chronic.  she is on home oxygen at 4 LPM. Supplemental oxygen was provided and noted- Oxygen Concentration (%):  [30-40] 40    .   Signs/symptoms of respiratory failure include- tachypnea, increased work of breathing, respiratory distress, use of accessory muscles, wheezing, and lethargy. Contributing diagnoses includes - CHF, COPD, Interstitial lung disease, and Obesity Hypoventilation Labs and images were reviewed. Patient Has not had a recent ABG. Will treat underlying causes and adjust management of respiratory failure as follows- treat underlying heart failure and COPD exacerbation  8/10 ND continue current treatment with IV antibiotics IV steroids and nebs.  We will continue BiPAP as needed for respiratory support and O2 support  8/11 continue IV antibiotics will begin weaning steroids continue nebs.  O2 protocol will attempt to wean BiPAP today  8/13: Continue to wean BiPAP as tolerated.  8/14: Patient now tolerating Ventimask.

## 2024-08-15 NOTE — PLAN OF CARE
FRANCOIS phoned Tan,Salvatore (Son) [phone] and updated regarding pt's dc plan. FRANCOIS informed pt's son that a referral will be sent to [facility] [place] for review per pt request and we will keep him updated. Son in agreement.

## 2024-08-15 NOTE — PROGRESS NOTES
"[facility] Medicine  Progress Note    Patient Name: Carmen Tan  MRN: [id number]  Patient Class: IP- Inpatient   Admission Date: 8/7/2024  Length of Stay: 8 days  Attending Physician: Oscar Ge Jr., MD  Primary Care Provider: Lucian Barry MD        Subjective:     Principal Problem:Acute on chronic respiratory failure with hypoxia and hypercapnia  Acute Condition: dyspnea        HPI:  Chief Complaint   Patient presents with    Shortness of Breath       Pt to ED via EMS from residence - Room air "in the 70's with tachypnea and hypotension." Pt fell this morning and is also complaining of neck pain.       74-year-old female past medical history CHF morbid obesity presents the ED via EMS with reports of shortness of breath and possible fall.  Patient was recently discharged from the hospital 6 days ago with week-long stay for heart failure.  Family reports she has been taking all medications as prescribed.  Patient reports that she feels short of breath and noted neck pain      updated HPI:  Patient nonverbal on BiPAP.  Above history reviewed.  Patient presents with shortness of breath with possible fall.  Recently discharge from heart failure now with acute renal failure and hypotension concerning for over-diuresis.    Overview/Hospital Course:  8/9/24:  remains bipap dependent.  Responding to gently IVF and steroids.   8/10/24 ND patient is sleeping but will awaken with arousal; patient reports being tired and will follow commands and squeeze your hand asked.  Still on low-dose norepi - will wean as tolerated.  Patient has some bradycardia, not on any rate-controlling medicine will continue to monitor  8/11/24 ND currently off of all pressors.  Her heart rate is ranging between 60- 80 at this time.  Appreciate cardiology recs.  Chest x-ray reviewed this a.m. with improvement in airspace.  Will give dose of IV Lasix today  08/12/2024:  Patient more alert this morning.  X-ray reviews " improve aeration.  Blood cultures negative.  Continue to wean BiPAP as tolerated.  8/13: Patient remains on BiPAP.  Will attempt to wean BiPAP utilization today in order to pursue enteral feedings.  8/14: Patient tolerated p.o. intake yesterday.  Parenteral nutrition discontinued.  Stable medically from a respiratory perspective will transition to [facility] for ongoing management.  Patient needs ongoing physical and occupational therapy and ongoing evaluation by case management regarding placement at time discharge.  815:  Patient continues to clinically improve.  Awaiting skilled nursing placement once stable.    Past Medical History:   Diagnosis Date    Abdominal hernia     Bacteremia 04/05/2023    CHF (congestive heart failure)     COPD (chronic obstructive pulmonary disease)     Diabetes mellitus, type 2 02/16/2022    GERD (gastroesophageal reflux disease)     History of home oxygen therapy     Hypertension     Hypertensive emergency 03/30/2023    Migraine headache     On home O2     Pulmonary embolism 06/01/2017    Small bowel obstruction     Thyroid disease        Past Surgical History:   Procedure Laterality Date    COLONOSCOPY      HYSTERECTOMY      INNER EAR SURGERY      UPPER GASTROINTESTINAL ENDOSCOPY         Review of patient's allergies indicates:   Allergen Reactions    Pcn [penicillins] Swelling       No current facility-administered medications on file prior to encounter.     Current Outpatient Medications on File Prior to Encounter   Medication Sig    acetaminophen (TYLENOL) 325 MG tablet Take 2 tablets (650 mg total) by mouth every 8 (eight) hours as needed for Pain or Temperature greater than (100, headache).    albuterol-ipratropium (DUO-NEB) 2.5 mg-0.5 mg/3 mL nebulizer solution Take 3 mLs by nebulization every 6 (six) hours while awake. Rescue    apixaban (ELIQUIS) 5 mg Tab Take 1 tablet (5 mg total) by mouth 2 (two) times daily.    aspirin (ECOTRIN) 81 MG EC tablet Take 1 tablet (81 mg total)  by mouth once daily.    budesonide (PULMICORT) 0.5 mg/2 mL nebulizer solution Take 2 mLs (0.5 mg total) by nebulization every 12 (twelve) hours. Controller    busPIRone (BUSPAR) 5 MG Tab Take 1 tablet (5 mg total) by mouth 2 (two) times daily.    carvediloL (COREG) 3.125 MG tablet Take 1 tablet (3.125 mg total) by mouth 2 (two) times daily.    furosemide (LASIX) 40 MG tablet Take 1 tablet (40 mg total) by mouth 2 (two) times a day.    gabapentin (NEURONTIN) 100 MG capsule Take 1 capsule (100 mg total) by mouth 3 (three) times daily.    insulin aspart U-100 (NOVOLOG) 100 unit/mL (3 mL) InPn pen Inject 0-5 Units into the skin before meals and at bedtime as needed (Hyperglycemia).    levothyroxine (SYNTHROID) 50 MCG tablet Take 1 tablet (50 mcg total) by mouth before breakfast.    pantoprazole (PROTONIX) 20 MG tablet Take 40 mg by mouth once daily.    valsartan (DIOVAN) 160 MG tablet Take 1 tablet (160 mg total) by mouth once daily.    vitamin D 1000 units Tab Take 185 mg by mouth once daily.    LIPITOR 40 mg tablet Take 1 tablet (40 mg total) by mouth once daily.     Family History    None       Tobacco Use    Smoking status: Former    Smokeless tobacco: Never   Substance and Sexual Activity    Alcohol use: No    Drug use: No    Sexual activity: Not on file     Review of Systems   Unable to perform ROS: Acuity of condition     Objective:     Vital Signs (Most Recent):  Temp: 97.7 °F (36.5 °C) (08/15/24 0717)  Pulse: 80 (08/15/24 0836)  Resp: 20 (08/15/24 0732)  BP: (!) 171/92 (08/15/24 0836)  SpO2: 95 % (08/15/24 0732) Vital Signs (24h Range):  Temp:  [97.4 °F (36.3 °C)-98.3 °F (36.8 °C)] 97.7 °F (36.5 °C)  Pulse:  [70-98] 80  Resp:  [18-22] 20  SpO2:  [93 %-98 %] 95 %  BP: (119-171)/(77-93) 171/92     Weight: 119.7 kg (263 lb 14.3 oz)  Body mass index is 45.3 kg/m².     Physical Exam  Constitutional:       Appearance: She is obese. She is ill-appearing.   HENT:      Nose: Nose normal.      Mouth/Throat:      Mouth:  Mucous membranes are moist.   Eyes:      Extraocular Movements: Extraocular movements intact.      Pupils: Pupils are equal, round, and reactive to light.   Cardiovascular:      Rate and Rhythm: Regular rhythm. Bradycardia present.   Pulmonary:      Breath sounds: Rhonchi present. No rales.      Comments: Improved airflow at bases - venti  Abdominal:      General: Bowel sounds are normal. There is no distension.      Palpations: Abdomen is soft.      Tenderness: There is no abdominal tenderness.   Genitourinary:     Comments: Hardwick in place  Musculoskeletal:      Cervical back: Normal range of motion.      Right lower leg: No edema.      Left lower leg: No edema.   Skin:     General: Skin is warm and dry.      Capillary Refill: Capillary refill takes 2 to 3 seconds.   Neurological:      General: No focal deficit present.      Mental Status: She is alert.      Motor: Weakness present.              CRANIAL NERVES     CN III, IV, VI   Pupils are equal, round, and reactive to light.       Significant Labs: All pertinent labs within the past 24 hours have been reviewed.    Significant Imaging: I have reviewed all pertinent imaging results/findings within the past 24 hours.    Assessment/Plan:      * Acute on chronic respiratory failure with hypoxia and hypercapnia  Patient with Hypercapnic and Hypoxic Respiratory failure which is Acute on chronic.  she is on home oxygen at 4 LPM. Supplemental oxygen was provided and noted- Oxygen Concentration (%):  [30-40] 40    .   Signs/symptoms of respiratory failure include- tachypnea, increased work of breathing, respiratory distress, use of accessory muscles, wheezing, and lethargy. Contributing diagnoses includes - CHF, COPD, Interstitial lung disease, and Obesity Hypoventilation Labs and images were reviewed. Patient Has not had a recent ABG. Will treat underlying causes and adjust management of respiratory failure as follows- treat underlying heart failure and COPD  exacerbation  8/10 ND continue current treatment with IV antibiotics IV steroids and nebs.  We will continue BiPAP as needed for respiratory support and O2 support  8/11 continue IV antibiotics will begin weaning steroids continue nebs.  O2 protocol will attempt to wean BiPAP today  8/13: Continue to wean BiPAP as tolerated.  8/14: Patient now tolerating Ventimask.    Hypothyroid  On Synthroid, check TSH  8/11 TSH at goal; continue Synthroid    Acute renal failure with tubular necrosis  BHAVESH is likely due to pre-renal azotemia due to intravascular volume depletion. Baseline creatinine is unknown. Most recent creatinine and eGFR are listed below.  Recent Labs     08/13/24  0352 08/14/24  0341 08/15/24  0532   CREATININE 1.6* 1.8* 1.6*   EGFRNORACEVR 33.8* 29.4* 33.8*     8/10 creatinine slowly improving; patient getting fluids via her TPN for nutritional support   Plan  - BHAVESH is improving  - Avoid nephrotoxins and renally dose meds for GFR listed above  - Monitor urine output, serial BMP, and adjust therapy as needed  -   8/11 kidney function continues to slowly improve, monitor I's and O    Hypotension  Monitor with IVF  8/10 patient currently on low-dose Levophed we will continue to wean as tolerates  8/11 blood pressure improved - continue to monitor blood pressure    BP Readings from Last 3 Encounters:   08/15/24 (!) 171/92   08/01/24 122/66   07/30/24 (!) 140/66         Chronic diastolic congestive heart failure  Patient is identified as having Diastolic (HFpEF) heart failure that is Chronic. CHF is currently controlled. Latest ECHO performed and demonstrates- Results for orders placed during the hospital encounter of 07/28/24    Echo Saline Bubble? No; Ultrasound enhancing contrast? No    Interpretation Summary    Left Ventricle: The left ventricle is normal in size. Mildly increased wall thickness. There is normal systolic function with a visually estimated ejection fraction of 55 - 60%. Grade I diastolic  "dysfunction.    Right Ventricle: Normal right ventricular cavity size. Systolic function is normal.    Left Atrium: Left atrium is severely dilated. Atrial septum is bulging to the right.    Right Atrium: Right atrium is moderately dilated.    Aortic Valve: The aortic valve is a trileaflet valve. Mildly restricted motion. There is mild stenosis. Aortic valve area by VTI is 1.84 cm². Aortic valve peak velocity is 2.18 m/s. Mean gradient is 10 mmHg. The dimensionless index is 0.58. There is mild aortic regurgitation.    Mitral Valve: There is mild regurgitation.    Tricuspid Valve: There is mild regurgitation.    Pulmonic Valve: There is mild to moderate regurgitation.    No pericardial effusion.  . Continue Beta Blocker, ACE/ARB, and Furosemide and monitor clinical status closely. Monitor on telemetry. Patient is on CHF pathway.  Monitor strict Is&Os and daily weights.  Place on fluid restriction of 1 L. Cardiology has been consulted. Continue to stress to patient importance of self efficacy and  on diet for CHF. Last BNP reviewed- and noted below No results for input(s): "BNP", "BNPTRIAGEBLO" in the last 168 hours.    8/10 currently euvolemic, we will continue to monitor need for CHF med  8/11 plan for dose of Lasix today, monitor I's and o's  8/12:  match I/O    Volume overload:  Continue to monitor strict intake and output.    I/O last 3 completed shifts:  In: 573.8 [P.O.:480; IV Piggyback:93.8]  Out: 1250 [Urine:1250]  No intake/output data recorded.         Diabetes mellitus, type 2  Patient's FSGs are controlled on current medication regimen.  Last A1c reviewed-   Lab Results   Component Value Date    HGBA1C 5.3 07/28/2024     Most recent fingerstick glucose reviewed-   Recent Labs   Lab 08/14/24  1146 08/14/24  1639 08/14/24  2101 08/15/24  0606   POCTGLUCOSE 125* 118* 131* 139*       Current correctional scale  stable  Maintain anti-hyperglycemic dose as follows-   Antihyperglycemics (From admission, " onward)      Start     Stop Route Frequency Ordered    08/08/24 1247  insulin aspart U-100 pen 0-5 Units         -- SubQ Before meals & nightly PRN 08/08/24 1147          Hold Oral hypoglycemics while patient is in the hospital.    COPD exacerbation  Patient's COPD is with exacerbation noted by continued dyspnea, use of accessory muscles for breathing, and worsening of baseline hypoxia currently.  Patient is currently on COPD Pathway. Continue scheduled inhalers Steroids, Antibiotics, and Supplemental oxygen and monitor respiratory status closely.     Continue to wean oxygen, steroids and antimicrobial therapy as tolerated.      Weakness  PT/OT when stable      Severe obesity (BMI >= 40)  Body mass index is 45.3 kg/m². Morbid obesity complicates all aspects of disease management from diagnostic modalities to treatment. Weight loss encouraged and health benefits explained to patient.         Paroxysmal A-fib  Currently in sinus rhythm   8/10 currently bradycardic, will check TSH, continue cardiac monitor  8/11 no longer bradycardic, continue cardiac monitoring    stable      VTE Risk Mitigation (From admission, onward)           Ordered     enoxaparin injection 40 mg  Every 24 hours         08/08/24 1305     IP VTE HIGH RISK PATIENT  Once         08/08/24 0118     Place sequential compression device  Until discontinued         08/08/24 0118                    Discharge Planning   TIGRE:      Code Status: Full Code   Is the patient medically ready for discharge?:     Reason for patient still in hospital (select all that apply): Treatment  Discharge Plan A: Home with family, Home Health   Discharge Delays: None known at this time              Oscar Ge Jr, MD  Department of Hospital Medicine   [facility]

## 2024-08-15 NOTE — ASSESSMENT & PLAN NOTE
BHAVESH is likely due to pre-renal azotemia due to intravascular volume depletion. Baseline creatinine is unknown. Most recent creatinine and eGFR are listed below.  Recent Labs     08/13/24  0352 08/14/24  0341 08/15/24  0532   CREATININE 1.6* 1.8* 1.6*   EGFRNORACEVR 33.8* 29.4* 33.8*     8/10 creatinine slowly improving; patient getting fluids via her TPN for nutritional support   Plan  - BHAVESH is improving  - Avoid nephrotoxins and renally dose meds for GFR listed above  - Monitor urine output, serial BMP, and adjust therapy as needed  -   8/11 kidney function continues to slowly improve, monitor I's and O

## 2024-08-16 VITALS
BODY MASS INDEX: 45.05 KG/M2 | HEIGHT: 64 IN | WEIGHT: 263.88 LBS | RESPIRATION RATE: 23 BRPM | OXYGEN SATURATION: 97 % | HEART RATE: 84 BPM | DIASTOLIC BLOOD PRESSURE: 91 MMHG | TEMPERATURE: 98 F | SYSTOLIC BLOOD PRESSURE: 145 MMHG

## 2024-08-16 LAB
ALBUMIN SERPL BCP-MCNC: 2 G/DL (ref 3.5–5.2)
ALP SERPL-CCNC: 106 U/L (ref 55–135)
ALT SERPL W/O P-5'-P-CCNC: 26 U/L (ref 10–44)
ANION GAP SERPL CALC-SCNC: 0 MMOL/L (ref 3–11)
AST SERPL-CCNC: 17 U/L (ref 10–40)
BASOPHILS # BLD AUTO: 0.01 K/UL (ref 0–0.2)
BASOPHILS NFR BLD: 0.1 % (ref 0–1.9)
BILIRUB SERPL-MCNC: 0.3 MG/DL (ref 0.1–1)
BUN SERPL-MCNC: 87 MG/DL (ref 8–23)
CALCIUM SERPL-MCNC: 9.3 MG/DL (ref 8.7–10.5)
CHLORIDE SERPL-SCNC: 102 MMOL/L (ref 95–110)
CO2 SERPL-SCNC: 36 MMOL/L (ref 23–29)
CREAT SERPL-MCNC: 1.6 MG/DL (ref 0.5–1.4)
DIFFERENTIAL METHOD BLD: ABNORMAL
EOSINOPHIL # BLD AUTO: 0 K/UL (ref 0–0.5)
EOSINOPHIL NFR BLD: 0 % (ref 0–8)
ERYTHROCYTE [DISTWIDTH] IN BLOOD BY AUTOMATED COUNT: 15.9 % (ref 11.5–14.5)
EST. GFR  (NO RACE VARIABLE): 33.8 ML/MIN/1.73 M^2
GLUCOSE SERPL-MCNC: 129 MG/DL (ref 70–110)
HCT VFR BLD AUTO: 43.6 % (ref 37–48.5)
HGB BLD-MCNC: 13.9 G/DL (ref 12–16)
IMM GRANULOCYTES # BLD AUTO: 0.17 K/UL (ref 0–0.04)
IMM GRANULOCYTES NFR BLD AUTO: 1.1 % (ref 0–0.5)
LYMPHOCYTES # BLD AUTO: 0.6 K/UL (ref 1–4.8)
LYMPHOCYTES NFR BLD: 3.7 % (ref 18–48)
MAGNESIUM SERPL-MCNC: 2.1 MG/DL (ref 1.6–2.6)
MCH RBC QN AUTO: 28 PG (ref 27–31)
MCHC RBC AUTO-ENTMCNC: 31.9 G/DL (ref 32–36)
MCV RBC AUTO: 88 FL (ref 82–98)
MONOCYTES # BLD AUTO: 0.8 K/UL (ref 0.3–1)
MONOCYTES NFR BLD: 5.3 % (ref 4–15)
NEUTROPHILS # BLD AUTO: 14.1 K/UL (ref 1.8–7.7)
NEUTROPHILS NFR BLD: 89.8 % (ref 38–73)
NRBC BLD-RTO: 0 /100 WBC
PLATELET # BLD AUTO: 287 K/UL (ref 150–450)
PMV BLD AUTO: 9.1 FL (ref 9.2–12.9)
POCT GLUCOSE: 130 MG/DL (ref 70–110)
POTASSIUM SERPL-SCNC: 4.7 MMOL/L (ref 3.5–5.1)
PROT SERPL-MCNC: 5.8 G/DL (ref 6–8.4)
RBC # BLD AUTO: 4.96 M/UL (ref 4–5.4)
SODIUM SERPL-SCNC: 138 MMOL/L (ref 136–145)
WBC # BLD AUTO: 15.74 K/UL (ref 3.9–12.7)

## 2024-08-16 PROCEDURE — 63600175 PHARM REV CODE 636 W HCPCS: Performed by: INTERNAL MEDICINE

## 2024-08-16 PROCEDURE — 94640 AIRWAY INHALATION TREATMENT: CPT

## 2024-08-16 PROCEDURE — 83735 ASSAY OF MAGNESIUM: CPT | Performed by: INTERNAL MEDICINE

## 2024-08-16 PROCEDURE — 25000242 PHARM REV CODE 250 ALT 637 W/ HCPCS: Performed by: INTERNAL MEDICINE

## 2024-08-16 PROCEDURE — A4216 STERILE WATER/SALINE, 10 ML: HCPCS | Performed by: INTERNAL MEDICINE

## 2024-08-16 PROCEDURE — 94660 CPAP INITIATION&MGMT: CPT

## 2024-08-16 PROCEDURE — 85025 COMPLETE CBC W/AUTO DIFF WBC: CPT | Performed by: INTERNAL MEDICINE

## 2024-08-16 PROCEDURE — 80053 COMPREHEN METABOLIC PANEL: CPT | Performed by: INTERNAL MEDICINE

## 2024-08-16 PROCEDURE — 99900031 HC PATIENT EDUCATION (STAT)

## 2024-08-16 PROCEDURE — 27100171 HC OXYGEN HIGH FLOW UP TO 24 HOURS

## 2024-08-16 PROCEDURE — 94761 N-INVAS EAR/PLS OXIMETRY MLT: CPT

## 2024-08-16 PROCEDURE — 99900035 HC TECH TIME PER 15 MIN (STAT)

## 2024-08-16 PROCEDURE — 36415 COLL VENOUS BLD VENIPUNCTURE: CPT | Performed by: INTERNAL MEDICINE

## 2024-08-16 PROCEDURE — 25000003 PHARM REV CODE 250: Performed by: INTERNAL MEDICINE

## 2024-08-16 RX ORDER — ENOXAPARIN SODIUM 100 MG/ML
40 INJECTION SUBCUTANEOUS EVERY 12 HOURS
Status: DISCONTINUED | OUTPATIENT
Start: 2024-08-16 | End: 2024-08-16 | Stop reason: HOSPADM

## 2024-08-16 RX ORDER — ISOSORBIDE MONONITRATE 30 MG/1
30 TABLET, EXTENDED RELEASE ORAL DAILY
Qty: 30 TABLET | Refills: 1 | Status: SHIPPED | OUTPATIENT
Start: 2024-08-16 | End: 2025-08-16

## 2024-08-16 RX ORDER — PREDNISONE 20 MG/1
20 TABLET ORAL 2 TIMES DAILY
Qty: 10 TABLET | Refills: 0 | Status: SHIPPED | OUTPATIENT
Start: 2024-08-16

## 2024-08-16 RX ADMIN — SODIUM CHLORIDE, PRESERVATIVE FREE 10 ML: 5 INJECTION INTRAVENOUS at 12:08

## 2024-08-16 RX ADMIN — ENOXAPARIN SODIUM 40 MG: 40 INJECTION SUBCUTANEOUS at 09:08

## 2024-08-16 RX ADMIN — LEVOTHYROXINE SODIUM 50 MCG: 50 TABLET ORAL at 06:08

## 2024-08-16 RX ADMIN — ASPIRIN 81 MG: 81 TABLET, COATED ORAL at 09:08

## 2024-08-16 RX ADMIN — METOPROLOL TARTRATE 25 MG: 25 TABLET, FILM COATED ORAL at 09:08

## 2024-08-16 RX ADMIN — ATORVASTATIN CALCIUM 40 MG: 40 TABLET, FILM COATED ORAL at 09:08

## 2024-08-16 RX ADMIN — IPRATROPIUM BROMIDE AND ALBUTEROL SULFATE 3 ML: 2.5; .5 SOLUTION RESPIRATORY (INHALATION) at 01:08

## 2024-08-16 RX ADMIN — ISOSORBIDE MONONITRATE 30 MG: 30 TABLET, EXTENDED RELEASE ORAL at 09:08

## 2024-08-16 RX ADMIN — FUROSEMIDE 40 MG: 10 INJECTION, SOLUTION INTRAVENOUS at 08:08

## 2024-08-16 RX ADMIN — IPRATROPIUM BROMIDE AND ALBUTEROL SULFATE 3 ML: 2.5; .5 SOLUTION RESPIRATORY (INHALATION) at 07:08

## 2024-08-16 RX ADMIN — PREDNISONE 20 MG: 20 TABLET ORAL at 09:08

## 2024-08-16 RX ADMIN — POLYETHYLENE GLYCOL (3350) 17 G: 17 POWDER, FOR SOLUTION ORAL at 09:08

## 2024-08-16 RX ADMIN — SODIUM CHLORIDE, PRESERVATIVE FREE 10 ML: 5 INJECTION INTRAVENOUS at 06:08

## 2024-08-16 NOTE — DISCHARGE INSTRUCTIONS
FOLLOW-UP WITH YOUR PRIMARY CARE DOCTOR.      THANK YOU FOR CHOOSING [facility] FOR YOUR CARE !!!    JASON VARGAS LPN

## 2024-08-16 NOTE — PT/OT/SLP DISCHARGE
Physical Therapy Discharge Summary    Name: Carmen Tan  MRN: [id number]   Principal Problem: Acute on chronic respiratory failure with hypoxia and hypercapnia     Patient Discharged from acute Physical Therapy on 2024.  Please refer to prior PT note dated  8/15/2024  for functional status.     Assessment:     Patient appropriate for care in another setting. Elevated BP with diastolic BP going more than 100 mmHg.     Objective:     GOALS:   Multidisciplinary Problems       Physical Therapy Goals          Problem: Physical Therapy    Goal Priority Disciplines Outcome Goal Variances Interventions   Physical Therapy Goal     PT, PT/OT Adequate for Care Transition     Description: Goals to be met by: 2024    Patient will increase functional independence with mobility by performin. Supine to sit with Minimal Assistance.  2. Sit to supine with Minimal Assistance.  3. Bed to chair transfer with Minimal Assistance with rolling walker using Step Transfer technique.  4. Sit to Stand with Minimal Assistance with rolling walker.  5. Gait  x 10  feet with Minimal Assistance with rolling walker.  6. Lower extremity exercise program x10 reps, with assistance as needed.                          Reasons for Discontinuation of Therapy Services  Transfer to alternate level of care.      Plan:     Patient Discharged to: Skilled Nursing Facility.      2024

## 2024-08-16 NOTE — PROGRESS NOTES
Pharmacist Renal Dose Adjustment Note    Carmen Tan is a 74 y.o. female being treated with the medication Enoxaparin    Patient Data:    Vital Signs (Most Recent):  Temp: 98.2 °F (36.8 °C) (08/16/24 0441)  Pulse: 85 (08/16/24 0717)  Resp: (!) 24 (08/16/24 0717)  BP: (!) 150/78 (08/16/24 0441)  SpO2: 99 % (08/16/24 0717) Vital Signs (72h Range):  Temp:  [96.7 °F (35.9 °C)-98.4 °F (36.9 °C)]   Pulse:  []   Resp:  [16-32]   BP: (102-176)/()   SpO2:  [76 %-99 %]      Recent Labs   Lab 08/14/24  0341 08/15/24  0532 08/16/24  0436   CREATININE 1.8* 1.6* 1.6*     Serum creatinine: 1.6 mg/dL (H) 08/16/24 0436  Estimated creatinine clearance: 39.9 mL/min (A)    Medication: Enoxaparin dose:  40 mg frequency  QD will be changed to medication: Enoxaparin dose: 40 mg frequency: Q12H due to BMI > 40     Pharmacist's Name: Wilmer Paz  Pharmacist's Extension: [phone]

## 2024-08-16 NOTE — ASSESSMENT & PLAN NOTE
Currently in sinus rhythm   8/10 currently bradycardic, will check TSH, continue cardiac monitor  8/11 no longer bradycardic, continue cardiac monitoring    stable   cont aspirin, isosorbide

## 2024-08-16 NOTE — PLAN OF CARE
[place] [facility]  Discharge Final Note    Primary Care Provider: Lucian Barry MD    Expected Discharge Date: 8/16/2024    Final Discharge Note (most recent)       Final Note - 08/16/24 1055          Final Note    Assessment Type Final Discharge Note     Anticipated Discharge Disposition Skilled Nursing Facility        Post-Acute Status    Post-Acute Authorization Placement     Post-Acute Placement Status Set-up Complete/Auth obtained     Coverage [employer] MANAGED MEDICARE - [employer] MEDICARE O     Discharge Delays None known at this time                     Important Message from Medicare  Important Message from Medicare regarding Discharge Appeal Rights: Given to patient/caregiver, Explained to patient/caregiver, Signed/date by patient/caregiver     Date IMM was signed: 08/15/24  Time IMM was signed: 1224     Follow-up providers       Lucian Barry MD   Specialty: Internal Medicine   Relationship: PCP - General    [address] [place], [place] [address]  [facility] [address]   Phone: [phone]       Next Steps: Follow up in 1 week(s)    Inst[facility] [place] [facility]              After-discharge care                Destination       [facility] [place]   Service: Skilled Nursing    [address] [address]   Phone: [phone]                           Final note is completed. The patient will be discharging to [facility] [place].

## 2024-08-16 NOTE — PLAN OF CARE
Problem: Physical Therapy  Goal: Physical Therapy Goal  Description: Goals to be met by: 2024    Patient will increase functional independence with mobility by performin. Supine to sit with Minimal Assistance.  2. Sit to supine with Minimal Assistance.  3. Bed to chair transfer with Minimal Assistance with rolling walker using Step Transfer technique.  4. Sit to Stand with Minimal Assistance with rolling walker.  5. Gait  x 10  feet with Minimal Assistance with rolling walker.  6. Lower extremity exercise program x10 reps, with assistance as needed.     Outcome: Adequate for Care Transition, no goals met, discharge to SNF

## 2024-08-16 NOTE — PLAN OF CARE
Problem: Infection  Goal: Absence of Infection Signs and Symptoms  Outcome: Progressing     Problem: Adult Inpatient Plan of Care  Goal: Plan of Care Review  Outcome: Progressing  Goal: Patient-Specific Goal (Individualized)  Outcome: Progressing  Goal: Absence of Hospital-Acquired Illness or Injury  Outcome: Progressing  Goal: Optimal Comfort and Wellbeing  Outcome: Progressing  Goal: Readiness for Transition of Care  Outcome: Progressing     Problem: Bariatric Environmental Safety  Goal: Safety Maintained with Care  Outcome: Progressing     Problem: Skin Injury Risk Increased  Goal: Skin Health and Integrity  Outcome: Progressing     Problem: Diabetes Comorbidity  Goal: Blood Glucose Level Within Targeted Range  Outcome: Progressing     Problem: Noninvasive Ventilation Acute  Goal: Effective Unassisted Ventilation and Oxygenation  Outcome: Progressing     Problem: Fall Injury Risk  Goal: Absence of Fall and Fall-Related Injury  Outcome: Progressing     Problem: Breathing Pattern Ineffective  Goal: Effective Breathing Pattern  Outcome: Progressing     Problem: Gas Exchange Impaired  Goal: Optimal Gas Exchange  Outcome: Progressing     Problem: Comorbidity Management  Goal: Maintenance of COPD Symptom Control  Outcome: Progressing  Goal: Maintenance of Heart Failure Symptom Control  Outcome: Progressing  Goal: Blood Pressure in Desired Range  Outcome: Progressing  Goal: Bariatric Home Regimen Maintained  Outcome: Progressing  Goal: Maintenance of Behavioral Health Symptom Control  Outcome: Progressing     Problem: Acute Kidney Injury/Impairment  Goal: Fluid and Electrolyte Balance  Outcome: Progressing  Goal: Improved Oral Intake  Outcome: Progressing  Goal: Effective Renal Function  Outcome: Progressing

## 2024-08-16 NOTE — ASSESSMENT & PLAN NOTE
Monitor with IVF  8/10 patient currently on low-dose Levophed we will continue to wean as tolerates  8/11 blood pressure improved - continue to monitor blood pressure    BP Readings from Last 3 Encounters:   08/16/24 (!) 145/91   08/01/24 122/66   07/30/24 (!) 140/66

## 2024-08-16 NOTE — ASSESSMENT & PLAN NOTE
"Patient is identified as having Diastolic (HFpEF) heart failure that is Chronic. CHF is currently controlled. Latest ECHO performed and demonstrates- Results for orders placed during the hospital encounter of 07/28/24    Echo Saline Bubble? No; Ultrasound enhancing contrast? No    Interpretation Summary    Left Ventricle: The left ventricle is normal in size. Mildly increased wall thickness. There is normal systolic function with a visually estimated ejection fraction of 55 - 60%. Grade I diastolic dysfunction.    Right Ventricle: Normal right ventricular cavity size. Systolic function is normal.    Left Atrium: Left atrium is severely dilated. Atrial septum is bulging to the right.    Right Atrium: Right atrium is moderately dilated.    Aortic Valve: The aortic valve is a trileaflet valve. Mildly restricted motion. There is mild stenosis. Aortic valve area by VTI is 1.84 cm². Aortic valve peak velocity is 2.18 m/s. Mean gradient is 10 mmHg. The dimensionless index is 0.58. There is mild aortic regurgitation.    Mitral Valve: There is mild regurgitation.    Tricuspid Valve: There is mild regurgitation.    Pulmonic Valve: There is mild to moderate regurgitation.    No pericardial effusion.  . Continue Beta Blocker, ACE/ARB, and Furosemide and monitor clinical status closely. Monitor on telemetry. Patient is on CHF pathway.  Monitor strict Is&Os and daily weights.  Place on fluid restriction of 1 L. Cardiology has been consulted. Continue to stress to patient importance of self efficacy and  on diet for CHF. Last BNP reviewed- and noted below No results for input(s): "BNP", "BNPTRIAGEBLO" in the last 168 hours.    8/10 currently euvolemic, we will continue to monitor need for CHF med  8/11 plan for dose of Lasix today, monitor I's and o's  8/12:  match I/O    Volume overload:  Continue to monitor strict intake and output.    I/O last 3 completed shifts:  In: 850 [P.O.:850]  Out: 2400 [Urine:2400]  No " intake/output data recorded.

## 2024-08-16 NOTE — ASSESSMENT & PLAN NOTE
Patient's FSGs are controlled on current medication regimen.  Last A1c reviewed-   Lab Results   Component Value Date    HGBA1C 5.3 07/28/2024     Most recent fingerstick glucose reviewed-   Recent Labs   Lab 08/15/24  1131 08/15/24  1656 08/15/24  2007 08/16/24  0610   POCTGLUCOSE 143* 138* 169* 130*       Current correctional scale  stable  Maintain anti-hyperglycemic dose as follows-   Antihyperglycemics (From admission, onward)    Start     Stop Route Frequency Ordered    08/08/24 1247  insulin aspart U-100 pen 0-5 Units         -- SubQ Before meals & nightly PRN 08/08/24 1147        Hold Oral hypoglycemics while patient is in the hospital.

## 2024-08-16 NOTE — PLAN OF CARE
I was able to touch base with the patient and her son, Salvatore (via phone),  this morning regarding the discharge plan of care. The patient is still in agreement with placement at [facility]. Salvatore was informed that the patient will be discharged today.

## 2024-08-16 NOTE — NURSING
PATIENT LEFT FLOOR IN STABLE CONDITION PER WHEEL CHAIR WITH OXYGEN AT 5 LPM VIA NASAL CANULA IN PROGRESS.  PATIENT ACCOMPANIED BY LEGACY TRANSPORTER.  JASON VARGAS, LARISAN

## 2024-08-16 NOTE — DISCHARGE SUMMARY
"[facility] Medicine  Discharge Summary      Patient Name: Carmen Tan  MRN: [id number]  GOLDIE: [id number]  Patient Class: IP- Inpatient  Admission Date: 8/7/2024  Hospital Length of Stay: 9 days  Discharge Date and Time:  08/16/2024 8:42 AM  Attending Physician: Oscar Ge Jr., MD   Discharging Provider: Oscar Ge Jr, MD  Primary Care Provider: Lucian Barry MD    Primary Care Team: Networked reference to record PCT     HPI:   Chief Complaint   Patient presents with    Shortness of Breath       Pt to ED via EMS from residence - Room air "in the 70's with tachypnea and hypotension." Pt fell this morning and is also complaining of neck pain.       74-year-old female past medical history CHF morbid obesity presents the ED via EMS with reports of shortness of breath and possible fall.  Patient was recently discharged from the hospital 6 days ago with week-long stay for heart failure.  Family reports she has been taking all medications as prescribed.  Patient reports that she feels short of breath and noted neck pain      updated HPI:  Patient nonverbal on BiPAP.  Above history reviewed.  Patient presents with shortness of breath with possible fall.  Recently discharge from heart failure now with acute renal failure and hypotension concerning for over-diuresis.    * No surgery found *      Hospital Course:   8/9/24:  remains bipap dependent.  Responding to gently IVF and steroids.   8/10/24 ND patient is sleeping but will awaken with arousal; patient reports being tired and will follow commands and squeeze your hand asked.  Still on low-dose norepi - will wean as tolerated.  Patient has some bradycardia, not on any rate-controlling medicine will continue to monitor  8/11/24 ND currently off of all pressors.  Her heart rate is ranging between 60- 80 at this time.  Appreciate cardiology recs.  Chest x-ray reviewed this a.m. with improvement in airspace.  Will give dose of IV Lasix " today  08/12/2024:  Patient more alert this morning.  X-ray reviews improve aeration.  Blood cultures negative.  Continue to wean BiPAP as tolerated.  8/13: Patient remains on BiPAP.  Will attempt to wean BiPAP utilization today in order to pursue enteral feedings.  8/14: Patient tolerated p.o. intake yesterday.  Parenteral nutrition discontinued.  Stable medically from a respiratory perspective will transition to [facility] for ongoing management.  Patient needs ongoing physical and occupational therapy and ongoing evaluation by case management regarding placement at time discharge.  815:  Patient continues to clinically improve.  Awaiting skilled nursing placement once stable.  8/16:  Patient without complaints this am.  Stable for transition to snf.      Goals of Care Treatment Preferences:  Code Status: Full Code         Consults:   Consults (From admission, onward)          Status Ordering Provider     Inpatient consult to Cardiology  Once        Provider:  Spencer Rascon MD    Completed CLAU DORMAN     Inpatient consult to Registered Dietitian/Nutritionist  Once        Provider:  (Not yet assigned)    Completed CHRISTINA BOJORQUEZ JR     Inpatient consult to PICC team ([facility])  Once        Provider:  (Not yet assigned)    Acknowledged CHRISTINA BOJORQUEZ JR     Inpatient consult to Registered Dietitian/Nutritionist  Once        Provider:  (Not yet assigned)    Completed CHRISTINA BOJORQUEZ JR     Inpatient consult to Social Work/Case Management  Once        Provider:  (Not yet assigned)    Acknowledged CHRISTINA BOJORQUEZ JR            Pulmonary  * Acute on chronic respiratory failure with hypoxia and hypercapnia  Patient with Hypercapnic and Hypoxic Respiratory failure which is Acute on chronic.  she is on home oxygen at 4 LPM. Supplemental oxygen was provided and noted- Oxygen Concentration (%):  [40] 40    .   Signs/symptoms of respiratory failure include- tachypnea, increased work of breathing, respiratory  distress, use of accessory muscles, wheezing, and lethargy. Contributing diagnoses includes - CHF, COPD, Interstitial lung disease, and Obesity Hypoventilation Labs and images were reviewed. Patient Has not had a recent ABG. Will treat underlying causes and adjust management of respiratory failure as follows- treat underlying heart failure and COPD exacerbation  8/10 ND continue current treatment with IV antibiotics IV steroids and nebs.  We will continue BiPAP as needed for respiratory support and O2 support  8/11 continue IV antibiotics will begin weaning steroids continue nebs.  O2 protocol will attempt to wean BiPAP today  8/13: Continue to wean BiPAP as tolerated.  8/14: Patient now tolerating Ventimask.  8/16:  tolerating NC    COPD exacerbation  Patient's COPD is with exacerbation noted by continued dyspnea, use of accessory muscles for breathing, and worsening of baseline hypoxia currently.  Patient is currently on COPD Pathway. Continue scheduled inhalers Steroids, Antibiotics, and Supplemental oxygen and monitor respiratory status closely.     Continue to wean oxygen, steroids and antimicrobial therapy as tolerated.      Cardiac/Vascular  Hypotension  Monitor with IVF  8/10 patient currently on low-dose Levophed we will continue to wean as tolerates  8/11 blood pressure improved - continue to monitor blood pressure    BP Readings from Last 3 Encounters:   08/16/24 (!) 145/91   08/01/24 122/66   07/30/24 (!) 140/66         Chronic diastolic congestive heart failure  Patient is identified as having Diastolic (HFpEF) heart failure that is Chronic. CHF is currently controlled. Latest ECHO performed and demonstrates- Results for orders placed during the hospital encounter of 07/28/24    Echo Saline Bubble? No; Ultrasound enhancing contrast? No    Interpretation Summary    Left Ventricle: The left ventricle is normal in size. Mildly increased wall thickness. There is normal systolic function with a visually  "estimated ejection fraction of 55 - 60%. Grade I diastolic dysfunction.    Right Ventricle: Normal right ventricular cavity size. Systolic function is normal.    Left Atrium: Left atrium is severely dilated. Atrial septum is bulging to the right.    Right Atrium: Right atrium is moderately dilated.    Aortic Valve: The aortic valve is a trileaflet valve. Mildly restricted motion. There is mild stenosis. Aortic valve area by VTI is 1.84 cm². Aortic valve peak velocity is 2.18 m/s. Mean gradient is 10 mmHg. The dimensionless index is 0.58. There is mild aortic regurgitation.    Mitral Valve: There is mild regurgitation.    Tricuspid Valve: There is mild regurgitation.    Pulmonic Valve: There is mild to moderate regurgitation.    No pericardial effusion.  . Continue Beta Blocker, ACE/ARB, and Furosemide and monitor clinical status closely. Monitor on telemetry. Patient is on CHF pathway.  Monitor strict Is&Os and daily weights.  Place on fluid restriction of 1 L. Cardiology has been consulted. Continue to stress to patient importance of self efficacy and  on diet for CHF. Last BNP reviewed- and noted below No results for input(s): "BNP", "BNPTRIAGEBLO" in the last 168 hours.    8/10 currently euvolemic, we will continue to monitor need for CHF med  8/11 plan for dose of Lasix today, monitor I's and o's  8/12:  match I/O    Volume overload:  Continue to monitor strict intake and output.    I/O last 3 completed shifts:  In: 850 [P.O.:850]  Out: 2400 [Urine:2400]  No intake/output data recorded.         Paroxysmal A-fib  Currently in sinus rhythm   8/10 currently bradycardic, will check TSH, continue cardiac monitor  8/11 no longer bradycardic, continue cardiac monitoring    stable    Endocrine  Hypothyroid  On Synthroid, check TSH  8/11 TSH at goal; continue Synthroid      Diabetes mellitus, type 2  Patient's FSGs are controlled on current medication regimen.  Last A1c reviewed-   Lab Results   Component Value " Date    HGBA1C 5.3 07/28/2024     Most recent fingerstick glucose reviewed-   Recent Labs   Lab 08/15/24  1131 08/15/24  1656 08/15/24  2007 08/16/24  0610   POCTGLUCOSE 143* 138* 169* 130*       Current correctional scale  stable  Maintain anti-hyperglycemic dose as follows-   Antihyperglycemics (From admission, onward)      Start     Stop Route Frequency Ordered    08/08/24 1247  insulin aspart U-100 pen 0-5 Units         -- SubQ Before meals & nightly PRN 08/08/24 1147          Hold Oral hypoglycemics while patient is in the hospital.    Other  Weakness  PT/OT when stable        Final Active Diagnoses:    Diagnosis Date Noted POA    PRINCIPAL PROBLEM:  Acute on chronic respiratory failure with hypoxia and hypercapnia [J96.21, J96.22] 02/01/2022 Yes    Hypothyroid [E03.9] 08/10/2024 Yes    Acute renal failure with tubular necrosis [N17.0] 08/09/2024 Unknown    Hypotension [I95.9] 08/08/2024 Unknown    Chronic diastolic congestive heart failure [I50.32] 09/29/2023 Yes     Chronic    COPD exacerbation [J44.1] 02/16/2022 Yes    Diabetes mellitus, type 2 [E11.9] 02/16/2022 Yes    Weakness [R53.1] 02/05/2022 Yes    Severe obesity (BMI >= 40) [E66.01] 02/04/2022 Yes    Paroxysmal A-fib [I48.0] 06/08/2017 Yes     Chronic      Problems Resolved During this Admission:       Discharged Condition: fair    Disposition: Skilled Nursing Facility    Follow Up:   Follow-up Information       Lucian Barry MD Follow up in 1 week(s).    Specialty: Internal Medicine  Contact information:  [facility] [address] [place] [id number]  [phone]                           Patient Instructions:      BIPAP FOR HOME USE     Order Specific Question Answer Comments   Length of need (1-99 months): 99    Type:  BiPap    BiPap setting (cmH20) Inspiratory: 16    BiPap setting (cmH20) Expiratory: 8    Humidification (): Heated    Choose ONE mask type and its corresponding cushions and/or pillows:  Full Face Mask, 1 per 90  days:  Full Face Cushion, (3 per 90 days)    Choose EITHER Heated or Non-Heated Tubjing  Non-Heated Tubing, 1 per 90 days    All other supplies as needed as listed below:  Headgear, 1 per 180 days    All other supplies as needed as listed below:  Disposable Filter, 6 per 90 days    All other supplies as needed as listed below:  Exhalation Port, contact payer for quantity/frequency    All other supplies as needed as listed below:  Humidifier Chamber, 1 per 180 days    All other supplies as needed as listed below:  Non-Disposable Filter, 1 per 180 days    All other supplies as needed as listed below:  Chin Strap, 1 per 180 days      Diet Cardiac       Significant Diagnostic Studies: Labs: All labs within the past 24 hours have been reviewed    Pending Diagnostic Studies:       None           Medications:  Reconciled Home Medications:      Medication List        START taking these medications      isosorbide mononitrate 30 MG 24 hr tablet  Commonly known as: IMDUR  Take 1 tablet (30 mg total) by mouth once daily.     predniSONE 20 MG tablet  Commonly known as: DELTASONE  Take 1 tablet (20 mg total) by mouth 2 (two) times daily.            CONTINUE taking these medications      acetaminophen 325 MG tablet  Commonly known as: TYLENOL  Take 2 tablets (650 mg total) by mouth every 8 (eight) hours as needed for Pain or Temperature greater than (100, headache).     albuterol-ipratropium 2.5 mg-0.5 mg/3 mL nebulizer solution  Commonly known as: DUO-NEB  Take 3 mLs by nebulization every 6 (six) hours while awake. Rescue     apixaban 5 mg Tab  Commonly known as: ELIQUIS  Take 1 tablet (5 mg total) by mouth 2 (two) times daily.     aspirin 81 MG EC tablet  Commonly known as: ECOTRIN  Take 1 tablet (81 mg total) by mouth once daily.     busPIRone 5 MG Tab  Commonly known as: BUSPAR  Take 1 tablet (5 mg total) by mouth 2 (two) times daily.     carvediloL 3.125 MG tablet  Commonly known as:  COREG  Take 1 tablet (3.125 mg total) by mouth 2 (two) times daily.     insulin aspart U-100 100 unit/mL (3 mL) Inpn pen  Commonly known as: NovoLOG  Inject 0-5 Units into the skin before meals and at bedtime as needed (Hyperglycemia).     levothyroxine 50 MCG tablet  Commonly known as: SYNTHROID  Take 1 tablet (50 mcg total) by mouth before breakfast.     LIPITOR 40 mg tablet  Generic drug: atorvastatin  Take 1 tablet (40 mg total) by mouth once daily.     pantoprazole 20 MG tablet  Commonly known as: PROTONIX  Take 40 mg by mouth once daily.     valsartan 160 MG tablet  Commonly known as: DIOVAN  Take 1 tablet (160 mg total) by mouth once daily.     vitamin D 1000 units Tab  Commonly known as: VITAMIN D3  Take 185 mg by mouth once daily.            STOP taking these medications      budesonide 0.5 mg/2 mL nebulizer solution  Commonly known as: PULMICORT     furosemide 40 MG tablet  Commonly known as: LASIX     gabapentin 100 MG capsule  Commonly known as: NEURONTIN              Indwelling Lines/Drains at time of discharge:   Lines/Drains/Airways       Peripherally Inserted Central Catheter Line  Duration             PICC Triple Lumen 08/09/24 1042 right basilic 6 days              Drain  Duration                  Urethral Catheter 08/07/24 2248 16 Fr. 8 days                    Time spent on the discharge of patient: 60 minutes         Oscar Ge Jr, MD  Department of Hospital Medicine  [facility]

## 2024-08-16 NOTE — NURSING
REPORT CALLED TO VELVET LANDRUM AT [facility].  PATIENT WILL BE DISCHARGED TO THEIR FACILITY TODAY.  PATIENT GIVEN DISCHARGE INSTRUCTIONS AS WELL.  PICC  LINE D/C'D WITH CATHETER INTACT.  TELEMETRY D/C'D AS WELL.   PATIENT AWAITING TRANSPORTATION TO TRANSPORT PATIENT TO [facility].   JASON VARGAS LPN

## 2024-09-22 ENCOUNTER — HOSPITAL ENCOUNTER (EMERGENCY)
Facility: HOSPITAL | Age: 74
Discharge: HOME | End: 2024-09-22
Attending: EMERGENCY MEDICINE
Payer: MEDICARE

## 2024-09-22 VITALS
HEIGHT: 64 IN | HEART RATE: 88 BPM | OXYGEN SATURATION: 100 % | BODY MASS INDEX: 42.68 KG/M2 | WEIGHT: 250 LBS | SYSTOLIC BLOOD PRESSURE: 163 MMHG | RESPIRATION RATE: 25 BRPM | DIASTOLIC BLOOD PRESSURE: 76 MMHG | TEMPERATURE: 99 F

## 2024-09-22 DIAGNOSIS — R60.1 GENERALIZED EDEMA: Primary | ICD-10-CM

## 2024-09-22 DIAGNOSIS — R06.02 SOB (SHORTNESS OF BREATH): ICD-10-CM

## 2024-09-22 DIAGNOSIS — I50.9 CHF (CONGESTIVE HEART FAILURE): ICD-10-CM

## 2024-09-22 LAB
ANION GAP SERPL CALC-SCNC: 5 MMOL/L (ref 3–11)
BASOPHILS # BLD AUTO: 0.01 K/UL (ref 0–0.2)
BASOPHILS NFR BLD: 0.1 % (ref 0–1.9)
BUN SERPL-MCNC: 9 MG/DL (ref 8–23)
CALCIUM SERPL-MCNC: 9.3 MG/DL (ref 8.7–10.5)
CHLORIDE SERPL-SCNC: 107 MMOL/L (ref 95–110)
CO2 SERPL-SCNC: 29 MMOL/L (ref 23–29)
CREAT SERPL-MCNC: 1.3 MG/DL (ref 0.5–1.4)
DIFFERENTIAL METHOD BLD: ABNORMAL
EOSINOPHIL # BLD AUTO: 0 K/UL (ref 0–0.5)
EOSINOPHIL NFR BLD: 0.4 % (ref 0–8)
ERYTHROCYTE [DISTWIDTH] IN BLOOD BY AUTOMATED COUNT: 18.6 % (ref 11.5–14.5)
EST. GFR  (NO RACE VARIABLE): 43.4 ML/MIN/1.73 M^2
GLUCOSE SERPL-MCNC: 84 MG/DL (ref 70–110)
HCT VFR BLD AUTO: 36.7 % (ref 37–48.5)
HGB BLD-MCNC: 11.7 G/DL (ref 12–16)
IMM GRANULOCYTES # BLD AUTO: 0.03 K/UL (ref 0–0.04)
IMM GRANULOCYTES NFR BLD AUTO: 0.4 % (ref 0–0.5)
LYMPHOCYTES # BLD AUTO: 1.8 K/UL (ref 1–4.8)
LYMPHOCYTES NFR BLD: 23 % (ref 18–48)
MAGNESIUM SERPL-MCNC: 1.7 MG/DL (ref 1.6–2.6)
MCH RBC QN AUTO: 28.6 PG (ref 27–31)
MCHC RBC AUTO-ENTMCNC: 31.9 G/DL (ref 32–36)
MCV RBC AUTO: 90 FL (ref 82–98)
MONOCYTES # BLD AUTO: 0.7 K/UL (ref 0.3–1)
MONOCYTES NFR BLD: 8.6 % (ref 4–15)
NEUTROPHILS # BLD AUTO: 5.2 K/UL (ref 1.8–7.7)
NEUTROPHILS NFR BLD: 67.5 % (ref 38–73)
NRBC BLD-RTO: 0 /100 WBC
PLATELET # BLD AUTO: 158 K/UL (ref 150–450)
PMV BLD AUTO: 9.2 FL (ref 9.2–12.9)
POTASSIUM SERPL-SCNC: 3.7 MMOL/L (ref 3.5–5.1)
RBC # BLD AUTO: 4.09 M/UL (ref 4–5.4)
SODIUM SERPL-SCNC: 141 MMOL/L (ref 136–145)
WBC # BLD AUTO: 7.68 K/UL (ref 3.9–12.7)

## 2024-09-22 PROCEDURE — 93010 ELECTROCARDIOGRAM REPORT: CPT | Mod: GW,,, | Performed by: INTERNAL MEDICINE

## 2024-09-22 PROCEDURE — 96374 THER/PROPH/DIAG INJ IV PUSH: CPT

## 2024-09-22 PROCEDURE — 93005 ELECTROCARDIOGRAM TRACING: CPT

## 2024-09-22 PROCEDURE — 80048 BASIC METABOLIC PNL TOTAL CA: CPT | Performed by: EMERGENCY MEDICINE

## 2024-09-22 PROCEDURE — 25000003 PHARM REV CODE 250: Performed by: EMERGENCY MEDICINE

## 2024-09-22 PROCEDURE — 85025 COMPLETE CBC W/AUTO DIFF WBC: CPT | Performed by: EMERGENCY MEDICINE

## 2024-09-22 PROCEDURE — 63600175 PHARM REV CODE 636 W HCPCS: Performed by: EMERGENCY MEDICINE

## 2024-09-22 PROCEDURE — 99285 EMERGENCY DEPT VISIT HI MDM: CPT | Mod: 25

## 2024-09-22 PROCEDURE — 83735 ASSAY OF MAGNESIUM: CPT | Performed by: EMERGENCY MEDICINE

## 2024-09-22 PROCEDURE — 36415 COLL VENOUS BLD VENIPUNCTURE: CPT | Performed by: EMERGENCY MEDICINE

## 2024-09-22 RX ORDER — FUROSEMIDE 10 MG/ML
80 INJECTION INTRAMUSCULAR; INTRAVENOUS
Status: COMPLETED | OUTPATIENT
Start: 2024-09-22 | End: 2024-09-22

## 2024-09-22 RX ORDER — POTASSIUM CHLORIDE 20 MEQ/1
40 TABLET, EXTENDED RELEASE ORAL
Status: COMPLETED | OUTPATIENT
Start: 2024-09-22 | End: 2024-09-22

## 2024-09-22 RX ADMIN — FUROSEMIDE 80 MG: 10 INJECTION, SOLUTION INTRAVENOUS at 07:09

## 2024-09-22 RX ADMIN — POTASSIUM CHLORIDE 40 MEQ: 1500 TABLET, EXTENDED RELEASE ORAL at 07:09

## 2024-09-22 NOTE — ED NOTES
Changed patient's soiled diaper and placed a new one along with an ABD pad. Pt is now resting comfortably in bed and denies any further needs at this time. Instructed to use call bell if needed. Side rails up x2.

## 2024-09-22 NOTE — ED PROVIDER NOTES
EMERGENCY DEPARTMENT HISTORY AND PHYSICAL EXAM     This note is dictated on M*Modal word recognition program.  There are word recognition mistakes and grammatical errors that are occasionally missed on review.     Date: 9/22/2024   Patient Name: Carmen Tan       History of Presenting Illness      Chief Complaint   Patient presents with    Shortness of Breath     EMS reports, pt from home with c/o lower extremity edema and pain, also reports pt is SOB for a few days. 324 ASA, 1 spray Nitro, 20G Left AC, CBG 75 en route.        1818   Carmen Tan is a 74 y.o. female with PMHX of CHF, paroxysmal AFib, diabetes, obesity,  who presents to the emergency department C/O shortness of breath.    Patient reports worsening shortness of breath and lower extremity edema.  She denies chest pain.  Patient reports he recently got her medications reordered and has been out of her sleeping medication and lasix.       PCP: Lucian Barry MD        No current facility-administered medications for this encounter.     Current Outpatient Medications   Medication Sig Dispense Refill    acetaminophen (TYLENOL) 325 MG tablet Take 2 tablets (650 mg total) by mouth every 8 (eight) hours as needed for Pain or Temperature greater than (100, headache).      albuterol-ipratropium (DUO-NEB) 2.5 mg-0.5 mg/3 mL nebulizer solution Take 3 mLs by nebulization every 6 (six) hours while awake. Rescue 75 mL 0    aspirin (ECOTRIN) 81 MG EC tablet Take 1 tablet (81 mg total) by mouth once daily. 90 tablet 3    busPIRone (BUSPAR) 5 MG Tab Take 1 tablet (5 mg total) by mouth 2 (two) times daily. 60 tablet 11    carvediloL (COREG) 3.125 MG tablet Take 1 tablet (3.125 mg total) by mouth 2 (two) times daily. 60 tablet 1    insulin aspart U-100 (NOVOLOG) 100 unit/mL (3 mL) InPn pen Inject 0-5 Units into the skin before meals and at bedtime as needed (Hyperglycemia). 3 mL 0    isosorbide mononitrate (IMDUR) 30 MG 24 hr tablet Take 1 tablet (30 mg  total) by mouth once daily. 30 tablet 1    levothyroxine (SYNTHROID) 50 MCG tablet Take 1 tablet (50 mcg total) by mouth before breakfast. 30 tablet 11    LIPITOR 40 mg tablet Take 1 tablet (40 mg total) by mouth once daily. 90 tablet 6    pantoprazole (PROTONIX) 20 MG tablet Take 40 mg by mouth once daily.      predniSONE (DELTASONE) 20 MG tablet Take 1 tablet (20 mg total) by mouth 2 (two) times daily. 10 tablet 0    valsartan (DIOVAN) 160 MG tablet Take 1 tablet (160 mg total) by mouth once daily. 30 tablet 1    vitamin D 1000 units Tab Take 185 mg by mouth once daily.             Past History     Past Medical History:   Past Medical History:   Diagnosis Date    Abdominal hernia     Bacteremia 04/05/2023    CHF (congestive heart failure)     COPD (chronic obstructive pulmonary disease)     Diabetes mellitus, type 2 02/16/2022    GERD (gastroesophageal reflux disease)     History of home oxygen therapy     Hypertension     Hypertensive emergency 03/30/2023    Migraine headache     On home O2     Pulmonary embolism 06/01/2017    Small bowel obstruction     Thyroid disease         Past Surgical History:   Past Surgical History:   Procedure Laterality Date    COLONOSCOPY      HYSTERECTOMY      INNER EAR SURGERY      UPPER GASTROINTESTINAL ENDOSCOPY          Family History:   No family history on file.     Social History:   Social History     Tobacco Use    Smoking status: Former    Smokeless tobacco: Never   Substance Use Topics    Alcohol use: No    Drug use: No        Allergies:   Review of patient's allergies indicates:   Allergen Reactions    Pcn [penicillins] Swelling          Review of Systems   Review of Systems   See HPI for pertinent positives and negatives       Physical Exam     Vitals:    09/22/24 1756 09/22/24 1758 09/22/24 1916   BP:  (!) 183/92 (!) 163/76   BP Location:  Right arm    Patient Position:  Lying    Pulse: 110  88   Resp: 20  (!) 25   Temp: 98.5 °F (36.9 °C)     TempSrc: Oral     SpO2:  97%  "100%   Weight:  113.4 kg (250 lb)    Height: 5' 4" (1.626 m)        Physical Exam  Vitals and nursing note reviewed.   Constitutional:       Appearance: Normal appearance. She is obese.      Comments: Chronically ill-appearing female lying in bed in no acute distress   HENT:      Head: Normocephalic and atraumatic.      Right Ear: External ear normal.      Left Ear: External ear normal.      Nose: Nose normal. No congestion or rhinorrhea.      Mouth/Throat:      Mouth: Mucous membranes are moist.   Eyes:      Conjunctiva/sclera: Conjunctivae normal.      Pupils: Pupils are equal, round, and reactive to light.   Cardiovascular:      Rate and Rhythm: Normal rate and regular rhythm.      Heart sounds: Murmur heard.      Systolic murmur is present with a grade of 5/6.   Pulmonary:      Effort: Pulmonary effort is normal. Tachypnea present. No respiratory distress.      Breath sounds: Decreased breath sounds present.   Chest:      Chest wall: No tenderness.   Musculoskeletal:         General: No deformity. Normal range of motion.      Cervical back: Normal range of motion. No rigidity.      Right lower leg: 3+ Pitting Edema present.      Left lower leg: 3+ Pitting Edema present.   Skin:     General: Skin is dry.   Neurological:      General: No focal deficit present.      Mental Status: She is alert and oriented to person, place, and time. Mental status is at baseline.   Psychiatric:         Mood and Affect: Mood normal.         Behavior: Behavior normal.              Diagnostic Study Results      Labs -   Recent Results (from the past 12 hour(s))   Magnesium    Collection Time: 09/22/24  6:30 PM   Result Value Ref Range    Magnesium 1.7 1.6 - 2.6 mg/dL   CBC Auto Differential    Collection Time: 09/22/24  6:30 PM   Result Value Ref Range    WBC 7.68 3.90 - 12.70 K/uL    RBC 4.09 4.00 - 5.40 M/uL    Hemoglobin 11.7 (L) 12.0 - 16.0 g/dL    Hematocrit 36.7 (L) 37.0 - 48.5 %    MCV 90 82 - 98 fL    MCH 28.6 27.0 - 31.0 pg "    MCHC 31.9 (L) 32.0 - 36.0 g/dL    RDW 18.6 (H) 11.5 - 14.5 %    Platelets 158 150 - 450 K/uL    MPV 9.2 9.2 - 12.9 fL    Immature Granulocytes 0.4 0.0 - 0.5 %    Gran # (ANC) 5.2 1.8 - 7.7 K/uL    Immature Grans (Abs) 0.03 0.00 - 0.04 K/uL    Lymph # 1.8 1.0 - 4.8 K/uL    Mono # 0.7 0.3 - 1.0 K/uL    Eos # 0.0 0.0 - 0.5 K/uL    Baso # 0.01 0.00 - 0.20 K/uL    nRBC 0 0 /100 WBC    Gran % 67.5 38.0 - 73.0 %    Lymph % 23.0 18.0 - 48.0 %    Mono % 8.6 4.0 - 15.0 %    Eosinophil % 0.4 0.0 - 8.0 %    Basophil % 0.1 0.0 - 1.9 %    Differential Method Automated    Basic Metabolic Panel    Collection Time: 09/22/24  6:30 PM   Result Value Ref Range    Sodium 141 136 - 145 mmol/L    Potassium 3.7 3.5 - 5.1 mmol/L    Chloride 107 95 - 110 mmol/L    CO2 29 23 - 29 mmol/L    Glucose 84 70 - 110 mg/dL    BUN 9 8 - 23 mg/dL    Creatinine 1.3 0.5 - 1.4 mg/dL    Calcium 9.3 8.7 - 10.5 mg/dL    Anion Gap 5 3 - 11 mmol/L    eGFR 43.4 (A) >60 mL/min/1.73 m^2        Radiologic Studies -    X-Ray Chest 1 View    (Results Pending)        Medications given in the ED-   Medications   furosemide injection 80 mg (80 mg Intravenous Given 9/22/24 1905)   potassium chloride SA CR tablet 40 mEq (40 mEq Oral Given 9/22/24 1906)           Medical Decision Making    I am the first provider for this patient.     I reviewed the vital signs, available nursing notes, past medical history, past surgical history, family history and social history.     Vital Signs:  Reviewed the patient's vital signs.     Pulse Oximetry Analysis and Interpretation:    97% on NC, normal (baseline)        EKG Interpretation: (Per my independent interpretation, pending formal read)   Interpreted by John Paul Rojas MD at 1819   Sinus rhythm rate of 95, LVH, no STEMI similar to prior     CXR  Interpretation: (Per my independent interpretation, pending formal read)   CXR read by Dr. John Paul Rojas      Enlarged cardiac silhouette, no pleural effusion, no focal  infiltrate    External Test Results (Pertinent to encounter):    Records Reviewed: Nursing Notes, Current Prescription Medications, Old Medical Records, External Medical Records , Previous EKG, and Previous Radiology Studies    History Obtained By: Patient    Provider Notes: Carmen Tan is a 74 y.o. female with edema, SOB    Co-morbidities Considered:  Missed medication, CHF    Differential Diagnosis:  Hypervolemia, CHF exacerbation, ACS, NSTEMI      ED Course:    Patient presents with worsening edema and reports with increased shortness of breath.  CBC and chemistry unremarkable.  EKG unchanged from prior.  Nonischemic.  Patient denies chest pain.  Respiratory status is at baseline.  Worsening lower extremity edema will treat with Lasix bolus.  Patient reports her medication has been filled in his awaiting it to be delivered..  Patient was appropriate for discharge at this time advised close follow-up with her primary care provider.  Reasons to return to ED discussed.         Problems Addressed:  CHF exacerbation, edema    Procedures:   Procedures       Diagnosis and Disposition     Critical Care:      DISCHARGE NOTE:       Carmen Tan's  results have been reviewed with her.  She has been counseled regarding her diagnosis, treatment, and plan.  She verbally conveys understanding and agreement of the signs, symptoms, diagnosis, treatment and prognosis and additionally agrees to follow up as discussed.  She also agrees with the care-plan and conveys that all of her questions have been answered.  I have also provided discharge instructions for her that include: educational information regarding their diagnosis and treatment, and list of reasons why they would want to return to the ED prior to their follow-up appointment, should her condition change. She has been provided with education for proper emergency department utilization.         CLINICAL IMPRESSION:         1. Generalized edema    2. SOB  (shortness of breath)    3. CHF (congestive heart failure)              PLAN:   1. Discharge Home  2.      Medication List        ASK your doctor about these medications      acetaminophen 325 MG tablet  Commonly known as: TYLENOL  Take 2 tablets (650 mg total) by mouth every 8 (eight) hours as needed for Pain or Temperature greater than (100, headache).     albuterol-ipratropium 2.5 mg-0.5 mg/3 mL nebulizer solution  Commonly known as: DUO-NEB  Take 3 mLs by nebulization every 6 (six) hours while awake. Rescue     aspirin 81 MG EC tablet  Commonly known as: ECOTRIN  Take 1 tablet (81 mg total) by mouth once daily.     busPIRone 5 MG Tab  Commonly known as: BUSPAR  Take 1 tablet (5 mg total) by mouth 2 (two) times daily.     carvediloL 3.125 MG tablet  Commonly known as: COREG  Take 1 tablet (3.125 mg total) by mouth 2 (two) times daily.     insulin aspart U-100 100 unit/mL (3 mL) Inpn pen  Commonly known as: NovoLOG  Inject 0-5 Units into the skin before meals and at bedtime as needed (Hyperglycemia).     isosorbide mononitrate 30 MG 24 hr tablet  Commonly known as: IMDUR  Take 1 tablet (30 mg total) by mouth once daily.     levothyroxine 50 MCG tablet  Commonly known as: SYNTHROID  Take 1 tablet (50 mcg total) by mouth before breakfast.     LIPITOR 40 mg tablet  Generic drug: atorvastatin  Take 1 tablet (40 mg total) by mouth once daily.     pantoprazole 20 MG tablet  Commonly known as: PROTONIX     predniSONE 20 MG tablet  Commonly known as: DELTASONE  Take 1 tablet (20 mg total) by mouth 2 (two) times daily.     valsartan 160 MG tablet  Commonly known as: DIOVAN  Take 1 tablet (160 mg total) by mouth once daily.     vitamin D 1000 units Tab  Commonly known as: VITAMIN D3             3. Lucian Barry MD  [address] [place] [id number]  [phone]    Schedule an appointment as soon as possible for a visit   Primary care follow up    [facility] Emergency Department  [address] [place] [address]  [place] [id number] [phone]  Go to   If symptoms worsen       _______________________________     Please note that this dictation was completed with [employer], the computer voice recognition software.  Quite often unanticipated grammatical, syntax, homophones, and other interpretive errors are inadvertently transcribed by the computer software.  Please disregard these errors.  Please excuse any errors that have escaped final proofreading.             John Paul Rojas MD  09/23/24 0100

## 2024-09-23 LAB
OHS QRS DURATION: 128 MS
OHS QTC CALCULATION: 447 MS

## 2024-10-16 ENCOUNTER — HOSPITAL ENCOUNTER (EMERGENCY)
Facility: HOSPITAL | Age: 74
Discharge: HOME | End: 2024-10-16
Attending: STUDENT IN AN ORGANIZED HEALTH CARE EDUCATION/TRAINING PROGRAM
Payer: MEDICARE

## 2024-10-16 VITALS
SYSTOLIC BLOOD PRESSURE: 138 MMHG | TEMPERATURE: 98 F | BODY MASS INDEX: 42.68 KG/M2 | WEIGHT: 250 LBS | DIASTOLIC BLOOD PRESSURE: 91 MMHG | RESPIRATION RATE: 20 BRPM | HEIGHT: 64 IN | OXYGEN SATURATION: 94 % | HEART RATE: 92 BPM

## 2024-10-16 DIAGNOSIS — M79.671 RIGHT FOOT PAIN: ICD-10-CM

## 2024-10-16 DIAGNOSIS — L03.90 CELLULITIS, UNSPECIFIED CELLULITIS SITE: Primary | ICD-10-CM

## 2024-10-16 PROCEDURE — 25000003 PHARM REV CODE 250: Performed by: STUDENT IN AN ORGANIZED HEALTH CARE EDUCATION/TRAINING PROGRAM

## 2024-10-16 PROCEDURE — 99283 EMERGENCY DEPT VISIT LOW MDM: CPT | Mod: 25

## 2024-10-16 RX ORDER — ACETAMINOPHEN 500 MG
1000 TABLET ORAL
Status: COMPLETED | OUTPATIENT
Start: 2024-10-16 | End: 2024-10-16

## 2024-10-16 RX ORDER — SULFAMETHOXAZOLE AND TRIMETHOPRIM 800; 160 MG/1; MG/1
1 TABLET ORAL
Status: COMPLETED | OUTPATIENT
Start: 2024-10-16 | End: 2024-10-16

## 2024-10-16 RX ORDER — SULFAMETHOXAZOLE AND TRIMETHOPRIM 800; 160 MG/1; MG/1
1 TABLET ORAL 2 TIMES DAILY
Qty: 14 TABLET | Refills: 0 | Status: SHIPPED | OUTPATIENT
Start: 2024-10-16 | End: 2024-10-23

## 2024-10-16 RX ADMIN — ACETAMINOPHEN 1000 MG: 500 TABLET ORAL at 08:10

## 2024-10-16 RX ADMIN — SULFAMETHOXAZOLE AND TRIMETHOPRIM 1 TABLET: 800; 160 TABLET ORAL at 08:10

## 2024-10-17 NOTE — ED PROVIDER NOTES
"  History  Chief Complaint   Patient presents with    Leg Swelling     Patient reports increased edema to legs x 3 days. Did not take her prescribed lasix today. Denies shortness of breath.      74-year-old female with history of COPD, CHF, GERD, migraines and thyroid disease presents for evaluation right foot pain.  Symptoms ongoing for the last 3 days.  Chief complaint makes note of swelling but patient actually complaining pain.  No falls or trauma.  Patient not endorsing any skin site changes.  No pain to the right lower leg or upper leg only to the top of the foot towards the toes.  Swelling to legs is chronic, patient is on Lasix.        Past Medical History:   Diagnosis Date    Abdominal hernia     Bacteremia 04/05/2023    CHF (congestive heart failure)     COPD (chronic obstructive pulmonary disease)     Diabetes mellitus, type 2 02/16/2022    GERD (gastroesophageal reflux disease)     History of home oxygen therapy     Hypertension     Hypertensive emergency 03/30/2023    Migraine headache     On home O2     Pulmonary embolism 06/01/2017    Small bowel obstruction     Thyroid disease        Past Surgical History:   Procedure Laterality Date    COLONOSCOPY      HYSTERECTOMY      INNER EAR SURGERY      UPPER GASTROINTESTINAL ENDOSCOPY         No family history on file.    Social History     Tobacco Use    Smoking status: Former    Smokeless tobacco: Never   Substance Use Topics    Alcohol use: No    Drug use: No       ROS  Review of Systems   Musculoskeletal:  Positive for arthralgias.       Physical Exam  BP (!) 138/91   Pulse 92   Temp 98 °F (36.7 °C)   Resp 20   Ht 5' 4" (1.626 m)   Wt 113.4 kg (250 lb)   SpO2 (!) 94%   Breastfeeding No   BMI 42.91 kg/m²   Physical Exam    Constitutional: She appears well-developed and well-nourished. She is Obese . She is cooperative.   HENT:   Head: Normocephalic and atraumatic.   Eyes: Conjunctivae, EOM and lids are normal. Pupils are equal, round, and reactive " to light.   Neck: Phonation normal.   Normal range of motion.  Cardiovascular:  Normal rate, regular rhythm and intact distal pulses.           Pulmonary/Chest: No respiratory distress. She has rhonchi.   Musculoskeletal:         General: Tenderness and edema (Chronic lymphedema changes to the lower extremities bilaterally.) present.      Cervical back: Normal range of motion.     Neurological: She is alert and oriented to person, place, and time.   Skin: Skin is warm and dry.   Skin overlying the dorsal aspect of the foot is mildly erythematous.  Area is also point tender to palpation over the distal aspect of the 2nd and 3rd metatarsals   Psychiatric: She has a normal mood and affect. Her speech is normal and behavior is normal.               Labs Reviewed - No data to display     Radiology Procedure Done: Right Foot.  Interpretation: No acute osseous abnormality identified.        Imaging Results              X-Ray Foot Complete Right (Final result)  Result time 10/17/24 11:42:21      Final result by Faustino Pelletier MD (10/17/24 11:42:21)                   Impression:      Marked soft tissue edema and swelling at the ankle and dorsal foot without evidence for acute osseous abnormality.  Chronic osseous findings are present.      Electronically signed by: Faustino Pelletier MD  Date:    10/17/2024  Time:    11:42               Narrative:    EXAMINATION:  XR FOOT COMPLETE 3 VIEW RIGHT    CLINICAL HISTORY:  Pain in right foot    COMPARISON:  Contralateral foot radiographs 10/09/2007    TECHNIQUE:  Right foot three views    FINDINGS:  Osteophytosis of the 1st metatarsophalangeal joint.  Tibiotalar osteophyte formation is also noted.  Enthesopathy at the posterior aspect of the calcaneus.  Multifocal osteophytes at the dorsal aspect of the midfoot.  Marked soft tissue swelling and edema at the dorsal aspect of the midfoot and forefoot.  There is also marked soft tissue swelling and edema throughout the remainder of the more  proximal right lower extremity with numerous soft tissue calcifications present.                                                  Procedures             Medical Decision Making  Patient presents for evaluation of right foot pain.  Physical exam notable for mild erythema over the distal aspect of the foot with point tenderness palpation over the distal aspect of the 2nd and 3rd metatarsals.  X-ray done to rule out osteomyelitis like process, no acute pathology identified.  Patient noted to have bilateral lymphedema changes to the lower extremities.  No acute swelling or alternate symptoms endorsed.  Will give prescription for Bactrim advised patient to continue other outpatient medications.  She may use Tylenol as needed for pain control.    Amount and/or Complexity of Data Reviewed  Radiology: ordered.    Risk  OTC drugs.  Prescription drug management.                    DISCHARGE NOTE:       Carmen Tan's  results have been reviewed with her.  She has been counseled regarding her diagnosis, treatment, and plan.  She verbally conveys understanding and agreement of the signs, symptoms, diagnosis, treatment and prognosis and additionally agrees to follow up as discussed.  She also agrees with the care-plan and conveys that all of her questions have been answered.  I have also provided discharge instructions for her that include: educational information regarding their diagnosis and treatment, and list of reasons why they would want to return to the ED prior to their follow-up appointment, should her condition change. She has been provided with education for proper emergency department utilization.      CLINICAL IMPRESSION:         1. Cellulitis, unspecified cellulitis site    2. Right foot pain              PLAN:   1. Discharge  2.   Discharge Medication List as of 10/16/2024  8:53 PM        START taking these medications    Details   sulfamethoxazole-trimethoprim 800-160mg (BACTRIM DS) 800-160 mg Tab Take 1  tablet by mouth 2 (two) times daily. for 7 days, Starting Wed 10/16/2024, Until Wed 10/23/2024, Normal           3. Lucian Barry MD  [facility] [id number]  [facility] [address]  [phone]    Schedule an appointment as soon as possible for a visit in 2 days            Brittany Cole MD  10/18/24 0154

## 2024-10-28 NOTE — ASSESSMENT & PLAN NOTE
Continued Stay SW/CM Assessment/Plan of Care Note       Active Substitute Decision Maker (SDM)    There are no active Substitute Decision Maker (SDM) on file.       Progress note:  SW following for dc planning.  Chart reviewed.  HF team planning to wean Milrinone.  Pt likely to continue to require IV dobutamine.  If on one inotrope, pt may be able to transfer out of ICU.  Pt working with therapy.  Pt to receive perm cath today.  Pt requires ongoing dialysis.     SW to continue to follow.      See SW/CM flowsheets for other objective data.    Disposition Recommendations:  Preliminary discharge destination:    SW/CM recommendation for discharge: Home    Prior To Hospitalization:    Living Situation: Spouse and residing at House    .  Support Systems: Home Care Staff   Home Devices/Equipment: CPAP/BiPAP machine (T)            Mobility Assist Devices: None   Type of Service Prior to Hospitalization: Nurse (specify)               Patient/Family discharge goal (s):  Home     Resources provided:           Prior Function  Bed Mobility: Independent (10/18/24 1045 : Odin Fulton, PT)  Transfers: Independent (10/18/24 1045 : Odin Fulton, PT)  Ambulation in the Home: Independent (10/18/24 1045 : Odin Fulton, PT)  Ambulation in the Community: Independent (10/18/24 1045 : Odin Fulton, PT)    Current Function  Last Filed Values         Value Time User    Current Function  significantly below baseline level of function 10/28/2024  3:38 PM Lynnette Card PT    Therapy Impairments  executive functioning; ROM; strength; activity tolerance; coordination; safety awareness; balance 10/28/2024  3:38 PM Lynnette Card, PT    ADLs Requiring Support  bed mobility; transfers; ambulation; stairs 10/28/2024  3:38 PM Lynnette Card, PT            Therapy Recommendations for Discharge:   PT:      Last Filed Values         Value Time User    PT Discharge Needs  therapy 5 or more times per week 10/28/2024  3:38 PM Lynnette Card PT  Patient with Hypercapnic and Hypoxic Respiratory failure which is Acute on chronic.  she is on home oxygen at 2 LPM. Supplemental oxygen was provided and noted- Oxygen Concentration (%):  [36-60] 36    Signs/symptoms of respiratory failure include- tachypnea, increased work of breathing, use of accessory muscles and wheezing. Contributing diagnoses includes - CHF, COPD, Obesity Hypoventilation and Pleural effusion Labs and images were reviewed. Patient Has recent ABG, which has been reviewed. Will treat underlying causes and adjust management of respiratory failure as follows- IV lasix, supplemental oxygen, continuous BiPAP  3/31: IV lasix 40 mg Q6H x 2 doses  4/1 improving daily, continue diuresis with Lasix, continue BiPAP therapy and will transition to O2 therapy as tolerates  4/2 continue current treatment, continue diuresis with Lasix; continue to offer nasal cannula as tolerates for oxygen options  4/3 transitioned to PO lasix 40 mg BID, continue BiPAP I/E 14/6 FiO2 60%  4/4 There is evidence, patient now requires noninvasive home ventilator due to chronic respiratory failure and COPD.  If obtained, this will help the patient decrease the work of breathing, hospital readmits, intubations, and improve overall quality of life. If this home respiratory device is not obtained it may lead to patient's harm or death. Will arrange for NI ventilator for home use ready on discharge.            OT:       Last Filed Values         Value Time User    OT Discharge Needs  therapy 5 or more times per week  pending progress 10/23/2024  4:57 PM Rajwinder Marte, OT          SLP:    Last Filed Values       None            Mobility Equipment Recommended for Discharge: TBD pending progress      Barriers to Discharge  Identified Barriers to Discharge/Transition Planning: Medical necessity for acute care

## 2024-11-05 NOTE — PLAN OF CARE
Problem: Adult Inpatient Plan of Care  Goal: Optimal Comfort and Wellbeing  Outcome: Ongoing, Not Progressing  Goal: Readiness for Transition of Care  Outcome: Ongoing, Not Progressing         Problem: Adult Inpatient Plan of Care  Goal: Plan of Care Review  Outcome: Ongoing, Progressing  Goal: Patient-Specific Goal (Individualized)  Outcome: Ongoing, Progressing  Goal: Absence of Hospital-Acquired Illness or Injury  Outcome: Ongoing, Progressing      Billing Type: Third-Party Bill Expected Date Of Service: 11/05/2024 Lab Facility: [id number] Performing Laboratory: [id number] Bill For Surgical Tray: no

## 2024-11-29 ENCOUNTER — HOSPITAL ENCOUNTER (INPATIENT)
Facility: HOSPITAL | Age: 74
LOS: 13 days | Discharge: HOSPICE | DRG: 291 | End: 2024-12-12
Attending: EMERGENCY MEDICINE | Admitting: EMERGENCY MEDICINE
Payer: MEDICARE

## 2024-11-29 DIAGNOSIS — E87.29 RESPIRATORY ACIDOSIS: ICD-10-CM

## 2024-11-29 DIAGNOSIS — I50.33 ACUTE ON CHRONIC DIASTOLIC CONGESTIVE HEART FAILURE: ICD-10-CM

## 2024-11-29 DIAGNOSIS — R06.02 SHORTNESS OF BREATH: ICD-10-CM

## 2024-11-29 DIAGNOSIS — E66.01 SEVERE OBESITY (BMI >= 40): ICD-10-CM

## 2024-11-29 DIAGNOSIS — R06.02 SOB (SHORTNESS OF BREATH): ICD-10-CM

## 2024-11-29 DIAGNOSIS — I50.9 CONGESTIVE HEART FAILURE, UNSPECIFIED HF CHRONICITY, UNSPECIFIED HEART FAILURE TYPE: Primary | ICD-10-CM

## 2024-11-29 DIAGNOSIS — I49.9 ABNORMAL HEART RHYTHMS: ICD-10-CM

## 2024-11-29 DIAGNOSIS — Z02.2 ENCOUNTER FOR EXAMINATION FOR ADMISSION TO NURSING HOME: ICD-10-CM

## 2024-11-29 DIAGNOSIS — Z71.89 ACP (ADVANCE CARE PLANNING): ICD-10-CM

## 2024-11-29 DIAGNOSIS — Z74.1 REQUIRES ASSISTANCE WITH ACTIVITIES OF DAILY LIVING (ADL): ICD-10-CM

## 2024-11-29 LAB
ALBUMIN SERPL BCP-MCNC: 3 G/DL (ref 3.5–5.2)
ALP SERPL-CCNC: 242 U/L (ref 55–135)
ALT SERPL W/O P-5'-P-CCNC: 27 U/L (ref 10–44)
ANION GAP SERPL CALC-SCNC: 4 MMOL/L (ref 3–11)
AST SERPL-CCNC: 49 U/L (ref 10–40)
BASOPHILS # BLD AUTO: 0.03 K/UL (ref 0–0.2)
BASOPHILS NFR BLD: 0.4 % (ref 0–1.9)
BILIRUB SERPL-MCNC: 0.6 MG/DL (ref 0.1–1)
BIPAP: ABNORMAL
BUN SERPL-MCNC: 16 MG/DL (ref 8–23)
CALCIUM SERPL-MCNC: 10 MG/DL (ref 8.7–10.5)
CHLORIDE SERPL-SCNC: 109 MMOL/L (ref 95–110)
CO2 SERPL-SCNC: 31 MMOL/L (ref 23–29)
CREAT SERPL-MCNC: 1.5 MG/DL (ref 0.5–1.4)
CTP QC/QA: YES
DIFFERENTIAL METHOD BLD: ABNORMAL
EOSINOPHIL # BLD AUTO: 0 K/UL (ref 0–0.5)
EOSINOPHIL NFR BLD: 0.3 % (ref 0–8)
ERYTHROCYTE [DISTWIDTH] IN BLOOD BY AUTOMATED COUNT: 16.8 % (ref 11.5–14.5)
EST. GFR  (NO RACE VARIABLE): 36.6 ML/MIN/1.73 M^2
FIO2: 50 %
GLUCOSE SERPL-MCNC: 93 MG/DL (ref 70–110)
HCT VFR BLD AUTO: 39.9 % (ref 37–48.5)
HGB BLD-MCNC: 12.1 G/DL (ref 12–16)
IMM GRANULOCYTES # BLD AUTO: 0.09 K/UL (ref 0–0.04)
IMM GRANULOCYTES NFR BLD AUTO: 1.2 % (ref 0–0.5)
LYMPHOCYTES # BLD AUTO: 2.4 K/UL (ref 1–4.8)
LYMPHOCYTES NFR BLD: 32.4 % (ref 18–48)
Lab: ABNORMAL
MCH RBC QN AUTO: 29.2 PG (ref 27–31)
MCHC RBC AUTO-ENTMCNC: 30.3 G/DL (ref 32–36)
MCV RBC AUTO: 96 FL (ref 82–98)
MODIFIED ALLEN'S TEST: ABNORMAL
MONOCYTES # BLD AUTO: 0.9 K/UL (ref 0.3–1)
MONOCYTES NFR BLD: 11.8 % (ref 4–15)
NEUTROPHILS # BLD AUTO: 4.1 K/UL (ref 1.8–7.7)
NEUTROPHILS NFR BLD: 53.9 % (ref 38–73)
NOTIFIED BY: ABNORMAL
NRBC BLD-RTO: 0 /100 WBC
NT-PROBNP SERPL-MCNC: ABNORMAL PG/ML (ref 5–900)
O2DEVICE: ABNORMAL
PCO2 BLDA: 79.4 MMHG (ref 35–45)
PH SMN: 7.15 [PH] (ref 7.34–7.45)
PLATELET # BLD AUTO: 277 K/UL (ref 150–450)
PMV BLD AUTO: 8.9 FL (ref 9.2–12.9)
PO2 BLDA: 70.3 MMHG (ref 80–100)
POC BASE DEFICIT: -1.1 MMOL/L (ref -2–2)
POC HCO3: 22 MMOL/L (ref 24–28)
POC NOTIFIED NOTE: ABNORMAL
POC PERFORMED BY: ABNORMAL
POC SATURATED O2: 88 % (ref 95–100)
POC TEMPERATURE: 37 C
POTASSIUM SERPL-SCNC: 5.1 MMOL/L (ref 3.5–5.1)
PROT SERPL-MCNC: 7.3 G/DL (ref 6–8.4)
PROVIDER NOTIFIED: ABNORMAL
RBC # BLD AUTO: 4.14 M/UL (ref 4–5.4)
SARS-COV-2 RDRP RESP QL NAA+PROBE: NEGATIVE
SODIUM SERPL-SCNC: 144 MMOL/L (ref 136–145)
SPECIMEN SOURCE: ABNORMAL
TROPONIN I SERPL DL<=0.01 NG/ML-MCNC: 98.1 PG/ML (ref 0–60)
TROPONIN I SERPL DL<=0.01 NG/ML-MCNC: 98.7 PG/ML (ref 0–60)
WBC # BLD AUTO: 7.52 K/UL (ref 3.9–12.7)

## 2024-11-29 PROCEDURE — 25000242 PHARM REV CODE 250 ALT 637 W/ HCPCS: Performed by: EMERGENCY MEDICINE

## 2024-11-29 PROCEDURE — 25000003 PHARM REV CODE 250: Performed by: EMERGENCY MEDICINE

## 2024-11-29 PROCEDURE — 25000003 PHARM REV CODE 250: Performed by: INTERNAL MEDICINE

## 2024-11-29 PROCEDURE — 20000000 HC ICU ROOM

## 2024-11-29 PROCEDURE — 87635 SARS-COV-2 COVID-19 AMP PRB: CPT | Performed by: EMERGENCY MEDICINE

## 2024-11-29 PROCEDURE — 94799 UNLISTED PULMONARY SVC/PX: CPT

## 2024-11-29 PROCEDURE — 80053 COMPREHEN METABOLIC PANEL: CPT | Performed by: EMERGENCY MEDICINE

## 2024-11-29 PROCEDURE — 27000190 HC CPAP FULL FACE MASK W/VALVE

## 2024-11-29 PROCEDURE — 36415 COLL VENOUS BLD VENIPUNCTURE: CPT | Performed by: EMERGENCY MEDICINE

## 2024-11-29 PROCEDURE — 83880 ASSAY OF NATRIURETIC PEPTIDE: CPT | Performed by: EMERGENCY MEDICINE

## 2024-11-29 PROCEDURE — 27100171 HC OXYGEN HIGH FLOW UP TO 24 HOURS

## 2024-11-29 PROCEDURE — 5A09457 ASSISTANCE WITH RESPIRATORY VENTILATION, 24-96 CONSECUTIVE HOURS, CONTINUOUS POSITIVE AIRWAY PRESSURE: ICD-10-PCS | Performed by: EMERGENCY MEDICINE

## 2024-11-29 PROCEDURE — 85025 COMPLETE CBC W/AUTO DIFF WBC: CPT | Performed by: EMERGENCY MEDICINE

## 2024-11-29 PROCEDURE — 63600175 PHARM REV CODE 636 W HCPCS: Performed by: INTERNAL MEDICINE

## 2024-11-29 PROCEDURE — 94640 AIRWAY INHALATION TREATMENT: CPT

## 2024-11-29 PROCEDURE — 94761 N-INVAS EAR/PLS OXIMETRY MLT: CPT

## 2024-11-29 PROCEDURE — 36600 WITHDRAWAL OF ARTERIAL BLOOD: CPT

## 2024-11-29 PROCEDURE — 63600175 PHARM REV CODE 636 W HCPCS: Performed by: EMERGENCY MEDICINE

## 2024-11-29 PROCEDURE — 93005 ELECTROCARDIOGRAM TRACING: CPT

## 2024-11-29 PROCEDURE — 93010 ELECTROCARDIOGRAM REPORT: CPT | Mod: GW,,, | Performed by: INTERNAL MEDICINE

## 2024-11-29 PROCEDURE — 82803 BLOOD GASES ANY COMBINATION: CPT

## 2024-11-29 PROCEDURE — 99900031 HC PATIENT EDUCATION (STAT)

## 2024-11-29 PROCEDURE — 99900035 HC TECH TIME PER 15 MIN (STAT)

## 2024-11-29 PROCEDURE — 94660 CPAP INITIATION&MGMT: CPT

## 2024-11-29 PROCEDURE — 84484 ASSAY OF TROPONIN QUANT: CPT | Mod: 91 | Performed by: EMERGENCY MEDICINE

## 2024-11-29 RX ORDER — CARVEDILOL 3.12 MG/1
3.12 TABLET ORAL 2 TIMES DAILY
Status: DISCONTINUED | OUTPATIENT
Start: 2024-11-29 | End: 2024-12-02

## 2024-11-29 RX ORDER — TALC
6 POWDER (GRAM) TOPICAL NIGHTLY PRN
Status: DISCONTINUED | OUTPATIENT
Start: 2024-11-29 | End: 2024-12-02

## 2024-11-29 RX ORDER — POLYETHYLENE GLYCOL 3350 17 G/17G
17 POWDER, FOR SOLUTION ORAL DAILY
Status: DISCONTINUED | OUTPATIENT
Start: 2024-11-30 | End: 2024-12-02

## 2024-11-29 RX ORDER — ENOXAPARIN SODIUM 100 MG/ML
30 INJECTION SUBCUTANEOUS EVERY 24 HOURS
Status: DISCONTINUED | OUTPATIENT
Start: 2024-11-29 | End: 2024-11-29

## 2024-11-29 RX ORDER — SODIUM CHLORIDE 0.9 % (FLUSH) 0.9 %
10 SYRINGE (ML) INJECTION
Status: DISCONTINUED | OUTPATIENT
Start: 2024-11-29 | End: 2024-12-12 | Stop reason: HOSPADM

## 2024-11-29 RX ORDER — NITROGLYCERIN 0.4 MG/1
0.4 TABLET SUBLINGUAL EVERY 5 MIN PRN
Status: COMPLETED | OUTPATIENT
Start: 2024-11-29 | End: 2024-11-29

## 2024-11-29 RX ORDER — FUROSEMIDE 10 MG/ML
20 INJECTION INTRAMUSCULAR; INTRAVENOUS EVERY 12 HOURS
Status: DISCONTINUED | OUTPATIENT
Start: 2024-11-29 | End: 2024-11-30

## 2024-11-29 RX ORDER — FUROSEMIDE 10 MG/ML
60 INJECTION INTRAMUSCULAR; INTRAVENOUS
Status: COMPLETED | OUTPATIENT
Start: 2024-11-29 | End: 2024-11-29

## 2024-11-29 RX ORDER — MUPIROCIN 20 MG/G
OINTMENT TOPICAL 2 TIMES DAILY
Status: COMPLETED | OUTPATIENT
Start: 2024-11-29 | End: 2024-12-04

## 2024-11-29 RX ORDER — ENOXAPARIN SODIUM 100 MG/ML
40 INJECTION SUBCUTANEOUS EVERY 24 HOURS
Status: DISCONTINUED | OUTPATIENT
Start: 2024-11-29 | End: 2024-12-05

## 2024-11-29 RX ORDER — ACETAMINOPHEN 325 MG/1
650 TABLET ORAL EVERY 8 HOURS PRN
Status: DISCONTINUED | OUTPATIENT
Start: 2024-11-29 | End: 2024-12-02

## 2024-11-29 RX ORDER — ONDANSETRON HYDROCHLORIDE 2 MG/ML
4 INJECTION, SOLUTION INTRAVENOUS EVERY 8 HOURS PRN
Status: DISCONTINUED | OUTPATIENT
Start: 2024-11-29 | End: 2024-12-12 | Stop reason: HOSPADM

## 2024-11-29 RX ORDER — ASPIRIN 325 MG
325 TABLET ORAL DAILY
Status: DISCONTINUED | OUTPATIENT
Start: 2024-11-30 | End: 2024-12-02

## 2024-11-29 RX ORDER — VALSARTAN 80 MG/1
160 TABLET ORAL DAILY
Status: DISCONTINUED | OUTPATIENT
Start: 2024-11-30 | End: 2024-12-02

## 2024-11-29 RX ORDER — LEVOTHYROXINE SODIUM 50 UG/1
50 TABLET ORAL
Status: DISCONTINUED | OUTPATIENT
Start: 2024-11-30 | End: 2024-12-02

## 2024-11-29 RX ORDER — ALBUTEROL SULFATE 2.5 MG/.5ML
2.5 SOLUTION RESPIRATORY (INHALATION) EVERY 4 HOURS
Status: DISCONTINUED | OUTPATIENT
Start: 2024-11-29 | End: 2024-12-05

## 2024-11-29 RX ADMIN — NITROGLYCERIN 0.4 MG: 0.4 TABLET SUBLINGUAL at 01:11

## 2024-11-29 RX ADMIN — ALBUTEROL SULFATE 2.5 MG: 2.5 SOLUTION RESPIRATORY (INHALATION) at 07:11

## 2024-11-29 RX ADMIN — FUROSEMIDE 20 MG: 10 INJECTION, SOLUTION INTRAMUSCULAR; INTRAVENOUS at 08:11

## 2024-11-29 RX ADMIN — FUROSEMIDE 60 MG: 10 INJECTION, SOLUTION INTRAMUSCULAR; INTRAVENOUS at 01:11

## 2024-11-29 RX ADMIN — CARVEDILOL 3.12 MG: 3.12 TABLET, FILM COATED ORAL at 08:11

## 2024-11-29 RX ADMIN — ENOXAPARIN SODIUM 40 MG: 40 INJECTION SUBCUTANEOUS at 05:11

## 2024-11-29 RX ADMIN — ALBUTEROL SULFATE 2.5 MG: 2.5 SOLUTION RESPIRATORY (INHALATION) at 11:11

## 2024-11-29 RX ADMIN — MUPIROCIN: 20 OINTMENT TOPICAL at 08:11

## 2024-11-29 NOTE — ED PROVIDER NOTES
Encounter Date: 11/29/2024       History     Chief Complaint   Patient presents with    Shortness of Breath     Pt to ED via EMS - stated that pt has been experiencing worsening dyspnea with edema for the past couple days. Hx COPD/CHF. On home bipap upon EMS arrival - sats 70's - arriving to ED on CPAP.     74-year-old female presents to the emergency department for evaluation due to shortness of breath and lower extremity edema.  History of COPD and CHF.  Upon ED arrival, the patient is on BiPAP.  EMS reports an O2 sat measuring in the 70s at home prior to transport to the emergency department.        Review of patient's allergies indicates:   Allergen Reactions    Pcn [penicillins] Swelling     Past Medical History:   Diagnosis Date    Abdominal hernia     Bacteremia 04/05/2023    CHF (congestive heart failure)     COPD (chronic obstructive pulmonary disease)     Diabetes mellitus, type 2 02/16/2022    GERD (gastroesophageal reflux disease)     History of home oxygen therapy     Hypertension     Hypertensive emergency 03/30/2023    Migraine headache     On home O2     Pulmonary embolism 06/01/2017    Small bowel obstruction     Thyroid disease      Past Surgical History:   Procedure Laterality Date    COLONOSCOPY      HYSTERECTOMY      INNER EAR SURGERY      UPPER GASTROINTESTINAL ENDOSCOPY       No family history on file.  Social History     Tobacco Use    Smoking status: Former     Passive exposure: Never    Smokeless tobacco: Never   Substance Use Topics    Alcohol use: No    Drug use: No     Review of Systems   Respiratory:  Positive for shortness of breath.    All other systems reviewed and are negative.      Physical Exam     Initial Vitals   BP Pulse Resp Temp SpO2   11/29/24 1322 11/29/24 1315 11/29/24 1322 11/29/24 1353 11/29/24 1315   (!) 207/103 (!) 119 (!) 26 99.4 °F (37.4 °C) (!) 93 %      MAP       --                Physical Exam    Nursing note and vitals reviewed.  Constitutional: She appears  well-developed and well-nourished.   HENT:   Head: Normocephalic and atraumatic.   Eyes: EOM are normal. Pupils are equal, round, and reactive to light.   Neck: Neck supple.   Normal range of motion.  Cardiovascular:  Normal rate, regular rhythm and normal heart sounds.     Exam reveals no gallop and no friction rub.       No murmur heard.  Pulmonary/Chest: She is in respiratory distress. She has wheezes.   Abdominal: Abdomen is soft. Bowel sounds are normal. She exhibits no distension. There is no abdominal tenderness.   Musculoskeletal:         General: Edema present. Normal range of motion.      Cervical back: Normal range of motion and neck supple.      Comments: Bilateral 3+ lower extremity edema.     Neurological: She is alert and oriented to person, place, and time. GCS score is 15. GCS eye subscore is 4. GCS verbal subscore is 5. GCS motor subscore is 6.   Skin: Skin is warm and dry. No rash noted.         ED Course   Procedures  Labs Reviewed   CBC W/ AUTO DIFFERENTIAL - Abnormal       Result Value    WBC 7.52      RBC 4.14      Hemoglobin 12.1      Hematocrit 39.9      MCV 96      MCH 29.2      MCHC 30.3 (*)     RDW 16.8 (*)     Platelets 277      MPV 8.9 (*)     Immature Granulocytes 1.2 (*)     Gran # (ANC) 4.1      Immature Grans (Abs) 0.09 (*)     Lymph # 2.4      Mono # 0.9      Eos # 0.0      Baso # 0.03      nRBC 0      Gran % 53.9      Lymph % 32.4      Mono % 11.8      Eosinophil % 0.3      Basophil % 0.4      Differential Method Automated     COMPREHENSIVE METABOLIC PANEL - Abnormal    Sodium 144      Potassium 5.1      Chloride 109      CO2 31 (*)     Glucose 93      BUN 16      Creatinine 1.5 (*)     Calcium 10.0      Total Protein 7.3      Albumin 3.0 (*)     Total Bilirubin 0.6      Alkaline Phosphatase 242 (*)     AST 49 (*)     ALT 27      eGFR 36.6 (*)     Anion Gap 4     TROPONIN I HIGH SENSITIVITY - Abnormal    Troponin I High Sensitivity 98.1 (*)    NT-PRO NATRIURETIC PEPTIDE - Abnormal     NT-proBNP 82348 (*)    TROPONIN I HIGH SENSITIVITY - Abnormal    Troponin I High Sensitivity 98.7 (*)    SARS-COV-2 RDRP GENE    POC Rapid COVID Negative       Acceptable Yes       EKG Readings: (Independently Interpreted)   Initial Reading: No STEMI. Rhythm: Sinus Tachycardia. Heart Rate: 120. Ectopy: No Ectopy. ST Segments: Normal ST Segments. T Waves Flipped: AVL. Axis: Left Axis Deviation. Clinical Impression: Sinus Tachycardia       Imaging Results              X-Ray Chest 1 View (Final result)  Result time 11/29/24 13:48:05      Final result by Fabiola Rodgers MD (11/29/24 13:48:05)                   Impression:      Significant cardiomegaly and mild chronic changes      Electronically signed by: Fabiola Rodgers MD  Date:    11/29/2024  Time:    13:48               Narrative:    EXAMINATION:  XR CHEST 1 VIEW    CLINICAL HISTORY:  shortness of breath;    TECHNIQUE:  portable chest x-ray    COMPARISON:  09/22/2024.  <Comparisons>    FINDINGS:  There is artifact.  The heart is significantly enlarged and the aorta is ectatic.  There are chronic changes with no acute lung disease.                                       Medications   furosemide injection 60 mg (60 mg Intravenous Given 11/29/24 1347)   nitroGLYCERIN SL tablet 0.4 mg (0.4 mg Sublingual Given 11/29/24 1356)     Medical Decision Making             ED Course as of 11/29/24 1635   Fri Nov 29, 2024   1500 The patient is in CHF and on BiPAP.  Diuresis initiated.  Admit.   [DH]   1530 Discussed with Dr. Anderson who requested a repeat troponin not call back with the result. [DH]   1624 Discussed with Dr. Anderson who accepts admission. [DH]      ED Course User Index  [DH] Jake Rapp DO                           Clinical Impression:  Final diagnoses:  [R06.02] Shortness of breath  [I50.9] Congestive heart failure, unspecified HF chronicity, unspecified heart failure type (Primary)  [R06.02] SOB (shortness of breath)  [E87.29] Respiratory  acidosis          ED Disposition Condition    Admit Stable                Jake Rapp, DO  11/29/24 [id number]

## 2024-11-29 NOTE — PROGRESS NOTES
Pharmacist Renal Dose Adjustment Note    Carmen Tan is a 74 y.o. female being treated with the medication Lovenox    Patient Data:    Vital Signs (Most Recent):  Temp: 99.4 °F (37.4 °C) (11/29/24 1353)  Pulse: 96 (11/29/24 1700)  Resp: (!) 24 (11/29/24 1539)  BP: (!) 144/76 (11/29/24 1538)  SpO2: 100 % (11/29/24 1538) Vital Signs (72h Range):  Temp:  [99.4 °F (37.4 °C)]   Pulse:  []   Resp:  [16-28]   BP: (144-207)/()   SpO2:  [93 %-100 %]      Recent Labs   Lab 11/29/24  1339   CREATININE 1.5*     Serum creatinine: 1.5 mg/dL (H) 11/29/24 1339  Estimated creatinine clearance: 41.2 mL/min (A)    Medication:Lovenox dose: 30 mg frequency daily will be changed to medication:Lovenox dose:40 mg frequency:daily    Pharmacist's Name: Lalitha Rodríguez  Pharmacist's Extension: [phone]

## 2024-11-30 ENCOUNTER — ANESTHESIA EVENT (OUTPATIENT)
Dept: INTENSIVE CARE | Facility: HOSPITAL | Age: 74
End: 2024-11-30
Payer: MEDICARE

## 2024-11-30 ENCOUNTER — ANESTHESIA (OUTPATIENT)
Dept: INTENSIVE CARE | Facility: HOSPITAL | Age: 74
End: 2024-11-30
Payer: MEDICARE

## 2024-11-30 PROBLEM — E78.5 HLD (HYPERLIPIDEMIA): Status: ACTIVE | Noted: 2024-11-30

## 2024-11-30 PROBLEM — R31.0 GROSS HEMATURIA: Status: RESOLVED | Noted: 2023-04-03 | Resolved: 2024-11-30

## 2024-11-30 PROBLEM — I95.9 HYPOTENSION: Status: RESOLVED | Noted: 2024-08-08 | Resolved: 2024-11-30

## 2024-11-30 PROBLEM — K21.9 GASTROESOPHAGEAL REFLUX DISEASE WITHOUT ESOPHAGITIS: Status: ACTIVE | Noted: 2024-11-30

## 2024-11-30 PROBLEM — J43.8 OTHER EMPHYSEMA: Status: ACTIVE | Noted: 2022-02-16

## 2024-11-30 PROBLEM — Z78.9: Status: RESOLVED | Noted: 2023-04-11 | Resolved: 2024-11-30

## 2024-11-30 PROBLEM — R41.0 DISORIENTATION: Status: RESOLVED | Noted: 2024-04-26 | Resolved: 2024-11-30

## 2024-11-30 PROBLEM — E03.9 HYPOTHYROIDISM: Status: ACTIVE | Noted: 2024-11-30

## 2024-11-30 PROBLEM — T88.4XXA HARD TO INTUBATE: Status: ACTIVE | Noted: 2024-11-30

## 2024-11-30 LAB
AC: 15
AC: 18
AC: 18
ALBUMIN SERPL BCP-MCNC: 2.8 G/DL (ref 3.5–5.2)
ALP SERPL-CCNC: 244 U/L (ref 55–135)
ALT SERPL W/O P-5'-P-CCNC: 35 U/L (ref 10–44)
ANION GAP SERPL CALC-SCNC: 6 MMOL/L (ref 3–11)
AST SERPL-CCNC: 48 U/L (ref 10–40)
BACTERIA #/AREA URNS HPF: ABNORMAL /HPF
BASOPHILS # BLD AUTO: 0.05 K/UL (ref 0–0.2)
BASOPHILS NFR BLD: 0.5 % (ref 0–1.9)
BILIRUB SERPL-MCNC: 0.4 MG/DL (ref 0.1–1)
BILIRUB UR QL STRIP: NEGATIVE
BIPAP: ABNORMAL
BUN SERPL-MCNC: 21 MG/DL (ref 8–23)
CALCIUM SERPL-MCNC: 9.5 MG/DL (ref 8.7–10.5)
CHLORIDE SERPL-SCNC: 107 MMOL/L (ref 95–110)
CLARITY UR: ABNORMAL
CO2 SERPL-SCNC: 28 MMOL/L (ref 23–29)
COLOR UR: YELLOW
COMMENTS: ABNORMAL
CREAT SERPL-MCNC: 1.4 MG/DL (ref 0.5–1.4)
DIFFERENTIAL METHOD BLD: ABNORMAL
EOSINOPHIL # BLD AUTO: 0.5 K/UL (ref 0–0.5)
EOSINOPHIL NFR BLD: 4.7 % (ref 0–8)
ERYTHROCYTE [DISTWIDTH] IN BLOOD BY AUTOMATED COUNT: 18.1 % (ref 11.5–14.5)
EST. GFR  (NO RACE VARIABLE): 39.7 ML/MIN/1.73 M^2
ESTIMATED AVG GLUCOSE: 103 MG/DL (ref 68–131)
FIO2: 100 %
FIO2: 60 %
GLUCOSE SERPL-MCNC: 93 MG/DL (ref 70–110)
GLUCOSE UR QL STRIP: NEGATIVE
GRAN CASTS #/AREA URNS LPF: 12 /LPF
HBA1C MFR BLD: 5.2 % (ref 4–5.6)
HCT VFR BLD AUTO: 39.2 % (ref 37–48.5)
HGB BLD-MCNC: 12 G/DL (ref 12–16)
HGB UR QL STRIP: ABNORMAL
HYALINE CASTS #/AREA URNS LPF: 20.8 /LPF
IMM GRANULOCYTES # BLD AUTO: 0.3 K/UL (ref 0–0.04)
IMM GRANULOCYTES NFR BLD AUTO: 2.8 % (ref 0–0.5)
KETONES UR QL STRIP: NEGATIVE
LEUKOCYTE ESTERASE UR QL STRIP: NEGATIVE
LYMPHOCYTES # BLD AUTO: 1.2 K/UL (ref 1–4.8)
LYMPHOCYTES NFR BLD: 10.8 % (ref 18–48)
Lab: ABNORMAL
MAGNESIUM SERPL-MCNC: 1.7 MG/DL (ref 1.6–2.6)
MCH RBC QN AUTO: 30.8 PG (ref 27–31)
MCHC RBC AUTO-ENTMCNC: 30.6 G/DL (ref 32–36)
MCV RBC AUTO: 101 FL (ref 82–98)
MICROSCOPIC COMMENT: ABNORMAL
MODIFIED ALLEN'S TEST: ABNORMAL
MONOCYTES # BLD AUTO: 1.4 K/UL (ref 0.3–1)
MONOCYTES NFR BLD: 13.3 % (ref 4–15)
NEUTROPHILS # BLD AUTO: 7.3 K/UL (ref 1.8–7.7)
NEUTROPHILS NFR BLD: 67.9 % (ref 38–73)
NITRITE UR QL STRIP: NEGATIVE
NOTIFIED BY: ABNORMAL
NRBC BLD-RTO: 1 /100 WBC
O2DEVICE: ABNORMAL
OHS QRS DURATION: 120 MS
OHS QRS DURATION: 130 MS
OHS QTC CALCULATION: 438 MS
OHS QTC CALCULATION: 469 MS
PCO2 BLDA: 54.8 MMHG (ref 35–45)
PCO2 BLDA: 62.3 MMHG (ref 35–45)
PCO2 BLDA: 64.2 MMHG (ref 35–45)
PCO2 BLDA: 89.9 MMHG (ref 35–45)
PEAK FLOW: 50
PEEP: 5
PEEP: 7
PEEP: 9
PH SMN: 7.13 [PH] (ref 7.34–7.45)
PH SMN: 7.26 [PH] (ref 7.34–7.45)
PH SMN: 7.29 [PH] (ref 7.34–7.45)
PH SMN: 7.34 [PH] (ref 7.34–7.45)
PH UR STRIP: 5 [PH] (ref 5–8)
PLATELET # BLD AUTO: 214 K/UL (ref 150–450)
PMV BLD AUTO: 8.6 FL (ref 9.2–12.9)
PO2 BLDA: 278 MMHG (ref 80–100)
PO2 BLDA: 59.5 MMHG (ref 80–100)
PO2 BLDA: 67.7 MMHG (ref 80–100)
PO2 BLDA: 67.7 MMHG (ref 80–100)
POC BASE DEFICIT: 1.1 MMOL/L (ref -2–2)
POC BASE DEFICIT: 1.4 MMOL/L (ref -2–2)
POC BASE DEFICIT: 3.4 MMOL/L (ref -2–2)
POC BASE DEFICIT: 3.8 MMOL/L (ref -2–2)
POC HCO3: 23.3 MMOL/L (ref 24–28)
POC HCO3: 24.8 MMOL/L (ref 24–28)
POC HCO3: 26.3 MMOL/L (ref 24–28)
POC HCO3: 26.8 MMOL/L (ref 24–28)
POC NOTIFIED NOTE: ABNORMAL
POC PERFORMED BY: ABNORMAL
POC SATURATED O2: 100 % (ref 95–100)
POC SATURATED O2: 85.9 % (ref 95–100)
POC SATURATED O2: 93.2 % (ref 95–100)
POC SATURATED O2: 93.8 % (ref 95–100)
POC TEMPERATURE: 37 C
POCT GLUCOSE: 96 MG/DL (ref 70–110)
POTASSIUM SERPL-SCNC: 4.8 MMOL/L (ref 3.5–5.1)
PROT SERPL-MCNC: 7.4 G/DL (ref 6–8.4)
PROT UR QL STRIP: ABNORMAL
PROVIDER NOTIFIED: ABNORMAL
RBC # BLD AUTO: 3.9 M/UL (ref 4–5.4)
RBC #/AREA URNS HPF: 2 /HPF (ref 0–4)
SODIUM SERPL-SCNC: 141 MMOL/L (ref 136–145)
SP GR UR STRIP: 1.01 (ref 1–1.03)
SPECIMEN SOURCE: ABNORMAL
SQUAMOUS #/AREA URNS HPF: 14 /HPF
URN SPEC COLLECT METH UR: ABNORMAL
UROBILINOGEN UR STRIP-ACNC: 1 EU/DL
VT: 400
VT: 400
VT: 450
WAXY CASTS #/AREA URNS LPF: 1 /LPF
WBC # BLD AUTO: 10.88 K/UL (ref 3.9–12.7)
WBC #/AREA URNS HPF: 43 /HPF (ref 0–5)

## 2024-11-30 PROCEDURE — 99900026 HC AIRWAY MAINTENANCE (STAT)

## 2024-11-30 PROCEDURE — 80053 COMPREHEN METABOLIC PANEL: CPT | Performed by: EMERGENCY MEDICINE

## 2024-11-30 PROCEDURE — 27200966 HC CLOSED SUCTION SYSTEM

## 2024-11-30 PROCEDURE — 94640 AIRWAY INHALATION TREATMENT: CPT

## 2024-11-30 PROCEDURE — 02HV33Z INSERTION OF INFUSION DEVICE INTO SUPERIOR VENA CAVA, PERCUTANEOUS APPROACH: ICD-10-PCS | Performed by: EMERGENCY MEDICINE

## 2024-11-30 PROCEDURE — 63600175 PHARM REV CODE 636 W HCPCS: Performed by: INTERNAL MEDICINE

## 2024-11-30 PROCEDURE — 87070 CULTURE OTHR SPECIMN AEROBIC: CPT | Performed by: STUDENT IN AN ORGANIZED HEALTH CARE EDUCATION/TRAINING PROGRAM

## 2024-11-30 PROCEDURE — 36415 COLL VENOUS BLD VENIPUNCTURE: CPT | Performed by: EMERGENCY MEDICINE

## 2024-11-30 PROCEDURE — 82803 BLOOD GASES ANY COMBINATION: CPT

## 2024-11-30 PROCEDURE — 94761 N-INVAS EAR/PLS OXIMETRY MLT: CPT

## 2024-11-30 PROCEDURE — 81000 URINALYSIS NONAUTO W/SCOPE: CPT | Performed by: STUDENT IN AN ORGANIZED HEALTH CARE EDUCATION/TRAINING PROGRAM

## 2024-11-30 PROCEDURE — 99900031 HC PATIENT EDUCATION (STAT)

## 2024-11-30 PROCEDURE — 99900035 HC TECH TIME PER 15 MIN (STAT)

## 2024-11-30 PROCEDURE — 94660 CPAP INITIATION&MGMT: CPT

## 2024-11-30 PROCEDURE — 27100108

## 2024-11-30 PROCEDURE — 25000003 PHARM REV CODE 250: Performed by: EMERGENCY MEDICINE

## 2024-11-30 PROCEDURE — 83735 ASSAY OF MAGNESIUM: CPT | Performed by: INTERNAL MEDICINE

## 2024-11-30 PROCEDURE — 93005 ELECTROCARDIOGRAM TRACING: CPT

## 2024-11-30 PROCEDURE — 27100171 HC OXYGEN HIGH FLOW UP TO 24 HOURS

## 2024-11-30 PROCEDURE — 85025 COMPLETE CBC W/AUTO DIFF WBC: CPT | Performed by: EMERGENCY MEDICINE

## 2024-11-30 PROCEDURE — 25000003 PHARM REV CODE 250: Performed by: INTERNAL MEDICINE

## 2024-11-30 PROCEDURE — 25000242 PHARM REV CODE 250 ALT 637 W/ HCPCS: Performed by: EMERGENCY MEDICINE

## 2024-11-30 PROCEDURE — 94002 VENT MGMT INPAT INIT DAY: CPT

## 2024-11-30 PROCEDURE — 5A1945Z RESPIRATORY VENTILATION, 24-96 CONSECUTIVE HOURS: ICD-10-PCS | Performed by: EMERGENCY MEDICINE

## 2024-11-30 PROCEDURE — 20000000 HC ICU ROOM

## 2024-11-30 PROCEDURE — 25000242 PHARM REV CODE 250 ALT 637 W/ HCPCS: Performed by: INTERNAL MEDICINE

## 2024-11-30 PROCEDURE — 25000003 PHARM REV CODE 250: Performed by: NURSE ANESTHETIST, CERTIFIED REGISTERED

## 2024-11-30 PROCEDURE — 36569 INSJ PICC 5 YR+ W/O IMAGING: CPT

## 2024-11-30 PROCEDURE — 87086 URINE CULTURE/COLONY COUNT: CPT | Performed by: STUDENT IN AN ORGANIZED HEALTH CARE EDUCATION/TRAINING PROGRAM

## 2024-11-30 PROCEDURE — 87205 SMEAR GRAM STAIN: CPT | Performed by: STUDENT IN AN ORGANIZED HEALTH CARE EDUCATION/TRAINING PROGRAM

## 2024-11-30 PROCEDURE — 0BH17EZ INSERTION OF ENDOTRACHEAL AIRWAY INTO TRACHEA, VIA NATURAL OR ARTIFICIAL OPENING: ICD-10-PCS | Performed by: EMERGENCY MEDICINE

## 2024-11-30 PROCEDURE — 63600175 PHARM REV CODE 636 W HCPCS: Performed by: NURSE ANESTHETIST, CERTIFIED REGISTERED

## 2024-11-30 PROCEDURE — 83036 HEMOGLOBIN GLYCOSYLATED A1C: CPT | Performed by: STUDENT IN AN ORGANIZED HEALTH CARE EDUCATION/TRAINING PROGRAM

## 2024-11-30 PROCEDURE — 93010 ELECTROCARDIOGRAM REPORT: CPT | Mod: GW,,, | Performed by: INTERNAL MEDICINE

## 2024-11-30 PROCEDURE — 51702 INSERT TEMP BLADDER CATH: CPT

## 2024-11-30 PROCEDURE — 36415 COLL VENOUS BLD VENIPUNCTURE: CPT | Performed by: INTERNAL MEDICINE

## 2024-11-30 PROCEDURE — C1751 CATH, INF, PER/CENT/MIDLINE: HCPCS

## 2024-11-30 PROCEDURE — 36415 COLL VENOUS BLD VENIPUNCTURE: CPT | Mod: XB | Performed by: STUDENT IN AN ORGANIZED HEALTH CARE EDUCATION/TRAINING PROGRAM

## 2024-11-30 PROCEDURE — 36600 WITHDRAWAL OF ARTERIAL BLOOD: CPT

## 2024-11-30 RX ORDER — SODIUM CHLORIDE 0.9 % (FLUSH) 0.9 %
10 SYRINGE (ML) INJECTION EVERY 12 HOURS PRN
Status: DISCONTINUED | OUTPATIENT
Start: 2024-11-30 | End: 2024-12-12 | Stop reason: HOSPADM

## 2024-11-30 RX ORDER — INSULIN ASPART 100 [IU]/ML
0-5 INJECTION, SOLUTION INTRAVENOUS; SUBCUTANEOUS EVERY 6 HOURS PRN
Status: DISCONTINUED | OUTPATIENT
Start: 2024-11-30 | End: 2024-12-12 | Stop reason: HOSPADM

## 2024-11-30 RX ORDER — PANTOPRAZOLE SODIUM 40 MG/1
40 TABLET, DELAYED RELEASE ORAL DAILY
Status: DISCONTINUED | OUTPATIENT
Start: 2024-11-30 | End: 2024-11-30

## 2024-11-30 RX ORDER — ISOSORBIDE MONONITRATE 30 MG/1
30 TABLET, EXTENDED RELEASE ORAL DAILY
Status: DISCONTINUED | OUTPATIENT
Start: 2024-11-30 | End: 2024-12-02

## 2024-11-30 RX ORDER — ATORVASTATIN CALCIUM 40 MG/1
40 TABLET, FILM COATED ORAL DAILY
Status: DISCONTINUED | OUTPATIENT
Start: 2024-11-30 | End: 2024-12-02

## 2024-11-30 RX ORDER — MAGNESIUM SULFATE HEPTAHYDRATE 40 MG/ML
2 INJECTION, SOLUTION INTRAVENOUS ONCE
Status: COMPLETED | OUTPATIENT
Start: 2024-11-30 | End: 2024-11-30

## 2024-11-30 RX ORDER — PROPOFOL 10 MG/ML
0-50 INJECTION, EMULSION INTRAVENOUS CONTINUOUS
Status: DISCONTINUED | OUTPATIENT
Start: 2024-11-30 | End: 2024-12-05

## 2024-11-30 RX ORDER — ETOMIDATE 2 MG/ML
INJECTION INTRAVENOUS
Status: COMPLETED
Start: 2024-11-30 | End: 2024-11-30

## 2024-11-30 RX ORDER — PANTOPRAZOLE SODIUM 40 MG/10ML
40 INJECTION, POWDER, LYOPHILIZED, FOR SOLUTION INTRAVENOUS DAILY
Status: DISCONTINUED | OUTPATIENT
Start: 2024-11-30 | End: 2024-12-10

## 2024-11-30 RX ORDER — ETOMIDATE 2 MG/ML
INJECTION INTRAVENOUS
Status: DISCONTINUED | OUTPATIENT
Start: 2024-11-30 | End: 2024-12-03 | Stop reason: HOSPADM

## 2024-11-30 RX ORDER — PROPOFOL 10 MG/ML
INJECTION, EMULSION INTRAVENOUS
Status: DISCONTINUED
Start: 2024-11-30 | End: 2024-11-30 | Stop reason: WASHOUT

## 2024-11-30 RX ORDER — GLUCAGON 1 MG
1 KIT INJECTION
Status: DISCONTINUED | OUTPATIENT
Start: 2024-11-30 | End: 2024-12-12 | Stop reason: HOSPADM

## 2024-11-30 RX ORDER — SUCCINYLCHOLINE CHLORIDE 20 MG/ML
INJECTION INTRAMUSCULAR; INTRAVENOUS
Status: COMPLETED
Start: 2024-11-30 | End: 2024-11-30

## 2024-11-30 RX ORDER — ROCURONIUM BROMIDE 10 MG/ML
INJECTION, SOLUTION INTRAVENOUS
Status: DISCONTINUED
Start: 2024-11-30 | End: 2024-11-30 | Stop reason: WASHOUT

## 2024-11-30 RX ORDER — FUROSEMIDE 10 MG/ML
40 INJECTION INTRAMUSCULAR; INTRAVENOUS EVERY 12 HOURS
Status: DISCONTINUED | OUTPATIENT
Start: 2024-11-30 | End: 2024-12-02

## 2024-11-30 RX ORDER — SUCCINYLCHOLINE CHLORIDE 20 MG/ML
INJECTION INTRAMUSCULAR; INTRAVENOUS
Status: DISCONTINUED | OUTPATIENT
Start: 2024-11-30 | End: 2024-12-03 | Stop reason: HOSPADM

## 2024-11-30 RX ADMIN — PANTOPRAZOLE SODIUM 40 MG: 40 INJECTION, POWDER, LYOPHILIZED, FOR SOLUTION INTRAVENOUS at 10:11

## 2024-11-30 RX ADMIN — METHYLPREDNISOLONE SODIUM SUCCINATE 80 MG: 40 INJECTION, POWDER, FOR SOLUTION INTRAMUSCULAR; INTRAVENOUS at 08:11

## 2024-11-30 RX ADMIN — ALBUTEROL SULFATE 2.5 MG: 2.5 SOLUTION RESPIRATORY (INHALATION) at 03:11

## 2024-11-30 RX ADMIN — SUCCINYLCHOLINE CHLORIDE 80 MG: 20 INJECTION, SOLUTION INTRAMUSCULAR; INTRAVENOUS at 08:11

## 2024-11-30 RX ADMIN — FUROSEMIDE 40 MG: 10 INJECTION, SOLUTION INTRAMUSCULAR; INTRAVENOUS at 10:11

## 2024-11-30 RX ADMIN — ETOMIDATE 20 MG: 2 INJECTION INTRAVENOUS at 08:11

## 2024-11-30 RX ADMIN — ALBUTEROL SULFATE 2.5 MG: 2.5 SOLUTION RESPIRATORY (INHALATION) at 11:11

## 2024-11-30 RX ADMIN — PROPOFOL 35 MCG/KG/MIN: 10 INJECTION, EMULSION INTRAVENOUS at 11:11

## 2024-11-30 RX ADMIN — SUCCINYLCHOLINE CHLORIDE 120 MG: 20 INJECTION, SOLUTION INTRAMUSCULAR; INTRAVENOUS at 08:11

## 2024-11-30 RX ADMIN — MAGNESIUM SULFATE HEPTAHYDRATE 2 G: 40 INJECTION, SOLUTION INTRAVENOUS at 02:11

## 2024-11-30 RX ADMIN — FUROSEMIDE 40 MG: 10 INJECTION, SOLUTION INTRAMUSCULAR; INTRAVENOUS at 08:11

## 2024-11-30 RX ADMIN — ALBUTEROL SULFATE 2.5 MG: 2.5 SOLUTION RESPIRATORY (INHALATION) at 04:11

## 2024-11-30 RX ADMIN — PROPOFOL 35 MCG/KG/MIN: 10 INJECTION, EMULSION INTRAVENOUS at 05:11

## 2024-11-30 RX ADMIN — ENOXAPARIN SODIUM 40 MG: 40 INJECTION SUBCUTANEOUS at 05:11

## 2024-11-30 RX ADMIN — PROPOFOL 35 MCG/KG/MIN: 10 INJECTION, EMULSION INTRAVENOUS at 02:11

## 2024-11-30 RX ADMIN — PROPOFOL 10 MCG/KG/MIN: 10 INJECTION, EMULSION INTRAVENOUS at 08:11

## 2024-11-30 RX ADMIN — ALBUTEROL SULFATE 2.5 MG: 2.5 SOLUTION RESPIRATORY (INHALATION) at 07:11

## 2024-11-30 RX ADMIN — LEVOTHYROXINE SODIUM 50 MCG: 50 TABLET ORAL at 05:11

## 2024-11-30 RX ADMIN — MUPIROCIN: 20 OINTMENT TOPICAL at 08:11

## 2024-11-30 RX ADMIN — PROPOFOL 35 MCG/KG/MIN: 10 INJECTION, EMULSION INTRAVENOUS at 08:11

## 2024-11-30 RX ADMIN — METHYLPREDNISOLONE SODIUM SUCCINATE 80 MG: 40 INJECTION, POWDER, FOR SOLUTION INTRAMUSCULAR; INTRAVENOUS at 01:11

## 2024-11-30 RX ADMIN — MUPIROCIN: 20 OINTMENT TOPICAL at 10:11

## 2024-11-30 NOTE — ASSESSMENT & PLAN NOTE
Patient with Hypercapnic and Hypoxic Respiratory failure which is Acute on chronic.  she is on home oxygen at 2 LPM. Supplemental oxygen was provided and noted- Vent Mode: A/C  Oxygen Concentration (%):  [] 100  Resp Rate Total:  [0 br/min-18 br/min] 18 br/min  Vt Set:  [400 mL] 400 mL  PEEP/CPAP:  [5 cmH20-7 cmH20] 5 cmH20  Mean Airway Pressure:  [13 cmH20] 13 cmH20    .   Signs/symptoms of respiratory failure include- tachypnea, increased work of breathing, respiratory distress, use of accessory muscles, and wheezing. Contributing diagnoses includes - CHF and COPD Labs and images were reviewed. Patient Has recent ABG, which has been reviewed. Will treat underlying causes and adjust management of respiratory failure as follows- duo nebs, wean vent as tolerated, ABGs prn, diuresis

## 2024-11-30 NOTE — ASSESSMENT & PLAN NOTE
Patient has  atrial fibrillation. Patient is currently in . [id number] Score: 3. The patients heart rate in the last 24 hours is as follows:  Pulse  Min: 54  Max: 135     Antiarrhythmics   BB on hold due to bradycardia from propofol gtt    Anticoagulants  enoxaparin injection 40 mg, Every 24 hours, Subcutaneous    Plan  - Replete lytes with a goal of K>4, Mg >2  - Patient's afib is currently stable, she has history of dash-tachy fluctuation, subsequent intubation on propofol gtt, now bradycardic, hold coreg for now, previous records showed on eliquis, care everywhere patient has not be seen by Cardiology except for and how during previous admission in August, unclear which medication patient is taking, will try and get with family member to verify medication patient taking at home and if she was taken off blood thinner at some point since her August admission

## 2024-11-30 NOTE — ASSESSMENT & PLAN NOTE
"Patient has Diastolic (HFpEF) heart failure that is Acute on chronic. On presentation their CHF was decompensated. Evidence of decompensated CHF on presentation includes: edema, orthopnea, dyspnea on exertion (MELISSA), and shortness of breath. The etiology of their decompensation is likely medication non-compliance?diet? Most recent BNP and echo results are listed below.  No results for input(s): "BNP" in the last 72 hours.  Latest ECHO  Results for orders placed during the hospital encounter of 07/28/24    Echo Saline Bubble? No; Ultrasound enhancing contrast? No    Interpretation Summary    Left Ventricle: The left ventricle is normal in size. Mildly increased wall thickness. There is normal systolic function with a visually estimated ejection fraction of 55 - 60%. Grade I diastolic dysfunction.    Right Ventricle: Normal right ventricular cavity size. Systolic function is normal.    Left Atrium: Left atrium is severely dilated. Atrial septum is bulging to the right.    Right Atrium: Right atrium is moderately dilated.    Aortic Valve: The aortic valve is a trileaflet valve. Mildly restricted motion. There is mild stenosis. Aortic valve area by VTI is 1.84 cm². Aortic valve peak velocity is 2.18 m/s. Mean gradient is 10 mmHg. The dimensionless index is 0.58. There is mild aortic regurgitation.    Mitral Valve: There is mild regurgitation.    Tricuspid Valve: There is mild regurgitation.    Pulmonic Valve: There is mild to moderate regurgitation.    No pericardial effusion.    Current Heart Failure Medications  carvediloL tablet 3.125 mg, 2 times daily, Oral  valsartan tablet 160 mg, Daily, Oral  furosemide injection 40 mg, Every 12 hours, Intravenous    Plan  - Monitor strict I&Os and daily weights.    - Place on telemetry  - Low sodium diet  - Place on fluid restriction of 1 L.   - Cardiology has been consulted  - The patient's volume status is stable but not at their baseline as indicated by edema and shortness of " breath and respiratory distress requiring intubation

## 2024-11-30 NOTE — EICU
Intervention Initiated From:  [facility] / EICU    Elliott intervened regarding:  Rounding (Video assessment)    Nurse Notified:  No    Doctor Notified:  No    Comments: Rounding done. Resting on BiPAP 50%. Side rails up x4. On no IV fluid. B/P 143/85, HR 95, resp 28, sat 94.

## 2024-11-30 NOTE — PLAN OF CARE
Received pt from er to room 207 - opens eyes and answers questions - sob noted with movement - bipap remains in place - sat 93 percent on fio2 of 50% - diminished breath sounds with expiratory wheezes - st with pacs and pvcs - pulses present - decreased to pedal area - dry flaky skin to legs and feet - generalized edema - scds in place - heel foams to both feel - abdomen obese - bs x 4 - purewick in place with yellow urine - oriented to room, equipment and medications - mepilex applied to sacral area - small dime sized open wound in between buttocks - picture taken - denies pain or distress at present - will continue to monitor

## 2024-11-30 NOTE — ANESTHESIA PROCEDURE NOTES
Ad Hoc Intubation    Date/Time: 11/30/2024 8:25 AM    Performed by: Jose A Calero CRNA  Authorized by: Jose A Calero CRNA    Indications:  Respiratory distress, hypoxemia and respiratory failure  Diagnosis:  RESPIRATORY ACIDOSIS, CHF  Patient Location:  ICU  Timeout:  11/30/2024 8:18 AM  Procedure Start Time:  11/30/2024 8:20 AM  Procedure End Time:  11/30/2024 8:28 AM  Staff:     Anesthesiologist Present: Yes    Intubation:     Induction:  Intravenous    Intubated:  Postinduction    Mask Ventilation:  Easy mask    Attempts:  2    Attempted By:  CRNA    Method of Intubation:  Direct and bougie    Blade:  Judy 4    Laryngeal View Grade: Grade III - only epiglottis visible      Laryngeal View Grade comment:  ANTERIOR AIRWAY,SWOLLEN AIRWAY    Attempted By (2nd Attempt):  CRNA    Method of Intubation (2nd Attempt):  Video laryngoscopy    Blade (2nd Attempt):  Judy 4    Laryngeal View Grade (2nd Attempt): Grade I - full view of cords      Difficult Airway Encountered?: Yes      Future Airway Recommendations:  VIDEO LARYNGOSCOPY    Complications:  None    Airway Device:  Oral endotracheal tube    Airway Device Size:  7.5    Style/Cuff Inflation:  Cuffed    Inflation Amount (mL):  6    Tube secured:  21    Placement Verified By:  Colorimetric ETCO2 device    Complicating Factors:  None, short neck, oropharyngeal edema or fat and large/floppy epiglottis    Findings Post-Intubation:  BS equal bilateral and atraumatic/condition of teeth unchanged

## 2024-11-30 NOTE — H&P
[facility] Medicine  History & Physical    Patient Name: Carmen Tan  MRN: [id number]  Patient Class: IP- Inpatient  Admission Date: 11/29/2024  Attending Physician: Gunnar Mott DO   Primary Care Provider: Lucian Barry MD         Patient information was obtained from patient, past medical records, and ER records.     Subjective:     Principal Problem:Acute on chronic diastolic congestive heart failure    Chief Complaint:   Chief Complaint   Patient presents with    Shortness of Breath     Pt to ED via EMS - stated that pt has been experiencing worsening dyspnea with edema for the past couple days. Hx COPD/CHF. On home bipap upon EMS arrival - sats 70's - arriving to ED on CPAP.        HPI: Patient 74-year-old female past medical history of chronic diastolic heart failure, AFib, chronic respiratory failure with hypoxemia and hypercapnia, anxiety depression, type 2 diabetes, GERD, hypothyroidism.  Patient came in after noted to be short of breath at home, patient was satting in the 70 on home BiPAP therapy, was brought to the ED noted to be in distress tachycardic hypotensive hypoxic, workup in the ED patient with enlarged cardiac silhouette, chronic changes no acute finding, elevated BNP 25,000, mild elevation in troponin negative COVID no leukocytosis, patient was given nitroglycerin and Lasix injection, admitted to ICU.    Past Medical History:   Diagnosis Date    Abdominal hernia     Bacteremia 04/05/2023    CHF (congestive heart failure)     COPD (chronic obstructive pulmonary disease)     Diabetes mellitus, type 2 02/16/2022    GERD (gastroesophageal reflux disease)     History of home oxygen therapy     Hypertension     Hypertensive emergency 03/30/2023    Migraine headache     On home O2     Pulmonary embolism 06/01/2017    Small bowel obstruction     Thyroid disease        Past Surgical History:   Procedure Laterality Date    COLONOSCOPY      HYSTERECTOMY       INNER EAR SURGERY      UPPER GASTROINTESTINAL ENDOSCOPY         Review of patient's allergies indicates:   Allergen Reactions    Pcn [penicillins] Swelling       No current facility-administered medications on file prior to encounter.     Current Outpatient Medications on File Prior to Encounter   Medication Sig    acetaminophen (TYLENOL) 325 MG tablet Take 2 tablets (650 mg total) by mouth every 8 (eight) hours as needed for Pain or Temperature greater than (100, headache).    albuterol-ipratropium (DUO-NEB) 2.5 mg-0.5 mg/3 mL nebulizer solution Take 3 mLs by nebulization every 6 (six) hours while awake. Rescue    aspirin (ECOTRIN) 81 MG EC tablet Take 1 tablet (81 mg total) by mouth once daily.    busPIRone (BUSPAR) 5 MG Tab Take 1 tablet (5 mg total) by mouth 2 (two) times daily.    carvediloL (COREG) 3.125 MG tablet Take 1 tablet (3.125 mg total) by mouth 2 (two) times daily.    insulin aspart U-100 (NOVOLOG) 100 unit/mL (3 mL) InPn pen Inject 0-5 Units into the skin before meals and at bedtime as needed (Hyperglycemia).    isosorbide mononitrate (IMDUR) 30 MG 24 hr tablet Take 1 tablet (30 mg total) by mouth once daily.    levothyroxine (SYNTHROID) 50 MCG tablet Take 1 tablet (50 mcg total) by mouth before breakfast.    LIPITOR 40 mg tablet Take 1 tablet (40 mg total) by mouth once daily.    pantoprazole (PROTONIX) 20 MG tablet Take 40 mg by mouth once daily.    predniSONE (DELTASONE) 20 MG tablet Take 1 tablet (20 mg total) by mouth 2 (two) times daily.    valsartan (DIOVAN) 160 MG tablet Take 1 tablet (160 mg total) by mouth once daily.    vitamin D 1000 units Tab Take 185 mg by mouth once daily.     Family History    None       Tobacco Use    Smoking status: Former     Passive exposure: Never    Smokeless tobacco: Never   Substance and Sexual Activity    Alcohol use: No    Drug use: No    Sexual activity: Not on file     Review of Systems   Unable to perform ROS: Severe respiratory distress   Constitutional:   Positive for fatigue.   Respiratory:  Positive for shortness of breath and wheezing.    Cardiovascular:  Positive for palpitations and leg swelling.   Neurological:  Positive for weakness.     Objective:     Vital Signs (Most Recent):  Temp: 97.3 °F (36.3 °C) (11/30/24 0710)  Pulse: (!) 54 (11/30/24 1100)  Resp: 19 (11/30/24 1100)  BP: 99/61 (11/30/24 1100)  SpO2: 97 % (11/30/24 1100) Vital Signs (24h Range):  Temp:  [97.2 °F (36.2 °C)-99.4 °F (37.4 °C)] 97.3 °F (36.3 °C)  Pulse:  [] 54  Resp:  [10-40] 19  SpO2:  [88 %-100 %] 97 %  BP: ()/() 99/61     Weight: (!) 142.4 kg (314 lb)  Body mass index is 53.9 kg/m².     Physical Exam  Vitals and nursing note reviewed.   Constitutional:       General: She is in acute distress.      Appearance: She is well-developed. She is obese. She is ill-appearing. She is not diaphoretic.   HENT:      Head: Normocephalic and atraumatic.      Nose: No congestion or rhinorrhea.   Eyes:      General: No scleral icterus.        Right eye: No discharge.         Left eye: No discharge.      Extraocular Movements: Extraocular movements intact.   Neck:      Comments: Large neck  Cardiovascular:      Rate and Rhythm: Bradycardia present.   Pulmonary:      Effort: Respiratory distress present.      Breath sounds: Rhonchi present. No wheezing or rales.   Abdominal:      General: Bowel sounds are normal. There is no distension.      Palpations: Abdomen is soft.      Tenderness: There is no abdominal tenderness.   Musculoskeletal:      Right lower leg: Edema present.      Left lower leg: Edema present.   Skin:     Capillary Refill: Capillary refill takes 2 to 3 seconds.      Coloration: Skin is not jaundiced or pale.   Neurological:      General: No focal deficit present.      Mental Status: She is alert.                Significant Labs: All pertinent labs within the past 24 hours have been reviewed.  ABGs:   Recent Labs   Lab 11/29/24  1403 11/30/24  0732 11/30/24  1105   PH  "7.152* 7.135* 7.256*   PCO2 79.4* 89.9* 64.2*   HCO3 22.0* 23.3* 24.8   POCSATURATED 88.0* 85.9* 100.0*   PO2 70.3* 59.5* 278*     CBC:   Recent Labs   Lab 11/29/24  1339 11/30/24  0340   WBC 7.52 10.88   HGB 12.1 12.0   HCT 39.9 39.2    214     CMP:   Recent Labs   Lab 11/29/24  1339 11/30/24  0340    141   K 5.1 4.8    107   CO2 31* 28   GLU 93 93   BUN 16 21   CREATININE 1.5* 1.4   CALCIUM 10.0 9.5   PROT 7.3 7.4   ALBUMIN 3.0* 2.8*   BILITOT 0.6 0.4   ALKPHOS 242* 244*   AST 49* 48*   ALT 27 35   ANIONGAP 4 6     Cardiac Markers: No results for input(s): "CKMB", "MYOGLOBIN", "BNP", "TROPISTAT" in the last 48 hours.  Urine Culture: No results for input(s): "LABURIN" in the last 48 hours.  Urine Studies: No results for input(s): "COLORU", "APPEARANCEUA", "PHUR", "SPECGRAV", "PROTEINUA", "GLUCUA", "KETONESU", "BILIRUBINUA", "OCCULTUA", "NITRITE", "UROBILINOGEN", "LEUKOCYTESUR", "RBCUA", "WBCUA", "BACTERIA", "SQUAMEPITHEL", "HYALINECASTS" in the last 48 hours.    Invalid input(s): "WRIGHTSUR"  Recent Lab Results  (Last 5 results in the past 24 hours)        11/30/24  1105   11/30/24  0732   11/30/24  0340   11/29/24  1542   11/29/24  1403        Base Deficit 1.4   1.1       -1.1       Notified Note Called and read back by RUI BREAUX   RESULTS HANDED TO RUI CHAPIN AT 0733       Called and read back by RUI TRAN       Performed By: Devon Osorio       Comments   DRAWN PER AD, RRT             Provider Notified: RUI BREAUX RN RN       Specimen source Arterial   Arterial       Arterial       Notified Time 11/30/2024 11:06   11/30/2024 08:06       11/29/2024 14:05       Notified By Devon HERNANDEZ 18               Albumin     2.8           ALP     244           ALT     35           Anion Gap     6           AST     48           Baso #     0.05           Basophil %     0.5           BILIRUBIN TOTAL     0.4  Comment: For infants and " newborns, interpretation of results should be based  on gestational age, weight and in agreement with clinical  observations.    Premature Infant recommended reference ranges:  Up to 24 hours.............<8.0 mg/dL  Up to 48 hours............<12.0 mg/dL  3-5 days..................<15.0 mg/dL  6-29 days.................<15.0 mg/dL    For patients on Eltrombopag therapy, use of Dimension [place] TBIL is   not   recommended.             BIPAP   16/7 16/7       BUN     21           Calcium     9.5           Chloride     107           CO2     28           Creatinine     1.4           Differential Method     Automated           eGFR     39.7           Eos #     0.5           Eos %     4.7           FiO2 100.0   60.0       50.0       Glucose     93           Gran # (ANC)     7.3           Gran %     67.9           Hematocrit     39.2           Hemoglobin     12.0           Immature Grans (Abs)     0.30  Comment: Mild elevation in immature granulocytes is non specific and   can be seen in a variety of conditions including stress response,   acute inflammation, trauma and pregnancy. Correlation with other   laboratory and clinical findings is essential.             Immature Granulocytes     2.8           Lymph #     1.2           Lymph %     10.8           MCH     30.8           MCHC     30.6           MCV     101           MODIFIED BLANCHE'S TEST na   NA       NA       Mono #     1.4           Mono %     13.3           MPV     8.6           nRBC     1           O2DEVICE Ventilator   BIPAP       BIPAP       PEAK FLOW 50.0               PEEP 5.0               Platelet Count     214           POC HCO3 24.8   23.3  Comment: Value below reference range       22.0  Comment: Value below reference range       POC PCO2 64.2  Comment: Devon notified RUI BREAUX at 11/30/2024 11:06  Called and read back by RUI BREAUX read back  Value above critical limit     89.9  Comment: Justen notified TRU FABIAN at 11/30/2024 08:06  RESULTS  HANDED TO RUI CHAPIN AT 0733 read back  Value above critical limit         79.4  Comment: Devon notified RUI CHRISTIAN at 11/29/2024 14:05  Called and read back by RUI TRAN read back  Value above critical limit         POC PH 7.256  Comment: Devon notified RUI BREAUX at 11/30/2024 11:06  Called and read back by RUI BREAUX read back  Value below critical limit     7.135  Comment: Justen notified TRU FABIAN at 11/30/2024 08:06  RESULTS HANDED TO RUI CHAPIN AT 0733 read back  Value below critical limit         7.152  Comment: Devon notified RUI CHRISTIAN at 11/29/2024 14:05  Called and read back by RUI TRAN read back  Value below critical limit         POC PO2 278  Comment: Value above reference range   59.5  Comment: Value below reference range       70.3  Comment: Value below reference range       POC SATURATED O2 100.0  Comment: Value above reference range   85.9  Comment: Justen notifnaveen OTT RN at 11/30/2024 08:06  RESULTS HANDED TO RUI CHAPIN AT 0733 read back  Value below critical limit         88.0  Comment: Devon notified RUI CHRISTIAN at 11/29/2024 14:05  Called and read back by RUI TRAN read back  Value below critical limit         POC Temp 37.0   37.0       37.0       Potassium     4.8           PROTEIN TOTAL     7.4            Acceptable               RBC     3.90           RDW     18.1           SARS-CoV-2 RNA, Amplification, Qual               Sodium     141           Troponin I High Sensitivity       98.7         Vt 400               WBC     10.88                                  Significant Imaging: I have reviewed all pertinent imaging results/findings within the past 24 hours.  Assessment/Plan:     * Acute on chronic diastolic congestive heart failure  Patient has Diastolic (HFpEF) heart failure that is Acute on chronic. On presentation their CHF was decompensated. Evidence of decompensated CHF on presentation includes: edema, orthopnea, dyspnea on exertion (MELISSA),  "and shortness of breath. The etiology of their decompensation is likely medication non-compliance?diet? Most recent BNP and echo results are listed below.  No results for input(s): "BNP" in the last 72 hours.  Latest ECHO  Results for orders placed during the hospital encounter of 07/28/24    Echo Saline Bubble? No; Ultrasound enhancing contrast? No    Interpretation Summary    Left Ventricle: The left ventricle is normal in size. Mildly increased wall thickness. There is normal systolic function with a visually estimated ejection fraction of 55 - 60%. Grade I diastolic dysfunction.    Right Ventricle: Normal right ventricular cavity size. Systolic function is normal.    Left Atrium: Left atrium is severely dilated. Atrial septum is bulging to the right.    Right Atrium: Right atrium is moderately dilated.    Aortic Valve: The aortic valve is a trileaflet valve. Mildly restricted motion. There is mild stenosis. Aortic valve area by VTI is 1.84 cm². Aortic valve peak velocity is 2.18 m/s. Mean gradient is 10 mmHg. The dimensionless index is 0.58. There is mild aortic regurgitation.    Mitral Valve: There is mild regurgitation.    Tricuspid Valve: There is mild regurgitation.    Pulmonic Valve: There is mild to moderate regurgitation.    No pericardial effusion.    Current Heart Failure Medications  carvediloL tablet 3.125 mg, 2 times daily, Oral  valsartan tablet 160 mg, Daily, Oral  furosemide injection 40 mg, Every 12 hours, Intravenous    Plan  - Monitor strict I&Os and daily weights.    - Place on telemetry  - Low sodium diet  - Place on fluid restriction of 1 L.   - Cardiology has been consulted  - The patient's volume status is stable but not at their baseline as indicated by edema and shortness of breath and respiratory distress requiring intubation        Hard to intubate        Hypothyroidism  Continue levothyroxine       HLD (hyperlipidemia)  Continue statin      Gastroesophageal reflux disease without " "esophagitis  PPI on board      Anxiety and depression  Patient has persistent depression which is unknown and is currently controlled. Will Continue anti-depressant medications. We will not consult psychiatry at this time. Patient does not display psychosis at this time. Continue to monitor closely and adjust plan of care as needed.        Diabetes mellitus, type 2  Patient's FSGs are controlled on current medication regimen.  Last A1c reviewed-   Lab Results   Component Value Date    HGBA1C 5.3 07/28/2024     Most recent fingerstick glucose reviewed- No results for input(s): "POCTGLUCOSE" in the last 24 hours.  Current correctional scale  Low  Maintain anti-hyperglycemic dose as follows-   Antihyperglycemics (From admission, onward)      None          Hold Oral hypoglycemics while patient is in the hospital.    Acute on chronic respiratory failure with hypoxia and hypercapnia  Patient with Hypercapnic and Hypoxic Respiratory failure which is Acute on chronic.  she is on home oxygen at 2 LPM. Supplemental oxygen was provided and noted- Vent Mode: A/C  Oxygen Concentration (%):  [] 100  Resp Rate Total:  [0 br/min-18 br/min] 18 br/min  Vt Set:  [400 mL] 400 mL  PEEP/CPAP:  [5 cmH20-7 cmH20] 5 cmH20  Mean Airway Pressure:  [13 cmH20] 13 cmH20    .   Signs/symptoms of respiratory failure include- tachypnea, increased work of breathing, respiratory distress, use of accessory muscles, and wheezing. Contributing diagnoses includes - CHF and COPD Labs and images were reviewed. Patient Has recent ABG, which has been reviewed. Will treat underlying causes and adjust management of respiratory failure as follows- duo nebs, wean vent as tolerated, ABGs prn, diuresis     Paroxysmal A-fib  Patient has  atrial fibrillation. Patient is currently in . [id number] Score: 3. The patients heart rate in the last 24 hours is as follows:  Pulse  Min: 54  Max: 135     Antiarrhythmics   BB on hold due to bradycardia from propofol " gtt    Anticoagulants  enoxaparin injection 40 mg, Every 24 hours, Subcutaneous    Plan  - Replete lytes with a goal of K>4, Mg >2  - Patient's afib is currently stable, she has history of dash-tachy fluctuation, subsequent intubation on propofol gtt, now bradycardic, hold coreg for now, previous records showed on eliquis, care everywhere patient has not be seen by Cardiology except for and how during previous admission in August, unclear which medication patient is taking, will try and get with family member to verify medication patient taking at home and if she was taken off blood thinner at some point since her August admission            VTE Risk Mitigation (From admission, onward)           Ordered     enoxaparin injection 40 mg  Every 24 hours         11/29/24 1730     IP VTE HIGH RISK PATIENT  Once         11/29/24 1726     Place sequential compression device  Until discontinued         11/29/24 1726                               Pharmacist Renal Dose Adjustment Note    Carmen Tan is a 74 y.o. female being treated with the medication Lovenox    Patient Data:    Vital Signs (Most Recent):  Temp: 99.4 °F (37.4 °C) (11/29/24 1353)  Pulse: 96 (11/29/24 1700)  Resp: (!) 24 (11/29/24 1539)  BP: (!) 144/76 (11/29/24 1538)  SpO2: 100 % (11/29/24 1538) Vital Signs (72h Range):  Temp:  [99.4 °F (37.4 °C)]   Pulse:  []   Resp:  [16-28]   BP: (144-207)/()   SpO2:  [93 %-100 %]      Recent Labs   Lab 11/29/24  1339   CREATININE 1.5*     Serum creatinine: 1.5 mg/dL (H) 11/29/24 1339  Estimated creatinine clearance: 41.2 mL/min (A)    Medication:Lovenox dose: 30 mg frequency daily will be changed to medication:Lovenox dose:40 mg frequency:daily    Pharmacist's Name: Lalitha Rodríguez  Pharmacist's Extension: [phone]      Rolo Anderson MD  Department of Hospital Medicine  [facility]

## 2024-11-30 NOTE — PLAN OF CARE
Problem: Adult Inpatient Plan of Care  Goal: Plan of Care Review  Outcome: Progressing     Problem: Adult Inpatient Plan of Care  Goal: Patient-Specific Goal (Individualized)  Outcome: Progressing     Problem: Adult Inpatient Plan of Care  Goal: Absence of Hospital-Acquired Illness or Injury  Outcome: Progressing     Problem: Adult Inpatient Plan of Care  Goal: Optimal Comfort and Wellbeing  Outcome: Progressing     Problem: Adult Inpatient Plan of Care  Goal: Readiness for Transition of Care  Outcome: Progressing     Problem: Skin Injury Risk Increased  Goal: Skin Health and Integrity  Outcome: Progressing     Problem: Heart Failure  Goal: Optimal Coping  Outcome: Progressing     Problem: Heart Failure  Goal: Optimal Cardiac Output  Outcome: Progressing     Problem: Heart Failure  Goal: Stable Heart Rate and Rhythm  Outcome: Progressing     Problem: Heart Failure  Goal: Fluid and Electrolyte Balance  Outcome: Progressing     Problem: Heart Failure  Goal: Effective Oxygenation and Ventilation  Outcome: Progressing     Problem: COPD (Chronic Obstructive Pulmonary Disease)  Goal: Optimal Chronic Illness Coping  Outcome: Progressing     Problem: COPD (Chronic Obstructive Pulmonary Disease)  Goal: Optimal Level of Functional [place]  Outcome: Progressing     Problem: Fluid Volume Excess  Goal: Fluid Balance  Outcome: Progressing

## 2024-11-30 NOTE — PLAN OF CARE
Pt now on vent - intubated this am due to increasing sob and abg results - skin warm and dry - color normal - on sedation at present - og tube in place to lis draining slightly bloody gastric secretions - picc line in place now to right upper arm with diprivan at 35 mcg - resting quietly at present - bilateral soft wrist restraints in place - neuro check normal - no skin breakdown noted - cardiology consult complete - bilateral breath sounds - sat 100 - multiple abgs reported to dr iqbal with changes to vent settings - tolerating vent at present - sb  pulses present - abdomen soft non tender bs x 4 - no bm noted - hannon catheter inserted this am - clear yellow urine per gravity - ua sent to lab - scds in place - no signs of pain or distress - will continue to monitor

## 2024-11-30 NOTE — CARE UPDATE
Pt intubated - difficult intubation - placed on diprivan for sedation - bilateral soft wrist restraints in place - picc line ordered - og tube now in place - waiting results from chest xray for oett and og placement - bloody oral and nasal secretions noted - will continue to monitor

## 2024-11-30 NOTE — CARE UPDATE
Plan of care reviewed. Pt reminded throughout the night of fluid restriction. BiPap maintained. No resp distress, remains tachypneic. O2 90-94%. Neb treatments as ordered. Cough weak and congestion. Nonproductive at this time. Bilateral expiratory wheezes. No BM this shift. Clear yellow urine per via. 900 ml. Perineal cleansing and purewick changed. SCD's maintained. Edema remains. Continue to observe.

## 2024-11-30 NOTE — CARE UPDATE
Spoke with son (néstor) and received permission for picc line insertion and notified that pt will be intubated due to sob and abnormal abgs - voiced understanding

## 2024-11-30 NOTE — NURSING
Pt remains on BiPap. Neb treatment earlier, c/o feeling bad. Voices SOB and weakness. HOB elevated. Pt requesting fluids. Reminded of fluid restriction. Will cluster care. O2 ranging 90-95%. 100% post neb treatment. Remains tachynpeic with shallow respirations. Occasional congested/wet cough. Audible wheezing. Meds tolerated. Purewick with clear yellow urine. SCD's maintained. Generalized edema, femi to the lower extremities. Mepilex maintained. Son (Heber) called to check on pt. Requested notification if anything changes. Continue to observe.

## 2024-11-30 NOTE — HPI
Patient 74-year-old female past medical history of chronic diastolic heart failure, AFib, chronic respiratory failure with hypoxemia and hypercapnia, anxiety depression, type 2 diabetes, GERD, hypothyroidism.  Patient came in after noted to be short of breath at home, patient was satting in the 70 on home BiPAP therapy, was brought to the ED noted to be in distress tachycardic hypotensive hypoxic, workup in the ED patient with enlarged cardiac silhouette, chronic changes no acute finding, elevated BNP 25,000, mild elevation in troponin negative COVID no leukocytosis, patient was given nitroglycerin and Lasix injection, admitted to ICU.

## 2024-11-30 NOTE — CARE UPDATE
Resp in room with pt attempting abg - pt more sob - sat 89 percent - dr iqbal in assessing pt, labs, and medications - continuing to monitor

## 2024-11-30 NOTE — SUBJECTIVE & OBJECTIVE
Past Medical History:   Diagnosis Date    Abdominal hernia     Bacteremia 04/05/2023    CHF (congestive heart failure)     COPD (chronic obstructive pulmonary disease)     Diabetes mellitus, type 2 02/16/2022    GERD (gastroesophageal reflux disease)     History of home oxygen therapy     Hypertension     Hypertensive emergency 03/30/2023    Migraine headache     On home O2     Pulmonary embolism 06/01/2017    Small bowel obstruction     Thyroid disease        Past Surgical History:   Procedure Laterality Date    COLONOSCOPY      HYSTERECTOMY      INNER EAR SURGERY      UPPER GASTROINTESTINAL ENDOSCOPY         Review of patient's allergies indicates:   Allergen Reactions    Pcn [penicillins] Swelling       No current facility-administered medications on file prior to encounter.     Current Outpatient Medications on File Prior to Encounter   Medication Sig    acetaminophen (TYLENOL) 325 MG tablet Take 2 tablets (650 mg total) by mouth every 8 (eight) hours as needed for Pain or Temperature greater than (100, headache).    albuterol-ipratropium (DUO-NEB) 2.5 mg-0.5 mg/3 mL nebulizer solution Take 3 mLs by nebulization every 6 (six) hours while awake. Rescue    aspirin (ECOTRIN) 81 MG EC tablet Take 1 tablet (81 mg total) by mouth once daily.    busPIRone (BUSPAR) 5 MG Tab Take 1 tablet (5 mg total) by mouth 2 (two) times daily.    carvediloL (COREG) 3.125 MG tablet Take 1 tablet (3.125 mg total) by mouth 2 (two) times daily.    insulin aspart U-100 (NOVOLOG) 100 unit/mL (3 mL) InPn pen Inject 0-5 Units into the skin before meals and at bedtime as needed (Hyperglycemia).    isosorbide mononitrate (IMDUR) 30 MG 24 hr tablet Take 1 tablet (30 mg total) by mouth once daily.    levothyroxine (SYNTHROID) 50 MCG tablet Take 1 tablet (50 mcg total) by mouth before breakfast.    LIPITOR 40 mg tablet Take 1 tablet (40 mg total) by mouth once daily.    pantoprazole (PROTONIX) 20 MG tablet Take 40 mg by mouth once daily.     predniSONE (DELTASONE) 20 MG tablet Take 1 tablet (20 mg total) by mouth 2 (two) times daily.    valsartan (DIOVAN) 160 MG tablet Take 1 tablet (160 mg total) by mouth once daily.    vitamin D 1000 units Tab Take 185 mg by mouth once daily.     Family History    None       Tobacco Use    Smoking status: Former     Passive exposure: Never    Smokeless tobacco: Never   Substance and Sexual Activity    Alcohol use: No    Drug use: No    Sexual activity: Not on file     Review of Systems   Unable to perform ROS: Severe respiratory distress   Constitutional:  Positive for fatigue.   Respiratory:  Positive for shortness of breath and wheezing.    Cardiovascular:  Positive for palpitations and leg swelling.   Neurological:  Positive for weakness.     Objective:     Vital Signs (Most Recent):  Temp: 97.3 °F (36.3 °C) (11/30/24 0710)  Pulse: (!) 54 (11/30/24 1100)  Resp: 19 (11/30/24 1100)  BP: 99/61 (11/30/24 1100)  SpO2: 97 % (11/30/24 1100) Vital Signs (24h Range):  Temp:  [97.2 °F (36.2 °C)-99.4 °F (37.4 °C)] 97.3 °F (36.3 °C)  Pulse:  [] 54  Resp:  [10-40] 19  SpO2:  [88 %-100 %] 97 %  BP: ()/() 99/61     Weight: (!) 142.4 kg (314 lb)  Body mass index is 53.9 kg/m².     Physical Exam  Vitals and nursing note reviewed.   Constitutional:       General: She is in acute distress.      Appearance: She is well-developed. She is obese. She is ill-appearing. She is not diaphoretic.   HENT:      Head: Normocephalic and atraumatic.      Nose: No congestion or rhinorrhea.   Eyes:      General: No scleral icterus.        Right eye: No discharge.         Left eye: No discharge.      Extraocular Movements: Extraocular movements intact.   Neck:      Comments: Large neck  Cardiovascular:      Rate and Rhythm: Bradycardia present.   Pulmonary:      Effort: Respiratory distress present.      Breath sounds: Rhonchi present. No wheezing or rales.   Abdominal:      General: Bowel sounds are normal. There is no distension.  "     Palpations: Abdomen is soft.      Tenderness: There is no abdominal tenderness.   Musculoskeletal:      Right lower leg: Edema present.      Left lower leg: Edema present.   Skin:     Capillary Refill: Capillary refill takes 2 to 3 seconds.      Coloration: Skin is not jaundiced or pale.   Neurological:      General: No focal deficit present.      Mental Status: She is alert.                Significant Labs: All pertinent labs within the past 24 hours have been reviewed.  ABGs:   Recent Labs   Lab 11/29/24  1403 11/30/24  0732 11/30/24  1105   PH 7.152* 7.135* 7.256*   PCO2 79.4* 89.9* 64.2*   HCO3 22.0* 23.3* 24.8   POCSATURATED 88.0* 85.9* 100.0*   PO2 70.3* 59.5* 278*     CBC:   Recent Labs   Lab 11/29/24  1339 11/30/24  0340   WBC 7.52 10.88   HGB 12.1 12.0   HCT 39.9 39.2    214     CMP:   Recent Labs   Lab 11/29/24  1339 11/30/24  0340    141   K 5.1 4.8    107   CO2 31* 28   GLU 93 93   BUN 16 21   CREATININE 1.5* 1.4   CALCIUM 10.0 9.5   PROT 7.3 7.4   ALBUMIN 3.0* 2.8*   BILITOT 0.6 0.4   ALKPHOS 242* 244*   AST 49* 48*   ALT 27 35   ANIONGAP 4 6     Cardiac Markers: No results for input(s): "CKMB", "MYOGLOBIN", "BNP", "TROPISTAT" in the last 48 hours.  Urine Culture: No results for input(s): "LABURIN" in the last 48 hours.  Urine Studies: No results for input(s): "COLORU", "APPEARANCEUA", "PHUR", "SPECGRAV", "PROTEINUA", "GLUCUA", "KETONESU", "BILIRUBINUA", "OCCULTUA", "NITRITE", "UROBILINOGEN", "LEUKOCYTESUR", "RBCUA", "WBCUA", "BACTERIA", "SQUAMEPITHEL", "HYALINECASTS" in the last 48 hours.    Invalid input(s): "WRIGHTSUR"  Recent Lab Results  (Last 5 results in the past 24 hours)        11/30/24  1105   11/30/24  0732   11/30/24  0340   11/29/24  1542   11/29/24  1403        Base Deficit 1.4   1.1       -1.1       Notified Note Called and read back by RUI BREAUX   RESULTS HANDED TO RUI CHAPIN AT 0733       Called and read back by RUI TRAN       Performed By: Devon Campuzano   "     Devon       Comments   DRAWN PER AD, RRT             Provider Notified: RUI BREAUX RN, RN       Specimen source Arterial   Arterial       Arterial       Notified Time 11/30/2024 11:06   11/30/2024 08:06       11/29/2024 14:05       Notified By Devon Osorio       AC 18               Albumin     2.8           ALP     244           ALT     35           Anion Gap     6           AST     48           Baso #     0.05           Basophil %     0.5           BILIRUBIN TOTAL     0.4  Comment: For infants and newborns, interpretation of results should be based  on gestational age, weight and in agreement with clinical  observations.    Premature Infant recommended reference ranges:  Up to 24 hours.............<8.0 mg/dL  Up to 48 hours............<12.0 mg/dL  3-5 days..................<15.0 mg/dL  6-29 days.................<15.0 mg/dL    For patients on Eltrombopag therapy, use of Dimension [place] TBIL is   not   recommended.             BIPAP   16/7 16/7       BUN     21           Calcium     9.5           Chloride     107           CO2     28           Creatinine     1.4           Differential Method     Automated           eGFR     39.7           Eos #     0.5           Eos %     4.7           FiO2 100.0   60.0       50.0       Glucose     93           Gran # (ANC)     7.3           Gran %     67.9           Hematocrit     39.2           Hemoglobin     12.0           Immature Grans (Abs)     0.30  Comment: Mild elevation in immature granulocytes is non specific and   can be seen in a variety of conditions including stress response,   acute inflammation, trauma and pregnancy. Correlation with other   laboratory and clinical findings is essential.             Immature Granulocytes     2.8           Lymph #     1.2           Lymph %     10.8           MCH     30.8           MCHC     30.6           MCV     101           MODIFIED BLANCHE'S TEST na   NA       NA       [place] #      1.4           Mono %     13.3           MPV     8.6           nRBC     1           O2DEVICE Ventilator   BIPAP       BIPAP       PEAK FLOW 50.0               PEEP 5.0               Platelet Count     214           POC HCO3 24.8   23.3  Comment: Value below reference range       22.0  Comment: Value below reference range       POC PCO2 64.2  Comment: Devon notified RUI BREAUX at 11/30/2024 11:06  Called and read back by RUI BREAUX read back  Value above critical limit     89.9  Comment: Justen notifnaveen OTT RN at 11/30/2024 08:06  RESULTS HANDED TO RUI CHAPIN AT 0733 read back  Value above critical limit         79.4  Comment: Devon notified RIU CHRISTIAN at 11/29/2024 14:05  Called and read back by RUI TRAN read back  Value above critical limit         POC PH 7.256  Comment: Devon notified RUI BREAUX at 11/30/2024 11:06  Called and read back by RUI BREAUX read back  Value below critical limit     7.135  Comment: Justen OTT RN at 11/30/2024 08:06  RESULTS HANDED TO RUI CHAPIN AT 0733 read back  Value below critical limit         7.152  Comment: Devon notified RUI CHRISTIAN at 11/29/2024 14:05  Called and read back by RUI TRAN read back  Value below critical limit         POC PO2 278  Comment: Value above reference range   59.5  Comment: Value below reference range       70.3  Comment: Value below reference range       POC SATURATED O2 100.0  Comment: Value above reference range   85.9  Comment: Justen OTT RN at 11/30/2024 08:06  RESULTS HANDED TO RUI CHAPIN AT 0733 read back  Value below critical limit         88.0  Comment: Devon metcalfied RUI CHRISTIAN at 11/29/2024 14:05  Called and read back by RUI TRAN read back  Value below critical limit         POC Temp 37.0   37.0       37.0       Potassium     4.8           PROTEIN TOTAL     7.4            Acceptable               RBC     3.90           RDW     18.1           SARS-CoV-2 RNA, Amplification,  Qual               Sodium     141           Troponin I High Sensitivity       98.7         Vt 400               WBC     10.88                                  Significant Imaging: I have reviewed all pertinent imaging results/findings within the past 24 hours.

## 2024-12-01 PROBLEM — J93.83 OTHER PNEUMOTHORAX: Status: ACTIVE | Noted: 2024-12-01

## 2024-12-01 LAB
AC: 18
ALBUMIN SERPL BCP-MCNC: 2.3 G/DL (ref 3.5–5.2)
ALP SERPL-CCNC: 181 U/L (ref 55–135)
ALT SERPL W/O P-5'-P-CCNC: 24 U/L (ref 10–44)
ANION GAP SERPL CALC-SCNC: 10 MMOL/L (ref 3–11)
AST SERPL-CCNC: 28 U/L (ref 10–40)
BASOPHILS # BLD AUTO: 0.02 K/UL (ref 0–0.2)
BASOPHILS NFR BLD: 0.3 % (ref 0–1.9)
BILIRUB SERPL-MCNC: 0.5 MG/DL (ref 0.1–1)
BUN SERPL-MCNC: 28 MG/DL (ref 8–23)
CALCIUM SERPL-MCNC: 9.7 MG/DL (ref 8.7–10.5)
CHLORIDE SERPL-SCNC: 108 MMOL/L (ref 95–110)
CO2 SERPL-SCNC: 30 MMOL/L (ref 23–29)
CREAT SERPL-MCNC: 1.6 MG/DL (ref 0.5–1.4)
DIFFERENTIAL METHOD BLD: ABNORMAL
EOSINOPHIL # BLD AUTO: 0 K/UL (ref 0–0.5)
EOSINOPHIL NFR BLD: 0.1 % (ref 0–8)
ERYTHROCYTE [DISTWIDTH] IN BLOOD BY AUTOMATED COUNT: 16.2 % (ref 11.5–14.5)
EST. GFR  (NO RACE VARIABLE): 33.8 ML/MIN/1.73 M^2
FIO2: 60 %
GLUCOSE SERPL-MCNC: 126 MG/DL (ref 70–110)
HCT VFR BLD AUTO: 35.4 % (ref 37–48.5)
HGB BLD-MCNC: 11.7 G/DL (ref 12–16)
IMM GRANULOCYTES # BLD AUTO: 0.03 K/UL (ref 0–0.04)
IMM GRANULOCYTES NFR BLD AUTO: 0.4 % (ref 0–0.5)
LYMPHOCYTES # BLD AUTO: 0.6 K/UL (ref 1–4.8)
LYMPHOCYTES NFR BLD: 9.1 % (ref 18–48)
Lab: ABNORMAL
MAGNESIUM SERPL-MCNC: 1.9 MG/DL (ref 1.6–2.6)
MCH RBC QN AUTO: 31 PG (ref 27–31)
MCHC RBC AUTO-ENTMCNC: 33.1 G/DL (ref 32–36)
MCV RBC AUTO: 94 FL (ref 82–98)
MODIFIED ALLEN'S TEST: ABNORMAL
MONOCYTES # BLD AUTO: 0.3 K/UL (ref 0.3–1)
MONOCYTES NFR BLD: 4.5 % (ref 4–15)
NEUTROPHILS # BLD AUTO: 6 K/UL (ref 1.8–7.7)
NEUTROPHILS NFR BLD: 85.6 % (ref 38–73)
NOTIFIED BY: ABNORMAL
NRBC BLD-RTO: 1 /100 WBC
O2DEVICE: ABNORMAL
PCO2 BLDA: 47.5 MMHG (ref 35–45)
PEAK FLOW: 50
PEEP: 9
PH SMN: 7.39 [PH] (ref 7.34–7.45)
PLATELET # BLD AUTO: 215 K/UL (ref 150–450)
PMV BLD AUTO: 8.7 FL (ref 9.2–12.9)
PO2 BLDA: 55.3 MMHG (ref 80–100)
POC BASE DEFICIT: 3.6 MMOL/L (ref -2–2)
POC HCO3: 26.8 MMOL/L (ref 24–28)
POC NOTIFIED NOTE: ABNORMAL
POC PERFORMED BY: ABNORMAL
POC SATURATED O2: 87.4 % (ref 95–100)
POC TEMPERATURE: 37 C
POCT GLUCOSE: 121 MG/DL (ref 70–110)
POCT GLUCOSE: 141 MG/DL (ref 70–110)
POCT GLUCOSE: 142 MG/DL (ref 70–110)
POTASSIUM SERPL-SCNC: 4.4 MMOL/L (ref 3.5–5.1)
PROT SERPL-MCNC: 6.1 G/DL (ref 6–8.4)
PROVIDER NOTIFIED: ABNORMAL
RBC # BLD AUTO: 3.77 M/UL (ref 4–5.4)
SODIUM SERPL-SCNC: 148 MMOL/L (ref 136–145)
SPECIMEN SOURCE: ABNORMAL
VT: 450
WBC # BLD AUTO: 7.04 K/UL (ref 3.9–12.7)

## 2024-12-01 PROCEDURE — 94003 VENT MGMT INPAT SUBQ DAY: CPT

## 2024-12-01 PROCEDURE — 25000003 PHARM REV CODE 250: Performed by: INTERNAL MEDICINE

## 2024-12-01 PROCEDURE — 80053 COMPREHEN METABOLIC PANEL: CPT | Performed by: INTERNAL MEDICINE

## 2024-12-01 PROCEDURE — 32551 INSERTION OF CHEST TUBE: CPT | Mod: GW,RT,, | Performed by: STUDENT IN AN ORGANIZED HEALTH CARE EDUCATION/TRAINING PROGRAM

## 2024-12-01 PROCEDURE — 36600 WITHDRAWAL OF ARTERIAL BLOOD: CPT

## 2024-12-01 PROCEDURE — 63600175 PHARM REV CODE 636 W HCPCS: Performed by: INTERNAL MEDICINE

## 2024-12-01 PROCEDURE — 0W9930Z DRAINAGE OF RIGHT PLEURAL CAVITY WITH DRAINAGE DEVICE, PERCUTANEOUS APPROACH: ICD-10-PCS | Performed by: STUDENT IN AN ORGANIZED HEALTH CARE EDUCATION/TRAINING PROGRAM

## 2024-12-01 PROCEDURE — 99900031 HC PATIENT EDUCATION (STAT)

## 2024-12-01 PROCEDURE — 99900035 HC TECH TIME PER 15 MIN (STAT)

## 2024-12-01 PROCEDURE — 94761 N-INVAS EAR/PLS OXIMETRY MLT: CPT | Mod: XB

## 2024-12-01 PROCEDURE — 94640 AIRWAY INHALATION TREATMENT: CPT

## 2024-12-01 PROCEDURE — 82803 BLOOD GASES ANY COMBINATION: CPT

## 2024-12-01 PROCEDURE — 85025 COMPLETE CBC W/AUTO DIFF WBC: CPT | Performed by: INTERNAL MEDICINE

## 2024-12-01 PROCEDURE — 25000242 PHARM REV CODE 250 ALT 637 W/ HCPCS: Performed by: INTERNAL MEDICINE

## 2024-12-01 PROCEDURE — 83735 ASSAY OF MAGNESIUM: CPT | Performed by: INTERNAL MEDICINE

## 2024-12-01 PROCEDURE — 99900026 HC AIRWAY MAINTENANCE (STAT)

## 2024-12-01 PROCEDURE — 27100171 HC OXYGEN HIGH FLOW UP TO 24 HOURS

## 2024-12-01 PROCEDURE — 20000000 HC ICU ROOM

## 2024-12-01 RX ADMIN — LEVOTHYROXINE SODIUM 50 MCG: 50 TABLET ORAL at 05:12

## 2024-12-01 RX ADMIN — MUPIROCIN: 20 OINTMENT TOPICAL at 09:12

## 2024-12-01 RX ADMIN — ATORVASTATIN CALCIUM 40 MG: 40 TABLET, FILM COATED ORAL at 09:12

## 2024-12-01 RX ADMIN — FUROSEMIDE 40 MG: 10 INJECTION, SOLUTION INTRAMUSCULAR; INTRAVENOUS at 08:12

## 2024-12-01 RX ADMIN — PROPOFOL 30 MCG/KG/MIN: 10 INJECTION, EMULSION INTRAVENOUS at 05:12

## 2024-12-01 RX ADMIN — PROPOFOL 35 MCG/KG/MIN: 10 INJECTION, EMULSION INTRAVENOUS at 03:12

## 2024-12-01 RX ADMIN — PROPOFOL 30 MCG/KG/MIN: 10 INJECTION, EMULSION INTRAVENOUS at 01:12

## 2024-12-01 RX ADMIN — PANTOPRAZOLE SODIUM 40 MG: 40 INJECTION, POWDER, LYOPHILIZED, FOR SOLUTION INTRAVENOUS at 09:12

## 2024-12-01 RX ADMIN — ALBUTEROL SULFATE 2.5 MG: 2.5 SOLUTION RESPIRATORY (INHALATION) at 03:12

## 2024-12-01 RX ADMIN — METHYLPREDNISOLONE SODIUM SUCCINATE 80 MG: 40 INJECTION, POWDER, FOR SOLUTION INTRAMUSCULAR; INTRAVENOUS at 08:12

## 2024-12-01 RX ADMIN — ALBUTEROL SULFATE 2.5 MG: 2.5 SOLUTION RESPIRATORY (INHALATION) at 07:12

## 2024-12-01 RX ADMIN — ALBUTEROL SULFATE 2.5 MG: 2.5 SOLUTION RESPIRATORY (INHALATION) at 11:12

## 2024-12-01 RX ADMIN — PROPOFOL 30 MCG/KG/MIN: 10 INJECTION, EMULSION INTRAVENOUS at 09:12

## 2024-12-01 RX ADMIN — PROPOFOL 35 MCG/KG/MIN: 10 INJECTION, EMULSION INTRAVENOUS at 10:12

## 2024-12-01 RX ADMIN — PROPOFOL 35 MCG/KG/MIN: 10 INJECTION, EMULSION INTRAVENOUS at 06:12

## 2024-12-01 RX ADMIN — CARVEDILOL 3.12 MG: 3.12 TABLET, FILM COATED ORAL at 08:12

## 2024-12-01 RX ADMIN — PROPOFOL 35 MCG/KG/MIN: 10 INJECTION, EMULSION INTRAVENOUS at 12:12

## 2024-12-01 RX ADMIN — ALBUTEROL SULFATE 2.5 MG: 2.5 SOLUTION RESPIRATORY (INHALATION) at 04:12

## 2024-12-01 RX ADMIN — METHYLPREDNISOLONE SODIUM SUCCINATE 80 MG: 40 INJECTION, POWDER, FOR SOLUTION INTRAMUSCULAR; INTRAVENOUS at 09:12

## 2024-12-01 RX ADMIN — ENOXAPARIN SODIUM 40 MG: 40 INJECTION SUBCUTANEOUS at 04:12

## 2024-12-01 RX ADMIN — FUROSEMIDE 40 MG: 10 INJECTION, SOLUTION INTRAMUSCULAR; INTRAVENOUS at 09:12

## 2024-12-01 RX ADMIN — MUPIROCIN: 20 OINTMENT TOPICAL at 08:12

## 2024-12-01 NOTE — HOSPITAL COURSE
12/1 AA, weekend crosscover, overnight patient had two ABG, eICU MD adjusted vent settings, had repeat chest x-ray which showed moderate right thorax, vital signs stable, O2 sat maintaining, surgery consulted for chest tube placement, patient was started on steroids yesterday for COPD flare, diuresis in progress, continue to monitor urine output, follow-up chest x-ray after chest tube placement, follow-up with surgery rec  12/2/24 ABG 7.395/52.4/87.3/29.8  improving respiratory status on AC Vt 450, RR18, PEEP 6, FiO2 55%, SpO2 98%, proprofol 25mcg/kg/min. CXR with right pneumothorax resolved, diuresing on scheduled IV lasix with adequate urine output. Patient minimally responsive to sternal rub. No elevated temperature. Urine culture positive for gram positive aerococcus spp, add IV rocephin. Continue with vent weaning and daily SBT, scheduled breathing treatments, solumedrol. Follow up recommendations cardiology. Follow up general surgery with chest tube management.    12/3/24 ABG 7.423/52.7/72.7/32.3, FiO2 40%. Off sedation awake and following commands. CPAP trial followed with tachypnea 40s, will maintain current vent settings and prepare for SBT and extubation tomorrow AM. Right lateral chest tube has been clamped. CXR overnight unchanged, no new pneumothorax. Chest tube management per GS. BMx1. Start enteral trickle feeds and advance per protocol.   12/4/24 Stable ABG and vent settings. Tolerating tube feeds with no residual. Patient awake and alert and requests to have ET tube to be removed. Patient further affirms she does not want to be re-intubated afterwards. Plan to extubate and transition to BiPAP.   12/5/24 No acute events overnight. Awake, on venturi mask FiO2 50%, SpO2 100%. Right chest tube in place remains clamped. Declines therapy work complaining of pain in her throat and upper chest. Renal function improving, adequate urine output. Continue with supplemental oxygen and rest from BiPAP for  nutrition and meds per OG tube. Follow up speech. Follow up GS recommendations.     12/6/24 On BiPAP maintaining SpO2 >95%. Failed swallow with speech. OG tube remains in place. Arousable to voice, responds to questions with hand squeezes and motion. Right chest tube removed yesterday and no desaturations reported. CXR showing no pneumothorax. Mild hypernatremia, will increase free water via OG tube and monitor I/Os. Renal function stable with appropriate clear urine output in Hardwick catheter. Leukocytosis resolved. Continue with scheduled IV lasix, scheduled breathing treatments, supplemental oxygen. Aspiration precautions, maintain OG tube until follow up swallow assessment.    12/7/24 Per nursing minimal sleep overnight. Respiratory rate stable on BiPAP maintain SpO2 >98%. Will discontinue OG tube and follow ST precautions with monitoring PO tolerance with ice chips. Continue to taper IV steroids, scheduled lasix. Plan for nursing home placement with hospice.   12/8/24 OG tube discontinued. Tolerated ice chips and clear liquids per nursing. No elevated temperature. Patient requires BiPAP to maintain SpO2 >90%. Alert and awake. Throat pain resolving. Will advance diet. Aspiration precautions in place. Expectant nursing home placement with hospice tomorrow.   12/9/24 No acute events overnight. Patient sitting upright in bed eating breakfast assisted by nursing staff. All meds to be administered orally. Continue aspiration precautions, BiPAP at nursing home. Follow up CM and SW with placement arrangements.    12/10/24 Sitting upright, able to converse better with venturi mask, off BiPAP patient's SpO2 88%. Patient in no respiratory distress, denies associated symptoms. PO intake improving, reported regular bowel movements. Urine output reduced with reduce dosing of lasix and transitioning from IV to PO. Continue with all other supportive care. Follow up CM and SW with placement status.   12/11/24 Patient alert and  responsive to questions. Endorses feeling dry all around. Patient continues to diurese with PO lasix. Denies symptoms. On BiPAP FiO2 50%, IP/EP, 17/6, QHS and as needed for shortness of breath. Back up rate of 8, wean FiO2 to maintain SpO2 >88%. Vital signs stable, on current BiPAP settings maintaining SpO2 >96%. Cardiopulmonary status stable. Will reduce PO lasix to 20 mg BID.Continue with aspiration precautions. Hardwick catheter draining blood tinged urine. Encourage patient with PO hydration.    12/12/24 Stable respiratory status with BiPAP FiO2 50% QHS and venturi mask maintaining SpO2 >97%. Hardwick catheter in place draining sediment tinged urine. Tolerating meals, stool out put regular. Diuresing well with PO lasix 20 mg BID, electrolytes within normal range. Patient has diuresed and lost 50 pounds over the course of 10 days. COVID negative. Follow up CM and SW with placement to nursing home with hospice care.

## 2024-12-01 NOTE — SUBJECTIVE & OBJECTIVE
Interval History: Patient seen and examined.    Review of Systems   Unable to perform ROS: Severe respiratory distress   Constitutional:  Positive for fatigue.   Respiratory:  Positive for shortness of breath and wheezing.    Cardiovascular:  Positive for palpitations and leg swelling.   Neurological:  Positive for weakness.     Objective:     Vital Signs (Most Recent):  Temp: 97.5 °F (36.4 °C) (12/01/24 0701)  Pulse: (!) 51 (12/01/24 0900)  Resp: 18 (12/01/24 0900)  BP: (!) 98/55 (12/01/24 0900)  SpO2: 95 % (12/01/24 0900) Vital Signs (24h Range):  Temp:  [97.1 °F (36.2 °C)-97.6 °F (36.4 °C)] 97.5 °F (36.4 °C)  Pulse:  [48-62] 51  Resp:  [0-40] 18  SpO2:  [88 %-100 %] 95 %  BP: ()/(50-83) 98/55     Weight: (!) 142.4 kg (314 lb)  Body mass index is 53.9 kg/m².    Intake/Output Summary (Last 24 hours) at 12/1/2024 1003  Last data filed at 12/1/2024 0926  Gross per 24 hour   Intake 761.9 ml   Output 3000 ml   Net -2238.1 ml         Physical Exam  Vitals and nursing note reviewed.   Constitutional:       General: She is in acute distress.      Appearance: She is well-developed. She is obese. She is ill-appearing. She is not diaphoretic.   HENT:      Head: Normocephalic and atraumatic.      Nose: No congestion or rhinorrhea.   Eyes:      General: No scleral icterus.        Right eye: No discharge.         Left eye: No discharge.      Extraocular Movements: Extraocular movements intact.   Neck:      Comments: Large neck  Cardiovascular:      Rate and Rhythm: Bradycardia present.   Pulmonary:      Effort: Respiratory distress present.      Breath sounds: Rhonchi present. No wheezing or rales.   Abdominal:      General: Bowel sounds are normal. There is no distension.      Palpations: Abdomen is soft.      Tenderness: There is no abdominal tenderness.   Musculoskeletal:      Right lower leg: Edema present.      Left lower leg: Edema present.   Skin:     Capillary Refill: Capillary refill takes 2 to 3 seconds.       Coloration: Skin is not jaundiced or pale.   Neurological:      General: No focal deficit present.      Mental Status: She is alert.             Significant Labs: All pertinent labs within the past 24 hours have been reviewed.  Recent Lab Results  (Last 5 results in the past 24 hours)        12/01/24  0600   12/01/24  0445   11/30/24  2325   11/30/24 2000 11/30/24  1801        Base Deficit   3.6  Comment: Value above reference range     3.8  Comment: Value above reference range         Notified Note   Called and read back by RUI PAREDES             Performed By:   Bahman Ruggiero         Provider Notified:   RUI SANCHEZ             Specimen source   Arterial     Arterial         Notified Time   12/01/2024 04:47             Notified By   Bahman             AC   18     18         Albumin 2.3                              ALT 24               Anion Gap 10               AST 28               Baso # 0.02               Basophil % 0.3               BILIRUBIN TOTAL 0.5  Comment: For infants and newborns, interpretation of results should be based  on gestational age, weight and in agreement with clinical  observations.    Premature Infant recommended reference ranges:  Up to 24 hours.............<8.0 mg/dL  Up to 48 hours............<12.0 mg/dL  3-5 days..................<15.0 mg/dL  6-29 days.................<15.0 mg/dL    For patients on Eltrombopag therapy, use of Dimension [place] TBIL is   not   recommended.                 BUN 28               Calcium 9.7               Chloride 108               CO2 30               Creatinine 1.6               Differential Method Automated               eGFR 33.8               Eos # 0.0               Eos % 0.1               FiO2   60.0     60.0         Glucose 126               Gran # (ANC) 6.0               Gran % 85.6               Hematocrit 35.4               Hemoglobin 11.7               Immature Grans (Abs) 0.03  Comment: Mild elevation in immature granulocytes is non  specific and   can be seen in a variety of conditions including stress response,   acute inflammation, trauma and pregnancy. Correlation with other   laboratory and clinical findings is essential.                 Immature Granulocytes 0.4               Lymph # 0.6               Lymph % 9.1               Magnesium  1.9               MCH 31.0               MCHC 33.1               MCV 94               MODIFIED BLANCHE'S TEST   N/A     N/A         Mono # 0.3               Mono % 4.5               MPV 8.7               nRBC 1               O2DEVICE   Ventilator     Ventilator         PEAK FLOW   50.0     50.0         PEEP   9.0     9.0         Platelet Count 215               POC HCO3   26.8     26.8         POC PCO2   47.5  Comment: Value above reference range     54.8  Comment: Value above reference range         POC PH   7.388     7.340  Comment: Value below reference range         POC PO2   55.3  Comment: Bahman notified RUI SANCHEZ at 12/01/2024 04:47  Called and read back by RUI PAREDES read back  Value below critical limit       67.7  Comment: Value below reference range         POC SATURATED O2   87.4  Comment: Bahman notified RUI SANCHEZ at 12/01/2024 04:47  Called and read back by RUI PAREDSE read back  Value below critical limit       93.8  Comment: Value below reference range         POC Temp   37.0     37.0         POCT Glucose     121     96       Potassium 4.4               PROTEIN TOTAL 6.1               RBC 3.77               RDW 16.2               Sodium 148               Vt   450     450         WBC 7.04                                      Significant Imaging: I have reviewed all pertinent imaging results/findings within the past 24 hours.

## 2024-12-01 NOTE — EICU
Intervention Initiated From:  [facility] / EICU    Elliott intervened regarding:  Rounding (Video assessment)    Comments: Video rounding complete. Pt on ventilator, no acute distress noted. Propofol infusing, VS per flowsheet.

## 2024-12-01 NOTE — PROCEDURES
"Carmen Tan is a 74 y.o. female patient with COPD and CHF exacerbation who presents with right PTX.    Temp: 97.5 °F (36.4 °C) (24 0701)  Pulse: (!) 57 (24 1143)  Resp: 18 (24 1143)  BP: (!) 98/55 (24 0900)  SpO2: 97 % (24 1143)  Weight: (!) 142.4 kg (314 lb) (24 0600)  Height: 5' 4" (162.6 cm) (24 1715)       Chest Tube Insertion    Date/Time: 2024 11:46 AM  Location procedure was performed: [facility]    Performed by: Isabell Raymond MD  Authorized by: Isabell Raymond MD  Post-operative diagnosis: Same  Pre-operative diagnosis: right pneumothorax  Consent Done: Yes  Consent: Written consent obtained.  Risks and benefits: risks, benefits and alternatives were discussed  Consent given by: power of   Patient understanding: patient does not state understanding of the procedure being performed (intubated)  Patient consent: the patient's understanding of the procedure does not match consent given  Procedure consent: procedure consent matches procedure scheduled  Relevant documents: relevant documents present and verified  Test results: test results available and properly labeled  Site marked: the operative site was marked  Imaging studies: imaging studies available  Required items: required blood products, implants, devices, and special equipment available  Patient identity confirmed: MRN,  and name  Time out: Immediately prior to procedure a "time out" was called to verify the correct patient, procedure, equipment, support staff and site/side marked as required.  Indications: pneumothorax    Patient sedated: yes  Sedation type: deep sedation    Sedatives: propofol  Sedation start date/time: 2024 11:12 AM  Sedation end date/time: 2024 11:52 AM  Vitals: Vital signs were monitored during sedation.  Preparation: skin prepped with ChloraPrep  Placement location: right lateral  Scalpel size: 15  Tube size: 28 [place]  Dissection " instrument: Nikki clamp and finger  Ultrasound guidance: no  Tension pneumothorax heard: no  Tube connected to: suction  Drainage characteristics: serosanguinous  Drainage amount: 3 ml  Suture material: 0 Vicryl.  Dressing: petrolatum-impregnated gauze and 4x4 sterile gauze  Post-insertion x-ray findings: tube in good position  Patient tolerance: Patient tolerated the procedure well with no immediate complications  Complications: No  Estimated blood loss (mL): 1  Specimens: No  Implants: No      Isabell Raymond MD  General Surgery   [phone]      12/1/2024

## 2024-12-01 NOTE — TELEMEDICINE CONSULT
[facility] - Intensive Care  Cardiology  Consult Note    Patient Name: Carmen Tan  Patient : 1950  MRN: [id number]  Admission Date: 2024  Hospital Length of Stay: 1 days  Code Status: Full Code   Attending Provider: Gunnar Mott DO   Consulting Provider: Spencer Rascon MD  Primary Care Physician: Lucian Barry MD  Principal Problem:Acute on chronic diastolic congestive heart failure      Patient information was obtained from ER records and primary team.     Consults    Chief Complaint: shortness of breath  HPI: Patient is a 75 yo female with severe COPD, hypertension, hyperlipidemia and hypothyroidism.    Patient presents with progressive shortness of breath over the last several days. She was in respiratory distress upon arrival and required intubation. CXR was notable for cardiomegaly and mild chronic changes. Her pro-BNP was markedly elevated at >25,000. Her ECG did not show any acute changes and troponin was unremarkable.             Past Medical History:   Diagnosis Date    Abdominal hernia     Bacteremia 2023    CHF (congestive heart failure)     COPD (chronic obstructive pulmonary disease)     Diabetes mellitus, type 2 2022    GERD (gastroesophageal reflux disease)     History of home oxygen therapy     Hypertension     Hypertensive emergency 2023    Migraine headache     On home O2     Pulmonary embolism 2017    Small bowel obstruction     Thyroid disease        Past Surgical History:   Procedure Laterality Date    COLONOSCOPY      HYSTERECTOMY      INNER EAR SURGERY      UPPER GASTROINTESTINAL ENDOSCOPY         Review of patient's allergies indicates:   Allergen Reactions    Pcn [penicillins] Swelling       No current facility-administered medications on file prior to encounter.     Current Outpatient Medications on File Prior to Encounter   Medication Sig    acetaminophen (TYLENOL) 325 MG tablet Take 2 tablets (650 mg total) by mouth every 8 (eight)  hours as needed for Pain or Temperature greater than (100, headache).    albuterol-ipratropium (DUO-NEB) 2.5 mg-0.5 mg/3 mL nebulizer solution Take 3 mLs by nebulization every 6 (six) hours while awake. Rescue    aspirin (ECOTRIN) 81 MG EC tablet Take 1 tablet (81 mg total) by mouth once daily.    busPIRone (BUSPAR) 5 MG Tab Take 1 tablet (5 mg total) by mouth 2 (two) times daily.    carvediloL (COREG) 3.125 MG tablet Take 1 tablet (3.125 mg total) by mouth 2 (two) times daily.    insulin aspart U-100 (NOVOLOG) 100 unit/mL (3 mL) InPn pen Inject 0-5 Units into the skin before meals and at bedtime as needed (Hyperglycemia).    isosorbide mononitrate (IMDUR) 30 MG 24 hr tablet Take 1 tablet (30 mg total) by mouth once daily.    levothyroxine (SYNTHROID) 50 MCG tablet Take 1 tablet (50 mcg total) by mouth before breakfast.    LIPITOR 40 mg tablet Take 1 tablet (40 mg total) by mouth once daily.    pantoprazole (PROTONIX) 20 MG tablet Take 40 mg by mouth once daily.    predniSONE (DELTASONE) 20 MG tablet Take 1 tablet (20 mg total) by mouth 2 (two) times daily.    valsartan (DIOVAN) 160 MG tablet Take 1 tablet (160 mg total) by mouth once daily.    vitamin D 1000 units Tab Take 185 mg by mouth once daily.     Family History    None       Tobacco Use    Smoking status: Former     Passive exposure: Never    Smokeless tobacco: Never   Substance and Sexual Activity    Alcohol use: No    Drug use: No    Sexual activity: Not on file     Review of Systems   Unable to perform ROS: Intubated     Objective:     Vital Signs (Most Recent):  Temp: 97.4 °F (36.3 °C) (11/30/24 1507)  Pulse: (!) 51 (11/30/24 1913)  Resp: 18 (11/30/24 1913)  BP: 117/78 (11/30/24 1830)  SpO2: 95 % (11/30/24 1913) Vital Signs (24h Range):  Temp:  [97.2 °F (36.2 °C)-97.8 °F (36.6 °C)] 97.4 °F (36.3 °C)  Pulse:  [] 51  Resp:  [10-40] 18  SpO2:  [88 %-100 %] 95 %  BP: ()/() 117/78     Weight: (!) 142.4 kg (314 lb)  Body mass index is 53.9  "kg/m².    SpO2: 95 %         Intake/Output Summary (Last 24 hours) at 11/30/2024 1922  Last data filed at 11/30/2024 1802  Gross per 24 hour   Intake 499.39 ml   Output 2950 ml   Net -2450.61 ml       Lines/Drains/Airways       Peripherally Inserted Central Catheter Line  Duration             PICC Triple Lumen 11/30/24 1046 right brachial <1 day              Drain  Duration                  NG/OG Tube 11/30/24 0840 Pimento sump 16 Fr. Center mouth <1 day         Urethral Catheter 11/30/24 0903 16 Fr. <1 day              Airway  Duration                  Airway - Non-Surgical 11/30/24 0820 Endotracheal Tube <1 day              Peripheral Intravenous Line  Duration                  Peripheral IV - Single Lumen 11/29/24 1339 22 G Anterior;Left Hand 1 day                    Physical Exam  Constitutional:       Appearance: She is ill-appearing.   HENT:      Head: Normocephalic.   Cardiovascular:      Rate and Rhythm: Normal rate and regular rhythm.      Pulses: Normal pulses.      Heart sounds: Normal heart sounds.   Pulmonary:      Breath sounds: Rhonchi and rales present.   Abdominal:      General: Abdomen is flat.      Palpations: Abdomen is soft.   Musculoskeletal:      Right lower leg: Edema present.      Left lower leg: Edema present.   Skin:     Capillary Refill: Capillary refill takes 2 to 3 seconds.         Significant Labs:    No results for input(s): "TROPONINI" in the last 2160 hours.   Recent Labs   Lab Result Units 11/29/24  1339 11/29/24  1542   Troponin I High Sensitivity pg/mL 98.1* 98.7*     Recent Lab Results  (Last 5 results in the past 72 hours)        11/30/24  1801   11/30/24  1517   11/30/24  1237   11/30/24  1145   11/30/24  1106        Base Deficit   3.4  Comment: Value above reference range             Notified Note   Called and read back by RUI BREAUX             Performed By:   Devon             Provider Notified:   RUI BREAUX             Specimen source   Arterial             Notified Time   " 11/30/2024 15:18             Notified By   Devon             AC   15             Estimated Avg Glucose     103           FiO2   60.0             Hemoglobin A1C External     5.2  Comment: ADA Screening Guidelines:  5.7-6.4%  Consistent with prediabetes  >or=6.5%  Consistent with diabetes    High levels of fetal hemoglobin interfere with the HbA1C  assay. Heterozygous hemoglobin variants (HbS, HgC, etc)do  not significantly interfere with this assay.   However, presence of multiple variants may affect accuracy.             Magnesium          1.7       MODIFIED BLANCHE'S TEST   NA             O2DEVICE   Ventilator             QRS Duration       130         OHS QTC Calculation       469         PEAK FLOW   50.0             PEEP   7.0             POC HCO3   26.3             POC PCO2   62.3  Comment: Devon notified RUI BREAUX at 11/30/2024 15:18  Called and read back by RUI BREAUX read back  Value above critical limit               POC PH   7.290  Comment: Value below reference range             POC PO2   67.7  Comment: Value below reference range             POC SATURATED O2   93.2  Comment: Value below reference range             POC Temp   37.0             POCT Glucose 96               Vt   400                                    Significant Imaging:  Imaging Results              X-Ray Chest 1 View (Final result)  Result time 11/29/24 13:48:05      Final result by Fabiola Rodgers MD (11/29/24 13:48:05)                   Impression:      Significant cardiomegaly and mild chronic changes      Electronically signed by: Fabiola Rodgers MD  Date:    11/29/2024  Time:    13:48               Narrative:    EXAMINATION:  XR CHEST 1 VIEW    CLINICAL HISTORY:  shortness of breath;    TECHNIQUE:  portable chest x-ray    COMPARISON:  09/22/2024.  <Comparisons>    FINDINGS:  There is artifact.  The heart is significantly enlarged and the aorta is ectatic.  There are chronic changes with no acute lung disease.                                             Lab Results   Component Value Date    CHOL 160 09/09/2023    CHOL 151 06/13/2017     Lab Results   Component Value Date    HDL 52 09/09/2023    HDL 42 06/13/2017     Lab Results   Component Value Date    LDLCALC 96.2 09/09/2023    LDLCALC 85.0 06/13/2017     Lab Results   Component Value Date    TRIG 49 08/08/2024    TRIG 59 09/09/2023    TRIG 120 06/13/2017     Lab Results   Component Value Date    CHOLHDL 32.5 09/09/2023    CHOLHDL 27.8 06/13/2017       MEDICATIONS:     Current Facility-Administered Medications:     acetaminophen tablet 650 mg, 650 mg, Oral, Q8H PRN, Rolo Anderson MD    albuterol sulfate nebulizer solution 2.5 mg, 2.5 mg, Nebulization, Q4H, Rolo Anderson MD, 2.5 mg at 11/30/24 1913    aspirin tablet 325 mg, 325 mg, Oral, Daily, Rolo Anderson MD    atorvastatin tablet 40 mg, 40 mg, Oral, Daily, Rolo Anderson MD    carvediloL tablet 3.125 mg, 3.125 mg, Oral, BID, Rolo Anderson MD, 3.125 mg at 11/29/24 2034    dextrose 10% bolus 125 mL 125 mL, 12.5 g, Intravenous, PRN, Rolo Anderson MD    dextrose 10% bolus 250 mL 250 mL, 25 g, Intravenous, PRN, Rolo Anderson MD    enoxaparin injection 40 mg, 40 mg, Subcutaneous, Q24H (prophylaxis, 1700), Rolo Anderson MD, 40 mg at 11/30/24 1708    furosemide injection 40 mg, 40 mg, Intravenous, Q12H, Rolo Anderson MD, 40 mg at 11/30/24 1050    glucagon (human recombinant) injection 1 mg, 1 mg, Intramuscular, PRN, Rolo Anderson MD    insulin aspart U-100 pen 0-5 Units, 0-5 Units, Subcutaneous, Q6H PRN, Rolo Anderson MD    isosorbide mononitrate 24 hr tablet 30 mg, 30 mg, Oral, Daily, Rolo Anderson MD    levothyroxine tablet 50 mcg, 50 mcg, Oral, Before breakfast, Rolo Anderson MD, 50 mcg at 11/30/24 0502    melatonin tablet 6 mg, 6 mg, Oral, Nightly PRN, Rolo Anderson MD    methylPREDNISolone sodium succinate injection 80 mg, 80 mg, Intravenous, BID, Rolo Anderson MD, 80 mg at 11/30/24 1309    mupirocin  2 % ointment, , Nasal, BID, Rolo Anderson MD, Given at 11/30/24 1050    ondansetron injection 4 mg, 4 mg, Intravenous, Q8H PRN, Rolo Anderson MD    pantoprazole injection 40 mg, 40 mg, Intravenous, Daily, Rolo Anderson MD, 40 mg at 11/30/24 1050    polyethylene glycol packet 17 g, 17 g, Oral, Daily, Rolo Anderson MD    propofol (DIPRIVAN) 10 mg/mL infusion, 0-50 mcg/kg/min, Intravenous, Continuous, Rolo Anderson MD, Last Rate: 29.9 mL/hr at 11/30/24 1727, 35 mcg/kg/min at 11/30/24 1727    sodium chloride 0.9% flush 10 mL, 10 mL, Intravenous, PRN, Rolo Anderson MD    Flushing PICC/Midline Protocol, , , Until Discontinued **AND** sodium chloride 0.9% flush 10 mL, 10 mL, Intravenous, Q12H PRN, Gunnar Mott,     valsartan tablet 160 mg, 160 mg, Oral, Daily, Rolo Anderson MD    Facility-Administered Medications Ordered in Other Encounters:     etomidate injection, , Intravenous, PRN, Kiyanfar, Burke, CRNA, 20 mg at 11/30/24 0824    succinylcholine injection, , Intravenous, PRN, Kiyanfar, Burke, CRNA, 80 mg at 11/30/24 0824      Assessment and Plan:  New onset heart failure: Patient appears to have new onset heart failure, but the etiology is unclear at this juncture. No evidence of ischemia at this time at troponin is negative and there are no acute changes on ECG.  - Diuresis with IV lasix  - Echocardiogram  - Adjust medical therapy once BP stable we have a better understanding of whether this is HFpEF or HFrEF    2. COPD: Supportive pulmonary care. Wean ventilator as tolerated    3.  HTN: BP now better controlled    4. PAF?: Will have to search historical records for h/o atrial fibrillation. She is currently in NSR       Active Diagnoses:    Diagnosis Date Noted POA    PRINCIPAL PROBLEM:  Acute on chronic diastolic congestive heart failure [I50.33] 02/16/2022 Yes    Gastroesophageal reflux disease without esophagitis [K21.9] 11/30/2024 Yes    HLD (hyperlipidemia) [E78.5] 11/30/2024 Yes     Hypothyroidism [E03.9] 11/30/2024 Yes    Hard to intubate [T88.4XXA] 11/30/2024 Yes    Anxiety and depression [F41.9, F32.A] 04/12/2023 Yes    Diabetes mellitus, type 2 [E11.9] 02/16/2022 Yes    Acute on chronic respiratory failure with hypoxia and hypercapnia [J96.21, J96.22] 02/01/2022 Yes    Paroxysmal A-fib [I48.0] 06/08/2017 Yes     Chronic      Problems Resolved During this Admission:       VTE Risk Mitigation (From admission, onward)           Ordered     enoxaparin injection 40 mg  Every 24 hours         11/29/24 1730     IP VTE HIGH RISK PATIENT  Once         11/29/24 1726     Place sequential compression device  Until discontinued         11/29/24 1726                    Thank you for your consult.          Spencer Rascon MD  Cardiology  [facility] Intensive [facility]  11/30/2024

## 2024-12-01 NOTE — EICU
Intervention Initiated From:  [facility] / EICU    Elliott intervened regarding:  Rounding (Video assessment)    Nurse Notified:  Yes    Doctor Notified:  Yes    Comments: Rounding done Intubated on ventilator 60 % +9 PEEP. On Propofol 35 mcg/kg/min. B/P 118/80, HR 53, resp 11, sat 95. Side rails up x4

## 2024-12-01 NOTE — ASSESSMENT & PLAN NOTE
Patient developed pneumothorax right lower lobe, moderate, surgery consulted for chest tube placement and eval

## 2024-12-01 NOTE — PROGRESS NOTES
EICU Physician Brief Note - Overnight Events    Called for declining PaO2.  Plan:  Increase PEEP from 9 to 12 and FiO2 from 60% to 65%.  Do am CxR asap.  Please report back to EICU if not improving.  Eicu is available should acute issues arise.    Addendum 6:19 AM:  CxR shows right-sided pneumothorax.  Plan:  Decrease PEEP to 6 cm H2O.  Needs emergent chest tube placement given that the patient is on the ventilator. Bedside staff will call ER physician regarding this.

## 2024-12-01 NOTE — PLAN OF CARE
Problem: Adult Inpatient Plan of Care  Goal: Plan of Care Review  Outcome: Progressing     Problem: Adult Inpatient Plan of Care  Goal: Patient-Specific Goal (Individualized)  Outcome: Progressing     Problem: Adult Inpatient Plan of Care  Goal: Absence of Hospital-Acquired Illness or Injury  Outcome: Progressing     Problem: Adult Inpatient Plan of Care  Goal: Optimal Comfort and Wellbeing  Outcome: Progressing     Problem: Adult Inpatient Plan of Care  Goal: Readiness for Transition of Care  Outcome: Progressing     Problem: Diabetes Comorbidity  Goal: Blood Glucose Level Within Targeted Range  Outcome: Progressing     Problem: Skin Injury Risk Increased  Goal: Skin Health and Integrity  Outcome: Progressing     Problem: Heart Failure  Goal: Optimal Coping  Outcome: Progressing     Problem: Heart Failure  Goal: Optimal Cardiac Output  Outcome: Progressing     Problem: Heart Failure  Goal: Stable Heart Rate and Rhythm  Outcome: Progressing     Problem: COPD (Chronic Obstructive Pulmonary Disease)  Goal: Optimal Chronic Illness Coping  Outcome: Progressing     Problem: COPD (Chronic Obstructive Pulmonary Disease)  Goal: Optimal Level of Functional [place]  Outcome: Progressing     Problem: Restraint, Nonviolent  Goal: Absence of Harm or Injury  Outcome: Progressing     Problem: Mechanical Ventilation Invasive  Goal: Effective Communication  Outcome: Progressing     Problem: Mechanical Ventilation Invasive  Goal: Optimal Device Function  Outcome: Progressing     Problem: Mechanical Ventilation Invasive  Goal: Mechanical Ventilation Liberation  Outcome: Progressing     Problem: Artificial Airway  Goal: Effective Communication  Outcome: Progressing     Problem: Artificial Airway  Goal: Optimal Device Function  Outcome: Progressing     Problem: Noninvasive Ventilation Acute  Goal: Effective Unassisted Ventilation and Oxygenation  Outcome: Progressing

## 2024-12-01 NOTE — PROGRESS NOTES
EICU Ventilator NOTE    Sedation: Propofol 35 mcg/kg/min  Ventilator settings: 18/450/9/60% breathing with the ventilator, IPI 35.  ABG with borderline compensated hypercapnia (I suspect baseline PaCO2 at 50).  Mrs Carmen Tan was intubated 11/30 am for acute hypercapnic and hypoxic respiratory failure due to CHF and COPD.  Plan:  Daily sedation vacation in am, and if meets criteria follow with SBT.  Continue aggressive treatment of underlying conditions.  Is on PPI and Lovenox.  Eicu is available if acute issues arise.

## 2024-12-01 NOTE — NURSING
Pt remains on the vent. Setting unchanged (60% Rate 18, Vte 450, PEEP 9). Suction as needed. Annie hugger applied. OG tube with small light red blood with suctioning. Diprivan infusing (35 mcg/kg/min). Pt resting. VSS (See flowsheet). Ronen has light yellow urine. SCD's and feet elevated. Repositioned. Continue to observe.

## 2024-12-01 NOTE — NURSING
Lab drawn. CXR report received. Taya from [facility] called to see if CXR report was received and if any questions. Dr. Yadav with EICU called with report from CXR. PEEP lower to 6 and put in a chest tube. House Supervisor informed. Resp staff made adjustment to PEEP. Dr. Anderson informed of above stated. Dr. Raymond called and will assist. O2 94%. No apparent distress. Continue to observe.

## 2024-12-01 NOTE — PLAN OF CARE
Remains on vent - no change in vent settings this shift - sat 100 percent - resp rate 18 to 20 - 28 f chest tube inserted this shift to right side - chest tube to suction - bilateral breath sounds - sb to sr - pulses present - generalized edema - abdomen soft non tender -bs x 4 - no bm noted - hannon catheter draining clear yellow urine via gravity - diprivan at 30 to right upper arm picc - no signs of pain or distress - monitoring blood sugars - - will continue to monitor

## 2024-12-01 NOTE — PROGRESS NOTES
[facility] Medicine  Progress Note    Patient Name: Carmen Tan  MRN: [id number]  Patient Class: IP- Inpatient   Admission Date: 11/29/2024  Length of Stay: 2 days  Attending Physician: Gunnar Mott DO  Primary Care Provider: Lucian Barry MD        Subjective:     Principal Problem:Acute on chronic diastolic congestive heart failure        HPI:  Patient 74-year-old female past medical history of chronic diastolic heart failure, AFib, chronic respiratory failure with hypoxemia and hypercapnia, anxiety depression, type 2 diabetes, GERD, hypothyroidism.  Patient came in after noted to be short of breath at home, patient was satting in the 70 on home BiPAP therapy, was brought to the ED noted to be in distress tachycardic hypotensive hypoxic, workup in the ED patient with enlarged cardiac silhouette, chronic changes no acute finding, elevated BNP 25,000, mild elevation in troponin negative COVID no leukocytosis, patient was given nitroglycerin and Lasix injection, admitted to ICU.    Overview/Hospital Course:  12/1 AA, weekend crosscover, overnight patient had two ABG, eICU MD adjusted vent settings, had repeat chest x-ray which showed moderate right thorax, vital signs stable, O2 sat maintaining, surgery consulted for chest tube placement, patient was started on steroids yesterday for COPD flare, diuresis in progress, continue to monitor urine output, follow-up chest x-ray after chest tube placement, follow-up with surgery rec    Interval History: Patient seen and examined.    Review of Systems   Unable to perform ROS: Severe respiratory distress   Constitutional:  Positive for fatigue.   Respiratory:  Positive for shortness of breath and wheezing.    Cardiovascular:  Positive for palpitations and leg swelling.   Neurological:  Positive for weakness.     Objective:     Vital Signs (Most Recent):  Temp: 97.5 °F (36.4 °C) (12/01/24 0701)  Pulse: (!) 51 (12/01/24 0900)  Resp: 18  (12/01/24 0900)  BP: (!) 98/55 (12/01/24 0900)  SpO2: 95 % (12/01/24 0900) Vital Signs (24h Range):  Temp:  [97.1 °F (36.2 °C)-97.6 °F (36.4 °C)] 97.5 °F (36.4 °C)  Pulse:  [48-62] 51  Resp:  [0-40] 18  SpO2:  [88 %-100 %] 95 %  BP: ()/(50-83) 98/55     Weight: (!) 142.4 kg (314 lb)  Body mass index is 53.9 kg/m².    Intake/Output Summary (Last 24 hours) at 12/1/2024 1003  Last data filed at 12/1/2024 0926  Gross per 24 hour   Intake 761.9 ml   Output 3000 ml   Net -2238.1 ml         Physical Exam  Vitals and nursing note reviewed.   Constitutional:       General: She is in acute distress.      Appearance: She is well-developed. She is obese. She is ill-appearing. She is not diaphoretic.   HENT:      Head: Normocephalic and atraumatic.      Nose: No congestion or rhinorrhea.   Eyes:      General: No scleral icterus.        Right eye: No discharge.         Left eye: No discharge.      Extraocular Movements: Extraocular movements intact.   Neck:      Comments: Large neck  Cardiovascular:      Rate and Rhythm: Bradycardia present.   Pulmonary:      Effort: Respiratory distress present.      Breath sounds: Rhonchi present. No wheezing or rales.   Abdominal:      General: Bowel sounds are normal. There is no distension.      Palpations: Abdomen is soft.      Tenderness: There is no abdominal tenderness.   Musculoskeletal:      Right lower leg: Edema present.      Left lower leg: Edema present.   Skin:     Capillary Refill: Capillary refill takes 2 to 3 seconds.      Coloration: Skin is not jaundiced or pale.   Neurological:      General: No focal deficit present.      Mental Status: She is alert.             Significant Labs: All pertinent labs within the past 24 hours have been reviewed.  Recent Lab Results  (Last 5 results in the past 24 hours)        12/01/24  0600   12/01/24  0445   11/30/24  2325   11/30/24  2000   11/30/24  1801        Base Deficit   3.6  Comment: Value above reference range     3.8  Comment:  Value above reference range         Notified Note   Called and read back by RUI PAREDES             Performed By:   Bahman Ruggiero         Provider Notified:   RUI SANCHEZ             Specimen source   Arterial     Arterial         Notified Time   12/01/2024 04:47             Notified By   Bahman             AC   18     18         Albumin 2.3                              ALT 24               Anion Gap 10               AST 28               Baso # 0.02               Basophil % 0.3               BILIRUBIN TOTAL 0.5  Comment: For infants and newborns, interpretation of results should be based  on gestational age, weight and in agreement with clinical  observations.    Premature Infant recommended reference ranges:  Up to 24 hours.............<8.0 mg/dL  Up to 48 hours............<12.0 mg/dL  3-5 days..................<15.0 mg/dL  6-29 days.................<15.0 mg/dL    For patients on Eltrombopag therapy, use of Dimension [place] TBIL is   not   recommended.                 BUN 28               Calcium 9.7               Chloride 108               CO2 30               Creatinine 1.6               Differential Method Automated               eGFR 33.8               Eos # 0.0               Eos % 0.1               FiO2   60.0     60.0         Glucose 126               Gran # (ANC) 6.0               Gran % 85.6               Hematocrit 35.4               Hemoglobin 11.7               Immature Grans (Abs) 0.03  Comment: Mild elevation in immature granulocytes is non specific and   can be seen in a variety of conditions including stress response,   acute inflammation, trauma and pregnancy. Correlation with other   laboratory and clinical findings is essential.                 Immature Granulocytes 0.4               Lymph # 0.6               Lymph % 9.1               Magnesium  1.9               MCH 31.0               MCHC 33.1               MCV 94               MODIFIED BLANCHE'S TEST   N/A     N/A         Mono # 0.3         "       Mono % 4.5               MPV 8.7               nRBC 1               O2DEVICE   Ventilator     Ventilator         PEAK FLOW   50.0     50.0         PEEP   9.0     9.0         Platelet Count 215               POC HCO3   26.8     26.8         POC PCO2   47.5  Comment: Value above reference range     54.8  Comment: Value above reference range         POC PH   7.388     7.340  Comment: Value below reference range         POC PO2   55.3  Comment: Bahman notified RUI SANCHEZ at 12/01/2024 04:47  Called and read back by RUI PAREDES read back  Value below critical limit       67.7  Comment: Value below reference range         POC SATURATED O2   87.4  Comment: Bahman notified RUI SANCHEZ at 12/01/2024 04:47  Called and read back by RUI PAREDES read back  Value below critical limit       93.8  Comment: Value below reference range         POC Temp   37.0     37.0         POCT Glucose     121     96       Potassium 4.4               PROTEIN TOTAL 6.1               RBC 3.77               RDW 16.2               Sodium 148               Vt   450     450         WBC 7.04                                      Significant Imaging: I have reviewed all pertinent imaging results/findings within the past 24 hours.    Assessment/Plan:      * Acute on chronic diastolic congestive heart failure  Patient has Diastolic (HFpEF) heart failure that is Acute on chronic. On presentation their CHF was decompensated. Evidence of decompensated CHF on presentation includes: edema, orthopnea, dyspnea on exertion (MELISSA), and shortness of breath. The etiology of their decompensation is likely medication non-compliance?diet? Most recent BNP and echo results are listed below.  No results for input(s): "BNP" in the last 72 hours.  Latest ECHO  Results for orders placed during the hospital encounter of 07/28/24    Echo Saline Bubble? No; Ultrasound enhancing contrast? No    Interpretation Summary    Left Ventricle: The left ventricle is normal in size. Mildly " increased wall thickness. There is normal systolic function with a visually estimated ejection fraction of 55 - 60%. Grade I diastolic dysfunction.    Right Ventricle: Normal right ventricular cavity size. Systolic function is normal.    Left Atrium: Left atrium is severely dilated. Atrial septum is bulging to the right.    Right Atrium: Right atrium is moderately dilated.    Aortic Valve: The aortic valve is a trileaflet valve. Mildly restricted motion. There is mild stenosis. Aortic valve area by VTI is 1.84 cm². Aortic valve peak velocity is 2.18 m/s. Mean gradient is 10 mmHg. The dimensionless index is 0.58. There is mild aortic regurgitation.    Mitral Valve: There is mild regurgitation.    Tricuspid Valve: There is mild regurgitation.    Pulmonic Valve: There is mild to moderate regurgitation.    No pericardial effusion.    Current Heart Failure Medications  carvediloL tablet 3.125 mg, 2 times daily, Oral  valsartan tablet 160 mg, Daily, Oral  furosemide injection 40 mg, Every 12 hours, Intravenous    Plan  - Monitor strict I&Os and daily weights.    - Place on telemetry  - Low sodium diet  - Place on fluid restriction of 1 L.   - Cardiology has been consulted  - The patient's volume status is stable but not at their baseline as indicated by edema and shortness of breath and respiratory distress requiring intubation        Other pneumothorax  Patient developed pneumothorax right lower lobe, moderate, surgery consulted for chest tube placement and eval      Hard to intubate        Hypothyroidism  Continue levothyroxine       HLD (hyperlipidemia)  Continue statin      Gastroesophageal reflux disease without esophagitis  PPI on board      Anxiety and depression  Patient has persistent depression which is unknown and is currently controlled. Will Continue anti-depressant medications. We will not consult psychiatry at this time. Patient does not display psychosis at this time. Continue to monitor closely and adjust  "plan of care as needed.        Diabetes mellitus, type 2  Patient's FSGs are controlled on current medication regimen.  Last A1c reviewed-   Lab Results   Component Value Date    HGBA1C 5.3 07/28/2024     Most recent fingerstick glucose reviewed- No results for input(s): "POCTGLUCOSE" in the last 24 hours.  Current correctional scale  Low  Maintain anti-hyperglycemic dose as follows-   Antihyperglycemics (From admission, onward)      None          Hold Oral hypoglycemics while patient is in the hospital.    Acute on chronic respiratory failure with hypoxia and hypercapnia  Patient with Hypercapnic and Hypoxic Respiratory failure which is Acute on chronic.  she is on home oxygen at 2 LPM. Supplemental oxygen was provided and noted- Vent Mode: A/C  Oxygen Concentration (%):  [] 100  Resp Rate Total:  [0 br/min-18 br/min] 18 br/min  Vt Set:  [400 mL] 400 mL  PEEP/CPAP:  [5 cmH20-7 cmH20] 5 cmH20  Mean Airway Pressure:  [13 cmH20] 13 cmH20    .   Signs/symptoms of respiratory failure include- tachypnea, increased work of breathing, respiratory distress, use of accessory muscles, and wheezing. Contributing diagnoses includes - CHF and COPD Labs and images were reviewed. Patient Has recent ABG, which has been reviewed. Will treat underlying causes and adjust management of respiratory failure as follows- duo nebs, wean vent as tolerated, ABGs prn, diuresis     Paroxysmal A-fib  Patient has  atrial fibrillation. Patient is currently in . [id number] Score: 3. The patients heart rate in the last 24 hours is as follows:  Pulse  Min: 54  Max: 135     Antiarrhythmics   BB on hold due to bradycardia from propofol gtt    Anticoagulants  enoxaparin injection 40 mg, Every 24 hours, Subcutaneous    Plan  - Replete lytes with a goal of K>4, Mg >2  - Patient's afib is currently stable, she has history of dash-tachy fluctuation, subsequent intubation on propofol gtt, now bradycardic, hold coreg for now, previous records showed on " eliquis, care everywhere patient has not be seen by Cardiology except for and how during previous admission in August, unclear which medication patient is taking, will try and get with family member to verify medication patient taking at home and if she was taken off blood thinner at some point since her August admission            VTE Risk Mitigation (From admission, onward)           Ordered     enoxaparin injection 40 mg  Every 24 hours         11/29/24 1730     IP VTE HIGH RISK PATIENT  Once         11/29/24 1726     Place sequential compression device  Until discontinued         11/29/24 1726                    Discharge Planning   TIGRE:      Code Status: Full Code   Is the patient medically ready for discharge?:     Reason for patient still in hospital (select all that apply): Patient unstable, Patient new problem, Patient trending condition, Laboratory test, Treatment, Imaging, and Consult recommendations  Discharge Plan A: Other (TBD)        Critical care time spent on the evaluation and treatment of severe organ dysfunction, review of pertinent labs and imaging studies, discussions with consulting providers and discussions with patient/family: 50 minutes.              Rolo Anderson MD  Department of Hospital Medicine   [facility] - Intensive Care

## 2024-12-02 PROBLEM — Z71.89 ACP (ADVANCE CARE PLANNING): Status: ACTIVE | Noted: 2024-12-02

## 2024-12-02 LAB
AC: 18
ALBUMIN SERPL BCP-MCNC: 2.1 G/DL (ref 3.5–5.2)
ALP SERPL-CCNC: 149 U/L (ref 55–135)
ALT SERPL W/O P-5'-P-CCNC: 18 U/L (ref 10–44)
ANION GAP SERPL CALC-SCNC: 3 MMOL/L (ref 3–11)
AST SERPL-CCNC: 19 U/L (ref 10–40)
BACTERIA SPEC AEROBE CULT: NORMAL
BACTERIA SPEC AEROBE CULT: NORMAL
BACTERIA UR CULT: ABNORMAL
BASOPHILS # BLD AUTO: 0.02 K/UL (ref 0–0.2)
BASOPHILS NFR BLD: 0.2 % (ref 0–1.9)
BILIRUB SERPL-MCNC: 0.4 MG/DL (ref 0.1–1)
BUN SERPL-MCNC: 31 MG/DL (ref 8–23)
CALCIUM SERPL-MCNC: 9 MG/DL (ref 8.7–10.5)
CHLORIDE SERPL-SCNC: 109 MMOL/L (ref 95–110)
CO2 SERPL-SCNC: 35 MMOL/L (ref 23–29)
CREAT SERPL-MCNC: 1.8 MG/DL (ref 0.5–1.4)
DIFFERENTIAL METHOD BLD: ABNORMAL
EOSINOPHIL # BLD AUTO: 0 K/UL (ref 0–0.5)
EOSINOPHIL NFR BLD: 0 % (ref 0–8)
ERYTHROCYTE [DISTWIDTH] IN BLOOD BY AUTOMATED COUNT: 15.7 % (ref 11.5–14.5)
EST. GFR  (NO RACE VARIABLE): 29.4 ML/MIN/1.73 M^2
FIO2: 65 %
GLUCOSE SERPL-MCNC: 137 MG/DL (ref 70–110)
GRAM STN SPEC: NORMAL
HCT VFR BLD AUTO: 35.1 % (ref 37–48.5)
HGB BLD-MCNC: 11.4 G/DL (ref 12–16)
IMM GRANULOCYTES # BLD AUTO: 0.08 K/UL (ref 0–0.04)
IMM GRANULOCYTES NFR BLD AUTO: 0.6 % (ref 0–0.5)
LYMPHOCYTES # BLD AUTO: 0.7 K/UL (ref 1–4.8)
LYMPHOCYTES NFR BLD: 5.4 % (ref 18–48)
MAGNESIUM SERPL-MCNC: 1.8 MG/DL (ref 1.6–2.6)
MCH RBC QN AUTO: 28.8 PG (ref 27–31)
MCHC RBC AUTO-ENTMCNC: 32.5 G/DL (ref 32–36)
MCV RBC AUTO: 89 FL (ref 82–98)
MODIFIED ALLEN'S TEST: ABNORMAL
MONOCYTES # BLD AUTO: 0.6 K/UL (ref 0.3–1)
MONOCYTES NFR BLD: 4.6 % (ref 4–15)
NEUTROPHILS # BLD AUTO: 11 K/UL (ref 1.8–7.7)
NEUTROPHILS NFR BLD: 89.2 % (ref 38–73)
NRBC BLD-RTO: 0 /100 WBC
O2DEVICE: ABNORMAL
PCO2 BLDA: 52.4 MMHG (ref 35–45)
PEAK FLOW: 50
PEEP: 6
PH SMN: 7.39 [PH] (ref 7.34–7.45)
PLATELET # BLD AUTO: 223 K/UL (ref 150–450)
PMV BLD AUTO: 8.7 FL (ref 9.2–12.9)
PO2 BLDA: 87.3 MMHG (ref 80–100)
POC BASE DEFICIT: 7.1 MMOL/L (ref -2–2)
POC HCO3: 29.8 MMOL/L (ref 24–28)
POC PERFORMED BY: ABNORMAL
POC SATURATED O2: 96.9 % (ref 95–100)
POC TEMPERATURE: 37 C
POCT GLUCOSE: 110 MG/DL (ref 70–110)
POCT GLUCOSE: 123 MG/DL (ref 70–110)
POCT GLUCOSE: 149 MG/DL (ref 70–110)
POTASSIUM SERPL-SCNC: 4.1 MMOL/L (ref 3.5–5.1)
PROT SERPL-MCNC: 5.8 G/DL (ref 6–8.4)
RBC # BLD AUTO: 3.96 M/UL (ref 4–5.4)
SODIUM SERPL-SCNC: 147 MMOL/L (ref 136–145)
SPECIMEN SOURCE: ABNORMAL
VT: 450
WBC # BLD AUTO: 12.36 K/UL (ref 3.9–12.7)

## 2024-12-02 PROCEDURE — 85025 COMPLETE CBC W/AUTO DIFF WBC: CPT | Performed by: INTERNAL MEDICINE

## 2024-12-02 PROCEDURE — 36415 COLL VENOUS BLD VENIPUNCTURE: CPT | Performed by: INTERNAL MEDICINE

## 2024-12-02 PROCEDURE — 25000003 PHARM REV CODE 250: Performed by: INTERNAL MEDICINE

## 2024-12-02 PROCEDURE — 83735 ASSAY OF MAGNESIUM: CPT | Performed by: INTERNAL MEDICINE

## 2024-12-02 PROCEDURE — 27100171 HC OXYGEN HIGH FLOW UP TO 24 HOURS

## 2024-12-02 PROCEDURE — 99900031 HC PATIENT EDUCATION (STAT)

## 2024-12-02 PROCEDURE — 63600175 PHARM REV CODE 636 W HCPCS: Performed by: INTERNAL MEDICINE

## 2024-12-02 PROCEDURE — 94761 N-INVAS EAR/PLS OXIMETRY MLT: CPT

## 2024-12-02 PROCEDURE — 99232 SBSQ HOSP IP/OBS MODERATE 35: CPT | Mod: GW,,, | Performed by: STUDENT IN AN ORGANIZED HEALTH CARE EDUCATION/TRAINING PROGRAM

## 2024-12-02 PROCEDURE — 20000000 HC ICU ROOM

## 2024-12-02 PROCEDURE — 25000242 PHARM REV CODE 250 ALT 637 W/ HCPCS: Performed by: INTERNAL MEDICINE

## 2024-12-02 PROCEDURE — 99900035 HC TECH TIME PER 15 MIN (STAT)

## 2024-12-02 PROCEDURE — 36600 WITHDRAWAL OF ARTERIAL BLOOD: CPT

## 2024-12-02 PROCEDURE — 80053 COMPREHEN METABOLIC PANEL: CPT | Performed by: INTERNAL MEDICINE

## 2024-12-02 PROCEDURE — 25000003 PHARM REV CODE 250: Performed by: STUDENT IN AN ORGANIZED HEALTH CARE EDUCATION/TRAINING PROGRAM

## 2024-12-02 PROCEDURE — 99900026 HC AIRWAY MAINTENANCE (STAT)

## 2024-12-02 PROCEDURE — 63600175 PHARM REV CODE 636 W HCPCS: Performed by: STUDENT IN AN ORGANIZED HEALTH CARE EDUCATION/TRAINING PROGRAM

## 2024-12-02 PROCEDURE — 94003 VENT MGMT INPAT SUBQ DAY: CPT

## 2024-12-02 PROCEDURE — 99233 SBSQ HOSP IP/OBS HIGH 50: CPT | Mod: GW,,, | Performed by: STUDENT IN AN ORGANIZED HEALTH CARE EDUCATION/TRAINING PROGRAM

## 2024-12-02 PROCEDURE — 94640 AIRWAY INHALATION TREATMENT: CPT

## 2024-12-02 PROCEDURE — 82803 BLOOD GASES ANY COMBINATION: CPT

## 2024-12-02 RX ORDER — FUROSEMIDE 10 MG/ML
40 INJECTION INTRAMUSCULAR; INTRAVENOUS DAILY
Status: DISCONTINUED | OUTPATIENT
Start: 2024-12-03 | End: 2024-12-05

## 2024-12-02 RX ORDER — TALC
6 POWDER (GRAM) TOPICAL NIGHTLY PRN
Status: DISCONTINUED | OUTPATIENT
Start: 2024-12-02 | End: 2024-12-09

## 2024-12-02 RX ORDER — VALSARTAN 80 MG/1
160 TABLET ORAL DAILY
Status: DISCONTINUED | OUTPATIENT
Start: 2024-12-03 | End: 2024-12-06

## 2024-12-02 RX ORDER — ATORVASTATIN CALCIUM 40 MG/1
40 TABLET, FILM COATED ORAL DAILY
Status: DISCONTINUED | OUTPATIENT
Start: 2024-12-03 | End: 2024-12-08

## 2024-12-02 RX ORDER — CARVEDILOL 3.12 MG/1
3.12 TABLET ORAL 2 TIMES DAILY
Status: DISCONTINUED | OUTPATIENT
Start: 2024-12-02 | End: 2024-12-08

## 2024-12-02 RX ORDER — LEVOTHYROXINE SODIUM 50 UG/1
50 TABLET ORAL
Status: DISCONTINUED | OUTPATIENT
Start: 2024-12-03 | End: 2024-12-09

## 2024-12-02 RX ORDER — ASPIRIN 325 MG
325 TABLET ORAL DAILY
Status: DISCONTINUED | OUTPATIENT
Start: 2024-12-03 | End: 2024-12-08

## 2024-12-02 RX ORDER — POLYETHYLENE GLYCOL 3350 17 G/17G
17 POWDER, FOR SOLUTION ORAL DAILY
Status: DISCONTINUED | OUTPATIENT
Start: 2024-12-03 | End: 2024-12-08

## 2024-12-02 RX ORDER — MAGNESIUM SULFATE HEPTAHYDRATE 40 MG/ML
2 INJECTION, SOLUTION INTRAVENOUS ONCE
Status: COMPLETED | OUTPATIENT
Start: 2024-12-02 | End: 2024-12-02

## 2024-12-02 RX ORDER — ACETAMINOPHEN 325 MG/1
650 TABLET ORAL EVERY 8 HOURS PRN
Status: DISCONTINUED | OUTPATIENT
Start: 2024-12-02 | End: 2024-12-08

## 2024-12-02 RX ORDER — AMOXICILLIN 250 MG
2 CAPSULE ORAL DAILY
Status: DISCONTINUED | OUTPATIENT
Start: 2024-12-02 | End: 2024-12-08

## 2024-12-02 RX ORDER — CEFTRIAXONE 1 G/1
1 INJECTION, POWDER, FOR SOLUTION INTRAMUSCULAR; INTRAVENOUS
Status: COMPLETED | OUTPATIENT
Start: 2024-12-02 | End: 2024-12-06

## 2024-12-02 RX ADMIN — FUROSEMIDE 40 MG: 10 INJECTION, SOLUTION INTRAMUSCULAR; INTRAVENOUS at 09:12

## 2024-12-02 RX ADMIN — METHYLPREDNISOLONE SODIUM SUCCINATE 80 MG: 40 INJECTION, POWDER, FOR SOLUTION INTRAMUSCULAR; INTRAVENOUS at 09:12

## 2024-12-02 RX ADMIN — ALBUTEROL SULFATE 2.5 MG: 2.5 SOLUTION RESPIRATORY (INHALATION) at 08:12

## 2024-12-02 RX ADMIN — ALBUTEROL SULFATE 2.5 MG: 2.5 SOLUTION RESPIRATORY (INHALATION) at 03:12

## 2024-12-02 RX ADMIN — ALBUTEROL SULFATE 2.5 MG: 2.5 SOLUTION RESPIRATORY (INHALATION) at 11:12

## 2024-12-02 RX ADMIN — CARVEDILOL 3.12 MG: 3.12 TABLET, FILM COATED ORAL at 08:12

## 2024-12-02 RX ADMIN — SENNOSIDES AND DOCUSATE SODIUM 2 TABLET: 50; 8.6 TABLET ORAL at 01:12

## 2024-12-02 RX ADMIN — ASPIRIN 325 MG ORAL TABLET 325 MG: 325 PILL ORAL at 09:12

## 2024-12-02 RX ADMIN — ISOSORBIDE MONONITRATE 30 MG: 30 TABLET, EXTENDED RELEASE ORAL at 10:12

## 2024-12-02 RX ADMIN — PROPOFOL 25 MCG/KG/MIN: 10 INJECTION, EMULSION INTRAVENOUS at 06:12

## 2024-12-02 RX ADMIN — ATORVASTATIN CALCIUM 40 MG: 40 TABLET, FILM COATED ORAL at 09:12

## 2024-12-02 RX ADMIN — ENOXAPARIN SODIUM 40 MG: 40 INJECTION SUBCUTANEOUS at 05:12

## 2024-12-02 RX ADMIN — PROPOFOL 30 MCG/KG/MIN: 10 INJECTION, EMULSION INTRAVENOUS at 01:12

## 2024-12-02 RX ADMIN — MUPIROCIN: 20 OINTMENT TOPICAL at 09:12

## 2024-12-02 RX ADMIN — MUPIROCIN: 20 OINTMENT TOPICAL at 08:12

## 2024-12-02 RX ADMIN — CEFTRIAXONE SODIUM 1 G: 1 INJECTION, POWDER, FOR SOLUTION INTRAMUSCULAR; INTRAVENOUS at 01:12

## 2024-12-02 RX ADMIN — VALSARTAN 160 MG: 80 TABLET, FILM COATED ORAL at 10:12

## 2024-12-02 RX ADMIN — PANTOPRAZOLE SODIUM 40 MG: 40 INJECTION, POWDER, LYOPHILIZED, FOR SOLUTION INTRAVENOUS at 09:12

## 2024-12-02 RX ADMIN — ALBUTEROL SULFATE 2.5 MG: 2.5 SOLUTION RESPIRATORY (INHALATION) at 04:12

## 2024-12-02 RX ADMIN — PROPOFOL 15 MCG/KG/MIN: 10 INJECTION, EMULSION INTRAVENOUS at 10:12

## 2024-12-02 RX ADMIN — POLYETHYLENE GLYCOL (3350) 17 G: 17 POWDER, FOR SOLUTION ORAL at 09:12

## 2024-12-02 RX ADMIN — CARVEDILOL 3.12 MG: 3.12 TABLET, FILM COATED ORAL at 09:12

## 2024-12-02 RX ADMIN — ALBUTEROL SULFATE 2.5 MG: 2.5 SOLUTION RESPIRATORY (INHALATION) at 07:12

## 2024-12-02 RX ADMIN — LEVOTHYROXINE SODIUM 50 MCG: 50 TABLET ORAL at 05:12

## 2024-12-02 RX ADMIN — MAGNESIUM SULFATE HEPTAHYDRATE 2 G: 40 INJECTION, SOLUTION INTRAVENOUS at 01:12

## 2024-12-02 NOTE — ASSESSMENT & PLAN NOTE
Patient with Hypercapnic and Hypoxic Respiratory failure which is Acute on chronic.  she is on home oxygen at 2 LPM. Supplemental oxygen was provided and noted- Vent Mode: A/C  Oxygen Concentration (%):  [55-65] 55  Resp Rate Total:  [18 br/min-23 br/min] 19 br/min  Vt Set:  [450 mL] 450 mL  PEEP/CPAP:  [6 cmH20] 6 cmH20  Mean Airway Pressure:  [12 cmH20-15 cmH20] 14 cmH20     Signs/symptoms of respiratory failure include- tachypnea, increased work of breathing, respiratory distress, use of accessory muscles, and wheezing. Contributing diagnoses includes - CHF and COPD Labs and images were reviewed. Patient Has recent ABG, which has been reviewed. Will treat underlying causes and adjust management of respiratory failure as follows- duo nebs, wean vent as tolerated, ABGs prn, diuresis

## 2024-12-02 NOTE — ASSESSMENT & PLAN NOTE
Patient developed pneumothorax right lower lobe, moderate, surgery consulted for chest tube placement and eval.  Right chest tube in place to suction  - f/u GS recommendations

## 2024-12-02 NOTE — PLAN OF CARE
[facility] - Intensive Care  Initial Discharge Assessment       Primary Care Provider: Lucian Barry MD    Admission Diagnosis: Shortness of breath [R06.02]  Respiratory acidosis [E87.29]  SOB (shortness of breath) [R06.02]  Congestive heart failure, unspecified HF chronicity, unspecified heart failure type [I50.9]    Admission Date: 11/29/2024  Expected Discharge Date:     Transition of Care Barriers: None    Payor: HUMANA MANAGED MEDICARE / Plan: [employer] HMO PPO SPECIAL NEEDS / Product Type: Medicare Advantage /     Extended Emergency Contact Information  Primary Emergency Contact: Salvatore Tan  Mobile Phone: [phone]  Relation: Son  Secondary Emergency Contact: Alfonzo Tan  Address: [address]  Home Phone: [phone]  Mobile Phone: [phone]  Relation: Son   needed? No    Discharge Plan A: Other (TBD)  Discharge Plan B: Other (TBD)      [id number] - [place], [place] - [address] [facility] [address] [facility]  [address]  [facility] [address]  Phone: [phone] Fax: [phone]    [id number] - [place], [place] - [address] [facility] AT [address]  [address]  [facility] [address]  Phone: [phone] Fax: [phone]    [email or web address] [address] [facility]  [address] [facility] [address]  Phone: [phone] Fax: [phone]      Initial Assessment (most recent)       Adult Discharge Assessment - 12/02/24 1404          Discharge Assessment    Assessment Type Discharge Planning Assessment     Confirmed/corrected address, phone number and insurance Yes     Confirmed Demographics Correct on Facesheet     Source of Information family     When was your last doctors appointment? --   unable to determine    Communicated TIGRE with patient/caregiver Date not available/Unable to determine     Reason For Admission Acute on chronic  diastolic congestive heart failure     People in Home child(edmar), adult   Family reports that pt lives at home with son, Alfonzo and grandchildren.    Do you expect to return to your current living situation? Yes     Do you have help at home or someone to help you manage your care at home? No     Prior to hospitilization cognitive status: Alert/Oriented     Current cognitive status: Coma/Sedated/Intubated     Walking or Climbing Stairs Difficulty yes     Walking or Climbing Stairs ambulation difficulty, requires equipment;ambulation difficulty, assistance 1 person     Mobility Management rollator, wheelchair     Dressing/Bathing Difficulty yes     Dressing/Bathing bathing difficulty, requires equipment;bathing difficulty, assistance 1 person     Dressing/Bathing Management bath bench     Do you have any problems with: Errands/Grocery     Home Accessibility wheelchair accessible   Family reported a ramp at the home.    Home Layout Able to live on 1st floor     Equipment Currently Used at Home bedside commode;CPAP;oxygen;rollator;hospital bed;bath bench;wheelchair     Readmission within 30 days? No     Patient currently being followed by outpatient case management? No     Do you currently have service(s) that help you manage your care at home? Yes     Name and Contact number of agency --   Family reports pt had home health, but the name of agency is unknown.    Is the pt/caregiver preference to resume services with current agency Yes     Do you take prescription medications? Yes     Do you have prescription coverage? Yes     Coverage HUMANA MANAGED MEDICARE - [facility] SPECIAL NEEDS     Do you have any problems affording any of your prescribed medications? No     Is the patient taking medications as prescribed? yes     Who is going to help you get home at discharge? Family     How do you get to doctors appointments? family or friend will provide     Are you on dialysis? No     Do you take coumadin? No      Discharge Plan A Other   TBD    Discharge Plan B Other   TBD    DME Needed Upon Discharge  other (see comments)   TBD    Discharge Plan discussed with: Adult children     Transition of Care Barriers None        Physical Activity    On average, how many days per week do you engage in moderate to strenuous exercise (like a brisk walk)? Patient unable to answer     On average, how many minutes do you engage in exercise at this level? Patient unable to answer        Financial Resource Strain    How hard is it for you to pay for the very basics like food, housing, medical care, and heating? Patient unable to answer        Housing Stability    In the last 12 months, was there a time when you were not able to pay the mortgage or rent on time? Patient unable to answer     At any time in the past 12 months, were you homeless or living in a shelter (including now)? Patient unable to answer        Transportation Needs    Has the lack of transportation kept you from medical appointments, meetings, work or from getting things needed for daily living? Patient unable to answer        Food Insecurity    Within the past 12 months, you worried that your food would run out before you got the money to buy more. Patient unable to answer     Within the past 12 months, the food you bought just didn't last and you didn't have money to get more. Patient unable to answer        Stress    Do you feel stress - tense, restless, nervous, or anxious, or unable to sleep at night because your mind is troubled all the time - these days? Patient unable to answer        Social Isolation    How often do you feel lonely or isolated from those around you?  Patient unable to answer        Alcohol Use    Q1: How often do you have a drink containing alcohol? Patient unable to answer     Q2: How many drinks containing alcohol do you have on a typical day when you are drinking? Patient unable to answer     Q3: How often do you have six or more drinks on one  occasion? Patient unable to answer        Utilities    In the past 12 months has the electric, gas, oil, or water company threatened to shut off services in your home? Patient unable to answer        Health Literacy    How often do you need to have someone help you when you read instructions, pamphlets, or other written material from your doctor or pharmacy? Patient unable to respond                   FRANCOIS spoke to adult children (Salvatore, Esther, Kendrick) via phone. FRANCOIS informed that Salvatore was in town from [place], and they were all together at home discussing pt's care. FRANCOIS informed that Alfonzo was not apart of their meeting/conversation at this time. SW was placed on speakerphone for all siblings to hear and discuss.     FRANCOIS informed that pt lives at home with Alfonzo and grandchildren. Pt requires assistance with ADLs. Pt uses DME at home. Pt has home health, but the name of agency is unknown. Family unaware of pt previously being on home hospice.     Adult children (Salvatore, Esther, Kendrick) designated Esther as the medical decision maker. Per Salvatore, he lives in [facility] and unable to be available at all times regarding his mother's care. Per Salvatore, he is the oldest sibling. Per Esther, Kendrick is the youngest sibling (age 43), and the pt had another child that is . Per adult children, the pt does not have POA.     Per Esther, Alfonzo is not taking care of things at home for pt. Per Esther, Alfonzo will often catch attitudes with the home health nurses which causes them not to want to return back to the home. Per Esther, there are 3 adult nieces that goes to the home to make sure pt is taken care of with meals and baths.     FRANCOIS phoned sibling (Alfonzo), but unable to reach. FRANCOIS phoned [phone] and automates message stated this number is not reachable. FRANCOIS phoned [phone], but the phone just rang.     FRANCOIS informed Attending of the designated  (Esther) for pt.     FRANCOIS/BAKARI will continue to follow for dc planning.

## 2024-12-02 NOTE — PROGRESS NOTES
[facility] Medicine  Progress Note    Patient Name: Carmen Tan  MRN: [id number]  Patient Class: IP- Inpatient   Admission Date: 11/29/2024  Length of Stay: 3 days  Attending Physician: Gunnar Mott DO  Primary Care Provider: Lucian Barry MD        Subjective     Principal Problem:Acute on chronic diastolic congestive heart failure        HPI:  Patient 74-year-old female past medical history of chronic diastolic heart failure, AFib, chronic respiratory failure with hypoxemia and hypercapnia, anxiety depression, type 2 diabetes, GERD, hypothyroidism.  Patient came in after noted to be short of breath at home, patient was satting in the 70 on home BiPAP therapy, was brought to the ED noted to be in distress tachycardic hypotensive hypoxic, workup in the ED patient with enlarged cardiac silhouette, chronic changes no acute finding, elevated BNP 25,000, mild elevation in troponin negative COVID no leukocytosis, patient was given nitroglycerin and Lasix injection, admitted to ICU.    Overview/Hospital Course:  12/1 AA, weekend crosscover, overnight patient had two ABG, eICU MD adjusted vent settings, had repeat chest x-ray which showed moderate right thorax, vital signs stable, O2 sat maintaining, surgery consulted for chest tube placement, patient was started on steroids yesterday for COPD flare, diuresis in progress, continue to monitor urine output, follow-up chest x-ray after chest tube placement, follow-up with surgery rec  12/2/24 ABG 7.395/52.4/87.3/29.8  improving respiratory status on AC Vt 450, RR18, PEEP 6, FiO2 55%, SpO2 98%, proprofol 25mcg/kg/min. CXR with right pneumothorax resolved, diuresing on scheduled IV lasix with adequate urine output. Patient minimally responsive to sternal rub. No elevated temperature. Urine culture positive for gram positive aerococcus spp, add IV rocephin. Continue with vent weaning and daily SBT, scheduled breathing treatments,  solumedrol. Follow up recommendations cardiology. Follow up general surgery with chest tube management.      Interval History: Patient seen and examined.     Review of Systems   Unable to perform ROS: Acuity of condition     Objective:     Vital Signs (Most Recent):  Temp: 98.4 °F (36.9 °C) (12/02/24 0815)  Pulse: 74 (12/02/24 1119)  Resp: 20 (12/02/24 1119)  BP: 116/61 (12/02/24 1119)  SpO2: 100 % (12/02/24 1119) Vital Signs (24h Range):  Temp:  [96.7 °F (35.9 °C)-98.4 °F (36.9 °C)] 98.4 °F (36.9 °C)  Pulse:  [59-87] 74  Resp:  [11-23] 20  SpO2:  [97 %-100 %] 100 %  BP: ()/(44-92) 116/61     Weight: 135 kg (297 lb 9.6 oz)  Body mass index is 51.08 kg/m².    Intake/Output Summary (Last 24 hours) at 12/2/2024 1227  Last data filed at 12/2/2024 0756  Gross per 24 hour   Intake 592.27 ml   Output 2015 ml   Net -1422.73 ml         Physical Exam  Vitals and nursing note reviewed.   Constitutional:       General: She is not in acute distress.     Appearance: She is well-developed. She is obese. She is ill-appearing. She is not diaphoretic.   HENT:      Head: Normocephalic and atraumatic.      Nose: No congestion or rhinorrhea.   Eyes:      General: No scleral icterus.        Right eye: No discharge.         Left eye: No discharge.      Extraocular Movements: Extraocular movements intact.   Neck:      Comments: Large neck  Cardiovascular:      Rate and Rhythm: Bradycardia present.   Pulmonary:      Breath sounds: No wheezing or rales.      Comments: Right side chest tube in place connected to suction.    Course vent sounds transmitting to lung bases  Abdominal:      General: There is no distension.      Palpations: Abdomen is soft.   Musculoskeletal:      Right lower leg: No edema (trace, non-pitting bilaterally).      Left lower leg: No edema.   Skin:     Capillary Refill: Capillary refill takes 2 to 3 seconds.      Coloration: Skin is not jaundiced or pale.   Neurological:      General: No focal deficit present.  "          Significant Labs: All pertinent labs within the past 24 hours have been reviewed.  A1C:   Recent Labs   Lab 07/28/24  2256 11/30/24  1237   HGBA1C 5.3 5.2     ABGs:   Recent Labs   Lab 11/30/24  2000 12/01/24  0445 12/02/24  0510   PH 7.340* 7.388 7.395   PCO2 54.8* 47.5* 52.4*   HCO3 26.8 26.8 29.8*   POCSATURATED 93.8* 87.4* 96.9   PO2 67.7* 55.3* 87.3     Bilirubin:   Recent Labs   Lab 11/29/24  1339 11/30/24  0340 12/01/24  0600 12/02/24  0349   BILITOT 0.6 0.4 0.5 0.4     Blood Culture: No results for input(s): "LABBLOO" in the last 48 hours.  BMP:   Recent Labs   Lab 12/02/24  0349   *   *   K 4.1      CO2 35*   BUN 31*   CREATININE 1.8*   CALCIUM 9.0   MG 1.8     CBC:   Recent Labs   Lab 12/01/24  0600 12/02/24  0349   WBC 7.04 12.36   HGB 11.7* 11.4*   HCT 35.4* 35.1*    223     CMP:   Recent Labs   Lab 12/01/24  0600 12/02/24  0349   * 147*   K 4.4 4.1    109   CO2 30* 35*   * 137*   BUN 28* 31*   CREATININE 1.6* 1.8*   CALCIUM 9.7 9.0   PROT 6.1 5.8*   ALBUMIN 2.3* 2.1*   BILITOT 0.5 0.4   ALKPHOS 181* 149*   AST 28 19   ALT 24 18   ANIONGAP 10 3     Recent Labs   Lab 12/01/24  0600 12/02/24  0349   MG 1.9 1.8     POCT Glucose:   Recent Labs   Lab 12/01/24  1215 12/01/24  1703 12/01/24  2351   POCTGLUCOSE 141* 142* 149*       Recent Labs   Lab 08/10/24  0335   TSH 0.632   X-Ray Chest 1 View  Narrative: EXAMINATION:  XR CHEST 1 VIEW    CLINICAL HISTORY:  chest tube insertion;    COMPARISON:  Portable chest 12/01/2024.    FINDINGS:  Frontal chest radiograph demonstrates interval placement of a right thoracostomy tube with resolved right-sided pneumothorax.  Endotracheal tube and nasogastric tube remain in place.  Right-sided PICC with tip projecting over the proximal aspect of the right subclavian vein, stable.  Impression: Interval placement of right thoracostomy tube with resolved right-sided pneumothorax.    Electronically signed by: Oscar Joe, " "MD  Date:    12/01/2024  Time:    13:49    X-Ray Chest 1 View  Narrative: EXAMINATION:  XR CHEST 1 VIEW    CLINICAL HISTORY:  intubated;    COMPARISON:  Portable chest 11/30/2024.    FINDINGS:  Frontal chest radiograph is rotated to the right and demonstrates interval development of a moderate-sized right pneumothorax measuring approximately 3.8 cm in greatest thickness.  Endotracheal tube and nasogastric tube remain in place.  No pleural fluid.  Impression: Interval development of a moderate-sized right pneumothorax.    Subsequent chest radiograph demonstrates placement of a right thoracostomy tube.    Electronically signed by: Oscar Joe MD  Date:    12/01/2024  Time:    13:29       Significant Imaging: I have reviewed all pertinent imaging results/findings within the past 24 hours.    Assessment and Plan     * Acute on chronic diastolic congestive heart failure  Patient has Diastolic (HFpEF) heart failure that is Acute on chronic. On presentation their CHF was decompensated. Evidence of decompensated CHF on presentation includes: edema, orthopnea, dyspnea on exertion (MELISSA), and shortness of breath. The etiology of their decompensation is likely medication non-compliance?diet? Most recent BNP and echo results are listed below.  No results for input(s): "BNP" in the last 72 hours.  Latest ECHO  Results for orders placed during the hospital encounter of 07/28/24    Echo Saline Bubble? No; Ultrasound enhancing contrast? No    Interpretation Summary    Left Ventricle: The left ventricle is normal in size. Mildly increased wall thickness. There is normal systolic function with a visually estimated ejection fraction of 55 - 60%. Grade I diastolic dysfunction.    Right Ventricle: Normal right ventricular cavity size. Systolic function is normal.    Left Atrium: Left atrium is severely dilated. Atrial septum is bulging to the right.    Right Atrium: Right atrium is moderately dilated.    Aortic Valve: The aortic " valve is a trileaflet valve. Mildly restricted motion. There is mild stenosis. Aortic valve area by VTI is 1.84 cm². Aortic valve peak velocity is 2.18 m/s. Mean gradient is 10 mmHg. The dimensionless index is 0.58. There is mild aortic regurgitation.    Mitral Valve: There is mild regurgitation.    Tricuspid Valve: There is mild regurgitation.    Pulmonic Valve: There is mild to moderate regurgitation.    No pericardial effusion.    Current Heart Failure Medications  carvediloL tablet 3.125 mg, 2 times daily, Oral  valsartan tablet 160 mg, Daily, Oral  furosemide injection 40 mg, Every 12 hours, Intravenous    Plan  - Monitor strict I&Os and daily weights.    - Place on telemetry  - Low sodium diet  - Place on fluid restriction of 1 L.   - Cardiology has been consulted, follow up recommendations  - The patient's volume status is stable but not at their baseline as indicated by edema and shortness of breath and respiratory distress requiring intubation        Other pneumothorax  Patient developed pneumothorax right lower lobe, moderate, surgery consulted for chest tube placement and eval.  Right chest tube in place to suction  - f/u GS recommendations        Hard to intubate  D2 intubation      Hypothyroidism  Continue levothyroxine       HLD (hyperlipidemia)  Continue statin      Gastroesophageal reflux disease without esophagitis  PPI on board      Acute cystitis without hematuria  11/30/24 Urine culture positive for aerococcus urinae. Start therapy with rocephin for 5 days total.   Estimated Creatinine Clearance: 38.1 mL/min (A) (based on SCr of 1.8 mg/dL (H)).  - Monitor I/Os       Anxiety and depression  Patient has persistent depression which is unknown and is currently controlled. Will Continue anti-depressant medications. We will not consult psychiatry at this time. Patient does not display psychosis at this time. Continue to monitor closely and adjust plan of care as needed.        Diabetes mellitus, type  2  Patient's FSGs are controlled on current medication regimen.  Last A1c reviewed-   Lab Results   Component Value Date    HGBA1C 5.2 11/30/2024     Most recent fingerstick glucose reviewed-   Recent Labs   Lab 12/01/24  1703 12/01/24  2351   POCTGLUCOSE 142* 149*     Current correctional scale  Low  Maintain anti-hyperglycemic dose as follows-   Antihyperglycemics (From admission, onward)      Start     Stop Route Frequency Ordered    11/30/24 1324  insulin aspart U-100 pen 0-5 Units         -- SubQ Every 6 hours PRN 11/30/24 1225          Hold Oral hypoglycemics while patient is in the hospital.    Severe obesity (BMI >= 40)  Body mass index is 51.08 kg/m². Morbid obesity complicates all aspects of disease management from diagnostic modalities to treatment. Weight loss encouraged and health benefits explained to patient.     Wt Readings from Last 8 Encounters:   12/02/24 135 kg (297 lb 9.6 oz)   10/16/24 113.4 kg (250 lb)   09/22/24 113.4 kg (250 lb)   08/14/24 119.7 kg (263 lb 14.3 oz)   07/29/24 104.3 kg (229 lb 15 oz)   06/25/24 104.6 kg (230 lb 11.2 oz)   06/24/24 104.3 kg (230 lb)   06/20/24 102.5 kg (226 lb)            Acute on chronic respiratory failure with hypoxia and hypercapnia  Patient with Hypercapnic and Hypoxic Respiratory failure which is Acute on chronic.  she is on home oxygen at 2 LPM. Supplemental oxygen was provided and noted- Vent Mode: A/C  Oxygen Concentration (%):  [55-65] 55  Resp Rate Total:  [18 br/min-23 br/min] 19 br/min  Vt Set:  [450 mL] 450 mL  PEEP/CPAP:  [6 cmH20] 6 cmH20  Mean Airway Pressure:  [12 cmH20-15 cmH20] 14 cmH20     Signs/symptoms of respiratory failure include- tachypnea, increased work of breathing, respiratory distress, use of accessory muscles, and wheezing. Contributing diagnoses includes - CHF and COPD Labs and images were reviewed. Patient Has recent ABG, which has been reviewed. Will treat underlying causes and adjust management of respiratory failure as  follows- duo nebs, wean vent as tolerated, ABGs prn, diuresis     Paroxysmal A-fib  Patient has  atrial fibrillation. Patient is currently in . [id number] Score: 3. The patients heart rate in the last 24 hours is as follows:  Pulse  Min: 59  Max: 87     Antiarrhythmics   BB on hold due to bradycardia from propofol gtt    Anticoagulants  enoxaparin injection 40 mg, Every 24 hours, Subcutaneous    Plan  - Replete lytes with a goal of K>4, Mg >2  - Patient's afib is currently stable, she has history of dash-tachy fluctuation, subsequent intubation on propofol gtt, now bradycardic, hold coreg for now, previous records showed on eliquis, care everywhere patient has not be seen by Cardiology except for and how during previous admission in August, unclear which medication patient is taking, will try and get with family member to verify medication patient taking at home and if she was taken off blood thinner at some point since her August admission            VTE Risk Mitigation (From admission, onward)           Ordered     enoxaparin injection 40 mg  Every 24 hours         11/29/24 1730     IP VTE HIGH RISK PATIENT  Once         11/29/24 1726     Place sequential compression device  Until discontinued         11/29/24 1726                    Discharge Planning   TIGRE:      Code Status: Full Code   Is the patient medically ready for discharge?:     Reason for patient still in hospital (select all that apply):   Discharge Plan A: Other (TBD)                        Gunnar Mott DO  Department of Hospital Medicine   [place] [facility]

## 2024-12-02 NOTE — EICU
Intervention Initiated From:  [facility] / EICU    Elliott intervened regarding:  Rounding (Video assessment)    Nurse Notified:  Yes    Doctor Notified:  Yes    Comments: Dr. Ortega notified of vented patient. Remote video rounding performed. Patient found in bed, intubated, sedated. No signs of acute distress noted. Airway pressures elevated; secure chat sent to bedside; hopeful for improvement with suctioning/pulmonary hygiene. Dr. Ortega to assess.      For questions or patient concerns, please reach out to eICU.

## 2024-12-02 NOTE — SUBJECTIVE & OBJECTIVE
Interval History: s/p R chest tube yesterday.  Remains ventilated.  Sedation weaned this AM with a RAAS of -2.  VSS.    Medications:  Continuous Infusions:   propofoL  0-50 mcg/kg/min Intravenous Continuous   Stopped at 12/02/24 0840     Scheduled Meds:   albuterol sulfate  2.5 mg Nebulization Q4H    [START ON 12/3/2024] aspirin  325 mg Per OG tube Daily    [START ON 12/3/2024] atorvastatin  40 mg Per OG tube Daily    carvediloL  3.125 mg Per OG tube BID    cefTRIAXone (Rocephin) IV (PEDS and ADULTS)  1 g Intravenous Q24H    enoxparin  40 mg Subcutaneous Q24H (prophylaxis, 1700)    furosemide (LASIX) injection  40 mg Intravenous Q12H    [START ON 12/3/2024] levothyroxine  50 mcg Per OG tube Before breakfast    magnesium sulfate IVPB  2 g Intravenous Once    methylPREDNISolone injection (PEDS and ADULTS)  80 mg Intravenous BID    mupirocin   Nasal BID    pantoprazole  40 mg Intravenous Daily    [START ON 12/3/2024] polyethylene glycol  17 g Per OG tube Daily    senna-docusate 8.6-50 mg  2 tablet Per OG tube Daily    [START ON 12/3/2024] valsartan  160 mg Per OG tube Daily     PRN Meds:  Current Facility-Administered Medications:     acetaminophen, 650 mg, Per OG tube, Q8H PRN    dextrose 10%, 12.5 g, Intravenous, PRN    dextrose 10%, 25 g, Intravenous, PRN    glucagon (human recombinant), 1 mg, Intramuscular, PRN    insulin aspart U-100, 0-5 Units, Subcutaneous, Q6H PRN    melatonin, 6 mg, Per OG tube, Nightly PRN    ondansetron, 4 mg, Intravenous, Q8H PRN    sodium chloride 0.9%, 10 mL, Intravenous, PRN    Flushing PICC/Midline Protocol, , , Until Discontinued **AND** sodium chloride 0.9%, 10 mL, Intravenous, Q12H PRN     Review of patient's allergies indicates:   Allergen Reactions    Pcn [penicillins] Swelling     Objective:     Vital Signs (Most Recent):  Temp: 98.4 °F (36.9 °C) (12/02/24 0815)  Pulse: 74 (12/02/24 1207)  Resp: (!) 0 (12/02/24 1200)  BP: 121/60 (12/02/24 1200)  SpO2: 100 % (12/02/24 1200) Vital  Signs (24h Range):  Temp:  [96.7 °F (35.9 °C)-98.4 °F (36.9 °C)] 98.4 °F (36.9 °C)  Pulse:  [59-87] 74  Resp:  [0-23] 0  SpO2:  [97 %-100 %] 100 %  BP: ()/(44-92) 121/60     Weight: 135 kg (297 lb 9.6 oz)  Body mass index is 51.08 kg/m².    Intake/Output - Last 3 Shifts         11/30 0700  12/01 0659 12/01 0700 12/02 0659 12/02 0700 12/03 0659    P.O. 0 0     I.V. (mL/kg) 674.6 (4.7) 600.1 (4.4)     NG/GT  90 30    Total Intake(mL/kg) 674.6 (4.7) 690.1 (5.1) 30 (0.2)    Urine (mL/kg/hr) 2050 (0.6) 1850 (0.6)     Drains 950 75     Stool 0 0     Chest Tube  90     Total Output 3000 2015     Net -2325.4 -1324.9 +30           Stool Occurrence 0 x 0 x 0 x             Physical Exam  Constitutional:       General: She is not in acute distress.     Appearance: She is obese. She is ill-appearing. She is not toxic-appearing.   Cardiovascular:      Rate and Rhythm: Normal rate and regular rhythm.   Pulmonary:      Effort: No respiratory distress.      Comments: Ventilator assisted breaths equal and nonlabored   R chest tube in place without air leak.    Abdominal:      General: There is no distension.      Palpations: Abdomen is soft.      Tenderness: There is no abdominal tenderness. There is no guarding.      Hernia: No hernia is present.   Skin:     Capillary Refill: Capillary refill takes less than 2 seconds.   Neurological:      Mental Status: Mental status is at baseline.          Significant Labs:  I have reviewed all pertinent lab results within the past 24 hours.  CBC:   Recent Labs   Lab 12/02/24  0349   WBC 12.36   RBC 3.96*   HGB 11.4*   HCT 35.1*      MCV 89   MCH 28.8   MCHC 32.5     CMP:   Recent Labs   Lab 12/02/24  0349   *   CALCIUM 9.0   ALBUMIN 2.1*   PROT 5.8*   *   K 4.1   CO2 35*      BUN 31*   CREATININE 1.8*   ALKPHOS 149*   ALT 18   AST 19   BILITOT 0.4       Significant Diagnostics:  I have reviewed all pertinent imaging results/findings within the past 24 hours.

## 2024-12-02 NOTE — SUBJECTIVE & OBJECTIVE
Interval History: Patient seen and examined.     Review of Systems   Unable to perform ROS: Acuity of condition     Objective:     Vital Signs (Most Recent):  Temp: 98.4 °F (36.9 °C) (12/02/24 0815)  Pulse: 74 (12/02/24 1119)  Resp: 20 (12/02/24 1119)  BP: 116/61 (12/02/24 1119)  SpO2: 100 % (12/02/24 1119) Vital Signs (24h Range):  Temp:  [96.7 °F (35.9 °C)-98.4 °F (36.9 °C)] 98.4 °F (36.9 °C)  Pulse:  [59-87] 74  Resp:  [11-23] 20  SpO2:  [97 %-100 %] 100 %  BP: ()/(44-92) 116/61     Weight: 135 kg (297 lb 9.6 oz)  Body mass index is 51.08 kg/m².    Intake/Output Summary (Last 24 hours) at 12/2/2024 1227  Last data filed at 12/2/2024 0756  Gross per 24 hour   Intake 592.27 ml   Output 2015 ml   Net -1422.73 ml         Physical Exam  Vitals and nursing note reviewed.   Constitutional:       General: She is not in acute distress.     Appearance: She is well-developed. She is obese. She is ill-appearing. She is not diaphoretic.   HENT:      Head: Normocephalic and atraumatic.      Nose: No congestion or rhinorrhea.   Eyes:      General: No scleral icterus.        Right eye: No discharge.         Left eye: No discharge.      Extraocular Movements: Extraocular movements intact.   Neck:      Comments: Large neck  Cardiovascular:      Rate and Rhythm: Bradycardia present.   Pulmonary:      Breath sounds: No wheezing or rales.      Comments: Right side chest tube in place connected to suction.    Course vent sounds transmitting to lung bases  Abdominal:      General: There is no distension.      Palpations: Abdomen is soft.   Musculoskeletal:      Right lower leg: No edema (trace, non-pitting bilaterally).      Left lower leg: No edema.   Skin:     Capillary Refill: Capillary refill takes 2 to 3 seconds.      Coloration: Skin is not jaundiced or pale.   Neurological:      General: No focal deficit present.           Significant Labs: All pertinent labs within the past 24 hours have been reviewed.  A1C:   Recent Labs  "  Lab 07/28/24  2256 11/30/24  1237   HGBA1C 5.3 5.2     ABGs:   Recent Labs   Lab 11/30/24  2000 12/01/24  0445 12/02/24  0510   PH 7.340* 7.388 7.395   PCO2 54.8* 47.5* 52.4*   HCO3 26.8 26.8 29.8*   POCSATURATED 93.8* 87.4* 96.9   PO2 67.7* 55.3* 87.3     Bilirubin:   Recent Labs   Lab 11/29/24  1339 11/30/24  0340 12/01/24  0600 12/02/24  0349   BILITOT 0.6 0.4 0.5 0.4     Blood Culture: No results for input(s): "LABBLOO" in the last 48 hours.  BMP:   Recent Labs   Lab 12/02/24  0349   *   *   K 4.1      CO2 35*   BUN 31*   CREATININE 1.8*   CALCIUM 9.0   MG 1.8     CBC:   Recent Labs   Lab 12/01/24  0600 12/02/24  0349   WBC 7.04 12.36   HGB 11.7* 11.4*   HCT 35.4* 35.1*    223     CMP:   Recent Labs   Lab 12/01/24  0600 12/02/24  0349   * 147*   K 4.4 4.1    109   CO2 30* 35*   * 137*   BUN 28* 31*   CREATININE 1.6* 1.8*   CALCIUM 9.7 9.0   PROT 6.1 5.8*   ALBUMIN 2.3* 2.1*   BILITOT 0.5 0.4   ALKPHOS 181* 149*   AST 28 19   ALT 24 18   ANIONGAP 10 3     Recent Labs   Lab 12/01/24  0600 12/02/24  0349   MG 1.9 1.8     POCT Glucose:   Recent Labs   Lab 12/01/24  1215 12/01/24  1703 12/01/24  2351   POCTGLUCOSE 141* 142* 149*       Recent Labs   Lab 08/10/24  0335   TSH 0.632   X-Ray Chest 1 View  Narrative: EXAMINATION:  XR CHEST 1 VIEW    CLINICAL HISTORY:  chest tube insertion;    COMPARISON:  Portable chest 12/01/2024.    FINDINGS:  Frontal chest radiograph demonstrates interval placement of a right thoracostomy tube with resolved right-sided pneumothorax.  Endotracheal tube and nasogastric tube remain in place.  Right-sided PICC with tip projecting over the proximal aspect of the right subclavian vein, stable.  Impression: Interval placement of right thoracostomy tube with resolved right-sided pneumothorax.    Electronically signed by: Oscar Joe MD  Date:    12/01/2024  Time:    13:49    X-Ray Chest 1 View  Narrative: EXAMINATION:  XR CHEST 1 " VIEW    CLINICAL HISTORY:  intubated;    COMPARISON:  Portable chest 11/30/2024.    FINDINGS:  Frontal chest radiograph is rotated to the right and demonstrates interval development of a moderate-sized right pneumothorax measuring approximately 3.8 cm in greatest thickness.  Endotracheal tube and nasogastric tube remain in place.  No pleural fluid.  Impression: Interval development of a moderate-sized right pneumothorax.    Subsequent chest radiograph demonstrates placement of a right thoracostomy tube.    Electronically signed by: Oscar Joe MD  Date:    12/01/2024  Time:    13:29       Significant Imaging: I have reviewed all pertinent imaging results/findings within the past 24 hours.

## 2024-12-02 NOTE — ASSESSMENT & PLAN NOTE
Patient has  atrial fibrillation. Patient is currently in . [id number] Score: 3. The patients heart rate in the last 24 hours is as follows:  Pulse  Min: 59  Max: 87     Antiarrhythmics   BB on hold due to bradycardia from propofol gtt    Anticoagulants  enoxaparin injection 40 mg, Every 24 hours, Subcutaneous    Plan  - Replete lytes with a goal of K>4, Mg >2  - Patient's afib is currently stable, she has history of dash-tachy fluctuation, subsequent intubation on propofol gtt, now bradycardic, hold coreg for now, previous records showed on eliquis, care everywhere patient has not be seen by Cardiology except for and how during previous admission in August, unclear which medication patient is taking, will try and get with family member to verify medication patient taking at home and if she was taken off blood thinner at some point since her August admission

## 2024-12-02 NOTE — PROGRESS NOTES
[place] - Intensive [facility], [place]  General Surgery  Progress Note    Subjective:     History of Present Illness:  No notes on file    Post-Op Info:  * No surgery found *         Interval History: s/p R chest tube yesterday.  Remains ventilated.  Sedation weaned this AM with a RAAS of -2.  VSS.    Medications:  Continuous Infusions:   propofoL  0-50 mcg/kg/min Intravenous Continuous   Stopped at 12/02/24 0840     Scheduled Meds:   albuterol sulfate  2.5 mg Nebulization Q4H    [START ON 12/3/2024] aspirin  325 mg Per OG tube Daily    [START ON 12/3/2024] atorvastatin  40 mg Per OG tube Daily    carvediloL  3.125 mg Per OG tube BID    cefTRIAXone (Rocephin) IV (PEDS and ADULTS)  1 g Intravenous Q24H    enoxparin  40 mg Subcutaneous Q24H (prophylaxis, 1700)    furosemide (LASIX) injection  40 mg Intravenous Q12H    [START ON 12/3/2024] levothyroxine  50 mcg Per OG tube Before breakfast    magnesium sulfate IVPB  2 g Intravenous Once    methylPREDNISolone injection (PEDS and ADULTS)  80 mg Intravenous BID    mupirocin   Nasal BID    pantoprazole  40 mg Intravenous Daily    [START ON 12/3/2024] polyethylene glycol  17 g Per OG tube Daily    senna-docusate 8.6-50 mg  2 tablet Per OG tube Daily    [START ON 12/3/2024] valsartan  160 mg Per OG tube Daily     PRN Meds:  Current Facility-Administered Medications:     acetaminophen, 650 mg, Per OG tube, Q8H PRN    dextrose 10%, 12.5 g, Intravenous, PRN    dextrose 10%, 25 g, Intravenous, PRN    glucagon (human recombinant), 1 mg, Intramuscular, PRN    insulin aspart U-100, 0-5 Units, Subcutaneous, Q6H PRN    melatonin, 6 mg, Per OG tube, Nightly PRN    ondansetron, 4 mg, Intravenous, Q8H PRN    sodium chloride 0.9%, 10 mL, Intravenous, PRN    Flushing PICC/Midline Protocol, , , Until Discontinued **AND** sodium chloride 0.9%, 10 mL, Intravenous, Q12H PRN     Review of patient's allergies indicates:   Allergen Reactions    Pcn [penicillins] Swelling     Objective:     Vital Signs (Most  Recent):  Temp: 98.4 °F (36.9 °C) (12/02/24 0815)  Pulse: 74 (12/02/24 1207)  Resp: (!) 0 (12/02/24 1200)  BP: 121/60 (12/02/24 1200)  SpO2: 100 % (12/02/24 1200) Vital Signs (24h Range):  Temp:  [96.7 °F (35.9 °C)-98.4 °F (36.9 °C)] 98.4 °F (36.9 °C)  Pulse:  [59-87] 74  Resp:  [0-23] 0  SpO2:  [97 %-100 %] 100 %  BP: ()/(44-92) 121/60     Weight: 135 kg (297 lb 9.6 oz)  Body mass index is 51.08 kg/m².    Intake/Output - Last 3 Shifts         11/30 0700  12/01 0659 12/01 0700  12/02 0659 12/02 0700  12/03 0659    P.O. 0 0     I.V. (mL/kg) 674.6 (4.7) 600.1 (4.4)     NG/GT  90 30    Total Intake(mL/kg) 674.6 (4.7) 690.1 (5.1) 30 (0.2)    Urine (mL/kg/hr) 2050 (0.6) 1850 (0.6)     Drains 950 75     Stool 0 0     Chest Tube  90     Total Output 3000 2015     Net -2325.4 -1324.9 +30           Stool Occurrence 0 x 0 x 0 x             Physical Exam  Constitutional:       General: She is not in acute distress.     Appearance: She is obese. She is ill-appearing. She is not toxic-appearing.   Cardiovascular:      Rate and Rhythm: Normal rate and regular rhythm.   Pulmonary:      Effort: No respiratory distress.      Comments: Ventilator assisted breaths equal and nonlabored   R chest tube in place without air leak.    Abdominal:      General: There is no distension.      Palpations: Abdomen is soft.      Tenderness: There is no abdominal tenderness. There is no guarding.      Hernia: No hernia is present.   Skin:     Capillary Refill: Capillary refill takes less than 2 seconds.   Neurological:      Mental Status: Mental status is at baseline.          Significant Labs:  I have reviewed all pertinent lab results within the past 24 hours.  CBC:   Recent Labs   Lab 12/02/24  0349   WBC 12.36   RBC 3.96*   HGB 11.4*   HCT 35.1*      MCV 89   MCH 28.8   MCHC 32.5     CMP:   Recent Labs   Lab 12/02/24  0349   *   CALCIUM 9.0   ALBUMIN 2.1*   PROT 5.8*   *   K 4.1   CO2 35*      BUN 31*   CREATININE  1.8*   ALKPHOS 149*   ALT 18   AST 19   BILITOT 0.4       Significant Diagnostics:  I have reviewed all pertinent imaging results/findings within the past 24 hours.  Assessment/Plan:     Other pneumothorax  R pneumothorax likely secondary to ruptured bleb after increase in PEEP   Chest tube to water seal at 1300  Repeat CXR at 1700  Will continue to monitor         Isabell Raymond MD  General Surgery  [place] - Intensive [facility]

## 2024-12-02 NOTE — ASSESSMENT & PLAN NOTE
"Patient has Diastolic (HFpEF) heart failure that is Acute on chronic. On presentation their CHF was decompensated. Evidence of decompensated CHF on presentation includes: edema, orthopnea, dyspnea on exertion (MELISSA), and shortness of breath. The etiology of their decompensation is likely medication non-compliance?diet? Most recent BNP and echo results are listed below.  No results for input(s): "BNP" in the last 72 hours.  Latest ECHO  Results for orders placed during the hospital encounter of 07/28/24    Echo Saline Bubble? No; Ultrasound enhancing contrast? No    Interpretation Summary    Left Ventricle: The left ventricle is normal in size. Mildly increased wall thickness. There is normal systolic function with a visually estimated ejection fraction of 55 - 60%. Grade I diastolic dysfunction.    Right Ventricle: Normal right ventricular cavity size. Systolic function is normal.    Left Atrium: Left atrium is severely dilated. Atrial septum is bulging to the right.    Right Atrium: Right atrium is moderately dilated.    Aortic Valve: The aortic valve is a trileaflet valve. Mildly restricted motion. There is mild stenosis. Aortic valve area by VTI is 1.84 cm². Aortic valve peak velocity is 2.18 m/s. Mean gradient is 10 mmHg. The dimensionless index is 0.58. There is mild aortic regurgitation.    Mitral Valve: There is mild regurgitation.    Tricuspid Valve: There is mild regurgitation.    Pulmonic Valve: There is mild to moderate regurgitation.    No pericardial effusion.    Current Heart Failure Medications  carvediloL tablet 3.125 mg, 2 times daily, Oral  valsartan tablet 160 mg, Daily, Oral  furosemide injection 40 mg, Every 12 hours, Intravenous    Plan  - Monitor strict I&Os and daily weights.    - Place on telemetry  - Low sodium diet  - Place on fluid restriction of 1 L.   - Cardiology has been consulted, follow up recommendations  - The patient's volume status is stable but not at their baseline as indicated " by edema and shortness of breath and respiratory distress requiring intubation

## 2024-12-02 NOTE — ASSESSMENT & PLAN NOTE
R pneumothorax likely secondary to ruptured bleb after increase in PEEP   Chest tube to water seal at 1300  Repeat CXR at 1700  Will continue to monitor

## 2024-12-02 NOTE — PLAN OF CARE
Problem: Adult Inpatient Plan of Care  Goal: Plan of Care Review  Outcome: Progressing  Goal: Patient-Specific Goal (Individualized)  Outcome: Progressing  Goal: Absence of Hospital-Acquired Illness or Injury  Outcome: Progressing  Goal: Optimal Comfort and Wellbeing  Outcome: Progressing  Goal: Readiness for Transition of Care  Outcome: Progressing     Problem: Bariatric Environmental Safety  Goal: Safety Maintained with Care  Outcome: Progressing     Problem: Diabetes Comorbidity  Goal: Blood Glucose Level Within Targeted Range  Outcome: Progressing     Problem: Skin Injury Risk Increased  Goal: Skin Health and Integrity  Outcome: Progressing     Problem: Heart Failure  Goal: Optimal Coping  Outcome: Progressing  Goal: Optimal Cardiac Output  Outcome: Progressing  Goal: Stable Heart Rate and Rhythm  Outcome: Progressing  Goal: Optimal Functional Ability  Outcome: Progressing  Goal: Fluid and Electrolyte Balance  Outcome: Progressing  Goal: Improved Oral Intake  Outcome: Progressing  Goal: Effective Oxygenation and Ventilation  Outcome: Progressing  Goal: Effective Breathing Pattern During Sleep  Outcome: Progressing     Problem: COPD (Chronic Obstructive Pulmonary Disease)  Goal: Optimal Chronic Illness Coping  Outcome: Progressing  Goal: Optimal Level of Functional [place]  Outcome: Progressing  Goal: Absence of Infection Signs and Symptoms  Outcome: Progressing  Goal: Improved Oral Intake  Outcome: Progressing  Goal: Effective Oxygenation and Ventilation  Outcome: Progressing     Problem: Fluid Volume Excess  Goal: Fluid Balance  Outcome: Progressing     Problem: Fall Injury Risk  Goal: Absence of Fall and Fall-Related Injury  Outcome: Progressing     Problem: Restraint, Nonviolent  Goal: Absence of Harm or Injury  Outcome: Progressing     Problem: Infection  Goal: Absence of Infection Signs and Symptoms  Outcome: Progressing     Problem: Mechanical Ventilation Invasive  Goal: Effective  Communication  Outcome: Progressing  Goal: Optimal Device Function  Outcome: Progressing  Goal: Mechanical Ventilation Liberation  Outcome: Progressing  Goal: Optimal Nutrition Delivery  Outcome: Progressing  Goal: Absence of Device-Related Skin and Tissue Injury  Outcome: Progressing  Goal: Absence of Ventilator-Induced Lung Injury  Outcome: Progressing     Problem: Artificial Airway  Goal: Effective Communication  Outcome: Progressing  Goal: Optimal Device Function  Outcome: Progressing  Goal: Absence of Device-Related Skin or Tissue Injury  Outcome: Progressing     Problem: Noninvasive Ventilation Acute  Goal: Effective Unassisted Ventilation and Oxygenation  Outcome: Progressing

## 2024-12-02 NOTE — PLAN OF CARE
Recommendations  1. Rec'd Continuous EN: Suplena @ 10mL/r increasing by 5-10mL q4-6hrs to goal rate of 60mL/hr with FWF as per MD to provide 2592kcal (% EEN), 65g of protein (74% EPN), and 1062mL FW.   2. RD to follow and make rec's accordingly.  Goals:   1. Pt will be started on Nutrition Support by next RD follow up.  Nutrition Goal Status: new

## 2024-12-02 NOTE — ASSESSMENT & PLAN NOTE
11/30/24 Urine culture positive for aerococcus urinae. Start therapy with rocephin for 5 days total.   Estimated Creatinine Clearance: 38.1 mL/min (A) (based on SCr of 1.8 mg/dL (H)).  - Monitor I/Os

## 2024-12-02 NOTE — CONSULTS
[facility] - Intensive Care  Adult Nutrition  Consult Note    SUMMARY     Recommendations  1. Rec'd Continuous EN: Suplena @ 10mL/r increasing by 5-10mL q4-6hrs to goal rate of 60mL/hr with FWF as per MD to provide 2592kcal (% EEN), 65g of protein (74% EPN), and 1062mL FW.   2. RD to follow and make rec's accordingly.  Goals:   1. Pt will be started on Nutrition Support by next RD follow up.  Nutrition Goal Status: new  Communication of RD Recs: reviewed with RN    Assessment and Plan    Nutrition Problem  Inadequate oral intake    Related to (etiology):   NPO/Intubation status    Signs and Symptoms (as evidenced by):   0% PO intake     Interventions/Recommendations (treatment strategy):  q4-6hrs to goal rate of 60mL/hr with FWF as per MD to provide 2592kcal (% EEN), 65g of protein (74% EPN), and 1062mL FW.   2. RD to follow and make rec's accordingly.    Nutrition Diagnosis Status:   New    Malnutrition Assessment  NFPE not appropriate at this time. RD to continue to monitor for signs and symptoms of malnutrition.    Reason for Assessment    Reason For Assessment: identified at risk by screening criteria (Intubated)  Diagnosis: other (see comments) (Acute on Chronic Diastolic Congestive Heart Failure)  General Information Comments: RD triggered for intubation status. Pt was intubated on 11/30/24. Nutrition support not initiated within the first 48hrs of intubation. RN stated pt has fluid overload. Will provide EN rec's for when medically appropriate to start nutrition support. RD to follow and make rec's accordingly.  Nutrition Discharge Planning: TBD as care progresses    Nutrition Risk Screen    Nutrition Risk Screen: no indicators present    Nutrition/Diet History    Patient Reported Diet/Restrictions/Preferences: general  Spiritual, Cultural Beliefs, Moravian Practices, Values that Affect Care: no  Food Allergies: NKFA  Factors Affecting Nutritional Intake: None identified at this  "time    Anthropometrics    Temp: 98.4 °F (36.9 °C)  Height Method: Measured  Height: 5' 4" (162.6 cm)  Height (inches): 64 in  Weight Method: Bed Scale  Weight: 135 kg (297 lb 9.9 oz)  Weight (lb): 297.62 lb  Ideal Body Weight (IBW), Female: 120 lb  % Ideal Body Weight, Female (lb): 248.02 %  BMI (Calculated): 51.1  BMI Grade: greater than 40 - morbid obesity  Weight Loss Since Admission: 17 lb  % Weight Change Since Admission: 5.71    Lab/Procedures/Meds    Pertinent Labs Reviewed: reviewed  Pertinent Medications Reviewed: reviewed    Estimated/Assessed Needs    Weight Used For Calorie Calculations: 135 kg (297 lb 9.9 oz)  Energy Calorie Requirements (kcal): 2508  Energy Need Method: [place] (modified)  Protein Requirements:  (1.6-2.0g/kg IBW)  Weight Used For Protein Calculations: 54.4 kg (120 lb) (IBW)  Fluid Requirements (mL): 2508 (or as per MD)  Estimated Fluid Requirement Method: RDA Method  RDA Method (mL): 2508    Nutrition Prescription Ordered    Current Diet Order: NPO  Oral Nutrition Supplement: None    Evaluation of Received Nutrient/Fluid Intake    % Kcal Needs: 0%  % Protein Needs: 0%  Energy Calories Required: not meeting needs  Protein Required: not meeting needs  Tolerance: other (see comments) (Nutrition Support not initiated at this time)  % Intake of Estimated Energy Needs: 0 - 25 %  % Meal Intake: NPO    Nutrition Risk    Level of Risk/Frequency of Follow-up: moderate     Monitor and Evaluation    Food and Nutrient Intake: enteral nutrition intake, parenteral nutrition intake  Food and Nutrient Adminstration: enteral and parenteral nutrition administration  Knowledge/Beliefs/Attitudes: food and nutrition knowledge/skill, beliefs and attitudes  Physical Activity and Function: nutrition-related ADLs and IADLs  Anthropometric Measurements: height/length, weight, weight change, body mass index  Biochemical Data, Medical Tests and Procedures: electrolyte and renal panel, gastrointestinal " profile, glucose/endocrine profile, inflammatory profile, lipid profile  Nutrition-Focused Physical Findings: overall appearance     Nutrition Follow-Up    RD Follow-up?: Yes

## 2024-12-02 NOTE — NURSING
Pt remains on the vent. Setting unchanged during the night. EICU requested suctioning to assess changes in pressures. No new orders. Diprivan titrated, improvement with B/P. See flowsheet. (30mcg/kg/min). Chest tube in place. Suctioning with serosanguineous drainage 60ml. Restraints maintained to prevent inadvertently removing tubes. No injuries. [place] maintained. 900 ml of yellow urine.[place]wick care as ordered. Weight obtained. OG in place. SCD's maintained. Repositioned. Glucose monitoring as ordered. Tolerating all. Afebrile this shift. No BM. Continue to observe.

## 2024-12-02 NOTE — ASSESSMENT & PLAN NOTE
Patient's FSGs are controlled on current medication regimen.  Last A1c reviewed-   Lab Results   Component Value Date    HGBA1C 5.2 11/30/2024     Most recent fingerstick glucose reviewed-   Recent Labs   Lab 12/01/24  1703 12/01/24  2351   POCTGLUCOSE 142* 149*     Current correctional scale  Low  Maintain anti-hyperglycemic dose as follows-   Antihyperglycemics (From admission, onward)      Start     Stop Route Frequency Ordered    11/30/24 1324  insulin aspart U-100 pen 0-5 Units         -- SubQ Every 6 hours PRN 11/30/24 1225          Hold Oral hypoglycemics while patient is in the hospital.

## 2024-12-02 NOTE — ASSESSMENT & PLAN NOTE
Body mass index is 51.08 kg/m². Morbid obesity complicates all aspects of disease management from diagnostic modalities to treatment. Weight loss encouraged and health benefits explained to patient.     Wt Readings from Last 8 Encounters:   12/02/24 135 kg (297 lb 9.6 oz)   10/16/24 113.4 kg (250 lb)   09/22/24 113.4 kg (250 lb)   08/14/24 119.7 kg (263 lb 14.3 oz)   07/29/24 104.3 kg (229 lb 15 oz)   06/25/24 104.6 kg (230 lb 11.2 oz)   06/24/24 104.3 kg (230 lb)   06/20/24 102.5 kg (226 lb)

## 2024-12-03 LAB
AC: 18
ALBUMIN SERPL BCP-MCNC: 1.9 G/DL (ref 3.5–5.2)
ALP SERPL-CCNC: 118 U/L (ref 55–135)
ALT SERPL W/O P-5'-P-CCNC: 13 U/L (ref 10–44)
ANION GAP SERPL CALC-SCNC: 2 MMOL/L (ref 3–11)
AST SERPL-CCNC: 13 U/L (ref 10–40)
BASOPHILS # BLD AUTO: 0.01 K/UL (ref 0–0.2)
BASOPHILS NFR BLD: 0.1 % (ref 0–1.9)
BILIRUB SERPL-MCNC: 0.4 MG/DL (ref 0.1–1)
BUN SERPL-MCNC: 37 MG/DL (ref 8–23)
CALCIUM SERPL-MCNC: 9 MG/DL (ref 8.7–10.5)
CHLORIDE SERPL-SCNC: 111 MMOL/L (ref 95–110)
CO2 SERPL-SCNC: 36 MMOL/L (ref 23–29)
CREAT SERPL-MCNC: 1.7 MG/DL (ref 0.5–1.4)
DIFFERENTIAL METHOD BLD: ABNORMAL
EOSINOPHIL # BLD AUTO: 0 K/UL (ref 0–0.5)
EOSINOPHIL NFR BLD: 0 % (ref 0–8)
ERYTHROCYTE [DISTWIDTH] IN BLOOD BY AUTOMATED COUNT: 15.7 % (ref 11.5–14.5)
EST. GFR  (NO RACE VARIABLE): 31.5 ML/MIN/1.73 M^2
FIO2: 40 %
GLUCOSE SERPL-MCNC: 113 MG/DL (ref 70–110)
HCT VFR BLD AUTO: 31.2 % (ref 37–48.5)
HGB BLD-MCNC: 10.5 G/DL (ref 12–16)
IMM GRANULOCYTES # BLD AUTO: 0.07 K/UL (ref 0–0.04)
IMM GRANULOCYTES NFR BLD AUTO: 0.6 % (ref 0–0.5)
LYMPHOCYTES # BLD AUTO: 0.8 K/UL (ref 1–4.8)
LYMPHOCYTES NFR BLD: 7.4 % (ref 18–48)
MAGNESIUM SERPL-MCNC: 2.1 MG/DL (ref 1.6–2.6)
MCH RBC QN AUTO: 30.8 PG (ref 27–31)
MCHC RBC AUTO-ENTMCNC: 33.7 G/DL (ref 32–36)
MCV RBC AUTO: 92 FL (ref 82–98)
MODIFIED ALLEN'S TEST: ABNORMAL
MONOCYTES # BLD AUTO: 0.8 K/UL (ref 0.3–1)
MONOCYTES NFR BLD: 6.8 % (ref 4–15)
NEUTROPHILS # BLD AUTO: 9.6 K/UL (ref 1.8–7.7)
NEUTROPHILS NFR BLD: 85.1 % (ref 38–73)
NRBC BLD-RTO: 0 /100 WBC
O2DEVICE: ABNORMAL
PCO2 BLDA: 52.7 MMHG (ref 35–45)
PEAK FLOW: 50
PEEP: 6
PH SMN: 7.42 [PH] (ref 7.34–7.45)
PLATELET # BLD AUTO: 208 K/UL (ref 150–450)
PMV BLD AUTO: 8.7 FL (ref 9.2–12.9)
PO2 BLDA: 72.2 MMHG (ref 80–100)
POC BASE DEFICIT: 9.9 MMOL/L (ref -2–2)
POC HCO3: 32.3 MMOL/L (ref 24–28)
POC PERFORMED BY: ABNORMAL
POC SATURATED O2: 94.2 % (ref 95–100)
POC TEMPERATURE: 37 C
POCT GLUCOSE: 108 MG/DL (ref 70–110)
POCT GLUCOSE: 114 MG/DL (ref 70–110)
POTASSIUM SERPL-SCNC: 3.7 MMOL/L (ref 3.5–5.1)
PROT SERPL-MCNC: 5.4 G/DL (ref 6–8.4)
RBC # BLD AUTO: 3.41 M/UL (ref 4–5.4)
SODIUM SERPL-SCNC: 149 MMOL/L (ref 136–145)
SPECIMEN SOURCE: ABNORMAL
VT: 400
WBC # BLD AUTO: 11.28 K/UL (ref 3.9–12.7)

## 2024-12-03 PROCEDURE — 99232 SBSQ HOSP IP/OBS MODERATE 35: CPT | Mod: GW,,, | Performed by: STUDENT IN AN ORGANIZED HEALTH CARE EDUCATION/TRAINING PROGRAM

## 2024-12-03 PROCEDURE — 94003 VENT MGMT INPAT SUBQ DAY: CPT

## 2024-12-03 PROCEDURE — 83735 ASSAY OF MAGNESIUM: CPT | Performed by: INTERNAL MEDICINE

## 2024-12-03 PROCEDURE — 82803 BLOOD GASES ANY COMBINATION: CPT

## 2024-12-03 PROCEDURE — 99900026 HC AIRWAY MAINTENANCE (STAT)

## 2024-12-03 PROCEDURE — 99900035 HC TECH TIME PER 15 MIN (STAT)

## 2024-12-03 PROCEDURE — 27100171 HC OXYGEN HIGH FLOW UP TO 24 HOURS

## 2024-12-03 PROCEDURE — 25000003 PHARM REV CODE 250: Performed by: STUDENT IN AN ORGANIZED HEALTH CARE EDUCATION/TRAINING PROGRAM

## 2024-12-03 PROCEDURE — 94761 N-INVAS EAR/PLS OXIMETRY MLT: CPT

## 2024-12-03 PROCEDURE — 99900031 HC PATIENT EDUCATION (STAT)

## 2024-12-03 PROCEDURE — 63600175 PHARM REV CODE 636 W HCPCS: Performed by: INTERNAL MEDICINE

## 2024-12-03 PROCEDURE — 20000000 HC ICU ROOM

## 2024-12-03 PROCEDURE — 80053 COMPREHEN METABOLIC PANEL: CPT | Performed by: INTERNAL MEDICINE

## 2024-12-03 PROCEDURE — 85025 COMPLETE CBC W/AUTO DIFF WBC: CPT | Performed by: INTERNAL MEDICINE

## 2024-12-03 PROCEDURE — 36600 WITHDRAWAL OF ARTERIAL BLOOD: CPT

## 2024-12-03 PROCEDURE — 94640 AIRWAY INHALATION TREATMENT: CPT

## 2024-12-03 PROCEDURE — 36415 COLL VENOUS BLD VENIPUNCTURE: CPT | Performed by: INTERNAL MEDICINE

## 2024-12-03 PROCEDURE — 63600175 PHARM REV CODE 636 W HCPCS: Performed by: STUDENT IN AN ORGANIZED HEALTH CARE EDUCATION/TRAINING PROGRAM

## 2024-12-03 PROCEDURE — 99233 SBSQ HOSP IP/OBS HIGH 50: CPT | Mod: GW,,, | Performed by: STUDENT IN AN ORGANIZED HEALTH CARE EDUCATION/TRAINING PROGRAM

## 2024-12-03 PROCEDURE — 25000242 PHARM REV CODE 250 ALT 637 W/ HCPCS: Performed by: INTERNAL MEDICINE

## 2024-12-03 RX ADMIN — ALBUTEROL SULFATE 2.5 MG: 2.5 SOLUTION RESPIRATORY (INHALATION) at 07:12

## 2024-12-03 RX ADMIN — ATORVASTATIN CALCIUM 40 MG: 40 TABLET, FILM COATED ORAL at 09:12

## 2024-12-03 RX ADMIN — METHYLPREDNISOLONE SODIUM SUCCINATE 80 MG: 40 INJECTION, POWDER, FOR SOLUTION INTRAMUSCULAR; INTRAVENOUS at 09:12

## 2024-12-03 RX ADMIN — ALBUTEROL SULFATE 2.5 MG: 2.5 SOLUTION RESPIRATORY (INHALATION) at 11:12

## 2024-12-03 RX ADMIN — VALSARTAN 160 MG: 80 TABLET, FILM COATED ORAL at 09:12

## 2024-12-03 RX ADMIN — PROPOFOL 15 MCG/KG/MIN: 10 INJECTION, EMULSION INTRAVENOUS at 04:12

## 2024-12-03 RX ADMIN — ASPIRIN 325 MG ORAL TABLET 325 MG: 325 PILL ORAL at 09:12

## 2024-12-03 RX ADMIN — ENOXAPARIN SODIUM 40 MG: 40 INJECTION SUBCUTANEOUS at 05:12

## 2024-12-03 RX ADMIN — ALBUTEROL SULFATE 2.5 MG: 2.5 SOLUTION RESPIRATORY (INHALATION) at 03:12

## 2024-12-03 RX ADMIN — MUPIROCIN: 20 OINTMENT TOPICAL at 08:12

## 2024-12-03 RX ADMIN — FUROSEMIDE 40 MG: 10 INJECTION, SOLUTION INTRAMUSCULAR; INTRAVENOUS at 09:12

## 2024-12-03 RX ADMIN — MUPIROCIN: 20 OINTMENT TOPICAL at 09:12

## 2024-12-03 RX ADMIN — POLYETHYLENE GLYCOL (3350) 17 G: 17 POWDER, FOR SOLUTION ORAL at 09:12

## 2024-12-03 RX ADMIN — PROPOFOL 10 MCG/KG/MIN: 10 INJECTION, EMULSION INTRAVENOUS at 05:12

## 2024-12-03 RX ADMIN — CARVEDILOL 3.12 MG: 3.12 TABLET, FILM COATED ORAL at 09:12

## 2024-12-03 RX ADMIN — ALBUTEROL SULFATE 2.5 MG: 2.5 SOLUTION RESPIRATORY (INHALATION) at 04:12

## 2024-12-03 RX ADMIN — PANTOPRAZOLE SODIUM 40 MG: 40 INJECTION, POWDER, LYOPHILIZED, FOR SOLUTION INTRAVENOUS at 09:12

## 2024-12-03 RX ADMIN — CEFTRIAXONE SODIUM 1 G: 1 INJECTION, POWDER, FOR SOLUTION INTRAMUSCULAR; INTRAVENOUS at 02:12

## 2024-12-03 RX ADMIN — LEVOTHYROXINE SODIUM 50 MCG: 50 TABLET ORAL at 05:12

## 2024-12-03 RX ADMIN — SENNOSIDES AND DOCUSATE SODIUM 2 TABLET: 50; 8.6 TABLET ORAL at 09:12

## 2024-12-03 NOTE — ASSESSMENT & PLAN NOTE
Patient developed pneumothorax right lower lobe, moderate, surgery consulted for chest tube placement and eval.  Right chest tube in place, clamped  CXR with no interval changes.   - f/u GS recommendations

## 2024-12-03 NOTE — PLAN OF CARE
Problem: Adult Inpatient Plan of Care  Goal: Plan of Care Review  Outcome: Progressing  Goal: Patient-Specific Goal (Individualized)  Outcome: Progressing  Goal: Absence of Hospital-Acquired Illness or Injury  Outcome: Progressing  Goal: Optimal Comfort and Wellbeing  Outcome: Progressing  Goal: Readiness for Transition of Care  Outcome: Progressing     Problem: Bariatric Environmental Safety  Goal: Safety Maintained with Care  Outcome: Progressing     Problem: Diabetes Comorbidity  Goal: Blood Glucose Level Within Targeted Range  Outcome: Progressing     Problem: Skin Injury Risk Increased  Goal: Skin Health and Integrity  Outcome: Progressing     Problem: Heart Failure  Goal: Optimal Coping  Outcome: Progressing  Goal: Optimal Cardiac Output  Outcome: Progressing  Goal: Stable Heart Rate and Rhythm  Outcome: Progressing  Goal: Optimal Functional Ability  Outcome: Not Progressing  Goal: Fluid and Electrolyte Balance  Outcome: Progressing  Goal: Improved Oral Intake  Outcome: Not Progressing  Goal: Effective Oxygenation and Ventilation  Outcome: Progressing  Goal: Effective Breathing Pattern During Sleep  Outcome: Not Progressing     Problem: COPD (Chronic Obstructive Pulmonary Disease)  Goal: Optimal Chronic Illness Coping  Outcome: Not Progressing  Goal: Optimal Level of Functional [place]  Outcome: Not Progressing  Goal: Absence of Infection Signs and Symptoms  Outcome: Not Progressing  Goal: Improved Oral Intake  Outcome: Not Progressing  Goal: Effective Oxygenation and Ventilation  Outcome: Not Progressing     Problem: Fluid Volume Excess  Goal: Fluid Balance  Outcome: Not Progressing     Problem: Fall Injury Risk  Goal: Absence of Fall and Fall-Related Injury  Outcome: Not Progressing     Problem: Restraint, Nonviolent  Goal: Absence of Harm or Injury  Outcome: Not Progressing     Problem: Infection  Goal: Absence of Infection Signs and Symptoms  Outcome: Not Progressing     Problem: Mechanical  Ventilation Invasive  Goal: Effective Communication  Outcome: Not Progressing  Goal: Optimal Device Function  Outcome: Not Progressing  Goal: Mechanical Ventilation Liberation  Outcome: Progressing  Goal: Optimal Nutrition Delivery  Outcome: Progressing  Goal: Absence of Device-Related Skin and Tissue Injury  Outcome: Progressing  Goal: Absence of Ventilator-Induced Lung Injury  Outcome: Progressing     Problem: Artificial Airway  Goal: Effective Communication  Outcome: Progressing  Goal: Optimal Device Function  Outcome: Progressing  Goal: Absence of Device-Related Skin or Tissue Injury  Outcome: Progressing     Problem: Noninvasive Ventilation Acute  Goal: Effective Unassisted Ventilation and Oxygenation  Outcome: Not Progressing

## 2024-12-03 NOTE — ASSESSMENT & PLAN NOTE
"Patient has Diastolic (HFpEF) heart failure that is Acute on chronic. On presentation their CHF was decompensated. Evidence of decompensated CHF on presentation includes: edema, orthopnea, dyspnea on exertion (MELISSA), and shortness of breath. The etiology of their decompensation is likely medication non-compliance?diet? Most recent BNP and echo results are listed below.  No results for input(s): "BNP" in the last 72 hours.  Latest ECHO  Results for orders placed during the hospital encounter of 07/28/24    Echo Saline Bubble? No; Ultrasound enhancing contrast? No    Interpretation Summary    Left Ventricle: The left ventricle is normal in size. Mildly increased wall thickness. There is normal systolic function with a visually estimated ejection fraction of 55 - 60%. Grade I diastolic dysfunction.    Right Ventricle: Normal right ventricular cavity size. Systolic function is normal.    Left Atrium: Left atrium is severely dilated. Atrial septum is bulging to the right.    Right Atrium: Right atrium is moderately dilated.    Aortic Valve: The aortic valve is a trileaflet valve. Mildly restricted motion. There is mild stenosis. Aortic valve area by VTI is 1.84 cm². Aortic valve peak velocity is 2.18 m/s. Mean gradient is 10 mmHg. The dimensionless index is 0.58. There is mild aortic regurgitation.    Mitral Valve: There is mild regurgitation.    Tricuspid Valve: There is mild regurgitation.    Pulmonic Valve: There is mild to moderate regurgitation.    No pericardial effusion.    Current Heart Failure Medications  carvediloL tablet 3.125 mg, 2 times daily, Per OG tube  valsartan tablet 160 mg, Daily, Per OG tube  furosemide injection 40 mg, Daily, Intravenous    Plan  - Monitor strict I&Os and daily weights.    - Place on telemetry  - Low sodium diet  - Place on fluid restriction of 1 L.   - Cardiology has been consulted, follow up recommendations  - The patient's volume status is stable but not at their " baseline as indicated by edema and shortness of breath and respiratory distress requiring intubation

## 2024-12-03 NOTE — SUBJECTIVE & OBJECTIVE
Interval History: Patient seen and examined.     Review of Systems   Unable to perform ROS: Intubated   Respiratory:  Negative for choking, chest tightness and shortness of breath.    Cardiovascular:  Negative for chest pain and palpitations.   Neurological:  Positive for weakness.     Objective:     Vital Signs (Most Recent):  Temp: 98 °F (36.7 °C) (12/03/24 0702)  Pulse: 82 (12/03/24 0925)  Resp: (!) 28 (12/03/24 0925)  BP: 130/65 (12/03/24 0900)  SpO2: (!) 92 % (12/03/24 0925) Vital Signs (24h Range):  Temp:  [96.6 °F (35.9 °C)-99 °F (37.2 °C)] 98 °F (36.7 °C)  Pulse:  [54-82] 82  Resp:  [0-36] 28  SpO2:  [85 %-100 %] 92 %  BP: ()/(52-65) 130/65     Weight: 133.6 kg (294 lb 8.6 oz)  Body mass index is 50.56 kg/m².    Intake/Output Summary (Last 24 hours) at 12/3/2024 1009  Last data filed at 12/3/2024 0520  Gross per 24 hour   Intake 431.25 ml   Output 2440 ml   Net -2008.75 ml         Physical Exam  Constitutional:       General: She is not in acute distress.     Appearance: She is obese. She is ill-appearing. She is not toxic-appearing.   Cardiovascular:      Rate and Rhythm: Normal rate and regular rhythm.   Pulmonary:      Effort: No respiratory distress.      Comments: Sedated on proprofol. Ventilator assisted breaths equal and nonlabored   R chest tube in place, clamped, no airleak  Abdominal:      General: There is no distension.      Palpations: Abdomen is soft.      Tenderness: There is no abdominal tenderness. There is no guarding.      Hernia: No hernia is present.   Skin:     Capillary Refill: Capillary refill takes less than 2 seconds.   Neurological:      Motor: Weakness present.             Significant Labs: All pertinent labs within the past 24 hours have been reviewed.  A1C:   Recent Labs   Lab 07/28/24  2256 11/30/24  1237   HGBA1C 5.3 5.2     ABGs:   Recent Labs   Lab 12/02/24  0510 12/03/24  0449   PH 7.395 7.423   PCO2 52.4* 52.7*   HCO3 29.8* 32.3*   POCSATURATED 96.9 94.2*   PO2 87.3  72.2*     Bilirubin:   Recent Labs   Lab 11/29/24  1339 11/30/24  0340 12/01/24  0600 12/02/24  0349 12/03/24  0356   BILITOT 0.6 0.4 0.5 0.4 0.4     Recent Labs   Lab 12/03/24  0356   *   *   K 3.7   *   CO2 36*   BUN 37*   CREATININE 1.7*   CALCIUM 9.0   MG 2.1     CBC:   Recent Labs   Lab 12/02/24  0349 12/03/24  0356   WBC 12.36 11.28   HGB 11.4* 10.5*   HCT 35.1* 31.2*    208     CMP:   Recent Labs   Lab 12/02/24  0349 12/03/24  0356   * 149*   K 4.1 3.7    111*   CO2 35* 36*   * 113*   BUN 31* 37*   CREATININE 1.8* 1.7*   CALCIUM 9.0 9.0   PROT 5.8* 5.4*   ALBUMIN 2.1* 1.9*   BILITOT 0.4 0.4   ALKPHOS 149* 118   AST 19 13   ALT 18 13   ANIONGAP 3 2*     Recent Labs   Lab 12/02/24  0349 12/03/24  0356   MG 1.8 2.1     POCT Glucose:   Recent Labs   Lab 12/02/24  1334 12/02/24  1751 12/02/24  2357   POCTGLUCOSE 123* 110 114*     Recent Labs   Lab 08/10/24  0335   TSH 0.632     X-Ray Chest 1 View  Narrative: EXAMINATION:  XR CHEST 1 VIEW    CLINICAL HISTORY:  h/o right pneumo.  Chest tube to water seal at 1300;    COMPARISON:  12/01/2024 at 11:51 hours    FINDINGS:  Cardiac silhouette is unchanged.  ET tube, NG tube, right thoracostomy tube and right PICC remain.  No obvious pneumothorax identified.  No new infiltrate or obvious pleural effusion.  Impression: No significant interval change.    Electronically signed by: Abran Pelletier MD  Date:    12/02/2024  Time:    22:24       Significant Imaging: I have reviewed all pertinent imaging results/findings within the past 24 hours.

## 2024-12-03 NOTE — PROGRESS NOTES
[facility] Medicine  Progress Note    Patient Name: Carmen Tan  MRN: [id number]  Patient Class: IP- Inpatient   Admission Date: 11/29/2024  Length of Stay: 4 days  Attending Physician: Gunnar Mott DO  Primary Care Provider: Lucian Barry MD        Subjective     Principal Problem:Acute on chronic diastolic congestive heart failure        HPI:  Patient 74-year-old female past medical history of chronic diastolic heart failure, AFib, chronic respiratory failure with hypoxemia and hypercapnia, anxiety depression, type 2 diabetes, GERD, hypothyroidism.  Patient came in after noted to be short of breath at home, patient was satting in the 70 on home BiPAP therapy, was brought to the ED noted to be in distress tachycardic hypotensive hypoxic, workup in the ED patient with enlarged cardiac silhouette, chronic changes no acute finding, elevated BNP 25,000, mild elevation in troponin negative COVID no leukocytosis, patient was given nitroglycerin and Lasix injection, admitted to ICU.    Overview/Hospital Course:  12/1 AA, weekend crosscover, overnight patient had two ABG, eICU MD adjusted vent settings, had repeat chest x-ray which showed moderate right thorax, vital signs stable, O2 sat maintaining, surgery consulted for chest tube placement, patient was started on steroids yesterday for COPD flare, diuresis in progress, continue to monitor urine output, follow-up chest x-ray after chest tube placement, follow-up with surgery rec  12/2/24 ABG 7.395/52.4/87.3/29.8  improving respiratory status on AC Vt 450, RR18, PEEP 6, FiO2 55%, SpO2 98%, proprofol 25mcg/kg/min. CXR with right pneumothorax resolved, diuresing on scheduled IV lasix with adequate urine output. Patient minimally responsive to sternal rub. No elevated temperature. Urine culture positive for gram positive aerococcus spp, add IV rocephin. Continue with vent weaning and daily SBT, scheduled breathing treatments,  solumedrol. Follow up recommendations cardiology. Follow up general surgery with chest tube management.    12/3/24 ABG 7.423/52.7/72.7/32.3, FiO2 40%. Off sedation awake and following commands. CPAP trial followed with tachypnea 40s, will maintain current vent settings and prepare for SBT and extubation tomorrow AM. Right lateral chest tube has been clamped. CXR overnight unchanged, no new pneumothorax. Chest tube management per GS. BMx1. Start enteral trickle feeds and advance per protocol.     Interval History: Patient seen and examined.     Review of Systems   Unable to perform ROS: Intubated   Respiratory:  Negative for choking, chest tightness and shortness of breath.    Cardiovascular:  Negative for chest pain and palpitations.   Neurological:  Positive for weakness.     Objective:     Vital Signs (Most Recent):  Temp: 98 °F (36.7 °C) (12/03/24 0702)  Pulse: 82 (12/03/24 0925)  Resp: (!) 28 (12/03/24 0925)  BP: 130/65 (12/03/24 0900)  SpO2: (!) 92 % (12/03/24 0925) Vital Signs (24h Range):  Temp:  [96.6 °F (35.9 °C)-99 °F (37.2 °C)] 98 °F (36.7 °C)  Pulse:  [54-82] 82  Resp:  [0-36] 28  SpO2:  [85 %-100 %] 92 %  BP: ()/(52-65) 130/65     Weight: 133.6 kg (294 lb 8.6 oz)  Body mass index is 50.56 kg/m².    Intake/Output Summary (Last 24 hours) at 12/3/2024 1009  Last data filed at 12/3/2024 0520  Gross per 24 hour   Intake 431.25 ml   Output 2440 ml   Net -2008.75 ml         Physical Exam  Constitutional:       General: She is not in acute distress.     Appearance: She is obese. She is ill-appearing. She is not toxic-appearing.   Cardiovascular:      Rate and Rhythm: Normal rate and regular rhythm.   Pulmonary:      Effort: No respiratory distress.      Comments: Sedated on proprofol. Ventilator assisted breaths equal and nonlabored   R chest tube in place, clamped, no airleak  Abdominal:      General: There is no distension.      Palpations: Abdomen is soft.      Tenderness: There is no abdominal  tenderness. There is no guarding.      Hernia: No hernia is present.   Skin:     Capillary Refill: Capillary refill takes less than 2 seconds.   Neurological:      Motor: Weakness present.             Significant Labs: All pertinent labs within the past 24 hours have been reviewed.  A1C:   Recent Labs   Lab 07/28/24  2256 11/30/24  1237   HGBA1C 5.3 5.2     ABGs:   Recent Labs   Lab 12/02/24  0510 12/03/24  0449   PH 7.395 7.423   PCO2 52.4* 52.7*   HCO3 29.8* 32.3*   POCSATURATED 96.9 94.2*   PO2 87.3 72.2*     Bilirubin:   Recent Labs   Lab 11/29/24  1339 11/30/24  0340 12/01/24  0600 12/02/24  0349 12/03/24  0356   BILITOT 0.6 0.4 0.5 0.4 0.4     Recent Labs   Lab 12/03/24  0356   *   *   K 3.7   *   CO2 36*   BUN 37*   CREATININE 1.7*   CALCIUM 9.0   MG 2.1     CBC:   Recent Labs   Lab 12/02/24  0349 12/03/24  0356   WBC 12.36 11.28   HGB 11.4* 10.5*   HCT 35.1* 31.2*    208     CMP:   Recent Labs   Lab 12/02/24  0349 12/03/24  0356   * 149*   K 4.1 3.7    111*   CO2 35* 36*   * 113*   BUN 31* 37*   CREATININE 1.8* 1.7*   CALCIUM 9.0 9.0   PROT 5.8* 5.4*   ALBUMIN 2.1* 1.9*   BILITOT 0.4 0.4   ALKPHOS 149* 118   AST 19 13   ALT 18 13   ANIONGAP 3 2*     Recent Labs   Lab 12/02/24  0349 12/03/24  0356   MG 1.8 2.1     POCT Glucose:   Recent Labs   Lab 12/02/24  1334 12/02/24  1751 12/02/24  2357   POCTGLUCOSE 123* 110 114*     Recent Labs   Lab 08/10/24  0335   TSH 0.632     X-Ray Chest 1 View  Narrative: EXAMINATION:  XR CHEST 1 VIEW    CLINICAL HISTORY:  h/o right pneumo.  Chest tube to water seal at 1300;    COMPARISON:  12/01/2024 at 11:51 hours    FINDINGS:  Cardiac silhouette is unchanged.  ET tube, NG tube, right thoracostomy tube and right PICC remain.  No obvious pneumothorax identified.  No new infiltrate or obvious pleural effusion.  Impression: No significant interval change.    Electronically signed by: Abran Pelletier MD  Date:    12/02/2024  Time:    22:24    "    Significant Imaging: I have reviewed all pertinent imaging results/findings within the past 24 hours.    Assessment and Plan     * Acute on chronic diastolic congestive heart failure  Patient has Diastolic (HFpEF) heart failure that is Acute on chronic. On presentation their CHF was decompensated. Evidence of decompensated CHF on presentation includes: edema, orthopnea, dyspnea on exertion (MELISSA), and shortness of breath. The etiology of their decompensation is likely medication non-compliance?diet? Most recent BNP and echo results are listed below.  No results for input(s): "BNP" in the last 72 hours.  Latest ECHO  Results for orders placed during the hospital encounter of 07/28/24    Echo Saline Bubble? No; Ultrasound enhancing contrast? No    Interpretation Summary    Left Ventricle: The left ventricle is normal in size. Mildly increased wall thickness. There is normal systolic function with a visually estimated ejection fraction of 55 - 60%. Grade I diastolic dysfunction.    Right Ventricle: Normal right ventricular cavity size. Systolic function is normal.    Left Atrium: Left atrium is severely dilated. Atrial septum is bulging to the right.    Right Atrium: Right atrium is moderately dilated.    Aortic Valve: The aortic valve is a trileaflet valve. Mildly restricted motion. There is mild stenosis. Aortic valve area by VTI is 1.84 cm². Aortic valve peak velocity is 2.18 m/s. Mean gradient is 10 mmHg. The dimensionless index is 0.58. There is mild aortic regurgitation.    Mitral Valve: There is mild regurgitation.    Tricuspid Valve: There is mild regurgitation.    Pulmonic Valve: There is mild to moderate regurgitation.    No pericardial effusion.    Current Heart Failure Medications  carvediloL tablet 3.125 mg, 2 times daily, Per OG tube  valsartan tablet 160 mg, Daily, Per OG tube  furosemide injection 40 mg, Daily, Intravenous    Plan  - Monitor strict I&Os and daily weights.    - Place on telemetry  - " Low sodium diet  - Place on fluid restriction of 1 L.   - Cardiology has been consulted, follow up recommendations  - The patient's volume status is stable but not at their baseline as indicated by edema and shortness of breath and respiratory distress requiring intubation        ACP (advance care planning)  12/3/24 Patient once extubated she does not want to be re-intubated      Other pneumothorax  Patient developed pneumothorax right lower lobe, moderate, surgery consulted for chest tube placement and eval.  Right chest tube in place, clamped  CXR with no interval changes.   - f/u GS recommendations        Hard to intubate  D3 intubation, tachypnea >40s after CPAP trial.  Sedated on proprofol  - start enteral suplena feeds per protocol   - ABG SBT in AM, plan for extubation tomorrow      Hypothyroidism  Continue levothyroxine       HLD (hyperlipidemia)  Continue statin      Gastroesophageal reflux disease without esophagitis  PPI on board      Acute cystitis without hematuria  11/30/24 Urine culture positive for aerococcus urinae. Start therapy with rocephin for 5 days total.   Estimated Creatinine Clearance: 40.2 mL/min (A) (based on SCr of 1.7 mg/dL (H)).  - continue rocephin  - Monitor I/Os       Anxiety and depression  Patient has persistent depression which is unknown and is currently controlled. Will Continue anti-depressant medications. We will not consult psychiatry at this time. Patient does not display psychosis at this time. Continue to monitor closely and adjust plan of care as needed.        Diabetes mellitus, type 2  Patient's FSGs are controlled on current medication regimen.  Last A1c reviewed-   Lab Results   Component Value Date    HGBA1C 5.2 11/30/2024     Most recent fingerstick glucose reviewed-   Recent Labs   Lab 12/02/24  1334 12/02/24  1751 12/02/24  2357   POCTGLUCOSE 123* 110 114*       Current correctional scale  Low  Maintain anti-hyperglycemic dose as follows-   Antihyperglycemics  (From admission, onward)      Start     Stop Route Frequency Ordered    11/30/24 1324  insulin aspart U-100 pen 0-5 Units         -- SubQ Every 6 hours PRN 11/30/24 1225          Hold Oral hypoglycemics while patient is in the hospital.    Severe obesity (BMI >= 40)  Body mass index is 50.56 kg/m². Morbid obesity complicates all aspects of disease management from diagnostic modalities to treatment. Weight loss encouraged and health benefits explained to patient.     Wt Readings from Last 8 Encounters:   12/03/24 133.6 kg (294 lb 8.6 oz)   10/16/24 113.4 kg (250 lb)   09/22/24 113.4 kg (250 lb)   08/14/24 119.7 kg (263 lb 14.3 oz)   07/29/24 104.3 kg (229 lb 15 oz)   06/25/24 104.6 kg (230 lb 11.2 oz)   06/24/24 104.3 kg (230 lb)   06/20/24 102.5 kg (226 lb)            Acute on chronic respiratory failure with hypoxia and hypercapnia  Patient with Hypercapnic and Hypoxic Respiratory failure which is Acute on chronic.  she is on home oxygen at 2 LPM. Supplemental oxygen was provided and noted- Vent Mode: A/C  Oxygen Concentration (%):  [40-65] 50  Resp Rate Total:  [18 br/min-36 br/min] 28 br/min  Vt Set:  [400 mL-450 mL] 400 mL  PEEP/CPAP:  [6 cmH20] 6 cmH20  Pressure Support:  [10 cmH20] 10 cmH20  Mean Airway Pressure:  [9.7 uuT80-31 cmH20] 18 cmH20     Signs/symptoms of respiratory failure include- tachypnea, increased work of breathing, respiratory distress, use of accessory muscles, and wheezing. Contributing diagnoses includes - CHF and COPD Labs and images were reviewed. Patient Has recent ABG, which has been reviewed. Will treat underlying causes and adjust management of respiratory failure as follows- duo nebs, wean vent as tolerated, ABGs prn, diuresis     Paroxysmal A-fib  Patient has  atrial fibrillation. Patient is currently in . [id number] Score: 3. The patients heart rate in the last 24 hours is as follows:  Pulse  Min: 54  Max: 82     Antiarrhythmics   BB on hold due to bradycardia from propofol  gtt    Anticoagulants  enoxaparin injection 40 mg, Every 24 hours, Subcutaneous    Plan  - Replete lytes with a goal of K>4, Mg >2  - Patient's afib is currently stable, she has history of dash-tachy fluctuation, subsequent intubation on propofol gtt, now bradycardic, hold coreg for now, previous records showed on eliquis, care everywhere patient has not be seen by Cardiology except for and how during previous admission in August, unclear which medication patient is taking, will try and get with family member to verify medication patient taking at home and if she was taken off blood thinner at some point since her August admission            VTE Risk Mitigation (From admission, onward)           Ordered     enoxaparin injection 40 mg  Every 24 hours         11/29/24 1730     IP VTE HIGH RISK PATIENT  Once         11/29/24 1726     Place sequential compression device  Until discontinued         11/29/24 1726                    Discharge Planning   TIGRE:      Code Status: DNR   Is the patient medically ready for discharge?:      Discharge Plan A: Other (TBD)   Discharge Delays: None known at this time                    Gunnar Mott DO  Department of Hospital Medicine   [place] - Intensive [facility]

## 2024-12-03 NOTE — PLAN OF CARE
[place] - Intensive Care  Discharge Reassessment    Primary Care Provider: Lucian Barry MD    Expected Discharge Date:     Reassessment (most recent)       Discharge Reassessment - 12/03/24 1013          Discharge Reassessment    Assessment Type Discharge Planning Reassessment     Did the patient's condition or plan change since previous assessment? Yes     Communicated TIGRE with patient/caregiver Date not available/Unable to determine     Discharge Plan A Other   TBD    Discharge Plan B Other   TBD    DME Needed Upon Discharge  other (see comments)   TBD    Transition of Care Barriers None     Why the patient remains in the hospital Requires continued medical care        Post-Acute Status    Discharge Delays None known at this time                   Pt remains on the vent at this time.     SW/CM will continue to follow for dc planning.

## 2024-12-03 NOTE — RESPIRATORY THERAPY
12/03/24 0920   Patient Assessment/Suction   Level of Consciousness (AVPU) alert   Respiratory Effort Moderate;Labored   Expansion/Accessory Muscles/Retractions abdominal muscle use   Rhythm/Pattern, Respiratory tachypneic;assisted mechanically;labored   PRE-TX-O2   Device (Oxygen Therapy) ventilator   $ Is the patient on High Flow Oxygen? Yes   Flow (L/min) (Oxygen Therapy) 50   Oxygen Concentration (%) 50   SpO2 (!) 85 %   Pulse Oximetry Type Continuous   $ Pulse Oximetry - Multiple Charge Pulse Oximetry - Multiple   Pulse 77   Resp (!) 36   Positioning HOB elevated 30 degrees   Vent Select   Charged w/in last 24h YES   Preset Conventional Ventilator Settings   Vent ID [id number]   Vent Type    Ventilation Type VC   Vent Mode Spont   Humidity HME   PEEP/CPAP 6 cmH20   Pressure Support 10 cmH20   Insp Rise Time  70 %   I-Trigger Type  V-TRIG   Trigger Sensitivity Flow/I-Trigger 3 L/min   Patient Ventilator Parameters   Resp Rate Total 36 br/min   Peak Airway Pressure 17 cmH20   Mean Airway Pressure 9.7 cmH20   Plateau Pressure 24 cmH20   Exhaled Vt 183 mL   Total Ve 5.26 L/m   Spont Ve 5.26 L   I:E Ratio Measured 1:1.90   Auto PEEP 2.4 cmH20   Inspired Tidal Volume (VTI) 141 mL   Conventional Ventilator Alarms   Ve High Alarm 24 L/min   Ve Low Alarm 4 L/min   Resp Rate High Alarm 40 br/min   Press High Alarm 55 cmH2O   Apnea Rate 20   Apnea Volume (mL) 450 mL   Apnea Oxygen Concentration  100   Apnea Flow Rate (L/min) 50   T Apnea 10 sec(s)   Ready to Wean/Extubation Screen   FIO2<=50 (chart decimal) 0.5   MV<16L (chart vol.) 5.26   PEEP <=8 (chart #) 6   Respiratory Evaluation   $ Care Plan Tech Time 15 min     Patient on CPAP TRIAL at this time -- pt's oxygen saturation in the 80's, low tidal volumes noted, and increased respiratory rate also noted at this time. Patient appears short of breath and anxious. RT placed patient back on previous AC vent settings per Dr. Mott's orders. RUI Fernandes notified.

## 2024-12-03 NOTE — ASSESSMENT & PLAN NOTE
Patient with Hypercapnic and Hypoxic Respiratory failure which is Acute on chronic.  she is on home oxygen at 2 LPM. Supplemental oxygen was provided and noted- Vent Mode: A/C  Oxygen Concentration (%):  [40-65] 50  Resp Rate Total:  [18 br/min-36 br/min] 28 br/min  Vt Set:  [400 mL-450 mL] 400 mL  PEEP/CPAP:  [6 cmH20] 6 cmH20  Pressure Support:  [10 cmH20] 10 cmH20  Mean Airway Pressure:  [9.7 hnU08-27 cmH20] 18 cmH20     Signs/symptoms of respiratory failure include- tachypnea, increased work of breathing, respiratory distress, use of accessory muscles, and wheezing. Contributing diagnoses includes - CHF and COPD Labs and images were reviewed. Patient Has recent ABG, which has been reviewed. Will treat underlying causes and adjust management of respiratory failure as follows- duo nebs, wean vent as tolerated, ABGs prn, diuresis

## 2024-12-03 NOTE — CARE UPDATE
Patient remains intubated with FIO2 down to 50%. Sedation was stopped at 0840 this morning and restarted at 1745 at 10mcg/kg/min. Patient is now a DNR per request from her children. Three rings were removed by her daughter and oldest son. She is NSR on telemetry.

## 2024-12-03 NOTE — ASSESSMENT & PLAN NOTE
D3 intubation, tachypnea >40s after CPAP trial.  Sedated on proprofol  - start enteral suplena feeds per protocol   - ABG SBT in AM, plan for extubation tomorrow

## 2024-12-03 NOTE — PLAN OF CARE
Problem: Adult Inpatient Plan of Care  Goal: Plan of Care Review  Outcome: Progressing  Goal: Patient-Specific Goal (Individualized)  Outcome: Progressing  Goal: Absence of Hospital-Acquired Illness or Injury  Outcome: Progressing  Goal: Optimal Comfort and Wellbeing  Outcome: Progressing  Goal: Readiness for Transition of Care  Outcome: Progressing     Problem: Bariatric Environmental Safety  Goal: Safety Maintained with Care  Outcome: Progressing     Problem: Diabetes Comorbidity  Goal: Blood Glucose Level Within Targeted Range  Outcome: Progressing     Problem: Skin Injury Risk Increased  Goal: Skin Health and Integrity  Outcome: Progressing     Problem: Heart Failure  Goal: Optimal Coping  Outcome: Progressing  Goal: Optimal Cardiac Output  Outcome: Progressing  Goal: Stable Heart Rate and Rhythm  Outcome: Progressing  Goal: Optimal Functional Ability  Outcome: Progressing  Goal: Fluid and Electrolyte Balance  Outcome: Progressing  Goal: Improved Oral Intake  Outcome: Progressing  Goal: Effective Oxygenation and Ventilation  Outcome: Progressing  Goal: Effective Breathing Pattern During Sleep  Outcome: Progressing     Problem: COPD (Chronic Obstructive Pulmonary Disease)  Goal: Optimal Chronic Illness Coping  Outcome: Progressing  Goal: Optimal Level of Functional [place]  Outcome: Not Progressing  Goal: Absence of Infection Signs and Symptoms  Outcome: Progressing  Goal: Improved Oral Intake  Outcome: Not Progressing  Goal: Effective Oxygenation and Ventilation  Outcome: Progressing     Problem: Fluid Volume Excess  Goal: Fluid Balance  Outcome: Progressing     Problem: Fall Injury Risk  Goal: Absence of Fall and Fall-Related Injury  Outcome: Progressing     Problem: Restraint, Nonviolent  Goal: Absence of Harm or Injury  Outcome: Progressing     Problem: Infection  Goal: Absence of Infection Signs and Symptoms  Outcome: Progressing     Problem: Mechanical Ventilation Invasive  Goal: Effective  Communication  Outcome: Not Progressing  Goal: Optimal Device Function  Outcome: Progressing  Goal: Mechanical Ventilation Liberation  Outcome: Progressing  Goal: Optimal Nutrition Delivery  Outcome: Not Progressing  Goal: Absence of Device-Related Skin and Tissue Injury  Outcome: Progressing  Goal: Absence of Ventilator-Induced Lung Injury  Outcome: Progressing     Problem: Artificial Airway  Goal: Effective Communication  Outcome: Progressing  Goal: Optimal Device Function  Outcome: Progressing  Goal: Absence of Device-Related Skin or Tissue Injury  Outcome: Progressing     Problem: Noninvasive Ventilation Acute  Goal: Effective Unassisted Ventilation and Oxygenation  Outcome: Progressing

## 2024-12-03 NOTE — ASSESSMENT & PLAN NOTE
Patient has  atrial fibrillation. Patient is currently in . [id number] Score: 3. The patients heart rate in the last 24 hours is as follows:  Pulse  Min: 54  Max: 82     Antiarrhythmics   BB on hold due to bradycardia from propofol gtt    Anticoagulants  enoxaparin injection 40 mg, Every 24 hours, Subcutaneous    Plan  - Replete lytes with a goal of K>4, Mg >2  - Patient's afib is currently stable, she has history of dash-tachy fluctuation, subsequent intubation on propofol gtt, now bradycardic, hold coreg for now, previous records showed on eliquis, care everywhere patient has not be seen by Cardiology except for and how during previous admission in August, unclear which medication patient is taking, will try and get with family member to verify medication patient taking at home and if she was taken off blood thinner at some point since her August admission

## 2024-12-03 NOTE — RESPIRATORY THERAPY
12/03/24 0900   Patient Assessment/Suction   Level of Consciousness (AVPU) alert   Respiratory Effort Unlabored   Expansion/Accessory Muscles/Retractions no use of accessory muscles   Rhythm/Pattern, Respiratory assisted mechanically   Cough Frequency with stimulation   Cough Type assisted   Suction Method oral;tracheal   Suction Pressure (mmHg) -120 mmHg   $ Suction Charges Inline Suction Procedure Stat Charge   Secretions Amount small   Secretions Color clear   Secretions Characteristics thick   $ Swab or suction? Suction   Aspirate Toleration JAVIER (no adverse reactions)   Skin Integrity   Area Observed Left;Right;Cheek;Upper lip;Lower lip;Corner lip   Skin Appearance without discoloration   PRE-TX-O2   Device (Oxygen Therapy) ventilator   $ Is the patient on High Flow Oxygen? Yes   Flow (L/min) (Oxygen Therapy) 50   Oxygen Concentration (%) 40   SpO2 (!) 91 %   Pulse Oximetry Type Continuous   $ Pulse Oximetry - Multiple Charge Pulse Oximetry - Multiple   Pulse 74   Resp (!) 29   /65   Positioning HOB elevated 30 degrees   Vent Select   Charged w/in last 24h YES   Preset Conventional Ventilator Settings   Vent ID [id number]   Vent Type    Ventilation Type VC   Vent Mode Spont   Humidity HME   PEEP/CPAP 6 cmH20   Pressure Support 10 cmH20   Insp Rise Time  70 %   I-Trigger Type  V-TRIG   Trigger Sensitivity Flow/I-Trigger 3 L/min   Patient Ventilator Parameters   Resp Rate Total 29 br/min   All Fields Breath Sounds equal bilaterally   Peak Airway Pressure 17 cmH20   Mean Airway Pressure 9.7 cmH20   Plateau Pressure 24 cmH20   Exhaled Vt 218 mL   Total Ve 6.99 L/m   Spont Ve 6.99 L   I:E Ratio Measured 1:1.10   Auto PEEP 2.4 cmH20   Tubing ID (mm) 7.5 mm   Tube Type ET   Inspired Tidal Volume (VTI) 306 mL   Conventional Ventilator Alarms   Alarms On Y   Ve High Alarm 24 L/min   Ve Low Alarm 4 L/min   Resp Rate High Alarm 40 br/min   Press High Alarm 55 cmH2O   Apnea Rate 20   Apnea Volume (mL) 450 mL   Apnea  Oxygen Concentration  100   Apnea Flow Rate (L/min) 50   T Apnea 10 sec(s)   IHI Ventilator Associated Pneumonia Bundle (Required)   Daily Awakening Trials Performed Yes   Daily Assessment of Readiness to Extubate Yes   Head of Bed Elevated  HOB 30   Oral Care Mouth suctioned   Ready to Wean/Extubation Screen   FIO2<=50 (chart decimal) 0.4   MV<16L (chart vol.) 6.99   PEEP <=8 (chart #) 6   Respiratory Evaluation   $ Care Plan Tech Time 15 min     Placed patient on CPAP TRIAL at this time per Dr. Mott's orders. RUI Fernandes aware.

## 2024-12-03 NOTE — EICU
eICU Physician Virtual/Remote Brief Evaluation Note      VENTILATOR REVIEW    Chart reviewed, patient observed, discussed with RT and RN  /57, P 59, R 18, O2 sat 100,   On ventilator 450/18/6/50% peak pressure 41  ABG this morning pH 7.39, pCO2 52, PO2 87, bicarb 29, O2 sat 96  Patient was made DNR by family today  Plateau pressure 24   Decrease tidal volume to 400.  For repeat ABG at 5:00 a.m.      ROSA M Mckeon MD  [facility] Attending  [phone]    This report has been created through the use of [employer] dictation software. Typographical and content errors may occur with this process. While efforts are made to detect and correct such errors, in some cases errors will persist. For this reason, wording in this document should be considered in the proper context and not strictly verbatim

## 2024-12-03 NOTE — ASSESSMENT & PLAN NOTE
11/30/24 Urine culture positive for aerococcus urinae. Start therapy with rocephin for 5 days total.   Estimated Creatinine Clearance: 40.2 mL/min (A) (based on SCr of 1.7 mg/dL (H)).  - continue rocephin  - Monitor I/Os

## 2024-12-03 NOTE — ASSESSMENT & PLAN NOTE
Patient's FSGs are controlled on current medication regimen.  Last A1c reviewed-   Lab Results   Component Value Date    HGBA1C 5.2 11/30/2024     Most recent fingerstick glucose reviewed-   Recent Labs   Lab 12/02/24  1334 12/02/24  1751 12/02/24  2357   POCTGLUCOSE 123* 110 114*       Current correctional scale  Low  Maintain anti-hyperglycemic dose as follows-   Antihyperglycemics (From admission, onward)      Start     Stop Route Frequency Ordered    11/30/24 1324  insulin aspart U-100 pen 0-5 Units         -- SubQ Every 6 hours PRN 11/30/24 1225          Hold Oral hypoglycemics while patient is in the hospital.

## 2024-12-03 NOTE — ASSESSMENT & PLAN NOTE
Body mass index is 50.56 kg/m². Morbid obesity complicates all aspects of disease management from diagnostic modalities to treatment. Weight loss encouraged and health benefits explained to patient.     Wt Readings from Last 8 Encounters:   12/03/24 133.6 kg (294 lb 8.6 oz)   10/16/24 113.4 kg (250 lb)   09/22/24 113.4 kg (250 lb)   08/14/24 119.7 kg (263 lb 14.3 oz)   07/29/24 104.3 kg (229 lb 15 oz)   06/25/24 104.6 kg (230 lb 11.2 oz)   06/24/24 104.3 kg (230 lb)   06/20/24 102.5 kg (226 lb)

## 2024-12-03 NOTE — PLAN OF CARE
Problem: Adult Inpatient Plan of Care  Goal: Plan of Care Review  Outcome: Progressing  Goal: Patient-Specific Goal (Individualized)  Outcome: Progressing  Goal: Absence of Hospital-Acquired Illness or Injury  Outcome: Progressing  Goal: Optimal Comfort and Wellbeing  Outcome: Progressing  Goal: Readiness for Transition of Care  Outcome: Progressing     Problem: Bariatric Environmental Safety  Goal: Safety Maintained with Care  Outcome: Progressing     Problem: Diabetes Comorbidity  Goal: Blood Glucose Level Within Targeted Range  Outcome: Progressing     Problem: Skin Injury Risk Increased  Goal: Skin Health and Integrity  Outcome: Progressing     Problem: Heart Failure  Goal: Optimal Coping  Outcome: Progressing  Goal: Optimal Cardiac Output  Outcome: Progressing  Goal: Stable Heart Rate and Rhythm  Outcome: Progressing  Goal: Optimal Functional Ability  Outcome: Not Progressing  Goal: Fluid and Electrolyte Balance  Outcome: Progressing  Goal: Improved Oral Intake  Outcome: Not Progressing  Goal: Effective Oxygenation and Ventilation  Outcome: Not Progressing  Goal: Effective Breathing Pattern During Sleep  Outcome: Not Progressing     Problem: Heart Failure  Goal: Optimal Cardiac Output  Outcome: Progressing     Problem: Heart Failure  Goal: Stable Heart Rate and Rhythm  Outcome: Progressing     Problem: Heart Failure  Goal: Optimal Functional Ability  Outcome: Not Progressing     Problem: Heart Failure  Goal: Fluid and Electrolyte Balance  Outcome: Progressing     Problem: Heart Failure  Goal: Improved Oral Intake  Outcome: Not Progressing     Problem: Heart Failure  Goal: Effective Oxygenation and Ventilation  Outcome: Not Progressing     Problem: Heart Failure  Goal: Effective Breathing Pattern During Sleep  Outcome: Not Progressing     Problem: COPD (Chronic Obstructive Pulmonary Disease)  Goal: Optimal Chronic Illness Coping  Outcome: Not Progressing  Goal: Optimal Level of Functional  [place]  Outcome: Not Progressing  Goal: Absence of Infection Signs and Symptoms  Outcome: Progressing  Goal: Improved Oral Intake  Outcome: Not Progressing  Goal: Effective Oxygenation and Ventilation  Outcome: Not Progressing     Problem: COPD (Chronic Obstructive Pulmonary Disease)  Goal: Optimal Chronic Illness Coping  Outcome: Not Progressing     Problem: COPD (Chronic Obstructive Pulmonary Disease)  Goal: Optimal Level of Functional [place]  Outcome: Not Progressing     Problem: COPD (Chronic Obstructive Pulmonary Disease)  Goal: Absence of Infection Signs and Symptoms  Outcome: Progressing     Problem: Fluid Volume Excess  Goal: Fluid Balance  Outcome: Progressing     Problem: Fall Injury Risk  Goal: Absence of Fall and Fall-Related Injury  Outcome: Progressing     Problem: Restraint, Nonviolent  Goal: Absence of Harm or Injury  Outcome: Progressing     Problem: Infection  Goal: Absence of Infection Signs and Symptoms  Outcome: Progressing     Problem: Mechanical Ventilation Invasive  Goal: Effective Communication  Outcome: Not Progressing  Goal: Optimal Device Function  Outcome: Progressing  Goal: Mechanical Ventilation Liberation  Outcome: Not Progressing  Goal: Optimal Nutrition Delivery  Outcome: Not Progressing  Goal: Absence of Device-Related Skin and Tissue Injury  Outcome: Not Progressing  Goal: Absence of Ventilator-Induced Lung Injury  Outcome: Progressing     Problem: Artificial Airway  Goal: Effective Communication  Outcome: Progressing  Goal: Optimal Device Function  Outcome: Progressing  Goal: Absence of Device-Related Skin or Tissue Injury  Outcome: Not Progressing     Problem: Noninvasive Ventilation Acute  Goal: Effective Unassisted Ventilation and Oxygenation  Outcome: Progressing

## 2024-12-03 NOTE — CARE UPDATE
Failed breathing trial, Chest Tube clamped by Dr. Raymond. Ariel hennessy feeds started at 10cc and will continue at 10cc/hour. Propofol restarted at 10 mcg/kg/min and has tolerated it well. Will attempt breathing trials again tomorrow.

## 2024-12-04 LAB
AC: 18
ALBUMIN SERPL BCP-MCNC: 2 G/DL (ref 3.5–5.2)
ALP SERPL-CCNC: 118 U/L (ref 55–135)
ALT SERPL W/O P-5'-P-CCNC: 12 U/L (ref 10–44)
ANION GAP SERPL CALC-SCNC: 1 MMOL/L (ref 3–11)
AST SERPL-CCNC: 11 U/L (ref 10–40)
BASOPHILS # BLD AUTO: 0.01 K/UL (ref 0–0.2)
BASOPHILS NFR BLD: 0.1 % (ref 0–1.9)
BILIRUB SERPL-MCNC: 0.3 MG/DL (ref 0.1–1)
BUN SERPL-MCNC: 38 MG/DL (ref 8–23)
CALCIUM SERPL-MCNC: 9.1 MG/DL (ref 8.7–10.5)
CHLORIDE SERPL-SCNC: 111 MMOL/L (ref 95–110)
CO2 SERPL-SCNC: 39 MMOL/L (ref 23–29)
CREAT SERPL-MCNC: 1.5 MG/DL (ref 0.5–1.4)
DIFFERENTIAL METHOD BLD: ABNORMAL
EOSINOPHIL # BLD AUTO: 0 K/UL (ref 0–0.5)
EOSINOPHIL NFR BLD: 0 % (ref 0–8)
ERYTHROCYTE [DISTWIDTH] IN BLOOD BY AUTOMATED COUNT: 15.6 % (ref 11.5–14.5)
EST. GFR  (NO RACE VARIABLE): 36.6 ML/MIN/1.73 M^2
FIO2: 40 %
GLUCOSE SERPL-MCNC: 120 MG/DL (ref 70–110)
HCT VFR BLD AUTO: 35.7 % (ref 37–48.5)
HGB BLD-MCNC: 11.4 G/DL (ref 12–16)
IMM GRANULOCYTES # BLD AUTO: 0.1 K/UL (ref 0–0.04)
IMM GRANULOCYTES NFR BLD AUTO: 0.7 % (ref 0–0.5)
LYMPHOCYTES # BLD AUTO: 0.9 K/UL (ref 1–4.8)
LYMPHOCYTES NFR BLD: 6.1 % (ref 18–48)
MAGNESIUM SERPL-MCNC: 2 MG/DL (ref 1.6–2.6)
MCH RBC QN AUTO: 28.6 PG (ref 27–31)
MCHC RBC AUTO-ENTMCNC: 31.9 G/DL (ref 32–36)
MCV RBC AUTO: 90 FL (ref 82–98)
MODIFIED ALLEN'S TEST: ABNORMAL
MONOCYTES # BLD AUTO: 0.8 K/UL (ref 0.3–1)
MONOCYTES NFR BLD: 5.5 % (ref 4–15)
NEUTROPHILS # BLD AUTO: 12.9 K/UL (ref 1.8–7.7)
NEUTROPHILS NFR BLD: 87.6 % (ref 38–73)
NRBC BLD-RTO: 0 /100 WBC
O2DEVICE: ABNORMAL
PCO2 BLDA: 53.1 MMHG (ref 35–45)
PEAK FLOW: 50
PH SMN: 7.45 [PH] (ref 7.34–7.45)
PLATELET # BLD AUTO: 212 K/UL (ref 150–450)
PMV BLD AUTO: 8.6 FL (ref 9.2–12.9)
PO2 BLDA: 73.2 MMHG (ref 80–100)
POC BASE DEFICIT: 12.4 MMOL/L (ref -2–2)
POC HCO3: 34.4 MMOL/L (ref 24–28)
POC PERFORMED BY: ABNORMAL
POC SATURATED O2: 95 % (ref 95–100)
POC TEMPERATURE: 37 C
POCT GLUCOSE: 111 MG/DL (ref 70–110)
POCT GLUCOSE: 111 MG/DL (ref 70–110)
POCT GLUCOSE: 112 MG/DL (ref 70–110)
POCT GLUCOSE: 127 MG/DL (ref 70–110)
POTASSIUM SERPL-SCNC: 3.6 MMOL/L (ref 3.5–5.1)
PROT SERPL-MCNC: 5.6 G/DL (ref 6–8.4)
RBC # BLD AUTO: 3.99 M/UL (ref 4–5.4)
SODIUM SERPL-SCNC: 151 MMOL/L (ref 136–145)
SPECIMEN SOURCE: ABNORMAL
VT: 400
WBC # BLD AUTO: 14.65 K/UL (ref 3.9–12.7)

## 2024-12-04 PROCEDURE — 97162 PT EVAL MOD COMPLEX 30 MIN: CPT

## 2024-12-04 PROCEDURE — 36600 WITHDRAWAL OF ARTERIAL BLOOD: CPT

## 2024-12-04 PROCEDURE — 27100171 HC OXYGEN HIGH FLOW UP TO 24 HOURS

## 2024-12-04 PROCEDURE — 83735 ASSAY OF MAGNESIUM: CPT | Performed by: INTERNAL MEDICINE

## 2024-12-04 PROCEDURE — 94640 AIRWAY INHALATION TREATMENT: CPT

## 2024-12-04 PROCEDURE — 99233 SBSQ HOSP IP/OBS HIGH 50: CPT | Mod: GW,,, | Performed by: STUDENT IN AN ORGANIZED HEALTH CARE EDUCATION/TRAINING PROGRAM

## 2024-12-04 PROCEDURE — 86580 TB INTRADERMAL TEST: CPT | Performed by: STUDENT IN AN ORGANIZED HEALTH CARE EDUCATION/TRAINING PROGRAM

## 2024-12-04 PROCEDURE — 25000003 PHARM REV CODE 250: Performed by: STUDENT IN AN ORGANIZED HEALTH CARE EDUCATION/TRAINING PROGRAM

## 2024-12-04 PROCEDURE — 25000242 PHARM REV CODE 250 ALT 637 W/ HCPCS: Performed by: STUDENT IN AN ORGANIZED HEALTH CARE EDUCATION/TRAINING PROGRAM

## 2024-12-04 PROCEDURE — 20000000 HC ICU ROOM

## 2024-12-04 PROCEDURE — 25000242 PHARM REV CODE 250 ALT 637 W/ HCPCS: Performed by: INTERNAL MEDICINE

## 2024-12-04 PROCEDURE — 99900035 HC TECH TIME PER 15 MIN (STAT)

## 2024-12-04 PROCEDURE — 94761 N-INVAS EAR/PLS OXIMETRY MLT: CPT

## 2024-12-04 PROCEDURE — 27000190 HC CPAP FULL FACE MASK W/VALVE

## 2024-12-04 PROCEDURE — 80053 COMPREHEN METABOLIC PANEL: CPT | Performed by: INTERNAL MEDICINE

## 2024-12-04 PROCEDURE — 97166 OT EVAL MOD COMPLEX 45 MIN: CPT

## 2024-12-04 PROCEDURE — 94660 CPAP INITIATION&MGMT: CPT | Mod: XB

## 2024-12-04 PROCEDURE — 36415 COLL VENOUS BLD VENIPUNCTURE: CPT | Performed by: INTERNAL MEDICINE

## 2024-12-04 PROCEDURE — 85025 COMPLETE CBC W/AUTO DIFF WBC: CPT | Performed by: INTERNAL MEDICINE

## 2024-12-04 PROCEDURE — 99900031 HC PATIENT EDUCATION (STAT)

## 2024-12-04 PROCEDURE — 63600175 PHARM REV CODE 636 W HCPCS: Performed by: INTERNAL MEDICINE

## 2024-12-04 PROCEDURE — 63600175 PHARM REV CODE 636 W HCPCS: Performed by: STUDENT IN AN ORGANIZED HEALTH CARE EDUCATION/TRAINING PROGRAM

## 2024-12-04 PROCEDURE — 30200315 PPD INTRADERMAL TEST REV CODE 302: Performed by: STUDENT IN AN ORGANIZED HEALTH CARE EDUCATION/TRAINING PROGRAM

## 2024-12-04 PROCEDURE — 94003 VENT MGMT INPAT SUBQ DAY: CPT

## 2024-12-04 PROCEDURE — 5A09557 ASSISTANCE WITH RESPIRATORY VENTILATION, GREATER THAN 96 CONSECUTIVE HOURS, CONTINUOUS POSITIVE AIRWAY PRESSURE: ICD-10-PCS | Performed by: STUDENT IN AN ORGANIZED HEALTH CARE EDUCATION/TRAINING PROGRAM

## 2024-12-04 PROCEDURE — 99900026 HC AIRWAY MAINTENANCE (STAT)

## 2024-12-04 PROCEDURE — 82803 BLOOD GASES ANY COMBINATION: CPT

## 2024-12-04 PROCEDURE — 97530 THERAPEUTIC ACTIVITIES: CPT

## 2024-12-04 RX ORDER — BUDESONIDE 0.5 MG/2ML
0.5 INHALANT ORAL EVERY 12 HOURS
Status: DISCONTINUED | OUTPATIENT
Start: 2024-12-04 | End: 2024-12-12 | Stop reason: HOSPADM

## 2024-12-04 RX ORDER — FUROSEMIDE 10 MG/ML
40 INJECTION INTRAMUSCULAR; INTRAVENOUS ONCE
Status: DISCONTINUED | OUTPATIENT
Start: 2024-12-04 | End: 2024-12-04

## 2024-12-04 RX ADMIN — ALBUTEROL SULFATE 2.5 MG: 2.5 SOLUTION RESPIRATORY (INHALATION) at 03:12

## 2024-12-04 RX ADMIN — PROPOFOL 10 MCG/KG/MIN: 10 INJECTION, EMULSION INTRAVENOUS at 03:12

## 2024-12-04 RX ADMIN — MUPIROCIN: 20 OINTMENT TOPICAL at 08:12

## 2024-12-04 RX ADMIN — TUBERCULIN PURIFIED PROTEIN DERIVATIVE 5 UNITS: 5 INJECTION, SOLUTION INTRADERMAL at 04:12

## 2024-12-04 RX ADMIN — ALBUTEROL SULFATE 2.5 MG: 2.5 SOLUTION RESPIRATORY (INHALATION) at 12:12

## 2024-12-04 RX ADMIN — PANTOPRAZOLE SODIUM 40 MG: 40 INJECTION, POWDER, LYOPHILIZED, FOR SOLUTION INTRAVENOUS at 08:12

## 2024-12-04 RX ADMIN — LEVOTHYROXINE SODIUM 50 MCG: 50 TABLET ORAL at 06:12

## 2024-12-04 RX ADMIN — ALBUTEROL SULFATE 2.5 MG: 2.5 SOLUTION RESPIRATORY (INHALATION) at 04:12

## 2024-12-04 RX ADMIN — ASPIRIN 325 MG ORAL TABLET 325 MG: 325 PILL ORAL at 08:12

## 2024-12-04 RX ADMIN — METHYLPREDNISOLONE SODIUM SUCCINATE 80 MG: 40 INJECTION, POWDER, FOR SOLUTION INTRAMUSCULAR; INTRAVENOUS at 08:12

## 2024-12-04 RX ADMIN — ALBUTEROL SULFATE 2.5 MG: 2.5 SOLUTION RESPIRATORY (INHALATION) at 07:12

## 2024-12-04 RX ADMIN — BUDESONIDE 0.5 MG: 0.5 INHALANT RESPIRATORY (INHALATION) at 07:12

## 2024-12-04 RX ADMIN — CEFTRIAXONE SODIUM 1 G: 1 INJECTION, POWDER, FOR SOLUTION INTRAMUSCULAR; INTRAVENOUS at 01:12

## 2024-12-04 RX ADMIN — ALBUTEROL SULFATE 2.5 MG: 2.5 SOLUTION RESPIRATORY (INHALATION) at 11:12

## 2024-12-04 RX ADMIN — SENNOSIDES AND DOCUSATE SODIUM 2 TABLET: 50; 8.6 TABLET ORAL at 08:12

## 2024-12-04 RX ADMIN — POLYETHYLENE GLYCOL (3350) 17 G: 17 POWDER, FOR SOLUTION ORAL at 08:12

## 2024-12-04 RX ADMIN — ENOXAPARIN SODIUM 40 MG: 40 INJECTION SUBCUTANEOUS at 04:12

## 2024-12-04 RX ADMIN — ATORVASTATIN CALCIUM 40 MG: 40 TABLET, FILM COATED ORAL at 08:12

## 2024-12-04 RX ADMIN — CARVEDILOL 3.12 MG: 3.12 TABLET, FILM COATED ORAL at 09:12

## 2024-12-04 RX ADMIN — VALSARTAN 160 MG: 80 TABLET, FILM COATED ORAL at 08:12

## 2024-12-04 RX ADMIN — CARVEDILOL 3.12 MG: 3.12 TABLET, FILM COATED ORAL at 08:12

## 2024-12-04 RX ADMIN — FUROSEMIDE 40 MG: 10 INJECTION, SOLUTION INTRAMUSCULAR; INTRAVENOUS at 08:12

## 2024-12-04 NOTE — SUBJECTIVE & OBJECTIVE
Interval History: Patient seen and examined.     Review of Systems   Unable to perform ROS: Intubated   Respiratory:  Negative for choking, chest tightness and shortness of breath.    Cardiovascular:  Negative for chest pain and palpitations.   Gastrointestinal:  Negative for abdominal pain.   Neurological:  Positive for weakness.     Objective:     Vital Signs (Most Recent):  Temp: 97.9 °F (36.6 °C) (12/04/24 0701)  Pulse: (!) 58 (12/04/24 1222)  Resp: 18 (12/04/24 1222)  BP: (!) 146/82 (12/04/24 1000)  SpO2: 97 % (12/04/24 1222) Vital Signs (24h Range):  Temp:  [97.3 °F (36.3 °C)-97.9 °F (36.6 °C)] 97.9 °F (36.6 °C)  Pulse:  [52-63] 58  Resp:  [8-30] 18  SpO2:  [91 %-100 %] 97 %  BP: (111-146)/(57-82) 146/82     Weight: 130.5 kg (287 lb 11.2 oz)  Body mass index is 49.38 kg/m².    Intake/Output Summary (Last 24 hours) at 12/4/2024 1226  Last data filed at 12/4/2024 0530  Gross per 24 hour   Intake 475.48 ml   Output 2900 ml   Net -2424.52 ml         Physical Exam  Vitals and nursing note reviewed.   Constitutional:       General: She is not in acute distress.     Appearance: She is not ill-appearing or toxic-appearing.      Comments: GCS 11T   Cardiovascular:      Rate and Rhythm: Normal rate and regular rhythm.   Pulmonary:      Effort: No respiratory distress.      Comments: Ventilator assisted breaths equal and nonlabored   R chest tube in place to water seal with no air leak   Abdominal:      General: There is no distension.      Palpations: Abdomen is soft.      Tenderness: There is no abdominal tenderness. There is no guarding.      Hernia: No hernia is present.   Skin:     Capillary Refill: Capillary refill takes less than 2 seconds.   Neurological:      Mental Status: She is alert. Mental status is at baseline.             Significant Labs: All pertinent labs within the past 24 hours have been reviewed.  A1C:   Recent Labs   Lab 07/28/24  2256 11/30/24  1237   HGBA1C 5.3 5.2     ABGs:   Recent Labs   Lab  12/03/24  0449 12/04/24  0510   PH 7.423 7.445   PCO2 52.7* 53.1*   HCO3 32.3* 34.4*   POCSATURATED 94.2* 95.0   PO2 72.2* 73.2*     Bilirubin:   Recent Labs   Lab 11/30/24  0340 12/01/24  0600 12/02/24  0349 12/03/24  0356 12/04/24  0409   BILITOT 0.4 0.5 0.4 0.4 0.3     Recent Labs   Lab 12/04/24  0409   *   *   K 3.6   *   CO2 39*   BUN 38*   CREATININE 1.5*   CALCIUM 9.1   MG 2.0     CBC:   Recent Labs   Lab 12/03/24  0356 12/04/24  0409   WBC 11.28 14.65*   HGB 10.5* 11.4*   HCT 31.2* 35.7*    212     CMP:   Recent Labs   Lab 12/03/24  0356 12/04/24  0409   * 151*   K 3.7 3.6   * 111*   CO2 36* 39*   * 120*   BUN 37* 38*   CREATININE 1.7* 1.5*   CALCIUM 9.0 9.1   PROT 5.4* 5.6*   ALBUMIN 1.9* 2.0*   BILITOT 0.4 0.3   ALKPHOS 118 118   AST 13 11   ALT 13 12   ANIONGAP 2* 1*     Recent Labs   Lab 12/03/24  0356 12/04/24  0409   MG 2.1 2.0     POCT Glucose:   Recent Labs   Lab 12/03/24  1153 12/03/24  1757 12/03/24  2336   POCTGLUCOSE 108 112* 127*     Recent Labs   Lab 08/10/24  0335   TSH 0.632     Urine culture [[id number]] (Abnormal) Collected: 11/30/24 1101   Order Status: Completed Specimen: Urine Updated: 12/02/24 0014    Urine Culture, Routine AEROCOCCUS URINAE  10,000 - 49,999 cfu/ml  Susceptibility testing not routinely performed  No other significant isolate   Abnormal    Narrative:     Preferred Collection Type->Urine, Clean Catch  Specimen Source->Urine   Culture, Respiratory with Gram Stain [[id number]] Collected: 11/30/24 0836   Order Status: Completed Specimen: Respiratory from Endotracheal Aspirate Updated: 12/02/24 1159    Respiratory Culture Normal respiratory beth     No S aureus or Pseudomonas isolated.    Gram Stain (Respiratory) <10 epithelial cells per low power field.    Gram Stain (Respiratory) Moderate WBC's    Gram Stain (Respiratory) Few Gram positive cocci    Gram Stain (Respiratory) Rare Gram negative rods     X-Ray Chest 1  View  Narrative: EXAMINATION:  XR CHEST 1 VIEW    CLINICAL HISTORY:  vent and chest tube;    COMPARISON:  12/03/2024    FINDINGS:  Cardiac silhouette is unchanged.  ET tube, NG tube, right thoracostomy tube and right PICC remain.  Lung findings are similar.  No obvious pneumothorax.  Impression: No significant interval change.    Electronically signed by: Abran Pelletier MD  Date:    12/04/2024  Time:    07:39     Significant Imaging: I have reviewed all pertinent imaging results/findings within the past 24 hours.

## 2024-12-04 NOTE — CARE UPDATE
Patient was extubated to BIPAP. Tolerated well. Tube feeding is on hold and sedation is off. Patient is tolerating BIPAP well. Family is at bedside.

## 2024-12-04 NOTE — ASSESSMENT & PLAN NOTE
Patient with Hypercapnic and Hypoxic Respiratory failure which is Acute on chronic.  she is on home oxygen at 2 LPM. Supplemental oxygen was provided and noted- Vent Mode: A/C  Oxygen Concentration (%):  [40] 40  Resp Rate Total:  [18 br/min-21 br/min] 21 br/min  Vt Set:  [400 mL] 400 mL  PEEP/CPAP:  [6 cmH20] 6 cmH20  Mean Airway Pressure:  [11 tdR68-56 cmH20] 11 cmH20     Signs/symptoms of respiratory failure include- tachypnea, increased work of breathing, respiratory distress, use of accessory muscles, and wheezing. Contributing diagnoses includes - CHF and COPD Labs and images were reviewed. Patient Has recent ABG, which has been reviewed. Will treat underlying causes and adjust management of respiratory failure as follows- duo nebs, wean vent as tolerated, ABGs prn, diuresis

## 2024-12-04 NOTE — PLAN OF CARE
Problem: Physical Therapy  Goal: Physical Therapy Goal  Description: Goals to be met by: 2024     Patient will increase functional independence with mobility by performin. Supine to sit with MInimal Assistance  2. Sit to supine with MInimal Assistance  3. Rolling to Left and Right with Minimal Assistance.  4. Sit to stand transfer with Moderate Assistance  5. Bed to chair transfer with Moderate Assistance using Rolling Walker  6. Gait  x 10 feet with Contact Guard Assistance using Rolling Walker.   7. Sitting at edge of bed x10 minutes with Supervision and Stand-by Assistance    Outcome: Plan of Care Established

## 2024-12-04 NOTE — PLAN OF CARE
Nurse with [facility] met with the patient and her family. At this time, the patient does not meet the criteria for inpatient hospice care. The family does want to move forward with group home placement at [facility] with hospice care. Dr. Mott was informed.

## 2024-12-04 NOTE — CARE UPDATE
12/04/24 1241   PRE-TX-O2   Device (Oxygen Therapy) BIPAP   Oxygen Concentration (%) 60   SpO2 100 %   Pulse 72   Resp (!) 22

## 2024-12-04 NOTE — PT/OT/SLP EVAL
Physical Therapy Evaluation    Patient Name:  Carmen Tan   MRN:  [id number]    Recommendations:     Discharge Recommendations:  (TBD based on progress closer to discharge)   Discharge Equipment Recommendations: other (see comments) (TBD based on progress closer to discharge)   Barriers to discharge:  ongoing medical treatment    Assessment:     Carmen Tan is a 74 y.o. female admitted with a medical diagnosis of Acute on chronic diastolic congestive heart failure.  She presents with the following impairments/functional limitations: weakness, impaired endurance, impaired self care skills, impaired functional mobility, impaired balance, decreased coordination, decreased upper extremity function, decreased lower extremity function, decreased safety awareness, decreased ROM, impaired cardiopulmonary response to activity, impaired joint extensibility, impaired muscle length. These limitations are causing decreased functional mobility and independence and increased caregiver burden.     Patient found supine in ICU with multiple lines. She was unable to communicate verbally secondary to BiPAP, but she tolerated bed mobility and sitting at EOB well, needing maximal assistance for all functional mobility tasks. Patient with VSS throughout treatment. All lines remained intact. Ambulation not attempted this visit secondary to significant BLE weakness and safety concerns. Progress patient per tolerance with functional mobility.     Rehab Prognosis: Fair and Poor; patient would benefit from acute skilled PT services to address these deficits and reach maximum level of function.    Recent Surgery: * No surgery found *      Plan:     During this hospitalization, patient to be seen 5 x/week to address the identified rehab impairments via gait training, therapeutic activities, therapeutic exercises, neuromuscular re-education and progress toward the following goals:    Plan of Care Expires:  12/11/24    Subjective      Chief Complaint: none stated  Patient/Family Comments/goals: none stated  Pain/Comfort:  Pain Rating 1: other (see comments) (patient unable to communicate pain today secondary to BiPAP)    Patients cultural, spiritual, Latter day conflicts given the current situation: no    Living Environment:  Unable to gather subjective patient information. Patient unable to communicate verbally secondary to BiPAP. Will reach out to  for subjective information from family.     Objective:     Communicated with nurse and patient prior to session.  Patient found supine with chest tube, blood pressure cuff, PICC line, PureWick, peripheral IV, pulse ox (continuous), SCD, telemetry, BIPAP  upon PT entry to room.    General Precautions: Standard, fall  Orthopedic Precautions:N/A   Braces: N/A  Respiratory Status: High flow, flow 7-8 L/min, concentration 60%    Vitals:   Prior to treatment (supine) After treatment (supine)   /70 mmHg N/a   HR 81 bpm 83 bpm   SpO2 (BiPAP) 100% 96%       Exams:  RLE ROM: Deficits: gross hypomobility  RLE Strength: Deficits: grossly 3-/5  LLE ROM: Deficits: gross hypomobility  LLE Strength: Deficits: grossly 3-/5    Functional Mobility:  Bed Mobility:     Rolling Left:  maximal assistance  Rolling Right: maximal assistance  Scooting: maximal assistance  Bridging: maximal assistance  Supine to Sit: maximal assistance  Sit to Supine: maximal assistance  Balance: sitting EOB maximum assistance initially; min assist - CGA after sitting EOB       AM-PAC 6 CLICK MOBILITY  Total Score:8       Treatment & Education:  Evaluation moderate complexity  Bed mobility   Rolling B sides   Scooting   Bridging   Supine<>Sit  Balance sitting EOB performing L and R weight shifting with BUE support  Education   Patient educated on role of acute care PT, POC, importance of OOB mobility, transfer safety, and call bell usage.      Patient left HOB elevated with all lines intact, call button in reach, and nurse  notified.    GOALS:   Multidisciplinary Problems       Physical Therapy Goals          Problem: Physical Therapy    Goal Priority Disciplines Outcome Interventions   Physical Therapy Goal     PT, PT/OT Progressing    Description: Goals to be met by: 2024     Patient will increase functional independence with mobility by performin. Supine to sit with MInimal Assistance  2. Sit to supine with MInimal Assistance  3. Rolling to Left and Right with Minimal Assistance.  4. Sit to stand transfer with Moderate Assistance  5. Bed to chair transfer with Moderate Assistance using Rolling Walker  6. Gait  x 10 feet with Contact Guard Assistance using Rolling Walker.   7. Sitting at edge of bed x10 minutes with Supervision and Stand-by Assistance                         History:     Past Medical History:   Diagnosis Date    Abdominal hernia     Bacteremia 2023    CHF (congestive heart failure)     COPD (chronic obstructive pulmonary disease)     Diabetes mellitus, type 2 2022    GERD (gastroesophageal reflux disease)     History of home oxygen therapy     Hypertension     Hypertensive emergency 2023    Migraine headache     On home O2     Pulmonary embolism 2017    Small bowel obstruction     Thyroid disease        Past Surgical History:   Procedure Laterality Date    COLONOSCOPY      HYSTERECTOMY      INNER EAR SURGERY      UPPER GASTROINTESTINAL ENDOSCOPY         Time Tracking:     PT Received On: 24  PT Start Time: 1400     PT Stop Time: 1425  PT Total Time (min): 25 min     Billable Minutes: Evaluation 10 and Therapeutic Activity 15      2024

## 2024-12-04 NOTE — PLAN OF CARE
Problem: Adult Inpatient Plan of Care  Goal: Plan of Care Review  Outcome: Progressing     Problem: Adult Inpatient Plan of Care  Goal: Patient-Specific Goal (Individualized)  Outcome: Progressing     Problem: Adult Inpatient Plan of Care  Goal: Absence of Hospital-Acquired Illness or Injury  Outcome: Progressing     Problem: Adult Inpatient Plan of Care  Goal: Optimal Comfort and Wellbeing  Outcome: Progressing     Problem: Adult Inpatient Plan of Care  Goal: Readiness for Transition of Care  Outcome: Progressing     Problem: Diabetes Comorbidity  Goal: Blood Glucose Level Within Targeted Range  Outcome: Progressing     Problem: Heart Failure  Goal: Optimal Coping  Outcome: Progressing     Problem: Heart Failure  Goal: Stable Heart Rate and Rhythm  Outcome: Progressing     Problem: Heart Failure  Goal: Fluid and Electrolyte Balance  Outcome: Progressing     Problem: COPD (Chronic Obstructive Pulmonary Disease)  Goal: Optimal Chronic Illness Coping  Outcome: Progressing     Problem: COPD (Chronic Obstructive Pulmonary Disease)  Goal: Absence of Infection Signs and Symptoms  Outcome: Progressing     Problem: Fluid Volume Excess  Goal: Fluid Balance  Outcome: Progressing     Problem: Restraint, Nonviolent  Goal: Absence of Harm or Injury  Outcome: Progressing     Problem: Mechanical Ventilation Invasive  Goal: Effective Communication  Outcome: Progressing     Problem: Mechanical Ventilation Invasive  Goal: Absence of Device-Related Skin and Tissue Injury  Outcome: Progressing     Problem: Mechanical Ventilation Invasive  Goal: Absence of Ventilator-Induced Lung Injury  Outcome: Progressing

## 2024-12-04 NOTE — ASSESSMENT & PLAN NOTE
Patient has  atrial fibrillation. Patient is currently in . [id number] Score: 3. The patients heart rate in the last 24 hours is as follows:  Pulse  Min: 52  Max: 63     Antiarrhythmics   BB on hold due to bradycardia from propofol gtt    Anticoagulants  enoxaparin injection 40 mg, Every 24 hours, Subcutaneous    Plan  - Replete lytes with a goal of K>4, Mg >2  - Patient's afib is currently stable, she has history of dash-tachy fluctuation, subsequent intubation on propofol gtt, now bradycardic, hold coreg for now, previous records showed on eliquis, care everywhere patient has not be seen by Cardiology except for and how during previous admission in August, unclear which medication patient is taking, will try and get with family member to verify medication patient taking at home and if she was taken off blood thinner at some point since her August admission

## 2024-12-04 NOTE — ASSESSMENT & PLAN NOTE
Body mass index is 49.38 kg/m². Morbid obesity complicates all aspects of disease management from diagnostic modalities to treatment. Weight loss encouraged and health benefits explained to patient.     Wt Readings from Last 8 Encounters:   12/04/24 130.5 kg (287 lb 11.2 oz)   10/16/24 113.4 kg (250 lb)   09/22/24 113.4 kg (250 lb)   08/14/24 119.7 kg (263 lb 14.3 oz)   07/29/24 104.3 kg (229 lb 15 oz)   06/25/24 104.6 kg (230 lb 11.2 oz)   06/24/24 104.3 kg (230 lb)   06/20/24 102.5 kg (226 lb)

## 2024-12-04 NOTE — ASSESSMENT & PLAN NOTE
Patient's FSGs are controlled on current medication regimen.  Last A1c reviewed-   Lab Results   Component Value Date    HGBA1C 5.2 11/30/2024     Most recent fingerstick glucose reviewed-   Recent Labs   Lab 12/03/24  1757 12/03/24  2336   POCTGLUCOSE 112* 127*     Current correctional scale  Low  Maintain anti-hyperglycemic dose as follows-   Antihyperglycemics (From admission, onward)      Start     Stop Route Frequency Ordered    11/30/24 1324  insulin aspart U-100 pen 0-5 Units         -- SubQ Every 6 hours PRN 11/30/24 1225          Hold Oral hypoglycemics while patient is in the hospital.

## 2024-12-04 NOTE — PROGRESS NOTES
[facility] Medicine  Progress Note    Patient Name: Carmen Tan  MRN: [id number]  Patient Class: IP- Inpatient   Admission Date: 11/29/2024  Length of Stay: 5 days  Attending Physician: Gunnar Mott DO  Primary Care Provider: Lucian Barry MD        Subjective     Principal Problem:Acute on chronic diastolic congestive heart failure        HPI:  Patient 74-year-old female past medical history of chronic diastolic heart failure, AFib, chronic respiratory failure with hypoxemia and hypercapnia, anxiety depression, type 2 diabetes, GERD, hypothyroidism.  Patient came in after noted to be short of breath at home, patient was satting in the 70 on home BiPAP therapy, was brought to the ED noted to be in distress tachycardic hypotensive hypoxic, workup in the ED patient with enlarged cardiac silhouette, chronic changes no acute finding, elevated BNP 25,000, mild elevation in troponin negative COVID no leukocytosis, patient was given nitroglycerin and Lasix injection, admitted to ICU.    Overview/Hospital Course:  12/1 AA, weekend crosscover, overnight patient had two ABG, eICU MD adjusted vent settings, had repeat chest x-ray which showed moderate right thorax, vital signs stable, O2 sat maintaining, surgery consulted for chest tube placement, patient was started on steroids yesterday for COPD flare, diuresis in progress, continue to monitor urine output, follow-up chest x-ray after chest tube placement, follow-up with surgery rec  12/2/24 ABG 7.395/52.4/87.3/29.8  improving respiratory status on AC Vt 450, RR18, PEEP 6, FiO2 55%, SpO2 98%, proprofol 25mcg/kg/min. CXR with right pneumothorax resolved, diuresing on scheduled IV lasix with adequate urine output. Patient minimally responsive to sternal rub. No elevated temperature. Urine culture positive for gram positive aerococcus spp, add IV rocephin. Continue with vent weaning and daily SBT, scheduled breathing treatments,  solumedrol. Follow up recommendations cardiology. Follow up general surgery with chest tube management.    12/3/24 ABG 7.423/52.7/72.7/32.3, FiO2 40%. Off sedation awake and following commands. CPAP trial followed with tachypnea 40s, will maintain current vent settings and prepare for SBT and extubation tomorrow AM. Right lateral chest tube has been clamped. CXR overnight unchanged, no new pneumothorax. Chest tube management per GS. BMx1. Start enteral trickle feeds and advance per protocol.   12/4/24 Stable ABG and vent settings. Tolerating tube feeds with no residual. Patient awake and alert and requests to have ET tube to be removed. Patient further affirms she does not want to be re-intubated afterwards. Plan to extubate and transition to BiPAP.     Interval History: Patient seen and examined.     Review of Systems   Unable to perform ROS: Intubated   Respiratory:  Negative for choking, chest tightness and shortness of breath.    Cardiovascular:  Negative for chest pain and palpitations.   Gastrointestinal:  Negative for abdominal pain.   Neurological:  Positive for weakness.     Objective:     Vital Signs (Most Recent):  Temp: 97.9 °F (36.6 °C) (12/04/24 0701)  Pulse: (!) 58 (12/04/24 1222)  Resp: 18 (12/04/24 1222)  BP: (!) 146/82 (12/04/24 1000)  SpO2: 97 % (12/04/24 1222) Vital Signs (24h Range):  Temp:  [97.3 °F (36.3 °C)-97.9 °F (36.6 °C)] 97.9 °F (36.6 °C)  Pulse:  [52-63] 58  Resp:  [8-30] 18  SpO2:  [91 %-100 %] 97 %  BP: (111-146)/(57-82) 146/82     Weight: 130.5 kg (287 lb 11.2 oz)  Body mass index is 49.38 kg/m².    Intake/Output Summary (Last 24 hours) at 12/4/2024 1226  Last data filed at 12/4/2024 0530  Gross per 24 hour   Intake 475.48 ml   Output 2900 ml   Net -2424.52 ml         Physical Exam  Vitals and nursing note reviewed.   Constitutional:       General: She is not in acute distress.     Appearance: She is not ill-appearing or toxic-appearing.      Comments: GCS 11T   Cardiovascular:       Rate and Rhythm: Normal rate and regular rhythm.   Pulmonary:      Effort: No respiratory distress.      Comments: Ventilator assisted breaths equal and nonlabored   R chest tube in place to water seal with no air leak   Abdominal:      General: There is no distension.      Palpations: Abdomen is soft.      Tenderness: There is no abdominal tenderness. There is no guarding.      Hernia: No hernia is present.   Skin:     Capillary Refill: Capillary refill takes less than 2 seconds.   Neurological:      Mental Status: She is alert. Mental status is at baseline.             Significant Labs: All pertinent labs within the past 24 hours have been reviewed.  A1C:   Recent Labs   Lab 07/28/24  2256 11/30/24  1237   HGBA1C 5.3 5.2     ABGs:   Recent Labs   Lab 12/03/24  0449 12/04/24  0510   PH 7.423 7.445   PCO2 52.7* 53.1*   HCO3 32.3* 34.4*   POCSATURATED 94.2* 95.0   PO2 72.2* 73.2*     Bilirubin:   Recent Labs   Lab 11/30/24  0340 12/01/24  0600 12/02/24  0349 12/03/24  0356 12/04/24  0409   BILITOT 0.4 0.5 0.4 0.4 0.3     Recent Labs   Lab 12/04/24  0409   *   *   K 3.6   *   CO2 39*   BUN 38*   CREATININE 1.5*   CALCIUM 9.1   MG 2.0     CBC:   Recent Labs   Lab 12/03/24  0356 12/04/24  0409   WBC 11.28 14.65*   HGB 10.5* 11.4*   HCT 31.2* 35.7*    212     CMP:   Recent Labs   Lab 12/03/24  0356 12/04/24  0409   * 151*   K 3.7 3.6   * 111*   CO2 36* 39*   * 120*   BUN 37* 38*   CREATININE 1.7* 1.5*   CALCIUM 9.0 9.1   PROT 5.4* 5.6*   ALBUMIN 1.9* 2.0*   BILITOT 0.4 0.3   ALKPHOS 118 118   AST 13 11   ALT 13 12   ANIONGAP 2* 1*     Recent Labs   Lab 12/03/24  0356 12/04/24  0409   MG 2.1 2.0     POCT Glucose:   Recent Labs   Lab 12/03/24  1153 12/03/24  1757 12/03/24  2336   POCTGLUCOSE 108 112* 127*     Recent Labs   Lab 08/10/24  0335   TSH 0.632     Urine culture [[id number]] (Abnormal) Collected: 11/30/24 1101   Order Status: Completed Specimen: Urine Updated: 12/02/24  "0014    Urine Culture, Routine AEROCOCCUS URINAE  10,000 - 49,999 cfu/ml  Susceptibility testing not routinely performed  No other significant isolate   Abnormal    Narrative:     Preferred Collection Type->Urine, Clean Catch  Specimen Source->Urine   Culture, Respiratory with Gram Stain [[id number]] Collected: 11/30/24 0836   Order Status: Completed Specimen: Respiratory from Endotracheal Aspirate Updated: 12/02/24 1159    Respiratory Culture Normal respiratory beth     No S aureus or Pseudomonas isolated.    Gram Stain (Respiratory) <10 epithelial cells per low power field.    Gram Stain (Respiratory) Moderate WBC's    Gram Stain (Respiratory) Few Gram positive cocci    Gram Stain (Respiratory) Rare Gram negative rods     X-Ray Chest 1 View  Narrative: EXAMINATION:  XR CHEST 1 VIEW    CLINICAL HISTORY:  vent and chest tube;    COMPARISON:  12/03/2024    FINDINGS:  Cardiac silhouette is unchanged.  ET tube, NG tube, right thoracostomy tube and right PICC remain.  Lung findings are similar.  No obvious pneumothorax.  Impression: No significant interval change.    Electronically signed by: Abran Pelletier MD  Date:    12/04/2024  Time:    07:39     Significant Imaging: I have reviewed all pertinent imaging results/findings within the past 24 hours.    Assessment and Plan     * Acute on chronic diastolic congestive heart failure  Patient has Diastolic (HFpEF) heart failure that is Acute on chronic. On presentation their CHF was decompensated. Evidence of decompensated CHF on presentation includes: edema, orthopnea, dyspnea on exertion (MELISSA), and shortness of breath. The etiology of their decompensation is likely medication non-compliance?diet? Most recent BNP and echo results are listed below.  No results for input(s): "BNP" in the last 72 hours.  Latest ECHO  Results for orders placed during the hospital encounter of 07/28/24    Echo Saline Bubble? No; Ultrasound enhancing contrast? No  Interpretation Summary    Left " Ventricle: The left ventricle is normal in size. Mildly increased wall thickness. There is normal systolic function with a visually estimated ejection fraction of 55 - 60%. Grade I diastolic dysfunction.    Right Ventricle: Normal right ventricular cavity size. Systolic function is normal.    Left Atrium: Left atrium is severely dilated. Atrial septum is bulging to the right.    Right Atrium: Right atrium is moderately dilated.    Aortic Valve: The aortic valve is a trileaflet valve. Mildly restricted motion. There is mild stenosis. Aortic valve area by VTI is 1.84 cm². Aortic valve peak velocity is 2.18 m/s. Mean gradient is 10 mmHg. The dimensionless index is 0.58. There is mild aortic regurgitation.    Mitral Valve: There is mild regurgitation.    Tricuspid Valve: There is mild regurgitation.    Pulmonic Valve: There is mild to moderate regurgitation.    No pericardial effusion.    Current Heart Failure Medications  carvediloL tablet 3.125 mg, 2 times daily, Per OG tube  valsartan tablet 160 mg, Daily, Per OG tube  furosemide injection 40 mg, Daily, Intravenous    Plan  - Monitor strict I&Os and daily weights.    - Place on telemetry  - Low sodium diet  - Place on fluid restriction of 1 L.   - Cardiology has been consulted, follow up recommendations  - The patient's volume status is stable but not at their baseline as indicated by edema and shortness of breath and respiratory distress requiring intubation        ACP (advance care planning)  12/3/24 Patient once extubated she does not want to be re-intubated  12/4/24 Family would like information on home hospice services      Other pneumothorax  Patient developed pneumothorax right lower lobe, moderate, surgery consulted for chest tube placement and eval.  Right chest tube in place, clamped  CXR with no interval changes.   - f/u GS recommendations        Hard to intubate  D4 intubation, on proprofol  Patient understands she will not be re-intubated   - SBT, CPAP  trial to extubation  - start continuous BiPAP  - continue daily pulmonary hygiene care with breathing treatments, solumedrol and IS      Hypothyroidism  Continue levothyroxine       HLD (hyperlipidemia)  Continue statin      Gastroesophageal reflux disease without esophagitis  PPI on board      Acute cystitis without hematuria  11/30/24 Urine culture positive for aerococcus urinae. Start therapy with rocephin for 5 days total.   Estimated Creatinine Clearance: 44.8 mL/min (A) (based on SCr of 1.5 mg/dL (H)).  Stable renal function.   - continue rocephin  - Monitor I/Os       Anxiety and depression  Patient has persistent depression which is unknown and is currently controlled. Will Continue anti-depressant medications. We will not consult psychiatry at this time. Patient does not display psychosis at this time. Continue to monitor closely and adjust plan of care as needed.        Diabetes mellitus, type 2  Patient's FSGs are controlled on current medication regimen.  Last A1c reviewed-   Lab Results   Component Value Date    HGBA1C 5.2 11/30/2024     Most recent fingerstick glucose reviewed-   Recent Labs   Lab 12/03/24  1757 12/03/24  2336   POCTGLUCOSE 112* 127*     Current correctional scale  Low  Maintain anti-hyperglycemic dose as follows-   Antihyperglycemics (From admission, onward)      Start     Stop Route Frequency Ordered    11/30/24 1324  insulin aspart U-100 pen 0-5 Units         -- SubQ Every 6 hours PRN 11/30/24 1225          Hold Oral hypoglycemics while patient is in the hospital.    Severe obesity (BMI >= 40)  Body mass index is 49.38 kg/m². Morbid obesity complicates all aspects of disease management from diagnostic modalities to treatment. Weight loss encouraged and health benefits explained to patient.     Wt Readings from Last 8 Encounters:   12/04/24 130.5 kg (287 lb 11.2 oz)   10/16/24 113.4 kg (250 lb)   09/22/24 113.4 kg (250 lb)   08/14/24 119.7 kg (263 lb 14.3 oz)   07/29/24 104.3 kg (229  lb 15 oz)   06/25/24 104.6 kg (230 lb 11.2 oz)   06/24/24 104.3 kg (230 lb)   06/20/24 102.5 kg (226 lb)            Acute on chronic respiratory failure with hypoxia and hypercapnia  Patient with Hypercapnic and Hypoxic Respiratory failure which is Acute on chronic.  she is on home oxygen at 2 LPM. Supplemental oxygen was provided and noted- Vent Mode: A/C  Oxygen Concentration (%):  [40] 40  Resp Rate Total:  [18 br/min-21 br/min] 21 br/min  Vt Set:  [400 mL] 400 mL  PEEP/CPAP:  [6 cmH20] 6 cmH20  Mean Airway Pressure:  [11 wvB10-08 cmH20] 11 cmH20     Signs/symptoms of respiratory failure include- tachypnea, increased work of breathing, respiratory distress, use of accessory muscles, and wheezing. Contributing diagnoses includes - CHF and COPD Labs and images were reviewed. Patient Has recent ABG, which has been reviewed. Will treat underlying causes and adjust management of respiratory failure as follows- duo nebs, wean vent as tolerated, ABGs prn, diuresis     Paroxysmal A-fib  Patient has  atrial fibrillation. Patient is currently in . [id number] Score: 3. The patients heart rate in the last 24 hours is as follows:  Pulse  Min: 52  Max: 63     Antiarrhythmics   BB on hold due to bradycardia from propofol gtt    Anticoagulants  enoxaparin injection 40 mg, Every 24 hours, Subcutaneous    Plan  - Replete lytes with a goal of K>4, Mg >2  - Patient's afib is currently stable, she has history of dash-tachy fluctuation, subsequent intubation on propofol gtt, now bradycardic, hold coreg for now, previous records showed on eliquis, care everywhere patient has not be seen by Cardiology except for and how during previous admission in August, unclear which medication patient is taking, will try and get with family member to verify medication patient taking at home and if she was taken off blood thinner at some point since her August admission            VTE Risk Mitigation (From admission, onward)           Ordered      enoxaparin injection 40 mg  Every 24 hours         11/29/24 1730     IP VTE HIGH RISK PATIENT  Once         11/29/24 1726     Place sequential compression device  Until discontinued         11/29/24 1726                    Discharge Planning   TIGRE:      Code Status: DNR   Medical Readiness for Discharge Date:   Discharge Plan A: Hospital at home, Hospice/home   Discharge Delays: None known at this time    Gunnar Mott DO  Department of Hospital Medicine   [facility]

## 2024-12-04 NOTE — SUBJECTIVE & OBJECTIVE
Interval History: Pt s/e. CPAP trial today.  Chest tube clamped.  Alert with sedation holiday.     Medications:  Continuous Infusions:   propofoL  0-50 mcg/kg/min Intravenous Continuous 8.5 mL/hr at 12/03/24 1728 10 mcg/kg/min at 12/03/24 1728     Scheduled Meds:   albuterol sulfate  2.5 mg Nebulization Q4H    aspirin  325 mg Per OG tube Daily    atorvastatin  40 mg Per OG tube Daily    carvediloL  3.125 mg Per OG tube BID    cefTRIAXone (Rocephin) IV (PEDS and ADULTS)  1 g Intravenous Q24H    enoxparin  40 mg Subcutaneous Q24H (prophylaxis, 1700)    furosemide (LASIX) injection  40 mg Intravenous Daily    levothyroxine  50 mcg Per OG tube Before breakfast    methylPREDNISolone injection (PEDS and ADULTS)  80 mg Intravenous Q24H    mupirocin   Nasal BID    pantoprazole  40 mg Intravenous Daily    polyethylene glycol  17 g Per OG tube Daily    senna-docusate 8.6-50 mg  2 tablet Per OG tube Daily    valsartan  160 mg Per OG tube Daily     PRN Meds:  Current Facility-Administered Medications:     acetaminophen, 650 mg, Per OG tube, Q8H PRN    dextrose 10%, 12.5 g, Intravenous, PRN    dextrose 10%, 25 g, Intravenous, PRN    glucagon (human recombinant), 1 mg, Intramuscular, PRN    insulin aspart U-100, 0-5 Units, Subcutaneous, Q6H PRN    melatonin, 6 mg, Per OG tube, Nightly PRN    ondansetron, 4 mg, Intravenous, Q8H PRN    sodium chloride 0.9%, 10 mL, Intravenous, PRN    Flushing PICC/Midline Protocol, , , Until Discontinued **AND** sodium chloride 0.9%, 10 mL, Intravenous, Q12H PRN     Review of patient's allergies indicates:   Allergen Reactions    Pcn [penicillins] Swelling     Objective:     Vital Signs (Most Recent):  Temp: 97.8 °F (36.6 °C) (12/03/24 1639)  Pulse: 61 (12/03/24 1700)  Resp: 19 (12/03/24 1700)  BP: 129/60 (12/03/24 1700)  SpO2: 95 % (12/03/24 1700) Vital Signs (24h Range):  Temp:  [96.6 °F (35.9 °C)-99 °F (37.2 °C)] 97.8 °F (36.6 °C)  Pulse:  [52-82] 61  Resp:  [0-36] 19  SpO2:  [85 %-100 %] 95  %  BP: ()/(52-80) 129/60     Weight: 133.6 kg (294 lb 8.6 oz)  Body mass index is 50.56 kg/m².    Intake/Output - Last 3 Shifts         12/01 0700  12/02 0659 12/02 0700 12/03 0659 12/03 0700 12/04 0659    P.O. 0      I.V. (mL/kg) 600.1 (4.4) 251.3 (1.9) 65.2 (0.5)    NG/GT 90 210 102    Total Intake(mL/kg) 690.1 (5.1) 461.3 (3.5) 167.2 (1.3)    Urine (mL/kg/hr) 1850 (0.6) 2150 (0.7) 2400 (1.6)    Drains 75 200     Stool 0      Chest Tube 90 90     Total Output 2015 2440 2400    Net -1324.9 -1978.8 -2232.9           Stool Occurrence 0 x 1 x              Physical Exam  Constitutional:       General: She is not in acute distress.     Appearance: She is not ill-appearing or toxic-appearing.      Comments: GCS 11T   Cardiovascular:      Rate and Rhythm: Normal rate and regular rhythm.   Pulmonary:      Effort: No respiratory distress.      Comments: Ventilator assisted breaths equal and nonlabored   R chest tube in place to water seal with no air leak   Abdominal:      General: There is no distension.      Palpations: Abdomen is soft.      Tenderness: There is no abdominal tenderness. There is no guarding.      Hernia: No hernia is present.   Skin:     Capillary Refill: Capillary refill takes less than 2 seconds.   Neurological:      Mental Status: She is alert. Mental status is at baseline.          Significant Labs:  I have reviewed all pertinent lab results within the past 24 hours.  CBC:   Recent Labs   Lab 12/03/24  0356   WBC 11.28   RBC 3.41*   HGB 10.5*   HCT 31.2*      MCV 92   MCH 30.8   MCHC 33.7     CMP:   Recent Labs   Lab 12/03/24  0356   *   CALCIUM 9.0   ALBUMIN 1.9*   PROT 5.4*   *   K 3.7   CO2 36*   *   BUN 37*   CREATININE 1.7*   ALKPHOS 118   ALT 13   AST 13   BILITOT 0.4       Significant Diagnostics:  I have reviewed all pertinent imaging results/findings within the past 24 hours.

## 2024-12-04 NOTE — PLAN OF CARE
12/04/24 1232   Post-Acute Status   Post-Acute Authorization Hospice   Hospice Status Referrals Sent   Coverage [employer] [facility]   Discharge Delays None known at this time   Discharge Plan   Discharge Plan A Hospital at home;Hospice/home   Discharge Plan B Other  (TBD)     New referral. The patient's family was in agreement with the informational visit for hospice care. Tayler, the , is aware of the referral. Tayler plans to meet with the patient and her family today around 1PM.     Addendum: The patient's family gave me permission to fax the clinicals to [facility] for review.

## 2024-12-04 NOTE — EICU
Bed: ED30  Expected date:   Expected time:   Means of arrival:   Comments:  443  90 f clogged g tube  1920   Intervention Initiated From:  [facility] / EICU    Elliott intervened regarding:  Rounding (Video assessment)    Nurse Notified:  No    Doctor Notified:  No    Comments: Intubated, sedated on Propofol  Video rounds completed.  Resting quietly in bed,  nad noted.   The patient is a 27y Female complaining of pregnancy problem.

## 2024-12-04 NOTE — PT/OT/SLP EVAL
Occupational Therapy   Evaluation    Name: Carmen Tan  MRN: [id number]  Admitting Diagnosis: Acute on chronic diastolic congestive heart failure  Recent Surgery: * No surgery found *      Recommendations:     Discharge Recommendations:  (To be further determined secondary to patient pending Hospice.)  Discharge Equipment Recommendations:  none  Barriers to discharge:  Other (Comment) (Medical status)    Assessment:     Carmen Tan is a 74 y.o. female with a medical diagnosis of Acute on chronic diastolic congestive heart failure.  She presents with functional deficits impacting independence with ADL's including functional mobility. Performance deficits affecting function: weakness, impaired endurance, impaired self care skills, impaired functional mobility, impaired balance, decreased coordination, decreased upper extremity function, decreased lower extremity function, decreased safety awareness, pain, decreased ROM, impaired fine motor, edema, impaired cardiopulmonary response to activity, impaired joint extensibility, impaired muscle length.      Rehab Prognosis: Poor; patient would benefit from acute skilled OT services to address these deficits and reach maximum level of function.       Plan:     Patient to be seen 3 x/week to address the above listed problems via self-care/home management, therapeutic activities, therapeutic exercises  Plan of Care Expires: 12/11/24  Plan of Care Reviewed with: patient    Subjective     Chief Complaint: pain  Patient/Family Comments/goals: Pt would like to be comfortable.    Occupational Profile:  Living Environment: Pt pending [facility] placement at [facility] with hospice care.  Previous level of function: Assistance with ADL's  Roles and Routines: BADL's  Equipment Used at Home: bedside commode, wheelchair, hospital bed, bath bench, oxygen, CPAP, rollator  Assistance upon Discharge: Potentially NH and hospice staff    Pain/Comfort:  Pain Rating 1:  9/10  Location - Side 1: Left  Location 1: abdomen (and lower chest)  Pain Addressed 1: Reposition, Distraction, Cessation of Activity, Nurse notified  Pain Rating Post-Intervention 1: 9/10    Patients cultural, spiritual, Pentec[employer]al conflicts given the current situation:      Objective:     Communicated with: nurse prior to session.  Patient found HOB elevated with telemetry, SCD, pulse ox (continuous), peripheral IV, chest tube, blood pressure cuff, hannon catheter, oxygen upon OT entry to room.    General Precautions: Standard, fall, respiratory  Orthopedic Precautions: N/A  Braces: N/A  Respiratory Status:  BiPAP    Occupational Performance:    Bed Mobility:    Patient completed Rolling/Turning to Left with  total assistance  Patient completed Rolling/Turning to Right with total assistance  Patient completed Scooting/Bridging with total assistance  Patient completed Supine to Sit with total assistance  Patient completed Sit to Supine with total assistance    Functional Mobility/Transfers:  Pt did not perform on this date.  Functional Mobility: Did not perform.    Activities of Daily Living:  Feeding:  total assistance NG tube feeding prior, but now on BiPAP  Grooming: maximal assistance    Bathing: total assistance    Upper Body Dressing: total assistance    Lower Body Dressing: total assistance    Toileting: total assistance      Cognitive/Visual Perceptual:  Cognitive/Psychosocial Skills:  -       Oriented to: Person, Place, and Situation   -       Follows Commands/attention:Follows multistep  commands  -       Communication: difficult with BiPAP  -       Memory: Impaired STM  -       Safety awareness/insight to disability: impaired   -       Mood/Affect/Coping skills/emotional control: Cooperative and Lethargic    Physical Exam:  Postural examination/scapula alignment: -       Rounded shoulders  -       Forward head  Edema:  bilateral Ue's and LE's  Dominant hand: -       Right  Upper Extremity Range of  Motion:  -       Right Upper Extremity: Passive ROM WFL; approx 25-40% of normal active ranges  -       Left Upper Extremity: Passive ROM WFL; approx 25-40% of normal active ranges  Upper Extremity Strength: -       Right Upper Extremity: Deficits: 2 to 2+/5  -       Left Upper Extremity: Deficits: 2 to 2+/5  Fine Motor Coordination: -       Impaired     Gross motor coordination: Impaired    AMPAC 6 Click ADL:  AMPAC Total Score: 6    Treatment & Education:  Pt was provided education / instruction regarding role of OT and established OT POC.    Patient left HOB elevated with all lines intact, call button in reach, and nurse notified    GOALS:   Goals to be met by: 12/11/24     Patient will increase functional independence with ADLs by performing:    Feeding with Supervision.  UE Dressing with Moderate Assistance.  LE Dressing with Maximum Assistance.  Grooming while seated with Minimal Assistance.  Toileting from bedside commode with Maximum Assistance for hygiene and clothing management.   Bathing from  bed with Maximum Assistance.  Toilet transfer to bedside commode with Moderate Assistance.          History:     Past Medical History:   Diagnosis Date    Abdominal hernia     Bacteremia 04/05/2023    CHF (congestive heart failure)     COPD (chronic obstructive pulmonary disease)     Diabetes mellitus, type 2 02/16/2022    GERD (gastroesophageal reflux disease)     History of home oxygen therapy     Hypertension     Hypertensive emergency 03/30/2023    Migraine headache     On home O2     Pulmonary embolism 06/01/2017    Small bowel obstruction     Thyroid disease          Past Surgical History:   Procedure Laterality Date    COLONOSCOPY      HYSTERECTOMY      INNER EAR SURGERY      UPPER GASTROINTESTINAL ENDOSCOPY         Time Tracking:     OT Date of Treatment: 12/04/24  OT Start Time: 1605  OT Stop Time: 1631  OT Total Time (min): 26 min    Billable Minutes:Evaluation 26    12/4/2024

## 2024-12-04 NOTE — ASSESSMENT & PLAN NOTE
"Patient has Diastolic (HFpEF) heart failure that is Acute on chronic. On presentation their CHF was decompensated. Evidence of decompensated CHF on presentation includes: edema, orthopnea, dyspnea on exertion (MELISSA), and shortness of breath. The etiology of their decompensation is likely medication non-compliance?diet? Most recent BNP and echo results are listed below.  No results for input(s): "BNP" in the last 72 hours.  Latest ECHO  Results for orders placed during the hospital encounter of 07/28/24    Echo Saline Bubble? No; Ultrasound enhancing contrast? No  Interpretation Summary    Left Ventricle: The left ventricle is normal in size. Mildly increased wall thickness. There is normal systolic function with a visually estimated ejection fraction of 55 - 60%. Grade I diastolic dysfunction.    Right Ventricle: Normal right ventricular cavity size. Systolic function is normal.    Left Atrium: Left atrium is severely dilated. Atrial septum is bulging to the right.    Right Atrium: Right atrium is moderately dilated.    Aortic Valve: The aortic valve is a trileaflet valve. Mildly restricted motion. There is mild stenosis. Aortic valve area by VTI is 1.84 cm². Aortic valve peak velocity is 2.18 m/s. Mean gradient is 10 mmHg. The dimensionless index is 0.58. There is mild aortic regurgitation.    Mitral Valve: There is mild regurgitation.    Tricuspid Valve: There is mild regurgitation.    Pulmonic Valve: There is mild to moderate regurgitation.    No pericardial effusion.    Current Heart Failure Medications  carvediloL tablet 3.125 mg, 2 times daily, Per OG tube  valsartan tablet 160 mg, Daily, Per OG tube  furosemide injection 40 mg, Daily, Intravenous    Plan  - Monitor strict I&Os and daily weights.    - Place on telemetry  - Low sodium diet  - Place on fluid restriction of 1 L.   - Cardiology has been consulted, follow up recommendations  - The patient's volume status is stable but not at their baseline as " indicated by edema and shortness of breath and respiratory distress requiring intubation

## 2024-12-04 NOTE — ASSESSMENT & PLAN NOTE
12/3/24 Patient once extubated she does not want to be re-intubated  12/4/24 Family would like information on home hospice services

## 2024-12-04 NOTE — EICU
Intervention Initiated From:  [facility] / EICU    Elliott intervened regarding:  Rounding (Video assessment)    Comments:  Video rounding completed.  Pt resting quiet on ventilator support.  No distress noted.  No gtts noted at this time.  B/P 146/82, HR 60, RR 18, POx 99%.

## 2024-12-04 NOTE — PROGRESS NOTES
[facility]  General Surgery  Progress Note    Subjective:     History of Present Illness:  No notes on file    Post-Op Info:  * No surgery found *         Interval History: Pt s/e. CPAP trial today.  Chest tube clamped.  Alert with sedation holiday.     Medications:  Continuous Infusions:   propofoL  0-50 mcg/kg/min Intravenous Continuous 8.5 mL/hr at 12/03/24 1728 10 mcg/kg/min at 12/03/24 1728     Scheduled Meds:   albuterol sulfate  2.5 mg Nebulization Q4H    aspirin  325 mg Per OG tube Daily    atorvastatin  40 mg Per OG tube Daily    carvediloL  3.125 mg Per OG tube BID    cefTRIAXone (Rocephin) IV (PEDS and ADULTS)  1 g Intravenous Q24H    enoxparin  40 mg Subcutaneous Q24H (prophylaxis, 1700)    furosemide (LASIX) injection  40 mg Intravenous Daily    levothyroxine  50 mcg Per OG tube Before breakfast    methylPREDNISolone injection (PEDS and ADULTS)  80 mg Intravenous Q24H    mupirocin   Nasal BID    pantoprazole  40 mg Intravenous Daily    polyethylene glycol  17 g Per OG tube Daily    senna-docusate 8.6-50 mg  2 tablet Per OG tube Daily    valsartan  160 mg Per OG tube Daily     PRN Meds:  Current Facility-Administered Medications:     acetaminophen, 650 mg, Per OG tube, Q8H PRN    dextrose 10%, 12.5 g, Intravenous, PRN    dextrose 10%, 25 g, Intravenous, PRN    glucagon (human recombinant), 1 mg, Intramuscular, PRN    insulin aspart U-100, 0-5 Units, Subcutaneous, Q6H PRN    melatonin, 6 mg, Per OG tube, Nightly PRN    ondansetron, 4 mg, Intravenous, Q8H PRN    sodium chloride 0.9%, 10 mL, Intravenous, PRN    Flushing PICC/Midline Protocol, , , Until Discontinued **AND** sodium chloride 0.9%, 10 mL, Intravenous, Q12H PRN     Review of patient's allergies indicates:   Allergen Reactions    Pcn [penicillins] Swelling     Objective:     Vital Signs (Most Recent):  Temp: 97.8 °F (36.6 °C) (12/03/24 1639)  Pulse: 61 (12/03/24 1700)  Resp: 19 (12/03/24 1700)  BP: 129/60 (12/03/24 1700)  SpO2: 95  % (12/03/24 1700) Vital Signs (24h Range):  Temp:  [96.6 °F (35.9 °C)-99 °F (37.2 °C)] 97.8 °F (36.6 °C)  Pulse:  [52-82] 61  Resp:  [0-36] 19  SpO2:  [85 %-100 %] 95 %  BP: ()/(52-80) 129/60     Weight: 133.6 kg (294 lb 8.6 oz)  Body mass index is 50.56 kg/m².    Intake/Output - Last 3 Shifts         12/01 0700 12/02 0659 12/02 0700 12/03 0659 12/03 0700 12/04 0659    P.O. 0      I.V. (mL/kg) 600.1 (4.4) 251.3 (1.9) 65.2 (0.5)    NG/GT 90 210 102    Total Intake(mL/kg) 690.1 (5.1) 461.3 (3.5) 167.2 (1.3)    Urine (mL/kg/hr) 1850 (0.6) 2150 (0.7) 2400 (1.6)    Drains 75 200     Stool 0      Chest Tube 90 90     Total Output 2015 2440 2400    Net -1324.9 -1978.8 -2232.9           Stool Occurrence 0 x 1 x              Physical Exam  Constitutional:       General: She is not in acute distress.     Appearance: She is not ill-appearing or toxic-appearing.      Comments: GCS 11T   Cardiovascular:      Rate and Rhythm: Normal rate and regular rhythm.   Pulmonary:      Effort: No respiratory distress.      Comments: Ventilator assisted breaths equal and nonlabored   R chest tube in place to water seal with no air leak   Abdominal:      General: There is no distension.      Palpations: Abdomen is soft.      Tenderness: There is no abdominal tenderness. There is no guarding.      Hernia: No hernia is present.   Skin:     Capillary Refill: Capillary refill takes less than 2 seconds.   Neurological:      Mental Status: She is alert. Mental status is at baseline.          Significant Labs:  I have reviewed all pertinent lab results within the past 24 hours.  CBC:   Recent Labs   Lab 12/03/24  0356   WBC 11.28   RBC 3.41*   HGB 10.5*   HCT 31.2*      MCV 92   MCH 30.8   MCHC 33.7     CMP:   Recent Labs   Lab 12/03/24  0356   *   CALCIUM 9.0   ALBUMIN 1.9*   PROT 5.4*   *   K 3.7   CO2 36*   *   BUN 37*   CREATININE 1.7*   ALKPHOS 118   ALT 13   AST 13   BILITOT 0.4       Significant  Diagnostics:  I have reviewed all pertinent imaging results/findings within the past 24 hours.  Assessment/Plan:     Other pneumothorax  Clamp chest tube   Repeat CXR at 1700  Plan for extubation tomorrow per primary         Isabell Raymond MD  General Surgery  [facility]

## 2024-12-04 NOTE — NURSING
Pt resting. Remains on the vent. Breathing over the vent when moved. No leaking noted. Feeding per OG. Tolerating well. Approx 10 ml residuals. Hardwick maintained. Hardwick care. SCD's maintained. Continue to open eyes with verbal stimuli and follow commands. Diprivan 10 ncg/kg/min. Continue to observe.

## 2024-12-04 NOTE — PLAN OF CARE
Problem: Occupational Therapy  Goal: Occupational Therapy Goal  Description: Goals to be met by: 12/11/24     Patient will increase functional independence with ADLs by performing:    Feeding with Supervision.  UE Dressing with Moderate Assistance.  LE Dressing with Maximum Assistance.  Grooming while seated with Minimal Assistance.  Toileting from bedside commode with Maximum Assistance for hygiene and clothing management.   Bathing from  bed with Maximum Assistance.  Toilet transfer to bedside commode with Moderate Assistance.    Outcome: Established OT POC

## 2024-12-04 NOTE — ASSESSMENT & PLAN NOTE
11/30/24 Urine culture positive for aerococcus urinae. Start therapy with rocephin for 5 days total.   Estimated Creatinine Clearance: 44.8 mL/min (A) (based on SCr of 1.5 mg/dL (H)).  Stable renal function.   - continue rocephin  - Monitor I/Os

## 2024-12-04 NOTE — ASSESSMENT & PLAN NOTE
D4 intubation, on proprofol  Patient understands she will not be re-intubated   - SBT, CPAP trial to extubation  - start continuous BiPAP  - continue daily pulmonary hygiene care with breathing treatments, solumedrol and IS

## 2024-12-04 NOTE — NURSING
Pt resting. Remains on the vent. 400 Vte, Rated 18, PEEP 6, and 40% FIO2. Open eyes to verbal stimuli. Followed command to squeeze hand and push against feet. Inst to rest, shaking head yes in response. OG tube with Suplena at 10ml. Diprivan remains 10 mcg/kg/min. Chest tube remains clamped. Hardwick with clear yellow urine. VSS, sinus bradycardic. Afebrile. Family called to check on pt, staff assisted. Continue to observe.

## 2024-12-05 LAB
ALBUMIN SERPL BCP-MCNC: 2 G/DL (ref 3.5–5.2)
ALP SERPL-CCNC: 110 U/L (ref 55–135)
ALT SERPL W/O P-5'-P-CCNC: 10 U/L (ref 10–44)
ANION GAP SERPL CALC-SCNC: 0 MMOL/L (ref 3–11)
AST SERPL-CCNC: 10 U/L (ref 10–40)
BASOPHILS # BLD AUTO: 0.01 K/UL (ref 0–0.2)
BASOPHILS NFR BLD: 0.1 % (ref 0–1.9)
BILIRUB SERPL-MCNC: 0.3 MG/DL (ref 0.1–1)
BUN SERPL-MCNC: 34 MG/DL (ref 8–23)
CALCIUM SERPL-MCNC: 9.3 MG/DL (ref 8.7–10.5)
CHLORIDE SERPL-SCNC: 113 MMOL/L (ref 95–110)
CO2 SERPL-SCNC: 40 MMOL/L (ref 23–29)
CREAT SERPL-MCNC: 1.3 MG/DL (ref 0.5–1.4)
DIFFERENTIAL METHOD BLD: ABNORMAL
EOSINOPHIL # BLD AUTO: 0 K/UL (ref 0–0.5)
EOSINOPHIL NFR BLD: 0 % (ref 0–8)
ERYTHROCYTE [DISTWIDTH] IN BLOOD BY AUTOMATED COUNT: 16.5 % (ref 11.5–14.5)
EST. GFR  (NO RACE VARIABLE): 43.4 ML/MIN/1.73 M^2
GLUCOSE SERPL-MCNC: 88 MG/DL (ref 70–110)
HCT VFR BLD AUTO: 36.8 % (ref 37–48.5)
HGB BLD-MCNC: 11.6 G/DL (ref 12–16)
IMM GRANULOCYTES # BLD AUTO: 0.08 K/UL (ref 0–0.04)
IMM GRANULOCYTES NFR BLD AUTO: 0.6 % (ref 0–0.5)
LYMPHOCYTES # BLD AUTO: 0.8 K/UL (ref 1–4.8)
LYMPHOCYTES NFR BLD: 6 % (ref 18–48)
MAGNESIUM SERPL-MCNC: 2.1 MG/DL (ref 1.6–2.6)
MCH RBC QN AUTO: 30 PG (ref 27–31)
MCHC RBC AUTO-ENTMCNC: 31.5 G/DL (ref 32–36)
MCV RBC AUTO: 95 FL (ref 82–98)
MONOCYTES # BLD AUTO: 0.8 K/UL (ref 0.3–1)
MONOCYTES NFR BLD: 6.4 % (ref 4–15)
NEUTROPHILS # BLD AUTO: 11.2 K/UL (ref 1.8–7.7)
NEUTROPHILS NFR BLD: 86.9 % (ref 38–73)
NRBC BLD-RTO: 0 /100 WBC
PLATELET # BLD AUTO: 211 K/UL (ref 150–450)
PMV BLD AUTO: 9.1 FL (ref 9.2–12.9)
POCT GLUCOSE: 92 MG/DL (ref 70–110)
POCT GLUCOSE: 96 MG/DL (ref 70–110)
POTASSIUM SERPL-SCNC: 3.6 MMOL/L (ref 3.5–5.1)
PROT SERPL-MCNC: 5.8 G/DL (ref 6–8.4)
RBC # BLD AUTO: 3.87 M/UL (ref 4–5.4)
SODIUM SERPL-SCNC: 153 MMOL/L (ref 136–145)
WBC # BLD AUTO: 12.9 K/UL (ref 3.9–12.7)

## 2024-12-05 PROCEDURE — A4216 STERILE WATER/SALINE, 10 ML: HCPCS | Performed by: STUDENT IN AN ORGANIZED HEALTH CARE EDUCATION/TRAINING PROGRAM

## 2024-12-05 PROCEDURE — 94640 AIRWAY INHALATION TREATMENT: CPT

## 2024-12-05 PROCEDURE — 99900035 HC TECH TIME PER 15 MIN (STAT)

## 2024-12-05 PROCEDURE — 36415 COLL VENOUS BLD VENIPUNCTURE: CPT | Performed by: INTERNAL MEDICINE

## 2024-12-05 PROCEDURE — 85025 COMPLETE CBC W/AUTO DIFF WBC: CPT | Performed by: INTERNAL MEDICINE

## 2024-12-05 PROCEDURE — 97112 NEUROMUSCULAR REEDUCATION: CPT

## 2024-12-05 PROCEDURE — 25000242 PHARM REV CODE 250 ALT 637 W/ HCPCS: Performed by: STUDENT IN AN ORGANIZED HEALTH CARE EDUCATION/TRAINING PROGRAM

## 2024-12-05 PROCEDURE — 20000000 HC ICU ROOM

## 2024-12-05 PROCEDURE — 92610 EVALUATE SWALLOWING FUNCTION: CPT

## 2024-12-05 PROCEDURE — 25000003 PHARM REV CODE 250: Performed by: STUDENT IN AN ORGANIZED HEALTH CARE EDUCATION/TRAINING PROGRAM

## 2024-12-05 PROCEDURE — 99233 SBSQ HOSP IP/OBS HIGH 50: CPT | Mod: GW,,, | Performed by: STUDENT IN AN ORGANIZED HEALTH CARE EDUCATION/TRAINING PROGRAM

## 2024-12-05 PROCEDURE — 97530 THERAPEUTIC ACTIVITIES: CPT

## 2024-12-05 PROCEDURE — 27100171 HC OXYGEN HIGH FLOW UP TO 24 HOURS

## 2024-12-05 PROCEDURE — 63600175 PHARM REV CODE 636 W HCPCS: Performed by: INTERNAL MEDICINE

## 2024-12-05 PROCEDURE — 80053 COMPREHEN METABOLIC PANEL: CPT | Performed by: INTERNAL MEDICINE

## 2024-12-05 PROCEDURE — 83735 ASSAY OF MAGNESIUM: CPT | Performed by: INTERNAL MEDICINE

## 2024-12-05 PROCEDURE — 94761 N-INVAS EAR/PLS OXIMETRY MLT: CPT

## 2024-12-05 PROCEDURE — 99900031 HC PATIENT EDUCATION (STAT)

## 2024-12-05 PROCEDURE — 94660 CPAP INITIATION&MGMT: CPT

## 2024-12-05 PROCEDURE — 25000242 PHARM REV CODE 250 ALT 637 W/ HCPCS: Performed by: INTERNAL MEDICINE

## 2024-12-05 PROCEDURE — 63600175 PHARM REV CODE 636 W HCPCS: Performed by: STUDENT IN AN ORGANIZED HEALTH CARE EDUCATION/TRAINING PROGRAM

## 2024-12-05 RX ORDER — FUROSEMIDE 10 MG/ML
20 INJECTION INTRAMUSCULAR; INTRAVENOUS 2 TIMES DAILY
Status: DISCONTINUED | OUTPATIENT
Start: 2024-12-05 | End: 2024-12-08

## 2024-12-05 RX ORDER — DEXTROSE MONOHYDRATE, SODIUM CHLORIDE, AND POTASSIUM CHLORIDE 50; 1.49; 4.5 G/1000ML; G/1000ML; G/1000ML
INJECTION, SOLUTION INTRAVENOUS CONTINUOUS
Status: DISCONTINUED | OUTPATIENT
Start: 2024-12-05 | End: 2024-12-05

## 2024-12-05 RX ORDER — ENOXAPARIN SODIUM 300 MG/3ML
20 INJECTION INTRAVENOUS; SUBCUTANEOUS 2 TIMES DAILY
Status: DISCONTINUED | OUTPATIENT
Start: 2024-12-05 | End: 2024-12-05

## 2024-12-05 RX ORDER — ENOXAPARIN SODIUM 100 MG/ML
40 INJECTION SUBCUTANEOUS EVERY 24 HOURS
Status: DISCONTINUED | OUTPATIENT
Start: 2024-12-05 | End: 2024-12-12 | Stop reason: HOSPADM

## 2024-12-05 RX ORDER — ALBUTEROL SULFATE 2.5 MG/.5ML
2.5 SOLUTION RESPIRATORY (INHALATION)
Status: DISCONTINUED | OUTPATIENT
Start: 2024-12-05 | End: 2024-12-12 | Stop reason: HOSPADM

## 2024-12-05 RX ADMIN — BUDESONIDE 0.5 MG: 0.5 INHALANT RESPIRATORY (INHALATION) at 07:12

## 2024-12-05 RX ADMIN — POTASSIUM BICARBONATE 25 MEQ: 978 TABLET, EFFERVESCENT ORAL at 05:12

## 2024-12-05 RX ADMIN — SENNOSIDES AND DOCUSATE SODIUM 2 TABLET: 50; 8.6 TABLET ORAL at 08:12

## 2024-12-05 RX ADMIN — ALBUTEROL SULFATE 2.5 MG: 2.5 SOLUTION RESPIRATORY (INHALATION) at 07:12

## 2024-12-05 RX ADMIN — POTASSIUM BICARBONATE 25 MEQ: 978 TABLET, EFFERVESCENT ORAL at 08:12

## 2024-12-05 RX ADMIN — LEVOTHYROXINE SODIUM 50 MCG: 50 TABLET ORAL at 06:12

## 2024-12-05 RX ADMIN — CARVEDILOL 3.12 MG: 3.12 TABLET, FILM COATED ORAL at 08:12

## 2024-12-05 RX ADMIN — POLYETHYLENE GLYCOL (3350) 17 G: 17 POWDER, FOR SOLUTION ORAL at 08:12

## 2024-12-05 RX ADMIN — ALBUTEROL SULFATE 2.5 MG: 2.5 SOLUTION RESPIRATORY (INHALATION) at 08:12

## 2024-12-05 RX ADMIN — VALSARTAN 160 MG: 80 TABLET, FILM COATED ORAL at 08:12

## 2024-12-05 RX ADMIN — ALBUTEROL SULFATE 2.5 MG: 2.5 SOLUTION RESPIRATORY (INHALATION) at 11:12

## 2024-12-05 RX ADMIN — CEFTRIAXONE SODIUM 1 G: 1 INJECTION, POWDER, FOR SOLUTION INTRAMUSCULAR; INTRAVENOUS at 01:12

## 2024-12-05 RX ADMIN — FUROSEMIDE 40 MG: 10 INJECTION, SOLUTION INTRAMUSCULAR; INTRAVENOUS at 08:12

## 2024-12-05 RX ADMIN — ALBUTEROL SULFATE 2.5 MG: 2.5 SOLUTION RESPIRATORY (INHALATION) at 03:12

## 2024-12-05 RX ADMIN — FUROSEMIDE 20 MG: 10 INJECTION, SOLUTION INTRAVENOUS at 08:12

## 2024-12-05 RX ADMIN — ATORVASTATIN CALCIUM 40 MG: 40 TABLET, FILM COATED ORAL at 08:12

## 2024-12-05 RX ADMIN — METHYLPREDNISOLONE SODIUM SUCCINATE 80 MG: 40 INJECTION, POWDER, FOR SOLUTION INTRAMUSCULAR; INTRAVENOUS at 08:12

## 2024-12-05 RX ADMIN — ASPIRIN 325 MG ORAL TABLET 325 MG: 325 PILL ORAL at 08:12

## 2024-12-05 RX ADMIN — ENOXAPARIN SODIUM 40 MG: 40 INJECTION SUBCUTANEOUS at 05:12

## 2024-12-05 RX ADMIN — Medication 10 ML: at 08:12

## 2024-12-05 RX ADMIN — BUDESONIDE 0.5 MG: 0.5 INHALANT RESPIRATORY (INHALATION) at 08:12

## 2024-12-05 RX ADMIN — PANTOPRAZOLE SODIUM 40 MG: 40 INJECTION, POWDER, LYOPHILIZED, FOR SOLUTION INTRAVENOUS at 08:12

## 2024-12-05 NOTE — PROGRESS NOTES
[facility]  Adult Nutrition  Progress Note    SUMMARY       Recommendations  1. Rec'd Continuous EN: Suplena @ 10mL/r increasing by 5-10mL q4-6hrs to goal rate of 60mL/hr with FWF as per MD to provide 2592kcal (% EEN), 65g of protein (74% EPN), and 1062mL FW.    2. Rec'd SLP consult.    3. RD to follow and make rec's accordingly.  Goals:   1. Pt will be started on Nutrition Support by next RD follow up.   2. Pt will be evaluated by SLP by next RD follow up.  Nutrition Goal Status: goal met, new  Communication of RD Recs: reviewed with RN    Assessment and Plan    Nutrition Problem  Inadequate oral intake     Related to (etiology):   NPO/Intubation status     Signs and Symptoms (as evidenced by):   0% PO intake      Interventions/Recommendations (treatment strategy):  q4-6hrs to goal rate of 60mL/hr with FWF as per MD to provide 2592kcal (% EEN), 65g of protein (74% EPN), and 1062mL FW.   2. RD to follow and make rec's accordingly.     Nutrition Diagnosis Status:   Continues     Malnutrition Assessment  NFPE not appropriate at this time. RD to continue to monitor for signs and symptoms of malnutrition.    Reason for Assessment    Reason For Assessment: RD follow-up  Diagnosis: other (see comments) (Acute on Chronic Diastolic Congestive Heart Failure)  General Information Comments: Followed up on pt this morning. Spoke with RN. Pt's TF is on hold due to pt was on BIPAP. Pt is now on a venti mask. REc'd re-start TF when medically appropraite or advance diet from NPO after SLP eval if medically appropriate. RD to follow and make rec's accordingly.  Nutrition Discharge Planning: TBD as care progresses    Nutrition Risk Screen    Nutrition Risk Screen: tube feeding or parenteral nutrition    Nutrition/Diet History    Patient Reported Diet/Restrictions/Preferences: general  Spiritual, Cultural Beliefs, Anabaptism Practices, Values that Affect Care: no  Food Allergies: NKFA  Factors Affecting Nutritional  "Intake: None identified at this time    Anthropometrics    Temp: 97.7 °F (36.5 °C)  Height Method: Measured  Height: 5' 4" (162.6 cm)  Height (inches): 64 in  Weight Method: Bed Scale  Weight: 128.8 kg (284 lb)  Weight (lb): 284 lb  Ideal Body Weight (IBW), Female: 120 lb  % Ideal Body Weight, Female (lb): 248.02 %  BMI (Calculated): 48.7  BMI Grade: greater than 40 - morbid obesity  Weight Loss Since Admission: 17 lb  % Weight Change Since Admission: 5.71    Lab/Procedures/Meds    Pertinent Labs Reviewed: reviewed  Pertinent Medications Reviewed: reviewed    Estimated/Assessed Needs    Weight Used For Calorie Calculations: 135 kg (297 lb 9.9 oz)  Energy Calorie Requirements (kcal): 2508  Energy Need Method: [place] (modified)  Protein Requirements:  (1.6-2.0g/kg IBW)  Weight Used For Protein Calculations: 54.4 kg (120 lb) (IBW)  Fluid Requirements (mL): 2508 (or as per MD)  Estimated Fluid Requirement Method: RDA Method  RDA Method (mL): 2508    Nutrition Prescription Ordered    Current Diet Order: NPO  Current Nutrition Support Frequency Ordered: Continuous (On Hold)  Oral Nutrition Supplement: None    Evaluation of Received Nutrient/Fluid Intake    Enteral Calories (kcal): 0  Enteral Protein (gm): 0  Enteral (Free Water) Fluid (mL): 0  % Kcal Needs: 0%  % Protein Needs: 0%  I/O: -650  Energy Calories Required: not meeting needs  Protein Required: not meeting needs  Tolerance: other (see comments) (TF on Hold)  % Intake of Estimated Energy Needs: 0 - 25 %  % Meal Intake: NPO    Nutrition Risk    Level of Risk/Frequency of Follow-up: moderate     Monitor and Evaluation    Food and Nutrient Intake: enteral nutrition intake, parenteral nutrition intake  Food and Nutrient Adminstration: enteral and parenteral nutrition administration  Knowledge/Beliefs/Attitudes: food and nutrition knowledge/skill, beliefs and attitudes  Physical Activity and Function: nutrition-related ADLs and IADLs  Anthropometric " Measurements: height/length, weight, weight change, body mass index  Biochemical Data, Medical Tests and Procedures: electrolyte and renal panel, gastrointestinal profile, glucose/endocrine profile, inflammatory profile, lipid profile  Nutrition-Focused Physical Findings: overall appearance     Nutrition Follow-Up    RD Follow-up?: Yes

## 2024-12-05 NOTE — ASSESSMENT & PLAN NOTE
"Patient has Diastolic (HFpEF) heart failure that is Acute on chronic. On presentation their CHF was decompensated. Evidence of decompensated CHF on presentation includes: edema, orthopnea, dyspnea on exertion (MELISSA), and shortness of breath. The etiology of their decompensation is likely medication non-compliance?diet? Most recent BNP and echo results are listed below.  No results for input(s): "BNP" in the last 72 hours.  Latest ECHO  Results for orders placed during the hospital encounter of 07/28/24    Echo Saline Bubble? No; Ultrasound enhancing contrast? No  Interpretation Summary    Left Ventricle: The left ventricle is normal in size. Mildly increased wall thickness. There is normal systolic function with a visually estimated ejection fraction of 55 - 60%. Grade I diastolic dysfunction.    Right Ventricle: Normal right ventricular cavity size. Systolic function is normal.    Left Atrium: Left atrium is severely dilated. Atrial septum is bulging to the right.    Right Atrium: Right atrium is moderately dilated.    Aortic Valve: The aortic valve is a trileaflet valve. Mildly restricted motion. There is mild stenosis. Aortic valve area by VTI is 1.84 cm². Aortic valve peak velocity is 2.18 m/s. Mean gradient is 10 mmHg. The dimensionless index is 0.58. There is mild aortic regurgitation.    Mitral Valve: There is mild regurgitation.    Tricuspid Valve: There is mild regurgitation.    Pulmonic Valve: There is mild to moderate regurgitation.    No pericardial effusion.    Current Heart Failure Medications  carvediloL tablet 3.125 mg, 2 times daily, Per OG tube  valsartan tablet 160 mg, Daily, Per OG tube  furosemide injection 40 mg, Daily, Intravenous    Plan  - Monitor strict I&Os and daily weights.    - Place on telemetry  - Low sodium diet  - Place on fluid restriction of 1 L.   - Cardiology has been consulted, follow up recommendations  - The patient's volume status is stable but not at their baseline " as indicated by edema and shortness of breath and respiratory distress requiring intubation

## 2024-12-05 NOTE — SUBJECTIVE & OBJECTIVE
Interval History: Pt s/e.  Extubated yesterday.  Stable on BiPAP.  Chest tube clamped for > 48hrs with no PTX observed on AM chest X ray.      Medications:  Continuous Infusions:   dextrose 5 % and 0.45 % NaCl with KCl 20 mEq   Intravenous Continuous        propofoL  0-50 mcg/kg/min Intravenous Continuous 8.5 mL/hr at 12/04/24 0530 10 mcg/kg/min at 12/04/24 0530     Scheduled Meds:   albuterol sulfate  2.5 mg Nebulization Q4H    aspirin  325 mg Per OG tube Daily    atorvastatin  40 mg Per OG tube Daily    budesonide  0.5 mg Nebulization Q12H    carvediloL  3.125 mg Per OG tube BID    cefTRIAXone (Rocephin) IV (PEDS and ADULTS)  1 g Intravenous Q24H    enoxparin  40 mg Subcutaneous Q24H (prophylaxis, 1700)    furosemide (LASIX) injection  40 mg Intravenous Daily    levothyroxine  50 mcg Per OG tube Before breakfast    [START ON 12/6/2024] methylPREDNISolone injection (PEDS and ADULTS)  60 mg Intravenous Q24H    pantoprazole  40 mg Intravenous Daily    polyethylene glycol  17 g Per OG tube Daily    senna-docusate 8.6-50 mg  2 tablet Per OG tube Daily    valsartan  160 mg Per OG tube Daily     PRN Meds:  Current Facility-Administered Medications:     acetaminophen, 650 mg, Per OG tube, Q8H PRN    dextrose 10%, 12.5 g, Intravenous, PRN    dextrose 10%, 25 g, Intravenous, PRN    glucagon (human recombinant), 1 mg, Intramuscular, PRN    insulin aspart U-100, 0-5 Units, Subcutaneous, Q6H PRN    melatonin, 6 mg, Per OG tube, Nightly PRN    ondansetron, 4 mg, Intravenous, Q8H PRN    sodium chloride 0.9%, 10 mL, Intravenous, PRN    Flushing PICC/Midline Protocol, , , Until Discontinued **AND** sodium chloride 0.9%, 10 mL, Intravenous, Q12H PRN     Review of patient's allergies indicates:   Allergen Reactions    Pcn [penicillins] Swelling     Objective:     Vital Signs (Most Recent):  Temp: 97.9 °F (36.6 °C) (12/05/24 1501)  Pulse: (!) 59 (12/05/24 1530)  Resp: (!) 21 (12/05/24 1530)  BP: 136/69 (12/05/24 1400)  SpO2: 100 %  (12/05/24 1530) Vital Signs (24h Range):  Temp:  [97.1 °F (36.2 °C)-97.9 °F (36.6 °C)] 97.9 °F (36.6 °C)  Pulse:  [52-99] 59  Resp:  [15-41] 21  SpO2:  [91 %-100 %] 100 %  BP: (129-175)/() 136/69     Weight: 128.8 kg (284 lb)  Body mass index is 48.75 kg/m².    Intake/Output - Last 3 Shifts         12/03 0700 12/04 0659 12/04 0700 12/05 0659 12/05 0700 12/06 0659    P.O. 0 0     I.V. (mL/kg) 164.5 (1.3) 37.2 (0.3)     NG/ 153     Total Intake(mL/kg) 475.5 (3.6) 190.2 (1.5)     Urine (mL/kg/hr) 2900 (0.9) 2450 (0.8)     Drains       Stool 0 1     Chest Tube       Total Output 2900 2451     Net -2424.5 -2260.8            Stool Occurrence 0 x               Physical Exam  Constitutional:       General: She is not in acute distress.     Appearance: She is obese. She is not ill-appearing or toxic-appearing.   Cardiovascular:      Rate and Rhythm: Normal rate and regular rhythm.   Pulmonary:      Effort: Pulmonary effort is normal. No respiratory distress.      Comments: R chest tube in place.   Abdominal:      General: There is no distension.      Palpations: Abdomen is soft.      Tenderness: There is no abdominal tenderness. There is no guarding.      Hernia: No hernia is present.   Skin:     Capillary Refill: Capillary refill takes less than 2 seconds.   Neurological:      Mental Status: She is alert. Mental status is at baseline.          Significant Labs:  I have reviewed all pertinent lab results within the past 24 hours.  CBC:   Recent Labs   Lab 12/05/24  0341   WBC 12.90*   RBC 3.87*   HGB 11.6*   HCT 36.8*      MCV 95   MCH 30.0   MCHC 31.5*     CMP:   Recent Labs   Lab 12/05/24  0341   GLU 88   CALCIUM 9.3   ALBUMIN 2.0*   PROT 5.8*   *   K 3.6   CO2 40*   *   BUN 34*   CREATININE 1.3   ALKPHOS 110   ALT 10   AST 10   BILITOT 0.3       Significant Diagnostics:  I have reviewed all pertinent imaging results/findings within the past 24 hours.

## 2024-12-05 NOTE — PLAN OF CARE
Problem: Occupational Therapy  Goal: Occupational Therapy Goal  Description: Goals to be met by: 12/11/24     Patient will increase functional independence with ADLs by performing:    Feeding with Minimal Assistance.  UE Dressing with Moderate Assistance.  LE Dressing with Maximum Assistance.  Grooming while seated with Moderate Assistance.  Toileting from bedside commode with Maximum Assistance for hygiene and clothing management.   Bathing from  bed with Maximum Assistance.  Toilet transfer to bedside commode with Maximum Assistance.    Outcome: Progressing

## 2024-12-05 NOTE — PROGRESS NOTES
[facility] Medicine  Progress Note    Patient Name: Carmen Tan  MRN: [id number]  Patient Class: IP- Inpatient   Admission Date: 11/29/2024  Length of Stay: 6 days  Attending Physician: Gunnar Mott DO  Primary Care Provider: Lucian Barry MD        Subjective     Principal Problem:Acute on chronic diastolic congestive heart failure        HPI:  Patient 74-year-old female past medical history of chronic diastolic heart failure, AFib, chronic respiratory failure with hypoxemia and hypercapnia, anxiety depression, type 2 diabetes, GERD, hypothyroidism.  Patient came in after noted to be short of breath at home, patient was satting in the 70 on home BiPAP therapy, was brought to the ED noted to be in distress tachycardic hypotensive hypoxic, workup in the ED patient with enlarged cardiac silhouette, chronic changes no acute finding, elevated BNP 25,000, mild elevation in troponin negative COVID no leukocytosis, patient was given nitroglycerin and Lasix injection, admitted to ICU.    Overview/Hospital Course:  12/1 AA, weekend crosscover, overnight patient had two ABG, eICU MD adjusted vent settings, had repeat chest x-ray which showed moderate right thorax, vital signs stable, O2 sat maintaining, surgery consulted for chest tube placement, patient was started on steroids yesterday for COPD flare, diuresis in progress, continue to monitor urine output, follow-up chest x-ray after chest tube placement, follow-up with surgery rec  12/2/24 ABG 7.395/52.4/87.3/29.8  improving respiratory status on AC Vt 450, RR18, PEEP 6, FiO2 55%, SpO2 98%, proprofol 25mcg/kg/min. CXR with right pneumothorax resolved, diuresing on scheduled IV lasix with adequate urine output. Patient minimally responsive to sternal rub. No elevated temperature. Urine culture positive for gram positive aerococcus spp, add IV rocephin. Continue with vent weaning and daily SBT, scheduled breathing treatments,  solumedrol. Follow up recommendations cardiology. Follow up general surgery with chest tube management.    12/3/24 ABG 7.423/52.7/72.7/32.3, FiO2 40%. Off sedation awake and following commands. CPAP trial followed with tachypnea 40s, will maintain current vent settings and prepare for SBT and extubation tomorrow AM. Right lateral chest tube has been clamped. CXR overnight unchanged, no new pneumothorax. Chest tube management per GS. BMx1. Start enteral trickle feeds and advance per protocol.   12/4/24 Stable ABG and vent settings. Tolerating tube feeds with no residual. Patient awake and alert and requests to have ET tube to be removed. Patient further affirms she does not want to be re-intubated afterwards. Plan to extubate and transition to BiPAP.   12/5/24 No acute events overnight. Awake, on venturi mask FiO2 50%, SpO2 100%. Right chest tube in place remains clamped. Declines therapy work complaining of pain in her throat and upper chest. Renal function improving, adequate urine output. Continue with supplemental oxygen and rest from BiPAP for nutrition and meds per OG tube. Follow up speech. Follow up GS recommendations.       Interval History: Patient seen and examined.     Review of Systems   Unable to perform ROS: Acuity of condition     Objective:     Vital Signs (Most Recent):  Temp: 97.9 °F (36.6 °C) (12/05/24 1501)  Pulse: (!) 59 (12/05/24 1530)  Resp: (!) 21 (12/05/24 1530)  BP: 136/69 (12/05/24 1400)  SpO2: 100 % (12/05/24 1530) Vital Signs (24h Range):  Temp:  [97.1 °F (36.2 °C)-97.9 °F (36.6 °C)] 97.9 °F (36.6 °C)  Pulse:  [52-99] 59  Resp:  [15-41] 21  SpO2:  [91 %-100 %] 100 %  BP: (129-175)/() 136/69     Weight: 128.8 kg (284 lb)  Body mass index is 48.75 kg/m².    Intake/Output Summary (Last 24 hours) at 12/5/2024 1638  Last data filed at 12/5/2024 0515  Gross per 24 hour   Intake 190.22 ml   Output 2451 ml   Net -2260.78 ml         Physical Exam  Vitals and nursing note reviewed.    Constitutional:       General: She is not in acute distress.     Appearance: She is obese. She is ill-appearing. She is not toxic-appearing.   HENT:      Head: Normocephalic.   Cardiovascular:      Rate and Rhythm: Normal rate and regular rhythm.      Pulses: Normal pulses.   Pulmonary:      Effort: No respiratory distress.      Comments: R chest tube in place.   Venturi mask, 15L  Abdominal:      General: There is no distension.      Palpations: Abdomen is soft.      Tenderness: There is no abdominal tenderness.      Hernia: No hernia is present.   Musculoskeletal:         General: No tenderness.      Cervical back: Neck supple. No rigidity. Tenderness: mild, no swelling.     Right lower leg: No edema (trace bilaterally).      Left lower leg: No edema.   Skin:     General: Skin is warm and dry.      Capillary Refill: Capillary refill takes less than 2 seconds.   Neurological:      Mental Status: She is alert. Mental status is at baseline.             Significant Labs: All pertinent labs within the past 24 hours have been reviewed.  A1C:   Recent Labs   Lab 07/28/24  2256 11/30/24  1237   HGBA1C 5.3 5.2     Bilirubin:   Recent Labs   Lab 12/01/24  0600 12/02/24  0349 12/03/24  0356 12/04/24  0409 12/05/24  0341   BILITOT 0.5 0.4 0.4 0.3 0.3     BMP:   Recent Labs   Lab 12/05/24  0341   GLU 88   *   K 3.6   *   CO2 40*   BUN 34*   CREATININE 1.3   CALCIUM 9.3   MG 2.1     CBC:   Recent Labs   Lab 12/04/24  0409 12/05/24  0341   WBC 14.65* 12.90*   HGB 11.4* 11.6*   HCT 35.7* 36.8*    211     CMP:   Recent Labs   Lab 12/04/24  0409 12/05/24  0341   * 153*   K 3.6 3.6   * 113*   CO2 39* 40*   * 88   BUN 38* 34*   CREATININE 1.5* 1.3   CALCIUM 9.1 9.3   PROT 5.6* 5.8*   ALBUMIN 2.0* 2.0*   BILITOT 0.3 0.3   ALKPHOS 118 110   AST 11 10   ALT 12 10   ANIONGAP 1* 0*     Recent Labs   Lab 12/04/24  0409 12/05/24  0341   MG 2.0 2.1     POCT Glucose:   Recent Labs   Lab 12/04/24  1815  "12/04/24  2304 12/05/24  1107   POCTGLUCOSE 111* 111* 92     Recent Labs   Lab 08/10/24  0335   TSH 0.632     X-Ray Chest 1 View  Narrative: EXAMINATION:  XR CHEST 1 VIEW    CLINICAL HISTORY:  Chest tube;    TECHNIQUE:  portable chest x-ray    COMPARISON:  12/04/2024.  <Comparisons>    FINDINGS:  The ET tube is been removed.  A NG tube is in place.  There is a right subclavian PICC line and a right chest tube unchanged.  The heart is significantly enlarged.  There is right basilar infiltrate or atelectasis and probable effusion  Impression: Removal of ET tube otherwise no change in the lines and tubes    Right basilar changes as above stable    Electronically signed by: Fabiola Rodgers MD  Date:    12/05/2024  Time:    11:10     Significant Imaging: I have reviewed all pertinent imaging results/findings within the past 24 hours.    Assessment and Plan     * Acute on chronic diastolic congestive heart failure  Patient has Diastolic (HFpEF) heart failure that is Acute on chronic. On presentation their CHF was decompensated. Evidence of decompensated CHF on presentation includes: edema, orthopnea, dyspnea on exertion (MELISSA), and shortness of breath. The etiology of their decompensation is likely medication non-compliance?diet? Most recent BNP and echo results are listed below.  No results for input(s): "BNP" in the last 72 hours.  Latest ECHO  Results for orders placed during the hospital encounter of 07/28/24    Echo Saline Bubble? No; Ultrasound enhancing contrast? No  Interpretation Summary    Left Ventricle: The left ventricle is normal in size. Mildly increased wall thickness. There is normal systolic function with a visually estimated ejection fraction of 55 - 60%. Grade I diastolic dysfunction.    Right Ventricle: Normal right ventricular cavity size. Systolic function is normal.    Left Atrium: Left atrium is severely dilated. Atrial septum is bulging to the right.    Right Atrium: Right atrium is moderately " dilated.    Aortic Valve: The aortic valve is a trileaflet valve. Mildly restricted motion. There is mild stenosis. Aortic valve area by VTI is 1.84 cm². Aortic valve peak velocity is 2.18 m/s. Mean gradient is 10 mmHg. The dimensionless index is 0.58. There is mild aortic regurgitation.    Mitral Valve: There is mild regurgitation.    Tricuspid Valve: There is mild regurgitation.    Pulmonic Valve: There is mild to moderate regurgitation.    No pericardial effusion.    Current Heart Failure Medications  carvediloL tablet 3.125 mg, 2 times daily, Per OG tube  valsartan tablet 160 mg, Daily, Per OG tube  furosemide injection 40 mg, Daily, Intravenous    Plan  - Monitor strict I&Os and daily weights.    - Place on telemetry  - Low sodium diet  - Place on fluid restriction of 1 L.   - Cardiology has been consulted, follow up recommendations  - The patient's volume status is stable but not at their baseline as indicated by edema and shortness of breath and respiratory distress requiring intubation        ACP (advance care planning)  12/3/24 Patient once extubated she does not want to be re-intubated  12/4/24 Family would like information on home hospice services  12/5/24 Plan for snf placement to nursing home per CM and SW      Other pneumothorax  Patient developed pneumothorax right lower lobe, moderate, surgery consulted for chest tube placement and eval.  Right chest tube in place, remains clamped  CXR with no interval changes.   - f/u GS recommendations        Hard to intubate  Extubated on 12/4/24, stable respiratory status.   Patient is now DNR/DNI  - continuous BiPAP  - continue daily pulmonary hygiene care with breathing treatments, solumedrol and IS      Hypothyroidism  Continue levothyroxine       HLD (hyperlipidemia)  Continue statin      Gastroesophageal reflux disease without esophagitis  PPI on board      Acute cystitis without hematuria  11/30/24 Urine culture positive for aerococcus urinae. Start  therapy with rocephin for 5 days total.   Estimated Creatinine Clearance: 51.3 mL/min (based on SCr of 1.3 mg/dL).  Stable renal function.   Completed 5 days rocephin  - Monitor I/Os       Anxiety and depression  Patient has persistent depression which is unknown and is currently controlled. Will Continue anti-depressant medications. We will not consult psychiatry at this time. Patient does not display psychosis at this time. Continue to monitor closely and adjust plan of care as needed.        Diabetes mellitus, type 2  Patient's FSGs are controlled on current medication regimen.  Last A1c reviewed-   Lab Results   Component Value Date    HGBA1C 5.2 11/30/2024     Most recent fingerstick glucose reviewed-   Recent Labs   Lab 12/04/24  1810 12/04/24  2304 12/05/24  1107   POCTGLUCOSE 111* 111* 92       Current correctional scale  Low  Maintain anti-hyperglycemic dose as follows-   Antihyperglycemics (From admission, onward)      Start     Stop Route Frequency Ordered    11/30/24 1324  insulin aspart U-100 pen 0-5 Units         -- SubQ Every 6 hours PRN 11/30/24 1225          Hold Oral hypoglycemics while patient is in the hospital.    Severe obesity (BMI >= 40)  Body mass index is 48.75 kg/m². Morbid obesity complicates all aspects of disease management from diagnostic modalities to treatment. Weight loss encouraged and health benefits explained to patient.     Wt Readings from Last 8 Encounters:   12/05/24 128.8 kg (284 lb)   10/16/24 113.4 kg (250 lb)   09/22/24 113.4 kg (250 lb)   08/14/24 119.7 kg (263 lb 14.3 oz)   07/29/24 104.3 kg (229 lb 15 oz)   06/25/24 104.6 kg (230 lb 11.2 oz)   06/24/24 104.3 kg (230 lb)   06/20/24 102.5 kg (226 lb)            Acute on chronic respiratory failure with hypoxia and hypercapnia  Patient with Hypercapnic and Hypoxic Respiratory failure which is Acute on chronic.  she is on home oxygen at 2 LPM. Supplemental oxygen was provided and noted- Oxygen Concentration (%):  [50] 50      Signs/symptoms of respiratory failure include- tachypnea, increased work of breathing, respiratory distress, use of accessory muscles, and wheezing. Contributing diagnoses includes - CHF and COPD Labs and images were reviewed. Patient Has recent ABG, which has been reviewed. Will treat underlying causes and adjust management of respiratory failure as follows- duo nebs, wean vent as tolerated, ABGs prn, diuresis     Paroxysmal A-fib  Patient has  atrial fibrillation. Patient is currently in . [id number] Score: 3. The patients heart rate in the last 24 hours is as follows:  Pulse  Min: 52  Max: 99     Antiarrhythmics   BB on hold due to bradycardia from propofol gtt    Anticoagulants  enoxaparin injection 40 mg, Every 24 hours, Subcutaneous    Plan  - Replete lytes with a goal of K>4, Mg >2  - Patient's afib is currently stable, she has history of dash-tachy fluctuation, subsequent intubation on propofol gtt, now bradycardic, hold coreg for now, previous records showed on eliquis, care everywhere patient has not be seen by Cardiology except for and how during previous admission in August, unclear which medication patient is taking, will try and get with family member to verify medication patient taking at home and if she was taken off blood thinner at some point since her August admission            VTE Risk Mitigation (From admission, onward)           Ordered     enoxaparin injection 40 mg  Every 24 hours         11/29/24 1730     IP VTE HIGH RISK PATIENT  Once         11/29/24 1726     Place sequential compression device  Until discontinued         11/29/24 1726                    Discharge Planning   TIGRE:      Code Status: DNR   Medical Readiness for Discharge Date:   Discharge Plan A: Hospital at home, Hospice/home   Discharge Delays: None known at this time            Please place Justification for DME        Gunnar Mott DO  Department of Hospital Medicine   [place] - [facility]

## 2024-12-05 NOTE — ASSESSMENT & PLAN NOTE
No PTX after > 48hrs clamp trial   Successfully extubated yesterday - stable on BiPAP  Chest tube removed and occlusive dressing left in place   CXR in AM if Sat stable   No further surgical intervention

## 2024-12-05 NOTE — PLAN OF CARE
Problem: Heart Failure  Goal: Optimal Coping  Outcome: Progressing     Problem: Heart Failure  Goal: Optimal Cardiac Output  Outcome: Progressing     Problem: Heart Failure  Goal: Fluid and Electrolyte Balance  Outcome: Progressing     Problem: Heart Failure  Goal: Effective Breathing Pattern During Sleep  Outcome: Progressing     Problem: COPD (Chronic Obstructive Pulmonary Disease)  Goal: Optimal Chronic Illness Coping  Outcome: Progressing     Problem: COPD (Chronic Obstructive Pulmonary Disease)  Goal: Absence of Infection Signs and Symptoms  Outcome: Progressing     Problem: Fluid Volume Excess  Goal: Fluid Balance  Outcome: Progressing     Problem: Infection  Goal: Absence of Infection Signs and Symptoms  Outcome: Progressing     Problem: Noninvasive Ventilation Acute  Goal: Effective Unassisted Ventilation and Oxygenation  Outcome: Progressing

## 2024-12-05 NOTE — PLAN OF CARE
Problem: SLP  Goal: SLP Goal  Description:   1. Pt will tolerate least restrictive oral diet to maintain adequate nutrition/hydration w/o acute dysphagia-related pulmonary complication.  2. Pt will participate in diagnostic swallow therapy to guide further management.   Outcome: POC Initiated

## 2024-12-05 NOTE — ASSESSMENT & PLAN NOTE
Patient developed pneumothorax right lower lobe, moderate, surgery consulted for chest tube placement and eval.  Right chest tube in place, remains clamped  CXR with no interval changes.   - f/u GS recommendations

## 2024-12-05 NOTE — PLAN OF CARE
Problem: Physical Therapy  Goal: Physical Therapy Goal  Description: Goals to be met by: 2024     Patient will increase functional independence with mobility by performin. Supine to sit with MInimal Assistance  2. Sit to supine with MInimal Assistance  3. Rolling to Left and Right with Minimal Assistance.  4. Sit to stand transfer with Moderate Assistance  5. Bed to chair transfer with Moderate Assistance using Rolling Walker  6. Gait  x 10 feet with Contact Guard Assistance using Rolling Walker.   7. Sitting at edge of bed x10 minutes with Supervision and Stand-by Assistance    Outcome: Progressing

## 2024-12-05 NOTE — PT/OT/SLP PROGRESS
Occupational Therapy   Treatment    Name: Carmen Tan  MRN: [id number]  Admitting Diagnosis:  Acute on chronic diastolic congestive heart failure       Recommendations:     Discharge Recommendations:  (To be further determined secondary to patient pending Hospice.)  Discharge Equipment Recommendations:  none  Barriers to discharge:  Other (Comment) (Medical status)    Assessment:     Carmen Tan is a 74 y.o. female with a medical diagnosis of Acute on chronic diastolic congestive heart failure.  She presents with transition to Venturi mask from BiPAP. Performance deficits affecting function are weakness, impaired endurance, impaired self care skills, impaired functional mobility, impaired balance, decreased coordination, decreased upper extremity function, decreased lower extremity function, decreased safety awareness, pain, decreased ROM, impaired fine motor, edema, impaired cardiopulmonary response to activity, impaired joint extensibility, impaired muscle length.     Rehab Prognosis:  Fair and Poor; patient would benefit from acute skilled OT services to address these deficits and reach maximum level of function.       Plan:     Patient to be seen 3 x/week to address the above listed problems via self-care/home management, therapeutic activities, therapeutic exercises  Plan of Care Expires: 12/11/24  Plan of Care Reviewed with: patient    Subjective     Chief Complaint: weakness and fatigue  Patient/Family Comments/goals: Pt would like to be comfortable.   Pain/Comfort:  Pain Rating 1: 0/10  Pain Rating Post-Intervention 1: 0/10    Objective:     Communicated with: nurse prior to session.  Patient found HOB elevated with telemetry, SCD, pulse ox (continuous), oxygen, hannon catheter, blood pressure cuff, PICC line, chest tube upon OT entry to room.    General Precautions: Standard, fall, respiratory    Orthopedic Precautions:N/A  Braces: N/A  Respiratory Status: High flow, flow 50 L/min, concentration  15%     Occupational Performance:     Bed Mobility:    Patient completed Rolling/Turning to Left with  maximal assistance  Patient completed Rolling/Turning to Right with maximal assistance  Patient completed Scooting/Bridging with maximal assistance  Patient completed Supine to Sit with maximal assistance  Patient completed Sit to Supine with maximal assistance     Functional Mobility/Transfers:  Pt did not perform on this date/  Functional Mobility: Unable to perform    Treatment & Education:  Pt was cooperative with encouragement while exhibiting neutral affect, but no c/o pain on this date. She performed bed mobility requiring max assist providing more effort and assistance when compared to evaluation (previous day) secondary to lifting assist and stabilization of trunk with additional assist with bilateral LE's off EOB during supine to sit transition, much scooting assist at bilateral hips to EOB, and much lifting assist of bilateral LE's with additional lowering assist of trunk during sit to supine transition with verbal and tactile cueing. Pt then participated in therapeutic activity addressing functional reaching, gross motor coordination, trunk control / strength, static / dynamic sitting balance, weight shifting, and energy for task / endurance challenging her to reach in multiple planes utilizing bilateral UE's requiring continuous verbal and tactile cueing to facilitate optimal movement patterns and proper weight shifting with additional assistance to achieve mod to end ranges of bilateral shoulders, and additional steadying with minimal excursions of trunk.     with mod steadying of trunk with minimal to moderate excursions of trunk.     Patient left HOB elevated with all lines intact, call button in reach, and nurse notified    GOALS:   Multidisciplinary Problems       Occupational Therapy Goals          Problem: Occupational Therapy    Goal Priority Disciplines Outcome Interventions   Occupational  Therapy Goal     OT, PT/OT Progressing    Description: Goals to be met by: 12/11/24     Patient will increase functional independence with ADLs by performing:    Feeding with Minimal Assistance.  UE Dressing with Moderate Assistance.  LE Dressing with Maximum Assistance.  Grooming while seated with Moderate Assistance.  Toileting from bedside commode with Maximum Assistance for hygiene and clothing management.   Bathing from  bed with Maximum Assistance.  Toilet transfer to bedside commode with Maximum Assistance.                         Time Tracking:     OT Date of Treatment: 12/05/24  OT Start Time: 1158  OT Stop Time: 1231  OT Total Time (min): 33 min    Billable Minutes:Therapeutic Activity 33    OT/MARLA: OT          12/5/2024

## 2024-12-05 NOTE — CARE UPDATE
Right chest tube removed by Dr. Raymond today. Patient was able to tolerate 50% venti mask after medication was given through OGT. Bipap removed prior to medication administration . Patient is still going on hospice. But not sure if home or to nursing home.

## 2024-12-05 NOTE — PT/OT/SLP PROGRESS
Physical Therapy Treatment    Patient Name:  Carmen Tan   MRN:  [id number]    Recommendations:     Discharge Recommendations:  (TBD based on progress closer to discharge)  Discharge Equipment Recommendations: none  Barriers to discharge:  ongoing medical treatment    Assessment:     Carmen Tan is a 74 y.o. female admitted with a medical diagnosis of Acute on chronic diastolic congestive heart failure.  She presents with the following impairments/functional limitations: weakness, impaired endurance, impaired self care skills, impaired functional mobility, impaired balance, decreased coordination, decreased upper extremity function, decreased lower extremity function, decreased safety awareness, pain, decreased ROM, impaired fine motor, impaired cardiopulmonary response to activity, impaired joint extensibility, impaired muscle length. These limitations are causing decreased functional mobility and independence and increased caregiver burden.    Patient found supine in ICU with multiple lines. She was transitioned from BiPAP to venturi mask this morning. Patient tolerated treatment well overall with c/o fatigue and weakness. She still requires maximum assistance with all functional mobility tasks today. Currently not appropriate for ambulation secondary to weakness and safety concerns. Patient only able to express needs this morning via head nodding to yes or no questions. VSS throughout treatment. SpO2 dropped to 85% twice, but recovered to 90% within 30s with verbal cues for pursed lip breathing. Continue to progress patient per tolerance to reach maximum level of function.     Rehab Prognosis: Fair and Poor; patient would benefit from acute skilled PT services to address these deficits and reach maximum level of function.    Recent Surgery: * No surgery found *      Plan:     During this hospitalization, patient to be seen 5 x/week to address the identified rehab impairments via gait training,  therapeutic activities, therapeutic exercises, neuromuscular re-education and progress toward the following goals:    Plan of Care Expires:  12/11/24    Subjective     Chief Complaint: pain, weakness, fatigue (via yes/no questions and head nodding)  Patient/Family Comments/goals: none stated  Pain/Comfort:   Unable to quantify, pain at chest tube insertion      Objective:     Communicated with nurse and patient prior to session.  Patient found supine with SCD, telemetry, PICC line, chest tube, blood pressure cuff, pulse ox (continuous), hannon catheter, oxygen upon PT entry to room.     General Precautions: Standard, fall, respiratory  Orthopedic Precautions: N/A  Braces: N/A  Respiratory Status: High flow, flow 50 L/min, concentration 15%     Vitals:   Prior to treatment (supine) During treatment (sitting) After treatment (supine)   /67 mmHg  157/70 mmHg   HR 87 bpm  93 bpm   SpO2   (venturi mask) 96% 85-91% 91%       Functional Mobility:  Bed Mobility:     Rolling Left:  maximal assistance  Rolling Right: maximal assistance  Scooting: maximal assistance  Bridging: maximal assistance  Supine to Sit: maximal assistance  Sit to Supine: maximal assistance  Balance: sitting EOB moderate assistance initially; sitting EOB SBA-CGA after one minute of postural stimulation      AM-PAC 6 CLICK MOBILITY  Turning over in bed (including adjusting bedclothes, sheets and blankets)?: 2  Sitting down on and standing up from a chair with arms (e.g., wheelchair, bedside commode, etc.): 1  Moving from lying on back to sitting on the side of the bed?: 2  Moving to and from a bed to a chair (including a wheelchair)?: 1  Need to walk in hospital room?: 1  Climbing 3-5 steps with a railing?: 1  Basic Mobility Total Score: 8       Treatment & Education:  Bed mobility   Rolling B sides   Supine<>sit   Scooting   Balance   Sitting EOB weight shifting L and R  Exercise BLE seated EOB   Marching   LAQ   Ankle pumps  Education   Patient  educated on role of acute care PT, POC, importance of OOB mobility, transfer safety, and call bell usage.      Patient left HOB elevated with all lines intact, call button in reach, and nurse notified..    GOALS:   Multidisciplinary Problems       Physical Therapy Goals          Problem: Physical Therapy    Goal Priority Disciplines Outcome Interventions   Physical Therapy Goal     PT, PT/OT Progressing    Description: Goals to be met by: 2024     Patient will increase functional independence with mobility by performin. Supine to sit with MInimal Assistance  2. Sit to supine with MInimal Assistance  3. Rolling to Left and Right with Minimal Assistance.  4. Sit to stand transfer with Moderate Assistance  5. Bed to chair transfer with Moderate Assistance using Rolling Walker  6. Gait  x 10 feet with Contact Guard Assistance using Rolling Walker.   7. Sitting at edge of bed x10 minutes with Supervision and Stand-by Assistance                         Time Tracking:     PT Received On: 24  PT Start Time: 920     PT Stop Time: 945  PT Total Time (min): 25 min     Billable Minutes: Therapeutic Activity 15 and Neuromuscular Re-education 10    Treatment Type: Treatment  PT/PTA: PT     Number of PTA visits since last PT visit: 0     2024

## 2024-12-05 NOTE — ASSESSMENT & PLAN NOTE
11/30/24 Urine culture positive for aerococcus urinae. Start therapy with rocephin for 5 days total.   Estimated Creatinine Clearance: 51.3 mL/min (based on SCr of 1.3 mg/dL).  Stable renal function.   Completed 5 days rocephin  - Monitor I/Os

## 2024-12-05 NOTE — PT/OT/SLP EVAL
Speech Language Pathology Evaluation  Bedside Swallow    Patient Name:  Carmen Tan   MRN:  [id number]  Admitting Diagnosis: Acute on chronic diastolic congestive heart failure    Recommendations:                 General Recommendations:  Dysphagia therapy  Diet recommendations:  NPO, NPO ; free water only & puree for pleasure  Aspiration Precautions: Continue alternate means of nutrition, HOB to 90 degrees, Ice chips sparingly, Monitor for s/s of aspiration, Puree for pleasure, Small bites/sips, Strict aspiration precautions, and Wear oxygen during intake   General Precautions: Standard, NPO, respiratory, fall, aspiration  Communication strategies:  none    Assessment:     Carmen Tan is a 74 y.o. female who presents w/ clinical signs of oropharyngeal dysphagia. Swallow prognosis is unknown. Pt not appropriate for MBSS at this time. Given dependence for oral hygiene, dependence for feeding, tube feeding, immune function, multiple medical comorbidities, COPD, CHF, and physical mobility/bedfast, pt is not safe for oral diet at this time. Nurse educated on free water protocol and puree for pleasure. ST to follow.     History:     Past Medical History:   Diagnosis Date    Abdominal hernia     Bacteremia 04/05/2023    CHF (congestive heart failure)     COPD (chronic obstructive pulmonary disease)     Diabetes mellitus, type 2 02/16/2022    GERD (gastroesophageal reflux disease)     History of home oxygen therapy     Hypertension     Hypertensive emergency 03/30/2023    Migraine headache     On home O2     Pulmonary embolism 06/01/2017    Small bowel obstruction     Thyroid disease        Past Surgical History:   Procedure Laterality Date    COLONOSCOPY      HYSTERECTOMY      INNER EAR SURGERY      UPPER GASTROINTESTINAL ENDOSCOPY         Prior Intubation HX:  pt intubated x5 days; 11/30/2024 - 12/4/2024    Modified Barium Swallow: none on file     Chest X-Rays: 12/5/2024:    FINDINGS:  The ET tube is been  "removed.  A NG tube is in place.  There is a right subclavian PICC line and a right chest tube unchanged.  The heart is significantly enlarged.  There is right basilar infiltrate or atelectasis and probable effusion     Impression:     Removal of ET tube otherwise no change in the lines and tubes     Right basilar changes as above stable    Prior diet: NPO.    Subjective     Communicated w/ nurse prior who reports "she was a very difficulty intubation." Nurse clears pt for ST bedside swallow eval and remains present at bedside throughout eval. Pt found asleep; wakes to verbal stimuli. Pt unable to communicate verbally; however, pt attempts to communicate nonverbally, which is overall successful. Able to follow simple commands.    Patient goals: PO intake     Pain/Comfort:  Pain Rating 1:  (pt did not rate)    Respiratory Status:  pt found on BiPAP; nurse transitions to venti mask at 50% upon ST arrival    Objective:     Oral Musculature Evaluation  Oral Musculature: general weakness  Dentition: present and adequate  Secretion Management: left corner drooling  Mandibular Strength and Mobility: WFL  Oral Labial Strength and Mobility: functional retraction, impaired pursing  Lingual Strength and Mobility: WFL  Velar Elevation:  (unable to assess)  Buccal Strength and Mobility: impaired  Volitional Cough: perceptually weak  Voice Prior to PO Intake: pt unable to vocalize    Bedside Swallow Eval:   Consistencies Assessed: all trials administered by SLP  Ice chips 1 at a time x2   Thin liquids via tsp x3 and open cup sip x1  Puree via 1/2 tsp x5      Oral Phase:   Anterior loss  Decreased closure around utensil  Drooling  Orally expelled  Poor oral acceptance    Pharyngeal Phase:   coughing/choking w/ single open cup sip  decreased hyolaryngeal excursion to palpation  delayed swallow initation  multiple spontaneous swallows     Compensatory Strategies  None    Education: Pt and nurse educated on free water protocol, puree " for pleasure, and strict aspiration precautions as previously listed. Nurse v/u of all information provided.    Goals:   Multidisciplinary Problems       SLP Goals          Problem: SLP    Goal Priority Disciplines Outcome   SLP Goal     SLP Progressing   Description:   1. Pt will tolerate least restrictive oral diet to maintain adequate nutrition/hydration w/o acute dysphagia-related pulmonary complication.  2. Pt will participate in diagnostic swallow therapy to guide further management.                        Plan:     Patient to be seen:  4 x/week   Plan of Care expires:  12/19/24  Plan of Care reviewed with:  patient   SLP Follow-Up:  Yes       Discharge recommendations:   (TBD based on progress)   Barriers to Discharge:  Level of Skilled Assistance Needed      Time Tracking:     SLP Treatment Date:   12/05/24  Speech Start Time:  1411  Speech Stop Time:  1432     Speech Total Time (min):  21 min    Billable Minutes: Eval Swallow and Oral Function x 21    12/05/2024

## 2024-12-05 NOTE — EICU
Intervention Initiated From:  COR / GLENNU    Elliott intervened regarding:  Rounding (Video assessment)    Nurse Notified:  No    Doctor Notified:  No    Comments: Appears to be sleeping.  BIPAP in use.  HR 72, B/P 151/88, RR 25, Sats 100% NAD

## 2024-12-05 NOTE — PLAN OF CARE
FRANCOIS faxed pt's referral to [facility] for review. Per Jenn at the facility, they cannot accept pt's with a chest tube, but the pt is in good standing to return to their facility once the pt is medically stable. FRANCOIS to complete LOCET and PASSR.     ADDENDUM: FRANCOIS completed LOCET/PASSR and faxed to [facility]. Fax successful. SW pending 142 form.     ADDENDUM: FRANCOIS received 142 form and loaded via [employer].

## 2024-12-05 NOTE — NURSING
Pt tolerated extubation well. Bipap maintained. O2 in the upper %. No acute resp distress. VSS. Chest tube clamped with serosanguineous drainage. Dressing reinforced. Congested cough with tenderness to the chest tube insertion. OG remains. Hardwick remains. Mepilex to sacral, buttocks and heels. AM, catheter and oral care. Tolerated well. Pt gestures or given paper to explain needs. No verbal responses during the night. Expresses feeling better and wants tube out. Continue to observe.

## 2024-12-05 NOTE — ASSESSMENT & PLAN NOTE
Body mass index is 48.75 kg/m². Morbid obesity complicates all aspects of disease management from diagnostic modalities to treatment. Weight loss encouraged and health benefits explained to patient.     Wt Readings from Last 8 Encounters:   12/05/24 128.8 kg (284 lb)   10/16/24 113.4 kg (250 lb)   09/22/24 113.4 kg (250 lb)   08/14/24 119.7 kg (263 lb 14.3 oz)   07/29/24 104.3 kg (229 lb 15 oz)   06/25/24 104.6 kg (230 lb 11.2 oz)   06/24/24 104.3 kg (230 lb)   06/20/24 102.5 kg (226 lb)

## 2024-12-05 NOTE — ASSESSMENT & PLAN NOTE
Patient's FSGs are controlled on current medication regimen.  Last A1c reviewed-   Lab Results   Component Value Date    HGBA1C 5.2 11/30/2024     Most recent fingerstick glucose reviewed-   Recent Labs   Lab 12/04/24  1810 12/04/24  2304 12/05/24  1107   POCTGLUCOSE 111* 111* 92       Current correctional scale  Low  Maintain anti-hyperglycemic dose as follows-   Antihyperglycemics (From admission, onward)      Start     Stop Route Frequency Ordered    11/30/24 1324  insulin aspart U-100 pen 0-5 Units         -- SubQ Every 6 hours PRN 11/30/24 1225          Hold Oral hypoglycemics while patient is in the hospital.

## 2024-12-05 NOTE — ASSESSMENT & PLAN NOTE
Patient with Hypercapnic and Hypoxic Respiratory failure which is Acute on chronic.  she is on home oxygen at 2 LPM. Supplemental oxygen was provided and noted- Oxygen Concentration (%):  [50] 50     Signs/symptoms of respiratory failure include- tachypnea, increased work of breathing, respiratory distress, use of accessory muscles, and wheezing. Contributing diagnoses includes - CHF and COPD Labs and images were reviewed. Patient Has recent ABG, which has been reviewed. Will treat underlying causes and adjust management of respiratory failure as follows- duo nebs, wean vent as tolerated, ABGs prn, diuresis

## 2024-12-05 NOTE — SUBJECTIVE & OBJECTIVE
Interval History: Patient seen and examined.     Review of Systems   Unable to perform ROS: Acuity of condition     Objective:     Vital Signs (Most Recent):  Temp: 97.9 °F (36.6 °C) (12/05/24 1501)  Pulse: (!) 59 (12/05/24 1530)  Resp: (!) 21 (12/05/24 1530)  BP: 136/69 (12/05/24 1400)  SpO2: 100 % (12/05/24 1530) Vital Signs (24h Range):  Temp:  [97.1 °F (36.2 °C)-97.9 °F (36.6 °C)] 97.9 °F (36.6 °C)  Pulse:  [52-99] 59  Resp:  [15-41] 21  SpO2:  [91 %-100 %] 100 %  BP: (129-175)/() 136/69     Weight: 128.8 kg (284 lb)  Body mass index is 48.75 kg/m².    Intake/Output Summary (Last 24 hours) at 12/5/2024 1638  Last data filed at 12/5/2024 0515  Gross per 24 hour   Intake 190.22 ml   Output 2451 ml   Net -2260.78 ml         Physical Exam  Vitals and nursing note reviewed.   Constitutional:       General: She is not in acute distress.     Appearance: She is obese. She is ill-appearing. She is not toxic-appearing.   HENT:      Head: Normocephalic.   Cardiovascular:      Rate and Rhythm: Normal rate and regular rhythm.      Pulses: Normal pulses.   Pulmonary:      Effort: No respiratory distress.      Comments: R chest tube in place.   Venturi mask, 15L  Abdominal:      General: There is no distension.      Palpations: Abdomen is soft.      Tenderness: There is no abdominal tenderness.      Hernia: No hernia is present.   Musculoskeletal:         General: No tenderness.      Cervical back: Neck supple. No rigidity. Tenderness: mild, no swelling.     Right lower leg: No edema (trace bilaterally).      Left lower leg: No edema.   Skin:     General: Skin is warm and dry.      Capillary Refill: Capillary refill takes less than 2 seconds.   Neurological:      Mental Status: She is alert. Mental status is at baseline.             Significant Labs: All pertinent labs within the past 24 hours have been reviewed.  A1C:   Recent Labs   Lab 07/28/24  2256 11/30/24  1237   HGBA1C 5.3 5.2     Bilirubin:   Recent Labs   Lab  12/01/24  0600 12/02/24  0349 12/03/24  0356 12/04/24  0409 12/05/24  0341   BILITOT 0.5 0.4 0.4 0.3 0.3     BMP:   Recent Labs   Lab 12/05/24  0341   GLU 88   *   K 3.6   *   CO2 40*   BUN 34*   CREATININE 1.3   CALCIUM 9.3   MG 2.1     CBC:   Recent Labs   Lab 12/04/24  0409 12/05/24  0341   WBC 14.65* 12.90*   HGB 11.4* 11.6*   HCT 35.7* 36.8*    211     CMP:   Recent Labs   Lab 12/04/24  0409 12/05/24  0341   * 153*   K 3.6 3.6   * 113*   CO2 39* 40*   * 88   BUN 38* 34*   CREATININE 1.5* 1.3   CALCIUM 9.1 9.3   PROT 5.6* 5.8*   ALBUMIN 2.0* 2.0*   BILITOT 0.3 0.3   ALKPHOS 118 110   AST 11 10   ALT 12 10   ANIONGAP 1* 0*     Recent Labs   Lab 12/04/24  0409 12/05/24  0341   MG 2.0 2.1     POCT Glucose:   Recent Labs   Lab 12/04/24  1810 12/04/24  2304 12/05/24  1107   POCTGLUCOSE 111* 111* 92     Recent Labs   Lab 08/10/24  0335   TSH 0.632     X-Ray Chest 1 View  Narrative: EXAMINATION:  XR CHEST 1 VIEW    CLINICAL HISTORY:  Chest tube;    TECHNIQUE:  portable chest x-ray    COMPARISON:  12/04/2024.  <Comparisons>    FINDINGS:  The ET tube is been removed.  A NG tube is in place.  There is a right subclavian PICC line and a right chest tube unchanged.  The heart is significantly enlarged.  There is right basilar infiltrate or atelectasis and probable effusion  Impression: Removal of ET tube otherwise no change in the lines and tubes    Right basilar changes as above stable    Electronically signed by: Fabiola Rodgers MD  Date:    12/05/2024  Time:    11:10     Significant Imaging: I have reviewed all pertinent imaging results/findings within the past 24 hours.

## 2024-12-05 NOTE — PLAN OF CARE
Recommendations  1. Rec'd Continuous EN: Suplena @ 10mL/r increasing by 5-10mL q4-6hrs to goal rate of 60mL/hr with FWF as per MD to provide 2592kcal (% EEN), 65g of protein (74% EPN), and 1062mL FW.    2. Rec'd SLP consult.    3. RD to follow and make rec's accordingly.  Goals:   1. Pt will be started on Nutrition Support by next RD follow up.   2. Pt will be evaluated by SLP by next RD follow up.  Nutrition Goal Status: goal met, new

## 2024-12-05 NOTE — ASSESSMENT & PLAN NOTE
Extubated on 12/4/24, stable respiratory status.   Patient is now DNR/DNI  - continuous BiPAP  - continue daily pulmonary hygiene care with breathing treatments, solumedrol and IS

## 2024-12-05 NOTE — ASSESSMENT & PLAN NOTE
12/3/24 Patient once extubated she does not want to be re-intubated  12/4/24 Family would like information on home hospice services  12/5/24 Plan for senior care placement to nursing home per BAKARI and FRANCOIS

## 2024-12-05 NOTE — PLAN OF CARE
Problem: Adult Inpatient Plan of Care  Goal: Plan of Care Review  Outcome: Not Progressing  Goal: Patient-Specific Goal (Individualized)  Outcome: Not Progressing  Goal: Absence of Hospital-Acquired Illness or Injury  Outcome: Not Progressing  Goal: Optimal Comfort and Wellbeing  Outcome: Not Progressing  Goal: Readiness for Transition of Care  Outcome: Not Progressing     Problem: Bariatric Environmental Safety  Goal: Safety Maintained with Care  Outcome: Not Progressing     Problem: Diabetes Comorbidity  Goal: Blood Glucose Level Within Targeted Range  Outcome: Not Progressing     Problem: Skin Injury Risk Increased  Goal: Skin Health and Integrity  Outcome: Not Progressing     Problem: Heart Failure  Goal: Optimal Coping  Outcome: Not Progressing  Goal: Optimal Cardiac Output  Outcome: Not Progressing  Goal: Stable Heart Rate and Rhythm  Outcome: Not Progressing  Goal: Optimal Functional Ability  Outcome: Not Progressing  Goal: Fluid and Electrolyte Balance  Outcome: Not Progressing  Goal: Improved Oral Intake  Outcome: Not Progressing  Goal: Effective Oxygenation and Ventilation  Outcome: Not Progressing  Goal: Effective Breathing Pattern During Sleep  Outcome: Not Progressing     Problem: COPD (Chronic Obstructive Pulmonary Disease)  Goal: Optimal Chronic Illness Coping  Outcome: Not Progressing  Goal: Optimal Level of Functional [place]  Outcome: Not Progressing  Goal: Absence of Infection Signs and Symptoms  Outcome: Not Progressing  Goal: Improved Oral Intake  Outcome: Not Progressing  Goal: Effective Oxygenation and Ventilation  Outcome: Not Progressing     Problem: Fluid Volume Excess  Goal: Fluid Balance  Outcome: Not Progressing     Problem: Fall Injury Risk  Goal: Absence of Fall and Fall-Related Injury  Outcome: Not Progressing     Problem: Infection  Goal: Absence of Infection Signs and Symptoms  Outcome: Not Progressing     Problem: Noninvasive Ventilation Acute  Goal: Effective Unassisted  Ventilation and Oxygenation  Outcome: Not Progressing

## 2024-12-05 NOTE — PROGRESS NOTES
[place] - Intensive [facility], [place]  General Surgery  Progress Note    Subjective:     History of Present Illness:  No notes on file    Post-Op Info:  * No surgery found *         Interval History: Pt s/e.  Extubated yesterday.  Stable on BiPAP.  Chest tube clamped for > 48hrs with no PTX observed on AM chest X ray.      Medications:  Continuous Infusions:   dextrose 5 % and 0.45 % NaCl with KCl 20 mEq   Intravenous Continuous        propofoL  0-50 mcg/kg/min Intravenous Continuous 8.5 mL/hr at 12/04/24 0530 10 mcg/kg/min at 12/04/24 0530     Scheduled Meds:   albuterol sulfate  2.5 mg Nebulization Q4H    aspirin  325 mg Per OG tube Daily    atorvastatin  40 mg Per OG tube Daily    budesonide  0.5 mg Nebulization Q12H    carvediloL  3.125 mg Per OG tube BID    cefTRIAXone (Rocephin) IV (PEDS and ADULTS)  1 g Intravenous Q24H    enoxparin  40 mg Subcutaneous Q24H (prophylaxis, 1700)    furosemide (LASIX) injection  40 mg Intravenous Daily    levothyroxine  50 mcg Per OG tube Before breakfast    [START ON 12/6/2024] methylPREDNISolone injection (PEDS and ADULTS)  60 mg Intravenous Q24H    pantoprazole  40 mg Intravenous Daily    polyethylene glycol  17 g Per OG tube Daily    senna-docusate 8.6-50 mg  2 tablet Per OG tube Daily    valsartan  160 mg Per OG tube Daily     PRN Meds:  Current Facility-Administered Medications:     acetaminophen, 650 mg, Per OG tube, Q8H PRN    dextrose 10%, 12.5 g, Intravenous, PRN    dextrose 10%, 25 g, Intravenous, PRN    glucagon (human recombinant), 1 mg, Intramuscular, PRN    insulin aspart U-100, 0-5 Units, Subcutaneous, Q6H PRN    melatonin, 6 mg, Per OG tube, Nightly PRN    ondansetron, 4 mg, Intravenous, Q8H PRN    sodium chloride 0.9%, 10 mL, Intravenous, PRN    Flushing PICC/Midline Protocol, , , Until Discontinued **AND** sodium chloride 0.9%, 10 mL, Intravenous, Q12H PRN     Review of patient's allergies indicates:   Allergen Reactions    Pcn [penicillins] Swelling     Objective:      Vital Signs (Most Recent):  Temp: 97.9 °F (36.6 °C) (12/05/24 1501)  Pulse: (!) 59 (12/05/24 1530)  Resp: (!) 21 (12/05/24 1530)  BP: 136/69 (12/05/24 1400)  SpO2: 100 % (12/05/24 1530) Vital Signs (24h Range):  Temp:  [97.1 °F (36.2 °C)-97.9 °F (36.6 °C)] 97.9 °F (36.6 °C)  Pulse:  [52-99] 59  Resp:  [15-41] 21  SpO2:  [91 %-100 %] 100 %  BP: (129-175)/() 136/69     Weight: 128.8 kg (284 lb)  Body mass index is 48.75 kg/m².    Intake/Output - Last 3 Shifts         12/03 0700  12/04 0659 12/04 0700 12/05 0659 12/05 0700  12/06 0659    P.O. 0 0     I.V. (mL/kg) 164.5 (1.3) 37.2 (0.3)     NG/ 153     Total Intake(mL/kg) 475.5 (3.6) 190.2 (1.5)     Urine (mL/kg/hr) 2900 (0.9) 2450 (0.8)     Drains       Stool 0 1     Chest Tube       Total Output 2900 2451     Net -2424.5 -2260.8            Stool Occurrence 0 x               Physical Exam  Constitutional:       General: She is not in acute distress.     Appearance: She is obese. She is not ill-appearing or toxic-appearing.   Cardiovascular:      Rate and Rhythm: Normal rate and regular rhythm.   Pulmonary:      Effort: Pulmonary effort is normal. No respiratory distress.      Comments: R chest tube in place.   Abdominal:      General: There is no distension.      Palpations: Abdomen is soft.      Tenderness: There is no abdominal tenderness. There is no guarding.      Hernia: No hernia is present.   Skin:     Capillary Refill: Capillary refill takes less than 2 seconds.   Neurological:      Mental Status: She is alert. Mental status is at baseline.          Significant Labs:  I have reviewed all pertinent lab results within the past 24 hours.  CBC:   Recent Labs   Lab 12/05/24  0341   WBC 12.90*   RBC 3.87*   HGB 11.6*   HCT 36.8*      MCV 95   MCH 30.0   MCHC 31.5*     CMP:   Recent Labs   Lab 12/05/24  0341   GLU 88   CALCIUM 9.3   ALBUMIN 2.0*   PROT 5.8*   *   K 3.6   CO2 40*   *   BUN 34*   CREATININE 1.3   ALKPHOS 110   ALT 10    AST 10   BILITOT 0.3       Significant Diagnostics:  I have reviewed all pertinent imaging results/findings within the past 24 hours.  Assessment/Plan:     Other pneumothorax  No PTX after > 48hrs clamp trial   Successfully extubated yesterday - stable on BiPAP  Chest tube removed and occlusive dressing left in place   CXR in AM if Sat stable   No further surgical intervention         Isabell Raymond MD  General Surgery  [place] - Intensive Care

## 2024-12-05 NOTE — ASSESSMENT & PLAN NOTE
Patient has  atrial fibrillation. Patient is currently in . [id number] Score: 3. The patients heart rate in the last 24 hours is as follows:  Pulse  Min: 52  Max: 99     Antiarrhythmics   BB on hold due to bradycardia from propofol gtt    Anticoagulants  enoxaparin injection 40 mg, Every 24 hours, Subcutaneous    Plan  - Replete lytes with a goal of K>4, Mg >2  - Patient's afib is currently stable, she has history of dash-tachy fluctuation, subsequent intubation on propofol gtt, now bradycardic, hold coreg for now, previous records showed on eliquis, care everywhere patient has not be seen by Cardiology except for and how during previous admission in August, unclear which medication patient is taking, will try and get with family member to verify medication patient taking at home and if she was taken off blood thinner at some point since her August admission

## 2024-12-05 NOTE — CARE UPDATE
Patient remains on BIPAP and is tolerating well. Tube feeding is on hold due to BIPAP and discussion with Dr. Mott. Propofol is off and patient has tolerated it well.Rt chest tube remains in place and is clamped per Dr. Raymond.Patient sat on side of bed with PT and tolerated well

## 2024-12-06 PROBLEM — R13.10 SWALLOWING IMPAIRED: Status: ACTIVE | Noted: 2024-12-06

## 2024-12-06 LAB
ALBUMIN SERPL BCP-MCNC: 2 G/DL (ref 3.5–5.2)
ALP SERPL-CCNC: 107 U/L (ref 55–135)
ALT SERPL W/O P-5'-P-CCNC: 12 U/L (ref 10–44)
ANION GAP SERPL CALC-SCNC: -2 MMOL/L (ref 3–11)
AST SERPL-CCNC: 14 U/L (ref 10–40)
BASOPHILS # BLD AUTO: 0 K/UL (ref 0–0.2)
BASOPHILS NFR BLD: 0 % (ref 0–1.9)
BILIRUB SERPL-MCNC: 0.4 MG/DL (ref 0.1–1)
BUN SERPL-MCNC: 37 MG/DL (ref 8–23)
CALCIUM SERPL-MCNC: 9.6 MG/DL (ref 8.7–10.5)
CHLORIDE SERPL-SCNC: 112 MMOL/L (ref 95–110)
CO2 SERPL-SCNC: 43 MMOL/L (ref 23–29)
CREAT SERPL-MCNC: 1.3 MG/DL (ref 0.5–1.4)
DIFFERENTIAL METHOD BLD: ABNORMAL
EOSINOPHIL # BLD AUTO: 0 K/UL (ref 0–0.5)
EOSINOPHIL NFR BLD: 0 % (ref 0–8)
ERYTHROCYTE [DISTWIDTH] IN BLOOD BY AUTOMATED COUNT: 15.7 % (ref 11.5–14.5)
EST. GFR  (NO RACE VARIABLE): 43.4 ML/MIN/1.73 M^2
GLUCOSE SERPL-MCNC: 84 MG/DL (ref 70–110)
HCT VFR BLD AUTO: 36.9 % (ref 37–48.5)
HGB BLD-MCNC: 11.4 G/DL (ref 12–16)
IMM GRANULOCYTES # BLD AUTO: 0.03 K/UL (ref 0–0.04)
IMM GRANULOCYTES NFR BLD AUTO: 0.3 % (ref 0–0.5)
LYMPHOCYTES # BLD AUTO: 0.7 K/UL (ref 1–4.8)
LYMPHOCYTES NFR BLD: 7.8 % (ref 18–48)
MAGNESIUM SERPL-MCNC: 1.9 MG/DL (ref 1.6–2.6)
MCH RBC QN AUTO: 28.8 PG (ref 27–31)
MCHC RBC AUTO-ENTMCNC: 30.9 G/DL (ref 32–36)
MCV RBC AUTO: 93 FL (ref 82–98)
MONOCYTES # BLD AUTO: 0.8 K/UL (ref 0.3–1)
MONOCYTES NFR BLD: 8.1 % (ref 4–15)
NEUTROPHILS # BLD AUTO: 7.7 K/UL (ref 1.8–7.7)
NEUTROPHILS NFR BLD: 83.8 % (ref 38–73)
NRBC BLD-RTO: 0 /100 WBC
PLATELET # BLD AUTO: 214 K/UL (ref 150–450)
PMV BLD AUTO: 9.2 FL (ref 9.2–12.9)
POCT GLUCOSE: 101 MG/DL (ref 70–110)
POCT GLUCOSE: 88 MG/DL (ref 70–110)
POCT GLUCOSE: 88 MG/DL (ref 70–110)
POCT GLUCOSE: 99 MG/DL (ref 70–110)
POTASSIUM SERPL-SCNC: 4.1 MMOL/L (ref 3.5–5.1)
PROT SERPL-MCNC: 5.8 G/DL (ref 6–8.4)
RBC # BLD AUTO: 3.96 M/UL (ref 4–5.4)
SODIUM SERPL-SCNC: 153 MMOL/L (ref 136–145)
TB INDURATION 48 - 72 HR READ: NORMAL
TB SKIN TEST 48 - 72 HR READ: NORMAL
WBC # BLD AUTO: 9.24 K/UL (ref 3.9–12.7)

## 2024-12-06 PROCEDURE — 36415 COLL VENOUS BLD VENIPUNCTURE: CPT | Performed by: INTERNAL MEDICINE

## 2024-12-06 PROCEDURE — 20000000 HC ICU ROOM

## 2024-12-06 PROCEDURE — 63600175 PHARM REV CODE 636 W HCPCS: Performed by: STUDENT IN AN ORGANIZED HEALTH CARE EDUCATION/TRAINING PROGRAM

## 2024-12-06 PROCEDURE — 85025 COMPLETE CBC W/AUTO DIFF WBC: CPT | Performed by: INTERNAL MEDICINE

## 2024-12-06 PROCEDURE — 99900035 HC TECH TIME PER 15 MIN (STAT)

## 2024-12-06 PROCEDURE — 25000242 PHARM REV CODE 250 ALT 637 W/ HCPCS: Performed by: STUDENT IN AN ORGANIZED HEALTH CARE EDUCATION/TRAINING PROGRAM

## 2024-12-06 PROCEDURE — 80053 COMPREHEN METABOLIC PANEL: CPT | Performed by: INTERNAL MEDICINE

## 2024-12-06 PROCEDURE — A4216 STERILE WATER/SALINE, 10 ML: HCPCS | Performed by: STUDENT IN AN ORGANIZED HEALTH CARE EDUCATION/TRAINING PROGRAM

## 2024-12-06 PROCEDURE — 63600175 PHARM REV CODE 636 W HCPCS: Performed by: INTERNAL MEDICINE

## 2024-12-06 PROCEDURE — 36569 INSJ PICC 5 YR+ W/O IMAGING: CPT

## 2024-12-06 PROCEDURE — 27000221 HC OXYGEN, UP TO 24 HOURS

## 2024-12-06 PROCEDURE — 99233 SBSQ HOSP IP/OBS HIGH 50: CPT | Mod: GW,,, | Performed by: STUDENT IN AN ORGANIZED HEALTH CARE EDUCATION/TRAINING PROGRAM

## 2024-12-06 PROCEDURE — 27100171 HC OXYGEN HIGH FLOW UP TO 24 HOURS

## 2024-12-06 PROCEDURE — 94660 CPAP INITIATION&MGMT: CPT

## 2024-12-06 PROCEDURE — 83735 ASSAY OF MAGNESIUM: CPT | Performed by: INTERNAL MEDICINE

## 2024-12-06 PROCEDURE — 94640 AIRWAY INHALATION TREATMENT: CPT

## 2024-12-06 PROCEDURE — 99900031 HC PATIENT EDUCATION (STAT)

## 2024-12-06 PROCEDURE — 94761 N-INVAS EAR/PLS OXIMETRY MLT: CPT

## 2024-12-06 PROCEDURE — 25000003 PHARM REV CODE 250: Performed by: STUDENT IN AN ORGANIZED HEALTH CARE EDUCATION/TRAINING PROGRAM

## 2024-12-06 RX ORDER — GLYCOPYRROLATE 1 MG/5ML
0.2 SOLUTION ORAL 3 TIMES DAILY
Status: DISCONTINUED | OUTPATIENT
Start: 2024-12-06 | End: 2024-12-08

## 2024-12-06 RX ORDER — VALSARTAN 80 MG/1
160 TABLET ORAL 2 TIMES DAILY
Status: DISCONTINUED | OUTPATIENT
Start: 2024-12-06 | End: 2024-12-08

## 2024-12-06 RX ORDER — TRAZODONE HYDROCHLORIDE 50 MG/1
50 TABLET ORAL NIGHTLY
Status: DISCONTINUED | OUTPATIENT
Start: 2024-12-06 | End: 2024-12-08

## 2024-12-06 RX ADMIN — FUROSEMIDE 20 MG: 10 INJECTION, SOLUTION INTRAVENOUS at 08:12

## 2024-12-06 RX ADMIN — GLYCOPYRROLATE 0.2 MG: 1 LIQUID ORAL at 04:12

## 2024-12-06 RX ADMIN — CEFTRIAXONE SODIUM 1 G: 1 INJECTION, POWDER, FOR SOLUTION INTRAMUSCULAR; INTRAVENOUS at 01:12

## 2024-12-06 RX ADMIN — ALBUTEROL SULFATE 2.5 MG: 2.5 SOLUTION RESPIRATORY (INHALATION) at 11:12

## 2024-12-06 RX ADMIN — Medication 10 ML: at 11:12

## 2024-12-06 RX ADMIN — VALSARTAN 160 MG: 80 TABLET, FILM COATED ORAL at 09:12

## 2024-12-06 RX ADMIN — POLYETHYLENE GLYCOL (3350) 17 G: 17 POWDER, FOR SOLUTION ORAL at 09:12

## 2024-12-06 RX ADMIN — ALBUTEROL SULFATE 2.5 MG: 2.5 SOLUTION RESPIRATORY (INHALATION) at 03:12

## 2024-12-06 RX ADMIN — BUDESONIDE 0.5 MG: 0.5 INHALANT RESPIRATORY (INHALATION) at 08:12

## 2024-12-06 RX ADMIN — TRAZODONE HYDROCHLORIDE 50 MG: 50 TABLET ORAL at 08:12

## 2024-12-06 RX ADMIN — ASPIRIN 325 MG ORAL TABLET 325 MG: 325 PILL ORAL at 09:12

## 2024-12-06 RX ADMIN — BUDESONIDE 0.5 MG: 0.5 INHALANT RESPIRATORY (INHALATION) at 07:12

## 2024-12-06 RX ADMIN — ALBUTEROL SULFATE 2.5 MG: 2.5 SOLUTION RESPIRATORY (INHALATION) at 08:12

## 2024-12-06 RX ADMIN — SENNOSIDES AND DOCUSATE SODIUM 2 TABLET: 50; 8.6 TABLET ORAL at 09:12

## 2024-12-06 RX ADMIN — VALSARTAN 160 MG: 80 TABLET, FILM COATED ORAL at 08:12

## 2024-12-06 RX ADMIN — CARVEDILOL 3.12 MG: 3.12 TABLET, FILM COATED ORAL at 08:12

## 2024-12-06 RX ADMIN — LEVOTHYROXINE SODIUM 50 MCG: 50 TABLET ORAL at 05:12

## 2024-12-06 RX ADMIN — ALBUTEROL SULFATE 2.5 MG: 2.5 SOLUTION RESPIRATORY (INHALATION) at 07:12

## 2024-12-06 RX ADMIN — CARVEDILOL 3.12 MG: 3.12 TABLET, FILM COATED ORAL at 09:12

## 2024-12-06 RX ADMIN — GLYCOPYRROLATE 0.2 MG: 1 LIQUID ORAL at 08:12

## 2024-12-06 RX ADMIN — PANTOPRAZOLE SODIUM 40 MG: 40 INJECTION, POWDER, LYOPHILIZED, FOR SOLUTION INTRAVENOUS at 08:12

## 2024-12-06 RX ADMIN — METHYLPREDNISOLONE SODIUM SUCCINATE 60 MG: 40 INJECTION, POWDER, FOR SOLUTION INTRAMUSCULAR; INTRAVENOUS at 08:12

## 2024-12-06 RX ADMIN — ENOXAPARIN SODIUM 40 MG: 40 INJECTION SUBCUTANEOUS at 04:12

## 2024-12-06 RX ADMIN — ATORVASTATIN CALCIUM 40 MG: 40 TABLET, FILM COATED ORAL at 09:12

## 2024-12-06 NOTE — PLAN OF CARE
Problem: Adult Inpatient Plan of Care  Goal: Plan of Care Review  Outcome: Progressing  Goal: Patient-Specific Goal (Individualized)  Outcome: Progressing  Goal: Absence of Hospital-Acquired Illness or Injury  Outcome: Progressing  Goal: Optimal Comfort and Wellbeing  Outcome: Progressing  Goal: Readiness for Transition of Care  Outcome: Progressing     Problem: Bariatric Environmental Safety  Goal: Safety Maintained with Care  Outcome: Progressing     Problem: Diabetes Comorbidity  Goal: Blood Glucose Level Within Targeted Range  Outcome: Progressing     Problem: Skin Injury Risk Increased  Goal: Skin Health and Integrity  Outcome: Progressing     Problem: Heart Failure  Goal: Optimal Coping  Outcome: Progressing  Goal: Optimal Cardiac Output  Outcome: Progressing  Goal: Stable Heart Rate and Rhythm  Outcome: Progressing  Goal: Optimal Functional Ability  Outcome: Progressing  Goal: Fluid and Electrolyte Balance  Outcome: Progressing  Goal: Improved Oral Intake  Outcome: Progressing  Goal: Effective Oxygenation and Ventilation  Outcome: Progressing  Goal: Effective Breathing Pattern During Sleep  Outcome: Progressing     Problem: COPD (Chronic Obstructive Pulmonary Disease)  Goal: Optimal Chronic Illness Coping  Outcome: Progressing  Goal: Optimal Level of Functional [place]  Outcome: Progressing  Goal: Absence of Infection Signs and Symptoms  Outcome: Progressing  Goal: Improved Oral Intake  Outcome: Progressing  Goal: Effective Oxygenation and Ventilation  Outcome: Progressing     Problem: Infection  Goal: Absence of Infection Signs and Symptoms  Outcome: Progressing       Patient stayed on the BIPAP all night, tolerated it well. VSS afebrile. CO2 on her venous blood work is elevated. She is COPD she awakens easily and follows commands. Patient is NPO due to failing the swallow test, she is also on the BIPAP.  Urinary output is good.

## 2024-12-06 NOTE — SUBJECTIVE & OBJECTIVE
Interval History: Patient seen and examined.     Review of Systems   Unable to perform ROS: Acuity of condition     Objective:     Vital Signs (Most Recent):  Temp: 98.3 °F (36.8 °C) (12/06/24 1510)  Pulse: 69 (12/06/24 1630)  Resp: (!) 30 (12/06/24 1630)  BP: 132/64 (12/06/24 1600)  SpO2: 95 % (12/06/24 1630) Vital Signs (24h Range):  Temp:  [97.3 °F (36.3 °C)-98.3 °F (36.8 °C)] 98.3 °F (36.8 °C)  Pulse:  [59-85] 69  Resp:  [16-38] 30  SpO2:  [94 %-100 %] 95 %  BP: (129-155)/(62-85) 132/64     Weight: 125.2 kg (276 lb)  Body mass index is 47.38 kg/m².    Intake/Output Summary (Last 24 hours) at 12/6/2024 1651  Last data filed at 12/6/2024 0538  Gross per 24 hour   Intake 960 ml   Output 3500 ml   Net -2540 ml         Physical Exam  Vitals and nursing note reviewed.   Constitutional:       General: She is not in acute distress.     Appearance: She is obese. She is ill-appearing. She is not toxic-appearing.   HENT:      Head: Normocephalic.      Mouth/Throat:      Comments: BiPAP mask, OG tube in place  Eyes:      Extraocular Movements: Extraocular movements intact.      Conjunctiva/sclera: Conjunctivae normal.   Cardiovascular:      Rate and Rhythm: Normal rate and regular rhythm.      Pulses: Normal pulses.   Pulmonary:      Effort: No respiratory distress.   Abdominal:      General: There is no distension.      Palpations: Abdomen is soft.      Tenderness: There is no abdominal tenderness.      Hernia: No hernia is present.   Musculoskeletal:         General: No tenderness.      Cervical back: Neck supple. No rigidity. Tenderness: mild, no swelling.     Right lower leg: No edema.      Left lower leg: No edema.   Skin:     General: Skin is warm and dry.      Capillary Refill: Capillary refill takes less than 2 seconds.   Neurological:      Mental Status: She is alert. Mental status is at baseline.             Significant Labs: All pertinent labs within the past 24 hours have been reviewed.  A1C:   Recent Labs   Lab  07/28/24  2256 11/30/24  1237   HGBA1C 5.3 5.2     Recent Labs   Lab 12/02/24  0349 12/03/24  0356 12/04/24  0409 12/05/24  0341 12/06/24  0357   BILITOT 0.4 0.4 0.3 0.3 0.4     Recent Labs   Lab 12/06/24  0357   GLU 84   *   K 4.1   *   CO2 43*   BUN 37*   CREATININE 1.3   CALCIUM 9.6   MG 1.9     CBC:   Recent Labs   Lab 12/05/24  0341 12/06/24  0357   WBC 12.90* 9.24   HGB 11.6* 11.4*   HCT 36.8* 36.9*    214     CMP:   Recent Labs   Lab 12/05/24  0341 12/06/24  0357   * 153*   K 3.6 4.1   * 112*   CO2 40* 43*   GLU 88 84   BUN 34* 37*   CREATININE 1.3 1.3   CALCIUM 9.3 9.6   PROT 5.8* 5.8*   ALBUMIN 2.0* 2.0*   BILITOT 0.3 0.4   ALKPHOS 110 107   AST 10 14   ALT 10 12   ANIONGAP 0* -2*     Recent Labs   Lab 12/05/24  0341 12/06/24  0357   MG 2.1 1.9     POCT Glucose:   Recent Labs   Lab 12/05/24  2309 12/06/24  1112 12/06/24  1636   POCTGLUCOSE 101 88 88     X-Ray Chest 1 View  Narrative: EXAMINATION:  XR CHEST 1 VIEW    CLINICAL HISTORY:  s/p R chest tube removal;    COMPARISON:  Portable chest 12/05/2024.    FINDINGS:  Frontal chest radiograph demonstrates interval removal of right thoracostomy tube.  No pneumothorax.  Nasogastric tube and right-sided PICC remain in place.  Impression: Interval removal of right thoracostomy tube.  No pneumothorax.    Electronically signed by: Oscar Joe MD  Date:    12/06/2024  Time:    11:29     Significant Imaging: I have reviewed all pertinent imaging results/findings within the past 24 hours.

## 2024-12-06 NOTE — EICU
Intervention Initiated From:  [facility] / KLAUS Gallagher intervened regarding:  Rounding (Video assessment)    Comments: pt is resting in bed with eyes closed. NAD observed. VSS. Bipap is in use and tolerating well. No drips are in fusing.

## 2024-12-06 NOTE — ASSESSMENT & PLAN NOTE
11/30/24 Urine culture positive for aerococcus urinae. Completed Rocephin 5 days total.   Estimated Creatinine Clearance: 50.4 mL/min (based on SCr of 1.3 mg/dL).  Stable renal function.   - Monitor I/Os

## 2024-12-06 NOTE — EICU
Intervention Initiated From:  [facility] / GLENNU    Elliott intervened regarding:  Rounding (Video assessment)    Comments: pt is resting in bed with eyes closed. NAD observed. Bipap is in use and tolerating well. VSS. No drips are infusing.

## 2024-12-06 NOTE — ASSESSMENT & PLAN NOTE
Body mass index is 47.38 kg/m². Morbid obesity complicates all aspects of disease management from diagnostic modalities to treatment. Weight loss encouraged and health benefits explained to patient..     Wt Readings from Last 8 Encounters:   12/06/24 125.2 kg (276 lb)   10/16/24 113.4 kg (250 lb)   09/22/24 113.4 kg (250 lb)   08/14/24 119.7 kg (263 lb 14.3 oz)   07/29/24 104.3 kg (229 lb 15 oz)   06/25/24 104.6 kg (230 lb 11.2 oz)   06/24/24 104.3 kg (230 lb)   06/20/24 102.5 kg (226 lb)

## 2024-12-06 NOTE — PT/OT/SLP DISCHARGE
Physical Therapy Discharge Summary    Name: Carmen Tan  MRN: [id number]   Principal Problem: Acute on chronic diastolic congestive heart failure     Patient Discharged from acute Physical Therapy on 2024.  Please refer to prior PT note dated on 2024 for functional status.     Assessment:     Patient is no longer making progress. Patient has not met goals. Patient transferred to lower level of care secondary to patient condition worsening / not progressing.    Objective:     GOALS:   Multidisciplinary Problems       Physical Therapy Goals          Problem: Physical Therapy    Goal Priority Disciplines Outcome Interventions   Physical Therapy Goal     PT, PT/OT Unable to Meet    Description: Goals to be met by: 2024     Patient will increase functional independence with mobility by performin. Supine to sit with MInimal Assistance  2. Sit to supine with MInimal Assistance  3. Rolling to Left and Right with Minimal Assistance.  4. Sit to stand transfer with Moderate Assistance  5. Bed to chair transfer with Moderate Assistance using Rolling Walker  6. Gait  x 10 feet with Contact Guard Assistance using Rolling Walker.   7. Sitting at edge of bed x10 minutes with Supervision and Stand-by Assistance                         Reasons for Discontinuation of Therapy Services  Therapist determines that the patient will no longer benefit from therapy services. and patient transitioning to hospice care.        Plan:     Patient Discharged to: Palliative Care/Hospice.      2024

## 2024-12-06 NOTE — PLAN OF CARE
Remains on bipap due to higher resp rate - awake and alert - - rests more of the day - turned and repositioned - sat 94 percent on current bipap settings - sr pulses present - abdomen soft non tender bs x 4 no bm noted - bolus free water ordered by dr - tolerating - hannon catheter draining clear yellow urine via gravity - secured in place - monitoring blood sugars - will continue to monitor

## 2024-12-06 NOTE — NURSING
Patient was bathed, Hovermatt applied under patient . She tolerated it very well. Changed the pulse ox probe to the left finger.

## 2024-12-06 NOTE — ASSESSMENT & PLAN NOTE
ET tube extubation on 12/4/24 with complaints of sore throat. OG tube in place. Failed initial swallow evaluation per ST post extubation.   Keep NPO, free water only via OG tube & puree for pleasure  Aspiration Precautions: Continue alternate means of nutrition, HOB to 90 degrees, Ice chips sparingly, Monitor for s/s of aspiration, Puree for pleasure, Small bites/sips  Strict aspiration precautions, and Wear oxygen during intake   Difficult to maintain upright posture given body habitus, will add glycopyrrolate while OG tube in place to prevent aspiration

## 2024-12-06 NOTE — PROGRESS NOTES
[facility] Medicine  Progress Note    Patient Name: Carmen Tan  MRN: [id number]  Patient Class: IP- Inpatient   Admission Date: 11/29/2024  Length of Stay: 7 days  Attending Physician: Gunnar Mott DO  Primary Care Provider: Lucian Barry MD        Subjective     Principal Problem:Acute on chronic diastolic congestive heart failure        HPI:  Patient 74-year-old female past medical history of chronic diastolic heart failure, AFib, chronic respiratory failure with hypoxemia and hypercapnia, anxiety depression, type 2 diabetes, GERD, hypothyroidism.  Patient came in after noted to be short of breath at home, patient was satting in the 70 on home BiPAP therapy, was brought to the ED noted to be in distress tachycardic hypotensive hypoxic, workup in the ED patient with enlarged cardiac silhouette, chronic changes no acute finding, elevated BNP 25,000, mild elevation in troponin negative COVID no leukocytosis, patient was given nitroglycerin and Lasix injection, admitted to ICU.    Overview/Hospital Course:  12/1 AA, weekend crosscover, overnight patient had two ABG, eICU MD adjusted vent settings, had repeat chest x-ray which showed moderate right thorax, vital signs stable, O2 sat maintaining, surgery consulted for chest tube placement, patient was started on steroids yesterday for COPD flare, diuresis in progress, continue to monitor urine output, follow-up chest x-ray after chest tube placement, follow-up with surgery rec  12/2/24 ABG 7.395/52.4/87.3/29.8  improving respiratory status on AC Vt 450, RR18, PEEP 6, FiO2 55%, SpO2 98%, proprofol 25mcg/kg/min. CXR with right pneumothorax resolved, diuresing on scheduled IV lasix with adequate urine output. Patient minimally responsive to sternal rub. No elevated temperature. Urine culture positive for gram positive aerococcus spp, add IV rocephin. Continue with vent weaning and daily SBT, scheduled breathing treatments,  solumedrol. Follow up recommendations cardiology. Follow up general surgery with chest tube management.    12/3/24 ABG 7.423/52.7/72.7/32.3, FiO2 40%. Off sedation awake and following commands. CPAP trial followed with tachypnea 40s, will maintain current vent settings and prepare for SBT and extubation tomorrow AM. Right lateral chest tube has been clamped. CXR overnight unchanged, no new pneumothorax. Chest tube management per GS. BMx1. Start enteral trickle feeds and advance per protocol.   12/4/24 Stable ABG and vent settings. Tolerating tube feeds with no residual. Patient awake and alert and requests to have ET tube to be removed. Patient further affirms she does not want to be re-intubated afterwards. Plan to extubate and transition to BiPAP.   12/5/24 No acute events overnight. Awake, on venturi mask FiO2 50%, SpO2 100%. Right chest tube in place remains clamped. Declines therapy work complaining of pain in her throat and upper chest. Renal function improving, adequate urine output. Continue with supplemental oxygen and rest from BiPAP for nutrition and meds per OG tube. Follow up speech. Follow up GS recommendations.     12/6/24 On BiPAP maintaining SpO2 >95%. Failed swallow with speech. OG tube remains in place. Arousable to voice, responds to questions with hand squeezes and motion. Right chest tube removed yesterday and no desaturations reported. CXR showing no pneumothorax. Mild hypernatremia, will increase free water via OG tube and monitor I/Os. Renal function stable with appropriate clear urine output in Hardwick catheter. Leukocytosis resolved. Continue with scheduled IV lasix, scheduled breathing treatments, supplemental oxygen. Aspiration precautions, maintain OG tube until follow up swallow assessment.      Interval History: Patient seen and examined.     Review of Systems   Unable to perform ROS: Acuity of condition     Objective:     Vital Signs (Most Recent):  Temp: 98.3 °F (36.8 °C) (12/06/24  1510)  Pulse: 69 (12/06/24 1630)  Resp: (!) 30 (12/06/24 1630)  BP: 132/64 (12/06/24 1600)  SpO2: 95 % (12/06/24 1630) Vital Signs (24h Range):  Temp:  [97.3 °F (36.3 °C)-98.3 °F (36.8 °C)] 98.3 °F (36.8 °C)  Pulse:  [59-85] 69  Resp:  [16-38] 30  SpO2:  [94 %-100 %] 95 %  BP: (129-155)/(62-85) 132/64     Weight: 125.2 kg (276 lb)  Body mass index is 47.38 kg/m².    Intake/Output Summary (Last 24 hours) at 12/6/2024 1651  Last data filed at 12/6/2024 0538  Gross per 24 hour   Intake 960 ml   Output 3500 ml   Net -2540 ml         Physical Exam  Vitals and nursing note reviewed.   Constitutional:       General: She is not in acute distress.     Appearance: She is obese. She is ill-appearing. She is not toxic-appearing.   HENT:      Head: Normocephalic.      Mouth/Throat:      Comments: BiPAP mask, OG tube in place  Eyes:      Extraocular Movements: Extraocular movements intact.      Conjunctiva/sclera: Conjunctivae normal.   Cardiovascular:      Rate and Rhythm: Normal rate and regular rhythm.      Pulses: Normal pulses.   Pulmonary:      Effort: No respiratory distress.   Abdominal:      General: There is no distension.      Palpations: Abdomen is soft.      Tenderness: There is no abdominal tenderness.      Hernia: No hernia is present.   Musculoskeletal:         General: No tenderness.      Cervical back: Neck supple. No rigidity. Tenderness: mild, no swelling.     Right lower leg: No edema.      Left lower leg: No edema.   Skin:     General: Skin is warm and dry.      Capillary Refill: Capillary refill takes less than 2 seconds.   Neurological:      Mental Status: She is alert. Mental status is at baseline.             Significant Labs: All pertinent labs within the past 24 hours have been reviewed.  A1C:   Recent Labs   Lab 07/28/24  2256 11/30/24  1237   HGBA1C 5.3 5.2     Recent Labs   Lab 12/02/24  0349 12/03/24  0356 12/04/24  0409 12/05/24  0341 12/06/24  0357   BILITOT 0.4 0.4 0.3 0.3 0.4     Recent Labs  "  Lab 12/06/24  0357   GLU 84   *   K 4.1   *   CO2 43*   BUN 37*   CREATININE 1.3   CALCIUM 9.6   MG 1.9     CBC:   Recent Labs   Lab 12/05/24  0341 12/06/24  0357   WBC 12.90* 9.24   HGB 11.6* 11.4*   HCT 36.8* 36.9*    214     CMP:   Recent Labs   Lab 12/05/24  0341 12/06/24  0357   * 153*   K 3.6 4.1   * 112*   CO2 40* 43*   GLU 88 84   BUN 34* 37*   CREATININE 1.3 1.3   CALCIUM 9.3 9.6   PROT 5.8* 5.8*   ALBUMIN 2.0* 2.0*   BILITOT 0.3 0.4   ALKPHOS 110 107   AST 10 14   ALT 10 12   ANIONGAP 0* -2*     Recent Labs   Lab 12/05/24  0341 12/06/24  0357   MG 2.1 1.9     POCT Glucose:   Recent Labs   Lab 12/05/24  2309 12/06/24  1112 12/06/24  1636   POCTGLUCOSE 101 88 88     X-Ray Chest 1 View  Narrative: EXAMINATION:  XR CHEST 1 VIEW    CLINICAL HISTORY:  s/p R chest tube removal;    COMPARISON:  Portable chest 12/05/2024.    FINDINGS:  Frontal chest radiograph demonstrates interval removal of right thoracostomy tube.  No pneumothorax.  Nasogastric tube and right-sided PICC remain in place.  Impression: Interval removal of right thoracostomy tube.  No pneumothorax.    Electronically signed by: Oscar Joe MD  Date:    12/06/2024  Time:    11:29     Significant Imaging: I have reviewed all pertinent imaging results/findings within the past 24 hours.    Assessment and Plan     * Acute on chronic diastolic congestive heart failure  Patient has Diastolic (HFpEF) heart failure that is Acute on chronic. On presentation their CHF was decompensated. Evidence of decompensated CHF on presentation includes: edema, orthopnea, dyspnea on exertion (MELISSA), and shortness of breath. The etiology of their decompensation is likely medication non-compliance?diet? Most recent BNP and echo results are listed below.  No results for input(s): "BNP" in the last 72 hours.  Latest ECHO  Results for orders placed during the hospital encounter of 07/28/24    Echo Saline Bubble? No; Ultrasound enhancing " contrast? No  Interpretation Summary    Left Ventricle: The left ventricle is normal in size. Mildly increased wall thickness. There is normal systolic function with a visually estimated ejection fraction of 55 - 60%. Grade I diastolic dysfunction.    Right Ventricle: Normal right ventricular cavity size. Systolic function is normal.    Left Atrium: Left atrium is severely dilated. Atrial septum is bulging to the right.    Right Atrium: Right atrium is moderately dilated.    Aortic Valve: The aortic valve is a trileaflet valve. Mildly restricted motion. There is mild stenosis. Aortic valve area by VTI is 1.84 cm². Aortic valve peak velocity is 2.18 m/s. Mean gradient is 10 mmHg. The dimensionless index is 0.58. There is mild aortic regurgitation.    Mitral Valve: There is mild regurgitation.    Tricuspid Valve: There is mild regurgitation.    Pulmonic Valve: There is mild to moderate regurgitation.    No pericardial effusion.    Current Heart Failure Medications  carvediloL tablet 3.125 mg, 2 times daily, Per OG tube  furosemide injection 20 mg, 2 times daily, Intravenous  valsartan tablet 160 mg, 2 times daily, Per OG tube    Plan  - Monitor strict I&Os and daily weights.    - Place on telemetry  - Low sodium diet  - Place on fluid restriction of 1 L.   - Cardiology has been consulted, follow up recommendations  - The patient's volume status is stable but not at their baseline as indicated by edema and shortness of breath and respiratory distress requiring intubation        Swallowing impaired  ET tube extubation on 12/4/24 with complaints of sore throat. OG tube in place. Failed initial swallow evaluation per ST post extubation.   Keep NPO, free water only via OG tube & puree for pleasure  Aspiration Precautions: Continue alternate means of nutrition, HOB to 90 degrees, Ice chips sparingly, Monitor for s/s of aspiration, Puree for pleasure, Small bites/sips  Strict aspiration precautions, and Wear oxygen during  intake   Difficult to maintain upright posture given body habitus, will add glycopyrrolate while OG tube in place to prevent aspiration    ACP (advance care planning)  12/3/24 Patient once extubated she does not want to be re-intubated  12/4/24 Family would like information on home hospice services  12/5/24 Plan for [facility] placement to nursing home per CM and SW      Other pneumothorax  Patient developed pneumothorax right lower lobe, moderate, surgery consulted for chest tube placement and eval.  Right chest tube in place, remains clamped  CXR with no interval changes.   - f/u GS recommendations        Hard to intubate  Extubated on 12/4/24, stable respiratory status.   Patient is now DNR/DNI  - continuous BiPAP  - continue daily pulmonary hygiene care with breathing treatments, solumedrol and IS      Hypothyroidism  Continue levothyroxine       HLD (hyperlipidemia)  Continue statin      Gastroesophageal reflux disease without esophagitis  PPI on board      Acute cystitis without hematuria  11/30/24 Urine culture positive for aerococcus urinae. Completed Rocephin 5 days total.   Estimated Creatinine Clearance: 50.4 mL/min (based on SCr of 1.3 mg/dL).  Stable renal function.   - Monitor I/Os       Anxiety and depression  Patient has persistent depression which is unknown and is currently controlled. Will Continue anti-depressant medications. We will not consult psychiatry at this time. Patient does not display psychosis at this time. Continue to monitor closely and adjust plan of care as needed.        Diabetes mellitus, type 2  Patient's FSGs are controlled on current medication regimen.  Last A1c reviewed-   Lab Results   Component Value Date    HGBA1C 5.2 11/30/2024     Most recent fingerstick glucose reviewed-   Recent Labs   Lab 12/04/24  1810 12/04/24  2304 12/05/24  1107   POCTGLUCOSE 111* 111* 92       Current correctional scale  Low  Maintain anti-hyperglycemic dose as follows-   Antihyperglycemics (From  admission, onward)      Start     Stop Route Frequency Ordered    11/30/24 1324  insulin aspart U-100 pen 0-5 Units         -- SubQ Every 6 hours PRN 11/30/24 1225          Hold Oral hypoglycemics while patient is in the hospital.    Severe obesity (BMI >= 40)  Body mass index is 47.38 kg/m². Morbid obesity complicates all aspects of disease management from diagnostic modalities to treatment. Weight loss encouraged and health benefits explained to patient..     Wt Readings from Last 8 Encounters:   12/06/24 125.2 kg (276 lb)   10/16/24 113.4 kg (250 lb)   09/22/24 113.4 kg (250 lb)   08/14/24 119.7 kg (263 lb 14.3 oz)   07/29/24 104.3 kg (229 lb 15 oz)   06/25/24 104.6 kg (230 lb 11.2 oz)   06/24/24 104.3 kg (230 lb)   06/20/24 102.5 kg (226 lb)            Acute on chronic respiratory failure with hypoxia and hypercapnia  Patient with Hypercapnic and Hypoxic Respiratory failure which is Acute on chronic.  she is on home oxygen at 2 LPM. Supplemental oxygen was provided and noted- Oxygen Concentration (%):  [50] 50     Signs/symptoms of respiratory failure include- tachypnea, increased work of breathing, respiratory distress, use of accessory muscles, and wheezing. Contributing diagnoses includes - CHF and COPD Labs and images were reviewed. Patient Has recent ABG, which has been reviewed. Will treat underlying causes and adjust management of respiratory failure as follows- duo nebs, wean vent as tolerated, ABGs prn, diuresis     Paroxysmal A-fib  Patient has  atrial fibrillation. Patient is currently in . [id number] Score: 3. The patients heart rate in the last 24 hours is as follows:  Pulse  Min: 52  Max: 99     Antiarrhythmics   BB on hold due to bradycardia from propofol gtt    Anticoagulants  enoxaparin injection 40 mg, Every 24 hours, Subcutaneous    Plan  - Replete lytes with a goal of K>4, Mg >2  - Patient's afib is currently stable, she has history of dash-tachy fluctuation, subsequent intubation on  propofol gtt, now bradycardic, hold coreg for now, previous records showed on eliquis, care everywhere patient has not be seen by Cardiology except for and how during previous admission in August, unclear which medication patient is taking, will try and get with family member to verify medication patient taking at home and if she was taken off blood thinner at some point since her August admission            VTE Risk Mitigation (From admission, onward)           Ordered     enoxaparin injection 40 mg  Every 24 hours         12/05/24 1654     IP VTE HIGH RISK PATIENT  Once         11/29/24 1726     Place sequential compression device  Until discontinued         11/29/24 1726                    Discharge Planning   TIGRE:      Code Status: DNR   Medical Readiness for Discharge Date:   Discharge Plan A: Hospital at home, Hospice/home   Discharge Delays: None known at this time            Please place Justification for DME        Gunnar Mott DO  Department of Hospital Medicine   [place] - Intensive [facility]

## 2024-12-06 NOTE — PLAN OF CARE
The patient is in agreement with nursing home placement at the facility of her [facility]. The patient's family plans to complete the Medicaid application on Monday with Jenn, the Admission Coordinator. Dr. Mott was informed.

## 2024-12-06 NOTE — PT/OT/SLP DISCHARGE
Occupational Therapy Discharge Summary    Carmen Tan  MRN: [id number]   Principal Problem: Acute on chronic diastolic congestive heart failure      Patient Discharged from acute Occupational Therapy on 12/06/24.  Please refer to prior OT note dated 12/05/24 for functional status.    Assessment:      Patient appropriate for care in another setting. Pt transitioning to retirement placement at [facility] with hospice care.     Objective:     GOALS:   Multidisciplinary Problems       Occupational Therapy Goals          Problem: Occupational Therapy    Goal Priority Disciplines Outcome Interventions   Occupational Therapy Goal     OT, PT/OT Progressing    Description: Goals to be met by: 12/11/24     Patient will increase functional independence with ADLs by performing:    Feeding with Minimal Assistance.  UE Dressing with Moderate Assistance.  LE Dressing with Maximum Assistance.  Grooming while seated with Moderate Assistance.  Toileting from bedside commode with Maximum Assistance for hygiene and clothing management.   Bathing from  bed with Maximum Assistance.  Toilet transfer to bedside commode with Maximum Assistance.                         Reasons for Discontinuation of Therapy Services  Therapist determines that the patient will no longer benefit from therapy services.      Plan:     Patient Discharged to: Palliative Care/Hospice    12/6/2024

## 2024-12-06 NOTE — PT/OT/SLP PROGRESS
Physical Therapy      Patient Name:  Carmen Tan   MRN:  [id number]    Patient not seen today secondary to Other (Comment) (Patient transitioning to hospice care. She did not want to participate in therapy today. No longer appropriate for acute PT services.). No follow-up necessary.

## 2024-12-06 NOTE — PT/OT/SLP PROGRESS
Speech Language Pathology    Carmenflorence Tan  MRN: [id number]    Patient not seen today at 1100 ST attempt secondary to BIPAP. Physician and nurse educated on swallow recs and aspiration precautions. Will follow-up as medically appropriate.    12/6/2024

## 2024-12-06 NOTE — PLAN OF CARE
Problem: Physical Therapy  Goal: Physical Therapy Goal  Description: Goals to be met by: 2024     Patient will increase functional independence with mobility by performin. Supine to sit with MInimal Assistance  2. Sit to supine with MInimal Assistance  3. Rolling to Left and Right with Minimal Assistance.  4. Sit to stand transfer with Moderate Assistance  5. Bed to chair transfer with Moderate Assistance using Rolling Walker  6. Gait  x 10 feet with Contact Guard Assistance using Rolling Walker.   7. Sitting at edge of bed x10 minutes with Supervision and Stand-by Assistance    Outcome: Unable to Meet

## 2024-12-06 NOTE — ASSESSMENT & PLAN NOTE
"Patient has Diastolic (HFpEF) heart failure that is Acute on chronic. On presentation their CHF was decompensated. Evidence of decompensated CHF on presentation includes: edema, orthopnea, dyspnea on exertion (MELISSA), and shortness of breath. The etiology of their decompensation is likely medication non-compliance?diet? Most recent BNP and echo results are listed below.  No results for input(s): "BNP" in the last 72 hours.  Latest ECHO  Results for orders placed during the hospital encounter of 07/28/24    Echo Saline Bubble? No; Ultrasound enhancing contrast? No  Interpretation Summary    Left Ventricle: The left ventricle is normal in size. Mildly increased wall thickness. There is normal systolic function with a visually estimated ejection fraction of 55 - 60%. Grade I diastolic dysfunction.    Right Ventricle: Normal right ventricular cavity size. Systolic function is normal.    Left Atrium: Left atrium is severely dilated. Atrial septum is bulging to the right.    Right Atrium: Right atrium is moderately dilated.    Aortic Valve: The aortic valve is a trileaflet valve. Mildly restricted motion. There is mild stenosis. Aortic valve area by VTI is 1.84 cm². Aortic valve peak velocity is 2.18 m/s. Mean gradient is 10 mmHg. The dimensionless index is 0.58. There is mild aortic regurgitation.    Mitral Valve: There is mild regurgitation.    Tricuspid Valve: There is mild regurgitation.    Pulmonic Valve: There is mild to moderate regurgitation.    No pericardial effusion.    Current Heart Failure Medications  carvediloL tablet 3.125 mg, 2 times daily, Per OG tube  furosemide injection 20 mg, 2 times daily, Intravenous  valsartan tablet 160 mg, 2 times daily, Per OG tube    Plan  - Monitor strict I&Os and daily weights.    - Place on telemetry  - Low sodium diet  - Place on fluid restriction of 1 L.   - Cardiology has been consulted, follow up recommendations  - The patient's volume status is stable but not at their " baseline as indicated by edema and shortness of breath and respiratory distress requiring intubation

## 2024-12-06 NOTE — ASSESSMENT & PLAN NOTE
12/3/24 Patient once extubated she does not want to be re-intubated  12/4/24 Family would like information on home hospice services  12/5/24 Plan for jail placement to nursing home per BAKARI and FRANCOIS

## 2024-12-07 LAB
ALBUMIN SERPL BCP-MCNC: 1.9 G/DL (ref 3.5–5.2)
ALP SERPL-CCNC: 98 U/L (ref 55–135)
ALT SERPL W/O P-5'-P-CCNC: 10 U/L (ref 10–44)
ANION GAP SERPL CALC-SCNC: 1 MMOL/L (ref 3–11)
AST SERPL-CCNC: 10 U/L (ref 10–40)
BASOPHILS # BLD AUTO: 0.01 K/UL (ref 0–0.2)
BASOPHILS NFR BLD: 0.1 % (ref 0–1.9)
BILIRUB SERPL-MCNC: 0.4 MG/DL (ref 0.1–1)
BUN SERPL-MCNC: 33 MG/DL (ref 8–23)
CALCIUM SERPL-MCNC: 9.4 MG/DL (ref 8.7–10.5)
CHLORIDE SERPL-SCNC: 108 MMOL/L (ref 95–110)
CO2 SERPL-SCNC: 45 MMOL/L (ref 23–29)
CREAT SERPL-MCNC: 1.2 MG/DL (ref 0.5–1.4)
DIFFERENTIAL METHOD BLD: ABNORMAL
EOSINOPHIL # BLD AUTO: 0 K/UL (ref 0–0.5)
EOSINOPHIL NFR BLD: 0 % (ref 0–8)
ERYTHROCYTE [DISTWIDTH] IN BLOOD BY AUTOMATED COUNT: 15.4 % (ref 11.5–14.5)
EST. GFR  (NO RACE VARIABLE): 47.8 ML/MIN/1.73 M^2
GLUCOSE SERPL-MCNC: 79 MG/DL (ref 70–110)
HCT VFR BLD AUTO: 36.4 % (ref 37–48.5)
HGB BLD-MCNC: 11.3 G/DL (ref 12–16)
IMM GRANULOCYTES # BLD AUTO: 0.05 K/UL (ref 0–0.04)
IMM GRANULOCYTES NFR BLD AUTO: 0.6 % (ref 0–0.5)
LYMPHOCYTES # BLD AUTO: 0.7 K/UL (ref 1–4.8)
LYMPHOCYTES NFR BLD: 9 % (ref 18–48)
MAGNESIUM SERPL-MCNC: 1.8 MG/DL (ref 1.6–2.6)
MCH RBC QN AUTO: 29.4 PG (ref 27–31)
MCHC RBC AUTO-ENTMCNC: 31 G/DL (ref 32–36)
MCV RBC AUTO: 95 FL (ref 82–98)
MONOCYTES # BLD AUTO: 0.7 K/UL (ref 0.3–1)
MONOCYTES NFR BLD: 8.8 % (ref 4–15)
NEUTROPHILS # BLD AUTO: 6.5 K/UL (ref 1.8–7.7)
NEUTROPHILS NFR BLD: 81.5 % (ref 38–73)
NRBC BLD-RTO: 0 /100 WBC
PLATELET # BLD AUTO: 202 K/UL (ref 150–450)
PMV BLD AUTO: 8.6 FL (ref 9.2–12.9)
POCT GLUCOSE: 85 MG/DL (ref 70–110)
POCT GLUCOSE: 95 MG/DL (ref 70–110)
POTASSIUM SERPL-SCNC: 4 MMOL/L (ref 3.5–5.1)
PROT SERPL-MCNC: 5.6 G/DL (ref 6–8.4)
RBC # BLD AUTO: 3.84 M/UL (ref 4–5.4)
SODIUM SERPL-SCNC: 154 MMOL/L (ref 136–145)
WBC # BLD AUTO: 8 K/UL (ref 3.9–12.7)

## 2024-12-07 PROCEDURE — 99900035 HC TECH TIME PER 15 MIN (STAT)

## 2024-12-07 PROCEDURE — 80053 COMPREHEN METABOLIC PANEL: CPT | Performed by: INTERNAL MEDICINE

## 2024-12-07 PROCEDURE — 99233 SBSQ HOSP IP/OBS HIGH 50: CPT | Mod: GW,,, | Performed by: STUDENT IN AN ORGANIZED HEALTH CARE EDUCATION/TRAINING PROGRAM

## 2024-12-07 PROCEDURE — 94761 N-INVAS EAR/PLS OXIMETRY MLT: CPT

## 2024-12-07 PROCEDURE — 36415 COLL VENOUS BLD VENIPUNCTURE: CPT | Performed by: INTERNAL MEDICINE

## 2024-12-07 PROCEDURE — 25000003 PHARM REV CODE 250: Performed by: STUDENT IN AN ORGANIZED HEALTH CARE EDUCATION/TRAINING PROGRAM

## 2024-12-07 PROCEDURE — 83735 ASSAY OF MAGNESIUM: CPT | Performed by: INTERNAL MEDICINE

## 2024-12-07 PROCEDURE — 63600175 PHARM REV CODE 636 W HCPCS: Performed by: STUDENT IN AN ORGANIZED HEALTH CARE EDUCATION/TRAINING PROGRAM

## 2024-12-07 PROCEDURE — 94640 AIRWAY INHALATION TREATMENT: CPT

## 2024-12-07 PROCEDURE — 63600175 PHARM REV CODE 636 W HCPCS: Performed by: INTERNAL MEDICINE

## 2024-12-07 PROCEDURE — 99900031 HC PATIENT EDUCATION (STAT)

## 2024-12-07 PROCEDURE — 27100171 HC OXYGEN HIGH FLOW UP TO 24 HOURS

## 2024-12-07 PROCEDURE — 27000221 HC OXYGEN, UP TO 24 HOURS

## 2024-12-07 PROCEDURE — 25000242 PHARM REV CODE 250 ALT 637 W/ HCPCS: Performed by: STUDENT IN AN ORGANIZED HEALTH CARE EDUCATION/TRAINING PROGRAM

## 2024-12-07 PROCEDURE — 20000000 HC ICU ROOM

## 2024-12-07 PROCEDURE — 85025 COMPLETE CBC W/AUTO DIFF WBC: CPT | Performed by: INTERNAL MEDICINE

## 2024-12-07 PROCEDURE — 94660 CPAP INITIATION&MGMT: CPT

## 2024-12-07 RX ADMIN — Medication 6 MG: at 09:12

## 2024-12-07 RX ADMIN — PANTOPRAZOLE SODIUM 40 MG: 40 INJECTION, POWDER, LYOPHILIZED, FOR SOLUTION INTRAVENOUS at 09:12

## 2024-12-07 RX ADMIN — ASPIRIN 325 MG ORAL TABLET 325 MG: 325 PILL ORAL at 08:12

## 2024-12-07 RX ADMIN — CARVEDILOL 3.12 MG: 3.12 TABLET, FILM COATED ORAL at 09:12

## 2024-12-07 RX ADMIN — SENNOSIDES AND DOCUSATE SODIUM 2 TABLET: 50; 8.6 TABLET ORAL at 08:12

## 2024-12-07 RX ADMIN — FUROSEMIDE 20 MG: 10 INJECTION, SOLUTION INTRAVENOUS at 09:12

## 2024-12-07 RX ADMIN — BUDESONIDE 0.5 MG: 0.5 INHALANT RESPIRATORY (INHALATION) at 07:12

## 2024-12-07 RX ADMIN — CARVEDILOL 3.12 MG: 3.12 TABLET, FILM COATED ORAL at 08:12

## 2024-12-07 RX ADMIN — ENOXAPARIN SODIUM 40 MG: 40 INJECTION SUBCUTANEOUS at 04:12

## 2024-12-07 RX ADMIN — BUDESONIDE 0.5 MG: 0.5 INHALANT RESPIRATORY (INHALATION) at 08:12

## 2024-12-07 RX ADMIN — ALBUTEROL SULFATE 2.5 MG: 2.5 SOLUTION RESPIRATORY (INHALATION) at 03:12

## 2024-12-07 RX ADMIN — TRAZODONE HYDROCHLORIDE 50 MG: 50 TABLET ORAL at 09:12

## 2024-12-07 RX ADMIN — ALBUTEROL SULFATE 2.5 MG: 2.5 SOLUTION RESPIRATORY (INHALATION) at 08:12

## 2024-12-07 RX ADMIN — ALBUTEROL SULFATE 2.5 MG: 2.5 SOLUTION RESPIRATORY (INHALATION) at 11:12

## 2024-12-07 RX ADMIN — METHYLPREDNISOLONE SODIUM SUCCINATE 40 MG: 40 INJECTION, POWDER, FOR SOLUTION INTRAMUSCULAR; INTRAVENOUS at 08:12

## 2024-12-07 RX ADMIN — POLYETHYLENE GLYCOL (3350) 17 G: 17 POWDER, FOR SOLUTION ORAL at 08:12

## 2024-12-07 RX ADMIN — VALSARTAN 160 MG: 80 TABLET, FILM COATED ORAL at 09:12

## 2024-12-07 RX ADMIN — GLYCOPYRROLATE 0.2 MG: 1 LIQUID ORAL at 09:12

## 2024-12-07 RX ADMIN — ATORVASTATIN CALCIUM 40 MG: 40 TABLET, FILM COATED ORAL at 08:12

## 2024-12-07 RX ADMIN — LEVOTHYROXINE SODIUM 50 MCG: 50 TABLET ORAL at 05:12

## 2024-12-07 RX ADMIN — ALBUTEROL SULFATE 2.5 MG: 2.5 SOLUTION RESPIRATORY (INHALATION) at 07:12

## 2024-12-07 RX ADMIN — VALSARTAN 160 MG: 80 TABLET, FILM COATED ORAL at 08:12

## 2024-12-07 NOTE — ASSESSMENT & PLAN NOTE
Resolved.   Patient developed pneumothorax right lower lobe, moderate, surgery consulted for chest tube placement and eval.  Right chest tube discontinued on 12/5/24.   CXR (12/6/24) with no interval changes, no new pneumothorax

## 2024-12-07 NOTE — PROGRESS NOTES
[facility] Medicine  Progress Note    Patient Name: Carmen Tan  MRN: [id number]  Patient Class: IP- Inpatient   Admission Date: 11/29/2024  Length of Stay: 8 days  Attending Physician: Gunnar Mott DO  Primary Care Provider: Lucian Barry MD        Subjective     Principal Problem:Acute on chronic diastolic congestive heart failure        HPI:  Patient 74-year-old female past medical history of chronic diastolic heart failure, AFib, chronic respiratory failure with hypoxemia and hypercapnia, anxiety depression, type 2 diabetes, GERD, hypothyroidism.  Patient came in after noted to be short of breath at home, patient was satting in the 70 on home BiPAP therapy, was brought to the ED noted to be in distress tachycardic hypotensive hypoxic, workup in the ED patient with enlarged cardiac silhouette, chronic changes no acute finding, elevated BNP 25,000, mild elevation in troponin negative COVID no leukocytosis, patient was given nitroglycerin and Lasix injection, admitted to ICU.    Overview/Hospital Course:  12/1 AA, weekend crosscover, overnight patient had two ABG, eICU MD adjusted vent settings, had repeat chest x-ray which showed moderate right thorax, vital signs stable, O2 sat maintaining, surgery consulted for chest tube placement, patient was started on steroids yesterday for COPD flare, diuresis in progress, continue to monitor urine output, follow-up chest x-ray after chest tube placement, follow-up with surgery rec  12/2/24 ABG 7.395/52.4/87.3/29.8  improving respiratory status on AC Vt 450, RR18, PEEP 6, FiO2 55%, SpO2 98%, proprofol 25mcg/kg/min. CXR with right pneumothorax resolved, diuresing on scheduled IV lasix with adequate urine output. Patient minimally responsive to sternal rub. No elevated temperature. Urine culture positive for gram positive aerococcus spp, add IV rocephin. Continue with vent weaning and daily SBT, scheduled breathing treatments,  solumedrol. Follow up recommendations cardiology. Follow up general surgery with chest tube management.    12/3/24 ABG 7.423/52.7/72.7/32.3, FiO2 40%. Off sedation awake and following commands. CPAP trial followed with tachypnea 40s, will maintain current vent settings and prepare for SBT and extubation tomorrow AM. Right lateral chest tube has been clamped. CXR overnight unchanged, no new pneumothorax. Chest tube management per GS. BMx1. Start enteral trickle feeds and advance per protocol.   12/4/24 Stable ABG and vent settings. Tolerating tube feeds with no residual. Patient awake and alert and requests to have ET tube to be removed. Patient further affirms she does not want to be re-intubated afterwards. Plan to extubate and transition to BiPAP.   12/5/24 No acute events overnight. Awake, on venturi mask FiO2 50%, SpO2 100%. Right chest tube in place remains clamped. Declines therapy work complaining of pain in her throat and upper chest. Renal function improving, adequate urine output. Continue with supplemental oxygen and rest from BiPAP for nutrition and meds per OG tube. Follow up speech. Follow up GS recommendations.     12/6/24 On BiPAP maintaining SpO2 >95%. Failed swallow with speech. OG tube remains in place. Arousable to voice, responds to questions with hand squeezes and motion. Right chest tube removed yesterday and no desaturations reported. CXR showing no pneumothorax. Mild hypernatremia, will increase free water via OG tube and monitor I/Os. Renal function stable with appropriate clear urine output in Hardwick catheter. Leukocytosis resolved. Continue with scheduled IV lasix, scheduled breathing treatments, supplemental oxygen. Aspiration precautions, maintain OG tube until follow up swallow assessment.    12/7/24 Per nursing minimal sleep overnight. Respiratory rate stable on BiPAP maintain SpO2 >98%. Will discontinue OG tube and follow ST precautions with monitoring PO tolerance with ice chips.  Continue to taper IV steroids, scheduled lasix. Plan for nursing home placement with hospice.     Interval History: Patient seen and examined.     Review of Systems   Unable to perform ROS: Acuity of condition     Objective:     Vital Signs (Most Recent):  Temp: 98.1 °F (36.7 °C) (12/07/24 0800)  Pulse: 75 (12/07/24 1113)  Resp: (!) 26 (12/07/24 1113)  BP: (!) 141/65 (12/07/24 0800)  SpO2: 96 % (12/07/24 1113) Vital Signs (24h Range):  Temp:  [97.1 °F (36.2 °C)-98.6 °F (37 °C)] 98.1 °F (36.7 °C)  Pulse:  [53-90] 75  Resp:  [14-38] 26  SpO2:  [94 %-100 %] 96 %  BP: (126-154)/(60-85) 141/65     Weight: 125.2 kg (276 lb)  Body mass index is 47.38 kg/m².    Intake/Output Summary (Last 24 hours) at 12/7/2024 1136  Last data filed at 12/7/2024 0543  Gross per 24 hour   Intake 810 ml   Output 2800 ml   Net -1990 ml         Physical Exam  Vitals and nursing note reviewed.   Constitutional:       General: She is not in acute distress.     Appearance: She is obese. She is not ill-appearing or toxic-appearing.   HENT:      Head: Normocephalic.      Mouth/Throat:      Comments: BiPAP mask, OG tube in place  Eyes:      Extraocular Movements: Extraocular movements intact.      Conjunctiva/sclera: Conjunctivae normal.   Cardiovascular:      Rate and Rhythm: Normal rate and regular rhythm.      Pulses: Normal pulses.   Pulmonary:      Effort: No respiratory distress.   Abdominal:      General: There is no distension.      Palpations: Abdomen is soft.      Tenderness: There is no abdominal tenderness.      Hernia: No hernia is present.   Musculoskeletal:      Cervical back: Neck supple. No rigidity or tenderness.      Right lower leg: No edema.      Left lower leg: No edema.   Skin:     General: Skin is warm and dry.      Capillary Refill: Capillary refill takes less than 2 seconds.   Neurological:      Mental Status: She is alert.      Motor: Weakness present.             Significant Labs: All pertinent labs within the past 24  "hours have been reviewed.  Bilirubin:   Recent Labs   Lab 12/03/24  0356 12/04/24  0409 12/05/24  0341 12/06/24  0357 12/07/24  0350   BILITOT 0.4 0.3 0.3 0.4 0.4     Blood Culture: No results for input(s): "LABBLOO" in the last 48 hours.  BMP:   Recent Labs   Lab 12/07/24  0350   GLU 79   *   K 4.0      CO2 45*   BUN 33*   CREATININE 1.2   CALCIUM 9.4   MG 1.8     CBC:   Recent Labs   Lab 12/06/24  0357 12/07/24  0350   WBC 9.24 8.00   HGB 11.4* 11.3*   HCT 36.9* 36.4*    202     CMP:   Recent Labs   Lab 12/06/24  0357 12/07/24  0350   * 154*   K 4.1 4.0   * 108   CO2 43* 45*   GLU 84 79   BUN 37* 33*   CREATININE 1.3 1.2   CALCIUM 9.6 9.4   PROT 5.8* 5.6*   ALBUMIN 2.0* 1.9*   BILITOT 0.4 0.4   ALKPHOS 107 98   AST 14 10   ALT 12 10   ANIONGAP -2* 1*     Magnesium:   Recent Labs   Lab 12/06/24  0357 12/07/24  0350   MG 1.9 1.8     POCT Glucose:   Recent Labs   Lab 12/06/24  1636 12/06/24  2310 12/07/24  1129   POCTGLUCOSE 88 99 85     X-Ray Chest 1 View  Narrative: EXAMINATION:  XR CHEST 1 VIEW    CLINICAL HISTORY:  s/p R chest tube removal;    COMPARISON:  Portable chest 12/05/2024.    FINDINGS:  Frontal chest radiograph demonstrates interval removal of right thoracostomy tube.  No pneumothorax.  Nasogastric tube and right-sided PICC remain in place.  Impression: Interval removal of right thoracostomy tube.  No pneumothorax.    Electronically signed by: Oscar Joe MD  Date:    12/06/2024  Time:    11:29     Significant Imaging: I have reviewed all pertinent imaging results/findings within the past 24 hours.    Assessment and Plan     * Acute on chronic diastolic congestive heart failure  Patient has Diastolic (HFpEF) heart failure that is Acute on chronic. On presentation their CHF was decompensated. Evidence of decompensated CHF on presentation includes: edema, orthopnea, dyspnea on exertion (MELISSA), and shortness of breath. The etiology of their decompensation is likely " "medication non-compliance?diet? Most recent BNP and echo results are listed below.  No results for input(s): "BNP" in the last 72 hours.  Latest ECHO  Results for orders placed during the hospital encounter of 07/28/24    Echo Saline Bubble? No; Ultrasound enhancing contrast? No  Interpretation Summary    Left Ventricle: The left ventricle is normal in size. Mildly increased wall thickness. There is normal systolic function with a visually estimated ejection fraction of 55 - 60%. Grade I diastolic dysfunction.    Right Ventricle: Normal right ventricular cavity size. Systolic function is normal.    Left Atrium: Left atrium is severely dilated. Atrial septum is bulging to the right.    Right Atrium: Right atrium is moderately dilated.    Aortic Valve: The aortic valve is a trileaflet valve. Mildly restricted motion. There is mild stenosis. Aortic valve area by VTI is 1.84 cm². Aortic valve peak velocity is 2.18 m/s. Mean gradient is 10 mmHg. The dimensionless index is 0.58. There is mild aortic regurgitation.    Mitral Valve: There is mild regurgitation.    Tricuspid Valve: There is mild regurgitation.    Pulmonic Valve: There is mild to moderate regurgitation.    No pericardial effusion.    Current Heart Failure Medications  carvediloL tablet 3.125 mg, 2 times daily, Per OG tube  furosemide injection 20 mg, 2 times daily, Intravenous  valsartan tablet 160 mg, 2 times daily, Per OG tube    Plan  - Monitor strict I&Os and daily weights.    - Place on telemetry  - Low sodium diet  - Place on fluid restriction of 1.5 L.   - Cardiology has been consulted, follow up recommendations  - The patient's volume status is stable but not at their baseline as indicated by edema and shortness of breath and respiratory distress requiring intubation        Swallowing impaired  ET tube extubation on 12/4/24 with complaints of sore throat. OG tube in place. Failed initial swallow evaluation per ST post extubation.   Discontinue OG tube " and follow aspiration precautions below;   - Continue alternate means of nutrition  - HOB to 90 degrees  - Ice chips sparingly  - Monitor for s/s of aspiration  - Puree for pleasure  - Small bites/sips  - Strict aspiration precautions, and Wear oxygen during intake       ACP (advance care planning)  12/3/24 Patient once extubated she does not want to be re-intubated  12/4/24 Family would like information on home hospice services  12/5/24 Plan for senior care placement to nursing home per CM and SW      Other pneumothorax  Resolved.   Patient developed pneumothorax right lower lobe, moderate, surgery consulted for chest tube placement and eval.  Right chest tube discontinued on 12/5/24.   CXR (12/6/24) with no interval changes, no new pneumothorax           Hard to intubate  Extubated on 12/4/24, stable respiratory status.   Patient is now DNR/DNI  - continuous O2 supplementation with BiPAP  - goal SpO2 >92%  - continue daily pulmonary hygiene care with breathing treatments, tapering solumedrol, encourage deep breathing      Hypothyroidism  Continue levothyroxine       HLD (hyperlipidemia)  Continue statin      Gastroesophageal reflux disease without esophagitis  PPI on board      Acute cystitis without hematuria  11/30/24 Urine culture positive for aerococcus urinae. Completed Rocephin 5 days total.   Estimated Creatinine Clearance: 54.6 mL/min (based on SCr of 1.2 mg/dL).  Stable renal function.   - Monitor I/Os       Anxiety and depression  Patient has persistent depression which is unknown and is currently controlled. Will Continue anti-depressant medications. We will not consult psychiatry at this time. Patient does not display psychosis at this time. Continue to monitor closely and adjust plan of care as needed.  - trazodone PRN for sleep      Diabetes mellitus, type 2  Patient's FSGs are controlled on current medication regimen.  Last A1c reviewed-   Lab Results   Component Value Date    HGBA1C 5.2 11/30/2024      Most recent fingerstick glucose reviewed-   Recent Labs   Lab 12/06/24  1636 12/06/24  2310 12/07/24  1129   POCTGLUCOSE 88 99 85       Current correctional scale  Low  Maintain anti-hyperglycemic dose as follows-   Antihyperglycemics (From admission, onward)      Start     Stop Route Frequency Ordered    11/30/24 1324  insulin aspart U-100 pen 0-5 Units         -- SubQ Every 6 hours PRN 11/30/24 1225          Hold Oral hypoglycemics while patient is in the hospital.    Severe obesity (BMI >= 40)  Body mass index is 47.38 kg/m². Morbid obesity complicates all aspects of disease management from diagnostic modalities to treatment. Weight loss encouraged and health benefits explained to patient..     Wt Readings from Last 8 Encounters:   12/06/24 125.2 kg (276 lb)   10/16/24 113.4 kg (250 lb)   09/22/24 113.4 kg (250 lb)   08/14/24 119.7 kg (263 lb 14.3 oz)   07/29/24 104.3 kg (229 lb 15 oz)   06/25/24 104.6 kg (230 lb 11.2 oz)   06/24/24 104.3 kg (230 lb)   06/20/24 102.5 kg (226 lb)            Acute on chronic respiratory failure with hypoxia and hypercapnia  Patient with Hypercapnic and Hypoxic Respiratory failure which is Acute on chronic.  she is on home oxygen at 2 LPM. Supplemental oxygen was provided and noted- Oxygen Concentration (%):  [50] 50     Signs/symptoms of respiratory failure include- tachypnea, increased work of breathing, respiratory distress, use of accessory muscles, and wheezing. Contributing diagnoses includes - CHF and COPD Labs and images were reviewed.   Patient Has not had a recent ABG.     Will treat underlying causes and adjust management of respiratory failure as follows- continuous O2 supplementation, BiPAP, duo nebs, scheduled lasix     Paroxysmal A-fib  Patient has  atrial fibrillation. Patient is currently in . [id number] Score: 3. The patients heart rate in the last 24 hours is as follows:  Pulse  Min: 53  Max: 90     Antiarrhythmics   BB on hold due to bradycardia from propofol  gtt    Anticoagulants  enoxaparin injection 40 mg, Every 24 hours, Subcutaneous    Plan  - Replete lytes with a goal of K>4, Mg >2  - Patient's afib is currently stable, she has history of dash-tachy fluctuation, subsequent intubation on propofol gtt, now bradycardic, hold coreg for now, previous records showed on eliquis, care everywhere patient has not be seen by Cardiology except for and how during previous admission in August, unclear which medication patient is taking, will try and get with family member to verify medication patient taking at home and if she was taken off blood thinner at some point since her August admission            VTE Risk Mitigation (From admission, onward)           Ordered     enoxaparin injection 40 mg  Every 24 hours         12/05/24 1654     IP VTE HIGH RISK PATIENT  Once         11/29/24 1726     Place sequential compression device  Until discontinued         11/29/24 1726                    Discharge Planning   TIGRE:      Code Status: DNR   Medical Readiness for Discharge Date:   Discharge Plan A: Hospital at home, Hospice/home   Discharge Delays: None known at this time            Please place Justification for DME        Gunnar Mott DO  Department of Hospital Medicine   [facility]

## 2024-12-07 NOTE — ASSESSMENT & PLAN NOTE
Patient has  atrial fibrillation. Patient is currently in . [id number] Score: 3. The patients heart rate in the last 24 hours is as follows:  Pulse  Min: 53  Max: 90     Antiarrhythmics   BB on hold due to bradycardia from propofol gtt    Anticoagulants  enoxaparin injection 40 mg, Every 24 hours, Subcutaneous    Plan  - Replete lytes with a goal of K>4, Mg >2  - Patient's afib is currently stable, she has history of dash-tachy fluctuation, subsequent intubation on propofol gtt, now bradycardic, hold coreg for now, previous records showed on eliquis, care everywhere patient has not be seen by Cardiology except for and how during previous admission in August, unclear which medication patient is taking, will try and get with family member to verify medication patient taking at home and if she was taken off blood thinner at some point since her August admission

## 2024-12-07 NOTE — ASSESSMENT & PLAN NOTE
Patient with Hypercapnic and Hypoxic Respiratory failure which is Acute on chronic.  she is on home oxygen at 2 LPM. Supplemental oxygen was provided and noted- Oxygen Concentration (%):  [50] 50     Signs/symptoms of respiratory failure include- tachypnea, increased work of breathing, respiratory distress, use of accessory muscles, and wheezing. Contributing diagnoses includes - CHF and COPD Labs and images were reviewed.   Patient Has not had a recent ABG.     Will treat underlying causes and adjust management of respiratory failure as follows- continuous O2 supplementation, BiPAP, duo nebs, scheduled lasix

## 2024-12-07 NOTE — ASSESSMENT & PLAN NOTE
Patient's FSGs are controlled on current medication regimen.  Last A1c reviewed-   Lab Results   Component Value Date    HGBA1C 5.2 11/30/2024     Most recent fingerstick glucose reviewed-   Recent Labs   Lab 12/06/24  1636 12/06/24  2310 12/07/24  1129   POCTGLUCOSE 88 99 85       Current correctional scale  Low  Maintain anti-hyperglycemic dose as follows-   Antihyperglycemics (From admission, onward)      Start     Stop Route Frequency Ordered    11/30/24 1324  insulin aspart U-100 pen 0-5 Units         -- SubQ Every 6 hours PRN 11/30/24 1225          Hold Oral hypoglycemics while patient is in the hospital.

## 2024-12-07 NOTE — ASSESSMENT & PLAN NOTE
"Patient has Diastolic (HFpEF) heart failure that is Acute on chronic. On presentation their CHF was decompensated. Evidence of decompensated CHF on presentation includes: edema, orthopnea, dyspnea on exertion (MELISSA), and shortness of breath. The etiology of their decompensation is likely medication non-compliance?diet? Most recent BNP and echo results are listed below.  No results for input(s): "BNP" in the last 72 hours.  Latest ECHO  Results for orders placed during the hospital encounter of 07/28/24    Echo Saline Bubble? No; Ultrasound enhancing contrast? No  Interpretation Summary    Left Ventricle: The left ventricle is normal in size. Mildly increased wall thickness. There is normal systolic function with a visually estimated ejection fraction of 55 - 60%. Grade I diastolic dysfunction.    Right Ventricle: Normal right ventricular cavity size. Systolic function is normal.    Left Atrium: Left atrium is severely dilated. Atrial septum is bulging to the right.    Right Atrium: Right atrium is moderately dilated.    Aortic Valve: The aortic valve is a trileaflet valve. Mildly restricted motion. There is mild stenosis. Aortic valve area by VTI is 1.84 cm². Aortic valve peak velocity is 2.18 m/s. Mean gradient is 10 mmHg. The dimensionless index is 0.58. There is mild aortic regurgitation.    Mitral Valve: There is mild regurgitation.    Tricuspid Valve: There is mild regurgitation.    Pulmonic Valve: There is mild to moderate regurgitation.    No pericardial effusion.    Current Heart Failure Medications  carvediloL tablet 3.125 mg, 2 times daily, Per OG tube  furosemide injection 20 mg, 2 times daily, Intravenous  valsartan tablet 160 mg, 2 times daily, Per OG tube    Plan  - Monitor strict I&Os and daily weights.    - Place on telemetry  - Low sodium diet  - Place on fluid restriction of 1.5 L.   - Cardiology has been consulted, follow up recommendations  - The patient's volume status is stable but not at their " baseline as indicated by edema and shortness of breath and respiratory distress requiring intubation

## 2024-12-07 NOTE — SUBJECTIVE & OBJECTIVE
Interval History: Patient seen and examined.     Review of Systems   Unable to perform ROS: Acuity of condition     Objective:     Vital Signs (Most Recent):  Temp: 98.1 °F (36.7 °C) (12/07/24 0800)  Pulse: 75 (12/07/24 1113)  Resp: (!) 26 (12/07/24 1113)  BP: (!) 141/65 (12/07/24 0800)  SpO2: 96 % (12/07/24 1113) Vital Signs (24h Range):  Temp:  [97.1 °F (36.2 °C)-98.6 °F (37 °C)] 98.1 °F (36.7 °C)  Pulse:  [53-90] 75  Resp:  [14-38] 26  SpO2:  [94 %-100 %] 96 %  BP: (126-154)/(60-85) 141/65     Weight: 125.2 kg (276 lb)  Body mass index is 47.38 kg/m².    Intake/Output Summary (Last 24 hours) at 12/7/2024 1136  Last data filed at 12/7/2024 0543  Gross per 24 hour   Intake 810 ml   Output 2800 ml   Net -1990 ml         Physical Exam  Vitals and nursing note reviewed.   Constitutional:       General: She is not in acute distress.     Appearance: She is obese. She is not ill-appearing or toxic-appearing.   HENT:      Head: Normocephalic.      Mouth/Throat:      Comments: BiPAP mask, OG tube in place  Eyes:      Extraocular Movements: Extraocular movements intact.      Conjunctiva/sclera: Conjunctivae normal.   Cardiovascular:      Rate and Rhythm: Normal rate and regular rhythm.      Pulses: Normal pulses.   Pulmonary:      Effort: No respiratory distress.   Abdominal:      General: There is no distension.      Palpations: Abdomen is soft.      Tenderness: There is no abdominal tenderness.      Hernia: No hernia is present.   Musculoskeletal:      Cervical back: Neck supple. No rigidity or tenderness.      Right lower leg: No edema.      Left lower leg: No edema.   Skin:     General: Skin is warm and dry.      Capillary Refill: Capillary refill takes less than 2 seconds.   Neurological:      Mental Status: She is alert.      Motor: Weakness present.             Significant Labs: All pertinent labs within the past 24 hours have been reviewed.  Bilirubin:   Recent Labs   Lab 12/03/24  0356 12/04/24  0409 12/05/24  0341  "12/06/24  0357 12/07/24  0350   BILITOT 0.4 0.3 0.3 0.4 0.4     Blood Culture: No results for input(s): "LABBLOO" in the last 48 hours.  BMP:   Recent Labs   Lab 12/07/24  0350   GLU 79   *   K 4.0      CO2 45*   BUN 33*   CREATININE 1.2   CALCIUM 9.4   MG 1.8     CBC:   Recent Labs   Lab 12/06/24  0357 12/07/24  0350   WBC 9.24 8.00   HGB 11.4* 11.3*   HCT 36.9* 36.4*    202     CMP:   Recent Labs   Lab 12/06/24  0357 12/07/24  0350   * 154*   K 4.1 4.0   * 108   CO2 43* 45*   GLU 84 79   BUN 37* 33*   CREATININE 1.3 1.2   CALCIUM 9.6 9.4   PROT 5.8* 5.6*   ALBUMIN 2.0* 1.9*   BILITOT 0.4 0.4   ALKPHOS 107 98   AST 14 10   ALT 12 10   ANIONGAP -2* 1*     Magnesium:   Recent Labs   Lab 12/06/24  0357 12/07/24  0350   MG 1.9 1.8     POCT Glucose:   Recent Labs   Lab 12/06/24  1636 12/06/24  2310 12/07/24  1129   POCTGLUCOSE 88 99 85     X-Ray Chest 1 View  Narrative: EXAMINATION:  XR CHEST 1 VIEW    CLINICAL HISTORY:  s/p R chest tube removal;    COMPARISON:  Portable chest 12/05/2024.    FINDINGS:  Frontal chest radiograph demonstrates interval removal of right thoracostomy tube.  No pneumothorax.  Nasogastric tube and right-sided PICC remain in place.  Impression: Interval removal of right thoracostomy tube.  No pneumothorax.    Electronically signed by: Oscar Joe MD  Date:    12/06/2024  Time:    11:29     Significant Imaging: I have reviewed all pertinent imaging results/findings within the past 24 hours.  "

## 2024-12-07 NOTE — EICU
Intervention Initiated From:  COR / KLAUS Gallagher intervened regarding:  Rounding (Video assessment)      Comments: pt is resting in bed with eyes closed. NAD observed. Bipap is in use. VSS. No drips are infusing.

## 2024-12-07 NOTE — ASSESSMENT & PLAN NOTE
Patient has persistent depression which is unknown and is currently controlled. Will Continue anti-depressant medications. We will not consult psychiatry at this time. Patient does not display psychosis at this time. Continue to monitor closely and adjust plan of care as needed.  - trazodone PRN for sleep

## 2024-12-07 NOTE — EICU
Intervention Initiated From:  COR / KLAUS Gallagher intervened regarding:  Rounding (Video assessment)    Comments: pt is resting in bed with eyes closed. NAD VSS except for HR is dash at 59 bpm. No drips are infusing. Bipap is in use.

## 2024-12-07 NOTE — PLAN OF CARE
Problem: Adult Inpatient Plan of Care  Goal: Patient-Specific Goal (Individualized)  Outcome: Progressing  Goal: Absence of Hospital-Acquired Illness or Injury  Outcome: Progressing  Goal: Optimal Comfort and Wellbeing  Outcome: Progressing     Problem: Bariatric Environmental Safety  Goal: Safety Maintained with Care  Outcome: Progressing     Problem: Diabetes Comorbidity  Goal: Blood Glucose Level Within Targeted Range  Outcome: Progressing     Problem: Skin Injury Risk Increased  Goal: Skin Health and Integrity  Outcome: Progressing     Problem: Heart Failure  Goal: Stable Heart Rate and Rhythm  Outcome: Progressing  Goal: Improved Oral Intake  Outcome: Progressing     Problem: COPD (Chronic Obstructive Pulmonary Disease)  Goal: Absence of Infection Signs and Symptoms  Outcome: Progressing  Goal: Improved Oral Intake  Outcome: Progressing     Problem: Fluid Volume Excess  Goal: Fluid Balance  Outcome: Progressing     Problem: Fall Injury Risk  Goal: Absence of Fall and Fall-Related Injury  Outcome: Progressing     Problem: Infection  Goal: Absence of Infection Signs and Symptoms  Outcome: Progressing      [facility] does not service pt's area and are unable to accept  CM left VM for son, Debby Mcclendon [phone] for additional [address] choices  CM placed DME script in paper chart for provider signature  CM met with pt's son Debby Mcclendon at bedside  CM informed him that [facility] was not in the pt's service area  Adi requested any facility with 4 stars or higher  CM explained freedom of choice and provided Debby Mcclendon with another list  Debby Mcclendon will review and CM will f/u  CM met with Adi at bedside to discuss choices  Debby Mcclendon prefers [place] [address] or [facility] in [place]  CM placed referrals  CM informed Debby Mcclendon that she is ordering the DME and they can deliver to room prior to d/c  Adi reported he has a [facility] that he is going to transport pt and pt's DME in for d/c home  CM confirmed that pt's dtr is ordering bedside commode, Debby Mcclendon reported that they are  CM sent [facility] [address] [address] and [facility] referrals  DME script signed and submitted to [facility] via [facility]

## 2024-12-07 NOTE — ASSESSMENT & PLAN NOTE
ET tube extubation on 12/4/24 with complaints of sore throat. OG tube in place. Failed initial swallow evaluation per ST post extubation.   Discontinue OG tube and follow aspiration precautions below;   - Continue alternate means of nutrition  - HOB to 90 degrees  - Ice chips sparingly  - Monitor for s/s of aspiration  - Puree for pleasure  - Small bites/sips  - Strict aspiration precautions, and Wear oxygen during intake

## 2024-12-07 NOTE — ASSESSMENT & PLAN NOTE
11/30/24 Urine culture positive for aerococcus urinae. Completed Rocephin 5 days total.   Estimated Creatinine Clearance: 54.6 mL/min (based on SCr of 1.2 mg/dL).  Stable renal function.   - Monitor I/Os

## 2024-12-07 NOTE — PLAN OF CARE
Problem: Diabetes Comorbidity  Goal: Blood Glucose Level Within Targeted Range  Outcome: Progressing     Problem: Skin Injury Risk Increased  Goal: Skin Health and Integrity  Outcome: Progressing     Problem: Heart Failure  Goal: Optimal Coping  Outcome: Progressing  Goal: Stable Heart Rate and Rhythm  Outcome: Progressing  Goal: Optimal Functional Ability  Outcome: Not Progressing  Goal: Fluid and Electrolyte Balance  Outcome: Progressing  Goal: Improved Oral Intake  Outcome: Not Progressing  Goal: Effective Oxygenation and Ventilation  Outcome: Progressing     Problem: COPD (Chronic Obstructive Pulmonary Disease)  Goal: Optimal Chronic Illness Coping  Outcome: Not Progressing  Goal: Optimal Level of Functional [place]  Outcome: Not Progressing  Goal: Absence of Infection Signs and Symptoms  Outcome: Progressing  Goal: Improved Oral Intake  Outcome: Not Progressing  Goal: Effective Oxygenation and Ventilation  Outcome: Progressing     Problem: Fluid Volume Excess  Goal: Fluid Balance  Outcome: Progressing     Problem: Fall Injury Risk  Goal: Absence of Fall and Fall-Related Injury  Outcome: Progressing     Problem: Infection  Goal: Absence of Infection Signs and Symptoms  Outcome: Progressing     Problem: Noninvasive Ventilation Acute  Goal: Effective Unassisted Ventilation and Oxygenation  Outcome: Not Progressing     Patient was awake most of the night. She continuously asked for water, even after this was explained to her several times. Urine out put is good, VSS, afebrile. She remained on the BIPAP on 50 % with the O2 sat staying in the mid 90's. Lung field clear but decreased when auscultated.

## 2024-12-07 NOTE — ASSESSMENT & PLAN NOTE
Extubated on 12/4/24, stable respiratory status.   Patient is now DNR/DNI  - continuous O2 supplementation with BiPAP  - goal SpO2 >92%  - continue daily pulmonary hygiene care with breathing treatments, tapering solumedrol, encourage deep breathing

## 2024-12-08 LAB
ALBUMIN SERPL BCP-MCNC: 2.1 G/DL (ref 3.5–5.2)
ALP SERPL-CCNC: 96 U/L (ref 55–135)
ALT SERPL W/O P-5'-P-CCNC: 11 U/L (ref 10–44)
ANION GAP SERPL CALC-SCNC: ABNORMAL MMOL/L (ref 3–11)
AST SERPL-CCNC: 9 U/L (ref 10–40)
BASOPHILS # BLD AUTO: 0.01 K/UL (ref 0–0.2)
BASOPHILS NFR BLD: 0.1 % (ref 0–1.9)
BILIRUB SERPL-MCNC: 0.5 MG/DL (ref 0.1–1)
BUN SERPL-MCNC: 31 MG/DL (ref 8–23)
CALCIUM SERPL-MCNC: 9.7 MG/DL (ref 8.7–10.5)
CHLORIDE SERPL-SCNC: 107 MMOL/L (ref 95–110)
CO2 SERPL-SCNC: >45 MMOL/L (ref 23–29)
CREAT SERPL-MCNC: 1.1 MG/DL (ref 0.5–1.4)
DIFFERENTIAL METHOD BLD: ABNORMAL
EOSINOPHIL # BLD AUTO: 0 K/UL (ref 0–0.5)
EOSINOPHIL NFR BLD: 0 % (ref 0–8)
ERYTHROCYTE [DISTWIDTH] IN BLOOD BY AUTOMATED COUNT: 15.2 % (ref 11.5–14.5)
EST. GFR  (NO RACE VARIABLE): 53.1 ML/MIN/1.73 M^2
GLUCOSE SERPL-MCNC: 71 MG/DL (ref 70–110)
HCT VFR BLD AUTO: 36.5 % (ref 37–48.5)
HGB BLD-MCNC: 11.3 G/DL (ref 12–16)
IMM GRANULOCYTES # BLD AUTO: 0.03 K/UL (ref 0–0.04)
IMM GRANULOCYTES NFR BLD AUTO: 0.4 % (ref 0–0.5)
LYMPHOCYTES # BLD AUTO: 0.9 K/UL (ref 1–4.8)
LYMPHOCYTES NFR BLD: 12.7 % (ref 18–48)
MAGNESIUM SERPL-MCNC: 1.8 MG/DL (ref 1.6–2.6)
MCH RBC QN AUTO: 29.8 PG (ref 27–31)
MCHC RBC AUTO-ENTMCNC: 31 G/DL (ref 32–36)
MCV RBC AUTO: 96 FL (ref 82–98)
MONOCYTES # BLD AUTO: 0.9 K/UL (ref 0.3–1)
MONOCYTES NFR BLD: 12.5 % (ref 4–15)
NEUTROPHILS # BLD AUTO: 5.4 K/UL (ref 1.8–7.7)
NEUTROPHILS NFR BLD: 74.3 % (ref 38–73)
NRBC BLD-RTO: 0 /100 WBC
PLATELET # BLD AUTO: 223 K/UL (ref 150–450)
PMV BLD AUTO: 9.2 FL (ref 9.2–12.9)
POCT GLUCOSE: 115 MG/DL (ref 70–110)
POCT GLUCOSE: 89 MG/DL (ref 70–110)
POCT GLUCOSE: 90 MG/DL (ref 70–110)
POTASSIUM SERPL-SCNC: 3.8 MMOL/L (ref 3.5–5.1)
PROT SERPL-MCNC: 5.8 G/DL (ref 6–8.4)
RBC # BLD AUTO: 3.79 M/UL (ref 4–5.4)
SODIUM SERPL-SCNC: 152 MMOL/L (ref 136–145)
WBC # BLD AUTO: 7.3 K/UL (ref 3.9–12.7)

## 2024-12-08 PROCEDURE — 94761 N-INVAS EAR/PLS OXIMETRY MLT: CPT

## 2024-12-08 PROCEDURE — 63600175 PHARM REV CODE 636 W HCPCS: Performed by: INTERNAL MEDICINE

## 2024-12-08 PROCEDURE — 80053 COMPREHEN METABOLIC PANEL: CPT | Performed by: INTERNAL MEDICINE

## 2024-12-08 PROCEDURE — 85025 COMPLETE CBC W/AUTO DIFF WBC: CPT | Performed by: INTERNAL MEDICINE

## 2024-12-08 PROCEDURE — 63600175 PHARM REV CODE 636 W HCPCS: Performed by: STUDENT IN AN ORGANIZED HEALTH CARE EDUCATION/TRAINING PROGRAM

## 2024-12-08 PROCEDURE — 83735 ASSAY OF MAGNESIUM: CPT | Performed by: INTERNAL MEDICINE

## 2024-12-08 PROCEDURE — 27100171 HC OXYGEN HIGH FLOW UP TO 24 HOURS

## 2024-12-08 PROCEDURE — 20000000 HC ICU ROOM

## 2024-12-08 PROCEDURE — 25000003 PHARM REV CODE 250: Performed by: STUDENT IN AN ORGANIZED HEALTH CARE EDUCATION/TRAINING PROGRAM

## 2024-12-08 PROCEDURE — 94660 CPAP INITIATION&MGMT: CPT

## 2024-12-08 PROCEDURE — 36415 COLL VENOUS BLD VENIPUNCTURE: CPT | Performed by: INTERNAL MEDICINE

## 2024-12-08 PROCEDURE — 27000221 HC OXYGEN, UP TO 24 HOURS

## 2024-12-08 PROCEDURE — 99233 SBSQ HOSP IP/OBS HIGH 50: CPT | Mod: GW,,, | Performed by: STUDENT IN AN ORGANIZED HEALTH CARE EDUCATION/TRAINING PROGRAM

## 2024-12-08 PROCEDURE — 99900031 HC PATIENT EDUCATION (STAT)

## 2024-12-08 PROCEDURE — 99900035 HC TECH TIME PER 15 MIN (STAT)

## 2024-12-08 PROCEDURE — 25000242 PHARM REV CODE 250 ALT 637 W/ HCPCS: Performed by: STUDENT IN AN ORGANIZED HEALTH CARE EDUCATION/TRAINING PROGRAM

## 2024-12-08 PROCEDURE — 94640 AIRWAY INHALATION TREATMENT: CPT

## 2024-12-08 RX ORDER — FUROSEMIDE 20 MG/1
20 TABLET ORAL 2 TIMES DAILY
Status: DISCONTINUED | OUTPATIENT
Start: 2024-12-09 | End: 2024-12-10

## 2024-12-08 RX ORDER — POLYETHYLENE GLYCOL 3350 17 G/17G
17 POWDER, FOR SOLUTION ORAL DAILY
Status: DISCONTINUED | OUTPATIENT
Start: 2024-12-08 | End: 2024-12-12 | Stop reason: HOSPADM

## 2024-12-08 RX ORDER — AMOXICILLIN 250 MG
2 CAPSULE ORAL DAILY
Status: DISCONTINUED | OUTPATIENT
Start: 2024-12-08 | End: 2024-12-12 | Stop reason: HOSPADM

## 2024-12-08 RX ORDER — ASPIRIN 325 MG
325 TABLET ORAL DAILY
Status: DISCONTINUED | OUTPATIENT
Start: 2024-12-08 | End: 2024-12-12 | Stop reason: HOSPADM

## 2024-12-08 RX ORDER — ATORVASTATIN CALCIUM 40 MG/1
40 TABLET, FILM COATED ORAL DAILY
Status: DISCONTINUED | OUTPATIENT
Start: 2024-12-08 | End: 2024-12-12 | Stop reason: HOSPADM

## 2024-12-08 RX ORDER — ACETAMINOPHEN 325 MG/1
650 TABLET ORAL EVERY 8 HOURS PRN
Status: DISCONTINUED | OUTPATIENT
Start: 2024-12-08 | End: 2024-12-12 | Stop reason: HOSPADM

## 2024-12-08 RX ORDER — VALSARTAN 80 MG/1
160 TABLET ORAL 2 TIMES DAILY
Status: DISCONTINUED | OUTPATIENT
Start: 2024-12-08 | End: 2024-12-12 | Stop reason: HOSPADM

## 2024-12-08 RX ORDER — TRAZODONE HYDROCHLORIDE 50 MG/1
50 TABLET ORAL NIGHTLY
Status: DISCONTINUED | OUTPATIENT
Start: 2024-12-08 | End: 2024-12-12 | Stop reason: HOSPADM

## 2024-12-08 RX ORDER — CARVEDILOL 3.12 MG/1
3.12 TABLET ORAL 2 TIMES DAILY
Status: DISCONTINUED | OUTPATIENT
Start: 2024-12-08 | End: 2024-12-12 | Stop reason: HOSPADM

## 2024-12-08 RX ADMIN — BUDESONIDE 0.5 MG: 0.5 INHALANT RESPIRATORY (INHALATION) at 07:12

## 2024-12-08 RX ADMIN — ALBUTEROL SULFATE 2.5 MG: 2.5 SOLUTION RESPIRATORY (INHALATION) at 11:12

## 2024-12-08 RX ADMIN — VALSARTAN 160 MG: 80 TABLET, FILM COATED ORAL at 10:12

## 2024-12-08 RX ADMIN — PANTOPRAZOLE SODIUM 40 MG: 40 INJECTION, POWDER, LYOPHILIZED, FOR SOLUTION INTRAVENOUS at 10:12

## 2024-12-08 RX ADMIN — CARVEDILOL 3.12 MG: 3.12 TABLET, FILM COATED ORAL at 09:12

## 2024-12-08 RX ADMIN — METHYLPREDNISOLONE SODIUM SUCCINATE 40 MG: 40 INJECTION, POWDER, FOR SOLUTION INTRAMUSCULAR; INTRAVENOUS at 09:12

## 2024-12-08 RX ADMIN — ENOXAPARIN SODIUM 40 MG: 40 INJECTION SUBCUTANEOUS at 05:12

## 2024-12-08 RX ADMIN — ALBUTEROL SULFATE 2.5 MG: 2.5 SOLUTION RESPIRATORY (INHALATION) at 03:12

## 2024-12-08 RX ADMIN — ALBUTEROL SULFATE 2.5 MG: 2.5 SOLUTION RESPIRATORY (INHALATION) at 07:12

## 2024-12-08 RX ADMIN — POLYETHYLENE GLYCOL (3350) 17 G: 17 POWDER, FOR SOLUTION ORAL at 10:12

## 2024-12-08 RX ADMIN — VALSARTAN 160 MG: 80 TABLET, FILM COATED ORAL at 09:12

## 2024-12-08 RX ADMIN — ASPIRIN 325 MG ORAL TABLET 325 MG: 325 PILL ORAL at 10:12

## 2024-12-08 RX ADMIN — FUROSEMIDE 20 MG: 10 INJECTION, SOLUTION INTRAVENOUS at 10:12

## 2024-12-08 RX ADMIN — SENNOSIDES AND DOCUSATE SODIUM 2 TABLET: 50; 8.6 TABLET ORAL at 10:12

## 2024-12-08 RX ADMIN — Medication 6 MG: at 09:12

## 2024-12-08 RX ADMIN — ATORVASTATIN CALCIUM 40 MG: 40 TABLET, FILM COATED ORAL at 10:12

## 2024-12-08 RX ADMIN — LEVOTHYROXINE SODIUM 50 MCG: 50 TABLET ORAL at 05:12

## 2024-12-08 RX ADMIN — CARVEDILOL 3.12 MG: 3.12 TABLET, FILM COATED ORAL at 10:12

## 2024-12-08 RX ADMIN — TRAZODONE HYDROCHLORIDE 50 MG: 50 TABLET ORAL at 09:12

## 2024-12-08 NOTE — NURSING
Patient is on the BIPAP asleep at this time report received that patient is swallowing very well, and is asking for water frequently. She is on a fluid restriction of 1000 cc/day and has reached that limit already. She also previous shift reported that she cannot tolerate being off of the BIPAP for any length of time.

## 2024-12-08 NOTE — PLAN OF CARE
Problem: Adult Inpatient Plan of Care  Goal: Patient-Specific Goal (Individualized)  Outcome: Progressing  Goal: Absence of Hospital-Acquired Illness or Injury  Outcome: Progressing  Goal: Optimal Comfort and Wellbeing  Outcome: Progressing  Goal: Readiness for Transition of Care  Outcome: Progressing     Problem: Bariatric Environmental Safety  Goal: Safety Maintained with Care  Outcome: Progressing     Problem: Diabetes Comorbidity  Goal: Blood Glucose Level Within Targeted Range  Outcome: Progressing     Problem: Skin Injury Risk Increased  Goal: Skin Health and Integrity  Outcome: Progressing     Problem: Heart Failure  Goal: Optimal Coping  Outcome: Progressing  Goal: Optimal Cardiac Output  Outcome: Progressing  Goal: Stable Heart Rate and Rhythm  Outcome: Progressing  Goal: Optimal Functional Ability  Outcome: Progressing  Goal: Fluid and Electrolyte Balance  Outcome: Progressing  Goal: Improved Oral Intake  Outcome: Progressing  Goal: Effective Oxygenation and Ventilation  Outcome: Progressing  Goal: Effective Breathing Pattern During Sleep  Outcome: Progressing     Problem: COPD (Chronic Obstructive Pulmonary Disease)  Goal: Absence of Infection Signs and Symptoms  Outcome: Progressing     Problem: COPD (Chronic Obstructive Pulmonary Disease)  Goal: Improved Oral Intake  Outcome: Progressing     Problem: Fluid Volume Excess  Goal: Fluid Balance  Outcome: Progressing     Problem: Fall Injury Risk  Goal: Absence of Fall and Fall-Related Injury  Outcome: Progressing     Problem: Infection  Goal: Absence of Infection Signs and Symptoms  Outcome: Progressing

## 2024-12-09 LAB
ALBUMIN SERPL BCP-MCNC: 1.8 G/DL (ref 3.5–5.2)
ALP SERPL-CCNC: 88 U/L (ref 55–135)
ALT SERPL W/O P-5'-P-CCNC: 8 U/L (ref 10–44)
ANION GAP SERPL CALC-SCNC: ABNORMAL MMOL/L (ref 3–11)
AST SERPL-CCNC: 12 U/L (ref 10–40)
BASOPHILS # BLD AUTO: 0 K/UL (ref 0–0.2)
BASOPHILS NFR BLD: 0 % (ref 0–1.9)
BILIRUB SERPL-MCNC: 0.5 MG/DL (ref 0.1–1)
BUN SERPL-MCNC: 26 MG/DL (ref 8–23)
CALCIUM SERPL-MCNC: 9 MG/DL (ref 8.7–10.5)
CHLORIDE SERPL-SCNC: 104 MMOL/L (ref 95–110)
CO2 SERPL-SCNC: >45 MMOL/L (ref 23–29)
CREAT SERPL-MCNC: 1 MG/DL (ref 0.5–1.4)
DIFFERENTIAL METHOD BLD: ABNORMAL
EOSINOPHIL # BLD AUTO: 0 K/UL (ref 0–0.5)
EOSINOPHIL NFR BLD: 0.1 % (ref 0–8)
ERYTHROCYTE [DISTWIDTH] IN BLOOD BY AUTOMATED COUNT: 15.1 % (ref 11.5–14.5)
EST. GFR  (NO RACE VARIABLE): 59.5 ML/MIN/1.73 M^2
GLUCOSE SERPL-MCNC: 86 MG/DL (ref 70–110)
HCT VFR BLD AUTO: 35.3 % (ref 37–48.5)
HGB BLD-MCNC: 10.7 G/DL (ref 12–16)
IMM GRANULOCYTES # BLD AUTO: 0.08 K/UL (ref 0–0.04)
IMM GRANULOCYTES NFR BLD AUTO: 1.1 % (ref 0–0.5)
LYMPHOCYTES # BLD AUTO: 1 K/UL (ref 1–4.8)
LYMPHOCYTES NFR BLD: 13.1 % (ref 18–48)
MAGNESIUM SERPL-MCNC: 1.8 MG/DL (ref 1.6–2.6)
MCH RBC QN AUTO: 29 PG (ref 27–31)
MCHC RBC AUTO-ENTMCNC: 30.3 G/DL (ref 32–36)
MCV RBC AUTO: 96 FL (ref 82–98)
MONOCYTES # BLD AUTO: 0.8 K/UL (ref 0.3–1)
MONOCYTES NFR BLD: 10.9 % (ref 4–15)
NEUTROPHILS # BLD AUTO: 5.5 K/UL (ref 1.8–7.7)
NEUTROPHILS NFR BLD: 74.8 % (ref 38–73)
NRBC BLD-RTO: 0 /100 WBC
PLATELET # BLD AUTO: 217 K/UL (ref 150–450)
PMV BLD AUTO: 9.5 FL (ref 9.2–12.9)
POCT GLUCOSE: 105 MG/DL (ref 70–110)
POCT GLUCOSE: 120 MG/DL (ref 70–110)
POCT GLUCOSE: 195 MG/DL (ref 70–110)
POCT GLUCOSE: 239 MG/DL (ref 70–110)
POTASSIUM SERPL-SCNC: 3.7 MMOL/L (ref 3.5–5.1)
PROT SERPL-MCNC: 5.4 G/DL (ref 6–8.4)
RBC # BLD AUTO: 3.69 M/UL (ref 4–5.4)
SODIUM SERPL-SCNC: 148 MMOL/L (ref 136–145)
WBC # BLD AUTO: 7.32 K/UL (ref 3.9–12.7)

## 2024-12-09 PROCEDURE — 20000000 HC ICU ROOM

## 2024-12-09 PROCEDURE — 25000003 PHARM REV CODE 250: Performed by: STUDENT IN AN ORGANIZED HEALTH CARE EDUCATION/TRAINING PROGRAM

## 2024-12-09 PROCEDURE — 27000221 HC OXYGEN, UP TO 24 HOURS

## 2024-12-09 PROCEDURE — 94660 CPAP INITIATION&MGMT: CPT

## 2024-12-09 PROCEDURE — 80053 COMPREHEN METABOLIC PANEL: CPT | Performed by: INTERNAL MEDICINE

## 2024-12-09 PROCEDURE — 99900035 HC TECH TIME PER 15 MIN (STAT)

## 2024-12-09 PROCEDURE — 27100171 HC OXYGEN HIGH FLOW UP TO 24 HOURS

## 2024-12-09 PROCEDURE — 83735 ASSAY OF MAGNESIUM: CPT | Performed by: INTERNAL MEDICINE

## 2024-12-09 PROCEDURE — 25000242 PHARM REV CODE 250 ALT 637 W/ HCPCS: Performed by: STUDENT IN AN ORGANIZED HEALTH CARE EDUCATION/TRAINING PROGRAM

## 2024-12-09 PROCEDURE — 63600175 PHARM REV CODE 636 W HCPCS: Performed by: INTERNAL MEDICINE

## 2024-12-09 PROCEDURE — 99900031 HC PATIENT EDUCATION (STAT)

## 2024-12-09 PROCEDURE — 63600175 PHARM REV CODE 636 W HCPCS: Performed by: STUDENT IN AN ORGANIZED HEALTH CARE EDUCATION/TRAINING PROGRAM

## 2024-12-09 PROCEDURE — 36415 COLL VENOUS BLD VENIPUNCTURE: CPT | Performed by: INTERNAL MEDICINE

## 2024-12-09 PROCEDURE — 94640 AIRWAY INHALATION TREATMENT: CPT

## 2024-12-09 PROCEDURE — 99233 SBSQ HOSP IP/OBS HIGH 50: CPT | Mod: GV,,, | Performed by: STUDENT IN AN ORGANIZED HEALTH CARE EDUCATION/TRAINING PROGRAM

## 2024-12-09 PROCEDURE — 94761 N-INVAS EAR/PLS OXIMETRY MLT: CPT

## 2024-12-09 PROCEDURE — 85025 COMPLETE CBC W/AUTO DIFF WBC: CPT | Performed by: INTERNAL MEDICINE

## 2024-12-09 RX ORDER — POTASSIUM CHLORIDE 750 MG/1
10 TABLET, EXTENDED RELEASE ORAL
Status: COMPLETED | OUTPATIENT
Start: 2024-12-09 | End: 2024-12-10

## 2024-12-09 RX ORDER — LEVOTHYROXINE SODIUM 50 UG/1
50 TABLET ORAL
Status: DISCONTINUED | OUTPATIENT
Start: 2024-12-10 | End: 2024-12-12 | Stop reason: HOSPADM

## 2024-12-09 RX ORDER — TALC
6 POWDER (GRAM) TOPICAL NIGHTLY PRN
Status: DISCONTINUED | OUTPATIENT
Start: 2024-12-09 | End: 2024-12-12 | Stop reason: HOSPADM

## 2024-12-09 RX ADMIN — ALBUTEROL SULFATE 2.5 MG: 2.5 SOLUTION RESPIRATORY (INHALATION) at 07:12

## 2024-12-09 RX ADMIN — TRAZODONE HYDROCHLORIDE 50 MG: 50 TABLET ORAL at 09:12

## 2024-12-09 RX ADMIN — ALBUTEROL SULFATE 2.5 MG: 2.5 SOLUTION RESPIRATORY (INHALATION) at 03:12

## 2024-12-09 RX ADMIN — SENNOSIDES AND DOCUSATE SODIUM 2 TABLET: 50; 8.6 TABLET ORAL at 09:12

## 2024-12-09 RX ADMIN — POTASSIUM CHLORIDE 10 MEQ: 750 TABLET, FILM COATED, EXTENDED RELEASE ORAL at 06:12

## 2024-12-09 RX ADMIN — POLYETHYLENE GLYCOL (3350) 17 G: 17 POWDER, FOR SOLUTION ORAL at 09:12

## 2024-12-09 RX ADMIN — POTASSIUM CHLORIDE 10 MEQ: 750 TABLET, FILM COATED, EXTENDED RELEASE ORAL at 09:12

## 2024-12-09 RX ADMIN — ALBUTEROL SULFATE 2.5 MG: 2.5 SOLUTION RESPIRATORY (INHALATION) at 11:12

## 2024-12-09 RX ADMIN — ENOXAPARIN SODIUM 40 MG: 40 INJECTION SUBCUTANEOUS at 06:12

## 2024-12-09 RX ADMIN — POTASSIUM CHLORIDE 10 MEQ: 750 TABLET, FILM COATED, EXTENDED RELEASE ORAL at 10:12

## 2024-12-09 RX ADMIN — FUROSEMIDE 20 MG: 20 TABLET ORAL at 06:12

## 2024-12-09 RX ADMIN — BUDESONIDE 0.5 MG: 0.5 INHALANT RESPIRATORY (INHALATION) at 07:12

## 2024-12-09 RX ADMIN — VALSARTAN 160 MG: 80 TABLET, FILM COATED ORAL at 09:12

## 2024-12-09 RX ADMIN — ASPIRIN 325 MG ORAL TABLET 325 MG: 325 PILL ORAL at 09:12

## 2024-12-09 RX ADMIN — METHYLPREDNISOLONE SODIUM SUCCINATE 20 MG: 40 INJECTION, POWDER, FOR SOLUTION INTRAMUSCULAR; INTRAVENOUS at 09:12

## 2024-12-09 RX ADMIN — LEVOTHYROXINE SODIUM 50 MCG: 50 TABLET ORAL at 05:12

## 2024-12-09 RX ADMIN — PANTOPRAZOLE SODIUM 40 MG: 40 INJECTION, POWDER, LYOPHILIZED, FOR SOLUTION INTRAVENOUS at 09:12

## 2024-12-09 RX ADMIN — CARVEDILOL 3.12 MG: 3.12 TABLET, FILM COATED ORAL at 09:12

## 2024-12-09 RX ADMIN — ATORVASTATIN CALCIUM 40 MG: 40 TABLET, FILM COATED ORAL at 09:12

## 2024-12-09 RX ADMIN — FUROSEMIDE 20 MG: 20 TABLET ORAL at 09:12

## 2024-12-09 RX ADMIN — INSULIN ASPART 1 UNITS: 100 INJECTION, SOLUTION INTRAVENOUS; SUBCUTANEOUS at 09:12

## 2024-12-09 NOTE — PLAN OF CARE
Recommendations  1. Rec'd Cardiac Consistent CHO diet with consistency rec's as per SLP.    2. RD to follow and make rec's accordingly.  Goals:   1. Pt will be started on Nutrition Support by next RD follow up.     2. Pt will be evaluated by SLP by next RD follow up.   3. Pt will consume > 50% of meals via PO intake by next RD follow up.  Nutrition Goal Status: goal met, goal discontinued, new

## 2024-12-09 NOTE — PLAN OF CARE
Patient rested very well last night.VSS, afebrile. Noted patient's heart rate is the 90's most of the night.This is a higher rate  than she has been.She is still on the BIPAP at 50 % O2. She has been eating puddings and jello's, tolerating it well while she was on the 50% mask.for short periods of time.

## 2024-12-09 NOTE — PLAN OF CARE
Care plan reviewed and updated. Cont with Bipap/VM. Diet ordered today. [place] cath draining without difficulty. Turn schedule maintained. SCD's in use. Accuchecks- no coverage needed.

## 2024-12-09 NOTE — PLAN OF CARE
Late entry: Spoke to Jenn with [facility] who reports that she is waiting on the patient's daughter, Esther, to complete the Medicaid application.     Addendum: Jenn was able to meet with the patient's daughter, and the Medicaid application is completed; however, she did not provide the three bank statements. Jenn expressed that the patient cannot admit to their facility until the statements are provided. I plan to follow-up with the patient and her daughter regarding the statements. I spoke to the patient, and she is still in agreement with nursing home placement and hospice care. I informed her that the nursing home would need her bank statements. She gave me permission to contact her daughter, Esther, about it.     Addendum: The patient's son Alfonzo was able to bring the patient's home ventilator in. [facility]. I was able to speak with Don the patient's respiratory therapist with [facility].      I also spoke to the patient's daughter Esther who plans to visit the patient regarding her the bank statements that are needed for nursing home placement.

## 2024-12-09 NOTE — ASSESSMENT & PLAN NOTE
Patient's FSGs are controlled on current medication regimen.  Last A1c reviewed-   Lab Results   Component Value Date    HGBA1C 5.2 11/30/2024     Most recent fingerstick glucose reviewed-   Recent Labs   Lab 12/07/24 2022 12/08/24  1139 12/08/24  1650   POCTGLUCOSE 90 89 115*       Current correctional scale  Low  Maintain anti-hyperglycemic dose as follows-   Antihyperglycemics (From admission, onward)      Start     Stop Route Frequency Ordered    11/30/24 1324  insulin aspart U-100 pen 0-5 Units         -- SubQ Every 6 hours PRN 11/30/24 1225          Hold Oral hypoglycemics while patient is in the hospital.

## 2024-12-09 NOTE — SUBJECTIVE & OBJECTIVE
Interval History: Patient seen and examined.     Review of Systems   Unable to perform ROS: Acuity of condition   HENT:  Negative for sinus pain and sore throat.    Gastrointestinal:  Negative for abdominal distention, abdominal pain, nausea and vomiting.   Musculoskeletal:  Positive for gait problem. Negative for neck pain and neck stiffness.   Neurological:  Positive for speech difficulty (soft) and weakness. Negative for dizziness and headaches.   Psychiatric/Behavioral:  Negative for agitation, confusion and dysphoric mood. The patient is not nervous/anxious and is not hyperactive.      Objective:     Vital Signs (Most Recent):  Temp: 98 °F (36.7 °C) (12/08/24 1600)  Pulse: 102 (12/08/24 1800)  Resp: 20 (12/08/24 1800)  BP: 134/78 (12/08/24 1800)  SpO2: 95 % (12/08/24 1800) Vital Signs (24h Range):  Temp:  [97.3 °F (36.3 °C)-98.2 °F (36.8 °C)] 98 °F (36.7 °C)  Pulse:  [] 102  Resp:  [17-25] 20  SpO2:  [82 %-98 %] 95 %  BP: (105-175)/(51-96) 134/78     Weight: 125.2 kg (276 lb)  Body mass index is 47.38 kg/m².    Intake/Output Summary (Last 24 hours) at 12/8/2024 1824  Last data filed at 12/8/2024 1820  Gross per 24 hour   Intake 360 ml   Output 2900 ml   Net -2540 ml         Physical Exam  Vitals and nursing note reviewed.   Constitutional:       General: She is not in acute distress.     Appearance: She is obese. She is not ill-appearing or toxic-appearing.   HENT:      Head: Normocephalic.      Comments: BiPAP in place  Eyes:      Extraocular Movements: Extraocular movements intact.      Conjunctiva/sclera: Conjunctivae normal.   Cardiovascular:      Rate and Rhythm: Normal rate and regular rhythm.      Pulses: Normal pulses.   Pulmonary:      Effort: No respiratory distress.   Abdominal:      General: There is no distension.      Palpations: Abdomen is soft.      Tenderness: There is no abdominal tenderness.      Hernia: No hernia is present.   Musculoskeletal:      Cervical back: Neck supple. No  rigidity or tenderness.      Right lower leg: No edema.      Left lower leg: No edema.   Skin:     General: Skin is warm and dry.      Capillary Refill: Capillary refill takes less than 2 seconds.   Neurological:      Mental Status: She is alert.      Motor: Weakness present.             Significant Labs: All pertinent labs within the past 24 hours have been reviewed.  Recent Labs   Lab 12/08/24  0348   GLU 71   *   K 3.8      CO2 >45*   BUN 31*   CREATININE 1.1   CALCIUM 9.7   MG 1.8     CBC:   Recent Labs   Lab 12/07/24  0350 12/08/24  0349   WBC 8.00 7.30   HGB 11.3* 11.3*   HCT 36.4* 36.5*    223     CMP:   Recent Labs   Lab 12/07/24  0350 12/08/24  0348   * 152*   K 4.0 3.8    107   CO2 45* >45*   GLU 79 71   BUN 33* 31*   CREATININE 1.2 1.1   CALCIUM 9.4 9.7   PROT 5.6* 5.8*   ALBUMIN 1.9* 2.1*   BILITOT 0.4 0.5   ALKPHOS 98 96   AST 10 9*   ALT 10 11   ANIONGAP 1* Unable to calculate     Recent Labs   Lab 12/07/24  0350 12/08/24  0348   MG 1.8 1.8     POCT Glucose:   Recent Labs   Lab 12/07/24 2022 12/08/24  1139 12/08/24  1650   POCTGLUCOSE 90 89 115*     Recent Labs   Lab 08/10/24  0335   TSH 0.632     Significant Imaging: I have reviewed all pertinent imaging results/findings within the past 24 hours.

## 2024-12-09 NOTE — EICU
Intervention Initiated From:  [facility] / EICU    Elliott intervened regarding:  Rounding (Video assessment)    Nurse Notified:  No    Doctor Notified:  No    Comments: Video rounding completed. Asleep in bed w/ BiPAP 50% O2 in place. Respirations even and unlabored. VSS. NAD.

## 2024-12-09 NOTE — PLAN OF CARE
12/09/24 1413   Medicare Message   Important Message from Medicare regarding Discharge Appeal Rights Given to patient/caregiver;Explained to patient/caregiver;Signed/date by patient/caregiver   Date IMM was signed 12/09/24   Time IMM was signed [id number]

## 2024-12-09 NOTE — PROGRESS NOTES
[place] - Intensive Care  Adult Nutrition  Progress Note    SUMMARY       Recommendations  1. Rec'd Cardiac Consistent CHO diet with consistency rec's as per SLP.    2. RD to follow and make rec's accordingly.  Goals:   1. Pt will be started on Nutrition Support by next RD follow up.     2. Pt will be evaluated by SLP by next RD follow up.   3. Pt will consume > 50% of meals via PO intake by next RD follow up.  Nutrition Goal Status: goal met, goal discontinued, new  Communication of RD Recs: reviewed with RN    Assessment and Plan     Nutrition Problem  Inadequate oral intake     Related to (etiology):   NPO/Intubation status     Signs and Symptoms (as evidenced by):   0% PO intake      Interventions/Recommendations (treatment strategy):  q4-6hrs to goal rate of 60mL/hr with FWF as per MD to provide 2592kcal (% EEN), 65g of protein (74% EPN), and 1062mL FW.   2. RD to follow and make rec's accordingly.     Nutrition Diagnosis Status:   Resolved    Malnutrition Assessment  NFPE not appropriate at this time. RD to continue to monitor for signs and symptoms of malnutrition.    Nutrition Related Social Determinants of Health:  None identified at this time.    Reason for Assessment    Reason For Assessment: RD follow-up  Diagnosis: other (see comments) (Acute on Chronic Diastolic Congestive Heart Failure)  General Information Comments: Followed up on pt this morning. Spoke with RN and RN reports that pt ate about 25% of breakfast this morning and stated that pt is always hungry. Continue to encourage PO intake as tolerated.  Nutrition Discharge Planning: Cardiac Consistent CHO diet with consistency rec's as per SLP\    Nutrition Risk Screen    Nutrition Risk Screen: difficulty chewing/swallowing    Nutrition/Diet History    Patient Reported Diet/Restrictions/Preferences: general  Spiritual, Cultural Beliefs, Gnosticist Practices, Values that Affect Care: no  Food Allergies: NKFA  Factors Affecting Nutritional Intake:  "None identified at this time    Anthropometrics    Temp: 97 °F (36.1 °C)  Height Method: Measured  Height: 5' 4" (162.6 cm)  Height (inches): 64 in  Weight Method: Bed Scale  Weight: 125.2 kg (276 lb 0.3 oz)  Weight (lb): 276.02 lb  Ideal Body Weight (IBW), Female: 120 lb  % Ideal Body Weight, Female (lb): 230.02 %  BMI (Calculated): 47.4  BMI Grade: greater than 40 - morbid obesity  Weight Loss Since Admission: 17 lb  % Weight Change Since Admission: 5.71    Lab/Procedures/Meds    Pertinent Labs Reviewed: reviewed  Pertinent Medications Reviewed: reviewed    Estimated/Assessed Needs    Weight Used For Calorie Calculations: 72.1 kg (159 lb)  Energy Calorie Requirements (kcal): 2307 (32kcal/kg AdjBW)  Energy Need Method: Kcal/kg  Protein Requirements: 65 (1.2g/kg IBW)  Weight Used For Protein Calculations: 54.4 kg (120 lb)  Fluid Requirements (mL): 2307 (1mL/kcal)  Estimated Fluid Requirement Method: RDA Method  RDA Method (mL): 2307    Nutrition Prescription Ordered    Current Diet Order: Heart Healthy Cardiac Soft & BiteSized  Current Nutrition Support Formula Ordered: Suplena (Discontinued)  Current Nutrition Support Rate Ordered: 0 (ml)  Current Nutrition Support Frequency Ordered: Continuous (Discontinued)  Oral Nutrition Supplement: None    Evaluation of Received Nutrient/Fluid Intake    Enteral Calories (kcal): 0  Enteral Protein (gm): 0  Enteral (Free Water) Fluid (mL): 0  Free Water Flush Fluid (mL): 0  % Kcal Needs: 25%  % Protein Needs: 25%  I/O: +30  Energy Calories Required: not meeting needs  Protein Required: not meeting needs  Tolerance: tolerating  % Intake of Estimated Energy Needs: 0 - 25 %  % Meal Intake: 0 - 25 %    Nutrition Risk    Level of Risk/Frequency of Follow-up: moderate     Monitor and Evaluation    Food and Nutrient Intake: energy intake, food and beverage intake  Food and Nutrient Adminstration: diet order  Knowledge/Beliefs/Attitudes: food and nutrition knowledge/skill, beliefs and " attitudes  Physical Activity and Function: nutrition-related ADLs and IADLs  Anthropometric Measurements: height/length, weight, weight change, body mass index  Biochemical Data, Medical Tests and Procedures: electrolyte and renal panel, glucose/endocrine profile, gastrointestinal profile, inflammatory profile, lipid profile  Nutrition-Focused Physical Findings: overall appearance     Nutrition Follow-Up    RD Follow-up?: Yes

## 2024-12-09 NOTE — ASSESSMENT & PLAN NOTE
12/3/24 Patient once extubated she does not want to be re-intubated  12/4/24 Family would like information on home hospice services  12/5/24 Plan for [facility]DC placement to nursing home per BAKARI and FRANCOIS

## 2024-12-09 NOTE — PT/OT/SLP PROGRESS
Speech Language Pathology    Carmen Tan  MRN: [id number]    Pt not seen today 2/2 to BIPAP. Communicated w/ nurse who reports soft & bite-sized (IDDSI Level 6) diet ordered by physician over the weekend. Nurse also reports pt tolerating diet well w/ no observed coughing/choking throughout meals. Will follow-up tomorrow, 12/9/2024, during mealtime to assess swallow function and determine swallow safety.    12/9/2024

## 2024-12-09 NOTE — RESPIRATORY THERAPY
Patient's home BIPAP unit brought in by family. Frantz with [employer] cleared BIPAP machine for hospital use.     Home unit brought in per Dr. Mott's request to corollate settings -- with hospital's  that patient is currently using and patient's home machine. RT notified RUI Damon settings must come from patient's [employer] company.     Patient remains on hospital BIPAP unit at this time.     RUI Damon aware.

## 2024-12-09 NOTE — ASSESSMENT & PLAN NOTE
"Patient has Diastolic (HFpEF) heart failure that is Acute on chronic. On presentation their CHF was decompensated. Evidence of decompensated CHF on presentation includes: edema, orthopnea, dyspnea on exertion (MELISSA), and shortness of breath. The etiology of their decompensation is likely medication non-compliance?diet? Most recent BNP and echo results are listed below.  No results for input(s): "BNP" in the last 72 hours.  Latest ECHO  Results for orders placed during the hospital encounter of 07/28/24    Echo Saline Bubble? No; Ultrasound enhancing contrast? No  Interpretation Summary    Left Ventricle: The left ventricle is normal in size. Mildly increased wall thickness. There is normal systolic function with a visually estimated ejection fraction of 55 - 60%. Grade I diastolic dysfunction.    Right Ventricle: Normal right ventricular cavity size. Systolic function is normal.    Left Atrium: Left atrium is severely dilated. Atrial septum is bulging to the right.    Right Atrium: Right atrium is moderately dilated.    Aortic Valve: The aortic valve is a trileaflet valve. Mildly restricted motion. There is mild stenosis. Aortic valve area by VTI is 1.84 cm². Aortic valve peak velocity is 2.18 m/s. Mean gradient is 10 mmHg. The dimensionless index is 0.58. There is mild aortic regurgitation.    Mitral Valve: There is mild regurgitation.    Tricuspid Valve: There is mild regurgitation.    Pulmonic Valve: There is mild to moderate regurgitation.    No pericardial effusion.    Current Heart Failure Medications  furosemide injection 20 mg, 2 times daily, Intravenous  carvediloL tablet 3.125 mg, 2 times daily, Oral  valsartan tablet 160 mg, 2 times daily, Oral    Plan  - Monitor strict I&Os and daily weights.    - Place on telemetry  - Low sodium diet  - Place on fluid restriction of 1.5 L.   - Cardiology has been consulted, follow up recommendations  - The patient's volume status is stable but not at their baseline as " indicated by edema and shortness of breath and respiratory distress requiring intubation  -  transition to PO lasix from IV

## 2024-12-09 NOTE — NURSING
Spoke with Alfonzo, son to bring home CPAP machine per Dr. Mott request. Voiced understanding and will be coming today.

## 2024-12-09 NOTE — SUBJECTIVE & OBJECTIVE
Interval History: Patient seen and examined.     Review of Systems   Constitutional:  Positive for activity change. Negative for appetite change, chills, fatigue and fever.   HENT:  Negative for sore throat.    Respiratory:  Negative for choking, chest tightness and shortness of breath.    Cardiovascular:  Negative for chest pain, palpitations and leg swelling.   Gastrointestinal:  Negative for abdominal pain.   Musculoskeletal:  Negative for gait problem, joint swelling, neck pain and neck stiffness.   Neurological:  Positive for weakness. Negative for dizziness, tremors, seizures, facial asymmetry and headaches.   Psychiatric/Behavioral:  Negative for agitation, behavioral problems, confusion, decreased concentration, dysphoric mood and sleep disturbance. The patient is not nervous/anxious and is not hyperactive.      Objective:     Vital Signs (Most Recent):  Temp: 97.4 °F (36.3 °C) (12/09/24 1501)  Pulse: 88 (12/09/24 1730)  Resp: (!) 24 (12/09/24 1730)  BP: 126/82 (12/09/24 1700)  SpO2: (!) 92 % (12/09/24 1730) Vital Signs (24h Range):  Temp:  [97 °F (36.1 °C)-98.8 °F (37.1 °C)] 97.4 °F (36.3 °C)  Pulse:  [] 88  Resp:  [19-25] 24  SpO2:  [85 %-99 %] 92 %  BP: ()/(58-83) 126/82     Weight: 125.2 kg (276 lb 0.3 oz)  Body mass index is 47.38 kg/m².    Intake/Output Summary (Last 24 hours) at 12/9/2024 1755  Last data filed at 12/9/2024 1724  Gross per 24 hour   Intake 1080 ml   Output 2352 ml   Net -1272 ml         Physical Exam  Vitals and nursing note reviewed.   Constitutional:       General: She is not in acute distress.     Appearance: She is obese. She is not ill-appearing or toxic-appearing.   HENT:      Head: Normocephalic.      Mouth/Throat:      Comments: BiPAP mask  Eyes:      Extraocular Movements: Extraocular movements intact.      Conjunctiva/sclera: Conjunctivae normal.   Cardiovascular:      Rate and Rhythm: Normal rate and regular rhythm.      Pulses: Normal pulses.      Heart sounds:  No murmur heard.  Pulmonary:      Effort: No respiratory distress.   Abdominal:      General: There is no distension.      Palpations: Abdomen is soft.      Tenderness: There is no abdominal tenderness.      Hernia: No hernia is present.   Musculoskeletal:      Cervical back: Neck supple. No rigidity or tenderness.      Right lower leg: No edema.      Left lower leg: No edema.   Skin:     General: Skin is warm and dry.      Capillary Refill: Capillary refill takes less than 2 seconds.      Coloration: Skin is not pale.      Findings: No erythema or rash.   Neurological:      Mental Status: She is alert and oriented to person, place, and time.      Motor: Weakness present.   Psychiatric:         Mood and Affect: Mood normal.         Behavior: Behavior normal.         Thought Content: Thought content normal.         Judgment: Judgment normal.             Significant Labs: All pertinent labs within the past 24 hours have been reviewed.  Bilirubin:   Recent Labs   Lab 12/05/24  0341 12/06/24  0357 12/07/24  0350 12/08/24  0348 12/09/24  0339   BILITOT 0.3 0.4 0.4 0.5 0.5     Recent Labs   Lab 12/09/24  0339   GLU 86   *   K 3.7      CO2 >45*   BUN 26*   CREATININE 1.0   CALCIUM 9.0   MG 1.8     CBC:   Recent Labs   Lab 12/08/24  0349 12/09/24  0339   WBC 7.30 7.32   HGB 11.3* 10.7*   HCT 36.5* 35.3*    217     Significant Imaging: I have reviewed all pertinent imaging results/findings within the past 24 hours.

## 2024-12-09 NOTE — PROGRESS NOTES
[facility] Medicine  Progress Note    Patient Name: Carmen Tan  MRN: [id number]  Patient Class: IP- Inpatient   Admission Date: 11/29/2024  Length of Stay: 9 days  Attending Physician: Gunnar Mott DO  Primary Care Provider: Lucian Barry MD        Subjective     Principal Problem:Acute on chronic diastolic congestive heart failure        HPI:  Patient 74-year-old female past medical history of chronic diastolic heart failure, AFib, chronic respiratory failure with hypoxemia and hypercapnia, anxiety depression, type 2 diabetes, GERD, hypothyroidism.  Patient came in after noted to be short of breath at home, patient was satting in the 70 on home BiPAP therapy, was brought to the ED noted to be in distress tachycardic hypotensive hypoxic, workup in the ED patient with enlarged cardiac silhouette, chronic changes no acute finding, elevated BNP 25,000, mild elevation in troponin negative COVID no leukocytosis, patient was given nitroglycerin and Lasix injection, admitted to ICU.    Overview/Hospital Course:  12/1 AA, weekend crosscover, overnight patient had two ABG, eICU MD adjusted vent settings, had repeat chest x-ray which showed moderate right thorax, vital signs stable, O2 sat maintaining, surgery consulted for chest tube placement, patient was started on steroids yesterday for COPD flare, diuresis in progress, continue to monitor urine output, follow-up chest x-ray after chest tube placement, follow-up with surgery rec  12/2/24 ABG 7.395/52.4/87.3/29.8  improving respiratory status on AC Vt 450, RR18, PEEP 6, FiO2 55%, SpO2 98%, proprofol 25mcg/kg/min. CXR with right pneumothorax resolved, diuresing on scheduled IV lasix with adequate urine output. Patient minimally responsive to sternal rub. No elevated temperature. Urine culture positive for gram positive aerococcus spp, add IV rocephin. Continue with vent weaning and daily SBT, scheduled breathing treatments,  solumedrol. Follow up recommendations cardiology. Follow up general surgery with chest tube management.    12/3/24 ABG 7.423/52.7/72.7/32.3, FiO2 40%. Off sedation awake and following commands. CPAP trial followed with tachypnea 40s, will maintain current vent settings and prepare for SBT and extubation tomorrow AM. Right lateral chest tube has been clamped. CXR overnight unchanged, no new pneumothorax. Chest tube management per GS. BMx1. Start enteral trickle feeds and advance per protocol.   12/4/24 Stable ABG and vent settings. Tolerating tube feeds with no residual. Patient awake and alert and requests to have ET tube to be removed. Patient further affirms she does not want to be re-intubated afterwards. Plan to extubate and transition to BiPAP.   12/5/24 No acute events overnight. Awake, on venturi mask FiO2 50%, SpO2 100%. Right chest tube in place remains clamped. Declines therapy work complaining of pain in her throat and upper chest. Renal function improving, adequate urine output. Continue with supplemental oxygen and rest from BiPAP for nutrition and meds per OG tube. Follow up speech. Follow up GS recommendations.     12/6/24 On BiPAP maintaining SpO2 >95%. Failed swallow with speech. OG tube remains in place. Arousable to voice, responds to questions with hand squeezes and motion. Right chest tube removed yesterday and no desaturations reported. CXR showing no pneumothorax. Mild hypernatremia, will increase free water via OG tube and monitor I/Os. Renal function stable with appropriate clear urine output in Hardwick catheter. Leukocytosis resolved. Continue with scheduled IV lasix, scheduled breathing treatments, supplemental oxygen. Aspiration precautions, maintain OG tube until follow up swallow assessment.    12/7/24 Per nursing minimal sleep overnight. Respiratory rate stable on BiPAP maintain SpO2 >98%. Will discontinue OG tube and follow ST precautions with monitoring PO tolerance with ice chips.  Continue to taper IV steroids, scheduled lasix. Plan for nursing home placement with hospice.   12/8/24 s/p OG tube discontinued. Tolerated ice chips and clear liquids per nursing. No elevated temperature. Patient requires BiPAP to maintain SpO2 >90%. Alert and awake. Throat pain resolving. Will advance diet. Aspiration precautions in place. Expectant nursing home placement with hospice tomorrow.     Interval History: Patient seen and examined.     Review of Systems   Unable to perform ROS: Acuity of condition   HENT:  Negative for sinus pain and sore throat.    Gastrointestinal:  Negative for abdominal distention, abdominal pain, nausea and vomiting.   Musculoskeletal:  Positive for gait problem. Negative for neck pain and neck stiffness.   Neurological:  Positive for speech difficulty (soft) and weakness. Negative for dizziness and headaches.   Psychiatric/Behavioral:  Negative for agitation, confusion and dysphoric mood. The patient is not nervous/anxious and is not hyperactive.      Objective:     Vital Signs (Most Recent):  Temp: 98 °F (36.7 °C) (12/08/24 1600)  Pulse: 102 (12/08/24 1800)  Resp: 20 (12/08/24 1800)  BP: 134/78 (12/08/24 1800)  SpO2: 95 % (12/08/24 1800) Vital Signs (24h Range):  Temp:  [97.3 °F (36.3 °C)-98.2 °F (36.8 °C)] 98 °F (36.7 °C)  Pulse:  [] 102  Resp:  [17-25] 20  SpO2:  [82 %-98 %] 95 %  BP: (105-175)/(51-96) 134/78     Weight: 125.2 kg (276 lb)  Body mass index is 47.38 kg/m².    Intake/Output Summary (Last 24 hours) at 12/8/2024 1824  Last data filed at 12/8/2024 1820  Gross per 24 hour   Intake 360 ml   Output 2900 ml   Net -2540 ml         Physical Exam  Vitals and nursing note reviewed.   Constitutional:       General: She is not in acute distress.     Appearance: She is obese. She is not ill-appearing or toxic-appearing.   HENT:      Head: Normocephalic.      Comments: BiPAP in place  Eyes:      Extraocular Movements: Extraocular movements intact.      Conjunctiva/sclera:  Conjunctivae normal.   Cardiovascular:      Rate and Rhythm: Normal rate and regular rhythm.      Pulses: Normal pulses.   Pulmonary:      Effort: No respiratory distress.   Abdominal:      General: There is no distension.      Palpations: Abdomen is soft.      Tenderness: There is no abdominal tenderness.      Hernia: No hernia is present.   Musculoskeletal:      Cervical back: Neck supple. No rigidity or tenderness.      Right lower leg: No edema.      Left lower leg: No edema.   Skin:     General: Skin is warm and dry.      Capillary Refill: Capillary refill takes less than 2 seconds.   Neurological:      Mental Status: She is alert.      Motor: Weakness present.             Significant Labs: All pertinent labs within the past 24 hours have been reviewed.  Recent Labs   Lab 12/08/24  0348   GLU 71   *   K 3.8      CO2 >45*   BUN 31*   CREATININE 1.1   CALCIUM 9.7   MG 1.8     CBC:   Recent Labs   Lab 12/07/24  0350 12/08/24  0349   WBC 8.00 7.30   HGB 11.3* 11.3*   HCT 36.4* 36.5*    223     CMP:   Recent Labs   Lab 12/07/24  0350 12/08/24  0348   * 152*   K 4.0 3.8    107   CO2 45* >45*   GLU 79 71   BUN 33* 31*   CREATININE 1.2 1.1   CALCIUM 9.4 9.7   PROT 5.6* 5.8*   ALBUMIN 1.9* 2.1*   BILITOT 0.4 0.5   ALKPHOS 98 96   AST 10 9*   ALT 10 11   ANIONGAP 1* Unable to calculate     Recent Labs   Lab 12/07/24  0350 12/08/24  0348   MG 1.8 1.8     POCT Glucose:   Recent Labs   Lab 12/07/24  2022 12/08/24  1139 12/08/24  1650   POCTGLUCOSE 90 89 115*     Recent Labs   Lab 08/10/24  0335   TSH 0.632     Significant Imaging: I have reviewed all pertinent imaging results/findings within the past 24 hours.    Assessment and Plan     * Acute on chronic diastolic congestive heart failure  Patient has Diastolic (HFpEF) heart failure that is Acute on chronic. On presentation their CHF was decompensated. Evidence of decompensated CHF on presentation includes: edema, orthopnea, dyspnea on  "exertion (MELISSA), and shortness of breath. The etiology of their decompensation is likely medication non-compliance?diet? Most recent BNP and echo results are listed below.  No results for input(s): "BNP" in the last 72 hours.  Latest ECHO  Results for orders placed during the hospital encounter of 07/28/24    Echo Saline Bubble? No; Ultrasound enhancing contrast? No  Interpretation Summary    Left Ventricle: The left ventricle is normal in size. Mildly increased wall thickness. There is normal systolic function with a visually estimated ejection fraction of 55 - 60%. Grade I diastolic dysfunction.    Right Ventricle: Normal right ventricular cavity size. Systolic function is normal.    Left Atrium: Left atrium is severely dilated. Atrial septum is bulging to the right.    Right Atrium: Right atrium is moderately dilated.    Aortic Valve: The aortic valve is a trileaflet valve. Mildly restricted motion. There is mild stenosis. Aortic valve area by VTI is 1.84 cm². Aortic valve peak velocity is 2.18 m/s. Mean gradient is 10 mmHg. The dimensionless index is 0.58. There is mild aortic regurgitation.    Mitral Valve: There is mild regurgitation.    Tricuspid Valve: There is mild regurgitation.    Pulmonic Valve: There is mild to moderate regurgitation.    No pericardial effusion.    Current Heart Failure Medications  furosemide injection 20 mg, 2 times daily, Intravenous  carvediloL tablet 3.125 mg, 2 times daily, Oral  valsartan tablet 160 mg, 2 times daily, Oral    Plan  - Monitor strict I&Os and daily weights.    - Place on telemetry  - Low sodium diet  - Place on fluid restriction of 1.5 L.   - Cardiology has been consulted, follow up recommendations  - The patient's volume status is stable but not at their baseline as indicated by edema and shortness of breath and respiratory distress requiring intubation  -  transition to PO lasix from IV        Swallowing impaired  ET tube extubation on 12/4/24 with complaints of " sore throat. OG tube in place. Failed initial swallow evaluation per ST post extubation.   Discontinue OG tube and follow aspiration precautions below;   - Continue alternate means of nutrition  - HOB to 90 degrees  - Ice chips sparingly  - Monitor for s/s of aspiration  - Puree for pleasure  - Small bites/sips  - Strict aspiration precautions, and Wear oxygen during intake       ACP (advance care planning)  12/3/24 Patient once extubated she does not want to be re-intubated  12/4/24 Family would like information on home hospice services  12/5/24 [facility] home per CM and SW      Other pneumothorax  Resolved.   Patient developed pneumothorax right lower lobe, moderate, surgery consulted for chest tube placement and eval.  Right chest tube discontinued on 12/5/24.   CXR (12/6/24) with no interval changes, no new pneumothorax           Hard to intubate  Extubated on 12/4/24, stable respiratory status.   Patient is now DNR/DNI  - continuous O2 supplementation with BiPAP  - goal SpO2 >92%  - continue daily pulmonary hygiene care with breathing treatments, tapering solumedrol, encourage deep breathing      Hypothyroidism  Continue levothyroxine       HLD (hyperlipidemia)  Continue statin      Gastroesophageal reflux disease without esophagitis  PPI on board      Acute cystitis without hematuria  11/30/24 Urine culture positive for aerococcus urinae. Completed Rocephin 5 days total.   Estimated Creatinine Clearance: 59.6 mL/min (based on SCr of 1.1 mg/dL).  Stable renal function.   - Monitor I/Os       Anxiety and depression  Patient has persistent depression which is unknown and is currently controlled. Will Continue anti-depressant medications. We will not consult psychiatry at this time. Patient does not display psychosis at this time. Continue to monitor closely and adjust plan of care as needed.  - trazodone PRN for sleep      Diabetes mellitus, type 2  Patient's FSGs are controlled on  current medication regimen.  Last A1c reviewed-   Lab Results   Component Value Date    HGBA1C 5.2 11/30/2024     Most recent fingerstick glucose reviewed-   Recent Labs   Lab 12/07/24 2022 12/08/24  1139 12/08/24  1650   POCTGLUCOSE 90 89 115*       Current correctional scale  Low  Maintain anti-hyperglycemic dose as follows-   Antihyperglycemics (From admission, onward)      Start     Stop Route Frequency Ordered    11/30/24 1324  insulin aspart U-100 pen 0-5 Units         -- SubQ Every 6 hours PRN 11/30/24 1225          Hold Oral hypoglycemics while patient is in the hospital.    Severe obesity (BMI >= 40)  Body mass index is 47.38 kg/m². Morbid obesity complicates all aspects of disease management from diagnostic modalities to treatment. Weight loss encouraged and health benefits explained to patient..     Wt Readings from Last 8 Encounters:   12/06/24 125.2 kg (276 lb)   10/16/24 113.4 kg (250 lb)   09/22/24 113.4 kg (250 lb)   08/14/24 119.7 kg (263 lb 14.3 oz)   07/29/24 104.3 kg (229 lb 15 oz)   06/25/24 104.6 kg (230 lb 11.2 oz)   06/24/24 104.3 kg (230 lb)   06/20/24 102.5 kg (226 lb)            Acute on chronic respiratory failure with hypoxia and hypercapnia  Patient with Hypercapnic and Hypoxic Respiratory failure which is Acute on chronic.  she is on home oxygen at 2 LPM. Supplemental oxygen was provided and noted- Oxygen Concentration (%):  [50] 50     Signs/symptoms of respiratory failure include- tachypnea, increased work of breathing, respiratory distress, use of accessory muscles, and wheezing. Contributing diagnoses includes - CHF and COPD Labs and images were reviewed.   Patient Has not had a recent ABG.     Will treat underlying causes and adjust management of respiratory failure as follows- continuous O2 supplementation, BiPAP, duo nebs, scheduled lasix     Paroxysmal A-fib  Patient has  atrial fibrillation. Patient is currently in . [id number] Score: 3. The patients heart rate in the last 24  hours is as follows:  Pulse  Min: 53  Max: 90     Antiarrhythmics   BB on hold due to bradycardia from propofol gtt    Anticoagulants  enoxaparin injection 40 mg, Every 24 hours, Subcutaneous    Plan  - Replete lytes with a goal of K>4, Mg >2  - Patient's afib is currently stable, she has history of dash-tachy fluctuation, subsequent intubation on propofol gtt, now bradycardic, hold coreg for now, previous records showed on eliquis, care everywhere patient has not be seen by Cardiology except for and how during previous admission in August, unclear which medication patient is taking, will try and get with family member to verify medication patient taking at home and if she was taken off blood thinner at some point since her August admission            VTE Risk Mitigation (From admission, onward)           Ordered     enoxaparin injection 40 mg  Every 24 hours         12/05/24 1654     IP VTE HIGH RISK PATIENT  Once         11/29/24 1726     Place sequential compression device  Until discontinued         11/29/24 1726                    Discharge Planning   TIGRE:      Code Status: DNR   Medical Readiness for Discharge Date:   Discharge Plan A: Hospital at home, Hospice/home   Discharge Delays: None known at this time            Please place Justification for DME        Gunnar Mott DO  Department of Hospital Medicine   [facility]

## 2024-12-09 NOTE — NURSING
Patient has old healed wound to sacrum with trapped debris measuring 1.0 cm L X 0.5 cm W.  Area cleansed with wound cleanser & applied silicone foam adhesive dressing.  Tolerated procedure well.   Sacral protection treatment orders already in EPIC.

## 2024-12-09 NOTE — ASSESSMENT & PLAN NOTE
11/30/24 Urine culture positive for aerococcus urinae. Completed Rocephin 5 days total.   Estimated Creatinine Clearance: 59.6 mL/min (based on SCr of 1.1 mg/dL).  Stable renal function.   - Monitor I/Os      [] : Resident

## 2024-12-10 PROBLEM — Z74.09 IMPAIRED MOBILITY AND ADLS: Chronic | Status: ACTIVE | Noted: 2024-12-06

## 2024-12-10 PROBLEM — Z99.89 BIPAP (BIPHASIC POSITIVE AIRWAY PRESSURE) DEPENDENCE: Status: ACTIVE | Noted: 2024-12-04

## 2024-12-10 PROBLEM — Z99.89 BIPAP (BIPHASIC POSITIVE AIRWAY PRESSURE) DEPENDENCE: Status: ACTIVE | Noted: 2024-12-10

## 2024-12-10 PROBLEM — Z78.9 IMPAIRED MOBILITY AND ADLS: Chronic | Status: ACTIVE | Noted: 2024-12-06

## 2024-12-10 LAB
POCT GLUCOSE: 105 MG/DL (ref 70–110)
POCT GLUCOSE: 136 MG/DL (ref 70–110)
POCT GLUCOSE: 138 MG/DL (ref 70–110)

## 2024-12-10 PROCEDURE — 63600175 PHARM REV CODE 636 W HCPCS: Performed by: STUDENT IN AN ORGANIZED HEALTH CARE EDUCATION/TRAINING PROGRAM

## 2024-12-10 PROCEDURE — 20000000 HC ICU ROOM

## 2024-12-10 PROCEDURE — 92526 ORAL FUNCTION THERAPY: CPT

## 2024-12-10 PROCEDURE — 99233 SBSQ HOSP IP/OBS HIGH 50: CPT | Mod: GV,,, | Performed by: STUDENT IN AN ORGANIZED HEALTH CARE EDUCATION/TRAINING PROGRAM

## 2024-12-10 PROCEDURE — 25000003 PHARM REV CODE 250: Performed by: STUDENT IN AN ORGANIZED HEALTH CARE EDUCATION/TRAINING PROGRAM

## 2024-12-10 PROCEDURE — 25000242 PHARM REV CODE 250 ALT 637 W/ HCPCS: Performed by: STUDENT IN AN ORGANIZED HEALTH CARE EDUCATION/TRAINING PROGRAM

## 2024-12-10 PROCEDURE — 99900031 HC PATIENT EDUCATION (STAT)

## 2024-12-10 PROCEDURE — 94761 N-INVAS EAR/PLS OXIMETRY MLT: CPT

## 2024-12-10 PROCEDURE — 99900035 HC TECH TIME PER 15 MIN (STAT)

## 2024-12-10 PROCEDURE — 63600175 PHARM REV CODE 636 W HCPCS: Performed by: INTERNAL MEDICINE

## 2024-12-10 PROCEDURE — 94660 CPAP INITIATION&MGMT: CPT

## 2024-12-10 PROCEDURE — 94640 AIRWAY INHALATION TREATMENT: CPT

## 2024-12-10 PROCEDURE — 27100171 HC OXYGEN HIGH FLOW UP TO 24 HOURS

## 2024-12-10 RX ORDER — FAMOTIDINE 20 MG/1
20 TABLET, FILM COATED ORAL 2 TIMES DAILY
Status: DISCONTINUED | OUTPATIENT
Start: 2024-12-10 | End: 2024-12-12 | Stop reason: HOSPADM

## 2024-12-10 RX ORDER — FUROSEMIDE 40 MG/1
40 TABLET ORAL 2 TIMES DAILY
Status: DISCONTINUED | OUTPATIENT
Start: 2024-12-10 | End: 2024-12-11

## 2024-12-10 RX ORDER — POTASSIUM CHLORIDE 750 MG/1
10 TABLET, EXTENDED RELEASE ORAL 4 TIMES DAILY
Status: DISCONTINUED | OUTPATIENT
Start: 2024-12-10 | End: 2024-12-12 | Stop reason: HOSPADM

## 2024-12-10 RX ORDER — LANOLIN ALCOHOL/MO/W.PET/CERES
800 CREAM (GRAM) TOPICAL 2 TIMES DAILY
Status: DISCONTINUED | OUTPATIENT
Start: 2024-12-10 | End: 2024-12-12 | Stop reason: HOSPADM

## 2024-12-10 RX ADMIN — POLYETHYLENE GLYCOL (3350) 17 G: 17 POWDER, FOR SOLUTION ORAL at 08:12

## 2024-12-10 RX ADMIN — FUROSEMIDE 20 MG: 20 TABLET ORAL at 08:12

## 2024-12-10 RX ADMIN — Medication 800 MG: at 09:12

## 2024-12-10 RX ADMIN — BUDESONIDE 0.5 MG: 0.5 INHALANT RESPIRATORY (INHALATION) at 07:12

## 2024-12-10 RX ADMIN — POTASSIUM CHLORIDE 10 MEQ: 750 TABLET, FILM COATED, EXTENDED RELEASE ORAL at 01:12

## 2024-12-10 RX ADMIN — Medication 800 MG: at 01:12

## 2024-12-10 RX ADMIN — ALBUTEROL SULFATE 2.5 MG: 2.5 SOLUTION RESPIRATORY (INHALATION) at 07:12

## 2024-12-10 RX ADMIN — SENNOSIDES AND DOCUSATE SODIUM 2 TABLET: 50; 8.6 TABLET ORAL at 08:12

## 2024-12-10 RX ADMIN — VALSARTAN 160 MG: 80 TABLET, FILM COATED ORAL at 08:12

## 2024-12-10 RX ADMIN — ALBUTEROL SULFATE 2.5 MG: 2.5 SOLUTION RESPIRATORY (INHALATION) at 12:12

## 2024-12-10 RX ADMIN — CARVEDILOL 3.12 MG: 3.12 TABLET, FILM COATED ORAL at 08:12

## 2024-12-10 RX ADMIN — POTASSIUM CHLORIDE 10 MEQ: 750 TABLET, FILM COATED, EXTENDED RELEASE ORAL at 05:12

## 2024-12-10 RX ADMIN — TRAZODONE HYDROCHLORIDE 50 MG: 50 TABLET ORAL at 09:12

## 2024-12-10 RX ADMIN — ASPIRIN 325 MG ORAL TABLET 325 MG: 325 PILL ORAL at 08:12

## 2024-12-10 RX ADMIN — ALBUTEROL SULFATE 2.5 MG: 2.5 SOLUTION RESPIRATORY (INHALATION) at 03:12

## 2024-12-10 RX ADMIN — FAMOTIDINE 20 MG: 20 TABLET, FILM COATED ORAL at 09:12

## 2024-12-10 RX ADMIN — PANTOPRAZOLE SODIUM 40 MG: 40 INJECTION, POWDER, LYOPHILIZED, FOR SOLUTION INTRAVENOUS at 08:12

## 2024-12-10 RX ADMIN — LEVOTHYROXINE SODIUM 50 MCG: 50 TABLET ORAL at 06:12

## 2024-12-10 RX ADMIN — ENOXAPARIN SODIUM 40 MG: 40 INJECTION SUBCUTANEOUS at 05:12

## 2024-12-10 RX ADMIN — ATORVASTATIN CALCIUM 40 MG: 40 TABLET, FILM COATED ORAL at 08:12

## 2024-12-10 RX ADMIN — POTASSIUM CHLORIDE 10 MEQ: 750 TABLET, FILM COATED, EXTENDED RELEASE ORAL at 12:12

## 2024-12-10 RX ADMIN — POTASSIUM CHLORIDE 10 MEQ: 750 TABLET, FILM COATED, EXTENDED RELEASE ORAL at 09:12

## 2024-12-10 RX ADMIN — FUROSEMIDE 40 MG: 40 TABLET ORAL at 05:12

## 2024-12-10 RX ADMIN — VALSARTAN 160 MG: 80 TABLET, FILM COATED ORAL at 09:12

## 2024-12-10 RX ADMIN — CARVEDILOL 3.12 MG: 3.12 TABLET, FILM COATED ORAL at 09:12

## 2024-12-10 NOTE — ASSESSMENT & PLAN NOTE
11/30/24 Urine culture positive for aerococcus urinae. Completed Rocephin 5 days total.     Estimated Creatinine Clearance: 65.6 mL/min (based on SCr of 1 mg/dL).  Stable renal function.   - Monitor I/Os

## 2024-12-10 NOTE — ASSESSMENT & PLAN NOTE
ET tube extubation on 12/4/24 with complaints of sore throat. OG tube in place. Failed initial swallow evaluation per ST post extubation.   12/9/24 Tolerating PO intake of solids and liquids without regurgitation.   - HOB to 90 degrees  - Ice chips sparingly  - Monitor for s/s of aspiration  - Puree for pleasure  - Small bites/sips  - Strict aspiration precautions, and Wear oxygen during intake

## 2024-12-10 NOTE — PROGRESS NOTES
[facility] Medicine  Progress Note    Patient Name: Carmen Tan  MRN: [id number]  Patient Class: IP- Inpatient   Admission Date: 11/29/2024  Length of Stay: 10 days  Attending Physician: Gunnar Mott DO  Primary Care Provider: Lucian Barry MD        Subjective     Principal Problem:Acute on chronic diastolic congestive heart failure        HPI:  Patient 74-year-old female past medical history of chronic diastolic heart failure, AFib, chronic respiratory failure with hypoxemia and hypercapnia, anxiety depression, type 2 diabetes, GERD, hypothyroidism.  Patient came in after noted to be short of breath at home, patient was satting in the 70 on home BiPAP therapy, was brought to the ED noted to be in distress tachycardic hypotensive hypoxic, workup in the ED patient with enlarged cardiac silhouette, chronic changes no acute finding, elevated BNP 25,000, mild elevation in troponin negative COVID no leukocytosis, patient was given nitroglycerin and Lasix injection, admitted to ICU.    Overview/Hospital Course:  12/1 AA, weekend crosscover, overnight patient had two ABG, eICU MD adjusted vent settings, had repeat chest x-ray which showed moderate right thorax, vital signs stable, O2 sat maintaining, surgery consulted for chest tube placement, patient was started on steroids yesterday for COPD flare, diuresis in progress, continue to monitor urine output, follow-up chest x-ray after chest tube placement, follow-up with surgery rec  12/2/24 ABG 7.395/52.4/87.3/29.8  improving respiratory status on AC Vt 450, RR18, PEEP 6, FiO2 55%, SpO2 98%, proprofol 25mcg/kg/min. CXR with right pneumothorax resolved, diuresing on scheduled IV lasix with adequate urine output. Patient minimally responsive to sternal rub. No elevated temperature. Urine culture positive for gram positive aerococcus spp, add IV rocephin. Continue with vent weaning and daily SBT, scheduled breathing treatments,  solumedrol. Follow up recommendations cardiology. Follow up general surgery with chest tube management.    12/3/24 ABG 7.423/52.7/72.7/32.3, FiO2 40%. Off sedation awake and following commands. CPAP trial followed with tachypnea 40s, will maintain current vent settings and prepare for SBT and extubation tomorrow AM. Right lateral chest tube has been clamped. CXR overnight unchanged, no new pneumothorax. Chest tube management per GS. BMx1. Start enteral trickle feeds and advance per protocol.   12/4/24 Stable ABG and vent settings. Tolerating tube feeds with no residual. Patient awake and alert and requests to have ET tube to be removed. Patient further affirms she does not want to be re-intubated afterwards. Plan to extubate and transition to BiPAP.   12/5/24 No acute events overnight. Awake, on venturi mask FiO2 50%, SpO2 100%. Right chest tube in place remains clamped. Declines therapy work complaining of pain in her throat and upper chest. Renal function improving, adequate urine output. Continue with supplemental oxygen and rest from BiPAP for nutrition and meds per OG tube. Follow up speech. Follow up GS recommendations.     12/6/24 On BiPAP maintaining SpO2 >95%. Failed swallow with speech. OG tube remains in place. Arousable to voice, responds to questions with hand squeezes and motion. Right chest tube removed yesterday and no desaturations reported. CXR showing no pneumothorax. Mild hypernatremia, will increase free water via OG tube and monitor I/Os. Renal function stable with appropriate clear urine output in Hardwick catheter. Leukocytosis resolved. Continue with scheduled IV lasix, scheduled breathing treatments, supplemental oxygen. Aspiration precautions, maintain OG tube until follow up swallow assessment.    12/7/24 Per nursing minimal sleep overnight. Respiratory rate stable on BiPAP maintain SpO2 >98%. Will discontinue OG tube and follow ST precautions with monitoring PO tolerance with ice chips.  Continue to taper IV steroids, scheduled lasix. Plan for nursing home placement with hospice.   12/8/24 OG tube discontinued. Tolerated ice chips and clear liquids per nursing. No elevated temperature. Patient requires BiPAP to maintain SpO2 >90%. Alert and awake. Throat pain resolving. Will advance diet. Aspiration precautions in place. Expectant nursing home placement with hospice tomorrow.   12/9/24 No acute events overnight. Patient sitting upright in bed eating breakfast assisted by nursing staff. All meds to be administered orally. Continue aspiration precautions, BiPAP at nursing home. Follow up CM and SW with placement arrangements.      Interval History: Patient seen and examined.     Review of Systems   Constitutional:  Positive for activity change. Negative for appetite change, chills, fatigue and fever.   HENT:  Negative for sore throat.    Respiratory:  Negative for choking, chest tightness and shortness of breath.    Cardiovascular:  Negative for chest pain, palpitations and leg swelling.   Gastrointestinal:  Negative for abdominal pain.   Musculoskeletal:  Negative for gait problem, joint swelling, neck pain and neck stiffness.   Neurological:  Positive for weakness. Negative for dizziness, tremors, seizures, facial asymmetry and headaches.   Psychiatric/Behavioral:  Negative for agitation, behavioral problems, confusion, decreased concentration, dysphoric mood and sleep disturbance. The patient is not nervous/anxious and is not hyperactive.      Objective:     Vital Signs (Most Recent):  Temp: 97.4 °F (36.3 °C) (12/09/24 1501)  Pulse: 88 (12/09/24 1730)  Resp: (!) 24 (12/09/24 1730)  BP: 126/82 (12/09/24 1700)  SpO2: (!) 92 % (12/09/24 1730) Vital Signs (24h Range):  Temp:  [97 °F (36.1 °C)-98.8 °F (37.1 °C)] 97.4 °F (36.3 °C)  Pulse:  [] 88  Resp:  [19-25] 24  SpO2:  [85 %-99 %] 92 %  BP: ()/(58-83) 126/82     Weight: 125.2 kg (276 lb 0.3 oz)  Body mass index is 47.38  kg/m².    Intake/Output Summary (Last 24 hours) at 12/9/2024 1755  Last data filed at 12/9/2024 1724  Gross per 24 hour   Intake 1080 ml   Output 2352 ml   Net -1272 ml         Physical Exam  Vitals and nursing note reviewed.   Constitutional:       General: She is not in acute distress.     Appearance: She is obese. She is not ill-appearing or toxic-appearing.   HENT:      Head: Normocephalic.      Mouth/Throat:      Comments: BiPAP mask  Eyes:      Extraocular Movements: Extraocular movements intact.      Conjunctiva/sclera: Conjunctivae normal.   Cardiovascular:      Rate and Rhythm: Normal rate and regular rhythm.      Pulses: Normal pulses.      Heart sounds: No murmur heard.  Pulmonary:      Effort: No respiratory distress.   Abdominal:      General: There is no distension.      Palpations: Abdomen is soft.      Tenderness: There is no abdominal tenderness.      Hernia: No hernia is present.   Musculoskeletal:      Cervical back: Neck supple. No rigidity or tenderness.      Right lower leg: No edema.      Left lower leg: No edema.   Skin:     General: Skin is warm and dry.      Capillary Refill: Capillary refill takes less than 2 seconds.      Coloration: Skin is not pale.      Findings: No erythema or rash.   Neurological:      Mental Status: She is alert and oriented to person, place, and time.      Motor: Weakness present.   Psychiatric:         Mood and Affect: Mood normal.         Behavior: Behavior normal.         Thought Content: Thought content normal.         Judgment: Judgment normal.             Significant Labs: All pertinent labs within the past 24 hours have been reviewed.  Bilirubin:   Recent Labs   Lab 12/05/24  0341 12/06/24  0357 12/07/24  0350 12/08/24  0348 12/09/24  0339   BILITOT 0.3 0.4 0.4 0.5 0.5     Recent Labs   Lab 12/09/24  0339   GLU 86   *   K 3.7      CO2 >45*   BUN 26*   CREATININE 1.0   CALCIUM 9.0   MG 1.8     CBC:   Recent Labs   Lab 12/08/24  0349 12/09/24 0339  "  WBC 7.30 7.32   HGB 11.3* 10.7*   HCT 36.5* 35.3*    217     Significant Imaging: I have reviewed all pertinent imaging results/findings within the past 24 hours.    Assessment and Plan     * Acute on chronic diastolic congestive heart failure  Patient has Diastolic (HFpEF) heart failure that is Acute on chronic. On presentation their CHF was decompensated. Evidence of decompensated CHF on presentation includes: edema, orthopnea, dyspnea on exertion (MELISSA), and shortness of breath. The etiology of their decompensation is likely medication non-compliance?diet? Most recent BNP and echo results are listed below.  No results for input(s): "BNP" in the last 72 hours.  Latest ECHO  Results for orders placed during the hospital encounter of 07/28/24    Echo Saline Bubble? No; Ultrasound enhancing contrast? No  Interpretation Summary    Left Ventricle: The left ventricle is normal in size. Mildly increased wall thickness. There is normal systolic function with a visually estimated ejection fraction of 55 - 60%. Grade I diastolic dysfunction.    Right Ventricle: Normal right ventricular cavity size. Systolic function is normal.    Left Atrium: Left atrium is severely dilated. Atrial septum is bulging to the right.    Right Atrium: Right atrium is moderately dilated.    Aortic Valve: The aortic valve is a trileaflet valve. Mildly restricted motion. There is mild stenosis. Aortic valve area by VTI is 1.84 cm². Aortic valve peak velocity is 2.18 m/s. Mean gradient is 10 mmHg. The dimensionless index is 0.58. There is mild aortic regurgitation.    Mitral Valve: There is mild regurgitation.    Tricuspid Valve: There is mild regurgitation.    Pulmonic Valve: There is mild to moderate regurgitation.    No pericardial effusion.    Current Heart Failure Medications  carvediloL tablet 3.125 mg, 2 times daily, Oral  valsartan tablet 160 mg, 2 times daily, Oral  furosemide tablet 20 mg, 2 times daily, Oral    Plan  - Monitor " strict I&Os and daily weights.    - Place on telemetry  - Low sodium diet  - Place on fluid restriction of 1.5 L.   - Cardiology has been consulted, follow up recommendations  - The patient's volume status is stable but not at their baseline as indicated by edema and shortness of breath and respiratory distress requiring intubation  -  transition to PO lasix from IV        Swallowing impaired  ET tube extubation on 12/4/24 with complaints of sore throat. OG tube in place. Failed initial swallow evaluation per ST post extubation.   12/9/24 Tolerating PO intake of solids and liquids without regurgitation.   - HOB to 90 degrees  - Ice chips sparingly  - Monitor for s/s of aspiration  - Puree for pleasure  - Small bites/sips  - Strict aspiration precautions, and Wear oxygen during intake       ACP (advance care planning)  12/3/24 Patient once extubated she does not want to be re-intubated  12/4/24 Family would like information on home hospice services  12/5/24 [facility] to [facility] home per CM and SW      Other pneumothorax  Resolved.   Patient developed pneumothorax right lower lobe, moderate, surgery consulted for chest tube placement and eval.  Right chest tube discontinued on 12/5/24.   CXR (12/6/24) with no interval changes, no new pneumothorax           Hard to intubate  Extubated on 12/4/24, stable respiratory status.   Patient is now DNR/DNI  - continuous O2 supplementation with BiPAP  - goal SpO2 >92%  - continue daily pulmonary hygiene care with breathing treatments, tapering solumedrol, encourage deep breathing      Hypothyroidism  Continue levothyroxine       HLD (hyperlipidemia)  Continue statin      Gastroesophageal reflux disease without esophagitis  PPI on board      Acute cystitis without hematuria  11/30/24 Urine culture positive for aerococcus urinae. Completed Rocephin 5 days total.     Estimated Creatinine Clearance: 65.6 mL/min (based on SCr of 1 mg/dL).  Stable renal function.    - Monitor I/Os       Anxiety and depression  Patient has persistent depression which is unknown and is currently controlled. Will Continue anti-depressant medications. We will not consult psychiatry at this time. Patient does not display psychosis at this time. Continue to monitor closely and adjust plan of care as needed.  - trazodone PRN for sleep      Diabetes mellitus, type 2  Patient's FSGs are controlled on current medication regimen.  Last A1c reviewed-   Lab Results   Component Value Date    HGBA1C 5.2 11/30/2024     Most recent fingerstick glucose reviewed-   Recent Labs   Lab 12/07/24 2022 12/08/24  1139 12/08/24  1650   POCTGLUCOSE 90 89 115*       Current correctional scale  Low  Maintain anti-hyperglycemic dose as follows-   Antihyperglycemics (From admission, onward)      Start     Stop Route Frequency Ordered    11/30/24 1324  insulin aspart U-100 pen 0-5 Units         -- SubQ Every 6 hours PRN 11/30/24 1225          Hold Oral hypoglycemics while patient is in the hospital.    Severe obesity (BMI >= 40)  Body mass index is 47.38 kg/m². Morbid obesity complicates all aspects of disease management from diagnostic modalities to treatment. Weight loss encouraged and health benefits explained to patient..     Wt Readings from Last 8 Encounters:   12/06/24 125.2 kg (276 lb)   10/16/24 113.4 kg (250 lb)   09/22/24 113.4 kg (250 lb)   08/14/24 119.7 kg (263 lb 14.3 oz)   07/29/24 104.3 kg (229 lb 15 oz)   06/25/24 104.6 kg (230 lb 11.2 oz)   06/24/24 104.3 kg (230 lb)   06/20/24 102.5 kg (226 lb)            Acute on chronic respiratory failure with hypoxia and hypercapnia  Patient with Hypercapnic and Hypoxic Respiratory failure which is Acute on chronic.  she is on home oxygen at 2 LPM. Supplemental oxygen was provided and noted- Oxygen Concentration (%):  [50] 50     Signs/symptoms of respiratory failure include- tachypnea, increased work of breathing, respiratory distress, use of accessory muscles, and  wheezing. Contributing diagnoses includes - CHF and COPD Labs and images were reviewed.   Patient Has not had a recent ABG.     Will treat underlying causes and adjust management of respiratory failure as follows- continuous O2 supplementation, BiPAP, duo nebs, scheduled lasix     Paroxysmal A-fib  Patient has  atrial fibrillation. Patient is currently in . POEWU2CDHl Score: 3. The patients heart rate in the last 24 hours is as follows:  Pulse  Min: 53  Max: 90     Antiarrhythmics   BB on hold due to bradycardia from propofol gtt    Anticoagulants  enoxaparin injection 40 mg, Every 24 hours, Subcutaneous    Plan  - Replete lytes with a goal of K>4, Mg >2  - Patient's afib is currently stable, she has history of dash-tachy fluctuation, subsequent intubation on propofol gtt, now bradycardic, hold coreg for now, previous records showed on eliquis, care everywhere patient has not be seen by Cardiology except for and how during previous admission in August, unclear which medication patient is taking, will try and get with family member to verify medication patient taking at home and if she was taken off blood thinner at some point since her August admission            VTE Risk Mitigation (From admission, onward)           Ordered     enoxaparin injection 40 mg  Every 24 hours         12/05/24 1654     IP VTE HIGH RISK PATIENT  Once         11/29/24 1726     Place sequential compression device  Until discontinued         11/29/24 1726                    Discharge Planning   TIGRE:      Code Status: DNR   Medical Readiness for Discharge Date:   Discharge Plan A: Hospital at home, Hospice/home   Discharge Delays: None known at this time            Please place Justification for DME        Gunnar Mott DO  Department of Hospital Medicine   [place] [facility]

## 2024-12-10 NOTE — PLAN OF CARE
Care plan reviewed and updated. Cont with Bipap/VM. Tolerating PO intake. Hardwick draining without difficulty. Wound care completed without adverse reaction. Accuchecks- no coverage needed. Potassium replaced.

## 2024-12-10 NOTE — ASSESSMENT & PLAN NOTE
Patient's SpO2 drops to 88% when off BiPAP with tachypnea of high 20-30s.   Continuous BiPAP   Mode: BIPAP  FiO2%: 50  Inspiratory pressure: 16  Expiratory pressure: 7  QHS isn't meant for rescue BIPAP - please use continuous instead., BIPAP QHS and PRN for shortness of breath Back up rate 8 Wean FIO2 to keep sats > = 88%

## 2024-12-10 NOTE — PLAN OF CARE
Problem: SLP  Goal: SLP Goal  Description:   1. Pt will tolerate least restrictive oral diet to maintain adequate nutrition/hydration w/o acute dysphagia-related pulmonary complication.  2. Pt will participate in diagnostic swallow therapy to guide further management.   Outcome: Progressing

## 2024-12-10 NOTE — SUBJECTIVE & OBJECTIVE
Interval History: Patient seen and examined.     Review of Systems   Constitutional:  Positive for activity change (improving). Negative for appetite change, chills, fatigue and fever.   HENT:  Negative for sore throat.    Respiratory:  Negative for choking, chest tightness and shortness of breath.    Cardiovascular:  Negative for chest pain, palpitations and leg swelling.   Gastrointestinal:  Negative for abdominal pain.   Musculoskeletal:  Negative for gait problem, joint swelling, neck pain and neck stiffness.   Neurological:  Positive for weakness. Negative for dizziness, tremors, seizures, facial asymmetry and headaches.   Psychiatric/Behavioral:  Negative for agitation, behavioral problems, confusion, decreased concentration, dysphoric mood and sleep disturbance. The patient is not nervous/anxious and is not hyperactive.      Objective:     Vital Signs (Most Recent):  Temp: 97.4 °F (36.3 °C) (12/10/24 1105)  Pulse: 96 (12/10/24 1215)  Resp: (!) 29 (12/10/24 1215)  BP: 105/75 (12/10/24 0838)  SpO2: (!) 94 % (12/10/24 1215) Vital Signs (24h Range):  Temp:  [97.1 °F (36.2 °C)-97.5 °F (36.4 °C)] 97.4 °F (36.3 °C)  Pulse:  [68-96] 96  Resp:  [17-29] 29  SpO2:  [81 %-98 %] 94 %  BP: (100-133)/(54-86) 105/75     Weight: 122.5 kg (270 lb)  Body mass index is 46.35 kg/m².    Intake/Output Summary (Last 24 hours) at 12/10/2024 1303  Last data filed at 12/10/2024 0810  Gross per 24 hour   Intake 680 ml   Output 1100 ml   Net -420 ml         Physical Exam  Vitals and nursing note reviewed.   Constitutional:       General: She is not in acute distress.     Appearance: She is obese. She is not ill-appearing or toxic-appearing.   HENT:      Head: Normocephalic.      Mouth/Throat:      Comments: BiPAP mask  Eyes:      Extraocular Movements: Extraocular movements intact.      Conjunctiva/sclera: Conjunctivae normal.   Cardiovascular:      Rate and Rhythm: Normal rate and regular rhythm.      Pulses: Normal pulses.      Heart  sounds: No murmur heard.  Pulmonary:      Effort: No respiratory distress.   Abdominal:      General: There is no distension.      Palpations: Abdomen is soft.      Tenderness: There is no abdominal tenderness.      Hernia: No hernia is present.   Musculoskeletal:      Cervical back: Neck supple. No rigidity or tenderness.      Right lower leg: No edema.      Left lower leg: No edema.   Skin:     General: Skin is warm and dry.      Capillary Refill: Capillary refill takes less than 2 seconds.      Coloration: Skin is not pale.      Findings: No erythema or rash.   Neurological:      Mental Status: She is alert and oriented to person, place, and time. Mental status is at baseline.      Motor: Weakness (bed bound limited use of upper and lower extremities bilaterally) present.   Psychiatric:         Mood and Affect: Mood normal.         Behavior: Behavior normal.         Thought Content: Thought content normal.         Judgment: Judgment normal.             Significant Labs: All pertinent labs within the past 24 hours have been reviewed.  None    Significant Imaging: I have reviewed all pertinent imaging results/findings within the past 24 hours.

## 2024-12-10 NOTE — PLAN OF CARE
Per patient's nurse, the patient's daughter, Esther came to see the patient regarding her bank statements. The patient gave her daughter consent to move forward with obtaining her statement from her bank. Jenn with [facility] was informed.

## 2024-12-10 NOTE — ASSESSMENT & PLAN NOTE
Patient refusal to participate in PT and OT efforts. Patient requires assistance for all ADLs.   Discharge plans;  placement to nursing home

## 2024-12-10 NOTE — NURSING
Pt rested. BiPap maintained. Ventimask when taking meds. Sat ranged 85-87% Congested cough. No resp distress. Fluid restriction. Glucose monitoring. Insulin as needed. Meds as ordered. Hardwick maintained. Hardwick care per protocol. Mepliex in place. Incontinent of bowel times, staff assisted. Wrinkling to the feet. Daily weight as ordered. No c/o pain. Continue to observe.

## 2024-12-10 NOTE — PT/OT/SLP PROGRESS
Speech Language Pathology Treatment    Patient Name:  Carmen Tan   MRN:  [id number]  Admitting Diagnosis: Acute on chronic diastolic congestive heart failure    Recommendations:                 General Recommendations:   f/u for diet toleration  Diet recommendations:  Regular Diet - IDDSI Level 7, Liquid Diet Level: Thin liquids - IDDSI Level 0   Aspiration Precautions: 1 bite/sip at a time, Assistance with meals, Frequent oral care, HOB to 90 degrees, Monitor for s/s of aspiration, Small bites/sips, Standard aspiration precautions, and Wear oxygen during intake   General Precautions: Standard, respiratory, fall  Communication strategies:  none    Assessment:     Carmen Tan is a 74 y.o. female w/ an SLP diagnosis of functional swallow for PO intake. No clinical signs of oropharyngeal dysphagia and/or dysphagia-related aspiration appreciated at bedside this date. Pt appears safe for diet upgrade to regular oral diet combined w/ aspiration precautions outlined above. Pt also demonstrates improved voicing and communication this date. ST to f/u for diet toleration.    Subjective     Communicated w/ nurse prior who reports pt tolerating soft & bite-sized diet w/ no clinical signs of aspiration; clears pt for ST tx. Pt found awake/alert. Pt denies dysphagia-related reports/concerns. Able to follow simple commands.      Patient goals: none stated     Pain/Comfort:  Pain Rating 1: 0/10    Respiratory Status:  venti mask    Objective:     Has the patient been evaluated by SLP for swallowing?   Yes  Keep patient NPO? No   Current Respiratory Status: SpO2 87-91% throughout session     Pt seen today for dysphagia therapy.     Pt administered the following consistencies:  Thin liquids via self-regulated straw drinking x5+  Puree via 1/2 tsp x2 and whole tsp x3  Soft solids via 2 diced fruit at a time x3  Solids via 1/4 caryn cracker x2       In the oral phase of swallowing, pt demonstrates the following:    WFL  Lingual residue; clears w/ liquid wash    No clinical signs of pharyngeal dysphagia appreciated at bedside across consistencies this date.     Goals:   Multidisciplinary Problems       SLP Goals          Problem: SLP    Goal Priority Disciplines Outcome   SLP Goal     SLP Progressing   Description:   1. Pt will tolerate least restrictive oral diet to maintain adequate nutrition/hydration w/o acute dysphagia-related pulmonary complication.  2. Pt will participate in diagnostic swallow therapy to guide further management.                        Plan:     Patient to be seen:  4 x/week  Plan of Care expires:  12/19/24  Plan of Care reviewed with:  patient   SLP Follow-Up:  Yes       Discharge recommendations:  No Therapy Indicated   Barriers to Discharge:  None    Time Tracking:     SLP Treatment Date:   12/10/24  Speech Start Time:  1416  Speech Stop Time:  1425     Speech Total Time (min):  9 min    Billable Minutes: Treatment Swallowing Dysfunction x 9    12/10/2024

## 2024-12-10 NOTE — ASSESSMENT & PLAN NOTE
"Patient has Diastolic (HFpEF) heart failure that is Acute on chronic. On presentation their CHF was decompensated. Evidence of decompensated CHF on presentation includes: edema, orthopnea, dyspnea on exertion (MELISSA), and shortness of breath. The etiology of their decompensation is likely medication non-compliance?diet? Most recent BNP and echo results are listed below.  No results for input(s): "BNP" in the last 72 hours.  Latest ECHO  Results for orders placed during the hospital encounter of 07/28/24    Echo Saline Bubble? No; Ultrasound enhancing contrast? No  Interpretation Summary    Left Ventricle: The left ventricle is normal in size. Mildly increased wall thickness. There is normal systolic function with a visually estimated ejection fraction of 55 - 60%. Grade I diastolic dysfunction.    Right Ventricle: Normal right ventricular cavity size. Systolic function is normal.    Left Atrium: Left atrium is severely dilated. Atrial septum is bulging to the right.    Right Atrium: Right atrium is moderately dilated.    Aortic Valve: The aortic valve is a trileaflet valve. Mildly restricted motion. There is mild stenosis. Aortic valve area by VTI is 1.84 cm². Aortic valve peak velocity is 2.18 m/s. Mean gradient is 10 mmHg. The dimensionless index is 0.58. There is mild aortic regurgitation.    Mitral Valve: There is mild regurgitation.    Tricuspid Valve: There is mild regurgitation.    Pulmonic Valve: There is mild to moderate regurgitation.    No pericardial effusion.    Current Heart Failure Medications  carvediloL tablet 3.125 mg, 2 times daily, Oral  valsartan tablet 160 mg, 2 times daily, Oral  furosemide tablet 20 mg, 2 times daily, Oral    Plan  - Monitor strict I&Os and daily weights.    - Place on telemetry  - Low sodium diet  - Place on fluid restriction of 1.5 L.   - Cardiology has been consulted, follow up recommendations  - The patient's volume status is stable but not at their baseline as indicated by " edema and shortness of breath and respiratory distress requiring intubation  -  transition to PO lasix from IV

## 2024-12-10 NOTE — PLAN OF CARE
Problem: Adult Inpatient Plan of Care  Goal: Patient-Specific Goal (Individualized)  Outcome: Progressing  Goal: Absence of Hospital-Acquired Illness or Injury  Outcome: Progressing  Goal: Optimal Comfort and Wellbeing  Outcome: Progressing  Goal: Readiness for Transition of Care  Outcome: Progressing     Problem: Bariatric Environmental Safety  Goal: Safety Maintained with Care  Outcome: Progressing     Problem: Diabetes Comorbidity  Goal: Blood Glucose Level Within Targeted Range  Outcome: Progressing     Problem: Skin Injury Risk Increased  Goal: Skin Health and Integrity  Outcome: Progressing     Problem: Heart Failure  Goal: Optimal Coping  Outcome: Progressing  Goal: Optimal Cardiac Output  Outcome: Progressing  Goal: Stable Heart Rate and Rhythm  Outcome: Progressing  Goal: Optimal Functional Ability  Outcome: Progressing  Goal: Fluid and Electrolyte Balance  Outcome: Progressing  Goal: Improved Oral Intake  Outcome: Progressing  Goal: Effective Oxygenation and Ventilation  Outcome: Progressing  Goal: Effective Breathing Pattern During Sleep  Outcome: Progressing     Problem: COPD (Chronic Obstructive Pulmonary Disease)  Goal: Optimal Chronic Illness Coping  Outcome: Progressing  Goal: Optimal Level of Functional [place]  Outcome: Progressing  Goal: Absence of Infection Signs and Symptoms  Outcome: Progressing  Goal: Improved Oral Intake  Outcome: Progressing  Goal: Effective Oxygenation and Ventilation  Outcome: Progressing     Problem: Fluid Volume Excess  Goal: Fluid Balance  Outcome: Progressing     Problem: Fall Injury Risk  Goal: Absence of Fall and Fall-Related Injury  Outcome: Progressing     Problem: Infection  Goal: Absence of Infection Signs and Symptoms  Outcome: Progressing     Problem: Noninvasive Ventilation Acute  Goal: Effective Unassisted Ventilation and Oxygenation  Outcome: Progressing     Problem: Electrolyte Imbalance  Goal: Electrolyte Balance  Outcome: Progressing

## 2024-12-10 NOTE — PROGRESS NOTES
[facility]  Progress Note    Patient Name: Carmen Tan  MRN: [id number]  Patient Class: IP- Inpatient   Admission Date: 11/29/2024  Length of Stay: 11 days  Attending Physician: Gunnar Mott DO  Primary Care Provider: Lucian Barry MD        Subjective     Principal Problem:Acute on chronic diastolic congestive heart failure        HPI:  Patient 74-year-old female past medical history of chronic diastolic heart failure, AFib, chronic respiratory failure with hypoxemia and hypercapnia, anxiety depression, type 2 diabetes, GERD, hypothyroidism.  Patient came in after noted to be short of breath at home, patient was satting in the 70 on home BiPAP therapy, was brought to the ED noted to be in distress tachycardic hypotensive hypoxic, workup in the ED patient with enlarged cardiac silhouette, chronic changes no acute finding, elevated BNP 25,000, mild elevation in troponin negative COVID no leukocytosis, patient was given nitroglycerin and Lasix injection, admitted to ICU.    Overview/Hospital Course:  12/1 AA, weekend crosscover, overnight patient had two ABG, eICU MD adjusted vent settings, had repeat chest x-ray which showed moderate right thorax, vital signs stable, O2 sat maintaining, surgery consulted for chest tube placement, patient was started on steroids yesterday for COPD flare, diuresis in progress, continue to monitor urine output, follow-up chest x-ray after chest tube placement, follow-up with surgery rec  12/2/24 ABG 7.395/52.4/87.3/29.8  improving respiratory status on AC Vt 450, RR18, PEEP 6, FiO2 55%, SpO2 98%, proprofol 25mcg/kg/min. CXR with right pneumothorax resolved, diuresing on scheduled IV lasix with adequate urine output. Patient minimally responsive to sternal rub. No elevated temperature. Urine culture positive for gram positive aerococcus spp, add IV rocephin. Continue with vent weaning and daily SBT, scheduled breathing treatments,  solumedrol. Follow up recommendations cardiology. Follow up general surgery with chest tube management.    12/3/24 ABG 7.423/52.7/72.7/32.3, FiO2 40%. Off sedation awake and following commands. CPAP trial followed with tachypnea 40s, will maintain current vent settings and prepare for SBT and extubation tomorrow AM. Right lateral chest tube has been clamped. CXR overnight unchanged, no new pneumothorax. Chest tube management per GS. BMx1. Start enteral trickle feeds and advance per protocol.   12/4/24 Stable ABG and vent settings. Tolerating tube feeds with no residual. Patient awake and alert and requests to have ET tube to be removed. Patient further affirms she does not want to be re-intubated afterwards. Plan to extubate and transition to BiPAP.   12/5/24 No acute events overnight. Awake, on venturi mask FiO2 50%, SpO2 100%. Right chest tube in place remains clamped. Declines therapy work complaining of pain in her throat and upper chest. Renal function improving, adequate urine output. Continue with supplemental oxygen and rest from BiPAP for nutrition and meds per OG tube. Follow up speech. Follow up GS recommendations.     12/6/24 On BiPAP maintaining SpO2 >95%. Failed swallow with speech. OG tube remains in place. Arousable to voice, responds to questions with hand squeezes and motion. Right chest tube removed yesterday and no desaturations reported. CXR showing no pneumothorax. Mild hypernatremia, will increase free water via OG tube and monitor I/Os. Renal function stable with appropriate clear urine output in Hardwick catheter. Leukocytosis resolved. Continue with scheduled IV lasix, scheduled breathing treatments, supplemental oxygen. Aspiration precautions, maintain OG tube until follow up swallow assessment.    12/7/24 Per nursing minimal sleep overnight. Respiratory rate stable on BiPAP maintain SpO2 >98%. Will discontinue OG tube and follow ST precautions with monitoring PO tolerance with ice chips.  Continue to taper IV steroids, scheduled lasix. Plan for nursing home placement with hospice.   12/8/24 OG tube discontinued. Tolerated ice chips and clear liquids per nursing. No elevated temperature. Patient requires BiPAP to maintain SpO2 >90%. Alert and awake. Throat pain resolving. Will advance diet. Aspiration precautions in place. Expectant nursing home placement with hospice tomorrow.   12/9/24 No acute events overnight. Patient sitting upright in bed eating breakfast assisted by nursing staff. All meds to be administered orally. Continue aspiration precautions, BiPAP at nursing home. Follow up CM and SW with placement arrangements.    12/10/24 Sitting upright, able to converse better with venturi mask, off BiPAP patient's SpO2 88%. Patient in no respiratory distress, denies associated symptoms. PO intake improving, reported regular bowel movements. Urine output reduced with reduce dosing of lasix and transitioning from IV to PO. Continue with all other supportive care. Follow up Cm and SW with placement status.     Interval History: Patient seen and examined.     Review of Systems   Constitutional:  Positive for activity change (improving). Negative for appetite change, chills, fatigue and fever.   HENT:  Negative for sore throat.    Respiratory:  Negative for choking, chest tightness and shortness of breath.    Cardiovascular:  Negative for chest pain, palpitations and leg swelling.   Gastrointestinal:  Negative for abdominal pain.   Musculoskeletal:  Negative for gait problem, joint swelling, neck pain and neck stiffness.   Neurological:  Positive for weakness. Negative for dizziness, tremors, seizures, facial asymmetry and headaches.   Psychiatric/Behavioral:  Negative for agitation, behavioral problems, confusion, decreased concentration, dysphoric mood and sleep disturbance. The patient is not nervous/anxious and is not hyperactive.      Objective:     Vital Signs (Most Recent):  Temp: 97.4 °F (36.3  °C) (12/10/24 1105)  Pulse: 96 (12/10/24 1215)  Resp: (!) 29 (12/10/24 1215)  BP: 105/75 (12/10/24 0838)  SpO2: (!) 94 % (12/10/24 1215) Vital Signs (24h Range):  Temp:  [97.1 °F (36.2 °C)-97.5 °F (36.4 °C)] 97.4 °F (36.3 °C)  Pulse:  [68-96] 96  Resp:  [17-29] 29  SpO2:  [81 %-98 %] 94 %  BP: (100-133)/(54-86) 105/75     Weight: 122.5 kg (270 lb)  Body mass index is 46.35 kg/m².    Intake/Output Summary (Last 24 hours) at 12/10/2024 1303  Last data filed at 12/10/2024 0810  Gross per 24 hour   Intake 680 ml   Output 1100 ml   Net -420 ml         Physical Exam  Vitals and nursing note reviewed.   Constitutional:       General: She is not in acute distress.     Appearance: She is obese. She is not ill-appearing or toxic-appearing.   HENT:      Head: Normocephalic.      Mouth/Throat:      Comments: BiPAP mask  Eyes:      Extraocular Movements: Extraocular movements intact.      Conjunctiva/sclera: Conjunctivae normal.   Cardiovascular:      Rate and Rhythm: Normal rate and regular rhythm.      Pulses: Normal pulses.      Heart sounds: No murmur heard.  Pulmonary:      Effort: No respiratory distress.   Abdominal:      General: There is no distension.      Palpations: Abdomen is soft.      Tenderness: There is no abdominal tenderness.      Hernia: No hernia is present.   Musculoskeletal:      Cervical back: Neck supple. No rigidity or tenderness.      Right lower leg: No edema.      Left lower leg: No edema.   Skin:     General: Skin is warm and dry.      Capillary Refill: Capillary refill takes less than 2 seconds.      Coloration: Skin is not pale.      Findings: No erythema or rash.   Neurological:      Mental Status: She is alert and oriented to person, place, and time. Mental status is at baseline.      Motor: Weakness (bed bound limited use of upper and lower extremities bilaterally) present.   Psychiatric:         Mood and Affect: Mood normal.         Behavior: Behavior normal.         Thought Content: Thought  "content normal.         Judgment: Judgment normal.             Significant Labs: All pertinent labs within the past 24 hours have been reviewed.  None    Significant Imaging: I have reviewed all pertinent imaging results/findings within the past 24 hours.    Assessment and Plan     * Acute on chronic diastolic congestive heart failure  Patient has Diastolic (HFpEF) heart failure that is Acute on chronic. On presentation their CHF was decompensated. Evidence of decompensated CHF on presentation includes: edema, orthopnea, dyspnea on exertion (MELISSA), and shortness of breath. The etiology of their decompensation is likely medication non-compliance?diet? Most recent BNP and echo results are listed below.  No results for input(s): "BNP" in the last 72 hours.  Latest ECHO  Results for orders placed during the hospital encounter of 07/28/24    Echo Saline Bubble? No; Ultrasound enhancing contrast? No  Interpretation Summary    Left Ventricle: The left ventricle is normal in size. Mildly increased wall thickness. There is normal systolic function with a visually estimated ejection fraction of 55 - 60%. Grade I diastolic dysfunction.    Right Ventricle: Normal right ventricular cavity size. Systolic function is normal.    Left Atrium: Left atrium is severely dilated. Atrial septum is bulging to the right.    Right Atrium: Right atrium is moderately dilated.    Aortic Valve: The aortic valve is a trileaflet valve. Mildly restricted motion. There is mild stenosis. Aortic valve area by VTI is 1.84 cm². Aortic valve peak velocity is 2.18 m/s. Mean gradient is 10 mmHg. The dimensionless index is 0.58. There is mild aortic regurgitation.    Mitral Valve: There is mild regurgitation.    Tricuspid Valve: There is mild regurgitation.    Pulmonic Valve: There is mild to moderate regurgitation.    No pericardial effusion.    Current Heart Failure Medications  carvediloL tablet 3.125 mg, 2 times daily, Oral  valsartan tablet 160 mg, 2 " times daily, Oral  furosemide tablet 20 mg, 2 times daily, Oral    Plan  - Monitor strict I&Os and daily weights.    - Place on telemetry  - Low sodium diet  - Place on fluid restriction of 1.5 L.   - Cardiology has been consulted, follow up recommendations  - The patient's volume status is stable but not at their baseline as indicated by edema and shortness of breath and respiratory distress requiring intubation  -  transition to PO lasix from IV        BiPAP (biphasic positive airway pressure) dependence  Patient's SpO2 drops to 88% when off BiPAP with tachypnea of high 20-30s.   Continuous BiPAP   Mode: BIPAP  FiO2%: 50  Inspiratory pressure: 16  Expiratory pressure: 7  QHS isn't meant for rescue BIPAP - please use continuous instead., BIPAP QHS and PRN for shortness of breath Back up rate 8 Wean FIO2 to keep sats > = 88%       Impaired mobility and ADLs  Patient refusal to participate in PT and OT efforts. Patient requires assistance for all ADLs.   Discharge plans;  placement to nursing home        Swallowing impaired  ET tube extubation on 12/4/24 with complaints of sore throat. OG tube in place. Failed initial swallow evaluation per ST post extubation.   12/9/24 Tolerating PO intake of solids and liquids without regurgitation.   - HOB to 90 degrees  - Ice chips sparingly  - Monitor for s/s of aspiration  - Puree for pleasure  - Small bites/sips  - Strict aspiration precautions, and Wear oxygen during intake       ACP (advance care planning)  12/3/24 Patient once extubated she does not want to be re-intubated  12/4/24 Family would like information on home hospice services  12/5/24 Plan for [facility] placement to nursing home per CM and SW      Other pneumothorax  Resolved.   Patient developed pneumothorax right lower lobe, moderate, surgery consulted for chest tube placement and eval.  Right chest tube discontinued on 12/5/24.   CXR (12/6/24) with no interval changes, no new pneumothorax           Hard to  intubate  Extubated on 12/4/24, stable respiratory status.   Patient is now DNR/DNI  - continuous O2 supplementation with BiPAP  - goal SpO2 >92%  - continue daily pulmonary hygiene care with breathing treatments, tapering solumedrol, encourage deep breathing      Hypothyroidism  Continue levothyroxine       HLD (hyperlipidemia)  Continue statin      Gastroesophageal reflux disease without esophagitis  PPI on board      Acute cystitis without hematuria  11/30/24 Urine culture positive for aerococcus urinae. Completed Rocephin 5 days total.     Estimated Creatinine Clearance: 65.6 mL/min (based on SCr of 1 mg/dL).  Stable renal function.   - Monitor I/Os       Anxiety and depression  Patient has persistent depression which is unknown and is currently controlled. Will Continue anti-depressant medications. We will not consult psychiatry at this time. Patient does not display psychosis at this time. Continue to monitor closely and adjust plan of care as needed.  - trazodone PRN for sleep      Diabetes mellitus, type 2  Patient's FSGs are controlled on current medication regimen.  Last A1c reviewed-   Lab Results   Component Value Date    HGBA1C 5.2 11/30/2024     Most recent fingerstick glucose reviewed-   Recent Labs   Lab 12/07/24 2022 12/08/24  1139 12/08/24  1650   POCTGLUCOSE 90 89 115*       Current correctional scale  Low  Maintain anti-hyperglycemic dose as follows-   Antihyperglycemics (From admission, onward)      Start     Stop Route Frequency Ordered    11/30/24 1324  insulin aspart U-100 pen 0-5 Units         -- SubQ Every 6 hours PRN 11/30/24 1225          Hold Oral hypoglycemics while patient is in the hospital.    Severe obesity (BMI >= 40)  Body mass index is 47.38 kg/m². Morbid obesity complicates all aspects of disease management from diagnostic modalities to treatment. Weight loss encouraged and health benefits explained to patient..     Wt Readings from Last 8 Encounters:   12/06/24 125.2 kg (276  lb)   10/16/24 113.4 kg (250 lb)   09/22/24 113.4 kg (250 lb)   08/14/24 119.7 kg (263 lb 14.3 oz)   07/29/24 104.3 kg (229 lb 15 oz)   06/25/24 104.6 kg (230 lb 11.2 oz)   06/24/24 104.3 kg (230 lb)   06/20/24 102.5 kg (226 lb)            Acute on chronic respiratory failure with hypoxia and hypercapnia  Patient with Hypercapnic and Hypoxic Respiratory failure which is Acute on chronic.  she is on home oxygen at 2 LPM. Supplemental oxygen was provided and noted- Oxygen Concentration (%):  [50] 50     Signs/symptoms of respiratory failure include- tachypnea, increased work of breathing, respiratory distress, use of accessory muscles, and wheezing. Contributing diagnoses includes - CHF and COPD Labs and images were reviewed.   Patient Has not had a recent ABG.     Will treat underlying causes and adjust management of respiratory failure as follows- continuous O2 supplementation, BiPAP, duo nebs, scheduled lasix     Paroxysmal A-fib  Patient has  atrial fibrillation. Patient is currently in . [id number] Score: 3. The patients heart rate in the last 24 hours is as follows:  Pulse  Min: 53  Max: 90     Antiarrhythmics   BB on hold due to bradycardia from propofol gtt    Anticoagulants  enoxaparin injection 40 mg, Every 24 hours, Subcutaneous    Plan  - Replete lytes with a goal of K>4, Mg >2  - Patient's afib is currently stable, she has history of dash-tachy fluctuation, subsequent intubation on propofol gtt, now bradycardic, hold coreg for now, previous records showed on eliquis, care everywhere patient has not be seen by Cardiology except for and how during previous admission in August, unclear which medication patient is taking, will try and get with family member to verify medication patient taking at home and if she was taken off blood thinner at some point since her August admission            VTE Risk Mitigation (From admission, onward)           Ordered     enoxaparin injection 40 mg  Every 24 hours          12/05/24 1654     IP VTE HIGH RISK PATIENT  Once         11/29/24 1726     Place sequential compression device  Until discontinued         11/29/24 1726                    Discharge Planning   TIGRE:      Code Status: DNR   Medical Readiness for Discharge Date:   Discharge Plan A: Hospital at home, Hospice/home   Discharge Delays: None known at this time            Please place Justification for DME        Gunnar Mott DO  Department of Hospital Medicine   [place] - [facility]

## 2024-12-11 LAB
ALBUMIN SERPL BCP-MCNC: 2.3 G/DL (ref 3.5–5.2)
ALP SERPL-CCNC: 93 U/L (ref 55–135)
ALT SERPL W/O P-5'-P-CCNC: 15 U/L (ref 10–44)
ANION GAP SERPL CALC-SCNC: 0 MMOL/L (ref 3–11)
AST SERPL-CCNC: 14 U/L (ref 10–40)
BASOPHILS # BLD AUTO: 0 K/UL (ref 0–0.2)
BASOPHILS NFR BLD: 0 % (ref 0–1.9)
BILIRUB SERPL-MCNC: 0.6 MG/DL (ref 0.1–1)
BUN SERPL-MCNC: 18 MG/DL (ref 8–23)
CALCIUM SERPL-MCNC: 9.5 MG/DL (ref 8.7–10.5)
CHLORIDE SERPL-SCNC: 100 MMOL/L (ref 95–110)
CO2 SERPL-SCNC: 43 MMOL/L (ref 23–29)
CREAT SERPL-MCNC: 0.9 MG/DL (ref 0.5–1.4)
DIFFERENTIAL METHOD BLD: ABNORMAL
EOSINOPHIL # BLD AUTO: 0 K/UL (ref 0–0.5)
EOSINOPHIL NFR BLD: 0.4 % (ref 0–8)
ERYTHROCYTE [DISTWIDTH] IN BLOOD BY AUTOMATED COUNT: 15.1 % (ref 11.5–14.5)
EST. GFR  (NO RACE VARIABLE): >60 ML/MIN/1.73 M^2
GLUCOSE SERPL-MCNC: 131 MG/DL (ref 70–110)
HCT VFR BLD AUTO: 38.4 % (ref 37–48.5)
HGB BLD-MCNC: 11.5 G/DL (ref 12–16)
IMM GRANULOCYTES # BLD AUTO: 0.04 K/UL (ref 0–0.04)
IMM GRANULOCYTES NFR BLD AUTO: 0.6 % (ref 0–0.5)
LYMPHOCYTES # BLD AUTO: 1.3 K/UL (ref 1–4.8)
LYMPHOCYTES NFR BLD: 18.1 % (ref 18–48)
MAGNESIUM SERPL-MCNC: 1.9 MG/DL (ref 1.6–2.6)
MCH RBC QN AUTO: 28.4 PG (ref 27–31)
MCHC RBC AUTO-ENTMCNC: 29.9 G/DL (ref 32–36)
MCV RBC AUTO: 95 FL (ref 82–98)
MONOCYTES # BLD AUTO: 0.8 K/UL (ref 0.3–1)
MONOCYTES NFR BLD: 10.7 % (ref 4–15)
NEUTROPHILS # BLD AUTO: 5.1 K/UL (ref 1.8–7.7)
NEUTROPHILS NFR BLD: 70.2 % (ref 38–73)
NRBC BLD-RTO: 0 /100 WBC
PLATELET # BLD AUTO: 231 K/UL (ref 150–450)
PMV BLD AUTO: 9.7 FL (ref 9.2–12.9)
POCT GLUCOSE: 110 MG/DL (ref 70–110)
POCT GLUCOSE: 111 MG/DL (ref 70–110)
POCT GLUCOSE: 118 MG/DL (ref 70–110)
POCT GLUCOSE: 120 MG/DL (ref 70–110)
POCT GLUCOSE: 129 MG/DL (ref 70–110)
POTASSIUM SERPL-SCNC: 4.2 MMOL/L (ref 3.5–5.1)
PROT SERPL-MCNC: 6 G/DL (ref 6–8.4)
RBC # BLD AUTO: 4.05 M/UL (ref 4–5.4)
SARS-COV-2 RNA RESP QL NAA+PROBE: NOT DETECTED
SODIUM SERPL-SCNC: 143 MMOL/L (ref 136–145)
WBC # BLD AUTO: 7.2 K/UL (ref 3.9–12.7)

## 2024-12-11 PROCEDURE — 11000001 HC ACUTE MED/SURG PRIVATE ROOM

## 2024-12-11 PROCEDURE — 27100171 HC OXYGEN HIGH FLOW UP TO 24 HOURS

## 2024-12-11 PROCEDURE — 94761 N-INVAS EAR/PLS OXIMETRY MLT: CPT

## 2024-12-11 PROCEDURE — 94660 CPAP INITIATION&MGMT: CPT

## 2024-12-11 PROCEDURE — 83735 ASSAY OF MAGNESIUM: CPT | Performed by: STUDENT IN AN ORGANIZED HEALTH CARE EDUCATION/TRAINING PROGRAM

## 2024-12-11 PROCEDURE — 85025 COMPLETE CBC W/AUTO DIFF WBC: CPT | Performed by: STUDENT IN AN ORGANIZED HEALTH CARE EDUCATION/TRAINING PROGRAM

## 2024-12-11 PROCEDURE — 99900031 HC PATIENT EDUCATION (STAT)

## 2024-12-11 PROCEDURE — A4216 STERILE WATER/SALINE, 10 ML: HCPCS | Performed by: STUDENT IN AN ORGANIZED HEALTH CARE EDUCATION/TRAINING PROGRAM

## 2024-12-11 PROCEDURE — 25000003 PHARM REV CODE 250: Performed by: STUDENT IN AN ORGANIZED HEALTH CARE EDUCATION/TRAINING PROGRAM

## 2024-12-11 PROCEDURE — 87635 SARS-COV-2 COVID-19 AMP PRB: CPT | Performed by: STUDENT IN AN ORGANIZED HEALTH CARE EDUCATION/TRAINING PROGRAM

## 2024-12-11 PROCEDURE — 25000242 PHARM REV CODE 250 ALT 637 W/ HCPCS: Performed by: STUDENT IN AN ORGANIZED HEALTH CARE EDUCATION/TRAINING PROGRAM

## 2024-12-11 PROCEDURE — 36415 COLL VENOUS BLD VENIPUNCTURE: CPT | Performed by: STUDENT IN AN ORGANIZED HEALTH CARE EDUCATION/TRAINING PROGRAM

## 2024-12-11 PROCEDURE — 94640 AIRWAY INHALATION TREATMENT: CPT

## 2024-12-11 PROCEDURE — 99900035 HC TECH TIME PER 15 MIN (STAT)

## 2024-12-11 PROCEDURE — 99233 SBSQ HOSP IP/OBS HIGH 50: CPT | Mod: GV,,, | Performed by: STUDENT IN AN ORGANIZED HEALTH CARE EDUCATION/TRAINING PROGRAM

## 2024-12-11 PROCEDURE — 80053 COMPREHEN METABOLIC PANEL: CPT | Performed by: STUDENT IN AN ORGANIZED HEALTH CARE EDUCATION/TRAINING PROGRAM

## 2024-12-11 PROCEDURE — 63600175 PHARM REV CODE 636 W HCPCS: Performed by: STUDENT IN AN ORGANIZED HEALTH CARE EDUCATION/TRAINING PROGRAM

## 2024-12-11 RX ORDER — FUROSEMIDE 20 MG/1
20 TABLET ORAL 2 TIMES DAILY
Status: DISCONTINUED | OUTPATIENT
Start: 2024-12-11 | End: 2024-12-12 | Stop reason: HOSPADM

## 2024-12-11 RX ADMIN — LEVOTHYROXINE SODIUM 50 MCG: 50 TABLET ORAL at 05:12

## 2024-12-11 RX ADMIN — FAMOTIDINE 20 MG: 20 TABLET, FILM COATED ORAL at 09:12

## 2024-12-11 RX ADMIN — POTASSIUM CHLORIDE 10 MEQ: 750 TABLET, FILM COATED, EXTENDED RELEASE ORAL at 01:12

## 2024-12-11 RX ADMIN — BUDESONIDE 0.5 MG: 0.5 INHALANT RESPIRATORY (INHALATION) at 07:12

## 2024-12-11 RX ADMIN — ALBUTEROL SULFATE 2.5 MG: 2.5 SOLUTION RESPIRATORY (INHALATION) at 07:12

## 2024-12-11 RX ADMIN — POTASSIUM CHLORIDE 10 MEQ: 750 TABLET, FILM COATED, EXTENDED RELEASE ORAL at 09:12

## 2024-12-11 RX ADMIN — VALSARTAN 160 MG: 80 TABLET, FILM COATED ORAL at 09:12

## 2024-12-11 RX ADMIN — CARVEDILOL 3.12 MG: 3.12 TABLET, FILM COATED ORAL at 09:12

## 2024-12-11 RX ADMIN — ENOXAPARIN SODIUM 40 MG: 40 INJECTION SUBCUTANEOUS at 06:12

## 2024-12-11 RX ADMIN — ALBUTEROL SULFATE 2.5 MG: 2.5 SOLUTION RESPIRATORY (INHALATION) at 03:12

## 2024-12-11 RX ADMIN — FUROSEMIDE 40 MG: 40 TABLET ORAL at 09:12

## 2024-12-11 RX ADMIN — POLYETHYLENE GLYCOL (3350) 17 G: 17 POWDER, FOR SOLUTION ORAL at 09:12

## 2024-12-11 RX ADMIN — ALBUTEROL SULFATE 2.5 MG: 2.5 SOLUTION RESPIRATORY (INHALATION) at 11:12

## 2024-12-11 RX ADMIN — Medication 800 MG: at 09:12

## 2024-12-11 RX ADMIN — POTASSIUM CHLORIDE 10 MEQ: 750 TABLET, FILM COATED, EXTENDED RELEASE ORAL at 06:12

## 2024-12-11 RX ADMIN — ATORVASTATIN CALCIUM 40 MG: 40 TABLET, FILM COATED ORAL at 09:12

## 2024-12-11 RX ADMIN — Medication 10 ML: at 05:12

## 2024-12-11 RX ADMIN — ASPIRIN 325 MG ORAL TABLET 325 MG: 325 PILL ORAL at 09:12

## 2024-12-11 RX ADMIN — TRAZODONE HYDROCHLORIDE 50 MG: 50 TABLET ORAL at 09:12

## 2024-12-11 RX ADMIN — SENNOSIDES AND DOCUSATE SODIUM 2 TABLET: 50; 8.6 TABLET ORAL at 09:12

## 2024-12-11 RX ADMIN — FUROSEMIDE 20 MG: 20 TABLET ORAL at 06:12

## 2024-12-11 NOTE — ASSESSMENT & PLAN NOTE
Patient's FSGs are controlled on current medication regimen.  Last A1c reviewed-   Lab Results   Component Value Date    HGBA1C 5.2 11/30/2024     Most recent fingerstick glucose reviewed-   Recent Labs   Lab 12/10/24  1647 12/10/24  2042 12/11/24  0605 12/11/24  1158   POCTGLUCOSE 138* 129* 111* 120*       Current correctional scale  Low  Maintain anti-hyperglycemic dose as follows-   Antihyperglycemics (From admission, onward)      Start     Stop Route Frequency Ordered    11/30/24 1324  insulin aspart U-100 pen 0-5 Units         -- SubQ Every 6 hours PRN 11/30/24 1225          Hold Oral hypoglycemics while patient is in the hospital.

## 2024-12-11 NOTE — ASSESSMENT & PLAN NOTE
12/3/24 Patient once extubated she does not want to be re-intubated  12/4/24 Family would like information on home hospice services  12/5/24 Plan for group home placement to nursing home with hospice per BAKARI and FRANCOIS

## 2024-12-11 NOTE — PROGRESS NOTES
[facility] Medicine  Progress Note    Patient Name: Carmen Tan  MRN: [id number]  Patient Class: IP- Inpatient   Admission Date: 11/29/2024  Length of Stay: 12 days  Attending Physician: Gunnar Mott DO  Primary Care Provider: Lucian Barry MD        Subjective     Principal Problem:Acute on chronic diastolic congestive heart failure        HPI:  Patient 74-year-old female past medical history of chronic diastolic heart failure, AFib, chronic respiratory failure with hypoxemia and hypercapnia, anxiety depression, type 2 diabetes, GERD, hypothyroidism.  Patient came in after noted to be short of breath at home, patient was satting in the 70 on home BiPAP therapy, was brought to the ED noted to be in distress tachycardic hypotensive hypoxic, workup in the ED patient with enlarged cardiac silhouette, chronic changes no acute finding, elevated BNP 25,000, mild elevation in troponin negative COVID no leukocytosis, patient was given nitroglycerin and Lasix injection, admitted to ICU.    Overview/Hospital Course:  12/1 AA, weekend crosscover, overnight patient had two ABG, eICU MD adjusted vent settings, had repeat chest x-ray which showed moderate right thorax, vital signs stable, O2 sat maintaining, surgery consulted for chest tube placement, patient was started on steroids yesterday for COPD flare, diuresis in progress, continue to monitor urine output, follow-up chest x-ray after chest tube placement, follow-up with surgery rec  12/2/24 ABG 7.395/52.4/87.3/29.8  improving respiratory status on AC Vt 450, RR18, PEEP 6, FiO2 55%, SpO2 98%, proprofol 25mcg/kg/min. CXR with right pneumothorax resolved, diuresing on scheduled IV lasix with adequate urine output. Patient minimally responsive to sternal rub. No elevated temperature. Urine culture positive for gram positive aerococcus spp, add IV rocephin. Continue with vent weaning and daily SBT, scheduled breathing treatments,  solumedrol. Follow up recommendations cardiology. Follow up general surgery with chest tube management.    12/3/24 ABG 7.423/52.7/72.7/32.3, FiO2 40%. Off sedation awake and following commands. CPAP trial followed with tachypnea 40s, will maintain current vent settings and prepare for SBT and extubation tomorrow AM. Right lateral chest tube has been clamped. CXR overnight unchanged, no new pneumothorax. Chest tube management per GS. BMx1. Start enteral trickle feeds and advance per protocol.   12/4/24 Stable ABG and vent settings. Tolerating tube feeds with no residual. Patient awake and alert and requests to have ET tube to be removed. Patient further affirms she does not want to be re-intubated afterwards. Plan to extubate and transition to BiPAP.   12/5/24 No acute events overnight. Awake, on venturi mask FiO2 50%, SpO2 100%. Right chest tube in place remains clamped. Declines therapy work complaining of pain in her throat and upper chest. Renal function improving, adequate urine output. Continue with supplemental oxygen and rest from BiPAP for nutrition and meds per OG tube. Follow up speech. Follow up GS recommendations.     12/6/24 On BiPAP maintaining SpO2 >95%. Failed swallow with speech. OG tube remains in place. Arousable to voice, responds to questions with hand squeezes and motion. Right chest tube removed yesterday and no desaturations reported. CXR showing no pneumothorax. Mild hypernatremia, will increase free water via OG tube and monitor I/Os. Renal function stable with appropriate clear urine output in Hardwick catheter. Leukocytosis resolved. Continue with scheduled IV lasix, scheduled breathing treatments, supplemental oxygen. Aspiration precautions, maintain OG tube until follow up swallow assessment.    12/7/24 Per nursing minimal sleep overnight. Respiratory rate stable on BiPAP maintain SpO2 >98%. Will discontinue OG tube and follow ST precautions with monitoring PO tolerance with ice chips.  Continue to taper IV steroids, scheduled lasix. Plan for nursing home placement with hospice.   12/8/24 OG tube discontinued. Tolerated ice chips and clear liquids per nursing. No elevated temperature. Patient requires BiPAP to maintain SpO2 >90%. Alert and awake. Throat pain resolving. Will advance diet. Aspiration precautions in place. Expectant nursing home placement with hospice tomorrow.   12/9/24 No acute events overnight. Patient sitting upright in bed eating breakfast assisted by nursing staff. All meds to be administered orally. Continue aspiration precautions, BiPAP at nursing home. Follow up CM and SW with placement arrangements.    12/10/24 Sitting upright, able to converse better with venturi mask, off BiPAP patient's SpO2 88%. Patient in no respiratory distress, denies associated symptoms. PO intake improving, reported regular bowel movements. Urine output reduced with reduce dosing of lasix and transitioning from IV to PO. Continue with all other supportive care. Follow up CM and SW with placement status.   12/11/24 Patient alert and responsive to questions. Endorses feeling dry all around. Patient continues to diurese with PO lasix. Denies symptoms. On BiPAP FiO2 50%, IP/EP, 17/6, QHS and as needed for shortness of breath. Back up rate of 8, wean FiO2 to maintain SpO2 >88%. Vital signs stable, on current BiPAP settings maintaining SpO2 >96%. Cardiopulmonary status stable. Will reduce PO lasix to 20 mg BID.Continue with aspiration precautions. Hardwick catheter draining blood tinged urine. Encourage patient with PO hydration.      Interval History: Patient seen and examined.     Review of Systems   Constitutional:  Positive for activity change. Negative for appetite change, chills, fatigue and fever.   HENT:  Negative for sore throat.    Respiratory:  Negative for choking, chest tightness and shortness of breath.    Cardiovascular:  Negative for chest pain, palpitations and leg swelling.    Gastrointestinal:  Negative for abdominal pain.   Musculoskeletal:  Negative for gait problem, joint swelling, neck pain and neck stiffness.   Neurological:  Positive for weakness. Negative for dizziness, tremors, seizures, facial asymmetry and headaches.   Psychiatric/Behavioral:  Negative for agitation, behavioral problems, confusion, decreased concentration, dysphoric mood and sleep disturbance. The patient is not nervous/anxious and is not hyperactive.      Objective:     Vital Signs (Most Recent):  Temp: 98.7 °F (37.1 °C) (12/11/24 0703)  Pulse: 70 (12/11/24 1110)  Resp: (!) 25 (12/11/24 1110)  BP: 105/64 (12/11/24 0956)  SpO2: 96 % (12/11/24 1110) Vital Signs (24h Range):  Temp:  [97.2 °F (36.2 °C)-98.7 °F (37.1 °C)] 98.7 °F (37.1 °C)  Pulse:  [70-97] 70  Resp:  [22-29] 25  SpO2:  [85 %-100 %] 96 %  BP: ()/(55-74) 105/64     Weight: 122.5 kg (270 lb)  Body mass index is 46.35 kg/m².    Intake/Output Summary (Last 24 hours) at 12/11/2024 1311  Last data filed at 12/11/2024 0549  Gross per 24 hour   Intake 560 ml   Output 1400 ml   Net -840 ml         Physical Exam  Vitals and nursing note reviewed.   Constitutional:       General: She is not in acute distress.     Appearance: She is obese. She is not ill-appearing or toxic-appearing.   HENT:      Head: Normocephalic.      Mouth/Throat:      Comments: BiPAP mask  Eyes:      Extraocular Movements: Extraocular movements intact.      Conjunctiva/sclera: Conjunctivae normal.   Cardiovascular:      Rate and Rhythm: Normal rate and regular rhythm.      Pulses: Normal pulses.      Heart sounds: No murmur heard.  Pulmonary:      Effort: No respiratory distress.   Abdominal:      General: There is no distension.      Palpations: Abdomen is soft.      Tenderness: There is no abdominal tenderness.      Hernia: No hernia is present.   Musculoskeletal:      Cervical back: Neck supple. No rigidity or tenderness.      Right lower leg: No edema.      Left lower leg: No  "edema.   Skin:     General: Skin is warm and dry.      Capillary Refill: Capillary refill takes less than 2 seconds.      Coloration: Skin is not pale.      Findings: No erythema or rash.   Neurological:      Mental Status: She is alert and oriented to person, place, and time. Mental status is at baseline.      Motor: Weakness (bed bound limited use of upper and lower extremities bilaterally) present.   Psychiatric:         Mood and Affect: Mood normal.         Behavior: Behavior normal.         Thought Content: Thought content normal.         Judgment: Judgment normal.             Significant Labs: All pertinent labs within the past 24 hours have been reviewed.  A1C:   Recent Labs   Lab 07/28/24  2256 11/30/24  1237   HGBA1C 5.3 5.2     Recent Labs   Lab 12/05/24  0341 12/06/24  0357 12/07/24  0350 12/08/24  0348 12/09/24  0339   BILITOT 0.3 0.4 0.4 0.5 0.5     Recent Labs   Lab 12/11/24  1253   WBC 7.20   HGB 11.5*   HCT 38.4        Recent Labs   Lab 12/10/24  2042 12/11/24  0605 12/11/24  1158   POCTGLUCOSE 129* 111* 120*     Significant Imaging: I have reviewed all pertinent imaging results/findings within the past 24 hours.    Assessment and Plan     * Acute on chronic diastolic congestive heart failure  Patient has Diastolic (HFpEF) heart failure that is Acute on chronic. On presentation their CHF was decompensated. Evidence of decompensated CHF on presentation includes: edema, orthopnea, dyspnea on exertion (MELISSA), and shortness of breath. The etiology of their decompensation is likely medication non-compliance?diet? Most recent BNP and echo results are listed below.  No results for input(s): "BNP" in the last 72 hours.  Latest ECHO  Results for orders placed during the hospital encounter of 07/28/24    Echo Saline Bubble? No; Ultrasound enhancing contrast? No  Interpretation Summary    Left Ventricle: The left ventricle is normal in size. Mildly increased wall thickness. There is normal systolic " function with a visually estimated ejection fraction of 55 - 60%. Grade I diastolic dysfunction.    Right Ventricle: Normal right ventricular cavity size. Systolic function is normal.    Left Atrium: Left atrium is severely dilated. Atrial septum is bulging to the right.    Right Atrium: Right atrium is moderately dilated.    Aortic Valve: The aortic valve is a trileaflet valve. Mildly restricted motion. There is mild stenosis. Aortic valve area by VTI is 1.84 cm². Aortic valve peak velocity is 2.18 m/s. Mean gradient is 10 mmHg. The dimensionless index is 0.58. There is mild aortic regurgitation.    Mitral Valve: There is mild regurgitation.    Tricuspid Valve: There is mild regurgitation.    Pulmonic Valve: There is mild to moderate regurgitation.    No pericardial effusion.    Current Heart Failure Medications  carvediloL tablet 3.125 mg, 2 times daily, Oral  valsartan tablet 160 mg, 2 times daily, Oral  furosemide tablet 20 mg, 2 times daily, Oral    Plan  - Monitor strict I&Os and daily weights.    - Place on telemetry  - Low sodium diet  - Place on fluid restriction of 1.5 L.   - Cardiology has been consulted, follow up recommendations  - The patient's volume status is stable but not at their baseline as indicated by dyspnea on exertion (MELISSA) and shortness of breath   - requires BiPAP QHS and intermittently or PRN to maintain SpO2 >88%  -  transition to PO lasix from IV        BiPAP (biphasic positive airway pressure) dependence  Patient's SpO2 drops to 88% when off BiPAP with tachypnea of high 20-30s.   Continuous BiPAP   Mode: BIPAP  FiO2%: 50  Inspiratory pressure: 16  Expiratory pressure: 7  QHS isn't meant for rescue BIPAP - please use continuous instead., BIPAP QHS and PRN for shortness of breath Back up rate 8 Wean FIO2 to keep sats > = 88%       Impaired mobility and ADLs  Patient refusal to participate in PT and OT efforts. Patient requires assistance for all ADLs.   Discharge plans;  placement to  nursing home        Swallowing impaired  ET tube extubation on 12/4/24 with complaints of sore throat. OG tube in place. Failed initial swallow evaluation per ST post extubation.   12/9/24 Tolerating PO intake of solids and liquids without regurgitation.   - HOB to 90 degrees  - Ice chips sparingly  - Monitor for s/s of aspiration  - Puree for pleasure  - Small bites/sips  - Strict aspiration precautions, and Wear oxygen during intake       ACP (advance care planning)  12/3/24 Patient once extubated she does not want to be re-intubated  12/4/24 Family would like information on home hospice services  12/5/24 Plan for [facility] placement to nursing home with hospice per CM and SW      Other pneumothorax  Resolved.   Patient developed pneumothorax right lower lobe, moderate, surgery consulted for chest tube placement and eval.  Right chest tube discontinued on 12/5/24.   CXR (12/6/24) with no interval changes, no new pneumothorax           Hard to intubate  Extubated on 12/4/24, stable respiratory status.   Patient is now DNR/DNI  - continuous O2 supplementation with BiPAP  - goal SpO2 >92%  - continue daily pulmonary hygiene care with breathing treatments, tapering solumedrol, encourage deep breathing      Hypothyroidism  Continue levothyroxine       HLD (hyperlipidemia)  Continue statin      Gastroesophageal reflux disease without esophagitis  PPI on board      Acute renal failure with tubular necrosis  BHAVESH is likely due to acute tubular necrosis caused by volume overload.   On admission, renal function with creatinine of 1.5  Baseline creatinine is  1 . Most recent creatinine and eGFR are listed below.  Recent Labs     12/09/24  0339   CREATININE 1.0   EGFRNORACEVR 59.5*      Plan  - BHAVESH is resolved  - Avoid nephrotoxins and renally dose meds for GFR listed above  - Monitor urine output, serial BMP, and adjust therapy as needed  - continue volume restriction  - continue with scheduled PO lasix 20 mg BID    Acute  cystitis without hematuria  11/30/24 Urine culture positive for aerococcus urinae. Completed Rocephin 5 days total.     Estimated Creatinine Clearance: 64.7 mL/min (based on SCr of 1 mg/dL).  Stable renal function.   - Monitor I/Os       Anxiety and depression  Patient has persistent depression which is unknown and is currently controlled. Will Continue anti-depressant medications. We will not consult psychiatry at this time. Patient does not display psychosis at this time. Continue to monitor closely and adjust plan of care as needed.  - trazodone PRN for sleep      Diabetes mellitus, type 2  Patient's FSGs are controlled on current medication regimen.  Last A1c reviewed-   Lab Results   Component Value Date    HGBA1C 5.2 11/30/2024     Most recent fingerstick glucose reviewed-   Recent Labs   Lab 12/10/24  1647 12/10/24  2042 12/11/24  0605 12/11/24  1158   POCTGLUCOSE 138* 129* 111* 120*       Current correctional scale  Low  Maintain anti-hyperglycemic dose as follows-   Antihyperglycemics (From admission, onward)      Start     Stop Route Frequency Ordered    11/30/24 1324  insulin aspart U-100 pen 0-5 Units         -- SubQ Every 6 hours PRN 11/30/24 1225          Hold Oral hypoglycemics while patient is in the hospital.    Severe obesity (BMI >= 40)  Body mass index is 47.38 kg/m². Morbid obesity complicates all aspects of disease management from diagnostic modalities to treatment. Weight loss encouraged and health benefits explained to patient..     Wt Readings from Last 8 Encounters:   12/06/24 125.2 kg (276 lb)   10/16/24 113.4 kg (250 lb)   09/22/24 113.4 kg (250 lb)   08/14/24 119.7 kg (263 lb 14.3 oz)   07/29/24 104.3 kg (229 lb 15 oz)   06/25/24 104.6 kg (230 lb 11.2 oz)   06/24/24 104.3 kg (230 lb)   06/20/24 102.5 kg (226 lb)            Acute on chronic respiratory failure with hypoxia and hypercapnia  Patient with Hypercapnic and Hypoxic Respiratory failure which is Acute on chronic.  she is on home  oxygen at 2 LPM. Supplemental oxygen was provided and noted- Oxygen Concentration (%):  [50] 50     Signs/symptoms of respiratory failure include- tachypnea, increased work of breathing, respiratory distress, use of accessory muscles, and wheezing. Contributing diagnoses includes - CHF and COPD Labs and images were reviewed.   Patient Has not had a recent ABG.     Will treat underlying causes and adjust management of respiratory failure as follows- continuous O2 supplementation, BiPAP, duo nebs, scheduled lasix     Paroxysmal A-fib  Patient has  atrial fibrillation. Patient is currently in . [id number] Score: 3. The patients heart rate in the last 24 hours is as follows:  Pulse  Min: 70  Max: 97     Antiarrhythmics   BB on hold due to bradycardia from propofol gtt    Anticoagulants  enoxaparin injection 40 mg, Every 24 hours, Subcutaneous    Plan  - Replete lytes with a goal of K>4, Mg >2  - Patient's afib is currently stable, she has history of dash-tachy fluctuation, subsequent intubation on propofol gtt, now bradycardic, hold coreg for now, previous records showed on eliquis, care everywhere patient has not be seen by Cardiology except for and how during previous admission in August, unclear which medication patient is taking, will try and get with family member to verify medication patient taking at home and if she was taken off blood thinner at some point since her August admission            VTE Risk Mitigation (From admission, onward)           Ordered     enoxaparin injection 40 mg  Every 24 hours         12/05/24 1654     IP VTE HIGH RISK PATIENT  Once         11/29/24 1726     Place sequential compression device  Until discontinued         11/29/24 1726                    Discharge Planning   TIGRE:      Code Status: DNR   Medical Readiness for Discharge Date:   Discharge Plan A: Hospital at home, Hospice/home   Discharge Delays: None known at this time                    Gunnar Mott,   Department  of [facility] Medicine   [place] - Intensive Care

## 2024-12-11 NOTE — ASSESSMENT & PLAN NOTE
BHAVESH is likely due to acute tubular necrosis caused by volume overload.   On admission, renal function with creatinine of 1.5  Baseline creatinine is  1 . Most recent creatinine and eGFR are listed below.  Recent Labs     12/09/24  0339   CREATININE 1.0   EGFRNORACEVR 59.5*      Plan  - BHAVESH is resolved  - Avoid nephrotoxins and renally dose meds for GFR listed above  - Monitor urine output, serial BMP, and adjust therapy as needed  - continue volume restriction  - continue with scheduled PO lasix 20 mg BID

## 2024-12-11 NOTE — PLAN OF CARE
Problem: Adult Inpatient Plan of Care  Goal: Patient-Specific Goal (Individualized)  Outcome: Progressing  Goal: Absence of Hospital-Acquired Illness or Injury  Outcome: Progressing  Goal: Optimal Comfort and Wellbeing  Outcome: Progressing  Goal: Readiness for Transition of Care  Outcome: Progressing     Problem: Bariatric Environmental Safety  Goal: Safety Maintained with Care  Outcome: Progressing     Problem: Diabetes Comorbidity  Goal: Blood Glucose Level Within Targeted Range  Outcome: Progressing     Problem: Skin Injury Risk Increased  Goal: Skin Health and Integrity  Outcome: Progressing     Problem: Heart Failure  Goal: Optimal Coping  Outcome: Progressing  Goal: Optimal Cardiac Output  Outcome: Progressing  Goal: Stable Heart Rate and Rhythm  Outcome: Progressing  Goal: Optimal Functional Ability  Outcome: Progressing  Goal: Fluid and Electrolyte Balance  Outcome: Progressing  Goal: Improved Oral Intake  Outcome: Progressing  Goal: Effective Oxygenation and Ventilation  Outcome: Not Progressing  Goal: Effective Breathing Pattern During Sleep  Outcome: Progressing     Problem: COPD (Chronic Obstructive Pulmonary Disease)  Goal: Optimal Chronic Illness Coping  Outcome: Progressing  Goal: Optimal Level of Functional [place]  Outcome: Not Progressing  Goal: Absence of Infection Signs and Symptoms  Outcome: Progressing  Goal: Improved Oral Intake  Outcome: Progressing  Goal: Effective Oxygenation and Ventilation  Outcome: Not Progressing     Problem: Fluid Volume Excess  Goal: Fluid Balance  Outcome: Progressing     Problem: Fall Injury Risk  Goal: Absence of Fall and Fall-Related Injury  Outcome: Progressing     Problem: Infection  Goal: Absence of Infection Signs and Symptoms  Outcome: Progressing     Problem: Noninvasive Ventilation Acute  Goal: Effective Unassisted Ventilation and Oxygenation  Outcome: Not Progressing     Problem: Electrolyte Imbalance  Goal: Electrolyte Balance  Outcome:  Progressing

## 2024-12-11 NOTE — PLAN OF CARE
Care plan reviewed and on-going. Alternate Bipap/VM. ST rounded today. Tolerating PO intake. BM today. DNR status. Meds adjusted.

## 2024-12-11 NOTE — ASSESSMENT & PLAN NOTE
Patient has  atrial fibrillation. Patient is currently in . [id number] Score: 3. The patients heart rate in the last 24 hours is as follows:  Pulse  Min: 70  Max: 97     Antiarrhythmics   BB on hold due to bradycardia from propofol gtt    Anticoagulants  enoxaparin injection 40 mg, Every 24 hours, Subcutaneous    Plan  - Replete lytes with a goal of K>4, Mg >2  - Patient's afib is currently stable, she has history of dash-tachy fluctuation, subsequent intubation on propofol gtt, now bradycardic, hold coreg for now, previous records showed on eliquis, care everywhere patient has not be seen by Cardiology except for and how during previous admission in August, unclear which medication patient is taking, will try and get with family member to verify medication patient taking at home and if she was taken off blood thinner at some point since her August admission

## 2024-12-11 NOTE — PLAN OF CARE
12/11/24 1412   Medicare Message   Important Message from Medicare regarding Discharge Appeal Rights Given to patient/caregiver;Explained to patient/caregiver;Signed/date by patient/caregiver   Date IMM was signed 12/11/24   Time IMM was signed [id number]

## 2024-12-11 NOTE — PROGRESS NOTES
[facility] - Intensive Care  Adult Nutrition  Progress Note    SUMMARY       Recommendations  1. Rec'd Cardiac Consistent CHO diet with consistency rec's as per SLP.     2. RD to follow and make rec's accordingly.  Goals:   1. Pt will be started on Nutrition Support by next RD follow up.     2. Pt will be evaluated by SLP by next RD follow up. 3. Pt will consume > 50% of meals via PO intake by next RD follow up.  Nutrition Goal Status: progressing towards goal  Communication of RD Recs: other (Documented in POC)    Assessment and Plan     Nutrition Problem  Inadequate oral intake     Related to (etiology):   NPO/Intubation status     Signs and Symptoms (as evidenced by):   0% PO intake      Interventions/Recommendations (treatment strategy):  q4-6hrs to goal rate of 60mL/hr with FWF as per MD to provide 2592kcal (% EEN), 65g of protein (74% EPN), and 1062mL FW.   2. RD to follow and make rec's accordingly.     Nutrition Diagnosis Status:   Resolved     Malnutrition Assessment  NFPE not appropriate at this time. RD to continue to monitor for signs and symptoms of malnutrition.     Nutrition Related Social Determinants of Health:  None identified at this time.    Reason for Assessment    Reason For Assessment: RD follow-up  Diagnosis: other (see comments) (Acute on Chronic Diastolic Congestive Heart Failure)  General Information Comments: Followed up on pt this morning. As per chart pt is tolerating PO diet with fair to good intake. Average PO intake is 75%. Continue to encourage good PO intake. RD to follow and make rec's accordingly.  Nutrition Discharge Planning: Cardiac Consistent CHO diet with consistency rec's as per SLP\    Nutrition Risk Screen    Nutrition Risk Screen: no indicators present    Nutrition/Diet History    Patient Reported Diet/Restrictions/Preferences: general  Spiritual, Cultural Beliefs, Adventism Practices, Values that Affect Care: no  Food Allergies: NKFA  Factors Affecting Nutritional Intake:  "None identified at this time    Anthropometrics    Temp: 98.7 °F (37.1 °C)  Height Method: Measured  Height: 5' 4" (162.6 cm)  Height (inches): 64 in  Weight Method: Bed Scale  Weight: 122.5 kg (270 lb)  Weight (lb): 270 lb  Ideal Body Weight (IBW), Female: 120 lb  % Ideal Body Weight, Female (lb): 230.02 %  BMI (Calculated): 46.3  BMI Grade: greater than 40 - morbid obesity  Weight Loss Since Admission: 17 lb  % Weight Change Since Admission: 5.71    Lab/Procedures/Meds    Pertinent Labs Reviewed: reviewed  Pertinent Medications Reviewed: reviewed    Estimated/Assessed Needs    Weight Used For Calorie Calculations: 72.1 kg (159 lb)  Energy Calorie Requirements (kcal): 2307 (32kcal/kg AdjBW)  Energy Need Method: Kcal/kg  Protein Requirements: 65 (1.2g/kg IBW)  Weight Used For Protein Calculations: 54.4 kg (120 lb)  Fluid Requirements (mL): 2307 (1mL/kcal)  Estimated Fluid Requirement Method: RDA Method  RDA Method (mL): 2307    Nutrition Prescription Ordered    Current Diet Order: Heart Healthy Cardiac Soft & BiteSized  Current Nutrition Support Formula Ordered: Suplena (Discontinued)  Current Nutrition Support Rate Ordered: 0 (ml)  Current Nutrition Support Frequency Ordered: Continuous (Discontinued)  Oral Nutrition Supplement: None    Evaluation of Received Nutrient/Fluid Intake    Enteral Calories (kcal): 0  Enteral Protein (gm): 0  Enteral (Free Water) Fluid (mL): 0  Free Water Flush Fluid (mL): 0  % Kcal Needs: 75%  % Protein Needs: 75%  I/O: +30  Energy Calories Required: meeting needs  Protein Required: meeting needs  Tolerance: tolerating  % Intake of Estimated Energy Needs: 75 - 100 %  % Meal Intake: 75 - 100 %    Nutrition Risk    Level of Risk/Frequency of Follow-up: moderate     Monitor and Evaluation    Food and Nutrient Intake: energy intake, food and beverage intake  Food and Nutrient Adminstration: diet order  Knowledge/Beliefs/Attitudes: food and nutrition knowledge/skill, beliefs and " attitudes  Physical Activity and Function: nutrition-related ADLs and IADLs  Anthropometric Measurements: height/length, weight, weight change, body mass index  Biochemical Data, Medical Tests and Procedures: electrolyte and renal panel, gastrointestinal profile, glucose/endocrine profile, inflammatory profile, lipid profile  Nutrition-Focused Physical Findings: overall appearance     Nutrition Follow-Up    RD Follow-up?: Yes

## 2024-12-11 NOTE — SUBJECTIVE & OBJECTIVE
Interval History: Patient seen and examined.     Review of Systems   Constitutional:  Positive for activity change. Negative for appetite change, chills, fatigue and fever.   HENT:  Negative for sore throat.    Respiratory:  Negative for choking, chest tightness and shortness of breath.    Cardiovascular:  Negative for chest pain, palpitations and leg swelling.   Gastrointestinal:  Negative for abdominal pain.   Musculoskeletal:  Negative for gait problem, joint swelling, neck pain and neck stiffness.   Neurological:  Positive for weakness. Negative for dizziness, tremors, seizures, facial asymmetry and headaches.   Psychiatric/Behavioral:  Negative for agitation, behavioral problems, confusion, decreased concentration, dysphoric mood and sleep disturbance. The patient is not nervous/anxious and is not hyperactive.      Objective:     Vital Signs (Most Recent):  Temp: 98.7 °F (37.1 °C) (12/11/24 0703)  Pulse: 70 (12/11/24 1110)  Resp: (!) 25 (12/11/24 1110)  BP: 105/64 (12/11/24 0956)  SpO2: 96 % (12/11/24 1110) Vital Signs (24h Range):  Temp:  [97.2 °F (36.2 °C)-98.7 °F (37.1 °C)] 98.7 °F (37.1 °C)  Pulse:  [70-97] 70  Resp:  [22-29] 25  SpO2:  [85 %-100 %] 96 %  BP: ()/(55-74) 105/64     Weight: 122.5 kg (270 lb)  Body mass index is 46.35 kg/m².    Intake/Output Summary (Last 24 hours) at 12/11/2024 1311  Last data filed at 12/11/2024 0549  Gross per 24 hour   Intake 560 ml   Output 1400 ml   Net -840 ml         Physical Exam  Vitals and nursing note reviewed.   Constitutional:       General: She is not in acute distress.     Appearance: She is obese. She is not ill-appearing or toxic-appearing.   HENT:      Head: Normocephalic.      Mouth/Throat:      Comments: BiPAP mask  Eyes:      Extraocular Movements: Extraocular movements intact.      Conjunctiva/sclera: Conjunctivae normal.   Cardiovascular:      Rate and Rhythm: Normal rate and regular rhythm.      Pulses: Normal pulses.      Heart sounds: No murmur  heard.  Pulmonary:      Effort: No respiratory distress.   Abdominal:      General: There is no distension.      Palpations: Abdomen is soft.      Tenderness: There is no abdominal tenderness.      Hernia: No hernia is present.   Musculoskeletal:      Cervical back: Neck supple. No rigidity or tenderness.      Right lower leg: No edema.      Left lower leg: No edema.   Skin:     General: Skin is warm and dry.      Capillary Refill: Capillary refill takes less than 2 seconds.      Coloration: Skin is not pale.      Findings: No erythema or rash.   Neurological:      Mental Status: She is alert and oriented to person, place, and time. Mental status is at baseline.      Motor: Weakness (bed bound limited use of upper and lower extremities bilaterally) present.   Psychiatric:         Mood and Affect: Mood normal.         Behavior: Behavior normal.         Thought Content: Thought content normal.         Judgment: Judgment normal.             Significant Labs: All pertinent labs within the past 24 hours have been reviewed.  A1C:   Recent Labs   Lab 07/28/24  2256 11/30/24  1237   HGBA1C 5.3 5.2     Recent Labs   Lab 12/05/24  0341 12/06/24  0357 12/07/24  0350 12/08/24  0348 12/09/24  0339   BILITOT 0.3 0.4 0.4 0.5 0.5     Recent Labs   Lab 12/11/24  1253   WBC 7.20   HGB 11.5*   HCT 38.4        Recent Labs   Lab 12/10/24  2042 12/11/24  0605 12/11/24  1158   POCTGLUCOSE 129* 111* 120*     Significant Imaging: I have reviewed all pertinent imaging results/findings within the past 24 hours.

## 2024-12-11 NOTE — ASSESSMENT & PLAN NOTE
"Patient has Diastolic (HFpEF) heart failure that is Acute on chronic. On presentation their CHF was decompensated. Evidence of decompensated CHF on presentation includes: edema, orthopnea, dyspnea on exertion (MELISSA), and shortness of breath. The etiology of their decompensation is likely medication non-compliance?diet? Most recent BNP and echo results are listed below.  No results for input(s): "BNP" in the last 72 hours.  Latest ECHO  Results for orders placed during the hospital encounter of 07/28/24    Echo Saline Bubble? No; Ultrasound enhancing contrast? No  Interpretation Summary    Left Ventricle: The left ventricle is normal in size. Mildly increased wall thickness. There is normal systolic function with a visually estimated ejection fraction of 55 - 60%. Grade I diastolic dysfunction.    Right Ventricle: Normal right ventricular cavity size. Systolic function is normal.    Left Atrium: Left atrium is severely dilated. Atrial septum is bulging to the right.    Right Atrium: Right atrium is moderately dilated.    Aortic Valve: The aortic valve is a trileaflet valve. Mildly restricted motion. There is mild stenosis. Aortic valve area by VTI is 1.84 cm². Aortic valve peak velocity is 2.18 m/s. Mean gradient is 10 mmHg. The dimensionless index is 0.58. There is mild aortic regurgitation.    Mitral Valve: There is mild regurgitation.    Tricuspid Valve: There is mild regurgitation.    Pulmonic Valve: There is mild to moderate regurgitation.    No pericardial effusion.    Current Heart Failure Medications  carvediloL tablet 3.125 mg, 2 times daily, Oral  valsartan tablet 160 mg, 2 times daily, Oral  furosemide tablet 20 mg, 2 times daily, Oral    Plan  - Monitor strict I&Os and daily weights.    - Place on telemetry  - Low sodium diet  - Place on fluid restriction of 1.5 L.   - Cardiology has been consulted, follow up recommendations  - The patient's volume status is stable but not at their baseline as indicated by " dyspnea on exertion (MELISSA) and shortness of breath   - requires BiPAP QHS and intermittently or PRN to maintain SpO2 >88%  -  transition to PO lasix from IV

## 2024-12-11 NOTE — PLAN OF CARE
Recommendations  1. Rec'd Cardiac Consistent CHO diet with consistency rec's as per SLP.     2. RD to follow and make rec's accordingly.  Goals:   1. Pt will be started on Nutrition Support by next RD follow up.     2. Pt will be evaluated by SLP by next RD follow up. 3. Pt will consume > 50% of meals via PO intake by next RD follow up.  Nutrition Goal Status: progressing towards goal

## 2024-12-11 NOTE — PT/OT/SLP PROGRESS
Speech Language Pathology    Carmen DARRYL Tan  MRN: [id number]    Patient not seen today at 1145 ST attempt secondary to pt asleep on BIPAP. Will follow-up tomorrow, 12/12/2024.    12/11/2024

## 2024-12-11 NOTE — ASSESSMENT & PLAN NOTE
11/30/24 Urine culture positive for aerococcus urinae. Completed Rocephin 5 days total.     Estimated Creatinine Clearance: 64.7 mL/min (based on SCr of 1 mg/dL).  Stable renal function.   - Monitor I/Os

## 2024-12-12 VITALS
SYSTOLIC BLOOD PRESSURE: 117 MMHG | RESPIRATION RATE: 34 BRPM | OXYGEN SATURATION: 100 % | HEART RATE: 93 BPM | HEIGHT: 64 IN | BODY MASS INDEX: 44.97 KG/M2 | WEIGHT: 263.38 LBS | DIASTOLIC BLOOD PRESSURE: 72 MMHG | TEMPERATURE: 98 F

## 2024-12-12 PROBLEM — Z97.8 FOLEY CATHETER IN PLACE: Status: ACTIVE | Noted: 2024-11-30

## 2024-12-12 LAB — POCT GLUCOSE: 110 MG/DL (ref 70–110)

## 2024-12-12 PROCEDURE — 25000242 PHARM REV CODE 250 ALT 637 W/ HCPCS: Performed by: STUDENT IN AN ORGANIZED HEALTH CARE EDUCATION/TRAINING PROGRAM

## 2024-12-12 PROCEDURE — 94640 AIRWAY INHALATION TREATMENT: CPT

## 2024-12-12 PROCEDURE — 99900031 HC PATIENT EDUCATION (STAT)

## 2024-12-12 PROCEDURE — 25000003 PHARM REV CODE 250: Performed by: STUDENT IN AN ORGANIZED HEALTH CARE EDUCATION/TRAINING PROGRAM

## 2024-12-12 PROCEDURE — 27100171 HC OXYGEN HIGH FLOW UP TO 24 HOURS

## 2024-12-12 PROCEDURE — 94660 CPAP INITIATION&MGMT: CPT

## 2024-12-12 PROCEDURE — 94761 N-INVAS EAR/PLS OXIMETRY MLT: CPT

## 2024-12-12 PROCEDURE — 99900035 HC TECH TIME PER 15 MIN (STAT)

## 2024-12-12 RX ORDER — POLYETHYLENE GLYCOL 3350 17 G/17G
17 POWDER, FOR SOLUTION ORAL DAILY
Qty: 100 EACH | Refills: 0 | Status: SHIPPED | OUTPATIENT
Start: 2024-12-13

## 2024-12-12 RX ORDER — ALBUTEROL SULFATE 2.5 MG/.5ML
2.5 SOLUTION RESPIRATORY (INHALATION) EVERY 4 HOURS
Qty: 180 EACH | Refills: 11 | Status: SHIPPED | OUTPATIENT
Start: 2024-12-12 | End: 2025-12-12

## 2024-12-12 RX ORDER — FAMOTIDINE 20 MG/1
20 TABLET, FILM COATED ORAL 2 TIMES DAILY
Qty: 60 TABLET | Refills: 11 | Status: SHIPPED | OUTPATIENT
Start: 2024-12-12 | End: 2025-12-12

## 2024-12-12 RX ORDER — LEVOTHYROXINE SODIUM 50 UG/1
50 TABLET ORAL
Qty: 30 TABLET | Refills: 11 | Status: SHIPPED | OUTPATIENT
Start: 2024-12-13 | End: 2025-12-13

## 2024-12-12 RX ORDER — AMOXICILLIN 250 MG
2 CAPSULE ORAL DAILY
Qty: 60 TABLET | Refills: 11 | Status: SHIPPED | OUTPATIENT
Start: 2024-12-13

## 2024-12-12 RX ORDER — INSULIN ASPART 100 [IU]/ML
0-5 INJECTION, SOLUTION INTRAVENOUS; SUBCUTANEOUS EVERY 6 HOURS PRN
Qty: 15 ML | Refills: 11 | Status: SHIPPED | OUTPATIENT
Start: 2024-12-12

## 2024-12-12 RX ORDER — LANOLIN ALCOHOL/MO/W.PET/CERES
800 CREAM (GRAM) TOPICAL 2 TIMES DAILY
Qty: 120 TABLET | Refills: 11 | Status: SHIPPED | OUTPATIENT
Start: 2024-12-12

## 2024-12-12 RX ORDER — VALSARTAN 160 MG/1
160 TABLET ORAL 2 TIMES DAILY
Qty: 180 TABLET | Refills: 3 | Status: SHIPPED | OUTPATIENT
Start: 2024-12-12 | End: 2025-12-12

## 2024-12-12 RX ORDER — BUDESONIDE 0.5 MG/2ML
0.5 INHALANT ORAL EVERY 12 HOURS
Qty: 120 ML | Refills: 11 | Status: SHIPPED | OUTPATIENT
Start: 2024-12-12 | End: 2025-12-12

## 2024-12-12 RX ORDER — POTASSIUM CHLORIDE 750 MG/1
10 TABLET, EXTENDED RELEASE ORAL 4 TIMES DAILY
Qty: 120 TABLET | Refills: 11 | Status: SHIPPED | OUTPATIENT
Start: 2024-12-12

## 2024-12-12 RX ORDER — TRAZODONE HYDROCHLORIDE 50 MG/1
50 TABLET ORAL NIGHTLY
Qty: 30 TABLET | Refills: 11 | Status: SHIPPED | OUTPATIENT
Start: 2024-12-12 | End: 2025-12-12

## 2024-12-12 RX ORDER — FUROSEMIDE 20 MG/1
20 TABLET ORAL 2 TIMES DAILY
Qty: 60 TABLET | Refills: 11 | Status: SHIPPED | OUTPATIENT
Start: 2024-12-12 | End: 2025-12-12

## 2024-12-12 RX ORDER — ASPIRIN 325 MG
325 TABLET ORAL DAILY
Qty: 30 TABLET | Refills: 5 | Status: SHIPPED | OUTPATIENT
Start: 2024-12-13 | End: 2025-12-13

## 2024-12-12 RX ADMIN — CARVEDILOL 3.12 MG: 3.12 TABLET, FILM COATED ORAL at 08:12

## 2024-12-12 RX ADMIN — ALBUTEROL SULFATE 2.5 MG: 2.5 SOLUTION RESPIRATORY (INHALATION) at 03:12

## 2024-12-12 RX ADMIN — POTASSIUM CHLORIDE 10 MEQ: 750 TABLET, FILM COATED, EXTENDED RELEASE ORAL at 08:12

## 2024-12-12 RX ADMIN — ALBUTEROL SULFATE 2.5 MG: 2.5 SOLUTION RESPIRATORY (INHALATION) at 07:12

## 2024-12-12 RX ADMIN — SENNOSIDES AND DOCUSATE SODIUM 2 TABLET: 50; 8.6 TABLET ORAL at 08:12

## 2024-12-12 RX ADMIN — FAMOTIDINE 20 MG: 20 TABLET, FILM COATED ORAL at 08:12

## 2024-12-12 RX ADMIN — POTASSIUM CHLORIDE 10 MEQ: 750 TABLET, FILM COATED, EXTENDED RELEASE ORAL at 02:12

## 2024-12-12 RX ADMIN — ALBUTEROL SULFATE 2.5 MG: 2.5 SOLUTION RESPIRATORY (INHALATION) at 11:12

## 2024-12-12 RX ADMIN — LEVOTHYROXINE SODIUM 50 MCG: 50 TABLET ORAL at 05:12

## 2024-12-12 RX ADMIN — ASPIRIN 325 MG ORAL TABLET 325 MG: 325 PILL ORAL at 08:12

## 2024-12-12 RX ADMIN — FUROSEMIDE 20 MG: 20 TABLET ORAL at 08:12

## 2024-12-12 RX ADMIN — Medication 800 MG: at 08:12

## 2024-12-12 RX ADMIN — VALSARTAN 160 MG: 80 TABLET, FILM COATED ORAL at 08:12

## 2024-12-12 RX ADMIN — BUDESONIDE 0.5 MG: 0.5 INHALANT RESPIRATORY (INHALATION) at 07:12

## 2024-12-12 RX ADMIN — ATORVASTATIN CALCIUM 40 MG: 40 TABLET, FILM COATED ORAL at 08:12

## 2024-12-12 NOTE — ASSESSMENT & PLAN NOTE
11/30/24 Urine culture positive for aerococcus urinae.   Completed Rocephin 5 days total.     Estimated Creatinine Clearance: 70.8 mL/min (based on SCr of 0.9 mg/dL).  Stable renal function.   - Monitor I/Os

## 2024-12-12 NOTE — NURSING
Called Nellie at [facility] to notify of [facility] arrival and pending discharge from facility. Updated. Aware 1600 breathing treatment given. No questions at this time

## 2024-12-12 NOTE — NURSING
Called [facility] to notify [facility] of discharge. Spoke with Pat. Reports will notify [facility]   Awake/Alert

## 2024-12-12 NOTE — ASSESSMENT & PLAN NOTE
"Patient has Diastolic (HFpEF) heart failure that is Acute on chronic. On presentation their CHF was decompensated. Evidence of decompensated CHF on presentation includes: edema, orthopnea, dyspnea on exertion (MELISSA), and shortness of breath. The etiology of their decompensation is likely medication non-compliance?diet? Most recent BNP and echo results are listed below.  No results for input(s): "BNP" in the last 72 hours.  Latest ECHO  Results for orders placed during the hospital encounter of 07/28/24    Echo Saline Bubble? No; Ultrasound enhancing contrast? No  Interpretation Summary    Left Ventricle: The left ventricle is normal in size. Mildly increased wall thickness. There is normal systolic function with a visually estimated ejection fraction of 55 - 60%. Grade I diastolic dysfunction.    Right Ventricle: Normal right ventricular cavity size. Systolic function is normal.    Left Atrium: Left atrium is severely dilated. Atrial septum is bulging to the right.    Right Atrium: Right atrium is moderately dilated.    Aortic Valve: The aortic valve is a trileaflet valve. Mildly restricted motion. There is mild stenosis. Aortic valve area by VTI is 1.84 cm². Aortic valve peak velocity is 2.18 m/s. Mean gradient is 10 mmHg. The dimensionless index is 0.58. There is mild aortic regurgitation.    Mitral Valve: There is mild regurgitation.    Tricuspid Valve: There is mild regurgitation.    Pulmonic Valve: There is mild to moderate regurgitation.    No pericardial effusion.    Current Heart Failure Medications  carvediloL tablet 3.125 mg, 2 times daily, Oral  valsartan tablet 160 mg, 2 times daily, Oral  furosemide tablet 20 mg, 2 times daily, Oral    Plan  - Monitor strict I&Os and daily weights.    - Place on telemetry  - Low sodium diet  - Place on fluid restriction of 1.5 L.   - Cardiology has been consulted, follow up recommendations  - The patient's volume status is stable but not at their baseline as " indicated by dyspnea on exertion (MELISSA) and shortness of breath   - requires BiPAP QHS and intermittently or PRN to maintain SpO2 >88%  -  transition to PO lasix from IV

## 2024-12-12 NOTE — ASSESSMENT & PLAN NOTE
Patient's FSGs are controlled on current medication regimen.  Last A1c reviewed-   Lab Results   Component Value Date    HGBA1C 5.2 11/30/2024     Most recent fingerstick glucose reviewed-   Recent Labs   Lab 12/11/24  1158 12/11/24  1646 12/11/24  2043   POCTGLUCOSE 120* 110 118*       Current correctional scale  Low  Maintain anti-hyperglycemic dose as follows-   Antihyperglycemics (From admission, onward)    Start     Stop Route Frequency Ordered    11/30/24 1324  insulin aspart U-100 pen 0-5 Units         -- SubQ Every 6 hours PRN 11/30/24 1225        Hold Oral hypoglycemics while patient is in the hospital.

## 2024-12-12 NOTE — ASSESSMENT & PLAN NOTE
Resolved. No associated sore throat.   ET tube extubation on 12/4/24.   12/9/24 Tolerating PO intake of solids and liquids without regurgitation.   - HOB to 90 degrees  - Ice chips sparingly  - Monitor for s/s of aspiration  - Puree for pleasure  - Small bites/sips  - Strict aspiration precautions, and Wear oxygen during intake

## 2024-12-12 NOTE — NURSING
Called [facility] to notify of [facility] arrival for transport to [facility] per patient request. Voiced understanding and will meet at there

## 2024-12-12 NOTE — DISCHARGE SUMMARY
[facility] Medicine  Discharge Summary      Patient Name: Carmen Tan  MRN: [id number]  GOLDIE: [id number]  Patient Class: IP- Inpatient  Admission Date: 11/29/2024  Hospital Length of Stay: 13 days  Discharge Date and Time:  12/12/2024 11:11 AM  Attending Physician: Gunnar Mott DO   Discharging Provider: Gunanr Mott DO  Primary Care Provider: Lucian Barry MD    Primary Care Team: Networked reference to record PCT     HPI:   Patient 74-year-old female past medical history of chronic diastolic heart failure, AFib, chronic respiratory failure with hypoxemia and hypercapnia, anxiety depression, type 2 diabetes, GERD, hypothyroidism.  Patient came in after noted to be short of breath at home, patient was satting in the 70 on home BiPAP therapy, was brought to the ED noted to be in distress tachycardic hypotensive hypoxic, workup in the ED patient with enlarged cardiac silhouette, chronic changes no acute finding, elevated BNP 25,000, mild elevation in troponin negative COVID no leukocytosis, patient was given nitroglycerin and Lasix injection, admitted to ICU.    * No surgery found *      Hospital Course:   12/1 AA, weekend crosscover, overnight patient had two ABG, eICU MD adjusted vent settings, had repeat chest x-ray which showed moderate right thorax, vital signs stable, O2 sat maintaining, surgery consulted for chest tube placement, patient was started on steroids yesterday for COPD flare, diuresis in progress, continue to monitor urine output, follow-up chest x-ray after chest tube placement, follow-up with surgery rec  12/2/24 ABG 7.395/52.4/87.3/29.8  improving respiratory status on AC Vt 450, RR18, PEEP 6, FiO2 55%, SpO2 98%, proprofol 25mcg/kg/min. CXR with right pneumothorax resolved, diuresing on scheduled IV lasix with adequate urine output. Patient minimally responsive to sternal rub. No elevated temperature. Urine culture positive for gram positive  aerococcus spp, add IV rocephin. Continue with vent weaning and daily SBT, scheduled breathing treatments, solumedrol. Follow up recommendations cardiology. Follow up general surgery with chest tube management.    12/3/24 ABG 7.423/52.7/72.7/32.3, FiO2 40%. Off sedation awake and following commands. CPAP trial followed with tachypnea 40s, will maintain current vent settings and prepare for SBT and extubation tomorrow AM. Right lateral chest tube has been clamped. CXR overnight unchanged, no new pneumothorax. Chest tube management per GS. BMx1. Start enteral trickle feeds and advance per protocol.   12/4/24 Stable ABG and vent settings. Tolerating tube feeds with no residual. Patient awake and alert and requests to have ET tube to be removed. Patient further affirms she does not want to be re-intubated afterwards. Plan to extubate and transition to BiPAP.   12/5/24 No acute events overnight. Awake, on venturi mask FiO2 50%, SpO2 100%. Right chest tube in place remains clamped. Declines therapy work complaining of pain in her throat and upper chest. Renal function improving, adequate urine output. Continue with supplemental oxygen and rest from BiPAP for nutrition and meds per OG tube. Follow up speech. Follow up GS recommendations.     12/6/24 On BiPAP maintaining SpO2 >95%. Failed swallow with speech. OG tube remains in place. Arousable to voice, responds to questions with hand squeezes and motion. Right chest tube removed yesterday and no desaturations reported. CXR showing no pneumothorax. Mild hypernatremia, will increase free water via OG tube and monitor I/Os. Renal function stable with appropriate clear urine output in Hardwick catheter. Leukocytosis resolved. Continue with scheduled IV lasix, scheduled breathing treatments, supplemental oxygen. Aspiration precautions, maintain OG tube until follow up swallow assessment.    12/7/24 Per nursing minimal sleep overnight. Respiratory rate stable on BiPAP maintain  SpO2 >98%. Will discontinue OG tube and follow ST precautions with monitoring PO tolerance with ice chips. Continue to taper IV steroids, scheduled lasix. Plan for nursing home placement with hospice.   12/8/24 OG tube discontinued. Tolerated ice chips and clear liquids per nursing. No elevated temperature. Patient requires BiPAP to maintain SpO2 >90%. Alert and awake. Throat pain resolving. Will advance diet. Aspiration precautions in place. Expectant nursing home placement with hospice tomorrow.   12/9/24 No acute events overnight. Patient sitting upright in bed eating breakfast assisted by nursing staff. All meds to be administered orally. Continue aspiration precautions, BiPAP at nursing home. Follow up CM and SW with placement arrangements.    12/10/24 Sitting upright, able to converse better with venturi mask, off BiPAP patient's SpO2 88%. Patient in no respiratory distress, denies associated symptoms. PO intake improving, reported regular bowel movements. Urine output reduced with reduce dosing of lasix and transitioning from IV to PO. Continue with all other supportive care. Follow up CM and SW with placement status.   12/11/24 Patient alert and responsive to questions. Endorses feeling dry all around. Patient continues to diurese with PO lasix. Denies symptoms. On BiPAP FiO2 50%, IP/EP, 17/6, QHS and as needed for shortness of breath. Back up rate of 8, wean FiO2 to maintain SpO2 >88%. Vital signs stable, on current BiPAP settings maintaining SpO2 >96%. Cardiopulmonary status stable. Will reduce PO lasix to 20 mg BID.Continue with aspiration precautions. Hardwick catheter draining blood tinged urine. Encourage patient with PO hydration.    12/12/24 Stable respiratory status with BiPAP FiO2 50% QHS and venturi mask maintaining SpO2 >97%. Hardwick catheter in place draining sediment tinged urine. Tolerating meals, stool out put regular. Diuresing well with PO lasix 20 mg BID, electrolytes within normal range.  "Patient has diuresed and lost 50 pounds over the course of 10 days. COVID negative. Follow up CM and SW with placement to nursing home with hospice care.      Goals of Care Treatment Preferences:  Code Status: DNR      SDOH Screening:  The patient was unable to be screened for utility difficulties, food insecurity, transport difficulties, housing insecurity, and interpersonal safety, so no concerns could be identified this admission.     Consults:   Consults (From admission, onward)          Status Ordering Provider     Inpatient consult to Social Work/Case Management  Once        Provider:  (Not yet assigned)    Acknowledged SHONNA DANIELS     Inpatient consult to Social Work/Case Management  Once        Provider:  (Not yet assigned)    Completed SHONNA DANIELS     Inpatient consult to Cardiology  Once        Provider:  (Not yet assigned)    Acknowledged LENIN JACOBSON            * Acute on chronic diastolic congestive heart failure  Patient has Diastolic (HFpEF) heart failure that is Acute on chronic. On presentation their CHF was decompensated. Evidence of decompensated CHF on presentation includes: edema, orthopnea, dyspnea on exertion (MELISSA), and shortness of breath. The etiology of their decompensation is likely medication non-compliance?diet? Most recent BNP and echo results are listed below.  No results for input(s): "BNP" in the last 72 hours.  Latest ECHO  Results for orders placed during the hospital encounter of 07/28/24    Echo Saline Bubble? No; Ultrasound enhancing contrast? No  Interpretation Summary    Left Ventricle: The left ventricle is normal in size. Mildly increased wall thickness. There is normal systolic function with a visually estimated ejection fraction of 55 - 60%. Grade I diastolic dysfunction.    Right Ventricle: Normal right ventricular cavity size. Systolic function is normal.    Left Atrium: Left atrium is severely dilated. Atrial septum is bulging to the right.    Right " Atrium: Right atrium is moderately dilated.    Aortic Valve: The aortic valve is a trileaflet valve. Mildly restricted motion. There is mild stenosis. Aortic valve area by VTI is 1.84 cm². Aortic valve peak velocity is 2.18 m/s. Mean gradient is 10 mmHg. The dimensionless index is 0.58. There is mild aortic regurgitation.    Mitral Valve: There is mild regurgitation.    Tricuspid Valve: There is mild regurgitation.    Pulmonic Valve: There is mild to moderate regurgitation.    No pericardial effusion.    Current Heart Failure Medications  carvediloL tablet 3.125 mg, 2 times daily, Oral  valsartan tablet 160 mg, 2 times daily, Oral  furosemide tablet 20 mg, 2 times daily, Oral    Plan  - Monitor strict I&Os and daily weights.    - Place on telemetry  - Low sodium diet  - Place on fluid restriction of 1.5 L.   - Cardiology has been consulted, follow up recommendations  - The patient's volume status is stable but not at their baseline as indicated by dyspnea on exertion (MELISSA) and shortness of breath   - requires BiPAP QHS and intermittently or PRN to maintain SpO2 >88%  -  transition to PO lasix from IV        Hardwick catheter in place  Daily Hardwick care.   Monitor I/Os        BiPAP (biphasic positive airway pressure) dependence  Patient's SpO2 drops to 88% when off BiPAP with tachypnea of high 20-30s.   Continuous BiPAP   Mode: BIPAP  FiO2%: 50  Inspiratory pressure: 16  Expiratory pressure: 7  QHS isn't meant for rescue BIPAP - please use continuous instead., BIPAP QHS and PRN for shortness of breath Back up rate 8 Wean FIO2 to keep sats > = 88%       Impaired mobility and ADLs  Patient refusal to participate in PT and OT efforts. Patient requires assistance for all ADLs.   Discharge plans;  placement to nursing home        Swallowing impaired  Resolved. No associated sore throat.   ET tube extubation on 12/4/24.   12/9/24 Tolerating PO intake of solids and liquids without regurgitation.   - HOB to 90 degrees  - Ice  chips sparingly  - Monitor for s/s of aspiration  - Puree for pleasure  - Small bites/sips  - Strict aspiration precautions, and Wear oxygen during intake       ACP (advance care planning)  12/3/24 Patient once extubated she does not want to be re-intubated  12/4/24 Family would like information on home hospice services  12/5/24 Plan for [facility] placement to nursing home with hospice per CM and SW      Other pneumothorax  Resolved.   Patient developed pneumothorax right lower lobe, moderate, surgery consulted for chest tube placement and eval.  Right chest tube discontinued on 12/5/24.   CXR (12/6/24) with no interval changes, no new pneumothorax           Hard to intubate  Extubated on 12/4/24, stable respiratory status.   Patient is now DNR/DNI  - continuous O2 supplementation with BiPAP  - goal SpO2 >92%  - continue daily pulmonary hygiene care with breathing treatments, tapering solumedrol, encourage deep breathing      Hypothyroidism  Continue levothyroxine       HLD (hyperlipidemia)  Continue statin      Gastroesophageal reflux disease without esophagitis  PPI on board      Acute renal failure with tubular necrosis  BHAVESH is likely due to acute tubular necrosis caused by volume overload.   On admission, renal function with creatinine of 1.5  Baseline creatinine is  1 . Most recent creatinine and eGFR are listed below.  Recent Labs     12/11/24  1253   CREATININE 0.9   EGFRNORACEVR >60.0        Plan  - BHAVESH is resolved  - Avoid nephrotoxins and renally dose meds for GFR listed above  - Monitor urine output, serial BMP, and adjust therapy as needed  - continue volume restriction  - continue with scheduled PO lasix 20 mg BID    Acute cystitis without hematuria  11/30/24 Urine culture positive for aerococcus urinae.   Completed Rocephin 5 days total.     Estimated Creatinine Clearance: 70.8 mL/min (based on SCr of 0.9 mg/dL).  Stable renal function.   - Monitor I/Os       Anxiety and depression  Patient has  persistent depression which is unknown and is currently controlled. Will Continue anti-depressant medications. We will not consult psychiatry at this time. Patient does not display psychosis at this time. Continue to monitor closely and adjust plan of care as needed.  - trazodone PRN for sleep      Diabetes mellitus, type 2  Patient's FSGs are controlled on current medication regimen.  Last A1c reviewed-   Lab Results   Component Value Date    HGBA1C 5.2 11/30/2024     Most recent fingerstick glucose reviewed-   Recent Labs   Lab 12/11/24  1158 12/11/24  1646 12/11/24  2043   POCTGLUCOSE 120* 110 118*       Current correctional scale  Low  Maintain anti-hyperglycemic dose as follows-   Antihyperglycemics (From admission, onward)      Start     Stop Route Frequency Ordered    11/30/24 1324  insulin aspart U-100 pen 0-5 Units         -- SubQ Every 6 hours PRN 11/30/24 1225          Hold Oral hypoglycemics while patient is in the hospital.    Severe obesity (BMI >= 40)  Body mass index is 47.38 kg/m². Morbid obesity complicates all aspects of disease management from diagnostic modalities to treatment. Weight loss encouraged and health benefits explained to patient..     Wt Readings from Last 8 Encounters:   12/06/24 125.2 kg (276 lb)   10/16/24 113.4 kg (250 lb)   09/22/24 113.4 kg (250 lb)   08/14/24 119.7 kg (263 lb 14.3 oz)   07/29/24 104.3 kg (229 lb 15 oz)   06/25/24 104.6 kg (230 lb 11.2 oz)   06/24/24 104.3 kg (230 lb)   06/20/24 102.5 kg (226 lb)            Acute on chronic respiratory failure with hypoxia and hypercapnia  Patient with Hypercapnic and Hypoxic Respiratory failure which is Acute on chronic.  she is on home oxygen at 2 LPM. Supplemental oxygen was provided and noted- Oxygen Concentration (%):  [45-50] 50     Signs/symptoms of respiratory failure include- tachypnea, increased work of breathing, respiratory distress, use of accessory muscles, and wheezing. Contributing diagnoses includes - CHF and  COPD Labs and images were reviewed.   Patient Has not had a recent ABG.     Will treat underlying causes and adjust management of respiratory failure as follows- continuous O2 supplementation, BiPAP, duo nebs, scheduled lasix     Paroxysmal A-fib  Patient has  atrial fibrillation. Patient is currently in . [id number] Score: 3. The patients heart rate in the last 24 hours is as follows:  Pulse  Min: 70  Max: 97     Antiarrhythmics   BB on hold due to bradycardia from propofol gtt    Anticoagulants  enoxaparin injection 40 mg, Every 24 hours, Subcutaneous    Plan  - Replete lytes with a goal of K>4, Mg >2  - Patient's afib is currently stable, she has history of dash-tachy fluctuation, subsequent intubation on propofol gtt, now bradycardic, hold coreg for now, previous records showed on eliquis, care everywhere patient has not be seen by Cardiology except for and how during previous admission in August, unclear which medication patient is taking, will try and get with family member to verify medication patient taking at home and if she was taken off blood thinner at some point since her August admission            Final Active Diagnoses:    Diagnosis Date Noted POA    PRINCIPAL PROBLEM:  Acute on chronic diastolic congestive heart failure [I50.33] 02/16/2022 Yes    Swallowing impaired [R13.10] 12/06/2024 No    Impaired mobility and ADLs [Z74.09, Z78.9] 12/06/2024 Yes     Chronic    BiPAP (biphasic positive airway pressure) dependence [Z99.89] 12/04/2024 Not Applicable    ACP (advance care planning) [Z71.89] 12/02/2024 Not Applicable    Other pneumothorax [J93.83] 12/01/2024 No    Gastroesophageal reflux disease without esophagitis [K21.9] 11/30/2024 Yes    HLD (hyperlipidemia) [E78.5] 11/30/2024 Yes    Hypothyroidism [E03.9] 11/30/2024 Yes    Hard to intubate [T88.4XXA] 11/30/2024 Yes    Hardwick catheter in place [Z97.8] 11/30/2024 Not Applicable    Acute renal failure with tubular necrosis [N17.0] 08/09/2024 Yes     "Acute cystitis without hematuria [N30.00] 09/09/2023 Yes    Anxiety and depression [F41.9, F32.A] 04/12/2023 Yes    Diabetes mellitus, type 2 [E11.9] 02/16/2022 Yes    Severe obesity (BMI >= 40) [E66.01] 02/04/2022 Yes    Acute on chronic respiratory failure with hypoxia and hypercapnia [J96.21, J96.22] 02/01/2022 Yes    Paroxysmal A-fib [I48.0] 06/08/2017 Yes     Chronic      Problems Resolved During this Admission:       Discharged Condition: stable    Disposition: intermediate Nursing Facili*    Follow Up:    Patient Instructions:   No discharge procedures on file.    Significant Diagnostic Studies: Labs: BMP:   Recent Labs   Lab 12/11/24  1253   *      K 4.2      CO2 43*   BUN 18   CREATININE 0.9   CALCIUM 9.5   MG 1.9   , CMP   Recent Labs   Lab 12/11/24  1253      K 4.2      CO2 43*   *   BUN 18   CREATININE 0.9   CALCIUM 9.5   PROT 6.0   ALBUMIN 2.3*   BILITOT 0.6   ALKPHOS 93   AST 14   ALT 15   ANIONGAP 0*   , CBC   Recent Labs   Lab 12/11/24  1253   WBC 7.20   HGB 11.5*   HCT 38.4      , INR   Lab Results   Component Value Date    INR 1.0 09/09/2023    INR 1.0 07/29/2017   , Lipid Panel   Lab Results   Component Value Date    CHOL 160 09/09/2023    HDL 52 09/09/2023    LDLCALC 96.2 09/09/2023    TRIG 49 08/08/2024    CHOLHDL 32.5 09/09/2023   , Troponin No results for input(s): "TROPONINI" in the last 168 hours., A1C:   Recent Labs   Lab 07/28/24  2256 11/30/24  1237   HGBA1C 5.3 5.2   , and All labs within the past 24 hours have been reviewed  Microbiology: Blood Culture   Lab Results   Component Value Date    LABBLOO No growth after 5 days. 08/07/2024   , Sputum Culture   Lab Results   Component Value Date    GSRESP <10 epithelial cells per low power field. 11/30/2024    GSRESP Moderate WBC's 11/30/2024    GSRESP Few Gram positive cocci 11/30/2024    GSRESP Rare Gram negative rods 11/30/2024    RESPIRATORYC Normal respiratory beth 11/30/2024    RESPIRATORYC No S " aureus or Pseudomonas isolated. 11/30/2024   , and Urine Culture    Lab Results   Component Value Date    LABURIN (A) 11/30/2024     AEROCOCCUS URINAE  10,000 - 49,999 cfu/ml  Susceptibility testing not routinely performed  No other significant isolate       Radiology:   X-Ray Chest 1 View  Narrative: EXAMINATION:  XR CHEST 1 VIEW    CLINICAL HISTORY:  s/p R chest tube removal;    COMPARISON:  Portable chest 12/05/2024.    FINDINGS:  Frontal chest radiograph demonstrates interval removal of right thoracostomy tube.  No pneumothorax.  Nasogastric tube and right-sided PICC remain in place.  Impression: Interval removal of right thoracostomy tube.  No pneumothorax.    Electronically signed by: Oscar Joe MD  Date:    12/06/2024  Time:    11:29       Pending Diagnostic Studies:       None           Medications:  Reconciled Home Medications:      Medication List        ASK your doctor about these medications      acetaminophen 325 MG tablet  Commonly known as: TYLENOL  Take 2 tablets (650 mg total) by mouth every 8 (eight) hours as needed for Pain or Temperature greater than (100, headache).     albuterol-ipratropium 2.5 mg-0.5 mg/3 mL nebulizer solution  Commonly known as: DUO-NEB  Take 3 mLs by nebulization every 6 (six) hours while awake. Rescue     aspirin 81 MG EC tablet  Commonly known as: ECOTRIN  Take 1 tablet (81 mg total) by mouth once daily.     busPIRone 5 MG Tab  Commonly known as: BUSPAR  Take 1 tablet (5 mg total) by mouth 2 (two) times daily.     carvediloL 3.125 MG tablet  Commonly known as: COREG  Take 1 tablet (3.125 mg total) by mouth 2 (two) times daily.     insulin aspart U-100 100 unit/mL (3 mL) Inpn pen  Commonly known as: NovoLOG  Inject 0-5 Units into the skin before meals and at bedtime as needed (Hyperglycemia).     isosorbide mononitrate 30 MG 24 hr tablet  Commonly known as: IMDUR  Take 1 tablet (30 mg total) by mouth once daily.     levothyroxine 50 MCG tablet  Commonly known as:  SYNTHROID  Take 1 tablet (50 mcg total) by mouth before breakfast.     LIPITOR 40 mg tablet  Generic drug: atorvastatin  Take 1 tablet (40 mg total) by mouth once daily.     pantoprazole 20 MG tablet  Commonly known as: PROTONIX  Take 40 mg by mouth once daily.     predniSONE 20 MG tablet  Commonly known as: DELTASONE  Take 1 tablet (20 mg total) by mouth 2 (two) times daily.     valsartan 160 MG tablet  Commonly known as: DIOVAN  Take 1 tablet (160 mg total) by mouth once daily.     vitamin D 1000 units Tab  Commonly known as: VITAMIN D3  Take 185 mg by mouth once daily.              Indwelling Lines/Drains at time of discharge:   Lines/Drains/Airways       Peripherally Inserted Central Catheter Line  Duration             PICC Triple Lumen 11/30/24 1046 right brachial 12 days              Drain  Duration                  Urethral Catheter 11/30/24 0903 16 Fr. 12 days                    Time spent on the discharge of patient: 35 minutes         Gunnar Mott DO  Department of Hospital Medicine  [facility]

## 2024-12-12 NOTE — ASSESSMENT & PLAN NOTE
BHAVESH is likely due to acute tubular necrosis caused by volume overload.   On admission, renal function with creatinine of 1.5  Baseline creatinine is  1 . Most recent creatinine and eGFR are listed below.  Recent Labs     12/11/24  1253   CREATININE 0.9   EGFRNORACEVR >60.0        Plan  - BHAVESH is resolved  - Avoid nephrotoxins and renally dose meds for GFR listed above  - Monitor urine output, serial BMP, and adjust therapy as needed  - continue volume restriction  - continue with scheduled PO lasix 20 mg BID

## 2024-12-12 NOTE — ASSESSMENT & PLAN NOTE
Patient with Hypercapnic and Hypoxic Respiratory failure which is Acute on chronic.  she is on home oxygen at 2 LPM. Supplemental oxygen was provided and noted- Oxygen Concentration (%):  [45-50] 50     Signs/symptoms of respiratory failure include- tachypnea, increased work of breathing, respiratory distress, use of accessory muscles, and wheezing. Contributing diagnoses includes - CHF and COPD Labs and images were reviewed.   Patient Has not had a recent ABG.     Will treat underlying causes and adjust management of respiratory failure as follows- continuous O2 supplementation, BiPAP, duo nebs, scheduled lasix

## 2024-12-12 NOTE — PLAN OF CARE
[facility] - Intensive Care  Discharge Final Note    Primary Care Provider: Lucian Barry MD    Expected Discharge Date: 12/12/2024    Final Discharge Note (most recent)       Final Note - 12/12/24 1403          Final Note    Assessment Type Final Discharge Note     Anticipated Discharge Disposition nursing home Nursing Facility        Post-Acute Status    Post-Acute Authorization Hospice;Placement     Post-Acute Placement Status Set-up Complete/Auth obtained     Hospice Status Set-up Complete/Auth obtained     Coverage [employer] MANAGED MEDICARE - [employer] [facility] HMO PPO SPECIAL NEEDS     Discharge Delays None known at this time                     Important Message from Medicare  Important Message from Medicare regarding Discharge Appeal Rights: Given to patient/caregiver, Explained to patient/caregiver, Signed/date by patient/caregiver     Date IMM was signed: 12/11/24  Time IMM was signed: 1325    After-discharge care                Destination       [facility]   Service: Nursing Home    [address] [place] [address]   Phone: [phone]                   Final note is completed. The patient will be discharging to [facility] with hospice care.

## 2024-12-12 NOTE — PLAN OF CARE
Terra, the  at [facility] was able to touch base with the patient regarding her assets. The patient verbalized understanding, and she ultimately agreed to move forward with long-term placement with hospice care at [facility].

## 2024-12-12 NOTE — PLAN OF CARE
Discharged in stable condition with [facility] with Bipap in use. No distress noted. Belongings sent with patient. Home CPAP with mask and Black garbage bag of clothes.

## 2024-12-12 NOTE — PLAN OF CARE
Care plan reviewed and progressing. Cont with Bipap/VM. Tolerating meals. Accuchecks- no coverage. Cont to reinforce Fluid restriction. Covid negative. Pending Nursing Home discharge tomorrow. DNR

## 2024-12-12 NOTE — ASSESSMENT & PLAN NOTE
12/3/24 Patient once extubated she does not want to be re-intubated  12/4/24 Family would like information on home hospice services  12/5/24 Plan for senior living placement to nursing home with hospice per BAKARI and FRANCOIS

## 2025-03-26 NOTE — SUBJECTIVE & OBJECTIVE
Past Medical History:   Diagnosis Date    Abdominal hernia     Bacteremia 04/05/2023    CHF (congestive heart failure)     COPD (chronic obstructive pulmonary disease)     Diabetes mellitus, type 2 02/16/2022    GERD (gastroesophageal reflux disease)     History of home oxygen therapy     Hypertension     Hypertensive emergency 03/30/2023    Migraine headache     On home O2     Pulmonary embolism 06/01/2017    Small bowel obstruction     Thyroid disease        Past Surgical History:   Procedure Laterality Date    COLONOSCOPY      HYSTERECTOMY      INNER EAR SURGERY      UPPER GASTROINTESTINAL ENDOSCOPY         Review of patient's allergies indicates:   Allergen Reactions    Pcn [penicillins] Swelling       No current facility-administered medications on file prior to encounter.     Current Outpatient Medications on File Prior to Encounter   Medication Sig    acetaminophen (TYLENOL) 325 MG tablet Take 2 tablets (650 mg total) by mouth every 8 (eight) hours as needed for Pain or Temperature greater than (100, headache).    albuterol-ipratropium (DUO-NEB) 2.5 mg-0.5 mg/3 mL nebulizer solution Take 3 mLs by nebulization every 6 (six) hours while awake. Rescue    aspirin (ECOTRIN) 81 MG EC tablet Take 1 tablet (81 mg total) by mouth once daily.    budesonide (PULMICORT) 0.5 mg/2 mL nebulizer solution Take 2 mLs (0.5 mg total) by nebulization every 12 (twelve) hours. Controller    busPIRone (BUSPAR) 5 MG Tab Take 1 tablet (5 mg total) by mouth 2 (two) times daily.    carvediloL (COREG) 3.125 MG tablet Take 1 tablet (3.125 mg total) by mouth 2 (two) times daily.    furosemide (LASIX) 40 MG tablet Take 1 tablet (40 mg total) by mouth once daily.    gabapentin (NEURONTIN) 100 MG capsule Take 1 capsule (100 mg total) by mouth 3 (three) times daily.    insulin aspart U-100 (NOVOLOG) 100 unit/mL (3 mL) InPn pen Inject 0-5 Units into the skin before meals and at bedtime as needed (Hyperglycemia).    levoFLOXacin (LEVAQUIN) 500  MG tablet Take 1 tablet (500 mg total) by mouth once daily.    levothyroxine (SYNTHROID) 50 MCG tablet Take 1 tablet (50 mcg total) by mouth before breakfast.    pantoprazole (PROTONIX) 20 MG tablet Take 40 mg by mouth once daily.    valsartan (DIOVAN) 160 MG tablet Take 1 tablet (160 mg total) by mouth once daily.    vitamin D 1000 units Tab Take 185 mg by mouth once daily.    LIPITOR 40 mg tablet Take 1 tablet (40 mg total) by mouth once daily.     Family History    None       Tobacco Use    Smoking status: Former    Smokeless tobacco: Never   Substance and Sexual Activity    Alcohol use: No    Drug use: No    Sexual activity: Not on file     Review of Systems   Constitutional:  Negative for chills and fever.   HENT:  Negative for rhinorrhea, sneezing and sore throat.    Eyes:  Negative for pain and visual disturbance.   Respiratory:  Positive for shortness of breath. Negative for cough.    Cardiovascular:  Positive for leg swelling. Negative for chest pain.   Gastrointestinal:  Negative for abdominal pain, constipation and diarrhea.   Endocrine: Negative for cold intolerance and heat intolerance.   Genitourinary:  Negative for difficulty urinating.   Musculoskeletal:  Negative for arthralgias and joint swelling.   Skin:  Negative for rash.   Allergic/Immunologic: Negative for environmental allergies.   Neurological:  Negative for dizziness, syncope and weakness.   Hematological:  Does not bruise/bleed easily.   Psychiatric/Behavioral:  Negative for dysphoric mood. The patient is not nervous/anxious.      Objective:     Vital Signs (Most Recent):  Temp: 97.1 °F (36.2 °C) (07/30/24 0258)  Pulse: 71 (07/30/24 0745)  Resp: 20 (07/30/24 0714)  BP: (!) 142/72 (07/30/24 0258)  SpO2: 97 % (07/30/24 0714) Vital Signs (24h Range):  Temp:  [97 °F (36.1 °C)-98.6 °F (37 °C)] 97.1 °F (36.2 °C)  Pulse:  [47-77] 71  Resp:  [18-28] 20  SpO2:  [91 %-98 %] 97 %  BP: (114-146)/(57-74) 142/72     Weight: 104.3 kg (229 lb 15 oz)  Body  mass index is 39.47 kg/m².     Physical Exam  Constitutional:       Appearance: She is obese.   HENT:      Nose: Nose normal.      Mouth/Throat:      Mouth: Mucous membranes are moist.   Eyes:      Extraocular Movements: Extraocular movements intact.      Pupils: Pupils are equal, round, and reactive to light.   Cardiovascular:      Rate and Rhythm: Normal rate and regular rhythm.   Pulmonary:      Effort: Respiratory distress present.      Breath sounds: Wheezing and rales present.      Comments: Decreased BS at bases - bipap  Abdominal:      General: Bowel sounds are normal. There is no distension.      Palpations: Abdomen is soft.      Tenderness: There is no abdominal tenderness.   Genitourinary:     Comments: Hardwick in place  Musculoskeletal:      Cervical back: Normal range of motion.      Right lower leg: Edema present.      Left lower leg: Edema present.      Comments: Edema improving   Skin:     General: Skin is warm and dry.      Capillary Refill: Capillary refill takes 2 to 3 seconds.   Neurological:      General: No focal deficit present.      Mental Status: She is alert and oriented to person, place, and time.              CRANIAL NERVES     CN III, IV, VI   Pupils are equal, round, and reactive to light.       Significant Labs: All pertinent labs within the past 24 hours have been reviewed.    Significant Imaging: I have reviewed all pertinent imaging results/findings within the past 24 hours.   Normal

## 2025-04-23 NOTE — PT/OT/SLP PROGRESS
"Physical Therapy Treatment    Patient Name:  Carmen Tan   MRN:  [id number]    Recommendations:     Discharge Recommendations: other (see comments) (post acute care rehabilitation)  Discharge Equipment Recommendations: none  Barriers to discharge: None    Assessment:     Carmen Tan is a 73 y.o. female admitted with a medical diagnosis of Acute respiratory failure with hypoxia.  She presents with the following impairments/functional limitations: weakness, impaired endurance, impaired functional mobility, gait instability, impaired balance, decreased coordination, decreased lower extremity function, decreased safety awareness, pain Patient was found supine in bed with BI-PAP on.  Agreeable to therapy.  Patient tolerated standing and side stepping by the bed, improving tolerance to activity.  Progress as tolerated. .    Rehab Prognosis: Good; patient would benefit from acute skilled PT services to address these deficits and reach maximum level of function.    Recent Surgery: * No surgery found *      Plan:     During this hospitalization, patient to be seen 5 x/week to address the identified rehab impairments via gait training, therapeutic activities, therapeutic exercises, neuromuscular re-education, wheelchair management/training and progress toward the following goals:    Plan of Care Expires:  04/22/23    Subjective     Chief Complaint: "I did stand, I did good."   Patient/Family Comments/goals: Get better.  Pain/Comfort:  Pain Rating 1: 0/10  Pain Rating Post-Intervention 1: 0/10      Objective:     Communicated with nurse and patient  prior to session.  Patient found supine with BIPAP, peripheral IV, pulse ox (continuous), PureWick upon PT entry to room.     General Precautions: Standard, fall, respiratory  Orthopedic Precautions: N/A  Braces: N/A  Respiratory Status:  BI-PAP      Functional Mobility:  Bed Mobility:     Rolling Left:  minimum assistance  Rolling Right: minimum assistance  Scooting: " C/w levothyroxine 50 mcg daily   minimum assistance and moderate assistance  Bridging: minimum assistance  Supine to Sit: minimum assistance  Sit to Supine: moderate assistance  Transfers:     Sit to Stand:  moderate assistance with rolling walker  Gait: 4 feet with RW side stepping by the bed with mod assist x 2, BI-PAP on  Balance: CGA with static sitting, mod assist x 2 with standing standing using a RW       AM-PAC 6 CLICK MOBILITY  Turning over in bed (including adjusting bedclothes, sheets and blankets)?: 3  Sitting down on and standing up from a chair with arms (e.g., wheelchair, bedside commode, etc.): 2  Moving from lying on back to sitting on the side of the bed?: 3  Moving to and from a bed to a chair (including a wheelchair)?: 2  Need to walk in hospital room?: 2  Climbing 3-5 steps with a railing?: 1 (based on clinical judgement)  Basic Mobility Total Score: 13       Treatment & Education:  roles and goals of PT, transfer training, bed mobility, gait training, balance training, safety awareness, body mechanics, assistive device, bed positioning, strengthening exercises, and fall prevention    Patient left HOB elevated with all lines intact, call button in reach, bed alarm on, and nurse notified..    GOALS:   Multidisciplinary Problems       Physical Therapy Goals          Problem: Physical Therapy    Goal Priority Disciplines Outcome Goal Variances Interventions   Physical Therapy Goal     PT, PT/OT Ongoing, Progressing     Description: Patient will increase functional independence with mobility by performin. Supine to sit with Standby Assistance  2. Sit to supine with Standby Assistance  3. Bed to chair transfer with Standby Assistancewith or without rolling walker using Step Transfer TECHNIQUE  4. Gait  x 50  feet with Standby Assistance with or without rolling walker  5. Lower extremity exercise program x10 reps per handout, with assistance as needed                           Time Tracking:     PT Received On: 23  PT  Start Time: 1122     PT Stop Time: 1135  PT Total Time (min): 13 min     Billable Minutes: Therapeutic Activity 13    Treatment Type: Treatment  PT/PTA: PT     Number of PTA visits since last PT visit: 0     04/11/2023